# Patient Record
Sex: FEMALE | Race: WHITE | NOT HISPANIC OR LATINO | Employment: OTHER | URBAN - METROPOLITAN AREA
[De-identification: names, ages, dates, MRNs, and addresses within clinical notes are randomized per-mention and may not be internally consistent; named-entity substitution may affect disease eponyms.]

---

## 2017-01-10 ENCOUNTER — TELEPHONE (OUTPATIENT)
Dept: FAMILY MEDICINE CLINIC | Facility: CLINIC | Age: 77
End: 2017-01-10

## 2017-01-10 NOTE — TELEPHONE ENCOUNTER
Colds are caused by viruses of which self resolve and abx don't help.  I would suggest codeine-guaifenesis 5-10 ml every 6 hours as needed for cough/congestion disp 240ml and nasal saline rinses 2-3 times a day.  Let me know if no improvement in another week or so or if soa, fevers continuing or no imrpovement.

## 2017-01-12 ENCOUNTER — TELEPHONE (OUTPATIENT)
Dept: FAMILY MEDICINE CLINIC | Facility: CLINIC | Age: 77
End: 2017-01-12

## 2017-01-12 NOTE — TELEPHONE ENCOUNTER
2 days isn't very long time.  Send in zithromax 250mg 2 x1 and then 1po daily x 4 days disp #6 and hold onto, would try give couple more days.    RRJ

## 2017-04-04 ENCOUNTER — OFFICE VISIT (OUTPATIENT)
Dept: OBSTETRICS AND GYNECOLOGY | Age: 77
End: 2017-04-04

## 2017-04-04 VITALS
WEIGHT: 154 LBS | SYSTOLIC BLOOD PRESSURE: 148 MMHG | BODY MASS INDEX: 26.29 KG/M2 | HEIGHT: 64 IN | DIASTOLIC BLOOD PRESSURE: 72 MMHG

## 2017-04-04 DIAGNOSIS — Z79.890 POST-MENOPAUSE ON HRT (HORMONE REPLACEMENT THERAPY): ICD-10-CM

## 2017-04-04 DIAGNOSIS — Z78.0 MENOPAUSE: ICD-10-CM

## 2017-04-04 DIAGNOSIS — Z01.419 WELL WOMAN EXAM WITH ROUTINE GYNECOLOGICAL EXAM: Primary | ICD-10-CM

## 2017-04-04 LAB
BILIRUB BLD-MCNC: NEGATIVE MG/DL
GLUCOSE UR STRIP-MCNC: NEGATIVE MG/DL
KETONES UR QL: NEGATIVE
LEUKOCYTE EST, POC: NEGATIVE
NITRITE UR-MCNC: NEGATIVE MG/ML
PH UR: 7 [PH] (ref 5–8)
PROT UR STRIP-MCNC: NEGATIVE MG/DL
RBC # UR STRIP: NEGATIVE /UL
SP GR UR: 1.02 (ref 1–1.03)
UROBILINOGEN UR QL: NORMAL

## 2017-04-04 PROCEDURE — 81003 URINALYSIS AUTO W/O SCOPE: CPT | Performed by: OBSTETRICS & GYNECOLOGY

## 2017-04-04 PROCEDURE — G0101 CA SCREEN;PELVIC/BREAST EXAM: HCPCS | Performed by: OBSTETRICS & GYNECOLOGY

## 2017-04-04 RX ORDER — AZITHROMYCIN 250 MG/1
TABLET, FILM COATED ORAL
COMMUNITY
Start: 2017-01-13 | End: 2017-04-04

## 2017-04-04 NOTE — PROGRESS NOTES
"ANNUAL GYN EXAM        2017    Subjective   Martina Blake is a 76 y.o., G2, ,  White Female  OB History    Para Term  AB SAB TAB Ectopic Multiple Living   2 2 2 0 0 0 0 0 0 2      # Outcome Date GA Lbr Paul/2nd Weight Sex Delivery Anes PTL Lv   2 Term            1 Term                    Chief complaint:   Chief Complaint   Patient presents with   • Gynecologic Exam     Annual Exam, Mammogram 2016 @ [place]   • Nocturia     History of Present Illness:  Nocturia, 2-3 times per night. Voids 4-5 times per daylight hours. Rare urgency incontinence.    1.Menstrual Hx:  Hysterectomy , KATHERIN, both ovaries still in. Taking 1/2 of a 0.5 mg estradiol tablet daily. Doing well on this.    2.Pap Smear Hx:  Prior to first exam in , never had an abnormal Pap smear.                                 [id number], normal yearly Pap Smears.                                 No longer needs yearly Pap smears.     3.Breast / Ovarian Hx:  Mammogram 2016 [place].  Does not do Regular SBE.    No family history of breast cancer.  No family history of ovarian cancer.    4.Family Hx of Colon Ca:  None.  Last Colonoscopy in .        Family Hx of Uterine Ca:   None.     5.New Past Medical Hx: No new PHx.  Past Medical History:   Diagnosis Date   • Cystitis    • Cystocele     moderate   • Dyslipidemia    • Esophageal reflux    • Fatigue    • Hypertension    • Major depression, chronic    • Menopause    • Osteoarthritis    • Osteoporosis    • Palpitations    • Post menopausal problems    • RLS (restless legs syndrome)    • Seizure disorder    • Subjective tinnitus    • Vaginal delivery     x2  \"SONYA\"      \"JASON\"   • Vitamin D deficiency          6.New Fam Medical Hx: No new Fhx.  Family History   Problem Relation Age of Onset   • No Known Problems Mother    • Stroke Father 54      @ 60    • No Known Problems Maternal Grandmother    • No Known Problems Maternal Grandfather    • Heart disease Paternal " "Grandmother    • Heart disease Paternal Grandfather    • No Known Problems Daughter    • Stroke Brother 76   • Diabetes type II Brother          7.   Past Surgical History:   Procedure Laterality Date   • CHOLECYSTECTOMY     • COLONOSCOPY     • CRANIOTOMY     • TOTAL ABDOMINAL HYSTERECTOMY  1980    w/ bladder suspension.  Probably vaginal hysterectomy with anterior colporrhaphy.             The following portions of the patient's history were reviewed / updated as appropriate: allergies, current medications, past medical history, past surgical history, past family history, past social history, and problem list.      Review of Systems   Constitutional: Negative.    HENT: Negative.    Eyes: Negative.    Respiratory: Negative.    Cardiovascular: Negative.    Gastrointestinal: Negative.    Endocrine: Negative.    Genitourinary: Positive for frequency and urgency.   Musculoskeletal: Negative.    Skin: Negative.    Neurological: Negative.    Hematological: Negative.    Psychiatric/Behavioral: Positive for sleep disturbance.       Objective     /72  Ht 64\" (162.6 cm)  Wt 154 lb (69.9 kg)  BMI 26.43 kg/m2    PHYSICAL EXAM    Constitutional: Well-developed, well-nourished, mildly overweight white female, in no distress, alert and well oriented ×3.    Skin is warm, dry, without rash.       Neck appears normal, trachea in the midline.  Thyroid exam normal.  No carotid bruits bilaterally.         No cervical lymphadenopathy.    Lungs clear to auscultation.  Chest wall appears normal.    Heart with regular rhythm without murmur or gallop.    Breasts:         Right breast nontender, without dominant mass.  No nipple discharge.            No superficial skin changes.  No axillary adenopathy.        Left breast nontender, without dominant mass.  No nipple discharge.            No superficial skin changes.  No axillary adenopathy.      Abdomen is flat, soft and nontender.No palpable mass.           No hepatosplenomegaly.  " Flanks are negative.          Bowel sounds normal.  No abdominal bruit.          No inguinal lymphadenopathy bilaterally.     Pelvic exam :  Vulva normal.           External genitalia normal.  BUS negative.             Introitus normal.  Vaginal mucosa normal.            Vaginal cuff looks normal, no Pap smear.          Bimanual exam negative, tolerates 2 finger exam.  No palpable mass.               Adnexa are negative bilaterally.  Cannot feel either ovary.  No tenderness.  No palpable mass.          Rectovaginal exam negative .      Musc /Skel: Lower extremities without edema.     Neuro: Coordination is grossly normal.  Gait is normal.    Psych: Mood and affect is normal.  Behavior normal.  Thought content normal.            Judgment normal.          Martina was seen today for gynecologic exam and nocturia.    Diagnoses and all orders for this visit:    Well woman exam with routine gynecological exam  -     POC Urinalysis Dipstick, Automated    Menopause    Post-menopause on HRT (hormone replacement therapy)  Comments:  Doing well on half a tablet of 0.5 mg estradiol daily.      COMMENTS:      Dustin Velazquez MD

## 2017-04-13 DIAGNOSIS — M81.0 POSTMENOPAUSAL OSTEOPOROSIS: Primary | ICD-10-CM

## 2017-04-13 DIAGNOSIS — Z79.899 DRUG THERAPY: ICD-10-CM

## 2017-04-14 DIAGNOSIS — I10 BENIGN ESSENTIAL HYPERTENSION: Primary | ICD-10-CM

## 2017-04-14 DIAGNOSIS — R73.09 BLOOD GLUCOSE ABNORMAL: ICD-10-CM

## 2017-04-14 DIAGNOSIS — E55.9 VITAMIN D DEFICIENCY: ICD-10-CM

## 2017-04-14 DIAGNOSIS — R53.83 FATIGUE, UNSPECIFIED TYPE: ICD-10-CM

## 2017-04-14 DIAGNOSIS — E78.5 DYSLIPIDEMIA: ICD-10-CM

## 2017-04-14 LAB
25(OH)D3+25(OH)D2 SERPL-MCNC: 48.1 NG/ML (ref 30–100)
ALBUMIN SERPL-MCNC: 4.4 G/DL (ref 3.5–5.2)
ALBUMIN/GLOB SERPL: 1.6 G/DL
ALP SERPL-CCNC: 73 U/L (ref 39–117)
ALT SERPL-CCNC: 13 U/L (ref 1–33)
AST SERPL-CCNC: 19 U/L (ref 1–32)
BILIRUB SERPL-MCNC: 0.3 MG/DL (ref 0.1–1.2)
BUN SERPL-MCNC: 20 MG/DL (ref 8–23)
BUN/CREAT SERPL: 27.4 (ref 7–25)
CALCIUM SERPL-MCNC: 9.8 MG/DL (ref 8.6–10.5)
CHLORIDE SERPL-SCNC: 97 MMOL/L (ref 98–107)
CHOLEST SERPL-MCNC: 209 MG/DL (ref 0–200)
CO2 SERPL-SCNC: 31 MMOL/L (ref 22–29)
CREAT SERPL-MCNC: 0.73 MG/DL (ref 0.57–1)
GLOBULIN SER CALC-MCNC: 2.8 GM/DL
GLUCOSE SERPL-MCNC: 105 MG/DL (ref 65–99)
HBA1C MFR BLD: 5.2 % (ref 4.8–5.6)
HDLC SERPL-MCNC: 69 MG/DL (ref 40–60)
LDLC SERPL CALC-MCNC: 126 MG/DL (ref 0–100)
POTASSIUM SERPL-SCNC: 4.4 MMOL/L (ref 3.5–5.2)
PROT SERPL-MCNC: 7.2 G/DL (ref 6–8.5)
SODIUM SERPL-SCNC: 138 MMOL/L (ref 136–145)
TRIGL SERPL-MCNC: 72 MG/DL (ref 0–150)
VLDLC SERPL CALC-MCNC: 14.4 MG/DL (ref 5–40)

## 2017-04-18 ENCOUNTER — RESULTS ENCOUNTER (OUTPATIENT)
Dept: FAMILY MEDICINE CLINIC | Facility: CLINIC | Age: 77
End: 2017-04-18

## 2017-04-18 DIAGNOSIS — Z79.899 DRUG THERAPY: ICD-10-CM

## 2017-04-18 DIAGNOSIS — M81.0 POSTMENOPAUSAL OSTEOPOROSIS: ICD-10-CM

## 2017-04-19 ENCOUNTER — RESULTS ENCOUNTER (OUTPATIENT)
Dept: FAMILY MEDICINE CLINIC | Facility: CLINIC | Age: 77
End: 2017-04-19

## 2017-04-19 DIAGNOSIS — R73.09 BLOOD GLUCOSE ABNORMAL: ICD-10-CM

## 2017-04-19 DIAGNOSIS — I10 BENIGN ESSENTIAL HYPERTENSION: ICD-10-CM

## 2017-04-19 DIAGNOSIS — E55.9 VITAMIN D DEFICIENCY: ICD-10-CM

## 2017-04-19 DIAGNOSIS — E78.5 DYSLIPIDEMIA: ICD-10-CM

## 2017-04-21 ENCOUNTER — OFFICE VISIT (OUTPATIENT)
Dept: FAMILY MEDICINE CLINIC | Facility: CLINIC | Age: 77
End: 2017-04-21

## 2017-04-21 VITALS
HEIGHT: 64 IN | DIASTOLIC BLOOD PRESSURE: 80 MMHG | BODY MASS INDEX: 26.29 KG/M2 | OXYGEN SATURATION: 99 % | WEIGHT: 154 LBS | HEART RATE: 54 BPM | SYSTOLIC BLOOD PRESSURE: 128 MMHG | TEMPERATURE: 97.9 F

## 2017-04-21 DIAGNOSIS — I10 BENIGN ESSENTIAL HYPERTENSION: Primary | ICD-10-CM

## 2017-04-21 DIAGNOSIS — E78.5 DYSLIPIDEMIA: ICD-10-CM

## 2017-04-21 DIAGNOSIS — G25.81 RESTLESS LEGS SYNDROME: ICD-10-CM

## 2017-04-21 DIAGNOSIS — E55.9 VITAMIN D DEFICIENCY: ICD-10-CM

## 2017-04-21 PROCEDURE — 99214 OFFICE O/P EST MOD 30 MIN: CPT | Performed by: FAMILY MEDICINE

## 2017-04-21 RX ORDER — GABAPENTIN 300 MG/1
300 CAPSULE ORAL NIGHTLY
Qty: 30 CAPSULE | Refills: 5 | Status: SHIPPED | OUTPATIENT
Start: 2017-04-21 | End: 2017-10-30 | Stop reason: SDUPTHER

## 2017-04-21 NOTE — PROGRESS NOTES
Subjective   Martina Blake is a 76 y.o. female. Presents today for   Chief Complaint   Patient presents with   • Hypertension     pt here for 6 month med review and labs.    • Rash     pt has been getting itchy rash all over   • Restless Legs Syndrome     pt has been getting restless legs at night     Had preappt labs as below  Rash   This is a new problem. The current episode started 1 to 4 weeks ago. The rash is diffuse. Rash characteristics: a few area of excoriations and dry skin, otherwise no rash;  Is wide spread. She was exposed to a new detergent/soap. Pertinent negatives include no shortness of breath or vomiting. Past treatments include moisturizer. The treatment provided no relief.   Hypertension   This is a chronic problem. The current episode started more than 1 year ago. The problem is unchanged. The problem is controlled. Pertinent negatives include no chest pain, headaches, orthopnea, palpitations, peripheral edema, PND or shortness of breath. (Heart racing occly, none recently;  Saw Cardiology last month, d/w beta blocker vs ablation for SVT, but asx and so no changes.  Has not had any issues.) There are no associated agents to hypertension. Past treatments include calcium channel blockers and ACE inhibitors. The current treatment provides moderate improvement.   hx of elevated lipids, not on statin.  Hx of IFG, a1c normal and .  Hx of low vitamin D, but normal.  Cannot sleep due to RLS, would like try medication as not getting better on own.    Review of Systems   Constitutional: Unexpected weight change: Weight gain or loss.   Respiratory: Negative for shortness of breath and wheezing.    Cardiovascular: Negative for chest pain, palpitations, orthopnea, leg swelling and PND.   Gastrointestinal: Negative for abdominal pain, nausea and vomiting.   Musculoskeletal: Negative for myalgias.   Skin: Positive for rash.   Neurological: Negative for dizziness, tremors, syncope, facial asymmetry,  speech difficulty, weakness, light-headedness, numbness and headaches.       The following portions of the patient's history were reviewed and updated as appropriate: allergies, current medications, past medical history and problem list.    Patient Active Problem List   Diagnosis   • Blood glucose abnormal   • Benign essential hypertension   • Dyslipidemia   • Gastroesophageal reflux disease   • Fatigue   • Generalized osteoarthritis   • Chronic recurrent major depressive disorder   • Menopause present   • Osteoporosis   • Palpitations   • Restless legs syndrome   • Seizure disorder   • Subjective tinnitus   • Vitamin D deficiency   • Bradycardia   • Chest pain   • Menopause   • Post-menopause on HRT (hormone replacement therapy)       Allergies   Allergen Reactions   • Crab (Diagnostic) Itching and Rash   • Pseudoephedrine        Current Outpatient Prescriptions on File Prior to Visit   Medication Sig Dispense Refill   • amLODIPine (NORVASC) 10 MG tablet Take 1 tablet by mouth Daily. 90 tablet 3   • aspirin 81 MG EC tablet Take 81 mg by mouth daily.     • benazepril (LOTENSIN) 40 MG tablet Take 1 tablet by mouth Daily. 90 tablet 3   • Calcium Carb-Cholecalciferol (CALCIUM 600 + D PO) Take  by mouth.     • Denosumab (PROLIA SC) Inject  under the skin.     • escitalopram (LEXAPRO) 5 MG tablet Take 1 tablet by mouth Daily. 90 tablet 3   • estradiol (ESTRACE) 0.5 MG tablet Take 1 tablet by mouth Daily. 90 tablet 3   • folic acid (FOLVITE) 400 MCG tablet Take 400 mcg by mouth daily.     • hydrALAZINE (APRESOLINE) 100 MG tablet Take 1 tablet by mouth 3 (Three) Times a Day. 270 tablet 3   • hydrochlorothiazide (HYDRODIURIL) 25 MG tablet Take 1 tablet by mouth Daily. 90 tablet 3   • Omega-3 Fatty Acids (FISH OIL) 1000 MG capsule capsule Take  by mouth daily with breakfast.     • pantoprazole (PROTONIX) 40 MG EC tablet Take 1 tablet by mouth Daily. 90 tablet 3   • phenytoin (DILANTIN) 100 MG ER capsule 3 at hs 270 capsule 3  "  • vitamin B-12 (CYANOCOBALAMIN) 100 MCG tablet Take 50 mcg by mouth daily.     • vitamin B-6 (PYRIDOXINE) 100 MG tablet Take 100 mg by mouth daily.     • vitamin C (ASCORBIC ACID) 500 MG tablet Take 500 mg by mouth daily.     • vitamin D (CHOLECALCIFEROL) 400 UNITS tablet Take 400 Units by mouth daily.       No current facility-administered medications on file prior to visit.        Objective   Vitals:    04/21/17 1343   BP: 128/80   BP Location: Left arm   Patient Position: Sitting   Cuff Size: Adult   Pulse: 54   Temp: 97.9 °F (36.6 °C)   TempSrc: Oral   SpO2: 99%   Weight: 154 lb (69.9 kg)   Height: 64\" (162.6 cm)       Physical Exam   Constitutional: She appears well-developed and well-nourished.   HENT:   Head: Normocephalic and atraumatic.   Neck: Neck supple. No JVD present. No thyromegaly present.   Cardiovascular: Normal rate, regular rhythm and normal heart sounds.  Exam reveals no gallop and no friction rub.    No murmur heard.  Pulmonary/Chest: Effort normal and breath sounds normal. No respiratory distress. She has no wheezes. She has no rales.   Abdominal: Soft. Bowel sounds are normal. She exhibits no distension. There is no tenderness. There is no rebound and no guarding.   Musculoskeletal: She exhibits no edema.   Neurological: She is alert.   Skin: Skin is warm and dry.   Psychiatric: She has a normal mood and affect. Her behavior is normal.   Nursing note and vitals reviewed.      Hemoglobin A1c   Order: [id number]   Collected:  4/14/2017 10:00 AM   Notes Recorded by Jamie Walton DO on 4/15/2017 at 8:18 AM  Will discuss at follow-up office visit 4/21/17      Ref Range & Units 7d ago     Hemoglobin A1C 4.80 - 5.60 % 5.20   Comments: Hemoglobin A1C Ranges:   Increased Risk for Diabetes  5.7% to 6.4%   Diabetes                     >= 6.5%   Diabetic Goal                < 7.0%      View Full Report  Narrative   Performed at:  [address] "  [id number]  : Jesus Sheldon MD, Phone:  [phone]               Vitamin D 25 hydroxy   Order: [id number]   Collected:  4/14/2017 10:00 AM   Notes Recorded by Jamie Walton DO on 4/15/2017 at 8:18 AM  Will discuss at follow-up office visit 4/21/17      Ref Range & Units 7d ago     25 Hydroxy, Vitamin D 30.0 - 100.0 ng/mL 48.1   Comments: Reference Range for Total Vitamin D 25(OH)   Deficiency    <20.0 ng/mL   Insufficiency 21-29 ng/mL   Sufficiency    ng/mL   Toxicity      >100 ng/ml          View Full Report  Narrative   Performed at:  [address] [place] [address]  [address] [place], [place]  [id number]  : Jesus Sheldon MD, Phone:  [phone]                  Lipid panel   Order: [id number]   Collected:  4/14/2017 10:00 AM   Notes Recorded by Jamie Walton DO on 4/15/2017 at 8:18 AM  Will discuss at follow-up office visit 4/21/17      Ref Range & Units 7d ago     Total Cholesterol 0 - 200 mg/dL 209 (H)   Triglycerides 0 - 150 mg/dL 72   HDL Cholesterol 40 - 60 mg/dL 69 (H)   VLDL Cholesterol 5 - 40 mg/dL 14.4   LDL Cholesterol  0 - 100 mg/dL 126 (H)   View Full Report  Narrative   Performed at:  [address] [place] [address]  [address] [place], [place]  [id number]  : Jesus Sheldon MD, Phone:  [phone]                  Comprehensive metabolic panel   Order: [id number]   Collected:  4/14/2017 10:00 AM   Notes Recorded by Jamie Walton DO on 4/15/2017 at 8:18 AM  Will discuss at follow-up office visit 4/21/17      Ref Range & Units 7d ago     Glucose 65 - 99 mg/dL 105 (H)   BUN 8 - 23 mg/dL 20   Creatinine 0.57 - 1.00 mg/dL 0.73   eGFR Non African Am >60 mL/min/1.73 78   Comments: The MDRD GFR formula is only valid for adults with stable   renal function between ages 18 and 70.      eGFR African Am >60 mL/min/1.73 94   BUN/Creatinine Ratio 7.0 - 25.0 27.4 (H)   Sodium 136 - 145 mmol/L 138   Potassium 3.5 - 5.2 mmol/L 4.4   Chloride 98 - 107 mmol/L 97 (L)    Total CO2 22.0 - 29.0 mmol/L 31.0 (H)   Calcium 8.6 - 10.5 mg/dL 9.8   Total Protein 6.0 - 8.5 g/dL 7.2   Albumin 3.50 - 5.20 g/dL 4.40   Globulin gm/dL 2.8   A/G Ratio g/dL 1.6   Total Bilirubin 0.1 - 1.2 mg/dL 0.3   Alkaline Phosphatase 39 - 117 U/L 73   AST (SGOT) 1 - 32 U/L 19   ALT (SGPT) 1 - 33 U/L 13   View Full Report  Narrative   Performed at:  [facility] [address] [facility]  [address] [place], [place]  [id number]  : Jesus Sheldon MD, Phone:  [phone]                   Assessment/Plan   Martina was seen today for hypertension, rash and restless legs syndrome.    Diagnoses and all orders for this visit:    Benign essential hypertension    Dyslipidemia    Restless legs syndrome  -     gabapentin (NEURONTIN) 300 MG capsule; Take 1 capsule by mouth Every Night.    Vitamin D deficiency    Other orders  -     Cancel: Comprehensive Metabolic Panel  -     Cancel: Lipid Panel    -labs look good  -cholesterol ok, HDL high;  Not on statin  -hypertension - controlled, continue medications  -vitamin d controlled  -try dove sensitive skin bar soap only and see if itching improves       -Follow up: 1 year       Current Outpatient Prescriptions:   •  amLODIPine (NORVASC) 10 MG tablet, Take 1 tablet by mouth Daily., Disp: 90 tablet, Rfl: 3  •  aspirin 81 MG EC tablet, Take 81 mg by mouth daily., Disp: , Rfl:   •  benazepril (LOTENSIN) 40 MG tablet, Take 1 tablet by mouth Daily., Disp: 90 tablet, Rfl: 3  •  Calcium Carb-Cholecalciferol (CALCIUM 600 + D PO), Take  by mouth., Disp: , Rfl:   •  Denosumab (PROLIA SC), Inject  under the skin., Disp: , Rfl:   •  escitalopram (LEXAPRO) 5 MG tablet, Take 1 tablet by mouth Daily., Disp: 90 tablet, Rfl: 3  •  estradiol (ESTRACE) 0.5 MG tablet, Take 1 tablet by mouth Daily., Disp: 90 tablet, Rfl: 3  •  folic acid (FOLVITE) 400 MCG tablet, Take 400 mcg by mouth daily., Disp: , Rfl:   •  hydrALAZINE (APRESOLINE) 100 MG tablet, Take 1 tablet by mouth 3 (Three) Times a  Day., Disp: 270 tablet, Rfl: 3  •  hydrochlorothiazide (HYDRODIURIL) 25 MG tablet, Take 1 tablet by mouth Daily., Disp: 90 tablet, Rfl: 3  •  Omega-3 Fatty Acids (FISH OIL) 1000 MG capsule capsule, Take  by mouth daily with breakfast., Disp: , Rfl:   •  pantoprazole (PROTONIX) 40 MG EC tablet, Take 1 tablet by mouth Daily., Disp: 90 tablet, Rfl: 3  •  phenytoin (DILANTIN) 100 MG ER capsule, 3 at hs, Disp: 270 capsule, Rfl: 3  •  vitamin B-12 (CYANOCOBALAMIN) 100 MCG tablet, Take 50 mcg by mouth daily., Disp: , Rfl:   •  vitamin B-6 (PYRIDOXINE) 100 MG tablet, Take 100 mg by mouth daily., Disp: , Rfl:   •  vitamin C (ASCORBIC ACID) 500 MG tablet, Take 500 mg by mouth daily., Disp: , Rfl:   •  vitamin D (CHOLECALCIFEROL) 400 UNITS tablet, Take 400 Units by mouth daily., Disp: , Rfl:   •  gabapentin (NEURONTIN) 300 MG capsule, Take 1 capsule by mouth Every Night., Disp: 30 capsule, Rfl: 5

## 2017-05-09 ENCOUNTER — OFFICE VISIT (OUTPATIENT)
Dept: FAMILY MEDICINE CLINIC | Facility: CLINIC | Age: 77
End: 2017-05-09

## 2017-05-09 VITALS
BODY MASS INDEX: 25.95 KG/M2 | OXYGEN SATURATION: 98 % | DIASTOLIC BLOOD PRESSURE: 70 MMHG | HEIGHT: 64 IN | SYSTOLIC BLOOD PRESSURE: 124 MMHG | HEART RATE: 60 BPM | WEIGHT: 152 LBS | TEMPERATURE: 97.9 F

## 2017-05-09 DIAGNOSIS — L29.9 ITCHING: Primary | ICD-10-CM

## 2017-05-09 PROCEDURE — 99213 OFFICE O/P EST LOW 20 MIN: CPT | Performed by: NURSE PRACTITIONER

## 2017-05-19 ENCOUNTER — TELEPHONE (OUTPATIENT)
Dept: FAMILY MEDICINE CLINIC | Facility: CLINIC | Age: 77
End: 2017-05-19

## 2017-05-19 DIAGNOSIS — B86 SCABIES: Primary | ICD-10-CM

## 2017-05-19 RX ORDER — PERMETHRIN 50 MG/G
CREAM TOPICAL ONCE
Qty: 60 G | Refills: 1 | Status: SHIPPED | OUTPATIENT
Start: 2017-05-19 | End: 2017-05-19

## 2017-10-17 RX ORDER — AMLODIPINE BESYLATE 10 MG/1
TABLET ORAL
Qty: 90 TABLET | Refills: 0 | Status: SHIPPED | OUTPATIENT
Start: 2017-10-17 | End: 2017-10-30 | Stop reason: SDUPTHER

## 2017-10-18 ENCOUNTER — TELEPHONE (OUTPATIENT)
Dept: FAMILY MEDICINE CLINIC | Facility: CLINIC | Age: 77
End: 2017-10-18

## 2017-10-19 DIAGNOSIS — Z79.899 DRUG THERAPY: ICD-10-CM

## 2017-10-19 DIAGNOSIS — E55.9 VITAMIN D DEFICIENCY: ICD-10-CM

## 2017-10-19 DIAGNOSIS — I10 BENIGN ESSENTIAL HYPERTENSION: Primary | ICD-10-CM

## 2017-10-19 DIAGNOSIS — E78.5 DYSLIPIDEMIA: ICD-10-CM

## 2017-10-19 DIAGNOSIS — M81.0 AGE-RELATED OSTEOPOROSIS WITHOUT CURRENT PATHOLOGICAL FRACTURE: ICD-10-CM

## 2017-10-19 DIAGNOSIS — G40.909 SEIZURE DISORDER (HCC): ICD-10-CM

## 2017-10-27 LAB
25(OH)D3+25(OH)D2 SERPL-MCNC: 48.3 NG/ML (ref 30–100)
ALBUMIN SERPL-MCNC: 4.1 G/DL (ref 3.5–5.2)
ALBUMIN/GLOB SERPL: 1.4 G/DL
ALP SERPL-CCNC: 106 U/L (ref 39–117)
ALT SERPL-CCNC: 20 U/L (ref 1–33)
AST SERPL-CCNC: 25 U/L (ref 1–32)
BASOPHILS # BLD AUTO: 0.03 10*3/MM3 (ref 0–0.2)
BASOPHILS NFR BLD AUTO: 0.6 % (ref 0–1.5)
BILIRUB SERPL-MCNC: 0.4 MG/DL (ref 0.1–1.2)
BUN SERPL-MCNC: 16 MG/DL (ref 8–23)
BUN/CREAT SERPL: 26.7 (ref 7–25)
CALCIUM SERPL-MCNC: 9.5 MG/DL (ref 8.6–10.5)
CHLORIDE SERPL-SCNC: 94 MMOL/L (ref 98–107)
CHOLEST SERPL-MCNC: 204 MG/DL (ref 0–200)
CO2 SERPL-SCNC: 27.4 MMOL/L (ref 22–29)
CREAT SERPL-MCNC: 0.6 MG/DL (ref 0.57–1)
EOSINOPHIL # BLD AUTO: 0.39 10*3/MM3 (ref 0–0.7)
EOSINOPHIL NFR BLD AUTO: 7.7 % (ref 0.3–6.2)
ERYTHROCYTE [DISTWIDTH] IN BLOOD BY AUTOMATED COUNT: 13.1 % (ref 11.7–13)
GFR SERPLBLD CREATININE-BSD FMLA CKD-EPI: 117 ML/MIN/1.73
GFR SERPLBLD CREATININE-BSD FMLA CKD-EPI: 97 ML/MIN/1.73
GLOBULIN SER CALC-MCNC: 2.9 GM/DL
GLUCOSE SERPL-MCNC: 97 MG/DL (ref 65–99)
HCT VFR BLD AUTO: 36.6 % (ref 35.6–45.5)
HDLC SERPL-MCNC: 71 MG/DL (ref 40–60)
HGB BLD-MCNC: 12.2 G/DL (ref 11.9–15.5)
IMM GRANULOCYTES # BLD: 0 10*3/MM3 (ref 0–0.03)
IMM GRANULOCYTES NFR BLD: 0 % (ref 0–0.5)
LDLC SERPL CALC-MCNC: 123 MG/DL (ref 0–100)
LYMPHOCYTES # BLD AUTO: 0.52 10*3/MM3 (ref 0.9–4.8)
LYMPHOCYTES NFR BLD AUTO: 10.2 % (ref 19.6–45.3)
MCH RBC QN AUTO: 31.2 PG (ref 26.9–32)
MCHC RBC AUTO-ENTMCNC: 33.3 G/DL (ref 32.4–36.3)
MCV RBC AUTO: 93.6 FL (ref 80.5–98.2)
MONOCYTES # BLD AUTO: 0.83 10*3/MM3 (ref 0.2–1.2)
MONOCYTES NFR BLD AUTO: 16.3 % (ref 5–12)
NEUTROPHILS # BLD AUTO: 3.32 10*3/MM3 (ref 1.9–8.1)
NEUTROPHILS NFR BLD AUTO: 65.2 % (ref 42.7–76)
PHENYTOIN SERPL-MCNC: 4.6 MCG/ML (ref 10–20)
PLATELET # BLD AUTO: 204 10*3/MM3 (ref 140–500)
POTASSIUM SERPL-SCNC: 4.4 MMOL/L (ref 3.5–5.2)
PROT SERPL-MCNC: 7 G/DL (ref 6–8.5)
RBC # BLD AUTO: 3.91 10*6/MM3 (ref 3.9–5.2)
SODIUM SERPL-SCNC: 134 MMOL/L (ref 136–145)
TRIGL SERPL-MCNC: 50 MG/DL (ref 0–150)
VLDLC SERPL CALC-MCNC: 10 MG/DL (ref 5–40)
WBC # BLD AUTO: 5.09 10*3/MM3 (ref 4.5–10.7)

## 2017-10-29 NOTE — PROGRESS NOTES
Call and mail copy of results to patient.  Cholesterol adequate control  Phenytoin (dilantin) level is subtherapeutic, any missed doses?  Vitamin D normal  Blood counts normal

## 2017-10-30 ENCOUNTER — OFFICE VISIT (OUTPATIENT)
Dept: FAMILY MEDICINE CLINIC | Facility: CLINIC | Age: 77
End: 2017-10-30

## 2017-10-30 VITALS
SYSTOLIC BLOOD PRESSURE: 122 MMHG | BODY MASS INDEX: 27.46 KG/M2 | OXYGEN SATURATION: 99 % | WEIGHT: 160 LBS | DIASTOLIC BLOOD PRESSURE: 70 MMHG | HEART RATE: 68 BPM | TEMPERATURE: 97.4 F

## 2017-10-30 DIAGNOSIS — G25.81 RESTLESS LEGS SYNDROME: ICD-10-CM

## 2017-10-30 DIAGNOSIS — R21 RASH: Primary | ICD-10-CM

## 2017-10-30 DIAGNOSIS — L29.9 ITCHING: ICD-10-CM

## 2017-10-30 PROCEDURE — 99213 OFFICE O/P EST LOW 20 MIN: CPT | Performed by: FAMILY MEDICINE

## 2017-10-30 RX ORDER — PHENYTOIN SODIUM 100 MG/1
CAPSULE, EXTENDED RELEASE ORAL
Qty: 270 CAPSULE | Refills: 3 | Status: SHIPPED | OUTPATIENT
Start: 2017-10-30 | End: 2018-04-10

## 2017-10-30 RX ORDER — HYDROCHLOROTHIAZIDE 25 MG/1
25 TABLET ORAL DAILY
Qty: 90 TABLET | Refills: 3 | Status: SHIPPED | OUTPATIENT
Start: 2017-10-30 | End: 2018-04-14 | Stop reason: HOSPADM

## 2017-10-30 RX ORDER — HYDRALAZINE HYDROCHLORIDE 100 MG/1
100 TABLET, FILM COATED ORAL 3 TIMES DAILY
Qty: 270 TABLET | Refills: 3 | Status: ON HOLD | OUTPATIENT
Start: 2017-10-30 | End: 2018-04-14

## 2017-10-30 RX ORDER — GABAPENTIN 300 MG/1
CAPSULE ORAL
Qty: 90 CAPSULE | Refills: 5 | Status: SHIPPED | OUTPATIENT
Start: 2017-10-30 | End: 2017-12-01

## 2017-10-30 RX ORDER — PANTOPRAZOLE SODIUM 40 MG/1
40 TABLET, DELAYED RELEASE ORAL DAILY
Qty: 90 TABLET | Refills: 3 | Status: SHIPPED | OUTPATIENT
Start: 2017-10-30 | End: 2018-11-23 | Stop reason: SDUPTHER

## 2017-10-30 RX ORDER — DOXEPIN HYDROCHLORIDE 25 MG/1
25 CAPSULE ORAL NIGHTLY
COMMUNITY
End: 2018-04-02

## 2017-10-30 RX ORDER — CETIRIZINE HYDROCHLORIDE 10 MG/1
10 TABLET ORAL DAILY
COMMUNITY
End: 2018-05-16 | Stop reason: HOSPADM

## 2017-10-30 RX ORDER — AMLODIPINE BESYLATE 10 MG/1
10 TABLET ORAL DAILY
Qty: 90 TABLET | Refills: 3 | Status: SHIPPED | OUTPATIENT
Start: 2017-10-30 | End: 2018-11-05 | Stop reason: SDUPTHER

## 2017-10-30 RX ORDER — BENAZEPRIL HYDROCHLORIDE 40 MG/1
40 TABLET, FILM COATED ORAL DAILY
Qty: 90 TABLET | Refills: 3 | Status: SHIPPED | OUTPATIENT
Start: 2017-10-30 | End: 2018-11-05 | Stop reason: SDUPTHER

## 2017-10-30 RX ORDER — ESCITALOPRAM OXALATE 5 MG/1
5 TABLET ORAL DAILY
Qty: 90 TABLET | Refills: 3 | Status: SHIPPED | OUTPATIENT
Start: 2017-10-30 | End: 2018-05-16 | Stop reason: HOSPADM

## 2017-10-30 NOTE — PROGRESS NOTES
Subjective   Martina Blake is a 77 y.o. female. Presents today for   Chief Complaint   Patient presents with   • Follow-up     had scabes all summer and now her elbow and arms are sore she has been seeing derm.         Rash   This is a recurrent problem. The current episode started more than 1 month ago. Progression since onset: Dx with scabies, saw dermatology;  tx for scabies;  Given doxepin, zyrtec;  Tried stopping supplements;   Relates rash still present. The rash is diffuse. The rash is characterized by itchiness. Associated with: scabies. (Saw Dermatology 2 weeks ago, suggested come back here as well.  Reports has had injection of steroid x2;  Had tapered dose of prednisone as well;  Reports tested for celiac but negative;  Did skin bx, told scabies.) Past treatments include antihistamine and anti-itch cream. The treatment provided mild relief.     Relates wonders if gabapentin as started in spring, d/w often give for itching and would actually increase dose to see if helps;  Helps RLS    Review of Systems   Skin: Positive for rash.       The following portions of the patient's history were reviewed and updated as appropriate: allergies, current medications, past medical history and problem list.    Patient Active Problem List   Diagnosis   • Blood glucose abnormal   • Benign essential hypertension   • Dyslipidemia   • Gastroesophageal reflux disease   • Fatigue   • Generalized osteoarthritis   • Chronic recurrent major depressive disorder   • Menopause present   • Osteoporosis   • Palpitations   • Restless legs syndrome   • Seizure disorder   • Subjective tinnitus   • Vitamin D deficiency   • Bradycardia   • Chest pain   • Menopause   • Post-menopause on HRT (hormone replacement therapy)       Allergies   Allergen Reactions   • Crab (Diagnostic) Itching and Rash   • Pseudoephedrine        Current Outpatient Prescriptions on File Prior to Visit   Medication Sig Dispense Refill   • amLODIPine (NORVASC) 10 MG  tablet take 1 tablet by mouth once daily 90 tablet 0   • aspirin 81 MG EC tablet Take 81 mg by mouth daily.     • benazepril (LOTENSIN) 40 MG tablet Take 1 tablet by mouth Daily. 90 tablet 3   • Calcium Carb-Cholecalciferol (CALCIUM 600 + D PO) Take  by mouth.     • Denosumab (PROLIA SC) Inject  under the skin.     • escitalopram (LEXAPRO) 5 MG tablet Take 1 tablet by mouth Daily. 90 tablet 3   • estradiol (ESTRACE) 0.5 MG tablet Take 1 tablet by mouth Daily. 90 tablet 3   • folic acid (FOLVITE) 400 MCG tablet Take 400 mcg by mouth daily.     • gabapentin (NEURONTIN) 300 MG capsule Take 1 capsule by mouth Every Night. 30 capsule 5   • hydrALAZINE (APRESOLINE) 100 MG tablet Take 1 tablet by mouth 3 (Three) Times a Day. 270 tablet 3   • hydrochlorothiazide (HYDRODIURIL) 25 MG tablet Take 1 tablet by mouth Daily. 90 tablet 3   • Omega-3 Fatty Acids (FISH OIL) 1000 MG capsule capsule Take  by mouth daily with breakfast.     • pantoprazole (PROTONIX) 40 MG EC tablet Take 1 tablet by mouth Daily. 90 tablet 3   • phenytoin (DILANTIN) 100 MG ER capsule 3 at hs 270 capsule 3   • vitamin B-12 (CYANOCOBALAMIN) 100 MCG tablet Take 50 mcg by mouth daily.     • vitamin B-6 (PYRIDOXINE) 100 MG tablet Take 100 mg by mouth daily.     • vitamin C (ASCORBIC ACID) 500 MG tablet Take 500 mg by mouth daily.     • vitamin D (CHOLECALCIFEROL) 400 UNITS tablet Take 400 Units by mouth daily.       No current facility-administered medications on file prior to visit.        Objective   Vitals:    10/30/17 1419   BP: 122/70   Pulse: 68   Temp: 97.4 °F (36.3 °C)   SpO2: 99%   Weight: 160 lb (72.6 kg)       Physical Exam   Constitutional: She is oriented to person, place, and time. She appears well-developed and well-nourished.   Neurological: She is alert and oriented to person, place, and time.   Skin: Skin is warm and dry.   Over trunk punctate lesions.   Psychiatric: She has a normal mood and affect. Her behavior is normal.   Nursing note and  vitals reviewed.      Assessment/Plan   Martina was seen today for follow-up.    Diagnoses and all orders for this visit:    Rash    Restless legs syndrome  -     gabapentin (NEURONTIN) 300 MG capsule; 1 every am and 2 nightly    Itching    Other orders  -     amLODIPine (NORVASC) 10 MG tablet; Take 1 tablet by mouth Daily.  -     benazepril (LOTENSIN) 40 MG tablet; Take 1 tablet by mouth Daily.  -     escitalopram (LEXAPRO) 5 MG tablet; Take 1 tablet by mouth Daily.  -     hydrALAZINE (APRESOLINE) 100 MG tablet; Take 1 tablet by mouth 3 (Three) Times a Day.  -     hydrochlorothiazide (HYDRODIURIL) 25 MG tablet; Take 1 tablet by mouth Daily.  -     pantoprazole (PROTONIX) 40 MG EC tablet; Take 1 tablet by mouth Daily.  -     phenytoin (DILANTIN) 100 MG ER capsule; 3 at hs    -try vics vapor rub with OTC HC 1% and see if relieves  -increase gabapentin and see if helps  -reports dilantin had gone down to just 2 a day, reported to patient was subtx on recent check, go back to 3 daily  -requests medicaiton refills as due.         -Follow up: 3 months       Current Outpatient Prescriptions:   •  amLODIPine (NORVASC) 10 MG tablet, take 1 tablet by mouth once daily, Disp: 90 tablet, Rfl: 0  •  aspirin 81 MG EC tablet, Take 81 mg by mouth daily., Disp: , Rfl:   •  benazepril (LOTENSIN) 40 MG tablet, Take 1 tablet by mouth Daily., Disp: 90 tablet, Rfl: 3  •  Calcium Carb-Cholecalciferol (CALCIUM 600 + D PO), Take  by mouth., Disp: , Rfl:   •  cetirizine (zyrTEC) 10 MG tablet, Take 10 mg by mouth Daily., Disp: , Rfl:   •  Denosumab (PROLIA SC), Inject  under the skin., Disp: , Rfl:   •  doxepin (SINEquan) 25 MG capsule, Take 25 mg by mouth Every Night., Disp: , Rfl:   •  escitalopram (LEXAPRO) 5 MG tablet, Take 1 tablet by mouth Daily., Disp: 90 tablet, Rfl: 3  •  estradiol (ESTRACE) 0.5 MG tablet, Take 1 tablet by mouth Daily., Disp: 90 tablet, Rfl: 3  •  folic acid (FOLVITE) 400 MCG tablet, Take 400 mcg by mouth daily., Disp:  , Rfl:   •  gabapentin (NEURONTIN) 300 MG capsule, Take 1 capsule by mouth Every Night., Disp: 30 capsule, Rfl: 5  •  hydrALAZINE (APRESOLINE) 100 MG tablet, Take 1 tablet by mouth 3 (Three) Times a Day., Disp: 270 tablet, Rfl: 3  •  hydrochlorothiazide (HYDRODIURIL) 25 MG tablet, Take 1 tablet by mouth Daily., Disp: 90 tablet, Rfl: 3  •  Omega-3 Fatty Acids (FISH OIL) 1000 MG capsule capsule, Take  by mouth daily with breakfast., Disp: , Rfl:   •  pantoprazole (PROTONIX) 40 MG EC tablet, Take 1 tablet by mouth Daily., Disp: 90 tablet, Rfl: 3  •  phenytoin (DILANTIN) 100 MG ER capsule, 3 at hs, Disp: 270 capsule, Rfl: 3  •  vitamin B-12 (CYANOCOBALAMIN) 100 MCG tablet, Take 50 mcg by mouth daily., Disp: , Rfl:   •  vitamin B-6 (PYRIDOXINE) 100 MG tablet, Take 100 mg by mouth daily., Disp: , Rfl:   •  vitamin C (ASCORBIC ACID) 500 MG tablet, Take 500 mg by mouth daily., Disp: , Rfl:   •  vitamin D (CHOLECALCIFEROL) 400 UNITS tablet, Take 400 Units by mouth daily., Disp: , Rfl:

## 2017-11-06 ENCOUNTER — TELEPHONE (OUTPATIENT)
Dept: FAMILY MEDICINE CLINIC | Facility: CLINIC | Age: 77
End: 2017-11-06

## 2017-11-06 DIAGNOSIS — M81.0 POSTMENOPAUSAL OSTEOPOROSIS: Primary | ICD-10-CM

## 2017-12-01 ENCOUNTER — OFFICE VISIT (OUTPATIENT)
Dept: FAMILY MEDICINE CLINIC | Facility: CLINIC | Age: 77
End: 2017-12-01

## 2017-12-01 VITALS
BODY MASS INDEX: 27.12 KG/M2 | WEIGHT: 158 LBS | HEART RATE: 57 BPM | SYSTOLIC BLOOD PRESSURE: 138 MMHG | OXYGEN SATURATION: 96 % | TEMPERATURE: 97.5 F | DIASTOLIC BLOOD PRESSURE: 62 MMHG

## 2017-12-01 DIAGNOSIS — L29.9 ITCHING: Primary | ICD-10-CM

## 2017-12-01 PROCEDURE — 99213 OFFICE O/P EST LOW 20 MIN: CPT | Performed by: FAMILY MEDICINE

## 2017-12-01 NOTE — PROGRESS NOTES
Subjective   Martina Blake is a 77 y.o. female. Presents today for   Chief Complaint   Patient presents with   • Follow-up     still itching and broke out all over in a rash.       Rash   This is a chronic problem. The current episode started more than 1 month ago (several months). The problem is unchanged. The rash is diffuse. The rash is characterized by itchiness. She was exposed to nothing (was treated for scabies a whiel ago). Pertinent negatives include no facial edema, fever, rhinorrhea, shortness of breath or sore throat. Past treatments include topical steroids, oral steroids, anti-itch cream, antihistamine and moisturizer (Has been to dermatology several times, they suggested coming back here and possibly seeing allergist.). The treatment provided no relief.       Review of Systems   Constitutional: Negative for fever.   HENT: Negative for rhinorrhea and sore throat.    Respiratory: Negative for shortness of breath.    Skin: Positive for rash.       The following portions of the patient's history were reviewed and updated as appropriate: allergies, current medications, past medical history and problem list.    Patient Active Problem List   Diagnosis   • Blood glucose abnormal   • Benign essential hypertension   • Dyslipidemia   • Gastroesophageal reflux disease   • Fatigue   • Generalized osteoarthritis   • Chronic recurrent major depressive disorder   • Menopause present   • Osteoporosis   • Palpitations   • Restless legs syndrome   • Seizure disorder   • Subjective tinnitus   • Vitamin D deficiency   • Bradycardia   • Chest pain   • Menopause   • Post-menopause on HRT (hormone replacement therapy)       Allergies   Allergen Reactions   • Crab (Diagnostic) Itching and Rash   • Pseudoephedrine        Current Outpatient Prescriptions on File Prior to Visit   Medication Sig Dispense Refill   • amLODIPine (NORVASC) 10 MG tablet Take 1 tablet by mouth Daily. 90 tablet 3   • aspirin 81 MG EC tablet Take 81 mg  by mouth daily.     • benazepril (LOTENSIN) 40 MG tablet Take 1 tablet by mouth Daily. 90 tablet 3   • Calcium Carb-Cholecalciferol (CALCIUM 600 + D PO) Take  by mouth.     • cetirizine (zyrTEC) 10 MG tablet Take 10 mg by mouth Daily.     • Denosumab (PROLIA SC) Inject  under the skin.     • doxepin (SINEquan) 25 MG capsule Take 25 mg by mouth Every Night.     • escitalopram (LEXAPRO) 5 MG tablet Take 1 tablet by mouth Daily. 90 tablet 3   • estradiol (ESTRACE) 0.5 MG tablet Take 1 tablet by mouth Daily. 90 tablet 3   • folic acid (FOLVITE) 400 MCG tablet Take 400 mcg by mouth daily.     • gabapentin (NEURONTIN) 300 MG capsule 1 every am and 2 nightly 90 capsule 5   • hydrALAZINE (APRESOLINE) 100 MG tablet Take 1 tablet by mouth 3 (Three) Times a Day. 270 tablet 3   • hydrochlorothiazide (HYDRODIURIL) 25 MG tablet Take 1 tablet by mouth Daily. 90 tablet 3   • Omega-3 Fatty Acids (FISH OIL) 1000 MG capsule capsule Take  by mouth daily with breakfast.     • pantoprazole (PROTONIX) 40 MG EC tablet Take 1 tablet by mouth Daily. 90 tablet 3   • phenytoin (DILANTIN) 100 MG ER capsule 3 at hs 270 capsule 3   • vitamin B-12 (CYANOCOBALAMIN) 100 MCG tablet Take 50 mcg by mouth daily.     • vitamin B-6 (PYRIDOXINE) 100 MG tablet Take 100 mg by mouth daily.     • vitamin C (ASCORBIC ACID) 500 MG tablet Take 500 mg by mouth daily.     • vitamin D (CHOLECALCIFEROL) 400 UNITS tablet Take 400 Units by mouth daily.       No current facility-administered medications on file prior to visit.        Objective   Vitals:    12/01/17 0957   BP: 138/62   Pulse: 57   Temp: 97.5 °F (36.4 °C)   SpO2: 96%   Weight: 158 lb (71.7 kg)       Physical Exam   Constitutional: She is oriented to person, place, and time. She appears well-developed and well-nourished.   Neurological: She is alert and oriented to person, place, and time.   Skin: Skin is warm and dry. Rash noted. Rash is papular.   A few scattered excoriations as well.  Dry skin    Psychiatric: She has a normal mood and affect. Her behavior is normal.   Nursing note and vitals reviewed.      Assessment/Plan   Martina was seen today for follow-up.    Diagnoses and all orders for this visit:    Itching  -     Ambulatory Referral to Allergy        1)  Gabapentin 1 nightly x 3 days and stop  2)  Dove bar soap only  3) no tub baths;  Showers luke warm only  4) After showers apply lotion head to toe, with aveeno fragrance free or Eucerin dry skin therapy  5) Change laundry detergent to dye and fragrance free, stop all dryer sheets and use liquid fabric softener that is dye and fragrance free only.         -Follow up: Prn - RTC if worse or no improvement.          Current Outpatient Prescriptions:   •  amLODIPine (NORVASC) 10 MG tablet, Take 1 tablet by mouth Daily., Disp: 90 tablet, Rfl: 3  •  aspirin 81 MG EC tablet, Take 81 mg by mouth daily., Disp: , Rfl:   •  benazepril (LOTENSIN) 40 MG tablet, Take 1 tablet by mouth Daily., Disp: 90 tablet, Rfl: 3  •  Calcium Carb-Cholecalciferol (CALCIUM 600 + D PO), Take  by mouth., Disp: , Rfl:   •  cetirizine (zyrTEC) 10 MG tablet, Take 10 mg by mouth Daily., Disp: , Rfl:   •  Denosumab (PROLIA SC), Inject  under the skin., Disp: , Rfl:   •  doxepin (SINEquan) 25 MG capsule, Take 25 mg by mouth Every Night., Disp: , Rfl:   •  escitalopram (LEXAPRO) 5 MG tablet, Take 1 tablet by mouth Daily., Disp: 90 tablet, Rfl: 3  •  estradiol (ESTRACE) 0.5 MG tablet, Take 1 tablet by mouth Daily., Disp: 90 tablet, Rfl: 3  •  folic acid (FOLVITE) 400 MCG tablet, Take 400 mcg by mouth daily., Disp: , Rfl:   •  gabapentin (NEURONTIN) 300 MG capsule, 1 every am and 2 nightly, Disp: 90 capsule, Rfl: 5  •  hydrALAZINE (APRESOLINE) 100 MG tablet, Take 1 tablet by mouth 3 (Three) Times a Day., Disp: 270 tablet, Rfl: 3  •  hydrochlorothiazide (HYDRODIURIL) 25 MG tablet, Take 1 tablet by mouth Daily., Disp: 90 tablet, Rfl: 3  •  Omega-3 Fatty Acids (FISH OIL) 1000 MG capsule  capsule, Take  by mouth daily with breakfast., Disp: , Rfl:   •  pantoprazole (PROTONIX) 40 MG EC tablet, Take 1 tablet by mouth Daily., Disp: 90 tablet, Rfl: 3  •  phenytoin (DILANTIN) 100 MG ER capsule, 3 at hs, Disp: 270 capsule, Rfl: 3  •  vitamin B-12 (CYANOCOBALAMIN) 100 MCG tablet, Take 50 mcg by mouth daily., Disp: , Rfl:   •  vitamin B-6 (PYRIDOXINE) 100 MG tablet, Take 100 mg by mouth daily., Disp: , Rfl:   •  vitamin C (ASCORBIC ACID) 500 MG tablet, Take 500 mg by mouth daily., Disp: , Rfl:   •  vitamin D (CHOLECALCIFEROL) 400 UNITS tablet, Take 400 Units by mouth daily., Disp: , Rfl:

## 2017-12-01 NOTE — PATIENT INSTRUCTIONS
1)  Gabapentin 1 nightly x 3 days and stop  2)  Dove bar soap only  3) no tub baths;  Showers luke warm only  4) After showers apply lotion head to toe, with aveeno fragrance free or Eucerin dry skin therapy  5) Change laundry detergent to dye and fragrance free, stop all dryer sheets and use liquid fabric softener that is dye and fragrance free only.

## 2017-12-05 RX ORDER — ESTRADIOL 0.5 MG/1
TABLET ORAL
Qty: 90 TABLET | Refills: 1 | Status: SHIPPED | OUTPATIENT
Start: 2017-12-05 | End: 2018-04-10

## 2018-02-05 ENCOUNTER — TELEPHONE (OUTPATIENT)
Dept: FAMILY MEDICINE CLINIC | Facility: CLINIC | Age: 78
End: 2018-02-05

## 2018-02-07 DIAGNOSIS — M81.0 AGE-RELATED OSTEOPOROSIS WITHOUT CURRENT PATHOLOGICAL FRACTURE: Primary | ICD-10-CM

## 2018-04-02 ENCOUNTER — OFFICE VISIT (OUTPATIENT)
Dept: FAMILY MEDICINE CLINIC | Facility: CLINIC | Age: 78
End: 2018-04-02

## 2018-04-02 VITALS
HEIGHT: 64 IN | SYSTOLIC BLOOD PRESSURE: 142 MMHG | HEART RATE: 78 BPM | OXYGEN SATURATION: 99 % | BODY MASS INDEX: 25.61 KG/M2 | WEIGHT: 150 LBS | TEMPERATURE: 97.6 F | DIASTOLIC BLOOD PRESSURE: 60 MMHG

## 2018-04-02 DIAGNOSIS — J30.2 CHRONIC SEASONAL ALLERGIC RHINITIS, UNSPECIFIED TRIGGER: ICD-10-CM

## 2018-04-02 DIAGNOSIS — E55.9 VITAMIN D DEFICIENCY: ICD-10-CM

## 2018-04-02 DIAGNOSIS — R53.83 FATIGUE, UNSPECIFIED TYPE: Primary | ICD-10-CM

## 2018-04-02 DIAGNOSIS — H61.21 IMPACTED CERUMEN OF RIGHT EAR: ICD-10-CM

## 2018-04-02 PROCEDURE — 99214 OFFICE O/P EST MOD 30 MIN: CPT | Performed by: NURSE PRACTITIONER

## 2018-04-02 PROCEDURE — 69209 REMOVE IMPACTED EAR WAX UNI: CPT | Performed by: NURSE PRACTITIONER

## 2018-04-02 RX ORDER — GABAPENTIN 300 MG/1
600 CAPSULE ORAL NIGHTLY
Refills: 0 | COMMUNITY
Start: 2018-03-06 | End: 2018-08-23 | Stop reason: SDUPTHER

## 2018-04-02 RX ORDER — IPRATROPIUM BROMIDE 21 UG/1
SPRAY, METERED NASAL
COMMUNITY
Start: 2018-03-28 | End: 2018-04-10

## 2018-04-02 RX ORDER — MONTELUKAST SODIUM 10 MG/1
10 TABLET ORAL DAILY
COMMUNITY
Start: 2018-03-28 | End: 2018-09-21 | Stop reason: SDUPTHER

## 2018-04-02 RX ORDER — RANITIDINE 150 MG/1
150 TABLET ORAL 2 TIMES DAILY
Refills: 1 | COMMUNITY
Start: 2018-03-06 | End: 2018-05-16 | Stop reason: HOSPADM

## 2018-04-02 NOTE — PROGRESS NOTES
"Subjective   Martina Blake is a 77 y.o. female who presents c/o fatigue x 6 weeks. Was dx with allergy flare up last week at [facility]. Also with a pain across top of back x several months.     History of Present Illness   Started ipratropium and singulair last week for allergy symptoms, ears popping, nose burning.     Lasts 6 weeks, no energy, 30 minutes of work, then has to nap. Daughter thought maybe had mono, as fatigue. Was generally lazy over the winter.     Having epigastric pain with eating and nothing tastes right. X 1 week wonders if associated with medications.     Neck stiff for 2-3 days, applying heat, some help. Sleeping on 2 pillows as post nasal drainage.     Next prolia injection June 7  The following portions of the patient's history were reviewed and updated as appropriate: allergies, current medications, past family history, past medical history, past social history, past surgical history and problem list.    Review of Systems   Constitutional: Positive for fatigue. Negative for chills and fever.   HENT: Positive for congestion and postnasal drip. Negative for rhinorrhea, sinus pressure and sneezing.    Eyes: Negative.    Respiratory: Negative.    Cardiovascular: Negative.    Endocrine: Negative.    Genitourinary: Negative.    Allergic/Immunologic: Positive for environmental allergies.   Neurological: Positive for headaches (some frontal headaches, mainly at night. ).   Hematological: Negative.    Psychiatric/Behavioral: Negative.      /60   Pulse 78   Temp 97.6 °F (36.4 °C) (Oral)   Ht 162.6 cm (64\")   Wt 68 kg (150 lb)   SpO2 99%   BMI 25.75 kg/m²     Objective     Physical Exam   Constitutional: She appears well-developed and well-nourished.   HENT:   Right Ear: Tympanic membrane is not retracted. A middle ear effusion is present.   Left Ear: Tympanic membrane is not retracted. A middle ear effusion is present.   Nose: Mucosal edema present. No rhinorrhea. Right sinus " exhibits no maxillary sinus tenderness and no frontal sinus tenderness. Left sinus exhibits no maxillary sinus tenderness and no frontal sinus tenderness.   Mouth/Throat: Uvula is midline, oropharynx is clear and moist and mucous membranes are normal.   R cerumen impaction   Neck: Normal range of motion. Neck supple. No tracheal deviation present. No thyromegaly present.   Cardiovascular: Normal rate, regular rhythm and normal heart sounds.    Pulmonary/Chest: Effort normal and breath sounds normal.   Musculoskeletal:        Cervical back: She exhibits spasm. She exhibits normal range of motion and no tenderness.   Lymphadenopathy:     She has no cervical adenopathy.   Skin: Skin is warm and dry.   Nursing note and vitals reviewed.    Assessment/Plan   Problems Addressed this Visit        Respiratory    Chronic seasonal allergic rhinitis       Digestive    Vitamin D deficiency    Relevant Orders    Vitamin D 25 Hydroxy       Other    Fatigue - Primary    Relevant Orders    TSH    Vitamin B12    CBC & Differential      Other Visit Diagnoses     Impacted cerumen of right ear        Relevant Orders    Ear Cerumen Removal        Stop ipratropium secondary to taste perversion. Continue singulair at HS. Continue zyrtec.   Neck pain--no red flags, try to sleep on 1 pillow, consider massage to address.   Fatigue--check B12 and TSH     Ear Cerumen Removal Instrumentation  Date/Time: 4/2/2018 3:35 PM  Performed by: NARINDER TRAYLOR  Authorized by: NARINDER TRAYLOR   Consent: Verbal consent obtained.  Risks and benefits: risks, benefits and alternatives were discussed  Consent given by: patient  Patient understanding: patient states understanding of the procedure being performed  Patient identity confirmed: verbally with patient  Ceruminolytics applied: Ceruminolytics applied prior to the procedure.  Location details: right ear  Patient tolerance: Patient tolerated the procedure well with no immediate  complications  Procedure type: irrigation   Sedation:  Patient sedated: no

## 2018-04-03 LAB
25(OH)D3+25(OH)D2 SERPL-MCNC: 43 NG/ML (ref 30–100)
BASOPHILS # BLD AUTO: 0 X10E3/UL (ref 0–0.2)
BASOPHILS NFR BLD AUTO: 0 %
EOSINOPHIL # BLD AUTO: 0.1 X10E3/UL (ref 0–0.4)
EOSINOPHIL NFR BLD AUTO: 1 %
ERYTHROCYTE [DISTWIDTH] IN BLOOD BY AUTOMATED COUNT: 12.9 % (ref 12.3–15.4)
HCT VFR BLD AUTO: 31.8 % (ref 34–46.6)
HGB BLD-MCNC: 10.8 G/DL (ref 11.1–15.9)
IMM GRANULOCYTES # BLD: 0 X10E3/UL (ref 0–0.1)
IMM GRANULOCYTES NFR BLD: 0 %
LYMPHOCYTES # BLD AUTO: 1 X10E3/UL (ref 0.7–3.1)
LYMPHOCYTES NFR BLD AUTO: 12 %
MCH RBC QN AUTO: 28.7 PG (ref 26.6–33)
MCHC RBC AUTO-ENTMCNC: 34 G/DL (ref 31.5–35.7)
MCV RBC AUTO: 85 FL (ref 79–97)
MONOCYTES # BLD AUTO: 1.1 X10E3/UL (ref 0.1–0.9)
MONOCYTES NFR BLD AUTO: 14 %
NEUTROPHILS # BLD AUTO: 5.9 X10E3/UL (ref 1.4–7)
NEUTROPHILS NFR BLD AUTO: 73 %
PLATELET # BLD AUTO: 266 X10E3/UL (ref 150–379)
RBC # BLD AUTO: 3.76 X10E6/UL (ref 3.77–5.28)
TSH SERPL DL<=0.005 MIU/L-ACNC: 1.54 UIU/ML (ref 0.45–4.5)
VIT B12 SERPL-MCNC: 898 PG/ML (ref 232–1245)
WBC # BLD AUTO: 8.1 X10E3/UL (ref 3.4–10.8)

## 2018-04-03 NOTE — PROGRESS NOTES
Please call the patient regarding her abnormal result. Anemic, likely cause of fatigue, recommend FOBT x 3, add iron stores to further clarify. Vitamin levels normal.

## 2018-04-06 ENCOUNTER — CLINICAL SUPPORT (OUTPATIENT)
Dept: FAMILY MEDICINE CLINIC | Facility: CLINIC | Age: 78
End: 2018-04-06

## 2018-04-06 DIAGNOSIS — Z12.11 COLON CANCER SCREENING: Primary | ICD-10-CM

## 2018-04-06 LAB
EXPIRATION DATE: NORMAL
GASTROCULT GAST QL: NEGATIVE
Lab: NORMAL

## 2018-04-06 PROCEDURE — G0328 FECAL BLOOD SCRN IMMUNOASSAY: HCPCS | Performed by: FAMILY MEDICINE

## 2018-04-10 ENCOUNTER — APPOINTMENT (OUTPATIENT)
Dept: GENERAL RADIOLOGY | Facility: HOSPITAL | Age: 78
End: 2018-04-10

## 2018-04-10 ENCOUNTER — HOSPITAL ENCOUNTER (INPATIENT)
Facility: HOSPITAL | Age: 78
LOS: 4 days | Discharge: HOME | End: 2018-04-14
Attending: EMERGENCY MEDICINE | Admitting: INTERNAL MEDICINE

## 2018-04-10 ENCOUNTER — TELEPHONE (OUTPATIENT)
Dept: FAMILY MEDICINE CLINIC | Facility: CLINIC | Age: 78
End: 2018-04-10

## 2018-04-10 ENCOUNTER — OFFICE VISIT (OUTPATIENT)
Dept: FAMILY MEDICINE CLINIC | Facility: CLINIC | Age: 78
End: 2018-04-10

## 2018-04-10 VITALS
DIASTOLIC BLOOD PRESSURE: 50 MMHG | HEART RATE: 70 BPM | SYSTOLIC BLOOD PRESSURE: 140 MMHG | WEIGHT: 147 LBS | BODY MASS INDEX: 25.23 KG/M2 | OXYGEN SATURATION: 98 % | TEMPERATURE: 97.6 F

## 2018-04-10 DIAGNOSIS — B96.81 HELICOBACTER PYLORI GASTRITIS: ICD-10-CM

## 2018-04-10 DIAGNOSIS — R63.4 ABNORMAL WEIGHT LOSS: ICD-10-CM

## 2018-04-10 DIAGNOSIS — K92.1 MELENA: ICD-10-CM

## 2018-04-10 DIAGNOSIS — E83.52 HYPERCALCEMIA: ICD-10-CM

## 2018-04-10 DIAGNOSIS — R53.83 OTHER FATIGUE: ICD-10-CM

## 2018-04-10 DIAGNOSIS — D50.0 IRON DEFICIENCY ANEMIA DUE TO CHRONIC BLOOD LOSS: ICD-10-CM

## 2018-04-10 DIAGNOSIS — R07.89 OTHER CHEST PAIN: ICD-10-CM

## 2018-04-10 DIAGNOSIS — E87.6 HYPOKALEMIA: ICD-10-CM

## 2018-04-10 DIAGNOSIS — K29.70 HELICOBACTER PYLORI GASTRITIS: ICD-10-CM

## 2018-04-10 DIAGNOSIS — I48.91 NEW ONSET ATRIAL FIBRILLATION (HCC): Primary | ICD-10-CM

## 2018-04-10 DIAGNOSIS — D64.9 ANEMIA, UNSPECIFIED TYPE: Primary | ICD-10-CM

## 2018-04-10 PROBLEM — N39.0 UTI (URINARY TRACT INFECTION): Status: ACTIVE | Noted: 2018-04-10

## 2018-04-10 PROBLEM — I48.0 PAROXYSMAL A-FIB: Status: ACTIVE | Noted: 2018-04-10

## 2018-04-10 LAB
ALBUMIN SERPL-MCNC: 3.4 G/DL (ref 3.5–5.2)
ALBUMIN/GLOB SERPL: 1 G/DL
ALP SERPL-CCNC: 86 U/L (ref 39–117)
ALT SERPL W P-5'-P-CCNC: 17 U/L (ref 1–33)
ANION GAP SERPL CALCULATED.3IONS-SCNC: 15.1 MMOL/L
AST SERPL-CCNC: 47 U/L (ref 1–32)
BACTERIA UR QL AUTO: ABNORMAL /HPF
BASOPHILS # BLD AUTO: 0.02 10*3/MM3 (ref 0–0.2)
BASOPHILS NFR BLD AUTO: 0.2 % (ref 0–1.5)
BILIRUB SERPL-MCNC: 0.4 MG/DL (ref 0.1–1.2)
BILIRUB UR QL STRIP: NEGATIVE
BUN BLD-MCNC: 11 MG/DL (ref 8–23)
BUN/CREAT SERPL: 14.9 (ref 7–25)
CALCIUM SPEC-SCNC: 11.2 MG/DL (ref 8.6–10.5)
CHLORIDE SERPL-SCNC: 95 MMOL/L (ref 98–107)
CLARITY UR: ABNORMAL
CO2 SERPL-SCNC: 27.9 MMOL/L (ref 22–29)
COLOR UR: YELLOW
CREAT BLD-MCNC: 0.74 MG/DL (ref 0.57–1)
DEPRECATED RDW RBC AUTO: 42.5 FL (ref 37–54)
DEVELOPER EXPIRATION DATE: NORMAL
DEVELOPER LOT NUMBER: NORMAL
EOSINOPHIL # BLD AUTO: 0.02 10*3/MM3 (ref 0–0.7)
EOSINOPHIL NFR BLD AUTO: 0.2 % (ref 0.3–6.2)
ERYTHROCYTE [DISTWIDTH] IN BLOOD BY AUTOMATED COUNT: 13.1 % (ref 11.7–13)
EXPIRATION DATE: NORMAL
FECAL OCCULT BLOOD SCREEN, POC: NEGATIVE
GFR SERPL CREATININE-BSD FRML MDRD: 76 ML/MIN/1.73
GLOBULIN UR ELPH-MCNC: 3.4 GM/DL
GLUCOSE BLD-MCNC: 103 MG/DL (ref 65–99)
GLUCOSE UR STRIP-MCNC: NEGATIVE MG/DL
HCT VFR BLD AUTO: 31.8 % (ref 35.6–45.5)
HGB BLD-MCNC: 10 G/DL (ref 11.9–15.5)
HGB UR QL STRIP.AUTO: NEGATIVE
HOLD SPECIMEN: NORMAL
HOLD SPECIMEN: NORMAL
HYALINE CASTS UR QL AUTO: ABNORMAL /LPF
IMM GRANULOCYTES # BLD: 0.02 10*3/MM3 (ref 0–0.03)
IMM GRANULOCYTES NFR BLD: 0.2 % (ref 0–0.5)
KETONES UR QL STRIP: ABNORMAL
LEUKOCYTE ESTERASE UR QL STRIP.AUTO: ABNORMAL
LYMPHOCYTES # BLD AUTO: 0.75 10*3/MM3 (ref 0.9–4.8)
LYMPHOCYTES NFR BLD AUTO: 7.3 % (ref 19.6–45.3)
Lab: NORMAL
MAGNESIUM SERPL-MCNC: 1.3 MG/DL (ref 1.6–2.4)
MCH RBC QN AUTO: 28.1 PG (ref 26.9–32)
MCHC RBC AUTO-ENTMCNC: 31.4 G/DL (ref 32.4–36.3)
MCV RBC AUTO: 89.3 FL (ref 80.5–98.2)
MONOCYTES # BLD AUTO: 1.47 10*3/MM3 (ref 0.2–1.2)
MONOCYTES NFR BLD AUTO: 14.2 % (ref 5–12)
NEGATIVE CONTROL: NEGATIVE
NEUTROPHILS # BLD AUTO: 8.04 10*3/MM3 (ref 1.9–8.1)
NEUTROPHILS NFR BLD AUTO: 77.9 % (ref 42.7–76)
NITRITE UR QL STRIP: NEGATIVE
PH UR STRIP.AUTO: 7 [PH] (ref 5–8)
PHENYTOIN SERPL-MCNC: 5.4 MCG/ML (ref 10–20)
PLATELET # BLD AUTO: 264 10*3/MM3 (ref 140–500)
PMV BLD AUTO: 10.1 FL (ref 6–12)
POSITIVE CONTROL: POSITIVE
POTASSIUM BLD-SCNC: 2.8 MMOL/L (ref 3.5–5.2)
POTASSIUM BLD-SCNC: 3.7 MMOL/L (ref 3.5–5.2)
PROT SERPL-MCNC: 6.8 G/DL (ref 6–8.5)
PROT UR QL STRIP: ABNORMAL
RBC # BLD AUTO: 3.56 10*6/MM3 (ref 3.9–5.2)
RBC # UR: ABNORMAL /HPF
REF LAB TEST METHOD: ABNORMAL
SODIUM BLD-SCNC: 138 MMOL/L (ref 136–145)
SP GR UR STRIP: 1.01 (ref 1–1.03)
SQUAMOUS #/AREA URNS HPF: ABNORMAL /HPF
TROPONIN T SERPL-MCNC: <0.01 NG/ML (ref 0–0.03)
UROBILINOGEN UR QL STRIP: ABNORMAL
WBC NRBC COR # BLD: 10.32 10*3/MM3 (ref 4.5–10.7)
WBC UR QL AUTO: ABNORMAL /HPF
WHOLE BLOOD HOLD SPECIMEN: NORMAL
WHOLE BLOOD HOLD SPECIMEN: NORMAL

## 2018-04-10 PROCEDURE — 25010000002 CEFTRIAXONE PER 250 MG: Performed by: INTERNAL MEDICINE

## 2018-04-10 PROCEDURE — 25010000002 ENOXAPARIN PER 10 MG: Performed by: INTERNAL MEDICINE

## 2018-04-10 PROCEDURE — 80185 ASSAY OF PHENYTOIN TOTAL: CPT | Performed by: EMERGENCY MEDICINE

## 2018-04-10 PROCEDURE — 82270 OCCULT BLOOD FECES: CPT | Performed by: FAMILY MEDICINE

## 2018-04-10 PROCEDURE — 93005 ELECTROCARDIOGRAM TRACING: CPT | Performed by: EMERGENCY MEDICINE

## 2018-04-10 PROCEDURE — 85025 COMPLETE CBC W/AUTO DIFF WBC: CPT

## 2018-04-10 PROCEDURE — 99214 OFFICE O/P EST MOD 30 MIN: CPT | Performed by: FAMILY MEDICINE

## 2018-04-10 PROCEDURE — 71046 X-RAY EXAM CHEST 2 VIEWS: CPT

## 2018-04-10 PROCEDURE — 93005 ELECTROCARDIOGRAM TRACING: CPT

## 2018-04-10 PROCEDURE — 81001 URINALYSIS AUTO W/SCOPE: CPT | Performed by: EMERGENCY MEDICINE

## 2018-04-10 PROCEDURE — 25010000002 MAGNESIUM SULFATE 2 GM/50ML SOLUTION: Performed by: INTERNAL MEDICINE

## 2018-04-10 PROCEDURE — 83735 ASSAY OF MAGNESIUM: CPT | Performed by: INTERNAL MEDICINE

## 2018-04-10 PROCEDURE — 99285 EMERGENCY DEPT VISIT HI MDM: CPT

## 2018-04-10 PROCEDURE — 80053 COMPREHEN METABOLIC PANEL: CPT | Performed by: EMERGENCY MEDICINE

## 2018-04-10 PROCEDURE — 93010 ELECTROCARDIOGRAM REPORT: CPT | Performed by: INTERNAL MEDICINE

## 2018-04-10 PROCEDURE — 84484 ASSAY OF TROPONIN QUANT: CPT | Performed by: EMERGENCY MEDICINE

## 2018-04-10 PROCEDURE — 84132 ASSAY OF SERUM POTASSIUM: CPT | Performed by: INTERNAL MEDICINE

## 2018-04-10 RX ORDER — SODIUM CHLORIDE 0.9 % (FLUSH) 0.9 %
10 SYRINGE (ML) INJECTION AS NEEDED
Status: DISCONTINUED | OUTPATIENT
Start: 2018-04-10 | End: 2018-04-14 | Stop reason: HOSPADM

## 2018-04-10 RX ORDER — SODIUM CHLORIDE 0.9 % (FLUSH) 0.9 %
1-10 SYRINGE (ML) INJECTION AS NEEDED
Status: DISCONTINUED | OUTPATIENT
Start: 2018-04-10 | End: 2018-04-14 | Stop reason: HOSPADM

## 2018-04-10 RX ORDER — POTASSIUM CHLORIDE 750 MG/1
40 CAPSULE, EXTENDED RELEASE ORAL ONCE
Status: DISCONTINUED | OUTPATIENT
Start: 2018-04-10 | End: 2018-04-10

## 2018-04-10 RX ORDER — POTASSIUM CHLORIDE 1.5 G/1.77G
40 POWDER, FOR SOLUTION ORAL ONCE
Status: COMPLETED | OUTPATIENT
Start: 2018-04-10 | End: 2018-04-10

## 2018-04-10 RX ORDER — CHOLECALCIFEROL (VITAMIN D3) 125 MCG
5 CAPSULE ORAL NIGHTLY
COMMUNITY

## 2018-04-10 RX ORDER — MAGNESIUM SULFATE HEPTAHYDRATE 40 MG/ML
2 INJECTION, SOLUTION INTRAVENOUS AS NEEDED
Status: DISCONTINUED | OUTPATIENT
Start: 2018-04-10 | End: 2018-04-14 | Stop reason: HOSPADM

## 2018-04-10 RX ORDER — MAGNESIUM SULFATE HEPTAHYDRATE 40 MG/ML
4 INJECTION, SOLUTION INTRAVENOUS AS NEEDED
Status: DISCONTINUED | OUTPATIENT
Start: 2018-04-10 | End: 2018-04-14 | Stop reason: HOSPADM

## 2018-04-10 RX ORDER — PHENYTOIN SODIUM 100 MG/1
300 CAPSULE, EXTENDED RELEASE ORAL EVERY 12 HOURS SCHEDULED
Status: DISCONTINUED | OUTPATIENT
Start: 2018-04-10 | End: 2018-04-14

## 2018-04-10 RX ORDER — PHENYTOIN SODIUM 100 MG/1
300 CAPSULE, EXTENDED RELEASE ORAL
COMMUNITY
End: 2018-12-16 | Stop reason: SDUPTHER

## 2018-04-10 RX ORDER — ASPIRIN 81 MG/1
81 TABLET ORAL DAILY
Status: DISCONTINUED | OUTPATIENT
Start: 2018-04-10 | End: 2018-04-14

## 2018-04-10 RX ORDER — POTASSIUM CHLORIDE 750 MG/1
40 CAPSULE, EXTENDED RELEASE ORAL AS NEEDED
Status: DISCONTINUED | OUTPATIENT
Start: 2018-04-10 | End: 2018-04-11

## 2018-04-10 RX ORDER — LANOLIN ALCOHOL/MO/W.PET/CERES
100 CREAM (GRAM) TOPICAL DAILY
Status: DISCONTINUED | OUTPATIENT
Start: 2018-04-10 | End: 2018-04-14 | Stop reason: HOSPADM

## 2018-04-10 RX ORDER — POTASSIUM CHLORIDE 1.5 G/1.77G
40 POWDER, FOR SOLUTION ORAL AS NEEDED
Status: DISCONTINUED | OUTPATIENT
Start: 2018-04-10 | End: 2018-04-11

## 2018-04-10 RX ORDER — HYDRALAZINE HYDROCHLORIDE 50 MG/1
50 TABLET, FILM COATED ORAL EVERY 8 HOURS SCHEDULED
Status: DISCONTINUED | OUTPATIENT
Start: 2018-04-10 | End: 2018-04-14 | Stop reason: HOSPADM

## 2018-04-10 RX ORDER — AMLODIPINE BESYLATE 10 MG/1
10 TABLET ORAL DAILY
Status: DISCONTINUED | OUTPATIENT
Start: 2018-04-11 | End: 2018-04-14 | Stop reason: HOSPADM

## 2018-04-10 RX ORDER — ESTRADIOL 1 MG/1
0.5 TABLET ORAL DAILY
Status: DISCONTINUED | OUTPATIENT
Start: 2018-04-11 | End: 2018-04-14 | Stop reason: HOSPADM

## 2018-04-10 RX ORDER — ONDANSETRON 2 MG/ML
4 INJECTION INTRAMUSCULAR; INTRAVENOUS EVERY 6 HOURS PRN
Status: DISCONTINUED | OUTPATIENT
Start: 2018-04-10 | End: 2018-04-14 | Stop reason: HOSPADM

## 2018-04-10 RX ORDER — ACETAMINOPHEN 325 MG/1
650 TABLET ORAL EVERY 4 HOURS PRN
Status: DISCONTINUED | OUTPATIENT
Start: 2018-04-10 | End: 2018-04-14 | Stop reason: HOSPADM

## 2018-04-10 RX ORDER — CEFTRIAXONE SODIUM 1 G/50ML
1 INJECTION, SOLUTION INTRAVENOUS EVERY 24 HOURS
Status: COMPLETED | OUTPATIENT
Start: 2018-04-10 | End: 2018-04-12

## 2018-04-10 RX ORDER — CETIRIZINE HYDROCHLORIDE 10 MG/1
5 TABLET ORAL DAILY
Status: DISCONTINUED | OUTPATIENT
Start: 2018-04-11 | End: 2018-04-14 | Stop reason: HOSPADM

## 2018-04-10 RX ORDER — ASCORBIC ACID 500 MG
500 TABLET ORAL DAILY
Status: DISCONTINUED | OUTPATIENT
Start: 2018-04-10 | End: 2018-04-14 | Stop reason: HOSPADM

## 2018-04-10 RX ORDER — MONTELUKAST SODIUM 10 MG/1
10 TABLET ORAL DAILY
Status: DISCONTINUED | OUTPATIENT
Start: 2018-04-11 | End: 2018-04-14 | Stop reason: HOSPADM

## 2018-04-10 RX ORDER — LANOLIN ALCOHOL/MO/W.PET/CERES
400 CREAM (GRAM) TOPICAL DAILY
Status: DISCONTINUED | OUTPATIENT
Start: 2018-04-10 | End: 2018-04-14 | Stop reason: HOSPADM

## 2018-04-10 RX ORDER — POTASSIUM CHLORIDE 7.45 MG/ML
10 INJECTION INTRAVENOUS
Status: DISCONTINUED | OUTPATIENT
Start: 2018-04-10 | End: 2018-04-11

## 2018-04-10 RX ORDER — CHOLECALCIFEROL (VITAMIN D3) 125 MCG
5 CAPSULE ORAL NIGHTLY
Status: DISCONTINUED | OUTPATIENT
Start: 2018-04-10 | End: 2018-04-14 | Stop reason: HOSPADM

## 2018-04-10 RX ORDER — ESCITALOPRAM OXALATE 5 MG/1
5 TABLET ORAL DAILY
Status: DISCONTINUED | OUTPATIENT
Start: 2018-04-11 | End: 2018-04-14 | Stop reason: HOSPADM

## 2018-04-10 RX ORDER — SODIUM CHLORIDE 9 MG/ML
75 INJECTION, SOLUTION INTRAVENOUS CONTINUOUS
Status: DISCONTINUED | OUTPATIENT
Start: 2018-04-10 | End: 2018-04-11

## 2018-04-10 RX ORDER — GABAPENTIN 300 MG/1
300 CAPSULE ORAL NIGHTLY
Status: DISCONTINUED | OUTPATIENT
Start: 2018-04-10 | End: 2018-04-14 | Stop reason: HOSPADM

## 2018-04-10 RX ORDER — PANTOPRAZOLE SODIUM 40 MG/1
40 TABLET, DELAYED RELEASE ORAL EVERY MORNING
Status: DISCONTINUED | OUTPATIENT
Start: 2018-04-11 | End: 2018-04-14 | Stop reason: HOSPADM

## 2018-04-10 RX ORDER — VITAMIN E 268 MG
400 CAPSULE ORAL DAILY
COMMUNITY
End: 2018-05-16 | Stop reason: HOSPADM

## 2018-04-10 RX ORDER — LISINOPRIL 40 MG/1
40 TABLET ORAL
Status: DISCONTINUED | OUTPATIENT
Start: 2018-04-11 | End: 2018-04-14 | Stop reason: HOSPADM

## 2018-04-10 RX ORDER — UBIDECARENONE 75 MG
100 CAPSULE ORAL DAILY
Status: DISCONTINUED | OUTPATIENT
Start: 2018-04-10 | End: 2018-04-10 | Stop reason: CLARIF

## 2018-04-10 RX ORDER — ESTRADIOL 0.5 MG/1
0.5 TABLET ORAL DAILY
COMMUNITY
End: 2018-05-16 | Stop reason: HOSPADM

## 2018-04-10 RX ADMIN — HYDRALAZINE HYDROCHLORIDE 50 MG: 50 TABLET, FILM COATED ORAL at 21:40

## 2018-04-10 RX ADMIN — ASPIRIN 81 MG: 81 TABLET ORAL at 21:40

## 2018-04-10 RX ADMIN — ACETAMINOPHEN 400 MCG: 400 TABLET ORAL at 21:40

## 2018-04-10 RX ADMIN — POTASSIUM CHLORIDE 40 MEQ: 1.5 POWDER, FOR SOLUTION ORAL at 17:03

## 2018-04-10 RX ADMIN — SODIUM CHLORIDE 75 ML/HR: 9 INJECTION, SOLUTION INTRAVENOUS at 18:45

## 2018-04-10 RX ADMIN — PHENYTOIN SODIUM 300 MG: 100 CAPSULE, EXTENDED RELEASE ORAL at 21:40

## 2018-04-10 RX ADMIN — Medication 5 MG: at 21:40

## 2018-04-10 RX ADMIN — OXYCODONE HYDROCHLORIDE AND ACETAMINOPHEN 500 MG: 500 TABLET ORAL at 21:40

## 2018-04-10 RX ADMIN — MAGNESIUM SULFATE HEPTAHYDRATE 2 G: 40 INJECTION, SOLUTION INTRAVENOUS at 21:39

## 2018-04-10 RX ADMIN — SODIUM CHLORIDE 500 ML: 9 INJECTION, SOLUTION INTRAVENOUS at 16:53

## 2018-04-10 RX ADMIN — GABAPENTIN 300 MG: 300 CAPSULE ORAL at 21:40

## 2018-04-10 NOTE — ED PROVIDER NOTES
" EMERGENCY DEPARTMENT ENCOUNTER    CHIEF COMPLAINT  Chief Complaint: CP  History given by: patient and daughter  History limited by: nothing  Room Number: 35/35  PMD: Jamie Walton DO      HPI:  Pt is a 77 y.o. female who presents via EMS with c/o chest tightness that began around 1230 while laying down. She reports shoulder pain, palpitations, and a numbness in her L hand. Pt states she got up, felt lightheaded, and collapsed. She denies hitting her head or LOC. She also c/o N/V. Pt was told by cardiology that if this arrhythmia becomes bothersome, he would \"put a defibrillator in\". Per EMS, pt was noted to have afib RVR. Pt was given 6 NTG, ASA, and zofran en route. She is currently asymptomatic.    In addition, pt and daughter reports generalized weakness for the last 3 weeks and has lost 13lbs in that time. Pt does not have decreased appetite but finds that she in unable to eat much. PMD is concerned that pt is bleeding internally.      Duration:  1230 today  Onset: gradual  Timing: constant  Location: chest  Radiation: none  Quality: \"tightness\"  Intensity/Severity: moderate  Progression: resolved  Associated Symptoms: N/V, shoulder pain, lightheaded, palpitations, numbness in L hand  Aggravating Factors: none  Alleviating Factors: none  Previous Episodes: Pt has hx of arrhthymia.  Treatment before arrival: Pt was given 6 NTG, ASA, and zofran en route.    PAST MEDICAL HISTORY  Active Ambulatory Problems     Diagnosis Date Noted   • Blood glucose abnormal 04/15/2016   • Benign essential hypertension 04/15/2016   • Dyslipidemia 04/15/2016   • Gastroesophageal reflux disease 04/15/2016   • Fatigue 04/15/2016   • Generalized osteoarthritis 04/15/2016   • Chronic recurrent major depressive disorder 04/15/2016   • Menopause present 04/15/2016   • Osteoporosis 04/15/2016   • Palpitations 04/15/2016   • Restless legs syndrome 04/15/2016   • Seizure disorder 04/15/2016   • Subjective tinnitus 04/15/2016   • Vitamin " D deficiency 04/15/2016   • Bradycardia 2015   • Chest pain 2015   • Menopause 2017   • Post-menopause on HRT (hormone replacement therapy) 2017   • Chronic seasonal allergic rhinitis 2018     Resolved Ambulatory Problems     Diagnosis Date Noted   • Hypertension 2015     Past Medical History:   Diagnosis Date   • Cystitis    • Cystocele    • Dyslipidemia    • Esophageal reflux    • Fatigue    • Hypertension    • Major depression, chronic    • Menopause    • Osteoarthritis    • Osteoporosis    • Palpitations    • Post menopausal problems    • RLS (restless legs syndrome)    • Seizure disorder    • Subjective tinnitus    • Vaginal delivery    • Vitamin D deficiency        PAST SURGICAL HISTORY  Past Surgical History:   Procedure Laterality Date   • CHOLECYSTECTOMY     • COLONOSCOPY     • CRANIOTOMY     • TOTAL ABDOMINAL HYSTERECTOMY      w/ bladder suspension.  Probably vaginal hysterectomy with anterior colporrhaphy.        FAMILY HISTORY  Family History   Problem Relation Age of Onset   • No Known Problems Mother    • Stroke Father 54      @ 60    • No Known Problems Maternal Grandmother    • No Known Problems Maternal Grandfather    • Heart disease Paternal Grandmother    • Heart disease Paternal Grandfather    • No Known Problems Daughter    • Stroke Brother 76   • Diabetes type II Brother        SOCIAL HISTORY  Social History     Social History   • Marital status:      Spouse name: JL   • Number of children: 2   • Years of education: N/A     Occupational History   • Not on file.     Social History Main Topics   • Smoking status: Never Smoker   • Smokeless tobacco: Never Used   • Alcohol use No   • Drug use: No   • Sexual activity: Not on file      Comment:  (Jl)     Other Topics Concern   • Not on file     Social History Narrative   • No narrative on file       ALLERGIES  Crab (diagnostic) and Pseudoephedrine    REVIEW OF SYSTEMS  Review of  Systems   Constitutional: Positive for unexpected weight change (lost 13lbs in 3 weeks). Negative for fever.   HENT: Negative for sore throat.    Respiratory: Positive for chest tightness. Negative for cough and shortness of breath.    Cardiovascular: Positive for chest pain and palpitations.   Gastrointestinal: Positive for nausea and vomiting. Negative for abdominal pain and diarrhea.   Genitourinary: Negative for dysuria.   Musculoskeletal: Positive for myalgias (shoulder pain). Negative for neck pain.   Skin: Negative for rash.   Neurological: Positive for weakness (generalized, 3 weeks), light-headedness and numbness (left hand). Negative for headaches.   All other systems reviewed and are negative.      PHYSICAL EXAM  ED Triage Vitals [04/10/18 1459]   Temp Heart Rate Resp BP SpO2   98.8 °F (37.1 °C) (!) 125 16 144/80 100 %      Temp src Heart Rate Source Patient Position BP Location FiO2 (%)   -- Monitor -- -- --       Physical Exam   Constitutional: She is oriented to person, place, and time and well-developed, well-nourished, and in no distress. No distress.   HENT:   Head: Normocephalic and atraumatic.   Eyes: EOM are normal. Pupils are equal, round, and reactive to light.   Neck: Normal range of motion. Neck supple.   Cardiovascular: Normal rate and regular rhythm.    Murmur heard.   Systolic murmur is present with a grade of 2/6   Pulses:       Radial pulses are 2+ on the right side, and 2+ on the left side.        Dorsalis pedis pulses are 2+ on the right side, and 2+ on the left side.        Posterior tibial pulses are 2+ on the right side, and 2+ on the left side.   Pt is currently in sinus rhythm with a rate of 70.   Pulmonary/Chest: Effort normal and breath sounds normal. No respiratory distress.   Abdominal: Soft. There is no tenderness. There is no rebound and no guarding.   Musculoskeletal: Normal range of motion. She exhibits no edema.   No pedal edema.   Neurological: She is alert and oriented  to person, place, and time. She has normal sensation and normal strength.   Skin: Skin is warm and dry. No rash noted.   Psychiatric: Mood and affect normal.   Nursing note and vitals reviewed.      LAB RESULTS  Lab Results (last 24 hours)     Procedure Component Value Units Date/Time    POC Occult Blood Stool [[id number]]  (Normal) Collected:  04/10/18 0930    Specimen:  Stool Updated:  04/10/18 0932     Fecal Occult Blood Negative     Lot Number [id number]     Expiration Date 73,118     DEVELOPER LOT NUMBER [id number]     DEVELOPER EXPIRATION DATE 43,019     Positive Control Positive     Negative Control Negative    CBC & Differential [[id number]] Collected:  04/10/18 1505    Specimen:  Blood Updated:  04/10/18 1519    Narrative:       The following orders were created for panel order CBC & Differential.  Procedure                               Abnormality         Status                     ---------                               -----------         ------                     CBC Auto Differential[[id number]]        Abnormal            Final result                 Please view results for these tests on the individual orders.    Comprehensive Metabolic Panel [[id number]]  (Abnormal) Collected:  04/10/18 1505    Specimen:  Blood Updated:  04/10/18 1546     Glucose 103 (H) mg/dL      BUN 11 mg/dL      Creatinine 0.74 mg/dL      Sodium 138 mmol/L      Potassium 2.8 (L) mmol/L      Chloride 95 (L) mmol/L      CO2 27.9 mmol/L      Calcium 11.2 (H) mg/dL      Total Protein 6.8 g/dL      Albumin 3.40 (L) g/dL      ALT (SGPT) 17 U/L      AST (SGOT) 47 (H) U/L      Alkaline Phosphatase 86 U/L      Total Bilirubin 0.4 mg/dL      eGFR Non African Amer 76 mL/min/1.73      Globulin 3.4 gm/dL      A/G Ratio 1.0 g/dL      BUN/Creatinine Ratio 14.9     Anion Gap 15.1 mmol/L     Narrative:       The MDRD GFR formula is only valid for adults with stable renal function between ages 18 and 70.    Troponin [[id number]]  (Normal) Collected:   04/10/18 1505    Specimen:  Blood Updated:  04/10/18 1553     Troponin T <0.010 ng/mL     Narrative:       Troponin T Reference Ranges:  Less than 0.03 ng/mL:    Negative for AMI  0.03 to 0.09 ng/mL:      Indeterminant for AMI  Greater than 0.09 ng/mL: Positive for AMI    Phenytoin Level, Total [[id number]]  (Abnormal) Collected:  04/10/18 1505    Specimen:  Blood Updated:  04/10/18 1541     Phenytoin Level 5.4 (L) mcg/mL     CBC Auto Differential [[id number]]  (Abnormal) Collected:  04/10/18 1505    Specimen:  Blood Updated:  04/10/18 1519     WBC 10.32 10*3/mm3      RBC 3.56 (L) 10*6/mm3      Hemoglobin 10.0 (L) g/dL      Hematocrit 31.8 (L) %      MCV 89.3 fL      MCH 28.1 pg      MCHC 31.4 (L) g/dL      RDW 13.1 (H) %      RDW-SD 42.5 fl      MPV 10.1 fL      Platelets 264 10*3/mm3      Neutrophil % 77.9 (H) %      Lymphocyte % 7.3 (L) %      Monocyte % 14.2 (H) %      Eosinophil % 0.2 (L) %      Basophil % 0.2 %      Immature Grans % 0.2 %      Neutrophils, Absolute 8.04 10*3/mm3      Lymphocytes, Absolute 0.75 (L) 10*3/mm3      Monocytes, Absolute 1.47 (H) 10*3/mm3      Eosinophils, Absolute 0.02 10*3/mm3      Basophils, Absolute 0.02 10*3/mm3      Immature Grans, Absolute 0.02 10*3/mm3           I ordered the above labs and reviewed the results    RADIOLOGY  XR Chest 2 View   Final Result   No evidence for acute pulmonary process. Borderline heart   size. Follow-up as clinical indications persist.       This report was finalized on 4/10/2018 4:43 PM by Dr. Levi Olea MD.               I ordered the above noted radiological studies. Interpreted by radiologist. Reviewed by me in PACS.       PROCEDURES  Procedures  EKG           EKG time: 1511  Rhythm/Rate: afib, 147  P waves and AL: afib  QRS, axis: wide complex, LAD   ST and T waves: diffuse non-specific ST and T wave changes, ST depression in V4, V5, V6    Interpreted Contemporaneously by me, independently viewed  No prior for comparison.    EKG            EKG time: 1628  Rhythm/Rate: NSR, 69  P waves and MN: IVCD  QRS, axis: LVH  ST and T waves: diffuse non-specific ST and T wave changes     Interpreted Contemporaneously by me, independently viewed  improved compared to prior today at 1511, pt is now in NSR      PROGRESS AND CONSULTS  ED Course   Value Comment By Time   Hemoglobin: (!) 10.0 Previous hemoglobin was 10.88 days ago Yevgeniy Magana MD 04/10 1633   Calcium: (!) 11.2 (Reviewed) Yevgeniy Magana MD 04/10 1634   1620  Placed call to cardiology for consult. Ordered repeat EKG and potassium for hypokalemia.    1635  Placed call to [facility] for consult.    1646  Discussed pt's case, labs, imaging, and EKG results with Dr. Remy (cardiology) who agrees to consult.    1700  Discussed pt's case, labs, imaging, and EKG results with Dr. Melgoza ([facility]) who will admit pt.    1710  Rechecked pt who is resting comfortably. Notified pt and family of all labs, imaging, EKG results, and consults. Discussed pt and family of plan to admit. Pt and family understands and agrees with the plan, all questions answered.      MEDICAL DECISION MAKING  Results were reviewed/discussed with the patient and they were also made aware of online access. Pt also made aware that some labs, such as cultures, will not be resulted during ER visit and follow up with PMD is necessary.     MDM  Number of Diagnoses or Management Options  Hypercalcemia:   Hypokalemia:   New onset atrial fibrillation with RVR:   Other chest pain:      Amount and/or Complexity of Data Reviewed  Clinical lab tests: reviewed and ordered (phenytoin level=5.4)  Tests in the radiology section of CPT®: ordered and reviewed (CXR is negative.)  Tests in the medicine section of CPT®: ordered and reviewed (See procedure note.)  Decide to obtain previous medical records or to obtain history from someone other than the patient: yes  Obtain history from someone other than the patient: yes (Daughter)  Review and summarize past medical  records: yes (Pt had stress test in Jan 2015 that was unremarkable. Pt also had an unremarkable lexiscan stress test. EF=65%)  Discuss the patient with other providers: yes (Dr. Remy (cardiology), Dr. Melgoza ([facility]))  Independent visualization of images, tracings, or specimens: yes    Patient Progress  Patient progress: stable         DIAGNOSIS  Final diagnoses:   Other chest pain   Hypokalemia   Hypercalcemia   New onset atrial fibrillation with RVR       DISPOSITION  ADMISSION    Discussed treatment plan and reason for admission with pt/family and admitting physician.  Pt/family voiced understanding of the plan for admission for further testing/treatment as needed.         Latest Documented Vital Signs:  As of 5:11 PM  BP- 162/78 HR- 72 Temp- 98.8 °F (37.1 °C) O2 sat- 92%    --  Documentation assistance provided by blossom Schreiber for Dr. Magana.  Information recorded by the scribe was done at my direction and has been verified and validated by me.     Kellie Schreiber  04/10/18 7522       Yevgeniy Magana MD  04/10/18 6524

## 2018-04-10 NOTE — H&P
Internal medicine history and physical    INTERNAL MEDICINE   [facility]       Patient Identification:  Name: Martina Blake  Age: 77 y.o.  Sex: female  :  1940  MRN: [id number]                   Primary Care Physician: Jamie Walton DO                                   Chief Complaint:  Sudden onset of palpitation numbness and discomfort in the left arm this afternoon.    History of Present Illness:   Patient is a 77-year-old female with a complicated past medical history has not been feeling very well since the beginning of the year.  She had a follow-up with Dr. Smith cardiologist couple of months ago and at that time she was told that there is a discrepancy between her upper part of the heart in the lower part of the heart in terms of how fast beating.  According to her at that time it was recommended that she may need either a device shock or thinners.  Patient was not symptomatic at that time symptoms palpitation or dizziness so it was elected to be observed.  According the patient since then she has decreased appetite and not eating and drinking very well and was very fatigued.  2 weeks into her symptoms she was found to have severe anemia which she described as hemoglobin dropped from 11-10.  Patient was seen today by primary care physician for progressive fatigue and drop in hemoglobin and points along with 13 pounds since 2017.  Check outpatient Hemoccult test in the stool is negative.  She admits to having occasional dark stools.  Denies any bright red blood in the stool.  After visited by her primary care physician she went to her friend's place and while she was there she developed sudden onset of above-mentioned symptoms resulting in EMS being called by her friends.  She was found to have atrial fibrillation with rapid ventricular response as per EMS monitor and was brought to the emergency room in the state of tachycardia.  By the time you physician could see  "the patient she spontaneous converted back to sinus rhythm.  According to her symptoms started when she was laying laying down and was involving the shoulder along with numbness in the left hand.  When she started to get up she felt lightheaded and collapsed but never lost consciousness.  Prior to arrival.  EMS she was given 6 nitroglycerin aspirin and Zofran with improvement in her symptoms.  She is currently chest pain-free.    Past Medical History:  Past Medical History:   Diagnosis Date   • Cystitis    • Cystocele     moderate   • Dyslipidemia    • Esophageal reflux    • Fatigue    • History of transfusion     no reaction   • Hypertension    • Major depression, chronic    • Menopause    • Osteoarthritis    • Osteoporosis    • Palpitations    • Post menopausal problems    • RLS (restless legs syndrome)    • Seizure disorder    • Subjective tinnitus    • Vaginal delivery     x2  \"SONYA\"      \"JASON\"   • Vitamin D deficiency      Past Surgical History:  Past Surgical History:   Procedure Laterality Date   • CHOLECYSTECTOMY     • COLONOSCOPY     • CRANIOTOMY     • TOTAL ABDOMINAL HYSTERECTOMY  1980    w/ bladder suspension.  Probably vaginal hysterectomy with anterior colporrhaphy.       Home Meds:  Prescriptions Prior to Admission   Medication Sig Dispense Refill Last Dose   • amLODIPine (NORVASC) 10 MG tablet Take 1 tablet by mouth Daily. 90 tablet 3 4/10/2018 at Unknown time   • aspirin 81 MG EC tablet Take 81 mg by mouth daily.   4/9/2018 at Unknown time   • benazepril (LOTENSIN) 40 MG tablet Take 1 tablet by mouth Daily. 90 tablet 3 4/10/2018 at Unknown time   • calcium carbonate-vitamin d (CALCIUM 600+D) 600-400 MG-UNIT per tablet Take 1 tablet by mouth Daily.   4/9/2018 at Unknown time   • cetirizine (zyrTEC) 10 MG tablet Take 10 mg by mouth Daily.   4/10/2018 at Unknown time   • escitalopram (LEXAPRO) 5 MG tablet Take 1 tablet by mouth Daily. 90 tablet 3 4/10/2018 at Unknown time   • estradiol (ESTRACE) 0.5 MG " tablet Take 0.5 mg by mouth Daily.   4/10/2018 at Unknown time   • folic acid (FOLVITE) 400 MCG tablet Take 400 mcg by mouth daily.   4/9/2018 at Unknown time   • gabapentin (NEURONTIN) 300 MG capsule Take 300 mg by mouth Every Night.  0 4/9/2018 at Unknown time   • hydrALAZINE (APRESOLINE) 100 MG tablet Take 1 tablet by mouth 3 (Three) Times a Day. 270 tablet 3 4/10/2018 at 0800   • hydrochlorothiazide (HYDRODIURIL) 25 MG tablet Take 1 tablet by mouth Daily. 90 tablet 3 4/10/2018 at Unknown time   • melatonin 5 MG tablet tablet Take 5 mg by mouth Every Night.   4/9/2018 at Unknown time   • montelukast (SINGULAIR) 10 MG tablet Take 10 mg by mouth Daily.   4/10/2018 at Unknown time   • Omega-3 Fatty Acids (FISH OIL) 1000 MG capsule capsule Take 1,000 mg by mouth Daily With Breakfast.   4/9/2018 at Unknown time   • pantoprazole (PROTONIX) 40 MG EC tablet Take 1 tablet by mouth Daily. 90 tablet 3 4/10/2018 at Unknown time   • phenytoin (DILANTIN) 100 MG ER capsule Take 300 mg by mouth every night at bedtime.   4/9/2018 at Unknown time   • raNITIdine (ZANTAC) 150 MG tablet Take 150 mg by mouth 2 (Two) Times a Day.  1 4/10/2018 at 0800   • vitamin B-12 (CYANOCOBALAMIN) 100 MCG tablet Take 100 mcg by mouth Daily.   4/9/2018 at Unknown time   • vitamin B-6 (PYRIDOXINE) 100 MG tablet Take 100 mg by mouth daily.   4/9/2018 at Unknown time   • vitamin C (ASCORBIC ACID) 500 MG tablet Take 500 mg by mouth daily.   4/9/2018 at Unknown time   • vitamin D (CHOLECALCIFEROL) 400 UNITS tablet Take 400 Units by mouth daily.   4/9/2018 at Unknown time   • vitamin E 400 UNIT capsule Take 400 Units by mouth Daily.   4/9/2018 at Unknown time   • Denosumab (PROLIA SC) Inject 1 Units under the skin Every 6 (Six) Months. Due June 7, 2018   More than a month at Unknown time     Current Meds:     Current Facility-Administered Medications:   •  Magnesium Sulfate 2 gram Bolus, followed by 8 gram infusion (total Mg dose 10 grams)- Mg less than or  equal to 1mg/dL, 2 g, Intravenous, PRN **OR** Magnesium Sulfate 6 gram Infusion (2 gm x 3) -Mg 1.1 -1.5 mg/dL, 2 g, Intravenous, PRN **OR** magnesium sulfate 4 gram infusion- Mg 1.6-1.9 mg/dL, 4 g, Intravenous, PRN, Gregory Melgoza MD  •  potassium chloride (MICRO-K) CR capsule 40 mEq, 40 mEq, Oral, PRN **OR** potassium chloride (KLOR-CON) packet 40 mEq, 40 mEq, Oral, PRN **OR** potassium chloride 10 mEq in 100 mL IVPB, 10 mEq, Intravenous, Q1H PRN, Gregory Melgoza MD  •  sodium chloride 0.9 % flush 10 mL, 10 mL, Intravenous, PRN, Yevgeniy Magana MD  •  Insert peripheral IV, , , Once **AND** sodium chloride 0.9 % flush 10 mL, 10 mL, Intravenous, PRN, Yevgeniy Magana MD  •  sodium chloride 0.9 % infusion, 75 mL/hr, Intravenous, Continuous, Gregory Melgoza MD, Last Rate: 75 mL/hr at 04/10/18 1845, 75 mL/hr at 04/10/18 1845  Allergies:  Allergies   Allergen Reactions   • Crab (Diagnostic) Itching and Rash   • Pseudoephedrine Dizziness     Social History:   Social History   Substance Use Topics   • Smoking status: Never Smoker   • Smokeless tobacco: Never Used   • Alcohol use No      Family History:  Family History   Problem Relation Age of Onset   • No Known Problems Mother    • Stroke Father 54      @ 60    • No Known Problems Maternal Grandmother    • No Known Problems Maternal Grandfather    • Heart disease Paternal Grandmother    • Heart disease Paternal Grandfather    • No Known Problems Daughter    • Stroke Brother 76   • Diabetes type II Brother           Review of Systems  See history of present illness and past medical history.    Constitutional: Positive for unexpected weight change (lost 13lbs in 3 weeks). Negative for fever.   HENT: Negative for sore throat.    Respiratory: Positive for chest tightness. Negative for cough and shortness of breath.    Cardiovascular: Positive for chest pain and palpitations.   Gastrointestinal: Positive for nausea and vomiting. Negative for abdominal pain and diarrhea.  "  Genitourinary: Negative for dysuria.   Musculoskeletal: Positive for myalgias (shoulder pain). Negative for neck pain.   Skin: Negative for rash.   Neurological: Positive for weakness (generalized, 3 weeks), light-headedness and numbness (left hand). Negative for headaches.   All other systems reviewed     Vitals:   /85 (BP Location: Right arm, Patient Position: Standing)   Pulse 63   Temp 98 °F (36.7 °C) (Oral)   Resp 18   Ht 162.6 cm (64\")   Wt 61.5 kg (135 lb 8 oz)   SpO2 100%   BMI 23.26 kg/m²   I/O:   Intake/Output Summary (Last 24 hours) at 04/10/18 1924  Last data filed at 04/10/18 1756   Gross per 24 hour   Intake                0 ml   Output              300 ml   Net             -300 ml     Exam:  General Appearance:    Alert, cooperative, no distress, appears stated age, Does not appear to be toxic or septic.  Does  appear fatigued and tired.     Head:    Normocephalic, without obvious abnormality, atraumatic   Eyes:    PERRL, conjunctiva/corneas clear, EOM's intact, both eyes   Ears:    Normal external ear canals, both ears   Nose:   Nares normal, septum midline, mucosa normal, no drainage    or sinus tenderness   Throat:   Lips, tongue, gums normal; oral mucosa pink and moist   Neck:   Supple, symmetrical, trachea midline, no adenopathy;     thyroid:  no enlargement/tenderness/nodules; no carotid    bruit or JVD   Back:     Symmetric, no curvature, ROM normal, no CVA tenderness   Lungs:     Clear to auscultation bilaterally, respirations unlabored   Chest Wall:    No tenderness or deformity    Heart:    Regular rate and Sinus rhythm, S1 and S2 normal,2/6 systolic murmur.   Abdomen:     Soft, non-tender, bowel sounds active all four quadrants,     no masses, no hepatomegaly, no splenomegaly   Extremities:   Extremities normal, atraumatic, no cyanosis or edema   Pulses:   Pulses palpable in all extremities; symmetric all extremities   Skin:   Skin color normal, Skin is warm and dry,  no " rashes or palpable lesions   Neurologic:   CNII-XII intact, motor strength grossly intact, sensation grossly intact to light touch, no focal deficits noted       Data Review:      I reviewed the patient's new clinical results.    Results from last 7 days  Lab Units 04/10/18  1505   WBC 10*3/mm3 10.32   HEMOGLOBIN g/dL 10.0*   PLATELETS 10*3/mm3 264       Results from last 7 days  Lab Units 04/10/18  1505   SODIUM mmol/L 138   POTASSIUM mmol/L 2.8*   CHLORIDE mmol/L 95*   CO2 mmol/L 27.9   BUN mg/dL 11   CREATININE mg/dL 0.74   CALCIUM mg/dL 11.2*   GLUCOSE mg/dL 103*       Assessment:  Active Hospital Problems (** Indicates Principal Problem)    Diagnosis Date Noted   • **Chest pain [R07.9] 01/05/2015   • Paroxysmal a-fib [I48.0] 04/10/2018   • Hypercalcemia [E83.52] 04/10/2018   • UTI (urinary tract infection) [N39.0] 04/10/2018   • Benign essential hypertension [I10] 04/15/2016   • Fatigue [R53.83] 04/15/2016   • Palpitations [R00.2] 04/15/2016   • Seizure disorder [G40.909] 04/15/2016      Resolved Hospital Problems    Diagnosis Date Noted Date Resolved   No resolved problems to display.       Plan:  Paroxysmal atrial fibrillation with rapid ventricular response-continue with beta blocker and correct electrolyte abnormalities.  Hypokalemia likely due to the use of hydrochlorothiazide thiazide and decrease intake potassium protocol and replaced potassium to keep it between the range of 4 and 4-1/2 meq/L.  Generalized weakness and fatigue likely due to urinary tract infection based on the urinary formula in the patient doesn't have any specific symptoms of UTI such as flank pain or consistency and frequency. Follow-up on urine cultures and empirically start her on Rocephin and monitor her overall disposition and clinical course.  Hypercalcemia likely secondary to dehydration-repeat serum calcium level after hydration and if it persists check parathyroid level and look at other causes of hypercalcemia including  possibility of hypercalcemia malignancy.  Hold all her vitamin D and calcium replacements for the time being.  Seizure disorder with sub therapeutic Dilantin level  - improve dilantin level and provide her with seizures precautions  Anemia monitor her hemoglobin and hematocrit patch.  Gregory Melgoza MD   4/10/2018  7:24 PM  Much of this encounter note is an electronic transcription/translation of spoken language to printed text. The electronic translation of spoken language may permit erroneous, or at times, nonsensical words or phrases to be inadvertently transcribed; Although I have reviewed the note for such errors, some may still exist

## 2018-04-10 NOTE — PLAN OF CARE
Problem: Patient Care Overview  Goal: Plan of Care Review  Outcome: Ongoing (interventions implemented as appropriate)   04/10/18 1802   Coping/Psychosocial   Plan of Care Reviewed With patient   Plan of Care Review   Progress improving   OTHER   Outcome Summary Admitted to [facility]. VSS. No cp and currently in SR. Continue to monitor.       04/10/18 1802   Coping/Psychosocial   Plan of Care Reviewed With patient   Plan of Care Review   Progress improving   OTHER   Outcome Summary Admitted to [facility]. VSS. No cp and currently in SR. Continue to monitor.      Goal: Individualization and Mutuality  Outcome: Ongoing (interventions implemented as appropriate)    Goal: Discharge Needs Assessment  Outcome: Ongoing (interventions implemented as appropriate)    Goal: Interprofessional Rounds/Family Conf  Outcome: Ongoing (interventions implemented as appropriate)      Problem: Pain, Acute (Adult)  Goal: Identify Related Risk Factors and Signs and Symptoms  Outcome: Outcome(s) achieved Date Met: 04/10/18    Goal: Acceptable Pain Control/Comfort Level  Outcome: Ongoing (interventions implemented as appropriate)      Problem: Arrhythmia/Dysrhythmia (Symptomatic) (Adult)  Goal: Signs and Symptoms of Listed Potential Problems Will be Absent, Minimized or Managed (Arrhythmia/Dysrhythmia)  Outcome: Ongoing (interventions implemented as appropriate)

## 2018-04-10 NOTE — TELEPHONE ENCOUNTER
----- Message from Jamie Walton DO sent at 4/7/2018  7:32 AM EDT -----  Call results to patient.  Stool negative for blood.

## 2018-04-10 NOTE — PROGRESS NOTES
Subjective   Martina Blake is a 77 y.o. female. Presents today for   Chief Complaint   Patient presents with   • Follow-up     med check review labs, she is anemic and did hemocults.         Anemia   Presents for initial visit. Onset time: Noted fatigue, checked cbc and Hgb down 2 gm, new 3 months ago. Symptoms include abdominal pain (Had some abd pain left side couple days ago, but resolved.), anorexia, malaise/fatigue and weight loss (lost 13 lbs since 10/2017). There has been no bruising/bleeding easily, fever, leg swelling, light-headedness, pallor or palpitations. (Had FOBT negative x1;  Just dropped off 2nd.  Has been having reflux.  Reports hx of h. Pylori, on ppi and ranitidine currently.) Signs of blood loss that are present include melena (off/on melena). Signs of blood loss that are not present include hematochezia. Past treatments include nothing (Intolerant iron po;  B12 was normal.  ). There is no history of cancer, chronic liver disease, chronic renal disease, heart failure or hypothyroidism (TSH normal). Procedure history includes colonoscopy (last 2016 believes, was not able to complete entire colon, had barium enema that was okay.) and FOBT.       Review of Systems   Constitutional: Positive for malaise/fatigue, unexpected weight change and weight loss (lost 13 lbs since 10/2017). Negative for fever.   Respiratory: Negative for shortness of breath.    Cardiovascular: Negative for chest pain, palpitations and leg swelling.   Gastrointestinal: Positive for abdominal pain (Had some abd pain left side couple days ago, but resolved.), anorexia and melena (off/on melena). Negative for anal bleeding, blood in stool, hematochezia, nausea and vomiting.   Genitourinary: Negative for difficulty urinating and dysuria.   Skin: Negative for pallor.   Neurological: Negative for light-headedness.   Hematological: Does not bruise/bleed easily.       The following portions of the patient's history were reviewed  and updated as appropriate: allergies, current medications, past medical history, past social history, past surgical history and problem list.    Patient Active Problem List   Diagnosis   • Blood glucose abnormal   • Benign essential hypertension   • Dyslipidemia   • Gastroesophageal reflux disease   • Fatigue   • Generalized osteoarthritis   • Chronic recurrent major depressive disorder   • Menopause present   • Osteoporosis   • Palpitations   • Restless legs syndrome   • Seizure disorder   • Subjective tinnitus   • Vitamin D deficiency   • Bradycardia   • Chest pain   • Menopause   • Post-menopause on HRT (hormone replacement therapy)   • Chronic seasonal allergic rhinitis       Allergies   Allergen Reactions   • Crab (Diagnostic) Itching and Rash   • Pseudoephedrine        Current Outpatient Prescriptions on File Prior to Visit   Medication Sig Dispense Refill   • amLODIPine (NORVASC) 10 MG tablet Take 1 tablet by mouth Daily. 90 tablet 3   • aspirin 81 MG EC tablet Take 81 mg by mouth daily.     • benazepril (LOTENSIN) 40 MG tablet Take 1 tablet by mouth Daily. 90 tablet 3   • Calcium Carb-Cholecalciferol (CALCIUM 600 + D PO) Take  by mouth.     • cetirizine (zyrTEC) 10 MG tablet Take 10 mg by mouth Daily.     • Denosumab (PROLIA SC) Inject  under the skin.     • escitalopram (LEXAPRO) 5 MG tablet Take 1 tablet by mouth Daily. 90 tablet 3   • estradiol (ESTRACE) 0.5 MG tablet take 1 tablet by mouth once daily 90 tablet 1   • folic acid (FOLVITE) 400 MCG tablet Take 400 mcg by mouth daily.     • gabapentin (NEURONTIN) 300 MG capsule   0   • hydrALAZINE (APRESOLINE) 100 MG tablet Take 1 tablet by mouth 3 (Three) Times a Day. 270 tablet 3   • hydrochlorothiazide (HYDRODIURIL) 25 MG tablet Take 1 tablet by mouth Daily. 90 tablet 3   • montelukast (SINGULAIR) 10 MG tablet      • Omega-3 Fatty Acids (FISH OIL) 1000 MG capsule capsule Take  by mouth daily with breakfast.     • pantoprazole (PROTONIX) 40 MG EC tablet  Take 1 tablet by mouth Daily. 90 tablet 3   • phenytoin (DILANTIN) 100 MG ER capsule 3 at hs 270 capsule 3   • raNITIdine (ZANTAC) 150 MG tablet   1   • vitamin B-12 (CYANOCOBALAMIN) 100 MCG tablet Take 50 mcg by mouth daily.     • vitamin B-6 (PYRIDOXINE) 100 MG tablet Take 100 mg by mouth daily.     • vitamin C (ASCORBIC ACID) 500 MG tablet Take 500 mg by mouth daily.     • vitamin D (CHOLECALCIFEROL) 400 UNITS tablet Take 400 Units by mouth daily.     • [DISCONTINUED] ipratropium (ATROVENT) 0.03 % nasal spray        No current facility-administered medications on file prior to visit.        Objective   Vitals:    04/10/18 0853   BP: 140/50   Pulse: 70   Temp: 97.6 °F (36.4 °C)   SpO2: 98%   Weight: 66.7 kg (147 lb)       Physical Exam   Constitutional: She appears well-developed and well-nourished.   HENT:   Head: Normocephalic and atraumatic.   Neck: Neck supple. No JVD present. No thyromegaly present.   Cardiovascular: Normal rate, regular rhythm and normal heart sounds.  Exam reveals no gallop and no friction rub.    No murmur heard.  Pulmonary/Chest: Effort normal and breath sounds normal. No respiratory distress. She has no wheezes. She has no rales.   Abdominal: Soft. Bowel sounds are normal. She exhibits no distension. There is no tenderness. There is no rebound and no guarding.   Musculoskeletal: She exhibits no edema.   Neurological: She is alert.   Skin: Skin is warm and dry.   Psychiatric: She has a normal mood and affect. Her behavior is normal.   Nursing note and vitals reviewed.      Assessment/Plan   Martina was seen today for follow-up.    Diagnoses and all orders for this visit:    Anemia, unspecified type  -     POC Occult Blood Stool  -     Ambulatory Referral to Gastroenterology  -     CBC & Differential  -     Iron Profile  -     Ferritin  -     Basic Metabolic Panel    Helicobacter pylori gastritis  Comments:  hx of in past    Melena  -     Ambulatory Referral to Gastroenterology  -     CBC &  Differential  -     Iron Profile  -     Ferritin  -     Basic Metabolic Panel    Other fatigue  -     CBC & Differential  -     Iron Profile  -     Ferritin  -     Basic Metabolic Panel    -intolerant oral iron, hold for now;  Check CBC, iron studies and order iron infusion if low iron studies;  D/w concern new onset anemia, fatigue and weight loss that is unintentional.  Will refer back to GI;  Has hx of h. Pylori, d/w rechecking but has been on ppi.  If profuse bleeding, recurrence of abd pain, go ER immediately.         -Follow up: 2 months        Current Outpatient Prescriptions:   •  amLODIPine (NORVASC) 10 MG tablet, Take 1 tablet by mouth Daily., Disp: 90 tablet, Rfl: 3  •  aspirin 81 MG EC tablet, Take 81 mg by mouth daily., Disp: , Rfl:   •  benazepril (LOTENSIN) 40 MG tablet, Take 1 tablet by mouth Daily., Disp: 90 tablet, Rfl: 3  •  Calcium Carb-Cholecalciferol (CALCIUM 600 + D PO), Take  by mouth., Disp: , Rfl:   •  cetirizine (zyrTEC) 10 MG tablet, Take 10 mg by mouth Daily., Disp: , Rfl:   •  Denosumab (PROLIA SC), Inject  under the skin., Disp: , Rfl:   •  escitalopram (LEXAPRO) 5 MG tablet, Take 1 tablet by mouth Daily., Disp: 90 tablet, Rfl: 3  •  estradiol (ESTRACE) 0.5 MG tablet, take 1 tablet by mouth once daily, Disp: 90 tablet, Rfl: 1  •  folic acid (FOLVITE) 400 MCG tablet, Take 400 mcg by mouth daily., Disp: , Rfl:   •  gabapentin (NEURONTIN) 300 MG capsule, , Disp: , Rfl: 0  •  hydrALAZINE (APRESOLINE) 100 MG tablet, Take 1 tablet by mouth 3 (Three) Times a Day., Disp: 270 tablet, Rfl: 3  •  hydrochlorothiazide (HYDRODIURIL) 25 MG tablet, Take 1 tablet by mouth Daily., Disp: 90 tablet, Rfl: 3  •  montelukast (SINGULAIR) 10 MG tablet, , Disp: , Rfl:   •  Omega-3 Fatty Acids (FISH OIL) 1000 MG capsule capsule, Take  by mouth daily with breakfast., Disp: , Rfl:   •  pantoprazole (PROTONIX) 40 MG EC tablet, Take 1 tablet by mouth Daily., Disp: 90 tablet, Rfl: 3  •  phenytoin (DILANTIN) 100 MG ER  capsule, 3 at hs, Disp: 270 capsule, Rfl: 3  •  raNITIdine (ZANTAC) 150 MG tablet, , Disp: , Rfl: 1  •  vitamin B-12 (CYANOCOBALAMIN) 100 MCG tablet, Take 50 mcg by mouth daily., Disp: , Rfl:   •  vitamin B-6 (PYRIDOXINE) 100 MG tablet, Take 100 mg by mouth daily., Disp: , Rfl:   •  vitamin C (ASCORBIC ACID) 500 MG tablet, Take 500 mg by mouth daily., Disp: , Rfl:   •  vitamin D (CHOLECALCIFEROL) 400 UNITS tablet, Take 400 Units by mouth daily., Disp: , Rfl:

## 2018-04-10 NOTE — PROGRESS NOTES
Clinical Pharmacy Services: Medication History    Martina Blake is a 77 y.o. female presenting to [facility] for   Chief Complaint   Patient presents with   • Syncope     after md visit for rectal bleeding, noted to have afib rvr per ems   • Chest Pain     ntg sl x6, asa 325mg, zofran 4mg PTA       She  has a past medical history of Cystitis; Cystocele; Dyslipidemia; Esophageal reflux; Fatigue; Hypertension; Major depression, chronic; Menopause; Osteoarthritis; Osteoporosis; Palpitations; Post menopausal problems; RLS (restless legs syndrome); Seizure disorder; Subjective tinnitus; Vaginal delivery; and Vitamin D deficiency.    Allergies as of 04/10/2018 - Reviewed 04/10/2018   Allergen Reaction Noted   • Crab (diagnostic) Itching and Rash 10/21/2016   • Pseudoephedrine  04/15/2016       Medication information was obtained from: Patient, medication list  Pharmacy and Phone Number: [facility] [phone]    Prior to Admission Medications     Prescriptions Last Dose Informant Patient Reported? Taking?    amLODIPine (NORVASC) 10 MG tablet  Self No Yes    Take 1 tablet by mouth Daily.    aspirin 81 MG EC tablet  Self Yes Yes    Take 81 mg by mouth daily.    benazepril (LOTENSIN) 40 MG tablet  Self No Yes    Take 1 tablet by mouth Daily.    calcium carbonate-vitamin d (CALCIUM 600+D) 600-400 MG-UNIT per tablet  Self Yes Yes    Take 1 tablet by mouth 2 (Two) Times a Day.    cetirizine (zyrTEC) 10 MG tablet  Self Yes Yes    Take 10 mg by mouth Daily.    Denosumab (PROLIA SC)  Self Yes Yes    Inject 1 Units under the skin Every 6 (Six) Months.    escitalopram (LEXAPRO) 5 MG tablet  Self No Yes    Take 1 tablet by mouth Daily.    estradiol (ESTRACE) 0.5 MG tablet  Self Yes Yes    Take 0.5 mg by mouth Daily.    folic acid (FOLVITE) 400 MCG tablet  Self Yes Yes    Take 400 mcg by mouth daily.    gabapentin (NEURONTIN) 300 MG capsule  Self Yes Yes    Take 300 mg by mouth Every Night.    hydrALAZINE (APRESOLINE)  100 MG tablet  Self No Yes    Take 1 tablet by mouth 3 (Three) Times a Day.    hydrochlorothiazide (HYDRODIURIL) 25 MG tablet  Self No Yes    Take 1 tablet by mouth Daily.    melatonin 5 MG tablet tablet  Self Yes Yes    Take 5 mg by mouth Every Night.    montelukast (SINGULAIR) 10 MG tablet  Self Yes Yes    Take 10 mg by mouth Daily.    Omega-3 Fatty Acids (FISH OIL) 1000 MG capsule capsule  Self Yes Yes    Take 1,000 mg by mouth Daily With Breakfast.    pantoprazole (PROTONIX) 40 MG EC tablet  Self No Yes    Take 1 tablet by mouth Daily.    phenytoin (DILANTIN) 100 MG ER capsule  Self Yes Yes    Take 300 mg by mouth every night at bedtime.    raNITIdine (ZANTAC) 150 MG tablet  Self Yes Yes    Take 150 mg by mouth 2 (Two) Times a Day.    vitamin B-12 (CYANOCOBALAMIN) 100 MCG tablet  Self Yes Yes    Take 100 mcg by mouth Daily.    vitamin B-6 (PYRIDOXINE) 100 MG tablet  Self Yes Yes    Take 100 mg by mouth daily.    vitamin C (ASCORBIC ACID) 500 MG tablet  Self Yes Yes    Take 500 mg by mouth daily.    vitamin D (CHOLECALCIFEROL) 400 UNITS tablet  Self Yes Yes    Take 400 Units by mouth daily.    vitamin E 400 UNIT capsule  Self Yes Yes    Take 400 Units by mouth Daily.            Medication notes: Added Vitamin E and Melatonin per patient    This medication list is complete to the best of my knowledge as of 4/10/2018    Please call if questions.    Justine Hartman, Medication History Technician  4/10/2018 5:39 PM

## 2018-04-11 ENCOUNTER — APPOINTMENT (OUTPATIENT)
Dept: CARDIOLOGY | Facility: HOSPITAL | Age: 78
End: 2018-04-11
Attending: INTERNAL MEDICINE

## 2018-04-11 PROBLEM — I48.0 PAROXYSMAL ATRIAL FIBRILLATION: Status: ACTIVE | Noted: 2018-04-11

## 2018-04-11 PROBLEM — E87.6 HYPOKALEMIA: Status: ACTIVE | Noted: 2018-04-11

## 2018-04-11 PROBLEM — E83.42 HYPOMAGNESEMIA: Status: ACTIVE | Noted: 2018-04-11

## 2018-04-11 LAB
ALBUMIN SERPL-MCNC: 2.9 G/DL (ref 3.5–5.2)
ALBUMIN/GLOB SERPL: 0.9 G/DL
ALP SERPL-CCNC: 73 U/L (ref 39–117)
ALT SERPL W P-5'-P-CCNC: 12 U/L (ref 1–33)
ANION GAP SERPL CALCULATED.3IONS-SCNC: 11.9 MMOL/L
ASCENDING AORTA: 2.7 CM
AST SERPL-CCNC: 43 U/L (ref 1–32)
BASOPHILS # BLD AUTO: 0.02 10*3/MM3 (ref 0–0.2)
BASOPHILS # BLD AUTO: 0.02 10*3/MM3 (ref 0–0.2)
BASOPHILS NFR BLD AUTO: 0.2 % (ref 0–1.5)
BASOPHILS NFR BLD AUTO: 0.3 % (ref 0–1.5)
BH CV ECHO MEAS - ACS: 1.6 CM
BH CV ECHO MEAS - AO MAX PG: 15 MMHG
BH CV ECHO MEAS - AO MEAN PG (FULL): 4 MMHG
BH CV ECHO MEAS - AO MEAN PG: 8 MMHG
BH CV ECHO MEAS - AO ROOT AREA (BSA CORRECTED): 1.8
BH CV ECHO MEAS - AO ROOT AREA: 6.6 CM^2
BH CV ECHO MEAS - AO ROOT DIAM: 2.9 CM
BH CV ECHO MEAS - AO V2 MAX: 192 CM/SEC
BH CV ECHO MEAS - AO V2 MEAN: 127 CM/SEC
BH CV ECHO MEAS - AO V2 VTI: 41.6 CM
BH CV ECHO MEAS - ASC AORTA: 2.7 CM
BH CV ECHO MEAS - AVA(I,A): 2.5 CM^2
BH CV ECHO MEAS - AVA(I,D): 2.5 CM^2
BH CV ECHO MEAS - BSA(HAYCOCK): 1.7 M^2
BH CV ECHO MEAS - BSA: 1.7 M^2
BH CV ECHO MEAS - BZI_BMI: 23.2 KILOGRAMS/M^2
BH CV ECHO MEAS - BZI_METRIC_HEIGHT: 162.6 CM
BH CV ECHO MEAS - BZI_METRIC_WEIGHT: 61.2 KG
BH CV ECHO MEAS - CONTRAST EF (2CH): 62 ML/M^2
BH CV ECHO MEAS - CONTRAST EF 4CH: 66.7 ML/M^2
BH CV ECHO MEAS - EDV(CUBED): 140.6 ML
BH CV ECHO MEAS - EDV(MOD-SP2): 100 ML
BH CV ECHO MEAS - EDV(MOD-SP4): 117 ML
BH CV ECHO MEAS - EDV(TEICH): 129.5 ML
BH CV ECHO MEAS - EF(CUBED): 87.5 %
BH CV ECHO MEAS - EF(MOD-SP2): 62 %
BH CV ECHO MEAS - EF(MOD-SP4): 66.7 %
BH CV ECHO MEAS - EF(TEICH): 81 %
BH CV ECHO MEAS - ESV(CUBED): 17.6 ML
BH CV ECHO MEAS - ESV(MOD-SP2): 38 ML
BH CV ECHO MEAS - ESV(MOD-SP4): 39 ML
BH CV ECHO MEAS - ESV(TEICH): 24.6 ML
BH CV ECHO MEAS - FS: 50 %
BH CV ECHO MEAS - IVS/LVPW: 0.89
BH CV ECHO MEAS - IVSD: 0.8 CM
BH CV ECHO MEAS - LAT PEAK E' VEL: 12 CM/SEC
BH CV ECHO MEAS - LV DIASTOLIC VOL/BSA (35-75): 70.7 ML/M^2
BH CV ECHO MEAS - LV MASS(C)D: 156.9 GRAMS
BH CV ECHO MEAS - LV MASS(C)DI: 94.8 GRAMS/M^2
BH CV ECHO MEAS - LV MEAN PG: 4 MMHG
BH CV ECHO MEAS - LV SYSTOLIC VOL/BSA (12-30): 23.6 ML/M^2
BH CV ECHO MEAS - LV V1 MAX: 159 CM/SEC
BH CV ECHO MEAS - LV V1 MEAN: 96.3 CM/SEC
BH CV ECHO MEAS - LV V1 VTI: 32.5 CM
BH CV ECHO MEAS - LVIDD: 5.2 CM
BH CV ECHO MEAS - LVIDS: 2.6 CM
BH CV ECHO MEAS - LVLD AP2: 7.2 CM
BH CV ECHO MEAS - LVLD AP4: 7.9 CM
BH CV ECHO MEAS - LVLS AP2: 6.2 CM
BH CV ECHO MEAS - LVLS AP4: 6.7 CM
BH CV ECHO MEAS - LVOT AREA (M): 3.1 CM^2
BH CV ECHO MEAS - LVOT AREA: 3.1 CM^2
BH CV ECHO MEAS - LVOT DIAM: 2 CM
BH CV ECHO MEAS - LVPWD: 0.9 CM
BH CV ECHO MEAS - MED PEAK E' VEL: 6 CM/SEC
BH CV ECHO MEAS - MR MAX PG: 45.2 MMHG
BH CV ECHO MEAS - MR MAX VEL: 336 CM/SEC
BH CV ECHO MEAS - MV A DUR: 0.19 SEC
BH CV ECHO MEAS - MV A MAX VEL: 67.9 CM/SEC
BH CV ECHO MEAS - MV DEC SLOPE: 330 CM/SEC^2
BH CV ECHO MEAS - MV DEC TIME: 0.16 SEC
BH CV ECHO MEAS - MV E MAX VEL: 117 CM/SEC
BH CV ECHO MEAS - MV E/A: 1.7
BH CV ECHO MEAS - MV MEAN PG: 2 MMHG
BH CV ECHO MEAS - MV P1/2T MAX VEL: 128 CM/SEC
BH CV ECHO MEAS - MV P1/2T: 113.6 MSEC
BH CV ECHO MEAS - MV V2 MEAN: 67.2 CM/SEC
BH CV ECHO MEAS - MV V2 VTI: 39.1 CM
BH CV ECHO MEAS - MVA P1/2T LCG: 1.7 CM^2
BH CV ECHO MEAS - MVA(P1/2T): 1.9 CM^2
BH CV ECHO MEAS - MVA(VTI): 2.6 CM^2
BH CV ECHO MEAS - PA ACC SLOPE: 1484 CM/SEC^2
BH CV ECHO MEAS - PA ACC TIME: 0.07 SEC
BH CV ECHO MEAS - PA MAX PG: 4.5 MMHG
BH CV ECHO MEAS - PA PR(ACCEL): 47.5 MMHG
BH CV ECHO MEAS - PA V2 MAX: 106 CM/SEC
BH CV ECHO MEAS - PULM A REVS DUR: 0.12 SEC
BH CV ECHO MEAS - PULM A REVS VEL: 30.1 CM/SEC
BH CV ECHO MEAS - PULM DIAS VEL: 61.7 CM/SEC
BH CV ECHO MEAS - PULM S/D: 1.3
BH CV ECHO MEAS - PULM SYS VEL: 80.5 CM/SEC
BH CV ECHO MEAS - QP/QS: 0.45
BH CV ECHO MEAS - RAP SYSTOLE: 8 MMHG
BH CV ECHO MEAS - RV MEAN PG: 1 MMHG
BH CV ECHO MEAS - RV V1 MEAN: 37.4 CM/SEC
BH CV ECHO MEAS - RV V1 VTI: 13.4 CM
BH CV ECHO MEAS - RVOT AREA: 3.5 CM^2
BH CV ECHO MEAS - RVOT DIAM: 2.1 CM
BH CV ECHO MEAS - RVSP: 44 MMHG
BH CV ECHO MEAS - SI(AO): 166 ML/M^2
BH CV ECHO MEAS - SI(CUBED): 74.3 ML/M^2
BH CV ECHO MEAS - SI(LVOT): 61.7 ML/M^2
BH CV ECHO MEAS - SI(MOD-SP2): 37.5 ML/M^2
BH CV ECHO MEAS - SI(MOD-SP4): 47.1 ML/M^2
BH CV ECHO MEAS - SI(TEICH): 63.4 ML/M^2
BH CV ECHO MEAS - SUP REN AO DIAM: 1.9 CM
BH CV ECHO MEAS - SV(AO): 274.8 ML
BH CV ECHO MEAS - SV(CUBED): 123 ML
BH CV ECHO MEAS - SV(LVOT): 102.1 ML
BH CV ECHO MEAS - SV(MOD-SP2): 62 ML
BH CV ECHO MEAS - SV(MOD-SP4): 78 ML
BH CV ECHO MEAS - SV(RVOT): 46.4 ML
BH CV ECHO MEAS - SV(TEICH): 104.9 ML
BH CV ECHO MEAS - TAPSE (>1.6): 2.4 CM2
BH CV ECHO MEAS - TR MAX VEL: 300 CM/SEC
BH CV VAS BP RIGHT ARM: NORMAL MMHG
BH CV XLRA - RV BASE: 3.6 CM
BH CV XLRA - TDI S': 14 CM/SEC
BILIRUB SERPL-MCNC: 0.3 MG/DL (ref 0.1–1.2)
BUN BLD-MCNC: 11 MG/DL (ref 8–23)
BUN SERPL-MCNC: 10 MG/DL (ref 8–23)
BUN/CREAT SERPL: 13.5 (ref 7–25)
BUN/CREAT SERPL: 15.9 (ref 7–25)
CALCIUM SERPL-MCNC: 12.7 MG/DL (ref 8.6–10.5)
CALCIUM SPEC-SCNC: 11.1 MG/DL (ref 8.6–10.5)
CHLORIDE SERPL-SCNC: 100 MMOL/L (ref 98–107)
CHLORIDE SERPL-SCNC: 92 MMOL/L (ref 98–107)
CO2 SERPL-SCNC: 29.1 MMOL/L (ref 22–29)
CO2 SERPL-SCNC: 31.6 MMOL/L (ref 22–29)
CREAT BLD-MCNC: 0.69 MG/DL (ref 0.57–1)
CREAT SERPL-MCNC: 0.74 MG/DL (ref 0.57–1)
DEPRECATED RDW RBC AUTO: 43.2 FL (ref 37–54)
E/E' RATIO: 15
EOSINOPHIL # BLD AUTO: 0.03 10*3/MM3 (ref 0–0.7)
EOSINOPHIL # BLD AUTO: 0.04 10*3/MM3 (ref 0–0.7)
EOSINOPHIL NFR BLD AUTO: 0.3 % (ref 0.3–6.2)
EOSINOPHIL NFR BLD AUTO: 0.6 % (ref 0.3–6.2)
ERYTHROCYTE [DISTWIDTH] IN BLOOD BY AUTOMATED COUNT: 13.3 % (ref 11.7–13)
ERYTHROCYTE [DISTWIDTH] IN BLOOD BY AUTOMATED COUNT: 13.4 % (ref 11.7–13)
FERRITIN SERPL-MCNC: 313.1 NG/ML (ref 13–150)
GFR SERPL CREATININE-BSD FRML MDRD: 82 ML/MIN/1.73
GFR SERPLBLD CREATININE-BSD FMLA CKD-EPI: 76 ML/MIN/1.73
GFR SERPLBLD CREATININE-BSD FMLA CKD-EPI: 92 ML/MIN/1.73
GLOBULIN UR ELPH-MCNC: 3.3 GM/DL
GLUCOSE BLD-MCNC: 106 MG/DL (ref 65–99)
GLUCOSE SERPL-MCNC: 115 MG/DL (ref 65–99)
HCT VFR BLD AUTO: 28.4 % (ref 35.6–45.5)
HCT VFR BLD AUTO: 33.9 % (ref 35.6–45.5)
HGB BLD-MCNC: 10.4 G/DL (ref 11.9–15.5)
HGB BLD-MCNC: 8.9 G/DL (ref 11.9–15.5)
IMM GRANULOCYTES # BLD: 0 10*3/MM3 (ref 0–0.03)
IMM GRANULOCYTES # BLD: 0.02 10*3/MM3 (ref 0–0.03)
IMM GRANULOCYTES NFR BLD: 0 % (ref 0–0.5)
IMM GRANULOCYTES NFR BLD: 0.3 % (ref 0–0.5)
IRON SATN MFR SERPL: 14 % (ref 20–50)
IRON SERPL-MCNC: 32 MCG/DL (ref 37–145)
LEFT ATRIUM VOLUME INDEX: 49 ML/M2
LV EF 2D ECHO EST: 67 %
LYMPHOCYTES # BLD AUTO: 0.6 10*3/MM3 (ref 0.9–4.8)
LYMPHOCYTES # BLD AUTO: 0.89 10*3/MM3 (ref 0.9–4.8)
LYMPHOCYTES NFR BLD AUTO: 13.7 % (ref 19.6–45.3)
LYMPHOCYTES NFR BLD AUTO: 6.9 % (ref 19.6–45.3)
MAGNESIUM SERPL-MCNC: 3.1 MG/DL (ref 1.6–2.4)
MAXIMAL PREDICTED HEART RATE: 143 BPM
MCH RBC QN AUTO: 27.8 PG (ref 26.9–32)
MCH RBC QN AUTO: 28.3 PG (ref 26.9–32)
MCHC RBC AUTO-ENTMCNC: 30.7 G/DL (ref 32.4–36.3)
MCHC RBC AUTO-ENTMCNC: 31.3 G/DL (ref 32.4–36.3)
MCV RBC AUTO: 90.2 FL (ref 80.5–98.2)
MCV RBC AUTO: 90.6 FL (ref 80.5–98.2)
MONOCYTES # BLD AUTO: 1.27 10*3/MM3 (ref 0.2–1.2)
MONOCYTES # BLD AUTO: 1.51 10*3/MM3 (ref 0.2–1.2)
MONOCYTES NFR BLD AUTO: 17.3 % (ref 5–12)
MONOCYTES NFR BLD AUTO: 19.5 % (ref 5–12)
NEUTROPHILS # BLD AUTO: 4.27 10*3/MM3 (ref 1.9–8.1)
NEUTROPHILS # BLD AUTO: 6.56 10*3/MM3 (ref 1.9–8.1)
NEUTROPHILS NFR BLD AUTO: 65.6 % (ref 42.7–76)
NEUTROPHILS NFR BLD AUTO: 75.3 % (ref 42.7–76)
PLATELET # BLD AUTO: 212 10*3/MM3 (ref 140–500)
PLATELET # BLD AUTO: 281 10*3/MM3 (ref 140–500)
PMV BLD AUTO: 10.3 FL (ref 6–12)
POTASSIUM BLD-SCNC: 2.9 MMOL/L (ref 3.5–5.2)
POTASSIUM SERPL-SCNC: 3 MMOL/L (ref 3.5–5.2)
PROT SERPL-MCNC: 6.2 G/DL (ref 6–8.5)
RBC # BLD AUTO: 3.15 10*6/MM3 (ref 3.9–5.2)
RBC # BLD AUTO: 3.74 10*6/MM3 (ref 3.9–5.2)
SODIUM BLD-SCNC: 141 MMOL/L (ref 136–145)
SODIUM SERPL-SCNC: 138 MMOL/L (ref 136–145)
STRESS TARGET HR: 122 BPM
TIBC SERPL-MCNC: 222 MCG/DL
TROPONIN T SERPL-MCNC: 0.05 NG/ML (ref 0–0.03)
UIBC SERPL-MCNC: 190 MCG/DL
WBC # BLD AUTO: 8.72 10*3/MM3 (ref 4.5–10.7)
WBC NRBC COR # BLD: 6.51 10*3/MM3 (ref 4.5–10.7)

## 2018-04-11 PROCEDURE — 99222 1ST HOSP IP/OBS MODERATE 55: CPT | Performed by: INTERNAL MEDICINE

## 2018-04-11 PROCEDURE — 93005 ELECTROCARDIOGRAM TRACING: CPT | Performed by: INTERNAL MEDICINE

## 2018-04-11 PROCEDURE — 25010000002 MAGNESIUM SULFATE 2 GM/50ML SOLUTION: Performed by: INTERNAL MEDICINE

## 2018-04-11 PROCEDURE — 84484 ASSAY OF TROPONIN QUANT: CPT | Performed by: INTERNAL MEDICINE

## 2018-04-11 PROCEDURE — 93306 TTE W/DOPPLER COMPLETE: CPT | Performed by: INTERNAL MEDICINE

## 2018-04-11 PROCEDURE — 80053 COMPREHEN METABOLIC PANEL: CPT | Performed by: INTERNAL MEDICINE

## 2018-04-11 PROCEDURE — 25010000002 CEFTRIAXONE PER 250 MG: Performed by: INTERNAL MEDICINE

## 2018-04-11 PROCEDURE — 93010 ELECTROCARDIOGRAM REPORT: CPT | Performed by: INTERNAL MEDICINE

## 2018-04-11 PROCEDURE — 25010000002 CEFTRIAXONE PER 250 MG: Performed by: HOSPITALIST

## 2018-04-11 PROCEDURE — 85025 COMPLETE CBC W/AUTO DIFF WBC: CPT | Performed by: INTERNAL MEDICINE

## 2018-04-11 PROCEDURE — 93306 TTE W/DOPPLER COMPLETE: CPT

## 2018-04-11 PROCEDURE — 83735 ASSAY OF MAGNESIUM: CPT | Performed by: INTERNAL MEDICINE

## 2018-04-11 RX ORDER — POTASSIUM CHLORIDE 750 MG/1
20 CAPSULE, EXTENDED RELEASE ORAL
Status: DISCONTINUED | OUTPATIENT
Start: 2018-04-11 | End: 2018-04-14 | Stop reason: HOSPADM

## 2018-04-11 RX ORDER — METOPROLOL TARTRATE 50 MG/1
50 TABLET, FILM COATED ORAL EVERY 12 HOURS SCHEDULED
Status: DISCONTINUED | OUTPATIENT
Start: 2018-04-11 | End: 2018-04-14 | Stop reason: HOSPADM

## 2018-04-11 RX ADMIN — POTASSIUM CHLORIDE 40 MEQ: 750 CAPSULE, EXTENDED RELEASE ORAL at 11:51

## 2018-04-11 RX ADMIN — PHENYTOIN SODIUM 300 MG: 100 CAPSULE, EXTENDED RELEASE ORAL at 22:27

## 2018-04-11 RX ADMIN — METOPROLOL TARTRATE 50 MG: 50 TABLET, FILM COATED ORAL at 20:53

## 2018-04-11 RX ADMIN — CEFTRIAXONE SODIUM 1 G: 1 INJECTION, SOLUTION INTRAVENOUS at 00:37

## 2018-04-11 RX ADMIN — HYDRALAZINE HYDROCHLORIDE 50 MG: 50 TABLET, FILM COATED ORAL at 14:25

## 2018-04-11 RX ADMIN — ACETAMINOPHEN 650 MG: 325 TABLET ORAL at 22:34

## 2018-04-11 RX ADMIN — POTASSIUM CHLORIDE 20 MEQ: 750 CAPSULE, EXTENDED RELEASE ORAL at 17:35

## 2018-04-11 RX ADMIN — LISINOPRIL 40 MG: 40 TABLET ORAL at 08:09

## 2018-04-11 RX ADMIN — Medication 5 MG: at 20:54

## 2018-04-11 RX ADMIN — SODIUM CHLORIDE 75 ML/HR: 9 INJECTION, SOLUTION INTRAVENOUS at 11:51

## 2018-04-11 RX ADMIN — MONTELUKAST 10 MG: 10 TABLET, FILM COATED ORAL at 08:09

## 2018-04-11 RX ADMIN — ESCITALOPRAM 5 MG: 5 TABLET, FILM COATED ORAL at 08:09

## 2018-04-11 RX ADMIN — CETIRIZINE HYDROCHLORIDE 5 MG: 10 TABLET, FILM COATED ORAL at 08:09

## 2018-04-11 RX ADMIN — ESTRADIOL 0.5 MG: 1 TABLET ORAL at 08:10

## 2018-04-11 RX ADMIN — PYRIDOXINE HCL TAB 50 MG 100 MG: 50 TAB at 05:21

## 2018-04-11 RX ADMIN — AMLODIPINE BESYLATE 10 MG: 10 TABLET ORAL at 08:09

## 2018-04-11 RX ADMIN — POTASSIUM CHLORIDE 40 MEQ: 750 CAPSULE, EXTENDED RELEASE ORAL at 08:09

## 2018-04-11 RX ADMIN — METOPROLOL TARTRATE 50 MG: 50 TABLET, FILM COATED ORAL at 14:24

## 2018-04-11 RX ADMIN — MAGNESIUM SULFATE HEPTAHYDRATE 2 G: 40 INJECTION, SOLUTION INTRAVENOUS at 00:22

## 2018-04-11 RX ADMIN — GABAPENTIN 300 MG: 300 CAPSULE ORAL at 20:54

## 2018-04-11 RX ADMIN — HYDRALAZINE HYDROCHLORIDE 50 MG: 50 TABLET, FILM COATED ORAL at 22:27

## 2018-04-11 RX ADMIN — HYDRALAZINE HYDROCHLORIDE 50 MG: 50 TABLET, FILM COATED ORAL at 05:19

## 2018-04-11 RX ADMIN — CEFTRIAXONE SODIUM 1 G: 1 INJECTION, SOLUTION INTRAVENOUS at 20:55

## 2018-04-11 RX ADMIN — PANTOPRAZOLE SODIUM 40 MG: 40 TABLET, DELAYED RELEASE ORAL at 05:22

## 2018-04-11 RX ADMIN — MAGNESIUM SULFATE HEPTAHYDRATE 2 G: 40 INJECTION, SOLUTION INTRAVENOUS at 05:20

## 2018-04-11 RX ADMIN — ACETAMINOPHEN 650 MG: 325 TABLET ORAL at 00:28

## 2018-04-11 NOTE — CONSULTS
Adult Nutrition  Assessment/PES    Patient Name:  Martina Blake  YOB: 1940  MRN: [id number]  Admit Date:  4/10/2018    Assessment Date:  4/11/2018    Comments:  Completed nutrition assessment triggered by nurse admission screen. Unsure of accuracy of weights-in doctor's office yesterday patient weighed 147# but her measured weight here was 135#.          Adult Nutrition Assessment     Row Name 04/11/18 1015       Calculation Measurements    Weight Used For Calculations 61.5 kg (135 lb 9.3 oz)       KCAL/KG    14 Kcal/Kg (kcal) 861    15 Kcal/Kg (kcal) 922.5    18 Kcal/Kg (kcal) 1107    20 Kcal/Kg (kcal) 1230    25 Kcal/Kg (kcal) 1537.5    30 Kcal/Kg (kcal) 1845    35 Kcal/Kg (kcal) 2152.5    40 Kcal/Kg (kcal) 2460    45 Kcal/Kg (kcal) 2767.5    50 Kcal/Kg (kcal) 3075       [place]-St. Jeor Equation    RMR ([place]-St. Jeor Equation) 1085       Fluid Requirements    [place]ibel Method (over 20 kg) 2730       Nutrition Prescription PO    Current PO Diet Regular       Nutrient/Fluid Evaluation    Number of Days Evaluated 1 day    Additional Documentation Fluid Intake Evaluation (Group)       Intake Assessment    Fluid Requirement Assessment meeting needs       Fluid Intake Evaluation    IV Fluid (mL) 1800       PO Evaluation    Number of Days PO Intake Evaluated 1 day    Number of Meals 1    % PO Intake 75%    Row Name 04/11/18 1014       Physical Findings    Skin other (see comments)   intact; Luis score 20       Calculation Measurements    Weight Used For Calculations 61.5 kg (135 lb 9.3 oz)       Estimated/Assessed Needs    Additional Documentation Calorie Requirements (Group);Fluid Requirements (Group);Protein Requirements (Group)       Calorie Requirements    Estimated Calorie Requirement (kcal/day) 1538    Estimated Calorie Need Method kcal/kg    Estimated Calorie Requirement Comment 25 treasure/kg       KCAL/KG    14 Kcal/Kg (kcal) 861    15 Kcal/Kg (kcal) 922.5    18 Kcal/Kg (kcal) 1107    20  Kcal/Kg (kcal) 1230    25 Kcal/Kg (kcal) 1537.5    30 Kcal/Kg (kcal) 1845    35 Kcal/Kg (kcal) 2152.5    40 Kcal/Kg (kcal) 2460    45 Kcal/Kg (kcal) 2767.5    50 Kcal/Kg (kcal) 3075       [place]-St. Jeor Equation    RMR ([place]-St. Jeor Equation) 1085       Protein Requirements    Est Protein Requirement Amount (gms/kg) 1.0 gm protein    Estimated Protein Requirements (gms/day) 61.5       Fluid Requirements    Estimated Fluid Requirements (mL/day) 1538    Estimated Fluid Requirement Method RDA Method    RDA Method (mL) 1538    [place] Method (over 20 kg) 2730    Row Name 04/11/18 1013       Labs/Procedures/Meds    Lab Results Reviewed reviewed    Lab Results Comments alb, H/H, K+ low; glu, Mg++ high; imaging and vitals reviewed; meds: antacid, antibiotic, anticonvulsant    Row Name 04/11/18 1011       Admit Weight    Admit Weight Method measured    Admit Weight 61.5 kg (135 lb 9.3 oz)       Usual Body Weight (UBW)    Weight Loss unintentional    Weight Loss Time Frame October 2017 weight 160#. Actual admitting weight 135#. 25# weight loss (18.51%) x 6 months       Body Mass Index (BMI)    BMI Assessment BMI 18.5-24.9: normal    Row Name 04/11/18 1009       Reason for Assessment    Reason For Assessment nurse/nurse practitioner consult    Diagnosis cardiac disease    Identified At Risk by Screening Criteria MST SCORE 2+;unintentional loss of 10 lbs or more in the past 2 mos          Problem/Interventions:        Problem 1     Row Name 04/11/18 1016       Nutrition Diagnoses Problem 1    Problem 1 Unintended Weight Loss    Etiology (related to) Factors Affecting Nutrition    Appetite Poor    Signs/Symptoms (evidenced by) Unintended Weight Change    Unintended Weight Change Loss    Number of Pounds Lost 25#    Weight loss time period 6 months                    Intervention Goal     Row Name 04/11/18 1017       Intervention Goal    General Maintain nutrition    PO Tolerate PO;Maintain intake;PO intake (%)     PO Intake % 75 %    Weight No significant weight loss            Nutrition Intervention     Row Name 04/11/18 1017       Nutrition Intervention    RD/Tech Action Follow Tx progress;Care plan reviewd              Education/Evaluation     Row Name 04/11/18 1017       Education    Education Will Instruct as appropriate       Monitor/Evaluation    Monitor Per protocol        Electronically signed by:  Debora Hernandez RD  04/11/18 10:19 AM

## 2018-04-11 NOTE — CONSULTS
Date of Hospital Visit: 18  Encounter Provider: Te Chan MD  Place of Service: [facility] CARDIOLOGY  Patient Name: Martina Blake  :1940  Referral Provider: Gregory Melgoza MD    Chief complaint:  Chest pain    Reason for Consult:  Atrial fibrillation    History of Present Illness    Ms Pinedo is a 77 year old woman who normally follows with Dr Champ Veloz.  She has a long history of intermittent palpitations.  A Holter in 2017 showed occasional PVCs and semi frequent PACs with occasional short bursts of SVT.  An echocardiogram in  was relatively normaly for age. A stress test was normal in 2015 as well.     Yesterday, she developed the abrupt onset of rapid irregular palpitations associated with chest pressure that radiated into the neck, jaw, and arm.  It was quite intense and made her quite short of breath.  EMS gave her nitroglycerin (x6) and each dose relieved her pain some until it was finally gone.  She was also in rapid atrial fibrillation.  She converted to NSR on her own last night, and has not had any cardiac complaints since.    She was noted to have numerous lab abnormalities including hypokalemia, hypomagnesemia, hypercalcemia, and anemia.  Her TSH was normal.      She has not had chest pain prior to yesterday.  Her palpitations in the past usually lasted only seconds; this was the first sustained episode.  She denies exertional dyspnea, edema, orthopnea, or syncope.     Previous Cardiac Testing  ECHO 2015  FINDINGS:        Technical Comments:  The study quality is fair.      Left Ventricle:  The left ventricular chamber size is normal. Mild concentric left  ventricular hypertrophy is observed. Global left ventricular wall motion  and contractility are within normal limits. There is normal left  ventricular systolic function. The estimated ejection fraction is  60-65%.  Abnormal left ventricular diastolic filling is  "observed,  consistent with impaired relaxation.      Left Atrium:  The left atrium is mildly enlarged.     Right Ventricle:  The right ventricular cavity size is normal. The right ventricular  global systolic function is normal.     Right Atrium:  The right atrial cavity size is normal.     Aortic Valve:  Mild aortic leaflet calcification is visualized. There is no evidence of  aortic regurgitation. There is no evidence of aortic stenosis.     Mitral Valve:  There is mild mitral annular calcification. There is mild mitral  regurgitation.      Tricuspid Valve:  There is mild tricuspid regurgitation. The right ventricular systolic  pressure is estimated to be 20-25 mmHg.  No pulmonary hypertension is  noted.     Pulmonic Valve:  The pulmonic valve is not adequately visualized but Doppler appears  normal. There is mild pulmonic regurgitation.      Pericardium:  There is no pericardial effusion.     Aorta:  There is no dilatation of the ascending aorta.     Stress Test 01/06/2015  IMPRESSION:   1. Probable normal myocardial perfusion study. No diagnostic pattern for   stress-induced ischemia or prior infarction. Fixed anteroapical defect   with preserved contractility, likely breast tissue attenuation artifact.   2. Normal wall motion with resting left ventricular ejection fraction of   79%. Clinical correlation recommended.   Past Medical History:   Diagnosis Date   • Cystitis    • Cystocele     moderate   • Dyslipidemia    • Esophageal reflux    • Fatigue    • History of transfusion     no reaction   • Hypertension    • Major depression, chronic    • Menopause    • Osteoarthritis    • Osteoporosis    • Palpitations    • Post menopausal problems    • RLS (restless legs syndrome)    • Seizure disorder    • Subjective tinnitus    • Vaginal delivery     x2  \"SONYA\"      \"JASON\"   • Vitamin D deficiency        Past Surgical History:   Procedure Laterality Date   • CHOLECYSTECTOMY     • COLONOSCOPY     • CRANIOTOMY     • " TOTAL ABDOMINAL HYSTERECTOMY  1980    w/ bladder suspension.  Probably vaginal hysterectomy with anterior colporrhaphy.        Prior to Admission medications    Medication Sig Start Date End Date Taking? Authorizing Provider   amLODIPine (NORVASC) 10 MG tablet Take 1 tablet by mouth Daily. 10/30/17  Yes Jamie Walton DO   aspirin 81 MG EC tablet Take 81 mg by mouth daily.   Yes Historical Provider, MD   benazepril (LOTENSIN) 40 MG tablet Take 1 tablet by mouth Daily. 10/30/17  Yes Jamie Walton DO   calcium carbonate-vitamin d (CALCIUM 600+D) 600-400 MG-UNIT per tablet Take 1 tablet by mouth Daily.   Yes Historical Provider, MD   cetirizine (zyrTEC) 10 MG tablet Take 10 mg by mouth Daily.   Yes Historical Provider, MD   escitalopram (LEXAPRO) 5 MG tablet Take 1 tablet by mouth Daily. 10/30/17  Yes Jamie Walton DO   estradiol (ESTRACE) 0.5 MG tablet Take 0.5 mg by mouth Daily.   Yes Historical Provider, MD   folic acid (FOLVITE) 400 MCG tablet Take 400 mcg by mouth daily.   Yes Historical Provider, MD   gabapentin (NEURONTIN) 300 MG capsule Take 300 mg by mouth Every Night. 3/6/18  Yes Historical Provider, MD   hydrALAZINE (APRESOLINE) 100 MG tablet Take 1 tablet by mouth 3 (Three) Times a Day. 10/30/17  Yes Jamie Walton DO   hydrochlorothiazide (HYDRODIURIL) 25 MG tablet Take 1 tablet by mouth Daily. 10/30/17  Yes Jamie Walton DO   melatonin 5 MG tablet tablet Take 5 mg by mouth Every Night.   Yes Historical Provider, MD   montelukast (SINGULAIR) 10 MG tablet Take 10 mg by mouth Daily. 3/28/18  Yes Historical Provider, MD   Omega-3 Fatty Acids (FISH OIL) 1000 MG capsule capsule Take 1,000 mg by mouth Daily With Breakfast.   Yes Historical Provider, MD   pantoprazole (PROTONIX) 40 MG EC tablet Take 1 tablet by mouth Daily. 10/30/17  Yes Jamie Walton DO   phenytoin (DILANTIN) 100 MG ER capsule Take 300 mg by mouth every night at bedtime.   Yes Historical Provider, MD   raNITIdine (ZANTAC)  150 MG tablet Take 150 mg by mouth 2 (Two) Times a Day. 3/6/18  Yes Historical Provider, MD   vitamin B-12 (CYANOCOBALAMIN) 100 MCG tablet Take 100 mcg by mouth Daily.   Yes Historical Provider, MD   vitamin B-6 (PYRIDOXINE) 100 MG tablet Take 100 mg by mouth daily.   Yes Historical Provider, MD   vitamin C (ASCORBIC ACID) 500 MG tablet Take 500 mg by mouth daily.   Yes Historical Provider, MD   vitamin D (CHOLECALCIFEROL) 400 UNITS tablet Take 400 Units by mouth daily.   Yes Historical Provider, MD   vitamin E 400 UNIT capsule Take 400 Units by mouth Daily.   Yes Historical Provider, MD   Denosumab (PROLIA SC) Inject 1 Units under the skin Every 6 (Six) Months. Due 2018    Historical Provider, MD       Social History     Social History   • Marital status:      Spouse name: JL   • Number of children: 2   • Years of education: N/A     Occupational History   • Not on file.     Social History Main Topics   • Smoking status: Never Smoker   • Smokeless tobacco: Never Used   • Alcohol use No   • Drug use: No   • Sexual activity: Not on file      Comment:  (Jl)     Other Topics Concern   • Not on file     Social History Narrative   • No narrative on file       Family History   Problem Relation Age of Onset   • No Known Problems Mother    • Stroke Father 54      @ 60    • No Known Problems Maternal Grandmother    • No Known Problems Maternal Grandfather    • Heart disease Paternal Grandmother    • Heart disease Paternal Grandfather    • No Known Problems Daughter    • Stroke Brother 76   • Diabetes type II Brother        Review of Systems   Constitutional: Positive for fatigue.   Respiratory: Positive for chest tightness and shortness of breath.    Cardiovascular: Positive for chest pain and palpitations.   All other systems reviewed and are negative.       Objective:     Vitals:    04/10/18 1937 18 0018 18 0700 18 0809   BP: 159/76 138/61 147/68    BP Location: Right arm  "Right arm Right arm    Patient Position: Lying Lying Lying    Pulse: 57 63 56 60   Resp: 16 16 16    Temp: 98.3 °F (36.8 °C) 98.1 °F (36.7 °C) 98.3 °F (36.8 °C)    TempSrc: Oral Oral Oral    SpO2: 100% 98% 92%    Weight:       Height:         Body mass index is 23.26 kg/m².  Flowsheet Rows    Flowsheet Row First Filed Value   Admission Height 162.6 cm (64\") Documented at 04/10/2018 1459   Admission Weight 66.7 kg (147 lb) Documented at 04/10/2018 1459          Physical Exam   Constitutional: She is oriented to person, place, and time.   obese   HENT:   Head: Normocephalic.   Nose: Nose normal.   Mouth/Throat: Oropharynx is clear and moist.   Eyes: Conjunctivae and EOM are normal. Pupils are equal, round, and reactive to light.   Neck: Normal range of motion.   Cannot assess JVD due to body habitus   Cardiovascular: Normal rate, regular rhythm, normal heart sounds and intact distal pulses.    Pulmonary/Chest: Effort normal and breath sounds normal.   Abdominal: Soft.   Cannot feel organs or aorta   Musculoskeletal: Normal range of motion. She exhibits no edema.   Neurological: She is alert and oriented to person, place, and time. No cranial nerve deficit.   Skin: Skin is warm and dry. No erythema.   Psychiatric: She has a normal mood and affect. Her behavior is normal. Judgment and thought content normal.   Vitals reviewed.              Lab Review:                  Results from last 7 days  Lab Units 04/11/18  0610   SODIUM mmol/L 141   POTASSIUM mmol/L 2.9*   CHLORIDE mmol/L 100   CO2 mmol/L 29.1*   BUN mg/dL 11   CREATININE mg/dL 0.69   GLUCOSE mg/dL 106*   CALCIUM mg/dL 11.1*       Results from last 7 days  Lab Units 04/11/18  0610 04/10/18  1505   TROPONIN T ng/mL 0.051* <0.010       Results from last 7 days  Lab Units 04/11/18  0610   WBC 10*3/mm3 6.51   HEMOGLOBIN g/dL 8.9*   HEMATOCRIT % 28.4*   PLATELETS 10*3/mm3 212               Results from last 7 days  Lab Units 04/11/18  0610   MAGNESIUM mg/dL 3.1*       " "  CXR  IMPRESSION:  No evidence for acute pulmonary process. Borderline heart  size. Follow-up as clinical indications persist.    Telemetry      EKG 04/11/2018    EKG 04/10/2018 1511    I personally viewed and interpreted the patient's EKG/Telemetry data    Assessment/Plan:     1.  PAF -- she certainly has the substrate for AF (age, female sex, atrial dilation, hypertension) and I suspect her marked electrolyte abnormalities \"pushed her over the edge.\"  She is in NSR at this point.  Her LVEF is normal.  For now, I recommend a beta blocker only. I would like her anemia to be worked up before starting an anticoagulant (which she will need long term, as her CHADSVASc score is 4.      2.  Chest pain -- her symptoms are clearly anginal, but were likely driven by her rapid arrhythmia.  She has not had any exertional symptoms prior to this.  Once her electrolyte abnormalities are improved, I will get a stress SPECT.      3.  Hypokalemia/hypomagnesemia/hypercalcemia -- stop HCTZ.  Consider spironolactone.      4.  HTN -- I'm adding metoprolol and stopping the thiazide.  Again, I may need to start spironolactone.               "

## 2018-04-11 NOTE — PLAN OF CARE
Problem: Patient Care Overview  Goal: Plan of Care Review  Outcome: Ongoing (interventions implemented as appropriate)   04/11/18 0615   Coping/Psychosocial   Plan of Care Reviewed With patient   Plan of Care Review   Progress no change   OTHER   Outcome Summary No complaints this shift. VSS, will continue to monitor.     Goal: Individualization and Mutuality  Outcome: Ongoing (interventions implemented as appropriate)    Goal: Discharge Needs Assessment  Outcome: Ongoing (interventions implemented as appropriate)    Goal: Interprofessional Rounds/Family Conf  Outcome: Ongoing (interventions implemented as appropriate)      Problem: Pain, Acute (Adult)  Goal: Acceptable Pain Control/Comfort Level  Outcome: Ongoing (interventions implemented as appropriate)      Problem: Arrhythmia/Dysrhythmia (Symptomatic) (Adult)  Goal: Signs and Symptoms of Listed Potential Problems Will be Absent, Minimized or Managed (Arrhythmia/Dysrhythmia)  Outcome: Ongoing (interventions implemented as appropriate)      Problem: Fall Risk (Adult)  Goal: Identify Related Risk Factors and Signs and Symptoms  Outcome: Outcome(s) achieved Date Met: 04/11/18    Goal: Absence of Fall  Outcome: Ongoing (interventions implemented as appropriate)

## 2018-04-11 NOTE — PROGRESS NOTES
Call results to patient.  Patient admitted to hospital day of after seen.  Let know iron level low;  Is she doing okay?

## 2018-04-11 NOTE — PROGRESS NOTES
"    DAILY PROGRESS NOTE  [facility]    Patient Identification:  Name: Martina Blake  Age: 77 y.o.  Sex: female  :  1940  MRN: [id number]         Primary Care Physician: Jamie Walton DO    Subjective:  Interval History: chest pain resolved - still c/o fatigue and now states melena for last 3 weeks though previously states stool was negative in PCP office - no abd pains/n/v    Objective:daughter at bs     Scheduled Meds:  amLODIPine 10 mg Oral Daily   aspirin 81 mg Oral Daily   ceftriaxone 1 g Intravenous Q24H   cetirizine 5 mg Oral Daily   enoxaparin 1 mg/kg Subcutaneous Q12H   escitalopram 5 mg Oral Daily   estradiol 0.5 mg Oral Daily   folic acid 400 mcg Oral Daily   gabapentin 300 mg Oral Nightly   hydrALAZINE 50 mg Oral Q8H   lisinopril 40 mg Oral Q24H   melatonin 5 mg Oral Nightly   metoprolol tartrate 50 mg Oral Q12H   montelukast 10 mg Oral Daily   pantoprazole 40 mg Oral QAM   phenytoin 300 mg Oral Q12H   vitamin B-6 100 mg Oral Daily   vitamin C 500 mg Oral Daily     Continuous Infusions:  sodium chloride 75 mL/hr Last Rate: 75 mL/hr (18 1151)       Vital signs in last 24 hours:  Temp:  [98 °F (36.7 °C)-98.3 °F (36.8 °C)] 98.3 °F (36.8 °C)  Heart Rate:  [56-68] 68  Resp:  [16-18] 16  BP: (138-188)/(61-85) 150/66    Intake/Output:    Intake/Output Summary (Last 24 hours) at 18 1654  Last data filed at 18 1500   Gross per 24 hour   Intake             1151 ml   Output             1900 ml   Net             -749 ml       Exam:  /66 (BP Location: Right arm, Patient Position: Lying)   Pulse 68   Temp 98.3 °F (36.8 °C) (Oral)   Resp 16   Ht 162.6 cm (64\")   Wt 61.5 kg (135 lb 8 oz)   SpO2 94%   BMI 23.26 kg/m²     General Appearance:    Alert, cooperative, no distress, AAOx3                           Eyes:    PERRL, conjunctiva/corneas clear, EOM's intact, both eyes                         Throat:   Lips, tongue, gums normal; oral mucosa pink and moist    "                        Neck:   Supple, symmetrical, trachea midline, no JVD                         Lungs:    Clear to auscultation bilaterally, respirations unlabored                          Heart:    Regular rate and rhythm, S1 and S2 normal                  Abdomen:     Soft, non-tender, bowel sounds active, no masses                 Extremities:   Extremities normal, atraumatic, no cyanosis or edema                  Neurologic:   CNII-XII intact, moving all extremities w/out focal deficits noted     Data Review:  Labs in chart were reviewed.    Assessment:  Active Hospital Problems (** Indicates Principal Problem)    Diagnosis Date Noted   • **Chest pain [R07.9] 01/05/2015   • Paroxysmal atrial fibrillation [I48.0] 04/11/2018   • Hypokalemia [E87.6] 04/11/2018   • Hypomagnesemia [E83.42] 04/11/2018   • Hypercalcemia [E83.52] 04/10/2018   • UTI (urinary tract infection) [N39.0] 04/10/2018   • Fatigue [R53.83] 04/15/2016   • Seizure disorder [G40.909] 04/15/2016   • Essential hypertension [I10] 01/05/2015      Resolved Hospital Problems    Diagnosis Date Noted Date Resolved   No resolved problems to display.       Plan:  PAFib w/ resolved RVR and elevated troponins - appreciate Cards input   -CP secondary to above and is resolved    -normal TSH    UTI - Rocephin D2/3 - NO Cx sent from ER at admission     Hypokalemia/Hypomagnesia - replace K and DC protocol - s/p IV /Mg     Anemia secondary to Melena/GIB - check stool for Heme - consult GI for endoscopy as Cards is wanting to initiate AC (lovenox)    Hypercalcemia w/out AMS - secondary to supplementation which has been dc'd - agree w/ DC of HCTZ. Can SLIVF    SCDs for DVT ppx     Cain Marin MD  4/11/2018  4:54 PM

## 2018-04-11 NOTE — PROGRESS NOTES
Discharge Planning Assessment  [facility]     Patient Name: Martina Blake  MRN: [id number]  Today's Date: 4/11/2018    Admit Date: 4/10/2018          Discharge Needs Assessment     Row Name 04/11/18 1503       Living Environment    Lives With alone    Current Living Arrangements home/apartment/condo    Primary Care Provided by self    Provides Primary Care For no one    Family Caregiver if Needed child(oswaldo), adult    Able to Return to Prior Arrangements yes       Resource/Environmental Concerns    Resource/Environmental Concerns none       Discharge Needs Assessment    Anticipated Changes Related to Illness none    Equipment Needed After Discharge none            Discharge Plan     Row Name 04/11/18 1504       Plan    Patient/Family in Agreement with Plan unable to assess    Plan Comments IMM verified, met at bedside with pt who lives at home alone. She has never used home health or been to rehab. Pt uses no equipment. Her PCP is Jamie Walton, Rx is [facility] on [address]. Denies problems affording or obtaining medications. Plan at DC is home with no anticipated DC needs.....[facility]    Row Name 04/11/18 1503       Plan    Plan Home no needs        Destination     No service coordination in this encounter.      Durable Medical Equipment     No service coordination in this encounter.      Dialysis/Infusion     No service coordination in this encounter.      Home Medical Care     No service coordination in this encounter.      Social Care     No service coordination in this encounter.                Demographic Summary     Row Name 04/11/18 1509       General Information    Admission Type inpatient    Arrived From home;physician office    Required Notices Provided Important Message from Medicare    Referral Source admission list    Reason for Consult discharge planning    Preferred Language English     Used During This Interaction no            Functional Status     Row Name 04/11/18 1506        Functional Status    Usual Activity Tolerance excellent    Current Activity Tolerance excellent       Functional Status, IADL    Medications independent    Meal Preparation independent    Housekeeping independent    Laundry independent    Shopping independent       Mental Status    General Appearance WDL WDL       Mental Status Summary    Recent Changes in Mental Status/Cognitive Functioning no changes            Psychosocial    No documentation.           Abuse/Neglect    No documentation.           Legal    No documentation.           Substance Abuse    No documentation.           Patient Forms    No documentation.         Archana Crow RN

## 2018-04-11 NOTE — PLAN OF CARE
Problem: Patient Care Overview  Goal: Plan of Care Review  Outcome: Ongoing (interventions implemented as appropriate)   04/11/18 3910   Coping/Psychosocial   Plan of Care Reviewed With patient   Plan of Care Review   Progress improving   OTHER   Outcome Summary Vitals stable. no chest pain today. K-2.9 - currently replacing. Mag low - replaced early this am. lopressor increased to 50. NPO at midnight. Will continue to monitor.        Problem: Pain, Acute (Adult)  Goal: Acceptable Pain Control/Comfort Level  Outcome: Ongoing (interventions implemented as appropriate)      Problem: Arrhythmia/Dysrhythmia (Symptomatic) (Adult)  Goal: Signs and Symptoms of Listed Potential Problems Will be Absent, Minimized or Managed (Arrhythmia/Dysrhythmia)  Outcome: Ongoing (interventions implemented as appropriate)      Problem: Fall Risk (Adult)  Goal: Absence of Fall  Outcome: Ongoing (interventions implemented as appropriate)

## 2018-04-12 ENCOUNTER — APPOINTMENT (OUTPATIENT)
Dept: NUCLEAR MEDICINE | Facility: HOSPITAL | Age: 78
End: 2018-04-12

## 2018-04-12 ENCOUNTER — INPATIENT HOSPITAL (OUTPATIENT)
Dept: URBAN - METROPOLITAN AREA HOSPITAL 113 | Facility: HOSPITAL | Age: 78
End: 2018-04-12
Payer: COMMERCIAL

## 2018-04-12 DIAGNOSIS — K30 FUNCTIONAL DYSPEPSIA: ICD-10-CM

## 2018-04-12 DIAGNOSIS — D50.9 IRON DEFICIENCY ANEMIA, UNSPECIFIED: ICD-10-CM

## 2018-04-12 PROBLEM — E83.42 HYPOMAGNESEMIA: Status: RESOLVED | Noted: 2018-04-11 | Resolved: 2018-04-12

## 2018-04-12 PROBLEM — E83.52 HYPERCALCEMIA: Status: RESOLVED | Noted: 2018-04-10 | Resolved: 2018-04-12

## 2018-04-12 PROBLEM — D50.0 IRON DEFICIENCY ANEMIA DUE TO CHRONIC BLOOD LOSS: Status: ACTIVE | Noted: 2018-04-10

## 2018-04-12 PROBLEM — E87.6 HYPOKALEMIA: Status: RESOLVED | Noted: 2018-04-11 | Resolved: 2018-04-12

## 2018-04-12 LAB
ALBUMIN SERPL-MCNC: 2.9 G/DL (ref 3.5–5.2)
ANION GAP SERPL CALCULATED.3IONS-SCNC: 10.6 MMOL/L
BH CV STRESS COMMENTS STAGE 1: NORMAL
BH CV STRESS DOSE REGADENOSON STAGE 1: 0.4
BH CV STRESS DURATION MIN STAGE 1: 0
BH CV STRESS DURATION SEC STAGE 1: 10
BH CV STRESS PROTOCOL 1: NORMAL
BH CV STRESS RECOVERY BP: NORMAL MMHG
BH CV STRESS RECOVERY HR: 58 BPM
BH CV STRESS STAGE 1: 1
BUN BLD-MCNC: 12 MG/DL (ref 8–23)
BUN/CREAT SERPL: 23.1 (ref 7–25)
CALCIUM SPEC-SCNC: 10.4 MG/DL (ref 8.6–10.5)
CHLORIDE SERPL-SCNC: 103 MMOL/L (ref 98–107)
CO2 SERPL-SCNC: 26.4 MMOL/L (ref 22–29)
CREAT BLD-MCNC: 0.52 MG/DL (ref 0.57–1)
DEPRECATED RDW RBC AUTO: 45 FL (ref 37–54)
ERYTHROCYTE [DISTWIDTH] IN BLOOD BY AUTOMATED COUNT: 13.6 % (ref 11.7–13)
GFR SERPL CREATININE-BSD FRML MDRD: 114 ML/MIN/1.73
GLUCOSE BLD-MCNC: 98 MG/DL (ref 65–99)
HCT VFR BLD AUTO: 28.4 % (ref 35.6–45.5)
HEMOCCULT STL QL: NEGATIVE
HGB BLD-MCNC: 8.8 G/DL (ref 11.9–15.5)
LV EF NUC BP: 74 %
MAXIMAL PREDICTED HEART RATE: 143 BPM
MCH RBC QN AUTO: 28.3 PG (ref 26.9–32)
MCHC RBC AUTO-ENTMCNC: 31 G/DL (ref 32.4–36.3)
MCV RBC AUTO: 91.3 FL (ref 80.5–98.2)
PERCENT MAX PREDICTED HR: 43.36 %
PHOSPHATE SERPL-MCNC: 2.6 MG/DL (ref 2.5–4.5)
PLATELET # BLD AUTO: 221 10*3/MM3 (ref 140–500)
PMV BLD AUTO: 10.6 FL (ref 6–12)
POTASSIUM BLD-SCNC: 4 MMOL/L (ref 3.5–5.2)
RBC # BLD AUTO: 3.11 10*6/MM3 (ref 3.9–5.2)
SODIUM BLD-SCNC: 140 MMOL/L (ref 136–145)
STRESS BASELINE BP: NORMAL MMHG
STRESS BASELINE HR: 48 BPM
STRESS PERCENT HR: 51 %
STRESS POST PEAK BP: NORMAL MMHG
STRESS POST PEAK HR: 62 BPM
STRESS TARGET HR: 122 BPM
WBC NRBC COR # BLD: 7.5 10*3/MM3 (ref 4.5–10.7)

## 2018-04-12 PROCEDURE — 78452 HT MUSCLE IMAGE SPECT MULT: CPT | Performed by: INTERNAL MEDICINE

## 2018-04-12 PROCEDURE — A9500 TC99M SESTAMIBI: HCPCS | Performed by: INTERNAL MEDICINE

## 2018-04-12 PROCEDURE — 25010000002 REGADENOSON 0.4 MG/5ML SOLUTION: Performed by: INTERNAL MEDICINE

## 2018-04-12 PROCEDURE — 80069 RENAL FUNCTION PANEL: CPT | Performed by: HOSPITALIST

## 2018-04-12 PROCEDURE — 78452 HT MUSCLE IMAGE SPECT MULT: CPT

## 2018-04-12 PROCEDURE — 82272 OCCULT BLD FECES 1-3 TESTS: CPT | Performed by: HOSPITALIST

## 2018-04-12 PROCEDURE — 93016 CV STRESS TEST SUPVJ ONLY: CPT | Performed by: INTERNAL MEDICINE

## 2018-04-12 PROCEDURE — 93005 ELECTROCARDIOGRAM TRACING: CPT | Performed by: HOSPITALIST

## 2018-04-12 PROCEDURE — 0 TECHNETIUM SESTAMIBI: Performed by: INTERNAL MEDICINE

## 2018-04-12 PROCEDURE — 93018 CV STRESS TEST I&R ONLY: CPT | Performed by: INTERNAL MEDICINE

## 2018-04-12 PROCEDURE — 99233 SBSQ HOSP IP/OBS HIGH 50: CPT | Performed by: INTERNAL MEDICINE

## 2018-04-12 PROCEDURE — 99222 1ST HOSP IP/OBS MODERATE 55: CPT | Performed by: INTERNAL MEDICINE

## 2018-04-12 PROCEDURE — 25010000002 CEFTRIAXONE PER 250 MG: Performed by: HOSPITALIST

## 2018-04-12 PROCEDURE — 85027 COMPLETE CBC AUTOMATED: CPT | Performed by: HOSPITALIST

## 2018-04-12 PROCEDURE — 93017 CV STRESS TEST TRACING ONLY: CPT

## 2018-04-12 PROCEDURE — 25010000002 ONDANSETRON PER 1 MG: Performed by: INTERNAL MEDICINE

## 2018-04-12 PROCEDURE — 93010 ELECTROCARDIOGRAM REPORT: CPT | Performed by: INTERNAL MEDICINE

## 2018-04-12 RX ORDER — NITROGLYCERIN 0.4 MG/1
0.4 TABLET SUBLINGUAL
Status: DISCONTINUED | OUTPATIENT
Start: 2018-04-12 | End: 2018-04-14 | Stop reason: HOSPADM

## 2018-04-12 RX ORDER — POLYETHYLENE GLYCOL 3350, SODIUM CHLORIDE, POTASSIUM CHLORIDE, SODIUM BICARBONATE, AND SODIUM SULFATE 240; 5.84; 2.98; 6.72; 22.72 G/4L; G/4L; G/4L; G/4L; G/4L
2000 POWDER, FOR SOLUTION ORAL DAILY
Status: DISCONTINUED | OUTPATIENT
Start: 2018-04-12 | End: 2018-04-12

## 2018-04-12 RX ORDER — POLYETHYLENE GLYCOL 3350, SODIUM CHLORIDE, POTASSIUM CHLORIDE, SODIUM BICARBONATE, AND SODIUM SULFATE 240; 5.84; 2.98; 6.72; 22.72 G/4L; G/4L; G/4L; G/4L; G/4L
2000 POWDER, FOR SOLUTION ORAL 2 TIMES DAILY
Status: COMPLETED | OUTPATIENT
Start: 2018-04-12 | End: 2018-04-13

## 2018-04-12 RX ORDER — SPIRONOLACTONE 50 MG/1
50 TABLET, FILM COATED ORAL DAILY
Status: DISCONTINUED | OUTPATIENT
Start: 2018-04-12 | End: 2018-04-14 | Stop reason: HOSPADM

## 2018-04-12 RX ADMIN — PYRIDOXINE HCL TAB 50 MG 100 MG: 50 TAB at 09:00

## 2018-04-12 RX ADMIN — TECHNETIUM TC 99M SESTAMIBI 1 DOSE: 1 INJECTION INTRAVENOUS at 09:50

## 2018-04-12 RX ADMIN — ESTRADIOL 0.5 MG: 1 TABLET ORAL at 08:58

## 2018-04-12 RX ADMIN — PHENYTOIN SODIUM 300 MG: 100 CAPSULE, EXTENDED RELEASE ORAL at 22:50

## 2018-04-12 RX ADMIN — GABAPENTIN 300 MG: 300 CAPSULE ORAL at 22:49

## 2018-04-12 RX ADMIN — PANTOPRAZOLE SODIUM 40 MG: 40 TABLET, DELAYED RELEASE ORAL at 06:22

## 2018-04-12 RX ADMIN — METOPROLOL TARTRATE 50 MG: 50 TABLET, FILM COATED ORAL at 08:59

## 2018-04-12 RX ADMIN — POTASSIUM CHLORIDE 20 MEQ: 750 CAPSULE, EXTENDED RELEASE ORAL at 13:17

## 2018-04-12 RX ADMIN — OXYCODONE HYDROCHLORIDE AND ACETAMINOPHEN 500 MG: 500 TABLET ORAL at 08:59

## 2018-04-12 RX ADMIN — AMLODIPINE BESYLATE 10 MG: 10 TABLET ORAL at 08:59

## 2018-04-12 RX ADMIN — CEFTRIAXONE SODIUM 1 G: 1 INJECTION, SOLUTION INTRAVENOUS at 20:16

## 2018-04-12 RX ADMIN — POLYETHYLENE GLYCOL 3350, SODIUM CHLORIDE, POTASSIUM CHLORIDE, SODIUM BICARBONATE, AND SODIUM SULFATE 2000 ML: 240; 5.84; 2.98; 6.72; 22.72 POWDER, FOR SOLUTION ORAL at 20:25

## 2018-04-12 RX ADMIN — LISINOPRIL 40 MG: 40 TABLET ORAL at 08:59

## 2018-04-12 RX ADMIN — ONDANSETRON 4 MG: 2 INJECTION INTRAMUSCULAR; INTRAVENOUS at 20:16

## 2018-04-12 RX ADMIN — CETIRIZINE HYDROCHLORIDE 5 MG: 10 TABLET, FILM COATED ORAL at 09:01

## 2018-04-12 RX ADMIN — HYDRALAZINE HYDROCHLORIDE 50 MG: 50 TABLET, FILM COATED ORAL at 06:22

## 2018-04-12 RX ADMIN — ASPIRIN 81 MG: 81 TABLET ORAL at 08:59

## 2018-04-12 RX ADMIN — ACETAMINOPHEN 400 MCG: 400 TABLET ORAL at 09:00

## 2018-04-12 RX ADMIN — POTASSIUM CHLORIDE 20 MEQ: 750 CAPSULE, EXTENDED RELEASE ORAL at 08:59

## 2018-04-12 RX ADMIN — SPIRONOLACTONE 50 MG: 50 TABLET ORAL at 13:17

## 2018-04-12 RX ADMIN — HYDRALAZINE HYDROCHLORIDE 50 MG: 50 TABLET, FILM COATED ORAL at 13:18

## 2018-04-12 RX ADMIN — REGADENOSON 0.4 MG: 0.08 INJECTION, SOLUTION INTRAVENOUS at 11:20

## 2018-04-12 RX ADMIN — TECHNETIUM TC 99M SESTAMIBI 1 DOSE: 1 INJECTION INTRAVENOUS at 11:20

## 2018-04-12 RX ADMIN — METOPROLOL TARTRATE 50 MG: 50 TABLET, FILM COATED ORAL at 20:13

## 2018-04-12 RX ADMIN — POTASSIUM CHLORIDE 20 MEQ: 750 CAPSULE, EXTENDED RELEASE ORAL at 18:14

## 2018-04-12 RX ADMIN — ESCITALOPRAM 5 MG: 5 TABLET, FILM COATED ORAL at 08:59

## 2018-04-12 RX ADMIN — HYDRALAZINE HYDROCHLORIDE 50 MG: 50 TABLET, FILM COATED ORAL at 22:49

## 2018-04-12 NOTE — PROGRESS NOTES
"    DAILY PROGRESS NOTE  [facility]    Patient Identification:  Name: Martina Blake  Age: 77 y.o.  Sex: female  :  1940  MRN: [id number]         Primary Care Physician: Jamie Walton DO    Subjective:  Interval History: no cp/melena or BM's - no loc/lof/n/v    Objective:no fm     Scheduled Meds:    amLODIPine 10 mg Oral Daily   aspirin 81 mg Oral Daily   ceftriaxone 1 g Intravenous Q24H   cetirizine 5 mg Oral Daily   enoxaparin 1 mg/kg Subcutaneous Q12H   escitalopram 5 mg Oral Daily   estradiol 0.5 mg Oral Daily   folic acid 400 mcg Oral Daily   gabapentin 300 mg Oral Nightly   hydrALAZINE 50 mg Oral Q8H   lisinopril 40 mg Oral Q24H   melatonin 5 mg Oral Nightly   metoprolol tartrate 50 mg Oral Q12H   montelukast 10 mg Oral Daily   pantoprazole 40 mg Oral QAM   phenytoin 300 mg Oral Q12H   polyethylene glycol with electrolytes 2,000 mL Oral Daily   potassium chloride 20 mEq Oral TID With Meals   spironolactone 50 mg Oral Daily   vitamin B-6 100 mg Oral Daily   vitamin C 500 mg Oral Daily     Continuous Infusions:     Vital signs in last 24 hours:  Temp:  [97.3 °F (36.3 °C)-98.4 °F (36.9 °C)] 97.3 °F (36.3 °C)  Heart Rate:  [52-60] 52  Resp:  [16-20] 20  BP: (162-186)/(60-85) 182/74    Intake/Output:    Intake/Output Summary (Last 24 hours) at 18 1430  Last data filed at 18 1334   Gross per 24 hour   Intake              650 ml   Output             1050 ml   Net             -400 ml       Exam:  BP (!) 182/74 Comment: scheduled meds given  Pulse 52   Temp 97.3 °F (36.3 °C) (Oral)   Resp 20   Ht 162.6 cm (64\")   Wt 61.5 kg (135 lb 8 oz)   SpO2 93%   BMI 23.26 kg/m²     General Appearance:    Alert, cooperative, no distress, AAOx3                         Throat:   Lips, tongue, gums normal; oral mucosa pink and moist                           Neck:   No JVD                         Lungs:    Clear to auscultation bilaterally, respirations unlabored                          " Heart:    Regular rate and rhythm, S1 and S2 normal                  Abdomen:     Soft, non-tender, bowel sounds active                 Extremities:   Extremities normal, atraumatic, no cyanosis or edema                             Data Review:  Labs in chart were reviewed.    Assessment:  Active Hospital Problems (** Indicates Principal Problem)    Diagnosis Date Noted   • **Paroxysmal atrial fibrillation [I48.0] 04/11/2018   • UTI (urinary tract infection) [N39.0] 04/10/2018   • Iron deficiency anemia due to chronic blood loss [D50.0] 04/10/2018   • Fatigue [R53.83] 04/15/2016   • Seizure disorder [G40.909] 04/15/2016   • Essential hypertension [I10] 01/05/2015      Resolved Hospital Problems    Diagnosis Date Noted Date Resolved   • Hypokalemia [E87.6] 04/11/2018 04/12/2018   • Hypomagnesemia [E83.42] 04/11/2018 04/12/2018   • Hypercalcemia [E83.52] 04/10/2018 04/12/2018   • Chest pain [R07.9] 01/05/2015 04/12/2018       Plan:  PAFib w/ resolved RVR and elevated troponins - appreciate Cards input              -CP secondary to above and is resolved - stress negative              -normal TSH     UTI - Rocephin D3/3 - NO Cx sent from ER at admission      Hypokalemia/Hypomagnesia - replaced      Anemia secondary to Melena/GIB - negative stool for Heme - consulted GI for endoscopy as Cards is wanting to initiate AC (lovenox)     Hypercalcemia w/out AMS - secondary to supplementation which has been dc'd - agree w/ DC of HCTZ.     HTN - spironolactone added      SCDs for DVT ppx        Cain Marin MD  4/12/2018  2:30 PM

## 2018-04-12 NOTE — PROGRESS NOTES
LOS: 2 days   Patient Care Team:  Jamie Walton DO as PCP - General  Mary Jane Shearer MD as PCP - Claims Attributed    Chief Complaint: CP, palpitations, melena     Interval History: No cardiac complaints at all.  Remains in SR/SB.      Objective   Vital Signs  Temp:  [98.1 °F (36.7 °C)-98.4 °F (36.9 °C)] 98.2 °F (36.8 °C)  Heart Rate:  [52-68] 53  Resp:  [16-20] 20  BP: (150-186)/(60-85) 169/82    Intake/Output Summary (Last 24 hours) at 04/12/18 0927  Last data filed at 04/12/18 0858   Gross per 24 hour   Intake              680 ml   Output             1050 ml   Net             -370 ml           Physical Exam   Constitutional: She is oriented to person, place, and time.   obese   HENT:   Head: Normocephalic.   Nose: Nose normal.   Mouth/Throat: Oropharynx is clear and moist.   Eyes: Conjunctivae and EOM are normal. Pupils are equal, round, and reactive to light.   Neck: Normal range of motion.   Cannot assess JVD due to body habitus   Cardiovascular: Regular rhythm and intact distal pulses.  Bradycardia present.    Murmur heard.   Systolic murmur is present with a grade of 1/6   Pulmonary/Chest: Effort normal and breath sounds normal.   Abdominal: Soft.   Cannot feel organs or aorta   Musculoskeletal: Normal range of motion. She exhibits edema (no edema but the left leg is larger than the right leg).   Neurological: She is alert and oriented to person, place, and time. No cranial nerve deficit.   Skin: Skin is warm and dry. No erythema.   Psychiatric: She has a normal mood and affect. Her behavior is normal. Judgment and thought content normal.   Vitals reviewed.      Results Review:        Results from last 7 days  Lab Units 04/12/18  0448 04/11/18  0610 04/10/18  1902 04/10/18  1505   SODIUM mmol/L 140 141  --  138   POTASSIUM mmol/L 4.0 2.9* 3.7 2.8*   CHLORIDE mmol/L 103 100  --  95*   CO2 mmol/L 26.4 29.1*  --  27.9   BUN mg/dL 12 11  --  11   CREATININE mg/dL 0.52* 0.69  --  0.74   GLUCOSE mg/dL  "98 106*  --  103*   CALCIUM mg/dL 10.4 11.1*  --  11.2*       Results from last 7 days  Lab Units 04/11/18  0610 04/10/18  1505   TROPONIN T ng/mL 0.051* <0.010       Results from last 7 days  Lab Units 04/12/18  0448 04/11/18  0610 04/10/18  1505   WBC 10*3/mm3 7.50 6.51 10.32   HEMOGLOBIN g/dL 8.8* 8.9* 10.0*   HEMATOCRIT % 28.4* 28.4* 31.8*   PLATELETS 10*3/mm3 221 212 264               Results from last 7 days  Lab Units 04/11/18  0610   MAGNESIUM mg/dL 3.1*           I reviewed the patient's new clinical results.  I personally viewed and interpreted the patient's EKG/Telemetry data        Medication Review:     amLODIPine 10 mg Oral Daily   aspirin 81 mg Oral Daily   ceftriaxone 1 g Intravenous Q24H   cetirizine 5 mg Oral Daily   enoxaparin 1 mg/kg Subcutaneous Q12H   escitalopram 5 mg Oral Daily   estradiol 0.5 mg Oral Daily   folic acid 400 mcg Oral Daily   gabapentin 300 mg Oral Nightly   hydrALAZINE 50 mg Oral Q8H   lisinopril 40 mg Oral Q24H   melatonin 5 mg Oral Nightly   metoprolol tartrate 50 mg Oral Q12H   montelukast 10 mg Oral Daily   pantoprazole 40 mg Oral QAM   phenytoin 300 mg Oral Q12H   potassium chloride 20 mEq Oral TID With Meals   vitamin B-6 100 mg Oral Daily   vitamin C 500 mg Oral Daily            Assessment/Plan     Principal Problem:    Chest pain  Active Problems:    Fatigue    Seizure disorder    Essential hypertension    Hypercalcemia    UTI (urinary tract infection)    Paroxysmal atrial fibrillation    Hypokalemia    Hypomagnesemia    1.  PAF -- she certainly has the substrate for AF (age, female sex, atrial dilation, hypertension) and I suspect her marked electrolyte abnormalities \"pushed her over the edge.\"  She is in NSR/SB at this point.  Her LVEF is normal.  For now, I recommend a beta blocker only. I would like her anemia to be worked up before starting an anticoagulant (which she will need long term, as her CHADSVASc score is 4).  She was having melena at home.     2.  Chest " pain -- her symptoms are clearly anginal, but were likely driven by her rapid arrhythmia.  She has not had any exertional symptoms prior to this.  I will get a Cardiolite today.      3.  Hypokalemia/hypomagnesemia/hypercalcemia -- likely due to HCTZ which was stopped.     4.  HTN -- I added metoprolol but I can't increase the dose due to HR. She's on amlodipine and lisinopril at max doses, as well as a good dose of hydralazine.  I'm starting spironolactone.    Te Chan MD  04/12/18  9:27 AM

## 2018-04-12 NOTE — PLAN OF CARE
Problem: Patient Care Overview  Goal: Plan of Care Review  Outcome: Ongoing (interventions implemented as appropriate)   04/12/18 6846   Coping/Psychosocial   Plan of Care Reviewed With patient   Plan of Care Review   Progress improving   OTHER   Outcome Summary tylenol given 1x, IV abx continued, NPO since midnight for possible stress test in AM, VSS, will continue to monitor       Problem: Pain, Acute (Adult)  Goal: Acceptable Pain Control/Comfort Level  Outcome: Ongoing (interventions implemented as appropriate)      Problem: Arrhythmia/Dysrhythmia (Symptomatic) (Adult)  Goal: Signs and Symptoms of Listed Potential Problems Will be Absent, Minimized or Managed (Arrhythmia/Dysrhythmia)  Outcome: Ongoing (interventions implemented as appropriate)      Problem: Fall Risk (Adult)  Goal: Absence of Fall  Outcome: Ongoing (interventions implemented as appropriate)

## 2018-04-12 NOTE — PLAN OF CARE
Problem: Patient Care Overview  Goal: Plan of Care Review  Outcome: Ongoing (interventions implemented as appropriate)   04/12/18 2765   Coping/Psychosocial   Plan of Care Reviewed With patient;family   Plan of Care Review   Progress improving   OTHER   Outcome Summary Vitals stable. no pain. stress test done today. upper and lower GI tomorrow. family at bedside.Will continue to monitor.        Problem: Pain, Acute (Adult)  Goal: Acceptable Pain Control/Comfort Level  Outcome: Ongoing (interventions implemented as appropriate)      Problem: Arrhythmia/Dysrhythmia (Symptomatic) (Adult)  Goal: Signs and Symptoms of Listed Potential Problems Will be Absent, Minimized or Managed (Arrhythmia/Dysrhythmia)  Outcome: Ongoing (interventions implemented as appropriate)      Problem: Fall Risk (Adult)  Goal: Absence of Fall  Outcome: Ongoing (interventions implemented as appropriate)

## 2018-04-13 ENCOUNTER — ANESTHESIA (OUTPATIENT)
Dept: GASTROENTEROLOGY | Facility: HOSPITAL | Age: 78
End: 2018-04-13

## 2018-04-13 ENCOUNTER — ANESTHESIA EVENT (OUTPATIENT)
Dept: GASTROENTEROLOGY | Facility: HOSPITAL | Age: 78
End: 2018-04-13

## 2018-04-13 LAB
ANION GAP SERPL CALCULATED.3IONS-SCNC: 14.5 MMOL/L
BUN BLD-MCNC: 9 MG/DL (ref 8–23)
BUN/CREAT SERPL: 18.4 (ref 7–25)
CALCIUM SPEC-SCNC: 10.5 MG/DL (ref 8.6–10.5)
CHLORIDE SERPL-SCNC: 97 MMOL/L (ref 98–107)
CO2 SERPL-SCNC: 28.5 MMOL/L (ref 22–29)
CREAT BLD-MCNC: 0.49 MG/DL (ref 0.57–1)
FERRITIN SERPL-MCNC: 307 NG/ML (ref 15–150)
GFR SERPL CREATININE-BSD FRML MDRD: 122 ML/MIN/1.73
GLUCOSE BLD-MCNC: 101 MG/DL (ref 65–99)
HCT VFR BLD AUTO: 30.6 % (ref 35.6–45.5)
HGB BLD-MCNC: 9.5 G/DL (ref 11.9–15.5)
IRON SATN MFR SERPL: 12 % (ref 15–55)
IRON SERPL-MCNC: 23 UG/DL (ref 27–139)
POTASSIUM BLD-SCNC: 4.4 MMOL/L (ref 3.5–5.2)
SODIUM BLD-SCNC: 140 MMOL/L (ref 136–145)
TIBC SERPL-MCNC: 194 UG/DL (ref 250–450)
UIBC SERPL-MCNC: 171 UG/DL (ref 118–369)
WRITTEN AUTHORIZATION: NORMAL

## 2018-04-13 PROCEDURE — 88312 SPECIAL STAINS GROUP 1: CPT | Performed by: INTERNAL MEDICINE

## 2018-04-13 PROCEDURE — 0DB98ZX EXCISION OF DUODENUM, VIA NATURAL OR ARTIFICIAL OPENING ENDOSCOPIC, DIAGNOSTIC: ICD-10-PCS | Performed by: INTERNAL MEDICINE

## 2018-04-13 PROCEDURE — 88305 TISSUE EXAM BY PATHOLOGIST: CPT | Performed by: INTERNAL MEDICINE

## 2018-04-13 PROCEDURE — 45378 DIAGNOSTIC COLONOSCOPY: CPT | Performed by: INTERNAL MEDICINE

## 2018-04-13 PROCEDURE — 99232 SBSQ HOSP IP/OBS MODERATE 35: CPT | Performed by: INTERNAL MEDICINE

## 2018-04-13 PROCEDURE — 43239 EGD BIOPSY SINGLE/MULTIPLE: CPT | Performed by: INTERNAL MEDICINE

## 2018-04-13 PROCEDURE — 25010000002 PROPOFOL 10 MG/ML EMULSION: Performed by: NURSE ANESTHETIST, CERTIFIED REGISTERED

## 2018-04-13 PROCEDURE — 85018 HEMOGLOBIN: CPT | Performed by: HOSPITALIST

## 2018-04-13 PROCEDURE — 85014 HEMATOCRIT: CPT | Performed by: HOSPITALIST

## 2018-04-13 PROCEDURE — 80048 BASIC METABOLIC PNL TOTAL CA: CPT | Performed by: INTERNAL MEDICINE

## 2018-04-13 PROCEDURE — 0DB68ZX EXCISION OF STOMACH, VIA NATURAL OR ARTIFICIAL OPENING ENDOSCOPIC, DIAGNOSTIC: ICD-10-PCS | Performed by: INTERNAL MEDICINE

## 2018-04-13 PROCEDURE — 0DJD8ZZ INSPECTION OF LOWER INTESTINAL TRACT, VIA NATURAL OR ARTIFICIAL OPENING ENDOSCOPIC: ICD-10-PCS | Performed by: INTERNAL MEDICINE

## 2018-04-13 RX ORDER — PROPOFOL 10 MG/ML
VIAL (ML) INTRAVENOUS AS NEEDED
Status: DISCONTINUED | OUTPATIENT
Start: 2018-04-13 | End: 2018-04-13 | Stop reason: SURG

## 2018-04-13 RX ORDER — SODIUM CHLORIDE, SODIUM LACTATE, POTASSIUM CHLORIDE, CALCIUM CHLORIDE 600; 310; 30; 20 MG/100ML; MG/100ML; MG/100ML; MG/100ML
30 INJECTION, SOLUTION INTRAVENOUS CONTINUOUS
Status: DISCONTINUED | OUTPATIENT
Start: 2018-04-13 | End: 2018-04-13

## 2018-04-13 RX ORDER — PROPOFOL 10 MG/ML
VIAL (ML) INTRAVENOUS CONTINUOUS PRN
Status: DISCONTINUED | OUTPATIENT
Start: 2018-04-13 | End: 2018-04-13 | Stop reason: SURG

## 2018-04-13 RX ORDER — LIDOCAINE HYDROCHLORIDE 20 MG/ML
INJECTION, SOLUTION INFILTRATION; PERINEURAL AS NEEDED
Status: DISCONTINUED | OUTPATIENT
Start: 2018-04-13 | End: 2018-04-13 | Stop reason: SURG

## 2018-04-13 RX ADMIN — METOPROLOL TARTRATE 50 MG: 50 TABLET, FILM COATED ORAL at 08:17

## 2018-04-13 RX ADMIN — PHENYTOIN SODIUM 300 MG: 100 CAPSULE, EXTENDED RELEASE ORAL at 20:30

## 2018-04-13 RX ADMIN — HYDRALAZINE HYDROCHLORIDE 50 MG: 50 TABLET, FILM COATED ORAL at 21:58

## 2018-04-13 RX ADMIN — APIXABAN 5 MG: 5 TABLET, FILM COATED ORAL at 20:30

## 2018-04-13 RX ADMIN — METOPROLOL TARTRATE 50 MG: 50 TABLET, FILM COATED ORAL at 20:30

## 2018-04-13 RX ADMIN — HYDRALAZINE HYDROCHLORIDE 50 MG: 50 TABLET, FILM COATED ORAL at 17:34

## 2018-04-13 RX ADMIN — POLYETHYLENE GLYCOL 3350, SODIUM CHLORIDE, POTASSIUM CHLORIDE, SODIUM BICARBONATE, AND SODIUM SULFATE 2000 ML: 240; 5.84; 2.98; 6.72; 22.72 POWDER, FOR SOLUTION ORAL at 03:34

## 2018-04-13 RX ADMIN — PROPOFOL 250 MCG/KG/MIN: 10 INJECTION, EMULSION INTRAVENOUS at 11:26

## 2018-04-13 RX ADMIN — ASPIRIN 81 MG: 81 TABLET ORAL at 17:35

## 2018-04-13 RX ADMIN — ACETAMINOPHEN 400 MCG: 400 TABLET ORAL at 17:35

## 2018-04-13 RX ADMIN — LIDOCAINE HYDROCHLORIDE 40 MG: 20 INJECTION, SOLUTION INFILTRATION; PERINEURAL at 11:26

## 2018-04-13 RX ADMIN — AMLODIPINE BESYLATE 10 MG: 10 TABLET ORAL at 08:17

## 2018-04-13 RX ADMIN — CETIRIZINE HYDROCHLORIDE 5 MG: 10 TABLET, FILM COATED ORAL at 17:35

## 2018-04-13 RX ADMIN — ACETAMINOPHEN 650 MG: 325 TABLET ORAL at 21:54

## 2018-04-13 RX ADMIN — SODIUM CHLORIDE, POTASSIUM CHLORIDE, SODIUM LACTATE AND CALCIUM CHLORIDE 30 ML/HR: 600; 310; 30; 20 INJECTION, SOLUTION INTRAVENOUS at 09:27

## 2018-04-13 RX ADMIN — PROPOFOL 50 MG: 10 INJECTION, EMULSION INTRAVENOUS at 11:26

## 2018-04-13 RX ADMIN — ESTRADIOL 0.5 MG: 1 TABLET ORAL at 17:34

## 2018-04-13 RX ADMIN — MONTELUKAST 10 MG: 10 TABLET, FILM COATED ORAL at 17:34

## 2018-04-13 RX ADMIN — PYRIDOXINE HCL TAB 50 MG 100 MG: 50 TAB at 17:34

## 2018-04-13 RX ADMIN — OXYCODONE HYDROCHLORIDE AND ACETAMINOPHEN 500 MG: 500 TABLET ORAL at 17:34

## 2018-04-13 RX ADMIN — LISINOPRIL 40 MG: 40 TABLET ORAL at 08:17

## 2018-04-13 RX ADMIN — GABAPENTIN 300 MG: 300 CAPSULE ORAL at 20:30

## 2018-04-13 RX ADMIN — ESCITALOPRAM 5 MG: 5 TABLET, FILM COATED ORAL at 17:35

## 2018-04-13 RX ADMIN — Medication 5 MG: at 21:54

## 2018-04-13 NOTE — PROGRESS NOTES
"    DAILY PROGRESS NOTE  [facility]    Patient Identification:  Name: Martina Blake  Age: 77 y.o.  Sex: female  :  1940  MRN: [id number]         Primary Care Physician: Jamie Walton DO    Subjective:  Interval History: No new complaints.  She is denying chest pain shortness of breath nausea vomiting or abdominal pain.  There's been no further issues with melena or bloody stool.  She denies any abdominal pain.    Objective: No family present at bedside - discussed case with CCP    Scheduled Meds:  amLODIPine 10 mg Oral Daily   apixaban 5 mg Oral Q12H   aspirin 81 mg Oral Daily   cetirizine 5 mg Oral Daily   enoxaparin 1 mg/kg Subcutaneous Q12H   escitalopram 5 mg Oral Daily   estradiol 0.5 mg Oral Daily   folic acid 400 mcg Oral Daily   gabapentin 300 mg Oral Nightly   hydrALAZINE 50 mg Oral Q8H   lisinopril 40 mg Oral Q24H   melatonin 5 mg Oral Nightly   metoprolol tartrate 50 mg Oral Q12H   montelukast 10 mg Oral Daily   pantoprazole 40 mg Oral QAM   phenytoin 300 mg Oral Q12H   potassium chloride 20 mEq Oral TID With Meals   spironolactone 50 mg Oral Daily   vitamin B-6 100 mg Oral Daily   vitamin C 500 mg Oral Daily     Continuous Infusions:  lactated ringers 30 mL/hr Last Rate: 30 mL/hr (18 0927)       Vital signs in last 24 hours:  Temp:  [97.4 °F (36.3 °C)-98.2 °F (36.8 °C)] 97.4 °F (36.3 °C)  Heart Rate:  [48-75] 51  Resp:  [16-20] 16  BP: (116-180)/(52-78) 180/76    Intake/Output:    Intake/Output Summary (Last 24 hours) at 18 1425  Last data filed at 18 1237   Gross per 24 hour   Intake              640 ml   Output              800 ml   Net             -160 ml       Exam:  /76 (BP Location: Left arm, Patient Position: Sitting)   Pulse 51   Temp 97.4 °F (36.3 °C) (Oral)   Resp 16   Ht 162.6 cm (64\")   Wt 61.5 kg (135 lb 8 oz)   SpO2 92%   BMI 23.26 kg/m²     General Appearance:    Alert, cooperative, no distress, AAOx3                         " Throat:   Lips, tongue, gums normal; oral mucosa pink and moist                           Neck:   no JVD                         Lungs:    Clear to auscultation bilaterally, respirations unlabored                          Heart:    Keith rate and rhythm, S1 and S2 normal                  Abdomen:     Soft, non-tender, bowel sounds active                 Extremities:   Extremities normal, atraumatic, no cyanosis or edema                            Data Review:  Labs in chart were reviewed.    Assessment:  Active Hospital Problems (** Indicates Principal Problem)    Diagnosis Date Noted   • **Paroxysmal atrial fibrillation [I48.0] 04/11/2018   • UTI (urinary tract infection) [N39.0] 04/10/2018   • Iron deficiency anemia due to chronic blood loss [D50.0] 04/10/2018   • Fatigue [R53.83] 04/15/2016   • Seizure disorder [G40.909] 04/15/2016   • Essential hypertension [I10] 01/05/2015      Resolved Hospital Problems    Diagnosis Date Noted Date Resolved   • Hypokalemia [E87.6] 04/11/2018 04/12/2018   • Hypomagnesemia [E83.42] 04/11/2018 04/12/2018   • Hypercalcemia [E83.52] 04/10/2018 04/12/2018   • Chest pain [R07.9] 01/05/2015 04/12/2018       Plan:  PAFib w/ resolved RVR and elevated troponins - appreciate Cards input              -CP secondary to above and is resolved - stress negative              -normal TSH   -Patient makes statements that had previously been on Toprol and Coreg in the past but these were discontinued and she is not sure why.  She is starting to illustrate low normal heart rates into the 50s during my exam so need to watch this over the next 24 hours as I feel it goes any lower she is likely to become symptomatic     UTI - Rocephin D3/3 - NO Cx sent from ER at admission      Hypokalemia/Hypomagnesia - replaced      Anemia secondary to Melena/GIB - negative stool for Heme endoscopy has not revealed any additional source.     -Cards has on therapeutic lovenox    -Transition to by mouth Eliquis per  cardiology recommendations.  I think it would be wise to watch her blood counts with a repeat H/H in the a.m. to ensure no further drop though over the last 24 hours she has trended up from 8.8-9.5     Hypercalcemia w/out AMS - secondary to supplementation which has been dc'd - agree w/ DC of HCTZ.      HTN - spironolactone added - BP still quite labile     SCDs for DVT ppx     Cain Marin MD  4/13/2018  2:25 PM

## 2018-04-13 NOTE — PLAN OF CARE
Problem: Patient Care Overview  Goal: Plan of Care Review  Outcome: Ongoing (interventions implemented as appropriate)   04/13/18 0448   Coping/Psychosocial   Plan of Care Reviewed With patient   Plan of Care Review   Progress improving   OTHER   Outcome Summary no complaints of pain, upper and lower GI in AM, bowel prep in progress, VSS, will continue to monitor       Problem: Pain, Acute (Adult)  Goal: Acceptable Pain Control/Comfort Level  Outcome: Ongoing (interventions implemented as appropriate)      Problem: Arrhythmia/Dysrhythmia (Symptomatic) (Adult)  Goal: Signs and Symptoms of Listed Potential Problems Will be Absent, Minimized or Managed (Arrhythmia/Dysrhythmia)  Outcome: Ongoing (interventions implemented as appropriate)      Problem: Fall Risk (Adult)  Goal: Absence of Fall  Outcome: Ongoing (interventions implemented as appropriate)

## 2018-04-13 NOTE — CONSULTS
"Inpatient Gastroenterology Consult  Consult performed by: FUENTES NIX  Consult ordered by: TY BAEZ          Patient Care Team:  Jamie Walton DO as PCP - General  Mary Jane Shearer MD as PCP - Claims Attributed    Chief complaint:melena anemia    Subjective     History of Present Illness  Pleasant lady with chest pain, who also describes melena.  Although stools have been heme negative.  She has iron deficiency anemia as well.  Last colonoscopy in  found to have a tortuous colon for which was incomplete she does describe an upset stomach as well and weight loss.  She is concerned about a serious condition given her anemia  Review of Systems   HENT: Negative for trouble swallowing.    Respiratory: Positive for chest tightness.    Gastrointestinal: Positive for abdominal pain and nausea. Negative for anal bleeding.        Past Medical History:   Diagnosis Date   • Cystitis    • Cystocele     moderate   • Dyslipidemia    • Esophageal reflux    • Fatigue    • History of transfusion     no reaction   • Hypertension    • Major depression, chronic    • Menopause    • Osteoarthritis    • Osteoporosis    • Palpitations    • Post menopausal problems    • RLS (restless legs syndrome)    • Seizure disorder    • Subjective tinnitus    • Vaginal delivery     x2  \"SONYA\"      \"JASON\"   • Vitamin D deficiency    ,   Past Surgical History:   Procedure Laterality Date   • CHOLECYSTECTOMY     • COLONOSCOPY     • CRANIOTOMY     • TOTAL ABDOMINAL HYSTERECTOMY      w/ bladder suspension.  Probably vaginal hysterectomy with anterior colporrhaphy.    ,   Family History   Problem Relation Age of Onset   • No Known Problems Mother    • Stroke Father 54      @ 60    • No Known Problems Maternal Grandmother    • No Known Problems Maternal Grandfather    • Heart disease Paternal Grandmother    • Heart disease Paternal Grandfather    • No Known Problems Daughter    • Stroke Brother 76   • Diabetes type II Brother "    ,   Social History   Substance Use Topics   • Smoking status: Never Smoker   • Smokeless tobacco: Never Used   • Alcohol use No   ,   Prescriptions Prior to Admission   Medication Sig Dispense Refill Last Dose   • amLODIPine (NORVASC) 10 MG tablet Take 1 tablet by mouth Daily. 90 tablet 3 4/10/2018 at Unknown time   • aspirin 81 MG EC tablet Take 81 mg by mouth daily.   4/9/2018 at Unknown time   • benazepril (LOTENSIN) 40 MG tablet Take 1 tablet by mouth Daily. 90 tablet 3 4/10/2018 at Unknown time   • calcium carbonate-vitamin d (CALCIUM 600+D) 600-400 MG-UNIT per tablet Take 1 tablet by mouth Daily.   4/9/2018 at Unknown time   • cetirizine (zyrTEC) 10 MG tablet Take 10 mg by mouth Daily.   4/10/2018 at Unknown time   • escitalopram (LEXAPRO) 5 MG tablet Take 1 tablet by mouth Daily. 90 tablet 3 4/10/2018 at Unknown time   • estradiol (ESTRACE) 0.5 MG tablet Take 0.5 mg by mouth Daily.   4/10/2018 at Unknown time   • folic acid (FOLVITE) 400 MCG tablet Take 400 mcg by mouth daily.   4/9/2018 at Unknown time   • gabapentin (NEURONTIN) 300 MG capsule Take 300 mg by mouth Every Night.  0 4/9/2018 at Unknown time   • hydrALAZINE (APRESOLINE) 100 MG tablet Take 1 tablet by mouth 3 (Three) Times a Day. 270 tablet 3 4/10/2018 at 0800   • hydrochlorothiazide (HYDRODIURIL) 25 MG tablet Take 1 tablet by mouth Daily. 90 tablet 3 4/10/2018 at Unknown time   • melatonin 5 MG tablet tablet Take 5 mg by mouth Every Night.   4/9/2018 at Unknown time   • montelukast (SINGULAIR) 10 MG tablet Take 10 mg by mouth Daily.   4/10/2018 at Unknown time   • Omega-3 Fatty Acids (FISH OIL) 1000 MG capsule capsule Take 1,000 mg by mouth Daily With Breakfast.   4/9/2018 at Unknown time   • pantoprazole (PROTONIX) 40 MG EC tablet Take 1 tablet by mouth Daily. 90 tablet 3 4/10/2018 at Unknown time   • phenytoin (DILANTIN) 100 MG ER capsule Take 300 mg by mouth every night at bedtime.   4/9/2018 at Unknown time   • raNITIdine (ZANTAC) 150 MG  tablet Take 150 mg by mouth 2 (Two) Times a Day.  1 4/10/2018 at 0800   • vitamin B-12 (CYANOCOBALAMIN) 100 MCG tablet Take 100 mcg by mouth Daily.   4/9/2018 at Unknown time   • vitamin B-6 (PYRIDOXINE) 100 MG tablet Take 100 mg by mouth daily.   4/9/2018 at Unknown time   • vitamin C (ASCORBIC ACID) 500 MG tablet Take 500 mg by mouth daily.   4/9/2018 at Unknown time   • vitamin D (CHOLECALCIFEROL) 400 UNITS tablet Take 400 Units by mouth daily.   4/9/2018 at Unknown time   • vitamin E 400 UNIT capsule Take 400 Units by mouth Daily.   4/9/2018 at Unknown time   • Denosumab (PROLIA SC) Inject 1 Units under the skin Every 6 (Six) Months. Due June 7, 2018   More than a month at Unknown time   , Scheduled Meds:    amLODIPine 10 mg Oral Daily   aspirin 81 mg Oral Daily   cetirizine 5 mg Oral Daily   enoxaparin 1 mg/kg Subcutaneous Q12H   escitalopram 5 mg Oral Daily   estradiol 0.5 mg Oral Daily   folic acid 400 mcg Oral Daily   gabapentin 300 mg Oral Nightly   hydrALAZINE 50 mg Oral Q8H   lisinopril 40 mg Oral Q24H   melatonin 5 mg Oral Nightly   metoprolol tartrate 50 mg Oral Q12H   montelukast 10 mg Oral Daily   pantoprazole 40 mg Oral QAM   phenytoin 300 mg Oral Q12H   polyethylene glycol with electrolytes 2,000 mL Oral BID   potassium chloride 20 mEq Oral TID With Meals   spironolactone 50 mg Oral Daily   vitamin B-6 100 mg Oral Daily   vitamin C 500 mg Oral Daily   , Continuous Infusions:   , PRN Meds:  •  acetaminophen  •  magnesium sulfate **OR** magnesium sulfate **OR** magnesium sulfate  •  nitroglycerin  •  ondansetron  •  sodium chloride  •  sodium chloride  •  Insert peripheral IV **AND** sodium chloride and Allergies:  Crab (diagnostic) and Pseudoephedrine    Objective      Vital Signs  Temp:  [97.3 °F (36.3 °C)-98.4 °F (36.9 °C)] 98.1 °F (36.7 °C)  Heart Rate:  [52-60] 57  Resp:  [16-20] 20  BP: (136-186)/(60-85) 140/75    Physical Exam   Constitutional: She appears well-developed and well-nourished.    HENT:   Mouth/Throat: Oropharynx is clear and moist.   Eyes: Conjunctivae are normal.   Neck: Neck supple.   Cardiovascular: Normal rate and regular rhythm.  Exam reveals gallop.    Pulmonary/Chest: Effort normal and breath sounds normal.   Abdominal: Soft. Bowel sounds are normal.       Results Review:    I reviewed the patient's new clinical results.        Assessment/Plan     Principal Problem:    Paroxysmal atrial fibrillation  Active Problems:    Fatigue    Seizure disorder    Essential hypertension    UTI (urinary tract infection)    Iron deficiency anemia due to chronic blood loss      Assessment:  (Dyspepsia  Iron deficiency anemia  ).     Plan:   (Upper and lower tract endoscopy, risks, alternatives and benefits dicussed  with patient and patient is agreeable to proceed.).       I discussed the patients findings and my recommendations with patient and nursing staff    Dominik Burt MD  04/12/18  9:39 PM    Time: Critical care 20 min

## 2018-04-13 NOTE — PROGRESS NOTES
She remains in NSR.  I s/w Dr. Burt re: her endoscopy results.    I recommend apixaban 5mg BID.     Her BP is still up but we just changed her medications yesterday and they will need a few days to reach steady state.    She can be discharged at any time and f/u with Dr. Veloz.

## 2018-04-13 NOTE — PLAN OF CARE
Problem: Patient Care Overview  Goal: Plan of Care Review  Outcome: Ongoing (interventions implemented as appropriate)   04/13/18 9528   Coping/Psychosocial   Plan of Care Reviewed With patient   Plan of Care Review   Progress improving   OTHER   Outcome Summary No c/o pain or soa. EGD showed hiatal hernia, polyps removed and chronic gastritis, Colonoscopy showed diverticulosis. Pt to start on Eliquis and Lovenox discontinued. Cardiology notified to keep rounding on pt d/t bradycardia and on Lopressor.     Goal: Individualization and Mutuality  Outcome: Ongoing (interventions implemented as appropriate)    Goal: Discharge Needs Assessment  Outcome: Ongoing (interventions implemented as appropriate)    Goal: Interprofessional Rounds/Family Conf  Outcome: Ongoing (interventions implemented as appropriate)      Problem: Pain, Acute (Adult)  Goal: Acceptable Pain Control/Comfort Level  Outcome: Ongoing (interventions implemented as appropriate)      Problem: Arrhythmia/Dysrhythmia (Symptomatic) (Adult)  Goal: Signs and Symptoms of Listed Potential Problems Will be Absent, Minimized or Managed (Arrhythmia/Dysrhythmia)  Outcome: Ongoing (interventions implemented as appropriate)      Problem: Fall Risk (Adult)  Goal: Absence of Fall  Outcome: Ongoing (interventions implemented as appropriate)

## 2018-04-13 NOTE — PROGRESS NOTES
UPPER AND LOWER ENDOSCOPY SUMMARY    HIATAL HERNIA  GASTRITIS   ESOPHAGITIS  DIVERTICULOSIS     case D/W Dr Chan and patients daughter  , no contraindication to AC, consider iron supplement  bx pending for celiac disease.   Certainly patient could have angioectasia of the small bowel , if anemia persists would consider outpatient capsule study.

## 2018-04-13 NOTE — PROGRESS NOTES
Continued Stay Note  [facility]     Patient Name: Martina Blake  MRN: [id number]  Today's Date: 4/13/2018    Admit Date: 4/10/2018          Discharge Plan     Row Name 04/13/18 1528       Plan    Plan Home     Plan Comments Asked to price ELIQUIS for patient. Called [facility] pharmacy . Per Violetta,  Co-pay  is $45 . Patient agreeable  to paying co pay and plans to use  her [facility] pharmacy..... CLARA Solomon              Discharge Codes    No documentation.       Expected Discharge Date and Time     Expected Discharge Date Expected Discharge Time    Apr 13, 2018             CLARA Solomon

## 2018-04-13 NOTE — ANESTHESIA POSTPROCEDURE EVALUATION
Patient: Martina Blake    Procedure Summary     Date:  04/13/18 Room / Location:   [facility] /  [facility]    Anesthesia Start:  1122 Anesthesia Stop:  1238    Procedures:       ESOPHAGOGASTRODUODENOSCOPY with biopsy (N/A Esophagus)      COLONOSCOPY to terminal ileum (N/A ) Diagnosis:       Iron deficiency anemia due to chronic blood loss      (Iron deficiency anemia due to chronic blood loss [D50.0])    Surgeon:  Dominik Burt MD Provider:  Maame Youssef MD    Anesthesia Type:  MAC ASA Status:  3          Anesthesia Type: MAC  Last vitals  BP   180/76 (04/13/18 1345)   Temp   36.3 °C (97.4 °F) (04/13/18 1345)   Pulse   51 (04/13/18 1345)   Resp   16 (04/13/18 1345)     SpO2   92 % (04/13/18 1300)     Post Anesthesia Care and Evaluation    Patient location during evaluation: bedside  Patient participation: complete - patient participated  Level of consciousness: awake and alert  Pain score: 0  Pain management: adequate  Airway patency: patent  Anesthetic complications: No anesthetic complications  PONV Status: none  Cardiovascular status: acceptable  Respiratory status: acceptable  Hydration status: acceptable  Post Neuraxial Block status: Motor and sensory function returned to baseline

## 2018-04-13 NOTE — ANESTHESIA PREPROCEDURE EVALUATION
Anesthesia Evaluation     Patient summary reviewed and Nursing notes reviewed   NPO Solid Status: > 8 hours  NPO Liquid Status: > 2 hours           Airway   Dental      Pulmonary    Cardiovascular     (+) hypertension, dysrhythmias Atrial Fib,       Neuro/Psych  (+) seizures, psychiatric history,     GI/Hepatic/Renal/Endo    (+)  GERD,      Musculoskeletal     Abdominal    Substance History      OB/GYN          Other   (+) arthritis                     Anesthesia Plan    ASA 3     MAC

## 2018-04-14 VITALS
HEART RATE: 56 BPM | DIASTOLIC BLOOD PRESSURE: 72 MMHG | RESPIRATION RATE: 18 BRPM | OXYGEN SATURATION: 92 % | WEIGHT: 135.5 LBS | BODY MASS INDEX: 23.13 KG/M2 | TEMPERATURE: 98.3 F | SYSTOLIC BLOOD PRESSURE: 156 MMHG | HEIGHT: 64 IN

## 2018-04-14 PROBLEM — K44.9 HIATAL HERNIA: Status: ACTIVE | Noted: 2018-04-14

## 2018-04-14 PROBLEM — K29.00 ACUTE SUPERFICIAL GASTRITIS WITHOUT HEMORRHAGE: Status: ACTIVE | Noted: 2018-04-14

## 2018-04-14 PROBLEM — K20.90 ESOPHAGITIS: Status: ACTIVE | Noted: 2018-04-14

## 2018-04-14 PROBLEM — K57.30 DIVERTICULOSIS OF LARGE INTESTINE WITHOUT HEMORRHAGE: Status: ACTIVE | Noted: 2018-04-14

## 2018-04-14 LAB
CYTO UR: NORMAL
HCT VFR BLD AUTO: 28 % (ref 35.6–45.5)
HCT VFR BLD AUTO: 29.5 % (ref 35.6–45.5)
HGB BLD-MCNC: 8.7 G/DL (ref 11.9–15.5)
HGB BLD-MCNC: 9.2 G/DL (ref 11.9–15.5)
LAB AP CASE REPORT: NORMAL
Lab: NORMAL
PATH REPORT.FINAL DX SPEC: NORMAL
PATH REPORT.GROSS SPEC: NORMAL

## 2018-04-14 PROCEDURE — 99232 SBSQ HOSP IP/OBS MODERATE 35: CPT | Performed by: INTERNAL MEDICINE

## 2018-04-14 PROCEDURE — 85018 HEMOGLOBIN: CPT | Performed by: INTERNAL MEDICINE

## 2018-04-14 PROCEDURE — 85018 HEMOGLOBIN: CPT | Performed by: HOSPITALIST

## 2018-04-14 PROCEDURE — 85014 HEMATOCRIT: CPT | Performed by: INTERNAL MEDICINE

## 2018-04-14 PROCEDURE — 85014 HEMATOCRIT: CPT | Performed by: HOSPITALIST

## 2018-04-14 RX ORDER — HYDRALAZINE HYDROCHLORIDE 100 MG/1
50 TABLET, FILM COATED ORAL 3 TIMES DAILY
Qty: 270 TABLET | Refills: 3
Start: 2018-04-14 | End: 2018-04-19

## 2018-04-14 RX ORDER — SPIRONOLACTONE 50 MG/1
50 TABLET, FILM COATED ORAL DAILY
Qty: 30 TABLET | Refills: 0 | Status: SHIPPED | OUTPATIENT
Start: 2018-04-15 | End: 2018-05-16 | Stop reason: HOSPADM

## 2018-04-14 RX ORDER — PHENYTOIN SODIUM 100 MG/1
300 CAPSULE, EXTENDED RELEASE ORAL NIGHTLY
Status: DISCONTINUED | OUTPATIENT
Start: 2018-04-14 | End: 2018-04-14 | Stop reason: HOSPADM

## 2018-04-14 RX ORDER — METOPROLOL TARTRATE 50 MG/1
50 TABLET, FILM COATED ORAL EVERY 12 HOURS SCHEDULED
Qty: 60 TABLET | Refills: 0 | Status: SHIPPED | OUTPATIENT
Start: 2018-04-14 | End: 2018-04-19

## 2018-04-14 RX ORDER — FERROUS SULFATE 325(65) MG
325 TABLET ORAL
Qty: 90 TABLET | Refills: 0 | Status: SHIPPED | OUTPATIENT
Start: 2018-04-14 | End: 2018-05-16 | Stop reason: HOSPADM

## 2018-04-14 RX ADMIN — PANTOPRAZOLE SODIUM 40 MG: 40 TABLET, DELAYED RELEASE ORAL at 06:44

## 2018-04-14 RX ADMIN — PYRIDOXINE HCL TAB 50 MG 100 MG: 50 TAB at 08:42

## 2018-04-14 RX ADMIN — ASPIRIN 81 MG: 81 TABLET ORAL at 08:42

## 2018-04-14 RX ADMIN — ACETAMINOPHEN 400 MCG: 400 TABLET ORAL at 08:42

## 2018-04-14 RX ADMIN — METOPROLOL TARTRATE 50 MG: 50 TABLET, FILM COATED ORAL at 08:42

## 2018-04-14 RX ADMIN — MONTELUKAST 10 MG: 10 TABLET, FILM COATED ORAL at 08:42

## 2018-04-14 RX ADMIN — ESTRADIOL 0.5 MG: 1 TABLET ORAL at 08:43

## 2018-04-14 RX ADMIN — CETIRIZINE HYDROCHLORIDE 5 MG: 10 TABLET, FILM COATED ORAL at 08:42

## 2018-04-14 RX ADMIN — HYDRALAZINE HYDROCHLORIDE 50 MG: 50 TABLET, FILM COATED ORAL at 06:44

## 2018-04-14 RX ADMIN — LISINOPRIL 40 MG: 40 TABLET ORAL at 08:42

## 2018-04-14 RX ADMIN — APIXABAN 5 MG: 5 TABLET, FILM COATED ORAL at 08:42

## 2018-04-14 RX ADMIN — ESCITALOPRAM 5 MG: 5 TABLET, FILM COATED ORAL at 08:42

## 2018-04-14 RX ADMIN — AMLODIPINE BESYLATE 10 MG: 10 TABLET ORAL at 08:42

## 2018-04-14 RX ADMIN — SPIRONOLACTONE 50 MG: 50 TABLET ORAL at 08:42

## 2018-04-14 RX ADMIN — POTASSIUM CHLORIDE 20 MEQ: 750 CAPSULE, EXTENDED RELEASE ORAL at 08:42

## 2018-04-14 RX ADMIN — HYDRALAZINE HYDROCHLORIDE 50 MG: 50 TABLET, FILM COATED ORAL at 14:18

## 2018-04-14 RX ADMIN — OXYCODONE HYDROCHLORIDE AND ACETAMINOPHEN 500 MG: 500 TABLET ORAL at 08:42

## 2018-04-14 RX ADMIN — POTASSIUM CHLORIDE 20 MEQ: 750 CAPSULE, EXTENDED RELEASE ORAL at 11:41

## 2018-04-14 NOTE — PLAN OF CARE
Problem: Patient Care Overview  Goal: Plan of Care Review  Outcome: Ongoing (interventions implemented as appropriate)   04/14/18 0404   Coping/Psychosocial   Plan of Care Reviewed With patient   Plan of Care Review   Progress no change   OTHER   Outcome Summary No complaints this shift. HR as low as 49. VSS, will continue to monitor.      Goal: Individualization and Mutuality  Outcome: Ongoing (interventions implemented as appropriate)    Goal: Discharge Needs Assessment  Outcome: Ongoing (interventions implemented as appropriate)    Goal: Interprofessional Rounds/Family Conf  Outcome: Ongoing (interventions implemented as appropriate)      Problem: Pain, Acute (Adult)  Goal: Acceptable Pain Control/Comfort Level  Outcome: Ongoing (interventions implemented as appropriate)      Problem: Arrhythmia/Dysrhythmia (Symptomatic) (Adult)  Goal: Signs and Symptoms of Listed Potential Problems Will be Absent, Minimized or Managed (Arrhythmia/Dysrhythmia)  Outcome: Ongoing (interventions implemented as appropriate)

## 2018-04-14 NOTE — PROGRESS NOTES
[facility]  Inpatient Progress Note    Reason for Follow Up: Melena, esophagitis, gastritis, diverticulosis    Subjective     Interval History:   No further bleeding, following hemoglobin    Current Facility-Administered Medications:   •  acetaminophen (TYLENOL) tablet 650 mg, 650 mg, Oral, Q4H PRN, Gregory Melgoza MD, 650 mg at 04/13/18 2154  •  amLODIPine (NORVASC) tablet 10 mg, 10 mg, Oral, Daily, Gregory Melgoza MD, 10 mg at 04/14/18 0842  •  apixaban (ELIQUIS) tablet 5 mg, 5 mg, Oral, Q12H, Cain Marin MD, 5 mg at 04/14/18 0842  •  cetirizine (zyrTEC) tablet 5 mg, 5 mg, Oral, Daily, Jawmelvi Melgoza MD, 5 mg at 04/14/18 0842  •  escitalopram (LEXAPRO) tablet 5 mg, 5 mg, Oral, Daily, Jawmelvi Melgoza MD, 5 mg at 04/14/18 0842  •  estradiol (ESTRACE) tablet 0.5 mg, 0.5 mg, Oral, Daily, Jawmelvi Melgoza MD, 0.5 mg at 04/14/18 0843  •  folic acid (FOLVITE) tablet 400 mcg, 400 mcg, Oral, Daily, Gregory Melgoza MD, 400 mcg at 04/14/18 0842  •  gabapentin (NEURONTIN) capsule 300 mg, 300 mg, Oral, Nightly, Jawmelvi Melgoza MD, 300 mg at 04/13/18 2030  •  hydrALAZINE (APRESOLINE) tablet 50 mg, 50 mg, Oral, Q8H, Gregory Melgoza MD, 50 mg at 04/14/18 0644  •  lisinopril (PRINIVIL,ZESTRIL) tablet 40 mg, 40 mg, Oral, Q24H, Gregory Melgoza MD, 40 mg at 04/14/18 0842  •  Magnesium Sulfate 2 gram Bolus, followed by 8 gram infusion (total Mg dose 10 grams)- Mg less than or equal to 1mg/dL, 2 g, Intravenous, PRN **OR** Magnesium Sulfate 6 gram Infusion (2 gm x 3) -Mg 1.1 -1.5 mg/dL, 2 g, Intravenous, PRN, Last Rate: 25 mL/hr at 04/11/18 0520, 2 g at 04/11/18 0520 **OR** magnesium sulfate 4 gram infusion- Mg 1.6-1.9 mg/dL, 4 g, Intravenous, PRN, Jawmelvi Melgoza MD  •  melatonin tablet 5 mg, 5 mg, Oral, Nightly, Jawmelvi Melgoza MD, 5 mg at 04/13/18 2154  •  metoprolol tartrate (LOPRESSOR) tablet 50 mg, 50 mg, Oral, Q12H, Te Chan MD, 50 mg at 04/14/18 0842  •  montelukast (SINGULAIR) tablet 10 mg, 10 mg, Oral, Daily, Jawed Abad,  MD, 10 mg at 04/14/18 0842  •  nitroglycerin (NITROSTAT) SL tablet 0.4 mg, 0.4 mg, Sublingual, Q5 Min PRN, Cain Marin MD  •  ondansetron (ZOFRAN) injection 4 mg, 4 mg, Intravenous, Q6H PRN, Gregory Melgoza MD, 4 mg at 04/12/18 2016  •  pantoprazole (PROTONIX) EC tablet 40 mg, 40 mg, Oral, QAM, Gregory Melgoza MD, 40 mg at 04/14/18 0644  •  phenytoin (DILANTIN) ER capsule 300 mg, 300 mg, Oral, Nightly, Quinton Frazier MD  •  potassium chloride (MICRO-K) CR capsule 20 mEq, 20 mEq, Oral, TID With Meals, Cain Marin MD, 20 mEq at 04/14/18 1141  •  sodium chloride 0.9 % flush 1-10 mL, 1-10 mL, Intravenous, PRN, Gregory Melgoza MD  •  sodium chloride 0.9 % flush 10 mL, 10 mL, Intravenous, PRN, Yevgeniy Magana MD  •  Insert peripheral IV, , , Once **AND** sodium chloride 0.9 % flush 10 mL, 10 mL, Intravenous, PRN, Yevgeniy Magana MD  •  spironolactone (ALDACTONE) tablet 50 mg, 50 mg, Oral, Daily, Te Chan MD, 50 mg at 04/14/18 0842  •  vitamin B-6 (PYRIDOXINE) tablet 100 mg, 100 mg, Oral, Daily, Gregory Melgoza MD, 100 mg at 04/14/18 0842  •  vitamin C (ASCORBIC ACID) tablet 500 mg, 500 mg, Oral, Daily, Gregory Melgoza MD, 500 mg at 04/14/18 0842  Review of Systems:    She endorses weakness and fatigue all other systems reviewed and negative    Objective     Vital Signs  Temp:  [97.4 °F (36.3 °C)-98.3 °F (36.8 °C)] 97.9 °F (36.6 °C)  Heart Rate:  [49-84] 58  Resp:  [16-18] 18  BP: (152-200)/(62-84) 180/62  Body mass index is 23.26 kg/m².  No intake or output data in the 24 hours ending 04/14/18 1255  No intake/output data recorded.     Physical Exam:   General: patient awake, alert and cooperative   Eyes: Normal lids and lashes, no scleral icterus   Neck: supple, normal ROM   Skin: warm and dry, not jaundiced   Cardiovascular: regular rhythm and rate, no murmurs auscultated   Pulm: clear to auscultation bilaterally, regular and unlabored   Abdomen: soft, nontender, nondistended; normal bowel sounds   Rectal:  deferred   Extremities: no rash or edema   Psychiatric: Normal mood and behavior; memory intact     Results Review:     I reviewed the patient's new clinical results.      Results from last 7 days  Lab Units 04/14/18  0423 04/13/18  0555 04/12/18  0448 04/11/18  0610 04/10/18  1505   WBC 10*3/mm3  --   --  7.50 6.51 10.32   HEMOGLOBIN g/dL 8.7* 9.5* 8.8* 8.9* 10.0*   HEMATOCRIT % 28.0* 30.6* 28.4* 28.4* 31.8*   PLATELETS 10*3/mm3  --   --  221 212 264       Results from last 7 days  Lab Units 04/13/18  0555 04/12/18  0448 04/11/18  0610  04/10/18  1505   SODIUM mmol/L 140 140 141  --  138   POTASSIUM mmol/L 4.4 4.0 2.9*  < > 2.8*   CHLORIDE mmol/L 97* 103 100  --  95*   CO2 mmol/L 28.5 26.4 29.1*  --  27.9   BUN mg/dL 9 12 11  --  11   CREATININE mg/dL 0.49* 0.52* 0.69  --  0.74   CALCIUM mg/dL 10.5 10.4 11.1*  --  11.2*   BILIRUBIN mg/dL  --   --  0.3  --  0.4   ALK PHOS U/L  --   --  73  --  86   ALT (SGPT) U/L  --   --  12  --  17   AST (SGOT) U/L  --   --  43*  --  47*   GLUCOSE mg/dL 101* 98 106*  --  103*   < > = values in this interval not displayed.      No results found for: LIPASE    Radiology:  XR Chest 2 View   Final Result   No evidence for acute pulmonary process. Borderline heart   size. Follow-up as clinical indications persist.       This report was finalized on 4/10/2018 4:43 PM by Dr. Levi Olea MD.              Assessment/Plan     Patient Active Problem List   Diagnosis   • Blood glucose abnormal   • Dyslipidemia   • Gastroesophageal reflux disease   • Fatigue   • Generalized osteoarthritis   • Chronic recurrent major depressive disorder   • Osteoporosis   • Restless legs syndrome   • Seizure disorder   • Vitamin D deficiency   • Bradycardia   • Essential hypertension   • Post-menopause on HRT (hormone replacement therapy)   • Chronic seasonal allergic rhinitis   • UTI (urinary tract infection)   • Paroxysmal atrial fibrillation   • Iron deficiency anemia due to chronic blood loss        GI bleed, anemia, melena, gastritis, esophagitis    Plan:    No active bleeding observed  Hemoglobin dropped a bit  Recheck hemoglobin 5 PM  If hemoglobin is improved DC home  qam PPI  Capsule endoscopy as an outpatient per Dr. Burt    I discussed the patients findings and my recommendations with patient, family and nursing staff.    Aime Nichole MD

## 2018-04-14 NOTE — DISCHARGE INSTRUCTIONS
ECHO and stress test as listed below. She had anemia but no definitive evidence of any GI bleeding (though she states sometimes her stool is dark). She underwent UPPER AND LOWER ENDOSCOPY-HIATAL HERNIA, GASTRITIS, ESOPHAGITIS, DIVERTICULOSIS but no evidence of any acute bleed.  If anemia persists would consider outpatient capsule study with Dr. Burt. Gi recommended that she continue on daily PPI and to add po iron.    Stress test 4/12/18  · Myocardial perfusion imaging indicates a normal myocardial perfusion study with no evidence of ischemia.  · Left ventricular ejection fraction is hyperdynamic (Calculated EF > 70%).  · Impressions are consistent with a low risk study.     ECHO 4/11/18  · Mild to moderate tricuspid valve regurgitation is present.  · Left ventricular systolic function is normal. Calculated EF = 66.7%. Estimated EF was in agreement with the calculated EF. Estimated EF = 67%. Normal left ventricular cavity size and wall thickness noted. All left ventricular wall segments contract normally. Left ventricular diastolic dysfunction is noted (grade II w/high LAP) consistent with pseudonormalization.  · Left atrial volume is severely increased.  · Right atrial cavity size is moderately dilated.  · Trace-to-mild mitral valve regurgitation is present.  · Mild to moderate tricuspid valve regurgitation is present. Estimated right ventricular systolic pressure from tricuspid regurgitation is mildly elevated (35-45 mmHg). Calculated right ventricular systolic pressure from tricuspid regurgitation is 44 mmHg.

## 2018-04-14 NOTE — DISCHARGE SUMMARY
"       NAME: Martina Blake ADMIT: 4/10/2018   : 1940  PCP: Jamie Walton DO    MRN: [id number] LOS: 4 days   AGE/SEX: 77 y.o. female  ROOM: [facility]     Date of Admission:  4/10/2018  Date of Discharge:  2018    PCP: Jamie Walton DO    CHIEF COMPLAINT  Syncope (after md visit for rectal bleeding, noted to have afib rvr per ems) and Chest Pain (ntg sl x6, asa 325mg, zofran 4mg PTA)      DISCHARGE DIAGNOSIS  Active Hospital Problems (** Indicates Principal Problem)    Diagnosis Date Noted   • **Paroxysmal atrial fibrillation [I48.0] 2018   • Acute superficial gastritis without hemorrhage [K29.00] 2018   • Hiatal hernia [K44.9] 2018   • Esophagitis [K20.9] 2018   • Diverticulosis of large intestine without hemorrhage [K57.30] 2018   • Iron deficiency anemia due to chronic blood loss [D50.0] 04/10/2018   • Fatigue [R53.83] 04/15/2016   • Seizure disorder [G40.909] 04/15/2016   • Essential hypertension [I10] 2015      Resolved Hospital Problems    Diagnosis Date Noted Date Resolved   • Hypokalemia [E87.6] 2018   • Hypomagnesemia [E83.42] 2018   • Hypercalcemia [E83.52] 04/10/2018 2018   • Chest pain [R07.9] 2015       SECONDARY DIAGNOSES  Past Medical History:   Diagnosis Date   • Cystitis    • Cystocele     moderate   • Dyslipidemia    • Esophageal reflux    • Fatigue    • History of transfusion     no reaction   • Hypertension    • Major depression, chronic    • Menopause    • Osteoarthritis    • Osteoporosis    • Palpitations    • Post menopausal problems    • RLS (restless legs syndrome)    • Seizure disorder    • Subjective tinnitus    • Vaginal delivery     x2  \"SONYA\"      \"JASON\"   • Vitamin D deficiency        CONSULTS   Dr. Chan- Cardiology  Dr. Burt- [facility] COURSE  Patient is a 77 y.o. female who has history of intermittent palpitations followed by Dr. Champ Veloz Cardiology but no known " Afib. She presented to the ER with chest pain and palpations and was found to be in Afib with RVR. She also had hypokalemia, hypomagnesemia, and anemia.     She was admitted. Placed on rate control agents with metoprolol. ECHO and stress test as listed below. She had anemia but no definitive evidence of any GI bleeding (though she states sometimes her stool is dark). She underwent UPPER AND LOWER ENDOSCOPY-HIATAL HERNIA, GASTRITIS, ESOPHAGITIS, DIVERTICULOSIS but no evidence of any acute bleed.  If anemia persists would consider outpatient capsule study with Dr. Burt. Gi recommended that she continue on daily PPI and to add po iron.     GI and Cardiology started Ms. Blake on eliquis with her CHADSVASc score of 4 (will stop her ASA). She had significant hypokalemia/hypomagnesemia/hypercalcemia --HCTZ stopped and started on aldactone. Should have follow up BMP with Jamie Walton DO or Cardiology as well as follow up CBC but Hgb has remained stable.          DIAGNOSTICS    Stress test 4/12/18  · Myocardial perfusion imaging indicates a normal myocardial perfusion study with no evidence of ischemia.  · Left ventricular ejection fraction is hyperdynamic (Calculated EF > 70%).  · Impressions are consistent with a low risk study.    ECHO 4/11/18  · Mild to moderate tricuspid valve regurgitation is present.  · Left ventricular systolic function is normal. Calculated EF = 66.7%. Estimated EF was in agreement with the calculated EF. Estimated EF = 67%. Normal left ventricular cavity size and wall thickness noted. All left ventricular wall segments contract normally. Left ventricular diastolic dysfunction is noted (grade II w/high LAP) consistent with pseudonormalization.  · Left atrial volume is severely increased.  · Right atrial cavity size is moderately dilated.  · Trace-to-mild mitral valve regurgitation is present.  · Mild to moderate tricuspid valve regurgitation is present. Estimated right ventricular systolic  pressure from tricuspid regurgitation is mildly elevated (35-45 mmHg). Calculated right ventricular systolic pressure from tricuspid regurgitation is 44 mmHg.    Hemoglobin & Hematocrit, Blood [[id number]] (Abnormal) Collected: 04/14/18 1347   Lab Status: Final result Specimen: Blood Updated: 04/14/18 1409    Hemoglobin 9.2 (L) g/dL     Hematocrit 29.5 (L) %    Hemoglobin & Hematocrit, Blood [[id number]] (Abnormal) Collected: 04/14/18 0423   Lab Status: Final result Specimen: Blood Updated: 04/14/18 0510    Hemoglobin 8.7 (L) g/dL     Hematocrit 28.0 (L) %    Basic Metabolic Panel [[id number]] (Abnormal) Collected: 04/13/18 0555   Lab Status: Final result Specimen: Blood Updated: 04/13/18 0634    Glucose 101 (H) mg/dL     BUN 9 mg/dL     Creatinine 0.49 (L) mg/dL     Sodium 140 mmol/L     Potassium 4.4 mmol/L     Chloride 97 (L) mmol/L     CO2 28.5 mmol/L     Calcium 10.5 mg/dL     eGFR Non African Amer 122 mL/min/1.73     BUN/Creatinine Ratio 18.4    Anion Gap 14.5 mmol/L      Iron Profile [[id number]] (Abnormal) Collected: 04/10/18 0959   Lab Status: Final result Specimen: Blood Updated: 04/12/18 1014    TIBC 222 mcg/dL     UIBC 190 mcg/dL     Iron 32 (L) mcg/dL     Iron Saturation 14 (L) %    Narrative:     Performed at:  [address] [place] [address]  [id number]  : Jesus Sheldon MD, Phone:  [phone]  Patient Fasting:  N    Ferritin [[id number]] (Abnormal) Collected: 04/10/18 0959   Lab Status: Final result Specimen: Blood Updated: 04/12/18 1014    Ferritin 313.10 (H) ng/mL    Narrative:     Performed at:  [address] [place] [address]  [address] [place], [place]  [id number]  : Jesus Sheldon MD, Phone:  [phone]     TSH [[id number]] Collected: 04/02/18 1551   Lab Status: Final result Specimen: Blood Updated: 04/03/18 0917    TSH 1.540 uIU/mL    Narrative:     Performed at:  [address]  [id number]  :  Jose Manuel Lopez PhD, Phone:  [phone]  Patient Fasting:  N    Vitamin B12 [[id number]] Collected: 04/02/18 1551   Lab Status: Final result Specimen: Blood Updated: 04/03/18 0917    Vitamin B-12 898 pg/mL    Narrative:     Performed at:  [address]  [id number]  : Jose Manuel Lopez PhD, Phone:  [phone]  Patient Fasting:  N    Vitamin D 25 Hydroxy [[id number]] Collected: 04/02/18 1551   Lab Status: Final result Specimen: Blood Updated: 04/03/18 0917    25 Hydroxy, Vitamin D 43.0 ng/mL    Narrative:     Performed at:  [address]  [id number]  : Jose Manuel Lopez PhD, Phone:  [phone]       PHYSICAL EXAM  Objective     Alert,  nad  Soft, nt  Lungs clear no resp distress  Soft, nt    CONDITION ON DISCHARGE  Stable.      DISCHARGE DISPOSITION   Home or Self Care      DISCHARGE MEDICATIONS   Martina Blake   Home Medication Instructions JABARI:[id number]    Printed on:04/14/18 0521   Medication Information                      amLODIPine (NORVASC) 10 MG tablet  Take 1 tablet by mouth Daily.             apixaban (ELIQUIS) 5 MG tablet tablet  Take 1 tablet by mouth 2 (Two) Times a Day.             benazepril (LOTENSIN) 40 MG tablet  Take 1 tablet by mouth Daily.             calcium carbonate-vitamin d (CALCIUM 600+D) 600-400 MG-UNIT per tablet  Take 1 tablet by mouth Daily.             cetirizine (zyrTEC) 10 MG tablet  Take 10 mg by mouth Daily.             Denosumab (PROLIA SC)  Inject 1 Units under the skin Every 6 (Six) Months. Due June 7, 2018             escitalopram (LEXAPRO) 5 MG tablet  Take 1 tablet by mouth Daily.             estradiol (ESTRACE) 0.5 MG tablet  Take 0.5 mg by mouth Daily.             ferrous sulfate 325 (65 FE) MG tablet  Take 1 tablet by mouth Daily With Breakfast.             folic acid (FOLVITE) 400 MCG tablet  Take 400 mcg by mouth daily.             gabapentin (NEURONTIN) 300 MG  capsule  Take 300 mg by mouth Every Night.             hydrALAZINE (APRESOLINE) 100 MG tablet  Take 0.5 tablets by mouth 3 (Three) Times a Day.             melatonin 5 MG tablet tablet  Take 5 mg by mouth Every Night.             metoprolol tartrate (LOPRESSOR) 50 MG tablet  Take 1 tablet by mouth Every 12 (Twelve) Hours.             montelukast (SINGULAIR) 10 MG tablet  Take 10 mg by mouth Daily.             pantoprazole (PROTONIX) 40 MG EC tablet  Take 1 tablet by mouth Daily.             phenytoin (DILANTIN) 100 MG ER capsule  Take 300 mg by mouth every night at bedtime.             raNITIdine (ZANTAC) 150 MG tablet  Take 150 mg by mouth 2 (Two) Times a Day.             spironolactone (ALDACTONE) 50 MG tablet  Take 1 tablet by mouth Daily.             vitamin B-12 (CYANOCOBALAMIN) 100 MCG tablet  Take 100 mcg by mouth Daily.             vitamin B-6 (PYRIDOXINE) 100 MG tablet  Take 100 mg by mouth daily.             vitamin C (ASCORBIC ACID) 500 MG tablet  Take 500 mg by mouth daily.             vitamin D (CHOLECALCIFEROL) 400 UNITS tablet  Take 400 Units by mouth daily.             vitamin E 400 UNIT capsule  Take 400 Units by mouth Daily.                  Future Appointments  Date Time Provider Department Center   6/11/2018 11:00 AM DO MERLINE Ace None     Additional Instructions for the Follow-ups that You Need to Schedule     Discharge Follow-up with PCP    As directed      Follow Up Details:  1-2 weeks         Discharge Follow-up with Specified Provider: Cardiologist Dr. Veloz; 3 Weeks    As directed      To:  Cardiologist Dr. Veloz    Follow Up:  3 Weeks         Discharge Follow-up with Specified Provider: GUERO Burt    As directed      To:  GUERO Burt    Follow Up Details:  2-3 weeks           Follow-up Information     Champ Veloz MD Follow up in 4 week(s).    Specialty:  Cardiology  Contact information:  [address]  [address]  [facility]  [address]  [phone]             Jamie Walton DO .    Specialties:  Family Medicine, Emergency Medicine  Why:  1-2 weeks  Contact information:  [address] [facility] [address]  [facility] [id number] [phone]             Mary Jane Shearer MD .    Specialty:  Dermatology  Why:  1-2 weeks  Contact information:  [address] [facility] [address]  [facility] [id number] [phone]                   TEST  RESULTS PENDING AT DISCHARGE         Quinton Frazier MD  [facility]  04/14/18  3:09 PM      Time: greater than 32 minutes on discharge  It was a pleasure taking care of this patient while in the hospital.

## 2018-04-14 NOTE — PLAN OF CARE
Problem: Patient Care Overview  Goal: Plan of Care Review  Outcome: Outcome(s) achieved Date Met: 04/14/18 04/14/18 1552   Coping/Psychosocial   Plan of Care Reviewed With patient   Plan of Care Review   Progress improving   OTHER   Outcome Summary No c/o pain or soa. Afternoon Hgb 9.2. Pt discharging home.     Goal: Individualization and Mutuality  Outcome: Outcome(s) achieved Date Met: 04/14/18    Goal: Discharge Needs Assessment  Outcome: Outcome(s) achieved Date Met: 04/14/18    Goal: Interprofessional Rounds/Family Conf  Outcome: Outcome(s) achieved Date Met: 04/14/18      Problem: Pain, Acute (Adult)  Goal: Acceptable Pain Control/Comfort Level  Outcome: Outcome(s) achieved Date Met: 04/14/18      Problem: Arrhythmia/Dysrhythmia (Symptomatic) (Adult)  Goal: Signs and Symptoms of Listed Potential Problems Will be Absent, Minimized or Managed (Arrhythmia/Dysrhythmia)  Outcome: Outcome(s) achieved Date Met: 04/14/18      Problem: Fall Risk (Adult)  Goal: Absence of Fall  Outcome: Outcome(s) achieved Date Met: 04/14/18

## 2018-04-16 NOTE — PROGRESS NOTES
Case Management Discharge Note    Final Note: Home no needs, Review of AVS and DC summary    Destination     No service coordination in this encounter.      Durable Medical Equipment     No service coordination in this encounter.      Dialysis/Infusion     No service coordination in this encounter.      Home Medical Care     No service coordination in this encounter.      Social Care     No service coordination in this encounter.        Other:  (car)    Final Discharge Disposition Code: 01 - home or self-care

## 2018-04-19 ENCOUNTER — TELEPHONE (OUTPATIENT)
Dept: CARDIOLOGY | Facility: CLINIC | Age: 78
End: 2018-04-19

## 2018-04-19 ENCOUNTER — HOSPITAL ENCOUNTER (OUTPATIENT)
Dept: CARDIOLOGY | Facility: HOSPITAL | Age: 78
Discharge: HOME | End: 2018-04-19
Admitting: INTERNAL MEDICINE

## 2018-04-19 ENCOUNTER — LAB (OUTPATIENT)
Dept: LAB | Facility: HOSPITAL | Age: 78
End: 2018-04-19

## 2018-04-19 VITALS
HEIGHT: 64 IN | WEIGHT: 150 LBS | DIASTOLIC BLOOD PRESSURE: 72 MMHG | HEART RATE: 51 BPM | BODY MASS INDEX: 25.61 KG/M2 | OXYGEN SATURATION: 96 % | SYSTOLIC BLOOD PRESSURE: 196 MMHG

## 2018-04-19 DIAGNOSIS — E83.52 HYPERCALCEMIA: ICD-10-CM

## 2018-04-19 DIAGNOSIS — R53.83 FATIGUE, UNSPECIFIED TYPE: Primary | ICD-10-CM

## 2018-04-19 DIAGNOSIS — I10 ESSENTIAL HYPERTENSION: ICD-10-CM

## 2018-04-19 DIAGNOSIS — R42 DIZZINESS: ICD-10-CM

## 2018-04-19 LAB
ALBUMIN SERPL-MCNC: 3.6 G/DL (ref 3.5–5.2)
ALBUMIN/GLOB SERPL: 1 G/DL
ALP SERPL-CCNC: 90 U/L (ref 39–117)
ALT SERPL W P-5'-P-CCNC: 18 U/L (ref 1–33)
ANION GAP SERPL CALCULATED.3IONS-SCNC: 14.1 MMOL/L
AST SERPL-CCNC: 60 U/L (ref 1–32)
BASOPHILS # BLD AUTO: 0.02 10*3/MM3 (ref 0–0.2)
BASOPHILS NFR BLD AUTO: 0.2 % (ref 0–1.5)
BILIRUB SERPL-MCNC: 0.3 MG/DL (ref 0.1–1.2)
BUN BLD-MCNC: 14 MG/DL (ref 8–23)
BUN/CREAT SERPL: 16.7 (ref 7–25)
CALCIUM SPEC-SCNC: 12.6 MG/DL (ref 8.6–10.5)
CHLORIDE SERPL-SCNC: 94 MMOL/L (ref 98–107)
CO2 SERPL-SCNC: 30.9 MMOL/L (ref 22–29)
CREAT BLD-MCNC: 0.84 MG/DL (ref 0.57–1)
D DIMER PPP FEU-MCNC: 2.12 MCGFEU/ML (ref 0–0.49)
DEPRECATED RDW RBC AUTO: 48.1 FL (ref 37–54)
EOSINOPHIL # BLD AUTO: 0.05 10*3/MM3 (ref 0–0.7)
EOSINOPHIL NFR BLD AUTO: 0.5 % (ref 0.3–6.2)
ERYTHROCYTE [DISTWIDTH] IN BLOOD BY AUTOMATED COUNT: 15 % (ref 11.7–13)
GFR SERPL CREATININE-BSD FRML MDRD: 66 ML/MIN/1.73
GLOBULIN UR ELPH-MCNC: 3.7 GM/DL
GLUCOSE BLD-MCNC: 99 MG/DL (ref 65–99)
HCT VFR BLD AUTO: 32.4 % (ref 35.6–45.5)
HGB BLD-MCNC: 10 G/DL (ref 11.9–15.5)
IMM GRANULOCYTES # BLD: 0.03 10*3/MM3 (ref 0–0.03)
IMM GRANULOCYTES NFR BLD: 0.3 % (ref 0–0.5)
LYMPHOCYTES # BLD AUTO: 0.51 10*3/MM3 (ref 0.9–4.8)
LYMPHOCYTES NFR BLD AUTO: 5 % (ref 19.6–45.3)
MAGNESIUM SERPL-MCNC: 1.7 MG/DL (ref 1.6–2.4)
MCH RBC QN AUTO: 27.8 PG (ref 26.9–32)
MCHC RBC AUTO-ENTMCNC: 30.9 G/DL (ref 32.4–36.3)
MCV RBC AUTO: 90 FL (ref 80.5–98.2)
MONOCYTES # BLD AUTO: 1.63 10*3/MM3 (ref 0.2–1.2)
MONOCYTES NFR BLD AUTO: 16 % (ref 5–12)
NEUTROPHILS # BLD AUTO: 7.95 10*3/MM3 (ref 1.9–8.1)
NEUTROPHILS NFR BLD AUTO: 78 % (ref 42.7–76)
NT-PROBNP SERPL-MCNC: 5893 PG/ML (ref 0–1800)
PLATELET # BLD AUTO: 266 10*3/MM3 (ref 140–500)
PMV BLD AUTO: 11.2 FL (ref 6–12)
POTASSIUM BLD-SCNC: 3 MMOL/L (ref 3.5–5.2)
PROT SERPL-MCNC: 7.3 G/DL (ref 6–8.5)
PTH-INTACT SERPL-MCNC: 7.1 PG/ML (ref 15–65)
RBC # BLD AUTO: 3.6 10*6/MM3 (ref 3.9–5.2)
SODIUM BLD-SCNC: 139 MMOL/L (ref 136–145)
TROPONIN T SERPL-MCNC: <0.01 NG/ML (ref 0–0.03)
WBC NRBC COR # BLD: 10.19 10*3/MM3 (ref 4.5–10.7)

## 2018-04-19 PROCEDURE — 85379 FIBRIN DEGRADATION QUANT: CPT | Performed by: INTERNAL MEDICINE

## 2018-04-19 PROCEDURE — 83970 ASSAY OF PARATHORMONE: CPT

## 2018-04-19 PROCEDURE — 84484 ASSAY OF TROPONIN QUANT: CPT | Performed by: INTERNAL MEDICINE

## 2018-04-19 PROCEDURE — 83735 ASSAY OF MAGNESIUM: CPT | Performed by: INTERNAL MEDICINE

## 2018-04-19 PROCEDURE — 93010 ELECTROCARDIOGRAM REPORT: CPT | Performed by: INTERNAL MEDICINE

## 2018-04-19 PROCEDURE — 80053 COMPREHEN METABOLIC PANEL: CPT | Performed by: INTERNAL MEDICINE

## 2018-04-19 PROCEDURE — 99204 OFFICE O/P NEW MOD 45 MIN: CPT | Performed by: INTERNAL MEDICINE

## 2018-04-19 PROCEDURE — 94760 N-INVAS EAR/PLS OXIMETRY 1: CPT

## 2018-04-19 PROCEDURE — 93005 ELECTROCARDIOGRAM TRACING: CPT | Performed by: INTERNAL MEDICINE

## 2018-04-19 PROCEDURE — 85025 COMPLETE CBC W/AUTO DIFF WBC: CPT | Performed by: INTERNAL MEDICINE

## 2018-04-19 PROCEDURE — 36415 COLL VENOUS BLD VENIPUNCTURE: CPT

## 2018-04-19 PROCEDURE — 83880 ASSAY OF NATRIURETIC PEPTIDE: CPT | Performed by: INTERNAL MEDICINE

## 2018-04-19 RX ORDER — SODIUM CHLORIDE 0.9 % (FLUSH) 0.9 %
10 SYRINGE (ML) INJECTION AS NEEDED
Status: DISCONTINUED | OUTPATIENT
Start: 2018-04-19 | End: 2018-05-16 | Stop reason: HOSPADM

## 2018-04-19 RX ORDER — NITROGLYCERIN 0.4 MG/1
0.4 TABLET SUBLINGUAL
Status: DISCONTINUED | OUTPATIENT
Start: 2018-04-19 | End: 2018-05-16 | Stop reason: HOSPADM

## 2018-04-19 RX ORDER — HYDRALAZINE HYDROCHLORIDE 100 MG/1
100 TABLET, FILM COATED ORAL 3 TIMES DAILY
Qty: 270 TABLET | Refills: 3
Start: 2018-04-19 | End: 2018-05-16 | Stop reason: HOSPADM

## 2018-04-19 RX ORDER — POTASSIUM CHLORIDE 1500 MG/1
20 TABLET, FILM COATED, EXTENDED RELEASE ORAL 2 TIMES DAILY
Qty: 60 TABLET | Refills: 2 | Status: SHIPPED | OUTPATIENT
Start: 2018-04-19 | End: 2018-07-09 | Stop reason: SDUPTHER

## 2018-04-19 NOTE — PROGRESS NOTES
Date of Office Visit: 2018  Encounter Provider: Justine Barrios MD  Place of Service: [facility] CARDIOLOGY  Patient Name: Martina Blake  :1940      Patient ID:  Martina Blake is a 77 y.o. female is here for hypertension.           History of Present Illness    Patient is a 77 y.o. female who has history of intermittent palpitations followed by Dr. Champ Veloz Cardiology but no known Afib. She presented to the ER with chest pain and palpations and was found to be in Afib with RVR. She also had hypokalemia, hypomagnesemia, and anemia.      She was admitted. Placed on rate control agents with metoprolol. ECHO and stress test as listed below. She had anemia but no definitive evidence of any GI bleeding (though she states sometimes her stool is dark). She underwent UPPER AND LOWER ENDOSCOPY-HIATAL HERNIA, GASTRITIS, ESOPHAGITIS, DIVERTICULOSIS but no evidence of any acute bleed.  If anemia persists would consider outpatient capsule study with Dr. Burt. Gi recommended that she continue on daily PPI and to add po iron.      GI and Cardiology started Ms. Blake on eliquis with her CHADSVASc score of 4 (will stop her ASA). She had significant hypokalemia/hypomagnesemia/hypercalcemia --HCTZ stopped and started on aldactone.     She had a stress nuclear perfusion study which was normal 18. She had an echocardiogram done 18.  This showed ejection fraction 67% with grade 2 diastolic dysfunction, severe left atrial enlargement, moderate right atrial enlargement, mild to moderate tricuspid insufficiency, RVSP 44 mmHg, mild to moderate tricuspid insufficiency.    She has been having progressive fatigue and short windedness.  She has been dizzy.  She's had no syncope.  Her blood pressures been high running 170-190.  She doesn't feel her heart racing or skipping.  She's had no vomiting blood, blood in stool or black tarry stools.  She has no exertional chest  "tightness or pressure.  She has no real lower extremity edema.  She says she just doesn't feel well.  She is mildly confused.  Her appetite and poor.  She's lost 13 pounds.  She's had no fevers or chills.      Today her hemoglobin is 10 with a d-dimer of 2.12, proBNP is 5893, potassium 3.0 with normal renal function and Ca+ 12.5.  She had normal TSH 4/2/18.     Past Medical History:   Diagnosis Date   • Atrial fibrillation    • Cystitis    • Cystocele     moderate   • Dyslipidemia    • Esophageal reflux    • Fatigue    • History of transfusion     no reaction   • Hypertension    • Major depression, chronic    • Menopause    • Osteoarthritis    • Osteoporosis    • Palpitations    • Post menopausal problems    • RLS (restless legs syndrome)    • Seizure disorder    • Subjective tinnitus    • Vaginal delivery     x2  \"SONYA\"      \"JASON\"   • Vitamin D deficiency          Past Surgical History:   Procedure Laterality Date   • CHOLECYSTECTOMY     • COLONOSCOPY     • COLONOSCOPY N/A 4/13/2018    Procedure: COLONOSCOPY to terminal ileum;  Surgeon: Dominik Burt MD;  Location: [facility] ENDOSCOPY;  Service: Gastroenterology   • CRANIOTOMY     • ENDOSCOPY N/A 4/13/2018    Procedure: ESOPHAGOGASTRODUODENOSCOPY with biopsy;  Surgeon: Dominik Burt MD;  Location: [facility] ENDOSCOPY;  Service: Gastroenterology   • TOTAL ABDOMINAL HYSTERECTOMY  1980    w/ bladder suspension.  Probably vaginal hysterectomy with anterior colporrhaphy.        Current Outpatient Prescriptions on File Prior to Encounter   Medication Sig Dispense Refill   • amLODIPine (NORVASC) 10 MG tablet Take 1 tablet by mouth Daily. 90 tablet 3   • apixaban (ELIQUIS) 5 MG tablet tablet Take 1 tablet by mouth 2 (Two) Times a Day. 60 tablet 0   • benazepril (LOTENSIN) 40 MG tablet Take 1 tablet by mouth Daily. 90 tablet 3   • calcium carbonate-vitamin d (CALCIUM 600+D) 600-400 MG-UNIT per tablet Take 1 tablet by mouth Daily.     • cetirizine (zyrTEC) 10 MG tablet Take " 10 mg by mouth Daily.     • Denosumab (PROLIA SC) Inject 1 Units under the skin Every 6 (Six) Months. Due June 7, 2018     • escitalopram (LEXAPRO) 5 MG tablet Take 1 tablet by mouth Daily. 90 tablet 3   • estradiol (ESTRACE) 0.5 MG tablet Take 0.5 mg by mouth Daily.     • folic acid (FOLVITE) 400 MCG tablet Take 400 mcg by mouth daily.     • gabapentin (NEURONTIN) 300 MG capsule Take 300 mg by mouth Every Night.  0   • hydrALAZINE (APRESOLINE) 100 MG tablet Take 0.5 tablets by mouth 3 (Three) Times a Day. 270 tablet 3   • melatonin 5 MG tablet tablet Take 5 mg by mouth Every Night.     • metoprolol tartrate (LOPRESSOR) 50 MG tablet Take 1 tablet by mouth Every 12 (Twelve) Hours. 60 tablet 0   • montelukast (SINGULAIR) 10 MG tablet Take 10 mg by mouth Daily.     • pantoprazole (PROTONIX) 40 MG EC tablet Take 1 tablet by mouth Daily. 90 tablet 3   • phenytoin (DILANTIN) 100 MG ER capsule Take 300 mg by mouth every night at bedtime.     • raNITIdine (ZANTAC) 150 MG tablet Take 150 mg by mouth 2 (Two) Times a Day.  1   • spironolactone (ALDACTONE) 50 MG tablet Take 1 tablet by mouth Daily. 30 tablet 0   • vitamin B-12 (CYANOCOBALAMIN) 100 MCG tablet Take 100 mcg by mouth Daily.     • vitamin B-6 (PYRIDOXINE) 100 MG tablet Take 100 mg by mouth daily.     • vitamin C (ASCORBIC ACID) 500 MG tablet Take 500 mg by mouth daily.     • vitamin D (CHOLECALCIFEROL) 400 UNITS tablet Take 400 Units by mouth daily.     • vitamin E 400 UNIT capsule Take 400 Units by mouth Daily.     • ferrous sulfate 325 (65 FE) MG tablet Take 1 tablet by mouth Daily With Breakfast. 90 tablet 0     No current facility-administered medications on file prior to encounter.        Social History     Social History   • Marital status:      Spouse name: POOJA   • Number of children: 2   • Years of education: N/A     Occupational History   • Not on file.     Social History Main Topics   • Smoking status: Never Smoker   • Smokeless tobacco: Never  "Used   • Alcohol use No   • Drug use: No   • Sexual activity: Not on file      Comment:  (Jl)     Other Topics Concern   • Not on file     Social History Narrative   • No narrative on file           Review of Systems   Constitution: Positive for malaise/fatigue.   HENT: Negative for congestion.    Eyes: Negative for vision loss in left eye and vision loss in right eye.   Cardiovascular: Positive for dyspnea on exertion.   Respiratory: Negative.  Negative for cough, hemoptysis, shortness of breath, sleep disturbances due to breathing, snoring, sputum production and wheezing.    Endocrine: Negative.    Hematologic/Lymphatic: Negative.    Skin: Negative for poor wound healing and rash.   Musculoskeletal: Negative for falls, gout, muscle cramps and myalgias.   Gastrointestinal: Negative for abdominal pain, diarrhea, dysphagia, hematemesis, melena, nausea and vomiting.   Neurological: Negative for excessive daytime sleepiness, dizziness, headaches, light-headedness, loss of balance, seizures and vertigo.   Psychiatric/Behavioral: Negative for depression and substance abuse. The patient is not nervous/anxious.        Procedures    ECG 12 Lead  Date/Time: 4/19/2018 4:15 PM  Performed by: DAMIAN BLAKE  Authorized by: DAMIAN BLAKE   Comparison: compared with previous ECG   Comparison to previous ECG: Mild changes.   Rhythm: sinus rhythm  Conduction: LAFB  T depression: V2  T flattening: V3, V4 and V5  Clinical impression: abnormal ECG                Objective:      Vitals:    04/19/18 1510 04/19/18 1511   BP: (!) 190/71 (!) 196/72   BP Location: Left arm Right arm   Patient Position: Sitting Sitting   Pulse: 51    SpO2: 96%    Weight: 68 kg (150 lb)    Height: 162.6 cm (64\")      Body mass index is 25.75 kg/m².    Physical Exam   Constitutional: She is oriented to person, place, and time. She appears well-developed and well-nourished. No distress.   pale   HENT:   Head: Normocephalic and atraumatic. "   Eyes: Conjunctivae are normal. No scleral icterus.   Neck: Neck supple. No JVD present. Carotid bruit is not present. No thyromegaly present.   Cardiovascular: Normal rate, regular rhythm, S1 normal, S2 normal, normal heart sounds and intact distal pulses.   No extrasystoles are present. PMI is not displaced.  Exam reveals no gallop.    No murmur heard.  Pulses:       Carotid pulses are 2+ on the right side, and 2+ on the left side.       Radial pulses are 2+ on the right side, and 2+ on the left side.        Dorsalis pedis pulses are 2+ on the right side, and 2+ on the left side.        Posterior tibial pulses are 2+ on the right side, and 2+ on the left side.   Pulmonary/Chest: Effort normal and breath sounds normal. No respiratory distress. She has no wheezes. She has no rhonchi. She has no rales. She exhibits no tenderness.   Abdominal: Soft. Bowel sounds are normal. She exhibits no distension, no abdominal bruit and no mass. There is no tenderness.   Musculoskeletal: She exhibits no edema or deformity.   Lymphadenopathy:     She has no cervical adenopathy.   Neurological: She is alert and oriented to person, place, and time. No cranial nerve deficit.   Skin: Skin is warm and dry. No rash noted. She is not diaphoretic. No cyanosis. No pallor. Nails show no clubbing.   Psychiatric: She has a normal mood and affect. Judgment normal.   Vitals reviewed.      Lab Review:       Assessment:      Diagnosis Plan   1. Fatigue, unspecified type     2. Dizziness  Cardiac Monitoring    Vital Signs - Once    Vital Signs - As Needed    Pulse Oximetry    Oxygen Therapy- Nasal Cannula; Titrate for SPO2: 92%, equal to or greater than    Insert Peripheral IV    sodium chloride 0.9 % flush 10 mL    nitroglycerin (NITROSTAT) SL tablet 0.4 mg    NPO Diet    Bathroom Privileges With Assistance    CBC & Differential    Comprehensive Metabolic Panel    Troponin T    D-dimer    proBNP    Cardiac Monitoring    Vital Signs - Once     Oxygen Therapy- Nasal Cannula; Titrate for SPO2: 92%, equal to or greater than    Insert Peripheral IV    NPO Diet    Bathroom Privileges With Assistance    Troponin T    Troponin T    D-dimer    D-dimer    proBNP    proBNP    CBC Auto Differential   3. Essential hypertension  Cardiac Monitoring    Vital Signs - Once    Vital Signs - As Needed    Pulse Oximetry    Oxygen Therapy- Nasal Cannula; Titrate for SPO2: 92%, equal to or greater than    Insert Peripheral IV    sodium chloride 0.9 % flush 10 mL    nitroglycerin (NITROSTAT) SL tablet 0.4 mg    NPO Diet    Bathroom Privileges With Assistance    CBC & Differential    Comprehensive Metabolic Panel    Troponin T    D-dimer    proBNP    Cardiac Monitoring    Vital Signs - Once    Oxygen Therapy- Nasal Cannula; Titrate for SPO2: 92%, equal to or greater than    Insert Peripheral IV    NPO Diet    Bathroom Privileges With Assistance    Troponin T    Troponin T    D-dimer    D-dimer    proBNP    proBNP    CBC Auto Differential     1. Hypertensive urgency.  She also has bradycardia with some mild dizziness.  Stop metoprolol.  Increase hydralazine to 100 mg 3 times a day.  2. Hypercalcemia.  Review of her chest x-ray from the hospitalization shows no lytic lesions.  Set up intact PTH.  3. Hypokalemia.  Start potassium 20 mg twice daily.  I would not try to diurese her as it will make her potassium worse  4. Anemia, mild gastritis on upper GI without bleeding.  Normal lower GI.  5. History of hypomagnesemia.  Await her magnesium level currently.     Plan:       Send intact PTH today.  See justice in 1 week for BP check.  I'm concerned that she has a malignancy With weight loss and elevated calcium level.

## 2018-04-19 NOTE — PROGRESS NOTES
"77 y.o.    162.6 cm (64\") 68 kg (150 lb)    Crab (diagnostic) and Pseudoephedrine    Jamie Walton DO    Chief Complaint   Patient presents with   • Dizziness     PT STATES THAT HER BP AT HOME /97.  FAMILY CALLED DR. BILL AND WAS SENT FOR EVALUATION.  RECENT ADMISSION FOR AF AND IS ON ELIQUIS       Past Medical History:   Diagnosis Date   • Cystitis    • Cystocele     moderate   • Dyslipidemia    • Esophageal reflux    • Fatigue    • History of transfusion     no reaction   • Hypertension    • Major depression, chronic    • Menopause    • Osteoarthritis    • Osteoporosis    • Palpitations    • Post menopausal problems    • RLS (restless legs syndrome)    • Seizure disorder    • Subjective tinnitus    • Vaginal delivery     x2  \"SONYA\"      \"JASON\"   • Vitamin D deficiency        Past Surgical History:   Procedure Laterality Date   • CHOLECYSTECTOMY     • COLONOSCOPY     • COLONOSCOPY N/A 2018    Procedure: COLONOSCOPY to terminal ileum;  Surgeon: Dominik Burt MD;  Location: [facility] ENDOSCOPY;  Service: Gastroenterology   • CRANIOTOMY     • ENDOSCOPY N/A 2018    Procedure: ESOPHAGOGASTRODUODENOSCOPY with biopsy;  Surgeon: Dominik uBrt MD;  Location: [facility] ENDOSCOPY;  Service: Gastroenterology   • TOTAL ABDOMINAL HYSTERECTOMY      w/ bladder suspension.  Probably vaginal hysterectomy with anterior colporrhaphy.        Social History     Social History   • Marital status:      Spouse name: POOJA   • Number of children: 2     Social History Main Topics   • Smoking status: Never Smoker   • Smokeless tobacco: Never Used   • Alcohol use No   • Drug use: No     Other Topics Concern   • Not on file       Family History   Problem Relation Age of Onset   • No Known Problems Mother    • Stroke Father 54      @ 60    • No Known Problems Maternal Grandmother    • No Known Problems Maternal Grandfather    • Heart disease Paternal Grandmother    • Heart disease Paternal Grandfather    • No " "Known Problems Daughter    • Stroke Brother 76   • Diabetes type II Brother        Vitals:    04/19/18 1510 04/19/18 1511   BP: (!) 190/71 (!) 196/72   BP Location: Left arm Right arm   Patient Position: Sitting Sitting   Pulse: 51    SpO2: 96%    Weight: 68 kg (150 lb)    Height: 162.6 cm (64\")          Current Outpatient Prescriptions:   •  amLODIPine (NORVASC) 10 MG tablet, Take 1 tablet by mouth Daily., Disp: 90 tablet, Rfl: 3  •  apixaban (ELIQUIS) 5 MG tablet tablet, Take 1 tablet by mouth 2 (Two) Times a Day., Disp: 60 tablet, Rfl: 0  •  benazepril (LOTENSIN) 40 MG tablet, Take 1 tablet by mouth Daily., Disp: 90 tablet, Rfl: 3  •  calcium carbonate-vitamin d (CALCIUM 600+D) 600-400 MG-UNIT per tablet, Take 1 tablet by mouth Daily., Disp: , Rfl:   •  cetirizine (zyrTEC) 10 MG tablet, Take 10 mg by mouth Daily., Disp: , Rfl:   •  Denosumab (PROLIA SC), Inject 1 Units under the skin Every 6 (Six) Months. Due June 7, 2018, Disp: , Rfl:   •  escitalopram (LEXAPRO) 5 MG tablet, Take 1 tablet by mouth Daily., Disp: 90 tablet, Rfl: 3  •  estradiol (ESTRACE) 0.5 MG tablet, Take 0.5 mg by mouth Daily., Disp: , Rfl:   •  ferrous sulfate 325 (65 FE) MG tablet, Take 1 tablet by mouth Daily With Breakfast., Disp: 90 tablet, Rfl: 0  •  folic acid (FOLVITE) 400 MCG tablet, Take 400 mcg by mouth daily., Disp: , Rfl:   •  gabapentin (NEURONTIN) 300 MG capsule, Take 300 mg by mouth Every Night., Disp: , Rfl: 0  •  hydrALAZINE (APRESOLINE) 100 MG tablet, Take 0.5 tablets by mouth 3 (Three) Times a Day., Disp: 270 tablet, Rfl: 3  •  melatonin 5 MG tablet tablet, Take 5 mg by mouth Every Night., Disp: , Rfl:   •  metoprolol tartrate (LOPRESSOR) 50 MG tablet, Take 1 tablet by mouth Every 12 (Twelve) Hours., Disp: 60 tablet, Rfl: 0  •  montelukast (SINGULAIR) 10 MG tablet, Take 10 mg by mouth Daily., Disp: , Rfl:   •  pantoprazole (PROTONIX) 40 MG EC tablet, Take 1 tablet by mouth Daily., Disp: 90 tablet, Rfl: 3  •  phenytoin " (DILANTIN) 100 MG ER capsule, Take 300 mg by mouth every night at bedtime., Disp: , Rfl:   •  raNITIdine (ZANTAC) 150 MG tablet, Take 150 mg by mouth 2 (Two) Times a Day., Disp: , Rfl: 1  •  spironolactone (ALDACTONE) 50 MG tablet, Take 1 tablet by mouth Daily., Disp: 30 tablet, Rfl: 0  •  vitamin B-12 (CYANOCOBALAMIN) 100 MCG tablet, Take 100 mcg by mouth Daily., Disp: , Rfl:   •  vitamin B-6 (PYRIDOXINE) 100 MG tablet, Take 100 mg by mouth daily., Disp: , Rfl:   •  vitamin C (ASCORBIC ACID) 500 MG tablet, Take 500 mg by mouth daily., Disp: , Rfl:   •  vitamin D (CHOLECALCIFEROL) 400 UNITS tablet, Take 400 Units by mouth daily., Disp: , Rfl:   •  vitamin E 400 UNIT capsule, Take 400 Units by mouth Daily., Disp: , Rfl:     Current Facility-Administered Medications:   •  nitroglycerin (NITROSTAT) SL tablet 0.4 mg, 0.4 mg, Sublingual, Q5 Min PRN, Justine Barrios MD  •  sodium chloride 0.9 % flush 10 mL, 10 mL, Intravenous, PRN, Justine Barrios MD

## 2018-04-19 NOTE — TELEPHONE ENCOUNTER
Pts friend called stating the pt is c/o SOA, very fatigued and wobbly.  Pts BP was taken manually and electrically 172/91 and HR 45.      Pt was recently in [facility] for new onset AFIB.  Seen by AGATA while there.      Per VO, AGATA [place] to [place] [facility]      Pts daughter bringing her now.      [facility]

## 2018-04-20 ENCOUNTER — TELEPHONE (OUTPATIENT)
Dept: FAMILY MEDICINE CLINIC | Facility: CLINIC | Age: 78
End: 2018-04-20

## 2018-04-20 ENCOUNTER — TELEPHONE (OUTPATIENT)
Dept: CARDIOLOGY | Facility: CLINIC | Age: 78
End: 2018-04-20

## 2018-04-20 NOTE — TELEPHONE ENCOUNTER
----- Message from Justine Barrios MD sent at 4/20/2018  8:14 AM EDT -----  pls let her know that her magnesium level is normal but her parathyroid hormone is low - she needs to get in touch with her PCP.

## 2018-04-21 ENCOUNTER — APPOINTMENT (OUTPATIENT)
Dept: MRI IMAGING | Facility: HOSPITAL | Age: 78
End: 2018-04-21

## 2018-04-21 ENCOUNTER — DOCUMENTATION (OUTPATIENT)
Dept: FAMILY MEDICINE CLINIC | Facility: CLINIC | Age: 78
End: 2018-04-21

## 2018-04-21 ENCOUNTER — HOSPITAL ENCOUNTER (INPATIENT)
Facility: HOSPITAL | Age: 78
LOS: 25 days | Discharge: HOME HEALTH | End: 2018-05-16
Attending: EMERGENCY MEDICINE | Admitting: INTERNAL MEDICINE

## 2018-04-21 ENCOUNTER — APPOINTMENT (OUTPATIENT)
Dept: GENERAL RADIOLOGY | Facility: HOSPITAL | Age: 78
End: 2018-04-21

## 2018-04-21 ENCOUNTER — TELEPHONE (OUTPATIENT)
Dept: FAMILY MEDICINE CLINIC | Facility: CLINIC | Age: 78
End: 2018-04-21

## 2018-04-21 ENCOUNTER — APPOINTMENT (OUTPATIENT)
Dept: CT IMAGING | Facility: HOSPITAL | Age: 78
End: 2018-04-21

## 2018-04-21 DIAGNOSIS — I16.0 HYPERTENSIVE URGENCY: ICD-10-CM

## 2018-04-21 DIAGNOSIS — C85.91 LYMPHOMA OF LYMPH NODES OF NECK, UNSPECIFIED LYMPHOMA TYPE (HCC): Primary | ICD-10-CM

## 2018-04-21 DIAGNOSIS — C83.39 DIFFUSE LARGE B-CELL LYMPHOMA OF EXTRANODAL SITE EXCLUDING SPLEEN AND OTHER SOLID ORGANS (HCC): ICD-10-CM

## 2018-04-21 DIAGNOSIS — E83.52 HYPERCALCEMIA: ICD-10-CM

## 2018-04-21 DIAGNOSIS — R53.1 WEAKNESS: ICD-10-CM

## 2018-04-21 PROBLEM — E87.6 HYPOKALEMIA: Status: ACTIVE | Noted: 2018-04-21

## 2018-04-21 PROBLEM — R93.0 ABNORMAL CT OF THE HEAD: Status: ACTIVE | Noted: 2018-04-21

## 2018-04-21 PROBLEM — I10 HYPERTENSION: Chronic | Status: ACTIVE | Noted: 2018-04-21

## 2018-04-21 LAB
25(OH)D3 SERPL-MCNC: 38.9 NG/ML (ref 30–100)
ALBUMIN SERPL-MCNC: 3.3 G/DL (ref 3.5–5.2)
ALBUMIN/GLOB SERPL: 0.9 G/DL
ALP SERPL-CCNC: 86 U/L (ref 39–117)
ALT SERPL W P-5'-P-CCNC: 14 U/L (ref 1–33)
ANION GAP SERPL CALCULATED.3IONS-SCNC: 14.8 MMOL/L
AST SERPL-CCNC: 57 U/L (ref 1–32)
BASOPHILS # BLD AUTO: 0.02 10*3/MM3 (ref 0–0.2)
BASOPHILS NFR BLD AUTO: 0.2 % (ref 0–1.5)
BILIRUB SERPL-MCNC: 0.4 MG/DL (ref 0.1–1.2)
BILIRUB UR QL STRIP: NEGATIVE
BUN BLD-MCNC: 18 MG/DL (ref 8–23)
BUN/CREAT SERPL: 21.2 (ref 7–25)
CALCIUM SPEC-SCNC: 13.8 MG/DL (ref 8.6–10.5)
CHLORIDE SERPL-SCNC: 95 MMOL/L (ref 98–107)
CLARITY UR: CLEAR
CO2 SERPL-SCNC: 29.2 MMOL/L (ref 22–29)
COLOR UR: YELLOW
CREAT BLD-MCNC: 0.85 MG/DL (ref 0.57–1)
DEPRECATED RDW RBC AUTO: 49.3 FL (ref 37–54)
EOSINOPHIL # BLD AUTO: 0.02 10*3/MM3 (ref 0–0.7)
EOSINOPHIL NFR BLD AUTO: 0.2 % (ref 0.3–6.2)
ERYTHROCYTE [DISTWIDTH] IN BLOOD BY AUTOMATED COUNT: 15.4 % (ref 11.7–13)
GFR SERPL CREATININE-BSD FRML MDRD: 65 ML/MIN/1.73
GLOBULIN UR ELPH-MCNC: 3.8 GM/DL
GLUCOSE BLD-MCNC: 109 MG/DL (ref 65–99)
GLUCOSE UR STRIP-MCNC: NEGATIVE MG/DL
HCT VFR BLD AUTO: 32.9 % (ref 35.6–45.5)
HGB BLD-MCNC: 10.3 G/DL (ref 11.9–15.5)
HGB UR QL STRIP.AUTO: NEGATIVE
HOLD SPECIMEN: NORMAL
IMM GRANULOCYTES # BLD: 0.04 10*3/MM3 (ref 0–0.03)
IMM GRANULOCYTES NFR BLD: 0.4 % (ref 0–0.5)
INR PPP: 1.29 (ref 0.9–1.1)
KETONES UR QL STRIP: NEGATIVE
LEUKOCYTE ESTERASE UR QL STRIP.AUTO: NEGATIVE
LYMPHOCYTES # BLD AUTO: 0.64 10*3/MM3 (ref 0.9–4.8)
LYMPHOCYTES NFR BLD AUTO: 5.8 % (ref 19.6–45.3)
MAGNESIUM SERPL-MCNC: 1.8 MG/DL (ref 1.6–2.4)
MCH RBC QN AUTO: 28.2 PG (ref 26.9–32)
MCHC RBC AUTO-ENTMCNC: 31.3 G/DL (ref 32.4–36.3)
MCV RBC AUTO: 90.1 FL (ref 80.5–98.2)
MONOCYTES # BLD AUTO: 1.59 10*3/MM3 (ref 0.2–1.2)
MONOCYTES NFR BLD AUTO: 14.3 % (ref 5–12)
NEUTROPHILS # BLD AUTO: 8.78 10*3/MM3 (ref 1.9–8.1)
NEUTROPHILS NFR BLD AUTO: 79.1 % (ref 42.7–76)
NITRITE UR QL STRIP: NEGATIVE
PH UR STRIP.AUTO: 6 [PH] (ref 5–8)
PHENYTOIN SERPL-MCNC: 3.5 MCG/ML (ref 10–20)
PHOSPHATE SERPL-MCNC: 3.1 MG/DL (ref 2.5–4.5)
PLATELET # BLD AUTO: 267 10*3/MM3 (ref 140–500)
PMV BLD AUTO: 11 FL (ref 6–12)
POTASSIUM BLD-SCNC: 3.4 MMOL/L (ref 3.5–5.2)
PROT SERPL-MCNC: 7.1 G/DL (ref 6–8.5)
PROT UR QL STRIP: ABNORMAL
PROTHROMBIN TIME: 15.8 SECONDS (ref 11.7–14.2)
PTH-INTACT SERPL-MCNC: 8.1 PG/ML (ref 15–65)
RBC # BLD AUTO: 3.65 10*6/MM3 (ref 3.9–5.2)
SODIUM BLD-SCNC: 139 MMOL/L (ref 136–145)
SP GR UR STRIP: 1.01 (ref 1–1.03)
T4 FREE SERPL-MCNC: 1.41 NG/DL (ref 0.93–1.7)
TROPONIN T SERPL-MCNC: <0.01 NG/ML (ref 0–0.03)
TSH SERPL DL<=0.05 MIU/L-ACNC: 2.18 MIU/ML (ref 0.27–4.2)
UROBILINOGEN UR QL STRIP: ABNORMAL
WBC NRBC COR # BLD: 11.09 10*3/MM3 (ref 4.5–10.7)
WHOLE BLOOD HOLD SPECIMEN: NORMAL

## 2018-04-21 PROCEDURE — 70553 MRI BRAIN STEM W/O & W/DYE: CPT

## 2018-04-21 PROCEDURE — 36415 COLL VENOUS BLD VENIPUNCTURE: CPT | Performed by: HOSPITALIST

## 2018-04-21 PROCEDURE — A9577 INJ MULTIHANCE: HCPCS | Performed by: HOSPITALIST

## 2018-04-21 PROCEDURE — 84100 ASSAY OF PHOSPHORUS: CPT | Performed by: EMERGENCY MEDICINE

## 2018-04-21 PROCEDURE — 84439 ASSAY OF FREE THYROXINE: CPT | Performed by: EMERGENCY MEDICINE

## 2018-04-21 PROCEDURE — 99285 EMERGENCY DEPT VISIT HI MDM: CPT

## 2018-04-21 PROCEDURE — 84443 ASSAY THYROID STIM HORMONE: CPT | Performed by: EMERGENCY MEDICINE

## 2018-04-21 PROCEDURE — 93005 ELECTROCARDIOGRAM TRACING: CPT | Performed by: EMERGENCY MEDICINE

## 2018-04-21 PROCEDURE — 99222 1ST HOSP IP/OBS MODERATE 55: CPT | Performed by: INTERNAL MEDICINE

## 2018-04-21 PROCEDURE — 83735 ASSAY OF MAGNESIUM: CPT | Performed by: EMERGENCY MEDICINE

## 2018-04-21 PROCEDURE — 93010 ELECTROCARDIOGRAM REPORT: CPT | Performed by: INTERNAL MEDICINE

## 2018-04-21 PROCEDURE — 25010000002 FUROSEMIDE PER 20 MG: Performed by: HOSPITALIST

## 2018-04-21 PROCEDURE — 85610 PROTHROMBIN TIME: CPT | Performed by: EMERGENCY MEDICINE

## 2018-04-21 PROCEDURE — 85025 COMPLETE CBC W/AUTO DIFF WBC: CPT | Performed by: EMERGENCY MEDICINE

## 2018-04-21 PROCEDURE — 0 GADOBENATE DIMEGLUMINE 529 MG/ML SOLUTION: Performed by: HOSPITALIST

## 2018-04-21 PROCEDURE — 82306 VITAMIN D 25 HYDROXY: CPT | Performed by: HOSPITALIST

## 2018-04-21 PROCEDURE — 93005 ELECTROCARDIOGRAM TRACING: CPT | Performed by: INTERNAL MEDICINE

## 2018-04-21 PROCEDURE — 71046 X-RAY EXAM CHEST 2 VIEWS: CPT

## 2018-04-21 PROCEDURE — 84484 ASSAY OF TROPONIN QUANT: CPT | Performed by: EMERGENCY MEDICINE

## 2018-04-21 PROCEDURE — 80053 COMPREHEN METABOLIC PANEL: CPT | Performed by: EMERGENCY MEDICINE

## 2018-04-21 PROCEDURE — 70450 CT HEAD/BRAIN W/O DYE: CPT

## 2018-04-21 PROCEDURE — 80185 ASSAY OF PHENYTOIN TOTAL: CPT | Performed by: EMERGENCY MEDICINE

## 2018-04-21 PROCEDURE — 25010000002 HYDRALAZINE PER 20 MG: Performed by: EMERGENCY MEDICINE

## 2018-04-21 PROCEDURE — 83970 ASSAY OF PARATHORMONE: CPT | Performed by: EMERGENCY MEDICINE

## 2018-04-21 PROCEDURE — 81003 URINALYSIS AUTO W/O SCOPE: CPT | Performed by: EMERGENCY MEDICINE

## 2018-04-21 RX ORDER — POTASSIUM CHLORIDE 1.5 G/1.77G
20 POWDER, FOR SOLUTION ORAL 2 TIMES DAILY
Status: DISCONTINUED | OUTPATIENT
Start: 2018-04-21 | End: 2018-04-24

## 2018-04-21 RX ORDER — FUROSEMIDE 10 MG/ML
20 INJECTION INTRAMUSCULAR; INTRAVENOUS EVERY 12 HOURS
Status: DISCONTINUED | OUTPATIENT
Start: 2018-04-21 | End: 2018-04-21

## 2018-04-21 RX ORDER — LANOLIN ALCOHOL/MO/W.PET/CERES
400 CREAM (GRAM) TOPICAL DAILY
Status: DISCONTINUED | OUTPATIENT
Start: 2018-04-21 | End: 2018-05-01 | Stop reason: HOSPADM

## 2018-04-21 RX ORDER — ACETAMINOPHEN 325 MG/1
650 TABLET ORAL EVERY 4 HOURS PRN
Status: DISCONTINUED | OUTPATIENT
Start: 2018-04-21 | End: 2018-05-01 | Stop reason: HOSPADM

## 2018-04-21 RX ORDER — SODIUM CHLORIDE 9 MG/ML
50 INJECTION, SOLUTION INTRAVENOUS CONTINUOUS
Status: DISCONTINUED | OUTPATIENT
Start: 2018-04-21 | End: 2018-04-25

## 2018-04-21 RX ORDER — DILTIAZEM HYDROCHLORIDE 5 MG/ML
20 INJECTION INTRAVENOUS ONCE
Status: COMPLETED | OUTPATIENT
Start: 2018-04-21 | End: 2018-04-21

## 2018-04-21 RX ORDER — ESTRADIOL 1 MG/1
0.5 TABLET ORAL DAILY
Status: DISCONTINUED | OUTPATIENT
Start: 2018-04-21 | End: 2018-04-24

## 2018-04-21 RX ORDER — MONTELUKAST SODIUM 10 MG/1
10 TABLET ORAL DAILY
Status: DISCONTINUED | OUTPATIENT
Start: 2018-04-21 | End: 2018-05-01 | Stop reason: HOSPADM

## 2018-04-21 RX ORDER — LISINOPRIL 20 MG/1
20 TABLET ORAL EVERY 12 HOURS SCHEDULED
Status: DISCONTINUED | OUTPATIENT
Start: 2018-04-21 | End: 2018-05-01 | Stop reason: HOSPADM

## 2018-04-21 RX ORDER — HYDRALAZINE HYDROCHLORIDE 20 MG/ML
10 INJECTION INTRAMUSCULAR; INTRAVENOUS ONCE
Status: COMPLETED | OUTPATIENT
Start: 2018-04-21 | End: 2018-04-21

## 2018-04-21 RX ORDER — SODIUM CHLORIDE 0.9 % (FLUSH) 0.9 %
1-10 SYRINGE (ML) INJECTION AS NEEDED
Status: DISCONTINUED | OUTPATIENT
Start: 2018-04-21 | End: 2018-04-24

## 2018-04-21 RX ORDER — SPIRONOLACTONE 50 MG/1
50 TABLET, FILM COATED ORAL DAILY
Status: DISCONTINUED | OUTPATIENT
Start: 2018-04-21 | End: 2018-04-22

## 2018-04-21 RX ORDER — CETIRIZINE HYDROCHLORIDE 10 MG/1
10 TABLET ORAL DAILY
Status: DISCONTINUED | OUTPATIENT
Start: 2018-04-21 | End: 2018-04-22

## 2018-04-21 RX ORDER — FERROUS SULFATE 325(65) MG
325 TABLET ORAL
Status: DISCONTINUED | OUTPATIENT
Start: 2018-04-22 | End: 2018-04-22

## 2018-04-21 RX ORDER — AMLODIPINE BESYLATE 10 MG/1
10 TABLET ORAL DAILY
Status: DISCONTINUED | OUTPATIENT
Start: 2018-04-21 | End: 2018-05-01 | Stop reason: HOSPADM

## 2018-04-21 RX ORDER — FAMOTIDINE 20 MG/1
20 TABLET, FILM COATED ORAL 2 TIMES DAILY
Status: DISCONTINUED | OUTPATIENT
Start: 2018-04-21 | End: 2018-04-22

## 2018-04-21 RX ORDER — HYDRALAZINE HYDROCHLORIDE 20 MG/ML
20 INJECTION INTRAMUSCULAR; INTRAVENOUS ONCE
Status: COMPLETED | OUTPATIENT
Start: 2018-04-21 | End: 2018-04-21

## 2018-04-21 RX ORDER — SODIUM CHLORIDE 0.9 % (FLUSH) 0.9 %
10 SYRINGE (ML) INJECTION AS NEEDED
Status: DISCONTINUED | OUTPATIENT
Start: 2018-04-21 | End: 2018-05-01 | Stop reason: HOSPADM

## 2018-04-21 RX ORDER — HYDRALAZINE HYDROCHLORIDE 20 MG/ML
10 INJECTION INTRAMUSCULAR; INTRAVENOUS EVERY 6 HOURS PRN
Status: DISCONTINUED | OUTPATIENT
Start: 2018-04-21 | End: 2018-05-01 | Stop reason: HOSPADM

## 2018-04-21 RX ORDER — PHENYTOIN SODIUM 100 MG/1
300 CAPSULE, EXTENDED RELEASE ORAL NIGHTLY
Status: DISCONTINUED | OUTPATIENT
Start: 2018-04-21 | End: 2018-04-24

## 2018-04-21 RX ORDER — GABAPENTIN 300 MG/1
300 CAPSULE ORAL NIGHTLY
Status: DISCONTINUED | OUTPATIENT
Start: 2018-04-21 | End: 2018-05-01 | Stop reason: HOSPADM

## 2018-04-21 RX ORDER — PANTOPRAZOLE SODIUM 40 MG/1
40 TABLET, DELAYED RELEASE ORAL DAILY
Status: DISCONTINUED | OUTPATIENT
Start: 2018-04-21 | End: 2018-05-01 | Stop reason: HOSPADM

## 2018-04-21 RX ORDER — CHOLECALCIFEROL (VITAMIN D3) 125 MCG
5 CAPSULE ORAL NIGHTLY
Status: DISCONTINUED | OUTPATIENT
Start: 2018-04-21 | End: 2018-05-01 | Stop reason: HOSPADM

## 2018-04-21 RX ORDER — POTASSIUM CHLORIDE 750 MG/1
40 CAPSULE, EXTENDED RELEASE ORAL ONCE
Status: COMPLETED | OUTPATIENT
Start: 2018-04-21 | End: 2018-04-21

## 2018-04-21 RX ORDER — SODIUM CHLORIDE 9 MG/ML
125 INJECTION, SOLUTION INTRAVENOUS CONTINUOUS
Status: DISCONTINUED | OUTPATIENT
Start: 2018-04-21 | End: 2018-04-21

## 2018-04-21 RX ORDER — ONDANSETRON 2 MG/ML
4 INJECTION INTRAMUSCULAR; INTRAVENOUS EVERY 6 HOURS PRN
Status: DISCONTINUED | OUTPATIENT
Start: 2018-04-21 | End: 2018-05-01 | Stop reason: HOSPADM

## 2018-04-21 RX ORDER — ESCITALOPRAM OXALATE 5 MG/1
5 TABLET ORAL DAILY
Status: DISCONTINUED | OUTPATIENT
Start: 2018-04-21 | End: 2018-05-01 | Stop reason: HOSPADM

## 2018-04-21 RX ORDER — HYDRALAZINE HYDROCHLORIDE 50 MG/1
100 TABLET, FILM COATED ORAL 3 TIMES DAILY
Status: DISCONTINUED | OUTPATIENT
Start: 2018-04-21 | End: 2018-04-23

## 2018-04-21 RX ADMIN — Medication 20 MG: at 15:54

## 2018-04-21 RX ADMIN — FUROSEMIDE 20 MG: 10 INJECTION, SOLUTION INTRAMUSCULAR; INTRAVENOUS at 20:45

## 2018-04-21 RX ADMIN — GADOBENATE DIMEGLUMINE 14 ML: 529 INJECTION, SOLUTION INTRAVENOUS at 19:28

## 2018-04-21 RX ADMIN — POTASSIUM CHLORIDE 10 MEQ: 750 CAPSULE, EXTENDED RELEASE ORAL at 16:12

## 2018-04-21 RX ADMIN — PANTOPRAZOLE SODIUM 40 MG: 40 TABLET, DELAYED RELEASE ORAL at 18:06

## 2018-04-21 RX ADMIN — HYDRALAZINE HYDROCHLORIDE 100 MG: 50 TABLET, FILM COATED ORAL at 18:21

## 2018-04-21 RX ADMIN — DILTIAZEM HYDROCHLORIDE 20 MG: 5 INJECTION, SOLUTION INTRAVENOUS at 23:11

## 2018-04-21 RX ADMIN — APIXABAN 5 MG: 5 TABLET, FILM COATED ORAL at 20:06

## 2018-04-21 RX ADMIN — SODIUM CHLORIDE 150 ML/HR: 9 INJECTION, SOLUTION INTRAVENOUS at 17:51

## 2018-04-21 RX ADMIN — Medication 10 MG: at 14:34

## 2018-04-21 RX ADMIN — SPIRONOLACTONE 50 MG: 50 TABLET ORAL at 20:21

## 2018-04-21 RX ADMIN — SODIUM CHLORIDE 125 ML/HR: 9 INJECTION, SOLUTION INTRAVENOUS at 15:53

## 2018-04-21 RX ADMIN — SODIUM CHLORIDE 5 MG/HR: 900 INJECTION, SOLUTION INTRAVENOUS at 23:12

## 2018-04-21 NOTE — TELEPHONE ENCOUNTER
Rec'd call from answering service to speak with max'ts dgt Aga Luis about patient. Spoke with dgt. Patient was seen in ED on 4/10 with new onset A Fib, It was noted she had hypomagnesemia/hypokalemia/hypercalcemia. Tx for A fib and d/c home on 4/14 with f/u with Dr. Walton scheduled for 4/24. Mom had been weak but was able to walk unassisted last night with just stand by supervision. This morning she was unable to get out of bed and in fact didn't have the strength to make a call on her cell phone. They were notified last night that her parathyroid level was very low at 7.1.  Since patient is so much weaker than yesterday, she wonders if they need to do something else prior to her scheduled appt on Tues.  Medication and labs reviewed. Ca level was 12.7, PTH 7.1. Mag level wnl but K was 3.0.  Advised dgt, in view of recent hs to take the patient back to the ER to be re-evaluated for the sudden change in her status. Hold Calcium for now.  She understands and will take her to [facility] ED.

## 2018-04-22 LAB
ALBUMIN SERPL-MCNC: 3.2 G/DL (ref 3.5–5.2)
ALBUMIN/GLOB SERPL: 0.8 G/DL
ALP SERPL-CCNC: 83 U/L (ref 39–117)
ALT SERPL W P-5'-P-CCNC: 13 U/L (ref 1–33)
ANION GAP SERPL CALCULATED.3IONS-SCNC: 16.4 MMOL/L
AST SERPL-CCNC: 48 U/L (ref 1–32)
BILIRUB SERPL-MCNC: 0.6 MG/DL (ref 0.1–1.2)
BUN BLD-MCNC: 16 MG/DL (ref 8–23)
BUN/CREAT SERPL: 21.1 (ref 7–25)
CALCIUM SPEC-SCNC: 12.7 MG/DL (ref 8.6–10.5)
CHLORIDE SERPL-SCNC: 96 MMOL/L (ref 98–107)
CO2 SERPL-SCNC: 28.6 MMOL/L (ref 22–29)
CREAT BLD-MCNC: 0.76 MG/DL (ref 0.57–1)
DEPRECATED RDW RBC AUTO: 48.6 FL (ref 37–54)
ERYTHROCYTE [DISTWIDTH] IN BLOOD BY AUTOMATED COUNT: 15.3 % (ref 11.7–13)
GFR SERPL CREATININE-BSD FRML MDRD: 74 ML/MIN/1.73
GLOBULIN UR ELPH-MCNC: 3.9 GM/DL
GLUCOSE BLD-MCNC: 109 MG/DL (ref 65–99)
HCT VFR BLD AUTO: 30.6 % (ref 35.6–45.5)
HGB BLD-MCNC: 9.8 G/DL (ref 11.9–15.5)
MAGNESIUM SERPL-MCNC: 1.4 MG/DL (ref 1.6–2.4)
MCH RBC QN AUTO: 28.2 PG (ref 26.9–32)
MCHC RBC AUTO-ENTMCNC: 32 G/DL (ref 32.4–36.3)
MCV RBC AUTO: 87.9 FL (ref 80.5–98.2)
PHOSPHATE SERPL-MCNC: 2.9 MG/DL (ref 2.5–4.5)
PLATELET # BLD AUTO: 284 10*3/MM3 (ref 140–500)
PMV BLD AUTO: 10.5 FL (ref 6–12)
POTASSIUM BLD-SCNC: 3 MMOL/L (ref 3.5–5.2)
PROT SERPL-MCNC: 7.1 G/DL (ref 6–8.5)
PROT UR-MCNC: 18 MG/DL
RBC # BLD AUTO: 3.48 10*6/MM3 (ref 3.9–5.2)
SODIUM BLD-SCNC: 141 MMOL/L (ref 136–145)
SODIUM UR-SCNC: 127 MMOL/L
URATE SERPL-MCNC: 8.5 MG/DL (ref 2.4–5.7)
WBC NRBC COR # BLD: 18.95 10*3/MM3 (ref 4.5–10.7)

## 2018-04-22 PROCEDURE — G8979 MOBILITY GOAL STATUS: HCPCS | Performed by: PHYSICAL THERAPIST

## 2018-04-22 PROCEDURE — 82397 CHEMILUMINESCENT ASSAY: CPT | Performed by: INTERNAL MEDICINE

## 2018-04-22 PROCEDURE — 25010000003 POTASSIUM CHLORIDE 10 MEQ/100ML SOLUTION: Performed by: HOSPITALIST

## 2018-04-22 PROCEDURE — 83735 ASSAY OF MAGNESIUM: CPT | Performed by: INTERNAL MEDICINE

## 2018-04-22 PROCEDURE — 84550 ASSAY OF BLOOD/URIC ACID: CPT | Performed by: INTERNAL MEDICINE

## 2018-04-22 PROCEDURE — 86334 IMMUNOFIX E-PHORESIS SERUM: CPT | Performed by: INTERNAL MEDICINE

## 2018-04-22 PROCEDURE — 97161 PT EVAL LOW COMPLEX 20 MIN: CPT | Performed by: PHYSICAL THERAPIST

## 2018-04-22 PROCEDURE — 82784 ASSAY IGA/IGD/IGG/IGM EACH: CPT | Performed by: INTERNAL MEDICINE

## 2018-04-22 PROCEDURE — 84165 PROTEIN E-PHORESIS SERUM: CPT | Performed by: INTERNAL MEDICINE

## 2018-04-22 PROCEDURE — 84156 ASSAY OF PROTEIN URINE: CPT | Performed by: INTERNAL MEDICINE

## 2018-04-22 PROCEDURE — 83883 ASSAY NEPHELOMETRY NOT SPEC: CPT | Performed by: INTERNAL MEDICINE

## 2018-04-22 PROCEDURE — 99232 SBSQ HOSP IP/OBS MODERATE 35: CPT | Performed by: INTERNAL MEDICINE

## 2018-04-22 PROCEDURE — 97116 GAIT TRAINING THERAPY: CPT | Performed by: PHYSICAL THERAPIST

## 2018-04-22 PROCEDURE — 85027 COMPLETE CBC AUTOMATED: CPT | Performed by: HOSPITALIST

## 2018-04-22 PROCEDURE — 84300 ASSAY OF URINE SODIUM: CPT | Performed by: INTERNAL MEDICINE

## 2018-04-22 PROCEDURE — 84100 ASSAY OF PHOSPHORUS: CPT | Performed by: INTERNAL MEDICINE

## 2018-04-22 PROCEDURE — G8978 MOBILITY CURRENT STATUS: HCPCS | Performed by: PHYSICAL THERAPIST

## 2018-04-22 PROCEDURE — 25010000002 MAGNESIUM SULFATE IN D5W 1G/100ML (PREMIX) 1-5 GM/100ML-% SOLUTION: Performed by: INTERNAL MEDICINE

## 2018-04-22 PROCEDURE — 80053 COMPREHEN METABOLIC PANEL: CPT | Performed by: HOSPITALIST

## 2018-04-22 RX ORDER — POTASSIUM CHLORIDE 7.45 MG/ML
10 INJECTION INTRAVENOUS
Status: DISCONTINUED | OUTPATIENT
Start: 2018-04-22 | End: 2018-05-01 | Stop reason: HOSPADM

## 2018-04-22 RX ORDER — SPIRONOLACTONE 50 MG/1
50 TABLET, FILM COATED ORAL
Status: DISCONTINUED | OUTPATIENT
Start: 2018-04-22 | End: 2018-05-01 | Stop reason: HOSPADM

## 2018-04-22 RX ORDER — MAGNESIUM SULFATE 1 G/100ML
1 INJECTION INTRAVENOUS
Status: COMPLETED | OUTPATIENT
Start: 2018-04-22 | End: 2018-04-22

## 2018-04-22 RX ORDER — POTASSIUM CHLORIDE 750 MG/1
40 CAPSULE, EXTENDED RELEASE ORAL AS NEEDED
Status: DISCONTINUED | OUTPATIENT
Start: 2018-04-22 | End: 2018-05-01 | Stop reason: HOSPADM

## 2018-04-22 RX ORDER — POTASSIUM CHLORIDE 1.5 G/1.77G
40 POWDER, FOR SOLUTION ORAL AS NEEDED
Status: DISCONTINUED | OUTPATIENT
Start: 2018-04-22 | End: 2018-05-01 | Stop reason: HOSPADM

## 2018-04-22 RX ORDER — MAGNESIUM SULFATE 1 G/100ML
2 INJECTION INTRAVENOUS ONCE
Status: DISCONTINUED | OUTPATIENT
Start: 2018-04-22 | End: 2018-04-22 | Stop reason: SDUPTHER

## 2018-04-22 RX ADMIN — POTASSIUM CHLORIDE 10 MEQ: 10 INJECTION, SOLUTION INTRAVENOUS at 16:39

## 2018-04-22 RX ADMIN — PHENYTOIN SODIUM 300 MG: 100 CAPSULE, EXTENDED RELEASE ORAL at 20:39

## 2018-04-22 RX ADMIN — APIXABAN 5 MG: 5 TABLET, FILM COATED ORAL at 20:39

## 2018-04-22 RX ADMIN — SODIUM CHLORIDE 150 ML/HR: 9 INJECTION, SOLUTION INTRAVENOUS at 16:39

## 2018-04-22 RX ADMIN — POTASSIUM CHLORIDE 10 MEQ: 10 INJECTION, SOLUTION INTRAVENOUS at 20:31

## 2018-04-22 RX ADMIN — DILTIAZEM HYDROCHLORIDE 30 MG: 30 TABLET, FILM COATED ORAL at 20:39

## 2018-04-22 RX ADMIN — MAGNESIUM SULFATE HEPTAHYDRATE 1 G: 1 INJECTION, SOLUTION INTRAVENOUS at 10:05

## 2018-04-22 RX ADMIN — POTASSIUM CHLORIDE 10 MEQ: 10 INJECTION, SOLUTION INTRAVENOUS at 14:54

## 2018-04-22 RX ADMIN — DILTIAZEM HYDROCHLORIDE 30 MG: 30 TABLET, FILM COATED ORAL at 14:53

## 2018-04-22 RX ADMIN — HYDRALAZINE HYDROCHLORIDE 50 MG: 50 TABLET, FILM COATED ORAL at 20:39

## 2018-04-22 RX ADMIN — LISINOPRIL 20 MG: 20 TABLET ORAL at 20:39

## 2018-04-22 RX ADMIN — POTASSIUM CHLORIDE 10 MEQ: 10 INJECTION, SOLUTION INTRAVENOUS at 22:43

## 2018-04-22 RX ADMIN — POTASSIUM CHLORIDE 10 MEQ: 10 INJECTION, SOLUTION INTRAVENOUS at 17:42

## 2018-04-22 RX ADMIN — SODIUM CHLORIDE 150 ML/HR: 9 INJECTION, SOLUTION INTRAVENOUS at 10:12

## 2018-04-22 RX ADMIN — MAGNESIUM SULFATE HEPTAHYDRATE 1 G: 1 INJECTION, SOLUTION INTRAVENOUS at 11:12

## 2018-04-22 RX ADMIN — SODIUM CHLORIDE 150 ML/HR: 9 INJECTION, SOLUTION INTRAVENOUS at 04:04

## 2018-04-23 ENCOUNTER — APPOINTMENT (OUTPATIENT)
Dept: CT IMAGING | Facility: HOSPITAL | Age: 78
End: 2018-04-23

## 2018-04-23 ENCOUNTER — APPOINTMENT (OUTPATIENT)
Dept: CARDIOLOGY | Facility: HOSPITAL | Age: 78
End: 2018-04-23
Attending: INTERNAL MEDICINE

## 2018-04-23 ENCOUNTER — APPOINTMENT (OUTPATIENT)
Dept: GENERAL RADIOLOGY | Facility: HOSPITAL | Age: 78
End: 2018-04-23

## 2018-04-23 LAB
ALBUMIN SERPL-MCNC: 3.2 G/DL (ref 3.5–5.2)
ANION GAP SERPL CALCULATED.3IONS-SCNC: 16.3 MMOL/L
BUN BLD-MCNC: 13 MG/DL (ref 8–23)
BUN/CREAT SERPL: 20.3 (ref 7–25)
CALCIUM SPEC-SCNC: 10.5 MG/DL (ref 8.6–10.5)
CHLORIDE SERPL-SCNC: 97 MMOL/L (ref 98–107)
CO2 SERPL-SCNC: 27.7 MMOL/L (ref 22–29)
CREAT BLD-MCNC: 0.64 MG/DL (ref 0.57–1)
CREAT UR-MCNC: 20.4 MG/DL
DEPRECATED RDW RBC AUTO: 48.4 FL (ref 37–54)
ERYTHROCYTE [DISTWIDTH] IN BLOOD BY AUTOMATED COUNT: 15.2 % (ref 11.7–13)
GFR SERPL CREATININE-BSD FRML MDRD: 90 ML/MIN/1.73
GLUCOSE BLD-MCNC: 93 MG/DL (ref 65–99)
HCT VFR BLD AUTO: 31.1 % (ref 35.6–45.5)
HGB BLD-MCNC: 9.9 G/DL (ref 11.9–15.5)
MAGNESIUM SERPL-MCNC: 1.6 MG/DL (ref 1.6–2.4)
MCH RBC QN AUTO: 28.3 PG (ref 26.9–32)
MCHC RBC AUTO-ENTMCNC: 31.8 G/DL (ref 32.4–36.3)
MCV RBC AUTO: 88.9 FL (ref 80.5–98.2)
PHOSPHATE SERPL-MCNC: 1.7 MG/DL (ref 2.5–4.5)
PLATELET # BLD AUTO: 295 10*3/MM3 (ref 140–500)
PMV BLD AUTO: 10.7 FL (ref 6–12)
POTASSIUM BLD-SCNC: 2.8 MMOL/L (ref 3.5–5.2)
POTASSIUM BLD-SCNC: 3.8 MMOL/L (ref 3.5–5.2)
PROCALCITONIN SERPL-MCNC: 0.14 NG/ML (ref 0.1–0.25)
RBC # BLD AUTO: 3.5 10*6/MM3 (ref 3.9–5.2)
SODIUM BLD-SCNC: 141 MMOL/L (ref 136–145)
WBC NRBC COR # BLD: 18.44 10*3/MM3 (ref 4.5–10.7)

## 2018-04-23 PROCEDURE — 82272 OCCULT BLD FECES 1-3 TESTS: CPT | Performed by: INTERNAL MEDICINE

## 2018-04-23 PROCEDURE — 83735 ASSAY OF MAGNESIUM: CPT | Performed by: HOSPITALIST

## 2018-04-23 PROCEDURE — 93975 VASCULAR STUDY: CPT

## 2018-04-23 PROCEDURE — 84244 ASSAY OF RENIN: CPT | Performed by: INTERNAL MEDICINE

## 2018-04-23 PROCEDURE — 73502 X-RAY EXAM HIP UNI 2-3 VIEWS: CPT

## 2018-04-23 PROCEDURE — 25010000003 POTASSIUM CHLORIDE 10 MEQ/100ML SOLUTION: Performed by: HOSPITALIST

## 2018-04-23 PROCEDURE — 84132 ASSAY OF SERUM POTASSIUM: CPT | Performed by: HOSPITALIST

## 2018-04-23 PROCEDURE — 70450 CT HEAD/BRAIN W/O DYE: CPT

## 2018-04-23 PROCEDURE — 82088 ASSAY OF ALDOSTERONE: CPT | Performed by: INTERNAL MEDICINE

## 2018-04-23 PROCEDURE — 82570 ASSAY OF URINE CREATININE: CPT | Performed by: INTERNAL MEDICINE

## 2018-04-23 PROCEDURE — 84145 PROCALCITONIN (PCT): CPT | Performed by: HOSPITALIST

## 2018-04-23 PROCEDURE — 85027 COMPLETE CBC AUTOMATED: CPT | Performed by: HOSPITALIST

## 2018-04-23 PROCEDURE — 25010000002 MAGNESIUM SULFATE IN D5W 1G/100ML (PREMIX) 1-5 GM/100ML-% SOLUTION: Performed by: INTERNAL MEDICINE

## 2018-04-23 PROCEDURE — 25010000002 HYDRALAZINE PER 20 MG: Performed by: HOSPITALIST

## 2018-04-23 PROCEDURE — 80069 RENAL FUNCTION PANEL: CPT | Performed by: HOSPITALIST

## 2018-04-23 PROCEDURE — 99232 SBSQ HOSP IP/OBS MODERATE 35: CPT | Performed by: INTERNAL MEDICINE

## 2018-04-23 PROCEDURE — 92610 EVALUATE SWALLOWING FUNCTION: CPT

## 2018-04-23 RX ORDER — MAGNESIUM SULFATE 1 G/100ML
2 INJECTION INTRAVENOUS ONCE
Status: COMPLETED | OUTPATIENT
Start: 2018-04-23 | End: 2018-04-23

## 2018-04-23 RX ORDER — MINOXIDIL 2.5 MG/1
2.5 TABLET ORAL EVERY 12 HOURS SCHEDULED
Status: DISCONTINUED | OUTPATIENT
Start: 2018-04-23 | End: 2018-04-25

## 2018-04-23 RX ADMIN — METOPROLOL TARTRATE 25 MG: 25 TABLET ORAL at 18:48

## 2018-04-23 RX ADMIN — ACETAMINOPHEN 400 MCG: 400 TABLET ORAL at 10:20

## 2018-04-23 RX ADMIN — HYDRALAZINE HYDROCHLORIDE 100 MG: 50 TABLET, FILM COATED ORAL at 10:26

## 2018-04-23 RX ADMIN — Medication 10 MG: at 00:25

## 2018-04-23 RX ADMIN — SODIUM CHLORIDE 150 ML/HR: 9 INJECTION, SOLUTION INTRAVENOUS at 00:29

## 2018-04-23 RX ADMIN — MINOXIDIL 2.5 MG: 2.5 TABLET ORAL at 13:27

## 2018-04-23 RX ADMIN — POTASSIUM CHLORIDE 10 MEQ: 10 INJECTION, SOLUTION INTRAVENOUS at 00:25

## 2018-04-23 RX ADMIN — POTASSIUM CHLORIDE 40 MEQ: 750 CAPSULE, EXTENDED RELEASE ORAL at 13:28

## 2018-04-23 RX ADMIN — POTASSIUM CHLORIDE 40 MEQ: 750 CAPSULE, EXTENDED RELEASE ORAL at 18:39

## 2018-04-23 RX ADMIN — GABAPENTIN 300 MG: 300 CAPSULE ORAL at 22:02

## 2018-04-23 RX ADMIN — SPIRONOLACTONE 50 MG: 50 TABLET ORAL at 18:39

## 2018-04-23 RX ADMIN — POTASSIUM PHOSPHATE, MONOBASIC AND POTASSIUM PHOSPHATE, DIBASIC 15 MMOL: 224; 236 INJECTION, SOLUTION INTRAVENOUS at 16:30

## 2018-04-23 RX ADMIN — LISINOPRIL 20 MG: 20 TABLET ORAL at 19:42

## 2018-04-23 RX ADMIN — PHENYTOIN SODIUM 300 MG: 100 CAPSULE, EXTENDED RELEASE ORAL at 22:02

## 2018-04-23 RX ADMIN — MAGNESIUM SULFATE HEPTAHYDRATE 2 G: 1 INJECTION, SOLUTION INTRAVENOUS at 11:36

## 2018-04-23 RX ADMIN — POTASSIUM CHLORIDE 10 MEQ: 10 INJECTION, SOLUTION INTRAVENOUS at 06:50

## 2018-04-23 RX ADMIN — ESCITALOPRAM 5 MG: 5 TABLET, FILM COATED ORAL at 10:19

## 2018-04-23 RX ADMIN — Medication 5 MG: at 22:01

## 2018-04-23 RX ADMIN — APIXABAN 5 MG: 5 TABLET, FILM COATED ORAL at 10:20

## 2018-04-23 RX ADMIN — MINOXIDIL 2.5 MG: 2.5 TABLET ORAL at 19:42

## 2018-04-23 RX ADMIN — PANTOPRAZOLE SODIUM 40 MG: 40 TABLET, DELAYED RELEASE ORAL at 10:19

## 2018-04-23 RX ADMIN — Medication 10 MG: at 07:35

## 2018-04-23 RX ADMIN — APIXABAN 5 MG: 5 TABLET, FILM COATED ORAL at 22:01

## 2018-04-23 RX ADMIN — SODIUM CHLORIDE 150 ML/HR: 9 INJECTION, SOLUTION INTRAVENOUS at 08:26

## 2018-04-23 RX ADMIN — DILTIAZEM HYDROCHLORIDE 30 MG: 30 TABLET, FILM COATED ORAL at 07:38

## 2018-04-23 RX ADMIN — ESTRADIOL 0.5 MG: 1 TABLET ORAL at 10:19

## 2018-04-23 RX ADMIN — POTASSIUM CHLORIDE 20 MEQ: 1.5 POWDER, FOR SOLUTION ORAL at 10:20

## 2018-04-23 RX ADMIN — MONTELUKAST 10 MG: 10 TABLET, FILM COATED ORAL at 10:20

## 2018-04-23 RX ADMIN — LISINOPRIL 20 MG: 20 TABLET ORAL at 08:08

## 2018-04-23 RX ADMIN — POTASSIUM CHLORIDE 40 MEQ: 750 CAPSULE, EXTENDED RELEASE ORAL at 08:08

## 2018-04-23 RX ADMIN — SPIRONOLACTONE 50 MG: 50 TABLET ORAL at 11:36

## 2018-04-23 RX ADMIN — AMLODIPINE BESYLATE 10 MG: 10 TABLET ORAL at 08:09

## 2018-04-24 ENCOUNTER — APPOINTMENT (OUTPATIENT)
Dept: CT IMAGING | Facility: HOSPITAL | Age: 78
End: 2018-04-24
Attending: HOSPITALIST

## 2018-04-24 PROBLEM — R16.0 LIVER MASS: Status: ACTIVE | Noted: 2018-04-24

## 2018-04-24 PROBLEM — E87.6 HYPOKALEMIA: Status: RESOLVED | Noted: 2018-04-21 | Resolved: 2018-04-24

## 2018-04-24 LAB
ALBUMIN SERPL-MCNC: 2.8 G/DL (ref 2.9–4.4)
ALBUMIN SERPL-MCNC: 3 G/DL (ref 3.5–5.2)
ALBUMIN/GLOB SERPL: 0.9 {RATIO} (ref 0.7–1.7)
ALPHA1 GLOB FLD ELPH-MCNC: 0.5 G/DL (ref 0–0.4)
ALPHA2 GLOB SERPL ELPH-MCNC: 1 G/DL (ref 0.4–1)
ANION GAP SERPL CALCULATED.3IONS-SCNC: 14.6 MMOL/L
B-GLOBULIN SERPL ELPH-MCNC: 1 G/DL (ref 0.7–1.3)
BH CV ECHO MEAS - DIST REN A EDV LEFT: 16.4 CM/SEC
BH CV ECHO MEAS - DIST REN A PSV LEFT: 98.4 CM/SEC
BH CV ECHO MEAS - DIST REN A RI LEFT: 0.83
BH CV ECHO MEAS - HILAR A EDV LEFT: 5 CM/SEC
BH CV ECHO MEAS - HILAR A PSV LEFT: 29 CM/SEC
BH CV ECHO MEAS - HILAR A RI LEFT: 0.83
BH CV ECHO MEAS - MID REN A EDV LEFT: 19.2 CM/SEC
BH CV ECHO MEAS - MID REN A PSV LEFT: 94.2 CM/SEC
BH CV ECHO MEAS - MID REN A RI LEFT: 0.84
BH CV ECHO MEAS - PROX REN A EDV LEFT: 20 CM/SEC
BH CV ECHO MEAS - PROX REN A PSV LEFT: 132 CM/SEC
BH CV ECHO MEAS - PROX REN A RI LEFT: 0.81
BH CV VAS BP RIGHT ARM: NORMAL MMHG
BH CV VAS RENAL AORTIC MID EDV: 0 CM/S
BH CV VAS RENAL AORTIC MID PSV: 184 CM/S
BH CV VAS RENAL HILUM LEFT EDV: 5 CM/S
BH CV VAS RENAL HILUM LEFT PSV: 29 CM/S
BH CV VAS RENAL HILUM RIGHT EDV: 5.81 CM/S
BH CV VAS RENAL HILUM RIGHT PSV: 29 CM/S
BH CV XLRA MEAS - KID L LEFT: 11 CM
BH CV XLRA MEAS - RENAL A ORG RI LEFT: 0.85
BH CV XLRA MEAS DIST REN A EDV RIGHT: 12.1 CM/SEC
BH CV XLRA MEAS DIST REN A PSV RIGHT: 94.6 CM/SEC
BH CV XLRA MEAS DIST REN A RI RIGHT: 0.83
BH CV XLRA MEAS HILAR A EDV RIGHT: 5.8 CM/SEC
BH CV XLRA MEAS HILAR A PSV RIGHT: 29 CM/SEC
BH CV XLRA MEAS HILAR A RI RIGHT: 0.8
BH CV XLRA MEAS KID L RIGHT: 10.3 CM
BH CV XLRA MEAS KID W RIGHT: 5.4 CM
BH CV XLRA MEAS MID REN A EDV RIGHT: 15.1 CM/SEC
BH CV XLRA MEAS MID REN A PSV RIGHT: 101 CM/SEC
BH CV XLRA MEAS MID REN A RI RIGHT: 0.84
BH CV XLRA MEAS PROX REN A EDV RIGHT: 19.4 CM/SEC
BH CV XLRA MEAS PROX REN A PSV RIGHT: 121 CM/SEC
BH CV XLRA MEAS PROX REN A RI RIGHT: 0.81
BH CV XLRA MEAS RAR RIGHT: 0.65
BH CV XLRA MEAS RENAL A ORG EDV LEFT: 19.3 CM/SEC
BH CV XLRA MEAS RENAL A ORG EDV RIGHT: 23.9 CM/SEC
BH CV XLRA MEAS RENAL A ORG PSV LEFT: 104 CM/SEC
BH CV XLRA MEAS RENAL A ORG PSV RIGHT: 118 CM/SEC
BH CV XLRA MEAS RENAL A ORG RI RIGHT: 0.82
BUN BLD-MCNC: 17 MG/DL (ref 8–23)
BUN/CREAT SERPL: 24.3 (ref 7–25)
CALCIUM SPEC-SCNC: 11 MG/DL (ref 8.6–10.5)
CEA SERPL-MCNC: 1.34 NG/ML
CHLORIDE SERPL-SCNC: 98 MMOL/L (ref 98–107)
CO2 SERPL-SCNC: 25.4 MMOL/L (ref 22–29)
CREAT BLD-MCNC: 0.7 MG/DL (ref 0.57–1)
DEPRECATED RDW RBC AUTO: 48.9 FL (ref 37–54)
ERYTHROCYTE [DISTWIDTH] IN BLOOD BY AUTOMATED COUNT: 15 % (ref 11.7–13)
FERRITIN SERPL-MCNC: 411.4 NG/ML (ref 13–150)
GAMMA GLOB SERPL ELPH-MCNC: 1 G/DL (ref 0.4–1.8)
GFR SERPL CREATININE-BSD FRML MDRD: 81 ML/MIN/1.73
GLOBULIN SER CALC-MCNC: 3.5 G/DL (ref 2.2–3.9)
GLUCOSE BLD-MCNC: 96 MG/DL (ref 65–99)
HCT VFR BLD AUTO: 28.3 % (ref 35.6–45.5)
HEMOCCULT STL QL: NEGATIVE
HGB BLD-MCNC: 8.9 G/DL (ref 11.9–15.5)
IGA SERPL-MCNC: 207 MG/DL (ref 64–422)
IGG SERPL-MCNC: 948 MG/DL (ref 700–1600)
IGM SERPL-MCNC: 86 MG/DL (ref 26–217)
INTERPRETATION SERPL IEP-IMP: ABNORMAL
IRON 24H UR-MRATE: 16 MCG/DL (ref 37–145)
IRON SATN MFR SERPL: 9 % (ref 20–50)
KAPPA LC SERPL-MCNC: 27.9 MG/L (ref 3.3–19.4)
KAPPA LC/LAMBDA SER: 0.94 {RATIO} (ref 0.26–1.65)
LAMBDA LC FREE SERPL-MCNC: 29.8 MG/L (ref 5.7–26.3)
LEFT KIDNEY WIDTH: 4.8 CM
LEFT RENAL UPPER PARENCHYMA MAX: 22.6 CM/S
LEFT RENAL UPPER PARENCHYMA MIN: 6.19 CM/S
LEFT RENAL UPPER PARENCHYMA RI: 0.73
Lab: ABNORMAL
M-SPIKE: ABNORMAL G/DL
MAGNESIUM SERPL-MCNC: 2 MG/DL (ref 1.6–2.4)
MCH RBC QN AUTO: 28 PG (ref 26.9–32)
MCHC RBC AUTO-ENTMCNC: 31.4 G/DL (ref 32.4–36.3)
MCV RBC AUTO: 89 FL (ref 80.5–98.2)
PHOSPHATE SERPL-MCNC: 3.2 MG/DL (ref 2.5–4.5)
PLATELET # BLD AUTO: 271 10*3/MM3 (ref 140–500)
PMV BLD AUTO: 10.5 FL (ref 6–12)
POTASSIUM BLD-SCNC: 3.6 MMOL/L (ref 3.5–5.2)
PROT SERPL-MCNC: 6.3 G/DL (ref 6–8.5)
RBC # BLD AUTO: 3.18 10*6/MM3 (ref 3.9–5.2)
RIGHT RENAL UPPER PARENCHYMA MAX: 17 CM/S
RIGHT RENAL UPPER PARENCHYMA MIN: 3.82 CM/S
RIGHT RENAL UPPER PARENCHYMA RI: 0.78
SODIUM BLD-SCNC: 138 MMOL/L (ref 136–145)
TIBC SERPL-MCNC: 180 MCG/DL
TRANSFERRIN SERPL-MCNC: 121 MG/DL (ref 200–360)
WBC NRBC COR # BLD: 15.86 10*3/MM3 (ref 4.5–10.7)

## 2018-04-24 PROCEDURE — 0 DIATRIZOATE MEGLUMINE & SODIUM PER 1 ML: Performed by: HOSPITALIST

## 2018-04-24 PROCEDURE — 82728 ASSAY OF FERRITIN: CPT | Performed by: INTERNAL MEDICINE

## 2018-04-24 PROCEDURE — 74177 CT ABD & PELVIS W/CONTRAST: CPT

## 2018-04-24 PROCEDURE — 84466 ASSAY OF TRANSFERRIN: CPT | Performed by: INTERNAL MEDICINE

## 2018-04-24 PROCEDURE — 25010000002 HYDRALAZINE PER 20 MG: Performed by: HOSPITALIST

## 2018-04-24 PROCEDURE — 83735 ASSAY OF MAGNESIUM: CPT | Performed by: HOSPITALIST

## 2018-04-24 PROCEDURE — 82378 CARCINOEMBRYONIC ANTIGEN: CPT | Performed by: HOSPITALIST

## 2018-04-24 PROCEDURE — 85027 COMPLETE CBC AUTOMATED: CPT | Performed by: HOSPITALIST

## 2018-04-24 PROCEDURE — 25010000002 IOPAMIDOL 61 % SOLUTION: Performed by: HOSPITALIST

## 2018-04-24 PROCEDURE — 92526 ORAL FUNCTION THERAPY: CPT

## 2018-04-24 PROCEDURE — 83540 ASSAY OF IRON: CPT | Performed by: INTERNAL MEDICINE

## 2018-04-24 PROCEDURE — 80069 RENAL FUNCTION PANEL: CPT | Performed by: HOSPITALIST

## 2018-04-24 PROCEDURE — 71260 CT THORAX DX C+: CPT

## 2018-04-24 RX ORDER — POTASSIUM CHLORIDE 750 MG/1
20 CAPSULE, EXTENDED RELEASE ORAL DAILY
Status: DISCONTINUED | OUTPATIENT
Start: 2018-04-25 | End: 2018-04-25

## 2018-04-24 RX ORDER — PHENYTOIN 125 MG/5ML
300 SUSPENSION ORAL NIGHTLY
Status: DISCONTINUED | OUTPATIENT
Start: 2018-04-24 | End: 2018-05-01 | Stop reason: HOSPADM

## 2018-04-24 RX ADMIN — METOPROLOL TARTRATE 25 MG: 25 TABLET ORAL at 22:17

## 2018-04-24 RX ADMIN — PANTOPRAZOLE SODIUM 40 MG: 40 TABLET, DELAYED RELEASE ORAL at 09:55

## 2018-04-24 RX ADMIN — AMLODIPINE BESYLATE 10 MG: 10 TABLET ORAL at 08:08

## 2018-04-24 RX ADMIN — DIATRIZOATE MEGLUMINE AND DIATRIZOATE SODIUM 30 ML: 600; 100 SOLUTION ORAL; RECTAL at 12:03

## 2018-04-24 RX ADMIN — ESTRADIOL 0.5 MG: 1 TABLET ORAL at 09:55

## 2018-04-24 RX ADMIN — METOPROLOL TARTRATE 25 MG: 25 TABLET ORAL at 08:08

## 2018-04-24 RX ADMIN — POTASSIUM CHLORIDE 40 MEQ: 750 CAPSULE, EXTENDED RELEASE ORAL at 09:55

## 2018-04-24 RX ADMIN — LISINOPRIL 20 MG: 20 TABLET ORAL at 08:08

## 2018-04-24 RX ADMIN — MINOXIDIL 2.5 MG: 2.5 TABLET ORAL at 08:08

## 2018-04-24 RX ADMIN — Medication 5 MG: at 22:15

## 2018-04-24 RX ADMIN — APIXABAN 5 MG: 5 TABLET, FILM COATED ORAL at 08:08

## 2018-04-24 RX ADMIN — SPIRONOLACTONE 50 MG: 50 TABLET ORAL at 17:02

## 2018-04-24 RX ADMIN — SPIRONOLACTONE 50 MG: 50 TABLET ORAL at 08:08

## 2018-04-24 RX ADMIN — Medication 10 MG: at 14:38

## 2018-04-24 RX ADMIN — SODIUM CHLORIDE 50 ML/HR: 9 INJECTION, SOLUTION INTRAVENOUS at 07:25

## 2018-04-24 RX ADMIN — POTASSIUM CHLORIDE 40 MEQ: 750 CAPSULE, EXTENDED RELEASE ORAL at 15:02

## 2018-04-24 RX ADMIN — MONTELUKAST 10 MG: 10 TABLET, FILM COATED ORAL at 09:55

## 2018-04-24 RX ADMIN — ACETAMINOPHEN 400 MCG: 400 TABLET ORAL at 09:55

## 2018-04-24 RX ADMIN — MINOXIDIL 2.5 MG: 2.5 TABLET ORAL at 22:15

## 2018-04-24 RX ADMIN — ESCITALOPRAM 5 MG: 5 TABLET, FILM COATED ORAL at 09:55

## 2018-04-24 RX ADMIN — LISINOPRIL 20 MG: 20 TABLET ORAL at 22:16

## 2018-04-24 RX ADMIN — ACETAMINOPHEN 325 MG: 325 TABLET ORAL at 00:29

## 2018-04-24 RX ADMIN — IOPAMIDOL 85 ML: 612 INJECTION, SOLUTION INTRAVENOUS at 14:14

## 2018-04-25 LAB
ALBUMIN SERPL-MCNC: 2.9 G/DL (ref 3.5–5.2)
ALBUMIN/GLOB SERPL: 0.9 G/DL
ALP SERPL-CCNC: 65 U/L (ref 39–117)
ALT SERPL W P-5'-P-CCNC: 11 U/L (ref 1–33)
ANION GAP SERPL CALCULATED.3IONS-SCNC: 13.8 MMOL/L
AST SERPL-CCNC: 45 U/L (ref 1–32)
BASOPHILS # BLD AUTO: 0.02 10*3/MM3 (ref 0–0.2)
BASOPHILS NFR BLD AUTO: 0.1 % (ref 0–1.5)
BILIRUB SERPL-MCNC: 0.4 MG/DL (ref 0.1–1.2)
BUN BLD-MCNC: 16 MG/DL (ref 8–23)
BUN/CREAT SERPL: 25.4 (ref 7–25)
CALCIUM SPEC-SCNC: 11.3 MG/DL (ref 8.6–10.5)
CANCER AG19-9 SERPL-ACNC: 7.8 U/ML
CHLORIDE SERPL-SCNC: 99 MMOL/L (ref 98–107)
CO2 SERPL-SCNC: 25.2 MMOL/L (ref 22–29)
CREAT BLD-MCNC: 0.63 MG/DL (ref 0.57–1)
DEPRECATED RDW RBC AUTO: 48 FL (ref 37–54)
EOSINOPHIL # BLD AUTO: 0.05 10*3/MM3 (ref 0–0.7)
EOSINOPHIL NFR BLD AUTO: 0.3 % (ref 0.3–6.2)
ERYTHROCYTE [DISTWIDTH] IN BLOOD BY AUTOMATED COUNT: 14.8 % (ref 11.7–13)
GFR SERPL CREATININE-BSD FRML MDRD: 92 ML/MIN/1.73
GLOBULIN UR ELPH-MCNC: 3.2 GM/DL
GLUCOSE BLD-MCNC: 106 MG/DL (ref 65–99)
HCT VFR BLD AUTO: 28.4 % (ref 35.6–45.5)
HGB BLD-MCNC: 8.9 G/DL (ref 11.9–15.5)
IMM GRANULOCYTES # BLD: 0.04 10*3/MM3 (ref 0–0.03)
IMM GRANULOCYTES NFR BLD: 0.3 % (ref 0–0.5)
LYMPHOCYTES # BLD AUTO: 0.77 10*3/MM3 (ref 0.9–4.8)
LYMPHOCYTES NFR BLD AUTO: 5.3 % (ref 19.6–45.3)
MAGNESIUM SERPL-MCNC: 1.5 MG/DL (ref 1.6–2.4)
MCH RBC QN AUTO: 27.7 PG (ref 26.9–32)
MCHC RBC AUTO-ENTMCNC: 31.3 G/DL (ref 32.4–36.3)
MCV RBC AUTO: 88.5 FL (ref 80.5–98.2)
MONOCYTES # BLD AUTO: 1.93 10*3/MM3 (ref 0.2–1.2)
MONOCYTES NFR BLD AUTO: 13.4 % (ref 5–12)
NEUTROPHILS # BLD AUTO: 11.62 10*3/MM3 (ref 1.9–8.1)
NEUTROPHILS NFR BLD AUTO: 80.6 % (ref 42.7–76)
PHOSPHATE SERPL-MCNC: 3.9 MG/DL (ref 2.5–4.5)
PLATELET # BLD AUTO: 283 10*3/MM3 (ref 140–500)
PMV BLD AUTO: 10.2 FL (ref 6–12)
POTASSIUM BLD-SCNC: 3.5 MMOL/L (ref 3.5–5.2)
POTASSIUM BLD-SCNC: 3.5 MMOL/L (ref 3.5–5.2)
POTASSIUM BLD-SCNC: 4.4 MMOL/L (ref 3.5–5.2)
PROT SERPL-MCNC: 6.1 G/DL (ref 6–8.5)
RBC # BLD AUTO: 3.21 10*6/MM3 (ref 3.9–5.2)
SODIUM BLD-SCNC: 138 MMOL/L (ref 136–145)
WBC NRBC COR # BLD: 14.43 10*3/MM3 (ref 4.5–10.7)

## 2018-04-25 PROCEDURE — 25010000002 MAGNESIUM SULFATE 2 GM/50ML SOLUTION: Performed by: INTERNAL MEDICINE

## 2018-04-25 PROCEDURE — 86304 IMMUNOASSAY TUMOR CA 125: CPT | Performed by: HOSPITALIST

## 2018-04-25 PROCEDURE — 82105 ALPHA-FETOPROTEIN SERUM: CPT | Performed by: HOSPITALIST

## 2018-04-25 PROCEDURE — 84100 ASSAY OF PHOSPHORUS: CPT | Performed by: HOSPITALIST

## 2018-04-25 PROCEDURE — 93005 ELECTROCARDIOGRAM TRACING: CPT | Performed by: HOSPITALIST

## 2018-04-25 PROCEDURE — 85025 COMPLETE CBC W/AUTO DIFF WBC: CPT | Performed by: HOSPITALIST

## 2018-04-25 PROCEDURE — 80053 COMPREHEN METABOLIC PANEL: CPT | Performed by: HOSPITALIST

## 2018-04-25 PROCEDURE — 86301 IMMUNOASSAY TUMOR CA 19-9: CPT | Performed by: HOSPITALIST

## 2018-04-25 PROCEDURE — 83735 ASSAY OF MAGNESIUM: CPT | Performed by: INTERNAL MEDICINE

## 2018-04-25 PROCEDURE — 25010000002 ZOLEDRONIC ACID PER 1 MG: Performed by: INTERNAL MEDICINE

## 2018-04-25 PROCEDURE — 93010 ELECTROCARDIOGRAM REPORT: CPT | Performed by: INTERNAL MEDICINE

## 2018-04-25 PROCEDURE — 84132 ASSAY OF SERUM POTASSIUM: CPT | Performed by: HOSPITALIST

## 2018-04-25 PROCEDURE — 99232 SBSQ HOSP IP/OBS MODERATE 35: CPT | Performed by: INTERNAL MEDICINE

## 2018-04-25 RX ORDER — SODIUM CHLORIDE 9 MG/ML
50 INJECTION, SOLUTION INTRAVENOUS CONTINUOUS
Status: DISCONTINUED | OUTPATIENT
Start: 2018-04-25 | End: 2018-04-29

## 2018-04-25 RX ORDER — METOPROLOL TARTRATE 50 MG/1
50 TABLET, FILM COATED ORAL ONCE
Status: DISCONTINUED | OUTPATIENT
Start: 2018-04-25 | End: 2018-04-25

## 2018-04-25 RX ORDER — MAGNESIUM SULFATE 1 G/100ML
1 INJECTION INTRAVENOUS
Status: ACTIVE | OUTPATIENT
Start: 2018-04-25 | End: 2018-04-25

## 2018-04-25 RX ORDER — MAGNESIUM SULFATE HEPTAHYDRATE 40 MG/ML
2 INJECTION, SOLUTION INTRAVENOUS AS NEEDED
Status: DISCONTINUED | OUTPATIENT
Start: 2018-04-25 | End: 2018-04-25

## 2018-04-25 RX ORDER — MAGNESIUM SULFATE HEPTAHYDRATE 40 MG/ML
4 INJECTION, SOLUTION INTRAVENOUS AS NEEDED
Status: DISCONTINUED | OUTPATIENT
Start: 2018-04-25 | End: 2018-04-25

## 2018-04-25 RX ORDER — POTASSIUM CHLORIDE 750 MG/1
40 CAPSULE, EXTENDED RELEASE ORAL DAILY
Status: DISCONTINUED | OUTPATIENT
Start: 2018-04-25 | End: 2018-05-01 | Stop reason: HOSPADM

## 2018-04-25 RX ORDER — NITROGLYCERIN 0.4 MG/1
0.4 TABLET SUBLINGUAL
Status: DISCONTINUED | OUTPATIENT
Start: 2018-04-25 | End: 2018-05-01 | Stop reason: HOSPADM

## 2018-04-25 RX ORDER — MINOXIDIL 2.5 MG/1
5 TABLET ORAL EVERY 12 HOURS SCHEDULED
Status: DISCONTINUED | OUTPATIENT
Start: 2018-04-25 | End: 2018-05-01 | Stop reason: HOSPADM

## 2018-04-25 RX ADMIN — PHENYTOIN 300 MG: 125 SUSPENSION ORAL at 20:13

## 2018-04-25 RX ADMIN — MAGNESIUM SULFATE HEPTAHYDRATE 2 G: 40 INJECTION, SOLUTION INTRAVENOUS at 05:41

## 2018-04-25 RX ADMIN — POTASSIUM CHLORIDE 40 MEQ: 750 CAPSULE, EXTENDED RELEASE ORAL at 09:17

## 2018-04-25 RX ADMIN — MINOXIDIL 5 MG: 2.5 TABLET ORAL at 20:12

## 2018-04-25 RX ADMIN — SPIRONOLACTONE 50 MG: 50 TABLET ORAL at 09:14

## 2018-04-25 RX ADMIN — ACETAMINOPHEN 650 MG: 325 TABLET ORAL at 20:12

## 2018-04-25 RX ADMIN — MONTELUKAST 10 MG: 10 TABLET, FILM COATED ORAL at 09:12

## 2018-04-25 RX ADMIN — METOPROLOL TARTRATE 50 MG: 25 TABLET ORAL at 05:16

## 2018-04-25 RX ADMIN — LISINOPRIL 20 MG: 20 TABLET ORAL at 11:53

## 2018-04-25 RX ADMIN — PANTOPRAZOLE SODIUM 40 MG: 40 TABLET, DELAYED RELEASE ORAL at 09:14

## 2018-04-25 RX ADMIN — METOPROLOL TARTRATE 25 MG: 25 TABLET ORAL at 20:12

## 2018-04-25 RX ADMIN — POTASSIUM CHLORIDE 40 MEQ: 750 CAPSULE, EXTENDED RELEASE ORAL at 05:41

## 2018-04-25 RX ADMIN — ACETAMINOPHEN 650 MG: 325 TABLET ORAL at 00:38

## 2018-04-25 RX ADMIN — ZOLEDRONIC ACID 4 MG: 4 INJECTION, SOLUTION, CONCENTRATE INTRAVENOUS at 11:54

## 2018-04-25 RX ADMIN — SPIRONOLACTONE 50 MG: 50 TABLET ORAL at 18:44

## 2018-04-25 RX ADMIN — LISINOPRIL 20 MG: 20 TABLET ORAL at 20:12

## 2018-04-25 RX ADMIN — SODIUM CHLORIDE 100 ML/HR: 9 INJECTION, SOLUTION INTRAVENOUS at 20:30

## 2018-04-25 RX ADMIN — MINOXIDIL 5 MG: 2.5 TABLET ORAL at 11:53

## 2018-04-25 RX ADMIN — ACETAMINOPHEN 400 MCG: 400 TABLET ORAL at 09:12

## 2018-04-25 RX ADMIN — ESCITALOPRAM 5 MG: 5 TABLET, FILM COATED ORAL at 09:12

## 2018-04-25 RX ADMIN — SODIUM CHLORIDE 100 ML/HR: 9 INJECTION, SOLUTION INTRAVENOUS at 10:12

## 2018-04-25 RX ADMIN — AMLODIPINE BESYLATE 10 MG: 10 TABLET ORAL at 09:12

## 2018-04-25 RX ADMIN — Medication 5 MG: at 20:12

## 2018-04-26 ENCOUNTER — APPOINTMENT (OUTPATIENT)
Dept: CT IMAGING | Facility: HOSPITAL | Age: 78
End: 2018-04-26
Attending: HOSPITALIST

## 2018-04-26 ENCOUNTER — APPOINTMENT (OUTPATIENT)
Dept: NUCLEAR MEDICINE | Facility: HOSPITAL | Age: 78
End: 2018-04-26

## 2018-04-26 LAB
AFP-TM SERPL-MCNC: 1.7 NG/ML (ref 0–8.3)
ALBUMIN SERPL-MCNC: 3 G/DL (ref 3.5–5.2)
ALBUMIN/GLOB SERPL: 0.9 G/DL
ALDOST SERPL-MCNC: <1 NG/DL (ref 0–30)
ALDOST/RENIN PLAS-RTO: <5 {RATIO} (ref 0–30)
ALP SERPL-CCNC: 71 U/L (ref 39–117)
ALT SERPL W P-5'-P-CCNC: 13 U/L (ref 1–33)
ANION GAP SERPL CALCULATED.3IONS-SCNC: 11.2 MMOL/L
AST SERPL-CCNC: 44 U/L (ref 1–32)
BILIRUB SERPL-MCNC: 0.4 MG/DL (ref 0.1–1.2)
BUN BLD-MCNC: 13 MG/DL (ref 8–23)
BUN/CREAT SERPL: 20.6 (ref 7–25)
CALCIUM SPEC-SCNC: 11.2 MG/DL (ref 8.6–10.5)
CANCER AG125 SERPL-ACNC: 77.2 U/ML (ref 0–38.1)
CHLORIDE SERPL-SCNC: 101 MMOL/L (ref 98–107)
CO2 SERPL-SCNC: 25.8 MMOL/L (ref 22–29)
CREAT BLD-MCNC: 0.63 MG/DL (ref 0.57–1)
GFR SERPL CREATININE-BSD FRML MDRD: 92 ML/MIN/1.73
GLOBULIN UR ELPH-MCNC: 3.4 GM/DL
GLUCOSE BLD-MCNC: 99 MG/DL (ref 65–99)
INR PPP: 1.3 (ref 0.9–1.1)
MAGNESIUM SERPL-MCNC: 2 MG/DL (ref 1.6–2.4)
PHOSPHATE SERPL-MCNC: 3.2 MG/DL (ref 2.5–4.5)
POTASSIUM BLD-SCNC: 3.2 MMOL/L (ref 3.5–5.2)
POTASSIUM BLD-SCNC: 3.9 MMOL/L (ref 3.5–5.2)
PROT SERPL-MCNC: 6.4 G/DL (ref 6–8.5)
PROTHROMBIN TIME: 15.9 SECONDS (ref 11.7–14.2)
RENIN PLAS-CCNC: 0.2 NG/ML/HR (ref 0.17–5.38)
SODIUM BLD-SCNC: 138 MMOL/L (ref 136–145)

## 2018-04-26 PROCEDURE — 83735 ASSAY OF MAGNESIUM: CPT | Performed by: INTERNAL MEDICINE

## 2018-04-26 PROCEDURE — 84100 ASSAY OF PHOSPHORUS: CPT | Performed by: INTERNAL MEDICINE

## 2018-04-26 PROCEDURE — 88184 FLOWCYTOMETRY/ TC 1 MARKER: CPT | Performed by: RADIOLOGY

## 2018-04-26 PROCEDURE — 85610 PROTHROMBIN TIME: CPT | Performed by: INTERNAL MEDICINE

## 2018-04-26 PROCEDURE — A9503 TC99M MEDRONATE: HCPCS | Performed by: HOSPITALIST

## 2018-04-26 PROCEDURE — 80053 COMPREHEN METABOLIC PANEL: CPT | Performed by: INTERNAL MEDICINE

## 2018-04-26 PROCEDURE — 88189 FLOWCYTOMETRY/READ 16 & >: CPT | Performed by: RADIOLOGY

## 2018-04-26 PROCEDURE — 88307 TISSUE EXAM BY PATHOLOGIST: CPT | Performed by: HOSPITALIST

## 2018-04-26 PROCEDURE — 88341 IMHCHEM/IMCYTCHM EA ADD ANTB: CPT | Performed by: HOSPITALIST

## 2018-04-26 PROCEDURE — 88300 SURGICAL PATH GROSS: CPT | Performed by: HOSPITALIST

## 2018-04-26 PROCEDURE — 77012 CT SCAN FOR NEEDLE BIOPSY: CPT

## 2018-04-26 PROCEDURE — 84132 ASSAY OF SERUM POTASSIUM: CPT | Performed by: HOSPITALIST

## 2018-04-26 PROCEDURE — 78306 BONE IMAGING WHOLE BODY: CPT

## 2018-04-26 PROCEDURE — 88185 FLOWCYTOMETRY/TC ADD-ON: CPT | Performed by: RADIOLOGY

## 2018-04-26 PROCEDURE — 0 TECHNETIUM MEDRONATE KIT: Performed by: HOSPITALIST

## 2018-04-26 PROCEDURE — 0FB03ZX EXCISION OF LIVER, PERCUTANEOUS APPROACH, DIAGNOSTIC: ICD-10-PCS | Performed by: RADIOLOGY

## 2018-04-26 PROCEDURE — 88342 IMHCHEM/IMCYTCHM 1ST ANTB: CPT | Performed by: HOSPITALIST

## 2018-04-26 RX ORDER — LIDOCAINE HYDROCHLORIDE 10 MG/ML
20 INJECTION, SOLUTION INFILTRATION; PERINEURAL ONCE
Status: COMPLETED | OUTPATIENT
Start: 2018-04-26 | End: 2018-04-26

## 2018-04-26 RX ORDER — TC 99M MEDRONATE 20 MG/10ML
23 INJECTION, POWDER, LYOPHILIZED, FOR SOLUTION INTRAVENOUS
Status: COMPLETED | OUTPATIENT
Start: 2018-04-26 | End: 2018-04-26

## 2018-04-26 RX ADMIN — AMLODIPINE BESYLATE 10 MG: 10 TABLET ORAL at 10:51

## 2018-04-26 RX ADMIN — PHENYTOIN 300 MG: 125 SUSPENSION ORAL at 20:39

## 2018-04-26 RX ADMIN — ACETAMINOPHEN 650 MG: 325 TABLET ORAL at 22:32

## 2018-04-26 RX ADMIN — Medication 5 MG: at 22:32

## 2018-04-26 RX ADMIN — POTASSIUM CHLORIDE 40 MEQ: 750 CAPSULE, EXTENDED RELEASE ORAL at 19:27

## 2018-04-26 RX ADMIN — SODIUM CHLORIDE 100 ML/HR: 9 INJECTION, SOLUTION INTRAVENOUS at 20:29

## 2018-04-26 RX ADMIN — MONTELUKAST 10 MG: 10 TABLET, FILM COATED ORAL at 10:50

## 2018-04-26 RX ADMIN — LISINOPRIL 20 MG: 20 TABLET ORAL at 10:50

## 2018-04-26 RX ADMIN — POTASSIUM CHLORIDE 40 MEQ: 750 CAPSULE, EXTENDED RELEASE ORAL at 10:51

## 2018-04-26 RX ADMIN — ESCITALOPRAM 5 MG: 5 TABLET, FILM COATED ORAL at 10:51

## 2018-04-26 RX ADMIN — METOPROLOL TARTRATE 25 MG: 25 TABLET ORAL at 10:50

## 2018-04-26 RX ADMIN — SPIRONOLACTONE 50 MG: 50 TABLET ORAL at 18:00

## 2018-04-26 RX ADMIN — SPIRONOLACTONE 50 MG: 50 TABLET ORAL at 10:51

## 2018-04-26 RX ADMIN — LIDOCAINE HYDROCHLORIDE 20 ML: 10 INJECTION, SOLUTION INFILTRATION; PERINEURAL at 09:56

## 2018-04-26 RX ADMIN — MINOXIDIL 5 MG: 2.5 TABLET ORAL at 10:50

## 2018-04-26 RX ADMIN — GABAPENTIN 300 MG: 300 CAPSULE ORAL at 22:32

## 2018-04-26 RX ADMIN — METOPROLOL TARTRATE 25 MG: 25 TABLET ORAL at 20:36

## 2018-04-26 RX ADMIN — MINOXIDIL 5 MG: 2.5 TABLET ORAL at 20:34

## 2018-04-26 RX ADMIN — Medication 23 MILLICURIE: at 06:45

## 2018-04-26 RX ADMIN — ACETAMINOPHEN 400 MCG: 400 TABLET ORAL at 10:50

## 2018-04-26 RX ADMIN — LISINOPRIL 20 MG: 20 TABLET ORAL at 20:38

## 2018-04-26 RX ADMIN — POTASSIUM CHLORIDE 40 MEQ: 750 CAPSULE, EXTENDED RELEASE ORAL at 12:32

## 2018-04-26 RX ADMIN — PANTOPRAZOLE SODIUM 40 MG: 40 TABLET, DELAYED RELEASE ORAL at 10:50

## 2018-04-27 LAB
ALBUMIN SERPL-MCNC: 3 G/DL (ref 3.5–5.2)
ANION GAP SERPL CALCULATED.3IONS-SCNC: 10.1 MMOL/L
BUN BLD-MCNC: 10 MG/DL (ref 8–23)
BUN/CREAT SERPL: 16.9 (ref 7–25)
CALCIUM SPEC-SCNC: 10.8 MG/DL (ref 8.6–10.5)
CHLORIDE SERPL-SCNC: 104 MMOL/L (ref 98–107)
CO2 SERPL-SCNC: 24.9 MMOL/L (ref 22–29)
CREAT BLD-MCNC: 0.59 MG/DL (ref 0.57–1)
DEPRECATED RDW RBC AUTO: 46.6 FL (ref 37–54)
ERYTHROCYTE [DISTWIDTH] IN BLOOD BY AUTOMATED COUNT: 14.5 % (ref 11.7–13)
GFR SERPL CREATININE-BSD FRML MDRD: 99 ML/MIN/1.73
GLUCOSE BLD-MCNC: 100 MG/DL (ref 65–99)
HCT VFR BLD AUTO: 27.8 % (ref 35.6–45.5)
HGB BLD-MCNC: 8.8 G/DL (ref 11.9–15.5)
MAGNESIUM SERPL-MCNC: 1.4 MG/DL (ref 1.6–2.4)
MCH RBC QN AUTO: 27.8 PG (ref 26.9–32)
MCHC RBC AUTO-ENTMCNC: 31.7 G/DL (ref 32.4–36.3)
MCV RBC AUTO: 87.7 FL (ref 80.5–98.2)
PHOSPHATE SERPL-MCNC: 2.4 MG/DL (ref 2.5–4.5)
PLATELET # BLD AUTO: 301 10*3/MM3 (ref 140–500)
PMV BLD AUTO: 10.6 FL (ref 6–12)
POTASSIUM BLD-SCNC: 3.5 MMOL/L (ref 3.5–5.2)
PTH RELATED PROT SERPL-SCNC: <2 PMOL/L
RBC # BLD AUTO: 3.17 10*6/MM3 (ref 3.9–5.2)
REF LAB TEST METHOD: NORMAL
SODIUM BLD-SCNC: 139 MMOL/L (ref 136–145)
WBC NRBC COR # BLD: 11.48 10*3/MM3 (ref 4.5–10.7)

## 2018-04-27 PROCEDURE — 83735 ASSAY OF MAGNESIUM: CPT | Performed by: INTERNAL MEDICINE

## 2018-04-27 PROCEDURE — 85027 COMPLETE CBC AUTOMATED: CPT | Performed by: HOSPITALIST

## 2018-04-27 PROCEDURE — 80069 RENAL FUNCTION PANEL: CPT | Performed by: HOSPITALIST

## 2018-04-27 PROCEDURE — 97110 THERAPEUTIC EXERCISES: CPT

## 2018-04-27 PROCEDURE — 25010000002 MAGNESIUM SULFATE IN D5W 1G/100ML (PREMIX) 1-5 GM/100ML-% SOLUTION: Performed by: INTERNAL MEDICINE

## 2018-04-27 RX ORDER — MAGNESIUM SULFATE 1 G/100ML
1 INJECTION INTRAVENOUS
Status: ACTIVE | OUTPATIENT
Start: 2018-04-27 | End: 2018-04-27

## 2018-04-27 RX ORDER — MAGNESIUM SULFATE 1 G/100ML
1 INJECTION INTRAVENOUS ONCE
Status: COMPLETED | OUTPATIENT
Start: 2018-04-27 | End: 2018-04-27

## 2018-04-27 RX ADMIN — LISINOPRIL 20 MG: 20 TABLET ORAL at 21:31

## 2018-04-27 RX ADMIN — POTASSIUM CHLORIDE 40 MEQ: 750 CAPSULE, EXTENDED RELEASE ORAL at 10:19

## 2018-04-27 RX ADMIN — POTASSIUM PHOSPHATE, MONOBASIC AND POTASSIUM PHOSPHATE, DIBASIC 15 MMOL: 224; 236 INJECTION, SOLUTION INTRAVENOUS at 22:43

## 2018-04-27 RX ADMIN — MAGNESIUM SULFATE IN DEXTROSE 1 G: 10 INJECTION, SOLUTION INTRAVENOUS at 10:45

## 2018-04-27 RX ADMIN — ACETAMINOPHEN 400 MCG: 400 TABLET ORAL at 10:19

## 2018-04-27 RX ADMIN — ESCITALOPRAM 5 MG: 5 TABLET, FILM COATED ORAL at 10:20

## 2018-04-27 RX ADMIN — PANTOPRAZOLE SODIUM 40 MG: 40 TABLET, DELAYED RELEASE ORAL at 10:20

## 2018-04-27 RX ADMIN — METOPROLOL TARTRATE 25 MG: 25 TABLET ORAL at 10:19

## 2018-04-27 RX ADMIN — MINOXIDIL 5 MG: 2.5 TABLET ORAL at 21:30

## 2018-04-27 RX ADMIN — MINOXIDIL 5 MG: 2.5 TABLET ORAL at 10:19

## 2018-04-27 RX ADMIN — Medication 5 MG: at 22:43

## 2018-04-27 RX ADMIN — METOPROLOL TARTRATE 25 MG: 25 TABLET ORAL at 21:31

## 2018-04-27 RX ADMIN — MONTELUKAST 10 MG: 10 TABLET, FILM COATED ORAL at 10:19

## 2018-04-27 RX ADMIN — AMLODIPINE BESYLATE 10 MG: 10 TABLET ORAL at 10:20

## 2018-04-27 RX ADMIN — PHENYTOIN 300 MG: 125 SUSPENSION ORAL at 21:30

## 2018-04-27 RX ADMIN — MAGNESIUM SULFATE HEPTAHYDRATE 1 G: 1 INJECTION, SOLUTION INTRAVENOUS at 09:12

## 2018-04-27 RX ADMIN — GABAPENTIN 300 MG: 300 CAPSULE ORAL at 22:43

## 2018-04-27 RX ADMIN — SPIRONOLACTONE 50 MG: 50 TABLET ORAL at 18:36

## 2018-04-27 RX ADMIN — MAGNESIUM SULFATE HEPTAHYDRATE 1 G: 1 INJECTION, SOLUTION INTRAVENOUS at 08:07

## 2018-04-27 RX ADMIN — LISINOPRIL 20 MG: 20 TABLET ORAL at 10:19

## 2018-04-27 RX ADMIN — SPIRONOLACTONE 50 MG: 50 TABLET ORAL at 10:20

## 2018-04-28 PROBLEM — C85.90 LYMPHOMA (HCC): Status: ACTIVE | Noted: 2018-04-28

## 2018-04-28 LAB
ALBUMIN SERPL-MCNC: 2.8 G/DL (ref 3.5–5.2)
ANION GAP SERPL CALCULATED.3IONS-SCNC: 10.6 MMOL/L
BUN BLD-MCNC: 10 MG/DL (ref 8–23)
BUN/CREAT SERPL: 17.2 (ref 7–25)
CALCIUM SPEC-SCNC: 10.2 MG/DL (ref 8.6–10.5)
CHLORIDE SERPL-SCNC: 103 MMOL/L (ref 98–107)
CO2 SERPL-SCNC: 24.4 MMOL/L (ref 22–29)
CREAT BLD-MCNC: 0.58 MG/DL (ref 0.57–1)
GFR SERPL CREATININE-BSD FRML MDRD: 101 ML/MIN/1.73
GLUCOSE BLD-MCNC: 104 MG/DL (ref 65–99)
MAGNESIUM SERPL-MCNC: 1.9 MG/DL (ref 1.6–2.4)
PHOSPHATE SERPL-MCNC: 3.8 MG/DL (ref 2.5–4.5)
POTASSIUM BLD-SCNC: 4 MMOL/L (ref 3.5–5.2)
SODIUM BLD-SCNC: 138 MMOL/L (ref 136–145)

## 2018-04-28 PROCEDURE — 97110 THERAPEUTIC EXERCISES: CPT

## 2018-04-28 PROCEDURE — 83735 ASSAY OF MAGNESIUM: CPT | Performed by: INTERNAL MEDICINE

## 2018-04-28 PROCEDURE — 80069 RENAL FUNCTION PANEL: CPT | Performed by: HOSPITALIST

## 2018-04-28 RX ADMIN — POTASSIUM CHLORIDE 40 MEQ: 750 CAPSULE, EXTENDED RELEASE ORAL at 08:16

## 2018-04-28 RX ADMIN — PANTOPRAZOLE SODIUM 40 MG: 40 TABLET, DELAYED RELEASE ORAL at 08:17

## 2018-04-28 RX ADMIN — AMLODIPINE BESYLATE 10 MG: 10 TABLET ORAL at 08:17

## 2018-04-28 RX ADMIN — ACETAMINOPHEN 650 MG: 325 TABLET ORAL at 22:40

## 2018-04-28 RX ADMIN — SPIRONOLACTONE 50 MG: 50 TABLET ORAL at 17:17

## 2018-04-28 RX ADMIN — MINOXIDIL 5 MG: 2.5 TABLET ORAL at 21:30

## 2018-04-28 RX ADMIN — SPIRONOLACTONE 50 MG: 50 TABLET ORAL at 08:17

## 2018-04-28 RX ADMIN — MINOXIDIL 5 MG: 2.5 TABLET ORAL at 08:17

## 2018-04-28 RX ADMIN — GABAPENTIN 300 MG: 300 CAPSULE ORAL at 22:40

## 2018-04-28 RX ADMIN — LISINOPRIL 20 MG: 20 TABLET ORAL at 08:17

## 2018-04-28 RX ADMIN — METOPROLOL TARTRATE 25 MG: 25 TABLET ORAL at 08:17

## 2018-04-28 RX ADMIN — MONTELUKAST 10 MG: 10 TABLET, FILM COATED ORAL at 08:17

## 2018-04-28 RX ADMIN — METOPROLOL TARTRATE 25 MG: 25 TABLET ORAL at 21:35

## 2018-04-28 RX ADMIN — LISINOPRIL 20 MG: 20 TABLET ORAL at 21:35

## 2018-04-28 RX ADMIN — Medication 5 MG: at 22:41

## 2018-04-28 RX ADMIN — ESCITALOPRAM 5 MG: 5 TABLET, FILM COATED ORAL at 08:17

## 2018-04-28 RX ADMIN — PHENYTOIN 300 MG: 125 SUSPENSION ORAL at 21:35

## 2018-04-28 RX ADMIN — ACETAMINOPHEN 400 MCG: 400 TABLET ORAL at 08:17

## 2018-04-29 ENCOUNTER — APPOINTMENT (OUTPATIENT)
Dept: CT IMAGING | Facility: HOSPITAL | Age: 78
End: 2018-04-29

## 2018-04-29 LAB
ALBUMIN SERPL-MCNC: 2.6 G/DL (ref 3.5–5.2)
ANION GAP SERPL CALCULATED.3IONS-SCNC: 12.5 MMOL/L
BUN BLD-MCNC: 10 MG/DL (ref 8–23)
BUN/CREAT SERPL: 18.2 (ref 7–25)
CA-I BLD-MCNC: 6.1 MG/DL (ref 4.6–5.4)
CA-I SERPL ISE-MCNC: 1.53 MMOL/L (ref 1.15–1.35)
CALCIUM SPEC-SCNC: 9.7 MG/DL (ref 8.6–10.5)
CHLORIDE SERPL-SCNC: 102 MMOL/L (ref 98–107)
CO2 SERPL-SCNC: 23.5 MMOL/L (ref 22–29)
CREAT BLD-MCNC: 0.55 MG/DL (ref 0.57–1)
CRP SERPL-MCNC: 11.46 MG/DL (ref 0–0.5)
ERYTHROCYTE [SEDIMENTATION RATE] IN BLOOD: 47 MM/HR (ref 0–30)
GFR SERPL CREATININE-BSD FRML MDRD: 107 ML/MIN/1.73
GLUCOSE BLD-MCNC: 97 MG/DL (ref 65–99)
LDH SERPL-CCNC: 734 U/L (ref 135–214)
MAGNESIUM SERPL-MCNC: 1.5 MG/DL (ref 1.6–2.4)
PHOSPHATE SERPL-MCNC: 3.3 MG/DL (ref 2.5–4.5)
POTASSIUM BLD-SCNC: 3.3 MMOL/L (ref 3.5–5.2)
POTASSIUM BLD-SCNC: 4.3 MMOL/L (ref 3.5–5.2)
SODIUM BLD-SCNC: 138 MMOL/L (ref 136–145)
URATE SERPL-MCNC: 5.9 MG/DL (ref 2.4–5.7)

## 2018-04-29 PROCEDURE — 25010000002 IOPAMIDOL 61 % SOLUTION: Performed by: HOSPITALIST

## 2018-04-29 PROCEDURE — 84132 ASSAY OF SERUM POTASSIUM: CPT | Performed by: HOSPITALIST

## 2018-04-29 PROCEDURE — 94799 UNLISTED PULMONARY SVC/PX: CPT

## 2018-04-29 PROCEDURE — 83615 LACTATE (LD) (LDH) ENZYME: CPT | Performed by: INTERNAL MEDICINE

## 2018-04-29 PROCEDURE — 84550 ASSAY OF BLOOD/URIC ACID: CPT | Performed by: INTERNAL MEDICINE

## 2018-04-29 PROCEDURE — 83735 ASSAY OF MAGNESIUM: CPT | Performed by: INTERNAL MEDICINE

## 2018-04-29 PROCEDURE — 99223 1ST HOSP IP/OBS HIGH 75: CPT | Performed by: INTERNAL MEDICINE

## 2018-04-29 PROCEDURE — 25010000002 MAGNESIUM SULFATE IN D5W 1G/100ML (PREMIX) 1-5 GM/100ML-% SOLUTION: Performed by: INTERNAL MEDICINE

## 2018-04-29 PROCEDURE — 82330 ASSAY OF CALCIUM: CPT | Performed by: HOSPITALIST

## 2018-04-29 PROCEDURE — 86140 C-REACTIVE PROTEIN: CPT | Performed by: INTERNAL MEDICINE

## 2018-04-29 PROCEDURE — 97110 THERAPEUTIC EXERCISES: CPT

## 2018-04-29 PROCEDURE — 70491 CT SOFT TISSUE NECK W/DYE: CPT

## 2018-04-29 PROCEDURE — 85652 RBC SED RATE AUTOMATED: CPT | Performed by: INTERNAL MEDICINE

## 2018-04-29 PROCEDURE — 80069 RENAL FUNCTION PANEL: CPT | Performed by: HOSPITALIST

## 2018-04-29 RX ORDER — MAGNESIUM SULFATE 1 G/100ML
2 INJECTION INTRAVENOUS ONCE
Status: COMPLETED | OUTPATIENT
Start: 2018-04-29 | End: 2018-04-29

## 2018-04-29 RX ORDER — MAGNESIUM OXIDE 400 MG/1
400 TABLET ORAL 2 TIMES DAILY
Status: DISCONTINUED | OUTPATIENT
Start: 2018-04-29 | End: 2018-05-01 | Stop reason: HOSPADM

## 2018-04-29 RX ADMIN — LISINOPRIL 20 MG: 20 TABLET ORAL at 08:27

## 2018-04-29 RX ADMIN — POTASSIUM CHLORIDE 40 MEQ: 750 CAPSULE, EXTENDED RELEASE ORAL at 17:41

## 2018-04-29 RX ADMIN — LISINOPRIL 20 MG: 20 TABLET ORAL at 21:31

## 2018-04-29 RX ADMIN — ACETAMINOPHEN 650 MG: 325 TABLET ORAL at 23:35

## 2018-04-29 RX ADMIN — SPIRONOLACTONE 50 MG: 50 TABLET ORAL at 08:28

## 2018-04-29 RX ADMIN — PANTOPRAZOLE SODIUM 40 MG: 40 TABLET, DELAYED RELEASE ORAL at 08:27

## 2018-04-29 RX ADMIN — MAGNESIUM SULFATE HEPTAHYDRATE 2 G: 1 INJECTION, SOLUTION INTRAVENOUS at 10:51

## 2018-04-29 RX ADMIN — PHENYTOIN 300 MG: 125 SUSPENSION ORAL at 21:31

## 2018-04-29 RX ADMIN — METOPROLOL TARTRATE 25 MG: 25 TABLET ORAL at 08:27

## 2018-04-29 RX ADMIN — IOPAMIDOL 75 ML: 612 INJECTION, SOLUTION INTRAVENOUS at 12:41

## 2018-04-29 RX ADMIN — METOPROLOL TARTRATE 25 MG: 25 TABLET ORAL at 21:31

## 2018-04-29 RX ADMIN — Medication 400 MG: at 13:51

## 2018-04-29 RX ADMIN — AMLODIPINE BESYLATE 10 MG: 10 TABLET ORAL at 08:27

## 2018-04-29 RX ADMIN — MINOXIDIL 5 MG: 2.5 TABLET ORAL at 08:27

## 2018-04-29 RX ADMIN — Medication 400 MG: at 21:31

## 2018-04-29 RX ADMIN — POTASSIUM CHLORIDE 40 MEQ: 750 CAPSULE, EXTENDED RELEASE ORAL at 13:51

## 2018-04-29 RX ADMIN — Medication 5 MG: at 21:31

## 2018-04-29 RX ADMIN — POTASSIUM CHLORIDE 40 MEQ: 750 CAPSULE, EXTENDED RELEASE ORAL at 08:27

## 2018-04-29 RX ADMIN — MONTELUKAST 10 MG: 10 TABLET, FILM COATED ORAL at 08:27

## 2018-04-29 RX ADMIN — MINOXIDIL 5 MG: 2.5 TABLET ORAL at 21:31

## 2018-04-29 RX ADMIN — ACETAMINOPHEN 400 MCG: 400 TABLET ORAL at 08:27

## 2018-04-29 RX ADMIN — GABAPENTIN 300 MG: 300 CAPSULE ORAL at 21:30

## 2018-04-29 RX ADMIN — ESCITALOPRAM 5 MG: 5 TABLET, FILM COATED ORAL at 08:27

## 2018-04-29 RX ADMIN — SPIRONOLACTONE 50 MG: 50 TABLET ORAL at 17:41

## 2018-04-30 PROBLEM — C85.91: Status: ACTIVE | Noted: 2018-04-21

## 2018-04-30 LAB
ALBUMIN SERPL-MCNC: 2.8 G/DL (ref 3.5–5.2)
ANION GAP SERPL CALCULATED.3IONS-SCNC: 12.2 MMOL/L
BUN BLD-MCNC: 8 MG/DL (ref 8–23)
BUN/CREAT SERPL: 13.3 (ref 7–25)
CALCIUM SPEC-SCNC: 9.8 MG/DL (ref 8.6–10.5)
CHLORIDE SERPL-SCNC: 102 MMOL/L (ref 98–107)
CO2 SERPL-SCNC: 24.8 MMOL/L (ref 22–29)
CREAT BLD-MCNC: 0.6 MG/DL (ref 0.57–1)
GFR SERPL CREATININE-BSD FRML MDRD: 97 ML/MIN/1.73
GLUCOSE BLD-MCNC: 96 MG/DL (ref 65–99)
LDH SERPL-CCNC: 706 U/L (ref 135–214)
MAGNESIUM SERPL-MCNC: 1.9 MG/DL (ref 1.6–2.4)
PHOSPHATE SERPL-MCNC: 3.2 MG/DL (ref 2.5–4.5)
POTASSIUM BLD-SCNC: 4.1 MMOL/L (ref 3.5–5.2)
SODIUM BLD-SCNC: 139 MMOL/L (ref 136–145)
URATE SERPL-MCNC: 5.7 MG/DL (ref 2.4–5.7)

## 2018-04-30 PROCEDURE — 83735 ASSAY OF MAGNESIUM: CPT | Performed by: INTERNAL MEDICINE

## 2018-04-30 PROCEDURE — 97110 THERAPEUTIC EXERCISES: CPT

## 2018-04-30 PROCEDURE — 99233 SBSQ HOSP IP/OBS HIGH 50: CPT | Performed by: INTERNAL MEDICINE

## 2018-04-30 PROCEDURE — 92526 ORAL FUNCTION THERAPY: CPT | Performed by: SPEECH-LANGUAGE PATHOLOGIST

## 2018-04-30 PROCEDURE — 80069 RENAL FUNCTION PANEL: CPT | Performed by: HOSPITALIST

## 2018-04-30 PROCEDURE — 84550 ASSAY OF BLOOD/URIC ACID: CPT | Performed by: INTERNAL MEDICINE

## 2018-04-30 PROCEDURE — 83615 LACTATE (LD) (LDH) ENZYME: CPT | Performed by: INTERNAL MEDICINE

## 2018-04-30 RX ADMIN — ACETAMINOPHEN 400 MCG: 400 TABLET ORAL at 08:21

## 2018-04-30 RX ADMIN — METOPROLOL TARTRATE 25 MG: 25 TABLET ORAL at 20:27

## 2018-04-30 RX ADMIN — SPIRONOLACTONE 50 MG: 50 TABLET ORAL at 17:35

## 2018-04-30 RX ADMIN — MINOXIDIL 5 MG: 2.5 TABLET ORAL at 08:18

## 2018-04-30 RX ADMIN — PANTOPRAZOLE SODIUM 40 MG: 40 TABLET, DELAYED RELEASE ORAL at 08:18

## 2018-04-30 RX ADMIN — Medication 400 MG: at 08:20

## 2018-04-30 RX ADMIN — ESCITALOPRAM 5 MG: 5 TABLET, FILM COATED ORAL at 08:18

## 2018-04-30 RX ADMIN — POTASSIUM CHLORIDE 40 MEQ: 750 CAPSULE, EXTENDED RELEASE ORAL at 08:19

## 2018-04-30 RX ADMIN — LISINOPRIL 20 MG: 20 TABLET ORAL at 20:27

## 2018-04-30 RX ADMIN — ACETAMINOPHEN 650 MG: 325 TABLET ORAL at 23:34

## 2018-04-30 RX ADMIN — AMLODIPINE BESYLATE 10 MG: 10 TABLET ORAL at 08:19

## 2018-04-30 RX ADMIN — MONTELUKAST 10 MG: 10 TABLET, FILM COATED ORAL at 08:20

## 2018-04-30 RX ADMIN — LISINOPRIL 20 MG: 20 TABLET ORAL at 08:18

## 2018-04-30 RX ADMIN — METOPROLOL TARTRATE 25 MG: 25 TABLET ORAL at 08:17

## 2018-04-30 RX ADMIN — Medication 400 MG: at 20:28

## 2018-04-30 RX ADMIN — PHENYTOIN 300 MG: 125 SUSPENSION ORAL at 20:27

## 2018-04-30 RX ADMIN — Medication 5 MG: at 23:34

## 2018-04-30 RX ADMIN — SPIRONOLACTONE 50 MG: 50 TABLET ORAL at 08:20

## 2018-04-30 RX ADMIN — MINOXIDIL 5 MG: 2.5 TABLET ORAL at 20:28

## 2018-05-01 ENCOUNTER — ANESTHESIA EVENT (OUTPATIENT)
Dept: PERIOP | Facility: HOSPITAL | Age: 78
End: 2018-05-01

## 2018-05-01 ENCOUNTER — ANESTHESIA (OUTPATIENT)
Dept: PERIOP | Facility: HOSPITAL | Age: 78
End: 2018-05-01

## 2018-05-01 LAB
ALBUMIN SERPL-MCNC: 3 G/DL (ref 3.5–5.2)
ANION GAP SERPL CALCULATED.3IONS-SCNC: 12.9 MMOL/L
BASOPHILS # BLD AUTO: 0.02 10*3/MM3 (ref 0–0.2)
BASOPHILS NFR BLD AUTO: 0.2 % (ref 0–1.5)
BUN BLD-MCNC: 8 MG/DL (ref 8–23)
BUN/CREAT SERPL: 12.9 (ref 7–25)
CALCIUM SPEC-SCNC: 10.8 MG/DL (ref 8.6–10.5)
CHLORIDE SERPL-SCNC: 100 MMOL/L (ref 98–107)
CO2 SERPL-SCNC: 26.1 MMOL/L (ref 22–29)
CREAT BLD-MCNC: 0.62 MG/DL (ref 0.57–1)
DEPRECATED RDW RBC AUTO: 45 FL (ref 37–54)
EOSINOPHIL # BLD AUTO: 0.11 10*3/MM3 (ref 0–0.7)
EOSINOPHIL NFR BLD AUTO: 1 % (ref 0.3–6.2)
ERYTHROCYTE [DISTWIDTH] IN BLOOD BY AUTOMATED COUNT: 14.4 % (ref 11.7–13)
GFR SERPL CREATININE-BSD FRML MDRD: 93 ML/MIN/1.73
GLUCOSE BLD-MCNC: 103 MG/DL (ref 65–99)
HBV SURFACE AG SERPL QL IA: NORMAL
HCT VFR BLD AUTO: 25.7 % (ref 35.6–45.5)
HGB BLD-MCNC: 8.2 G/DL (ref 11.9–15.5)
IMM GRANULOCYTES # BLD: 0.22 10*3/MM3 (ref 0–0.03)
IMM GRANULOCYTES NFR BLD: 1.9 % (ref 0–0.5)
LDH SERPL-CCNC: 672 U/L (ref 135–214)
LYMPHOCYTES # BLD AUTO: 0.87 10*3/MM3 (ref 0.9–4.8)
LYMPHOCYTES NFR BLD AUTO: 7.5 % (ref 19.6–45.3)
MAGNESIUM SERPL-MCNC: 1.8 MG/DL (ref 1.6–2.4)
MCH RBC QN AUTO: 27.4 PG (ref 26.9–32)
MCHC RBC AUTO-ENTMCNC: 31.9 G/DL (ref 32.4–36.3)
MCV RBC AUTO: 86 FL (ref 80.5–98.2)
MONOCYTES # BLD AUTO: 2.12 10*3/MM3 (ref 0.2–1.2)
MONOCYTES NFR BLD AUTO: 18.3 % (ref 5–12)
NEUTROPHILS # BLD AUTO: 8.22 10*3/MM3 (ref 1.9–8.1)
NEUTROPHILS NFR BLD AUTO: 71.1 % (ref 42.7–76)
PHOSPHATE SERPL-MCNC: 3.6 MG/DL (ref 2.5–4.5)
PLATELET # BLD AUTO: 271 10*3/MM3 (ref 140–500)
PMV BLD AUTO: 10.3 FL (ref 6–12)
POTASSIUM BLD-SCNC: 3.5 MMOL/L (ref 3.5–5.2)
RBC # BLD AUTO: 2.99 10*6/MM3 (ref 3.9–5.2)
SODIUM BLD-SCNC: 139 MMOL/L (ref 136–145)
URATE SERPL-MCNC: 5.5 MG/DL (ref 2.4–5.7)
WBC NRBC COR # BLD: 11.56 10*3/MM3 (ref 4.5–10.7)

## 2018-05-01 PROCEDURE — 87340 HEPATITIS B SURFACE AG IA: CPT | Performed by: INTERNAL MEDICINE

## 2018-05-01 PROCEDURE — 85025 COMPLETE CBC W/AUTO DIFF WBC: CPT | Performed by: INTERNAL MEDICINE

## 2018-05-01 PROCEDURE — 83735 ASSAY OF MAGNESIUM: CPT | Performed by: INTERNAL MEDICINE

## 2018-05-01 PROCEDURE — 25010000002 ONDANSETRON PER 1 MG: Performed by: HOSPITALIST

## 2018-05-01 PROCEDURE — 25010000002 DEXAMETHASONE PER 1 MG: Performed by: NURSE ANESTHETIST, CERTIFIED REGISTERED

## 2018-05-01 PROCEDURE — 25010000003 CEFAZOLIN IN DEXTROSE 2-4 GM/100ML-% SOLUTION: Performed by: OTOLARYNGOLOGY

## 2018-05-01 PROCEDURE — 80069 RENAL FUNCTION PANEL: CPT | Performed by: HOSPITALIST

## 2018-05-01 PROCEDURE — 88305 TISSUE EXAM BY PATHOLOGIST: CPT | Performed by: OTOLARYNGOLOGY

## 2018-05-01 PROCEDURE — 25010000002 PROPOFOL 10 MG/ML EMULSION: Performed by: NURSE ANESTHETIST, CERTIFIED REGISTERED

## 2018-05-01 PROCEDURE — 25010000002 MIDAZOLAM PER 1 MG: Performed by: ANESTHESIOLOGY

## 2018-05-01 PROCEDURE — 07B20ZX EXCISION OF LEFT NECK LYMPHATIC, OPEN APPROACH, DIAGNOSTIC: ICD-10-PCS | Performed by: OTOLARYNGOLOGY

## 2018-05-01 PROCEDURE — 84550 ASSAY OF BLOOD/URIC ACID: CPT | Performed by: INTERNAL MEDICINE

## 2018-05-01 PROCEDURE — 86704 HEP B CORE ANTIBODY TOTAL: CPT | Performed by: INTERNAL MEDICINE

## 2018-05-01 PROCEDURE — 25010000002 ONDANSETRON PER 1 MG: Performed by: NURSE ANESTHETIST, CERTIFIED REGISTERED

## 2018-05-01 PROCEDURE — 88185 FLOWCYTOMETRY/TC ADD-ON: CPT | Performed by: HOSPITALIST

## 2018-05-01 PROCEDURE — 88189 FLOWCYTOMETRY/READ 16 & >: CPT | Performed by: HOSPITALIST

## 2018-05-01 PROCEDURE — 83615 LACTATE (LD) (LDH) ENZYME: CPT | Performed by: INTERNAL MEDICINE

## 2018-05-01 PROCEDURE — 88184 FLOWCYTOMETRY/ TC 1 MARKER: CPT | Performed by: HOSPITALIST

## 2018-05-01 PROCEDURE — 25010000002 FENTANYL CITRATE (PF) 100 MCG/2ML SOLUTION: Performed by: NURSE ANESTHETIST, CERTIFIED REGISTERED

## 2018-05-01 PROCEDURE — 99233 SBSQ HOSP IP/OBS HIGH 50: CPT | Performed by: INTERNAL MEDICINE

## 2018-05-01 PROCEDURE — 25010000002 PHENYLEPHRINE PER 1 ML: Performed by: NURSE ANESTHETIST, CERTIFIED REGISTERED

## 2018-05-01 RX ORDER — POTASSIUM CHLORIDE 750 MG/1
40 CAPSULE, EXTENDED RELEASE ORAL DAILY
Status: DISCONTINUED | OUTPATIENT
Start: 2018-05-01 | End: 2018-05-10

## 2018-05-01 RX ORDER — SODIUM CHLORIDE, SODIUM LACTATE, POTASSIUM CHLORIDE, CALCIUM CHLORIDE 600; 310; 30; 20 MG/100ML; MG/100ML; MG/100ML; MG/100ML
9 INJECTION, SOLUTION INTRAVENOUS CONTINUOUS
Status: DISCONTINUED | OUTPATIENT
Start: 2018-05-01 | End: 2018-05-01

## 2018-05-01 RX ORDER — LIDOCAINE HYDROCHLORIDE 10 MG/ML
0.5 INJECTION, SOLUTION EPIDURAL; INFILTRATION; INTRACAUDAL; PERINEURAL ONCE AS NEEDED
Status: DISCONTINUED | OUTPATIENT
Start: 2018-05-01 | End: 2018-05-01 | Stop reason: HOSPADM

## 2018-05-01 RX ORDER — DEXTROSE AND SODIUM CHLORIDE 5; .45 G/100ML; G/100ML
100 INJECTION, SOLUTION INTRAVENOUS CONTINUOUS
Status: DISCONTINUED | OUTPATIENT
Start: 2018-05-01 | End: 2018-05-02

## 2018-05-01 RX ORDER — EPHEDRINE SULFATE 50 MG/ML
INJECTION, SOLUTION INTRAVENOUS AS NEEDED
Status: DISCONTINUED | OUTPATIENT
Start: 2018-05-01 | End: 2018-05-01 | Stop reason: SURG

## 2018-05-01 RX ORDER — MONTELUKAST SODIUM 10 MG/1
10 TABLET ORAL DAILY
Status: DISCONTINUED | OUTPATIENT
Start: 2018-05-01 | End: 2018-05-06

## 2018-05-01 RX ORDER — GABAPENTIN 300 MG/1
300 CAPSULE ORAL NIGHTLY
Status: DISCONTINUED | OUTPATIENT
Start: 2018-05-01 | End: 2018-05-05

## 2018-05-01 RX ORDER — SODIUM CHLORIDE 0.9 % (FLUSH) 0.9 %
1-10 SYRINGE (ML) INJECTION AS NEEDED
Status: DISCONTINUED | OUTPATIENT
Start: 2018-05-01 | End: 2018-05-01

## 2018-05-01 RX ORDER — ACETAMINOPHEN 325 MG/1
650 TABLET ORAL EVERY 4 HOURS PRN
Status: DISCONTINUED | OUTPATIENT
Start: 2018-05-01 | End: 2018-05-16 | Stop reason: HOSPADM

## 2018-05-01 RX ORDER — POTASSIUM CHLORIDE 1.5 G/1.77G
40 POWDER, FOR SOLUTION ORAL AS NEEDED
Status: DISCONTINUED | OUTPATIENT
Start: 2018-05-01 | End: 2018-05-16 | Stop reason: HOSPADM

## 2018-05-01 RX ORDER — SPIRONOLACTONE 50 MG/1
50 TABLET, FILM COATED ORAL
Status: DISCONTINUED | OUTPATIENT
Start: 2018-05-01 | End: 2018-05-04

## 2018-05-01 RX ORDER — MIDAZOLAM HYDROCHLORIDE 1 MG/ML
2 INJECTION INTRAMUSCULAR; INTRAVENOUS
Status: DISCONTINUED | OUTPATIENT
Start: 2018-05-01 | End: 2018-05-01 | Stop reason: HOSPADM

## 2018-05-01 RX ORDER — FENTANYL CITRATE 50 UG/ML
50 INJECTION, SOLUTION INTRAMUSCULAR; INTRAVENOUS
Status: DISCONTINUED | OUTPATIENT
Start: 2018-05-01 | End: 2018-05-01 | Stop reason: HOSPADM

## 2018-05-01 RX ORDER — FENTANYL CITRATE 50 UG/ML
INJECTION, SOLUTION INTRAMUSCULAR; INTRAVENOUS AS NEEDED
Status: DISCONTINUED | OUTPATIENT
Start: 2018-05-01 | End: 2018-05-01 | Stop reason: SURG

## 2018-05-01 RX ORDER — DIPHENHYDRAMINE HYDROCHLORIDE 50 MG/ML
12.5 INJECTION INTRAMUSCULAR; INTRAVENOUS
Status: DISCONTINUED | OUTPATIENT
Start: 2018-05-01 | End: 2018-05-01 | Stop reason: HOSPADM

## 2018-05-01 RX ORDER — MINOXIDIL 2.5 MG/1
5 TABLET ORAL EVERY 12 HOURS SCHEDULED
Status: DISCONTINUED | OUTPATIENT
Start: 2018-05-01 | End: 2018-05-04

## 2018-05-01 RX ORDER — EPHEDRINE SULFATE 50 MG/ML
5 INJECTION, SOLUTION INTRAVENOUS ONCE AS NEEDED
Status: DISCONTINUED | OUTPATIENT
Start: 2018-05-01 | End: 2018-05-01 | Stop reason: HOSPADM

## 2018-05-01 RX ORDER — SODIUM CHLORIDE 0.9 % (FLUSH) 0.9 %
1-10 SYRINGE (ML) INJECTION AS NEEDED
Status: DISCONTINUED | OUTPATIENT
Start: 2018-05-01 | End: 2018-05-01 | Stop reason: HOSPADM

## 2018-05-01 RX ORDER — ALLOPURINOL 100 MG/1
200 TABLET ORAL 2 TIMES DAILY
Status: DISCONTINUED | OUTPATIENT
Start: 2018-05-01 | End: 2018-05-06

## 2018-05-01 RX ORDER — FAMOTIDINE 10 MG/ML
20 INJECTION, SOLUTION INTRAVENOUS ONCE
Status: COMPLETED | OUTPATIENT
Start: 2018-05-01 | End: 2018-05-01

## 2018-05-01 RX ORDER — HYDROCODONE BITARTRATE AND ACETAMINOPHEN 5; 325 MG/1; MG/1
1 TABLET ORAL EVERY 4 HOURS PRN
Status: DISCONTINUED | OUTPATIENT
Start: 2018-05-01 | End: 2018-05-16 | Stop reason: HOSPADM

## 2018-05-01 RX ORDER — LABETALOL HYDROCHLORIDE 5 MG/ML
5 INJECTION, SOLUTION INTRAVENOUS
Status: DISCONTINUED | OUTPATIENT
Start: 2018-05-01 | End: 2018-05-01 | Stop reason: HOSPADM

## 2018-05-01 RX ORDER — CHOLECALCIFEROL (VITAMIN D3) 125 MCG
5 CAPSULE ORAL NIGHTLY
Status: DISCONTINUED | OUTPATIENT
Start: 2018-05-01 | End: 2018-05-06

## 2018-05-01 RX ORDER — POTASSIUM CHLORIDE 750 MG/1
40 CAPSULE, EXTENDED RELEASE ORAL AS NEEDED
Status: DISCONTINUED | OUTPATIENT
Start: 2018-05-01 | End: 2018-05-10

## 2018-05-01 RX ORDER — ONDANSETRON 2 MG/ML
4 INJECTION INTRAMUSCULAR; INTRAVENOUS EVERY 6 HOURS PRN
Status: DISCONTINUED | OUTPATIENT
Start: 2018-05-01 | End: 2018-05-16 | Stop reason: HOSPADM

## 2018-05-01 RX ORDER — CEFAZOLIN SODIUM 2 G/100ML
2 INJECTION, SOLUTION INTRAVENOUS ONCE
Status: COMPLETED | OUTPATIENT
Start: 2018-05-01 | End: 2018-05-01

## 2018-05-01 RX ORDER — PHENYTOIN 125 MG/5ML
300 SUSPENSION ORAL NIGHTLY
Status: DISCONTINUED | OUTPATIENT
Start: 2018-05-01 | End: 2018-05-16 | Stop reason: HOSPADM

## 2018-05-01 RX ORDER — PANTOPRAZOLE SODIUM 40 MG/1
40 TABLET, DELAYED RELEASE ORAL
Status: DISCONTINUED | OUTPATIENT
Start: 2018-05-02 | End: 2018-05-16 | Stop reason: HOSPADM

## 2018-05-01 RX ORDER — AMLODIPINE BESYLATE 10 MG/1
10 TABLET ORAL
Status: DISCONTINUED | OUTPATIENT
Start: 2018-05-01 | End: 2018-05-16 | Stop reason: HOSPADM

## 2018-05-01 RX ORDER — HYDRALAZINE HYDROCHLORIDE 20 MG/ML
10 INJECTION INTRAMUSCULAR; INTRAVENOUS EVERY 6 HOURS PRN
Status: DISCONTINUED | OUTPATIENT
Start: 2018-05-01 | End: 2018-05-16 | Stop reason: HOSPADM

## 2018-05-01 RX ORDER — NALOXONE HCL 0.4 MG/ML
0.4 VIAL (ML) INJECTION
Status: DISCONTINUED | OUTPATIENT
Start: 2018-05-01 | End: 2018-05-16 | Stop reason: HOSPADM

## 2018-05-01 RX ORDER — PROPOFOL 10 MG/ML
VIAL (ML) INTRAVENOUS AS NEEDED
Status: DISCONTINUED | OUTPATIENT
Start: 2018-05-01 | End: 2018-05-01 | Stop reason: SURG

## 2018-05-01 RX ORDER — DEXAMETHASONE SODIUM PHOSPHATE 10 MG/ML
INJECTION INTRAMUSCULAR; INTRAVENOUS AS NEEDED
Status: DISCONTINUED | OUTPATIENT
Start: 2018-05-01 | End: 2018-05-01 | Stop reason: SURG

## 2018-05-01 RX ORDER — ESCITALOPRAM OXALATE 5 MG/1
5 TABLET ORAL DAILY
Status: DISCONTINUED | OUTPATIENT
Start: 2018-05-01 | End: 2018-05-05

## 2018-05-01 RX ORDER — MAGNESIUM OXIDE 400 MG/1
400 TABLET ORAL 2 TIMES DAILY
Status: DISCONTINUED | OUTPATIENT
Start: 2018-05-01 | End: 2018-05-05

## 2018-05-01 RX ORDER — LIDOCAINE HYDROCHLORIDE 20 MG/ML
INJECTION, SOLUTION INFILTRATION; PERINEURAL AS NEEDED
Status: DISCONTINUED | OUTPATIENT
Start: 2018-05-01 | End: 2018-05-01 | Stop reason: SURG

## 2018-05-01 RX ORDER — LANOLIN ALCOHOL/MO/W.PET/CERES
400 CREAM (GRAM) TOPICAL DAILY
Status: DISCONTINUED | OUTPATIENT
Start: 2018-05-01 | End: 2018-05-06

## 2018-05-01 RX ORDER — ONDANSETRON 2 MG/ML
INJECTION INTRAMUSCULAR; INTRAVENOUS AS NEEDED
Status: DISCONTINUED | OUTPATIENT
Start: 2018-05-01 | End: 2018-05-01 | Stop reason: SURG

## 2018-05-01 RX ORDER — LISINOPRIL 20 MG/1
20 TABLET ORAL EVERY 12 HOURS SCHEDULED
Status: DISCONTINUED | OUTPATIENT
Start: 2018-05-01 | End: 2018-05-09

## 2018-05-01 RX ORDER — ROCURONIUM BROMIDE 10 MG/ML
INJECTION, SOLUTION INTRAVENOUS AS NEEDED
Status: DISCONTINUED | OUTPATIENT
Start: 2018-05-01 | End: 2018-05-01 | Stop reason: SURG

## 2018-05-01 RX ORDER — MIDAZOLAM HYDROCHLORIDE 1 MG/ML
1 INJECTION INTRAMUSCULAR; INTRAVENOUS
Status: DISCONTINUED | OUTPATIENT
Start: 2018-05-01 | End: 2018-05-01 | Stop reason: HOSPADM

## 2018-05-01 RX ADMIN — FENTANYL CITRATE 50 MCG: 50 INJECTION INTRAMUSCULAR; INTRAVENOUS at 10:23

## 2018-05-01 RX ADMIN — EPHEDRINE SULFATE 10 MG: 50 INJECTION INTRAMUSCULAR; INTRAVENOUS; SUBCUTANEOUS at 10:41

## 2018-05-01 RX ADMIN — EPHEDRINE SULFATE 10 MG: 50 INJECTION INTRAMUSCULAR; INTRAVENOUS; SUBCUTANEOUS at 10:47

## 2018-05-01 RX ADMIN — CEFAZOLIN SODIUM 2 G: 2 INJECTION, SOLUTION INTRAVENOUS at 10:35

## 2018-05-01 RX ADMIN — MIDAZOLAM HYDROCHLORIDE 0.5 MG: 2 INJECTION, SOLUTION INTRAMUSCULAR; INTRAVENOUS at 09:56

## 2018-05-01 RX ADMIN — FENTANYL CITRATE 50 MCG: 50 INJECTION INTRAMUSCULAR; INTRAVENOUS at 10:45

## 2018-05-01 RX ADMIN — GABAPENTIN 300 MG: 300 CAPSULE ORAL at 21:25

## 2018-05-01 RX ADMIN — HYDROCODONE BITARTRATE AND ACETAMINOPHEN 1 TABLET: 5; 325 TABLET ORAL at 23:21

## 2018-05-01 RX ADMIN — EPHEDRINE SULFATE 10 MG: 50 INJECTION INTRAMUSCULAR; INTRAVENOUS; SUBCUTANEOUS at 10:44

## 2018-05-01 RX ADMIN — EPHEDRINE SULFATE 10 MG: 50 INJECTION INTRAMUSCULAR; INTRAVENOUS; SUBCUTANEOUS at 10:49

## 2018-05-01 RX ADMIN — PHENYLEPHRINE HYDROCHLORIDE 50 MCG: 10 INJECTION INTRAVENOUS at 11:12

## 2018-05-01 RX ADMIN — ACETAMINOPHEN 650 MG: 325 TABLET ORAL at 21:25

## 2018-05-01 RX ADMIN — FAMOTIDINE 20 MG: 10 INJECTION INTRAVENOUS at 09:55

## 2018-05-01 RX ADMIN — SPIRONOLACTONE 50 MG: 50 TABLET ORAL at 16:25

## 2018-05-01 RX ADMIN — LIDOCAINE HYDROCHLORIDE 60 MG: 20 INJECTION, SOLUTION INFILTRATION; PERINEURAL at 10:23

## 2018-05-01 RX ADMIN — METOPROLOL TARTRATE 25 MG: 25 TABLET ORAL at 08:06

## 2018-05-01 RX ADMIN — MINOXIDIL 5 MG: 2.5 TABLET ORAL at 21:26

## 2018-05-01 RX ADMIN — PROPOFOL 110 MG: 10 INJECTION, EMULSION INTRAVENOUS at 10:23

## 2018-05-01 RX ADMIN — ESCITALOPRAM 5 MG: 5 TABLET, FILM COATED ORAL at 16:25

## 2018-05-01 RX ADMIN — Medication 5 MG: at 21:25

## 2018-05-01 RX ADMIN — ONDANSETRON 4 MG: 2 INJECTION INTRAMUSCULAR; INTRAVENOUS at 08:09

## 2018-05-01 RX ADMIN — MONTELUKAST 10 MG: 10 TABLET, FILM COATED ORAL at 16:25

## 2018-05-01 RX ADMIN — Medication 400 MG: at 21:25

## 2018-05-01 RX ADMIN — SODIUM CHLORIDE, POTASSIUM CHLORIDE, SODIUM LACTATE AND CALCIUM CHLORIDE 9 ML/HR: 600; 310; 30; 20 INJECTION, SOLUTION INTRAVENOUS at 09:55

## 2018-05-01 RX ADMIN — ROCURONIUM BROMIDE 30 MG: 10 INJECTION INTRAVENOUS at 10:23

## 2018-05-01 RX ADMIN — ACETAMINOPHEN 400 MCG: 400 TABLET ORAL at 16:25

## 2018-05-01 RX ADMIN — METOPROLOL TARTRATE 25 MG: 25 TABLET ORAL at 21:26

## 2018-05-01 RX ADMIN — ONDANSETRON 4 MG: 2 INJECTION INTRAMUSCULAR; INTRAVENOUS at 11:03

## 2018-05-01 RX ADMIN — LISINOPRIL 20 MG: 20 TABLET ORAL at 21:25

## 2018-05-01 RX ADMIN — AMLODIPINE BESYLATE 10 MG: 10 TABLET ORAL at 16:25

## 2018-05-01 RX ADMIN — DEXTROSE AND SODIUM CHLORIDE 100 ML/HR: 5; 450 INJECTION, SOLUTION INTRAVENOUS at 14:16

## 2018-05-01 RX ADMIN — POTASSIUM CHLORIDE 40 MEQ: 750 CAPSULE, EXTENDED RELEASE ORAL at 16:25

## 2018-05-01 RX ADMIN — PHENYTOIN 300 MG: 125 SUSPENSION ORAL at 21:26

## 2018-05-01 RX ADMIN — ALLOPURINOL 200 MG: 100 TABLET ORAL at 21:26

## 2018-05-01 RX ADMIN — DEXAMETHASONE SODIUM PHOSPHATE 8 MG: 10 INJECTION INTRAMUSCULAR; INTRAVENOUS at 11:02

## 2018-05-01 NOTE — ANESTHESIA POSTPROCEDURE EVALUATION
"Patient: Martina Blake    Procedure Summary     Date:  05/01/18 Room / Location:  [facility] OR [address] [place] [address] MAIN OR    Anesthesia Start:  1013 Anesthesia Stop:  1135    Procedure:  CERVICAL LYMPH NODE BIOPSY/EXCISION (Left Neck) Diagnosis:       Lymphoma of lymph nodes of neck, unspecified lymphoma type      (Lymphoma of lymph nodes of neck, unspecified lymphoma type [C85.91])    Surgeon:  Danny Oconnor MD Provider:  Agusto Roe MD    Anesthesia Type:  general ASA Status:  3          Anesthesia Type: general  Last vitals  BP   118/52 (05/01/18 1140)   Temp   36.8 °C (98.3 °F) (05/01/18 1132)   Pulse   72 (05/01/18 1140)   Resp   12 (05/01/18 1140)     SpO2   96 % (05/01/18 1140)     Post Anesthesia Care and Evaluation    Patient location during evaluation: PACU  Patient participation: complete - patient participated  Level of consciousness: awake and alert  Pain management: adequate  Airway patency: patent  Anesthetic complications: No anesthetic complications    Cardiovascular status: acceptable  Respiratory status: acceptable  Hydration status: acceptable    Comments: /52   Pulse 72   Temp 36.8 °C (98.3 °F) (Oral)   Resp 12   Ht 162.6 cm (64\")   Wt 66.8 kg (147 lb 4.8 oz)   SpO2 96%   BMI 25.28 kg/m²               "

## 2018-05-01 NOTE — OP NOTE
[facility] OPERATIVE NOTE  5/1/2018    NAME: Martina Blake    YOB: 1940  MRN: [id number]    PRE-OPERATIVE DIAGNOSIS:   Lymphoma with cervical lymphadenopathy    POST-OPERATIVE DIAGNOSIS: same    PROCEDURE PERFORMED: Excisional Biopsy of Neck Mass left    SURGEON: Danny Oconnor MD    ASSISTANT(S): None    ANESTHESIA: General Anesthesia via Endotracheal Tube    INDICATIONS: The patient is a 77 y.o. female with tentative diagnosis of lymphoma.  She appears to have metastatic disease.  She is brought to the operating room for cervical lymph node biopsy to help establish a definitive diagnosis.    FINDINGS: There was a large lymph node located in the left posterior neck.  There was significant inflammation around the lymph node and I did not excise it completely due to the 11th cranial nerve intimately wrapping around it.    PROCEDURE:    Patient was brought to the operating room and given general anesthesia and orally intubated.  A shoulder roll was placed the neck was rotated to the right.  The left neck was then prepped and draped in sterile fashion.  The left neck lymph node was palpated and identified in the posterior triangle.  Horizontal incision was made through the skin and platysma overlying the mass.  Using sharp and blunt dissection flaps were elevated superiorly and inferiorly.  The 11th cranial nerve was identified and confirmed with a portable nerve stimulator.  This appeared to intimately wrapping around the nerve and I did not feel complete excision was possible without injury to the nerve.  The lymph node was grasped with DeBakeys and a 15 blade was used to remove a block of tissue from the lymph node.  An additional piece was taken.  Hemostasis was controlled with bipolar cautery and application of Surgicel.  The wound was closed with interrupted 4-0 Vicryl.  Mastisol and Steri-Strips were placed over the skin followed by Telfa and Tegaderm.  She was extubated in the  operating room and transferred to the recovery room in stable condition all sponge and enhancement counts were correct.    SPECIMENS: Left neck lymph node sent for lymphoma workup and flow cytometry    COMPLICATIONS: NONE    ESTIMATED BLOOD LOSS: 10 cc    Danny Oconnor MD  5/1/2018

## 2018-05-01 NOTE — ANESTHESIA PREPROCEDURE EVALUATION
Anesthesia Evaluation     Patient summary reviewed and Nursing notes reviewed                Airway   Mallampati: II  Dental      Pulmonary - negative pulmonary ROS   Cardiovascular     ECG reviewed  Rate: normal    (+) hypertension, CABG, dysrhythmias Paroxysmal Atrial Fib,       Neuro/Psych  (+) dizziness/light headedness, weakness, psychiatric history,     GI/Hepatic/Renal/Endo    (+)  hiatal hernia, GERD,      Musculoskeletal     Abdominal    Substance History - negative use     OB/GYN    (+) Pregnant,         Other   (+) arthritis   history of cancer                    Anesthesia Plan    ASA 3     general   (PAF now in NSR with PAC's  Multiple Medical problems)  intravenous induction   Anesthetic plan and risks discussed with patient.

## 2018-05-01 NOTE — PLAN OF CARE
Problem: Fall Risk (Adult)  Goal: Absence of Fall  Outcome: Ongoing (interventions implemented as appropriate)      Problem: Patient Care Overview  Goal: Plan of Care Review  Outcome: Ongoing (interventions implemented as appropriate)   05/01/18 1262   Coping/Psychosocial   Plan of Care Reviewed With patient   Plan of Care Review   Progress improving   OTHER   Outcome Summary VSS; No c/o pain or nausea; pt likes taking her pills with food - not applesauce or pudding; pt seems somewhat anxious for bx today; no s/s of distress       Problem: Activity Intolerance (Adult)  Goal: Activity Tolerance  Outcome: Ongoing (interventions implemented as appropriate)    Goal: Effective Energy Conservation Techniques  Outcome: Ongoing (interventions implemented as appropriate)      Problem: Hypertensive Disease/Crisis (Arterial) (Adult)  Goal: Signs and Symptoms of Listed Potential Problems Will be Absent, Minimized or Managed (Hypertensive Disease/Crisis)  Outcome: Ongoing (interventions implemented as appropriate)

## 2018-05-01 NOTE — SIGNIFICANT NOTE
05/01/18 1429   Rehab Treatment   Discipline physical therapist   Reason Treatment Not Performed patient/family declined treatment  (Pt declined PT tx this pm due to just having surgery this am.  PT will follow up 5/2.  KEIRY Kaur notified.)   Recommendation   PT - Next Appointment 05/02/18

## 2018-05-01 NOTE — ANESTHESIA PROCEDURE NOTES
Airway  Urgency: elective    Airway not difficult    General Information and Staff    Patient location during procedure: OR  CRNA: OLIVIA DOTY    Indications and Patient Condition  Indications for airway management: airway protection    Preoxygenated: yes  Mask difficulty assessment: 1 - vent by mask    Final Airway Details  Final airway type: endotracheal airway      Successful airway: ETT  Cuffed: yes   Successful intubation technique: direct laryngoscopy  Endotracheal tube insertion site: oral  Blade: Dee  Blade size: #3  ETT size: 7.0 mm  Cormack-Lehane Classification: grade I - full view of glottis  Placement verified by: chest auscultation and capnometry   Measured from: lips  ETT to lips (cm): 21  Number of attempts at approach: 1    Additional Comments   ett cuff up at MOP

## 2018-05-01 NOTE — PROGRESS NOTES
"DAILY PROGRESS NOTE  [facility]    Patient Identification:  Name: Martina Blake  Age: 77 y.o.  Sex: female  :  1940  MRN: [id number]         Primary Care Physician: Jamie Walton DO      Subjective      Objective:  General Appearance:  Comfortable, well-appearing, in no acute distress and not in pain.    Vital signs: (most recent): Blood pressure 112/76, pulse 73, temperature 97.7 °F (36.5 °C), temperature source Oral, resp. rate 16, height 162.6 cm (64\"), weight 66.8 kg (147 lb 4.8 oz), SpO2 96 %.    Lungs:  Normal effort and normal respiratory rate.  Breath sounds clear to auscultation.    Heart: Normal rate.  Regular rhythm.    Abdomen: Abdomen is soft.  Bowel sounds are normal.     Extremities: There is no dependent edema.    Neurological: Patient is alert and oriented to person, place and time.    Skin:  Warm and dry.                Vital signs in last 24 hours:  Temp:  [97.7 °F (36.5 °C)-98.8 °F (37.1 °C)] 97.7 °F (36.5 °C)  Heart Rate:  [55-74] 73  Resp:  [12-20] 16  BP: (112-150)/(50-76) 112/76    Intake/Output:    Intake/Output Summary (Last 24 hours) at 18 1722  Last data filed at 18 1219   Gross per 24 hour   Intake              700 ml   Output              250 ml   Net              450 ml           Results from last 7 days  Lab Units 18  0521 18  0454 18  0347   WBC 10*3/mm3 11.56* 11.48* 14.43*   HEMOGLOBIN g/dL 8.2* 8.8* 8.9*   PLATELETS 10*3/mm3 271 301 283     Results from last 7 days  Lab Units 18  0521 18  0443 18  0424 18  0428 18  0454 18  2327 18  0311  18  0347   SODIUM mmol/L 139 139  --  138 138 139  --  138  --  138   POTASSIUM mmol/L 3.5 4.1 4.3 3.3* 4.0 3.5 3.9 3.2*  < > 3.5  3.5   CHLORIDE mmol/L 100 102  --  102 103 104  --  101  --  99   CO2 mmol/L 26.1 24.8  --  23.5 24.4 24.9  --  25.8  --  25.2   BUN mg/dL 8 8  --  10 10 10  --  13  --  16   CREATININE mg/dL " 0.62 0.60  --  0.55* 0.58 0.59  --  0.63  --  0.63   GLUCOSE mg/dL 103* 96  --  97 104* 100*  --  99  --  106*   < > = values in this interval not displayed.Estimated Creatinine Clearance: 55.3 mL/min (by C-G formula based on SCr of 0.62 mg/dL).  Results from last 7 days  Lab Units 05/01/18 0521 04/30/18 0443 04/29/18 0424 04/28/18 0428 04/27/18 0454 04/26/18 0311 04/25/18  0347   CALCIUM mg/dL 10.8* 9.8 9.7 10.2 10.8* 11.2* 11.3*   ALBUMIN g/dL 3.00* 2.80* 2.60* 2.80* 3.00* 3.00* 2.90*   MAGNESIUM mg/dL 1.8 1.9 1.5* 1.9 1.4* 2.0 1.5*   PHOSPHORUS mg/dL 3.6 3.2 3.3 3.8 2.4* 3.2 3.9     Results from last 7 days  Lab Units 05/01/18 0521 04/30/18 0443 04/29/18 0424 04/28/18 0428 04/27/18 0454 04/26/18 0311 04/25/18  0347   ALBUMIN g/dL 3.00* 2.80* 2.60* 2.80* 3.00* 3.00* 2.90*   BILIRUBIN mg/dL  --   --   --   --   --  0.4 0.4   ALK PHOS U/L  --   --   --   --   --  71 65   AST (SGOT) U/L  --   --   --   --   --  44* 45*   ALT (SGPT) U/L  --   --   --   --   --  13 11       Assessment:  Principal Problem:    Hypercalcemia 2nd to malignancy - Nephrology input appreciated.   Active Problems:    Lymphoma - working dx - Onc input appreciated.     Gastroesophageal reflux disease    Chronic recurrent major depressive disorder    Restless legs syndrome    Seizure disorder    Essential hypertension    Paroxysmal atrial fibrillation    Iron deficiency anemia due to chronic blood loss    Weakness    Hypertension    Liver mass    Hypokalemia:  Mild.  Protocol.    Hypomagnesemia:  Cont MgOx and monitior.        Plan:  Trans to 3P.     Isreal Tan MD  5/1/2018  5:22 PM

## 2018-05-01 NOTE — PLAN OF CARE
Problem: Fall Risk (Adult)  Goal: Absence of Fall  Outcome: Ongoing (interventions implemented as appropriate)      Problem: Patient Care Overview  Goal: Plan of Care Review  Outcome: Ongoing (interventions implemented as appropriate)   05/01/18 4901   Coping/Psychosocial   Plan of Care Reviewed With patient   Plan of Care Review   Progress improving   OTHER   Outcome Summary No c/o pain or soa. Biopsy of left neck lymph node done this morning. Pt transfered to , will need port placed and will be started on chemo. PET scan ordered for tomorrow morning.      Goal: Individualization and Mutuality  Outcome: Ongoing (interventions implemented as appropriate)    Goal: Discharge Needs Assessment  Outcome: Ongoing (interventions implemented as appropriate)    Goal: Interprofessional Rounds/Family Conf  Outcome: Ongoing (interventions implemented as appropriate)      Problem: Activity Intolerance (Adult)  Goal: Activity Tolerance  Outcome: Ongoing (interventions implemented as appropriate)    Goal: Effective Energy Conservation Techniques  Outcome: Ongoing (interventions implemented as appropriate)      Problem: Hypertensive Disease/Crisis (Arterial) (Adult)  Goal: Signs and Symptoms of Listed Potential Problems Will be Absent, Minimized or Managed (Hypertensive Disease/Crisis)  Outcome: Ongoing (interventions implemented as appropriate)

## 2018-05-01 NOTE — PROGRESS NOTES
"CHIEF COMPLAINT:  Anemia, hypercalcemia, radiographic findings of malignancy with pathology/ diagnosis on the liver consistent with diffuse large B-cell lymphoma FISH for c-myc, bcl-2 and bcl-6 pending    Interval History:  The patient underwent an excisional lymph node biopsy from the left neck this morning for confirmation of pathology.  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  Additional FISH studies are pending to evaluate for \"double hit\" lymphoma as Ki 67 staining is 4+    Oncology History:  Martina Blake is a 77 y.o. female who were asked to see in consultation  4/29/18 for hypercalcemia, anemia and radiologic findings consistent with likely malignancy-?  Lymphoma.        She has a significant past medical history of palpitations followed by cardiology.  She presented to the emergency room April 10-14 with chest pain and palpitations and was found to be in A. fib with RVR and also demonstrated electrolyte abnormalities and anemia.  Records demonstrates that her anemia became particularly evident April 11 with an H&H of 8.9/ 28.4, platelet count 212,000 with a normal automated differential.  She underwent GI assessment including upper and lower endoscopy demonstrated hernia, gastritis, esophagitis, diverticulosis but no evidence of acute bleed.  She was placed on Eliquis as result of a relatively high CHADSVASc score.  She had also been found to be hypercalcemic at approximately 11 with HCTZ altered and the patient started on Aldactone.  Additional testing had included a ferritin of 313.1, iron of 32 with TIBC of 222 and iron saturation of 14, occult blood negative per stool testing    She was brought back to the emergency room April 21 with further evidence of hypercalcemia, associated weakness, anorexia, nausea, ataxia and weight loss.  MRI of the brain April 21 was found to be unremarkable. " Outpatient intact PTH levels were found to be 7.1(reduced), and an H&H of 10.3 and 32.9, white count 11,090, platelet count 267,000 with a normal differential.  Patient seen by renal medicine with the possibility of Fanconi syndrome raised.  Thereafter PTH hormone was found be less than 2.0, and protein electropheresis included IgG of 948, IgA of 207, IgM 86, total protein 6.3, albumin 2.8, no M spike noted, slightly elevated free kappa and lambda light chains with normal ratio.  Repeat testing per iron profile again revealed low serum iron but high serum ferritin and normal CEA, normal AFP, CA-125 of 77.2, CA 19-9 7.8   At this point the reason for her hypercalcemia was thought to be possible medication effect and/or possible malignancy with calcium was running between 12 and 13 mg/dL.    This led to CT scan of chest abdomen pelvis performed April 24 demonstrate extensive metastatic disease throughout the abdomen and pelvis particularly involving the spleen and liver, extensive lymphadenopathy at the abdomen and pelvis with a 4 cm lesion splenic hilum partially encasing the pancreatic tail, pancreatic tail malignancy?,  Gastric primary malignancy?  There was a mass along the right wall of the urinary bladder with adjacent adenopathy.  CT of the chest revealed several tiny dense pleural-based nodular opacities-partially calcified granulomas, no mediastinal or hilar adenopathy, enlarged pulmonary arteries consistent with pulmonary arterial hypertension.  A subsequent bone scan performed April 26 revealed prominent uptake in the right frontal bone and right posterior ninth rib and a CT needle biopsy of the liver was obtained April 26 as well.The sample obtained revealed, on flow cytometry examination, a subpopulation of normal B cells that might be consistent with B-cell lymphoma.  Additional testing is currently pending.  On May 25 further hypercalcemia was again noted and treated with IV fluids and dose of Zometa,  "vitamin D level normal.  Again evidence of hypokalemia, hypophosphatemia and hypomagnesemia.  The patient is seen April 29 her calcium level has gradually improved dropping to 9.7, albumin of 2.6, ionized calcium of 6.1, BUN and creatinine of 10/.55.  We are asked to see her with results available thus far as to possible lymphoma.    Past Medical History:     Atrial fibrillation      • Cystitis     • Cystocele       moderate   • Dyslipidemia     • Esophageal reflux     • Fatigue     • History of transfusion       no reaction   • Hypertension     • Major depression, chronic     • Menopause     • Osteoarthritis     • Osteoporosis     • Palpitations     • Post menopausal problems     • RLS (restless legs syndrome)     • Seizure disorder     • Subjective tinnitus     • Vaginal delivery       x2  \"SONYA\"      \"JASON\"   • Vitamin D deficiency      Social History:  The patient is a  and has 2 children.  She has never smoked.  She denies alcohol use.    Review of Systems   Constitutional: Positive for activity change, appetite change and unexpected weight change. Negative for fatigue.   HENT: Negative.    Respiratory: Negative for shortness of breath and wheezing.    Cardiovascular: Negative for chest pain and palpitations.   Gastrointestinal: Negative for abdominal pain, constipation, nausea and vomiting.   Musculoskeletal: Positive for gait problem. Negative for neck stiffness.   Skin: Negative for color change and pallor.   Neurological: Positive for weakness. Negative for seizures, numbness and headaches.   Hematological: Positive for adenopathy.   Psychiatric/Behavioral: Negative.           Medications:  The current medication list was reviewed in the EMR    ALLERGIES:    Allergies   Allergen Reactions   • Crab (Diagnostic) Itching and Rash   • Pseudoephedrine Dizziness       Objective      Vitals:    05/01/18 1320   BP: 123/56   Pulse: 70   Resp:    Temp: 97.7 °F (36.5 °C)   SpO2:           Physical Exam  " "  Constitutional: She is oriented to person, place, and time.  LN: there is a left neck bandage  Cardiovascular: Normal rate and regular rhythm.   Extrasystoles are present.   No murmur heard.  Pulmonary/Chest: Effort normal and breath sounds normal. No respiratory distress.   Abdominal: Soft. She exhibits no distension. There is no tenderness.   Neurological:   Strength is mildly clonally reduced  Skin: Skin is warm and dry. No rash noted. There is pallor.   Psychiatric: She has a normal mood and affect. Her behavior is normal.   Nursing note and vitals reviewed    RECENT LABS:  Hematology   Results from last 7 days  Lab Units 05/01/18  0521 04/27/18  0454 04/25/18  0347   WBC 10*3/mm3 11.56* 11.48* 14.43*   HEMOGLOBIN g/dL 8.2* 8.8* 8.9*   HEMATOCRIT % 25.7* 27.8* 28.4*   PLATELETS 10*3/mm3 271 301 283     2  Lab Results   Component Value Date    GLUCOSE 103 (H) 05/01/2018    BUN 8 05/01/2018    CREATININE 0.62 05/01/2018    EGFRIFNONA 93 05/01/2018    EGFRIFAFRI 92 04/10/2018    BCR 12.9 05/01/2018    CO2 26.1 05/01/2018    CALCIUM 10.8 (H) 05/01/2018    PROTENTOTREF 6.3 04/22/2018    ALBUMIN 3.00 (L) 05/01/2018    LABIL2 0.9 04/26/2018    AST 44 (H) 04/26/2018    ALT 13 04/26/2018       Lab Results   Component Value Date    IRON 16 (L) 04/24/2018    TIBC 180 04/24/2018    FERRITIN 411.40 (H) 04/24/2018       Lab Results   Component Value Date    SIDJSGEA62 898 04/02/2018     CRP 11.46  ESR 47           Assessment/Plan   1.  Hypercalcemia of malignancy:  The patient received Zometa 4 mg on 4/25/18.  Her calcium temporarily improved but is beginning to elevate again today to 10.8 with albumin 3.0.  Hypercalcemia is likely to be an ongoing issue until we can treat the underlying lymphoma causing the problem    2.  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  Additional FISH studies are pending to evaluate for \"double hit\" lymphoma as Ki 67 staining is 4+     She has extensive metastatic disease " "throughout the abdomen and pelvis, splenomegaly replaced with low attenuation lesions, 4.4 cm splenic hilum mass, disease encasing the pancreatic tail, 3 cm lesion abutting the stomach, extensive lymphadenopathy throughout the celiac axis, retroperitoneum, mesentery, periaortic lymphadenopathy, liver lesion in the posterior segment 4.8 cm, nodular thickening of the right wall of urinary bladder 5.0 cm, peritoneal lymphadenopathy and thickening, coarsely calcified mesenteric mass 9.6 cm (?old mesenteric adenitis).  Bone scan shows foci of uptake in the frontal bone and right posterior ninth rib.      The patient has had B symptoms with decreased performance status, and weight loss, anorexia.    I discussed today with the patient and her son her liver biopsy results which showed diffuse large B-cell lymphoma.  She underwent a cervical lymph node excisional biopsy today to confirm disease.  Given the patient's refractory hypercalcemia and B symptoms she will need treatment while in the hospital.  I recommended we proceed with a PET scan for complete staging.  I did not pursue a bone marrow exam as with likely not change her treatment.  We could consider a lumbar puncture given her advanced stage, particularly consider lumbar puncture to rule out CNS involvement if disease returns positive for C-myc, BCL-2 or BCL- 6    I recommended a power port placement to facilitate chemotherapy.    I reviewed with the patient and her son systemic chemotherapy with CHOP R including risk and side effects of this regimen including but not limited to alopecia, worsening fatigue, neuropathy, cardiac toxicity, myelosuppression, risk of infection, possible need of transfusion support etc.  I explained to the patient if her FISH studies returned consistent with a \"double hit\" lymphoma alternative therapy may be required.  The patient is anxious to proceed with treatment.  She has had a 2-D echocardiogram for 1118 with ejection fraction 67%. "  Hepatitis B testing is pending.    3.  Leukocytosis/anemia:  Probably inflammatory/malignancy associated (elevated ferritin, low transferrin, elevated ESR) versus bone marrow involvement?.  I do not think a bone marrow exam would change her treatment plan.  We will monitor the CBC.    4.  Paroxysmal atrial fibrillation: The patient is anticoagulated with Eliquis.  Her platelet count will need to be monitored carefully after chemotherapy    Recommendations:  1.  Transfer the patient to [address] [place] [address] in anticipation of chemotherapy during the admission  2.  PET scan for staging of lymphoma  3.  PowerPort placement  4.  We will follow-up the FISH studies for c-myc, bcl-2 and bcl-6 but results could take up to 10 days and we will likely need to start some form of chemotherapy, probably CHOP-R, before results are available  5.  Follow-up pending cervical lymph node biopsy to confirm the diagnosis  6.  I will begin allopurinol as the patient is at risk of tumor lysis syndrome given the bulk of disease  7.  Consider lumbar puncture to rule out CNS involvement  8.  Monitor cbc, renal function, calcium                        5/1/2018      CC:

## 2018-05-02 LAB
ALBUMIN SERPL-MCNC: 2.7 G/DL (ref 3.5–5.2)
ANION GAP SERPL CALCULATED.3IONS-SCNC: 11.3 MMOL/L
BUN BLD-MCNC: 9 MG/DL (ref 8–23)
BUN/CREAT SERPL: 12.9 (ref 7–25)
CALCIUM SPEC-SCNC: 10.7 MG/DL (ref 8.6–10.5)
CHLORIDE SERPL-SCNC: 100 MMOL/L (ref 98–107)
CO2 SERPL-SCNC: 25.7 MMOL/L (ref 22–29)
CREAT BLD-MCNC: 0.7 MG/DL (ref 0.57–1)
GFR SERPL CREATININE-BSD FRML MDRD: 81 ML/MIN/1.73
GLUCOSE BLD-MCNC: 107 MG/DL (ref 65–99)
HBV CORE AB SER DONR QL IA: NEGATIVE
MAGNESIUM SERPL-MCNC: 1.9 MG/DL (ref 1.6–2.4)
PHOSPHATE SERPL-MCNC: 3.5 MG/DL (ref 2.5–4.5)
POTASSIUM BLD-SCNC: 3.7 MMOL/L (ref 3.5–5.2)
REF LAB TEST METHOD: NORMAL
SODIUM BLD-SCNC: 137 MMOL/L (ref 136–145)
URATE SERPL-MCNC: 5.4 MG/DL (ref 2.4–5.7)

## 2018-05-02 PROCEDURE — 99221 1ST HOSP IP/OBS SF/LOW 40: CPT | Performed by: SURGERY

## 2018-05-02 PROCEDURE — 99233 SBSQ HOSP IP/OBS HIGH 50: CPT | Performed by: INTERNAL MEDICINE

## 2018-05-02 PROCEDURE — 84550 ASSAY OF BLOOD/URIC ACID: CPT | Performed by: INTERNAL MEDICINE

## 2018-05-02 PROCEDURE — 83735 ASSAY OF MAGNESIUM: CPT | Performed by: INTERNAL MEDICINE

## 2018-05-02 PROCEDURE — 80069 RENAL FUNCTION PANEL: CPT | Performed by: INTERNAL MEDICINE

## 2018-05-02 PROCEDURE — 25810000003 DEXTROSE-NACL PER 500 ML: Performed by: INTERNAL MEDICINE

## 2018-05-02 RX ORDER — DEXTROSE AND SODIUM CHLORIDE 5; .9 G/100ML; G/100ML
100 INJECTION, SOLUTION INTRAVENOUS CONTINUOUS
Status: DISCONTINUED | OUTPATIENT
Start: 2018-05-02 | End: 2018-05-06

## 2018-05-02 RX ADMIN — PANTOPRAZOLE SODIUM 40 MG: 40 TABLET, DELAYED RELEASE ORAL at 07:15

## 2018-05-02 RX ADMIN — MONTELUKAST 10 MG: 10 TABLET, FILM COATED ORAL at 08:28

## 2018-05-02 RX ADMIN — LISINOPRIL 20 MG: 20 TABLET ORAL at 21:30

## 2018-05-02 RX ADMIN — SPIRONOLACTONE 50 MG: 50 TABLET ORAL at 17:44

## 2018-05-02 RX ADMIN — LISINOPRIL 20 MG: 20 TABLET ORAL at 08:29

## 2018-05-02 RX ADMIN — Medication 400 MG: at 08:29

## 2018-05-02 RX ADMIN — Medication 400 MG: at 21:31

## 2018-05-02 RX ADMIN — ACETAMINOPHEN 400 MCG: 400 TABLET ORAL at 08:28

## 2018-05-02 RX ADMIN — POTASSIUM CHLORIDE 40 MEQ: 750 CAPSULE, EXTENDED RELEASE ORAL at 08:28

## 2018-05-02 RX ADMIN — MINOXIDIL 5 MG: 2.5 TABLET ORAL at 21:30

## 2018-05-02 RX ADMIN — PHENYTOIN 300 MG: 125 SUSPENSION ORAL at 21:31

## 2018-05-02 RX ADMIN — METOPROLOL TARTRATE 25 MG: 25 TABLET ORAL at 21:31

## 2018-05-02 RX ADMIN — MINOXIDIL 5 MG: 2.5 TABLET ORAL at 08:28

## 2018-05-02 RX ADMIN — Medication 5 MG: at 21:30

## 2018-05-02 RX ADMIN — AMLODIPINE BESYLATE 10 MG: 10 TABLET ORAL at 08:29

## 2018-05-02 RX ADMIN — ALLOPURINOL 200 MG: 100 TABLET ORAL at 21:31

## 2018-05-02 RX ADMIN — GABAPENTIN 300 MG: 300 CAPSULE ORAL at 21:31

## 2018-05-02 RX ADMIN — METOPROLOL TARTRATE 25 MG: 25 TABLET ORAL at 08:29

## 2018-05-02 RX ADMIN — SPIRONOLACTONE 50 MG: 50 TABLET ORAL at 08:29

## 2018-05-02 RX ADMIN — DEXTROSE AND SODIUM CHLORIDE 100 ML/HR: 5; 900 INJECTION, SOLUTION INTRAVENOUS at 16:43

## 2018-05-02 RX ADMIN — ALLOPURINOL 200 MG: 100 TABLET ORAL at 08:29

## 2018-05-02 RX ADMIN — APIXABAN 5 MG: 5 TABLET, FILM COATED ORAL at 08:29

## 2018-05-02 RX ADMIN — DEXTROSE AND SODIUM CHLORIDE 100 ML/HR: 5; 450 INJECTION, SOLUTION INTRAVENOUS at 01:46

## 2018-05-02 RX ADMIN — ESCITALOPRAM 5 MG: 5 TABLET, FILM COATED ORAL at 08:28

## 2018-05-02 NOTE — PROGRESS NOTES
Adult Nutrition  Assessment/PES    Patient Name:  Martina Blake  YOB: 1940  MRN: [id number]  Admit Date:  4/21/2018    Assessment Date:  5/2/2018    Assessment completed. Patient reported appetite improving-likes Mighty shakes supplements, ordered daily.  Patient requested nutrition education once treatment is started. Provided healing guide handout-cancer nutrition guide.  Will follow   Reason for Assessment   Reason For Assessment per organizational policy-length of stay   Diagnosis   Primary Problem:  Hypercalcemia            Adult Nutrition Assessment     Row Name 05/02/18 1041       Nutrition Prescription PO    Current PO Diet Regular       PO Evaluation    Number of Meals 3    % PO Intake 75    Row Name 05/02/18 1040       Body Mass Index (BMI)    BMI Assessment BMI 18.5-24.9: normal       Labs/Procedures/Meds    Lab Results Reviewed reviewed, pertinent    Lab Results Comments Meds-folic acid, PPI, D5% and NaCl       Physical Findings    Overall Physical Appearance --   B=19       Calculation Measurements    Weight Used For Calculations 64.9 kg (143 lb)       Estimated/Assessed Needs    Additional Documentation KCAL/KG (Group);Protein Requirements (Group);Fluid Requirements (Group)       KCAL/KG                                                      kcal/kg (Specify) --   0959-5153       [place]-St. Jeor Equation    RMR ([place]-St. Jeor Equation) 1118.64       Protein Requirements    Est Protein Requirement Amount (gms/kg) 1.0 gm protein    Estimated Protein Requirements (gms/day) 64.86       Fluid Requirements    Estimated Fluid Requirements (mL/day) 1700    RDA Method (mL) 1700    [place]-Segar Method (over 20 kg) 2797.28    Row Name 05/02/18 1039                           Problem/Interventions:        Problem 1     Row Name 05/02/18 1043       Nutrition Diagnoses Problem 1    Problem 1 Nutrition Appropriate for Condition at this Time                    Intervention Goal     Row Name  05/02/18 1043       Intervention Goal    General Maintain nutrition;Meet nutritional needs for age/condition    PO Tolerate PO    Weight Maintain weight            Nutrition Intervention     Row Name 05/02/18 1043       Nutrition Intervention    RD/Tech Action Interview for preference;Follow Tx progress;Care plan reviewd;Encourage intake;Recommend/ordered;Supplement provided    Recommended/Ordered Supplement            Nutrition Prescription     Row Name 05/02/18 1044       Nutrition Prescription PO    PO Prescription Begin/change supplement    Supplement Mighty Shake    Supplement Frequency Daily    New PO Prescription Ordered? Yes            Education/Evaluation     Row Name 05/02/18 1044       Education    Education Provided education regarding   healing guide once chemotherapy is started       Monitor/Evaluation    Monitor Per protocol    Education Follow-up Reinforce PRN        Electronically signed by:  Mariel Lara RD  05/02/18 10:45 AM

## 2018-05-02 NOTE — PROGRESS NOTES
Continued Stay Note   [facility]     Patient Name: Martina Blake  MRN: [id number]  Today's Date: 5/2/2018    Admit Date: 4/21/2018          Discharge Plan     Row Name 05/02/18 1237       Plan    Plan Home with HH- [facility] following.    Plan Comments Met with the patient at bedside who stated that she did not want to go to rehab upon d/c as referral was previously made to [facility].  The patient stated that she would d/c home with HH services- [facility] is following and patient states that she will confirm [facility] with her daughter. SHREE Dinero is following to assist the paitent with possible transportation needs to treatments and MD appointments. CCP will follow to assist with the patient's d/c home and will confirm [facility].  CLARA Mills              Discharge Codes    No documentation.           CLARA Muhammad

## 2018-05-02 NOTE — PROGRESS NOTES
Oncology Social Work    Referral received from [facility] to see for new diagnosis and psychosocial support.  Chart reviewed.  Patient admitted on 4/21/2018 with anemia, hypercalcemia.  Patient had an excisional lymph node bx for the left neck. Patient with B-Cell lymphoma. Patient to have PET scan this afternoon and family conference with Dr. Matthews tomorrow morning to discuss pathology, scan results and treatment.     Psychosocial assessment:  Patient is 77 yr old  female, active and independent with ADL's,  Alert and oriented x 3.  Patient lives at home alone in a patio home, no steps to enter.  She has two adult children.  She has never used home health or any durable medical equipment.  She states that she is very active and loves to travel with her girlfriends.  Patient reports no financial distress.  She has health insurance, RX drug coverage and long term care insurance.  She is worried about how the treatment may affect her performance / activity level.  She states that she has been so weak the past couple months and worries her weakness might worsen once she starts treatment.    Discussed support system and patient reports a strong support system and both of her children live locally.  She has an advanced directive on file.  She has designated her children as her POA's.  Patient attends [facility] and states that her  has already been up to the hospital to see her.    No psych hx, substance use.     OSW explained role and services.  Patient states that she may need home health at d/c and transportation assistance.  OSW explained that I can assist with both.  Patient's mood was good and she was very engaged in our conversation.  Provided my contact information and will follow up with patient next week.    Thank you,  Zoila Navarrete, MSSW, CSW, OSW-C

## 2018-05-02 NOTE — PLAN OF CARE
Problem: Fall Risk (Adult)  Goal: Absence of Fall  Outcome: Ongoing (interventions implemented as appropriate)      Problem: Patient Care Overview  Goal: Plan of Care Review  Outcome: Ongoing (interventions implemented as appropriate)   05/02/18 6892   Coping/Psychosocial   Plan of Care Reviewed With patient   Plan of Care Review   Progress improving   OTHER   Outcome Summary Pt was scheduled for a PET scan today. Reviewed with patient she was not to have anything to eat or drink after 830am. Meal tray was delivered to patient at lunch and she forgot and ate. Plan is for PET to be done at 730am. Iv's should be stopper 6 hours prior due to dextrose.. Pt also scheduled for port placement tomorrow but is refusing to sign permit until she and family meet with MD in the morning. She has been up to the BR with assist of 1. Will continue to monitor     Goal: Individualization and Mutuality  Outcome: Ongoing (interventions implemented as appropriate)    Goal: Discharge Needs Assessment  Outcome: Ongoing (interventions implemented as appropriate)      Problem: Activity Intolerance (Adult)  Goal: Activity Tolerance  Outcome: Ongoing (interventions implemented as appropriate)    Goal: Effective Energy Conservation Techniques  Outcome: Ongoing (interventions implemented as appropriate)      Problem: Hypertensive Disease/Crisis (Arterial) (Adult)  Goal: Signs and Symptoms of Listed Potential Problems Will be Absent, Minimized or Managed (Hypertensive Disease/Crisis)  Outcome: Ongoing (interventions implemented as appropriate)

## 2018-05-02 NOTE — PROGRESS NOTES
NEPHROLOGY PROGRESS NOTE    PATIENT IDENTIFICATION:   Name:  Martina Blake      MRN:  [id number]     77 y.o.  female             Reason for visit: hypercalcemia    SUBJECTIVE:   Awaiting PET scan (scheduled for tomorrow AM); no SOB; states she is eating ok    OBJECTIVE:  Vitals:    05/02/18 0500 05/02/18 0828 05/02/18 1020 05/02/18 1504   BP: 142/56 136/59 133/67 142/66   BP Location: Right arm  Left arm Left arm   Patient Position: Lying  Lying Lying   Pulse: 66 66 59 61   Resp: 16  16 16   Temp: 97 °F (36.1 °C)  97 °F (36.1 °C) 98.4 °F (36.9 °C)   TempSrc: Oral  Oral Oral   SpO2: 95%  94% 98%   Weight:       Height:               Body mass index is 24.65 kg/m².    Intake/Output Summary (Last 24 hours) at 05/02/18 1614  Last data filed at 05/02/18 1320   Gross per 24 hour   Intake             1902 ml   Output                0 ml   Net             1902 ml     Wt Readings from Last 1 Encounters:   05/01/18 1850 65.1 kg (143 lb 9.6 oz)   04/30/18 0643 66.8 kg (147 lb 4.8 oz)   04/29/18 0652 67.9 kg (149 lb 12.8 oz)   04/28/18 0641 68 kg (150 lb)   04/27/18 0603 64.7 kg (142 lb 9.6 oz)   04/25/18 0516 62.6 kg (138 lb)   04/21/18 1731 68.4 kg (150 lb 12.8 oz)   04/21/18 1227 68 kg (150 lb)     Wt Readings from Last 3 Encounters:   05/01/18 65.1 kg (143 lb 9.6 oz)   04/19/18 68 kg (150 lb)   04/10/18 61.5 kg (135 lb 8 oz)         Exam:  General: NAD, pleasant  Neck with dressing at site of left LN resection  Heart RRR no s3 or rub  Lungs Clear to auscultation; not labored on RA  abd +bs, slightly distended, soft, mildly tender. No guarding or rebound.   Ext no lower ext edema.  Skin, no rash  Psych mood and affect fine    Scheduled meds:      allopurinol 200 mg Oral BID   amLODIPine 10 mg Oral Q24H   escitalopram 5 mg Oral Daily   folic acid 400 mcg Oral Daily   gabapentin 300 mg Oral Nightly   lisinopril 20 mg Oral Q12H   magnesium oxide 400 mg Oral BID   melatonin 5 mg Oral Nightly   metoprolol tartrate 25 mg  Oral Q12H   minoxidil 5 mg Oral Q12H   montelukast 10 mg Oral Daily   pantoprazole 40 mg Oral Q AM   phenytoin 300 mg Oral Nightly   potassium chloride 40 mEq Oral Daily   spironolactone 50 mg Oral BID     IV meds:                          dextrose 5 % and sodium chloride 0.45 % 100 mL/hr Last Rate: 100 mL/hr (05/02/18 0146)       Data Review:      Results from last 7 days  Lab Units 05/02/18  0554 05/01/18 0521 04/30/18 0443 04/26/18 0311   SODIUM mmol/L 137 139 139  < > 138   POTASSIUM mmol/L 3.7 3.5 4.1  < > 3.2*   CHLORIDE mmol/L 100 100 102  < > 101   CO2 mmol/L 25.7 26.1 24.8  < > 25.8   BUN mg/dL 9 8 8  < > 13   CREATININE mg/dL 0.70 0.62 0.60  < > 0.63   CALCIUM mg/dL 10.7* 10.8* 9.8  < > 11.2*   BILIRUBIN mg/dL  --   --   --   --  0.4   ALK PHOS U/L  --   --   --   --  71   ALT (SGPT) U/L  --   --   --   --  13   AST (SGOT) U/L  --   --   --   --  44*   GLUCOSE mg/dL 107* 103* 96  < > 99   < > = values in this interval not displayed.  Estimated Creatinine Clearance: 60.5 mL/min (by C-G formula based on SCr of 0.7 mg/dL).    Results from last 7 days  Lab Units 05/02/18  0554   URIC ACID mg/dL 5.4       Results from last 7 days  Lab Units 05/02/18  0554 05/01/18  0521 04/30/18 0443   MAGNESIUM mg/dL 1.9 1.8 1.9   PHOSPHORUS mg/dL 3.5 3.6 3.2         Results from last 7 days  Lab Units 05/01/18 0521 04/27/18 0454   WBC 10*3/mm3 11.56* 11.48*   HEMOGLOBIN g/dL 8.2* 8.8*   PLATELETS 10*3/mm3 271 301         Results from last 7 days  Lab Units 04/26/18 0311   INR  1.30*             ASSESSMENT:   Principal Problem:    Hypercalcemia  Active Problems:    Gastroesophageal reflux disease    Chronic recurrent major depressive disorder    Restless legs syndrome    Seizure disorder    Essential hypertension    Paroxysmal atrial fibrillation    Iron deficiency anemia due to chronic blood loss    Weakness    Hypertension    Liver mass    Lymphoma    Lymphoma of lymph nodes of neck    1.  Hypercalcemia. Improving,  "all in all, and likely driven by malignancy  2.  Liver mass on renal artery duplex. Widely metastatic disease on CT abdomen. Liver biopsy results pending; concern for lymphoma  3.  Hypokalemia, hypophosphatemia, and hypomagnesemia: variably low.  Multifactorial, with likely contribution from malignancy d/t \"take up\" by malignant cells  4.  Labile HTN: better, all in all  4.  Afib with RVR, with rate now controlled  5.  Recent GIB  6.  Leukocytosis.     PLAN:  1.  K, phos, and Mg being repleted as needed  2.  Chemo anticipated for what is likely lymphoma, so concern for TLS.  Allopurinol started, and pt may need rasburicase    3.  F/u pathology reports  4.  IVF for now, tho will switch to normal saline in face of dropping sNa       Kendall Belle MD  5/2/2018    4:14 PM   "

## 2018-05-02 NOTE — PLAN OF CARE
Problem: Fall Risk (Adult)  Goal: Absence of Fall  Outcome: Ongoing (interventions implemented as appropriate)      Problem: Patient Care Overview  Goal: Plan of Care Review  Outcome: Ongoing (interventions implemented as appropriate)   05/02/18 0248   Coping/Psychosocial   Plan of Care Reviewed With patient   Plan of Care Review   Progress improving   OTHER   Outcome Summary Up to [facility] with assist of one. Incont. briefs in place. Left node biopsy on neck with dressing in place. Awaiting Path. Plan is to get Mediiport placed with chemo soon. PET scan scheduled for today. To be NPO after 0830 today . suspect lymphoma       Problem: Activity Intolerance (Adult)  Goal: Activity Tolerance  Outcome: Ongoing (interventions implemented as appropriate)      Problem: Hypertensive Disease/Crisis (Arterial) (Adult)  Goal: Signs and Symptoms of Listed Potential Problems Will be Absent, Minimized or Managed (Hypertensive Disease/Crisis)  Outcome: Ongoing (interventions implemented as appropriate)

## 2018-05-02 NOTE — CONSULTS
"REASON FOR CONSULT:    [facility]    REQUESTING PHYSICIAN:    Chivo Iraheta M.D.    HISTORY OF PRESENT ILLNESS:    Martina Blake is a 77 y.o. female who omitted to the hospital on 4/21/2018 hypercalcemia, weakness, ataxia, and weight loss.  CT imaging of the chest abdomen and pelvis shows extensive lymphadenopathy and what appears to be metastatic disease involving the spleen liver and pelvic structures.  Yesterday she underwent incisional biopsy of a left posterior cervical lymph node by Dr. Oconnor.  Histopathology and flow cytometry are pending today.  She is scheduled for a PET/CT scan today.    Since admission to the hospital she has been seen by cardiology, gastroenterology, and nephrology.    She still has generalized weakness.  She does not have shortness of breath.  Her appetite is still poor.    Past Medical History:   Diagnosis Date   • Atrial fibrillation    • Cystitis    • Cystocele     moderate   • Dyslipidemia    • Esophageal reflux    • Fatigue    • History of transfusion     no reaction   • Hypertension    • Major depression, chronic    • Menopause    • Osteoarthritis    • Osteoporosis    • Palpitations    • Post menopausal problems    • RLS (restless legs syndrome)    • Seizure disorder    • Subjective tinnitus    • Vaginal delivery     x2  \"SONYA\"      \"JASON\"   • Vitamin D deficiency        Past Surgical History:   Procedure Laterality Date   • CHOLECYSTECTOMY     • COLONOSCOPY     • COLONOSCOPY N/A 4/13/2018    Procedure: COLONOSCOPY to terminal ileum;  Surgeon: Dominik Burt MD;  Location: [facility] ENDOSCOPY;  Service: Gastroenterology   • CRANIOTOMY     • ENDOSCOPY N/A 4/13/2018    Procedure: ESOPHAGOGASTRODUODENOSCOPY with biopsy;  Surgeon: Dominik Burt MD;  Location: [facility] ENDOSCOPY;  Service: Gastroenterology   • TOTAL ABDOMINAL HYSTERECTOMY  1980    w/ bladder suspension.  Probably vaginal hysterectomy with anterior colporrhaphy.          Current Facility-Administered " Medications:   •  acetaminophen (TYLENOL) tablet 650 mg, 650 mg, Oral, Q4H PRN, Danny Oconnor MD, 650 mg at 05/01/18 2125  •  allopurinol (ZYLOPRIM) tablet 200 mg, 200 mg, Oral, BID, Dustin Zee MD, 200 mg at 05/02/18 0829  •  amLODIPine (NORVASC) tablet 10 mg, 10 mg, Oral, Q24H, Danny Oconnor MD, 10 mg at 05/02/18 0829  •  apixaban (ELIQUIS) tablet 5 mg, 5 mg, Oral, Q12H, Danny Oconnor MD, 5 mg at 05/02/18 0829  •  dextrose 5 % and sodium chloride 0.45 % infusion, 100 mL/hr, Intravenous, Continuous, Danny Oconnor MD, Last Rate: 100 mL/hr at 05/02/18 0146, 100 mL/hr at 05/02/18 0146  •  escitalopram (LEXAPRO) tablet 5 mg, 5 mg, Oral, Daily, Danny Oconnor MD, 5 mg at 05/02/18 0828  •  folic acid (FOLVITE) tablet 400 mcg, 400 mcg, Oral, Daily, Danny Oconnor MD, 400 mcg at 05/02/18 0828  •  gabapentin (NEURONTIN) capsule 300 mg, 300 mg, Oral, Nightly, Danny Oconnor MD, 300 mg at 05/01/18 2125  •  hydrALAZINE (APRESOLINE) injection 10 mg, 10 mg, Intravenous, Q6H PRN, Danny Oconnor MD  •  HYDROcodone-acetaminophen (NORCO) 5-325 MG per tablet 1 tablet, 1 tablet, Oral, Q4H PRN, Danny Oconnor MD, 1 tablet at 05/01/18 2321  •  lisinopril (PRINIVIL,ZESTRIL) tablet 20 mg, 20 mg, Oral, Q12H, Danny Oconnor MD, 20 mg at 05/02/18 0829  •  magnesium oxide (MAG-OX) tablet 400 mg, 400 mg, Oral, BID, Danny Oconnor MD, 400 mg at 05/02/18 0829  •  melatonin tablet 5 mg, 5 mg, Oral, Nightly, Danny Oconnor MD, 5 mg at 05/01/18 2125  •  metoprolol tartrate (LOPRESSOR) tablet 25 mg, 25 mg, Oral, Q12H, Danny Oconnor MD, 25 mg at 05/02/18 0829  •  minoxidil (LONITEN) tablet 5 mg, 5 mg, Oral, Q12H, Danny Oconnor MD, 5 mg at 05/02/18 0828  •  montelukast (SINGULAIR) tablet 10 mg, 10 mg, Oral, Daily, Danny Oconnor MD, 10 mg at 05/02/18 0828  •  morphine injection 4 mg, 4 mg, Intravenous, Q2H PRN **AND** naloxone (NARCAN) injection 0.4 mg, 0.4 mg, Intravenous, Q5 Min PRN, Danny Oconnor MD  •  ondansetron  (ZOFRAN) 6.68 mg in sodium chloride 0.9 % 50 mL IVPB, 0.1 mg/kg, Intravenous, Once PRN, Danny Oconnor MD  •  ondansetron (ZOFRAN) injection 4 mg, 4 mg, Intravenous, Q6H PRN, Danny Oconnor MD  •  pantoprazole (PROTONIX) EC tablet 40 mg, 40 mg, Oral, Q AM, Danny Oconnor MD, 40 mg at 18 0715  •  phenytoin (DILANTIN) 125 MG/5ML suspension 300 mg, 300 mg, Oral, Nightly, Danny Oconnor MD, 300 mg at 18 2126  •  potassium chloride (KLOR-CON) packet 40 mEq, 40 mEq, Oral, PRN, Danny Oconnor MD  •  potassium chloride (MICRO-K) CR capsule 40 mEq, 40 mEq, Oral, Daily, Danny Oconnor MD, 40 mEq at 18 0828  •  potassium chloride (MICRO-K) CR capsule 40 mEq, 40 mEq, Oral, PRN, Danny Oconnor MD  •  spironolactone (ALDACTONE) tablet 50 mg, 50 mg, Oral, BID, Danny Oconnor MD, 50 mg at 18 0829    Allergies   Allergen Reactions   • Crab (Diagnostic) Itching and Rash   • Pseudoephedrine Dizziness       Family History   Problem Relation Age of Onset   • No Known Problems Mother    • Stroke Father 54      @ 60    • No Known Problems Maternal Grandmother    • No Known Problems Maternal Grandfather    • Heart disease Paternal Grandmother    • Heart disease Paternal Grandfather    • No Known Problems Daughter    • Stroke Brother 76   • Diabetes type II Brother        Social History     Social History   • Marital status:      Spouse name: JL   • Number of children: 2   • Years of education: N/A     Occupational History   • Not on file.     Social History Main Topics   • Smoking status: Never Smoker   • Smokeless tobacco: Never Used   • Alcohol use No   • Drug use: No   • Sexual activity: Not on file      Comment:  (Jl)     Other Topics Concern   • Not on file     Social History Narrative   • No narrative on file       Review of Systems   Constitutional: Positive for unexpected weight change.   HENT: Negative for trouble swallowing.    Respiratory: Negative for shortness of  "breath.    Cardiovascular: Negative for chest pain.   Gastrointestinal: Positive for abdominal pain and blood in stool.   Genitourinary: Negative for dysuria.       Objective     /59   Pulse 66   Temp 97 °F (36.1 °C) (Oral)   Resp 16   Ht 162.6 cm (64\")   Wt 65.1 kg (143 lb 9.6 oz)   SpO2 95%   BMI 24.65 kg/m²     Physical Exam   Constitutional: She is oriented to person, place, and time. She appears well-developed and well-nourished. No distress.   HENT:   Head: Normocephalic and atraumatic.   Eyes: Conjunctivae are normal. No scleral icterus.   Neck: No JVD present.   There is a 4 x 4 gauze bandage and Tegaderm dressing on the posterior left neck from her cervical node biopsy.  Dressing is dry.   Cardiovascular: Normal rate, normal heart sounds and intact distal pulses.    No murmur heard.  Pulmonary/Chest: Effort normal and breath sounds normal. No respiratory distress. She has no wheezes. She has no rales.   Abdominal: Soft. Bowel sounds are normal. She exhibits no distension. There is no tenderness. There is no guarding.   Musculoskeletal: She exhibits no edema or deformity.   Lymphadenopathy:     She has cervical adenopathy.   Neurological: She is alert and oriented to person, place, and time.   Skin: Skin is warm and dry.   Psychiatric: She has a normal mood and affect. Her behavior is normal.   Vitals reviewed.      DIAGNOSTIC DATA:    I reviewed the reports, images, and pathology from the EGD and colonoscopy performed on 4/13/2018.  I reviewed the pathology report of a liver biopsy performed on 4/26/2018 that shows B-cell lymphoma.  Pathology report from cervical lymph node biopsy on 5/1/2018 is pending.    Creatinine 0.70.    WBC 11.56 yesterday.  Hgb 8.2 yesterday.    ASSESSMENT:    Lymphoma.    PLAN:    She is on Eliquis for atrial fibrillation.  She already took her dose today.  She not willing to consent for PowerPort insertion until her cervical lymph node biopsy results are back and she " and her family can discuss a treatment plan with the oncologists.  Will hold Eliquis tomorrow and Friday morning.  Plan for PowerPort insertion Friday morning.

## 2018-05-02 NOTE — PROGRESS NOTES
"CHIEF COMPLAINT:  Anemia, hypercalcemia, radiographic findings of malignancy with pathology/ diagnosis on the liver consistent with diffuse large B-cell lymphoma FISH for c-myc, bcl-2 and bcl-6 pending    Interval History:  The patient underwent an excisional lymph node biopsy from the left neck this morning for confirmation of pathology.  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  Additional FISH studies are pending to evaluate for \"double hit\" lymphoma as Ki 67 staining is 4+  On 05/02/2018, actually the patient was feeling better.  She had no issues in regard to the site of the lymph node biopsy in the left side of the neck with minimal discomfort.  I talked with Dr. Gill in regard to port placement tomorrow.      Hopefully, we will have further information in regard to “double-hit” lymphoma before we start any chemotherapy.  If that is the case, the patient will require infusional EPOCH/Rituxan.     Other than that, I went through formal education and teaching in regard to chemotherapy medicines, the frequency of the treatment, side effects and so forth; please review below.    The patient will have a PET scan this afternoon as well and the port will be placed tomorrow.      Oncology History:  Martina Blake is a 77 y.o. female who were asked to see in consultation  4/29/18 for hypercalcemia, anemia and radiologic findings consistent with likely malignancy-?  Lymphoma.        She has a significant past medical history of palpitations followed by cardiology.  She presented to the emergency room April 10-14 with chest pain and palpitations and was found to be in A. fib with RVR and also demonstrated electrolyte abnormalities and anemia.  Records demonstrates that her anemia became particularly evident April 11 with an H&H of 8.9/ 28.4, platelet count 212,000 with a normal automated differential.  She " underwent GI assessment including upper and lower endoscopy demonstrated hernia, gastritis, esophagitis, diverticulosis but no evidence of acute bleed.  She was placed on Eliquis as result of a relatively high CHADSVASc score.  She had also been found to be hypercalcemic at approximately 11 with HCTZ altered and the patient started on Aldactone.  Additional testing had included a ferritin of 313.1, iron of 32 with TIBC of 222 and iron saturation of 14, occult blood negative per stool testing    She was brought back to the emergency room April 21 with further evidence of hypercalcemia, associated weakness, anorexia, nausea, ataxia and weight loss.  MRI of the brain April 21 was found to be unremarkable. Outpatient intact PTH levels were found to be 7.1(reduced), and an H&H of 10.3 and 32.9, white count 11,090, platelet count 267,000 with a normal differential.  Patient seen by renal medicine with the possibility of Fanconi syndrome raised.  Thereafter PTH hormone was found be less than 2.0, and protein electropheresis included IgG of 948, IgA of 207, IgM 86, total protein 6.3, albumin 2.8, no M spike noted, slightly elevated free kappa and lambda light chains with normal ratio.  Repeat testing per iron profile again revealed low serum iron but high serum ferritin and normal CEA, normal AFP, CA-125 of 77.2, CA 19-9 7.8   At this point the reason for her hypercalcemia was thought to be possible medication effect and/or possible malignancy with calcium was running between 12 and 13 mg/dL.    This led to CT scan of chest abdomen pelvis performed April 24 demonstrate extensive metastatic disease throughout the abdomen and pelvis particularly involving the spleen and liver, extensive lymphadenopathy at the abdomen and pelvis with a 4 cm lesion splenic hilum partially encasing the pancreatic tail, pancreatic tail malignancy?,  Gastric primary malignancy?  There was a mass along the right wall of the urinary bladder with  "adjacent adenopathy.  CT of the chest revealed several tiny dense pleural-based nodular opacities-partially calcified granulomas, no mediastinal or hilar adenopathy, enlarged pulmonary arteries consistent with pulmonary arterial hypertension.  A subsequent bone scan performed April 26 revealed prominent uptake in the right frontal bone and right posterior ninth rib and a CT needle biopsy of the liver was obtained April 26 as well.The sample obtained revealed, on flow cytometry examination, a subpopulation of normal B cells that might be consistent with B-cell lymphoma.  Additional testing is currently pending.  On May 25 further hypercalcemia was again noted and treated with IV fluids and dose of Zometa, vitamin D level normal.  Again evidence of hypokalemia, hypophosphatemia and hypomagnesemia.  The patient is seen April 29 her calcium level has gradually improved dropping to 9.7, albumin of 2.6, ionized calcium of 6.1, BUN and creatinine of 10/.55.  We are asked to see her with results available thus far as to possible lymphoma.    Past Medical History:     Atrial fibrillation      • Cystitis     • Cystocele       moderate   • Dyslipidemia     • Esophageal reflux     • Fatigue     • History of transfusion       no reaction   • Hypertension     • Major depression, chronic     • Menopause     • Osteoarthritis     • Osteoporosis     • Palpitations     • Post menopausal problems     • RLS (restless legs syndrome)     • Seizure disorder     • Subjective tinnitus     • Vaginal delivery       x2  \"SONYA\"      \"JASON\"   • Vitamin D deficiency      Social History:  The patient is a  and has 2 children.  She has never smoked.  She denies alcohol use.    Review of Systems   Constitutional: Positive for activity change, appetite change, fatigue and unexpected weight change.   HENT: Negative.    Eyes: Negative.    Respiratory: Negative.  Negative for shortness of breath and wheezing.    Cardiovascular: Negative.  Negative " for chest pain and palpitations.   Gastrointestinal: Negative.  Negative for abdominal pain, constipation, nausea and vomiting.   Endocrine: Negative.    Genitourinary: Negative.    Musculoskeletal: Positive for gait problem. Negative for neck stiffness.   Skin: Negative.  Negative for color change and pallor.   Neurological: Positive for weakness. Negative for seizures, numbness and headaches.   Hematological: Positive for adenopathy.   Psychiatric/Behavioral: Negative.           Medications:  The current medication list was reviewed in the EMR    ALLERGIES:    Allergies   Allergen Reactions   • Crab (Diagnostic) Itching and Rash   • Pseudoephedrine Dizziness       Objective      Vitals:    05/02/18 0828   BP: 136/59   Pulse: 66   Resp:    Temp:    SpO2:           Physical Exam    Constitutional: She is oriented to person, place, and time.  LN: there is a left neck bandage  Cardiovascular: Normal rate and regular rhythm.   Extrasystoles are present.   No murmur heard.  Pulmonary/Chest: Effort normal and breath sounds normal. No respiratory distress.   Abdominal: Soft. She exhibits no distension. There is no tenderness.   Neurological:   Strength is mildly clonally reduced  Skin: Skin is warm and dry. No rash noted. There is pallor.   Psychiatric: She has a normal mood and affect. Her behavior is normal.   Nursing note and vitals reviewed    RECENT LABS:  Hematology   Results from last 7 days  Lab Units 05/01/18  0521 04/27/18  0454   WBC 10*3/mm3 11.56* 11.48*   HEMOGLOBIN g/dL 8.2* 8.8*   HEMATOCRIT % 25.7* 27.8*   PLATELETS 10*3/mm3 271 301     2  Lab Results   Component Value Date    GLUCOSE 107 (H) 05/02/2018    BUN 9 05/02/2018    CREATININE 0.70 05/02/2018    EGFRIFNONA 81 05/02/2018    EGFRIFAFRI 92 04/10/2018    BCR 12.9 05/02/2018    CO2 25.7 05/02/2018    CALCIUM 10.7 (H) 05/02/2018    PROTENTOTREF 6.3 04/22/2018    ALBUMIN 2.70 (L) 05/02/2018    LABIL2 0.9 04/26/2018    AST 44 (H) 04/26/2018    ALT 13  "04/26/2018       Lab Results   Component Value Date    IRON 16 (L) 04/24/2018    TIBC 180 04/24/2018    FERRITIN 411.40 (H) 04/24/2018       Lab Results   Component Value Date    SKLHQRZF74 898 04/02/2018     CRP 11.46  ESR 47           Assessment/Plan   1.  Hypercalcemia of malignancy:  The patient received Zometa 4 mg on 4/25/18.  Her calcium temporarily improved but is beginning to elevate again today to 10.8 with albumin 3.0.  Hypercalcemia is likely to be an ongoing issue until we can treat the underlying lymphoma causing the problem    2.  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  Additional FISH studies are pending to evaluate for \"double hit\" lymphoma as Ki 67 staining is 4+     She has extensive metastatic disease throughout the abdomen and pelvis, splenomegaly replaced with low attenuation lesions, 4.4 cm splenic hilum mass, disease encasing the pancreatic tail, 3 cm lesion abutting the stomach, extensive lymphadenopathy throughout the celiac axis, retroperitoneum, mesentery, periaortic lymphadenopathy, liver lesion in the posterior segment 4.8 cm, nodular thickening of the right wall of urinary bladder 5.0 cm, peritoneal lymphadenopathy and thickening, coarsely calcified mesenteric mass 9.6 cm (?old mesenteric adenitis).  Bone scan shows foci of uptake in the frontal bone and right posterior ninth rib.      The patient has had B symptoms with decreased performance status, and weight loss, anorexia.    I discussed today with the patient and her son her liver biopsy results which showed diffuse large B-cell lymphoma.  She underwent a cervical lymph node excisional biopsy today to confirm disease.  Given the patient's refractory hypercalcemia and B symptoms she will need treatment while in the hospital.  I recommended we proceed with a PET scan for complete staging.  I did not pursue a bone marrow exam as with likely not change her treatment.  We could consider a lumbar puncture given " "her advanced stage, particularly consider lumbar puncture to rule out CNS involvement if disease returns positive for C-myc, BCL-2 or BCL- 6    I recommended a power port placement to facilitate chemotherapy.    I reviewed with the patient and her son systemic chemotherapy with CHOP R including risk and side effects of this regimen including but not limited to alopecia, worsening fatigue, neuropathy, cardiac toxicity, myelosuppression, risk of infection, possible need of transfusion support etc.  I explained to the patient if her FISH studies returned consistent with a \"double hit\" lymphoma alternative therapy may be required.  The patient is anxious to proceed with treatment.  She has had a 2-D echocardiogram for 1118 with ejection fraction 67%.  Hepatitis B testing is negative/normal.    3.  Leukocytosis/anemia:  Probably inflammatory/malignancy associated (elevated ferritin, low transferrin, elevated ESR) versus bone marrow involvement?.  I do not think a bone marrow exam would change her treatment plan.  We will monitor the CBC.    4.  Paroxysmal atrial fibrillation: The patient is anticoagulated with Eliquis.  Her platelet count will need to be monitored carefully after chemotherapy    Recommendations:  1.  Patient has again been irritated by me in regard to the frequency of chemotherapy medicine administration every 3 weeks with the goal to put the disease away in remission as far as we can and maybe even consider the possibility of curing.  Obviously, the cure depends on the final pathological analysis.  If this is a double-hit lymphoma I doubt that will be the case.  Nevertheless, I think the patient deserves to have the opportunity of being treated and see how things evolve.    2.  I discussed with the patient that independent of the chemotherapy regimen that is picked, the likelihood of her having tumor lysis syndrome is very high.  For this reason, I have already talked with the Pharmacist in regard to " the RUBIRICASE that she will be require to be given before the initiation of chemotherapy.  3.  Patient will have formal chemotherapy education, teaching and consent today by the Pharmacist and nurses.  Obviously, the final regimen will be dependent of the final pathological analysis.  The entity of the regimen will be CHOP/Rituxan.  The patient is aware that on the 1st day of infusion she will require medication for almost 8 hours and she will require treatment every 3 weeks.  If indeed she has a double-hit lymphoma, future treatments will need to be administered in the hospital.  If she does not have a double-hit lymphoma future treatments could be administered in the office.  4.  I discussed with her the side effects of the treatment including alopecia, nausea, vomiting, anemia, leukopenia, thrombocytopenia, immunodeficiency as well as tumor lysis syndrome, peripheral neuropathy, constipation, diarrhea and alopecia among others.  She was ready to proceed.  5.  I discussed with Dr. Gill the fact that it will be okay to hold off on the anticoagulant, Eliquis, until the patient undergoes placement of a port tomorrow.    6.  Patient will have a PET scan this afternoon.  This will be useful in regard to the determination of level of SUV activity besides location of tumors.  We know that this is in the bone and liver, spleen and intraabdominal organs and also in the cervical area.  Very likely this represents stage IV disease anyway given the fact that there is presence of this in the bone and she has associated hypercalcemia malignancy.    7.  Patient in preparation for the PET scan will hold off on IV fluids and after she returns will be started on hydration prechemotherapy with dextrose and sodium bicarbonate solution.  Maybe she will require placement of a Roberson catheter to allow proper diuresis to ensure and minimize the potentially for tumor lysis syndrome.  8.  I discussed with the Pharmacist that she will  require special prophylactic medication for tumor lysis syndrome.  9.  Patient will require during the first 3 days of treatment, blood work every 6 hours that will monitor labs in regard to tumor lysis syndrome that will include a chemistry profile, LDH, uric acid and phosphorus.    10. Patient had formal education in regard to all these things, but we will review this with the family tomorrow. The patient will ask the kids to be here tomorrow morning for me to discuss these issues with them.                      5/2/2018      CC:

## 2018-05-02 NOTE — NURSING NOTE
Explained to patient she was not to have anything to eat or drink after 830am.  Verbalized understanding. Noted at lunch a tray was delivered and pt had eaten.  Dr. Cruz notified and pt to be scheduled in AM.  She is to be NPO after midnight and test will be dont at 730am.  IVF's will need to be stopped at least 6 hours prior to test due to dextrose.

## 2018-05-03 ENCOUNTER — APPOINTMENT (OUTPATIENT)
Dept: PET IMAGING | Facility: HOSPITAL | Age: 78
End: 2018-05-03

## 2018-05-03 LAB
ALBUMIN SERPL-MCNC: 2.4 G/DL (ref 3.5–5.2)
ANION GAP SERPL CALCULATED.3IONS-SCNC: 9.4 MMOL/L
BUN BLD-MCNC: 9 MG/DL (ref 8–23)
BUN/CREAT SERPL: 12.7 (ref 7–25)
CALCIUM SPEC-SCNC: 13 MG/DL (ref 8.6–10.5)
CHLORIDE SERPL-SCNC: 100 MMOL/L (ref 98–107)
CO2 SERPL-SCNC: 27.6 MMOL/L (ref 22–29)
CREAT BLD-MCNC: 0.71 MG/DL (ref 0.57–1)
CYTO UR: NORMAL
DX PRELIMINARY: NORMAL
GFR SERPL CREATININE-BSD FRML MDRD: 80 ML/MIN/1.73
GLUCOSE BLD-MCNC: 111 MG/DL (ref 65–99)
GLUCOSE BLDC GLUCOMTR-MCNC: 110 MG/DL (ref 70–130)
LAB AP CASE REPORT: NORMAL
LAB AP CLINICAL INFORMATION: NORMAL
LAB AP SPECIAL STAINS: NORMAL
Lab: NORMAL
Lab: NORMAL
MAGNESIUM SERPL-MCNC: 1.9 MG/DL (ref 1.6–2.4)
PATH REPORT.ADDENDUM SPEC: NORMAL
PATH REPORT.FINAL DX SPEC: NORMAL
PATH REPORT.GROSS SPEC: NORMAL
PHOSPHATE SERPL-MCNC: 4 MG/DL (ref 2.5–4.5)
POTASSIUM BLD-SCNC: 3.9 MMOL/L (ref 3.5–5.2)
SODIUM BLD-SCNC: 137 MMOL/L (ref 136–145)
URATE SERPL-MCNC: 4.3 MG/DL (ref 2.4–5.7)

## 2018-05-03 PROCEDURE — 83735 ASSAY OF MAGNESIUM: CPT | Performed by: INTERNAL MEDICINE

## 2018-05-03 PROCEDURE — A9552 F18 FDG: HCPCS | Performed by: HOSPITALIST

## 2018-05-03 PROCEDURE — 99233 SBSQ HOSP IP/OBS HIGH 50: CPT | Performed by: INTERNAL MEDICINE

## 2018-05-03 PROCEDURE — 25810000003 DEXTROSE-NACL PER 500 ML: Performed by: INTERNAL MEDICINE

## 2018-05-03 PROCEDURE — 84550 ASSAY OF BLOOD/URIC ACID: CPT | Performed by: INTERNAL MEDICINE

## 2018-05-03 PROCEDURE — 80069 RENAL FUNCTION PANEL: CPT | Performed by: INTERNAL MEDICINE

## 2018-05-03 PROCEDURE — 78815 PET IMAGE W/CT SKULL-THIGH: CPT

## 2018-05-03 PROCEDURE — 82962 GLUCOSE BLOOD TEST: CPT

## 2018-05-03 PROCEDURE — 0 FLUDEOXYGLUCOSE F18 SOLUTION: Performed by: HOSPITALIST

## 2018-05-03 PROCEDURE — 99231 SBSQ HOSP IP/OBS SF/LOW 25: CPT | Performed by: SURGERY

## 2018-05-03 RX ADMIN — LISINOPRIL 20 MG: 20 TABLET ORAL at 21:51

## 2018-05-03 RX ADMIN — Medication 400 MG: at 11:15

## 2018-05-03 RX ADMIN — AMLODIPINE BESYLATE 10 MG: 10 TABLET ORAL at 11:16

## 2018-05-03 RX ADMIN — POTASSIUM CHLORIDE 40 MEQ: 750 CAPSULE, EXTENDED RELEASE ORAL at 11:14

## 2018-05-03 RX ADMIN — ALLOPURINOL 200 MG: 100 TABLET ORAL at 21:51

## 2018-05-03 RX ADMIN — Medication 5 MG: at 21:50

## 2018-05-03 RX ADMIN — PHENYTOIN 300 MG: 125 SUSPENSION ORAL at 21:51

## 2018-05-03 RX ADMIN — LISINOPRIL 20 MG: 20 TABLET ORAL at 11:15

## 2018-05-03 RX ADMIN — SPIRONOLACTONE 50 MG: 50 TABLET ORAL at 17:17

## 2018-05-03 RX ADMIN — FLUDEOXYGLUCOSE F18 1 DOSE: 300 INJECTION INTRAVENOUS at 08:30

## 2018-05-03 RX ADMIN — ESCITALOPRAM 5 MG: 5 TABLET, FILM COATED ORAL at 11:15

## 2018-05-03 RX ADMIN — MONTELUKAST 10 MG: 10 TABLET, FILM COATED ORAL at 11:14

## 2018-05-03 RX ADMIN — METOPROLOL TARTRATE 25 MG: 25 TABLET ORAL at 21:50

## 2018-05-03 RX ADMIN — MINOXIDIL 5 MG: 2.5 TABLET ORAL at 11:15

## 2018-05-03 RX ADMIN — Medication 400 MG: at 21:54

## 2018-05-03 RX ADMIN — SPIRONOLACTONE 50 MG: 50 TABLET ORAL at 11:16

## 2018-05-03 RX ADMIN — ALLOPURINOL 200 MG: 100 TABLET ORAL at 11:16

## 2018-05-03 RX ADMIN — ACETAMINOPHEN 650 MG: 325 TABLET ORAL at 22:21

## 2018-05-03 RX ADMIN — GABAPENTIN 300 MG: 300 CAPSULE ORAL at 22:21

## 2018-05-03 RX ADMIN — MINOXIDIL 5 MG: 2.5 TABLET ORAL at 21:50

## 2018-05-03 RX ADMIN — ACETAMINOPHEN 400 MCG: 400 TABLET ORAL at 11:14

## 2018-05-03 RX ADMIN — METOPROLOL TARTRATE 25 MG: 25 TABLET ORAL at 11:15

## 2018-05-03 RX ADMIN — DEXTROSE AND SODIUM CHLORIDE 100 ML/HR: 5; 900 INJECTION, SOLUTION INTRAVENOUS at 17:17

## 2018-05-03 NOTE — PROGRESS NOTES
"CHIEF COMPLAINT:  Anemia, hypercalcemia, radiographic findings of malignancy with pathology/ diagnosis on the liver consistent with diffuse large B-cell lymphoma FISH for c-myc, bcl-2 and bcl-6 pending    Interval History:  The patient underwent an excisional lymph node biopsy from the left neck this morning for confirmation of pathology.  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  Additional FISH studies are pending to evaluate for \"double hit\" lymphoma as Ki 67 staining is 4+  On 05/02/2018, actually the patient was feeling better.  She had no issues in regard to the site of the lymph node biopsy in the left side of the neck with minimal discomfort.  I talked with Dr. Gill in regard to port placement tomorrow.      Hopefully, we will have further information in regard to “double-hit” lymphoma before we start any chemotherapy.  If that is the case, the patient will require infusional EPOCH/Rituxan.     Other than that, I went through formal education and teaching in regard to chemotherapy medicines, the frequency of the treatment, side effects and so forth; please review below.    The patient will have a PET scan this afternoon as well and the port will be placed tomorrow.  On 05/03/2018 the patient was feeling fatigued and tired from so many testings back and forth. She had her PET scan this morning and there is planning for port placement tomorrow. I asked the patient to bring her family this afternoon to talk with them about the diagnosis and decide how they want to proceed. I pointed out to the patient as well that the absence of treatment life expectancy is measured in a question of a few weeks to a few months.     I also discussed with her the finding on the PET scan today that will be described below and also the finding of the cervical lymph node that will be described below.         Oncology " History:  Martina Blake is a 77 y.o. female who were asked to see in consultation  4/29/18 for hypercalcemia, anemia and radiologic findings consistent with likely malignancy-?  Lymphoma.        She has a significant past medical history of palpitations followed by cardiology.  She presented to the emergency room April 10-14 with chest pain and palpitations and was found to be in A. fib with RVR and also demonstrated electrolyte abnormalities and anemia.  Records demonstrates that her anemia became particularly evident April 11 with an H&H of 8.9/ 28.4, platelet count 212,000 with a normal automated differential.  She underwent GI assessment including upper and lower endoscopy demonstrated hernia, gastritis, esophagitis, diverticulosis but no evidence of acute bleed.  She was placed on Eliquis as result of a relatively high CHADSVASc score.  She had also been found to be hypercalcemic at approximately 11 with HCTZ altered and the patient started on Aldactone.  Additional testing had included a ferritin of 313.1, iron of 32 with TIBC of 222 and iron saturation of 14, occult blood negative per stool testing    She was brought back to the emergency room April 21 with further evidence of hypercalcemia, associated weakness, anorexia, nausea, ataxia and weight loss.  MRI of the brain April 21 was found to be unremarkable. Outpatient intact PTH levels were found to be 7.1(reduced), and an H&H of 10.3 and 32.9, white count 11,090, platelet count 267,000 with a normal differential.  Patient seen by renal medicine with the possibility of Fanconi syndrome raised.  Thereafter PTH hormone was found be less than 2.0, and protein electropheresis included IgG of 948, IgA of 207, IgM 86, total protein 6.3, albumin 2.8, no M spike noted, slightly elevated free kappa and lambda light chains with normal ratio.  Repeat testing per iron profile again revealed low serum iron but high serum ferritin and normal CEA, normal AFP, CA-125 of  77.2, CA 19-9 7.8   At this point the reason for her hypercalcemia was thought to be possible medication effect and/or possible malignancy with calcium was running between 12 and 13 mg/dL.    This led to CT scan of chest abdomen pelvis performed April 24 demonstrate extensive metastatic disease throughout the abdomen and pelvis particularly involving the spleen and liver, extensive lymphadenopathy at the abdomen and pelvis with a 4 cm lesion splenic hilum partially encasing the pancreatic tail, pancreatic tail malignancy?,  Gastric primary malignancy?  There was a mass along the right wall of the urinary bladder with adjacent adenopathy.  CT of the chest revealed several tiny dense pleural-based nodular opacities-partially calcified granulomas, no mediastinal or hilar adenopathy, enlarged pulmonary arteries consistent with pulmonary arterial hypertension.  A subsequent bone scan performed April 26 revealed prominent uptake in the right frontal bone and right posterior ninth rib and a CT needle biopsy of the liver was obtained April 26 as well.The sample obtained revealed, on flow cytometry examination, a subpopulation of normal B cells that might be consistent with B-cell lymphoma.  Additional testing is currently pending.  On May 25 further hypercalcemia was again noted and treated with IV fluids and dose of Zometa, vitamin D level normal.  Again evidence of hypokalemia, hypophosphatemia and hypomagnesemia.  The patient is seen April 29 her calcium level has gradually improved dropping to 9.7, albumin of 2.6, ionized calcium of 6.1, BUN and creatinine of 10/.55.  We are asked to see her with results available thus far as to possible lymphoma.    Past Medical History:     Atrial fibrillation      • Cystitis     • Cystocele       moderate   • Dyslipidemia     • Esophageal reflux     • Fatigue     • History of transfusion       no reaction   • Hypertension     • Major depression, chronic     • Menopause     •  "Osteoarthritis     • Osteoporosis     • Palpitations     • Post menopausal problems     • RLS (restless legs syndrome)     • Seizure disorder     • Subjective tinnitus     • Vaginal delivery       x2  \"SONYA\"      \"JASON\"   • Vitamin D deficiency      Social History:  The patient is a  and has 2 children.  She has never smoked.  She denies alcohol use.    Review of Systems   Constitutional: Positive for activity change, appetite change, fatigue and unexpected weight change.   HENT: Negative.    Eyes: Negative.    Respiratory: Negative.  Negative for shortness of breath and wheezing.    Cardiovascular: Negative.  Negative for chest pain and palpitations.   Gastrointestinal: Negative.  Negative for abdominal pain, constipation, nausea and vomiting.   Endocrine: Negative.    Genitourinary: Negative.    Musculoskeletal: Positive for gait problem. Negative for neck stiffness.   Skin: Negative.  Negative for color change and pallor.   Neurological: Positive for weakness. Negative for seizures, numbness and headaches.   Hematological: Positive for adenopathy.   Psychiatric/Behavioral: Negative.           Medications:  The current medication list was reviewed in the EMR    ALLERGIES:    Allergies   Allergen Reactions   • Crab (Diagnostic) Itching and Rash   • Pseudoephedrine Dizziness       Objective      Vitals:    05/03/18 1112   BP: 159/69   Pulse: 71   Resp: 18   Temp: 98.4 °F (36.9 °C)   SpO2: 93%          Physical Exam    GENERAL:  Well-developed, well-nourished  Patient  in no acute distress.   SKIN:  Warm, dry without rashes, purpura or petechiae.  HEENT:  Pupils were equal and reactive to light and accomodation, conjunctivas non injected, no pterigion, normal extraocular movements, normal visual acuity.   Mouth mucosa was moist, no exudates in oropharynx, normal gum line, normal roof of the mouth and pillars, normal papillations of the tongue.  NECK:  Supple with good range of motion; no thyromegaly no change in " left cervical wound and node 2 cm in size;, no JVD or bruits, no cervical adenopathies.No carotid arteries pain, no carotid abnormal pulsation or arterial dance.  LYMPHATICS:  No cervical, supraclavicular, axillary, epitrochlear or inguinal adenopathy.  CHEST:  Normal excursion of both jaylen thoraces, normal voice fremitus, no subcutaneous emphysema, normal axillas, no rashes or acanthosis nigricans. Lungs clear to percussion and auscultation, normal breath sounds bilaterally, no wheezing, crackles or ronchi, no stridor, no rubs.  CARDIAC AND VASCULAR: normal rate and regular rhythm, without murmurs, rubs or S3 or S4 right or left sided gallops. Normal femoral, popliteal, pedis, brachial and carotid pulses.  ABDOMEN:  Soft, nontender with spleen 4 cm blcm organomegaly , palpable liver 4 cm brcm., no ascites, no collateral circulation,no distention,no Carmel sign, no abdominal pain, no inguinal hernias,no umbilical hernias, no abdominal bruits. Normal bowel sounds.  GENITAL: Not  Performed.  EXTREMITIES  AND SPINE:  No clubbing, cyanosis or edema, no deformities or pain .No kyphosis, scoliosis, deformities or pain in spine, ribs or pelvic bone.  NEUROLOGICAL:  Patient was awake, alert, oriented to time, person and place      RECENT LABS:  Hematology   Results from last 7 days  Lab Units 05/01/18  0521 04/27/18  0454   WBC 10*3/mm3 11.56* 11.48*   HEMOGLOBIN g/dL 8.2* 8.8*   HEMATOCRIT % 25.7* 27.8*   PLATELETS 10*3/mm3 271 301     2  Lab Results   Component Value Date    GLUCOSE 111 (H) 05/03/2018    BUN 9 05/03/2018    CREATININE 0.71 05/03/2018    EGFRIFNONA 80 05/03/2018    EGFRIFAFRI 92 04/10/2018    BCR 12.7 05/03/2018    CO2 27.6 05/03/2018    CALCIUM 13.0 (H) 05/03/2018    PROTENTOTREF 6.3 04/22/2018    ALBUMIN 2.40 (L) 05/03/2018    LABIL2 0.9 04/26/2018    AST 44 (H) 04/26/2018    ALT 13 04/26/2018       Lab Results   Component Value Date    IRON 16 (L) 04/24/2018    TIBC 180 04/24/2018    FERRITIN 411.40 (H)  04/24/2018       Lab Results   Component Value Date    KBHTXUEV34 898 04/02/2018          F-18 FDG PET FROM SKULL BASE TO MID THIGH WITH PET/CT FUSION     HISTORY: 77-year-old female with lymphoma. Initial staging.     TECHNIQUE: Radiation dose reduction techniques were utilized, including  automated exposure control and exposure modulation based on body size.   Blood glucose level at time of injection was 110 mg/dL.  5.2 mCi of F-18  FDG were injected and PET was performed from skull base to mid thigh. CT  was obtained for localization and attenuation correction. Time at  injection 8:30 AM. PET start time 10:17 AM. Compared with previous CTs  from 04/24/2018 as well as neck CT from 04/29/2018.     FINDINGS: There is intense hypermetabolic activity throughout the spleen  with a maximal SUV of 16.9. There is hypermetabolic confluent  lymphadenopathy at the splenic hilum and pancreatic tail as well as  hypermetabolic lymphadenopathy throughout the upper abdomen and  retroperitoneum. The left para-aortic lymphadenopathy has a maximal SUV  of 13.1. There is a hypermetabolic approximately 5 cm lesion within the  inferior aspect of the right hepatic lobe with a maximal SUV of 12.8.  There are hypermetabolic nodes along the right pelvic sidewall and right  external iliac chain. The largest node along the right external iliac  chain measuring 4.0 x 2.7 cm is nearly photopenic except for a much  smaller nodular focus of hypermetabolic activity at the superior margin  of the node. There are also hypermetabolic mesenteric nodes within the  right aspect of the pelvis. Assessment of bladder tumors with PET is  limited as there is excretion of the radiopharmaceutical within the  bladder, but the plaque-like right bladder wall thickening does appear  to be more hypermetabolic than the contents of the urinary bladder. The  coarsely calcified mesenteric lesion is photopenic. Within the chest,  there is a hypermetabolic node along  the azygos vein and shotty nodes at  the rhett have low level activity. There is no hypermetabolic axillary  lymphadenopathy. Within the neck, there is hypermetabolic  lymphadenopathy in the left posterior cervical triangle with a maximal  SUV of 11.2. There are also multiple hypermetabolic bone lesions. The  largest involves the left intertrochanteric region of the femur with a  maximal SUV of 18.7. There is faint sclerosis on the corresponding CT  sequence. There are also hypermetabolic bone lesions scattered  throughout the axial skeleton, ribs, right scapula and there is patchy  metabolic activity throughout the pelvis. There is a somewhat  ill-defined lesion within the left gluteal musculature measuring  approximately 4 cm, previously nearly 5 cm and there is hypermetabolic  activity predominantly in the periphery with a maximal SUV of 5.9. IV  infiltration is noted at the right upper extremity injection site.     IMPRESSION:  1. There is extensive metastatic disease involving the spleen and there  is hypermetabolic lymphadenopathy within the left aspect of the neck,  mediastinum, abdomen, and pelvis. The approximately 5 cm right hepatic  lobe liver mass is intensely hypermetabolic as well. The right bladder  wall thickening is more hypermetabolic than the urinary contents and  likely represents malignancy. There are multiple bone metastases as  described. Along the axial skeleton, T8 is involved to the greatest  degree and there is also a sizable metastasis at the intertrochanteric  region of the left femur.  2. The approximately 4 cm lesion within the left buttock musculature is  smaller than previously and may represent a resolving hematoma. Pattern  of the metabolic activity along the periphery of the lesion can be seen  with hematomas.  3. The coarsely calcified mesenteric mass is photopenic and is likely  benign.   Tissue Pathology Exam   Order: [id number]   Status:  Preliminary result   Visible to patient:  " No (Not Released)   Dx:  Lymphoma of lymph nodes of neck, unsp...    2d ago   Preliminary Diagnosis   LEFT POSTERIOR CERVICAL LYMPH NODE:                ATYPICAL LYMPHOID INFILTRATE WORRISOME FOR LYMPHOPROLIFERATIVE DISORDER.      COMMENT:  Paraffin block will be forwarded to [facility] for additional studies to further classify the lymphoid infiltrate and to correlate with flow cytometry.       CMK/brb  [facility]     CPT CODES:  1.  15362   Preliminary result electronically signed by Carlos Bridges MD on 5/3/2018 at 0748   Preliminary result electronically signed by Carlos Bridges MD on 5/2/2018 at 1453   Gross Description See result belowP    Received fresh without formalin labeled \"left hip posterior cervical lymph node\" are two pink tan fragments of tissue consistent with lymph node measuring 1.2 x 0.9 x 0.9 cm in aggregate. A representative section of each of the tissue fragments is submitted in transport media for flow cytometry studies. The remaining tissue is serially sectioned and entirely submitted in a single block labeled 1A for routine histologic examination.  CC/USO/CMK/th    Microscopic Description See result belowP    Microscopic performed, incorporated in diagnosis.    Scan on 5/2/2018  2:01 PM by Krista Barraza : FLOW CYTOMETRY ANALYSISScan on 5/2/2018  2:01 PM by Krista Barraza : FLOW CYTOMETRY ANALYSIS      Resulting Agency [facility]                 Assessment/Plan   1.  Hypercalcemia of malignancy:  The patient received Zometa 4 mg on 4/25/18.  Her calcium temporarily improved but is beginning to elevate again today to 10.8 with albumin 3.0.  Hypercalcemia is likely to be an ongoing issue until we can treat the underlying lymphoma causing the problem    2.  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  Additional FISH studies are pending to evaluate for \"double hit\" lymphoma as Ki 67 staining is 4+     She has extensive metastatic disease throughout " "the abdomen and pelvis, splenomegaly replaced with low attenuation lesions, 4.4 cm splenic hilum mass, disease encasing the pancreatic tail, 3 cm lesion abutting the stomach, extensive lymphadenopathy throughout the celiac axis, retroperitoneum, mesentery, periaortic lymphadenopathy, liver lesion in the posterior segment 4.8 cm, nodular thickening of the right wall of urinary bladder 5.0 cm, peritoneal lymphadenopathy and thickening, coarsely calcified mesenteric mass 9.6 cm (?old mesenteric adenitis).  Bone scan shows foci of uptake in the frontal bone and right posterior ninth rib.      The patient has had B symptoms with decreased performance status, and weight loss, anorexia.    I discussed today with the patient and her son her liver biopsy results which showed diffuse large B-cell lymphoma.  She underwent a cervical lymph node excisional biopsy today to confirm disease.  Given the patient's refractory hypercalcemia and B symptoms she will need treatment while in the hospital.  I recommended we proceed with a PET scan for complete staging.  I did not pursue a bone marrow exam as with likely not change her treatment.  We could consider a lumbar puncture given her advanced stage, particularly consider lumbar puncture to rule out CNS involvement if disease returns positive for C-myc, BCL-2 or BCL- 6    I recommended a power port placement to facilitate chemotherapy.    I reviewed with the patient and her son systemic chemotherapy with CHOP R including risk and side effects of this regimen including but not limited to alopecia, worsening fatigue, neuropathy, cardiac toxicity, myelosuppression, risk of infection, possible need of transfusion support etc.  I explained to the patient if her FISH studies returned consistent with a \"double hit\" lymphoma alternative therapy may be required.  The patient is anxious to proceed with treatment.  She has had a 2-D echocardiogram for 1118 with ejection fraction 67%.  Hepatitis " B testing is negative/normal.    3.  Leukocytosis/anemia:  Probably inflammatory/malignancy associated (elevated ferritin, low transferrin, elevated ESR) versus bone marrow involvement?.  I do not think a bone marrow exam would change her treatment plan.  We will monitor the CBC.    4.  Paroxysmal atrial fibrillation: The patient is anticoagulated with Eliquis.  Her platelet count will need to be monitored carefully after chemotherapy    Recommendations:  1.  Patient has again been irritated by me in regard to the frequency of chemotherapy medicine administration every 3 weeks with the goal to put the disease away in remission as far as we can and maybe even consider the possibility of curing.  Obviously, the cure depends on the final pathological analysis.  If this is a double-hit lymphoma I doubt that will be the case.  Nevertheless, I think the patient deserves to have the opportunity of being treated and see how things evolve.    2.  I discussed with the patient that independent of the chemotherapy regimen that is picked, the likelihood of her having tumor lysis syndrome is very high.  For this reason, I have already talked with the Pharmacist in regard to the RUBIRICASE that she will be require to be given before the initiation of chemotherapy.  3.  Patient will have formal chemotherapy education, teaching and consent today by the Pharmacist and nurses.  Obviously, the final regimen will be dependent of the final pathological analysis.  The entity of the regimen will be CHOP/Rituxan.  The patient is aware that on the 1st day of infusion she will require medication for almost 8 hours and she will require treatment every 3 weeks.  If indeed she has a double-hit lymphoma, future treatments will need to be administered in the hospital.  If she does not have a double-hit lymphoma future treatments could be administered in the office.  4.  I discussed with her the side effects of the treatment including alopecia,  nausea, vomiting, anemia, leukopenia, thrombocytopenia, immunodeficiency as well as tumor lysis syndrome, peripheral neuropathy, constipation, diarrhea and alopecia among others.  She was ready to proceed.  5.  I discussed with Dr. Gill the fact that it will be okay to hold off on the anticoagulant, Eliquis, until the patient undergoes placement of a port tomorrow.    6.  Patient will have a PET scan this afternoon.  This will be useful in regard to the determination of level of SUV activity besides location of tumors.  We know that this is in the bone and liver, spleen and intraabdominal organs and also in the cervical area.  Very likely this represents stage IV disease anyway given the fact that there is presence of this in the bone and she has associated hypercalcemia malignancy.    7.  Patient in preparation for the PET scan will hold off on IV fluids and after she returns will be started on hydration prechemotherapy with dextrose and sodium bicarbonate solution.  Maybe she will require placement of a Roberson catheter to allow proper diuresis to ensure and minimize the potentially for tumor lysis syndrome.  8.  I discussed with the Pharmacist that she will require special prophylactic medication for tumor lysis syndrome.  9.  Patient will require during the first 3 days of treatment, blood work every 6 hours that will monitor labs in regard to tumor lysis syndrome that will include a chemistry profile, LDH, uric acid and phosphorus.    10. Patient had formal education in regard to all these things, but we will review this with the family tomorrow. The patient will ask the kids to be here tomorrow morning for me to discuss these issues with them.    I discussed with the patient today the findings on the PET scan that are very dramatic. She has multiple sites of disease through her skeleton, her spleen, her liver, lymph nodes, in many places including the pelvic area and also disease in her spine and left  femur. Again the SUV activity in all of these areas is very high consistent with her diagnosis.     I reviewed with the patient the findings of the report of the cervical lymph node that contains similar material to the one in the liver biopsy and definitive morphological diagnosis and immunohistochemistry diagnosis will be completed probably tomorrow. This will not change whatever we get to do in regard to treatment.     Theoretically the patient is scheduled to have a port placed tomorrow and then proceed with definitive chemotherapy. The patient has requested an appointment to be seen today with all of her family members coming around 2-o'clock to discuss the status of disease, diagnosis, options of therapy, other options including doing nothing and overall survival. This will be discussed with them in detail and an addendum will be done to this note once that this happens.     The patient understands that in absence of any therapy life expectancy is measured in a few weeks. The patient also understands that treatment can provide her quantity of life and quality of life that she no longer has.       I underwent through the logistics of the therapy, the explanation about the staging of the lymphoma, the side effects of each one of the chemotherapy medicines in detail, the need for placement of a port if she wants to proceed with definitive chemotherapy and I utilized almost 1 hour of my time discussing this with the patient’s daughter, the patient and the patient’s son. After each one of the questions were answered to the best of possibilities, they made the decision and the patient also made the decision to go ahead and proceed with port placement tomorrow. In expectation for this, she will go to the ER in the morning, and after that will initiate hydration with sodium bicarbonate in prevention of tumor lysis syndrome.     I discussed also the case with the nurse who is looking after the patient and as well as  the pharmacist.                    5/3/2018      CC:

## 2018-05-03 NOTE — PROGRESS NOTES
"DAILY PROGRESS NOTE  [facility]    Patient Identification:  Name: Martina Blake  Age: 77 y.o.  Sex: female  :  1940  MRN: [id number]         Primary Care Physician: Jamie Walton, DO      Subjective  No c/o.     Objective:  General Appearance:  Comfortable, well-appearing, in no acute distress and not in pain.    Vital signs: (most recent): Blood pressure 143/56, pulse 67, temperature 97.9 °F (36.6 °C), temperature source Oral, resp. rate 18, height 162.6 cm (64\"), weight 65.1 kg (143 lb 9.6 oz), SpO2 96 %.    Lungs:  Normal effort and normal respiratory rate.  Breath sounds clear to auscultation.    Heart: Normal rate.  Regular rhythm.    Extremities: There is no dependent edema.    Neurological: Patient is alert and oriented to person, place and time.    Skin:  Warm and dry.                Vital signs in last 24 hours:  Temp:  [97 °F (36.1 °C)-98.4 °F (36.9 °C)] 97.9 °F (36.6 °C)  Heart Rate:  [59-77] 67  Resp:  [16-18] 18  BP: (123-143)/(56-67) 143/56    Intake/Output:    Intake/Output Summary (Last 24 hours) at 18  Last data filed at 18 1826   Gross per 24 hour   Intake             2580 ml   Output                0 ml   Net             2580 ml           Results from last 7 days  Lab Units 18  0521 18  0454   WBC 10*3/mm3 11.56* 11.48*   HEMOGLOBIN g/dL 8.2* 8.8*   PLATELETS 10*3/mm3 271 301     Results from last 7 days  Lab Units 18  0554 18  0521 18  0443 18  2121 18  0424 18  0428 18  0454  18  0311   SODIUM mmol/L 137 139 139  --  138 138 139  --  138   POTASSIUM mmol/L 3.7 3.5 4.1 4.3 3.3* 4.0 3.5  < > 3.2*   CHLORIDE mmol/L 100 100 102  --  102 103 104  --  101   CO2 mmol/L 25.7 26.1 24.8  --  23.5 24.4 24.9  --  25.8   BUN mg/dL 9 8 8  --  10 10 10  --  13   CREATININE mg/dL 0.70 0.62 0.60  --  0.55* 0.58 0.59  --  0.63   GLUCOSE mg/dL 107* 103* 96  --  97 104* 100*  --  99   < > = values in this " interval not displayed.Estimated Creatinine Clearance: 60.5 mL/min (by C-G formula based on SCr of 0.7 mg/dL).  Results from last 7 days  Lab Units 05/02/18  0554 05/01/18 0521 04/30/18 0443 04/29/18 0424 04/28/18 0428 04/27/18 0454 04/26/18  0311   CALCIUM mg/dL 10.7* 10.8* 9.8 9.7 10.2 10.8* 11.2*   ALBUMIN g/dL 2.70* 3.00* 2.80* 2.60* 2.80* 3.00* 3.00*   MAGNESIUM mg/dL 1.9 1.8 1.9 1.5* 1.9 1.4* 2.0   PHOSPHORUS mg/dL 3.5 3.6 3.2 3.3 3.8 2.4* 3.2     Results from last 7 days  Lab Units 05/02/18 0554 05/01/18 0521 04/30/18 0443 04/29/18 0424 04/28/18 0428 04/27/18 0454 04/26/18  0311   ALBUMIN g/dL 2.70* 3.00* 2.80* 2.60* 2.80* 3.00* 3.00*   BILIRUBIN mg/dL  --   --   --   --   --   --  0.4   ALK PHOS U/L  --   --   --   --   --   --  71   AST (SGOT) U/L  --   --   --   --   --   --  44*   ALT (SGPT) U/L  --   --   --   --   --   --  13       Assessment:  Principal Problem:    Hypercalcemia 2nd to malignancy  Active Problems:    Lymphoma -     Gastroesophageal reflux disease    Chronic recurrent major depressive disorder    Restless legs syndrome    Seizure disorder    Essential hypertension    Paroxysmal atrial fibrillation    Iron deficiency anemia due to chronic blood loss    Weakness    Hypertension    Liver mass    Hypokalemia:     Hypomagnesemia:         Plan:  Chemo tx per Oncology.     Isreal Tan MD  5/2/2018  8:01 PM

## 2018-05-03 NOTE — PROGRESS NOTES
Chief Complaint:    Lymphoma    Subjective:    Feels okay. Returned from PET scan.    Objective:    Vitals:    05/02/18 1832 05/02/18 2039 05/03/18 0509 05/03/18 1112   BP: 143/56 166/59 158/64 159/69   BP Location: Left arm Left arm Left arm Left arm   Patient Position: Lying Lying Lying Lying   Pulse: 67 72 74 71   Resp: 18 18 18 18   Temp: 97.9 °F (36.6 °C) 99.6 °F (37.6 °C) 98 °F (36.7 °C) 98.4 °F (36.9 °C)   TempSrc: Oral Oral Oral Oral   SpO2: 96% 96% 93% 93%   Weight:       Height:           Lungs: Clear  Heart: Regular  Abdomen: Soft.   Extremities: Warm    Labs reviewed.    Path reviewed.    Assessment:    Lymphoma    Plan:    Placement of PowerPort tomorrow AM.  Apixaban on hold until after port insertion.

## 2018-05-03 NOTE — PLAN OF CARE
Problem: Fall Risk (Adult)  Goal: Absence of Fall  Outcome: Ongoing (interventions implemented as appropriate)      Problem: Patient Care Overview  Goal: Plan of Care Review  Outcome: Ongoing (interventions implemented as appropriate)   05/03/18 2110   Coping/Psychosocial   Plan of Care Reviewed With patient   Plan of Care Review   Progress improving   OTHER   Outcome Summary Pt down for PET scan today, ney well. Scheduled for port placement and initiation of chemo tomorrow. She has been up to the bathroom today but also experienced episodes of incontinence. Denies c/o pain today Will continue to monitor     Goal: Individualization and Mutuality  Outcome: Ongoing (interventions implemented as appropriate)    Goal: Discharge Needs Assessment  Outcome: Ongoing (interventions implemented as appropriate)      Problem: Activity Intolerance (Adult)  Goal: Activity Tolerance  Outcome: Ongoing (interventions implemented as appropriate)    Goal: Effective Energy Conservation Techniques  Outcome: Ongoing (interventions implemented as appropriate)      Problem: Hypertensive Disease/Crisis (Arterial) (Adult)  Goal: Signs and Symptoms of Listed Potential Problems Will be Absent, Minimized or Managed (Hypertensive Disease/Crisis)  Outcome: Ongoing (interventions implemented as appropriate)

## 2018-05-03 NOTE — PLAN OF CARE
Problem: Fall Risk (Adult)  Goal: Absence of Fall  Outcome: Ongoing (interventions implemented as appropriate)      Problem: Patient Care Overview  Goal: Plan of Care Review  Outcome: Ongoing (interventions implemented as appropriate)   05/03/18 0207   Coping/Psychosocial   Plan of Care Reviewed With patient   Plan of Care Review   Progress improving   OTHER   Outcome Summary To have PET scan this AM> IV fluids stopped at 0030. Has been NPO since 2400. Plan is to have Mediport placed today as well. Chemo CHOP-R to start Friday. Left neck node biopsy dressing intact. Up to bsc with assist. Bed alarm on. Incont. briefs in place. Takes pills with pudding. NO compl. of pain or nausea tonight.       Problem: Activity Intolerance (Adult)  Goal: Activity Tolerance  Outcome: Ongoing (interventions implemented as appropriate)      Problem: Hypertensive Disease/Crisis (Arterial) (Adult)  Goal: Signs and Symptoms of Listed Potential Problems Will be Absent, Minimized or Managed (Hypertensive Disease/Crisis)  Outcome: Ongoing (interventions implemented as appropriate)

## 2018-05-04 ENCOUNTER — ANESTHESIA (OUTPATIENT)
Dept: PERIOP | Facility: HOSPITAL | Age: 78
End: 2018-05-04

## 2018-05-04 ENCOUNTER — APPOINTMENT (OUTPATIENT)
Dept: GENERAL RADIOLOGY | Facility: HOSPITAL | Age: 78
End: 2018-05-04

## 2018-05-04 ENCOUNTER — ANESTHESIA EVENT (OUTPATIENT)
Dept: PERIOP | Facility: HOSPITAL | Age: 78
End: 2018-05-04

## 2018-05-04 LAB
ALBUMIN SERPL-MCNC: 2.7 G/DL (ref 3.5–5.2)
ALBUMIN SERPL-MCNC: 3 G/DL (ref 3.5–5.2)
ALBUMIN/GLOB SERPL: 0.9 G/DL
ALP SERPL-CCNC: 80 U/L (ref 39–117)
ALT SERPL W P-5'-P-CCNC: 11 U/L (ref 1–33)
ANION GAP SERPL CALCULATED.3IONS-SCNC: 12 MMOL/L
ANION GAP SERPL CALCULATED.3IONS-SCNC: 7.4 MMOL/L
AST SERPL-CCNC: 39 U/L (ref 1–32)
BILIRUB SERPL-MCNC: 0.2 MG/DL (ref 0.1–1.2)
BUN BLD-MCNC: 8 MG/DL (ref 8–23)
BUN BLD-MCNC: 8 MG/DL (ref 8–23)
BUN/CREAT SERPL: 11.4 (ref 7–25)
BUN/CREAT SERPL: 13.6 (ref 7–25)
CALCIUM SPEC-SCNC: 11.6 MG/DL (ref 8.6–10.5)
CALCIUM SPEC-SCNC: 12.4 MG/DL (ref 8.6–10.5)
CHLORIDE SERPL-SCNC: 98 MMOL/L (ref 98–107)
CHLORIDE SERPL-SCNC: 98 MMOL/L (ref 98–107)
CO2 SERPL-SCNC: 30 MMOL/L (ref 22–29)
CO2 SERPL-SCNC: 32.6 MMOL/L (ref 22–29)
CREAT BLD-MCNC: 0.59 MG/DL (ref 0.57–1)
CREAT BLD-MCNC: 0.7 MG/DL (ref 0.57–1)
GFR SERPL CREATININE-BSD FRML MDRD: 81 ML/MIN/1.73
GFR SERPL CREATININE-BSD FRML MDRD: 99 ML/MIN/1.73
GLOBULIN UR ELPH-MCNC: 3.1 GM/DL
GLUCOSE BLD-MCNC: 108 MG/DL (ref 65–99)
GLUCOSE BLD-MCNC: 133 MG/DL (ref 65–99)
LDH SERPL-CCNC: 537 U/L (ref 135–214)
MAGNESIUM SERPL-MCNC: 2 MG/DL (ref 1.6–2.4)
PHOSPHATE SERPL-MCNC: 2.8 MG/DL (ref 2.5–4.5)
PHOSPHATE SERPL-MCNC: 3 MG/DL (ref 2.5–4.5)
POTASSIUM BLD-SCNC: 3.3 MMOL/L (ref 3.5–5.2)
POTASSIUM BLD-SCNC: 3.5 MMOL/L (ref 3.5–5.2)
PROT SERPL-MCNC: 5.8 G/DL (ref 6–8.5)
SODIUM BLD-SCNC: 138 MMOL/L (ref 136–145)
SODIUM BLD-SCNC: 140 MMOL/L (ref 136–145)
URATE SERPL-MCNC: 3.4 MG/DL (ref 2.4–5.7)

## 2018-05-04 PROCEDURE — 83735 ASSAY OF MAGNESIUM: CPT | Performed by: INTERNAL MEDICINE

## 2018-05-04 PROCEDURE — 25010000002 ONDANSETRON PER 1 MG: Performed by: INTERNAL MEDICINE

## 2018-05-04 PROCEDURE — 80053 COMPREHEN METABOLIC PANEL: CPT | Performed by: INTERNAL MEDICINE

## 2018-05-04 PROCEDURE — 02HV33Z INSERTION OF INFUSION DEVICE INTO SUPERIOR VENA CAVA, PERCUTANEOUS APPROACH: ICD-10-PCS | Performed by: SURGERY

## 2018-05-04 PROCEDURE — C1788 PORT, INDWELLING, IMP: HCPCS | Performed by: SURGERY

## 2018-05-04 PROCEDURE — 25010000002 RITUXIMAB 10 MG/ML SOLUTION 10 ML VIAL: Performed by: INTERNAL MEDICINE

## 2018-05-04 PROCEDURE — 25010000002 RITUXIMAB 10 MG/ML SOLUTION 50 ML VIAL: Performed by: INTERNAL MEDICINE

## 2018-05-04 PROCEDURE — 25010000002 MIDAZOLAM PER 1 MG: Performed by: NURSE ANESTHETIST, CERTIFIED REGISTERED

## 2018-05-04 PROCEDURE — 25010000002 PROPOFOL 10 MG/ML EMULSION: Performed by: NURSE ANESTHETIST, CERTIFIED REGISTERED

## 2018-05-04 PROCEDURE — 3E03305 INTRODUCTION OF OTHER ANTINEOPLASTIC INTO PERIPHERAL VEIN, PERCUTANEOUS APPROACH: ICD-10-PCS | Performed by: RADIOLOGY

## 2018-05-04 PROCEDURE — 25010000002 CYCLOPHOSPHAMIDE PER 100 MG: Performed by: INTERNAL MEDICINE

## 2018-05-04 PROCEDURE — 25010000002 FENTANYL CITRATE (PF) 100 MCG/2ML SOLUTION: Performed by: NURSE ANESTHETIST, CERTIFIED REGISTERED

## 2018-05-04 PROCEDURE — 25010000002 VINCRISTINE PER 1 MG: Performed by: INTERNAL MEDICINE

## 2018-05-04 PROCEDURE — 77001 FLUOROGUIDE FOR VEIN DEVICE: CPT | Performed by: SURGERY

## 2018-05-04 PROCEDURE — B548ZZA ULTRASONOGRAPHY OF SUPERIOR VENA CAVA, GUIDANCE: ICD-10-PCS | Performed by: SURGERY

## 2018-05-04 PROCEDURE — 25010000002 ONDANSETRON PER 1 MG: Performed by: OTOLARYNGOLOGY

## 2018-05-04 PROCEDURE — 25810000003 DEXTROSE-NACL PER 500 ML: Performed by: INTERNAL MEDICINE

## 2018-05-04 PROCEDURE — 25010000002 HYDRALAZINE PER 20 MG: Performed by: OTOLARYNGOLOGY

## 2018-05-04 PROCEDURE — 63710000001 PREDNISONE PER 5 MG: Performed by: INTERNAL MEDICINE

## 2018-05-04 PROCEDURE — 25010000003 CEFAZOLIN IN DEXTROSE 2-4 GM/100ML-% SOLUTION: Performed by: SURGERY

## 2018-05-04 PROCEDURE — 25010000002 DIPHENHYDRAMINE PER 50 MG: Performed by: INTERNAL MEDICINE

## 2018-05-04 PROCEDURE — 84550 ASSAY OF BLOOD/URIC ACID: CPT | Performed by: INTERNAL MEDICINE

## 2018-05-04 PROCEDURE — 0JH60WZ INSERTION OF TOTALLY IMPLANTABLE VASCULAR ACCESS DEVICE INTO CHEST SUBCUTANEOUS TISSUE AND FASCIA, OPEN APPROACH: ICD-10-PCS | Performed by: SURGERY

## 2018-05-04 PROCEDURE — 25010000002 FOSAPREPITANT PER 1 MG: Performed by: INTERNAL MEDICINE

## 2018-05-04 PROCEDURE — 84100 ASSAY OF PHOSPHORUS: CPT | Performed by: INTERNAL MEDICINE

## 2018-05-04 PROCEDURE — 25010000002 DOXORUBICIN PER 10 MG: Performed by: INTERNAL MEDICINE

## 2018-05-04 PROCEDURE — 36561 INSERT TUNNELED CV CATH: CPT | Performed by: SURGERY

## 2018-05-04 PROCEDURE — 83615 LACTATE (LD) (LDH) ENZYME: CPT | Performed by: INTERNAL MEDICINE

## 2018-05-04 PROCEDURE — 80069 RENAL FUNCTION PANEL: CPT | Performed by: INTERNAL MEDICINE

## 2018-05-04 PROCEDURE — 25010000002 HEPARIN (PORCINE) PER 1000 UNITS: Performed by: SURGERY

## 2018-05-04 PROCEDURE — 99233 SBSQ HOSP IP/OBS HIGH 50: CPT | Performed by: INTERNAL MEDICINE

## 2018-05-04 PROCEDURE — 76937 US GUIDE VASCULAR ACCESS: CPT | Performed by: SURGERY

## 2018-05-04 PROCEDURE — 77001 FLUOROGUIDE FOR VEIN DEVICE: CPT

## 2018-05-04 PROCEDURE — 25010000002 RASBURICASE PER 0.5 MG: Performed by: INTERNAL MEDICINE

## 2018-05-04 PROCEDURE — 25010000002 DEXAMETHASONE PER 1 MG: Performed by: INTERNAL MEDICINE

## 2018-05-04 DEVICE — POWERPORT M.R.I. IMPLANTABLE PORT WITH ATTACHABLE 9.6F OPEN-ENDED SINGLE-LUMEN VENOUS CATHETER INTERMEDIATE KIT (WITH SUTURE PLUGS)
Type: IMPLANTABLE DEVICE | Site: CHEST | Status: FUNCTIONAL
Brand: POWERPORT M.R.I.

## 2018-05-04 RX ORDER — MAGNESIUM HYDROXIDE 1200 MG/15ML
LIQUID ORAL AS NEEDED
Status: DISCONTINUED | OUTPATIENT
Start: 2018-05-04 | End: 2018-05-04 | Stop reason: HOSPADM

## 2018-05-04 RX ORDER — FENTANYL CITRATE 50 UG/ML
50 INJECTION, SOLUTION INTRAMUSCULAR; INTRAVENOUS
Status: DISCONTINUED | OUTPATIENT
Start: 2018-05-04 | End: 2018-05-04 | Stop reason: HOSPADM

## 2018-05-04 RX ORDER — FLUMAZENIL 0.1 MG/ML
0.2 INJECTION INTRAVENOUS AS NEEDED
Status: DISCONTINUED | OUTPATIENT
Start: 2018-05-04 | End: 2018-05-04 | Stop reason: HOSPADM

## 2018-05-04 RX ORDER — BUPIVACAINE HYDROCHLORIDE AND EPINEPHRINE 5; 5 MG/ML; UG/ML
INJECTION, SOLUTION PERINEURAL AS NEEDED
Status: DISCONTINUED | OUTPATIENT
Start: 2018-05-04 | End: 2018-05-04 | Stop reason: HOSPADM

## 2018-05-04 RX ORDER — SODIUM CHLORIDE 9 MG/ML
250 INJECTION, SOLUTION INTRAVENOUS ONCE
Status: COMPLETED | OUTPATIENT
Start: 2018-05-04 | End: 2018-05-04

## 2018-05-04 RX ORDER — PREDNISONE 50 MG/1
100 TABLET ORAL
Status: COMPLETED | OUTPATIENT
Start: 2018-05-04 | End: 2018-05-08

## 2018-05-04 RX ORDER — DOXORUBICIN HYDROCHLORIDE 2 MG/ML
50 INJECTION, SOLUTION INTRAVENOUS ONCE
Status: COMPLETED | OUTPATIENT
Start: 2018-05-04 | End: 2018-05-04

## 2018-05-04 RX ORDER — DIPHENHYDRAMINE HYDROCHLORIDE 50 MG/ML
50 INJECTION INTRAMUSCULAR; INTRAVENOUS AS NEEDED
Status: DISCONTINUED | OUTPATIENT
Start: 2018-05-04 | End: 2018-05-16 | Stop reason: HOSPADM

## 2018-05-04 RX ORDER — SODIUM CHLORIDE 0.9 % (FLUSH) 0.9 %
1-10 SYRINGE (ML) INJECTION AS NEEDED
Status: DISCONTINUED | OUTPATIENT
Start: 2018-05-04 | End: 2018-05-04 | Stop reason: HOSPADM

## 2018-05-04 RX ORDER — FENTANYL CITRATE 50 UG/ML
INJECTION, SOLUTION INTRAMUSCULAR; INTRAVENOUS AS NEEDED
Status: DISCONTINUED | OUTPATIENT
Start: 2018-05-04 | End: 2018-05-04 | Stop reason: SURG

## 2018-05-04 RX ORDER — PREDNISONE 50 MG/1
100 TABLET ORAL DAILY
Qty: 10 TABLET | Refills: 5 | Status: SHIPPED | OUTPATIENT
Start: 2018-05-04 | End: 2018-05-09

## 2018-05-04 RX ORDER — PALONOSETRON 0.05 MG/ML
0.25 INJECTION, SOLUTION INTRAVENOUS ONCE
Status: CANCELLED | OUTPATIENT
Start: 2018-05-05

## 2018-05-04 RX ORDER — HYDRALAZINE HYDROCHLORIDE 20 MG/ML
5 INJECTION INTRAMUSCULAR; INTRAVENOUS
Status: DISCONTINUED | OUTPATIENT
Start: 2018-05-04 | End: 2018-05-04 | Stop reason: HOSPADM

## 2018-05-04 RX ORDER — ONDANSETRON 2 MG/ML
4 INJECTION INTRAMUSCULAR; INTRAVENOUS ONCE AS NEEDED
Status: DISCONTINUED | OUTPATIENT
Start: 2018-05-04 | End: 2018-05-04 | Stop reason: HOSPADM

## 2018-05-04 RX ORDER — MIDAZOLAM HYDROCHLORIDE 1 MG/ML
INJECTION INTRAMUSCULAR; INTRAVENOUS AS NEEDED
Status: DISCONTINUED | OUTPATIENT
Start: 2018-05-04 | End: 2018-05-04 | Stop reason: SURG

## 2018-05-04 RX ORDER — LIDOCAINE HYDROCHLORIDE 10 MG/ML
0.5 INJECTION, SOLUTION EPIDURAL; INFILTRATION; INTRACAUDAL; PERINEURAL ONCE AS NEEDED
Status: DISCONTINUED | OUTPATIENT
Start: 2018-05-04 | End: 2018-05-04 | Stop reason: HOSPADM

## 2018-05-04 RX ORDER — FAMOTIDINE 10 MG/ML
20 INJECTION, SOLUTION INTRAVENOUS AS NEEDED
Status: DISCONTINUED | OUTPATIENT
Start: 2018-05-04 | End: 2018-05-06

## 2018-05-04 RX ORDER — DIPHENHYDRAMINE HYDROCHLORIDE 50 MG/ML
12.5 INJECTION INTRAMUSCULAR; INTRAVENOUS
Status: DISCONTINUED | OUTPATIENT
Start: 2018-05-04 | End: 2018-05-04 | Stop reason: HOSPADM

## 2018-05-04 RX ORDER — EPHEDRINE SULFATE 50 MG/ML
5 INJECTION, SOLUTION INTRAVENOUS ONCE AS NEEDED
Status: DISCONTINUED | OUTPATIENT
Start: 2018-05-04 | End: 2018-05-04 | Stop reason: HOSPADM

## 2018-05-04 RX ORDER — NALOXONE HCL 0.4 MG/ML
0.2 VIAL (ML) INJECTION AS NEEDED
Status: DISCONTINUED | OUTPATIENT
Start: 2018-05-04 | End: 2018-05-04 | Stop reason: HOSPADM

## 2018-05-04 RX ORDER — MEPERIDINE HYDROCHLORIDE 50 MG/ML
25 INJECTION INTRAMUSCULAR; INTRAVENOUS; SUBCUTANEOUS
Status: DISCONTINUED | OUTPATIENT
Start: 2018-05-04 | End: 2018-05-06

## 2018-05-04 RX ORDER — SODIUM CHLORIDE, SODIUM LACTATE, POTASSIUM CHLORIDE, CALCIUM CHLORIDE 600; 310; 30; 20 MG/100ML; MG/100ML; MG/100ML; MG/100ML
9 INJECTION, SOLUTION INTRAVENOUS CONTINUOUS
Status: DISCONTINUED | OUTPATIENT
Start: 2018-05-04 | End: 2018-05-04

## 2018-05-04 RX ORDER — SENNA AND DOCUSATE SODIUM 50; 8.6 MG/1; MG/1
2 TABLET, FILM COATED ORAL NIGHTLY
Status: DISCONTINUED | OUTPATIENT
Start: 2018-05-04 | End: 2018-05-16 | Stop reason: HOSPADM

## 2018-05-04 RX ORDER — ACETAMINOPHEN 325 MG/1
650 TABLET ORAL ONCE
Status: COMPLETED | OUTPATIENT
Start: 2018-05-04 | End: 2018-05-04

## 2018-05-04 RX ORDER — PROPOFOL 10 MG/ML
VIAL (ML) INTRAVENOUS CONTINUOUS PRN
Status: DISCONTINUED | OUTPATIENT
Start: 2018-05-04 | End: 2018-05-04 | Stop reason: SURG

## 2018-05-04 RX ORDER — CEFAZOLIN SODIUM 2 G/100ML
2 INJECTION, SOLUTION INTRAVENOUS ONCE
Status: COMPLETED | OUTPATIENT
Start: 2018-05-04 | End: 2018-05-04

## 2018-05-04 RX ADMIN — DIPHENHYDRAMINE HYDROCHLORIDE 50 MG: 50 INJECTION, SOLUTION INTRAMUSCULAR; INTRAVENOUS at 13:46

## 2018-05-04 RX ADMIN — ACETAMINOPHEN 650 MG: 325 TABLET ORAL at 18:40

## 2018-05-04 RX ADMIN — PANTOPRAZOLE SODIUM 40 MG: 40 TABLET, DELAYED RELEASE ORAL at 05:36

## 2018-05-04 RX ADMIN — SODIUM CHLORIDE 250 ML: 0.9 INJECTION, SOLUTION INTRAVENOUS at 13:46

## 2018-05-04 RX ADMIN — PREDNISONE 100 MG: 50 TABLET ORAL at 20:23

## 2018-05-04 RX ADMIN — METOPROLOL TARTRATE 25 MG: 25 TABLET ORAL at 20:24

## 2018-05-04 RX ADMIN — AMLODIPINE BESYLATE 10 MG: 10 TABLET ORAL at 11:19

## 2018-05-04 RX ADMIN — DEXTROSE AND SODIUM CHLORIDE 100 ML/HR: 5; 900 INJECTION, SOLUTION INTRAVENOUS at 05:36

## 2018-05-04 RX ADMIN — SODIUM CHLORIDE 7.5 MG: 9 INJECTION, SOLUTION INTRAVENOUS at 14:09

## 2018-05-04 RX ADMIN — MEPERIDINE HYDROCHLORIDE 25 MG: 50 INJECTION INTRAMUSCULAR; INTRAVENOUS; SUBCUTANEOUS at 17:29

## 2018-05-04 RX ADMIN — FENTANYL CITRATE 50 MCG: 50 INJECTION INTRAMUSCULAR; INTRAVENOUS at 08:17

## 2018-05-04 RX ADMIN — RITUXIMAB 640 MG: 10 INJECTION, SOLUTION INTRAVENOUS at 14:45

## 2018-05-04 RX ADMIN — LISINOPRIL 20 MG: 20 TABLET ORAL at 11:19

## 2018-05-04 RX ADMIN — ALLOPURINOL 200 MG: 100 TABLET ORAL at 20:24

## 2018-05-04 RX ADMIN — METOPROLOL TARTRATE 25 MG: 25 TABLET ORAL at 11:19

## 2018-05-04 RX ADMIN — Medication 5 MG: at 20:31

## 2018-05-04 RX ADMIN — LISINOPRIL 20 MG: 20 TABLET ORAL at 20:23

## 2018-05-04 RX ADMIN — CEFAZOLIN SODIUM 2 G: 2 INJECTION, SOLUTION INTRAVENOUS at 08:22

## 2018-05-04 RX ADMIN — DEXAMETHASONE SODIUM PHOSPHATE 200 ML/HR: 4 INJECTION, SOLUTION INTRAMUSCULAR; INTRAVENOUS at 20:36

## 2018-05-04 RX ADMIN — PHENYTOIN 300 MG: 125 SUSPENSION ORAL at 20:23

## 2018-05-04 RX ADMIN — DOCUSATE SODIUM -SENNOSIDES 2 TABLET: 50; 8.6 TABLET, COATED ORAL at 20:31

## 2018-05-04 RX ADMIN — DOXORUBICIN HYDROCHLORIDE 86 MG: 2 INJECTION, SOLUTION INTRAVENOUS at 22:03

## 2018-05-04 RX ADMIN — CYCLOPHOSPHAMIDE 1280 MG: 1 INJECTION, POWDER, FOR SOLUTION INTRAVENOUS; ORAL at 22:40

## 2018-05-04 RX ADMIN — Medication 10 MG: at 17:58

## 2018-05-04 RX ADMIN — ACETAMINOPHEN 650 MG: 325 TABLET, FILM COATED ORAL at 13:46

## 2018-05-04 RX ADMIN — VINCRISTINE SULFATE 2 MG: 1 INJECTION, SOLUTION INTRAVENOUS at 22:04

## 2018-05-04 RX ADMIN — POTASSIUM CHLORIDE 40 MEQ: 750 CAPSULE, EXTENDED RELEASE ORAL at 11:18

## 2018-05-04 RX ADMIN — GABAPENTIN 300 MG: 300 CAPSULE ORAL at 20:31

## 2018-05-04 RX ADMIN — HYDROCODONE BITARTRATE AND ACETAMINOPHEN 1 TABLET: 5; 325 TABLET ORAL at 11:26

## 2018-05-04 RX ADMIN — MINOXIDIL 5 MG: 2.5 TABLET ORAL at 11:18

## 2018-05-04 RX ADMIN — Medication 2 MG: at 08:17

## 2018-05-04 RX ADMIN — ONDANSETRON 4 MG: 2 INJECTION INTRAMUSCULAR; INTRAVENOUS at 18:27

## 2018-05-04 RX ADMIN — SODIUM CHLORIDE, POTASSIUM CHLORIDE, SODIUM LACTATE AND CALCIUM CHLORIDE 9 ML/HR: 600; 310; 30; 20 INJECTION, SOLUTION INTRAVENOUS at 07:39

## 2018-05-04 RX ADMIN — FENTANYL CITRATE 50 MCG: 50 INJECTION, SOLUTION INTRAMUSCULAR; INTRAVENOUS at 09:26

## 2018-05-04 RX ADMIN — SODIUM CHLORIDE 150 MG: 9 INJECTION, SOLUTION INTRAVENOUS at 20:36

## 2018-05-04 RX ADMIN — Medication 400 MG: at 20:23

## 2018-05-04 RX ADMIN — PROPOFOL 100 MCG/KG/MIN: 10 INJECTION, EMULSION INTRAVENOUS at 08:19

## 2018-05-04 NOTE — PROGRESS NOTES
NEPHROLOGY PROGRESS NOTE    PATIENT IDENTIFICATION:   Name:  Martina Blake      MRN:  [id number]     77 y.o.  female             Reason for visit: hypercalcemia    SUBJECTIVE:   Had chemo port placed earlier today; chest is sore; no SOB; appetite low; chemo to start today or tomorrow    OBJECTIVE:  Vitals:    05/04/18 1059 05/04/18 1443 05/04/18 1514 05/04/18 1544   BP: 128/60 115/55 119/63 127/61   BP Location: Left arm Left arm Left arm Left arm   Patient Position: Lying  Lying Lying   Pulse: 64 55 55 55   Resp: 16 16 16 16   Temp: 98.4 °F (36.9 °C) 98 °F (36.7 °C) 97.2 °F (36.2 °C) 97.1 °F (36.2 °C)   TempSrc: Oral Oral Oral Oral   SpO2: 97% 97%  97%   Weight:       Height:               Body mass index is 24.65 kg/m².    Intake/Output Summary (Last 24 hours) at 05/04/18 1613  Last data filed at 05/04/18 1000   Gross per 24 hour   Intake             2000 ml   Output                0 ml   Net             2000 ml     Wt Readings from Last 1 Encounters:   05/01/18 1850 65.1 kg (143 lb 9.6 oz)   04/30/18 0643 66.8 kg (147 lb 4.8 oz)   04/29/18 0652 67.9 kg (149 lb 12.8 oz)   04/28/18 0641 68 kg (150 lb)   04/27/18 0603 64.7 kg (142 lb 9.6 oz)   04/25/18 0516 62.6 kg (138 lb)   04/21/18 1731 68.4 kg (150 lb 12.8 oz)   04/21/18 1227 68 kg (150 lb)     Wt Readings from Last 3 Encounters:   05/01/18 65.1 kg (143 lb 9.6 oz)   04/19/18 68 kg (150 lb)   04/10/18 61.5 kg (135 lb 8 oz)         Exam:  General: NAD but groggy post port-placement, pleasant  Neck with dressing at site of left LN resection and over port right chest  Heart RRR no s3 or rub  Lungs Clear to auscultation; not labored   abd +bs, slightly distended, soft, mildly tender. No guarding or rebound.   Ext no lower ext edema.  Skin, no rash  Psych mood and affect fine    Scheduled meds:      allopurinol 200 mg Oral BID   amLODIPine 10 mg Oral Q24H   cyclophosphamide (CYTOXAN) chemo IVPB 750 mg/m2 (Treatment Plan Recorded) Intravenous Once   DOXOrubicin  50 mg/m2 (Treatment Plan Recorded) Intravenous Once   escitalopram 5 mg Oral Daily   folic acid 400 mcg Oral Daily   fosaprepitant (EMEND) IVPB 150 mg Intravenous Once   gabapentin 300 mg Oral Nightly   lisinopril 20 mg Oral Q12H   magnesium oxide 400 mg Oral BID   melatonin 5 mg Oral Nightly   metoprolol tartrate 25 mg Oral Q12H   minoxidil 5 mg Oral Q12H   montelukast 10 mg Oral Daily   ondansetron with dexamethasone IVPB 200 mL/hr Intravenous Once   pantoprazole 40 mg Oral Q AM   phenytoin 300 mg Oral Nightly   potassium chloride 40 mEq Oral Daily   predniSONE 100 mg Oral Daily With Breakfast   sennosides-docusate sodium 2 tablet Oral Nightly   vinCRIStine (ONCOVIN) chemo IVPB 2 mg Intravenous Once     IV meds:                          dextrose 5 % and sodium chloride 0.9 % 100 mL/hr Last Rate: 100 mL/hr (05/04/18 0536)       Data Review:      Results from last 7 days  Lab Units 05/04/18  1220 05/04/18  0602 05/03/18  0437   SODIUM mmol/L 138 140 137   POTASSIUM mmol/L 3.5 3.3* 3.9   CHLORIDE mmol/L 98 98 100   CO2 mmol/L 32.6* 30.0* 27.6   BUN mg/dL 8 8 9   CREATININE mg/dL 0.70 0.59 0.71   CALCIUM mg/dL 11.6* 12.4* 13.0*   BILIRUBIN mg/dL 0.2  --   --    ALK PHOS U/L 80  --   --    ALT (SGPT) U/L 11  --   --    AST (SGOT) U/L 39*  --   --    GLUCOSE mg/dL 133* 108* 111*     Estimated Creatinine Clearance: 60.5 mL/min (by C-G formula based on SCr of 0.7 mg/dL).    Results from last 7 days  Lab Units 05/04/18  1220   URIC ACID mg/dL 3.4       Results from last 7 days  Lab Units 05/04/18  1220 05/04/18  0602 05/03/18  0437 05/02/18  0554   MAGNESIUM mg/dL  --  2.0 1.9 1.9   PHOSPHORUS mg/dL 3.0 2.8 4.0 3.5         Results from last 7 days  Lab Units 05/01/18  0521   WBC 10*3/mm3 11.56*   HEMOGLOBIN g/dL 8.2*   PLATELETS 10*3/mm3 271                   ASSESSMENT:   Principal Problem:    Hypercalcemia  Active Problems:    Gastroesophageal reflux disease    Chronic recurrent major depressive disorder    Restless legs  "syndrome    Seizure disorder    Essential hypertension    Paroxysmal atrial fibrillation    Iron deficiency anemia due to chronic blood loss    Weakness    Hypertension    Liver mass    Lymphoma    Lymphoma of lymph nodes of neck    1.  Hypercalcemia. stable, all in all, and likely driven by malignancy  2.  Liver mass on renal artery duplex. Widely metastatic disease on CT abdomen. Lymphoma is culprit  3.  Hypokalemia, hypophosphatemia, and hypomagnesemia: variably low.  Multifactorial, with poor nutrition and a likely contribution from malignancy d/t \"take up\" by malignant cells  4.  Labile HTN: better, all in all  4.  Afib with RVR, with rate now controlled  5.  Recent GIB  6.  Leukocytosis.     PLAN:  1.  K, phos, and Mg being repleted as needed  2.  Chemo anticipated soon, with concern for TLS.  Allopurinol started, and pt may need rasburicase    3.  IVF  4.  Stop minox with gradually falling BP's  5.  Surveillance labs       Kendall Belle MD  5/4/2018    4:13 PM   "

## 2018-05-04 NOTE — PROGRESS NOTES
Discussed with Dr Matthews.  I will transfer care to his service and signoff.  Many thanks for assistance in this case.     Isreal Tan MD

## 2018-05-04 NOTE — PROGRESS NOTES
"Martina Blake is a 77 y.o. y.o. 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 Reviewed regimen with patient and daughter (via phone). Pt to receive cycle 1 during this admission. Pt without previous experience of chemotherapy. The patient is concerned about nausea risk and what she \"can and can't eat\" while on chemo, while her daughter is concerned regarding functional status while on chemo. Daughter was concerned that if the patient changed her mind, that chemo could not be \"stopped\"; reassured daughter that if her mother wanted to stop chemotherapy at any time this was an option and she would be supported.    In addition to the specific concerns mentioned above by the patient and family, the following effects were discussed with the patient/daughter: pancytopenia, infection risk, nausea/vomiting, hair loss, cardiotoxicity, hemorraghic cystitis, neuropathy, skin changes, insomnia/agitation, fluid retention, sun exposure, taste abnormalities and constipation. Infection prevention including hand washing, food preparation, avoidance of crowds/sick contacts and use of mask were discussed as well as expected cosmet. Bleeding risk was also covered including when to notify MD regarding bleeding and bruising as well as ways to minimize risk of falls. Pt to get Rituximab today - reviewed infusion reaction and when to notify an RN.     Patient very receptive to counseling although her participation was signficantly limited by drowsiness - she often had a difficult time staying awake. Daughter however very participatory in counseling and asked appropriate questions to confirm understanding. Educational handouts regarding medications provided; patient also given calendar outlining chemotherapy scheduled. Patient was able to verbalize understanding of the risks of chemo and denied further questions at this time; daughter was able to verbalize and " teach back understanding - noted that she would be a caregiver for patient and would help reinforce discussed effects.    Thank you for this opportunity to  this patient and her family,    Debora Manning, Pharm.D., [facility]  Oncology Pharmacy Specialist  [phone]

## 2018-05-04 NOTE — ANESTHESIA PREPROCEDURE EVALUATION
Anesthesia Evaluation     no history of anesthetic complications:  NPO Solid Status: > 8 hours             Airway   Mallampati: II  TM distance: >3 FB  Neck ROM: full  No difficulty expected  Dental - normal exam     Pulmonary - negative pulmonary ROS and normal exam   (-) rhonchi  Cardiovascular   Exercise tolerance: poor (<4 METS)    ECG reviewed  Rhythm: regular    (+) hypertension, dysrhythmias Atrial Fib, murmur,   (-) carotid bruits    ROS comment: ECG- afib.  Currently in NSR on monitor.    Echo- TR,no AS, good EF    Neuro/Psych  (+) psychiatric history Depression,     GI/Hepatic/Renal/Endo    (+)  hiatal hernia, GERD,      Musculoskeletal     Abdominal  - normal exam   Substance History      OB/GYN          Other      history of cancer active      Other Comment: lymphoma                  Anesthesia Plan    ASA 3     MAC   (D/W pt. MAC and possible awareness intra op.  Pt understands MAC and GA are not the same and the possibility of GA being required for failed MAC)  intravenous induction   Anesthetic plan and risks discussed with patient.

## 2018-05-04 NOTE — PROGRESS NOTES
"CHIEF COMPLAINT:  Anemia, hypercalcemia, radiographic findings of malignancy with pathology/ diagnosis on the liver consistent with diffuse large B-cell lymphoma FISH for c-myc, bcl-2 and bcl-6 pending    Interval History:  The patient underwent an excisional lymph node biopsy from the left neck this morning for confirmation of pathology.  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  Additional FISH studies are pending to evaluate for \"double hit\" lymphoma as Ki 67 staining is 4+  On 05/02/2018, actually the patient was feeling better.  She had no issues in regard to the site of the lymph node biopsy in the left side of the neck with minimal discomfort.  I talked with Dr. Gill in regard to port placement tomorrow.      Hopefully, we will have further information in regard to “double-hit” lymphoma before we start any chemotherapy.  If that is the case, the patient will require infusional EPOCH/Rituxan.     Other than that, I went through formal education and teaching in regard to chemotherapy medicines, the frequency of the treatment, side effects and so forth; please review below.    The patient will have a PET scan this afternoon as well and the port will be placed tomorrow.  On 05/03/2018 the patient was feeling fatigued and tired from so many testings back and forth. She had her PET scan this morning and there is planning for port placement tomorrow. I asked the patient to bring her family this afternoon to talk with them about the diagnosis and decide how they want to proceed. I pointed out to the patient as well that the absence of treatment life expectancy is measured in a question of a few weeks to a few months.     I also discussed with her the finding on the PET scan today that will be described below and also the finding of the cervical lymph node that will be described below.         Oncology " History:  Martina Blake is a 77 y.o. female who were asked to see in consultation  4/29/18 for hypercalcemia, anemia and radiologic findings consistent with likely malignancy-?  Lymphoma.        She has a significant past medical history of palpitations followed by cardiology.  She presented to the emergency room April 10-14 with chest pain and palpitations and was found to be in A. fib with RVR and also demonstrated electrolyte abnormalities and anemia.  Records demonstrates that her anemia became particularly evident April 11 with an H&H of 8.9/ 28.4, platelet count 212,000 with a normal automated differential.  She underwent GI assessment including upper and lower endoscopy demonstrated hernia, gastritis, esophagitis, diverticulosis but no evidence of acute bleed.  She was placed on Eliquis as result of a relatively high CHADSVASc score.  She had also been found to be hypercalcemic at approximately 11 with HCTZ altered and the patient started on Aldactone.  Additional testing had included a ferritin of 313.1, iron of 32 with TIBC of 222 and iron saturation of 14, occult blood negative per stool testing    She was brought back to the emergency room April 21 with further evidence of hypercalcemia, associated weakness, anorexia, nausea, ataxia and weight loss.  MRI of the brain April 21 was found to be unremarkable. Outpatient intact PTH levels were found to be 7.1(reduced), and an H&H of 10.3 and 32.9, white count 11,090, platelet count 267,000 with a normal differential.  Patient seen by renal medicine with the possibility of Fanconi syndrome raised.  Thereafter PTH hormone was found be less than 2.0, and protein electropheresis included IgG of 948, IgA of 207, IgM 86, total protein 6.3, albumin 2.8, no M spike noted, slightly elevated free kappa and lambda light chains with normal ratio.  Repeat testing per iron profile again revealed low serum iron but high serum ferritin and normal CEA, normal AFP, CA-125 of  77.2, CA 19-9 7.8   At this point the reason for her hypercalcemia was thought to be possible medication effect and/or possible malignancy with calcium was running between 12 and 13 mg/dL.    This led to CT scan of chest abdomen pelvis performed April 24 demonstrate extensive metastatic disease throughout the abdomen and pelvis particularly involving the spleen and liver, extensive lymphadenopathy at the abdomen and pelvis with a 4 cm lesion splenic hilum partially encasing the pancreatic tail, pancreatic tail malignancy?,  Gastric primary malignancy?  There was a mass along the right wall of the urinary bladder with adjacent adenopathy.  CT of the chest revealed several tiny dense pleural-based nodular opacities-partially calcified granulomas, no mediastinal or hilar adenopathy, enlarged pulmonary arteries consistent with pulmonary arterial hypertension.  A subsequent bone scan performed April 26 revealed prominent uptake in the right frontal bone and right posterior ninth rib and a CT needle biopsy of the liver was obtained April 26 as well.The sample obtained revealed, on flow cytometry examination, a subpopulation of normal B cells that might be consistent with B-cell lymphoma.  Additional testing is currently pending.  On May 25 further hypercalcemia was again noted and treated with IV fluids and dose of Zometa, vitamin D level normal.  Again evidence of hypokalemia, hypophosphatemia and hypomagnesemia.  The patient is seen April 29 her calcium level has gradually improved dropping to 9.7, albumin of 2.6, ionized calcium of 6.1, BUN and creatinine of 10/.55.  We are asked to see her with results available thus far as to possible lymphoma.    Past Medical History:     Atrial fibrillation      • Cystitis     • Cystocele       moderate   • Dyslipidemia     • Esophageal reflux     • Fatigue     • History of transfusion       no reaction   • Hypertension     • Major depression, chronic     • Menopause     •  "Osteoarthritis     • Osteoporosis     • Palpitations     • Post menopausal problems     • RLS (restless legs syndrome)     • Seizure disorder     • Subjective tinnitus     • Vaginal delivery       x2  \"SONYA\"      \"JASON\"   • Vitamin D deficiency      Social History:  The patient is a  and has 2 children.  She has never smoked.  She denies alcohol use.    Review of Systems   Constitutional: Positive for fatigue and unexpected weight change. Negative for activity change and appetite change.   HENT: Negative.    Eyes: Negative.    Respiratory: Negative.  Negative for shortness of breath and wheezing.    Cardiovascular: Negative.  Negative for chest pain and palpitations.   Gastrointestinal: Negative.  Negative for abdominal pain, constipation, nausea and vomiting.   Endocrine: Negative.    Genitourinary: Negative.    Musculoskeletal: Positive for gait problem. Negative for neck stiffness.   Skin: Negative.  Negative for color change and pallor.   Neurological: Positive for weakness. Negative for seizures, numbness and headaches.   Hematological: Positive for adenopathy.   Psychiatric/Behavioral: Negative.           Medications:  The current medication list was reviewed in the EMR    ALLERGIES:    Allergies   Allergen Reactions   • Crab (Diagnostic) Itching and Rash   • Pseudoephedrine Dizziness       Objective      Vitals:    05/04/18 1020   BP: 134/57   Pulse: 64   Resp: 16   Temp: 98.4 °F (36.9 °C)   SpO2: 97%          Physical Exam    GENERAL:  Well-developed, well-nourished  Patient  in no acute distress.   SKIN:  Warm, dry without rashes, purpura or petechiae.  HEENT:  Pupils were equal and reactive to light and accomodation, conjunctivas non injected, no pterigion, normal extraocular movements, normal visual acuity.   Mouth mucosa was moist, no exudates in oropharynx, normal gum line, normal roof of the mouth and pillars, normal papillations of the tongue.  NECK:  Supple with good range of motion; no " thyromegaly no change in left cervical wound and node 2 cm in size;, no JVD or bruits, no cervical adenopathies.No carotid arteries pain, no carotid abnormal pulsation or arterial dance.  LYMPHATICS:  No cervical, supraclavicular, axillary, epitrochlear or inguinal adenopathy.  CHEST:  Normal excursion of both jaylen thoraces, normal voice fremitus, no subcutaneous emphysema, normal axillas, no rashes or acanthosis nigricans. Lungs clear to percussion and auscultation, normal breath sounds bilaterally, no wheezing, crackles or ronchi, no stridor, no rubs.  CARDIAC AND VASCULAR: normal rate and regular rhythm, without murmurs, rubs or S3 or S4 right or left sided gallops. Normal femoral, popliteal, pedis, brachial and carotid pulses.  ABDOMEN:  Soft, nontender with spleen 4 cm blcm organomegaly , palpable liver 4 cm brcm., no ascites, no collateral circulation,no distention,no [place] sign, no abdominal pain, no inguinal hernias,no umbilical hernias, no abdominal bruits. Normal bowel sounds.  GENITAL: Not  Performed.  EXTREMITIES  AND SPINE:  No clubbing, cyanosis or edema, no deformities or pain .No kyphosis, scoliosis, deformities or pain in spine, ribs or pelvic bone.  NEUROLOGICAL:  Patient was awake, alert, oriented to time, person and place      RECENT LABS:  Hematology   Results from last 7 days  Lab Units 05/01/18  0521   WBC 10*3/mm3 11.56*   HEMOGLOBIN g/dL 8.2*   HEMATOCRIT % 25.7*   PLATELETS 10*3/mm3 271     2  Lab Results   Component Value Date    GLUCOSE 108 (H) 05/04/2018    BUN 8 05/04/2018    CREATININE 0.59 05/04/2018    EGFRIFNONA 99 05/04/2018    EGFRIFAFRI 92 04/10/2018    BCR 13.6 05/04/2018    CO2 30.0 (H) 05/04/2018    CALCIUM 12.4 (H) 05/04/2018    PROTENTOTREF 6.3 04/22/2018    ALBUMIN 3.00 (L) 05/04/2018    LABIL2 0.9 04/26/2018    AST 44 (H) 04/26/2018    ALT 13 04/26/2018       Lab Results   Component Value Date    IRON 16 (L) 04/24/2018    TIBC 180 04/24/2018    FERRITIN 411.40 (H)  04/24/2018       Lab Results   Component Value Date    [id number] 898 04/02/2018          F-18 FDG PET FROM SKULL BASE TO MID THIGH WITH PET/CT FUSION     HISTORY: 77-year-old female with lymphoma. Initial staging.     TECHNIQUE: Radiation dose reduction techniques were utilized, including  automated exposure control and exposure modulation based on body size.   Blood glucose level at time of injection was 110 mg/dL.  5.2 mCi of F-18  FDG were injected and PET was performed from skull base to mid thigh. CT  was obtained for localization and attenuation correction. Time at  injection 8:30 AM. PET start time 10:17 AM. Compared with previous CTs  from 04/24/2018 as well as neck CT from 04/29/2018.     FINDINGS: There is intense hypermetabolic activity throughout the spleen  with a maximal SUV of 16.9. There is hypermetabolic confluent  lymphadenopathy at the splenic hilum and pancreatic tail as well as  hypermetabolic lymphadenopathy throughout the upper abdomen and  retroperitoneum. The left para-aortic lymphadenopathy has a maximal SUV  of 13.1. There is a hypermetabolic approximately 5 cm lesion within the  inferior aspect of the right hepatic lobe with a maximal SUV of 12.8.  There are hypermetabolic nodes along the right pelvic sidewall and right  external iliac chain. The largest node along the right external iliac  chain measuring 4.0 x 2.7 cm is nearly photopenic except for a much  smaller nodular focus of hypermetabolic activity at the superior margin  of the node. There are also hypermetabolic mesenteric nodes within the  right aspect of the pelvis. Assessment of bladder tumors with PET is  limited as there is excretion of the radiopharmaceutical within the  bladder, but the plaque-like right bladder wall thickening does appear  to be more hypermetabolic than the contents of the urinary bladder. The  coarsely calcified mesenteric lesion is photopenic. Within the chest,  there is a hypermetabolic node along  the azygos vein and shotty nodes at  the rhett have low level activity. There is no hypermetabolic axillary  lymphadenopathy. Within the neck, there is hypermetabolic  lymphadenopathy in the left posterior cervical triangle with a maximal  SUV of 11.2. There are also multiple hypermetabolic bone lesions. The  largest involves the left intertrochanteric region of the femur with a  maximal SUV of 18.7. There is faint sclerosis on the corresponding CT  sequence. There are also hypermetabolic bone lesions scattered  throughout the axial skeleton, ribs, right scapula and there is patchy  metabolic activity throughout the pelvis. There is a somewhat  ill-defined lesion within the left gluteal musculature measuring  approximately 4 cm, previously nearly 5 cm and there is hypermetabolic  activity predominantly in the periphery with a maximal SUV of 5.9. IV  infiltration is noted at the right upper extremity injection site.     IMPRESSION:  1. There is extensive metastatic disease involving the spleen and there  is hypermetabolic lymphadenopathy within the left aspect of the neck,  mediastinum, abdomen, and pelvis. The approximately 5 cm right hepatic  lobe liver mass is intensely hypermetabolic as well. The right bladder  wall thickening is more hypermetabolic than the urinary contents and  likely represents malignancy. There are multiple bone metastases as  described. Along the axial skeleton, T8 is involved to the greatest  degree and there is also a sizable metastasis at the intertrochanteric  region of the left femur.  2. The approximately 4 cm lesion within the left buttock musculature is  smaller than previously and may represent a resolving hematoma. Pattern  of the metabolic activity along the periphery of the lesion can be seen  with hematomas.  3. The coarsely calcified mesenteric mass is photopenic and is likely  benign.   Tissue Pathology Exam   Order: [id number]   Status:  Preliminary result   Visible to patient:  " No (Not Released)   Dx:  Lymphoma of lymph nodes of neck, unsp...    2d ago   Preliminary Diagnosis   LEFT POSTERIOR CERVICAL LYMPH NODE:                ATYPICAL LYMPHOID INFILTRATE WORRISOME FOR LYMPHOPROLIFERATIVE DISORDER.      COMMENT:  Paraffin block will be forwarded to [facility] for additional studies to further classify the lymphoid infiltrate and to correlate with flow cytometry.       CMK/brb  I[facility]/THM     CPT CODES:  1.  18772   Preliminary result electronically signed by Carlos Bridges MD on 5/3/2018 at 0748   Preliminary result electronically signed by Carlos Bridges MD on 5/2/2018 at 1453   Gross Description See result belowP    Received fresh without formalin labeled \"left hip posterior cervical lymph node\" are two pink tan fragments of tissue consistent with lymph node measuring 1.2 x 0.9 x 0.9 cm in aggregate. A representative section of each of the tissue fragments is submitted in transport media for flow cytometry studies. The remaining tissue is serially sectioned and entirely submitted in a single block labeled 1A for routine histologic examination.  CC/USO/CMK/th    Microscopic Description See result belowP    Microscopic performed, incorporated in diagnosis.    Scan on 5/2/2018  2:01 PM by Krista Barraza : FLOW CYTOMETRY ANALYSISScan on 5/2/2018  2:01 PM by Krista Barraza : FLOW CYTOMETRY ANALYSIS      Resulting Agency [facility]                 Assessment/Plan   1.  Hypercalcemia of malignancy:  The patient received Zometa 4 mg on 4/25/18.  Her calcium temporarily improved but is beginning to elevate again today to 10.8 with albumin 3.0.  Hypercalcemia is likely to be an ongoing issue until we can treat the underlying lymphoma causing the problem    2.  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  Additional FISH studies are pending to evaluate for \"double hit\" lymphoma as Ki 67 staining is 4+     She has extensive metastatic disease throughout " "the abdomen and pelvis, splenomegaly replaced with low attenuation lesions, 4.4 cm splenic hilum mass, disease encasing the pancreatic tail, 3 cm lesion abutting the stomach, extensive lymphadenopathy throughout the celiac axis, retroperitoneum, mesentery, periaortic lymphadenopathy, liver lesion in the posterior segment 4.8 cm, nodular thickening of the right wall of urinary bladder 5.0 cm, peritoneal lymphadenopathy and thickening, coarsely calcified mesenteric mass 9.6 cm (?old mesenteric adenitis).  Bone scan shows foci of uptake in the frontal bone and right posterior ninth rib.      The patient has had B symptoms with decreased performance status, and weight loss, anorexia.    I discussed today with the patient and her son her liver biopsy results which showed diffuse large B-cell lymphoma.  She underwent a cervical lymph node excisional biopsy today to confirm disease.  Given the patient's refractory hypercalcemia and B symptoms she will need treatment while in the hospital.  I recommended we proceed with a PET scan for complete staging.  I did not pursue a bone marrow exam as with likely not change her treatment.  We could consider a lumbar puncture given her advanced stage, particularly consider lumbar puncture to rule out CNS involvement if disease returns positive for C-myc, BCL-2 or BCL- 6    I recommended a power port placement to facilitate chemotherapy.    I reviewed with the patient and her son systemic chemotherapy with CHOP R including risk and side effects of this regimen including but not limited to alopecia, worsening fatigue, neuropathy, cardiac toxicity, myelosuppression, risk of infection, possible need of transfusion support etc.  I explained to the patient if her FISH studies returned consistent with a \"double hit\" lymphoma alternative therapy may be required.  The patient is anxious to proceed with treatment.  She has had a 2-D echocardiogram for 1118 with ejection fraction 67%.  Hepatitis " B testing is negative/normal.    3.  Leukocytosis/anemia:  Probably inflammatory/malignancy associated (elevated ferritin, low transferrin, elevated ESR) versus bone marrow involvement?.  I do not think a bone marrow exam would change her treatment plan.  We will monitor the CBC.    4.  Paroxysmal atrial fibrillation: The patient is anticoagulated with Eliquis.  Her platelet count will need to be monitored carefully after chemotherapy    Recommendations:  1.  Patient has again been irritated by me in regard to the frequency of chemotherapy medicine administration every 3 weeks with the goal to put the disease away in remission as far as we can and maybe even consider the possibility of curing.  Obviously, the cure depends on the final pathological analysis.  If this is a double-hit lymphoma I doubt that will be the case.  Nevertheless, I think the patient deserves to have the opportunity of being treated and see how things evolve.    2.  I discussed with the patient that independent of the chemotherapy regimen that is picked, the likelihood of her having tumor lysis syndrome is very high.  For this reason, I have already talked with the Pharmacist in regard to the RUBIRICASE that she will be require to be given before the initiation of chemotherapy.  3.  Patient will have formal chemotherapy education, teaching and consent today by the Pharmacist and nurses.  Obviously, the final regimen will be dependent of the final pathological analysis.  The entity of the regimen will be CHOP/Rituxan.  The patient is aware that on the 1st day of infusion she will require medication for almost 8 hours and she will require treatment every 3 weeks.  If indeed she has a double-hit lymphoma, future treatments will need to be administered in the hospital.  If she does not have a double-hit lymphoma future treatments could be administered in the office.  4.  I discussed with her the side effects of the treatment including alopecia,  nausea, vomiting, anemia, leukopenia, thrombocytopenia, immunodeficiency as well as tumor lysis syndrome, peripheral neuropathy, constipation, diarrhea and alopecia among others.  She was ready to proceed.  5.  I discussed with Dr. Gill the fact that it will be okay to hold off on the anticoagulant, Eliquis, until the patient undergoes placement of a port tomorrow.    6.  Patient will have a PET scan this afternoon.  This will be useful in regard to the determination of level of SUV activity besides location of tumors.  We know that this is in the bone and liver, spleen and intraabdominal organs and also in the cervical area.  Very likely this represents stage IV disease anyway given the fact that there is presence of this in the bone and she has associated hypercalcemia malignancy.    7.  Patient in preparation for the PET scan will hold off on IV fluids and after she returns will be started on hydration prechemotherapy with dextrose and sodium bicarbonate solution.  Maybe she will require placement of a Roberson catheter to allow proper diuresis to ensure and minimize the potentially for tumor lysis syndrome.  8.  I discussed with the Pharmacist that she will require special prophylactic medication for tumor lysis syndrome.  9.  Patient will require during the first 3 days of treatment, blood work every 6 hours that will monitor labs in regard to tumor lysis syndrome that will include a chemistry profile, LDH, uric acid and phosphorus.    10. Patient had formal education in regard to all these things, but we will review this with the family tomorrow. The patient will ask the kids to be here tomorrow morning for me to discuss these issues with them.    I discussed with the patient today the findings on the PET scan that are very dramatic. She has multiple sites of disease through her skeleton, her spleen, her liver, lymph nodes, in many places including the pelvic area and also disease in her spine and left  femur. Again the SUV activity in all of these areas is very high consistent with her diagnosis.     I reviewed with the patient the findings of the report of the cervical lymph node that contains similar material to the one in the liver biopsy and definitive morphological diagnosis and immunohistochemistry diagnosis will be completed probably tomorrow. This will not change whatever we get to do in regard to treatment.     The patient has had her port placed this morning. I have actually discussed the case with Dr. Gill, General Surgery. I also have discussed the case with the pharmacist. I also have discussed the case with the nurse taking care of the patient. Overall the patient is doing fine postoperatively and I am ready to go ahead and proceed with her therapy. Therefore I am going to go ahead and proceed with the care plan for CHOP/Rituxan. I have discussed the case with Dr. Zee also. 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 We are going to proceed with CHOP/Rituxan and see how she evolves from this, see how the toxicity is and observe the metabolic complications from this to see what happens. In anticipation for her chemotherapy treatment the patient also will receive her IV medication to prevent her from developing tumor lysis syndrome. This was discussed with the pharmacy days ago.    Given the late timing back into the room likely the patient will require to split the treatment in 2 days, either the Rituxan today or tomorrow and the chemotherapy again either today or tomorrow. We will see how things evolve.     In preparation for her chemotherapy treatment I have ordered for the patient to have a CMP, LDH, uric acid and phosphorus level done every 6 hours and ordered the nurses who are taking care of the patient to call  if the phosphorus level is above 6, if the potassium level is above 5, if the creatinine level is above 1.3 or if the LDH goes above 10.     If any of the above changes in a radical way we will consult nephrology to be sure that we have all of the measures appropriate to minimize any potential for tumor lysis and eventually kidney dysfunction.    I reviewed the port by myself, it is functional doing fine and it is fine for the patient to go ahead and proceed with the treatment as we described yesterday extensively to the patient and her daughter in detail as well with the patient's son.                     5/4/2018      CC:

## 2018-05-04 NOTE — NURSING NOTE
Pt started on Rituxan, upon the rate becoming 250mg/hr, patient started shaking. Demoral IV given and Rituxan stopped. NS increased to 100cc/hr. Patient's BP also became elevated. IV hydralazine given. Upon assessment an hour later before restarting Rituxan the patient started vomiting. IV Zofran was given. Pt was assessed 30 mins later and had spiked a temperature of 101.1 F. PO Tylenol 650mg given. Reassessment 15 mins later the patient had improved. Rituxan restarted at 150mg/hr. Will continue to monitor. MD aware of the above.

## 2018-05-04 NOTE — OP NOTE
PREOPERATIVE DIAGNOSIS: Lymphoma.    POSTOPERATIVE DIAGNOSIS: Lymphoma.    PROCEDURE: Right internal jugular vein PowerPort placement with ultrasound guidance and fluoroscopic guidance.    SURGEON: Jamie Campa M.D.  ANESTHESIA: MAC plus 30 mL 0.5% bupivacaine.  BLOOD LOSS: 20 mL.    DESCRIPTION: The patient is positioned supine on the operating table. The arms are tucked at her sides. A shoulder roll was placed beneath the scapula to hyperextend the neck and clavicle. Perioperative antibiotics were administered. Lower extremity compression garments are in place. A surgical pause was performed. The skin over the right chest and neck area was prepped with ChloraPrep. Sterile drapes were arranged.   The site was anesthetized with 0.5% bupivacaine. A total of 30 mL was ultimately used for the case. A spinal needle was used to percutaneously enter the right internal jugular vein under ultrasound guidance. It was entered easily. A guidewire was easily passed. The needle was removed. A photo was obtained of the ultrasound image. Fluoroscopy was used to position the guidewire properly.   A subcutaneous pocket was made in the right chest wall  with electrocautery. It was large enough to accommodate implantation of the reservoir. The catheter was tunneled between the guidewire incision and the reservoir incision.  A dilator and peel-away sheath were passed over the guidewire. The guidewire and dilator were removed. Fluoroscopy was used to accurately trim the catheter to the appropriate length. The catheter was passed through the peel-away sheath and the sheath was removed. Fluoroscopy was used to confirm that the catheter tip was correctly positioned at the junction between superior vena cava and right atrium. The contour of the catheter is smooth. The catheter flushes easily and has good blood return.   The reservoir was secured to the anterior chest wall with 2-0 Ethibond interrupted stitches. The subcutaneous  tissues were closed over the reservoir with 2-0 Vicryl. The skin was closed with 4-0 Monocryl and Steri-Strips. I accessed the port with the supplied noncoring infusion set. A Biopatch and sterile Tegaderm dressing were placed over the access site. A postoperative chest x-ray was ordered for the recovery room.

## 2018-05-04 NOTE — NURSING NOTE
"Nursing Chemotherapy Verification    Chemotherapy Regimen:   Treatment Plans     Name Type Plan dates Plan Provider         Active    OP LYMPHOMA R-CHOP RiTUXimab / Cyclophosphamide / DOXOrubicin / VinCRIStine / PredniSONE / Pegfilgrastim ONCOLOGY TREATMENT  5/4/2018 - Present Ramesh Matthews MD                     Current height and weight: 162.6 cm (64\") 65.1 kg (143 lb 9.6 oz)  Calculated BSA from current height and weight (uLcinda): 1.7    Relevant Labs    Results from last 7 days  Lab Units 05/01/18  0521   WBC 10*3/mm3 11.56*   HEMOGLOBIN g/dL 8.2*   HEMATOCRIT % 25.7*   PLATELETS 10*3/mm3 271     Lab Results   Component Value Date    NEUTROABS 8.22 (H) 05/01/2018         Results from last 7 days  Lab Units 05/04/18  1220 05/04/18  0602 05/03/18  0437   CREATININE mg/dL 0.70 0.59 0.71       Serum creatinine: 0.7 mg/dL 05/04/18 1220  Estimated creatinine clearance: 60.5 mL/min    Lab Results   Component Value Date    HEPBCAB Negative 05/01/2018       Verification attestation:  I have personally reviewed the planned regimen and its administration and dosing. I understand the potential side effects.The patient has been instructed on the regimen, potential side effects, and self care measures; the consent form has been completed. I have confirmed that the appropriate premedication, prehydration, post medication and/or emergency medications are ordered in addition to the chemotherapy.    I have independently verified that the height and weight is current and calculated the BSA. I have verified the doses with the planned regimen and have clarified any deviations with the physician (Dr. Matthews). I have confirmed the route of administration and patient IV access.    Nurse: Robyn Lomax RN  2nd verification Nurse: Anushka Shields RN    "

## 2018-05-04 NOTE — PLAN OF CARE
Problem: Fall Risk (Adult)  Goal: Absence of Fall  Outcome: Ongoing (interventions implemented as appropriate)      Problem: Patient Care Overview  Goal: Plan of Care Review  Outcome: Ongoing (interventions implemented as appropriate)   05/04/18 4136   Coping/Psychosocial   Plan of Care Reviewed With patient   Plan of Care Review   Progress improving   OTHER   Outcome Summary Pt went down for port placement today. Port accessed in PACU, flushing with blood return. RCHOP started, rituxan going now. VSS. Will continue to monitor. Incontience care provided. Family at bedside. IVF continued.      Goal: Individualization and Mutuality  Outcome: Ongoing (interventions implemented as appropriate)    Goal: Discharge Needs Assessment  Outcome: Ongoing (interventions implemented as appropriate)    Goal: Interprofessional Rounds/Family Conf  Outcome: Ongoing (interventions implemented as appropriate)      Problem: Activity Intolerance (Adult)  Goal: Activity Tolerance  Outcome: Ongoing (interventions implemented as appropriate)    Goal: Effective Energy Conservation Techniques  Outcome: Ongoing (interventions implemented as appropriate)      Problem: Hypertensive Disease/Crisis (Arterial) (Adult)  Goal: Signs and Symptoms of Listed Potential Problems Will be Absent, Minimized or Managed (Hypertensive Disease/Crisis)  Outcome: Ongoing (interventions implemented as appropriate)

## 2018-05-04 NOTE — ANESTHESIA POSTPROCEDURE EVALUATION
"Patient: Martina Blake    Procedure Summary     Date:  05/04/18 Room / Location:  [facility] OR 06 / [facility] MAIN OR    Anesthesia Start:  0813 Anesthesia Stop:  0910    Procedure:  INSERTION VENOUS ACCESS DEVICE (N/A ) Diagnosis:       Lymphoma of lymph nodes of neck, unspecified lymphoma type      (Lymphoma of lymph nodes of neck, unspecified lymphoma type [C85.91])    Surgeon:  Jamie Gill MD Provider:  Maame Youssef MD    Anesthesia Type:  MAC ASA Status:  3          Anesthesia Type: MAC  Last vitals  BP   134/58 (05/04/18 1005)   Temp   36.6 °C (97.9 °F) (05/04/18 0905)   Pulse   64 (05/04/18 1005)   Resp   16 (05/04/18 1005)     SpO2   98 % (05/04/18 1005)     Post Anesthesia Care and Evaluation    Patient location during evaluation: bedside  Patient participation: complete - patient participated  Level of consciousness: awake  Pain score: 2  Pain management: adequate  Airway patency: patent  Anesthetic complications: No anesthetic complications    Cardiovascular status: acceptable  Respiratory status: acceptable  Hydration status: acceptable    Comments: /58 (BP Location: Right arm, Patient Position: Lying)   Pulse 64   Temp 36.6 °C (97.9 °F) (Oral)   Resp 16   Ht 162.6 cm (64\")   Wt 65.1 kg (143 lb 9.6 oz)   SpO2 98%   BMI 24.65 kg/m²         "

## 2018-05-05 LAB
ALBUMIN SERPL-MCNC: 2.7 G/DL (ref 3.5–5.2)
ALBUMIN SERPL-MCNC: 2.8 G/DL (ref 3.5–5.2)
ALBUMIN SERPL-MCNC: 2.9 G/DL (ref 3.5–5.2)
ALBUMIN/GLOB SERPL: 0.9 G/DL
ALBUMIN/GLOB SERPL: 0.9 G/DL
ALBUMIN/GLOB SERPL: 1 G/DL
ALP SERPL-CCNC: 81 U/L (ref 39–117)
ALP SERPL-CCNC: 81 U/L (ref 39–117)
ALP SERPL-CCNC: 85 U/L (ref 39–117)
ALT SERPL W P-5'-P-CCNC: 8 U/L (ref 1–33)
ALT SERPL W P-5'-P-CCNC: 8 U/L (ref 1–33)
ALT SERPL W P-5'-P-CCNC: 9 U/L (ref 1–33)
ANION GAP SERPL CALCULATED.3IONS-SCNC: 10.2 MMOL/L
ANION GAP SERPL CALCULATED.3IONS-SCNC: 12.1 MMOL/L
ANION GAP SERPL CALCULATED.3IONS-SCNC: 8.5 MMOL/L
AST SERPL-CCNC: 38 U/L (ref 1–32)
AST SERPL-CCNC: 43 U/L (ref 1–32)
AST SERPL-CCNC: 43 U/L (ref 1–32)
BILIRUB SERPL-MCNC: 0.2 MG/DL (ref 0.1–1.2)
BILIRUB SERPL-MCNC: 0.2 MG/DL (ref 0.1–1.2)
BILIRUB SERPL-MCNC: <0.2 MG/DL (ref 0.1–1.2)
BUN BLD-MCNC: 15 MG/DL (ref 8–23)
BUN BLD-MCNC: 16 MG/DL (ref 8–23)
BUN BLD-MCNC: 19 MG/DL (ref 8–23)
BUN/CREAT SERPL: 16.3 (ref 7–25)
BUN/CREAT SERPL: 16.5 (ref 7–25)
BUN/CREAT SERPL: 19 (ref 7–25)
CALCIUM SPEC-SCNC: 11 MG/DL (ref 8.6–10.5)
CALCIUM SPEC-SCNC: 11.3 MG/DL (ref 8.6–10.5)
CALCIUM SPEC-SCNC: 11.5 MG/DL (ref 8.6–10.5)
CHLORIDE SERPL-SCNC: 100 MMOL/L (ref 98–107)
CHLORIDE SERPL-SCNC: 101 MMOL/L (ref 98–107)
CHLORIDE SERPL-SCNC: 102 MMOL/L (ref 98–107)
CO2 SERPL-SCNC: 24.9 MMOL/L (ref 22–29)
CO2 SERPL-SCNC: 27.8 MMOL/L (ref 22–29)
CO2 SERPL-SCNC: 28.5 MMOL/L (ref 22–29)
CREAT BLD-MCNC: 0.92 MG/DL (ref 0.57–1)
CREAT BLD-MCNC: 0.97 MG/DL (ref 0.57–1)
CREAT BLD-MCNC: 1 MG/DL (ref 0.57–1)
GFR SERPL CREATININE-BSD FRML MDRD: 54 ML/MIN/1.73
GFR SERPL CREATININE-BSD FRML MDRD: 56 ML/MIN/1.73
GFR SERPL CREATININE-BSD FRML MDRD: 59 ML/MIN/1.73
GLOBULIN UR ELPH-MCNC: 2.8 GM/DL
GLOBULIN UR ELPH-MCNC: 3.1 GM/DL
GLOBULIN UR ELPH-MCNC: 3.3 GM/DL
GLUCOSE BLD-MCNC: 144 MG/DL (ref 65–99)
GLUCOSE BLD-MCNC: 157 MG/DL (ref 65–99)
GLUCOSE BLD-MCNC: 173 MG/DL (ref 65–99)
LDH SERPL-CCNC: 549 U/L (ref 135–214)
LDH SERPL-CCNC: 660 U/L (ref 135–214)
LDH SERPL-CCNC: 678 U/L (ref 135–214)
MAGNESIUM SERPL-MCNC: 2 MG/DL (ref 1.6–2.4)
PHOSPHATE SERPL-MCNC: 2.8 MG/DL (ref 2.5–4.5)
PHOSPHATE SERPL-MCNC: 3.9 MG/DL (ref 2.5–4.5)
PHOSPHATE SERPL-MCNC: 3.9 MG/DL (ref 2.5–4.5)
PHOSPHATE SERPL-MCNC: 4 MG/DL (ref 2.5–4.5)
POTASSIUM BLD-SCNC: 3.8 MMOL/L (ref 3.5–5.2)
POTASSIUM BLD-SCNC: 4 MMOL/L (ref 3.5–5.2)
POTASSIUM BLD-SCNC: 4.2 MMOL/L (ref 3.5–5.2)
PROT SERPL-MCNC: 5.6 G/DL (ref 6–8.5)
PROT SERPL-MCNC: 5.8 G/DL (ref 6–8.5)
PROT SERPL-MCNC: 6.2 G/DL (ref 6–8.5)
SODIUM BLD-SCNC: 138 MMOL/L (ref 136–145)
SODIUM BLD-SCNC: 138 MMOL/L (ref 136–145)
SODIUM BLD-SCNC: 139 MMOL/L (ref 136–145)
URATE SERPL-MCNC: 0.2 MG/DL (ref 2.4–5.7)
URATE SERPL-MCNC: 0.3 MG/DL (ref 2.4–5.7)
URATE SERPL-MCNC: <0.2 MG/DL (ref 2.4–5.7)
URATE SERPL-MCNC: <0.2 MG/DL (ref 2.4–5.7)

## 2018-05-05 PROCEDURE — 99233 SBSQ HOSP IP/OBS HIGH 50: CPT | Performed by: INTERNAL MEDICINE

## 2018-05-05 PROCEDURE — 84550 ASSAY OF BLOOD/URIC ACID: CPT | Performed by: INTERNAL MEDICINE

## 2018-05-05 PROCEDURE — 83735 ASSAY OF MAGNESIUM: CPT | Performed by: INTERNAL MEDICINE

## 2018-05-05 PROCEDURE — 84100 ASSAY OF PHOSPHORUS: CPT | Performed by: INTERNAL MEDICINE

## 2018-05-05 PROCEDURE — 63710000001 PREDNISONE PER 5 MG: Performed by: INTERNAL MEDICINE

## 2018-05-05 PROCEDURE — 80053 COMPREHEN METABOLIC PANEL: CPT | Performed by: INTERNAL MEDICINE

## 2018-05-05 PROCEDURE — 83615 LACTATE (LD) (LDH) ENZYME: CPT | Performed by: INTERNAL MEDICINE

## 2018-05-05 PROCEDURE — 63710000001 DIPHENHYDRAMINE PER 50 MG: Performed by: INTERNAL MEDICINE

## 2018-05-05 PROCEDURE — 99024 POSTOP FOLLOW-UP VISIT: CPT | Performed by: SURGERY

## 2018-05-05 PROCEDURE — 25810000003 DEXTROSE-NACL PER 500 ML: Performed by: INTERNAL MEDICINE

## 2018-05-05 RX ORDER — DIPHENHYDRAMINE HCL 25 MG
25 CAPSULE ORAL EVERY 6 HOURS PRN
Status: DISCONTINUED | OUTPATIENT
Start: 2018-05-05 | End: 2018-05-16 | Stop reason: HOSPADM

## 2018-05-05 RX ADMIN — DOCUSATE SODIUM -SENNOSIDES 2 TABLET: 50; 8.6 TABLET, COATED ORAL at 21:59

## 2018-05-05 RX ADMIN — Medication 5 MG: at 21:59

## 2018-05-05 RX ADMIN — METOPROLOL TARTRATE 25 MG: 25 TABLET ORAL at 08:15

## 2018-05-05 RX ADMIN — DEXTROSE AND SODIUM CHLORIDE 100 ML/HR: 5; 900 INJECTION, SOLUTION INTRAVENOUS at 15:52

## 2018-05-05 RX ADMIN — ESCITALOPRAM 5 MG: 5 TABLET, FILM COATED ORAL at 08:15

## 2018-05-05 RX ADMIN — DEXTROSE AND SODIUM CHLORIDE 100 ML/HR: 5; 900 INJECTION, SOLUTION INTRAVENOUS at 05:22

## 2018-05-05 RX ADMIN — MONTELUKAST 10 MG: 10 TABLET, FILM COATED ORAL at 08:15

## 2018-05-05 RX ADMIN — ALLOPURINOL 200 MG: 100 TABLET ORAL at 08:17

## 2018-05-05 RX ADMIN — ALLOPURINOL 200 MG: 100 TABLET ORAL at 21:59

## 2018-05-05 RX ADMIN — Medication 400 MG: at 08:16

## 2018-05-05 RX ADMIN — DIPHENHYDRAMINE HYDROCHLORIDE 25 MG: 25 CAPSULE ORAL at 21:58

## 2018-05-05 RX ADMIN — PANTOPRAZOLE SODIUM 40 MG: 40 TABLET, DELAYED RELEASE ORAL at 06:02

## 2018-05-05 RX ADMIN — PREDNISONE 100 MG: 50 TABLET ORAL at 08:15

## 2018-05-05 RX ADMIN — LISINOPRIL 20 MG: 20 TABLET ORAL at 21:58

## 2018-05-05 RX ADMIN — PHENYTOIN 300 MG: 125 SUSPENSION ORAL at 21:58

## 2018-05-05 RX ADMIN — ACETAMINOPHEN 400 MCG: 400 TABLET ORAL at 08:15

## 2018-05-05 RX ADMIN — POTASSIUM CHLORIDE 40 MEQ: 750 CAPSULE, EXTENDED RELEASE ORAL at 08:16

## 2018-05-05 NOTE — PROGRESS NOTES
NEPHROLOGY PROGRESS NOTE    PATIENT IDENTIFICATION:   Name:  Martina Blake      MRN:  [id number]     77 y.o.  female             Reason for visit: hypercalcemia    SUBJECTIVE:   S/p 1st round of chemo with RCHOP yesterday.  Difficulty tolerating with associated fever and hypertension.  Now resolved.  Patient does not recall this.  Denies n/v/soa, still w/ good appetite, encouraged po intake.    OBJECTIVE:  Vitals:    05/04/18 2000 05/04/18 2030 05/04/18 2349 05/05/18 0533   BP: 109/48 111/56 105/47 103/54   BP Location: Left arm Left arm Left arm Left arm   Patient Position: Lying Lying Lying Lying   Pulse: 69 71 56 55   Resp: 16 16 16 16   Temp: 98.2 °F (36.8 °C) 97.7 °F (36.5 °C) 97.5 °F (36.4 °C) 97 °F (36.1 °C)   TempSrc: Oral Oral Oral Oral   SpO2: 96% 92% 96% 96%   Weight:       Height:               Body mass index is 24.65 kg/m².    Intake/Output Summary (Last 24 hours) at 05/05/18 1208  Last data filed at 05/05/18 1058   Gross per 24 hour   Intake             2291 ml   Output                0 ml   Net             2291 ml     Wt Readings from Last 1 Encounters:   05/01/18 1850 65.1 kg (143 lb 9.6 oz)   04/30/18 0643 66.8 kg (147 lb 4.8 oz)   04/29/18 0652 67.9 kg (149 lb 12.8 oz)   04/28/18 0641 68 kg (150 lb)   04/27/18 0603 64.7 kg (142 lb 9.6 oz)   04/25/18 0516 62.6 kg (138 lb)   04/21/18 1731 68.4 kg (150 lb 12.8 oz)   04/21/18 1227 68 kg (150 lb)     Wt Readings from Last 3 Encounters:   05/01/18 65.1 kg (143 lb 9.6 oz)   04/19/18 68 kg (150 lb)   04/10/18 61.5 kg (135 lb 8 oz)         Exam:  General: NAD  pleasant  Neck with dressing at site of left LN resection  Port right chest  Heart RRR no s3 or rub  Lungs Clear to auscultation; not labored   abd +bs, slightly distended, soft, mildly tender. No guarding or rebound.   Ext no lower ext edema.  Skin, no rash  Psych mood and affect fine    Scheduled meds:      allopurinol 200 mg Oral BID   amLODIPine 10 mg Oral Q24H   folic acid 400 mcg Oral  Daily   lisinopril 20 mg Oral Q12H   melatonin 5 mg Oral Nightly   metoprolol tartrate 25 mg Oral Daily   montelukast 10 mg Oral Daily   pantoprazole 40 mg Oral Q AM   phenytoin 300 mg Oral Nightly   potassium chloride 40 mEq Oral Daily   predniSONE 100 mg Oral Daily With Breakfast   sennosides-docusate sodium 2 tablet Oral Nightly     IV meds:                          dextrose 5 % and sodium chloride 0.9 % 100 mL/hr Last Rate: 100 mL/hr (05/05/18 0522)       Data Review:      Results from last 7 days  Lab Units 05/05/18  0656 05/04/18  1220 05/04/18  0602   SODIUM mmol/L 139 138 140   POTASSIUM mmol/L 4.2 3.5 3.3*   CHLORIDE mmol/L 102 98 98   CO2 mmol/L 28.5 32.6* 30.0*   BUN mg/dL 15 8 8   CREATININE mg/dL 0.92 0.70 0.59   CALCIUM mg/dL 11.3* 11.6* 12.4*   BILIRUBIN mg/dL 0.2 0.2  --    ALK PHOS U/L 81 80  --    ALT (SGPT) U/L 9 11  --    AST (SGOT) U/L 38* 39*  --    GLUCOSE mg/dL 157* 133* 108*     Estimated Creatinine Clearance: 52.6 mL/min (by C-G formula based on SCr of 0.92 mg/dL).    Results from last 7 days  Lab Units 05/05/18  0656   URIC ACID mg/dL <0.2*       Results from last 7 days  Lab Units 05/05/18  0658 05/05/18  0656 05/05/18  0251 05/04/18  1220 05/04/18  0602 05/03/18  0437   MAGNESIUM mg/dL 2.0  --   --   --  2.0 1.9   PHOSPHORUS mg/dL  --  4.0 3.9 3.0 2.8 4.0         Results from last 7 days  Lab Units 05/01/18  0521   WBC 10*3/mm3 11.56*   HEMOGLOBIN g/dL 8.2*   PLATELETS 10*3/mm3 271                   ASSESSMENT:   Principal Problem:    Hypercalcemia  Active Problems:    Gastroesophageal reflux disease    Chronic recurrent major depressive disorder    Restless legs syndrome    Seizure disorder    Essential hypertension    Paroxysmal atrial fibrillation    Iron deficiency anemia due to chronic blood loss    Weakness    Hypertension    Liver mass    Lymphoma    Lymphoma of lymph nodes of neck    1.  Hypercalcemia. stable, all in all, and likely driven by malignancy  2.  Liver mass on renal  "artery duplex. Widely metastatic disease on CT abdomen. Lymphoma is culprit  3.  Hypokalemia, hypophosphatemia, and hypomagnesemia: variably low.  Multifactorial, with poor nutrition and a likely contribution from malignancy d/t \"take up\" by malignant cells  4.  Labile HTN: better, all in all  4.  Afib with RVR, with rate now controlled  5.  Recent GIB  6.  Leukocytosis.     PLAN:  1. Calcium slowly improving.  2.  Encouraged po intake  3.  Lytes stable  4.  Surveillance labs       Jose L Young MD  5/5/2018    12:08 PM   "

## 2018-05-05 NOTE — PROGRESS NOTES
Postoperative day #1 from port placement    Subjective:  No complaints.  Underwent first chemotherapy via port yesterday.  No shortness of breath.    Objective:  Incision clean dry and intact, no signs of infection.  No respiratory distress.  Abdomen soft and benign.    Postop chest x-ray looks good with regard to the Evamuf-w-Rium positioning.    Assessment and plan:  Lymphoma  Postoperative day one port placement  Port is functioning well.  No evidence of postoperative complications.  Please call us as needed.    Kobe Tyson MD  General and Endoscopic Surgery  [facility]    [address] [place] [address], [address]  [place], [place], [address]  P: [phone]  F: [phone]

## 2018-05-05 NOTE — PROGRESS NOTES
"CHIEF COMPLAINT:  Anemia, hypercalcemia, radiographic findings of malignancy with pathology/ diagnosis on the liver consistent with diffuse large B-cell lymphoma FISH for c-myc, bcl-2 and bcl-6 pending    Interval History:  The patient underwent an excisional lymph node biopsy from the left neck this morning for confirmation of pathology.  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  Additional FISH studies are pending to evaluate for \"double hit\" lymphoma as Ki 67 staining is 4+  On 05/02/2018, actually the patient was feeling better.  She had no issues in regard to the site of the lymph node biopsy in the left side of the neck with minimal discomfort.  I talked with Dr. Gill in regard to port placement tomorrow.      Hopefully, we will have further information in regard to “double-hit” lymphoma before we start any chemotherapy.  If that is the case, the patient will require infusional EPOCH/Rituxan.     Other than that, I went through formal education and teaching in regard to chemotherapy medicines, the frequency of the treatment, side effects and so forth; please review below.    The patient will have a PET scan this afternoon as well and the port will be placed tomorrow.  On 05/03/2018 the patient was feeling fatigued and tired from so many testings back and forth. She had her PET scan this morning and there is planning for port placement tomorrow. I asked the patient to bring her family this afternoon to talk with them about the diagnosis and decide how they want to proceed. I pointed out to the patient as well that the absence of treatment life expectancy is measured in a question of a few weeks to a few months.     I also discussed with her the finding on the PET scan today that will be described below and also the finding of the cervical lymph node that will be described below.         Oncology " History:  Martina Blake is a 77 y.o. female who were asked to see in consultation  4/29/18 for hypercalcemia, anemia and radiologic findings consistent with likely malignancy-?  Lymphoma.        She has a significant past medical history of palpitations followed by cardiology.  She presented to the emergency room April 10-14 with chest pain and palpitations and was found to be in A. fib with RVR and also demonstrated electrolyte abnormalities and anemia.  Records demonstrates that her anemia became particularly evident April 11 with an H&H of 8.9/ 28.4, platelet count 212,000 with a normal automated differential.  She underwent GI assessment including upper and lower endoscopy demonstrated hernia, gastritis, esophagitis, diverticulosis but no evidence of acute bleed.  She was placed on Eliquis as result of a relatively high CHADSVASc score.  She had also been found to be hypercalcemic at approximately 11 with HCTZ altered and the patient started on Aldactone.  Additional testing had included a ferritin of 313.1, iron of 32 with TIBC of 222 and iron saturation of 14, occult blood negative per stool testing    She was brought back to the emergency room April 21 with further evidence of hypercalcemia, associated weakness, anorexia, nausea, ataxia and weight loss.  MRI of the brain April 21 was found to be unremarkable. Outpatient intact PTH levels were found to be 7.1(reduced), and an H&H of 10.3 and 32.9, white count 11,090, platelet count 267,000 with a normal differential.  Patient seen by renal medicine with the possibility of Fanconi syndrome raised.  Thereafter PTH hormone was found be less than 2.0, and protein electropheresis included IgG of 948, IgA of 207, IgM 86, total protein 6.3, albumin 2.8, no M spike noted, slightly elevated free kappa and lambda light chains with normal ratio.  Repeat testing per iron profile again revealed low serum iron but high serum ferritin and normal CEA, normal AFP, CA-125 of  77.2, CA 19-9 7.8   At this point the reason for her hypercalcemia was thought to be possible medication effect and/or possible malignancy with calcium was running between 12 and 13 mg/dL.    This led to CT scan of chest abdomen pelvis performed April 24 demonstrate extensive metastatic disease throughout the abdomen and pelvis particularly involving the spleen and liver, extensive lymphadenopathy at the abdomen and pelvis with a 4 cm lesion splenic hilum partially encasing the pancreatic tail, pancreatic tail malignancy?,  Gastric primary malignancy?  There was a mass along the right wall of the urinary bladder with adjacent adenopathy.  CT of the chest revealed several tiny dense pleural-based nodular opacities-partially calcified granulomas, no mediastinal or hilar adenopathy, enlarged pulmonary arteries consistent with pulmonary arterial hypertension.  A subsequent bone scan performed April 26 revealed prominent uptake in the right frontal bone and right posterior ninth rib and a CT needle biopsy of the liver was obtained April 26 as well.The sample obtained revealed, on flow cytometry examination, a subpopulation of normal B cells that might be consistent with B-cell lymphoma.  Additional testing is currently pending.  On May 25 further hypercalcemia was again noted and treated with IV fluids and dose of Zometa, vitamin D level normal.  Again evidence of hypokalemia, hypophosphatemia and hypomagnesemia.  The patient is seen April 29 her calcium level has gradually improved dropping to 9.7, albumin of 2.6, ionized calcium of 6.1, BUN and creatinine of 10/.55.  We are asked to see her with results available thus far as to possible lymphoma.    Past Medical History:     Atrial fibrillation      • Cystitis     • Cystocele       moderate   • Dyslipidemia     • Esophageal reflux     • Fatigue     • History of transfusion       no reaction   • Hypertension     • Major depression, chronic     • Menopause     •  "Osteoarthritis     • Osteoporosis     • Palpitations     • Post menopausal problems     • RLS (restless legs syndrome)     • Seizure disorder     • Subjective tinnitus     • Vaginal delivery       x2  \"SONYA\"      \"JASON\"   • Vitamin D deficiency      Social History:  The patient is a  and has 2 children.  She has never smoked.  She denies alcohol use.    Review of Systems   Constitutional: Positive for fatigue and unexpected weight change. Negative for activity change and appetite change.   HENT: Negative.    Eyes: Negative.    Respiratory: Negative.  Negative for shortness of breath and wheezing.    Cardiovascular: Negative.  Negative for chest pain and palpitations.   Gastrointestinal: Negative.  Negative for abdominal pain, constipation, nausea and vomiting.   Endocrine: Negative.    Genitourinary: Negative.    Musculoskeletal: Positive for gait problem. Negative for neck stiffness.   Skin: Negative.  Negative for color change and pallor.   Neurological: Positive for weakness. Negative for seizures, numbness and headaches.   Hematological: Positive for adenopathy.   Psychiatric/Behavioral: Positive for confusion.          Medications:  The current medication list was reviewed in the EMR    ALLERGIES:    Allergies   Allergen Reactions   • Crab (Diagnostic) Itching and Rash   • Pseudoephedrine Dizziness       Objective      Vitals:    05/05/18 0533   BP: 103/54   Pulse: 55   Resp: 16   Temp: 97 °F (36.1 °C)   SpO2: 96%          Physical Exam    GENERAL:  Well-developed, well-nourished  Patient  in no acute distress.   SKIN:  Warm, dry without rashes, purpura or petechiae.  HEENT:  Pupils were equal and reactive to light and accomodation, conjunctivas non injected, no pterigion, normal extraocular movements, normal visual acuity.   Mouth mucosa was moist, no exudates in oropharynx, normal gum line, normal roof of the mouth and pillars, normal papillations of the tongue.  NECK:  Supple with good range of motion; " no thyromegaly no change in left cervical wound and node 2 cm in size;, no JVD or bruits, no cervical adenopathies.No carotid arteries pain, no carotid abnormal pulsation or arterial dance.  LYMPHATICS:  No cervical, supraclavicular, axillary, epitrochlear or inguinal adenopathy.  CHEST:  Normal excursion of both jaylen thoraces, normal voice fremitus, no subcutaneous emphysema, normal axillas, no rashes or acanthosis nigricans. Lungs clear to percussion and auscultation, normal breath sounds bilaterally, no wheezing, crackles or ronchi, no stridor, no rubs.  CARDIAC AND VASCULAR: normal rate and regular rhythm, without murmurs, rubs or S3 or S4 right or left sided gallops. Normal femoral, popliteal, pedis, brachial and carotid pulses.  ABDOMEN:  Soft, nontender with spleen 4 cm blcm organomegaly , palpable liver 4 cm brcm., no ascites, no collateral circulation,no distention,no Henderson sign, no abdominal pain, no inguinal hernias,no umbilical hernias, no abdominal bruits. Normal bowel sounds.  GENITAL: Not  Performed.  EXTREMITIES  AND SPINE:  No clubbing, cyanosis or edema, no deformities or pain .No kyphosis, scoliosis, deformities or pain in spine, ribs or pelvic bone.  NEUROLOGICAL:  Patient was SLEEPY, LOGIC PROPPER SPEECH UPON QUESTIONS    RECENT LABS:  Hematology   Results from last 7 days  Lab Units 05/01/18  0521   WBC 10*3/mm3 11.56*   HEMOGLOBIN g/dL 8.2*   HEMATOCRIT % 25.7*   PLATELETS 10*3/mm3 271     2  Lab Results   Component Value Date    GLUCOSE 157 (H) 05/05/2018    BUN 15 05/05/2018    CREATININE 0.92 05/05/2018    EGFRIFNONA 59 (L) 05/05/2018    EGFRIFAFRI 92 04/10/2018    BCR 16.3 05/05/2018    CO2 28.5 05/05/2018    CALCIUM 11.3 (H) 05/05/2018    PROTENTOTREF 6.3 04/22/2018    ALBUMIN 2.70 (L) 05/05/2018    LABIL2 0.9 05/05/2018    AST 38 (H) 05/05/2018    ALT 9 05/05/2018       Lab Results   Component Value Date    IRON 16 (L) 04/24/2018    TIBC 180 04/24/2018    FERRITIN 411.40 (H) 04/24/2018  "      Lab Results   Component Value Date    [id number] 898 04/02/2018          Comprehensive Metabolic Panel   Order: [id number]   Status:  Final result   Visible to patient:  No (Not Released)    Ref Range & Units 06:56   Glucose 65 - 99 mg/dL 157     BUN 8 - 23 mg/dL 15    Creatinine 0.57 - 1.00 mg/dL 0.92    Sodium 136 - 145 mmol/L 139    Potassium 3.5 - 5.2 mmol/L 4.2    Chloride 98 - 107 mmol/L 102    CO2 22.0 - 29.0 mmol/L 28.5    Calcium 8.6 - 10.5 mg/dL 11.3     Total Protein 6.0 - 8.5 g/dL 5.8     Albumin 3.50 - 5.20 g/dL 2.70     ALT (SGPT) 1 - 33 U/L 9    AST (SGOT) 1 - 32 U/L 38     Alkaline Phosphatase 39 - 117 U/L 81    Total Bilirubin 0.1 - 1.2 mg/dL 0.2    eGFR Non African Amer >60 mL/min/1.73 59     Globulin gm/dL 3.1    A/G Ratio g/dL 0.9    BUN/Creatinine Ratio 7.0 - 25.0 16.3                Assessment/Plan   1.  Hypercalcemia of malignancy:  The patient received Zometa 4 mg on 4/25/18.  Her calcium temporarily improved but is beginning to elevate again today to 10.8 with albumin 3.0.  Hypercalcemia is likely to be an ongoing issue until we can treat the underlying lymphoma causing the problem    2.  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  Additional FISH studies arePOSITIVE for \"double hit\" lymphoma as Ki 67 staining is 4+     She has extensive metastatic disease throughout the abdomen and pelvis, splenomegaly replaced with low attenuation lesions, 4.4 cm splenic hilum mass, disease encasing the pancreatic tail, 3 cm lesion abutting the stomach, extensive lymphadenopathy throughout the celiac axis, retroperitoneum, mesentery, periaortic lymphadenopathy, liver lesion in the posterior segment 4.8 cm, nodular thickening of the right wall of urinary bladder 5.0 cm, peritoneal lymphadenopathy and thickening, coarsely calcified mesenteric mass 9.6 cm (?old mesenteric adenitis).  Bone scan shows foci of uptake in the frontal bone and right posterior ninth rib.      The patient has " "had B symptoms with decreased performance status, and weight loss, anorexia.    I discussed today with the patient and her son her liver biopsy results which showed diffuse large B-cell lymphoma.  She underwent a cervical lymph node excisional biopsy today to confirm disease.  Given the patient's refractory hypercalcemia and B symptoms she will need treatment while in the hospital.  I recommended we proceed with a PET scan for complete staging.  I did not pursue a bone marrow exam as with likely not change her treatment.  We could consider a lumbar puncture given her advanced stage, particularly consider lumbar puncture to rule out CNS involvement if disease returns positive for C-myc, BCL-2 or BCL- 6    I recommended a power port placement to facilitate chemotherapy.    I reviewed with the patient and her son systemic chemotherapy with CHOP R including risk and side effects of this regimen including but not limited to alopecia, worsening fatigue, neuropathy, cardiac toxicity, myelosuppression, risk of infection, possible need of transfusion support etc.  I explained to the patient if her FISH studies returned consistent with a \"double hit\" lymphoma alternative therapy may be required.  The patient is anxious to proceed with treatment.  She has had a 2-D echocardiogram for 1118 with ejection fraction 67%.  Hepatitis B testing is negative/normal.    3.  Leukocytosis/anemia:  Probably inflammatory/malignancy associated (elevated ferritin, low transferrin, elevated ESR) versus bone marrow involvement?.  I do not think a bone marrow exam would change her treatment plan.  We will monitor the CBC.    4.  Paroxysmal atrial fibrillation: The patient is anticoagulated with Eliquis.  Her platelet count will need to be monitored carefully after chemotherapy    Recommendations:  1.  Patient has again been irritated by me in regard to the frequency of chemotherapy medicine administration every 3 weeks with the goal to put the " disease away in remission as far as we can and maybe even consider the possibility of curing.  Obviously, the cure depends on the final pathological analysis.  If this is a double-hit lymphoma I doubt that will be the case.  Nevertheless, I think the patient deserves to have the opportunity of being treated and see how things evolve.    2.  I discussed with the patient that independent of the chemotherapy regimen that is picked, the likelihood of her having tumor lysis syndrome is very high.  For this reason, I have already talked with the Pharmacist in regard to the RUBIRICASE that she will be require to be given before the initiation of chemotherapy.  3.  Patient will have formal chemotherapy education, teaching and consent today by the Pharmacist and nurses.  Obviously, the final regimen will be dependent of the final pathological analysis.  The entity of the regimen will be CHOP/Rituxan.  The patient is aware that on the 1st day of infusion she will require medication for almost 8 hours and she will require treatment every 3 weeks.  If indeed she has a double-hit lymphoma, future treatments will need to be administered in the hospital.  If she does not have a double-hit lymphoma future treatments could be administered in the office.  4.  I discussed with her the side effects of the treatment including alopecia, nausea, vomiting, anemia, leukopenia, thrombocytopenia, immunodeficiency as well as tumor lysis syndrome, peripheral neuropathy, constipation, diarrhea and alopecia among others.  She was ready to proceed.  5.  I discussed with Dr. Gill the fact that it will be okay to hold off on the anticoagulant, Eliquis, until the patient undergoes placement of a port tomorrow.    6.  Patient will have a PET scan this afternoon.  This will be useful in regard to the determination of level of SUV activity besides location of tumors.  We know that this is in the bone and liver, spleen and intraabdominal organs and  also in the cervical area.  Very likely this represents stage IV disease anyway given the fact that there is presence of this in the bone and she has associated hypercalcemia malignancy.    7.  Patient in preparation for the PET scan will hold off on IV fluids and after she returns will be started on hydration prechemotherapy with dextrose and sodium bicarbonate solution.  Maybe she will require placement of a Roberson catheter to allow proper diuresis to ensure and minimize the potentially for tumor lysis syndrome.  8.  I discussed with the Pharmacist that she will require special prophylactic medication for tumor lysis syndrome.  9.  Patient will require during the first 3 days of treatment, blood work every 6 hours that will monitor labs in regard to tumor lysis syndrome that will include a chemistry profile, LDH, uric acid and phosphorus.    10. Patient had formal education in regard to all these things, but we will review this with the family tomorrow. The patient will ask the kids to be here tomorrow morning for me to discuss these issues with them.    I discussed with the patient today the findings on the PET scan that are very dramatic. She has multiple sites of disease through her skeleton, her spleen, her liver, lymph nodes, in many places including the pelvic area and also disease in her spine and left femur. Again the SUV activity in all of these areas is very high consistent with her diagnosis.     I reviewed with the patient the findings of the report of the cervical lymph node that contains similar material to the one in the liver biopsy and definitive morphological diagnosis and immunohistochemistry diagnosis will be completed probably tomorrow. This will not change whatever we get to do in regard to treatment.     The patient has had her port placed this morning. I have actually discussed the case with Dr. Gill, General Surgery. I also have discussed the case with the pharmacist. I also have  discussed the case with the nurse taking care of the patient. Overall the patient is doing fine postoperatively and I am ready to go ahead and proceed with her therapy. Therefore I am going to go ahead and proceed with the care plan for CHOP/Rituxan. I have discussed the case with Dr. Zee also. 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 We are going to proceed with CHOP/Rituxan and see how she evolves from this, see how the toxicity is and observe the metabolic complications from this to see what happens. In anticipation for her chemotherapy treatment the patient also will receive her IV medication to prevent her from developing tumor lysis syndrome. This was discussed with the pharmacy days ago.    The patient was able to undergo yesterday through almost 8 hours of her plan of treatment with Rituxan and CHOP. She had fevers and chills during the infusion and nausea and vomiting after Demerol was given to control the chills. She finally was able to finish the treatment. So far the patient feels very fatigued and tired. This morning she is sleepy and she is taking also multiple medicines acting on the sensory nervous system including gabapentin, Dilantin and Lexapro. I am going to hold off on these medicines today, especially the Lexapro and the gabapentin, leave her on the Dilantin for her seizure activity only and we will monitor. Her electrolytes still show hypercalcemia of malignancy. Hopefully with the hydration that she is receiving now this will be getting better along with the treatment for her lymphoma.     The patient will remain on aggressive IV fluid resuscitation to control tumor lysis syndrome. Her uric acid remains into the normal limits and the LDH has not mobilized up. Her phosphorus level and potassium level remain  normal. Therefore, under these premises, the patient will be monitored, continued with her IV fluids, and again we will monitor her calcium level. Actually, her corrected calcium level is around 13 given her low albumin level. I would not be surprised if in the next day or so will need to give her Zometa as well.     The patient will be initiating at some point tonight her Granix to counteract the effect of the chemotherapy and keep her immune system in the best way possible in order to minimize any potentiality for immunosuppression and infection.     Her port is functioning fine.     I discussed all these facts with the patient and the nurse who is taking care of her today.                    5/5/2018      CC:

## 2018-05-05 NOTE — PLAN OF CARE
Problem: Fall Risk (Adult)  Goal: Absence of Fall  Outcome: Ongoing (interventions implemented as appropriate)      Problem: Patient Care Overview  Goal: Plan of Care Review   05/05/18 1600   Coping/Psychosocial   Plan of Care Reviewed With patient   Plan of Care Review   Progress improving   OTHER   Outcome Summary no c/o pain or nausea this shift, labs drawn every 6 hrs as ordered, Md stopped several meds that he thought made pt sleepy     Goal: Individualization and Mutuality  Outcome: Ongoing (interventions implemented as appropriate)    Goal: Discharge Needs Assessment  Outcome: Ongoing (interventions implemented as appropriate)    Goal: Interprofessional Rounds/Family Conf  Outcome: Ongoing (interventions implemented as appropriate)      Problem: Activity Intolerance (Adult)  Goal: Activity Tolerance  Outcome: Ongoing (interventions implemented as appropriate)    Goal: Effective Energy Conservation Techniques  Outcome: Ongoing (interventions implemented as appropriate)      Problem: Hypertensive Disease/Crisis (Arterial) (Adult)  Goal: Signs and Symptoms of Listed Potential Problems Will be Absent, Minimized or Managed (Hypertensive Disease/Crisis)  Outcome: Ongoing (interventions implemented as appropriate)

## 2018-05-06 LAB
ALBUMIN SERPL-MCNC: 2.8 G/DL (ref 3.5–5.2)
ALBUMIN SERPL-MCNC: 3 G/DL (ref 3.5–5.2)
ALBUMIN/GLOB SERPL: 1 G/DL
ALBUMIN/GLOB SERPL: 1.1 G/DL
ALP SERPL-CCNC: 70 U/L (ref 39–117)
ALP SERPL-CCNC: 72 U/L (ref 39–117)
ALT SERPL W P-5'-P-CCNC: 6 U/L (ref 1–33)
ALT SERPL W P-5'-P-CCNC: 8 U/L (ref 1–33)
ANION GAP SERPL CALCULATED.3IONS-SCNC: 10.6 MMOL/L
ANION GAP SERPL CALCULATED.3IONS-SCNC: 9.4 MMOL/L
AST SERPL-CCNC: 35 U/L (ref 1–32)
AST SERPL-CCNC: 37 U/L (ref 1–32)
BASOPHILS # BLD AUTO: 0.01 10*3/MM3 (ref 0–0.2)
BASOPHILS NFR BLD AUTO: 0.1 % (ref 0–1.5)
BILIRUB SERPL-MCNC: 0.2 MG/DL (ref 0.1–1.2)
BILIRUB SERPL-MCNC: 0.2 MG/DL (ref 0.1–1.2)
BUN BLD-MCNC: 22 MG/DL (ref 8–23)
BUN BLD-MCNC: 23 MG/DL (ref 8–23)
BUN/CREAT SERPL: 21.6 (ref 7–25)
BUN/CREAT SERPL: 23.2 (ref 7–25)
CALCIUM SPEC-SCNC: 10.9 MG/DL (ref 8.6–10.5)
CALCIUM SPEC-SCNC: 11 MG/DL (ref 8.6–10.5)
CHLORIDE SERPL-SCNC: 102 MMOL/L (ref 98–107)
CHLORIDE SERPL-SCNC: 103 MMOL/L (ref 98–107)
CO2 SERPL-SCNC: 25.4 MMOL/L (ref 22–29)
CO2 SERPL-SCNC: 25.6 MMOL/L (ref 22–29)
CREAT BLD-MCNC: 0.99 MG/DL (ref 0.57–1)
CREAT BLD-MCNC: 1.02 MG/DL (ref 0.57–1)
DEPRECATED RDW RBC AUTO: 46.4 FL (ref 37–54)
EOSINOPHIL # BLD AUTO: 0 10*3/MM3 (ref 0–0.7)
EOSINOPHIL NFR BLD AUTO: 0 % (ref 0.3–6.2)
ERYTHROCYTE [DISTWIDTH] IN BLOOD BY AUTOMATED COUNT: 14.9 % (ref 11.7–13)
GFR SERPL CREATININE-BSD FRML MDRD: 53 ML/MIN/1.73
GFR SERPL CREATININE-BSD FRML MDRD: 54 ML/MIN/1.73
GLOBULIN UR ELPH-MCNC: 2.7 GM/DL
GLOBULIN UR ELPH-MCNC: 2.7 GM/DL
GLUCOSE BLD-MCNC: 111 MG/DL (ref 65–99)
GLUCOSE BLD-MCNC: 132 MG/DL (ref 65–99)
HCT VFR BLD AUTO: 23.6 % (ref 35.6–45.5)
HGB BLD-MCNC: 7.4 G/DL (ref 11.9–15.5)
IMM GRANULOCYTES # BLD: 0.03 10*3/MM3 (ref 0–0.03)
IMM GRANULOCYTES NFR BLD: 0.2 % (ref 0–0.5)
LDH SERPL-CCNC: 531 U/L (ref 135–214)
LDH SERPL-CCNC: 561 U/L (ref 135–214)
LYMPHOCYTES # BLD AUTO: 0.76 10*3/MM3 (ref 0.9–4.8)
LYMPHOCYTES NFR BLD AUTO: 5.5 % (ref 19.6–45.3)
MCH RBC QN AUTO: 27.2 PG (ref 26.9–32)
MCHC RBC AUTO-ENTMCNC: 31.4 G/DL (ref 32.4–36.3)
MCV RBC AUTO: 86.8 FL (ref 80.5–98.2)
MONOCYTES # BLD AUTO: 0.49 10*3/MM3 (ref 0.2–1.2)
MONOCYTES NFR BLD AUTO: 3.6 % (ref 5–12)
NEUTROPHILS # BLD AUTO: 12.53 10*3/MM3 (ref 1.9–8.1)
NEUTROPHILS NFR BLD AUTO: 90.8 % (ref 42.7–76)
PHOSPHATE SERPL-MCNC: 2.8 MG/DL (ref 2.5–4.5)
PHOSPHATE SERPL-MCNC: 2.9 MG/DL (ref 2.5–4.5)
PLATELET # BLD AUTO: 236 10*3/MM3 (ref 140–500)
PMV BLD AUTO: 9.6 FL (ref 6–12)
POTASSIUM BLD-SCNC: 3.8 MMOL/L (ref 3.5–5.2)
POTASSIUM BLD-SCNC: 4 MMOL/L (ref 3.5–5.2)
PROT SERPL-MCNC: 5.5 G/DL (ref 6–8.5)
PROT SERPL-MCNC: 5.7 G/DL (ref 6–8.5)
RBC # BLD AUTO: 2.72 10*6/MM3 (ref 3.9–5.2)
SODIUM BLD-SCNC: 138 MMOL/L (ref 136–145)
SODIUM BLD-SCNC: 138 MMOL/L (ref 136–145)
URATE SERPL-MCNC: 0.4 MG/DL (ref 2.4–5.7)
URATE SERPL-MCNC: 0.5 MG/DL (ref 2.4–5.7)
WBC NRBC COR # BLD: 13.79 10*3/MM3 (ref 4.5–10.7)

## 2018-05-06 PROCEDURE — 99233 SBSQ HOSP IP/OBS HIGH 50: CPT | Performed by: INTERNAL MEDICINE

## 2018-05-06 PROCEDURE — 80053 COMPREHEN METABOLIC PANEL: CPT | Performed by: INTERNAL MEDICINE

## 2018-05-06 PROCEDURE — 84550 ASSAY OF BLOOD/URIC ACID: CPT | Performed by: INTERNAL MEDICINE

## 2018-05-06 PROCEDURE — 84100 ASSAY OF PHOSPHORUS: CPT | Performed by: INTERNAL MEDICINE

## 2018-05-06 PROCEDURE — 97164 PT RE-EVAL EST PLAN CARE: CPT

## 2018-05-06 PROCEDURE — 85025 COMPLETE CBC W/AUTO DIFF WBC: CPT | Performed by: INTERNAL MEDICINE

## 2018-05-06 PROCEDURE — 25810000003 DEXTROSE-NACL PER 500 ML: Performed by: INTERNAL MEDICINE

## 2018-05-06 PROCEDURE — 97110 THERAPEUTIC EXERCISES: CPT

## 2018-05-06 PROCEDURE — 83615 LACTATE (LD) (LDH) ENZYME: CPT | Performed by: INTERNAL MEDICINE

## 2018-05-06 PROCEDURE — 25010000002 TBO-FILGRASTIM 300 MCG/0.5ML SOLUTION PREFILLED SYRINGE: Performed by: INTERNAL MEDICINE

## 2018-05-06 PROCEDURE — 63710000001 DIPHENHYDRAMINE PER 50 MG: Performed by: INTERNAL MEDICINE

## 2018-05-06 PROCEDURE — 63710000001 PREDNISONE PER 5 MG: Performed by: INTERNAL MEDICINE

## 2018-05-06 RX ORDER — ALLOPURINOL 300 MG/1
300 TABLET ORAL DAILY
Status: DISCONTINUED | OUTPATIENT
Start: 2018-05-07 | End: 2018-05-16 | Stop reason: HOSPADM

## 2018-05-06 RX ORDER — ALPRAZOLAM 0.25 MG/1
0.25 TABLET ORAL EVERY 8 HOURS PRN
Status: DISCONTINUED | OUTPATIENT
Start: 2018-05-06 | End: 2018-05-16 | Stop reason: HOSPADM

## 2018-05-06 RX ADMIN — DEXTROSE AND SODIUM CHLORIDE 100 ML/HR: 5; 900 INJECTION, SOLUTION INTRAVENOUS at 02:10

## 2018-05-06 RX ADMIN — PHENYTOIN 300 MG: 125 SUSPENSION ORAL at 22:15

## 2018-05-06 RX ADMIN — PREDNISONE 100 MG: 50 TABLET ORAL at 08:45

## 2018-05-06 RX ADMIN — AMLODIPINE BESYLATE 10 MG: 10 TABLET ORAL at 08:45

## 2018-05-06 RX ADMIN — ALLOPURINOL 200 MG: 100 TABLET ORAL at 08:45

## 2018-05-06 RX ADMIN — TBO-FILGRASTIM 300 MCG: 300 INJECTION, SOLUTION SUBCUTANEOUS at 10:39

## 2018-05-06 RX ADMIN — DOCUSATE SODIUM -SENNOSIDES 2 TABLET: 50; 8.6 TABLET, COATED ORAL at 22:17

## 2018-05-06 RX ADMIN — PANTOPRAZOLE SODIUM 40 MG: 40 TABLET, DELAYED RELEASE ORAL at 08:11

## 2018-05-06 RX ADMIN — POTASSIUM CHLORIDE 40 MEQ: 750 CAPSULE, EXTENDED RELEASE ORAL at 08:45

## 2018-05-06 RX ADMIN — LISINOPRIL 20 MG: 20 TABLET ORAL at 08:45

## 2018-05-06 RX ADMIN — ALPRAZOLAM 0.25 MG: 0.25 TABLET ORAL at 22:20

## 2018-05-06 RX ADMIN — METOPROLOL TARTRATE 25 MG: 25 TABLET ORAL at 08:45

## 2018-05-06 RX ADMIN — ACETAMINOPHEN 400 MCG: 400 TABLET ORAL at 08:45

## 2018-05-06 RX ADMIN — MONTELUKAST 10 MG: 10 TABLET, FILM COATED ORAL at 08:45

## 2018-05-06 RX ADMIN — DIPHENHYDRAMINE HYDROCHLORIDE 25 MG: 25 CAPSULE ORAL at 23:50

## 2018-05-06 RX ADMIN — LISINOPRIL 20 MG: 20 TABLET ORAL at 22:16

## 2018-05-06 NOTE — THERAPY EVALUATION
"Acute Care - Physical Therapy Initial Evaluation   [facility]     Patient Name: Martina Blake  : 1940  MRN: [id number]  Today's Date: 2018      Date of Referral to PT: 18  Referring Physician: Byron      Admit Date: 2018    Visit Dx:     ICD-10-CM ICD-9-CM   1. Lymphoma of lymph nodes of neck, unspecified lymphoma type C85.91 202.81   2. Weakness R53.1 780.79   3. Hypercalcemia E83.52 275.42   4. Hypertensive urgency I16.0 401.9   5. 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 C83.39 202.80     Patient Active Problem List   Diagnosis   • Blood glucose abnormal   • Dyslipidemia   • Gastroesophageal reflux disease   • Fatigue   • Generalized osteoarthritis   • Chronic recurrent major depressive disorder   • Osteoporosis   • Restless legs syndrome   • Seizure disorder   • Vitamin D deficiency   • Bradycardia   • Essential hypertension   • Post-menopause on HRT (hormone replacement therapy)   • Chronic seasonal allergic rhinitis   • Paroxysmal atrial fibrillation   • Iron deficiency anemia due to chronic blood loss   • Acute superficial gastritis without hemorrhage   • Hiatal hernia   • Esophagitis   • Diverticulosis of large intestine without hemorrhage   • Dizziness   • Weakness   • Hypertension   • Hypercalcemia   • Liver mass   • Lymphoma   • Lymphoma of lymph nodes of neck     Past Medical History:   Diagnosis Date   • Atrial fibrillation    • Cystitis    • Cystocele     moderate   • Dyslipidemia    • Esophageal reflux    • Fatigue    • History of transfusion     no reaction   • Hypertension    • Major depression, chronic    • Menopause    • Osteoarthritis    • Osteoporosis    • Palpitations    • Post menopausal problems    • RLS (restless legs syndrome)    • Seizure disorder    • Subjective tinnitus    • Vaginal delivery     x2  \"SONYA\"      \"JASON\"   • Vitamin D deficiency      Past Surgical History:   Procedure Laterality Date   • CERVICAL " LYMPH NODE BIOPSY/EXCISION Left 5/1/2018    Procedure: CERVICAL LYMPH NODE BIOPSY/EXCISION;  Surgeon: Danny Oconnor MD;  Location: [facility] MAIN OR;  Service: ENT   • CHOLECYSTECTOMY     • COLONOSCOPY     • COLONOSCOPY N/A 4/13/2018    Procedure: COLONOSCOPY to terminal ileum;  Surgeon: Dominik Burt MD;  Location: [facility] ENDOSCOPY;  Service: Gastroenterology   • CRANIOTOMY     • ENDOSCOPY N/A 4/13/2018    Procedure: ESOPHAGOGASTRODUODENOSCOPY with biopsy;  Surgeon: Dominik Burt MD;  Location: [facility] ENDOSCOPY;  Service: Gastroenterology   • TOTAL ABDOMINAL HYSTERECTOMY  1980    w/ bladder suspension.  Probably vaginal hysterectomy with anterior colporrhaphy.         PT ASSESSMENT (last 12 hours)      Physical Therapy Evaluation     Row Name 05/06/18 1407          PT Evaluation Time/Intention    Subjective Information complains of;fatigue  -     Document Type re-evaluation  -     Mode of Treatment physical therapy  -     Patient Effort good  -     Row Name 05/06/18 140          General Information    Patient Profile Reviewed? yes  -PC     Referring Physician Byron  -     Patient Observations alert;cooperative;agree to therapy  -PC     Patient/Family Observations pt sitting edge of bed, no acute distress  -PC     Pertinent History of Current Functional Problem pt found to have lymphoma with extensive metastatic disease in the abdomen and pelvis, she had an excisional biopsy of a L neck mass on 5/1 and chemo was initiated on 5/4.   -PC     Barriers to Rehab medically complex  -     Row Name 05/06/18 1404          Bed Mobility Assessment/Treatment    Supine-Sit [place] (Bed Mobility) --   sitting edge of bed upon entering room  -     Sit-Supine [place] (Bed Mobility) supervision  -     Row Name 05/06/18 1409          Transfer Assessment/Treatment    Sit-Stand [place] (Transfers) contact guard  -     Stand-Sit [place] (Transfers) contact guard  -     Row Name 05/06/18 1400           Sit-Stand Transfer    Assistive Device (Sit-Stand Transfers) walker, front-wheeled  -PC     Row Name 05/06/18 1408          Stand-Sit Transfer    Assistive Device (Stand-Sit Transfers) walker, front-wheeled  -PC     Row Name 05/06/18 1408          Gait/Stairs Assessment/Training    [place] Level (Gait) contact guard  -PC     Assistive Device (Gait) walker, front-wheeled  -PC     Distance in Feet (Gait) 160 ft  -PC     Pattern (Gait) step-through  -PC     Deviations/Abnormal Patterns (Gait) gait speed decreased  -PC     Bilateral Gait Deviations forward flexed posture  -PC     Row Name 05/06/18 1408          General ROM    GENERAL ROM COMMENTS WFL  -PC     Row Name 05/06/18 1408          General Assessment (Manual Muscle Testing)    Comment, General Manual Muscle Testing (MMT) Assessment general weakness, no focal deficits but fatigues quickly  -PC     Row Name 05/06/18 1408          Pain Assessment    Additional Documentation Pain Scale: Word Pre/Post-Treatment (Group)  -PC     Row Name 05/06/18 1408          Pain Scale: Numbers Pre/Post-Treatment    Pain Scale: Numbers, Pretreatment 0/10 - no pain  -PC     Row Name             Wound 05/01/18 1057 Left neck incision    Wound - Properties Group Date first assessed: 05/01/18  -PEARL Time first assessed: 1057  -PEARL Side: Left  -PEARL Location: neck  -PEARL Type: incision  -PEARL    Row Name             Wound 05/04/18 0838 Right chest incision    Wound - Properties Group Date first assessed: 05/04/18  -SM Time first assessed: 0838  -SM Side: Right  -SM Location: chest  -SM Type: incision  -SM    Row Name 05/06/18 1408          Plan of Care Review    Plan of Care Reviewed With patient  -PC     Row Name 05/06/18 1408          Physical Therapy Clinical Impression    Date of Referral to PT 05/06/18  -PC     Criteria for Skilled Interventions Met (PT Clinical Impression) yes;treatment indicated  -PC     Impairments Found (describe specific impairments) aerobic  capacity/endurance;gait, locomotion, and balance  -PC     Rehab Potential (PT Clinical Summary) good, to achieve stated therapy goals  -PC     Row Name 05/06/18 1408          Physical Therapy Goals    Bed Mobility Goal Selection (PT) bed mobility, PT goal 1  -PC     Transfer Goal Selection (PT) transfer, PT goal 1  -PC     Gait Training Goal Selection (PT) gait training, PT goal 1  -PC     Row Name 05/06/18 1408          Bed Mobility Goal 1 (PT)    Activity/Assistive Device (Bed Mobility Goal 1, PT) sit to supine/supine to sit  -PC     [place] Level/Cues Needed (Bed Mobility Goal 1, PT) independent  -PC     Time Frame (Bed Mobility Goal 1, PT) 1 week  -PC     Row Name 05/06/18 1408          Transfer Goal 1 (PT)    Activity/Assistive Device (Transfer Goal 1, PT) sit-to-stand/stand-to-sit  -PC     [place] Level/Cues Needed (Transfer Goal 1, PT) independent  -PC     Time Frame (Transfer Goal 1, PT) 1 week  -PC     Row Name 05/06/18 1408          Gait Training Goal 1 (PT)    Activity/Assistive Device (Gait Training Goal 1, PT) walker, rolling  -PC     [place] Level (Gait Training Goal 1, PT) supervision required  -PC     Distance (Gait Goal 1, PT) 200 ft  -PC     Time Frame (Gait Training Goal 1, PT) 1 week  -PC     Row Name 05/06/18 1402          Positioning and Restraints    Pre-Treatment Position in bed  -PC     Post Treatment Position bed  -PC     In Bed supine;call light within reach;encouraged to call for assist  -PC       User Key  (r) = Recorded By, (t) = Taken By, (c) = Cosigned By    Initials Name Provider Type    PEARL Almonte, RN Registered Nurse    PC Rosette Navarrete PT Physical Therapist    JEREMIAH Cole RN Registered Nurse          Physical Therapy Education     Title: PT OT SLP Therapies (Done)     Topic: Physical Therapy (Done)     Point: Mobility training (Done)    Learning Progress Summary     Learner Status Readiness Method Response Comment Documented by    Patient Done  Acceptance E,D VU,DU  PC 05/06/18 1416     Done Acceptance E,TB VU   05/03/18 0206     Done Acceptance E,TB VU   05/02/18 0248     Done Acceptance E VU   04/29/18 1554     Done Acceptance E,TB,D VU,NR   04/28/18 1733     Done Acceptance E,TB VU,DU  CW 04/27/18 1544     Done Acceptance E,TB VU,NR role of PT, benefits of activity, use of AD, safety GR 04/22/18 1311    Family Done Acceptance E,TB,D VU,NR   04/28/18 1733     Done Acceptance E,TB VU,NR role of PT, benefits of activity, use of AD, safety GR 04/22/18 1311          Point: Home exercise program (Done)    Learning Progress Summary     Learner Status Readiness Method Response Comment Documented by    Patient Done Acceptance E,D VU,DU  PC 05/06/18 1416     Done Acceptance E,TB VU   05/03/18 0206     Done Acceptance E,TB VU   05/02/18 0248     Done Acceptance E,TB,D VU,NR   04/28/18 1733     Done Acceptance E,TB VU,DU  CW 04/27/18 1544     Done Acceptance E,TB VU,NR role of PT, benefits of activity, use of AD, safety GR 04/22/18 1311    Family Done Acceptance E,TB,D VU,NR   04/28/18 1733     Done Acceptance E,TB VU,NR role of PT, benefits of activity, use of AD, safety GR 04/22/18 1311          Point: Body mechanics (Done)    Learning Progress Summary     Learner Status Readiness Method Response Comment Documented by    Patient Done Acceptance E,D VU,DU  PC 05/06/18 1416     Done Acceptance E,TB VU   05/03/18 0206     Done Acceptance E,TB VU   05/02/18 0248     Done Acceptance E,TB,D VU,NR   04/28/18 1733     Done Acceptance E,TB VU,DU  CW 04/27/18 1544     Done Acceptance E,TB VU,NR role of PT, benefits of activity, use of AD, safety GR 04/22/18 1311    Family Done Acceptance E,TB,D VU,NR   04/28/18 1733     Done Acceptance E,TB VU,NR role of PT, benefits of activity, use of AD, safety GR 04/22/18 1311          Point: Precautions (Done)    Learning Progress Summary     Learner Status Readiness Method Response Comment Documented by     Patient Done Acceptance E,D VU,DU  PC 05/06/18 1416     Done Acceptance E,TB VU  BS 05/03/18 0206     Done Acceptance E,TB VU  BS 05/02/18 0248     Done Acceptance E GENE CHAMBERLAIN 04/30/18 1615     Done Acceptance E,TB,D VU,NR   04/28/18 1733     Done Acceptance E,TB VU,DU  CW 04/27/18 1544     Done Acceptance E,TB VU,NR role of PT, benefits of activity, use of AD, safety  04/22/18 1311    Family Done Acceptance E,TB,D VU,NR   04/28/18 1733     Done Acceptance E,TB VU,NR role of PT, benefits of activity, use of AD, safety  04/22/18 1311                      User Key     Initials Effective Dates Name Provider Type Discipline     10/03/16 -  Aubrey Del Rosario, PT Physical Therapist PT     04/03/18 -  Rosette Navarrete, PT Physical Therapist PT    MD 04/03/18 -  Malissa Oliver, PT Physical Therapist PT     03/07/18 -  Linda Cole, PTA Physical Therapy Assistant PT     06/16/16 -  Ara Gibson, RN Registered Nurse Nurse     04/03/18 -  Alma Delia Cazares, PT Physical Therapist PT     03/07/18 -  Myke Burrell, THIAGO Physical Therapy Assistant PT                PT Recommendation and Plan  Planned Therapy Interventions (PT Eval): strengthening, transfer training, balance training, bed mobility training, gait training  Therapy Frequency (PT Clinical Impression): daily  Plan of Care Reviewed With: patient  Outcome Summary: pt presnets with ewakness and impaired functional mobility, pt will benefit from PT to address, pt hopes to return to her home at discharge          Outcome Measures     Row Name 05/06/18 1400             How much help from another person do you currently need...    Turning from your back to your side while in flat bed without using bedrails? 3  -PC      Moving from lying on back to sitting on the side of a flat bed without bedrails? 3  -PC      Moving to and from a bed to a chair (including a wheelchair)? 3  -PC      Standing up from a chair using your arms (e.g., wheelchair, bedside  chair)? 3  -PC      Climbing 3-5 steps with a railing? 3  -PC      To walk in hospital room? 3  -PC      AM-PAC 6 Clicks Score 18  -PC        User Key  (r) = Recorded By, (t) = Taken By, (c) = Cosigned By    Initials Name Provider Type    PC Rosette Navarrete, PT Physical Therapist           Time Calculation:         PT Charges     Row Name 05/06/18 1417             Time Calculation    Start Time 1340  -PC      Stop Time 1400  -PC      Time Calculation (min) 20 min  -PC      PT Received On 05/06/18  -PC      PT - Next Appointment 05/07/18  -PC      PT Goal Re-Cert Due Date 05/13/18  -PC        User Key  (r) = Recorded By, (t) = Taken By, (c) = Cosigned By    Initials Name Provider Type    PC Rosette Navarrete, PT Physical Therapist              PT G-Codes  PT Professional Judgement Used?: Yes  Outcome Measure Options: AM-PAC 6 Clicks Basic Mobility (PT)  Score: 13  Functional Limitation: Mobility: Walking and moving around  Mobility: Walking and Moving Around Current Status (): At least 60 percent but less than 80 percent impaired, limited or restricted  Mobility: Walking and Moving Around Goal Status (): At least 40 percent but less than 60 percent impaired, limited or restricted      Rosette Navarrete, PT  5/6/2018

## 2018-05-06 NOTE — PLAN OF CARE
Problem: Fall Risk (Adult)  Goal: Absence of Fall  Outcome: Ongoing (interventions implemented as appropriate)      Problem: Patient Care Overview  Goal: Plan of Care Review  Outcome: Ongoing (interventions implemented as appropriate)   05/06/18 3205   Coping/Psychosocial   Plan of Care Reviewed With patient;daughter   Plan of Care Review   Progress improving   OTHER   Outcome Summary started Granix today, walked with PT, no pain, fluids dc/d, daily labs ordered     Goal: Individualization and Mutuality  Outcome: Ongoing (interventions implemented as appropriate)    Goal: Discharge Needs Assessment  Outcome: Ongoing (interventions implemented as appropriate)    Goal: Interprofessional Rounds/Family Conf  Outcome: Ongoing (interventions implemented as appropriate)      Problem: Activity Intolerance (Adult)  Goal: Activity Tolerance  Outcome: Ongoing (interventions implemented as appropriate)    Goal: Effective Energy Conservation Techniques  Outcome: Ongoing (interventions implemented as appropriate)      Problem: Hypertensive Disease/Crisis (Arterial) (Adult)  Goal: Signs and Symptoms of Listed Potential Problems Will be Absent, Minimized or Managed (Hypertensive Disease/Crisis)  Outcome: Outcome(s) achieved Date Met: 05/06/18      Problem: Oncology Care (Adult)  Goal: Signs and Symptoms of Listed Potential Problems Will be Absent, Minimized or Managed (Oncology Care)  Outcome: Ongoing (interventions implemented as appropriate)

## 2018-05-06 NOTE — PROGRESS NOTES
NEPHROLOGY PROGRESS NOTE    PATIENT IDENTIFICATION:   Name:  Martina Blake      MRN:  [id number]     77 y.o.  female             Reason for visit: hypercalcemia    SUBJECTIVE:   Doing well.  No new complaints. Denies n/v/soa, still w/ good appetite, encouraged po intake.    OBJECTIVE:  Vitals:    05/05/18 1445 05/05/18 1750 05/05/18 2045 05/06/18 0608   BP: 111/60 122/64 126/65 125/58   BP Location: Left arm Left arm Left arm Left arm   Patient Position: Lying Lying Lying Lying   Pulse: 63 60 68 71   Resp: 18 18 18 17   Temp: 97.1 °F (36.2 °C) 97.1 °F (36.2 °C) 97.1 °F (36.2 °C) 97.1 °F (36.2 °C)   TempSrc: Oral Oral Oral Oral   SpO2: 95% 95% 94% 90%   Weight:       Height:               Body mass index is 24.65 kg/m².    Intake/Output Summary (Last 24 hours) at 05/06/18 1021  Last data filed at 05/06/18 0210   Gross per 24 hour   Intake             2516 ml   Output                0 ml   Net             2516 ml     Wt Readings from Last 1 Encounters:   05/01/18 1850 65.1 kg (143 lb 9.6 oz)   04/30/18 0643 66.8 kg (147 lb 4.8 oz)   04/29/18 0652 67.9 kg (149 lb 12.8 oz)   04/28/18 0641 68 kg (150 lb)   04/27/18 0603 64.7 kg (142 lb 9.6 oz)   04/25/18 0516 62.6 kg (138 lb)   04/21/18 1731 68.4 kg (150 lb 12.8 oz)   04/21/18 1227 68 kg (150 lb)     Wt Readings from Last 3 Encounters:   05/01/18 65.1 kg (143 lb 9.6 oz)   04/19/18 68 kg (150 lb)   04/10/18 61.5 kg (135 lb 8 oz)         Exam:  General: NAD  pleasant  Neck with dressing at site of left LN resection  Port right chest  Heart RRR no s3 or rub  Lungs Clear to auscultation; not labored   abd +bs, slightly distended, soft, mildly tender. No guarding or rebound.   Ext no lower ext edema.  Skin, no rash  Psych mood and affect fine    Scheduled meds:      [START ON 5/7/2018] allopurinol 300 mg Oral Daily   amLODIPine 10 mg Oral Q24H   lisinopril 20 mg Oral Q12H   metoprolol tartrate 25 mg Oral Daily   pantoprazole 40 mg Oral Q AM   phenytoin 300 mg Oral  Nightly   potassium chloride 40 mEq Oral Daily   predniSONE 100 mg Oral Daily With Breakfast   sennosides-docusate sodium 2 tablet Oral Nightly   tbo-filgrastim 300 mcg Subcutaneous Q24H     IV meds:                             Data Review:      Results from last 7 days  Lab Units 05/06/18  0708 05/06/18  0205 05/05/18  1817   SODIUM mmol/L 138 138 138   POTASSIUM mmol/L 3.8 4.0 4.0   CHLORIDE mmol/L 103 102 101   CO2 mmol/L 25.6 25.4 24.9   BUN mg/dL 23 22 19   CREATININE mg/dL 0.99 1.02* 1.00   CALCIUM mg/dL 11.0* 10.9* 11.0*   BILIRUBIN mg/dL 0.2 0.2 <0.2   ALK PHOS U/L 70 72 81   ALT (SGPT) U/L 6 8 8   AST (SGOT) U/L 35* 37* 43*   GLUCOSE mg/dL 111* 132* 173*     Estimated Creatinine Clearance: 48.9 mL/min (by C-G formula based on SCr of 0.99 mg/dL).    Results from last 7 days  Lab Units 05/06/18  0708   URIC ACID mg/dL 0.5*       Results from last 7 days  Lab Units 05/06/18  0708 05/06/18  0205 05/05/18  1817  05/05/18  0658  05/04/18  0602 05/03/18  0437   MAGNESIUM mg/dL  --   --   --   --  2.0  --  2.0 1.9   PHOSPHORUS mg/dL 2.8 2.9 2.8  < >  --   < > 2.8 4.0   < > = values in this interval not displayed.      Results from last 7 days  Lab Units 05/06/18  0814 05/01/18  0521   WBC 10*3/mm3 13.79* 11.56*   HEMOGLOBIN g/dL 7.4* 8.2*   PLATELETS 10*3/mm3 236 271                   ASSESSMENT:   Principal Problem:    Hypercalcemia  Active Problems:    Gastroesophageal reflux disease    Chronic recurrent major depressive disorder    Restless legs syndrome    Seizure disorder    Essential hypertension    Paroxysmal atrial fibrillation    Iron deficiency anemia due to chronic blood loss    Weakness    Hypertension    Liver mass    Lymphoma    Lymphoma of lymph nodes of neck    1.  Hypercalcemia. stable, all in all, and likely driven by malignancy s/p zometa x1.  May need to repeat if calcium does not trend down s/p chemo.  2.  Metastatic NHL, s/p RCHOP 5/4/18.  3.  Hypokalemia, hypophosphatemia, and hypomagnesemia:  "variably low.  Multifactorial, with poor nutrition and a likely contribution from malignancy d/t \"take up\" by malignant cells  4.  Labile HTN: better, all in all  4.  Afib with RVR, with rate now controlled  5.  Recent GIB  6.  Leukocytosis.   7. Anemia; transfuse prn    PLAN:  1.  Encouraged po intake  2   Lytes stable  3.  Surveillance labs       Jose L Young MD  5/6/2018    10:21 AM   "

## 2018-05-06 NOTE — PROGRESS NOTES
"CHIEF COMPLAINT:  Anemia, hypercalcemia, radiographic findings of malignancy with pathology/ diagnosis on the liver consistent with diffuse large B-cell lymphoma FISH for c-myc, bcl-2 and bcl-6 pending    Interval History:  The patient underwent an excisional lymph node biopsy from the left neck this morning for confirmation of pathology.  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  Additional FISH studies are pending to evaluate for \"double hit\" lymphoma as Ki 67 staining is 4+  On 05/02/2018, actually the patient was feeling better.  She had no issues in regard to the site of the lymph node biopsy in the left side of the neck with minimal discomfort.  I talked with Dr. Gill in regard to port placement tomorrow.      Hopefully, we will have further information in regard to “double-hit” lymphoma before we start any chemotherapy.  If that is the case, the patient will require infusional EPOCH/Rituxan.     Other than that, I went through formal education and teaching in regard to chemotherapy medicines, the frequency of the treatment, side effects and so forth; please review below.    The patient will have a PET scan this afternoon as well and the port will be placed tomorrow.  On 05/03/2018 the patient was feeling fatigued and tired from so many testings back and forth. She had her PET scan this morning and there is planning for port placement tomorrow. I asked the patient to bring her family this afternoon to talk with them about the diagnosis and decide how they want to proceed. I pointed out to the patient as well that the absence of treatment life expectancy is measured in a question of a few weeks to a few months.     I also discussed with her the finding on the PET scan today that will be described below and also the finding of the cervical lymph node that will be described below.         Oncology " History:  Martina Blake is a 77 y.o. female who were asked to see in consultation  4/29/18 for hypercalcemia, anemia and radiologic findings consistent with likely malignancy-?  Lymphoma.        She has a significant past medical history of palpitations followed by cardiology.  She presented to the emergency room April 10-14 with chest pain and palpitations and was found to be in A. fib with RVR and also demonstrated electrolyte abnormalities and anemia.  Records demonstrates that her anemia became particularly evident April 11 with an H&H of 8.9/ 28.4, platelet count 212,000 with a normal automated differential.  She underwent GI assessment including upper and lower endoscopy demonstrated hernia, gastritis, esophagitis, diverticulosis but no evidence of acute bleed.  She was placed on Eliquis as result of a relatively high CHADSVASc score.  She had also been found to be hypercalcemic at approximately 11 with HCTZ altered and the patient started on Aldactone.  Additional testing had included a ferritin of 313.1, iron of 32 with TIBC of 222 and iron saturation of 14, occult blood negative per stool testing    She was brought back to the emergency room April 21 with further evidence of hypercalcemia, associated weakness, anorexia, nausea, ataxia and weight loss.  MRI of the brain April 21 was found to be unremarkable. Outpatient intact PTH levels were found to be 7.1(reduced), and an H&H of 10.3 and 32.9, white count 11,090, platelet count 267,000 with a normal differential.  Patient seen by renal medicine with the possibility of Fanconi syndrome raised.  Thereafter PTH hormone was found be less than 2.0, and protein electropheresis included IgG of 948, IgA of 207, IgM 86, total protein 6.3, albumin 2.8, no M spike noted, slightly elevated free kappa and lambda light chains with normal ratio.  Repeat testing per iron profile again revealed low serum iron but high serum ferritin and normal CEA, normal AFP, CA-125 of  77.2, CA 19-9 7.8   At this point the reason for her hypercalcemia was thought to be possible medication effect and/or possible malignancy with calcium was running between 12 and 13 mg/dL.    This led to CT scan of chest abdomen pelvis performed April 24 demonstrate extensive metastatic disease throughout the abdomen and pelvis particularly involving the spleen and liver, extensive lymphadenopathy at the abdomen and pelvis with a 4 cm lesion splenic hilum partially encasing the pancreatic tail, pancreatic tail malignancy?,  Gastric primary malignancy?  There was a mass along the right wall of the urinary bladder with adjacent adenopathy.  CT of the chest revealed several tiny dense pleural-based nodular opacities-partially calcified granulomas, no mediastinal or hilar adenopathy, enlarged pulmonary arteries consistent with pulmonary arterial hypertension.  A subsequent bone scan performed April 26 revealed prominent uptake in the right frontal bone and right posterior ninth rib and a CT needle biopsy of the liver was obtained April 26 as well.The sample obtained revealed, on flow cytometry examination, a subpopulation of normal B cells that might be consistent with B-cell lymphoma.  Additional testing is currently pending.  On May 25 further hypercalcemia was again noted and treated with IV fluids and dose of Zometa, vitamin D level normal.  Again evidence of hypokalemia, hypophosphatemia and hypomagnesemia.  The patient is seen April 29 her calcium level has gradually improved dropping to 9.7, albumin of 2.6, ionized calcium of 6.1, BUN and creatinine of 10/.55.  We are asked to see her with results available thus far as to possible lymphoma.    Past Medical History:     Atrial fibrillation      • Cystitis     • Cystocele       moderate   • Dyslipidemia     • Esophageal reflux     • Fatigue     • History of transfusion       no reaction   • Hypertension     • Major depression, chronic     • Menopause     •  "Osteoarthritis     • Osteoporosis     • Palpitations     • Post menopausal problems     • RLS (restless legs syndrome)     • Seizure disorder     • Subjective tinnitus     • Vaginal delivery       x2  \"SONYA\"      \"JASON\"   • Vitamin D deficiency      Social History:  The patient is a  and has 2 children.  She has never smoked.  She denies alcohol use.    Review of Systems   Constitutional: Positive for fatigue. Negative for activity change, appetite change and unexpected weight change.   HENT: Negative.    Eyes: Negative.    Respiratory: Negative.  Negative for shortness of breath and wheezing.    Cardiovascular: Negative.  Negative for chest pain and palpitations.   Gastrointestinal: Negative.  Negative for abdominal pain, constipation, nausea and vomiting.   Endocrine: Negative.    Genitourinary: Negative.    Musculoskeletal: Positive for gait problem. Negative for neck stiffness.   Skin: Negative.  Negative for color change and pallor.   Neurological: Positive for weakness. Negative for seizures, numbness and headaches.   Hematological: Positive for adenopathy.   Psychiatric/Behavioral: Negative for confusion.          Medications:  The current medication list was reviewed in the EMR    ALLERGIES:    Allergies   Allergen Reactions   • Crab (Diagnostic) Itching and Rash   • Pseudoephedrine Dizziness       Objective      Vitals:    05/06/18 0608   BP: 125/58   Pulse: 71   Resp: 17   Temp: 97.1 °F (36.2 °C)   SpO2: 90%          Physical Exam    GENERAL:  Well-developed, well-nourished  Patient  in no acute distress.   SKIN:  Warm, dry without rashes, purpura or petechiae.  HEENT:  Pupils were equal and reactive to light and accomodation, conjunctivas non injected, no pterigion, normal extraocular movements, normal visual acuity.   Mouth mucosa was moist, no exudates in oropharynx, normal gum line, normal roof of the mouth and pillars, normal papillations of the tongue.  NECK:  Supple with good range of motion; " no thyromegaly no change in left cervical wound and node 2 cm in size;, no JVD or bruits, no cervical adenopathies.No carotid arteries pain, no carotid abnormal pulsation or arterial dance.  LYMPHATICS:  No cervical, supraclavicular, axillary, epitrochlear or inguinal adenopathy.  CHEST:  Normal excursion of both jaylen thoraces, normal voice fremitus, no subcutaneous emphysema, normal axillas, no rashes or acanthosis nigricans. Lungs clear to percussion and auscultation, normal breath sounds bilaterally, no wheezing, crackles or ronchi, no stridor, no rubs.  CARDIAC AND VASCULAR: normal rate and regular rhythm, without murmurs, rubs or S3 or S4 right or left sided gallops. Normal femoral, popliteal, pedis, brachial and carotid pulses.  ABDOMEN:  Soft, nontender with spleen 4 cm blcm organomegaly , palpable liver 4 cm brcm., no ascites, no collateral circulation,no distention,no Braddock sign, no abdominal pain, no inguinal hernias,no umbilical hernias, no abdominal bruits. Normal bowel sounds.  GENITAL: Not  Performed.  EXTREMITIES  AND SPINE:  No clubbing, cyanosis or edema, no deformities or pain .No kyphosis, scoliosis, deformities or pain in spine, ribs or pelvic bone.  NEUROLOGICAL:  Patient was alert, LOGIC PROPPER SPEECH UPON QUESTIONS    RECENT LABS:  Hematology   Results from last 7 days  Lab Units 05/06/18  0814 05/01/18  0521   WBC 10*3/mm3 13.79* 11.56*   HEMOGLOBIN g/dL 7.4* 8.2*   HEMATOCRIT % 23.6* 25.7*   PLATELETS 10*3/mm3 236 271     2  Lab Results   Component Value Date    GLUCOSE 111 (H) 05/06/2018    BUN 23 05/06/2018    CREATININE 0.99 05/06/2018    EGFRIFNONA 54 (L) 05/06/2018    EGFRIFAFRI 92 04/10/2018    BCR 23.2 05/06/2018    CO2 25.6 05/06/2018    CALCIUM 11.0 (H) 05/06/2018    PROTENTOTREF 6.3 04/22/2018    ALBUMIN 2.80 (L) 05/06/2018    LABIL2 1.0 05/06/2018    AST 35 (H) 05/06/2018    ALT 6 05/06/2018       Lab Results   Component Value Date    IRON 16 (L) 04/24/2018      "04/24/2018    FERRITIN 411.40 (H) 04/24/2018       Lab Results   Component Value Date    [id number] 898 04/02/2018                  Assessment/Plan   1.  Hypercalcemia of malignancy:  The patient received Zometa 4 mg on 4/25/18.  Her calcium temporarily improved but is beginning to elevate again today to 10.8 with albumin 3.0.  Hypercalcemia is likely to be an ongoing issue until we can treat the underlying lymphoma causing the problem    2.  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  Additional FISH studies arePOSITIVE for \"double hit\" lymphoma as Ki 67 staining is 4+     She has extensive metastatic disease throughout the abdomen and pelvis, splenomegaly replaced with low attenuation lesions, 4.4 cm splenic hilum mass, disease encasing the pancreatic tail, 3 cm lesion abutting the stomach, extensive lymphadenopathy throughout the celiac axis, retroperitoneum, mesentery, periaortic lymphadenopathy, liver lesion in the posterior segment 4.8 cm, nodular thickening of the right wall of urinary bladder 5.0 cm, peritoneal lymphadenopathy and thickening, coarsely calcified mesenteric mass 9.6 cm (?old mesenteric adenitis).  Bone scan shows foci of uptake in the frontal bone and right posterior ninth rib.      The patient has had B symptoms with decreased performance status, and weight loss, anorexia.    I discussed today with the patient and her son her liver biopsy results which showed diffuse large B-cell lymphoma.  She underwent a cervical lymph node excisional biopsy today to confirm disease.  Given the patient's refractory hypercalcemia and B symptoms she will need treatment while in the hospital.  I recommended we proceed with a PET scan for complete staging.  I did not pursue a bone marrow exam as with likely not change her treatment.  We could consider a lumbar puncture given her advanced stage, particularly consider lumbar puncture to rule out CNS involvement if disease returns positive for " "C-myc, BCL-2 or BCL- 6    I recommended a power port placement to facilitate chemotherapy.    I reviewed with the patient and her son systemic chemotherapy with CHOP R including risk and side effects of this regimen including but not limited to alopecia, worsening fatigue, neuropathy, cardiac toxicity, myelosuppression, risk of infection, possible need of transfusion support etc.  I explained to the patient if her FISH studies returned consistent with a \"double hit\" lymphoma alternative therapy may be required.  The patient is anxious to proceed with treatment.  She has had a 2-D echocardiogram for 1118 with ejection fraction 67%.  Hepatitis B testing is negative/normal.    3.  Leukocytosis/anemia:  Probably inflammatory/malignancy associated (elevated ferritin, low transferrin, elevated ESR) versus bone marrow involvement?.  I do not think a bone marrow exam would change her treatment plan.  We will monitor the CBC.    4.  Paroxysmal atrial fibrillation: The patient is anticoagulated with Eliquis.  Her platelet count will need to be monitored carefully after chemotherapy    Recommendations:  1.  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  The patient has completed her plan of chemotherapy CHOP/Rituxan and so far she has not had anything that suggests tumor lysis syndrome. Her LDH remains elevated, her uric acid is very low and her phosphorus, potassium level and creatinine have remained stable.  The cervical lymph node palpable on the left side is unchanged and the spleen and liver remain unchanged on clinical examination.  On the other hand, actually the patient looks very good today.  She is eating a copious amount of food.  She is walking and is making a lot of sense in regard to her conversation today.  She is not as sleepy as she was yesterday. I think the discontinuation of the gabapentin and Lexapro were good indicators that some of these medicines were acting on her central nerves system and she did not need these " medicines anymore anyway.    2.  Hypercalcemia malignancy.  The patient’s calcium remains elevated at 11. We expect that with the chemotherapy these numbers eventually will come down in the next 3-4 days. If the calcium remains elevated in the next 2-3 days maybe she will be a candidate to receive another dose of Zometa.    3.  Anemia associated with neoplastic disease.  No indicator for transfusion of red cells at this time.  4.  Need for continuation of Granix in the background of chemotherapy treatment.  This medicine will remain ongoing at the present dose on a daily basis and in order to minimize any potential for neutropenia and neutropenic fever.   5.  Minimal discomfort at the port site.  This is expected from a port that was placed 3 days ago. This pain eventually will go away.  6.  Polypharmacy.  The patient’s medications have been trimmed down to the minimal amount possible to minimize so many pills and so many other confounding factors.   7.  Social issues.  The patient is starting to ask about what we are going to do in regard to discharge.  I am going to go ahead and consult the  in preparation for this and going home eventually at some point next week with home health.  8.  I think the patient so far is doing fine.  We have to see how she is going to do and she is aware that she needs to be here probably until the end of the week to see how she is going to do with her ANC.  9.  Laboratory testing for tumor lysis.  Now that we know that this patient does not have this condition we will discontinue her IV fluids and we will continue blood testing every 6-hours for potassium, creatinine, phosphorus, LDH and uric acid.  The testing of this will be done on a daily basis from now on.  10. Allopurinol dose will be adjusted to just 300 mg a day given the fact that she has a creatinine that is normal.   11. Surgical site in the left neck will be left alone until she further heals and hopefully  this will be an index or resolution in regard to therapy for lymphoma in the next days or so.                5/6/2018      CC:

## 2018-05-06 NOTE — PLAN OF CARE
Problem: Patient Care Overview  Goal: Plan of Care Review  Outcome: Ongoing (interventions implemented as appropriate)   05/06/18 1795   Coping/Psychosocial   Plan of Care Reviewed With patient   OTHER   Outcome Summary pt presnets with ewakness and impaired functional mobility, pt will benefit from PT to address, pt hopes to return to her home at discharge

## 2018-05-07 PROBLEM — D72.823 LEUKEMOID REACTION: Status: ACTIVE | Noted: 2018-05-07

## 2018-05-07 PROBLEM — G47.00 INSOMNIA: Status: ACTIVE | Noted: 2018-05-07

## 2018-05-07 LAB
ALBUMIN SERPL-MCNC: 2.9 G/DL (ref 3.5–5.2)
ALBUMIN/GLOB SERPL: 0.9 G/DL
ALP SERPL-CCNC: 81 U/L (ref 39–117)
ALT SERPL W P-5'-P-CCNC: 9 U/L (ref 1–33)
ANION GAP SERPL CALCULATED.3IONS-SCNC: 9.2 MMOL/L
AST SERPL-CCNC: 29 U/L (ref 1–32)
BASOPHILS # BLD AUTO: 0.03 10*3/MM3 (ref 0–0.2)
BASOPHILS NFR BLD AUTO: 0 % (ref 0–1.5)
BILIRUB SERPL-MCNC: 0.4 MG/DL (ref 0.1–1.2)
BUN BLD-MCNC: 30 MG/DL (ref 8–23)
BUN/CREAT SERPL: 32.6 (ref 7–25)
C3 FRG RBC-MCNC: NORMAL
CALCIUM SPEC-SCNC: 11.3 MG/DL (ref 8.6–10.5)
CHLORIDE SERPL-SCNC: 103 MMOL/L (ref 98–107)
CO2 SERPL-SCNC: 24.8 MMOL/L (ref 22–29)
CREAT BLD-MCNC: 0.92 MG/DL (ref 0.57–1)
DEPRECATED RDW RBC AUTO: 47.9 FL (ref 37–54)
EOSINOPHIL # BLD AUTO: 0 10*3/MM3 (ref 0–0.7)
EOSINOPHIL NFR BLD AUTO: 0 % (ref 0.3–6.2)
ERYTHROCYTE [DISTWIDTH] IN BLOOD BY AUTOMATED COUNT: 15.4 % (ref 11.7–13)
GFR SERPL CREATININE-BSD FRML MDRD: 59 ML/MIN/1.73
GLOBULIN UR ELPH-MCNC: 3.1 GM/DL
GLUCOSE BLD-MCNC: 116 MG/DL (ref 65–99)
HCT VFR BLD AUTO: 23.4 % (ref 35.6–45.5)
HGB BLD-MCNC: 7.4 G/DL (ref 11.9–15.5)
IMM GRANULOCYTES # BLD: 1.82 10*3/MM3 (ref 0–0.03)
IMM GRANULOCYTES NFR BLD: 2.7 % (ref 0–0.5)
LDH SERPL-CCNC: 448 U/L (ref 135–214)
LYMPHOCYTES # BLD AUTO: 0.45 10*3/MM3 (ref 0.9–4.8)
LYMPHOCYTES NFR BLD AUTO: 0.7 % (ref 19.6–45.3)
MCH RBC QN AUTO: 27.4 PG (ref 26.9–32)
MCHC RBC AUTO-ENTMCNC: 31.6 G/DL (ref 32.4–36.3)
MCV RBC AUTO: 86.7 FL (ref 80.5–98.2)
MONOCYTES # BLD AUTO: 0.44 10*3/MM3 (ref 0.2–1.2)
MONOCYTES NFR BLD AUTO: 0.7 % (ref 5–12)
NEUTROPHILS # BLD AUTO: 65.67 10*3/MM3 (ref 1.9–8.1)
NEUTROPHILS NFR BLD AUTO: 98.6 % (ref 42.7–76)
NRBC BLD MANUAL-RTO: 0 /100 WBC (ref 0–0)
OVALOCYTES BLD QL SMEAR: NORMAL
PLAT MORPH BLD: NORMAL
PLATELET # BLD AUTO: 211 10*3/MM3 (ref 140–500)
PMV BLD AUTO: 10.1 FL (ref 6–12)
POTASSIUM BLD-SCNC: 4.2 MMOL/L (ref 3.5–5.2)
PROT SERPL-MCNC: 6 G/DL (ref 6–8.5)
RBC # BLD AUTO: 2.7 10*6/MM3 (ref 3.9–5.2)
SODIUM BLD-SCNC: 137 MMOL/L (ref 136–145)
SPHEROCYTES BLD QL SMEAR: NORMAL
URATE SERPL-MCNC: 0.9 MG/DL (ref 2.4–5.7)
WBC MORPH BLD: NORMAL
WBC NRBC COR # BLD: 66.59 10*3/MM3 (ref 4.5–10.7)

## 2018-05-07 PROCEDURE — 83615 LACTATE (LD) (LDH) ENZYME: CPT | Performed by: INTERNAL MEDICINE

## 2018-05-07 PROCEDURE — 85007 BL SMEAR W/DIFF WBC COUNT: CPT | Performed by: INTERNAL MEDICINE

## 2018-05-07 PROCEDURE — 25010000002 DIPHENHYDRAMINE PER 50 MG: Performed by: INTERNAL MEDICINE

## 2018-05-07 PROCEDURE — 99233 SBSQ HOSP IP/OBS HIGH 50: CPT | Performed by: INTERNAL MEDICINE

## 2018-05-07 PROCEDURE — 63710000001 PREDNISONE PER 5 MG: Performed by: INTERNAL MEDICINE

## 2018-05-07 PROCEDURE — 84550 ASSAY OF BLOOD/URIC ACID: CPT | Performed by: INTERNAL MEDICINE

## 2018-05-07 PROCEDURE — 25010000002 ZOLEDRONIC ACID PER 1 MG: Performed by: INTERNAL MEDICINE

## 2018-05-07 PROCEDURE — 25010000002 IRON SUCROSE PER 1 MG: Performed by: INTERNAL MEDICINE

## 2018-05-07 PROCEDURE — 85025 COMPLETE CBC W/AUTO DIFF WBC: CPT | Performed by: INTERNAL MEDICINE

## 2018-05-07 PROCEDURE — 63710000001 DIPHENHYDRAMINE PER 50 MG: Performed by: INTERNAL MEDICINE

## 2018-05-07 PROCEDURE — 80053 COMPREHEN METABOLIC PANEL: CPT | Performed by: INTERNAL MEDICINE

## 2018-05-07 PROCEDURE — 97110 THERAPEUTIC EXERCISES: CPT

## 2018-05-07 PROCEDURE — 25010000002 TBO-FILGRASTIM 300 MCG/0.5ML SOLUTION PREFILLED SYRINGE: Performed by: INTERNAL MEDICINE

## 2018-05-07 RX ORDER — GABAPENTIN 300 MG/1
600 CAPSULE ORAL NIGHTLY
Status: DISCONTINUED | OUTPATIENT
Start: 2018-05-07 | End: 2018-05-16 | Stop reason: HOSPADM

## 2018-05-07 RX ORDER — DIPHENHYDRAMINE HYDROCHLORIDE 50 MG/ML
25 INJECTION INTRAMUSCULAR; INTRAVENOUS DAILY
Status: COMPLETED | OUTPATIENT
Start: 2018-05-07 | End: 2018-05-08

## 2018-05-07 RX ORDER — CHOLECALCIFEROL (VITAMIN D3) 125 MCG
5 CAPSULE ORAL NIGHTLY
Status: DISCONTINUED | OUTPATIENT
Start: 2018-05-07 | End: 2018-05-16 | Stop reason: HOSPADM

## 2018-05-07 RX ORDER — FAMOTIDINE 10 MG/ML
20 INJECTION, SOLUTION INTRAVENOUS DAILY
Status: COMPLETED | OUTPATIENT
Start: 2018-05-07 | End: 2018-05-08

## 2018-05-07 RX ADMIN — ALLOPURINOL 300 MG: 300 TABLET ORAL at 08:29

## 2018-05-07 RX ADMIN — GABAPENTIN 600 MG: 300 CAPSULE ORAL at 22:12

## 2018-05-07 RX ADMIN — POTASSIUM CHLORIDE 40 MEQ: 750 CAPSULE, EXTENDED RELEASE ORAL at 08:29

## 2018-05-07 RX ADMIN — Medication 5 MG: at 22:12

## 2018-05-07 RX ADMIN — AMLODIPINE BESYLATE 10 MG: 10 TABLET ORAL at 08:29

## 2018-05-07 RX ADMIN — IRON SUCROSE 300 MG: 20 INJECTION, SOLUTION INTRAVENOUS at 17:06

## 2018-05-07 RX ADMIN — PREDNISONE 100 MG: 50 TABLET ORAL at 08:29

## 2018-05-07 RX ADMIN — LISINOPRIL 20 MG: 20 TABLET ORAL at 22:08

## 2018-05-07 RX ADMIN — ZOLEDRONIC ACID 4 MG: 4 INJECTION, SOLUTION, CONCENTRATE INTRAVENOUS at 14:09

## 2018-05-07 RX ADMIN — TBO-FILGRASTIM 300 MCG: 300 INJECTION, SOLUTION SUBCUTANEOUS at 09:52

## 2018-05-07 RX ADMIN — PANTOPRAZOLE SODIUM 40 MG: 40 TABLET, DELAYED RELEASE ORAL at 05:38

## 2018-05-07 RX ADMIN — METOPROLOL TARTRATE 25 MG: 25 TABLET ORAL at 08:29

## 2018-05-07 RX ADMIN — FAMOTIDINE 20 MG: 10 INJECTION INTRAVENOUS at 16:42

## 2018-05-07 RX ADMIN — DIPHENHYDRAMINE HYDROCHLORIDE 25 MG: 50 INJECTION, SOLUTION INTRAMUSCULAR; INTRAVENOUS at 16:42

## 2018-05-07 RX ADMIN — PHENYTOIN 300 MG: 125 SUSPENSION ORAL at 22:08

## 2018-05-07 RX ADMIN — LISINOPRIL 20 MG: 20 TABLET ORAL at 08:29

## 2018-05-07 NOTE — PLAN OF CARE
Problem: Patient Care Overview  Goal: Plan of Care Review   05/07/18 1323   Coping/Psychosocial   Plan of Care Reviewed With patient   Plan of Care Review   Progress improving   OTHER   Outcome Summary Pt progressing w ambulation as shown by less assistance for safety and improved dynamic standing balance. Pt requires SBA with RWx when ambulating.

## 2018-05-07 NOTE — PROGRESS NOTES
"CHIEF COMPLAINT:  Anemia, hypercalcemia, radiographic findings of malignancy with pathology/ diagnosis on the liver consistent with diffuse large B-cell lymphoma FISH for c-myc, bcl-2 and bcl-6 pending    Interval History:  The patient underwent an excisional lymph node biopsy from the left neck this morning for confirmation of pathology.  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  Additional FISH studies are pending to evaluate for \"double hit\" lymphoma as Ki 67 staining is 4+     On 05/02/2018, actually the patient was feeling better.  She had no issues in regard to the site of the lymph node biopsy in the left side of the neck with minimal discomfort.  I talked with Dr. Gill in regard to port placement tomorrow.      Hopefully, we will have further information in regard to “double-hit” lymphoma before we start any chemotherapy.  If that is the case, the patient will require infusional EPOCH/Rituxan.     Other than that, I went through formal education and teaching in regard to chemotherapy medicines, the frequency of the treatment, side effects and so forth; please review below.    The patient will have a PET scan this afternoon as well and the port will be placed tomorrow.  On 05/03/2018 the patient was feeling fatigued and tired from so many testings back and forth. She had her PET scan this morning and there is planning for port placement tomorrow. I asked the patient to bring her family this afternoon to talk with them about the diagnosis and decide how they want to proceed. I pointed out to the patient as well that the absence of treatment life expectancy is measured in a question of a few weeks to a few months.     I also discussed with her the finding on the PET scan today that will be described below and also the finding of the cervical lymph node that will be described below.         Oncology " History:  Martina Blake is a 77 y.o. female who were asked to see in consultation  4/29/18 for hypercalcemia, anemia and radiologic findings consistent with likely malignancy-?  Lymphoma.        She has a significant past medical history of palpitations followed by cardiology.  She presented to the emergency room April 10-14 with chest pain and palpitations and was found to be in A. fib with RVR and also demonstrated electrolyte abnormalities and anemia.  Records demonstrates that her anemia became particularly evident April 11 with an H&H of 8.9/ 28.4, platelet count 212,000 with a normal automated differential.  She underwent GI assessment including upper and lower endoscopy demonstrated hernia, gastritis, esophagitis, diverticulosis but no evidence of acute bleed.  She was placed on Eliquis as result of a relatively high CHADSVASc score.  She had also been found to be hypercalcemic at approximately 11 with HCTZ altered and the patient started on Aldactone.  Additional testing had included a ferritin of 313.1, iron of 32 with TIBC of 222 and iron saturation of 14, occult blood negative per stool testing    She was brought back to the emergency room April 21 with further evidence of hypercalcemia, associated weakness, anorexia, nausea, ataxia and weight loss.  MRI of the brain April 21 was found to be unremarkable. Outpatient intact PTH levels were found to be 7.1(reduced), and an H&H of 10.3 and 32.9, white count 11,090, platelet count 267,000 with a normal differential.  Patient seen by renal medicine with the possibility of Fanconi syndrome raised.  Thereafter PTH hormone was found be less than 2.0, and protein electropheresis included IgG of 948, IgA of 207, IgM 86, total protein 6.3, albumin 2.8, no M spike noted, slightly elevated free kappa and lambda light chains with normal ratio.  Repeat testing per iron profile again revealed low serum iron but high serum ferritin and normal CEA, normal AFP, CA-125 of  77.2, CA 19-9 7.8   At this point the reason for her hypercalcemia was thought to be possible medication effect and/or possible malignancy with calcium was running between 12 and 13 mg/dL.    This led to CT scan of chest abdomen pelvis performed April 24 demonstrate extensive metastatic disease throughout the abdomen and pelvis particularly involving the spleen and liver, extensive lymphadenopathy at the abdomen and pelvis with a 4 cm lesion splenic hilum partially encasing the pancreatic tail, pancreatic tail malignancy?,  Gastric primary malignancy?  There was a mass along the right wall of the urinary bladder with adjacent adenopathy.  CT of the chest revealed several tiny dense pleural-based nodular opacities-partially calcified granulomas, no mediastinal or hilar adenopathy, enlarged pulmonary arteries consistent with pulmonary arterial hypertension.  A subsequent bone scan performed April 26 revealed prominent uptake in the right frontal bone and right posterior ninth rib and a CT needle biopsy of the liver was obtained April 26 as well.The sample obtained revealed, on flow cytometry examination, a subpopulation of normal B cells that might be consistent with B-cell lymphoma.  Additional testing is currently pending.  On May 25 further hypercalcemia was again noted and treated with IV fluids and dose of Zometa, vitamin D level normal.  Again evidence of hypokalemia, hypophosphatemia and hypomagnesemia.  The patient is seen April 29 her calcium level has gradually improved dropping to 9.7, albumin of 2.6, ionized calcium of 6.1, BUN and creatinine of 10/.55.  We are asked to see her with results available thus far as to possible lymphoma.    Past Medical History:     Atrial fibrillation      • Cystitis     • Cystocele       moderate   • Dyslipidemia     • Esophageal reflux     • Fatigue     • History of transfusion       no reaction   • Hypertension     • Major depression, chronic     • Menopause     •  "Osteoarthritis     • Osteoporosis     • Palpitations     • Post menopausal problems     • RLS (restless legs syndrome)     • Seizure disorder     • Subjective tinnitus     • Vaginal delivery       x2  \"SONYA\"      \"JASON\"   • Vitamin D deficiency      Social History:  The patient is a  and has 2 children.  She has never smoked.  She denies alcohol use.    Review of Systems   Constitutional: Positive for fatigue. Negative for activity change, appetite change and unexpected weight change.   HENT: Negative.    Eyes: Negative.    Respiratory: Negative.  Negative for shortness of breath and wheezing.    Cardiovascular: Negative.  Negative for chest pain and palpitations.   Gastrointestinal: Negative.  Negative for abdominal pain, constipation, nausea and vomiting.   Endocrine: Negative.    Genitourinary: Negative.    Musculoskeletal: Positive for gait problem. Negative for neck stiffness.   Skin: Negative.  Negative for color change and pallor.   Neurological: Positive for weakness. Negative for seizures, numbness and headaches.   Hematological: Positive for adenopathy.   Psychiatric/Behavioral: Negative for confusion.          Medications:  The current medication list was reviewed in the EMR    ALLERGIES:    Allergies   Allergen Reactions   • Crab (Diagnostic) Itching and Rash   • Pseudoephedrine Dizziness       Objective      Vitals:    05/07/18 1043   BP:    Pulse:    Resp: 18   Temp: 97.1 °F (36.2 °C)   SpO2: 93%          Physical Exam    GENERAL:  Well-developed, well-nourished  Patient  in no acute distress.   SKIN:  Warm, dry without rashes, purpura or petechiae.  HEENT:  Pupils were equal and reactive to light and accomodation, conjunctivas non injected, no pterigion, normal extraocular movements, normal visual acuity.   Mouth mucosa was moist, no exudates in oropharynx, normal gum line, normal roof of the mouth and pillars, normal papillations of the tongue.  NECK:  Supple with good range of motion; no " thyromegaly no change in left cervical wound and node 2 cm in size;, no JVD or bruits, no cervical adenopathies.No carotid arteries pain, no carotid abnormal pulsation or arterial dance.  LYMPHATICS:  No cervical, supraclavicular, axillary, epitrochlear or inguinal adenopathy.  CHEST:  Normal excursion of both jaylen thoraces, normal voice fremitus, no subcutaneous emphysema, normal axillas, no rashes or acanthosis nigricans. Lungs clear to percussion and auscultation, normal breath sounds bilaterally, no wheezing, crackles or ronchi, no stridor, no rubs.  CARDIAC AND VASCULAR: normal rate and regular rhythm, without murmurs, rubs or S3 or S4 right or left sided gallops. Normal femoral, popliteal, pedis, brachial and carotid pulses.  ABDOMEN:  Soft, nontender with spleen 4 cm blcm organomegaly , palpable liver 4 cm brcm., no ascites, no collateral circulation,no distention,no Rip sign, no abdominal pain, no inguinal hernias,no umbilical hernias, no abdominal bruits. Normal bowel sounds.  GENITAL: Not  Performed.  EXTREMITIES  AND SPINE:  No clubbing, cyanosis or edema, no deformities or pain .No kyphosis, scoliosis, deformities or pain in spine, ribs or pelvic bone.  NEUROLOGICAL:  Patient was alert, LOGIC PROPPER SPEECH UPON QUESTIONS    RECENT LABS:      Results from last 7 days  Lab Units 05/07/18  0536 05/06/18  0814 05/01/18  0521   WBC 10*3/mm3 66.59* 13.79* 11.56*   HEMOGLOBIN g/dL 7.4* 7.4* 8.2*   HEMATOCRIT % 23.4* 23.6* 25.7*   PLATELETS 10*3/mm3 211 236 271     Lab Results   Component Value Date    NEUTROABS 65.67 (H) 05/07/2018       Results from last 7 days  Lab Units 05/07/18  0536 05/06/18  0708 05/06/18  0205   SODIUM mmol/L 137 138 138   POTASSIUM mmol/L 4.2 3.8 4.0   CHLORIDE mmol/L 103 103 102   CO2 mmol/L 24.8 25.6 25.4   BUN mg/dL 30* 23 22   CREATININE mg/dL 0.92 0.99 1.02*   CALCIUM mg/dL 11.3* 11.0* 10.9*   BILIRUBIN mg/dL 0.4 0.2 0.2   ALK PHOS U/L 81 70 72   ALT (SGPT) U/L 9 6 8   AST  "(SGOT) U/L 29 35* 37*   GLUCOSE mg/dL 116* 111* 132*       Assessment/Plan     1.  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  Additional FISH studies are POSITIVE for \"double hit\" lymphoma as Ki 67 staining is 4+     She has extensive metastatic disease throughout the abdomen and pelvis, splenomegaly replaced with low attenuation lesions, 4.4 cm splenic hilum mass, disease encasing the pancreatic tail, 3 cm lesion abutting the stomach, extensive lymphadenopathy throughout the celiac axis, retroperitoneum, mesentery, periaortic lymphadenopathy, liver lesion in the posterior segment 4.8 cm, nodular thickening of the right wall of urinary bladder 5.0 cm, peritoneal lymphadenopathy and thickening, coarsely calcified mesenteric mass 9.6 cm (?old mesenteric adenitis).  Bone scan shows foci of uptake in the frontal bone and right posterior ninth rib.      The patient has had B symptoms with decreased performance status, and weight loss, anorexia.    She had a PET scan examination.  She was started on chemotherapy with R-CHOP on 5/4/2018.  She tolerated chemotherapy relatively well.     2.  Hypercalcemia of malignancy:  The patient received Zometa 4 mg on 4/25/18.  Her calcium temporarily improved but is beginning to elevate again today to 11.3 with albumin 2.9.  Hypercalcemia is likely to be an ongoing issue until she responded to chemotherapy.      I discussed with pharmacist Dr. Gagnon today and will give her another dose of Zometa today.      3.  Leukocytosis.  This is likely secondary to chronic see injection.  I explained to patient that her WBC eventually will decrease the next week also due to to chemotherapy.  The tomasz of marrow suppression usually happens around day #10.      4.  Anemia:  Probably inflammatory/malignancy associated (elevated ferritin, low transferrin, elevated ESR) versus bone marrow involvement.   We do not think a bone marrow exam would change her treatment plan.      Her " hemoglobin has deteriorated past 2 weeks.  Patient didn't reports no lightheadedness or fainting.  We'll continue to observe.  I do believe this patient would need transfusion in the near future.      Reviewing her iron study from 4/24/2018, I do believe this patient also had elements of iron deficiency.  I will give this patient intravenous iron Venofer 300 mg for 2 doses.  We'll check her vitamin B12 and folic acid level also.     5.  Paroxysmal atrial fibrillation: The patient is anticoagulated with Eliquis.  Her platelet count will need to be monitored carefully after chemotherapy.     6.  Prophylaxis of tumor lysis syndrome.  Patient will be continued on allopurinol.    7.  GI prophylaxis on Protonix.     8.  Insomnia.  Patient reports he was not able to sleep the last 4 out of 5 days.  At home she used to take 2 tablets of gabapentin and 5 mg melatonin before sleep.  I asked patient to double check with her daughter the dose of gabapentin.  In the computer and listed take 1 tablets of 300 mg which is in discrepancy with the patient told me.  I was started on melatonin 5 mg daily at bedtime.      9.  Discharge plan.  Patient will need to continue physical/occupational therapy.  Patient prefers to be discharged to home.   has been following patient.     Discussed with the patient and today.  I also discussed with her nurse today.     I discussed her case with Dr. Matthews, my colleague.    JABARI RAMIREZ M.D., Ph.D.      5/7/2018      CC:

## 2018-05-07 NOTE — PROGRESS NOTES
Discharge Planning Assessment   [facility]     Patient Name: Martina Blake  MRN: [id number]  Today's Date: 5/7/2018    Admit Date: 4/21/2018          Discharge Needs Assessment    No documentation.             Discharge Plan     Row Name 05/07/18 1419       Plan    Plan Home with [facility] vs Skilled Nursing Rehab    Plan Comments Call placed to daughter Aga Smith work phone [phone] to discuss discharge plans, she stated she has a meeting tomorrow 5/8 at 1545 with Zoila Navarrete Oncology Social Worker to discuss discharge planning. Sammi Mcleod RN        Destination     Service Request Status Selected Specialties Address Phone Number Fax Number    [facility] Pending - Request Sent N/A [address] [facility] [address] [phone]      Durable Medical Equipment     No service coordination in this encounter.      Dialysis/Infusion     No service coordination in this encounter.      Home Medical Care     Service Request Status Selected Specialties Address Phone Number Fax Number    [facility] Accepted N/A [address] [address] [phone]      Social Care     No service coordination in this encounter.                Demographic Summary    No documentation.           Functional Status    No documentation.           Psychosocial    No documentation.           Abuse/Neglect    No documentation.           Legal    No documentation.           Substance Abuse    No documentation.           Patient Forms    No documentation.         Sammi Mcleod RN

## 2018-05-07 NOTE — PROGRESS NOTES
NEPHROLOGY PROGRESS NOTE    PATIENT IDENTIFICATION:   Name:  Martina Blake      MRN:  [id number]     77 y.o.  female             Reason for visit: hypercalcemia    SUBJECTIVE:   Hasn't eaten as much today as yesterday; no SOB on RA; no pain; getting iv iron    OBJECTIVE:  Vitals:    05/07/18 1043 05/07/18 1445 05/07/18 1706 05/07/18 1737   BP:  174/68 175/65 175/65   BP Location:  Left arm Left arm Left arm   Patient Position:  Lying Sitting Sitting   Pulse:  65 66 72   Resp: 18 18 18 18   Temp: 97.1 °F (36.2 °C) 97.3 °F (36.3 °C) 97.6 °F (36.4 °C) 97.3 °F (36.3 °C)   TempSrc: Oral Oral Oral Oral   SpO2: 93% 95% 96% 96%   Weight:       Height:               Body mass index is 24.65 kg/m².    Intake/Output Summary (Last 24 hours) at 05/07/18 1810  Last data filed at 05/07/18 0829   Gross per 24 hour   Intake              210 ml   Output                0 ml   Net              210 ml     Wt Readings from Last 1 Encounters:   05/01/18 1850 65.1 kg (143 lb 9.6 oz)   04/30/18 0643 66.8 kg (147 lb 4.8 oz)   04/29/18 0652 67.9 kg (149 lb 12.8 oz)   04/28/18 0641 68 kg (150 lb)   04/27/18 0603 64.7 kg (142 lb 9.6 oz)   04/25/18 0516 62.6 kg (138 lb)   04/21/18 1731 68.4 kg (150 lb 12.8 oz)   04/21/18 1227 68 kg (150 lb)     Wt Readings from Last 3 Encounters:   05/01/18 65.1 kg (143 lb 9.6 oz)   04/19/18 68 kg (150 lb)   04/10/18 61.5 kg (135 lb 8 oz)         Exam:  General: NAD, pleasant  Neck without JVD; healing incision on left at site of LN resection  Port right chest  Heart RRR no s3 or rub  Lungs fwe rales bilat; not labored   abd +bs, slightly distended, soft, mildly tender. No guarding or rebound.   Ext trace lower ext edema.  Skin, no rash  Psych mood and affect fine    Scheduled meds:      allopurinol 300 mg Oral Daily   amLODIPine 10 mg Oral Q24H   diphenhydrAMINE 25 mg Intravenous Daily   famotidine 20 mg Intravenous Daily   gabapentin 600 mg Oral Nightly   iron sucrose 300 mg Intravenous Daily  This is prescribed by Dr. Norman. Discussed with Alisson Arriaga RN ([phone]), and she recommended forwarding to Dr. Lopez.     Abhishek Donnelly MD      lisinopril 20 mg Oral Q12H   melatonin 5 mg Oral Nightly   metoprolol tartrate 25 mg Oral Daily   pantoprazole 40 mg Oral Q AM   phenytoin 300 mg Oral Nightly   potassium chloride 40 mEq Oral Daily   predniSONE 100 mg Oral Daily With Breakfast   sennosides-docusate sodium 2 tablet Oral Nightly   tbo-filgrastim 300 mcg Subcutaneous Q24H     IV meds:                             Data Review:      Results from last 7 days  Lab Units 05/07/18  0536 05/06/18  0708 05/06/18  0205   SODIUM mmol/L 137 138 138   POTASSIUM mmol/L 4.2 3.8 4.0   CHLORIDE mmol/L 103 103 102   CO2 mmol/L 24.8 25.6 25.4   BUN mg/dL 30* 23 22   CREATININE mg/dL 0.92 0.99 1.02*   CALCIUM mg/dL 11.3* 11.0* 10.9*   BILIRUBIN mg/dL 0.4 0.2 0.2   ALK PHOS U/L 81 70 72   ALT (SGPT) U/L 9 6 8   AST (SGOT) U/L 29 35* 37*   GLUCOSE mg/dL 116* 111* 132*     Estimated Creatinine Clearance: 52.6 mL/min (by C-G formula based on SCr of 0.92 mg/dL).    Results from last 7 days  Lab Units 05/07/18  0536   URIC ACID mg/dL 0.9*       Results from last 7 days  Lab Units 05/06/18  0708 05/06/18  0205 05/05/18  1817  05/05/18  0658  05/04/18  0602 05/03/18  0437   MAGNESIUM mg/dL  --   --   --   --  2.0  --  2.0 1.9   PHOSPHORUS mg/dL 2.8 2.9 2.8  < >  --   < > 2.8 4.0   < > = values in this interval not displayed.      Results from last 7 days  Lab Units 05/07/18  0536 05/06/18  0814 05/01/18  0521   WBC 10*3/mm3 66.59* 13.79* 11.56*   HEMOGLOBIN g/dL 7.4* 7.4* 8.2*   PLATELETS 10*3/mm3 211 236 271                   ASSESSMENT:   Principal Problem:    Hypercalcemia  Active Problems:    Gastroesophageal reflux disease    Chronic recurrent major depressive disorder    Restless legs syndrome    Seizure disorder    Essential hypertension    Paroxysmal atrial fibrillation    Iron deficiency anemia due to chronic blood loss    Weakness    Hypertension    Liver mass    Lymphoma    Lymphoma of lymph nodes of neck    Leukemoid reaction    Insomnia    1.  Hypercalcemia.  "stable, all in all, and likely driven by malignancy s/p zometa 4.25.18 and again 5.7.18.   2.  Metastatic NHL, s/p RCHOP 5/4/18.  3.  Hypokalemia, hypophosphatemia, and hypomagnesemia: variably low.  Multifactorial, with poor nutrition and a likely contribution from malignancy d/t \"take up\" by malignant cells  4.  Labile HTN  4.  Afib with RVR, with rate now controlled  5.  Recent GIB  6.  Leukocytosis.   7.  Anemia; transfuse prn    PLAN:  1.  PRN replacement of lytes  2   Agree with more Zometa today  3.  Chemo per Heme/Onc  4.  Surveillance labs       Kendall Belle MD  5/7/2018    6:10 PM   "

## 2018-05-07 NOTE — PROGRESS NOTES
Oncology Social Work    OSW received a phone call from patient's dgt and Aga BROOKE.  Spoke to Aga about patient's status and concerns at discharge.  We have scheduled a family conference for 3:45 tomorrow in patient's room to discuss HH Vs SNF and setting up in home caregivers in the home, treatment plans and other practical , home and emotional needs.      Thank you,  Zoila Navarrete, CLAUDIAW, CSW, OSW-C

## 2018-05-07 NOTE — THERAPY TREATMENT NOTE
Acute Care - Physical Therapy Treatment Note   [facility]     Patient Name: Martina Blake  : 1940  MRN: [id number]  Today's Date: 2018     Date of Referral to PT: 18  Referring Physician: Byron    Admit Date: 2018    Visit Dx:    ICD-10-CM ICD-9-CM   1. Lymphoma of lymph nodes of neck, unspecified lymphoma type C85.91 202.81   2. Weakness R53.1 780.79   3. Hypercalcemia E83.52 275.42   4. Hypertensive urgency I16.0 401.9   5. 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 C83.39 202.80     Patient Active Problem List   Diagnosis   • Blood glucose abnormal   • Dyslipidemia   • Gastroesophageal reflux disease   • Fatigue   • Generalized osteoarthritis   • Chronic recurrent major depressive disorder   • Osteoporosis   • Restless legs syndrome   • Seizure disorder   • Vitamin D deficiency   • Bradycardia   • Essential hypertension   • Post-menopause on HRT (hormone replacement therapy)   • Chronic seasonal allergic rhinitis   • Paroxysmal atrial fibrillation   • Iron deficiency anemia due to chronic blood loss   • Acute superficial gastritis without hemorrhage   • Hiatal hernia   • Esophagitis   • Diverticulosis of large intestine without hemorrhage   • Dizziness   • Weakness   • Hypertension   • Hypercalcemia   • Liver mass   • Lymphoma   • Lymphoma of lymph nodes of neck   • Leukemoid reaction   • Insomnia       Therapy Treatment          Rehabilitation Treatment Summary     Row Name 18 1322             Treatment Time/Intention    Discipline physical therapist  -MD      Document Type therapy note (daily note)  -MD      Subjective Information no complaints  -MD      Mode of Treatment physical therapy  -MD      Therapy Frequency (PT Clinical Impression) daily  -MD      Patient Effort good  -MD      Recorded by [MD] Malissa Oliver, PT 18 1323 18 1323      Row Name 18 1322             Cognitive Assessment/Intervention- PT/OT     Orientation Status (Cognition) oriented x 4  -MD      Personal Safety Interventions fall prevention program maintained  -MD      Recorded by [MD] Malissa Oliver, PT 05/07/18 1323 05/07/18 1323      Row Name 05/07/18 1322             Bed Mobility Assessment/Treatment    Supine-Sit Harris (Bed Mobility) supervision  -MD      Sit-Supine Harris (Bed Mobility) supervision  -MD      Recorded by [MD] Malissa Oliver, PT 05/07/18 1323 05/07/18 1323      Row Name 05/07/18 1322             Transfer Assessment/Treatment    Sit-Stand Harris (Transfers) contact guard  -MD      Stand-Sit Harris (Transfers) contact guard  -MD      Recorded by [MD] Malissa Oliver, PT 05/07/18 1323 05/07/18 1323      Row Name 05/07/18 1322             Sit-Stand Transfer    Assistive Device (Sit-Stand Transfers) walker front-wheeled  -MD      Recorded by [MD] Malissa Oliver, PT 05/07/18 1323 05/07/18 1323      Row Name 05/07/18 1322             Stand-Sit Transfer    Assistive Device (Stand-Sit Transfers) walker front-wheeled  -MD      Recorded by [MD] Malissa Oliver, PT 05/07/18 1323 05/07/18 1323      Row Name 05/07/18 1322             Gait/Stairs Assessment/Training    Harris Level (Gait) stand by assist;contact guard  -MD      Assistive Device (Gait) walker, front-wheeled  -MD      Distance in Feet (Gait) 160  -MD      Deviations/Abnormal Patterns (Gait) gait speed decreased  -MD      Bilateral Gait Deviations forward flexed posture  -MD      Recorded by [MD] Malissa Oliver, PT 05/07/18 1323 05/07/18 1323      Row Name 05/07/18 1322             Positioning and Restraints    Pre-Treatment Position in bed  -MD      Post Treatment Position bed  -MD      In Bed supine;call light within reach;with family/caregiver  -MD      Recorded by [MD] Malissa Oliver, PT 05/07/18 1323 05/07/18 1323      Row Name 05/07/18 1322             Pain Scale: Numbers Pre/Post-Treatment    Pain Scale: Numbers, Pretreatment 0/10 - no pain  -MD      Recorded by [MD] Malissa Oliver, PT  05/07/18 1323 05/07/18 1323      Row Name                Wound 05/01/18 1057 Left neck incision    Wound - Properties Group Date first assessed: 05/01/18 [PEARL] Time first assessed: 1057 [PEARL] Side: Left [PEARL] Location: neck [PEARL] Type: incision [PEARL] Recorded by:  [PEARL] Magaly Almonte RN 05/01/18 1057 05/01/18 1057    Row Name                Wound 05/04/18 0838 Right chest incision    Wound - Properties Group Date first assessed: 05/04/18 [SM] Time first assessed: 0838 [SM] Side: Right [SM] Location: chest [SM] Type: incision [SM] Recorded by:  [SM] Scott Cole RN 05/04/18 0838 05/04/18 0838      User Key  (r) = Recorded By, (t) = Taken By, (c) = Cosigned By    Initials Name Effective Dates Discipline    PEARL Magaly Almonte RN 06/16/16 -  Nurse    MD Malissa Oliver, PT 04/03/18 -  PT    JEREMIAH Cole RN 06/16/16 -  Nurse          Wound 05/01/18 1057 Left neck incision (Active)   Dressing Appearance dry;intact 5/7/2018  8:31 AM   Closure Adhesive closure strips 5/7/2018  8:31 AM   Base dry;clean 5/7/2018  8:31 AM   Periwound Temperature warm 5/7/2018  8:31 AM   Periwound Skin Turgor soft 5/7/2018  8:31 AM   Drainage Amount none 5/7/2018  8:31 AM   Dressing Care, Wound gauze;transparent film 5/7/2018  8:31 AM       Wound 05/04/18 0838 Right chest incision (Active)   Dressing Appearance dry;intact 5/7/2018  8:31 AM   Closure Adhesive closure strips 5/7/2018  8:31 AM   Base dry;clean 5/7/2018  8:31 AM   Drainage Amount none 5/7/2018  8:31 AM   Dressing Care, Wound transparent film 5/7/2018  8:31 AM             Physical Therapy Education     Title: PT OT SLP Therapies (Done)     Topic: Physical Therapy (Done)     Point: Mobility training (Done)    Learning Progress Summary     Learner Status Readiness Method Response Comment Documented by    Patient Done Acceptance EJOAO DU PC 05/06/18 0440     Done Acceptance E,TB GENE  BS 05/03/18 0206     Done Acceptance E,TB GENE  BS 05/02/18 0248     Done Acceptance E VU  EJ  04/29/18 1554     Done Acceptance E,TB,D VU,NR   04/28/18 1733     Done Acceptance E,TB VU,DU  CW 04/27/18 1544     Done Acceptance E,TB VU,NR role of PT, benefits of activity, use of AD, safety GR 04/22/18 1311    Family Done Acceptance E,TB,D VU,NR   04/28/18 1733     Done Acceptance E,TB VU,NR role of PT, benefits of activity, use of AD, safety GR 04/22/18 1311          Point: Home exercise program (Done)    Learning Progress Summary     Learner Status Readiness Method Response Comment Documented by    Patient Done Acceptance E,D VU,DU  PC 05/06/18 1416     Done Acceptance E,TB VU   05/03/18 0206     Done Acceptance E,TB VU   05/02/18 0248     Done Acceptance E,TB,D VU,NR   04/28/18 1733     Done Acceptance E,TB VU,DU  CW 04/27/18 1544     Done Acceptance E,TB VU,NR role of PT, benefits of activity, use of AD, safety GR 04/22/18 1311    Family Done Acceptance E,TB,D VU,NR   04/28/18 1733     Done Acceptance E,TB VU,NR role of PT, benefits of activity, use of AD, safety GR 04/22/18 1311          Point: Body mechanics (Done)    Learning Progress Summary     Learner Status Readiness Method Response Comment Documented by    Patient Done Acceptance E,D VU,DU  PC 05/06/18 1416     Done Acceptance E,TB VU   05/03/18 0206     Done Acceptance E,TB VU   05/02/18 0248     Done Acceptance E,TB,D VU,NR   04/28/18 1733     Done Acceptance E,TB VU,DU  CW 04/27/18 1544     Done Acceptance E,TB VU,NR role of PT, benefits of activity, use of AD, safety GR 04/22/18 1311    Family Done Acceptance E,TB,D VU,NR   04/28/18 1733     Done Acceptance E,TB VU,NR role of PT, benefits of activity, use of AD, safety GR 04/22/18 1311          Point: Precautions (Done)    Learning Progress Summary     Learner Status Readiness Method Response Comment Documented by    Patient Done Acceptance MAKAYLA MILLS MD 05/07/18 1325     Done Acceptance E,D VU,DU  PC 05/06/18 1416     Done Acceptance PAVEL BENAVIDES  BS 05/03/18 0206     Done Acceptance  E,TB VU   05/02/18 0248     Done Acceptance E VU  MD 04/30/18 1615     Done Acceptance E,TB,D VU,NR   04/28/18 1733     Done Acceptance E,TB VU,DU   04/27/18 1544     Done Acceptance E,TB VU,NR role of PT, benefits of activity, use of AD, safety  04/22/18 1311    Family Done Acceptance E,TB,D VU,NR   04/28/18 1733     Done Acceptance E,TB VU,NR role of PT, benefits of activity, use of AD, safety  04/22/18 1311                      User Key     Initials Effective Dates Name Provider Type Discipline     10/03/16 -  Aubrey Del Rosario, PT Physical Therapist PT     04/03/18 -  Rosette Navarrete, PT Physical Therapist PT    MD 04/03/18 -  Malissa Oliver, PT Physical Therapist PT     03/07/18 -  Linda Cole, PTA Physical Therapy Assistant PT     06/16/16 -  Ara Gibson, RN Registered Nurse Nurse     04/03/18 -  Alma Delia Cazares, PT Physical Therapist PT     03/07/18 -  Myke Burrell, PTA Physical Therapy Assistant PT                    PT Recommendation and Plan  Therapy Frequency (PT Clinical Impression): daily  Plan of Care Reviewed With: patient  Progress: improving  Outcome Summary: Pt progressing w ambulation as shown by less assistance for safety and improved dynamic standing balance.  Pt requires SBA with RWx when ambulating.          Outcome Measures     Row Name 05/07/18 1300 05/06/18 1400          How much help from another person do you currently need...    Turning from your back to your side while in flat bed without using bedrails? 4  -MD 3  -PC     Moving from lying on back to sitting on the side of a flat bed without bedrails? 4  -MD 3  -PC     Moving to and from a bed to a chair (including a wheelchair)? 3  -MD 3  -PC     Standing up from a chair using your arms (e.g., wheelchair, bedside chair)? 3  -MD 3  -PC     Climbing 3-5 steps with a railing? 3  -MD 3  -PC     To walk in hospital room? 3  -MD 3  -PC     AM-PAC 6 Clicks Score 20  -MD 18  -PC        Functional Assessment     Outcome Measure Options AM-PAC 6 Clicks Basic Mobility (PT)  -MD  --       User Key  (r) = Recorded By, (t) = Taken By, (c) = Cosigned By    Initials Name Provider Type    JENNIFER Navarrete, PT Physical Therapist    MD Malissa Oliver, PT Physical Therapist           Time Calculation:         PT Charges     Row Name 05/07/18 1321             Time Calculation    Start Time 1308  -MD      Stop Time 1321  -MD      Time Calculation (min) 13 min  -MD      PT Received On 05/07/18  -MD      PT - Next Appointment 05/08/18  -MD        User Key  (r) = Recorded By, (t) = Taken By, (c) = Cosigned By    Initials Name Provider Type    MD Malissa Oliver, PT Physical Therapist          Therapy Charges for Today     Code Description Service Date Service Provider Modifiers Qty    32940977166 HC PT THER PROC EA 15 MIN 5/7/2018 Malissa Oliver PT GP 1          PT G-Codes  PT Professional Judgement Used?: Yes  Outcome Measure Options: AM-PAC 6 Clicks Basic Mobility (PT)  Score: 13  Functional Limitation: Mobility: Walking and moving around  Mobility: Walking and Moving Around Current Status (): At least 60 percent but less than 80 percent impaired, limited or restricted  Mobility: Walking and Moving Around Goal Status (): At least 40 percent but less than 60 percent impaired, limited or restricted    Malissa Oliver PT  5/7/2018

## 2018-05-08 PROBLEM — D64.81 ANEMIA ASSOCIATED WITH CHEMOTHERAPY: Status: ACTIVE | Noted: 2018-05-08

## 2018-05-08 PROBLEM — T45.1X5A ANEMIA ASSOCIATED WITH CHEMOTHERAPY: Status: ACTIVE | Noted: 2018-05-08

## 2018-05-08 LAB
ABO GROUP BLD: NORMAL
ALBUMIN SERPL-MCNC: 2.7 G/DL (ref 3.5–5.2)
ALBUMIN/GLOB SERPL: 1.1 G/DL
ALP SERPL-CCNC: 76 U/L (ref 39–117)
ALT SERPL W P-5'-P-CCNC: 8 U/L (ref 1–33)
ANION GAP SERPL CALCULATED.3IONS-SCNC: 9.3 MMOL/L
AST SERPL-CCNC: 24 U/L (ref 1–32)
BASOPHILS # BLD AUTO: 0.02 10*3/MM3 (ref 0–0.2)
BASOPHILS NFR BLD AUTO: 0 % (ref 0–1.5)
BILIRUB SERPL-MCNC: 0.6 MG/DL (ref 0.1–1.2)
BLD GP AB SCN SERPL QL: NEGATIVE
BUN BLD-MCNC: 31 MG/DL (ref 8–23)
BUN/CREAT SERPL: 44.3 (ref 7–25)
CALCIUM SPEC-SCNC: 10.4 MG/DL (ref 8.6–10.5)
CHLORIDE SERPL-SCNC: 105 MMOL/L (ref 98–107)
CO2 SERPL-SCNC: 25.7 MMOL/L (ref 22–29)
CREAT BLD-MCNC: 0.7 MG/DL (ref 0.57–1)
DEPRECATED RDW RBC AUTO: 48.1 FL (ref 37–54)
EOSINOPHIL # BLD AUTO: 0 10*3/MM3 (ref 0–0.7)
EOSINOPHIL NFR BLD AUTO: 0 % (ref 0.3–6.2)
ERYTHROCYTE [DISTWIDTH] IN BLOOD BY AUTOMATED COUNT: 15.4 % (ref 11.7–13)
FOLATE SERPL-MCNC: 11.51 NG/ML (ref 4.78–24.2)
GFR SERPL CREATININE-BSD FRML MDRD: 81 ML/MIN/1.73
GLOBULIN UR ELPH-MCNC: 2.5 GM/DL
GLUCOSE BLD-MCNC: 82 MG/DL (ref 65–99)
HCT VFR BLD AUTO: 19.9 % (ref 35.6–45.5)
HGB BLD-MCNC: 6.3 G/DL (ref 11.9–15.5)
IMM GRANULOCYTES # BLD: 1.84 10*3/MM3 (ref 0–0.03)
IMM GRANULOCYTES NFR BLD: 3.9 % (ref 0–0.5)
LDH SERPL-CCNC: 339 U/L (ref 135–214)
LYMPHOCYTES # BLD AUTO: 0.16 10*3/MM3 (ref 0.9–4.8)
LYMPHOCYTES NFR BLD AUTO: 0.3 % (ref 19.6–45.3)
MAGNESIUM SERPL-MCNC: 1.8 MG/DL (ref 1.6–2.4)
MCH RBC QN AUTO: 27.5 PG (ref 26.9–32)
MCHC RBC AUTO-ENTMCNC: 31.7 G/DL (ref 32.4–36.3)
MCV RBC AUTO: 86.9 FL (ref 80.5–98.2)
MONOCYTES # BLD AUTO: 0.11 10*3/MM3 (ref 0.2–1.2)
MONOCYTES NFR BLD AUTO: 0.2 % (ref 5–12)
NEUTROPHILS # BLD AUTO: 46.92 10*3/MM3 (ref 1.9–8.1)
NEUTROPHILS NFR BLD AUTO: 99.5 % (ref 42.7–76)
NRBC BLD MANUAL-RTO: 0 /100 WBC (ref 0–0)
PHOSPHATE SERPL-MCNC: 3.1 MG/DL (ref 2.5–4.5)
PLATELET # BLD AUTO: 140 10*3/MM3 (ref 140–500)
PMV BLD AUTO: 10.1 FL (ref 6–12)
POTASSIUM BLD-SCNC: 3.8 MMOL/L (ref 3.5–5.2)
PROT SERPL-MCNC: 5.2 G/DL (ref 6–8.5)
RBC # BLD AUTO: 2.29 10*6/MM3 (ref 3.9–5.2)
RH BLD: NEGATIVE
SODIUM BLD-SCNC: 140 MMOL/L (ref 136–145)
T&S EXPIRATION DATE: NORMAL
URATE SERPL-MCNC: 1.2 MG/DL (ref 2.4–5.7)
VIT B12 BLD-MCNC: 485 PG/ML (ref 211–946)
WBC NRBC COR # BLD: 47.21 10*3/MM3 (ref 4.5–10.7)

## 2018-05-08 PROCEDURE — P9016 RBC LEUKOCYTES REDUCED: HCPCS

## 2018-05-08 PROCEDURE — 93005 ELECTROCARDIOGRAM TRACING: CPT | Performed by: INTERNAL MEDICINE

## 2018-05-08 PROCEDURE — 86901 BLOOD TYPING SEROLOGIC RH(D): CPT

## 2018-05-08 PROCEDURE — 84100 ASSAY OF PHOSPHORUS: CPT | Performed by: INTERNAL MEDICINE

## 2018-05-08 PROCEDURE — 36430 TRANSFUSION BLD/BLD COMPNT: CPT

## 2018-05-08 PROCEDURE — 93010 ELECTROCARDIOGRAM REPORT: CPT | Performed by: INTERNAL MEDICINE

## 2018-05-08 PROCEDURE — 82746 ASSAY OF FOLIC ACID SERUM: CPT | Performed by: INTERNAL MEDICINE

## 2018-05-08 PROCEDURE — 86901 BLOOD TYPING SEROLOGIC RH(D): CPT | Performed by: INTERNAL MEDICINE

## 2018-05-08 PROCEDURE — 97110 THERAPEUTIC EXERCISES: CPT

## 2018-05-08 PROCEDURE — 25010000002 TBO-FILGRASTIM 300 MCG/0.5ML SOLUTION PREFILLED SYRINGE: Performed by: INTERNAL MEDICINE

## 2018-05-08 PROCEDURE — 25010000002 DIPHENHYDRAMINE PER 50 MG: Performed by: INTERNAL MEDICINE

## 2018-05-08 PROCEDURE — 83615 LACTATE (LD) (LDH) ENZYME: CPT | Performed by: INTERNAL MEDICINE

## 2018-05-08 PROCEDURE — 84550 ASSAY OF BLOOD/URIC ACID: CPT | Performed by: INTERNAL MEDICINE

## 2018-05-08 PROCEDURE — 86900 BLOOD TYPING SEROLOGIC ABO: CPT

## 2018-05-08 PROCEDURE — 85025 COMPLETE CBC W/AUTO DIFF WBC: CPT | Performed by: INTERNAL MEDICINE

## 2018-05-08 PROCEDURE — 86923 COMPATIBILITY TEST ELECTRIC: CPT

## 2018-05-08 PROCEDURE — 86850 RBC ANTIBODY SCREEN: CPT | Performed by: INTERNAL MEDICINE

## 2018-05-08 PROCEDURE — 82607 VITAMIN B-12: CPT | Performed by: INTERNAL MEDICINE

## 2018-05-08 PROCEDURE — 63710000001 PREDNISONE PER 5 MG: Performed by: INTERNAL MEDICINE

## 2018-05-08 PROCEDURE — 99233 SBSQ HOSP IP/OBS HIGH 50: CPT | Performed by: INTERNAL MEDICINE

## 2018-05-08 PROCEDURE — 25010000002 IRON SUCROSE PER 1 MG: Performed by: INTERNAL MEDICINE

## 2018-05-08 PROCEDURE — 83735 ASSAY OF MAGNESIUM: CPT | Performed by: INTERNAL MEDICINE

## 2018-05-08 PROCEDURE — 99222 1ST HOSP IP/OBS MODERATE 55: CPT | Performed by: INTERNAL MEDICINE

## 2018-05-08 PROCEDURE — 80053 COMPREHEN METABOLIC PANEL: CPT | Performed by: INTERNAL MEDICINE

## 2018-05-08 PROCEDURE — 86900 BLOOD TYPING SEROLOGIC ABO: CPT | Performed by: INTERNAL MEDICINE

## 2018-05-08 RX ORDER — CHOLECALCIFEROL (VITAMIN D3) 125 MCG
1000 CAPSULE ORAL DAILY
Status: DISCONTINUED | OUTPATIENT
Start: 2018-05-08 | End: 2018-05-16 | Stop reason: HOSPADM

## 2018-05-08 RX ORDER — METOPROLOL TARTRATE 50 MG/1
50 TABLET, FILM COATED ORAL DAILY
Status: DISCONTINUED | OUTPATIENT
Start: 2018-05-09 | End: 2018-05-08

## 2018-05-08 RX ORDER — METOPROLOL TARTRATE 50 MG/1
50 TABLET, FILM COATED ORAL EVERY 12 HOURS SCHEDULED
Status: DISCONTINUED | OUTPATIENT
Start: 2018-05-08 | End: 2018-05-16 | Stop reason: HOSPADM

## 2018-05-08 RX ORDER — ACETAMINOPHEN 325 MG/1
650 TABLET ORAL ONCE
Status: COMPLETED | OUTPATIENT
Start: 2018-05-08 | End: 2018-05-08

## 2018-05-08 RX ADMIN — Medication 5 MG: at 22:08

## 2018-05-08 RX ADMIN — TBO-FILGRASTIM 300 MCG: 300 INJECTION, SOLUTION SUBCUTANEOUS at 10:40

## 2018-05-08 RX ADMIN — DIPHENHYDRAMINE HYDROCHLORIDE 25 MG: 50 INJECTION, SOLUTION INTRAMUSCULAR; INTRAVENOUS at 08:04

## 2018-05-08 RX ADMIN — ACETAMINOPHEN 650 MG: 325 TABLET, FILM COATED ORAL at 11:32

## 2018-05-08 RX ADMIN — DOCUSATE SODIUM -SENNOSIDES 2 TABLET: 50; 8.6 TABLET, COATED ORAL at 22:08

## 2018-05-08 RX ADMIN — FAMOTIDINE 20 MG: 10 INJECTION INTRAVENOUS at 08:04

## 2018-05-08 RX ADMIN — POTASSIUM CHLORIDE 40 MEQ: 750 CAPSULE, EXTENDED RELEASE ORAL at 08:03

## 2018-05-08 RX ADMIN — METOPROLOL TARTRATE 50 MG: 50 TABLET, FILM COATED ORAL at 22:10

## 2018-05-08 RX ADMIN — LISINOPRIL 20 MG: 20 TABLET ORAL at 08:04

## 2018-05-08 RX ADMIN — GABAPENTIN 600 MG: 300 CAPSULE ORAL at 22:10

## 2018-05-08 RX ADMIN — IRON SUCROSE 300 MG: 20 INJECTION, SOLUTION INTRAVENOUS at 09:27

## 2018-05-08 RX ADMIN — ALLOPURINOL 300 MG: 300 TABLET ORAL at 08:04

## 2018-05-08 RX ADMIN — PHENYTOIN 300 MG: 125 SUSPENSION ORAL at 22:08

## 2018-05-08 RX ADMIN — PREDNISONE 100 MG: 50 TABLET ORAL at 08:04

## 2018-05-08 RX ADMIN — METOPROLOL TARTRATE 25 MG: 25 TABLET ORAL at 08:04

## 2018-05-08 RX ADMIN — PANTOPRAZOLE SODIUM 40 MG: 40 TABLET, DELAYED RELEASE ORAL at 05:53

## 2018-05-08 RX ADMIN — AMLODIPINE BESYLATE 10 MG: 10 TABLET ORAL at 08:04

## 2018-05-08 RX ADMIN — Medication 1000 MCG: at 14:51

## 2018-05-08 RX ADMIN — LISINOPRIL 20 MG: 20 TABLET ORAL at 22:08

## 2018-05-08 NOTE — PROGRESS NOTES
NEPHROLOGY PROGRESS NOTE    PATIENT IDENTIFICATION:   Name:  Martina Blake      MRN:  [id number]     77 y.o.  female             Reason for visit: hypercalcemia    SUBJECTIVE:   Eating better.  Urinating a lot, not measured. Bowels moving. Not soa. Tired.     OBJECTIVE:  Vitals:    05/07/18 1737 05/07/18 2237 05/08/18 0607 05/08/18 0803   BP: 175/65 (!) 184/71 180/74 (!) 188/73   BP Location: Left arm Left arm Left arm    Patient Position: Sitting Lying Lying    Pulse: 72 68 63 61   Resp: 18 18 18    Temp: 97.3 °F (36.3 °C) 97.6 °F (36.4 °C) 97.7 °F (36.5 °C)    TempSrc: Oral Oral Oral    SpO2: 96% 93% 91%    Weight:       Height:               Body mass index is 24.65 kg/m².    Intake/Output Summary (Last 24 hours) at 05/08/18 0926  Last data filed at 05/07/18 1850   Gross per 24 hour   Intake              580 ml   Output                0 ml   Net              580 ml     Wt Readings from Last 1 Encounters:   05/01/18 1850 65.1 kg (143 lb 9.6 oz)   04/30/18 0643 66.8 kg (147 lb 4.8 oz)   04/29/18 0652 67.9 kg (149 lb 12.8 oz)   04/28/18 0641 68 kg (150 lb)   04/27/18 0603 64.7 kg (142 lb 9.6 oz)   04/25/18 0516 62.6 kg (138 lb)   04/21/18 1731 68.4 kg (150 lb 12.8 oz)   04/21/18 1227 68 kg (150 lb)     Wt Readings from Last 3 Encounters:   05/01/18 65.1 kg (143 lb 9.6 oz)   04/19/18 68 kg (150 lb)   04/10/18 61.5 kg (135 lb 8 oz)         Exam:  General: NAD, pleasant  Neck without JVD; port rij  Heart RRR no s3 or rub. 2/6 early syst m LUSB  Lungs clear to auscult   Abd +bs, slightly distended, soft, not tender.    Ext trace lower ext edema.  Skin: no rash  Psych mood and affect fine    Scheduled meds:      acetaminophen 650 mg Oral Once   allopurinol 300 mg Oral Daily   amLODIPine 10 mg Oral Q24H   gabapentin 600 mg Oral Nightly   iron sucrose 300 mg Intravenous Daily   lisinopril 20 mg Oral Q12H   melatonin 5 mg Oral Nightly   metoprolol tartrate 25 mg Oral Daily   pantoprazole 40 mg Oral Q AM   phenytoin  300 mg Oral Nightly   potassium chloride 40 mEq Oral Daily   sennosides-docusate sodium 2 tablet Oral Nightly   tbo-filgrastim 300 mcg Subcutaneous Q24H     IV meds:                             Data Review:      Results from last 7 days  Lab Units 05/08/18  0554 05/07/18  0536 05/06/18  0708   SODIUM mmol/L 140 137 138   POTASSIUM mmol/L 3.8 4.2 3.8   CHLORIDE mmol/L 105 103 103   CO2 mmol/L 25.7 24.8 25.6   BUN mg/dL 31* 30* 23   CREATININE mg/dL 0.70 0.92 0.99   CALCIUM mg/dL 10.4 11.3* 11.0*   BILIRUBIN mg/dL 0.6 0.4 0.2   ALK PHOS U/L 76 81 70   ALT (SGPT) U/L 8 9 6   AST (SGOT) U/L 24 29 35*   GLUCOSE mg/dL 82 116* 111*     Estimated Creatinine Clearance: 60.5 mL/min (by C-G formula based on SCr of 0.7 mg/dL).    Results from last 7 days  Lab Units 05/08/18  0554   URIC ACID mg/dL 1.2*       Results from last 7 days  Lab Units 05/08/18  0554 05/06/18  0708 05/06/18  0205  05/05/18  0658  05/04/18  0602   MAGNESIUM mg/dL 1.8  --   --   --  2.0  --  2.0   PHOSPHORUS mg/dL 3.1 2.8 2.9  < >  --   < > 2.8   < > = values in this interval not displayed.      Results from last 7 days  Lab Units 05/08/18  0554 05/07/18  0536 05/06/18  0814   WBC 10*3/mm3 47.21* 66.59* 13.79*   HEMOGLOBIN g/dL 6.3* 7.4* 7.4*   PLATELETS 10*3/mm3 140 211 236                   ASSESSMENT:   Principal Problem:    Hypercalcemia  Active Problems:    Gastroesophageal reflux disease    Chronic recurrent major depressive disorder    Restless legs syndrome    Seizure disorder    Essential hypertension    Paroxysmal atrial fibrillation    Iron deficiency anemia due to chronic blood loss    Weakness    Hypertension    Liver mass    Lymphoma    Lymphoma of lymph nodes of neck    Leukemoid reaction    Insomnia    1.  Hypercalcemia. Due to malignancy s/p zometa 4.25.18 5.7.18. Calcium better today  2.  Metastatic NHL, s/p [facility] 5/4/18.  3.  Hypokalemia, hypophosphatemia, and hypomagnesemia: all replete today.  Multifactorial, with poor nutrition and a  likely contribution from malignancy .  4.  Labile HTN. Increase metoprolol.   5.  Afib with RVR, with rate now controlled  6.  Recent GIB  7.  Leukocytosis.   8.  Anemia; IV iron. Prn prbc    PLAN:  1. Increase metoprolol       Delilah Denney MD  5/8/2018    9:26 AM

## 2018-05-08 NOTE — CONSULTS
Date of Hospital Visit: [unfilled]ENC@  Encounter Provider: Justine Barrios MD  Place of Service: [facility] CARDIOLOGY  Patient Name: Martina Blake  :1940  Referral Provider: Fahad Gonzalez MD    Chief complaint    History of Present Illness      Mrs Blake is 77-year-old patient who has a history of atrial fibrillation with rapid ventricular response in the setting of hypokalemia, hypomagnesemia, hypercalcemia and anemia.  She was placed on metoprolol.  She had upper and lower endoscopy showing hiatal hernia, gastritis, esophagitis and diverticulosis but no evidence of acute bleeding.  Dr. Burt at the time recommended a capsule study and daily PPI.  She started on eliquis due to chads score of 4.  Her HCTZ was stopped and she started on Aldactone.  She had a normal stress nuclear perfusion state and 18.  Her echocardiogram done 18 showed ejection fraction 67% with grade 2 diastolic dysfunction, severe left atrial enlargement, mild to moderate tricuspid insufficiency and RVSP of 44 mm record.    She presented to our [facility] aggressive fatigue and short windedness as well as dizziness.  Her blood pressure was high running 170s to 190s.  She didn't feel her heart racing or skipping.  Her appetite was poor.  She is having mild confusion and she lost 13 pounds.  Her hemoglobin on 18 was 10 with a d-dimer of 2.12, proBNP 5893, potassium 3 with normal renal function and calcium 12.5 with normal TSH in 18.    There was concerned for malignancy.  Shortly after this, she was diagnosed with large B-cell lymphoma, diffuse. She is now getting chemotherapy for this.  Her counts are starting to decrease.  She has atrial fibrillation with RVR.     she does not feel the atrial fibrillation.  She has no dizziness, nausea, chest pain or dyspnea.  She tolerates atrial fibrillation quite well.        Past Medical History:   Diagnosis Date   • Atrial fibrillation    • Cystitis   "  • Cystocele     moderate   • Dyslipidemia    • Esophageal reflux    • Fatigue    • History of transfusion     no reaction   • Hypertension    • Major depression, chronic    • Menopause    • Osteoarthritis    • Osteoporosis    • Palpitations    • Post menopausal problems    • RLS (restless legs syndrome)    • Seizure disorder    • Subjective tinnitus    • Vaginal delivery     x2  \"SONYA\"      \"JASON\"   • Vitamin D deficiency        Past Surgical History:   Procedure Laterality Date   • CERVICAL LYMPH NODE BIOPSY/EXCISION Left 5/1/2018    Procedure: CERVICAL LYMPH NODE BIOPSY/EXCISION;  Surgeon: Danny Oconnor MD;  Location: [facility] MAIN OR;  Service: ENT   • CHOLECYSTECTOMY     • COLONOSCOPY     • COLONOSCOPY N/A 4/13/2018    Procedure: COLONOSCOPY to terminal ileum;  Surgeon: Dominik Burt MD;  [facility];  Service: Gastroenterology   • CRANIOTOMY     • ENDOSCOPY N/A 4/13/2018    Procedure: ESOPHAGOGASTRODUODENOSCOPY with biopsy;  Surgeon: Dominik Burt MD;  Location: [facility] ENDOSCOPY;  Service: Gastroenterology   • TOTAL ABDOMINAL HYSTERECTOMY  1980    w/ bladder suspension.  Probably vaginal hysterectomy with anterior colporrhaphy.    • VENOUS ACCESS DEVICE (PORT) INSERTION N/A 5/4/2018    Procedure: INSERTION VENOUS ACCESS DEVICE;  Surgeon: Jamie Gill MD;  Location: [facility] OR;  Service: General       Facility-Administered Medications Prior to Admission   Medication Dose Route Frequency Provider Last Rate Last Dose   • nitroglycerin (NITROSTAT) SL tablet 0.4 mg  0.4 mg Sublingual Q5 Min PRN Justine Barrios MD       • sodium chloride 0.9 % flush 10 mL  10 mL Intravenous PRN Justine Barrios MD         Prescriptions Prior to Admission   Medication Sig Dispense Refill Last Dose   • amLODIPine (NORVASC) 10 MG tablet Take 1 tablet by mouth Daily. 90 tablet 3 Taking   • apixaban (ELIQUIS) 5 MG tablet tablet Take 1 tablet by mouth 2 (Two) Times a Day. 60 tablet 0 Taking   • " benazepril (LOTENSIN) 40 MG tablet Take 1 tablet by mouth Daily. 90 tablet 3 Taking   • calcium carbonate-vitamin d (CALCIUM 600+D) 600-400 MG-UNIT per tablet Take 1 tablet by mouth Daily.   Taking   • cetirizine (zyrTEC) 10 MG tablet Take 10 mg by mouth Daily.   Taking   • Denosumab (PROLIA SC) Inject 1 Units under the skin Every 6 (Six) Months. Due June 7, 2018   Taking   • escitalopram (LEXAPRO) 5 MG tablet Take 1 tablet by mouth Daily. 90 tablet 3 Taking   • estradiol (ESTRACE) 0.5 MG tablet Take 0.5 mg by mouth Daily.   Taking   • ferrous sulfate 325 (65 FE) MG tablet Take 1 tablet by mouth Daily With Breakfast. 90 tablet 0 Not Taking   • folic acid (FOLVITE) 400 MCG tablet Take 400 mcg by mouth daily.   Taking   • gabapentin (NEURONTIN) 300 MG capsule Take 300 mg by mouth Every Night.  0 Taking   • hydrALAZINE (APRESOLINE) 100 MG tablet Take 1 tablet by mouth 3 (Three) Times a Day. 270 tablet 3    • melatonin 5 MG tablet tablet Take 5 mg by mouth Every Night.   Taking   • montelukast (SINGULAIR) 10 MG tablet Take 10 mg by mouth Daily.   Taking   • pantoprazole (PROTONIX) 40 MG EC tablet Take 1 tablet by mouth Daily. 90 tablet 3 Taking   • phenytoin (DILANTIN) 100 MG ER capsule Take 300 mg by mouth every night at bedtime.   Taking   • potassium chloride (K-TAB) 20 MEQ tablet controlled-release ER tablet Take 1 tablet by mouth 2 (Two) Times a Day. 60 tablet 2    • raNITIdine (ZANTAC) 150 MG tablet Take 150 mg by mouth 2 (Two) Times a Day.  1 Taking   • spironolactone (ALDACTONE) 50 MG tablet Take 1 tablet by mouth Daily. 30 tablet 0 Taking   • vitamin B-12 (CYANOCOBALAMIN) 100 MCG tablet Take 100 mcg by mouth Daily.   Taking   • vitamin B-6 (PYRIDOXINE) 100 MG tablet Take 100 mg by mouth daily.   Taking   • vitamin C (ASCORBIC ACID) 500 MG tablet Take 500 mg by mouth daily.   Taking   • vitamin D (CHOLECALCIFEROL) 400 UNITS tablet Take 400 Units by mouth daily.   Taking   • vitamin E 400 UNIT capsule Take 400  Units by mouth Daily.   Taking       Current Meds  Scheduled Meds:  allopurinol 300 mg Oral Daily   amLODIPine 10 mg Oral Q24H   gabapentin 600 mg Oral Nightly   lisinopril 20 mg Oral Q12H   melatonin 5 mg Oral Nightly   [START ON 2018] metoprolol tartrate 50 mg Oral Daily   pantoprazole 40 mg Oral Q AM   phenytoin 300 mg Oral Nightly   potassium chloride 40 mEq Oral Daily   sennosides-docusate sodium 2 tablet Oral Nightly   tbo-filgrastim 300 mcg Subcutaneous Q24H   vitamin B-12 1,000 mcg Oral Daily     Continuous Infusions:   PRN Meds:.•  acetaminophen  •  ALPRAZolam  •  diphenhydrAMINE  •  diphenhydrAMINE  •  hydrALAZINE  •  HYDROcodone-acetaminophen  •  Morphine **AND** naloxone  •  ondansetron  •  ondansetron  •  potassium chloride  •  potassium chloride    Allergies as of 2018 - Reviewed 2018   Allergen Reaction Noted   • Crab (diagnostic) Itching and Rash 10/21/2016   • Pseudoephedrine Dizziness 04/15/2016       Social History     Social History   • Marital status:      Spouse name: JL   • Number of children: 2   • Years of education: N/A     Occupational History   • Not on file.     Social History Main Topics   • Smoking status: Never Smoker   • Smokeless tobacco: Never Used   • Alcohol use No   • Drug use: No   • Sexual activity: Defer      Comment:  (Jl)     Other Topics Concern   • Not on file     Social History Narrative   • No narrative on file       Family History   Problem Relation Age of Onset   • No Known Problems Mother    • Stroke Father 54      @ 60    • No Known Problems Maternal Grandmother    • No Known Problems Maternal Grandfather    • Heart disease Paternal Grandmother    • Heart disease Paternal Grandfather    • No Known Problems Daughter    • Stroke Brother 76   • Diabetes type II Brother        REVIEW OF SYSTEMS:   12 point ROS was performed and is negative except as outlined in HPI            Objective:   Temp:  [97 °F (36.1 °C)-97.7 °F (36.5  "°C)] 97 °F (36.1 °C)  Heart Rate:  [] 115  Resp:  [18] 18  BP: (161-188)/(65-82) 161/82  Body mass index is 24.65 kg/m².  Flowsheet Rows    Flowsheet Row First Filed Value   Admission Height 167.6 cm (66\") Documented at 04/21/2018 1227   Admission Weight 68 kg (150 lb) Documented at 04/21/2018 1227        Vitals:    05/08/18 1520   BP: 161/82   Pulse: 115   Resp: 18   Temp: 97 °F (36.1 °C)   SpO2: 93%       General Appearance:    Alert, cooperative, in no acute distress   Head:    Normocephalic, without obvious abnormality, atraumatic   Eyes:            Lids and lashes normal, conjunctivae and sclerae normal, no   icterus, no pallor, corneas clear   Ears:    Ears appear intact with no abnormalities noted   Throat:   No oral lesions, no thrush, oral mucosa moist   Neck:   No adenopathy, supple, trachea midline, no thyromegaly, no   carotid bruit, no JVD   Back:     No kyphosis present, no scoliosis present, no skin lesions, erythema or scars, no tenderness, range of motion normal   Lungs:     Clear to auscultation,respirations regular, even and unlabored    Heart:    irregular rhythm and normal rate, normal S1 and S2, no murmur, no gallop, no rub, no click   Chest Wall:    No abnormalities observed   Abdomen:     Normal bowel sounds, no masses, no organomegaly, soft        non-tender, non-distended, no guarding, no rebound  tenderness   Extremities:   Moves all extremities well, no edema, no cyanosis, no redness   Pulses:   Pulses palpable and equal bilaterally. Normal radial, carotid, dorsalis pedis and posterior tibial pulses bilaterally.    Skin:  Psychiatric:   No bleeding, bruising or rash    Alert and oriented x 3, normal mood and affect                 Lab Review:      Results from last 7 days  Lab Units 05/08/18  0554   SODIUM mmol/L 140   POTASSIUM mmol/L 3.8   CHLORIDE mmol/L 105   CO2 mmol/L 25.7   BUN mg/dL 31*   CREATININE mg/dL 0.70   CALCIUM mg/dL 10.4   BILIRUBIN mg/dL 0.6   ALK PHOS U/L 76 "   ALT (SGPT) U/L 8   AST (SGOT) U/L 24   GLUCOSE mg/dL 82         @LABRCNTbnp@    Results from last 7 days  Lab Units 05/08/18  0554 05/07/18  0536 05/06/18  0814   WBC 10*3/mm3 47.21* 66.59* 13.79*   HEMOGLOBIN g/dL 6.3* 7.4* 7.4*   HEMATOCRIT % 19.9* 23.4* 23.6*   PLATELETS 10*3/mm3 140 211 236           Results from last 7 days  Lab Units 05/08/18  0554   MAGNESIUM mg/dL 1.8     @LABNT(chol,trig,hdl,ldl)    I personally viewed and interpreted the patient's EKG/Telemetry data  )  Patient Active Problem List   Diagnosis   • Blood glucose abnormal   • Dyslipidemia   • Gastroesophageal reflux disease   • Fatigue   • Generalized osteoarthritis   • Chronic recurrent major depressive disorder   • Osteoporosis   • Restless legs syndrome   • Seizure disorder   • Vitamin D deficiency   • Bradycardia   • Essential hypertension   • Post-menopause on HRT (hormone replacement therapy)   • Chronic seasonal allergic rhinitis   • Paroxysmal atrial fibrillation   • Iron deficiency anemia due to chronic blood loss   • Acute superficial gastritis without hemorrhage   • Hiatal hernia   • Esophagitis   • Diverticulosis of large intestine without hemorrhage   • Dizziness   • Weakness   • Hypertension   • Hypercalcemia   • Liver mass   • Lymphoma   • Lymphoma of lymph nodes of neck   • Leukemoid reaction   • Insomnia   • Anemia associated with chemotherapy     Assessment and Plan:    1. Diffuse B-cell lymphoma. Has started chemotherapy, RCHOP with first therapy 5/4/18.   2. Anemia, to get venofer.   3. Atrial fibrillation with rVR. Increase metoprolol to 50mg bid  4. Hypertension.       Will follow.     Justine Barrios MD  05/08/18  4:23 PM.  Time spent in reviewing chart, discussion and examination:

## 2018-05-08 NOTE — PLAN OF CARE
Problem: Patient Care Overview  Goal: Plan of Care Review   05/08/18 1423   Coping/Psychosocial   Plan of Care Reviewed With patient   OTHER   Outcome Summary Pt requires CGA with ambulation due to gait impairments and compromised dynamic standing balance.

## 2018-05-08 NOTE — PLAN OF CARE
Problem: Fall Risk (Adult)  Goal: Absence of Fall  Outcome: Ongoing (interventions implemented as appropriate)      Problem: Patient Care Overview  Goal: Plan of Care Review  Outcome: Ongoing (interventions implemented as appropriate)   05/08/18 1640   Coping/Psychosocial   Plan of Care Reviewed With patient   Plan of Care Review   Progress improving   OTHER   Outcome Summary Pt A&Ox4. Up with assistance, using walker. 2 units of PRBCs ordered. Tylenol and Bendaryl given before. Port flushing with blood return. Pt went into A Fib after first unit of blood. EKG stat ordered, cardiology consulted and metoprolol increased. Granix continued. Will continue to monitor. Family at bedside. HTN, afebrile. Chemo precautions.      Goal: Individualization and Mutuality  Outcome: Ongoing (interventions implemented as appropriate)    Goal: Discharge Needs Assessment  Outcome: Ongoing (interventions implemented as appropriate)    Goal: Interprofessional Rounds/Family Conf  Outcome: Ongoing (interventions implemented as appropriate)      Problem: Activity Intolerance (Adult)  Goal: Activity Tolerance  Outcome: Ongoing (interventions implemented as appropriate)    Goal: Effective Energy Conservation Techniques  Outcome: Ongoing (interventions implemented as appropriate)      Problem: Oncology Care (Adult)  Goal: Signs and Symptoms of Listed Potential Problems Will be Absent, Minimized or Managed (Oncology Care)  Outcome: Ongoing (interventions implemented as appropriate)

## 2018-05-08 NOTE — THERAPY TREATMENT NOTE
Acute Care - Physical Therapy Treatment Note   [facility]     Patient Name: Martina Blake  : 1940  MRN: [id number]  Today's Date: 2018     Date of Referral to PT: 18  Referring Physician: Byron    Admit Date: 2018    Visit Dx:    ICD-10-CM ICD-9-CM   1. Lymphoma of lymph nodes of neck, unspecified lymphoma type C85.91 202.81   2. Weakness R53.1 780.79   3. Hypercalcemia E83.52 275.42   4. Hypertensive urgency I16.0 401.9   5. 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 C83.39 202.80     Patient Active Problem List   Diagnosis   • Blood glucose abnormal   • Dyslipidemia   • Gastroesophageal reflux disease   • Fatigue   • Generalized osteoarthritis   • Chronic recurrent major depressive disorder   • Osteoporosis   • Restless legs syndrome   • Seizure disorder   • Vitamin D deficiency   • Bradycardia   • Essential hypertension   • Post-menopause on HRT (hormone replacement therapy)   • Chronic seasonal allergic rhinitis   • Paroxysmal atrial fibrillation   • Iron deficiency anemia due to chronic blood loss   • Acute superficial gastritis without hemorrhage   • Hiatal hernia   • Esophagitis   • Diverticulosis of large intestine without hemorrhage   • Dizziness   • Weakness   • Hypertension   • Hypercalcemia   • Liver mass   • Lymphoma   • Lymphoma of lymph nodes of neck   • Leukemoid reaction   • Insomnia   • Anemia associated with chemotherapy       Therapy Treatment          Rehabilitation Treatment Summary     Row Name 18 1420             Treatment Time/Intention    Discipline physical therapist  -MD      Document Type therapy note (daily note)  -MD      Subjective Information complains of;fatigue;weakness  -MD      Mode of Treatment physical therapy  -MD      Patient Effort good  -MD      Existing Precautions/Restrictions fall  -MD      Recorded by [MD] Malissa Oliver, PT 18 1421 18 1421      Row Name 18 1420              Cognitive Assessment/Intervention- PT/OT    Orientation Status (Cognition) oriented x 4  -MD      Personal Safety Interventions fall prevention program maintained  -MD      Recorded by [MD] Malissa Oliver, PT 05/08/18 1421 05/08/18 1421      Row Name 05/08/18 1420             Bed Mobility Assessment/Treatment    Supine-Sit [place] (Bed Mobility) supervision  -MD      Sit-Supine [place] (Bed Mobility) supervision  -MD      Recorded by [MD] Malissa Oliver, PT 05/08/18 1421 05/08/18 1421      Row Name 05/08/18 1420             Transfer Assessment/Treatment    Sit-Stand [place] (Transfers) contact guard  -MD      Stand-Sit [place] (Transfers) contact guard  -MD      Recorded by [MD] Malissa Oliver, PT 05/08/18 1421 05/08/18 1421      Row Name 05/08/18 1420             Sit-Stand Transfer    Assistive Device (Sit-Stand Transfers) walker, front-wheeled  -MD      Recorded by [MD] Malissa Oliver, PT 05/08/18 1421 05/08/18 1421      Row Name 05/08/18 1420             Stand-Sit Transfer    Assistive Device (Stand-Sit Transfers) walker, front-wheeled  -MD      Recorded by [MD] Malissa Oliver, PT 05/08/18 1421 05/08/18 1421      Row Name 05/08/18 1420             Gait/Stairs Assessment/Training    [place] Level (Gait) contact guard  -MD      Assistive Device (Gait) walker, front-wheeled  -MD      Distance in Feet (Gait) 160  -MD      Pattern (Gait) step-through  -MD      Deviations/Abnormal Patterns (Gait) gait speed decreased  -MD      Bilateral Gait Deviations forward flexed posture  -MD      Recorded by [MD] Malissa Oliver, PT 05/08/18 1421 05/08/18 1421      Row Name 05/08/18 1420             Positioning and Restraints    Pre-Treatment Position in bed  -MD      Post Treatment Position bed  -MD      In Bed supine;call light within reach;exit alarm on  -MD      Recorded by [MD] Malissa Oliver, PT 05/08/18 1421 05/08/18 1421      Row Name 05/08/18 1420             Pain Scale: Numbers Pre/Post-Treatment    Pain Scale: Numbers,  Pretreatment 0/10 - no pain  -MD      Recorded by [MD] Malissa Oliver, PT 05/08/18 1421 05/08/18 1421      Row Name                Wound 05/01/18 1057 Left neck incision    Wound - Properties Group Date first assessed: 05/01/18 [PEARL] Time first assessed: 1057 [PEARL] Side: Left [PEARL] Location: neck [PEARL] Type: incision [PEARL] Recorded by:  [PEARL] Magaly Almonte RN 05/01/18 1057 05/01/18 1057    Row Name                Wound 05/04/18 0838 Right chest incision    Wound - Properties Group Date first assessed: 05/04/18 [SM] Time first assessed: 0838 [SM] Side: Right [SM] Location: chest [SM] Type: incision [SM] Recorded by:  [SM] Scott Cole RN 05/04/18 0838 05/04/18 0838      User Key  (r) = Recorded By, (t) = Taken By, (c) = Cosigned By    Initials Name Effective Dates Discipline    PEARL Magaly Almonte RN 06/16/16 -  Nurse    MD Malissa Oliver, PT 04/03/18 -  PT    JEREMIAH Cole RN 06/16/16 -  Nurse          Wound 05/01/18 1057 Left neck incision (Active)   Dressing Appearance intact;dry 5/8/2018  8:06 AM   Closure Adhesive closure strips 5/8/2018  8:06 AM   Base dry;clean 5/8/2018  8:06 AM   Periwound Temperature warm 5/8/2018  8:06 AM   Periwound Skin Turgor soft 5/8/2018  8:06 AM   Drainage Amount none 5/8/2018  8:06 AM   Dressing Care, Wound transparent film 5/8/2018  8:06 AM       Wound 05/04/18 0838 Right chest incision (Active)   Dressing Appearance dry;intact 5/8/2018  8:06 AM   Closure Adhesive closure strips 5/8/2018  8:06 AM   Base clean;dry 5/8/2018  8:06 AM   Drainage Amount none 5/8/2018  8:06 AM   Dressing Care, Wound transparent film 5/8/2018  8:06 AM             Physical Therapy Education     Title: PT OT SLP Therapies (Done)     Topic: Physical Therapy (Done)     Point: Mobility training (Done)    Learning Progress Summary     Learner Status Readiness Method Response Comment Documented by    Patient Done Acceptance E,MADIE HOBBS  PC 05/06/18 1416     Done Acceptance PAVEL BENAVIDES  BS 05/03/18 0206     Done Acceptance  E,TB VU   05/02/18 0248     Done Acceptance E VU  EJ 04/29/18 1554     Done Acceptance E,TB,D VU,NR   04/28/18 1733     Done Acceptance E,TB VU,DU  CW 04/27/18 1544     Done Acceptance E,TB VU,NR role of PT, benefits of activity, use of AD, safety GR 04/22/18 1311    Family Done Acceptance E,TB,D VU,NR   04/28/18 1733     Done Acceptance E,TB VU,NR role of PT, benefits of activity, use of AD, safety GR 04/22/18 1311          Point: Home exercise program (Done)    Learning Progress Summary     Learner Status Readiness Method Response Comment Documented by    Patient Done Acceptance E,D VU,DU  PC 05/06/18 1416     Done Acceptance E,TB VU   05/03/18 0206     Done Acceptance E,TB VU   05/02/18 0248     Done Acceptance E,TB,D VU,NR   04/28/18 1733     Done Acceptance E,TB VU,DU  CW 04/27/18 1544     Done Acceptance E,TB VU,NR role of PT, benefits of activity, use of AD, safety GR 04/22/18 1311    Family Done Acceptance E,TB,D VU,NR   04/28/18 1733     Done Acceptance E,TB VU,NR role of PT, benefits of activity, use of AD, safety GR 04/22/18 1311          Point: Body mechanics (Done)    Learning Progress Summary     Learner Status Readiness Method Response Comment Documented by    Patient Done Acceptance E,D VU,DU  PC 05/06/18 1416     Done Acceptance E,TB VU   05/03/18 0206     Done Acceptance E,TB VU   05/02/18 0248     Done Acceptance E,TB,D VU,NR   04/28/18 1733     Done Acceptance E,TB VU,DU  CW 04/27/18 1544     Done Acceptance E,TB VU,NR role of PT, benefits of activity, use of AD, safety GR 04/22/18 1311    Family Done Acceptance E,TB,D VU,NR   04/28/18 1733     Done Acceptance E,TB VU,NR role of PT, benefits of activity, use of AD, safety GR 04/22/18 1311          Point: Precautions (Done)    Learning Progress Summary     Learner Status Readiness Method Response Comment Documented by    Patient Done Acceptance E VU  MD 05/08/18 1422     Done Acceptance E GENE CHAMBERLAIN 05/07/18 1325     Done  Acceptance E,D VU,DU  PC 05/06/18 1416     Done Acceptance E,TB VU   05/03/18 0206     Done Acceptance E,TB VU   05/02/18 0248     Done Acceptance E VU  MD 04/30/18 1615     Done Acceptance E,TB,D VU,NR   04/28/18 1733     Done Acceptance E,TB VU,DU  CW 04/27/18 1544     Done Acceptance E,TB VU,NR role of PT, benefits of activity, use of AD, safety  04/22/18 1311    Family Done Acceptance E,TB,D VU,NR   04/28/18 1733     Done Acceptance E,TB VU,NR role of PT, benefits of activity, use of AD, safety  04/22/18 1311                      User Key     Initials Effective Dates Name Provider Type Discipline     10/03/16 -  Aubrey Del Rosario, PT Physical Therapist PT     04/03/18 -  Rosette Navarrete, PT Physical Therapist PT    MD 04/03/18 -  Malissa Oliver, PT Physical Therapist PT     03/07/18 -  Linda Cole, PTA Physical Therapy Assistant PT     06/16/16 -  Ara Gibson, RN Registered Nurse Nurse     04/03/18 -  Alma Delia Cazares, PT Physical Therapist PT     03/07/18 -  Myke Burrell, THIAGO Physical Therapy Assistant PT                    PT Recommendation and Plan  Therapy Frequency (PT Clinical Impression): daily  Plan of Care Reviewed With: patient  Progress: improving  Outcome Summary: Pt requires CGA with ambulation due to gait impairments and compromised dynamic standing balance.            Outcome Measures     Row Name 05/08/18 1400 05/07/18 1300 05/06/18 1400       How much help from another person do you currently need...    Turning from your back to your side while in flat bed without using bedrails? 4  -MD 4  -MD 3  -PC    Moving from lying on back to sitting on the side of a flat bed without bedrails? 4  -MD Javier CARROLL 3  -PC    Moving to and from a bed to a chair (including a wheelchair)? 3  -MD 3  -MD 3  -PC    Standing up from a chair using your arms (e.g., wheelchair, bedside chair)? 3  -MD 3  -MD 3  -PC    Climbing 3-5 steps with a railing? 3  -MD 3  -MD 3  -PC    To walk in  hospital room? 3  -MD 3  -MD 3  -PC    AM-PAC 6 Clicks Score 20  -MD 20  -MD 18  -PC       Functional Assessment    Outcome Measure Options AM-PAC 6 Clicks Basic Mobility (PT)  -MD AM-PAC 6 Clicks Basic Mobility (PT)  -MD  --      User Key  (r) = Recorded By, (t) = Taken By, (c) = Cosigned By    Initials Name Provider Type    PC Rosette Navarrete, PT Physical Therapist    MD Malissa Oliver, PT Physical Therapist           Time Calculation:         PT Charges     Row Name 05/08/18 1402             Time Calculation    Start Time 1402  -MD      Stop Time 1417  -MD      Time Calculation (min) 15 min  -MD      PT Received On 05/08/18  -MD      PT - Next Appointment 05/09/18  -MD        User Key  (r) = Recorded By, (t) = Taken By, (c) = Cosigned By    Initials Name Provider Type    MD Malissa Oliver, PT Physical Therapist          Therapy Charges for Today     Code Description Service Date Service Provider Modifiers Qty    58834166138 HC PT THER PROC EA 15 MIN 5/7/2018 Malissa Oliver, PT GP 1    48415666743 HC PT THER PROC EA 15 MIN 5/8/2018 Malissa Oliver, PT GP 1          PT G-Codes  PT Professional Judgement Used?: Yes  Outcome Measure Options: AM-PAC 6 Clicks Basic Mobility (PT)  Score: 13  Functional Limitation: Mobility: Walking and moving around  Mobility: Walking and Moving Around Current Status (): At least 60 percent but less than 80 percent impaired, limited or restricted  Mobility: Walking and Moving Around Goal Status (): At least 40 percent but less than 60 percent impaired, limited or restricted    Malissa Oliver PT  5/8/2018

## 2018-05-08 NOTE — PROGRESS NOTES
"CHIEF COMPLAINT:  Anemia, hypercalcemia, radiographic findings of malignancy with pathology/ diagnosis on the liver consistent with diffuse large B-cell lymphoma FISH for c-myc, bcl-2 and bcl-6 pending    Interval History:  The patient underwent an excisional lymph node biopsy from the left neck this morning for confirmation of pathology.  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  Additional FISH studies are pending to evaluate for \"double hit\" lymphoma as Ki 67 staining is 4+     On 05/02/2018, actually the patient was feeling better.  She had no issues in regard to the site of the lymph node biopsy in the left side of the neck with minimal discomfort.  I talked with Dr. Gill in regard to port placement tomorrow.      Hopefully, we will have further information in regard to “double-hit” lymphoma before we start any chemotherapy.  If that is the case, the patient will require infusional EPOCH/Rituxan.     Other than that, I went through formal education and teaching in regard to chemotherapy medicines, the frequency of the treatment, side effects and so forth; please review below.    The patient will have a PET scan this afternoon as well and the port will be placed tomorrow.  On 05/03/2018 the patient was feeling fatigued and tired from so many testings back and forth. She had her PET scan this morning and there is planning for port placement tomorrow. I asked the patient to bring her family this afternoon to talk with them about the diagnosis and decide how they want to proceed. I pointed out to the patient as well that the absence of treatment life expectancy is measured in a question of a few weeks to a few months.     I also discussed with her the finding on the PET scan today that will be described below and also the finding of the cervical lymph node that will be described below.     On 5/7/2018, " we started the patient on intravenous Venofer.  Patient has worsening hemoglobin 7.4.  She tolerated Venofer without any incident.     On 5/8/2018 laboratory study showed worsening hemoglobin at 6.3, she reports no bleeding.  Her WBC is trending down at a 47,200 as well as neutrophils 47,000. She has good appetite no nausea vomiting.  No cough no dyspnea.     Oncology History:  Martina Blake is a 77 y.o. female who were asked to see in consultation  4/29/18 for hypercalcemia, anemia and radiologic findings consistent with likely malignancy-?  Lymphoma.        She has a significant past medical history of palpitations followed by cardiology.  She presented to the emergency room April 10-14 with chest pain and palpitations and was found to be in A. fib with RVR and also demonstrated electrolyte abnormalities and anemia.  Records demonstrates that her anemia became particularly evident April 11 with an H&H of 8.9/ 28.4, platelet count 212,000 with a normal automated differential.  She underwent GI assessment including upper and lower endoscopy demonstrated hernia, gastritis, esophagitis, diverticulosis but no evidence of acute bleed.  She was placed on Eliquis as result of a relatively high CHADSVASc score.  She had also been found to be hypercalcemic at approximately 11 with HCTZ altered and the patient started on Aldactone.  Additional testing had included a ferritin of 313.1, iron of 32 with TIBC of 222 and iron saturation of 14, occult blood negative per stool testing    She was brought back to the emergency room April 21 with further evidence of hypercalcemia, associated weakness, anorexia, nausea, ataxia and weight loss.  MRI of the brain April 21 was found to be unremarkable. Outpatient intact PTH levels were found to be 7.1(reduced), and an H&H of 10.3 and 32.9, white count 11,090, platelet count 267,000 with a normal differential.  Patient seen by renal medicine with the possibility of Fanconi syndrome  raised.  Thereafter PTH hormone was found be less than 2.0, and protein electropheresis included IgG of 948, IgA of 207, IgM 86, total protein 6.3, albumin 2.8, no M spike noted, slightly elevated free kappa and lambda light chains with normal ratio.  Repeat testing per iron profile again revealed low serum iron but high serum ferritin and normal CEA, normal AFP, CA-125 of 77.2, CA 19-9 7.8   At this point the reason for her hypercalcemia was thought to be possible medication effect and/or possible malignancy with calcium was running between 12 and 13 mg/dL.    This led to CT scan of chest abdomen pelvis performed April 24 demonstrate extensive metastatic disease throughout the abdomen and pelvis particularly involving the spleen and liver, extensive lymphadenopathy at the abdomen and pelvis with a 4 cm lesion splenic hilum partially encasing the pancreatic tail, pancreatic tail malignancy?,  Gastric primary malignancy?  There was a mass along the right wall of the urinary bladder with adjacent adenopathy.  CT of the chest revealed several tiny dense pleural-based nodular opacities-partially calcified granulomas, no mediastinal or hilar adenopathy, enlarged pulmonary arteries consistent with pulmonary arterial hypertension.  A subsequent bone scan performed April 26 revealed prominent uptake in the right frontal bone and right posterior ninth rib and a CT needle biopsy of the liver was obtained April 26 as well.The sample obtained revealed, on flow cytometry examination, a subpopulation of normal B cells that might be consistent with B-cell lymphoma.  Additional testing is currently pending.  On May 25 further hypercalcemia was again noted and treated with IV fluids and dose of Zometa, vitamin D level normal.  Again evidence of hypokalemia, hypophosphatemia and hypomagnesemia.  The patient is seen April 29 her calcium level has gradually improved dropping to 9.7, albumin of 2.6, ionized calcium of 6.1, BUN and  "creatinine of 10/.55.  We are asked to see her with results available thus far as to possible lymphoma.    Past Medical History:     Atrial fibrillation      • Cystitis     • Cystocele       moderate   • Dyslipidemia     • Esophageal reflux     • Fatigue     • History of transfusion       no reaction   • Hypertension     • Major depression, chronic     • Menopause     • Osteoarthritis     • Osteoporosis     • Palpitations     • Post menopausal problems     • RLS (restless legs syndrome)     • Seizure disorder     • Subjective tinnitus     • Vaginal delivery       x2  \"SONYA\"      \"JASON\"   • Vitamin D deficiency      Social History:  The patient is a  and has 2 children.  She has never smoked.  She denies alcohol use.    Review of Systems   Constitutional: Positive for fatigue. Negative for activity change, appetite change and unexpected weight change.   HENT: Negative.    Eyes: Negative.    Respiratory: Negative.  Negative for shortness of breath and wheezing.    Cardiovascular: Negative.  Negative for chest pain and palpitations.   Gastrointestinal: Negative.  Negative for abdominal pain, constipation, nausea and vomiting.   Endocrine: Negative.    Genitourinary: Negative.    Musculoskeletal: Positive for gait problem. Negative for neck stiffness.   Skin: Negative.  Negative for color change and pallor.   Neurological: Positive for weakness. Negative for seizures, numbness and headaches.   Hematological: Positive for adenopathy.   Psychiatric/Behavioral: Negative for confusion.          Medications:  The current medication list was reviewed in the EMR    ALLERGIES:    Allergies   Allergen Reactions   • Crab (Diagnostic) Itching and Rash   • Pseudoephedrine Dizziness       Objective    Vitals:    05/07/18 2237 05/08/18 0607 05/08/18 0803 05/08/18 0932   BP: (!) 184/71 180/74 (!) 188/73    BP Location: Left arm Left arm     Patient Position: Lying Lying     Pulse: 68 63 61 74   Resp: 18 18     Temp: 97.6 °F (36.4 " °C) 97.7 °F (36.5 °C)  97.1 °F (36.2 °C)   TempSrc: Oral Oral  Oral   SpO2: 93% 91%  94%   Weight:       Height:       ECOG 3       Physical Exam    GENERAL:  Well-developed, well-nourished  Patient lay in bed in no acute distress.   SKIN:  Warm, dry without rashes, purpura or petechiae.  HEENT:  conjunctivas non injected, normal extraocular movements.   Mouth mucosa was moist, no exudates in oropharynx,  normal roof of the mouth and pillars, normal papillations of the tongue.  NECK:  no thyromegaly no change in left cervical wound and node 2 cm in size, no cervical adenopathies.   LYMPHATICS:  No cervical, supraclavicular, axillary adenopathy.  CHEST:  Lungs clear to percussion and auscultation, normal breath sounds bilaterally, no wheezing, crackles or ronchi, no stridor, no rubs.  CARDIAC AND VASCULAR: normal rate and regular rhythm, without murmurs, rubs or S3 or S4 right or left sided gallops.    ABDOMEN:  Soft, nontender with spleen 4 cm blcm organomegaly , palpable liver 4 cm brcm., no ascites, no collateral circulation,no distention, no abdominal pain, Normal bowel sounds.   EXTREMITIES  AND SPINE:  No clubbing, cyanosis or edema, no deformities or pain.      NEUROLOGICAL:  Patient is AAOx3.     RECENT LABS:      Results from last 7 days  Lab Units 05/08/18  0554 05/07/18  0536 05/06/18  0814   WBC 10*3/mm3 47.21* 66.59* 13.79*   HEMOGLOBIN g/dL 6.3* 7.4* 7.4*   HEMATOCRIT % 19.9* 23.4* 23.6*   PLATELETS 10*3/mm3 140 211 236     Lab Results   Component Value Date    NEUTROABS 46.92 (H) 05/08/2018       Results from last 7 days  Lab Units 05/08/18  0554 05/07/18  0536 05/06/18  0708   SODIUM mmol/L 140 137 138   POTASSIUM mmol/L 3.8 4.2 3.8   CHLORIDE mmol/L 105 103 103   CO2 mmol/L 25.7 24.8 25.6   BUN mg/dL 31* 30* 23   CREATININE mg/dL 0.70 0.92 0.99   CALCIUM mg/dL 10.4 11.3* 11.0*   BILIRUBIN mg/dL 0.6 0.4 0.2   ALK PHOS U/L 76 81 70   ALT (SGPT) U/L 8 9 6   AST (SGOT) U/L 24 29 35*   GLUCOSE mg/dL 82  "116* 111*     Lab Results   Component Value Date    WCIOIBQS11 485 05/08/2018     Lab Results   Component Value Date    FOLATE 11.51 05/08/2018       Assessment/Plan     1.  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  Additional FISH studies are POSITIVE for \"double hit\" lymphoma as Ki 67 staining is 4+     She has extensive metastatic disease throughout the abdomen and pelvis, splenomegaly replaced with low attenuation lesions, 4.4 cm splenic hilum mass, disease encasing the pancreatic tail, 3 cm lesion abutting the stomach, extensive lymphadenopathy throughout the celiac axis, retroperitoneum, mesentery, periaortic lymphadenopathy, liver lesion in the posterior segment 4.8 cm, nodular thickening of the right wall of urinary bladder 5.0 cm, peritoneal lymphadenopathy and thickening, coarsely calcified mesenteric mass 9.6 cm (?old mesenteric adenitis).  Bone scan shows foci of uptake in the frontal bone and right posterior ninth rib.      The patient has had B symptoms with decreased performance status, and weight loss, anorexia.    She had a PET scan examination.  She was started on chemotherapy with R-CHOP on 5/4/2018.  She tolerated chemotherapy relatively well.     2.  Hypercalcemia of malignancy:  The patient received Zometa 4 mg on 4/25/18.  Her calcium temporarily improved but is beginning to elevate again today to 11.3 with albumin 2.9.  Hypercalcemia is likely to be an ongoing issue until she responded to chemotherapy.      Patient received a second dose of Zometa on 5/7/2018.  Laboratory study today reported a normalized calcium and a 10.4.  We'll continue to monitor.     3.  Leukocytosis.  This is likely secondary to Granix injection.  Her WBC actually is trending down which is expected, I think her WBC will continue to decrease despite Granix injection.    The tomasz of marrow suppression usually happens around day #10.      4.  Anemia:  Probably inflammatory/malignancy associated " (elevated ferritin, low transferrin, elevated ESR) versus bone marrow involvement.   We do not think a bone marrow exam would change her treatment plan.      Patient has further worsening anemia secondary to chemotherapy, with hemoglobin 6.3 this morning.  I ordered 2 units PRBC transfusion today on 5/8/2018.     Reviewing her iron study from 4/24/2018, I do believe this patient also had elements of iron deficiency.  She received  intravenous iron Venofer 300 mg for 2 doses on 5/7/2018 and 5/8/2018.      She has normal folic acid level and vitamin B12 level this morning on 5/8/2018, however her B12 level can be further improved.  We'll was started on oral B12 supplementation once a day.    5.  Paroxysmal atrial fibrillation: The patient is anticoagulated with Eliquis.  Her platelet count will need to be monitored carefully after chemotherapy.  Her platelets is coming down at 140,000 on 5/8/2018    6.  Prophylaxis of tumor lysis syndrome.  Patient will be continued on allopurinol.    7.  GI prophylaxis on Protonix.     8.  Insomnia.  Patient reports he was not able to sleep the last 4 out of 5 days.  At home she used to take 2 tablets of gabapentin and 5 mg melatonin before sleep.      Patient was started on gabapentin and the melatonin on 5/7/2018.  She reported had a significant improvement of sleep in the night.    9.  Discharge plan.  Patient will need to continue physical/occupational therapy.  Patient prefers to be discharged to home.   has been following patient.       PLAN:   1.  Transfuse 2 units PRBC today.    2.  She finished a second dose of Venofer today.   3.  Continue Granix 300 mg daily.    4.  Continue allopurinol for tumor lysis syndrome prophylaxis.   5.  Continue Eliquis for anticoagulation.   6.  Continue gabapentin and melatonin.   7.  Start oral vitamin B12 at 1000 µg daily.   8.  Continue supportive care.     Discussed with the patient today.  I also discussed with her nurse  today.       JABARI RAMIREZ M.D., Ph.D.      5/8/2018      CC:

## 2018-05-09 LAB
ABO + RH BLD: NORMAL
ABO + RH BLD: NORMAL
ALBUMIN SERPL-MCNC: 2.7 G/DL (ref 3.5–5.2)
ALBUMIN/GLOB SERPL: 1.1 G/DL
ALP SERPL-CCNC: 81 U/L (ref 39–117)
ALT SERPL W P-5'-P-CCNC: 12 U/L (ref 1–33)
ANION GAP SERPL CALCULATED.3IONS-SCNC: 8.8 MMOL/L
ANISOCYTOSIS BLD QL: ABNORMAL
AST SERPL-CCNC: 23 U/L (ref 1–32)
BH BB BLOOD EXPIRATION DATE: NORMAL
BH BB BLOOD EXPIRATION DATE: NORMAL
BH BB BLOOD TYPE BARCODE: 600
BH BB BLOOD TYPE BARCODE: 600
BH BB DISPENSE STATUS: NORMAL
BH BB DISPENSE STATUS: NORMAL
BH BB PRODUCT CODE: NORMAL
BH BB PRODUCT CODE: NORMAL
BH BB UNIT NUMBER: NORMAL
BH BB UNIT NUMBER: NORMAL
BILIRUB SERPL-MCNC: 0.7 MG/DL (ref 0.1–1.2)
BUN BLD-MCNC: 27 MG/DL (ref 8–23)
BUN/CREAT SERPL: 42.2 (ref 7–25)
C3 FRG RBC-MCNC: ABNORMAL
CALCIUM SPEC-SCNC: 10.7 MG/DL (ref 8.6–10.5)
CHLORIDE SERPL-SCNC: 103 MMOL/L (ref 98–107)
CO2 SERPL-SCNC: 27.2 MMOL/L (ref 22–29)
CREAT BLD-MCNC: 0.64 MG/DL (ref 0.57–1)
DACRYOCYTES BLD QL SMEAR: ABNORMAL
DEPRECATED RDW RBC AUTO: 47.1 FL (ref 37–54)
ERYTHROCYTE [DISTWIDTH] IN BLOOD BY AUTOMATED COUNT: 15 % (ref 11.7–13)
GFR SERPL CREATININE-BSD FRML MDRD: 90 ML/MIN/1.73
GLOBULIN UR ELPH-MCNC: 2.5 GM/DL
GLUCOSE BLD-MCNC: 83 MG/DL (ref 65–99)
HCT VFR BLD AUTO: 24.3 % (ref 35.6–45.5)
HGB BLD-MCNC: 7.9 G/DL (ref 11.9–15.5)
HYPOCHROMIA BLD QL: ABNORMAL
LDH SERPL-CCNC: 310 U/L (ref 135–214)
LYMPHOCYTES # BLD MANUAL: 0.58 10*3/MM3 (ref 0.9–4.8)
LYMPHOCYTES NFR BLD MANUAL: 2 % (ref 19.6–45.3)
MAGNESIUM SERPL-MCNC: 1.7 MG/DL (ref 1.6–2.4)
MCH RBC QN AUTO: 27.7 PG (ref 26.9–32)
MCHC RBC AUTO-ENTMCNC: 32.5 G/DL (ref 32.4–36.3)
MCV RBC AUTO: 85.3 FL (ref 80.5–98.2)
NEUTROPHILS # BLD AUTO: 28.57 10*3/MM3 (ref 1.9–8.1)
NEUTROPHILS NFR BLD MANUAL: 98 % (ref 42.7–76)
NEUTS HYPERSEG # BLD: ABNORMAL 10*3/UL
PHOSPHATE SERPL-MCNC: 3.5 MG/DL (ref 2.5–4.5)
PLAT MORPH BLD: NORMAL
PLATELET # BLD AUTO: 98 10*3/MM3 (ref 140–500)
PMV BLD AUTO: 10.7 FL (ref 6–12)
POTASSIUM BLD-SCNC: 3.4 MMOL/L (ref 3.5–5.2)
PROT SERPL-MCNC: 5.2 G/DL (ref 6–8.5)
RBC # BLD AUTO: 2.85 10*6/MM3 (ref 3.9–5.2)
SCAN SLIDE: NORMAL
SODIUM BLD-SCNC: 139 MMOL/L (ref 136–145)
UNIT  ABO: NORMAL
UNIT  ABO: NORMAL
UNIT  RH: NORMAL
UNIT  RH: NORMAL
URATE SERPL-MCNC: 1.5 MG/DL (ref 2.4–5.7)
WBC NRBC COR # BLD: 29.15 10*3/MM3 (ref 4.5–10.7)

## 2018-05-09 PROCEDURE — P9016 RBC LEUKOCYTES REDUCED: HCPCS

## 2018-05-09 PROCEDURE — 99233 SBSQ HOSP IP/OBS HIGH 50: CPT | Performed by: INTERNAL MEDICINE

## 2018-05-09 PROCEDURE — 86900 BLOOD TYPING SEROLOGIC ABO: CPT

## 2018-05-09 PROCEDURE — 63710000001 DIPHENHYDRAMINE PER 50 MG: Performed by: INTERNAL MEDICINE

## 2018-05-09 PROCEDURE — 83615 LACTATE (LD) (LDH) ENZYME: CPT | Performed by: INTERNAL MEDICINE

## 2018-05-09 PROCEDURE — 25010000002 HYDRALAZINE PER 20 MG: Performed by: OTOLARYNGOLOGY

## 2018-05-09 PROCEDURE — 25010000002 FUROSEMIDE PER 20 MG: Performed by: INTERNAL MEDICINE

## 2018-05-09 PROCEDURE — 85025 COMPLETE CBC W/AUTO DIFF WBC: CPT | Performed by: INTERNAL MEDICINE

## 2018-05-09 PROCEDURE — 25010000002 TBO-FILGRASTIM 300 MCG/0.5ML SOLUTION PREFILLED SYRINGE: Performed by: INTERNAL MEDICINE

## 2018-05-09 PROCEDURE — 36430 TRANSFUSION BLD/BLD COMPNT: CPT

## 2018-05-09 PROCEDURE — 80053 COMPREHEN METABOLIC PANEL: CPT | Performed by: INTERNAL MEDICINE

## 2018-05-09 PROCEDURE — 83735 ASSAY OF MAGNESIUM: CPT | Performed by: INTERNAL MEDICINE

## 2018-05-09 PROCEDURE — 85007 BL SMEAR W/DIFF WBC COUNT: CPT | Performed by: INTERNAL MEDICINE

## 2018-05-09 PROCEDURE — 99232 SBSQ HOSP IP/OBS MODERATE 35: CPT | Performed by: INTERNAL MEDICINE

## 2018-05-09 PROCEDURE — 97110 THERAPEUTIC EXERCISES: CPT

## 2018-05-09 PROCEDURE — 84100 ASSAY OF PHOSPHORUS: CPT | Performed by: INTERNAL MEDICINE

## 2018-05-09 PROCEDURE — 84550 ASSAY OF BLOOD/URIC ACID: CPT | Performed by: INTERNAL MEDICINE

## 2018-05-09 RX ORDER — ACETAMINOPHEN 325 MG/1
650 TABLET ORAL ONCE
Status: COMPLETED | OUTPATIENT
Start: 2018-05-09 | End: 2018-05-09

## 2018-05-09 RX ORDER — FUROSEMIDE 10 MG/ML
20 INJECTION INTRAMUSCULAR; INTRAVENOUS ONCE
Status: COMPLETED | OUTPATIENT
Start: 2018-05-09 | End: 2018-05-09

## 2018-05-09 RX ORDER — DIPHENHYDRAMINE HCL 25 MG
25 CAPSULE ORAL ONCE
Status: DISCONTINUED | OUTPATIENT
Start: 2018-05-09 | End: 2018-05-16 | Stop reason: HOSPADM

## 2018-05-09 RX ORDER — POTASSIUM CHLORIDE 750 MG/1
40 CAPSULE, EXTENDED RELEASE ORAL ONCE
Status: COMPLETED | OUTPATIENT
Start: 2018-05-09 | End: 2018-05-09

## 2018-05-09 RX ADMIN — FUROSEMIDE 20 MG: 10 INJECTION, SOLUTION INTRAMUSCULAR; INTRAVENOUS at 18:19

## 2018-05-09 RX ADMIN — Medication 5 MG: at 21:00

## 2018-05-09 RX ADMIN — Medication 1000 MCG: at 08:49

## 2018-05-09 RX ADMIN — GABAPENTIN 600 MG: 300 CAPSULE ORAL at 21:00

## 2018-05-09 RX ADMIN — POTASSIUM CHLORIDE 40 MEQ: 750 CAPSULE, EXTENDED RELEASE ORAL at 08:50

## 2018-05-09 RX ADMIN — DIPHENHYDRAMINE HYDROCHLORIDE 25 MG: 25 CAPSULE ORAL at 15:29

## 2018-05-09 RX ADMIN — METOPROLOL TARTRATE 50 MG: 50 TABLET, FILM COATED ORAL at 08:49

## 2018-05-09 RX ADMIN — PANTOPRAZOLE SODIUM 40 MG: 40 TABLET, DELAYED RELEASE ORAL at 06:03

## 2018-05-09 RX ADMIN — ALLOPURINOL 300 MG: 300 TABLET ORAL at 08:52

## 2018-05-09 RX ADMIN — DOCUSATE SODIUM -SENNOSIDES 2 TABLET: 50; 8.6 TABLET, COATED ORAL at 21:04

## 2018-05-09 RX ADMIN — LISINOPRIL 30 MG: 20 TABLET ORAL at 21:01

## 2018-05-09 RX ADMIN — Medication 10 MG: at 23:07

## 2018-05-09 RX ADMIN — LISINOPRIL 20 MG: 20 TABLET ORAL at 08:49

## 2018-05-09 RX ADMIN — Medication 10 MG: at 17:09

## 2018-05-09 RX ADMIN — ACETAMINOPHEN 650 MG: 325 TABLET, FILM COATED ORAL at 15:29

## 2018-05-09 RX ADMIN — PHENYTOIN 300 MG: 125 SUSPENSION ORAL at 21:00

## 2018-05-09 RX ADMIN — AMLODIPINE BESYLATE 10 MG: 10 TABLET ORAL at 08:49

## 2018-05-09 RX ADMIN — POTASSIUM CHLORIDE 40 MEQ: 750 CAPSULE, EXTENDED RELEASE ORAL at 15:56

## 2018-05-09 RX ADMIN — TBO-FILGRASTIM 300 MCG: 300 INJECTION, SOLUTION SUBCUTANEOUS at 12:07

## 2018-05-09 RX ADMIN — POTASSIUM CHLORIDE 40 MEQ: 750 CAPSULE, EXTENDED RELEASE ORAL at 15:35

## 2018-05-09 RX ADMIN — Medication 10 MG: at 09:49

## 2018-05-09 RX ADMIN — METOPROLOL TARTRATE 50 MG: 50 TABLET, FILM COATED ORAL at 21:00

## 2018-05-09 NOTE — PROGRESS NOTES
"CHIEF COMPLAINT:  Anemia, hypercalcemia, radiographic findings of malignancy with pathology/ diagnosis on the liver consistent with diffuse large B-cell lymphoma FISH for c-myc, bcl-2 and bcl-6 pending    Interval History:  The patient underwent an excisional lymph node biopsy from the left neck this morning for confirmation of pathology.  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  Additional FISH studies are pending to evaluate for \"double hit\" lymphoma as Ki 67 staining is 4+     On 05/02/2018, actually the patient was feeling better.  She had no issues in regard to the site of the lymph node biopsy in the left side of the neck with minimal discomfort.  I talked with Dr. Gill in regard to port placement tomorrow.      Hopefully, we will have further information in regard to “double-hit” lymphoma before we start any chemotherapy.  If that is the case, the patient will require infusional EPOCH/Rituxan.     Other than that, I went through formal education and teaching in regard to chemotherapy medicines, the frequency of the treatment, side effects and so forth; please review below.    The patient will have a PET scan this afternoon as well and the port will be placed tomorrow.  On 05/03/2018 the patient was feeling fatigued and tired from so many testings back and forth. She had her PET scan this morning and there is planning for port placement tomorrow. I asked the patient to bring her family this afternoon to talk with them about the diagnosis and decide how they want to proceed. I pointed out to the patient as well that the absence of treatment life expectancy is measured in a question of a few weeks to a few months.     I also discussed with her the finding on the PET scan today that will be described below and also the finding of the cervical lymph node that will be described below.     On 5/7/2018, " we started the patient on intravenous Venofer.  Patient has worsening hemoglobin 7.4.  She tolerated Venofer without any incident.     On 5/8/2018 laboratory study showed worsening hemoglobin at 6.3, she reports no bleeding.  Her WBC is trending down at 47,200 as well as neutrophils 47,000. She has good appetite no nausea vomiting.  No cough no dyspnea.  Patient was given 2 units PRBC transfusion.  She got a second dose of Venofer (total 600 mg).      On 5/9/2018, laboratory study showed improved the hemoglobin is 7.9.  However patient reported she was dyspneic and that having lightheadedness when she walked in the hallway.  She reports no nausea vomiting.  No bleeding. Her nurse reported patient also had elevated blood pressure.  She got intravenous hydralazine.  She already is on amlodipine, lisinopril and metoprolol.      Oncology History:  Martina Blake is a 77 y.o. female who were asked to see in consultation  4/29/18 for hypercalcemia, anemia and radiologic findings consistent with likely malignancy-?  Lymphoma.        She has a significant past medical history of palpitations followed by cardiology.  She presented to the emergency room April 10-14 with chest pain and palpitations and was found to be in A. fib with RVR and also demonstrated electrolyte abnormalities and anemia.  Records demonstrates that her anemia became particularly evident April 11 with an H&H of 8.9/ 28.4, platelet count 212,000 with a normal automated differential.  She underwent GI assessment including upper and lower endoscopy demonstrated hernia, gastritis, esophagitis, diverticulosis but no evidence of acute bleed.  She was placed on Eliquis as result of a relatively high CHADSVASc score.  She had also been found to be hypercalcemic at approximately 11 with HCTZ altered and the patient started on Aldactone.  Additional testing had included a ferritin of 313.1, iron of 32 with TIBC of 222 and iron saturation of 14, occult blood  negative per stool testing    She was brought back to the emergency room April 21 with further evidence of hypercalcemia, associated weakness, anorexia, nausea, ataxia and weight loss.  MRI of the brain April 21 was found to be unremarkable. Outpatient intact PTH levels were found to be 7.1(reduced), and an H&H of 10.3 and 32.9, white count 11,090, platelet count 267,000 with a normal differential.  Patient seen by renal medicine with the possibility of Fanconi syndrome raised.  Thereafter PTH hormone was found be less than 2.0, and protein electropheresis included IgG of 948, IgA of 207, IgM 86, total protein 6.3, albumin 2.8, no M spike noted, slightly elevated free kappa and lambda light chains with normal ratio.  Repeat testing per iron profile again revealed low serum iron but high serum ferritin and normal CEA, normal AFP, CA-125 of 77.2, CA 19-9 7.8   At this point the reason for her hypercalcemia was thought to be possible medication effect and/or possible malignancy with calcium was running between 12 and 13 mg/dL.    This led to CT scan of chest abdomen pelvis performed April 24 demonstrate extensive metastatic disease throughout the abdomen and pelvis particularly involving the spleen and liver, extensive lymphadenopathy at the abdomen and pelvis with a 4 cm lesion splenic hilum partially encasing the pancreatic tail, pancreatic tail malignancy?,  Gastric primary malignancy?  There was a mass along the right wall of the urinary bladder with adjacent adenopathy.  CT of the chest revealed several tiny dense pleural-based nodular opacities-partially calcified granulomas, no mediastinal or hilar adenopathy, enlarged pulmonary arteries consistent with pulmonary arterial hypertension.  A subsequent bone scan performed April 26 revealed prominent uptake in the right frontal bone and right posterior ninth rib and a CT needle biopsy of the liver was obtained April 26 as well.The sample obtained revealed, on flow  "cytometry examination, a subpopulation of normal B cells that might be consistent with B-cell lymphoma.  Additional testing is currently pending.  On May 25 further hypercalcemia was again noted and treated with IV fluids and dose of Zometa, vitamin D level normal.  Again evidence of hypokalemia, hypophosphatemia and hypomagnesemia.  The patient is seen April 29 her calcium level has gradually improved dropping to 9.7, albumin of 2.6, ionized calcium of 6.1, BUN and creatinine of 10/.55.  We are asked to see her with results available thus far as to possible lymphoma.    Past Medical History:     Atrial fibrillation      • Cystitis     • Cystocele       moderate   • Dyslipidemia     • Esophageal reflux     • Fatigue     • History of transfusion       no reaction   • Hypertension     • Major depression, chronic     • Menopause     • Osteoarthritis     • Osteoporosis     • Palpitations     • Post menopausal problems     • RLS (restless legs syndrome)     • Seizure disorder     • Subjective tinnitus     • Vaginal delivery       x2  \"SONYA\"      \"JASON\"   • Vitamin D deficiency      Social History:  The patient is a  and has 2 children.  She has never smoked.  She denies alcohol use.    Review of Systems   Constitutional: Positive for fatigue. Negative for activity change, appetite change and unexpected weight change.   HENT: Negative.    Eyes: Negative.    Respiratory: Positive for shortness of breath (Has exertional dyspnea). Negative for wheezing.    Cardiovascular: Negative.  Negative for chest pain and palpitations.   Gastrointestinal: Negative.  Negative for abdominal pain, constipation, nausea and vomiting.   Endocrine: Negative.    Genitourinary: Negative.    Musculoskeletal: Negative for neck stiffness.   Skin: Negative.  Negative for color change and pallor.   Neurological: Positive for weakness. Negative for seizures, numbness and headaches.   Hematological: Positive for adenopathy. "   Psychiatric/Behavioral: Negative for confusion.          Medications:  The current medication list was reviewed in the EMR    ALLERGIES:    Allergies   Allergen Reactions   • Crab (Diagnostic) Itching and Rash   • Pseudoephedrine Dizziness       Objective    Vitals:    05/08/18 2048 05/09/18 0538 05/09/18 0934 05/09/18 1139   BP: (!) 184/80 (!) 182/78 (!) 205/74 173/69   BP Location: Left arm Left arm Left arm Left leg   Patient Position: Lying Lying Sitting Sitting   Pulse: 68 61 54 52   Resp: 18 16 16    Temp: 98.1 °F (36.7 °C) 96.9 °F (36.1 °C) 98.7 °F (37.1 °C)    TempSrc: Oral Oral Oral    SpO2: 95% 95% 97%    Weight:  70.9 kg (156 lb 4.8 oz)     Height:       ECOG 3       Physical Exam    GENERAL:  Well-developed, well-nourished female patient lay in bed in no acute distress.   SKIN:  Warm, dry without rashes, purpura or petechiae.  HEENT:  conjunctivas non injected, normal extraocular movements.   Mouth mucosa was moist, no exudates in oropharynx.  NECK:  no thyromegaly no change in left cervical wound and node 2 cm in size, no other cervical adenopathies.   LYMPHATICS:  No cervical, supraclavicular, axillary adenopathy.  CHEST:  Lungs clear to auscultation, normal breath sounds bilaterally, no wheezing, crackles or ronchi.  CARDIAC AND VASCULAR: normal rate and regular rhythm, without murmurs, rubs or S3 or S4 right or left sided gallops.    ABDOMEN:  Soft, nontender with spleen 4 cm blcm organomegaly, palpable liver 4 cm brcm., no ascites, no collateral circulation, no distention, no abdominal pain, Normal bowel sounds.   EXTREMITIES  AND SPINE:  No clubbing, cyanosis or edema, no deformities or pain.      NEUROLOGICAL:  Patient is AAOx3.     RECENT LABS:      Results from last 7 days  Lab Units 05/09/18  0603 05/08/18  0554 05/07/18  0536   WBC 10*3/mm3 29.15* 47.21* 66.59*   HEMOGLOBIN g/dL 7.9* 6.3* 7.4*   HEMATOCRIT % 24.3* 19.9* 23.4*   PLATELETS 10*3/mm3 98* 140 211     Lab Results   Component Value  "Date    NEUTROABS 28.57 (H) 05/09/2018       Results from last 7 days  Lab Units 05/09/18  0603 05/08/18  0554 05/07/18  0536   SODIUM mmol/L 139 140 137   POTASSIUM mmol/L 3.4* 3.8 4.2   CHLORIDE mmol/L 103 105 103   CO2 mmol/L 27.2 25.7 24.8   BUN mg/dL 27* 31* 30*   CREATININE mg/dL 0.64 0.70 0.92   CALCIUM mg/dL 10.7* 10.4 11.3*   BILIRUBIN mg/dL 0.7 0.6 0.4   ALK PHOS U/L 81 76 81   ALT (SGPT) U/L 12 8 9   AST (SGOT) U/L 23 24 29   GLUCOSE mg/dL 83 82 116*     Lab Results   Component Value Date    XIXOBIFS80 485 05/08/2018     Lab Results   Component Value Date    FOLATE 11.51 05/08/2018       Assessment/Plan     1.  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  Additional FISH studies are POSITIVE for \"double hit\" lymphoma as Ki 67 staining is 4+     She has extensive metastatic disease throughout the abdomen and pelvis, splenomegaly replaced with low attenuation lesions, 4.4 cm splenic hilum mass, disease encasing the pancreatic tail, 3 cm lesion abutting the stomach, extensive lymphadenopathy throughout the celiac axis, retroperitoneum, mesentery, periaortic lymphadenopathy, liver lesion in the posterior segment 4.8 cm, nodular thickening of the right wall of urinary bladder 5.0 cm, peritoneal lymphadenopathy and thickening, coarsely calcified mesenteric mass 9.6 cm (?old mesenteric adenitis).  Bone scan shows foci of uptake in the frontal bone and right posterior ninth rib.      The patient has had B symptoms with decreased performance status, and weight loss, anorexia.    She had a PET scan examination.  She was started on chemotherapy with R-CHOP on 5/4/2018.  She tolerated chemotherapy relatively well.     2.  Hypercalcemia of malignancy:  The patient received Zometa 4 mg on 4/25/18.  Her calcium temporarily improved but is beginning to elevate again to 11.3 with albumin 2.9.  Hypercalcemia is likely to be an ongoing issue until she responded to chemotherapy.      Patient received a " second dose of Zometa on 5/7/2018.  Laboratory study today reporte calcium 10.7, slightly trending up again.  We'll continue to monitor.     3.  Leukocytosis.  This is secondary to Granix injection.  Her WBC actually is trending down which is expected, I think her WBC will continue to decrease despite Granix injection.    The tomasz of marrow suppression usually happens around day #10.  We will continue Granix injection daily for planed 10 days.  CBC will be monitored daily during hospital stay.     4.  Anemia:  Probably inflammatory/malignancy associated (elevated ferritin, low transferrin, elevated ESR) versus bone marrow involvement.   We do not think a bone marrow exam would change her treatment plan.      Patient has further worsening anemia secondary to chemotherapy, with hemoglobin 6.3 on 5/8/2018.  She received 2 units PRBC transfusion on 5/8/2018.  Her hemoglobin improved to 7.9 this morning, however she continues to be symptomatic with exertional dyspnea and lightheadedness.  Gross given 2 more units PRBC transfusion.  We'll give 20 mg Lasix after first unit to avoid volume overload.     Reviewing her iron study from 4/24/2018, I do believe this patient also had elements of iron deficiency.  She received intravenous iron Venofer 300 mg for 2 doses on 5/7/2018 and 5/8/2018, total 600 mg.      She has normal folic acid level and vitamin B12 level this morning on 5/8/2018, however her B12 level can be further improved.  We'll was started on oral B12 supplementation once a day.    5.  Paroxysmal atrial fibrillation: The patient was anticoagulated with Eliquis, which has been on hold since admission.  Her platelet count is coming down quickly after chemotherapy.  Her platelets is coming down at 98,000 on 5/9/2018.  We prefer not to start this patient back on Eliquis at this point.     6.  Thrombocytopenia secondary to chemotherapy.  Her platelets drop quickly.  This needs to be monitored daily.     6.   Prophylaxis of tumor lysis syndrome.  Patient will be continued on allopurinol.    7.  GI prophylaxis on Protonix.     8.  Insomnia.  Patient reports he was not able to sleep the last 4 out of 5 days.  At home she used to take 2 tablets of gabapentin and 5 mg melatonin before sleep.      Patient was started on gabapentin and the melatonin on 5/7/2018.  She reported had a significant improvement of sleep in the night.     9.  Significantly elevated above pressure.  Dr. Denney adjusted her medication for BP control.     10.  Discharge plan.  Patient will need to continue physical/occupational therapy.  Patient prefers to be discharged to home.   has been following patient.       PLAN:   1.  Transfuse another 2 units PRBC today.    2.  Give Lasix 20 mg IV after first unit transfusion.    3.  Continue Granix 300 mg daily.    4.  Continue allopurinol for tumor lysis syndrome prophylaxis.   5.  Continue to hold Eliquis.   6.   lisinopril dose has been increased today.    7.  Continue gabapentin and melatonin.   8.  Start oral vitamin B12 at 1000 µg daily.   9.  Continue supportive care.     Discussed with the patient today.  I also discussed with her nurse today.     Dr. Denney discussed with me today for management of her case.       JABARI RAMIREZ M.D., Ph.D.      5/9/2018      CC:

## 2018-05-09 NOTE — THERAPY TREATMENT NOTE
Acute Care - Physical Therapy Treatment Note   [facility]     Patient Name: Martina Blake  : 1940  MRN: [id number]  Today's Date: 2018     Date of Referral to PT: 18  Referring Physician: Byron    Admit Date: 2018    Visit Dx:    ICD-10-CM ICD-9-CM   1. Lymphoma of lymph nodes of neck, unspecified lymphoma type C85.91 202.81   2. Weakness R53.1 780.79   3. Hypercalcemia E83.52 275.42   4. Hypertensive urgency I16.0 401.9   5. 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 C83.39 202.80     Patient Active Problem List   Diagnosis   • Blood glucose abnormal   • Dyslipidemia   • Gastroesophageal reflux disease   • Fatigue   • Generalized osteoarthritis   • Chronic recurrent major depressive disorder   • Osteoporosis   • Restless legs syndrome   • Seizure disorder   • Vitamin D deficiency   • Bradycardia   • Essential hypertension   • Post-menopause on HRT (hormone replacement therapy)   • Chronic seasonal allergic rhinitis   • Paroxysmal atrial fibrillation   • Iron deficiency anemia due to chronic blood loss   • Acute superficial gastritis without hemorrhage   • Hiatal hernia   • Esophagitis   • Diverticulosis of large intestine without hemorrhage   • Dizziness   • Weakness   • Hypertension   • Hypercalcemia   • Liver mass   • Lymphoma   • Lymphoma of lymph nodes of neck   • Leukemoid reaction   • Insomnia   • Anemia associated with chemotherapy       Therapy Treatment          Rehabilitation Treatment Summary     Row Name 18 1400             Treatment Time/Intention    Discipline physical therapist  -MD      Document Type therapy note (daily note)  -MD      Subjective Information no complaints  -MD      Mode of Treatment physical therapy  -MD      Patient/Family Observations Pt supine in bed showing no signs of acute distress.  -MD      Patient Effort good  -MD      Existing Precautions/Restrictions fall  -MD      Recorded by [MD] Malissa Oliver,  PT 05/09/18 1402 05/09/18 1402      Row Name 05/09/18 1400             Cognitive Assessment/Intervention- PT/OT    Orientation Status (Cognition) oriented x 4  -MD      Personal Safety Interventions fall prevention program maintained  -MD      Recorded by [MD] Malissa Oliver, PT 05/09/18 1402 05/09/18 1402      Row Name 05/09/18 1400             Bed Mobility Assessment/Treatment    Supine-Sit [place] (Bed Mobility) supervision  -MD      Sit-Supine [place] (Bed Mobility) supervision  -MD      Recorded by [MD] Malissa Oliver, PT 05/09/18 1402 05/09/18 1402      Row Name 05/09/18 1400             Transfer Assessment/Treatment    Sit-Stand [place] (Transfers) stand by assist  -MD      Stand-Sit [place] (Transfers) stand by assist  -MD      Recorded by [MD] Malissa Oliver, PT 05/09/18 1402 05/09/18 1402      Row Name 05/09/18 1400             Sit-Stand Transfer    Assistive Device (Sit-Stand Transfers) walker, front-wheeled  -MD      Recorded by [MD] Malissa Oliver, PT 05/09/18 1402 05/09/18 1402      Row Name 05/09/18 1400             Stand-Sit Transfer    Assistive Device (Stand-Sit Transfers) walker, front-wheeled  -MD      Recorded by [MD] Malissa Oliver, PT 05/09/18 1402 05/09/18 1402      Row Name 05/09/18 1400             Gait/Stairs Assessment/Training    [place] Level (Gait) contact guard  -MD      Assistive Device (Gait) walker, front-wheeled  -MD      Distance in Feet (Gait) 240  -MD      Pattern (Gait) step-through  -MD      Deviations/Abnormal Patterns (Gait) gait speed decreased  -MD      Recorded by [MD] Malissa Oliver, PT 05/09/18 1402 05/09/18 1402      Row Name 05/09/18 1400             Positioning and Restraints    Pre-Treatment Position in bed  -MD      Post Treatment Position bed  -MD      In Bed supine;call light within reach  -MD      Recorded by [MD] Malissa Oliver, PT 05/09/18 1402 05/09/18 1402      Row Name 05/09/18 1400             Pain Scale: Numbers Pre/Post-Treatment    Pain Scale: Numbers,  Pretreatment 0/10 - no pain  -MD      Recorded by [MD] Malissa Oliver, PT 05/09/18 1402 05/09/18 1402      Row Name                Wound 05/01/18 1057 Left neck incision    Wound - Properties Group Date first assessed: 05/01/18 [PEARL] Time first assessed: 1057 [PEARL] Side: Left [PEARL] Location: neck [PEARL] Type: incision [PEARL] Recorded by:  [PEARL] Magaly Almonte RN 05/01/18 1057 05/01/18 1057    Row Name                Wound 05/04/18 0838 Right chest incision    Wound - Properties Group Date first assessed: 05/04/18 [SM] Time first assessed: 0838 [SM] Side: Right [SM] Location: chest [SM] Type: incision [SM] Recorded by:  [SM] Scott Cole RN 05/04/18 0838 05/04/18 0838      User Key  (r) = Recorded By, (t) = Taken By, (c) = Cosigned By    Initials Name Effective Dates Discipline    PEARL Magaly Almonte RN 06/16/16 -  Nurse    MD Malissa Oliver, PT 04/03/18 -  PT    JEREMIAH Cole RN 06/16/16 -  Nurse          Wound 05/01/18 1057 Left neck incision (Active)   Dressing Appearance dry;intact 5/9/2018  8:55 AM   Closure Adhesive closure strips 5/9/2018  8:55 AM   Base dry;clean 5/9/2018  8:55 AM   Drainage Amount none 5/8/2018 10:04 PM   Dressing Care, Wound transparent film 5/8/2018 10:04 PM       Wound 05/04/18 0838 Right chest incision (Active)   Dressing Appearance dry;intact 5/9/2018  8:55 AM   Closure Adhesive closure strips 5/9/2018  8:55 AM   Base dry;clean 5/9/2018  8:55 AM   Drainage Amount none 5/8/2018 10:04 PM   Dressing Care, Wound transparent film 5/8/2018 10:04 PM             Physical Therapy Education     Title: PT OT SLP Therapies (Done)     Topic: Physical Therapy (Done)     Point: Mobility training (Done)    Learning Progress Summary     Learner Status Readiness Method Response Comment Documented by    Patient Done Acceptance E,MADIE HOBBS 05/06/18 1416     Done Acceptance PAEVL BENAVIDES 05/03/18 0206     Done Acceptance EPAVEL 05/02/18 0248     Done Acceptance E VU  EJ 04/29/18 1554     Done Acceptance  E,TB,D VU,NR   04/28/18 1733     Done Acceptance E,TB VU,DU   04/27/18 1544     Done Acceptance E,TB VU,NR role of PT, benefits of activity, use of AD, safety GR 04/22/18 1311    Family Done Acceptance E,TB,D VU,NR   04/28/18 1733     Done Acceptance E,TB VU,NR role of PT, benefits of activity, use of AD, safety GR 04/22/18 1311          Point: Home exercise program (Done)    Learning Progress Summary     Learner Status Readiness Method Response Comment Documented by    Patient Done Acceptance E,D VU,DU  PC 05/06/18 1416     Done Acceptance E,TB VU   05/03/18 0206     Done Acceptance E,TB VU   05/02/18 0248     Done Acceptance E,TB,D VU,NR   04/28/18 1733     Done Acceptance E,TB VU,MADIE   04/27/18 1544     Done Acceptance E,TB VU,NR role of PT, benefits of activity, use of AD, safety GR 04/22/18 1311    Family Done Acceptance E,TB,D VU,NR   04/28/18 1733     Done Acceptance E,TB VU,NR role of PT, benefits of activity, use of AD, safety GR 04/22/18 1311          Point: Body mechanics (Done)    Learning Progress Summary     Learner Status Readiness Method Response Comment Documented by    Patient Done Acceptance E,D VU,DU  PC 05/06/18 1416     Done Acceptance E,TB VU   05/03/18 0206     Done Acceptance E,TB VU   05/02/18 0248     Done Acceptance E,TB,D VU,NR   04/28/18 1733     Done Acceptance E,TB VU,DU   04/27/18 1544     Done Acceptance E,TB VU,NR role of PT, benefits of activity, use of AD, safety GR 04/22/18 1311    Family Done Acceptance E,TB,D VU,NR   04/28/18 1733     Done Acceptance E,TB VU,NR role of PT, benefits of activity, use of AD, safety GR 04/22/18 1311          Point: Precautions (Done)    Learning Progress Summary     Learner Status Readiness Method Response Comment Documented by    Patient Done Acceptance E VU PT educated pt to ambulate in hallway with CNA or RN w RWx while at [facility] multiple times daily.  Pt states understanding. MD 05/09/18 2878     Done Acceptance E VU   MD 05/08/18 1422     Done Acceptance E VU  MD 05/07/18 1325     Done Acceptance E,D VU,DU  PC 05/06/18 1416     Done Acceptance E,TB VU   05/03/18 0206     Done Acceptance E,TB VU   05/02/18 0248     Done Acceptance E VU  MD 04/30/18 1615     Done Acceptance E,TB,D VU,NR   04/28/18 1733     Done Acceptance E,TB VU,DU   04/27/18 1544     Done Acceptance E,TB VU,NR role of PT, benefits of activity, use of AD, safety  04/22/18 1311    Family Done Acceptance E,TB,D VU,NR   04/28/18 1733     Done Acceptance E,TB VU,NR role of PT, benefits of activity, use of AD, safety  04/22/18 1311                      User Key     Initials Effective Dates Name Provider Type Discipline     10/03/16 -  Aubrey Del Rosario, PT Physical Therapist PT     04/03/18 -  Rosette Navarrete, PT Physical Therapist PT    MD 04/03/18 -  Malissa Oliver, PT Physical Therapist PT     03/07/18 -  Linda Cole, PTA Physical Therapy Assistant PT     06/16/16 -  Ara Gibson, RN Registered Nurse Nurse     04/03/18 -  Alma Delia Cazares, PT Physical Therapist PT     03/07/18 -  Myke Burrell, PTA Physical Therapy Assistant PT                    PT Recommendation and Plan  Therapy Frequency (PT Clinical Impression): daily  Plan of Care Reviewed With: patient  Progress: improving  Outcome Summary: Pt progressing w ambulation and transfers this pm.  Pt tolerated increase in ambulation distance well and required less assistance to transfer from sitting-standing.  PT encouraged pt to ambulate w CNA and notified CNA that pt was safe to ambulate with her.          Outcome Measures     Row Name 05/09/18 1400 05/08/18 1400 05/07/18 1300       How much help from another person do you currently need...    Turning from your back to your side while in flat bed without using bedrails? 4  -MD 4  -MD 4  -MD    Moving from lying on back to sitting on the side of a flat bed without bedrails? 4  -MD 4  -MD 4  -MD    Moving to and from a bed to a  chair (including a wheelchair)? 3  -MD 3  -MD 3  -MD    Standing up from a chair using your arms (e.g., wheelchair, bedside chair)? 3  -MD 3  -MD 3  -MD    Climbing 3-5 steps with a railing? 3  -MD 3  -MD 3  -MD    To walk in hospital room? 3  -MD 3  -MD 3  -MD    AM-PAC 6 Clicks Score 20  -MD 20  -MD 20  -MD       Functional Assessment    Outcome Measure Options AM-PAC 6 Clicks Basic Mobility (PT)  -MD AM-PAC 6 Clicks Basic Mobility (PT)  -MD AM-PAC 6 Clicks Basic Mobility (PT)  -MD      User Key  (r) = Recorded By, (t) = Taken By, (c) = Cosigned By    Initials Name Provider Type    MD Malissa Oliver PT Physical Therapist           Time Calculation:         PT Charges     Row Name 05/09/18 1349             Time Calculation    Start Time 1349  -MD      Stop Time 1405  -MD      Time Calculation (min) 16 min  -MD      PT Received On 05/09/18  -MD      PT - Next Appointment 05/10/18  -MD        User Key  (r) = Recorded By, (t) = Taken By, (c) = Cosigned By    Initials Name Provider Type    MD Malissa Oliver, PT Physical Therapist          Therapy Charges for Today     Code Description Service Date Service Provider Modifiers Qty    82236486546 HC PT THER PROC EA 15 MIN 5/8/2018 Malissa Oilver PT GP 1    59036577068 HC PT THER PROC EA 15 MIN 5/9/2018 Malissa Oliver PT GP 1          PT G-Codes  PT Professional Judgement Used?: Yes  Outcome Measure Options: AM-PAC 6 Clicks Basic Mobility (PT)  Score: 13  Functional Limitation: Mobility: Walking and moving around  Mobility: Walking and Moving Around Current Status (): At least 60 percent but less than 80 percent impaired, limited or restricted  Mobility: Walking and Moving Around Goal Status (): At least 40 percent but less than 60 percent impaired, limited or restricted    Malissa Oliver PT  5/9/2018

## 2018-05-09 NOTE — PLAN OF CARE
Problem: Fall Risk (Adult)  Goal: Absence of Fall  Outcome: Ongoing (interventions implemented as appropriate)      Problem: Patient Care Overview  Goal: Plan of Care Review  Outcome: Ongoing (interventions implemented as appropriate)   05/09/18 6851   Coping/Psychosocial   Plan of Care Reviewed With patient   Plan of Care Review   Progress improving   OTHER   Outcome Summary no c/o, hbg 7.9 transfusing 2 u pcs, BP elevated 180s-200, lisinopril increased, pt has prn order for hydralazine and have given twice, Renal wants daily wt by standing scale and strict I/O, pt is incontinent at times     Goal: Individualization and Mutuality  Outcome: Ongoing (interventions implemented as appropriate)    Goal: Discharge Needs Assessment  Outcome: Ongoing (interventions implemented as appropriate)    Goal: Interprofessional Rounds/Family Conf  Outcome: Ongoing (interventions implemented as appropriate)      Problem: Activity Intolerance (Adult)  Goal: Activity Tolerance  Outcome: Ongoing (interventions implemented as appropriate)    Goal: Effective Energy Conservation Techniques  Outcome: Ongoing (interventions implemented as appropriate)      Problem: Oncology Care (Adult)  Goal: Signs and Symptoms of Listed Potential Problems Will be Absent, Minimized or Managed (Oncology Care)  Outcome: Ongoing (interventions implemented as appropriate)

## 2018-05-09 NOTE — PLAN OF CARE
Problem: Oncology Care (Adult)  Intervention: Prevent Deficiencies/Improve Nutritional Intake   05/09/18 0368   Nutrition Interventions   Oral Nutrition Promotion calorie dense foods provided;calorie dense liquids provided;nutritional therapy counseling provided   Follow up-good appetite, consuming on average 75% of meals. Previously educated patient on healing guide handout for nutrition during treatment.  Discussed ways for adequate nutrition once out of the hospital-patient stated her family and neighbors will help in regards to meals and grocery shopping.  Will follow up per protocol

## 2018-05-09 NOTE — PLAN OF CARE
Problem: Patient Care Overview  Goal: Plan of Care Review   05/09/18 5808   Coping/Psychosocial   Plan of Care Reviewed With patient   Plan of Care Review   Progress improving   OTHER   Outcome Summary Pt progressing w ambulation and transfers this pm. Pt tolerated increase in ambulation distance well and required less assistance to transfer from sitting-standing. PT encouraged pt to ambulate w CNA and notified CNA that pt was safe to ambulate with her.

## 2018-05-09 NOTE — PROGRESS NOTES
Oncology Social Work    Family conference held with patient, dgt and son at bedside.  Discussed patients functional status -  Walking with a rolling walker at [facility] 160ft. And is at [facility] with transfers in and out of bed.   Patient really desires to go home at d/c.  Dgt and Son are supportive in making that a reality for patient.  Patient interested in having [facility] for PT, OT and Aide.  Patient also has long term care insurance and daughter is working to get the policy up and running. OSW helped fill out some of the forms for patient.  OSW provided list of inhome caregiving agencies.  Daughter is trying to find private/indepednent caregivers, but appreciated the list.  Explained that OSW can assist with transportation to and from treatment in the future if needed.      Plan:  Referral to [facility] for PT, OT, Aide  Referral to [employer] for rolling walker    OSW will remain available and assist if needs arise  Thank you,  Zoila Navarrete, CLAUDIAW, CSW, OSW-C

## 2018-05-09 NOTE — PROGRESS NOTES
LOS: 18 days   Patient Care Team:  Jamie Walton DO as PCP - General  Mary Jane Shearer MD as PCP - Claims Attributed    Chief Complaint: following for AF       Interval History: She had a brief episode of AF yesterday which was asymptomatic and not too terribly rapid.  She is doing well from a cardiac standpoint today.      Objective   Vital Signs  Temp:  [96.9 °F (36.1 °C)-98.7 °F (37.1 °C)] 98.7 °F (37.1 °C)  Heart Rate:  [] 54  Resp:  [16-18] 16  BP: (161-205)/(74-83) 205/74    Intake/Output Summary (Last 24 hours) at 05/09/18 0947  Last data filed at 05/09/18 0934   Gross per 24 hour   Intake             1405 ml   Output                0 ml   Net             1405 ml           Physical Exam   Constitutional: She is oriented to person, place, and time. She appears well-developed and well-nourished.   HENT:   Head: Normocephalic.   Nose: Nose normal.   Mouth/Throat: Oropharynx is clear and moist.   Eyes: Conjunctivae and EOM are normal. Pupils are equal, round, and reactive to light.   Neck: Normal range of motion. No JVD present.   Cardiovascular: Normal rate, regular rhythm, normal heart sounds and intact distal pulses.    Pulmonary/Chest: Effort normal and breath sounds normal.   Abdominal: Soft. She exhibits no mass. There is no tenderness.   Musculoskeletal: Normal range of motion. She exhibits no edema.   Neurological: She is alert and oriented to person, place, and time. No cranial nerve deficit.   Skin: Skin is warm and dry. No erythema.   Psychiatric: She has a normal mood and affect. Her behavior is normal. Judgment and thought content normal.   Vitals reviewed.      Results Review:        Results from last 7 days  Lab Units 05/09/18  0603 05/08/18  0554 05/07/18  0536   SODIUM mmol/L 139 140 137   POTASSIUM mmol/L 3.4* 3.8 4.2   CHLORIDE mmol/L 103 105 103   CO2 mmol/L 27.2 25.7 24.8   BUN mg/dL 27* 31* 30*   CREATININE mg/dL 0.64 0.70 0.92   GLUCOSE mg/dL 83 82 116*   CALCIUM mg/dL  10.7* 10.4 11.3*           Results from last 7 days  Lab Units 05/09/18  0603 05/08/18  0554 05/07/18  0536   WBC 10*3/mm3 29.15* 47.21* 66.59*   HEMOGLOBIN g/dL 7.9* 6.3* 7.4*   HEMATOCRIT % 24.3* 19.9* 23.4*   PLATELETS 10*3/mm3 98* 140 211               Results from last 7 days  Lab Units 05/09/18  0603   MAGNESIUM mg/dL 1.7           I reviewed the patient's new clinical results.  I personally viewed and interpreted the patient's EKG/Telemetry data        Medication Review:     allopurinol 300 mg Oral Daily   amLODIPine 10 mg Oral Q24H   gabapentin 600 mg Oral Nightly   lisinopril 20 mg Oral Q12H   melatonin 5 mg Oral Nightly   metoprolol tartrate 50 mg Oral Q12H   pantoprazole 40 mg Oral Q AM   phenytoin 300 mg Oral Nightly   potassium chloride 40 mEq Oral Daily   sennosides-docusate sodium 2 tablet Oral Nightly   tbo-filgrastim 300 mcg Subcutaneous Q24H   vitamin B-12 1,000 mcg Oral Daily            Assessment/Plan     1.  PAF -- known diagnosis.  She had a brief episode yesterday which is not unexpected given the clinical scenario.  She's in SR/SB right now.  Continue metoprolol.      Can her apixaban be restarted? Or is this prohibitive given the expected decline in blood counts?      Te Chan MD  05/09/18  9:47 AM

## 2018-05-09 NOTE — PROGRESS NOTES
NEPHROLOGY PROGRESS NOTE    PATIENT IDENTIFICATION:   Name:  Martina Blake      MRN:  [id number]     77 y.o.  female             Reason for visit: hypercalcemia    SUBJECTIVE:     Feels ok.  Eating better.  Urinating, not recorded. Not weighed standing.  Spoke to RN. Bowels moving. BP not controlled.   OBJECTIVE:  Vitals:    05/09/18 0934 05/09/18 1139 05/09/18 1455 05/09/18 1457   BP: (!) 205/74 173/69 173/73 173/73   BP Location: Left arm Left arm  Left arm   Patient Position: Sitting Sitting  Sitting   Pulse: 54 52 54 54   Resp: 16      Temp: 98.7 °F (37.1 °C)  97.2 °F (36.2 °C) 97.2 °F (36.2 °C)   TempSrc: Oral      SpO2: 97%      Weight:       Height:               Body mass index is 26.83 kg/m².    Intake/Output Summary (Last 24 hours) at 05/09/18 1522  Last data filed at 05/09/18 0934   Gross per 24 hour   Intake              805 ml   Output                0 ml   Net              805 ml     Wt Readings from Last 1 Encounters:   05/09/18 0538 70.9 kg (156 lb 4.8 oz)   05/01/18 1850 65.1 kg (143 lb 9.6 oz)   04/30/18 0643 66.8 kg (147 lb 4.8 oz)   04/29/18 0652 67.9 kg (149 lb 12.8 oz)   04/28/18 0641 68 kg (150 lb)   04/27/18 0603 64.7 kg (142 lb 9.6 oz)   04/25/18 0516 62.6 kg (138 lb)   04/21/18 1731 68.4 kg (150 lb 12.8 oz)   04/21/18 1227 68 kg (150 lb)     Wt Readings from Last 3 Encounters:   05/09/18 70.9 kg (156 lb 4.8 oz)   04/19/18 68 kg (150 lb)   04/10/18 61.5 kg (135 lb 8 oz)         Exam:  General: NAD, pleasant  Neck without JVD; port rij  Heart RRR no s3 or rub. 2/6 early syst m LUSB  Lungs clear to auscultation   Abd +bs, slightly distended, soft, not tender.    Ext trace lower ext edema.  Skin: no rash  Psych mood and affect fine    Scheduled meds:      acetaminophen 650 mg Oral Once   allopurinol 300 mg Oral Daily   amLODIPine 10 mg Oral Q24H   CloNIDine 1 patch Transdermal Weekly   diphenhydrAMINE 25 mg Oral Once   furosemide 20 mg Intravenous Once   gabapentin 600 mg Oral Nightly    lisinopril 20 mg Oral Q12H   melatonin 5 mg Oral Nightly   metoprolol tartrate 50 mg Oral Q12H   pantoprazole 40 mg Oral Q AM   phenytoin 300 mg Oral Nightly   potassium chloride 40 mEq Oral Daily   sennosides-docusate sodium 2 tablet Oral Nightly   tbo-filgrastim 300 mcg Subcutaneous Q24H   vitamin B-12 1,000 mcg Oral Daily     IV meds:                             Data Review:      Results from last 7 days  Lab Units 05/09/18  0603 05/08/18  0554 05/07/18  0536   SODIUM mmol/L 139 140 137   POTASSIUM mmol/L 3.4* 3.8 4.2   CHLORIDE mmol/L 103 105 103   CO2 mmol/L 27.2 25.7 24.8   BUN mg/dL 27* 31* 30*   CREATININE mg/dL 0.64 0.70 0.92   CALCIUM mg/dL 10.7* 10.4 11.3*   BILIRUBIN mg/dL 0.7 0.6 0.4   ALK PHOS U/L 81 76 81   ALT (SGPT) U/L 12 8 9   AST (SGOT) U/L 23 24 29   GLUCOSE mg/dL 83 82 116*     Estimated Creatinine Clearance: 56.9 mL/min (by C-G formula based on SCr of 0.64 mg/dL).    Results from last 7 days  Lab Units 05/09/18  0603   URIC ACID mg/dL 1.5*       Results from last 7 days  Lab Units 05/09/18  0603 05/08/18  0554 05/06/18  0708  05/05/18  0658   MAGNESIUM mg/dL 1.7 1.8  --   --  2.0   PHOSPHORUS mg/dL 3.5 3.1 2.8  < >  --    < > = values in this interval not displayed.      Results from last 7 days  Lab Units 05/09/18  0603 05/08/18  0554 05/07/18  0536 05/06/18  0814   WBC 10*3/mm3 29.15* 47.21* 66.59* 13.79*   HEMOGLOBIN g/dL 7.9* 6.3* 7.4* 7.4*   PLATELETS 10*3/mm3 98* 140 211 236                   ASSESSMENT:   Principal Problem:    Hypercalcemia  Active Problems:    Gastroesophageal reflux disease    Chronic recurrent major depressive disorder    Restless legs syndrome    Seizure disorder    Essential hypertension    Paroxysmal atrial fibrillation    Iron deficiency anemia due to chronic blood loss    Weakness    Hypertension    Liver mass    Lymphoma    Lymphoma of lymph nodes of neck    Leukemoid reaction    Insomnia    Anemia associated with chemotherapy    1.  Hypercalcemia. Due to  malignancy s/p zometa 4.25.18 and 5.7.18. Calcium stable today  2.  Metastatic NHL, s/p [facility] 5/4/18.  3.  Hypokalemia, will replace. Hypophosphatemia, and hypomagnesemia: normal today.    Multifactorial, with poor nutrition and a likely contribution from malignancy .  4.  Labile HTN. Metoprolol increased yesterday. Clonidine patch ordered today, but already bradycardic so will need to change this. Increase lisinopril instead.     5.  Afib with RVR, with rate now controlled.  6.  Recent GIB  7.  Leukocytosis. WBC falling.   8.  Anemia; IV iron. Prn prbc. PRBC today. Platelets falling.     PLAN:  1. Monitor chemistries  2. Increase lisinopril to 30 mg bid.  If not better, can add hydralazine.   3. Spoke to RN about weight standing and urine measurement.    4. PAULINE Denney MD  5/9/2018    3:22 PM

## 2018-05-09 NOTE — PROGRESS NOTES
Continued Stay Note   [facility]     Patient Name: Martina Blake  MRN: [id number]  Today's Date: 5/9/2018    Admit Date: 4/21/2018          Discharge Plan     Row Name 05/09/18 1015       Plan    Plan  Home with [facility]?     Patient/Family in Agreement with Plan yes    Plan Comments Spoke with patient at bedside regarding dc plans. Instructed CCP to speak with her daughter Lucille. Spoke with Lucille on phone. Agreeable with [facility]. Zoila/OSW following. She contacted [facility] for rolling walker. Also provided list of in-home personal caregivers. Continue to follow              Discharge Codes    No documentation.           Jaquelin Atkins RN

## 2018-05-10 LAB
ABO + RH BLD: NORMAL
ABO + RH BLD: NORMAL
ANION GAP SERPL CALCULATED.3IONS-SCNC: 8.3 MMOL/L
ANISOCYTOSIS BLD QL: ABNORMAL
BH BB BLOOD EXPIRATION DATE: NORMAL
BH BB BLOOD EXPIRATION DATE: NORMAL
BH BB BLOOD TYPE BARCODE: 600
BH BB BLOOD TYPE BARCODE: 600
BH BB DISPENSE STATUS: NORMAL
BH BB DISPENSE STATUS: NORMAL
BH BB PRODUCT CODE: NORMAL
BH BB PRODUCT CODE: NORMAL
BH BB UNIT NUMBER: NORMAL
BH BB UNIT NUMBER: NORMAL
BUN BLD-MCNC: 21 MG/DL (ref 8–23)
BUN/CREAT SERPL: 38.9 (ref 7–25)
C3 FRG RBC-MCNC: ABNORMAL
CALCIUM SPEC-SCNC: 10.5 MG/DL (ref 8.6–10.5)
CHLORIDE SERPL-SCNC: 102 MMOL/L (ref 98–107)
CO2 SERPL-SCNC: 28.7 MMOL/L (ref 22–29)
CREAT BLD-MCNC: 0.54 MG/DL (ref 0.57–1)
DACRYOCYTES BLD QL SMEAR: ABNORMAL
DEPRECATED RDW RBC AUTO: 46.6 FL (ref 37–54)
ERYTHROCYTE [DISTWIDTH] IN BLOOD BY AUTOMATED COUNT: 15.2 % (ref 11.7–13)
GFR SERPL CREATININE-BSD FRML MDRD: 109 ML/MIN/1.73
GLUCOSE BLD-MCNC: 95 MG/DL (ref 65–99)
HCT VFR BLD AUTO: 32.8 % (ref 35.6–45.5)
HGB BLD-MCNC: 10.8 G/DL (ref 11.9–15.5)
LDH SERPL-CCNC: 319 U/L (ref 135–214)
LYMPHOCYTES # BLD MANUAL: 0.29 10*3/MM3 (ref 0.9–4.8)
LYMPHOCYTES NFR BLD MANUAL: 2 % (ref 5–12)
LYMPHOCYTES NFR BLD MANUAL: 3 % (ref 19.6–45.3)
MAGNESIUM SERPL-MCNC: 2 MG/DL (ref 1.6–2.4)
MCH RBC QN AUTO: 27.8 PG (ref 26.9–32)
MCHC RBC AUTO-ENTMCNC: 32.9 G/DL (ref 32.4–36.3)
MCV RBC AUTO: 84.3 FL (ref 80.5–98.2)
METAMYELOCYTES NFR BLD MANUAL: 1 % (ref 0–0)
MONOCYTES # BLD AUTO: 0.2 10*3/MM3 (ref 0.2–1.2)
NEUTROPHILS # BLD AUTO: 9.21 10*3/MM3 (ref 1.9–8.1)
NEUTROPHILS NFR BLD MANUAL: 94 % (ref 42.7–76)
NEUTS HYPERSEG # BLD: ABNORMAL 10*3/UL
PHOSPHATE SERPL-MCNC: 3.4 MG/DL (ref 2.5–4.5)
PLAT MORPH BLD: NORMAL
PLATELET # BLD AUTO: 55 10*3/MM3 (ref 140–500)
PMV BLD AUTO: 10.2 FL (ref 6–12)
POTASSIUM BLD-SCNC: 3.2 MMOL/L (ref 3.5–5.2)
RBC # BLD AUTO: 3.89 10*6/MM3 (ref 3.9–5.2)
SCAN SLIDE: NORMAL
SODIUM BLD-SCNC: 139 MMOL/L (ref 136–145)
UNIT  ABO: NORMAL
UNIT  ABO: NORMAL
UNIT  RH: NORMAL
UNIT  RH: NORMAL
URATE SERPL-MCNC: 1.9 MG/DL (ref 2.4–5.7)
WBC NRBC COR # BLD: 9.8 10*3/MM3 (ref 4.5–10.7)

## 2018-05-10 PROCEDURE — 25010000002 TBO-FILGRASTIM 300 MCG/0.5ML SOLUTION PREFILLED SYRINGE: Performed by: INTERNAL MEDICINE

## 2018-05-10 PROCEDURE — 97110 THERAPEUTIC EXERCISES: CPT

## 2018-05-10 PROCEDURE — 83615 LACTATE (LD) (LDH) ENZYME: CPT | Performed by: INTERNAL MEDICINE

## 2018-05-10 PROCEDURE — 80048 BASIC METABOLIC PNL TOTAL CA: CPT | Performed by: INTERNAL MEDICINE

## 2018-05-10 PROCEDURE — 85025 COMPLETE CBC W/AUTO DIFF WBC: CPT | Performed by: INTERNAL MEDICINE

## 2018-05-10 PROCEDURE — 85007 BL SMEAR W/DIFF WBC COUNT: CPT | Performed by: INTERNAL MEDICINE

## 2018-05-10 PROCEDURE — 99233 SBSQ HOSP IP/OBS HIGH 50: CPT | Performed by: INTERNAL MEDICINE

## 2018-05-10 PROCEDURE — 84550 ASSAY OF BLOOD/URIC ACID: CPT | Performed by: INTERNAL MEDICINE

## 2018-05-10 PROCEDURE — 83735 ASSAY OF MAGNESIUM: CPT | Performed by: INTERNAL MEDICINE

## 2018-05-10 PROCEDURE — 25010000002 HYDRALAZINE PER 20 MG: Performed by: OTOLARYNGOLOGY

## 2018-05-10 PROCEDURE — 84100 ASSAY OF PHOSPHORUS: CPT | Performed by: INTERNAL MEDICINE

## 2018-05-10 RX ORDER — POTASSIUM CHLORIDE 1.5 G/1.77G
40 POWDER, FOR SOLUTION ORAL 2 TIMES DAILY
Status: DISCONTINUED | OUTPATIENT
Start: 2018-05-10 | End: 2018-05-16 | Stop reason: HOSPADM

## 2018-05-10 RX ORDER — HYDRALAZINE HYDROCHLORIDE 25 MG/1
25 TABLET, FILM COATED ORAL EVERY 8 HOURS SCHEDULED
Status: DISCONTINUED | OUTPATIENT
Start: 2018-05-10 | End: 2018-05-16 | Stop reason: HOSPADM

## 2018-05-10 RX ADMIN — POTASSIUM CHLORIDE 40 MEQ: 1.5 POWDER, FOR SOLUTION ORAL at 20:50

## 2018-05-10 RX ADMIN — DOCUSATE SODIUM -SENNOSIDES 2 TABLET: 50; 8.6 TABLET, COATED ORAL at 20:51

## 2018-05-10 RX ADMIN — PANTOPRAZOLE SODIUM 40 MG: 40 TABLET, DELAYED RELEASE ORAL at 05:47

## 2018-05-10 RX ADMIN — Medication 5 MG: at 20:51

## 2018-05-10 RX ADMIN — LISINOPRIL 30 MG: 20 TABLET ORAL at 20:51

## 2018-05-10 RX ADMIN — Medication 10 MG: at 23:57

## 2018-05-10 RX ADMIN — HYDRALAZINE HYDROCHLORIDE 25 MG: 25 TABLET, FILM COATED ORAL at 13:42

## 2018-05-10 RX ADMIN — POTASSIUM CHLORIDE 40 MEQ: 1.5 POWDER, FOR SOLUTION ORAL at 13:42

## 2018-05-10 RX ADMIN — ACETAMINOPHEN 650 MG: 325 TABLET ORAL at 06:00

## 2018-05-10 RX ADMIN — LISINOPRIL 30 MG: 20 TABLET ORAL at 08:35

## 2018-05-10 RX ADMIN — METOPROLOL TARTRATE 50 MG: 50 TABLET, FILM COATED ORAL at 20:50

## 2018-05-10 RX ADMIN — Medication 10 MG: at 18:20

## 2018-05-10 RX ADMIN — Medication 1000 MCG: at 08:35

## 2018-05-10 RX ADMIN — HYDRALAZINE HYDROCHLORIDE 25 MG: 25 TABLET, FILM COATED ORAL at 20:51

## 2018-05-10 RX ADMIN — ACETAMINOPHEN 650 MG: 325 TABLET ORAL at 23:51

## 2018-05-10 RX ADMIN — AMLODIPINE BESYLATE 10 MG: 10 TABLET ORAL at 08:35

## 2018-05-10 RX ADMIN — GABAPENTIN 600 MG: 300 CAPSULE ORAL at 21:40

## 2018-05-10 RX ADMIN — METOPROLOL TARTRATE 50 MG: 50 TABLET, FILM COATED ORAL at 08:35

## 2018-05-10 RX ADMIN — Medication 10 MG: at 05:47

## 2018-05-10 RX ADMIN — ALLOPURINOL 300 MG: 300 TABLET ORAL at 08:35

## 2018-05-10 RX ADMIN — TBO-FILGRASTIM 300 MCG: 300 INJECTION, SOLUTION SUBCUTANEOUS at 10:45

## 2018-05-10 NOTE — THERAPY TREATMENT NOTE
Acute Care - Physical Therapy Treatment Note   [facility]     Patient Name: Martina Blake  : 1940  MRN: [id number]  Today's Date: 5/10/2018     Date of Referral to PT: 18  Referring Physician: Byron    Admit Date: 2018    Visit Dx:    ICD-10-CM ICD-9-CM   1. Lymphoma of lymph nodes of neck, unspecified lymphoma type C85.91 202.81   2. Weakness R53.1 780.79   3. Hypercalcemia E83.52 275.42   4. Hypertensive urgency I16.0 401.9   5. 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 C83.39 202.80     Patient Active Problem List   Diagnosis   • Blood glucose abnormal   • Dyslipidemia   • Gastroesophageal reflux disease   • Fatigue   • Generalized osteoarthritis   • Chronic recurrent major depressive disorder   • Osteoporosis   • Restless legs syndrome   • Seizure disorder   • Vitamin D deficiency   • Bradycardia   • Essential hypertension   • Post-menopause on HRT (hormone replacement therapy)   • Chronic seasonal allergic rhinitis   • Paroxysmal atrial fibrillation   • Iron deficiency anemia due to chronic blood loss   • Acute superficial gastritis without hemorrhage   • Hiatal hernia   • Esophagitis   • Diverticulosis of large intestine without hemorrhage   • Dizziness   • Weakness   • Hypertension   • Hypercalcemia   • Liver mass   • Lymphoma   • Lymphoma of lymph nodes of neck   • Leukemoid reaction   • Insomnia   • Anemia associated with chemotherapy       Therapy Treatment          Rehabilitation Treatment Summary     Row Name 05/10/18 1202             Treatment Time/Intention    Discipline physical therapist  -MD      Document Type therapy note (daily note)  -MD      Subjective Information no complaints  -MD      Mode of Treatment physical therapy  -MD      Patient/Family Observations Pt sitting in chair showing no signs of acute distress  -MD      Therapy Frequency (PT Clinical Impression) daily  -MD      Patient Effort good  -MD      Existing  Precautions/Restrictions fall  -MD      Recorded by [MD] Malissa Oliver, PT 05/10/18 1204 05/10/18 1204      Row Name 05/10/18 1202             Cognitive Assessment/Intervention- PT/OT    Orientation Status (Cognition) oriented x 4  -MD      Personal Safety Interventions gait belt  -MD      Recorded by [MD] Malissa Oliver, PT 05/10/18 1204 05/10/18 1204      Row Name 05/10/18 1202             Bed Mobility Assessment/Treatment    Sit-Supine [place] (Bed Mobility) supervision  -MD      Recorded by [MD] Malissa Oliver, PT 05/10/18 1206 05/10/18 1206      Row Name 05/10/18 1202             Transfer Assessment/Treatment    Sit-Stand [place] (Transfers) stand by assist  -MD      Stand-Sit [place] (Transfers) stand by assist  -MD      Recorded by [MD] Malissa Oliver, PT 05/10/18 1204 05/10/18 1204      Row Name 05/10/18 1202             Sit-Stand Transfer    Assistive Device (Sit-Stand Transfers) walker, front-wheeled  -MD      Recorded by [MD] Malissa Oliver, PT 05/10/18 1204 05/10/18 1204      Row Name 05/10/18 1202             Stand-Sit Transfer    Assistive Device (Stand-Sit Transfers) walker, front-wheeled  -MD      Recorded by [MD] Malissa Oliver, PT 05/10/18 1204 05/10/18 1204      Row Name 05/10/18 1202             Gait/Stairs Assessment/Training    [place] Level (Gait) contact guard  -MD      Assistive Device (Gait) walker, front-wheeled  -MD      Distance in Feet (Gait) 100  -MD      Pattern (Gait) step-through  -MD      Deviations/Abnormal Patterns (Gait) gait speed decreased  -MD      Recorded by [MD] Malissa Oliver, PT 05/10/18 1204 05/10/18 1204      Row Name 05/10/18 1202             Positioning and Restraints    Pre-Treatment Position sitting in chair/recliner  -MD      Post Treatment Position bed  -MD      In Bed supine;call light within reach;with family/caregiver  -MD      Recorded by [MD] Malissa Oliver, PT 05/10/18 1204 05/10/18 1204      Row Name 05/10/18 1202             Pain Scale: Numbers Pre/Post-Treatment    Pain  Scale: Numbers, Pretreatment 0/10 - no pain  -MD      Recorded by [MD] Malissa Oliver, PT 05/10/18 1204 05/10/18 1204      Row Name                Wound 05/01/18 1057 Left neck incision    Wound - Properties Group Date first assessed: 05/01/18 [PEARL] Time first assessed: 1057 [PEARL] Side: Left [PEARL] Location: neck [PEARL] Type: incision [PEARL] Recorded by:  [PEARL] Magaly Almonte RN 05/01/18 1057 05/01/18 1057    Row Name                Wound 05/04/18 0838 Right chest incision    Wound - Properties Group Date first assessed: 05/04/18 [SM] Time first assessed: 0838 [SM] Side: Right [SM] Location: chest [SM] Type: incision [SM] Recorded by:  [SM] Scott Cole RN 05/04/18 0838 05/04/18 0838      User Key  (r) = Recorded By, (t) = Taken By, (c) = Cosigned By    Initials Name Effective Dates Discipline    PEARL Magaly Almonte RN 06/16/16 -  Nurse    MD Malissa Oliver, PT 04/03/18 -  PT    JEREMIAH Cole RN 06/16/16 -  Nurse          Wound 05/01/18 1057 Left neck incision (Active)   Dressing Appearance dry;intact;no drainage 5/10/2018  8:26 AM   Closure RYAN 5/10/2018  8:26 AM   Base dry;clean 5/10/2018  8:26 AM   Periwound Temperature warm 5/10/2018  8:26 AM   Periwound Skin Turgor soft 5/10/2018  8:26 AM   Drainage Amount none 5/10/2018  8:26 AM   Dressing Care, Wound transparent film 5/10/2018  8:26 AM       Wound 05/04/18 0838 Right chest incision (Active)   Dressing Appearance dry;intact;no drainage 5/10/2018  8:26 AM   Closure Adhesive closure strips 5/10/2018  8:26 AM   Base clean;dry 5/10/2018  8:26 AM   Drainage Amount none 5/10/2018  8:26 AM   Dressing Care, Wound transparent film 5/10/2018  8:26 AM             Physical Therapy Education     Title: PT OT SLP Therapies (Done)     Topic: Physical Therapy (Done)     Point: Mobility training (Done)    Learning Progress Summary     Learner Status Readiness Method Response Comment Documented by    Patient Done Acceptance JOAO BENAVIDES DU PC 05/06/18 1416     Done Acceptance PAVEL BENAVIDES  05/03/18 0206     Done Acceptance E,TB VU   05/02/18 0248     Done Acceptance E VU   04/29/18 1554     Done Acceptance E,TB,D VU,NR   04/28/18 1733     Done Acceptance E,TB VU,DU  CW 04/27/18 1544     Done Acceptance E,TB VU,NR role of PT, benefits of activity, use of AD, safety GR 04/22/18 1311    Family Done Acceptance E,TB,D VU,NR   04/28/18 1733     Done Acceptance E,TB VU,NR role of PT, benefits of activity, use of AD, safety GR 04/22/18 1311          Point: Home exercise program (Done)    Learning Progress Summary     Learner Status Readiness Method Response Comment Documented by    Patient Done Acceptance E,D VU,DU  PC 05/06/18 1416     Done Acceptance E,TB VU   05/03/18 0206     Done Acceptance E,TB VU   05/02/18 0248     Done Acceptance E,TB,D VU,NR   04/28/18 1733     Done Acceptance E,TB VU,DU  CW 04/27/18 1544     Done Acceptance E,TB VU,NR role of PT, benefits of activity, use of AD, safety GR 04/22/18 1311    Family Done Acceptance E,TB,D VU,NR   04/28/18 1733     Done Acceptance E,TB VU,NR role of PT, benefits of activity, use of AD, safety  04/22/18 1311          Point: Body mechanics (Done)    Learning Progress Summary     Learner Status Readiness Method Response Comment Documented by    Patient Done Acceptance E,D VU,DU  PC 05/06/18 1416     Done Acceptance E,TB VU   05/03/18 0206     Done Acceptance E,TB VU   05/02/18 0248     Done Acceptance E,TB,D VU,NR   04/28/18 1733     Done Acceptance E,TB VU,DU  CW 04/27/18 1544     Done Acceptance E,TB VU,NR role of PT, benefits of activity, use of AD, safety GR 04/22/18 1311    Family Done Acceptance E,TB,D VU,NR   04/28/18 1733     Done Acceptance E,TB VU,NR role of PT, benefits of activity, use of AD, safety GR 04/22/18 1311          Point: Precautions (Done)    Learning Progress Summary     Learner Status Readiness Method Response Comment Documented by    Patient Done Acceptance MAKAYLA MILLS MD 05/10/18 3764     Done Acceptance MAKAYLA MILLS  PT educated pt to ambulate in hallway with CNA or RN elvis Rios while at [facility] multiple times daily.  Pt states understanding. MD 05/09/18 1402     Done Acceptance E VU  MD 05/08/18 1422     Done Acceptance E VU  MD 05/07/18 1325     Done Acceptance E,D VU,DU  PC 05/06/18 1416     Done Acceptance E,TB VU  BS 05/03/18 0206     Done Acceptance E,TB VU  BS 05/02/18 0248     Done Acceptance E VU  MD 04/30/18 1615     Done Acceptance E,TB,D VU,NR   04/28/18 1733     Done Acceptance E,TB VU,DU   04/27/18 1544     Done Acceptance E,TB VU,NR role of PT, benefits of activity, use of AD, safety  04/22/18 1311    Family Done Acceptance E,TB,D VU,NR   04/28/18 1733     Done Acceptance E,TB VU,NR role of PT, benefits of activity, use of AD, safety  04/22/18 1311                      User Key     Initials Effective Dates Name Provider Type Discipline     10/03/16 -  Aubrey Del Rosario, PT Physical Therapist PT     04/03/18 -  Rosette Navarrete, PT Physical Therapist PT    MD 04/03/18 -  Malissa Oliver, PT Physical Therapist PT     03/07/18 -  Linda Cole, PTA Physical Therapy Assistant PT     06/16/16 -  Ara Gibson, RN Registered Nurse Nurse     04/03/18 -  Alma Delia Cazares, PT Physical Therapist PT     03/07/18 -  Myke Burrell, PTA Physical Therapy Assistant PT                    PT Recommendation and Plan  Therapy Frequency (PT Clinical Impression): daily  Plan of Care Reviewed With: patient  Progress: improving  Outcome Summary: Pt ambulation distance decreased today due to fatigue and pt feeling weak.            Outcome Measures     Row Name 05/10/18 1200 05/09/18 1400 05/08/18 1400       How much help from another person do you currently need...    Turning from your back to your side while in flat bed without using bedrails? 4  -MD 4  -MD 4  -MD    Moving from lying on back to sitting on the side of a flat bed without bedrails? 4  -MD 4  -MD 4  -MD    Moving to and from a bed to a chair (including  a wheelchair)? 3  -MD 3  -MD 3  -MD    Standing up from a chair using your arms (e.g., wheelchair, bedside chair)? 3  -MD 3  -MD 3  -MD    Climbing 3-5 steps with a railing? 3  -MD 3  -MD 3  -MD    To walk in hospital room? 3  -MD 3  -MD 3  -MD    AM-PAC 6 Clicks Score 20  -MD 20  -MD 20  -MD       Functional Assessment    Outcome Measure Options AM-PAC 6 Clicks Basic Mobility (PT)  -MD AM-PAC 6 Clicks Basic Mobility (PT)  -MD AM-PAC 6 Clicks Basic Mobility (PT)  -MD    Row Name 05/07/18 1300             How much help from another person do you currently need...    Turning from your back to your side while in flat bed without using bedrails? 4  -MD      Moving from lying on back to sitting on the side of a flat bed without bedrails? 4  -MD      Moving to and from a bed to a chair (including a wheelchair)? 3  -MD      Standing up from a chair using your arms (e.g., wheelchair, bedside chair)? 3  -MD      Climbing 3-5 steps with a railing? 3  -MD      To walk in hospital room? 3  -MD      AM-PAC 6 Clicks Score 20  -MD         Functional Assessment    Outcome Measure Options AM-PAC 6 Clicks Basic Mobility (PT)  -MD        User Key  (r) = Recorded By, (t) = Taken By, (c) = Cosigned By    Initials Name Provider Type    MD Malissa Oliver PT Physical Therapist           Time Calculation:         PT Charges     Row Name 05/10/18 1202             Time Calculation    Start Time 1153  -MD      Stop Time 1206  -MD      Time Calculation (min) 13 min  -MD      PT Received On 05/10/18  -MD      PT - Next Appointment 05/11/18  -MD        User Key  (r) = Recorded By, (t) = Taken By, (c) = Cosigned By    Initials Name Provider Type    MD Malissa Oliver PT Physical Therapist          Therapy Charges for Today     Code Description Service Date Service Provider Modifiers Qty    39298545413 HC PT THER PROC EA 15 MIN 5/9/2018 Malissa Oliver PT GP 1    97465338709 HC PT THER PROC EA 15 MIN 5/10/2018 Malissa Oliver PT GP 1          PT G-Codes  PT  Professional Judgement Used?: Yes  Outcome Measure Options: AM-PAC 6 Clicks Basic Mobility (PT)  Score: 13  Functional Limitation: Mobility: Walking and moving around  Mobility: Walking and Moving Around Current Status (): At least 60 percent but less than 80 percent impaired, limited or restricted  Mobility: Walking and Moving Around Goal Status (): At least 40 percent but less than 60 percent impaired, limited or restricted    Malissa Oliver, PT  5/10/2018

## 2018-05-10 NOTE — PLAN OF CARE
Problem: Fall Risk (Adult)  Goal: Absence of Fall  Outcome: Ongoing (interventions implemented as appropriate)      Problem: Patient Care Overview  Goal: Plan of Care Review  Outcome: Ongoing (interventions implemented as appropriate)   05/10/18 0459   Coping/Psychosocial   Plan of Care Reviewed With patient   Plan of Care Review   Progress improving   OTHER   Outcome Summary Tolerated 2 units of PRBC tonight. BP cont. to be high. IV Hydralazine given x 1. Up with assist only. Incont briefs in place. up to [facility]. Home soon. S/P Chemo for lymphoma. Family at bedside.       Problem: Activity Intolerance (Adult)  Goal: Activity Tolerance  Outcome: Ongoing (interventions implemented as appropriate)      Problem: Oncology Care (Adult)  Goal: Signs and Symptoms of Listed Potential Problems Will be Absent, Minimized or Managed (Oncology Care)  Outcome: Ongoing (interventions implemented as appropriate)

## 2018-05-10 NOTE — PLAN OF CARE
Problem: Patient Care Overview  Goal: Plan of Care Review  Outcome: Ongoing (interventions implemented as appropriate)   05/10/18 9857   Coping/Psychosocial   Plan of Care Reviewed With patient   Plan of Care Review   Progress improving   OTHER   Outcome Summary Pt. has had an overall good day. Given potassium - need to give PRN potassium. Sat up in chair today. Granix given to boost WBC's.      Goal: Individualization and Mutuality  Outcome: Ongoing (interventions implemented as appropriate)    Goal: Discharge Needs Assessment  Outcome: Ongoing (interventions implemented as appropriate)    Goal: Interprofessional Rounds/Family Conf  Outcome: Ongoing (interventions implemented as appropriate)      Problem: Activity Intolerance (Adult)  Goal: Activity Tolerance  Outcome: Ongoing (interventions implemented as appropriate)    Goal: Effective Energy Conservation Techniques  Outcome: Ongoing (interventions implemented as appropriate)      Problem: Oncology Care (Adult)  Goal: Signs and Symptoms of Listed Potential Problems Will be Absent, Minimized or Managed (Oncology Care)  Outcome: Ongoing (interventions implemented as appropriate)

## 2018-05-10 NOTE — PLAN OF CARE
Problem: Patient Care Overview  Goal: Plan of Care Review   05/10/18 1204   Coping/Psychosocial   Plan of Care Reviewed With patient   OTHER   Outcome Summary Pt ambulation distance decreased today due to fatigue and pt feeling weak.

## 2018-05-10 NOTE — PROGRESS NOTES
Oncology Social Work    OSW followed up with patient and her dgt Aga.  Patient making more progress with PT and walking longer distances at stand by assist level.  Patient nervous about going home and transportation to and from doctor's office for Granix shots.  Explained that I could set up transportation assistance through [employer] when family and friends are unable to bring her.    Dgt still working on setting up in home caregivers and their schedule.  [facility] following for home health needs and [facility] following for rolling wx.     Will remain available for pracitical, home and emotional needs.  Thank you,  Zoila Navarrete, MSSW, CSW, OSW-C

## 2018-05-10 NOTE — PROGRESS NOTES
NEPHROLOGY PROGRESS NOTE    PATIENT IDENTIFICATION:   Name:  Martina Blake      MRN:  [id number]     77 y.o.  female             Reason for visit: hypercalcemia    SUBJECTIVE:     Feels ok.  Eating better.  Urinating a lot.  Bowels moving. BP not controlled.   OBJECTIVE:  Vitals:    05/09/18 2150 05/10/18 0308 05/10/18 0538 05/10/18 0952   BP: (!) 194/83 178/73 (!) 192/76 170/70   BP Location:  Left arm Left arm Left arm   Patient Position:  Lying Lying Sitting   Pulse: 60 59 60    Resp: 16 18 16    Temp: 97 °F (36.1 °C) 97 °F (36.1 °C) 99.3 °F (37.4 °C)    TempSrc: Oral Oral Oral    SpO2: 97% 95% 95%    Weight:   71.4 kg (157 lb 8 oz)    Height:               Body mass index is 27.03 kg/m².    Intake/Output Summary (Last 24 hours) at 05/10/18 1156  Last data filed at 05/09/18 2125   Gross per 24 hour   Intake              970 ml   Output              250 ml   Net              720 ml     Wt Readings from Last 1 Encounters:   05/10/18 0538 71.4 kg (157 lb 8 oz)   05/09/18 1555 71.3 kg (157 lb 3.2 oz)   05/09/18 0538 70.9 kg (156 lb 4.8 oz)   05/01/18 1850 65.1 kg (143 lb 9.6 oz)   04/30/18 0643 66.8 kg (147 lb 4.8 oz)   04/29/18 0652 67.9 kg (149 lb 12.8 oz)   04/28/18 0641 68 kg (150 lb)   04/27/18 0603 64.7 kg (142 lb 9.6 oz)   04/25/18 0516 62.6 kg (138 lb)   04/21/18 1731 68.4 kg (150 lb 12.8 oz)   04/21/18 1227 68 kg (150 lb)     Wt Readings from Last 3 Encounters:   05/10/18 71.4 kg (157 lb 8 oz)   04/19/18 68 kg (150 lb)   04/10/18 61.5 kg (135 lb 8 oz)         Exam:  General: NAD, pleasant  Neck without JVD; port rij  Heart RRR no s3 or rub. 2/6 early syst m LUSB  Lungs clear to auscultation   Abd +bs, slightly distended, soft, not tender.    Ext trace lower ext edema.  Skin: no rash  Psych mood and affect fine    Scheduled meds:      allopurinol 300 mg Oral Daily   amLODIPine 10 mg Oral Q24H   diphenhydrAMINE 25 mg Oral Once   gabapentin 600 mg Oral Nightly   hydrALAZINE 25 mg Oral Q8H   lisinopril 30  mg Oral Q12H   melatonin 5 mg Oral Nightly   metoprolol tartrate 50 mg Oral Q12H   pantoprazole 40 mg Oral Q AM   phenytoin 300 mg Oral Nightly   potassium chloride 40 mEq Oral BID   sennosides-docusate sodium 2 tablet Oral Nightly   tbo-filgrastim 300 mcg Subcutaneous Q24H   vitamin B-12 1,000 mcg Oral Daily     IV meds:                             Data Review:      Results from last 7 days  Lab Units 05/10/18  0556 05/09/18  0603 05/08/18  0554 05/07/18  0536   SODIUM mmol/L 139 139 140 137   POTASSIUM mmol/L 3.2* 3.4* 3.8 4.2   CHLORIDE mmol/L 102 103 105 103   CO2 mmol/L 28.7 27.2 25.7 24.8   BUN mg/dL 21 27* 31* 30*   CREATININE mg/dL 0.54* 0.64 0.70 0.92   CALCIUM mg/dL 10.5 10.7* 10.4 11.3*   BILIRUBIN mg/dL  --  0.7 0.6 0.4   ALK PHOS U/L  --  81 76 81   ALT (SGPT) U/L  --  12 8 9   AST (SGOT) U/L  --  23 24 29   GLUCOSE mg/dL 95 83 82 116*     Estimated Creatinine Clearance: 57.1 mL/min (by C-G formula based on SCr of 0.54 mg/dL (L)).    Results from last 7 days  Lab Units 05/10/18  0556   URIC ACID mg/dL 1.9*       Results from last 7 days  Lab Units 05/10/18  0556 05/09/18  0603 05/08/18  0554   MAGNESIUM mg/dL 2.0 1.7 1.8   PHOSPHORUS mg/dL 3.4 3.5 3.1         Results from last 7 days  Lab Units 05/10/18  0556 05/09/18  0603 05/08/18  0554 05/07/18  0536 05/06/18  0814   WBC 10*3/mm3 9.80 29.15* 47.21* 66.59* 13.79*   HEMOGLOBIN g/dL 10.8* 7.9* 6.3* 7.4* 7.4*   PLATELETS 10*3/mm3 55* 98* 140 211 236                   ASSESSMENT:   Principal Problem:    Hypercalcemia  Active Problems:    Gastroesophageal reflux disease    Chronic recurrent major depressive disorder    Restless legs syndrome    Seizure disorder    Essential hypertension    Paroxysmal atrial fibrillation    Iron deficiency anemia due to chronic blood loss    Weakness    Hypertension    Liver mass    Lymphoma    Lymphoma of lymph nodes of neck    Leukemoid reaction    Insomnia    Anemia associated with chemotherapy    1.  Hypercalcemia. Due  to malignancy s/p zometa 4.25.18 and 5.7.18. Calcium stable today  2.  Metastatic NHL, s/p RCHOP 5/4/18.  3.  Hypokalemia, will replace with packet.  Not able to swallow the large Micro K pills. Hypophosphatemia, and hypomagnesemia: normal today.  4.  Labile HTN. Metoprolol at mx effect with pulse in 50-60 range. Lisinopril increased yesterday.  Add hydralazine ( home med) back today.  Hesitant to add diuretic with her hypercalcemia and hypokalemia.   5.  Afib with RVR, with rate now controlled.  6.  Recent GIB  7.  Leukocytosis. WBC falling.   8.  Anemia; IV iron. Prn prbc. PRBC today. Platelets falling.     PLAN:  1. Monitor chemistries  2. Hydralazine 25 mg tid  3. Change potassium to packet form.  Watch K closely as she is also on lisinopril.   Delilah Denney MD  5/10/2018    11:56 AM

## 2018-05-10 NOTE — PROGRESS NOTES
"CHIEF COMPLAINT:  Anemia, hypercalcemia, radiographic findings of malignancy with pathology/ diagnosis on the liver consistent with diffuse large B-cell lymphoma FISH for c-myc, bcl-2 and bcl-6 pending    Interval History:  The patient underwent an excisional lymph node biopsy from the left neck this morning for confirmation of pathology.  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  Additional FISH studies are pending to evaluate for \"double hit\" lymphoma as Ki 67 staining is 4+     On 05/02/2018, actually the patient was feeling better.  She had no issues in regard to the site of the lymph node biopsy in the left side of the neck with minimal discomfort.  I talked with Dr. Gill in regard to port placement tomorrow.      Hopefully, we will have further information in regard to “double-hit” lymphoma before we start any chemotherapy.  If that is the case, the patient will require infusional EPOCH/Rituxan.     Other than that, I went through formal education and teaching in regard to chemotherapy medicines, the frequency of the treatment, side effects and so forth; please review below.    The patient will have a PET scan this afternoon as well and the port will be placed tomorrow.  On 05/03/2018 the patient was feeling fatigued and tired from so many testings back and forth. She had her PET scan this morning and there is planning for port placement tomorrow. I asked the patient to bring her family this afternoon to talk with them about the diagnosis and decide how they want to proceed. I pointed out to the patient as well that the absence of treatment life expectancy is measured in a question of a few weeks to a few months.     I also discussed with her the finding on the PET scan today that will be described below and also the finding of the cervical lymph node that will be described below.     On 5/7/2018, " we started the patient on intravenous Venofer.  Patient has worsening hemoglobin 7.4.  She tolerated Venofer without any incident.     On 5/8/2018 laboratory study showed worsening hemoglobin at 6.3, she reports no bleeding.  Her WBC is trending down at 47,200 as well as neutrophils 47,000. She has good appetite no nausea vomiting.  No cough no dyspnea.  Patient was given 2 units PRBC transfusion.  She got a second dose of Venofer (total 600 mg).      On 5/9/2018, laboratory study showed improved the hemoglobin is 7.9.  However patient reported she was dyspneic and that having lightheadedness when she walked in the hallway.  She reports no nausea vomiting.  No bleeding. Her nurse reported patient also had elevated blood pressure.  She got intravenous hydralazine.  She already is on amlodipine, lisinopril and metoprolol.  Due to symptomatic anemia, patient was given 2 units more units PRBC transfusion.    On 5/10/2018 patient reports more fatigue.  She denies bleeding.  She reports good appetite.  She has bowel movement daily.  Laboratory study today reported worsening platelets at a 55,000.  Her hemoglobin improved at 10.8, and a normal WBC 9800 including neutrophils 9200.  Her WBC counts actually is deteriorating every day despite on Gnanix injection.    Oncology History:  Martina Blake is a 77 y.o. female who were asked to see in consultation  4/29/18 for hypercalcemia, anemia and radiologic findings consistent with likely malignancy-?  Lymphoma.        She has a significant past medical history of palpitations followed by cardiology.  She presented to the emergency room April 10-14 with chest pain and palpitations and was found to be in A. fib with RVR and also demonstrated electrolyte abnormalities and anemia.  Records demonstrates that her anemia became particularly evident April 11 with an H&H of 8.9/ 28.4, platelet count 212,000 with a normal automated differential.  She underwent GI assessment including  upper and lower endoscopy demonstrated hernia, gastritis, esophagitis, diverticulosis but no evidence of acute bleed.  She was placed on Eliquis as result of a relatively high CHADSVASc score.  She had also been found to be hypercalcemic at approximately 11 with HCTZ altered and the patient started on Aldactone.  Additional testing had included a ferritin of 313.1, iron of 32 with TIBC of 222 and iron saturation of 14, occult blood negative per stool testing    She was brought back to the emergency room April 21 with further evidence of hypercalcemia, associated weakness, anorexia, nausea, ataxia and weight loss.  MRI of the brain April 21 was found to be unremarkable. Outpatient intact PTH levels were found to be 7.1(reduced), and an H&H of 10.3 and 32.9, white count 11,090, platelet count 267,000 with a normal differential.  Patient seen by renal medicine with the possibility of Fanconi syndrome raised.  Thereafter PTH hormone was found be less than 2.0, and protein electropheresis included IgG of 948, IgA of 207, IgM 86, total protein 6.3, albumin 2.8, no M spike noted, slightly elevated free kappa and lambda light chains with normal ratio.  Repeat testing per iron profile again revealed low serum iron but high serum ferritin and normal CEA, normal AFP, CA-125 of 77.2, CA 19-9 7.8   At this point the reason for her hypercalcemia was thought to be possible medication effect and/or possible malignancy with calcium was running between 12 and 13 mg/dL.    This led to CT scan of chest abdomen pelvis performed April 24 demonstrate extensive metastatic disease throughout the abdomen and pelvis particularly involving the spleen and liver, extensive lymphadenopathy at the abdomen and pelvis with a 4 cm lesion splenic hilum partially encasing the pancreatic tail, pancreatic tail malignancy?,  Gastric primary malignancy?  There was a mass along the right wall of the urinary bladder with adjacent adenopathy.  CT of the chest  "revealed several tiny dense pleural-based nodular opacities-partially calcified granulomas, no mediastinal or hilar adenopathy, enlarged pulmonary arteries consistent with pulmonary arterial hypertension.  A subsequent bone scan performed April 26 revealed prominent uptake in the right frontal bone and right posterior ninth rib and a CT needle biopsy of the liver was obtained April 26 as well.The sample obtained revealed, on flow cytometry examination, a subpopulation of normal B cells that might be consistent with B-cell lymphoma.  Additional testing is currently pending.  On May 25 further hypercalcemia was again noted and treated with IV fluids and dose of Zometa, vitamin D level normal.  Again evidence of hypokalemia, hypophosphatemia and hypomagnesemia.  The patient is seen April 29 her calcium level has gradually improved dropping to 9.7, albumin of 2.6, ionized calcium of 6.1, BUN and creatinine of 10/.55.  We are asked to see her with results available thus far as to possible lymphoma.    Past Medical History:     Atrial fibrillation      • Cystitis     • Cystocele       moderate   • Dyslipidemia     • Esophageal reflux     • Fatigue     • History of transfusion       no reaction   • Hypertension     • Major depression, chronic     • Menopause     • Osteoarthritis     • Osteoporosis     • Palpitations     • Post menopausal problems     • RLS (restless legs syndrome)     • Seizure disorder     • Subjective tinnitus     • Vaginal delivery       x2  \"SONYA\"      \"JASON\"   • Vitamin D deficiency      Social History:  The patient is a  and has 2 children.  She has never smoked.  She denies alcohol use.    Review of Systems   Constitutional: Positive for fatigue. Negative for activity change, appetite change and unexpected weight change.   HENT: Negative.    Eyes: Negative.    Respiratory: Positive for shortness of breath (Has exertional dyspnea). Negative for wheezing.    Cardiovascular: Negative.  Negative " for chest pain and palpitations.   Gastrointestinal: Negative.  Negative for abdominal pain, constipation, nausea and vomiting.   Endocrine: Negative.    Genitourinary: Negative.    Musculoskeletal: Negative for neck stiffness.   Skin: Negative.  Negative for color change and pallor.   Neurological: Positive for weakness. Negative for seizures, numbness and headaches.   Hematological: Positive for adenopathy.   Psychiatric/Behavioral: Negative for confusion.          Medications:  The current medication list was reviewed in the EMR    ALLERGIES:    Allergies   Allergen Reactions   • Crab (Diagnostic) Itching and Rash   • Pseudoephedrine Dizziness       Objective    Vitals:    05/09/18 2150 05/10/18 0308 05/10/18 0538 05/10/18 0952   BP: (!) 194/83 178/73 (!) 192/76 170/70   BP Location:  Left arm Left arm Left arm   Patient Position:  Lying Lying Sitting   Pulse: 60 59 60    Resp: 16 18 16    Temp: 97 °F (36.1 °C) 97 °F (36.1 °C) 99.3 °F (37.4 °C)    TempSrc: Oral Oral Oral    SpO2: 97% 95% 95%    Weight:   71.4 kg (157 lb 8 oz)    Height:       ECOG 3       Physical Exam    GENERAL:  Well-developed, well-nourished female patient lay in bed in no acute distress.   SKIN:  Warm, dry without rashes, purpura or petechiae.  HEENT:  conjunctivas non injected.  Mouth mucosa moist, no exudates in oropharynx.  NECK:  no thyromegaly. no cervical adenopathies.   LYMPHATICS:  No cervical, supraclavicular, axillary adenopathy.  CHEST:  Lungs clear to auscultation, normal breath sounds bilaterally, no wheezing, crackles or ronchi.  CARDIAC AND VASCULAR: normal rate and regular rhythm, without murmurs.    ABDOMEN:  Soft, nontender with spleen 4 cm blcm organomegaly, palpable liver 4 cm brcm., no ascites, no collateral circulation, no distention, no abdominal pain, Normal bowel sounds.   EXTREMITIES  AND SPINE:  No clubbing, cyanosis or edema, no deformities or pain.      NEUROLOGICAL:  Patient is AAOx3.     RECENT  "LABS:      Results from last 7 days  Lab Units 05/10/18  0556 05/09/18  0603 05/08/18  0554   WBC 10*3/mm3 9.80 29.15* 47.21*   HEMOGLOBIN g/dL 10.8* 7.9* 6.3*   HEMATOCRIT % 32.8* 24.3* 19.9*   PLATELETS 10*3/mm3 55* 98* 140   MONOCYTES % % 2.0*  --   --      Lab Results   Component Value Date    NEUTROABS 9.21 (H) 05/10/2018       Results from last 7 days  Lab Units 05/10/18  0556 05/09/18  0603 05/08/18  0554 05/07/18  0536   SODIUM mmol/L 139 139 140 137   POTASSIUM mmol/L 3.2* 3.4* 3.8 4.2   CHLORIDE mmol/L 102 103 105 103   CO2 mmol/L 28.7 27.2 25.7 24.8   BUN mg/dL 21 27* 31* 30*   CREATININE mg/dL 0.54* 0.64 0.70 0.92   CALCIUM mg/dL 10.5 10.7* 10.4 11.3*   BILIRUBIN mg/dL  --  0.7 0.6 0.4   ALK PHOS U/L  --  81 76 81   ALT (SGPT) U/L  --  12 8 9   AST (SGOT) U/L  --  23 24 29   GLUCOSE mg/dL 95 83 82 116*     Lab Results   Component Value Date    [id number] 485 05/08/2018     Lab Results   Component Value Date    FOLATE 11.51 05/08/2018       Assessment/Plan     1.  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  Additional FISH studies are POSITIVE for \"double hit\" lymphoma as Ki 67 staining is 4+     She has extensive metastatic disease throughout the abdomen and pelvis, splenomegaly replaced with low attenuation lesions, 4.4 cm splenic hilum mass, disease encasing the pancreatic tail, 3 cm lesion abutting the stomach, extensive lymphadenopathy throughout the celiac axis, retroperitoneum, mesentery, periaortic lymphadenopathy, liver lesion in the posterior segment 4.8 cm, nodular thickening of the right wall of urinary bladder 5.0 cm, peritoneal lymphadenopathy and thickening, coarsely calcified mesenteric mass 9.6 cm (?old mesenteric adenitis).  Bone scan shows foci of uptake in the frontal bone and right posterior ninth rib.      The patient has had B symptoms with decreased performance status, and weight loss, anorexia.    She had a PET scan examination.  She was started on chemotherapy " with R-CHOP on 5/4/2018.  She tolerated chemotherapy relatively well.     2.  Hypercalcemia of malignancy:  The patient received Zometa 4 mg on 4/25/18.  Her calcium temporarily improved but is beginning to elevate again to 11.3 with albumin 2.9.  Hypercalcemia is likely to be an ongoing issue until she responded to chemotherapy.      Patient received a second dose of Zometa on 5/7/2018.  Laboratory study today reporte calcium 10.5.  We'll continue to monitor.     3.  Leukocytosis.  This is secondary to Granix injection.  Her WBC actually is trending down which is expected, I think her WBC will continue to decrease despite Granix injection.    The tomasz of marrow suppression usually happens around day #10.     Her WBC and neutrophil count has been trending down every day, despite granix injection.  We will continue Granix injection daily for planed 10 days.  CBC will be monitored daily during hospital stay.     4.  Anemia:  Probably inflammatory/malignancy associated (elevated ferritin, low transferrin, elevated ESR) versus bone marrow involvement.   We do not think a bone marrow exam would change her treatment plan.      Patient was given TIBC transfusion 4 units in the past 3 days due to the tomasz hemoglobin 6.3 and she was symptomatic.  Today hemoglobin improved to 10.8 on 5/10/2018.     Reviewing her iron study from 4/24/2018, I do believe this patient also had elements of iron deficiency.  She received intravenous iron Venofer 300 mg for 2 doses on 5/7/2018 and 5/8/2018, total 600 mg.      She has normal folic acid level and vitamin B12 level this morning on 5/8/2018, however her B12 level can be further improved.  She was started on oral B12 supplementation once a day.    5.  Thrombocytopenia secondary to chemotherapy.  Her platelets drop quickly.  It is 55,000 today.  Expecting it will further decreased tomorrow.  Patient reports no bleeding.  This needs to be monitored daily.     6.  Paroxysmal atrial  fibrillation: The patient was anticoagulated with Eliquis, which has been on hold since admission.  Her platelet count is coming down quickly after chemotherapy.  Her platelets is coming down at 98,000 on 5/9/2018 and 55,000 on 5/10/2018.  We prefer not to start this patient back on Eliquis at this point.     7. Prophylaxis of tumor lysis syndrome.  Patient will be continued on allopurinol.    8.  GI prophylaxis on Protonix.     9.  Insomnia.  Patient reports he was not able to sleep the last 4 out of 5 days.  At home she used to take 2 tablets of gabapentin and 5 mg melatonin before sleep.      Patient was started on gabapentin and the melatonin on 5/7/2018.  She reported had a significant improvement of sleep in the night.     10.  Significantly elevated blood pressure.  Dr. Denney manages her medication for BP control.     10.  Discharge plan.  Patient will need to continue physical/occupational therapy.  Patient prefers to be discharged to home.   has been following patient.       PLAN:   1.  Continue Granix 300 mg daily.    2.  Continue allopurinol for tumor lysis syndrome prophylaxis.   3.  Continue to hold Eliquis.   4.   Hydralazine has being added today.    5.  Continue gabapentin and melatonin.   6.  Continue oral vitamin B12 at 1000 µg daily.   7.  Continue supportive care.   8.  CBC with differentiation in morning.     Discussed with the patient today.      I also discussed with cardiologist Dr. Chan today.       JABARI RAMIREZ M.D., Ph.D.      5/10/2018      CC:

## 2018-05-11 PROBLEM — T45.1X5A CHEMOTHERAPY-INDUCED NEUTROPENIA (HCC): Status: ACTIVE | Noted: 2018-05-11

## 2018-05-11 PROBLEM — D70.1 CHEMOTHERAPY-INDUCED NEUTROPENIA: Status: ACTIVE | Noted: 2018-05-11

## 2018-05-11 PROBLEM — D61.810 PANCYTOPENIA DUE TO ANTINEOPLASTIC CHEMOTHERAPY: Status: ACTIVE | Noted: 2018-05-11

## 2018-05-11 PROBLEM — T45.1X5A PANCYTOPENIA DUE TO ANTINEOPLASTIC CHEMOTHERAPY: Status: ACTIVE | Noted: 2018-05-11

## 2018-05-11 LAB
ALBUMIN SERPL-MCNC: 3.2 G/DL (ref 3.5–5.2)
ANION GAP SERPL CALCULATED.3IONS-SCNC: 9.7 MMOL/L
BUN BLD-MCNC: 20 MG/DL (ref 8–23)
BUN/CREAT SERPL: 44.4 (ref 7–25)
CALCIUM SPEC-SCNC: 10.5 MG/DL (ref 8.6–10.5)
CHLORIDE SERPL-SCNC: 101 MMOL/L (ref 98–107)
CO2 SERPL-SCNC: 28.3 MMOL/L (ref 22–29)
CREAT BLD-MCNC: 0.45 MG/DL (ref 0.57–1)
DEPRECATED RDW RBC AUTO: 47 FL (ref 37–54)
ERYTHROCYTE [DISTWIDTH] IN BLOOD BY AUTOMATED COUNT: 15.2 % (ref 11.7–13)
GFR SERPL CREATININE-BSD FRML MDRD: 135 ML/MIN/1.73
GLUCOSE BLD-MCNC: 96 MG/DL (ref 65–99)
HCT VFR BLD AUTO: 32 % (ref 35.6–45.5)
HGB BLD-MCNC: 10.5 G/DL (ref 11.9–15.5)
LDH SERPL-CCNC: 266 U/L (ref 135–214)
MCH RBC QN AUTO: 27.7 PG (ref 26.9–32)
MCHC RBC AUTO-ENTMCNC: 32.8 G/DL (ref 32.4–36.3)
MCV RBC AUTO: 84.4 FL (ref 80.5–98.2)
NRBC BLD MANUAL-RTO: 0 /100 WBC (ref 0–0)
PHOSPHATE SERPL-MCNC: 3.5 MG/DL (ref 2.5–4.5)
PLATELET # BLD AUTO: 34 10*3/MM3 (ref 140–500)
PMV BLD AUTO: 10.1 FL (ref 6–12)
POTASSIUM BLD-SCNC: 3.4 MMOL/L (ref 3.5–5.2)
RBC # BLD AUTO: 3.79 10*6/MM3 (ref 3.9–5.2)
SODIUM BLD-SCNC: 139 MMOL/L (ref 136–145)
URATE SERPL-MCNC: 1.8 MG/DL (ref 2.4–5.7)
WBC NRBC COR # BLD: 1.4 10*3/MM3 (ref 4.5–10.7)

## 2018-05-11 PROCEDURE — 85025 COMPLETE CBC W/AUTO DIFF WBC: CPT | Performed by: INTERNAL MEDICINE

## 2018-05-11 PROCEDURE — 83615 LACTATE (LD) (LDH) ENZYME: CPT | Performed by: INTERNAL MEDICINE

## 2018-05-11 PROCEDURE — 84550 ASSAY OF BLOOD/URIC ACID: CPT | Performed by: INTERNAL MEDICINE

## 2018-05-11 PROCEDURE — 80069 RENAL FUNCTION PANEL: CPT | Performed by: INTERNAL MEDICINE

## 2018-05-11 PROCEDURE — 25010000002 TBO-FILGRASTIM 300 MCG/0.5ML SOLUTION PREFILLED SYRINGE: Performed by: INTERNAL MEDICINE

## 2018-05-11 PROCEDURE — 99233 SBSQ HOSP IP/OBS HIGH 50: CPT | Performed by: INTERNAL MEDICINE

## 2018-05-11 PROCEDURE — 97110 THERAPEUTIC EXERCISES: CPT

## 2018-05-11 RX ORDER — LEVOFLOXACIN 500 MG/1
500 TABLET, FILM COATED ORAL DAILY
Status: DISCONTINUED | OUTPATIENT
Start: 2018-05-11 | End: 2018-05-14

## 2018-05-11 RX ORDER — NYSTATIN 100000 [USP'U]/G
POWDER TOPICAL EVERY 8 HOURS SCHEDULED
Status: DISCONTINUED | OUTPATIENT
Start: 2018-05-11 | End: 2018-05-16 | Stop reason: HOSPADM

## 2018-05-11 RX ADMIN — TBO-FILGRASTIM 300 MCG: 300 INJECTION, SOLUTION SUBCUTANEOUS at 11:12

## 2018-05-11 RX ADMIN — GABAPENTIN 600 MG: 300 CAPSULE ORAL at 21:38

## 2018-05-11 RX ADMIN — PANTOPRAZOLE SODIUM 40 MG: 40 TABLET, DELAYED RELEASE ORAL at 05:16

## 2018-05-11 RX ADMIN — LISINOPRIL 30 MG: 20 TABLET ORAL at 21:39

## 2018-05-11 RX ADMIN — NYSTATIN: 100000 POWDER TOPICAL at 20:05

## 2018-05-11 RX ADMIN — HYDRALAZINE HYDROCHLORIDE 25 MG: 25 TABLET, FILM COATED ORAL at 21:39

## 2018-05-11 RX ADMIN — LISINOPRIL 30 MG: 20 TABLET ORAL at 09:17

## 2018-05-11 RX ADMIN — DOCUSATE SODIUM -SENNOSIDES 2 TABLET: 50; 8.6 TABLET, COATED ORAL at 21:39

## 2018-05-11 RX ADMIN — AMLODIPINE BESYLATE 10 MG: 10 TABLET ORAL at 09:17

## 2018-05-11 RX ADMIN — HYDRALAZINE HYDROCHLORIDE 25 MG: 25 TABLET, FILM COATED ORAL at 05:16

## 2018-05-11 RX ADMIN — Medication 5 MG: at 21:39

## 2018-05-11 RX ADMIN — METOPROLOL TARTRATE 50 MG: 50 TABLET, FILM COATED ORAL at 21:39

## 2018-05-11 RX ADMIN — NYSTATIN: 100000 POWDER TOPICAL at 21:40

## 2018-05-11 RX ADMIN — PHENYTOIN 300 MG: 125 SUSPENSION ORAL at 21:38

## 2018-05-11 RX ADMIN — LEVOFLOXACIN 500 MG: 500 TABLET, FILM COATED ORAL at 14:16

## 2018-05-11 RX ADMIN — METOPROLOL TARTRATE 50 MG: 50 TABLET, FILM COATED ORAL at 09:17

## 2018-05-11 RX ADMIN — Medication 1000 MCG: at 09:17

## 2018-05-11 RX ADMIN — NYSTATIN: 100000 POWDER TOPICAL at 14:16

## 2018-05-11 RX ADMIN — HYDRALAZINE HYDROCHLORIDE 25 MG: 25 TABLET, FILM COATED ORAL at 14:17

## 2018-05-11 RX ADMIN — POTASSIUM CHLORIDE 40 MEQ: 1.5 POWDER, FOR SOLUTION ORAL at 21:38

## 2018-05-11 RX ADMIN — ALLOPURINOL 300 MG: 300 TABLET ORAL at 09:17

## 2018-05-11 RX ADMIN — POTASSIUM CHLORIDE 40 MEQ: 1.5 POWDER, FOR SOLUTION ORAL at 09:17

## 2018-05-11 RX ADMIN — ACETAMINOPHEN 650 MG: 325 TABLET ORAL at 21:47

## 2018-05-11 NOTE — PLAN OF CARE
Problem: Patient Care Overview  Goal: Plan of Care Review   05/11/18 1145   Coping/Psychosocial   Plan of Care Reviewed With patient   Plan of Care Review   Progress improving   OTHER   Outcome Summary Pt progressing w ambulation distance today showing increased endurance. PT educated pt to ambulate w nsg 2 more times today.

## 2018-05-11 NOTE — THERAPY TREATMENT NOTE
Acute Care - Physical Therapy Treatment Note   [facility]     Patient Name: Martina Blake  : 1940  MRN: [id number]  Today's Date: 2018     Date of Referral to PT: 18  Referring Physician: Byron    Admit Date: 2018    Visit Dx:    ICD-10-CM ICD-9-CM   1. Lymphoma of lymph nodes of neck, unspecified lymphoma type C85.91 202.81   2. Weakness R53.1 780.79   3. Hypercalcemia E83.52 275.42   4. Hypertensive urgency I16.0 401.9   5. 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 C83.39 202.80     Patient Active Problem List   Diagnosis   • Blood glucose abnormal   • Dyslipidemia   • Gastroesophageal reflux disease   • Fatigue   • Generalized osteoarthritis   • Chronic recurrent major depressive disorder   • Osteoporosis   • Restless legs syndrome   • Seizure disorder   • Vitamin D deficiency   • Bradycardia   • Essential hypertension   • Post-menopause on HRT (hormone replacement therapy)   • Chronic seasonal allergic rhinitis   • Paroxysmal atrial fibrillation   • Iron deficiency anemia due to chronic blood loss   • Acute superficial gastritis without hemorrhage   • Hiatal hernia   • Esophagitis   • Diverticulosis of large intestine without hemorrhage   • Dizziness   • Weakness   • Hypertension   • Hypercalcemia   • Liver mass   • Lymphoma   • Lymphoma of lymph nodes of neck   • Leukemoid reaction   • Insomnia   • Anemia associated with chemotherapy   • Pancytopenia due to antineoplastic chemotherapy   • Chemotherapy-induced neutropenia       Therapy Treatment          Rehabilitation Treatment Summary     Row Name 18 1144             Treatment Time/Intention    Discipline physical therapist  -MD      Document Type therapy note (daily note)  -MD      Subjective Information no complaints  -MD      Mode of Treatment physical therapy  -MD      Therapy Frequency (PT Clinical Impression) daily  -MD      Existing Precautions/Restrictions fall  -MD       Recorded by [MD] Malissa Oliver, PT 05/11/18 1145 05/11/18 1145      Row Name 05/11/18 1144             Cognitive Assessment/Intervention- PT/OT    Orientation Status (Cognition) oriented x 4  -MD      Personal Safety Interventions gait belt  -MD      Recorded by [MD] Malissa Oliver, PT 05/11/18 1145 05/11/18 1145      Row Name 05/11/18 1144             Bed Mobility Assessment/Treatment    Supine-Sit [place] (Bed Mobility) supervision  -MD      Sit-Supine [place] (Bed Mobility) supervision  -MD      Recorded by [MD] Malissa Oliver, PT 05/11/18 1145 05/11/18 1145      Row Name 05/11/18 1144             Transfer Assessment/Treatment    Sit-Stand [place] (Transfers) stand by assist  -MD      Stand-Sit [place] (Transfers) stand by assist  -MD      Recorded by [MD] Malissa Oliver, PT 05/11/18 1145 05/11/18 1145      Row Name 05/11/18 1144             Sit-Stand Transfer    Assistive Device (Sit-Stand Transfers) walker front-wheeled  -MD      Recorded by [MD] Malissa Oliver, PT 05/11/18 1145 05/11/18 1145      Row Name 05/11/18 1144             Stand-Sit Transfer    Assistive Device (Stand-Sit Transfers) walker front-wheeled  -MD      Recorded by [MD] Malissa Oliver, PT 05/11/18 1145 05/11/18 1145      Row Name 05/11/18 1144             Gait/Stairs Assessment/Training    [place] Level (Gait) contact guard  -MD      Assistive Device (Gait) walker, front-wheeled  -MD      Distance in Feet (Gait) 150  -MD      Pattern (Gait) step-through  -MD      Deviations/Abnormal Patterns (Gait) gait speed decreased  -MD      Recorded by [MD] Malissa Oliver, PT 05/11/18 1145 05/11/18 1145      Row Name 05/11/18 1144             Positioning and Restraints    Pre-Treatment Position in bed  -MD      Post Treatment Position chair  -MD      In Chair sitting;call light within reach;reclined  -MD      Recorded by [MD] Malissa Oliver, PT 05/11/18 1145 05/11/18 1145      Row Name 05/11/18 1144             Pain Scale: Numbers Pre/Post-Treatment    Pain Scale:  Numbers, Pretreatment 0/10 - no pain  -MD      Recorded by [MD] Malissa Oliver, PT 05/11/18 1145 05/11/18 1145      Row Name                Wound 05/01/18 1057 Left neck incision    Wound - Properties Group Date first assessed: 05/01/18 [PEARL] Time first assessed: 1057 [PEARL] Side: Left [PEARL] Location: neck [PEARL] Type: incision [PEARL] Recorded by:  [PEARL] Magaly Almonte RN 05/01/18 1057 05/01/18 1057    Row Name                Wound 05/04/18 0838 Right chest incision    Wound - Properties Group Date first assessed: 05/04/18 [SM] Time first assessed: 0838 [SM] Side: Right [SM] Location: chest [SM] Type: incision [SM] Recorded by:  [SM] Scott Cole RN 05/04/18 0838 05/04/18 0838      User Key  (r) = Recorded By, (t) = Taken By, (c) = Cosigned By    Initials Name Effective Dates Discipline    PEARL Magaly Almonte RN 06/16/16 -  Nurse    MD Malissa Oliver, PT 04/03/18 -  PT    JEREMIAH Cole RN 06/16/16 -  Nurse          Wound 05/01/18 1057 Left neck incision (Active)   Dressing Appearance dry;intact;no drainage 5/11/2018  9:24 AM   Closure RYAN 5/11/2018  9:24 AM   Base dry;clean 5/11/2018  9:24 AM   Periwound Temperature warm 5/11/2018  9:24 AM   Periwound Skin Turgor soft 5/11/2018  9:24 AM   Drainage Amount none 5/11/2018  9:24 AM   Dressing Care, Wound transparent film 5/11/2018  9:24 AM       Wound 05/04/18 0838 Right chest incision (Active)   Dressing Appearance dry;intact;no drainage 5/11/2018  9:24 AM   Closure RYAN 5/11/2018  9:24 AM   Base clean;dry 5/11/2018  9:24 AM   Drainage Amount none 5/11/2018  9:24 AM   Dressing Care, Wound transparent film 5/11/2018  9:24 AM             Physical Therapy Education     Title: PT OT SLP Therapies (Done)     Topic: Physical Therapy (Done)     Point: Mobility training (Done)    Learning Progress Summary     Learner Status Readiness Method Response Comment Documented by    Patient Done Acceptance E,D VU,DU  PC 05/06/18 1416     Done Acceptance PAVEL BENAVIDES 05/03/18 0206     Done  Acceptance E,TB VU   05/02/18 0248     Done Acceptance E VU  EJ 04/29/18 1554     Done Acceptance E,TB,D VU,NR   04/28/18 1733     Done Acceptance E,TB VU,DU  CW 04/27/18 1544     Done Acceptance E,TB VU,NR role of PT, benefits of activity, use of AD, safety GR 04/22/18 1311    Family Done Acceptance E,TB,D VU,NR   04/28/18 1733     Done Acceptance E,TB VU,NR role of PT, benefits of activity, use of AD, safety GR 04/22/18 1311          Point: Home exercise program (Done)    Learning Progress Summary     Learner Status Readiness Method Response Comment Documented by    Patient Done Acceptance E,D VU,DU  PC 05/06/18 1416     Done Acceptance E,TB VU   05/03/18 0206     Done Acceptance E,TB VU   05/02/18 0248     Done Acceptance E,TB,D VU,NR   04/28/18 1733     Done Acceptance E,TB VU,DU  CW 04/27/18 1544     Done Acceptance E,TB VU,NR role of PT, benefits of activity, use of AD, safety GR 04/22/18 1311    Family Done Acceptance E,TB,D VU,NR   04/28/18 1733     Done Acceptance E,TB VU,NR role of PT, benefits of activity, use of AD, safety GR 04/22/18 1311          Point: Body mechanics (Done)    Learning Progress Summary     Learner Status Readiness Method Response Comment Documented by    Patient Done Acceptance E,D VU,DU  PC 05/06/18 1416     Done Acceptance E,TB VU   05/03/18 0206     Done Acceptance E,TB VU   05/02/18 0248     Done Acceptance E,TB,D VU,NR   04/28/18 1733     Done Acceptance E,TB VU,DU  CW 04/27/18 1544     Done Acceptance E,TB VU,NR role of PT, benefits of activity, use of AD, safety GR 04/22/18 1311    Family Done Acceptance E,TB,D VU,NR   04/28/18 1733     Done Acceptance E,TB VU,NR role of PT, benefits of activity, use of AD, safety GR 04/22/18 1311          Point: Precautions (Done)    Learning Progress Summary     Learner Status Readiness Method Response Comment Documented by    Patient Done Acceptance E VU  MD 05/11/18 1141     Done Acceptance MAKAYLA MILLS MD 05/10/18 9283      Done Acceptance E VU PT educated pt to ambulate in hallway with CNA or RN w RWx while at [facility] multiple times daily.  Pt states understanding. MD 05/09/18 1402     Done Acceptance E VU  MD 05/08/18 1422     Done Acceptance E VU  MD 05/07/18 1325     Done Acceptance E,D VU,DU  PC 05/06/18 1416     Done Acceptance E,TB VU  BS 05/03/18 0206     Done Acceptance E,TB VU  BS 05/02/18 0248     Done Acceptance E VU  MD 04/30/18 1615     Done Acceptance E,TB,D VU,NR   04/28/18 1733     Done Acceptance E,TB VU,DU   04/27/18 1544     Done Acceptance E,TB VU,NR role of PT, benefits of activity, use of AD, safety  04/22/18 1311    Family Done Acceptance E,TB,D VU,NR   04/28/18 1733     Done Acceptance E,TB VU,NR role of PT, benefits of activity, use of AD, safety  04/22/18 1311                      User Key     Initials Effective Dates Name Provider Type Discipline     10/03/16 -  Aubrey Del Rosario, PT Physical Therapist PT    PC 04/03/18 -  Rosette Navarrete, PT Physical Therapist PT    MD 04/03/18 -  Malissa Oliver, PT Physical Therapist PT     03/07/18 -  Linda Cole, PTA Physical Therapy Assistant PT     06/16/16 -  Ara Gibson, RN Registered Nurse Nurse     04/03/18 -  Alma Delia Cazares, PT Physical Therapist PT     03/07/18 -  Myke Burrell PTA Physical Therapy Assistant PT                    PT Recommendation and Plan  Therapy Frequency (PT Clinical Impression): daily  Plan of Care Reviewed With: patient  Progress: improving  Outcome Summary: Pt progressing w ambulation distance today showing increased endurance.  PT educated pt to ambulate w nsg 2 more times today.          Outcome Measures     Row Name 05/11/18 1100 05/10/18 1200 05/09/18 1400       How much help from another person do you currently need...    Turning from your back to your side while in flat bed without using bedrails? 4  -MD 4  -MD 4  -MD    Moving from lying on back to sitting on the side of a flat bed without bedrails? 4   -MD 4  -MD 4  -MD    Moving to and from a bed to a chair (including a wheelchair)? 3  -MD 3  -MD 3  -MD    Standing up from a chair using your arms (e.g., wheelchair, bedside chair)? 3  -MD 3  -MD 3  -MD    Climbing 3-5 steps with a railing? 3  -MD 3  -MD 3  -MD    To walk in hospital room? 3  -MD 3  -MD 3  -MD    AM-PAC 6 Clicks Score 20  -MD 20  -MD 20  -MD       Functional Assessment    Outcome Measure Options AM-PAC 6 Clicks Basic Mobility (PT)  -MD AM-PAC 6 Clicks Basic Mobility (PT)  -MD AM-PAC 6 Clicks Basic Mobility (PT)  -MD    Row Name 05/08/18 1400             How much help from another person do you currently need...    Turning from your back to your side while in flat bed without using bedrails? 4  -MD      Moving from lying on back to sitting on the side of a flat bed without bedrails? 4  -MD      Moving to and from a bed to a chair (including a wheelchair)? 3  -MD      Standing up from a chair using your arms (e.g., wheelchair, bedside chair)? 3  -MD      Climbing 3-5 steps with a railing? 3  -MD      To walk in hospital room? 3  -MD      AM-PAC 6 Clicks Score 20  -MD         Functional Assessment    Outcome Measure Options AM-PAC 6 Clicks Basic Mobility (PT)  -MD        User Key  (r) = Recorded By, (t) = Taken By, (c) = Cosigned By    Initials Name Provider Type    MD Malissa Oliver PT Physical Therapist           Time Calculation:         PT Charges     Row Name 05/11/18 1143             Time Calculation    Start Time 1137  -MD      Stop Time 1147  -MD      Time Calculation (min) 10 min  -MD      PT Received On 05/11/18  -MD      PT - Next Appointment 05/12/18  -MD        User Key  (r) = Recorded By, (t) = Taken By, (c) = Cosigned By    Initials Name Provider Type    MD Malissa Oliver PT Physical Therapist          Therapy Charges for Today     Code Description Service Date Service Provider Modifiers Qty    05252053923 HC PT THER PROC EA 15 MIN 5/10/2018 Malissa Oliver, PT GP 1    76622085435 HC PT THER PROC EA  15 MIN 5/11/2018 Malissa Oliver, PT  1          PT G-Codes  PT Professional Judgement Used?: Yes  Outcome Measure Options: AM-PAC 6 Clicks Basic Mobility (PT)  Score: 13  Functional Limitation: Mobility: Walking and moving around  Mobility: Walking and Moving Around Current Status (): At least 60 percent but less than 80 percent impaired, limited or restricted  Mobility: Walking and Moving Around Goal Status (): At least 40 percent but less than 60 percent impaired, limited or restricted    Malissa Oliver, PT  5/11/2018

## 2018-05-11 NOTE — PROGRESS NOTES
"CHIEF COMPLAINT:  Anemia, hypercalcemia, radiographic findings of malignancy with pathology/ diagnosis on the liver consistent with diffuse large B-cell lymphoma FISH for c-myc, bcl-2 and bcl-6 pending    Interval History:  The patient underwent an excisional lymph node biopsy from the left neck this morning for confirmation of pathology.  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  Additional FISH studies are pending to evaluate for \"double hit\" lymphoma as Ki 67 staining is 4+     On 05/02/2018, actually the patient was feeling better.  She had no issues in regard to the site of the lymph node biopsy in the left side of the neck with minimal discomfort.  I talked with Dr. Gill in regard to port placement tomorrow.      Hopefully, we will have further information in regard to “double-hit” lymphoma before we start any chemotherapy.  If that is the case, the patient will require infusional EPOCH/Rituxan.     Other than that, I went through formal education and teaching in regard to chemotherapy medicines, the frequency of the treatment, side effects and so forth; please review below.    The patient will have a PET scan this afternoon as well and the port will be placed tomorrow.  On 05/03/2018 the patient was feeling fatigued and tired from so many testings back and forth. She had her PET scan this morning and there is planning for port placement tomorrow. I asked the patient to bring her family this afternoon to talk with them about the diagnosis and decide how they want to proceed. I pointed out to the patient as well that the absence of treatment life expectancy is measured in a question of a few weeks to a few months.     I also discussed with her the finding on the PET scan today that will be described below and also the finding of the cervical lymph node that will be described below.     On 5/7/2018, " we started the patient on intravenous Venofer.  Patient has worsening hemoglobin 7.4.  She tolerated Venofer without any incident.     On 5/8/2018 laboratory study showed worsening hemoglobin at 6.3, she reports no bleeding.  Her WBC is trending down at 47,200 as well as neutrophils 47,000. She has good appetite no nausea vomiting.  No cough no dyspnea.  Patient was given 2 units PRBC transfusion.  She got a second dose of Venofer (total 600 mg).      On 5/9/2018, laboratory study showed improved the hemoglobin is 7.9.  However patient reported she was dyspneic and that having lightheadedness when she walked in the hallway.  She reports no nausea vomiting.  No bleeding. Her nurse reported patient also had elevated blood pressure.  She got intravenous hydralazine.  She already is on amlodipine, lisinopril and metoprolol.  Due to symptomatic anemia, patient was given 2 units more units PRBC transfusion.    On 5/10/2018 patient reports more fatigue.  She denies bleeding.  She reports good appetite.  She has bowel movement daily.  Laboratory study today reported worsening platelets at a 55,000.  Her hemoglobin improved at 10.8, and a normal WBC 9800 including neutrophils 9200.  Her WBC counts actually is deteriorating every day despite on Gnanix injection.     On 5/11/2018, patient complains of swelling in the legs.  Denies oral mucositis/soreness, no nausea vomiting.  She reports no fever no sweating no chills.  Had a regular bowel movement.  No evidence of bleeding no bruising.  Her laboratory study showed a significant drop of WBC at 1400 and platelets is further therapy now it is 34,000.  Hemoglobin is 10.5,  stable after transfusion on 5/9/2018.     Oncology History:  Martina Blake is a 77 y.o. female who were asked to see in consultation  4/29/18 for hypercalcemia, anemia and radiologic findings consistent with likely malignancy-?  Lymphoma.        She has a significant past medical history of palpitations  followed by cardiology.  She presented to the emergency room April 10-14 with chest pain and palpitations and was found to be in A. fib with RVR and also demonstrated electrolyte abnormalities and anemia.  Records demonstrates that her anemia became particularly evident April 11 with an H&H of 8.9/ 28.4, platelet count 212,000 with a normal automated differential.  She underwent GI assessment including upper and lower endoscopy demonstrated hernia, gastritis, esophagitis, diverticulosis but no evidence of acute bleed.  She was placed on Eliquis as result of a relatively high CHADSVASc score.  She had also been found to be hypercalcemic at approximately 11 with HCTZ altered and the patient started on Aldactone.  Additional testing had included a ferritin of 313.1, iron of 32 with TIBC of 222 and iron saturation of 14, occult blood negative per stool testing    She was brought back to the emergency room April 21 with further evidence of hypercalcemia, associated weakness, anorexia, nausea, ataxia and weight loss.  MRI of the brain April 21 was found to be unremarkable. Outpatient intact PTH levels were found to be 7.1(reduced), and an H&H of 10.3 and 32.9, white count 11,090, platelet count 267,000 with a normal differential.  Patient seen by renal medicine with the possibility of Fanconi syndrome raised.  Thereafter PTH hormone was found be less than 2.0, and protein electropheresis included IgG of 948, IgA of 207, IgM 86, total protein 6.3, albumin 2.8, no M spike noted, slightly elevated free kappa and lambda light chains with normal ratio.  Repeat testing per iron profile again revealed low serum iron but high serum ferritin and normal CEA, normal AFP, CA-125 of 77.2, CA 19-9 7.8   At this point the reason for her hypercalcemia was thought to be possible medication effect and/or possible malignancy with calcium was running between 12 and 13 mg/dL.    This led to CT scan of chest abdomen pelvis performed April 24  "demonstrate extensive metastatic disease throughout the abdomen and pelvis particularly involving the spleen and liver, extensive lymphadenopathy at the abdomen and pelvis with a 4 cm lesion splenic hilum partially encasing the pancreatic tail, pancreatic tail malignancy?,  Gastric primary malignancy?  There was a mass along the right wall of the urinary bladder with adjacent adenopathy.  CT of the chest revealed several tiny dense pleural-based nodular opacities-partially calcified granulomas, no mediastinal or hilar adenopathy, enlarged pulmonary arteries consistent with pulmonary arterial hypertension.  A subsequent bone scan performed April 26 revealed prominent uptake in the right frontal bone and right posterior ninth rib and a CT needle biopsy of the liver was obtained April 26 as well.The sample obtained revealed, on flow cytometry examination, a subpopulation of normal B cells that might be consistent with B-cell lymphoma.  Additional testing is currently pending.  On May 25 further hypercalcemia was again noted and treated with IV fluids and dose of Zometa, vitamin D level normal.  Again evidence of hypokalemia, hypophosphatemia and hypomagnesemia.  The patient is seen April 29 her calcium level has gradually improved dropping to 9.7, albumin of 2.6, ionized calcium of 6.1, BUN and creatinine of 10/.55.  We are asked to see her with results available thus far as to possible lymphoma.    Past Medical History:     Atrial fibrillation      • Cystitis     • Cystocele       moderate   • Dyslipidemia     • Esophageal reflux     • Fatigue     • History of transfusion       no reaction   • Hypertension     • Major depression, chronic     • Menopause     • Osteoarthritis     • Osteoporosis     • Palpitations     • Post menopausal problems     • RLS (restless legs syndrome)     • Seizure disorder     • Subjective tinnitus     • Vaginal delivery       x2  \"SONYA\"      \"JASON\"   • Vitamin D deficiency      Social " History:  The patient is a  and has 2 children.  She has never smoked.  She denies alcohol use.    Review of Systems   Constitutional: Positive for fatigue. Negative for activity change, appetite change and unexpected weight change.   HENT: Negative.    Eyes: Negative.    Respiratory: Positive for shortness of breath (Has exertional dyspnea). Negative for wheezing.    Cardiovascular: Negative.  Negative for chest pain and palpitations.   Gastrointestinal: Negative.  Negative for abdominal pain, constipation, nausea and vomiting.   Endocrine: Negative.    Genitourinary: Negative.    Musculoskeletal: Negative for neck stiffness.   Skin: Negative.  Negative for color change and pallor.   Neurological: Positive for weakness. Negative for seizures, numbness and headaches.   Hematological: Positive for adenopathy.   Psychiatric/Behavioral: Negative for confusion.          Medications:  The current medication list was reviewed in the EMR    ALLERGIES:    Allergies   Allergen Reactions   • Crab (Diagnostic) Itching and Rash   • Pseudoephedrine Dizziness       Objective    Vitals:    05/10/18 2353 05/10/18 2357 05/11/18 0446 05/11/18 0922   BP: 180/82  (!) 187/79 170/70   BP Location: Left arm  Left arm Left arm   Patient Position: Lying  Lying Sitting   Pulse:  58 58    Resp:   16    Temp:   98.3 °F (36.8 °C)    TempSrc:   Oral    SpO2:   96%    Weight:   72.1 kg (158 lb 15.2 oz)    Height:       ECOG 3       Physical Exam    GENERAL:  Well-developed, well-nourished female patient lay in bed in no acute distress.   SKIN:  Warm, dry without rashes, purpura or petechiae.  HEENT:  conjunctivas non injected.  Mouth mucosa moist, no exudates in oropharynx.  NECK:  no thyromegaly. Left neck has a lymph node about 2 cm, no tenderness.  No other cervical adenopathies.   LYMPHATICS:  No other cervical, supraclavicular, axillary adenopathy.  CHEST:  Lungs clear to auscultation, normal breath sounds bilaterally, no wheezing,  "crackles or ronchi.  CARDIAC AND VASCULAR: normal rate and regular rhythm, without murmurs.    ABDOMEN:  Soft, nontender with spleen 4 cm blcm organomegaly, palpable liver 4 cm brcm., no ascites, no collateral circulation, no distention, no abdominal pain, Normal bowel sounds.   EXTREMITIES  AND SPINE:  No clubbing, cyanosis or edema, no deformities or pain.      NEUROLOGICAL:  Patient is AAOx3.     RECENT LABS:      Results from last 7 days  Lab Units 05/11/18  0405 05/10/18  0556 05/09/18  0603   WBC 10*3/mm3 1.40* 9.80 29.15*   HEMOGLOBIN g/dL 10.5* 10.8* 7.9*   HEMATOCRIT % 32.0* 32.8* 24.3*   PLATELETS 10*3/mm3 34* 55* 98*   MONOCYTES % %  --  2.0*  --      Lab Results   Component Value Date    NEUTROABS 9.21 (H) 05/10/2018       Results from last 7 days  Lab Units 05/11/18  0405 05/10/18  0556 05/09/18  0603 05/08/18  0554 05/07/18  0536   SODIUM mmol/L 139 139 139 140 137   POTASSIUM mmol/L 3.4* 3.2* 3.4* 3.8 4.2   CHLORIDE mmol/L 101 102 103 105 103   CO2 mmol/L 28.3 28.7 27.2 25.7 24.8   BUN mg/dL 20 21 27* 31* 30*   CREATININE mg/dL 0.45* 0.54* 0.64 0.70 0.92   CALCIUM mg/dL 10.5 10.5 10.7* 10.4 11.3*   BILIRUBIN mg/dL  --   --  0.7 0.6 0.4   ALK PHOS U/L  --   --  81 76 81   ALT (SGPT) U/L  --   --  12 8 9   AST (SGOT) U/L  --   --  23 24 29   GLUCOSE mg/dL 96 95 83 82 116*      on 5/11/2018, 319 on 5/10/2018, 531 on 5/6/2018 and 678 on 5/5/2018    Uric acid 1.8 on 5/11/2018, 1.2 on 5/8/2018    Assessment/Plan :     1.  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  Additional FISH studies are POSITIVE for \"double hit\" lymphoma as Ki 67 staining is 4+     She has extensive metastatic disease throughout the abdomen and pelvis, splenomegaly replaced with low attenuation lesions, 4.4 cm splenic hilum mass, disease encasing the pancreatic tail, 3 cm lesion abutting the stomach, extensive lymphadenopathy throughout the celiac axis, retroperitoneum, mesentery, periaortic lymphadenopathy, " liver lesion in the posterior segment 4.8 cm, nodular thickening of the right wall of urinary bladder 5.0 cm, peritoneal lymphadenopathy and thickening, coarsely calcified mesenteric mass 9.6 cm (?old mesenteric adenitis).  Bone scan shows foci of uptake in the frontal bone and right posterior ninth rib.      The patient has had B symptoms with decreased performance status, and weight loss, anorexia.    She was started on chemotherapy with R-CHOP on 5/4/2018.  She tolerated chemotherapy relatively well.     2.  Hypercalcemia of malignancy:  The patient received Zometa 4 mg on 4/25/18.  Her calcium temporarily improved but is beginning to elevate again to 11.3 with albumin 2.9.  Hypercalcemia is likely to be an ongoing issue until she responded to chemotherapy.      Patient received a second dose of Zometa on 5/7/2018.  Laboratory study today reporte calcium 10.5.  We'll continue to monitor.     3.  Pancytopenia secondary to chemotherapy.     This patient now has severe leukocytopenia/neutropenia, despite daily Granix  Injection, her WBC dropped from peak level 67,000 to current 1400 within 4 days.  Her WBC may further decrease in the next several days.  We'll continue Granix injection daily.       We'll start the patient on Levaquin 500 mg daily for prophylaxis of bacterial infection due to severe neutropenia.    4.   Severe thrombocytopenia secondary to chemotherapy.  Her platelets drop quickly.  It dropped from baseline level to 236,000 on 5/7/2018 to today's 34,000 within 4 days.  Expecting it will further decreased tomorrow.  Patient reports no bleeding.  This needs to be monitored daily.     5.  Anemia:  Had a pre-existing anemia but it worsened with chemotherapy.  Her baseline anemia was probably inflammatory/malignancy associated (elevated ferritin, low transferrin, elevated ESR) versus bone marrow involvement.   We do not think a bone marrow exam would change her treatment plan.      Patient was given pRBC  transfusion 4 units in the past 4 days due to the tomasz hemoglobin 6.3 on 5/8/2018 and she was symptomatic.  Her hemoglobin improved to 10.8 on 5/10/2018.  Stable at 10.5 today.     Reviewing her iron study from 4/24/2018, I do believe this patient also had elements of iron deficiency.  She received intravenous iron Venofer 300 mg for 2 doses on 5/7/2018 and 5/8/2018, total 600 mg.      She has normal folic acid level and vitamin B12 level this morning on 5/8/2018, however her B12 level can be further improved.  She was started on oral B12 supplementation once a day.      6.  Paroxysmal atrial fibrillation: The patient was anticoagulated with Eliquis, which has been on hold since admission.  Her platelet count is coming down quickly after chemotherapy.  Her platelets is coming down at 55,000 on 5/10/2018 and a further down to 34,000 on 5/11/2018.  We prefer not to start this patient back on Eliquis at this point.     7. Prophylaxis of tumor lysis syndrome.  Her uric acid has being had a low level.  Her LDH has coming down nicely also.  Patient will be continued on allopurinol.    8.  GI prophylaxis on Protonix.     9.  Insomnia.  Patient reports he was not able to sleep the last 4 out of 5 days.  At home she used to take 2 tablets of gabapentin and 5 mg melatonin before sleep.      Patient was started on gabapentin and the melatonin on 5/7/2018.  She reported had a significant improvement of sleep in the night.     10.  Significantly elevated blood pressure.  Dr. Denney manages her medication for BP control.     11.  No extremity edema developed within the past day.  Examination shows no calf tenderness.  We'll add a RUDOLPH hose.    12.  Discharge plan.  Patient will need to continue physical/occupational therapy.  Patient prefers to be discharged to home.   has been following patient.       PLAN:   1.  Continue Granix 300 mg daily.    2.  And Levaquin 500 mg daily for prophylaxis.   3.  Continue  allopurinol for tumor lysis syndrome prophylaxis.   4.   Continue current medicine for management of hypertension.   5.  Continue gabapentin and melatonin.   6.  Continue oral vitamin B12 at 1000 µg daily.   7. Add RUDOLPH HOSE to both legs.   8.  Continue supportive care.   9.  CBC with differentiation in morning.     Discussed with the patient today.  I discussed with his nurse today.      JABARI RAMIREZ M.D., Ph.D.      5/11/2018      CC:

## 2018-05-11 NOTE — PROGRESS NOTES
Continued Stay Note   [facility]     Patient Name: Martina Blake  MRN: [id number]  Today's Date: 5/11/2018    Admit Date: 4/21/2018          Discharge Plan     Row Name 05/11/18 1522       Plan    Plan Home with [facility]    Patient/Family in Agreement with Plan yes    Plan Comments Discussed case with Dr. Burt. States no plan for discharge this weekend. Discharge plans unchanged. Plans dc home with family and [facility]. [facility] following for walker. CCP will continue to follow.              Discharge Codes    No documentation.       Expected Discharge Date and Time     Expected Discharge Date Expected Discharge Time    May 14, 2018             Jaquelin Atkins RN

## 2018-05-11 NOTE — PLAN OF CARE
Problem: Patient Care Overview  Goal: Plan of Care Review  Outcome: Ongoing (interventions implemented as appropriate)   05/11/18 0624   Coping/Psychosocial   Plan of Care Reviewed With patient   Plan of Care Review   Progress no change   OTHER   Outcome Summary Patient rested well throughout night, WBC back down to 1.4, unable to give potassium last night, vomited after received oral dose, will address again in the morning, will continue to monitor.       Problem: Oncology Care (Adult)  Goal: Signs and Symptoms of Listed Potential Problems Will be Absent, Minimized or Managed (Oncology Care)  Outcome: Ongoing (interventions implemented as appropriate)

## 2018-05-12 LAB
ACANTHOCYTES BLD QL SMEAR: ABNORMAL
ALBUMIN SERPL-MCNC: 3 G/DL (ref 3.5–5.2)
ANION GAP SERPL CALCULATED.3IONS-SCNC: 11.6 MMOL/L
ANION GAP SERPL CALCULATED.3IONS-SCNC: 12.9 MMOL/L
BASOPHILS NFR BLD AUTO: 2 % (ref 0–1.5)
BUN BLD-MCNC: 14 MG/DL (ref 8–23)
BUN BLD-MCNC: 16 MG/DL (ref 8–23)
BUN/CREAT SERPL: 25 (ref 7–25)
BUN/CREAT SERPL: 28.1 (ref 7–25)
CALCIUM SPEC-SCNC: 10.7 MG/DL (ref 8.6–10.5)
CALCIUM SPEC-SCNC: 10.8 MG/DL (ref 8.6–10.5)
CHLORIDE SERPL-SCNC: 97 MMOL/L (ref 98–107)
CHLORIDE SERPL-SCNC: 99 MMOL/L (ref 98–107)
CO2 SERPL-SCNC: 27.1 MMOL/L (ref 22–29)
CO2 SERPL-SCNC: 28.4 MMOL/L (ref 22–29)
CREAT BLD-MCNC: 0.56 MG/DL (ref 0.57–1)
CREAT BLD-MCNC: 0.57 MG/DL (ref 0.57–1)
DEPRECATED RDW RBC AUTO: 46.8 FL (ref 37–54)
ELLIPTOCYTES BLD QL SMEAR: ABNORMAL
EOSINOPHIL # BLD MANUAL: 0.08 10*3/MM3 (ref 0–0.7)
EOSINOPHIL NFR BLD MANUAL: 36 % (ref 0.3–6.2)
ERYTHROCYTE [DISTWIDTH] IN BLOOD BY AUTOMATED COUNT: 15 % (ref 11.7–13)
GFR SERPL CREATININE-BSD FRML MDRD: 103 ML/MIN/1.73
GFR SERPL CREATININE-BSD FRML MDRD: 105 ML/MIN/1.73
GLUCOSE BLD-MCNC: 96 MG/DL (ref 65–99)
GLUCOSE BLD-MCNC: 96 MG/DL (ref 65–99)
HCT VFR BLD AUTO: 31.9 % (ref 35.6–45.5)
HELMET CELLS: ABNORMAL
HGB BLD-MCNC: 10.2 G/DL (ref 11.9–15.5)
LDH SERPL-CCNC: 243 U/L (ref 135–214)
LYMPHOCYTES # BLD MANUAL: 0.11 10*3/MM3 (ref 0.9–4.8)
LYMPHOCYTES NFR BLD MANUAL: 4 % (ref 5–12)
LYMPHOCYTES NFR BLD MANUAL: 48 % (ref 19.6–45.3)
MAGNESIUM SERPL-MCNC: 1.5 MG/DL (ref 1.6–2.4)
MAGNESIUM SERPL-MCNC: 2 MG/DL (ref 1.6–2.4)
MCH RBC QN AUTO: 27.3 PG (ref 26.9–32)
MCHC RBC AUTO-ENTMCNC: 32 G/DL (ref 32.4–36.3)
MCV RBC AUTO: 85.5 FL (ref 80.5–98.2)
MONOCYTES # BLD AUTO: 0.01 10*3/MM3 (ref 0.2–1.2)
NEUTROPHILS # BLD AUTO: 0.02 10*3/MM3 (ref 1.9–8.1)
NEUTROPHILS NFR BLD MANUAL: 10 % (ref 42.7–76)
PHOSPHATE SERPL-MCNC: 3.4 MG/DL (ref 2.5–4.5)
PLAT MORPH BLD: NORMAL
PLATELET # BLD AUTO: 21 10*3/MM3 (ref 140–500)
PMV BLD AUTO: ABNORMAL FL (ref 6–12)
POTASSIUM BLD-SCNC: 3.4 MMOL/L (ref 3.5–5.2)
POTASSIUM BLD-SCNC: 3.6 MMOL/L (ref 3.5–5.2)
RBC # BLD AUTO: 3.73 10*6/MM3 (ref 3.9–5.2)
SCAN SLIDE: NORMAL
SODIUM BLD-SCNC: 137 MMOL/L (ref 136–145)
SODIUM BLD-SCNC: 139 MMOL/L (ref 136–145)
TARGETS BLD QL SMEAR: ABNORMAL
URATE SERPL-MCNC: 1.7 MG/DL (ref 2.4–5.7)
WBC MORPH BLD: NORMAL
WBC NRBC COR # BLD: 0.23 10*3/MM3 (ref 4.5–10.7)

## 2018-05-12 PROCEDURE — 25010000002 TBO-FILGRASTIM 300 MCG/0.5ML SOLUTION PREFILLED SYRINGE: Performed by: INTERNAL MEDICINE

## 2018-05-12 PROCEDURE — 85025 COMPLETE CBC W/AUTO DIFF WBC: CPT | Performed by: INTERNAL MEDICINE

## 2018-05-12 PROCEDURE — 63710000001 DIPHENHYDRAMINE PER 50 MG: Performed by: INTERNAL MEDICINE

## 2018-05-12 PROCEDURE — 80069 RENAL FUNCTION PANEL: CPT | Performed by: INTERNAL MEDICINE

## 2018-05-12 PROCEDURE — 83615 LACTATE (LD) (LDH) ENZYME: CPT | Performed by: INTERNAL MEDICINE

## 2018-05-12 PROCEDURE — 85007 BL SMEAR W/DIFF WBC COUNT: CPT | Performed by: INTERNAL MEDICINE

## 2018-05-12 PROCEDURE — 25010000002 MAGNESIUM SULFATE IN D5W 1G/100ML (PREMIX) 1-5 GM/100ML-% SOLUTION: Performed by: INTERNAL MEDICINE

## 2018-05-12 PROCEDURE — 83735 ASSAY OF MAGNESIUM: CPT | Performed by: INTERNAL MEDICINE

## 2018-05-12 PROCEDURE — 97110 THERAPEUTIC EXERCISES: CPT

## 2018-05-12 PROCEDURE — 84550 ASSAY OF BLOOD/URIC ACID: CPT | Performed by: INTERNAL MEDICINE

## 2018-05-12 PROCEDURE — 80048 BASIC METABOLIC PNL TOTAL CA: CPT | Performed by: INTERNAL MEDICINE

## 2018-05-12 PROCEDURE — 99233 SBSQ HOSP IP/OBS HIGH 50: CPT | Performed by: INTERNAL MEDICINE

## 2018-05-12 RX ORDER — FLUCONAZOLE 200 MG/1
200 TABLET ORAL DAILY
Status: DISCONTINUED | OUTPATIENT
Start: 2018-05-12 | End: 2018-05-12

## 2018-05-12 RX ORDER — FLUCONAZOLE 150 MG/1
150 TABLET ORAL DAILY
Status: COMPLETED | OUTPATIENT
Start: 2018-05-13 | End: 2018-05-14

## 2018-05-12 RX ORDER — MAGNESIUM SULFATE 1 G/100ML
1 INJECTION INTRAVENOUS ONCE
Status: COMPLETED | OUTPATIENT
Start: 2018-05-12 | End: 2018-05-12

## 2018-05-12 RX ADMIN — NYSTATIN: 100000 POWDER TOPICAL at 06:33

## 2018-05-12 RX ADMIN — METOPROLOL TARTRATE 50 MG: 50 TABLET, FILM COATED ORAL at 09:35

## 2018-05-12 RX ADMIN — LISINOPRIL 30 MG: 20 TABLET ORAL at 09:35

## 2018-05-12 RX ADMIN — Medication 5 MG: at 22:20

## 2018-05-12 RX ADMIN — LISINOPRIL 30 MG: 20 TABLET ORAL at 22:21

## 2018-05-12 RX ADMIN — DOCUSATE SODIUM -SENNOSIDES 2 TABLET: 50; 8.6 TABLET, COATED ORAL at 22:20

## 2018-05-12 RX ADMIN — GABAPENTIN 600 MG: 300 CAPSULE ORAL at 22:21

## 2018-05-12 RX ADMIN — NYSTATIN: 100000 POWDER TOPICAL at 14:23

## 2018-05-12 RX ADMIN — NYSTATIN: 100000 POWDER TOPICAL at 22:21

## 2018-05-12 RX ADMIN — POTASSIUM CHLORIDE 40 MEQ: 1.5 POWDER, FOR SOLUTION ORAL at 09:34

## 2018-05-12 RX ADMIN — PANTOPRAZOLE SODIUM 40 MG: 40 TABLET, DELAYED RELEASE ORAL at 06:33

## 2018-05-12 RX ADMIN — DIPHENHYDRAMINE HYDROCHLORIDE 25 MG: 25 CAPSULE ORAL at 01:26

## 2018-05-12 RX ADMIN — HYDRALAZINE HYDROCHLORIDE 25 MG: 25 TABLET, FILM COATED ORAL at 14:23

## 2018-05-12 RX ADMIN — PHENYTOIN 300 MG: 125 SUSPENSION ORAL at 22:19

## 2018-05-12 RX ADMIN — LEVOFLOXACIN 500 MG: 500 TABLET, FILM COATED ORAL at 09:35

## 2018-05-12 RX ADMIN — ACETAMINOPHEN 650 MG: 325 TABLET ORAL at 22:20

## 2018-05-12 RX ADMIN — METOPROLOL TARTRATE 50 MG: 50 TABLET, FILM COATED ORAL at 22:20

## 2018-05-12 RX ADMIN — MAGNESIUM SULFATE HEPTAHYDRATE 1 G: 1 INJECTION, SOLUTION INTRAVENOUS at 11:09

## 2018-05-12 RX ADMIN — TBO-FILGRASTIM 300 MCG: 300 INJECTION, SOLUTION SUBCUTANEOUS at 09:34

## 2018-05-12 RX ADMIN — ACETAMINOPHEN 650 MG: 325 TABLET ORAL at 15:36

## 2018-05-12 RX ADMIN — FLUCONAZOLE 200 MG: 200 TABLET ORAL at 12:42

## 2018-05-12 RX ADMIN — DIPHENHYDRAMINE HYDROCHLORIDE 25 MG: 25 CAPSULE ORAL at 22:20

## 2018-05-12 RX ADMIN — Medication 1000 MCG: at 09:35

## 2018-05-12 RX ADMIN — HYDRALAZINE HYDROCHLORIDE 25 MG: 25 TABLET, FILM COATED ORAL at 06:32

## 2018-05-12 RX ADMIN — AMLODIPINE BESYLATE 10 MG: 10 TABLET ORAL at 09:35

## 2018-05-12 RX ADMIN — POTASSIUM CHLORIDE 40 MEQ: 1.5 POWDER, FOR SOLUTION ORAL at 22:20

## 2018-05-12 RX ADMIN — ALLOPURINOL 300 MG: 300 TABLET ORAL at 09:35

## 2018-05-12 RX ADMIN — HYDRALAZINE HYDROCHLORIDE 25 MG: 25 TABLET, FILM COATED ORAL at 22:20

## 2018-05-12 NOTE — PROGRESS NOTES
"CHIEF COMPLAINT:  Anemia, hypercalcemia, radiographic findings of malignancy with pathology/ diagnosis on the liver consistent with diffuse large B-cell lymphoma FISH for c-myc, bcl-2 and bcl-6 pending    Interval History:  The patient underwent an excisional lymph node biopsy from the left neck this morning for confirmation of pathology.  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  Additional FISH studies are pending to evaluate for \"double hit\" lymphoma as Ki 67 staining is 4+     On 05/02/2018, actually the patient was feeling better.  She had no issues in regard to the site of the lymph node biopsy in the left side of the neck with minimal discomfort.  I talked with Dr. Gill in regard to port placement tomorrow.      Hopefully, we will have further information in regard to “double-hit” lymphoma before we start any chemotherapy.  If that is the case, the patient will require infusional EPOCH/Rituxan.     Other than that, I went through formal education and teaching in regard to chemotherapy medicines, the frequency of the treatment, side effects and so forth; please review below.    The patient will have a PET scan this afternoon as well and the port will be placed tomorrow.  On 05/03/2018 the patient was feeling fatigued and tired from so many testings back and forth. She had her PET scan this morning and there is planning for port placement tomorrow. I asked the patient to bring her family this afternoon to talk with them about the diagnosis and decide how they want to proceed. I pointed out to the patient as well that the absence of treatment life expectancy is measured in a question of a few weeks to a few months.     I also discussed with her the finding on the PET scan today that will be described below and also the finding of the cervical lymph node that will be described below.     On 5/7/2018, " we started the patient on intravenous Venofer.  Patient has worsening hemoglobin 7.4.  She tolerated Venofer without any incident.     On 5/8/2018 laboratory study showed worsening hemoglobin at 6.3, she reports no bleeding.  Her WBC is trending down at 47,200 as well as neutrophils 47,000. She has good appetite no nausea vomiting.  No cough no dyspnea.  Patient was given 2 units PRBC transfusion.  She got a second dose of Venofer (total 600 mg).      On 5/9/2018, laboratory study showed improved the hemoglobin is 7.9.  However patient reported she was dyspneic and that having lightheadedness when she walked in the hallway.  She reports no nausea vomiting.  No bleeding. Her nurse reported patient also had elevated blood pressure.  She got intravenous hydralazine.  She already is on amlodipine, lisinopril and metoprolol.  Due to symptomatic anemia, patient was given 2 units more units PRBC transfusion.    On 5/10/2018 patient reports more fatigue.  She denies bleeding.  She reports good appetite.  She has bowel movement daily.  Laboratory study today reported worsening platelets at a 55,000.  Her hemoglobin improved at 10.8, and a normal WBC 9800 including neutrophils 9200.  Her WBC counts actually is deteriorating every day despite on Gnanix injection.     On 5/11/2018, patient complains of swelling in the legs.  Denies oral mucositis/soreness, no nausea vomiting.  She reports no fever no sweating no chills.  Had a regular bowel movement.  No evidence of bleeding no bruising.  Her laboratory study showed a significant drop of WBC at 1400 and platelets is further therapy now it is 34,000.  Hemoglobin is 10.5,  stable after transfusion on 5/9/2018.     On 5/12/2018, patient has worsening severe neutropenia with ANC 20, and worsening severe thrombocytopenia with platelets 21,000 and a slightly trending down hemoglobin 10.2.  She denies no bleeding no bruising.     Oncology History:  Martina Blake is a 77 y.o. female  who were asked to see in consultation  4/29/18 for hypercalcemia, anemia and radiologic findings consistent with likely malignancy-?  Lymphoma.        She has a significant past medical history of palpitations followed by cardiology.  She presented to the emergency room April 10-14 with chest pain and palpitations and was found to be in A. fib with RVR and also demonstrated electrolyte abnormalities and anemia.  Records demonstrates that her anemia became particularly evident April 11 with an H&H of 8.9/ 28.4, platelet count 212,000 with a normal automated differential.  She underwent GI assessment including upper and lower endoscopy demonstrated hernia, gastritis, esophagitis, diverticulosis but no evidence of acute bleed.  She was placed on Eliquis as result of a relatively high CHADSVASc score.  She had also been found to be hypercalcemic at approximately 11 with HCTZ altered and the patient started on Aldactone.  Additional testing had included a ferritin of 313.1, iron of 32 with TIBC of 222 and iron saturation of 14, occult blood negative per stool testing    She was brought back to the emergency room April 21 with further evidence of hypercalcemia, associated weakness, anorexia, nausea, ataxia and weight loss.  MRI of the brain April 21 was found to be unremarkable. Outpatient intact PTH levels were found to be 7.1(reduced), and an H&H of 10.3 and 32.9, white count 11,090, platelet count 267,000 with a normal differential.  Patient seen by renal medicine with the possibility of Fanconi syndrome raised.  Thereafter PTH hormone was found be less than 2.0, and protein electropheresis included IgG of 948, IgA of 207, IgM 86, total protein 6.3, albumin 2.8, no M spike noted, slightly elevated free kappa and lambda light chains with normal ratio.  Repeat testing per iron profile again revealed low serum iron but high serum ferritin and normal CEA, normal AFP, CA-125 of 77.2, CA 19-9 7.8   At this point the reason  for her hypercalcemia was thought to be possible medication effect and/or possible malignancy with calcium was running between 12 and 13 mg/dL.    This led to CT scan of chest abdomen pelvis performed April 24 demonstrate extensive metastatic disease throughout the abdomen and pelvis particularly involving the spleen and liver, extensive lymphadenopathy at the abdomen and pelvis with a 4 cm lesion splenic hilum partially encasing the pancreatic tail, pancreatic tail malignancy?,  Gastric primary malignancy?  There was a mass along the right wall of the urinary bladder with adjacent adenopathy.  CT of the chest revealed several tiny dense pleural-based nodular opacities-partially calcified granulomas, no mediastinal or hilar adenopathy, enlarged pulmonary arteries consistent with pulmonary arterial hypertension.  A subsequent bone scan performed April 26 revealed prominent uptake in the right frontal bone and right posterior ninth rib and a CT needle biopsy of the liver was obtained April 26 as well.The sample obtained revealed, on flow cytometry examination, a subpopulation of normal B cells that might be consistent with B-cell lymphoma.  Additional testing is currently pending.  On May 25 further hypercalcemia was again noted and treated with IV fluids and dose of Zometa, vitamin D level normal.  Again evidence of hypokalemia, hypophosphatemia and hypomagnesemia.  The patient is seen April 29 her calcium level has gradually improved dropping to 9.7, albumin of 2.6, ionized calcium of 6.1, BUN and creatinine of 10/.55.  We are asked to see her with results available thus far as to possible lymphoma.    Past Medical History:     Atrial fibrillation      • Cystitis     • Cystocele       moderate   • Dyslipidemia     • Esophageal reflux     • Fatigue     • History of transfusion       no reaction   • Hypertension     • Major depression, chronic     • Menopause     • Osteoarthritis     • Osteoporosis     • Palpitations  "    • Post menopausal problems     • RLS (restless legs syndrome)     • Seizure disorder     • Subjective tinnitus     • Vaginal delivery       x2  \"SONYA\"      \"JASON\"   • Vitamin D deficiency      Social History:  The patient is a  and has 2 children.  She has never smoked.  She denies alcohol use.    Review of Systems   Constitutional: Positive for fatigue. Negative for activity change, appetite change and unexpected weight change.   HENT: Negative.    Eyes: Negative.    Respiratory: Positive for shortness of breath (Has exertional dyspnea). Negative for wheezing.    Cardiovascular: Negative.  Negative for chest pain and palpitations.   Gastrointestinal: Negative.  Negative for abdominal pain, constipation, nausea and vomiting.   Endocrine: Negative.    Genitourinary: Negative.    Musculoskeletal: Negative for neck stiffness.   Skin: Negative.  Negative for color change and pallor.   Neurological: Positive for weakness. Negative for seizures, numbness and headaches.   Hematological: Positive for adenopathy.   Psychiatric/Behavioral: Negative for confusion.          Medications:  The current medication list was reviewed in the EMR    ALLERGIES:    Allergies   Allergen Reactions   • Crab (Diagnostic) Itching and Rash   • Pseudoephedrine Dizziness       Objective    Vitals:    05/11/18 2305 05/12/18 0535 05/12/18 0600 05/12/18 0934   BP: 150/90 130/80  171/73   BP Location: Left arm Left arm     Patient Position: Lying Lying     Pulse: 61 59  68   Resp: 20 20     Temp: 97.4 °F (36.3 °C) 98.7 °F (37.1 °C)     TempSrc: Oral Oral     SpO2: 96% 95%     Weight:   65.5 kg (144 lb 8 oz)    Height:       ECOG 3       Physical Exam    GENERAL:  Well-developed, well-nourished female patient sitting in chair. no acute distress.   SKIN:  Warm, dry without rashes, purpura or petechiae.  HEENT:  conjunctivas non injected.  Mouth mucosa moist, no exudates in oropharynx.  NECK:  no thyromegaly. Left neck has a lymph node about 2 " "cm, no tenderness.  No other cervical adenopathies.   LYMPHATICS:  No other cervical, supraclavicular, axillary adenopathy.  CHEST:  Lungs clear to auscultation, normal breath sounds bilaterally, no wheezing, crackles or ronchi.  CARDIAC AND VASCULAR: normal rate and regular rhythm, without murmurs.    ABDOMEN:  Soft, nontender with spleen 4 cm blcm organomegaly, palpable liver 4 cm brcm, no distention, no abdominal pain, Normal bowel sounds.   EXTREMITIES  AND SPINE:  No clubbing, cyanosis or edema, no deformities or pain.      NEUROLOGICAL:  Patient is AAOx3.     RECENT LABS:      Results from last 7 days  Lab Units 05/12/18  0326 05/11/18  0405 05/10/18  0556   WBC 10*3/mm3 0.23* 1.40* 9.80   HEMOGLOBIN g/dL 10.2* 10.5* 10.8*   HEMATOCRIT % 31.9* 32.0* 32.8*   PLATELETS 10*3/mm3 21* 34* 55*   MONOCYTES % % 4.0*  --  2.0*     Lab Results   Component Value Date    NEUTROABS 0.02 (C) 05/12/2018       Results from last 7 days  Lab Units 05/12/18  0326 05/11/18  0405 05/10/18  0556 05/09/18  0603 05/08/18  0554 05/07/18  0536   SODIUM mmol/L 139 139 139 139 140 137   POTASSIUM mmol/L 3.6 3.4* 3.2* 3.4* 3.8 4.2   CHLORIDE mmol/L 99 101 102 103 105 103   CO2 mmol/L 28.4 28.3 28.7 27.2 25.7 24.8   BUN mg/dL 16 20 21 27* 31* 30*   CREATININE mg/dL 0.57 0.45* 0.54* 0.64 0.70 0.92   CALCIUM mg/dL 10.7* 10.5 10.5 10.7* 10.4 11.3*   BILIRUBIN mg/dL  --   --   --  0.7 0.6 0.4   ALK PHOS U/L  --   --   --  81 76 81   ALT (SGPT) U/L  --   --   --  12 8 9   AST (SGOT) U/L  --   --   --  23 24 29   GLUCOSE mg/dL 96 96 95 83 82 116*      on 5/12/2018,  on 5/11/2018, 319 on 5/10/2018, 531 on 5/6/2018 and 678 on 5/5/2018    Uric acid 1.8 on 5/11/2018, 1.2 on 5/8/2018.    Magnesium level 1.5 on 5/12/2018      Assessment/Plan :     1.  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  Additional FISH studies are POSITIVE for \"double hit\" lymphoma as Ki 67 staining is 4+     She has extensive metastatic " disease throughout the abdomen and pelvis, splenomegaly replaced with low attenuation lesions, 4.4 cm splenic hilum mass, disease encasing the pancreatic tail, 3 cm lesion abutting the stomach, extensive lymphadenopathy throughout the celiac axis, retroperitoneum, mesentery, periaortic lymphadenopathy, liver lesion in the posterior segment 4.8 cm, nodular thickening of the right wall of urinary bladder 5.0 cm, peritoneal lymphadenopathy and thickening, coarsely calcified mesenteric mass 9.6 cm (?old mesenteric adenitis).  Bone scan shows foci of uptake in the frontal bone and right posterior ninth rib.      The patient has had B symptoms with decreased performance status, and weight loss, anorexia.    She was started on chemotherapy with R-CHOP on 5/4/2018.  She tolerated chemotherapy relatively well.     2.  Hypercalcemia of malignancy:  The patient received Zometa 4 mg on 4/25/18.  Her calcium temporarily improved but is beginning to elevate again to 11.3 with albumin 2.9.  Hypercalcemia is likely to be an ongoing issue until she responded to chemotherapy.      Patient received a second dose of Zometa on 5/7/2018.  Laboratory study today reporte calcium 10.5.  We'll continue to monitor.     3.  Pancytopenia secondary to chemotherapy.     This patient now has severe leukocytopenia/neutropenia, despite daily Granix  Injection, her WBC dropped from peak level 67,000 to 1400 within 4 days on 5/11/2018.  There are further decreased to a total  and neutrophils 20 on 5/12/2018.  Her WBC may stays at this level in the next several days.  We'll continue Granix injection daily.       She was started on Levaquin 500 mg daily for prophylaxis of bacterial infection on 5/11/2018.    4.   Severe thrombocytopenia secondary to chemotherapy.  Her platelets drop quickly.  It dropped from baseline level to 236,000 on 5/7/2018 to today's 21,000 within 5 days.  Expecting it will further decrease or stay in the level for some  days.  Patient reports no bleeding.  There is no need of for transfusion of platelets.  Her platelet level needs to be monitored daily.     5.  Anemia:  Had pre-existing anemia but it worsened with chemotherapy.  Her baseline anemia was probably inflammatory/malignancy associated (elevated ferritin, low transferrin, elevated ESR) versus bone marrow involvement.   We do not think a bone marrow exam would change her treatment plan.      Patient was given pRBC transfusion 4 units in the past 4 days due to the tomasz hemoglobin 6.3 on 5/8/2018 and she was symptomatic.  Her hemoglobin improved to 10.8 on 5/10/2018.  Stable at 10.3 today.     Reviewing her iron study from 4/24/2018, I do believe this patient also had elements of iron deficiency.  She received intravenous iron Venofer 300 mg for 2 doses on 5/7/2018 and 5/8/2018, total 600 mg.      She has normal folic acid level and vitamin B12 level this morning on 5/8/2018, however her B12 level can be further improved.  She was started on oral B12 supplementation once a day.      6.  Paroxysmal atrial fibrillation: The patient was anticoagulated with Eliquis, which has been on hold since admission.  Her platelet count is coming down quickly after chemotherapy.  Her platelets is coming down at 55,000 on 5/10/2018 and further down to 21,000 on 5/12/2018.  We prefer not to start this patient back on Eliquis until her platelets recover above 60,000.  This can be ongoing issue though since patient will have more chemotherapy down the line, repeat every 3 weeks.     7. Prophylaxis of tumor lysis syndrome.  Her uric acid has being had a low level.  Her LDH has coming down nicely also.  Patient will be continued on allopurinol.     8.  Hypokalemia and hypomagnesemia.  Patient will be given intravenous magnesium sulfate infusion.  Patient is on oral potassium which will be continued.    9.  GI prophylaxis on Protonix.     10.  Insomnia.  Patient reports he was not able to sleep  the last 4 out of 5 days.  At home she used to take 2 tablets of gabapentin and 5 mg melatonin before sleep.      Patient was started on gabapentin and the melatonin on 5/7/2018.  She reported had a significant improvement of sleep in the night.     11.  Significantly elevated blood pressure.  Nephrologist manages her medication for BP control.  Her BP is better controlled.      12.  No extremity edema developed within the past day.  Examination shows no calf tenderness.  We added RUDOLPH hose.     12.  Vaginal yeast infection.  Patient was started on topical the statin yesterday.  Patient is still asymptomatic.  We'll start on Diflucan.     13.  Discharge plan.  Patient will need to continue physical/occupational therapy.  Patient prefers to be discharged to home.   has been following patient.       PLAN:   1.  Continue Granix 300 mg daily.    2.  Continue Levaquin 500 mg daily for prophylaxis.   3.  Add Diflucan 200 mg daily for 5 days. .  4.  Continue allopurinol for tumor lysis syndrome prophylaxis.   5.  Continue current medicine for management of hypertension.   6.  Intravenous magnesium sulfate infusion.. Continue oral potassium.  7.  Continue gabapentin and melatonin.   8.  Continue oral vitamin B12 at 1000 µg daily.   9.  Add RUDOLPH HOSE to both legs.   10.  Continue supportive care.   11.  CBC with differentiation, BMP and mag level in morning.         Discussed with the patient today, as she asked about her diagnosis, no region of her lymphoma, and the prognosis and the monitoring of her response.  I answered the question to her satisfaction.    I discussed with his nurse today.      JABARI RAMIREZ M.D., Ph.D.      5/12/2018      CC:

## 2018-05-12 NOTE — PROGRESS NOTES
NEPHROLOGY PROGRESS NOTE    PATIENT IDENTIFICATION:   Name:  Martina Blake      MRN:  [id number]     77 y.o.  female             Reason for visit: hypercalcemia    SUBJECTIVE:    States she is eating better, but food looks untouched on dinner tray; no SOB on RA    OBJECTIVE:  Vitals:    05/11/18 0922 05/11/18 1101 05/11/18 1425 05/11/18 1826   BP: 170/70 134/80 128/72 138/78   BP Location: Left arm Left arm Left arm Left arm   Patient Position: Sitting Lying Lying Lying   Pulse:  60 58 62   Resp:  18 16 20   Temp:  96.8 °F (36 °C) 97 °F (36.1 °C) 97.2 °F (36.2 °C)   TempSrc:  Oral Oral Oral   SpO2:  97% 95% 96%   Weight:       Height:               Body mass index is 27.28 kg/m².    Intake/Output Summary (Last 24 hours) at 05/11/18 2112  Last data filed at 05/11/18 1826   Gross per 24 hour   Intake             1030 ml   Output                0 ml   Net             1030 ml     Wt Readings from Last 1 Encounters:   05/11/18 0446 72.1 kg (158 lb 15.2 oz)   05/10/18 0538 71.4 kg (157 lb 8 oz)   05/09/18 1555 71.3 kg (157 lb 3.2 oz)   05/09/18 0538 70.9 kg (156 lb 4.8 oz)   05/01/18 1850 65.1 kg (143 lb 9.6 oz)   04/30/18 0643 66.8 kg (147 lb 4.8 oz)   04/29/18 0652 67.9 kg (149 lb 12.8 oz)   04/28/18 0641 68 kg (150 lb)   04/27/18 0603 64.7 kg (142 lb 9.6 oz)   04/25/18 0516 62.6 kg (138 lb)   04/21/18 1731 68.4 kg (150 lb 12.8 oz)   04/21/18 1227 68 kg (150 lb)     Wt Readings from Last 3 Encounters:   05/11/18 72.1 kg (158 lb 15.2 oz)   04/19/18 68 kg (150 lb)   04/10/18 61.5 kg (135 lb 8 oz)         Exam:  General: NAD, pleasant  Neck without JVD; port rij  Heart RRR no s3 or rub. 2/6 early syst m LUSB  Lungs clear to auscultation; not labored  Abd +bs, slightly distended, soft, not tender.    Ext trace lower ext edema.  Skin: no rash  Psych mood and affect fine    Scheduled meds:      allopurinol 300 mg Oral Daily   amLODIPine 10 mg Oral Q24H   diphenhydrAMINE 25 mg Oral Once   gabapentin 600 mg Oral Nightly    hydrALAZINE 25 mg Oral Q8H   levoFLOXacin 500 mg Oral Daily   lisinopril 30 mg Oral Q12H   melatonin 5 mg Oral Nightly   metoprolol tartrate 50 mg Oral Q12H   nystatin  Topical Q8H   pantoprazole 40 mg Oral Q AM   phenytoin 300 mg Oral Nightly   potassium chloride 40 mEq Oral BID   sennosides-docusate sodium 2 tablet Oral Nightly   tbo-filgrastim 300 mcg Subcutaneous Q24H   vitamin B-12 1,000 mcg Oral Daily     IV meds:                             Data Review:      Results from last 7 days  Lab Units 05/11/18  0405 05/10/18  0556 05/09/18  0603 05/08/18  0554 05/07/18  0536   SODIUM mmol/L 139 139 139 140 137   POTASSIUM mmol/L 3.4* 3.2* 3.4* 3.8 4.2   CHLORIDE mmol/L 101 102 103 105 103   CO2 mmol/L 28.3 28.7 27.2 25.7 24.8   BUN mg/dL 20 21 27* 31* 30*   CREATININE mg/dL 0.45* 0.54* 0.64 0.70 0.92   CALCIUM mg/dL 10.5 10.5 10.7* 10.4 11.3*   BILIRUBIN mg/dL  --   --  0.7 0.6 0.4   ALK PHOS U/L  --   --  81 76 81   ALT (SGPT) U/L  --   --  12 8 9   AST (SGOT) U/L  --   --  23 24 29   GLUCOSE mg/dL 96 95 83 82 116*     Estimated Creatinine Clearance: 57.4 mL/min (by C-G formula based on SCr of 0.45 mg/dL (L)).    Results from last 7 days  Lab Units 05/11/18  0405   URIC ACID mg/dL 1.8*       Results from last 7 days  Lab Units 05/11/18  0405 05/10/18  0556 05/09/18  0603 05/08/18  0554   MAGNESIUM mg/dL  --  2.0 1.7 1.8   PHOSPHORUS mg/dL 3.5 3.4 3.5 3.1         Results from last 7 days  Lab Units 05/11/18  0405 05/10/18  0556 05/09/18  0603 05/08/18  0554 05/07/18  0536   WBC 10*3/mm3 1.40* 9.80 29.15* 47.21* 66.59*   HEMOGLOBIN g/dL 10.5* 10.8* 7.9* 6.3* 7.4*   PLATELETS 10*3/mm3 34* 55* 98* 140 211                   ASSESSMENT:   Principal Problem:    Hypercalcemia  Active Problems:    Gastroesophageal reflux disease    Chronic recurrent major depressive disorder    Restless legs syndrome    Seizure disorder    Essential hypertension    Paroxysmal atrial fibrillation    Iron deficiency anemia due to chronic  blood loss    Weakness    Hypertension    Liver mass    Lymphoma    Lymphoma of lymph nodes of neck    Leukemoid reaction    Insomnia    Anemia associated with chemotherapy    Pancytopenia due to antineoplastic chemotherapy    Chemotherapy-induced neutropenia    1.  Hypercalcemia. Due to malignancy s/p zometa 4.25.18 and 5.7.18. Calcium stable today  2.  Metastatic NHL, s/p RCHOP 5.4.18.  3.  Hypokalemia, will replace with packet.  Not able to swallow the large Micro K pills. Hypophosphatemia and hypomagnesemia: with prn replacement  4.  Labile HTN. better.   5.  Afib with RVR, with rate now controlled.  6.  Recent GIB  7.  Leukocytosis. WBC falling.   8.  Anemia; IV iron. Prn prbc. Platelets falling.     PLAN:  1. Monitor chemistries  2. Replace K, phos, and Mg PRN    Kendall Belle MD  5/11/2018    9:12 PM

## 2018-05-12 NOTE — PLAN OF CARE
Problem: Patient Care Overview  Goal: Plan of Care Review  Outcome: Ongoing (interventions implemented as appropriate)   05/12/18 8669   Coping/Psychosocial   Plan of Care Reviewed With patient   Plan of Care Review   Progress improving   OTHER   Outcome Summary Pt tolerated treatment well this date. Pt reported feeling weak d/t blood count being low today, although gave good effort throughout. Ambulated 200ft w/ Rw and SBA. Performed seated ther ex for LE strengthening. PT will continue to address functional mobility deficits as tolerated.

## 2018-05-12 NOTE — THERAPY TREATMENT NOTE
Acute Care - Physical Therapy Treatment Note   [facility]     Patient Name: Martina Blake  : 1940  MRN: [id number]  Today's Date: 2018     Date of Referral to PT: 18  Referring Physician: Byron    Admit Date: 2018    Visit Dx:    ICD-10-CM ICD-9-CM   1. Lymphoma of lymph nodes of neck, unspecified lymphoma type C85.91 202.81   2. Weakness R53.1 780.79   3. Hypercalcemia E83.52 275.42   4. Hypertensive urgency I16.0 401.9   5. 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 C83.39 202.80     Patient Active Problem List   Diagnosis   • Blood glucose abnormal   • Dyslipidemia   • Gastroesophageal reflux disease   • Fatigue   • Generalized osteoarthritis   • Chronic recurrent major depressive disorder   • Osteoporosis   • Restless legs syndrome   • Seizure disorder   • Vitamin D deficiency   • Bradycardia   • Essential hypertension   • Post-menopause on HRT (hormone replacement therapy)   • Chronic seasonal allergic rhinitis   • Paroxysmal atrial fibrillation   • Iron deficiency anemia due to chronic blood loss   • Acute superficial gastritis without hemorrhage   • Hiatal hernia   • Esophagitis   • Diverticulosis of large intestine without hemorrhage   • Dizziness   • Weakness   • Hypertension   • Hypercalcemia   • Liver mass   • Lymphoma   • Lymphoma of lymph nodes of neck   • Leukemoid reaction   • Insomnia   • Anemia associated with chemotherapy   • Pancytopenia due to antineoplastic chemotherapy   • Chemotherapy-induced neutropenia       Therapy Treatment          Rehabilitation Treatment Summary     Row Name 18 1332             Treatment Time/Intention    Discipline physical therapy assistant  -SM      Document Type therapy note (daily note)  -SM      Subjective Information complains of;weakness  -SM      Mode of Treatment physical therapy  -SM      Patient Effort good  -SM      Existing Precautions/Restrictions fall  -SM      Recorded by  [SM] Zoila Cole, PTA 05/12/18 1346 05/12/18 1346      Row Name 05/12/18 1332             Cognitive Assessment/Intervention- PT/OT    Orientation Status (Cognition) oriented x 4  -SM      Personal Safety Interventions fall prevention program maintained;gait belt;nonskid shoes/slippers when out of bed  -SM      Recorded by [SM] Zoila Cole, PTA 05/12/18 1346 05/12/18 1346      Row Name 05/12/18 1332             Bed Mobility Assessment/Treatment    Comment (Bed Mobility) up in chair  -SM      Recorded by [SM] Zoila Cole, PTA 05/12/18 1346 05/12/18 1346      Row Name 05/12/18 1332             Transfer Assessment/Treatment    Transfer Assessment/Treatment sit-stand transfer;stand-sit transfer  -      Sit-Stand Evans (Transfers) stand by assist  -      Stand-Sit Evans (Transfers) stand by assist  -      Recorded by [SM] Zoila Cole, PTA 05/12/18 1346 05/12/18 1346      Row Name 05/12/18 1332             Sit-Stand Transfer    Assistive Device (Sit-Stand Transfers) walker, front-wheeled  -SM      Recorded by [SM] Zoila Cole, PTA 05/12/18 1346 05/12/18 1346      Row Name 05/12/18 1332             Stand-Sit Transfer    Assistive Device (Stand-Sit Transfers) walker, front-wheeled  -      Recorded by [SM] Zoila Cole, PTA 05/12/18 1346 05/12/18 1346      Row Name 05/12/18 1332             Gait/Stairs Assessment/Training    Gait/Stairs Assessment/Training gait/ambulation independence;gait/ambulation assistive device;distance ambulated;gait deviations  -      Evans Level (Gait) stand by assist  -      Assistive Device (Gait) walker, front-wheeled  -      Distance in Feet (Gait) 200  -SM      Deviations/Abnormal Patterns (Gait) grazyna decreased;stride length decreased  -      Bilateral Gait Deviations forward flexed posture  -SM      Recorded by [SM] Zoila Cole, PTA 05/12/18 1346 05/12/18 1346      Row Name 05/12/18 1332             Motor  Skills Assessment/Interventions    Additional Documentation Therapeutic Exercise (Group)  -SM      Recorded by [] Zoila Cole, [facility] 05/12/18 1346 05/12/18 1346      Row Name 05/12/18 1332             Therapeutic Exercise    Additional Documentation Therapeutic Exercise (Row)  -      Recorded by [] Zoila Cole, [facility] 05/12/18 1346 05/12/18 1346      Row Name 05/12/18 1332             Therapeutic Exercise    Lower Extremity (Therapeutic Exercise) gluteal sets;quad sets, bilateral  -      Lower Extremity Range of Motion (Therapeutic Exercise) ankle dorsiflexion/plantar flexion, bilateral  -      Exercise Type (Therapeutic Exercise) AROM (active range of motion)  -      Position (Therapeutic Exercise) seated  -      Sets/Reps (Therapeutic Exercise) 10  -SM      Recorded by [] Zoila Cole, [facility] 05/12/18 1346 05/12/18 1346      Row Name 05/12/18 1332             Positioning and Restraints    Pre-Treatment Position sitting in chair/recliner  -SM      Post Treatment Position chair  -SM      In Chair reclined;call light within reach;encouraged to call for assist  -SM      Recorded by [] Zoila Cole, [facility] 05/12/18 1346 05/12/18 1346      Row Name 05/12/18 1332             Pain Assessment    Additional Documentation Pain Scale: Numbers Pre/Post-Treatment (Group)  -SM      Recorded by [] Zoila Cole, [facility] 05/12/18 1346 05/12/18 1346      Row Name 05/12/18 1332             Pain Scale: Numbers Pre/Post-Treatment    Pain Scale: Numbers, Pretreatment 0/10 - no pain  -SM      Recorded by [] Zoila Cole, [facility] 05/12/18 1346 05/12/18 1346      Row Name                Wound 05/01/18 1057 Left neck incision    Wound - Properties Group Date first assessed: 05/01/18 [PEARL] Time first assessed: 1057 [PEARL] Side: Left [PEARL] Location: neck [PEARL] Type: incision [PEARL] Recorded by:  [PEARL] Magaly Almonte RN 05/01/18 1057 05/01/18 1057    Row Name                Wound 05/04/18 0838 Right chest  incision    Wound - Properties Group Date first assessed: 05/04/18 [SMA] Time first assessed: 0838 [SMA] Side: Right [SMA] Location: chest [SMA] Type: incision [SMA] Recorded by:  [[facility]] Scott Cole RN 05/04/18 0838 05/04/18 0838      User Key  (r) = Recorded By, (t) = Taken By, (c) = Cosigned By    Initials Name Effective Dates Discipline    PEARL Magaly Almonte RN 06/16/16 -  Nurse    SMA Scott Cole RN 06/16/16 -  Nurse    JEREMIAH Cole, THIAGO 03/07/18 -  PT          Rash 05/11/18 2147 other (see comments) groin patch (Active)   Distribution regional 5/12/2018  9:43 AM   Configuration/Shape pinpoint 5/12/2018  9:43 AM   Borders indistinct 5/12/2018  9:43 AM   Characteristics other (see comments) 5/12/2018  9:43 AM   Color red 5/12/2018  9:43 AM   Lesion Size pinpoint 5/12/2018  9:43 AM   Care, Rash antimicrobial agent applied 5/12/2018  9:43 AM       Wound 05/01/18 1057 Left neck incision (Active)   Dressing Appearance dry;intact;no drainage 5/12/2018  9:43 AM   Closure RYAN 5/12/2018  9:43 AM   Base dry;clean 5/12/2018  9:43 AM   Periwound Temperature warm 5/12/2018  9:43 AM   Periwound Skin Turgor soft 5/12/2018  9:43 AM   Drainage Amount none 5/12/2018  9:43 AM   Dressing Care, Wound transparent film 5/12/2018  9:43 AM       Wound 05/04/18 0838 Right chest incision (Active)   Dressing Appearance dry;intact;no drainage 5/12/2018  9:43 AM   Closure RYAN 5/12/2018  9:43 AM   Base clean;dry 5/12/2018  9:43 AM   Drainage Amount none 5/12/2018  9:43 AM   Dressing Care, Wound transparent film 5/12/2018  9:43 AM             Physical Therapy Education     Title: PT OT SLP Therapies (Done)     Topic: Physical Therapy (Done)     Point: Mobility training (Done)    Learning Progress Summary     Learner Status Readiness Method Response Comment Documented by    Patient Done Acceptance JOAO BENAVIDES,PANCHO DANIELS 05/12/18 1346     Done Acceptance E,MADIE HOBBS  PC 05/06/18 1416     Done Acceptance PAVEL BENAVIDES  BS 05/03/18 0206     Done  Acceptance E,TB VU   05/02/18 0248     Done Acceptance E VU  EJ 04/29/18 1554     Done Acceptance E,TB,D VU,NR   04/28/18 1733     Done Acceptance E,TB VU,DU  CW 04/27/18 1544     Done Acceptance E,TB VU,NR role of PT, benefits of activity, use of AD, safety GR 04/22/18 1311    Family Done Acceptance E,TB,D VU,NR   04/28/18 1733     Done Acceptance E,TB VU,NR role of PT, benefits of activity, use of AD, safety GR 04/22/18 1311          Point: Home exercise program (Done)    Learning Progress Summary     Learner Status Readiness Method Response Comment Documented by    Patient Done Acceptance E,D VU,NR   05/12/18 1346     Done Acceptance E,D VU,DU  PC 05/06/18 1416     Done Acceptance E,TB VU   05/03/18 0206     Done Acceptance E,TB VU   05/02/18 0248     Done Acceptance E,TB,D VU,NR   04/28/18 1733     Done Acceptance E,TB VU,DU  CW 04/27/18 1544     Done Acceptance E,TB VU,NR role of PT, benefits of activity, use of AD, safety GR 04/22/18 1311    Family Done Acceptance E,TB,D VU,NR   04/28/18 1733     Done Acceptance E,TB VU,NR role of PT, benefits of activity, use of AD, safety GR 04/22/18 1311          Point: Body mechanics (Done)    Learning Progress Summary     Learner Status Readiness Method Response Comment Documented by    Patient Done Acceptance E,D VU,NR   05/12/18 1346     Done Acceptance E,D VU,DU  PC 05/06/18 1416     Done Acceptance E,TB VU   05/03/18 0206     Done Acceptance E,TB VU   05/02/18 0248     Done Acceptance E,TB,D VU,NR   04/28/18 1733     Done Acceptance E,TB VU,DU  CW 04/27/18 1544     Done Acceptance E,TB VU,NR role of PT, benefits of activity, use of AD, safety GR 04/22/18 1311    Family Done Acceptance E,TB,D VU,NR   04/28/18 1733     Done Acceptance E,TB VU,NR role of PT, benefits of activity, use of AD, safety GR 04/22/18 4951          Point: Precautions (Done)    Learning Progress Summary     Learner Status Readiness Method Response Comment Documented by     Patient Done Acceptance E,D VU,NR   05/12/18 1346     Done Acceptance E VU  MD 05/11/18 1146     Done Acceptance E VU  MD 05/10/18 1205     Done Acceptance E VU PT educated pt to ambulate in hallway with CNA or RN w RWx while at [facility] multiple times daily.  Pt states understanding. MD 05/09/18 1402     Done Acceptance E VU  MD 05/08/18 1422     Done Acceptance E VU  MD 05/07/18 1325     Done Acceptance E,D VU,DU  PC 05/06/18 1416     Done Acceptance E,TB VU   05/03/18 0206     Done Acceptance E,TB VU  BS 05/02/18 0248     Done Acceptance E VU  MD 04/30/18 1615     Done Acceptance E,TB,D VU,NR   04/28/18 1733     Done Acceptance E,TB VU,DU   04/27/18 1544     Done Acceptance E,TB VU,NR role of PT, benefits of activity, use of AD, safety  04/22/18 1311    Family Done Acceptance E,TB,D VU,NR   04/28/18 1733     Done Acceptance E,TB VU,NR role of PT, benefits of activity, use of AD, safety  04/22/18 1311                      User Key     Initials Effective Dates Name Provider Type Discipline    GR 10/03/16 -  Aubrey Del Rosario, PT Physical Therapist PT    PC 04/03/18 -  Rosette Navarrete, PT Physical Therapist PT    MD 04/03/18 -  Malissa Oliver, PT Physical Therapist PT     03/07/18 -  Linda Cole, PTA Physical Therapy Assistant PT    BS 06/16/16 -  Ara Gibson, RN Registered Nurse Nurse     04/03/18 -  Alma Delia Cazares, PT Physical Therapist PT     03/07/18 -  Zoila Cole, PTA Physical Therapy Assistant PT    CW 03/07/18 -  Myke Burrell, PTA Physical Therapy Assistant PT                    PT Recommendation and Plan     Plan of Care Reviewed With: patient  Progress: improving  Outcome Summary: Pt tolerated treatment well this date. Pt reported feeling weak d/t blood count being low today, although gave good effort throughout. Ambulated 200ft w/  Rw and SBA. Performed seated ther ex for LE strengthening. PT will continue to address functional mobility deficits as tolerated.           Outcome Measures     Row Name 05/12/18 1300 05/11/18 1100 05/10/18 1200       How much help from another person do you currently need...    Turning from your back to your side while in flat bed without using bedrails? 4  -SM 4  -MD 4  -MD    Moving from lying on back to sitting on the side of a flat bed without bedrails? 4  -SM 4  -MD 4  -MD    Moving to and from a bed to a chair (including a wheelchair)? 3  -SM 3  -MD 3  -MD    Standing up from a chair using your arms (e.g., wheelchair, bedside chair)? 4  -SM 3  -MD 3  -MD    Climbing 3-5 steps with a railing? 3  -SM 3  -MD 3  -MD    To walk in hospital room? 3  -SM 3  -MD 3  -MD    AM-PAC 6 Clicks Score 21  -SM 20  -MD 20  -MD       Functional Assessment    Outcome Measure Options AM-PAC 6 Clicks Basic Mobility (PT)  -SM AM-PAC 6 Clicks Basic Mobility (PT)  -MD AM-PAC 6 Clicks Basic Mobility (PT)  -MD    Row Name 05/09/18 1400             How much help from another person do you currently need...    Turning from your back to your side while in flat bed without using bedrails? 4  -MD      Moving from lying on back to sitting on the side of a flat bed without bedrails? 4  -MD      Moving to and from a bed to a chair (including a wheelchair)? 3  -MD      Standing up from a chair using your arms (e.g., wheelchair, bedside chair)? 3  -MD      Climbing 3-5 steps with a railing? 3  -MD      To walk in hospital room? 3  -MD      AM-PAC 6 Clicks Score 20  -MD         Functional Assessment    Outcome Measure Options AM-PAC 6 Clicks Basic Mobility (PT)  -MD        User Key  (r) = Recorded By, (t) = Taken By, (c) = Cosigned By    Initials Name Provider Type    MD Malissa Oliver, PT Physical Therapist     Zoila Cole, PTA Physical Therapy Assistant           Time Calculation:         PT Charges     Row Name 05/12/18 1349             Time Calculation    Start Time 1332  -      Stop Time 1350  -      Time Calculation (min) 18 min  -      PT Received On 05/12/18  -       PT - Next Appointment 05/14/18  -        User Key  (r) = Recorded By, (t) = Taken By, (c) = Cosigned By    Initials Name Provider Type     Zoila Cole PTA Physical Therapy Assistant          Therapy Charges for Today     Code Description Service Date Service Provider Modifiers Qty    84257823327 HC PT THER PROC EA 15 MIN 5/12/2018 Zoila Cole PTA GP 1          PT G-Codes  PT Professional Judgement Used?: Yes  Outcome Measure Options: AM-PAC 6 Clicks Basic Mobility (PT)  Score: 13  Functional Limitation: Mobility: Walking and moving around  Mobility: Walking and Moving Around Current Status (): At least 60 percent but less than 80 percent impaired, limited or restricted  Mobility: Walking and Moving Around Goal Status (): At least 40 percent but less than 60 percent impaired, limited or restricted    Zoila Cole PTA  5/12/2018

## 2018-05-12 NOTE — PROGRESS NOTES
NEPHROLOGY PROGRESS NOTE    PATIENT IDENTIFICATION:   Name:  Martina Blake      MRN:  [id number]     77 y.o.  female             Reason for visit: hypercalcemia    SUBJECTIVE:    Feeling better. No SOB. Sitting in chair.   OBJECTIVE:  Vitals:    05/12/18 0535 05/12/18 0600 05/12/18 0934 05/12/18 1058   BP: 130/80  171/73    BP Location: Left arm      Patient Position: Lying      Pulse: 59  68    Resp: 20      Temp: 98.7 °F (37.1 °C)   98.1 °F (36.7 °C)   TempSrc: Oral   Oral   SpO2: 95%   98%   Weight:  65.5 kg (144 lb 8 oz)     Height:               Body mass index is 24.8 kg/m².    Intake/Output Summary (Last 24 hours) at 05/12/18 1102  Last data filed at 05/11/18 1826   Gross per 24 hour   Intake              480 ml   Output                0 ml   Net              480 ml     Wt Readings from Last 1 Encounters:   05/12/18 0600 65.5 kg (144 lb 8 oz)   05/11/18 0446 72.1 kg (158 lb 15.2 oz)   05/10/18 0538 71.4 kg (157 lb 8 oz)   05/09/18 1555 71.3 kg (157 lb 3.2 oz)   05/09/18 0538 70.9 kg (156 lb 4.8 oz)   05/01/18 1850 65.1 kg (143 lb 9.6 oz)   04/30/18 0643 66.8 kg (147 lb 4.8 oz)   04/29/18 0652 67.9 kg (149 lb 12.8 oz)   04/28/18 0641 68 kg (150 lb)   04/27/18 0603 64.7 kg (142 lb 9.6 oz)   04/25/18 0516 62.6 kg (138 lb)   04/21/18 1731 68.4 kg (150 lb 12.8 oz)   04/21/18 1227 68 kg (150 lb)     Wt Readings from Last 3 Encounters:   05/12/18 65.5 kg (144 lb 8 oz)   04/19/18 68 kg (150 lb)   04/10/18 61.5 kg (135 lb 8 oz)       Exam:  General: NAD, pleasant  Neck without JVD; port rij  Heart RRR no s3 or rub. 2/6 early syst m LUSB  Lungs clear to auscultation; not labored  Abd +bs, slightly distended, soft, not tender.    Ext trace lower ext edema.  Skin: no rash  Psych mood and affect fine    Scheduled meds:      allopurinol 300 mg Oral Daily   amLODIPine 10 mg Oral Q24H   diphenhydrAMINE 25 mg Oral Once   fluconazole 200 mg Oral Daily   gabapentin 600 mg Oral Nightly   hydrALAZINE 25 mg Oral Q8H    levoFLOXacin 500 mg Oral Daily   lisinopril 30 mg Oral Q12H   magnesium sulfate 1 g Intravenous Once   melatonin 5 mg Oral Nightly   metoprolol tartrate 50 mg Oral Q12H   nystatin  Topical Q8H   pantoprazole 40 mg Oral Q AM   phenytoin 300 mg Oral Nightly   potassium chloride 40 mEq Oral BID   sennosides-docusate sodium 2 tablet Oral Nightly   tbo-filgrastim 300 mcg Subcutaneous Q24H   vitamin B-12 1,000 mcg Oral Daily     IV meds:                             Data Review:      Results from last 7 days  Lab Units 05/12/18  0326 05/11/18  0405 05/10/18  0556 05/09/18  0603 05/08/18  0554 05/07/18  0536   SODIUM mmol/L 139 139 139 139 140 137   POTASSIUM mmol/L 3.6 3.4* 3.2* 3.4* 3.8 4.2   CHLORIDE mmol/L 99 101 102 103 105 103   CO2 mmol/L 28.4 28.3 28.7 27.2 25.7 24.8   BUN mg/dL 16 20 21 27* 31* 30*   CREATININE mg/dL 0.57 0.45* 0.54* 0.64 0.70 0.92   CALCIUM mg/dL 10.7* 10.5 10.5 10.7* 10.4 11.3*   BILIRUBIN mg/dL  --   --   --  0.7 0.6 0.4   ALK PHOS U/L  --   --   --  81 76 81   ALT (SGPT) U/L  --   --   --  12 8 9   AST (SGOT) U/L  --   --   --  23 24 29   GLUCOSE mg/dL 96 96 95 83 82 116*     Estimated Creatinine Clearance: 60.9 mL/min (by C-G formula based on SCr of 0.57 mg/dL).    Results from last 7 days  Lab Units 05/12/18 0326   URIC ACID mg/dL 1.7*       Results from last 7 days  Lab Units 05/12/18 0326 05/11/18  0405 05/10/18  0556 05/09/18  0603   MAGNESIUM mg/dL 1.5*  --  2.0 1.7   PHOSPHORUS mg/dL 3.4 3.5 3.4 3.5         Results from last 7 days  Lab Units 05/12/18 0326 05/11/18  0405 05/10/18  0556 05/09/18  0603 05/08/18  0554   WBC 10*3/mm3 0.23* 1.40* 9.80 29.15* 47.21*   HEMOGLOBIN g/dL 10.2* 10.5* 10.8* 7.9* 6.3*   PLATELETS 10*3/mm3 21* 34* 55* 98* 140           ASSESSMENT:   Principal Problem:    Hypercalcemia  Active Problems:    Gastroesophageal reflux disease    Chronic recurrent major depressive disorder    Restless legs syndrome    Seizure disorder    Essential hypertension     Paroxysmal atrial fibrillation    Iron deficiency anemia due to chronic blood loss    Weakness    Hypertension    Liver mass    Lymphoma    Lymphoma of lymph nodes of neck    Leukemoid reaction    Insomnia    Anemia associated with chemotherapy    Pancytopenia due to antineoplastic chemotherapy    Chemotherapy-induced neutropenia    1.  Hypercalcemia de to malignancy, s/p zometa 4.25.18 and 5.7.18. Calcium is stable. Monitor. If ca spikes again, could consider steroids.   2.  Metastatic NHL, s/p RCHOP 5.4.18.  3.  Hypokalemia, replaced. Hypophosphatemia and hypomagnesemia: with prn replacement. Will give 1 g MgSO4.   4.  Labile HTN. better.   5.  Afib with RVR, with rate now controlled.  6.  Recent GIB.  7.  Leukocytosis. WBC falling.   8.  Anemia; IV iron. Prn prbc.   9.  Pan cytopenia.   D/w nursing staff.   Will follow.     Emilio Mary MD  5/12/2018    11:02 AM

## 2018-05-12 NOTE — PLAN OF CARE
Problem: Patient Care Overview  Goal: Plan of Care Review  Outcome: Ongoing (interventions implemented as appropriate)   05/11/18 2008   Coping/Psychosocial   Plan of Care Reviewed With patient;family   Plan of Care Review   Progress improving   OTHER   Outcome Summary Pt. remains with low WBC and fairly low potassium - continues to receive Granix and Micro K. Pt. ambulated with family today and worked with PT briefly. Vaginal area is having some redness - Nystatin powder applied.      Goal: Individualization and Mutuality  Outcome: Ongoing (interventions implemented as appropriate)    Goal: Discharge Needs Assessment  Outcome: Ongoing (interventions implemented as appropriate)    Goal: Interprofessional Rounds/Family Conf  Outcome: Ongoing (interventions implemented as appropriate)      Problem: Activity Intolerance (Adult)  Goal: Activity Tolerance  Outcome: Ongoing (interventions implemented as appropriate)    Goal: Effective Energy Conservation Techniques  Outcome: Ongoing (interventions implemented as appropriate)      Problem: Oncology Care (Adult)  Goal: Signs and Symptoms of Listed Potential Problems Will be Absent, Minimized or Managed (Oncology Care)  Outcome: Ongoing (interventions implemented as appropriate)

## 2018-05-12 NOTE — PLAN OF CARE
Problem: Fall Risk (Adult)  Goal: Absence of Fall  Outcome: Ongoing (interventions implemented as appropriate)      Problem: Patient Care Overview  Goal: Plan of Care Review  Outcome: Ongoing (interventions implemented as appropriate)   05/12/18 0656   Coping/Psychosocial   Plan of Care Reviewed With patient   Plan of Care Review   Progress no change   OTHER   Outcome Summary Pt had ok night. C/o slight headache last night, resolved with tylenol. More neutropenic today. Incontinent. Rash in groin with no change; nystatin powder applied. Continu to monitor.       Problem: Activity Intolerance (Adult)  Goal: Effective Energy Conservation Techniques  Outcome: Ongoing (interventions implemented as appropriate)      Problem: Oncology Care (Adult)  Goal: Signs and Symptoms of Listed Potential Problems Will be Absent, Minimized or Managed (Oncology Care)  Outcome: Ongoing (interventions implemented as appropriate)      Problem: Skin Injury Risk (Adult)  Goal: Skin Health and Integrity  Outcome: Ongoing (interventions implemented as appropriate)

## 2018-05-12 NOTE — PLAN OF CARE
Problem: Fall Risk (Adult)  Goal: Absence of Fall  Outcome: Ongoing (interventions implemented as appropriate)      Problem: Patient Care Overview  Goal: Plan of Care Review  Outcome: Ongoing (interventions implemented as appropriate)   05/12/18 8075   Coping/Psychosocial   Plan of Care Reviewed With patient   Plan of Care Review   Progress improving   OTHER   Outcome Summary Pt up in chair most of day. A&Ox4. IV Mg given x1 for low Mg. Port flushing with blood return. DIflucan started to help with rash on bilateral groin area. Neutropenic and bleeding precautions. No need for transfusion at this time. Granix continued. HTN, meds given as ordered. Afebrile. Will continue to monitor. No c/o pain, n/v/d. Pt up with assistance, ambulating with walker.      Goal: Individualization and Mutuality  Outcome: Ongoing (interventions implemented as appropriate)    Goal: Discharge Needs Assessment  Outcome: Ongoing (interventions implemented as appropriate)    Goal: Interprofessional Rounds/Family Conf  Outcome: Ongoing (interventions implemented as appropriate)      Problem: Activity Intolerance (Adult)  Goal: Activity Tolerance  Outcome: Ongoing (interventions implemented as appropriate)    Goal: Effective Energy Conservation Techniques  Outcome: Ongoing (interventions implemented as appropriate)      Problem: Oncology Care (Adult)  Goal: Signs and Symptoms of Listed Potential Problems Will be Absent, Minimized or Managed (Oncology Care)  Outcome: Ongoing (interventions implemented as appropriate)      Problem: Skin Injury Risk (Adult)  Goal: Identify Related Risk Factors and Signs and Symptoms  Outcome: Ongoing (interventions implemented as appropriate)    Goal: Skin Health and Integrity  Outcome: Ongoing (interventions implemented as appropriate)

## 2018-05-13 LAB
ANION GAP SERPL CALCULATED.3IONS-SCNC: 12.3 MMOL/L
BUN BLD-MCNC: 11 MG/DL (ref 8–23)
BUN/CREAT SERPL: 20.4 (ref 7–25)
CALCIUM SPEC-SCNC: 10.6 MG/DL (ref 8.6–10.5)
CHLORIDE SERPL-SCNC: 103 MMOL/L (ref 98–107)
CO2 SERPL-SCNC: 25.7 MMOL/L (ref 22–29)
CREAT BLD-MCNC: 0.54 MG/DL (ref 0.57–1)
DEPRECATED RDW RBC AUTO: 48 FL (ref 37–54)
ERYTHROCYTE [DISTWIDTH] IN BLOOD BY AUTOMATED COUNT: 14.9 % (ref 11.7–13)
GFR SERPL CREATININE-BSD FRML MDRD: 109 ML/MIN/1.73
GLUCOSE BLD-MCNC: 83 MG/DL (ref 65–99)
HCT VFR BLD AUTO: 30.8 % (ref 35.6–45.5)
HGB BLD-MCNC: 9.8 G/DL (ref 11.9–15.5)
LDH SERPL-CCNC: 457 U/L (ref 135–214)
MAGNESIUM SERPL-MCNC: 1.7 MG/DL (ref 1.6–2.4)
MCH RBC QN AUTO: 27.7 PG (ref 26.9–32)
MCHC RBC AUTO-ENTMCNC: 31.8 G/DL (ref 32.4–36.3)
MCV RBC AUTO: 87 FL (ref 80.5–98.2)
NRBC BLD MANUAL-RTO: 0 /100 WBC (ref 0–0)
PHOSPHATE SERPL-MCNC: 3.7 MG/DL (ref 2.5–4.5)
PLATELET # BLD AUTO: 26 10*3/MM3 (ref 140–500)
PMV BLD AUTO: ABNORMAL FL (ref 6–12)
POTASSIUM BLD-SCNC: 3.5 MMOL/L (ref 3.5–5.2)
RBC # BLD AUTO: 3.54 10*6/MM3 (ref 3.9–5.2)
SODIUM BLD-SCNC: 141 MMOL/L (ref 136–145)
URATE SERPL-MCNC: 1.8 MG/DL (ref 2.4–5.7)
WBC NRBC COR # BLD: 0.79 10*3/MM3 (ref 4.5–10.7)

## 2018-05-13 PROCEDURE — 63710000001 DIPHENHYDRAMINE PER 50 MG: Performed by: INTERNAL MEDICINE

## 2018-05-13 PROCEDURE — 93005 ELECTROCARDIOGRAM TRACING: CPT | Performed by: INTERNAL MEDICINE

## 2018-05-13 PROCEDURE — 99233 SBSQ HOSP IP/OBS HIGH 50: CPT | Performed by: INTERNAL MEDICINE

## 2018-05-13 PROCEDURE — 84550 ASSAY OF BLOOD/URIC ACID: CPT | Performed by: INTERNAL MEDICINE

## 2018-05-13 PROCEDURE — 25010000002 TBO-FILGRASTIM 300 MCG/0.5ML SOLUTION PREFILLED SYRINGE: Performed by: INTERNAL MEDICINE

## 2018-05-13 PROCEDURE — 84100 ASSAY OF PHOSPHORUS: CPT | Performed by: INTERNAL MEDICINE

## 2018-05-13 PROCEDURE — 25010000002 MAGNESIUM SULFATE IN D5W 1G/100ML (PREMIX) 1-5 GM/100ML-% SOLUTION: Performed by: INTERNAL MEDICINE

## 2018-05-13 PROCEDURE — 83615 LACTATE (LD) (LDH) ENZYME: CPT | Performed by: INTERNAL MEDICINE

## 2018-05-13 PROCEDURE — 80048 BASIC METABOLIC PNL TOTAL CA: CPT | Performed by: INTERNAL MEDICINE

## 2018-05-13 PROCEDURE — 93010 ELECTROCARDIOGRAM REPORT: CPT | Performed by: INTERNAL MEDICINE

## 2018-05-13 PROCEDURE — 85025 COMPLETE CBC W/AUTO DIFF WBC: CPT | Performed by: INTERNAL MEDICINE

## 2018-05-13 PROCEDURE — 83735 ASSAY OF MAGNESIUM: CPT | Performed by: INTERNAL MEDICINE

## 2018-05-13 RX ORDER — MAGNESIUM SULFATE 1 G/100ML
2 INJECTION INTRAVENOUS ONCE
Status: COMPLETED | OUTPATIENT
Start: 2018-05-13 | End: 2018-05-13

## 2018-05-13 RX ADMIN — PANTOPRAZOLE SODIUM 40 MG: 40 TABLET, DELAYED RELEASE ORAL at 06:48

## 2018-05-13 RX ADMIN — HYDRALAZINE HYDROCHLORIDE 25 MG: 25 TABLET, FILM COATED ORAL at 13:45

## 2018-05-13 RX ADMIN — NYSTATIN: 100000 POWDER TOPICAL at 13:46

## 2018-05-13 RX ADMIN — HYDRALAZINE HYDROCHLORIDE 25 MG: 25 TABLET, FILM COATED ORAL at 06:48

## 2018-05-13 RX ADMIN — NYSTATIN: 100000 POWDER TOPICAL at 21:33

## 2018-05-13 RX ADMIN — POTASSIUM CHLORIDE 40 MEQ: 1.5 POWDER, FOR SOLUTION ORAL at 10:04

## 2018-05-13 RX ADMIN — GABAPENTIN 600 MG: 300 CAPSULE ORAL at 20:05

## 2018-05-13 RX ADMIN — LISINOPRIL 30 MG: 20 TABLET ORAL at 10:03

## 2018-05-13 RX ADMIN — FLUCONAZOLE 150 MG: 150 TABLET ORAL at 10:04

## 2018-05-13 RX ADMIN — AMLODIPINE BESYLATE 10 MG: 10 TABLET ORAL at 10:03

## 2018-05-13 RX ADMIN — LISINOPRIL 30 MG: 20 TABLET ORAL at 20:05

## 2018-05-13 RX ADMIN — Medication 1000 MCG: at 10:04

## 2018-05-13 RX ADMIN — METOPROLOL TARTRATE 50 MG: 50 TABLET, FILM COATED ORAL at 21:33

## 2018-05-13 RX ADMIN — PHENYTOIN 300 MG: 125 SUSPENSION ORAL at 20:26

## 2018-05-13 RX ADMIN — DIPHENHYDRAMINE HYDROCHLORIDE 25 MG: 25 CAPSULE ORAL at 20:04

## 2018-05-13 RX ADMIN — METOPROLOL TARTRATE 50 MG: 50 TABLET, FILM COATED ORAL at 10:04

## 2018-05-13 RX ADMIN — DOCUSATE SODIUM -SENNOSIDES 2 TABLET: 50; 8.6 TABLET, COATED ORAL at 20:05

## 2018-05-13 RX ADMIN — MAGNESIUM SULFATE HEPTAHYDRATE 2 G: 1 INJECTION, SOLUTION INTRAVENOUS at 11:14

## 2018-05-13 RX ADMIN — LEVOFLOXACIN 500 MG: 500 TABLET, FILM COATED ORAL at 10:03

## 2018-05-13 RX ADMIN — NYSTATIN: 100000 POWDER TOPICAL at 06:48

## 2018-05-13 RX ADMIN — POTASSIUM CHLORIDE 40 MEQ: 1.5 POWDER, FOR SOLUTION ORAL at 20:04

## 2018-05-13 RX ADMIN — TBO-FILGRASTIM 300 MCG: 300 INJECTION, SOLUTION SUBCUTANEOUS at 10:06

## 2018-05-13 RX ADMIN — Medication 5 MG: at 20:04

## 2018-05-13 RX ADMIN — ALLOPURINOL 300 MG: 300 TABLET ORAL at 10:04

## 2018-05-13 RX ADMIN — HYDRALAZINE HYDROCHLORIDE 25 MG: 25 TABLET, FILM COATED ORAL at 21:32

## 2018-05-13 NOTE — PROGRESS NOTES
NEPHROLOGY PROGRESS NOTE    PATIENT IDENTIFICATION:   Name:  Martina Blake      MRN:  [id number]     77 y.o.  female             Reason for visit: hypercalcemia    SUBJECTIVE:    Feeling better. No SOB. No new complains.   OBJECTIVE:  Vitals:    05/12/18 1839 05/12/18 2303 05/13/18 0558 05/13/18 0600   BP: 150/82  165/80    BP Location: Left arm  Left arm    Patient Position: Lying  Lying    Pulse: 58 62 53    Resp: 18 18 18    Temp: 97.7 °F (36.5 °C) 97.3 °F (36.3 °C) 97.7 °F (36.5 °C)    TempSrc: Oral Oral Oral    SpO2: 97% 98% 97%    Weight:    63 kg (139 lb)   Height:               Body mass index is 23.86 kg/m².  No intake or output data in the 24 hours ending 05/13/18 1000  Wt Readings from Last 1 Encounters:   05/13/18 0600 63 kg (139 lb)   05/12/18 0600 65.5 kg (144 lb 8 oz)   05/11/18 0446 72.1 kg (158 lb 15.2 oz)   05/10/18 0538 71.4 kg (157 lb 8 oz)   05/09/18 1555 71.3 kg (157 lb 3.2 oz)   05/09/18 0538 70.9 kg (156 lb 4.8 oz)   05/01/18 1850 65.1 kg (143 lb 9.6 oz)   04/30/18 0643 66.8 kg (147 lb 4.8 oz)   04/29/18 0652 67.9 kg (149 lb 12.8 oz)   04/28/18 0641 68 kg (150 lb)   04/27/18 0603 64.7 kg (142 lb 9.6 oz)   04/25/18 0516 62.6 kg (138 lb)   04/21/18 1731 68.4 kg (150 lb 12.8 oz)   04/21/18 1227 68 kg (150 lb)     Wt Readings from Last 3 Encounters:   05/13/18 63 kg (139 lb)   04/19/18 68 kg (150 lb)   04/10/18 61.5 kg (135 lb 8 oz)       Exam:  General: NAD, pleasant  Neck without JVD; port rij  Heart RRR no s3 or rub. 2/6 early syst m LUSB  Lungs with minimal bassam upper ronchi. No rales. not labored  Abd +bs, slightly distended, soft, not tender.    Ext trace lower ext edema.  Skin: no rash  Psych mood and affect fine    Scheduled meds:      allopurinol 300 mg Oral Daily   amLODIPine 10 mg Oral Q24H   diphenhydrAMINE 25 mg Oral Once   fluconazole 150 mg Oral Daily   gabapentin 600 mg Oral Nightly   hydrALAZINE 25 mg Oral Q8H   levoFLOXacin 500 mg Oral Daily   lisinopril 30 mg Oral Q12H    magnesium sulfate 2 g Intravenous Once   melatonin 5 mg Oral Nightly   metoprolol tartrate 50 mg Oral Q12H   nystatin  Topical Q8H   pantoprazole 40 mg Oral Q AM   phenytoin 300 mg Oral Nightly   potassium chloride 40 mEq Oral BID   sennosides-docusate sodium 2 tablet Oral Nightly   tbo-filgrastim 300 mcg Subcutaneous Q24H   vitamin B-12 1,000 mcg Oral Daily     IV meds:                             Data Review:      Results from last 7 days  Lab Units 05/13/18 0343 05/12/18 1828 05/12/18 0326  05/09/18  0603 05/08/18  0554 05/07/18  0536   SODIUM mmol/L 141 137 139  < > 139 140 137   POTASSIUM mmol/L 3.5 3.4* 3.6  < > 3.4* 3.8 4.2   CHLORIDE mmol/L 103 97* 99  < > 103 105 103   CO2 mmol/L 25.7 27.1 28.4  < > 27.2 25.7 24.8   BUN mg/dL 11 14 16  < > 27* 31* 30*   CREATININE mg/dL 0.54* 0.56* 0.57  < > 0.64 0.70 0.92   CALCIUM mg/dL 10.6* 10.8* 10.7*  < > 10.7* 10.4 11.3*   BILIRUBIN mg/dL  --   --   --   --  0.7 0.6 0.4   ALK PHOS U/L  --   --   --   --  81 76 81   ALT (SGPT) U/L  --   --   --   --  12 8 9   AST (SGOT) U/L  --   --   --   --  23 24 29   GLUCOSE mg/dL 83 96 96  < > 83 82 116*   < > = values in this interval not displayed.  Estimated Creatinine Clearance: 58.7 mL/min (by C-G formula based on SCr of 0.54 mg/dL (L)).    Results from last 7 days  Lab Units 05/13/18 0343   URIC ACID mg/dL 1.8*       Results from last 7 days  Lab Units 05/13/18 0343 05/12/18 1828 05/12/18 0326 05/11/18  0405   MAGNESIUM mg/dL 1.7 2.0 1.5*  --    PHOSPHORUS mg/dL 3.7  --  3.4 3.5         Results from last 7 days  Lab Units 05/13/18  0343 05/12/18  0326 05/11/18  0405 05/10/18  0556 05/09/18  0603   WBC 10*3/mm3 0.79* 0.23* 1.40* 9.80 29.15*   HEMOGLOBIN g/dL 9.8* 10.2* 10.5* 10.8* 7.9*   PLATELETS 10*3/mm3 26* 21* 34* 55* 98*           ASSESSMENT:   Principal Problem:    Hypercalcemia  Active Problems:    Gastroesophageal reflux disease    Chronic recurrent major depressive disorder    Restless legs syndrome     Seizure disorder    Essential hypertension    Paroxysmal atrial fibrillation    Iron deficiency anemia due to chronic blood loss    Weakness    Hypertension    Liver mass    Lymphoma    Lymphoma of lymph nodes of neck    Leukemoid reaction    Insomnia    Anemia associated with chemotherapy    Pancytopenia due to antineoplastic chemotherapy    Chemotherapy-induced neutropenia    1.  Hypercalcemia de to malignancy, s/p zometa 4.25.18 and 5.7.18. Calcium is stable. Monitor. If ca spikes again, could consider steroids.   2.  Metastatic NHL, s/p RCHOP 5.4.18.  3.  Hypokalemia, replaced per protocol.   4.  Hypophosphatemia and hypomagnesemia: with prn replacement.   5.  Labile HTN. better.   6.  Recent GIB and anemia. IV iron and prn prbc.   7.  Leukocytosis. WBC falling.   8.  Afib with RVR, with rate now controlled.   9.  Pan-cytopenia. Per oncology.   Will follow.     Emilio Mary MD  5/13/2018    10:00 AM

## 2018-05-13 NOTE — PLAN OF CARE
Problem: Patient Care Overview  Goal: Plan of Care Review  Outcome: Ongoing (interventions implemented as appropriate)   05/13/18 1698   Coping/Psychosocial   Plan of Care Reviewed With patient   Plan of Care Review   Progress improving   OTHER   Outcome Summary Pt. received 2 grams of Magnesium Sulfate today due to low magnesium. Granix given to boost WBC's. BP has been well-controlled compared to the past few days - Hydralazine given. RUDOLPH's on. PORT remains flush-able with blood return. Pt. ambulated in lugo with family.      Goal: Individualization and Mutuality  Outcome: Ongoing (interventions implemented as appropriate)    Goal: Interprofessional Rounds/Family Conf  Outcome: Ongoing (interventions implemented as appropriate)      Problem: Activity Intolerance (Adult)  Goal: Activity Tolerance  Outcome: Ongoing (interventions implemented as appropriate)    Goal: Effective Energy Conservation Techniques  Outcome: Ongoing (interventions implemented as appropriate)      Problem: Oncology Care (Adult)  Goal: Signs and Symptoms of Listed Potential Problems Will be Absent, Minimized or Managed (Oncology Care)  Outcome: Ongoing (interventions implemented as appropriate)      Problem: Skin Injury Risk (Adult)  Goal: Identify Related Risk Factors and Signs and Symptoms  Outcome: Ongoing (interventions implemented as appropriate)    Goal: Skin Health and Integrity  Outcome: Ongoing (interventions implemented as appropriate)

## 2018-05-13 NOTE — PROGRESS NOTES
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  She was given first cycle R-CHOP on 5/4/2018.    Interval History:  The patient underwent an excisional lymph node biopsy from the left neck this morning for confirmation of pathology.  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  Additional FISH studies are pending to evaluate for \"double hit\" lymphoma as Ki 67 staining is 4+     On 05/02/2018, actually the patient was feeling better.  She had no issues in regard to the site of the lymph node biopsy in the left side of the neck with minimal discomfort.  I talked with Dr. Gill in regard to port placement tomorrow.      Hopefully, we will have further information in regard to “double-hit” lymphoma before we start any chemotherapy.     Other than that, I went through formal education and teaching in regard to chemotherapy medicines, the frequency of the treatment, side effects and so forth; please review below.    The patient will have a PET scan this afternoon as well and the port will be placed tomorrow.    On 05/03/2018 the patient was feeling fatigued and tired from so many testings back and forth. She had her PET scan this morning and there is planning for port placement tomorrow. I asked the patient to bring her family this afternoon to talk with them about the diagnosis and decide how they want to proceed. I pointed out to the patient as well that the absence of treatment life expectancy is measured in a question of a few weeks to a few months.     I also discussed with her the finding on the PET scan today that will be described below and also the finding of the " cervical lymph node that will be described below.     On 5/4/2018, patient was given for cycle immunochemotherapy with R-CHOP.  Patient tolerated physically.      On 5/7/2018, we started the patient on intravenous Venofer.  Patient has worsening hemoglobin 7.4.  She tolerated Venofer without any incident.     On 5/8/2018 laboratory study showed worsening hemoglobin at 6.3, she reports no bleeding.  Her WBC is trending down at 47,200 as well as neutrophils 47,000. She has good appetite no nausea vomiting.  No cough no dyspnea.  Patient was given 2 units PRBC transfusion.  She got a second dose of Venofer (total 600 mg).      On 5/9/2018, laboratory study showed improved the hemoglobin is 7.9.  However patient reported she was dyspneic and that having lightheadedness when she walked in the hallway.  She reports no nausea vomiting.  No bleeding. Her nurse reported patient also had elevated blood pressure.  She got intravenous hydralazine.  She already is on amlodipine, lisinopril and metoprolol.  Due to symptomatic anemia, patient was given 2 units more units PRBC transfusion.    On 5/10/2018 patient reports more fatigue.  She denies bleeding.  She reports good appetite.  She has bowel movement daily.  Laboratory study today reported worsening platelets at 55,000.  Her hemoglobin improved at 10.8, and a normal WBC 9800 including neutrophils 9200.  Her WBC counts actually is deteriorating every day despite on Gnanix injection.     On 5/11/2018, patient complains of swelling in the legs.  Denies oral mucositis/soreness, no nausea vomiting.  She reports no fever no sweating no chills.  Had a regular bowel movement.  No evidence of bleeding no bruising.  Her laboratory study showed a significant drop of WBC at 1400 and platelets is further therapy now it is 34,000.  Hemoglobin is 10.5,  stable after transfusion on 5/9/2018.     On 5/12/2018, patient has worsening severe neutropenia with ANC 20, and worsening severe  thrombocytopenia with platelets 21,000 and a slightly trending down hemoglobin 10.2.  She denies  bleeding no bruising.     Oncology History:  Martina Blake is a 77 y.o. female who were asked to see in consultation  4/29/18 for hypercalcemia, anemia and radiologic findings consistent with likely malignancy-?  Lymphoma.        She has a significant past medical history of palpitations followed by cardiology.  She presented to the emergency room April 10-14 with chest pain and palpitations and was found to be in A. fib with RVR and also demonstrated electrolyte abnormalities and anemia.  Records demonstrates that her anemia became particularly evident April 11 with an H&H of 8.9/ 28.4, platelet count 212,000 with a normal automated differential.  She underwent GI assessment including upper and lower endoscopy demonstrated hernia, gastritis, esophagitis, diverticulosis but no evidence of acute bleed.  She was placed on Eliquis as result of a relatively high CHADSVASc score.  She had also been found to be hypercalcemic at approximately 11 with HCTZ altered and the patient started on Aldactone.  Additional testing had included a ferritin of 313.1, iron of 32 with TIBC of 222 and iron saturation of 14, occult blood negative per stool testing    She was brought back to the emergency room April 21 with further evidence of hypercalcemia, associated weakness, anorexia, nausea, ataxia and weight loss.  MRI of the brain April 21 was found to be unremarkable. Outpatient intact PTH levels were found to be 7.1(reduced), and an H&H of 10.3 and 32.9, white count 11,090, platelet count 267,000 with a normal differential.  Patient seen by renal medicine with the possibility of Fanconi syndrome raised.  Thereafter PTH hormone was found be less than 2.0, and protein electropheresis included IgG of 948, IgA of 207, IgM 86, total protein 6.3, albumin 2.8, no M spike noted, slightly elevated free kappa and lambda light chains with normal  ratio.  Repeat testing per iron profile again revealed low serum iron but high serum ferritin and normal CEA, normal AFP, CA-125 of 77.2, CA 19-9 7.8   At this point the reason for her hypercalcemia was thought to be possible medication effect and/or possible malignancy with calcium was running between 12 and 13 mg/dL.    This led to CT scan of chest abdomen pelvis performed April 24 demonstrate extensive metastatic disease throughout the abdomen and pelvis particularly involving the spleen and liver, extensive lymphadenopathy at the abdomen and pelvis with a 4 cm lesion splenic hilum partially encasing the pancreatic tail, pancreatic tail malignancy?,  Gastric primary malignancy?  There was a mass along the right wall of the urinary bladder with adjacent adenopathy.  CT of the chest revealed several tiny dense pleural-based nodular opacities-partially calcified granulomas, no mediastinal or hilar adenopathy, enlarged pulmonary arteries consistent with pulmonary arterial hypertension.  A subsequent bone scan performed April 26 revealed prominent uptake in the right frontal bone and right posterior ninth rib and a CT needle biopsy of the liver was obtained April 26 as well.The sample obtained revealed, on flow cytometry examination, a subpopulation of normal B cells that might be consistent with B-cell lymphoma.  Additional testing is currently pending.     On May 25 further hypercalcemia was again noted and treated with IV fluids and dose of Zometa, vitamin D level normal.  Again evidence of hypokalemia, hypophosphatemia and hypomagnesemia.  The patient is seen April 29 her calcium level has gradually improved dropping to 9.7, albumin of 2.6, ionized calcium of 6.1, BUN and creatinine of 10/.55.  We are asked to see her with results available thus far as to possible lymphoma.    Past Medical History:     Atrial fibrillation      • Cystitis     • Cystocele       moderate   • Dyslipidemia     • Esophageal reflux     •  "Fatigue     • History of transfusion       no reaction   • Hypertension     • Major depression, chronic     • Menopause     • Osteoarthritis     • Osteoporosis     • Palpitations     • Post menopausal problems     • RLS (restless legs syndrome)     • Seizure disorder     • Subjective tinnitus     • Vaginal delivery       x2  \"SONYA\"      \"JASON\"   • Vitamin D deficiency      Social History:  The patient is a  and has 2 children.  She has never smoked.  She denies alcohol use.    Review of Systems   Constitutional: Positive for fatigue. Negative for activity change, appetite change and unexpected weight change.   HENT: Negative.    Eyes: Negative.    Respiratory: Positive for shortness of breath (Has exertional dyspnea). Negative for wheezing.    Cardiovascular: Negative.  Negative for chest pain and palpitations.   Gastrointestinal: Negative.  Negative for abdominal pain, constipation, nausea and vomiting.   Endocrine: Negative.    Genitourinary: Negative.    Musculoskeletal: Negative for neck stiffness.   Skin: Negative.  Negative for color change and pallor.   Neurological: Positive for weakness. Negative for seizures, numbness and headaches.   Hematological: Positive for adenopathy.   Psychiatric/Behavioral: Negative for confusion.          Medications:  The current medication list was reviewed in the EMR    ALLERGIES:    Allergies   Allergen Reactions   • Crab (Diagnostic) Itching and Rash   • Pseudoephedrine Dizziness       Objective    Vitals:    05/12/18 1839 05/12/18 2303 05/13/18 0558 05/13/18 0600   BP: 150/82  165/80    BP Location: Left arm  Left arm    Patient Position: Lying  Lying    Pulse: 58 62 53    Resp: 18 18 18    Temp: 97.7 °F (36.5 °C) 97.3 °F (36.3 °C) 97.7 °F (36.5 °C)    TempSrc: Oral Oral Oral    SpO2: 97% 98% 97%    Weight:    63 kg (139 lb)   Height:       ECOG 3       Physical Exam    GENERAL:  Well-developed, well-nourished female patient sitting in chair. no acute distress.   SKIN: "  Warm, dry without rashes, no purpura or petechiae.  HEENT:  conjunctivas non injected.  Mouth mucosa moist, no exudates in oropharynx.  NECK:  no thyromegaly. Left neck has a lymph node about 2 cm, no tenderness.  No other cervical adenopathies.   LYMPHATICS:  No other cervical, supraclavicular, axillary adenopathy.  CHEST:  Lungs clear to auscultation, no wheezing, crackles or ronchi.  CARDIAC AND VASCULAR: normal rate and regular rhythm, without murmurs.    ABDOMEN:  Soft, nontender with spleen 4 cm blcm organomegaly, palpable liver 4 cm brcm, no distention, no abdominal pain, Normal bowel sounds.   EXTREMITIES  AND SPINE:  No clubbing, cyanosis or edema, no deformities or pain.      NEUROLOGICAL:  Patient is AAOx3.     RECENT LABS:      Results from last 7 days  Lab Units 05/13/18  0343 05/12/18  0326 05/11/18  0405 05/10/18  0556   WBC 10*3/mm3 0.79* 0.23* 1.40* 9.80   HEMOGLOBIN g/dL 9.8* 10.2* 10.5* 10.8*   HEMATOCRIT % 30.8* 31.9* 32.0* 32.8*   PLATELETS 10*3/mm3 26* 21* 34* 55*   MONOCYTES % %  --  4.0*  --  2.0*     Lab Results   Component Value Date    NEUTROABS 0.02 (C) 05/12/2018       Results from last 7 days  Lab Units 05/13/18  0343 05/12/18  1828 05/12/18  0326  05/09/18  0603 05/08/18  0554 05/07/18  0536   SODIUM mmol/L 141 137 139  < > 139 140 137   POTASSIUM mmol/L 3.5 3.4* 3.6  < > 3.4* 3.8 4.2   CHLORIDE mmol/L 103 97* 99  < > 103 105 103   CO2 mmol/L 25.7 27.1 28.4  < > 27.2 25.7 24.8   BUN mg/dL 11 14 16  < > 27* 31* 30*   CREATININE mg/dL 0.54* 0.56* 0.57  < > 0.64 0.70 0.92   CALCIUM mg/dL 10.6* 10.8* 10.7*  < > 10.7* 10.4 11.3*   BILIRUBIN mg/dL  --   --   --   --  0.7 0.6 0.4   ALK PHOS U/L  --   --   --   --  81 76 81   ALT (SGPT) U/L  --   --   --   --  12 8 9   AST (SGOT) U/L  --   --   --   --  23 24 29   GLUCOSE mg/dL 83 96 96  < > 83 82 116*   < > = values in this interval not displayed.   on 5/12/2018,  on 5/11/2018, 319 on 5/10/2018, 531 on 5/6/2018 and 678 on  "5/5/2018    Uric acid 1.8 on 5/11/2018, 1.2 on 5/8/2018.    Magnesium level 1.5 on 5/12/2018      Assessment/Plan :     1.  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  Additional FISH studies are POSITIVE for \"double hit\" lymphoma as Ki 67 staining is 4+     She has extensive metastatic disease throughout the abdomen and pelvis, splenomegaly replaced with low attenuation lesions, 4.4 cm splenic hilum mass, disease encasing the pancreatic tail, 3 cm lesion abutting the stomach, extensive lymphadenopathy throughout the celiac axis, retroperitoneum, mesentery, periaortic lymphadenopathy, liver lesion in the posterior segment 4.8 cm, nodular thickening of the right wall of urinary bladder 5.0 cm, peritoneal lymphadenopathy and thickening, coarsely calcified mesenteric mass 9.6 cm (?old mesenteric adenitis).  Bone scan shows foci of uptake in the frontal bone and right posterior ninth rib.      The patient has had B symptoms with decreased performance status, and weight loss, anorexia.    She was started on chemotherapy with R-CHOP on 5/4/2018.  She tolerated chemotherapy relatively well.     2.  Hypercalcemia of malignancy:  The patient received Zometa 4 mg on 4/25/18.  Her calcium temporarily improved but is beginning to elevate again to 11.3 with albumin 2.9.  Hypercalcemia is likely to be an ongoing issue until she responded to chemotherapy.      Patient received a second dose of Zometa on 5/7/2018.  Laboratory study today reporte calcium 10.5.  We'll continue to monitor.     3.  Pancytopenia secondary to chemotherapy.     This patient now has severe leukocytopenia/neutropenia, despite daily Granix  Injection, her WBC dropped from peak level 67,000 to 1400 within 4 days on 5/11/2018.  There are further decreased to a total  and neutrophils 20 on 5/12/2018.  Her WBC may stays at this level in the next several days.  We'll continue Granix injection daily.       She was started on Levaquin 500 " mg daily for prophylaxis of bacterial infection on 5/11/2018.    4.   Severe thrombocytopenia secondary to chemotherapy.  Her platelets drop quickly.  It dropped from baseline level to 236,000 on 5/7/2018 to 21,000 on 5/12/2018 within 5 days.  Patient reports no bleeding.      On 5/13/2018, platelets is 26,000, marginally better.  Hopefully this is the finding point for marrow recovery.  Patient is asymptomatic with no bleeding or bruising. There is no need of for transfusion of platelets.  Her platelet level needs to be monitored daily.     5.  Anemia:  Had pre-existing anemia but it worsened with chemotherapy.  Her baseline anemia was probably inflammatory/malignancy associated (elevated ferritin, low transferrin, elevated ESR) versus bone marrow involvement.   We do not think a bone marrow exam would change her treatment plan.      Patient was given pRBC transfusion 4 units in the past 4 days due to the tomasz hemoglobin 6.3 on 5/8/2018 and she was symptomatic.  Her hemoglobin improved to 10.8 on 5/10/2018.  Slowly trending down in the past 4 days, at 9.8 on 5/13/2018.     Reviewing her iron study from 4/24/2018, I do believe this patient also had elements of iron deficiency.  She received intravenous iron Venofer 300 mg for 2 doses on 5/7/2018 and 5/8/2018, total 600 mg.      She has normal folic acid level and vitamin B12 level this morning on 5/8/2018, however her B12 level can be further improved.  She was started on oral B12 supplementation once a day.      6.  Paroxysmal atrial fibrillation: The patient was anticoagulated with Eliquis, which has been on hold since admission.  Her platelet count is coming down quickly after chemotherapy.  Her platelets is coming down at 55,000 on 5/10/2018 and further down to 21,000 on 5/12/2018.  We prefer not to start this patient back on Eliquis until her platelets recover above 60,000.  This can be ongoing issue though since patient will have more chemotherapy down the  line, repeat every 3 weeks.     7. Prophylaxis of tumor lysis syndrome.  Her uric acid has being had a low level.  Her LDH has coming down nicely also.  Patient will be continued on allopurinol.     8.  Hypokalemia and hypomagnesemia.  Both are marginal low today.  Patient will be given intravenous magnesium sulfate infusion 2 g on 5/13/2018.  Patient is on oral potassium 40 MG to twice a day, which will be continued.    9.  GI prophylaxis on Protonix.     10.  Insomnia.  Patient reports he was not able to sleep the last 4 out of 5 days.  At home she used to take 2 tablets of gabapentin and 5 mg melatonin before sleep.      Patient was started on gabapentin and the melatonin on 5/7/2018.  She reported had a significant improvement of sleep in the night.     11.  Significantly elevated blood pressure.  Nephrologist manages her medication for BP control.  Her BP is better controlled.      12.  No extremity edema developed within the past day.  Examination shows no calf tenderness.  We added RUDOLPH hose.     12.  Vaginal yeast infection.  Patient was started on topical the statin yesterday.  Patient is still asymptomatic.  We started her on Diflucan on 5/12/2018 for 3 days as consulted with pharmacist.  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    13.  Discharge plan.  Patient will need to continue physical/occupational therapy.  Patient prefers to be discharged to home.   has been following patient.  With her bone marrow shows signs of good recovery, she can be discharged home and follow-up with us in the clinic.      PLAN:   1.  Continue Granix 300 mg daily.    2.  Continue Levaquin 500 mg daily for prophylaxis.   3.  Continue Diflucan, (first dose 200 mg on 5/12/2018, and then 150 mg daily for 2 more days finish on  5/14/2018).    4.  Continue allopurinol for tumor lysis syndrome prophylaxis.   5.  Continue current medicine for management of hypertension.   6.  Intravenous magnesium sulfate infusion 2 g on 5/13/2018.   7.  Continue oral potassium.  8.  Continue gabapentin and melatonin.   9.  Continue oral vitamin B12 at 1000 µg daily.   10. UseTED HOSE to both legs for DVT prophylaxis.   10.  Continue supportive care.   11.  CBC with differentiation, BMP and mag level in morning.         Discussed with the patient today.  I discussed with her nurse today.      JABARI RAMIREZ M.D., Ph.D.      5/13/2018      CC:

## 2018-05-13 NOTE — SIGNIFICANT NOTE
05/13/18 1319   Rehab Treatment   Discipline physical therapy assistant   Reason Treatment Not Performed patient/family declined treatment  (pt declined d/t ambulating w/ family; educated on performing seated ther ex in evening for LE strengthening; PT to follow up tomorrow)   Recommendation   PT - Next Appointment 05/14/18

## 2018-05-13 NOTE — PLAN OF CARE
Problem: Fall Risk (Adult)  Goal: Absence of Fall  Outcome: Ongoing (interventions implemented as appropriate)      Problem: Patient Care Overview  Goal: Plan of Care Review  Outcome: Ongoing (interventions implemented as appropriate)   05/13/18 2451   Coping/Psychosocial   Plan of Care Reviewed With patient   Plan of Care Review   Progress improving   OTHER   Outcome Summary Pt had a good night, rested well. Was able to keep all meds down last night. C/o itching . Bilateral groin and rectal rash look the same, nystatin powder continues. VSS. Bleeding and neutropenic precautions maintained. Friend is at bedside. Incontinent. Able to turn herself. Continue to monitor.       Problem: Activity Intolerance (Adult)  Goal: Activity Tolerance  Outcome: Ongoing (interventions implemented as appropriate)    Goal: Effective Energy Conservation Techniques  Outcome: Ongoing (interventions implemented as appropriate)      Problem: Oncology Care (Adult)  Goal: Signs and Symptoms of Listed Potential Problems Will be Absent, Minimized or Managed (Oncology Care)  Outcome: Ongoing (interventions implemented as appropriate)      Problem: Skin Injury Risk (Adult)  Goal: Identify Related Risk Factors and Signs and Symptoms  Outcome: Ongoing (interventions implemented as appropriate)    Goal: Skin Health and Integrity  Outcome: Ongoing (interventions implemented as appropriate)

## 2018-05-14 LAB
ACANTHOCYTES BLD QL SMEAR: ABNORMAL
ANION GAP SERPL CALCULATED.3IONS-SCNC: 12.6 MMOL/L
BUN BLD-MCNC: 13 MG/DL (ref 8–23)
BUN/CREAT SERPL: 21 (ref 7–25)
CALCIUM SPEC-SCNC: 11.4 MG/DL (ref 8.6–10.5)
CHLORIDE SERPL-SCNC: 99 MMOL/L (ref 98–107)
CO2 SERPL-SCNC: 28.4 MMOL/L (ref 22–29)
CREAT BLD-MCNC: 0.62 MG/DL (ref 0.57–1)
DEPRECATED RDW RBC AUTO: 47.3 FL (ref 37–54)
EOSINOPHIL # BLD MANUAL: 0.03 10*3/MM3 (ref 0–0.7)
EOSINOPHIL NFR BLD MANUAL: 1 % (ref 0.3–6.2)
ERYTHROCYTE [DISTWIDTH] IN BLOOD BY AUTOMATED COUNT: 14.9 % (ref 11.7–13)
GFR SERPL CREATININE-BSD FRML MDRD: 93 ML/MIN/1.73
GLUCOSE BLD-MCNC: 89 MG/DL (ref 65–99)
HCT VFR BLD AUTO: 31.6 % (ref 35.6–45.5)
HGB BLD-MCNC: 10 G/DL (ref 11.9–15.5)
LDH SERPL-CCNC: 259 U/L (ref 135–214)
LYMPHOCYTES # BLD MANUAL: 0.3 10*3/MM3 (ref 0.9–4.8)
LYMPHOCYTES NFR BLD MANUAL: 11 % (ref 19.6–45.3)
LYMPHOCYTES NFR BLD MANUAL: 16 % (ref 5–12)
MAGNESIUM SERPL-MCNC: 1.9 MG/DL (ref 1.6–2.4)
MCH RBC QN AUTO: 27.4 PG (ref 26.9–32)
MCHC RBC AUTO-ENTMCNC: 31.6 G/DL (ref 32.4–36.3)
MCV RBC AUTO: 86.6 FL (ref 80.5–98.2)
METAMYELOCYTES NFR BLD MANUAL: 3 % (ref 0–0)
MONOCYTES # BLD AUTO: 0.44 10*3/MM3 (ref 0.2–1.2)
NEUTROPHILS # BLD AUTO: 1.9 10*3/MM3 (ref 1.9–8.1)
NEUTROPHILS NFR BLD MANUAL: 69 % (ref 42.7–76)
PLAT MORPH BLD: NORMAL
PLATELET # BLD AUTO: 27 10*3/MM3 (ref 140–500)
PMV BLD AUTO: ABNORMAL FL (ref 6–12)
POTASSIUM BLD-SCNC: 3.5 MMOL/L (ref 3.5–5.2)
RBC # BLD AUTO: 3.65 10*6/MM3 (ref 3.9–5.2)
SCAN SLIDE: NORMAL
SCHISTOCYTES BLD QL SMEAR: ABNORMAL
SODIUM BLD-SCNC: 140 MMOL/L (ref 136–145)
URATE SERPL-MCNC: 2 MG/DL (ref 2.4–5.7)
WBC MORPH BLD: NORMAL
WBC NRBC COR # BLD: 2.76 10*3/MM3 (ref 4.5–10.7)

## 2018-05-14 PROCEDURE — 83615 LACTATE (LD) (LDH) ENZYME: CPT | Performed by: INTERNAL MEDICINE

## 2018-05-14 PROCEDURE — 85025 COMPLETE CBC W/AUTO DIFF WBC: CPT | Performed by: INTERNAL MEDICINE

## 2018-05-14 PROCEDURE — 83735 ASSAY OF MAGNESIUM: CPT | Performed by: INTERNAL MEDICINE

## 2018-05-14 PROCEDURE — 80048 BASIC METABOLIC PNL TOTAL CA: CPT | Performed by: INTERNAL MEDICINE

## 2018-05-14 PROCEDURE — 25010000002 TBO-FILGRASTIM 300 MCG/0.5ML SOLUTION PREFILLED SYRINGE: Performed by: INTERNAL MEDICINE

## 2018-05-14 PROCEDURE — 85007 BL SMEAR W/DIFF WBC COUNT: CPT | Performed by: INTERNAL MEDICINE

## 2018-05-14 PROCEDURE — 99233 SBSQ HOSP IP/OBS HIGH 50: CPT | Performed by: INTERNAL MEDICINE

## 2018-05-14 PROCEDURE — 84550 ASSAY OF BLOOD/URIC ACID: CPT | Performed by: INTERNAL MEDICINE

## 2018-05-14 PROCEDURE — 63710000001 DIPHENHYDRAMINE PER 50 MG: Performed by: INTERNAL MEDICINE

## 2018-05-14 RX ORDER — LISINOPRIL 40 MG/1
40 TABLET ORAL EVERY 12 HOURS SCHEDULED
Status: DISCONTINUED | OUTPATIENT
Start: 2018-05-14 | End: 2018-05-16 | Stop reason: HOSPADM

## 2018-05-14 RX ADMIN — NYSTATIN: 100000 POWDER TOPICAL at 06:27

## 2018-05-14 RX ADMIN — ALLOPURINOL 300 MG: 300 TABLET ORAL at 09:07

## 2018-05-14 RX ADMIN — LEVOFLOXACIN 500 MG: 500 TABLET, FILM COATED ORAL at 09:07

## 2018-05-14 RX ADMIN — FLUCONAZOLE 150 MG: 150 TABLET ORAL at 09:06

## 2018-05-14 RX ADMIN — TBO-FILGRASTIM 300 MCG: 300 INJECTION, SOLUTION SUBCUTANEOUS at 09:07

## 2018-05-14 RX ADMIN — HYDRALAZINE HYDROCHLORIDE 25 MG: 25 TABLET, FILM COATED ORAL at 22:16

## 2018-05-14 RX ADMIN — POTASSIUM CHLORIDE 40 MEQ: 1.5 POWDER, FOR SOLUTION ORAL at 20:11

## 2018-05-14 RX ADMIN — LISINOPRIL 30 MG: 20 TABLET ORAL at 09:07

## 2018-05-14 RX ADMIN — POTASSIUM CHLORIDE 40 MEQ: 1.5 POWDER, FOR SOLUTION ORAL at 09:07

## 2018-05-14 RX ADMIN — LISINOPRIL 40 MG: 40 TABLET ORAL at 20:15

## 2018-05-14 RX ADMIN — DIPHENHYDRAMINE HYDROCHLORIDE 25 MG: 25 CAPSULE ORAL at 20:16

## 2018-05-14 RX ADMIN — NYSTATIN: 100000 POWDER TOPICAL at 14:09

## 2018-05-14 RX ADMIN — HYDRALAZINE HYDROCHLORIDE 25 MG: 25 TABLET, FILM COATED ORAL at 14:09

## 2018-05-14 RX ADMIN — DOCUSATE SODIUM -SENNOSIDES 2 TABLET: 50; 8.6 TABLET, COATED ORAL at 20:10

## 2018-05-14 RX ADMIN — METOPROLOL TARTRATE 50 MG: 50 TABLET, FILM COATED ORAL at 09:07

## 2018-05-14 RX ADMIN — Medication 1000 MCG: at 09:07

## 2018-05-14 RX ADMIN — GABAPENTIN 600 MG: 300 CAPSULE ORAL at 20:16

## 2018-05-14 RX ADMIN — NYSTATIN: 100000 POWDER TOPICAL at 22:16

## 2018-05-14 RX ADMIN — ACETAMINOPHEN 650 MG: 325 TABLET ORAL at 20:16

## 2018-05-14 RX ADMIN — Medication 5 MG: at 20:15

## 2018-05-14 RX ADMIN — METOPROLOL TARTRATE 50 MG: 50 TABLET, FILM COATED ORAL at 22:16

## 2018-05-14 RX ADMIN — PANTOPRAZOLE SODIUM 40 MG: 40 TABLET, DELAYED RELEASE ORAL at 06:26

## 2018-05-14 RX ADMIN — HYDRALAZINE HYDROCHLORIDE 25 MG: 25 TABLET, FILM COATED ORAL at 06:26

## 2018-05-14 RX ADMIN — PHENYTOIN 300 MG: 125 SUSPENSION ORAL at 20:10

## 2018-05-14 RX ADMIN — AMLODIPINE BESYLATE 10 MG: 10 TABLET ORAL at 09:06

## 2018-05-14 NOTE — PLAN OF CARE
Problem: Patient Care Overview  Goal: Plan of Care Review   05/14/18 1203   Coping/Psychosocial   Plan of Care Reviewed With patient   OTHER   Outcome Summary PT spoke with pt who states she is ambulating w family multiple times daily. Pt ambulates 200` SBA w RWx. Pt will require RWx when d/c home. PT will sign off at this time as pt is safe to ambulate w family/nsg while at [facility].

## 2018-05-14 NOTE — PROGRESS NOTES
NEPHROLOGY PROGRESS NOTE    PATIENT IDENTIFICATION:   Name:  Martina Blake      MRN:  [id number]     77 y.o.  female             Reason for visit: hypercalcemia    SUBJECTIVE:    Eager to go home; eating well; no SOB    OBJECTIVE:  Vitals:    05/13/18 2123 05/14/18 0400 05/14/18 0954 05/14/18 1534   BP: 176/70 167/63 172/70 160/80   BP Location: Left arm Left arm Left arm Right arm   Patient Position: Sitting Lying Sitting Lying   Pulse: 65 55 66 62   Resp: 18 16 18 16   Temp: 97.9 °F (36.6 °C) 97.3 °F (36.3 °C) 97.6 °F (36.4 °C) 98.7 °F (37.1 °C)   TempSrc: Oral Oral Oral Oral   SpO2: 98% 93% 97% 98%   Weight:  64.1 kg (141 lb 5 oz)     Height:               Body mass index is 24.26 kg/m².    Intake/Output Summary (Last 24 hours) at 05/14/18 1745  Last data filed at 05/14/18 1326   Gross per 24 hour   Intake              760 ml   Output                0 ml   Net              760 ml     Wt Readings from Last 1 Encounters:   05/14/18 0400 64.1 kg (141 lb 5 oz)   05/13/18 0600 63 kg (139 lb)   05/12/18 0600 65.5 kg (144 lb 8 oz)   05/11/18 0446 72.1 kg (158 lb 15.2 oz)   05/10/18 0538 71.4 kg (157 lb 8 oz)   05/09/18 1555 71.3 kg (157 lb 3.2 oz)   05/09/18 0538 70.9 kg (156 lb 4.8 oz)   05/01/18 1850 65.1 kg (143 lb 9.6 oz)   04/30/18 0643 66.8 kg (147 lb 4.8 oz)   04/29/18 0652 67.9 kg (149 lb 12.8 oz)   04/28/18 0641 68 kg (150 lb)   04/27/18 0603 64.7 kg (142 lb 9.6 oz)   04/25/18 0516 62.6 kg (138 lb)   04/21/18 1731 68.4 kg (150 lb 12.8 oz)   04/21/18 1227 68 kg (150 lb)     Wt Readings from Last 3 Encounters:   05/14/18 64.1 kg (141 lb 5 oz)   04/19/18 68 kg (150 lb)   04/10/18 61.5 kg (135 lb 8 oz)       Exam:  General: NAD, pleasant  Neck without JVD; port rij  Heart RRR no s3 or rub. 2/6 early syst m LUSB  Lungs clear; no rales; not labored  Abd +bs, slightly distended, soft, not tender.    Ext trace lower ext edema, right greater than left.  Skin: no rash  Psych mood and affect fine    Scheduled meds:       allopurinol 300 mg Oral Daily   amLODIPine 10 mg Oral Q24H   diphenhydrAMINE 25 mg Oral Once   gabapentin 600 mg Oral Nightly   hydrALAZINE 25 mg Oral Q8H   lisinopril 30 mg Oral Q12H   melatonin 5 mg Oral Nightly   metoprolol tartrate 50 mg Oral Q12H   nystatin  Topical Q8H   pantoprazole 40 mg Oral Q AM   phenytoin 300 mg Oral Nightly   potassium chloride 40 mEq Oral BID   sennosides-docusate sodium 2 tablet Oral Nightly   tbo-filgrastim 300 mcg Subcutaneous Q24H   vitamin B-12 1,000 mcg Oral Daily     IV meds:                             Data Review:      Results from last 7 days  Lab Units 05/14/18  0429 05/13/18  0343 05/12/18  1828  05/09/18  0603 05/08/18  0554   SODIUM mmol/L 140 141 137  < > 139 140   POTASSIUM mmol/L 3.5 3.5 3.4*  < > 3.4* 3.8   CHLORIDE mmol/L 99 103 97*  < > 103 105   CO2 mmol/L 28.4 25.7 27.1  < > 27.2 25.7   BUN mg/dL 13 11 14  < > 27* 31*   CREATININE mg/dL 0.62 0.54* 0.56*  < > 0.64 0.70   CALCIUM mg/dL 11.4* 10.6* 10.8*  < > 10.7* 10.4   BILIRUBIN mg/dL  --   --   --   --  0.7 0.6   ALK PHOS U/L  --   --   --   --  81 76   ALT (SGPT) U/L  --   --   --   --  12 8   AST (SGOT) U/L  --   --   --   --  23 24   GLUCOSE mg/dL 89 83 96  < > 83 82   < > = values in this interval not displayed.  Estimated Creatinine Clearance: 59.6 mL/min (by C-G formula based on SCr of 0.62 mg/dL).    Results from last 7 days  Lab Units 05/14/18  0429   URIC ACID mg/dL 2.0*       Results from last 7 days  Lab Units 05/14/18  0429 05/13/18  0343 05/12/18  1828 05/12/18  0326 05/11/18  0405   MAGNESIUM mg/dL 1.9 1.7 2.0 1.5*  --    PHOSPHORUS mg/dL  --  3.7  --  3.4 3.5         Results from last 7 days  Lab Units 05/14/18  0429 05/13/18  0343 05/12/18  0326 05/11/18  0405 05/10/18  0556   WBC 10*3/mm3 2.76* 0.79* 0.23* 1.40* 9.80   HEMOGLOBIN g/dL 10.0* 9.8* 10.2* 10.5* 10.8*   PLATELETS 10*3/mm3 27* 26* 21* 34* 55*           ASSESSMENT:   Principal Problem:    Hypercalcemia  Active Problems:     Gastroesophageal reflux disease    Chronic recurrent major depressive disorder    Restless legs syndrome    Seizure disorder    Essential hypertension    Paroxysmal atrial fibrillation    Iron deficiency anemia due to chronic blood loss    Weakness    Hypertension    Liver mass    Lymphoma    Lymphoma of lymph nodes of neck    Leukemoid reaction    Insomnia    Anemia associated with chemotherapy    Pancytopenia due to antineoplastic chemotherapy    Chemotherapy-induced neutropenia    1.  Hypercalcemia due to malignancy, stable all in all, s/p zometa 4.25.18 and 5.7.18.    2.  Metastatic NHL, s/p RCHOP 5.4.18.  3.  Hypokalemia, replacing per protocol.   4.  Hypophosphatemia and hypomagnesemia: with prn replacement.   5.  Labile HTN. uncontrolled.   6.  Recent GIB and anemia. IV iron and prn prbc.   7.  Leukocytosis. WBC falling.   8.  Afib with RVR, with rate now controlled.   9.  Pan-cytopenia. Per oncology.     Plan:  1.  Increase lisinopril to 40 mg BID  2.  Surveillance labs     Kendall Belle MD  5/14/2018    5:45 PM

## 2018-05-14 NOTE — PLAN OF CARE
Problem: Patient Care Overview  Goal: Plan of Care Review  Outcome: Ongoing (interventions implemented as appropriate)   05/14/18 6608   Coping/Psychosocial   Plan of Care Reviewed With patient   Plan of Care Review   Progress improving   OTHER   Outcome Summary Pt. up in chair most of the day. Counts looking better today - WBC count up to 2 - continues to receive granix Q 24. Magnesium also up from yesterday. Pt. had a good BM this morning. Rash on vaginal area seems to be resolving but is still present. Overall pt. is in good spirits and ready to go home.      Goal: Individualization and Mutuality  Outcome: Ongoing (interventions implemented as appropriate)    Goal: Discharge Needs Assessment  Outcome: Ongoing (interventions implemented as appropriate)    Goal: Interprofessional Rounds/Family Conf  Outcome: Ongoing (interventions implemented as appropriate)      Problem: Activity Intolerance (Adult)  Goal: Activity Tolerance  Outcome: Ongoing (interventions implemented as appropriate)    Goal: Effective Energy Conservation Techniques  Outcome: Ongoing (interventions implemented as appropriate)      Problem: Skin Injury Risk (Adult)  Goal: Identify Related Risk Factors and Signs and Symptoms  Outcome: Ongoing (interventions implemented as appropriate)    Goal: Skin Health and Integrity  Outcome: Ongoing (interventions implemented as appropriate)

## 2018-05-14 NOTE — PROGRESS NOTES
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      Interval History: Today, the patient reports that she is significantly improved.  She is ambulating well without assistance.  Her appetite has improved.  Fortunately she has not experienced any infectious difficulties.  She did have a minor vaginal yeast infection with some pruritus however that is improved on Diflucan.      Review of Systems:    Review of systems was obtained with pertinent positive findings as noted in the interval history.  All other systems negative.    Objective     Vital Signs  Temp:  [97.3 °F (36.3 °C)-98.7 °F (37.1 °C)] 98.7 °F (37.1 °C)  Heart Rate:  [55-66] 62  Resp:  [16-18] 16  BP: (160-176)/(63-80) 160/80        Physical Exam:     GENERAL:  Well-developed, well-nourished in no acute distress.   SKIN:  Warm, dry without rashes, purpura or petechiae.  EYES:  Pupils equal, round and reactive to light.  EOMs intact.  Conjunctivae normal.  MOUTH:  Tongue is well-papillated; no stomatitis or ulcers.  Lips normal.  THROAT:  Oropharynx without lesions or exudates.  NECK:  Supple with good range of motion; no thyromegaly or masses, no JVD.  Bilateral neck incisions with dressings in place (port right, biopsy left).  CHEST:  Lungs clear to percussion and auscultation. Good airflow.  CARDIAC:  Regular rate and rhythm without murmurs, rubs or gallops. Normal S1,S2.  ABDOMEN:  Soft, nontender with no organomegaly or masses.  EXTREMITIES:  No clubbing, cyanosis or edema.  NEUROLOGICAL:  Cranial Nerves II-XII grossly intact.  No focal neurological deficits.  PSYCHIATRIC:  Normal affect and mood.           Results Review:     I reviewed the patient's new clinical results.      Results from last 7 days  Lab Units 05/14/18  0429   WBC 10*3/mm3 2.76*   HEMOGLOBIN g/dL 10.0*    HEMATOCRIT % 31.6*   PLATELETS 10*3/mm3 27*     Lab Results   Component Value Date    NEUTROABS 1.90 05/14/2018       Results from last 7 days  Lab Units 05/14/18  0429   SODIUM mmol/L 140   POTASSIUM mmol/L 3.5   CHLORIDE mmol/L 99   CO2 mmol/L 28.4   BUN mg/dL 13   CREATININE mg/dL 0.62   GLUCOSE mg/dL 89   CALCIUM mg/dL 11.4*           Results from last 7 days  Lab Units 05/14/18  0429   MAGNESIUM mg/dL 1.9         Medication Review: Yes     Assessment/Plan     1. 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  · PET scan 5/3/18 with diffuse splenic involvement (SUV 16.9), lymphadenopathy throughout upper abdomen and retroperitoneum (SUV 13.1), right liver lesion 5 cm (SUV 12.8), pelvic lymphadenopathy up to 4 cm, bladder wall thickening with associated hypermetabolism, cervical lymphadenopathy left posterior (SUV 11.2), multiple bone lesions including left intertrochanteric femur, ribs, right scapula, pelvis as well as left gluteal hematoma versus lymphomatous lesion.  · CT-guided liver biopsy 4/26/18 with diffuse large B-cell non-Hodgkin's lymphoma, CD20 positive, double hit (BCL-6 rearrangement 85%, MYC rearrangement 79%), KI-67 4+/4  · Left cervical excisional biopsy by ENT Dr. Oconnor 5/1/18 confirming high-grade B cell non-Hodgkin's lymphoma, Ki-67 100%, double hit (BCL-6 rearrangement 76%, MYC rearrangement 85%)  · Echocardiogram 4/11/18 with ejection fraction 66.7%  · Right port placement Dr Gill 5/4/18  · Patient not felt to be a candidate for more aggressive chemotherapy due to performance status and age (DA EPOCH-R)  · Therefore treated with R CHOP chemotherapy 5/4/18.  · Followed by granix 300mcg daily beginning 5/6/18.  · Currently clinically doing well, counts beginning to improve, LDH declining to 259.  Some concern regarding increase in calcium today  to 11.4.  2. Myelosuppression with pancytopenia related to chemotherapy:  · Continuing on granix 300mcg daily initiated 5/6/18  · Counts improving today, no longer neutropenic with WBC 2.76, ANC 1.9.  Discontinue neutropenic precautions.  Check WBC in a.m. and administer at least 1 more day of granix.  · Platelet count stable to slightly improved at 27,000 today, no bleeding issues, no need for transfusion.  3. Multifactorial anemia:  · Related to anemia chronic disease, chemotherapy, prior iron deficiency.  · Received previous venofer 600mg (5/7 and 5/8) with iron studies from 4/24/18 showing ferritin 411, iron saturation 9%, TIBC 180.  · Hemoglobin today stable at 10.0, no need for additional transfusion support.  4. Hypercalcemia of malignancy:  · Calcium up to 13.8 on 4/21/18 (albumin 3.3).  Intact PTH 8.1, PTH RP less than 2.0  · Received Zometa 4 mg IV 4/25/18.  · Calcium up to 11.3 on 5/7/18 with second dose of Zometa administered 4 mg IV.  · Calcium today increased to 11.4.  Patient asymptomatic.  Reluctant to place back on IV fluids.  Plan to reassess calcium in a.m. as well as ionized calcium.  5. Hypomagnesemia, hypokalemia:  · Continuing on oral potassium chloride 40 mEq twice a day  · Potassium 3.5, magnesium 1.9 today, no additional supplementation needed.  6. Paroxysmal atrial fibrillation:  · Recently diagnosed, anticoagulated with Eliquis initially, discontinued due to expected thrombocytopenia from chemotherapy  · Currently in sinus rhythm, continues on Lopressor 50 mg every 12 hours  · Unable to resume anticoagulation at present due to persistent severe thrombocytopenia.  7. Vaginal yeast infection:  · Pruritus improved after course of oral Diflucan completed today.  8. Prior seizure disorder:  · Continues currently on Dilantin 300 mg daily at bedtime and Neurontin 600 mg daily at bedtime  9. GI prophylaxis:  · Protonix 40 mg daily  10. CODE STATUS:  · Patient is full  code  11. Disposition:  · Anticipate potential discharge home in the next few days pending counts and calcium level.    Plan:  1. Discontinue prophylactic Levaquin  2. Discontinue neutropenic precautions  3. Discontinue Diflucan (treatment completed)  4. Continue granix 300mcg today  5. CBC/differential, CMP, LDH, uric acid, magnesium in a.m.      I discussed at the bedside with the patient and her daughter.  The patient and all of the above issues were new to me today.      Fahad Bobby MD  05/14/18  4:49 PM

## 2018-05-14 NOTE — THERAPY DISCHARGE NOTE
Acute Care - IRF Physical Therapy Discharge Summary   [facility]       Patient Name: Martina Blake  : 1940  MRN: [id number]    Today's Date: 2018       Date of Referral to PT: 18  Referring Physician: Byron      Admit Date: 2018      PT Recommendation and Plan    Visit Dx:    ICD-10-CM ICD-9-CM   1. Lymphoma of lymph nodes of neck, unspecified lymphoma type C85.91 202.81   2. Weakness R53.1 780.79   3. Hypercalcemia E83.52 275.42   4. Hypertensive urgency I16.0 401.9   5. 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 C83.39 202.80             Outcome Measures     Row Name 18 1300             How much help from another person do you currently need...    Turning from your back to your side while in flat bed without using bedrails? 4  -SM      Moving from lying on back to sitting on the side of a flat bed without bedrails? 4  -SM      Moving to and from a bed to a chair (including a wheelchair)? 3  -SM      Standing up from a chair using your arms (e.g., wheelchair, bedside chair)? 4  -SM      Climbing 3-5 steps with a railing? 3  -SM      To walk in hospital room? 3  -SM      AM-PAC 6 Clicks Score 21  -SM         Functional Assessment    Outcome Measure Options AM-PAC 6 Clicks Basic Mobility (PT)  -SM        User Key  (r) = Recorded By, (t) = Taken By, (c) = Cosigned By    Initials Name Provider Type     Zoila Cole PTA Physical Therapy Assistant                        PT Discharge Summary  Reason for Discharge: other (comment) (pt safe to ambulate w family/nsg at this time)  Outcomes Achieved: Patient able to partially acheive established goals  Discharge Destination: Home with assist      Malissa Oliver, PT   2018

## 2018-05-15 LAB
ALBUMIN SERPL-MCNC: 3 G/DL (ref 3.5–5.2)
ALBUMIN/GLOB SERPL: 0.9 G/DL
ALP SERPL-CCNC: 101 U/L (ref 39–117)
ALT SERPL W P-5'-P-CCNC: 10 U/L (ref 1–33)
ANION GAP SERPL CALCULATED.3IONS-SCNC: 12.2 MMOL/L
ANISOCYTOSIS BLD QL: ABNORMAL
AST SERPL-CCNC: 21 U/L (ref 1–32)
BILIRUB SERPL-MCNC: 0.3 MG/DL (ref 0.1–1.2)
BUN BLD-MCNC: 13 MG/DL (ref 8–23)
BUN/CREAT SERPL: 19.7 (ref 7–25)
CA-I BLD-MCNC: 6.8 MG/DL (ref 4.6–5.4)
CA-I SERPL ISE-MCNC: 1.69 MMOL/L (ref 1.15–1.35)
CALCIUM SPEC-SCNC: 11.6 MG/DL (ref 8.6–10.5)
CHLORIDE SERPL-SCNC: 99 MMOL/L (ref 98–107)
CO2 SERPL-SCNC: 26.8 MMOL/L (ref 22–29)
CREAT BLD-MCNC: 0.66 MG/DL (ref 0.57–1)
CYTO UR: NORMAL
DEPRECATED RDW RBC AUTO: 48 FL (ref 37–54)
DX PRELIMINARY: NORMAL
EOSINOPHIL # BLD MANUAL: 0.14 10*3/MM3 (ref 0–0.7)
EOSINOPHIL NFR BLD MANUAL: 1 % (ref 0.3–6.2)
ERYTHROCYTE [DISTWIDTH] IN BLOOD BY AUTOMATED COUNT: 15.1 % (ref 11.7–13)
GFR SERPL CREATININE-BSD FRML MDRD: 87 ML/MIN/1.73
GLOBULIN UR ELPH-MCNC: 3.2 GM/DL
GLUCOSE BLD-MCNC: 85 MG/DL (ref 65–99)
HCT VFR BLD AUTO: 32 % (ref 35.6–45.5)
HGB BLD-MCNC: 10.2 G/DL (ref 11.9–15.5)
LAB AP CASE REPORT: NORMAL
LDH SERPL-CCNC: 354 U/L (ref 135–214)
LYMPHOCYTES # BLD MANUAL: 0.84 10*3/MM3 (ref 0.9–4.8)
LYMPHOCYTES NFR BLD MANUAL: 6 % (ref 19.6–45.3)
LYMPHOCYTES NFR BLD MANUAL: 6 % (ref 5–12)
Lab: NORMAL
Lab: NORMAL
MAGNESIUM SERPL-MCNC: 2 MG/DL (ref 1.6–2.4)
MCH RBC QN AUTO: 27.6 PG (ref 26.9–32)
MCHC RBC AUTO-ENTMCNC: 31.9 G/DL (ref 32.4–36.3)
MCV RBC AUTO: 86.5 FL (ref 80.5–98.2)
METAMYELOCYTES NFR BLD MANUAL: 2 % (ref 0–0)
MONOCYTES # BLD AUTO: 0.84 10*3/MM3 (ref 0.2–1.2)
MYELOCYTES NFR BLD MANUAL: 1 % (ref 0–0)
NEUTROPHILS # BLD AUTO: 11.74 10*3/MM3 (ref 1.9–8.1)
NEUTROPHILS NFR BLD MANUAL: 84 % (ref 42.7–76)
NRBC SPEC MANUAL: 1 /100 WBC (ref 0–0)
OVALOCYTES BLD QL SMEAR: ABNORMAL
PATH REPORT.ADDENDUM SPEC: NORMAL
PATH REPORT.FINAL DX SPEC: NORMAL
PATH REPORT.GROSS SPEC: NORMAL
PHOSPHATE SERPL-MCNC: 3.2 MG/DL (ref 2.5–4.5)
PLAT MORPH BLD: NORMAL
PLATELET # BLD AUTO: 38 10*3/MM3 (ref 140–500)
POTASSIUM BLD-SCNC: 4.1 MMOL/L (ref 3.5–5.2)
PROT SERPL-MCNC: 6.2 G/DL (ref 6–8.5)
RBC # BLD AUTO: 3.7 10*6/MM3 (ref 3.9–5.2)
SCAN SLIDE: NORMAL
SCHISTOCYTES BLD QL SMEAR: ABNORMAL
SODIUM BLD-SCNC: 138 MMOL/L (ref 136–145)
URATE SERPL-MCNC: 2.3 MG/DL (ref 2.4–5.7)
WBC MORPH BLD: NORMAL
WBC NRBC COR # BLD: 13.98 10*3/MM3 (ref 4.5–10.7)

## 2018-05-15 PROCEDURE — 84550 ASSAY OF BLOOD/URIC ACID: CPT | Performed by: INTERNAL MEDICINE

## 2018-05-15 PROCEDURE — 97110 THERAPEUTIC EXERCISES: CPT

## 2018-05-15 PROCEDURE — 80053 COMPREHEN METABOLIC PANEL: CPT | Performed by: INTERNAL MEDICINE

## 2018-05-15 PROCEDURE — 83615 LACTATE (LD) (LDH) ENZYME: CPT | Performed by: INTERNAL MEDICINE

## 2018-05-15 PROCEDURE — 63710000001 DIPHENHYDRAMINE PER 50 MG: Performed by: INTERNAL MEDICINE

## 2018-05-15 PROCEDURE — 83735 ASSAY OF MAGNESIUM: CPT | Performed by: INTERNAL MEDICINE

## 2018-05-15 PROCEDURE — 85007 BL SMEAR W/DIFF WBC COUNT: CPT | Performed by: INTERNAL MEDICINE

## 2018-05-15 PROCEDURE — 85025 COMPLETE CBC W/AUTO DIFF WBC: CPT | Performed by: INTERNAL MEDICINE

## 2018-05-15 PROCEDURE — 82330 ASSAY OF CALCIUM: CPT | Performed by: INTERNAL MEDICINE

## 2018-05-15 PROCEDURE — 99231 SBSQ HOSP IP/OBS SF/LOW 25: CPT | Performed by: INTERNAL MEDICINE

## 2018-05-15 PROCEDURE — 84100 ASSAY OF PHOSPHORUS: CPT | Performed by: INTERNAL MEDICINE

## 2018-05-15 RX ORDER — FENTANYL CITRATE 50 UG/ML
INJECTION, SOLUTION INTRAMUSCULAR; INTRAVENOUS
Status: DISPENSED
Start: 2018-05-15 | End: 2018-05-16

## 2018-05-15 RX ADMIN — PHENYTOIN 300 MG: 125 SUSPENSION ORAL at 21:24

## 2018-05-15 RX ADMIN — Medication 1000 MCG: at 09:02

## 2018-05-15 RX ADMIN — DIPHENHYDRAMINE HYDROCHLORIDE 25 MG: 25 CAPSULE ORAL at 21:29

## 2018-05-15 RX ADMIN — AMLODIPINE BESYLATE 10 MG: 10 TABLET ORAL at 09:02

## 2018-05-15 RX ADMIN — HYDRALAZINE HYDROCHLORIDE 25 MG: 25 TABLET, FILM COATED ORAL at 14:19

## 2018-05-15 RX ADMIN — NYSTATIN: 100000 POWDER TOPICAL at 21:24

## 2018-05-15 RX ADMIN — Medication 5 MG: at 21:24

## 2018-05-15 RX ADMIN — LISINOPRIL 40 MG: 40 TABLET ORAL at 09:02

## 2018-05-15 RX ADMIN — ALLOPURINOL 300 MG: 300 TABLET ORAL at 09:02

## 2018-05-15 RX ADMIN — DOCUSATE SODIUM -SENNOSIDES 2 TABLET: 50; 8.6 TABLET, COATED ORAL at 21:24

## 2018-05-15 RX ADMIN — HYDRALAZINE HYDROCHLORIDE 25 MG: 25 TABLET, FILM COATED ORAL at 05:01

## 2018-05-15 RX ADMIN — LISINOPRIL 40 MG: 40 TABLET ORAL at 21:24

## 2018-05-15 RX ADMIN — ACETAMINOPHEN 650 MG: 325 TABLET ORAL at 21:29

## 2018-05-15 RX ADMIN — NYSTATIN: 100000 POWDER TOPICAL at 15:01

## 2018-05-15 RX ADMIN — METOPROLOL TARTRATE 50 MG: 50 TABLET, FILM COATED ORAL at 09:02

## 2018-05-15 RX ADMIN — PANTOPRAZOLE SODIUM 40 MG: 40 TABLET, DELAYED RELEASE ORAL at 05:02

## 2018-05-15 RX ADMIN — HYDRALAZINE HYDROCHLORIDE 25 MG: 25 TABLET, FILM COATED ORAL at 21:24

## 2018-05-15 RX ADMIN — POTASSIUM CHLORIDE 40 MEQ: 1.5 POWDER, FOR SOLUTION ORAL at 09:02

## 2018-05-15 RX ADMIN — METOPROLOL TARTRATE 50 MG: 50 TABLET, FILM COATED ORAL at 21:24

## 2018-05-15 RX ADMIN — POTASSIUM CHLORIDE 40 MEQ: 1.5 POWDER, FOR SOLUTION ORAL at 21:22

## 2018-05-15 RX ADMIN — NYSTATIN: 100000 POWDER TOPICAL at 05:02

## 2018-05-15 RX ADMIN — GABAPENTIN 600 MG: 300 CAPSULE ORAL at 21:29

## 2018-05-15 NOTE — PROGRESS NOTES
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      Interval History: The patient has no complaints today, has been ambulating well with the use of her walker.  She denies any abdominal discomfort.  Oral intake has improved significantly.  She reports 2 loose bowel movements, no diarrhea.      Review of Systems:    Review of systems was obtained with pertinent positive findings as noted in the interval history.  All other systems negative.    Objective     Vital Signs  Temp:  [97 °F (36.1 °C)-98.7 °F (37.1 °C)] 97.1 °F (36.2 °C)  Heart Rate:  [60-62] 62  Resp:  [16] 16  BP: (132-160)/(60-80) 132/60        Physical Exam:     GENERAL:  Well-developed, well-nourished in no acute distress.   SKIN:  Warm, dry without rashes, purpura or petechiae.  EYES:  Pupils equal, round and reactive to light.  EOMs intact.  Conjunctivae normal.  MOUTH:  Tongue is well-papillated; no stomatitis or ulcers.  Lips normal.  THROAT:  Oropharynx without lesions or exudates.  NECK:  Supple with good range of motion; no thyromegaly or masses, no JVD.  Bilateral neck incisions with dressings in place (port right, biopsy left).  CHEST:  Lungs clear to percussion and auscultation. Good airflow.  CARDIAC:  Regular rate and rhythm with 2/6 murmur.  ABDOMEN:  Soft, nontender with no organomegaly or masses.  EXTREMITIES:  No clubbing, cyanosis or edema.  NEUROLOGICAL:  Cranial Nerves II-XII grossly intact.  No focal neurological deficits.  PSYCHIATRIC:  Normal affect and mood.           Results Review:     I reviewed the patient's new clinical results.      Results from last 7 days  Lab Units 05/15/18  0459   WBC 10*3/mm3 13.98*   HEMOGLOBIN g/dL 10.2*   HEMATOCRIT % 32.0*   PLATELETS 10*3/mm3 38*     Lab Results   Component Value Date    NEUTROABS 11.74 (H) 05/15/2018        Results from last 7 days  Lab Units 05/15/18  0459   SODIUM mmol/L 138   POTASSIUM mmol/L 4.1   CHLORIDE mmol/L 99   CO2 mmol/L 26.8   BUN mg/dL 13   CREATININE mg/dL 0.66   GLUCOSE mg/dL 85   CALCIUM mg/dL 11.6*           Results from last 7 days  Lab Units 05/15/18  0459   MAGNESIUM mg/dL 2.0         Medication Review: Yes     Assessment/Plan     1. 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  · PET scan 5/3/18 with diffuse splenic involvement (SUV 16.9), lymphadenopathy throughout upper abdomen and retroperitoneum (SUV 13.1), right liver lesion 5 cm (SUV 12.8), pelvic lymphadenopathy up to 4 cm, bladder wall thickening with associated hypermetabolism, cervical lymphadenopathy left posterior (SUV 11.2), multiple bone lesions including left intertrochanteric femur, ribs, right scapula, pelvis as well as left gluteal hematoma versus lymphomatous lesion.  · CT-guided liver biopsy 4/26/18 with diffuse large B-cell non-Hodgkin's lymphoma, CD20 positive, double hit (BCL-6 rearrangement 85%, MYC rearrangement 79%), KI-67 4+/4  · Left cervical excisional biopsy by ENT Dr. Oconnor 5/1/18 confirming high-grade B cell non-Hodgkin's lymphoma, Ki-67 100%, double hit (BCL-6 rearrangement 76%, MYC rearrangement 85%)  · Echocardiogram 4/11/18 with ejection fraction 66.7%  · Right port placement Dr Gill 5/4/18  · Patient not felt to be a candidate for more aggressive chemotherapy due to performance status and age (DA EPOCH-R)  · Therefore treated with R CHOP chemotherapy 5/4/18.  · Followed by granix 300mcg daily beginning 5/6/18 through 5/14/18.  · Today, significant increase in WBC to 13.98 and granix has been discontinued.  Hemoglobin continues gradual improvement at 10.2.  Platelet count is gradually improving at 38,000.  Concern regarding slight increase in LDH from  259-354 today as well as slight increase in calcium from 11.4-11.6.  Will monitor labs in a.m.  Patient is recovering well from treatment and likely could be discharged home tomorrow.  2. Myelosuppression with pancytopenia related to chemotherapy:  · Continuing on granix 300mcg daily initiated 5/6/18  · As above, WBC dramatically improved at 13.98 today and granix has been discontinued, hemoglobin and platelets are slightly further improved as well.  3. Multifactorial anemia:  · Related to anemia chronic disease, chemotherapy, prior iron deficiency.  · Received previous venofer 600mg (5/7 and 5/8) with iron studies from 4/24/18 showing ferritin 411, iron saturation 9%, TIBC 180.  · Hemoglobin today stable at 10.2.  4. Hypercalcemia of malignancy:  · Calcium up to 13.8 on 4/21/18 (albumin 3.3).  Intact PTH 8.1, PTH RP less than 2.0  · Received Zometa 4 mg IV 4/25/18.  · Calcium up to 11.3 on 5/7/18 with second dose of Zometa administered 4 mg IV.  · Calcium today increased to 11.6 with ionized calcium of 6.8.  The patient remains asymptomatic.  Hopefully her calcium will gradually improve as she continues to respond to chemotherapy.  We will recheck labs again in the morning to ensure that there is not a continued upward trend.  5. Hypomagnesemia, hypokalemia:  · Continuing on oral potassium chloride 40 mEq twice a day  · Potassium 4.1, magnesium 2.0 today.  6. Paroxysmal atrial fibrillation:  · Recently diagnosed, anticoagulated with Eliquis initially, discontinued due to expected thrombocytopenia from chemotherapy  · Currently in sinus rhythm, continues on Lopressor 50 mg every 12 hours  · Unable to resume anticoagulation at present due to persistent severe thrombocytopenia.  7. Vaginal yeast infection:  · Pruritus now resolved after course of oral Diflucan.  8. Prior seizure disorder:  · Continues currently on Dilantin 300 mg daily at bedtime and Neurontin 600 mg daily at bedtime  9. GI prophylaxis:  · Protonix 40  mg daily  10. CODE STATUS:  · Patient is full code  11. Disposition:  · Anticipate potential discharge home tomorrow  · Follow-up visit scheduled 5/25/18 at which time the patient will be due for cycle 2 R-CHOP chemotherapy with growth factor support.    Plan:  1. Discontinue granix  2. CBC/differential, renal panel, LDH, uric acid, magnesium, ionized calcium in a.m.      Fahad Bobby MD  05/15/18  2:06 PM

## 2018-05-15 NOTE — PROGRESS NOTES
Continued Stay Note   [facility]     Patient Name: Martina Blake  MRN: [id number]  Today's Date: 5/15/2018    Admit Date: 4/21/2018          Discharge Plan     Row Name 05/15/18 1423       Plan    Patient/Family in Agreement with Plan yes    Plan Comments Reviewed case with patient's RN. States possible dc home in am. [facility] informed. Continues to follow. Malissa/Ruby contacted. Will deliver rolling walker to room tomorrow. No additional needs noted. CCP will continue to follow.    Row Name 05/15/18 1413       Plan    Plan Home with [facility].              Discharge Codes    No documentation.       Expected Discharge Date and Time     Expected Discharge Date Expected Discharge Time    May 16, 2018             Jaquelin Atkins RN

## 2018-05-15 NOTE — PLAN OF CARE
Problem: Fall Risk (Adult)  Goal: Absence of Fall  Outcome: Ongoing (interventions implemented as appropriate)      Problem: Patient Care Overview  Goal: Plan of Care Review  Outcome: Ongoing (interventions implemented as appropriate)   05/15/18 0516   Coping/Psychosocial   Plan of Care Reviewed With patient   Plan of Care Review   Progress improving   OTHER   Outcome Summary Pt had a good night, rested well through the night. Rash is getting better. Pt is no pain and in good spirits. Continue to monitor.       Problem: Oncology Care (Adult)  Goal: Signs and Symptoms of Listed Potential Problems Will be Absent, Minimized or Managed (Oncology Care)  Outcome: Ongoing (interventions implemented as appropriate)      Problem: Skin Injury Risk (Adult)  Goal: Identify Related Risk Factors and Signs and Symptoms  Outcome: Ongoing (interventions implemented as appropriate)    Goal: Skin Health and Integrity  Outcome: Ongoing (interventions implemented as appropriate)

## 2018-05-15 NOTE — PROGRESS NOTES
NEPHROLOGY PROGRESS NOTE    PATIENT IDENTIFICATION:   Name:  Martina Blake      MRN:  [id number]     77 y.o.  female             Reason for visit: hypercalcemia    SUBJECTIVE:    No problems overnight; eating better; no SOB; BM's ok    OBJECTIVE:  Vitals:    05/14/18 1534 05/14/18 1751 05/14/18 2104 05/15/18 0444   BP: 160/80 140/72 149/72 154/70   BP Location: Right arm Right arm Left arm Left arm   Patient Position: Lying Sitting Lying Lying   Pulse: 62 60 62 61   Resp: 16 16 16 16   Temp: 98.7 °F (37.1 °C) 97.1 °F (36.2 °C) 97 °F (36.1 °C) 97 °F (36.1 °C)   TempSrc: Oral Oral Oral Oral   SpO2: 98% 98% 98% 93%   Weight:       Height:               Body mass index is 24.26 kg/m².    Intake/Output Summary (Last 24 hours) at 05/15/18 0829  Last data filed at 05/15/18 0444   Gross per 24 hour   Intake             1070 ml   Output                0 ml   Net             1070 ml     Wt Readings from Last 1 Encounters:   05/14/18 0400 64.1 kg (141 lb 5 oz)   05/13/18 0600 63 kg (139 lb)   05/12/18 0600 65.5 kg (144 lb 8 oz)   05/11/18 0446 72.1 kg (158 lb 15.2 oz)   05/10/18 0538 71.4 kg (157 lb 8 oz)   05/09/18 1555 71.3 kg (157 lb 3.2 oz)   05/09/18 0538 70.9 kg (156 lb 4.8 oz)   05/01/18 1850 65.1 kg (143 lb 9.6 oz)   04/30/18 0643 66.8 kg (147 lb 4.8 oz)   04/29/18 0652 67.9 kg (149 lb 12.8 oz)   04/28/18 0641 68 kg (150 lb)   04/27/18 0603 64.7 kg (142 lb 9.6 oz)   04/25/18 0516 62.6 kg (138 lb)   04/21/18 1731 68.4 kg (150 lb 12.8 oz)   04/21/18 1227 68 kg (150 lb)     Wt Readings from Last 3 Encounters:   05/14/18 64.1 kg (141 lb 5 oz)   04/19/18 68 kg (150 lb)   04/10/18 61.5 kg (135 lb 8 oz)       Exam:  General: NAD, pleasant  Neck without JVD; port rij  Heart RRR no s3 or rub. 2/6 early syst m LUSB  Lungs clear; no rales; not labored  Abd +bs, slightly distended, soft, not tender.    Ext trace lower ext edema, right greater than left.  Skin: no rash  Psych mood and affect fine    Scheduled meds:       allopurinol 300 mg Oral Daily   amLODIPine 10 mg Oral Q24H   diphenhydrAMINE 25 mg Oral Once   gabapentin 600 mg Oral Nightly   hydrALAZINE 25 mg Oral Q8H   lisinopril 40 mg Oral Q12H   melatonin 5 mg Oral Nightly   metoprolol tartrate 50 mg Oral Q12H   nystatin  Topical Q8H   pantoprazole 40 mg Oral Q AM   phenytoin 300 mg Oral Nightly   potassium chloride 40 mEq Oral BID   sennosides-docusate sodium 2 tablet Oral Nightly   tbo-filgrastim 300 mcg Subcutaneous Q24H   vitamin B-12 1,000 mcg Oral Daily     IV meds:                             Data Review:      Results from last 7 days  Lab Units 05/15/18  0459 05/14/18  0429 05/13/18  0343  05/09/18  0603   SODIUM mmol/L 138 140 141  < > 139   POTASSIUM mmol/L 4.1 3.5 3.5  < > 3.4*   CHLORIDE mmol/L 99 99 103  < > 103   CO2 mmol/L 26.8 28.4 25.7  < > 27.2   BUN mg/dL 13 13 11  < > 27*   CREATININE mg/dL 0.66 0.62 0.54*  < > 0.64   CALCIUM mg/dL 11.6* 11.4* 10.6*  < > 10.7*   BILIRUBIN mg/dL 0.3  --   --   --  0.7   ALK PHOS U/L 101  --   --   --  81   ALT (SGPT) U/L 10  --   --   --  12   AST (SGOT) U/L 21  --   --   --  23   GLUCOSE mg/dL 85 89 83  < > 83   < > = values in this interval not displayed.  Estimated Creatinine Clearance: 59.6 mL/min (by C-G formula based on SCr of 0.66 mg/dL).    Results from last 7 days  Lab Units 05/15/18  0459   URIC ACID mg/dL 2.3*       Results from last 7 days  Lab Units 05/15/18  0459 05/14/18  0429 05/13/18  0343  05/12/18  0326   MAGNESIUM mg/dL 2.0 1.9 1.7  < > 1.5*   PHOSPHORUS mg/dL 3.2  --  3.7  --  3.4   < > = values in this interval not displayed.      Results from last 7 days  Lab Units 05/15/18  0459 05/14/18  0429 05/13/18  0343 05/12/18  0326 05/11/18  0405   WBC 10*3/mm3 13.98* 2.76* 0.79* 0.23* 1.40*   HEMOGLOBIN g/dL 10.2* 10.0* 9.8* 10.2* 10.5*   PLATELETS 10*3/mm3 38* 27* 26* 21* 34*           ASSESSMENT:   Principal Problem:    Hypercalcemia  Active Problems:    Gastroesophageal reflux disease    Chronic  recurrent major depressive disorder    Restless legs syndrome    Seizure disorder    Essential hypertension    Paroxysmal atrial fibrillation    Iron deficiency anemia due to chronic blood loss    Weakness    Hypertension    Liver mass    Lymphoma    Lymphoma of lymph nodes of neck    Leukemoid reaction    Insomnia    Anemia associated with chemotherapy    Pancytopenia due to antineoplastic chemotherapy    Chemotherapy-induced neutropenia    1.  Hypercalcemia due to malignancy, stable all in all, s/p zometa 4.25.18 and 5.7.18.    2.  Metastatic NHL, s/p RCHOP 5.4.18.  3.  Hypokalemia, replacing per protocol.   4.  Hypophosphatemia and hypomagnesemia: with prn replacement.   5.  Labile HTN, with trend improving  6.  Recent GIB and anemia. IV iron and prn prbc.   7.  Leukocytosis.  8.  Afib with RVR, with rate now controlled.   9.  Pan-cytopenia. Per oncology.     Plan:  1.  Expect serum Ca to normalize as remission hopefully obtained  2.  Surveillance labs     Kendall Belle MD  5/15/2018    8:29 AM

## 2018-05-15 NOTE — PLAN OF CARE
Problem: Patient Care Overview  Goal: Plan of Care Review  Outcome: Ongoing (interventions implemented as appropriate)   05/15/18 3847   Coping/Psychosocial   Plan of Care Reviewed With patient   Plan of Care Review   Progress improving   OTHER   Outcome Summary Pt. did not receieve Granix today - WBC count was 13.98. Continues to receive Nystain powder for a vaginal rash that is clearing. Worked with PT today and tolerated well. Possibly home tomorrow (5/16).      Goal: Individualization and Mutuality  Outcome: Ongoing (interventions implemented as appropriate)    Goal: Discharge Needs Assessment  Outcome: Ongoing (interventions implemented as appropriate)    Goal: Interprofessional Rounds/Family Conf  Outcome: Ongoing (interventions implemented as appropriate)      Problem: Oncology Care (Adult)  Goal: Signs and Symptoms of Listed Potential Problems Will be Absent, Minimized or Managed (Oncology Care)  Outcome: Outcome(s) achieved Date Met: 05/15/18      Problem: Skin Injury Risk (Adult)  Goal: Identify Related Risk Factors and Signs and Symptoms  Outcome: Ongoing (interventions implemented as appropriate)    Goal: Skin Health and Integrity  Outcome: Ongoing (interventions implemented as appropriate)

## 2018-05-15 NOTE — THERAPY TREATMENT NOTE
Acute Care - Physical Therapy Treatment Note   [facility]     Patient Name: Martina Blake  : 1940  MRN: [id number]  Today's Date: 5/15/2018     Date of Referral to PT: 18  Referring Physician: Byron    Admit Date: 2018    Visit Dx:    ICD-10-CM ICD-9-CM   1. Lymphoma of lymph nodes of neck, unspecified lymphoma type C85.91 202.81   2. Weakness R53.1 780.79   3. Hypercalcemia E83.52 275.42   4. Hypertensive urgency I16.0 401.9   5. 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 C83.39 202.80     Patient Active Problem List   Diagnosis   • Blood glucose abnormal   • Dyslipidemia   • Gastroesophageal reflux disease   • Fatigue   • Generalized osteoarthritis   • Chronic recurrent major depressive disorder   • Osteoporosis   • Restless legs syndrome   • Seizure disorder   • Vitamin D deficiency   • Bradycardia   • Essential hypertension   • Post-menopause on HRT (hormone replacement therapy)   • Chronic seasonal allergic rhinitis   • Paroxysmal atrial fibrillation   • Iron deficiency anemia due to chronic blood loss   • Acute superficial gastritis without hemorrhage   • Hiatal hernia   • Esophagitis   • Diverticulosis of large intestine without hemorrhage   • Dizziness   • Weakness   • Hypertension   • Hypercalcemia   • Liver mass   • Lymphoma   • Lymphoma of lymph nodes of neck   • Leukemoid reaction   • Insomnia   • Anemia associated with chemotherapy   • Pancytopenia due to antineoplastic chemotherapy   • Chemotherapy-induced neutropenia       Therapy Treatment          Rehabilitation Treatment Summary     Row Name 05/15/18 1516             Treatment Time/Intention    Discipline physical therapist  -PC      Document Type therapy note (daily note)  -PC      Subjective Information no complaints  -PC      Mode of Treatment physical therapy  -PC      Patient/Family Observations pt walking in lugo with family  -PC      Therapy Frequency (PT Clinical  Impression) daily  -PC      Patient Effort good  -PC      Recorded by [PC] Rosette Navarrete, PT 05/15/18 1524 05/15/18 1524      Row Name 05/15/18 1516             Cognitive Assessment/Intervention- PT/OT    Orientation Status (Cognition) oriented x 4  -PC      Personal Safety Interventions fall prevention program maintained;gait belt  -PC      Recorded by [PC] Rosette Navarrete, PT 05/15/18 1524 05/15/18 1524      Row Name 05/15/18 1516             Bed Mobility Assessment/Treatment    Comment (Bed Mobility) not assessed. pt in chair  -PC      Recorded by [PC] Rosette Navarrete, PT 05/15/18 1524 05/15/18 1524      Row Name 05/15/18 1516             Transfer Assessment/Treatment    Sit-Stand Gregg (Transfers) conditional independence  -PC      Stand-Sit Gregg (Transfers) conditional independence  -PC      Recorded by [PC] Rosette Navarrete, PT 05/15/18 1524 05/15/18 1524      Row Name 05/15/18 1516             Sit-Stand Transfer    Assistive Device (Sit-Stand Transfers) --   needs to hold onto chair arms  -PC      Recorded by [PC] Rosette Navarrete, PT 05/15/18 1524 05/15/18 1524      Row Name 05/15/18 1516             Gait/Stairs Assessment/Training    Gregg Level (Gait) supervision  -PC      Assistive Device (Gait) walker, front-wheeled  -PC      Distance in Feet (Gait) 200 ft  -PC      Pattern (Gait) step-through  -PC      Bilateral Gait Deviations forward flexed posture  -PC      Comment (Gait/Stairs) verbal cues for posture correction and to stay closer to walker  -PC      Recorded by [PC] Rosette Navarrete, PT 05/15/18 1524 05/15/18 1524      Row Name 05/15/18 1516             Therapeutic Exercise    Comment (Therapeutic Exercise) --   5 reps sit to stand, marching in place, shallow knee bends,   -PC      Recorded by [PC] Rosette Navarrete, PT 05/15/18 1524 05/15/18 1524      Row Name 05/15/18 1516             Positioning and Restraints    Pre-Treatment Position sitting in chair/recliner  -PC      Post  Treatment Position chair  -PC      In Chair sitting;call light within reach;encouraged to call for assist;with family/caregiver  -PC      Recorded by [PC] Rosette Navarrete, PT 05/15/18 1524 05/15/18 1524      Row Name                Wound 05/01/18 1057 Left neck incision    Wound - Properties Group Date first assessed: 05/01/18 [PEARL] Time first assessed: 1057 [PEARL] Side: Left [PEARL] Location: neck [PEARL] Type: incision [PEARL] Recorded by:  [PEARL] Magaly Almonte RN 05/01/18 1057 05/01/18 1057    Row Name                Wound 05/04/18 0838 Right chest incision    Wound - Properties Group Date first assessed: 05/04/18 [SM] Time first assessed: 0838 [SM] Side: Right [SM] Location: chest [SM] Type: incision [SM] Recorded by:  [SM] Scott Cole RN 05/04/18 0838 05/04/18 0838      User Key  (r) = Recorded By, (t) = Taken By, (c) = Cosigned By    Initials Name Effective Dates Discipline    PEARL Magaly Almonte RN 06/16/16 -  Nurse    PC Rosette Navarrete, PT 04/03/18 -  PT    SM Scott Cole RN 06/16/16 -  Nurse          Rash 05/11/18 2147 other (see comments) groin patch (Active)   Distribution regional 5/15/2018  8:15 AM   Configuration/Shape pinpoint 5/15/2018  8:15 AM   Borders indistinct 5/15/2018  8:15 AM   Characteristics other (see comments) 5/15/2018  8:15 AM   Color red 5/15/2018  8:15 AM   Lesion Size pinpoint 5/15/2018  8:15 AM   Care, Rash open to air;other (see comments) 5/15/2018  8:15 AM       Wound 05/01/18 1057 Left neck incision (Active)   Dressing Appearance intact;dry 5/15/2018  8:15 AM   Closure Adhesive closure strips 5/15/2018  8:15 AM   Base clean;dry 5/15/2018  8:15 AM   Periwound Temperature warm 5/15/2018  8:15 AM   Periwound Skin Turgor soft 5/15/2018  8:15 AM   Drainage Amount none 5/15/2018  8:15 AM   Dressing Care, Wound transparent film 5/15/2018  8:15 AM       Wound 05/04/18 0838 Right chest incision (Active)   Dressing Appearance dry;intact;no drainage 5/15/2018  8:15 AM   Closure RYAN 5/15/2018   8:15 AM   Base clean;dry 5/15/2018  8:15 AM   Periwound intact;dry 5/15/2018  8:15 AM   Periwound Temperature warm 5/15/2018  8:15 AM   Periwound Skin Turgor soft 5/15/2018  8:15 AM   Drainage Amount none 5/15/2018  8:15 AM   Dressing Care, Wound transparent film 5/15/2018  8:15 AM             Physical Therapy Education     Title: PT OT SLP Therapies (Done)     Topic: Physical Therapy (Done)     Point: Mobility training (Done)    Learning Progress Summary     Learner Status Readiness Method Response Comment Documented by    Patient Done Acceptance E,D DU  PC 05/15/18 1524     Done Acceptance E,D VU,NR   05/12/18 1346     Done Acceptance E,D VU,DU  PC 05/06/18 1416     Done Acceptance E,TB VU   05/03/18 0206     Done Acceptance E,TB VU   05/02/18 0248     Done Acceptance E VU   04/29/18 1554     Done Acceptance E,TB,D VU,NR   04/28/18 1733     Done Acceptance E,TB VU,DU  CW 04/27/18 1544     Done Acceptance E,TB VU,NR role of PT, benefits of activity, use of AD, safety GR 04/22/18 1311    Family Done Acceptance E,TB,D VU,NR   04/28/18 1733     Done Acceptance E,TB VU,NR role of PT, benefits of activity, use of AD, safety GR 04/22/18 1311          Point: Home exercise program (Done)    Learning Progress Summary     Learner Status Readiness Method Response Comment Documented by    Patient Done Acceptance E,D DU  PC 05/15/18 1524     Done Acceptance E,D VU,NR   05/12/18 1346     Done Acceptance E,D VU,DU  PC 05/06/18 1416     Done Acceptance E,TB VU   05/03/18 0206     Done Acceptance E,TB VU   05/02/18 0248     Done Acceptance E,TB,D VU,NR   04/28/18 1733     Done Acceptance E,TB VU,DU  CW 04/27/18 1544     Done Acceptance E,TB VU,NR role of PT, benefits of activity, use of AD, safety GR 04/22/18 1311    Family Done Acceptance E,TB,D VU,NR   04/28/18 1733     Done Acceptance E,TB VU,NR role of PT, benefits of activity, use of AD, safety GR 04/22/18 1311          Point: Body mechanics (Done)     Learning Progress Summary     Learner Status Readiness Method Response Comment Documented by    Patient Done Acceptance E,D DU  PC 05/15/18 1524     Done Acceptance E,D VU,NR   05/12/18 1346     Done Acceptance E,D VU,DU  PC 05/06/18 1416     Done Acceptance E,TB VU   05/03/18 0206     Done Acceptance E,TB VU   05/02/18 0248     Done Acceptance E,TB,D VU,NR   04/28/18 1733     Done Acceptance E,TB VU,DU  CW 04/27/18 1544     Done Acceptance E,TB VU,NR role of PT, benefits of activity, use of AD, safety GR 04/22/18 1311    Family Done Acceptance E,TB,D VU,NR   04/28/18 1733     Done Acceptance E,TB VU,NR role of PT, benefits of activity, use of AD, safety GR 04/22/18 1311          Point: Precautions (Done)    Learning Progress Summary     Learner Status Readiness Method Response Comment Documented by    Patient Done Acceptance E,D DU  PC 05/15/18 1524     Done Acceptance E,D VU,NR   05/12/18 1346     Done Acceptance E VU  MD 05/11/18 1146     Done Acceptance E VU  MD 05/10/18 1205     Done Acceptance E VU PT educated pt to ambulate in hallway with CNA or RN elvis Rios while at [facility] multiple times daily.  Pt states understanding. MD 05/09/18 1402     Done Acceptance E VU  MD 05/08/18 1422     Done Acceptance E GENE CHAMBERLAIN 05/07/18 1325     Done Acceptance E,D VU,DU  PC 05/06/18 1416     Done Acceptance E,TB VU   05/03/18 0206     Done Acceptance E,TB VU   05/02/18 0248     Done Acceptance E VU  MD 04/30/18 1615     Done Acceptance E,TB,D VU,NR   04/28/18 1733     Done Acceptance E,TB VU,DU  CW 04/27/18 1544     Done Acceptance E,TB VU,NR role of PT, benefits of activity, use of AD, safety  04/22/18 1311    Family Done Acceptance E,TB,D VU,NR   04/28/18 1733     Done Acceptance E,TB VU,NR role of PT, benefits of activity, use of AD, safety GR 04/22/18 1311                      User Key     Initials Effective Dates Name Provider Type Discipline    GR 10/03/16 -  Aubrey Del Rosario, PT Physical Therapist PT     PC 04/03/18 -  Rosette Navarrete, PT Physical Therapist PT    MD 04/03/18 -  Malissa Oliver, PT Physical Therapist PT    JM 03/07/18 -  Linda Cole, PTA Physical Therapy Assistant PT    BS 06/16/16 -  Ara Gibson, RN Registered Nurse Nurse    EJ 04/03/18 -  Alma Delia Cazares, PT Physical Therapist PT    SM 03/07/18 -  Zoila Cole, PTA Physical Therapy Assistant PT    CW 03/07/18 -  Myke Burrell, PTA Physical Therapy Assistant PT                    PT Recommendation and Plan  Planned Therapy Interventions (PT Eval): strengthening, transfer training, balance training, bed mobility training, gait training  Therapy Frequency (PT Clinical Impression): daily  Plan of Care Reviewed With: patient  Progress: improving  Outcome Summary: pt cont to show improvement in activity tolerance and gait steadiness          Outcome Measures     Row Name 05/15/18 1500             How much help from another person do you currently need...    Turning from your back to your side while in flat bed without using bedrails? 4  -PC      Moving from lying on back to sitting on the side of a flat bed without bedrails? 4  -PC      Moving to and from a bed to a chair (including a wheelchair)? 3  -PC      Standing up from a chair using your arms (e.g., wheelchair, bedside chair)? 4  -PC      Climbing 3-5 steps with a railing? 3  -PC      To walk in hospital room? 3  -PC      AM-PAC 6 Clicks Score 21  -PC        User Key  (r) = Recorded By, (t) = Taken By, (c) = Cosigned By    Initials Name Provider Type    PC Rosette Navarrete, PT Physical Therapist           Time Calculation:         PT Charges     Row Name 05/15/18 1527             Time Calculation    Start Time 1455  -PC      Stop Time 1505  -PC      Time Calculation (min) 10 min  -PC      PT Received On 05/15/18  -PC      PT - Next Appointment 05/16/18  -PC      PT Goal Re-Cert Due Date 05/22/18  -PC        User Key  (r) = Recorded By, (t) = Taken By, (c) = Cosigned By     Initials Name Provider Type    PC Rosette Navarrete, PT Physical Therapist          Therapy Charges for Today     Code Description Service Date Service Provider Modifiers Qty    95162967238 HC PT THER PROC EA 15 MIN 5/15/2018 Rosette Navarrete, PT GP 1          PT G-Codes  PT Professional Judgement Used?: Yes  Outcome Measure Options: AM-PAC 6 Clicks Basic Mobility (PT)  Score: 13  Functional Limitation: Mobility: Walking and moving around  Mobility: Walking and Moving Around Current Status (): At least 60 percent but less than 80 percent impaired, limited or restricted  Mobility: Walking and Moving Around Goal Status (): At least 40 percent but less than 60 percent impaired, limited or restricted    Rosette Navarrete, SAGAR  5/15/2018

## 2018-05-15 NOTE — PLAN OF CARE
Problem: Patient Care Overview  Goal: Plan of Care Review   05/15/18 1524   Coping/Psychosocial   Plan of Care Reviewed With patient   Plan of Care Review   Progress improving   OTHER   Outcome Summary pt cont to show improvement in activity tolerance and gait steadiness

## 2018-05-16 VITALS
TEMPERATURE: 97.3 F | HEART RATE: 57 BPM | OXYGEN SATURATION: 98 % | DIASTOLIC BLOOD PRESSURE: 62 MMHG | SYSTOLIC BLOOD PRESSURE: 163 MMHG | HEIGHT: 64 IN | WEIGHT: 141.31 LBS | BODY MASS INDEX: 24.13 KG/M2 | RESPIRATION RATE: 16 BRPM

## 2018-05-16 LAB
ALBUMIN SERPL-MCNC: 3.3 G/DL (ref 3.5–5.2)
ANION GAP SERPL CALCULATED.3IONS-SCNC: 9 MMOL/L
ANISOCYTOSIS BLD QL: ABNORMAL
BUN BLD-MCNC: 14 MG/DL (ref 8–23)
BUN/CREAT SERPL: 20.3 (ref 7–25)
CA-I BLD-MCNC: 6.8 MG/DL (ref 4.6–5.4)
CA-I SERPL ISE-MCNC: 1.69 MMOL/L (ref 1.15–1.35)
CALCIUM SPEC-SCNC: 11.9 MG/DL (ref 8.6–10.5)
CHLORIDE SERPL-SCNC: 100 MMOL/L (ref 98–107)
CO2 SERPL-SCNC: 29 MMOL/L (ref 22–29)
CREAT BLD-MCNC: 0.69 MG/DL (ref 0.57–1)
DEPRECATED RDW RBC AUTO: 50.1 FL (ref 37–54)
ERYTHROCYTE [DISTWIDTH] IN BLOOD BY AUTOMATED COUNT: 15.6 % (ref 11.7–13)
GFR SERPL CREATININE-BSD FRML MDRD: 82 ML/MIN/1.73
GLUCOSE BLD-MCNC: 92 MG/DL (ref 65–99)
HCT VFR BLD AUTO: 30.8 % (ref 35.6–45.5)
HGB BLD-MCNC: 9.8 G/DL (ref 11.9–15.5)
LDH SERPL-CCNC: 347 U/L (ref 135–214)
LYMPHOCYTES # BLD MANUAL: 0.5 10*3/MM3 (ref 0.9–4.8)
LYMPHOCYTES NFR BLD MANUAL: 3 % (ref 19.6–45.3)
LYMPHOCYTES NFR BLD MANUAL: 6 % (ref 5–12)
MAGNESIUM SERPL-MCNC: 2 MG/DL (ref 1.6–2.4)
MCH RBC QN AUTO: 28 PG (ref 26.9–32)
MCHC RBC AUTO-ENTMCNC: 31.8 G/DL (ref 32.4–36.3)
MCV RBC AUTO: 88 FL (ref 80.5–98.2)
METAMYELOCYTES NFR BLD MANUAL: 2 % (ref 0–0)
MONOCYTES # BLD AUTO: 0.99 10*3/MM3 (ref 0.2–1.2)
MYELOCYTES NFR BLD MANUAL: 1 % (ref 0–0)
NEUTROPHILS # BLD AUTO: 14.55 10*3/MM3 (ref 1.9–8.1)
NEUTROPHILS NFR BLD MANUAL: 88 % (ref 42.7–76)
OVALOCYTES BLD QL SMEAR: ABNORMAL
PHOSPHATE SERPL-MCNC: 3.8 MG/DL (ref 2.5–4.5)
PLAT MORPH BLD: NORMAL
PLATELET # BLD AUTO: 59 10*3/MM3 (ref 140–500)
POTASSIUM BLD-SCNC: 4.1 MMOL/L (ref 3.5–5.2)
RBC # BLD AUTO: 3.5 10*6/MM3 (ref 3.9–5.2)
SCAN SLIDE: NORMAL
SODIUM BLD-SCNC: 138 MMOL/L (ref 136–145)
URATE SERPL-MCNC: 2.3 MG/DL (ref 2.4–5.7)
WBC MORPH BLD: NORMAL
WBC NRBC COR # BLD: 16.53 10*3/MM3 (ref 4.5–10.7)

## 2018-05-16 PROCEDURE — 82330 ASSAY OF CALCIUM: CPT | Performed by: INTERNAL MEDICINE

## 2018-05-16 PROCEDURE — 99239 HOSP IP/OBS DSCHRG MGMT >30: CPT | Performed by: INTERNAL MEDICINE

## 2018-05-16 PROCEDURE — 83615 LACTATE (LD) (LDH) ENZYME: CPT | Performed by: INTERNAL MEDICINE

## 2018-05-16 PROCEDURE — 84550 ASSAY OF BLOOD/URIC ACID: CPT | Performed by: INTERNAL MEDICINE

## 2018-05-16 PROCEDURE — 83735 ASSAY OF MAGNESIUM: CPT | Performed by: INTERNAL MEDICINE

## 2018-05-16 PROCEDURE — 85025 COMPLETE CBC W/AUTO DIFF WBC: CPT | Performed by: INTERNAL MEDICINE

## 2018-05-16 PROCEDURE — 25010000002 HEPARIN FLUSH (PORCINE) 100 UNIT/ML SOLUTION: Performed by: INTERNAL MEDICINE

## 2018-05-16 PROCEDURE — 85007 BL SMEAR W/DIFF WBC COUNT: CPT | Performed by: INTERNAL MEDICINE

## 2018-05-16 PROCEDURE — 80069 RENAL FUNCTION PANEL: CPT | Performed by: INTERNAL MEDICINE

## 2018-05-16 RX ORDER — HYDRALAZINE HYDROCHLORIDE 25 MG/1
25 TABLET, FILM COATED ORAL 3 TIMES DAILY
Qty: 90 TABLET | Refills: 1 | Status: SHIPPED | OUTPATIENT
Start: 2018-05-16 | End: 2018-07-14 | Stop reason: SDUPTHER

## 2018-05-16 RX ORDER — ALLOPURINOL 300 MG/1
300 TABLET ORAL DAILY
Qty: 30 TABLET | Refills: 1 | Status: SHIPPED | OUTPATIENT
Start: 2018-05-17 | End: 2018-07-09 | Stop reason: SDUPTHER

## 2018-05-16 RX ORDER — METOPROLOL TARTRATE 50 MG/1
50 TABLET, FILM COATED ORAL EVERY 12 HOURS SCHEDULED
Qty: 60 TABLET | Refills: 1 | Status: SHIPPED | OUTPATIENT
Start: 2018-05-16 | End: 2018-06-28

## 2018-05-16 RX ORDER — NYSTATIN 100000 [USP'U]/G
POWDER TOPICAL EVERY 8 HOURS SCHEDULED
Qty: 45 G | Refills: 1 | Status: SHIPPED | OUTPATIENT
Start: 2018-05-16 | End: 2019-10-28

## 2018-05-16 RX ORDER — SENNA AND DOCUSATE SODIUM 50; 8.6 MG/1; MG/1
2 TABLET, FILM COATED ORAL NIGHTLY
Qty: 60 TABLET | Refills: 1 | Status: SHIPPED | OUTPATIENT
Start: 2018-05-16 | End: 2018-07-14 | Stop reason: SDUPTHER

## 2018-05-16 RX ADMIN — HYDRALAZINE HYDROCHLORIDE 25 MG: 25 TABLET, FILM COATED ORAL at 15:31

## 2018-05-16 RX ADMIN — AMLODIPINE BESYLATE 10 MG: 10 TABLET ORAL at 09:28

## 2018-05-16 RX ADMIN — Medication 500 UNITS: at 15:33

## 2018-05-16 RX ADMIN — Medication 1000 MCG: at 09:27

## 2018-05-16 RX ADMIN — ALLOPURINOL 300 MG: 300 TABLET ORAL at 09:28

## 2018-05-16 RX ADMIN — NYSTATIN: 100000 POWDER TOPICAL at 07:18

## 2018-05-16 RX ADMIN — METOPROLOL TARTRATE 50 MG: 50 TABLET, FILM COATED ORAL at 09:27

## 2018-05-16 RX ADMIN — HYDROCODONE BITARTRATE AND ACETAMINOPHEN 1 TABLET: 5; 325 TABLET ORAL at 03:25

## 2018-05-16 RX ADMIN — HYDRALAZINE HYDROCHLORIDE 25 MG: 25 TABLET, FILM COATED ORAL at 07:18

## 2018-05-16 RX ADMIN — PANTOPRAZOLE SODIUM 40 MG: 40 TABLET, DELAYED RELEASE ORAL at 07:18

## 2018-05-16 RX ADMIN — POTASSIUM CHLORIDE 40 MEQ: 1.5 POWDER, FOR SOLUTION ORAL at 09:27

## 2018-05-16 RX ADMIN — LISINOPRIL 40 MG: 40 TABLET ORAL at 09:28

## 2018-05-16 NOTE — PROGRESS NOTES
Continued Stay Note   [facility]     Patient Name: Martina Blake  MRN: [id number]  Today's Date: 5/16/2018    Admit Date: 4/21/2018          Discharge Plan     Row Name 05/16/18 1636       Plan    Plan Home with [facility]    Plan Comments Walker in room. Advised Ca/MARCOA  of patient's discharge home today. [facility] to follow for HH needs......  CLARA Solomon              Discharge Codes    No documentation.       Expected Discharge Date and Time     Expected Discharge Date Expected Discharge Time    May 16, 2018             CLARA Solomon

## 2018-05-16 NOTE — PLAN OF CARE
Problem: Oncology Care (Adult)  Intervention: Prevent Deficiencies/Improve Nutritional Intake  Follow up-good appetite, consuming on average 50% of meals

## 2018-05-16 NOTE — DISCHARGE SUMMARY
Date of Discharge:  5/16/2018    Discharge Diagnosis:   1. 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 Myelosuppression with pancytopenia related to chemotherapy  3. Multifactorial anemia  4. Hypercalcemia of malignancy  5. Hypomagnesemia, hypokalemia  6. Paroxysmal atrial fibrillation  7. Longstanding seizure disorder    Presenting Problem/History of Present Illness  Hypercalcemia [E83.52]  Weakness [R53.1]  Hypertensive urgency [I16.0]     Hospital Course  1. 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  · PET scan 5/3/18 with diffuse splenic involvement (SUV 16.9), lymphadenopathy throughout upper abdomen and retroperitoneum (SUV 13.1), right liver lesion 5 cm (SUV 12.8), pelvic lymphadenopathy up to 4 cm, bladder wall thickening with associated hypermetabolism, cervical lymphadenopathy left posterior (SUV 11.2), multiple bone lesions including left intertrochanteric femur, ribs, right scapula, pelvis as well as left gluteal hematoma versus lymphomatous lesion.  · CT-guided liver biopsy 4/26/18 with diffuse large B-cell non-Hodgkin's lymphoma, CD20 positive, double hit (BCL-6 rearrangement 85%, MYC rearrangement 79%), KI-67 4+/4  · Left cervical excisional biopsy by ENT Dr. Oconnor 5/1/18 confirming high-grade B cell non-Hodgkin's lymphoma, Ki-67 100%, double hit (BCL-6 rearrangement 76%, MYC rearrangement 85%)  · Echocardiogram 4/11/18 with ejection fraction 66.7%  · Right port placement Dr Gill 5/4/18  · Patient not felt to be a candidate for more aggressive chemotherapy due to performance status and age (DA EPOCH-R)  · Therefore treated with R CHOP chemotherapy 5/4/18.  · Followed by granix 300mcg daily beginning 5/6/18 through 5/14/18.  · Today, the patient has increasing WBC related to growth factor use, stable hemoglobin,  spontaneous improvement in platelet count.  She is doing quite well following her chemotherapy and is ready to go home.  There is some concern regarding her persistently elevated LDH at 347 today as well as her escalating calcium at 11.9.  She is currently asymptomatic from the calcium and her ionized calcium is stable at 6.8.  Therefore we are going to proceed with discharge home.  She will return to the office later this week on 5/18/18 with repeat labs, nurse practitioner visit, and potential treatment with a third dose of Zometa if her calcium has continued to escalate significantly.  Otherwise she will be seen the following week again with a physician visit and plans to begin cycle 2 R CHOP chemotherapy with growth factor support (either daily granix 10 days or Neulasta on body injector).  Would anticipate follow-up PET scan after 2 cycles of therapy.  2. Myelosuppression with pancytopenia related to chemotherapy:  · Received granix 300mcg daily initiated 5/6/18, last dose 5/14/18.  · As above, WBC further improved at 16.53 today, hemoglobin stable, and platelets are slightly further improved as well.  3. Multifactorial anemia:  · Related to anemia chronic disease, chemotherapy, prior iron deficiency.  · Received previous venofer 600mg (5/7 and 5/8) with iron studies from 4/24/18 showing ferritin 411, iron saturation 9%, TIBC 180.  · Hemoglobin today stable at 9.8.  4. Hypercalcemia of malignancy:  · Calcium up to 13.8 on 4/21/18 (albumin 3.3).  Intact PTH 8.1, PTH RP less than 2.0  · Received Zometa 4 mg IV 4/25/18.  · Calcium up to 11.3 on 5/7/18 with second dose of Zometa administered 4 mg IV.  · Calcium today has continued to gradually increase up to 11.9 with albumin 3.3.  Ionized calcium however is stable at 6.8 compared to yesterday.  The patient is asymptomatic.  We will not plan to administer a further dose of Zometa just yet and will allow further time for the patient to respond to chemotherapy.  She  will be returning to the office later this week on 5/18/18 for nurse practitioner visit with repeat laboratory studies and potential Zometa pending her calcium level.  5. Hypomagnesemia, hypokalemia:  · Continuing on oral potassium chloride 40 mEq twice a day  · Potassium 4.1, magnesium 2.0 today.  6. Paroxysmal atrial fibrillation:  · Recently diagnosed, anticoagulated with Eliquis initially, discontinued due to expected thrombocytopenia from chemotherapy  · Currently in sinus rhythm, continues on Lopressor 50 mg every 12 hours  · Unable to resume anticoagulation at present due to persistent severe thrombocytopenia.  7. Vaginal yeast infection:  · Pruritus resolved after course of oral Diflucan.  8. Prior seizure disorder:  · Continues currently on Dilantin 300 mg daily at bedtime and Neurontin 600 mg daily at bedtime, will return to outpatient dose of Neurontin 300 mg daily at bedtime at discharge.  9. GI prophylaxis:  · Protonix 40 mg daily  10. Venous access:  · Port placement 5/4/18 by Dr. Gill  11. CODE STATUS:  · Patient is full code  12. Disposition:  · Discharge home today.  · Arrange nurse practitioner visit on 5/18/18 with CBC, CMP, LDH, ionized calcium and possible treatment with Zometa pending calcium level.  · Follow-up MD visit scheduled 5/25/18 with CBC, CMP, LDH, ionized calcium at which time the patient will be due for cycle 2 R-CHOP chemotherapy with growth factor support.       Procedures Performed  Procedure(s):  INSERTION VENOUS ACCESS DEVICE       Consults:   Consults     Date and Time Order Name Status Description    5/8/2018 1535 Inpatient Cardiology Consult Completed     5/2/2018 0310 Inpatient General Surgery Consult Completed     4/28/2018 1739 Hematology & Oncology Inpatient Consult Completed     4/21/2018 1732 Inpatient Cardiology Consult Completed     4/21/2018 1550 [facility] (on-call MD unless specified) Completed     4/11/2018 1702 Inpatient Gastroenterology Consult Completed      4/10/2018 1931 Inpatient Cardiology Consult Completed     4/10/2018 1635 [facility] (on-call MD unless specified) Completed     4/10/2018 1618 [facility] (on-call MD unless specified) Completed               Condition on Discharge:  Fair    Vital Signs  Temp:  [97.3 °F (36.3 °C)-97.9 °F (36.6 °C)] 97.3 °F (36.3 °C)  Heart Rate:  [56-60] 57  Resp:  [16-18] 16  BP: (136-163)/(60-62) 163/62    Physical Exam:  GENERAL:  Well-developed, well-nourished in no acute distress.   SKIN:  Warm, dry without rashes, purpura or petechiae.  EYES:  Pupils equal, round and reactive to light.  EOMs intact.  Conjunctivae normal.  MOUTH:  Tongue is well-papillated; no stomatitis or ulcers.  Lips normal.  THROAT:  Oropharynx without lesions or exudates.  NECK:  Supple with good range of motion; no thyromegaly or masses, no JVD.  Bilateral neck incisions with dressings in place (port right, biopsy left).  CHEST:  Lungs clear to percussion and auscultation. Good airflow.  CARDIAC:  Regular rate and rhythm with 2/6 murmur.  ABDOMEN:  Soft, nontender with no organomegaly or masses.  EXTREMITIES:  No clubbing, cyanosis or edema.  NEUROLOGICAL:  Cranial Nerves II-XII grossly intact.  No focal neurological deficits.  PSYCHIATRIC:  Normal affect and mood.  I did examine the patient today, unchanged as noted above    Discharge Disposition  Home or Self Care    Discharge Medications   Martina Blake   Home Medication Instructions JABARI:[id number]    Printed on:05/16/18 4003   Medication Information                      allopurinol (ZYLOPRIM) 300 MG tablet  Take 1 tablet by mouth Daily.             amLODIPine (NORVASC) 10 MG tablet  Take 1 tablet by mouth Daily.             benazepril (LOTENSIN) 40 MG tablet  Take 1 tablet by mouth Daily.             folic acid (FOLVITE) 400 MCG tablet  Take 400 mcg by mouth daily.             gabapentin (NEURONTIN) 300 MG capsule  Take 300 mg by mouth Every Night.             hydrALAZINE (APRESOLINE) 25 MG tablet  Take 1  tablet by mouth 3 (Three) Times a Day.             melatonin 5 MG tablet tablet  Take 5 mg by mouth Every Night.             metoprolol tartrate (LOPRESSOR) 50 MG tablet  Take 1 tablet by mouth Every 12 (Twelve) Hours.             montelukast (SINGULAIR) 10 MG tablet  Take 10 mg by mouth Daily.             nystatin (MYCOSTATIN) 816475 UNIT/GM powder  Apply  topically Every 8 (Eight) Hours.             pantoprazole (PROTONIX) 40 MG EC tablet  Take 1 tablet by mouth Daily.             phenytoin (DILANTIN) 100 MG ER capsule  Take 300 mg by mouth every night at bedtime.             potassium chloride (K-TAB) 20 MEQ tablet controlled-release ER tablet  Take 1 tablet by mouth 2 (Two) Times a Day.             sennosides-docusate sodium (SENOKOT-S) 8.6-50 MG tablet  Take 2 tablets by mouth Every Night.             vitamin B-12 (CYANOCOBALAMIN) 100 MCG tablet  Take 100 mcg by mouth Daily.                 Discharge Diet:  regular    Activity at Discharge:  as tolerated, currently ambulating with walker    Follow-up Appointments  Future Appointments  Date Time Provider Department Center   5/18/2018 7:50 AM LAB CHAIR 1 [facility]  [facility]   5/18/2018 8:20 AM MELINDA Rothman [facility]  [facility]   5/18/2018 9:00 AM CHAIR 12 [facility] - [facility]  [facility]   5/25/2018 7:45 AM IN[facility] PORT CHAIR  [facility]   5/25/2018 8:20 AM Chivo Iraheta MD [facility]  [facility]   5/25/2018 9:00 AM CHEMO INF 6 [facility]  [facility]   6/11/2018 11:00 AM Jamie Walton DO [facility] None     Additional Instructions for the Follow-ups that You Need to Schedule     CBC and Differential    May 05, 2018      Manual Differential:  No         Comprehensive metabolic panel    May 05, 2018                   Fahad Bobby MD  05/16/18  2:52 PM

## 2018-05-17 ENCOUNTER — EPISODE CHANGES (OUTPATIENT)
Dept: CASE MANAGEMENT | Facility: OTHER | Age: 78
End: 2018-05-17

## 2018-05-17 DIAGNOSIS — C85.91 LYMPHOMA OF LYMPH NODES OF NECK, UNSPECIFIED LYMPHOMA TYPE (HCC): Primary | ICD-10-CM

## 2018-05-17 DIAGNOSIS — E83.52 HYPERCALCEMIA: ICD-10-CM

## 2018-05-18 ENCOUNTER — OFFICE VISIT (OUTPATIENT)
Dept: ONCOLOGY | Facility: CLINIC | Age: 78
End: 2018-05-18

## 2018-05-18 ENCOUNTER — INFUSION (OUTPATIENT)
Dept: ONCOLOGY | Facility: HOSPITAL | Age: 78
End: 2018-05-18

## 2018-05-18 ENCOUNTER — HOSPITAL ENCOUNTER (OUTPATIENT)
Dept: CARDIOLOGY | Facility: HOSPITAL | Age: 78
Discharge: HOME | End: 2018-05-18
Admitting: NURSE PRACTITIONER

## 2018-05-18 VITALS
RESPIRATION RATE: 16 BRPM | HEART RATE: 64 BPM | OXYGEN SATURATION: 98 % | DIASTOLIC BLOOD PRESSURE: 70 MMHG | WEIGHT: 138.6 LBS | BODY MASS INDEX: 23.66 KG/M2 | HEIGHT: 64 IN | SYSTOLIC BLOOD PRESSURE: 130 MMHG | TEMPERATURE: 97.5 F

## 2018-05-18 DIAGNOSIS — E83.52 HYPERCALCEMIA: ICD-10-CM

## 2018-05-18 DIAGNOSIS — Z45.2 ENCOUNTER FOR ADJUSTMENT OR MANAGEMENT OF VASCULAR ACCESS DEVICE: ICD-10-CM

## 2018-05-18 DIAGNOSIS — M79.604 RIGHT LEG PAIN: Primary | ICD-10-CM

## 2018-05-18 DIAGNOSIS — I82.401 ACUTE DEEP VEIN THROMBOSIS (DVT) OF RIGHT LOWER EXTREMITY, UNSPECIFIED VEIN (HCC): ICD-10-CM

## 2018-05-18 DIAGNOSIS — M79.89 RIGHT LEG SWELLING: ICD-10-CM

## 2018-05-18 DIAGNOSIS — C85.91 LYMPHOMA OF LYMPH NODES OF NECK, UNSPECIFIED LYMPHOMA TYPE (HCC): ICD-10-CM

## 2018-05-18 DIAGNOSIS — C85.91 LYMPHOMA OF LYMPH NODES OF NECK, UNSPECIFIED LYMPHOMA TYPE (HCC): Primary | ICD-10-CM

## 2018-05-18 DIAGNOSIS — E83.52 HYPERCALCEMIA: Primary | ICD-10-CM

## 2018-05-18 LAB
ALBUMIN SERPL-MCNC: 3.8 G/DL (ref 3.5–5.2)
ALBUMIN/GLOB SERPL: 1.2 G/DL (ref 1.1–2.4)
ALP SERPL-CCNC: 121 U/L (ref 38–116)
ALT SERPL W P-5'-P-CCNC: 11 U/L (ref 0–33)
ANION GAP SERPL CALCULATED.3IONS-SCNC: 13.9 MMOL/L
AST SERPL-CCNC: 26 U/L (ref 0–32)
BASOPHILS # BLD AUTO: 0.14 10*3/MM3 (ref 0–0.1)
BASOPHILS NFR BLD AUTO: 0.7 % (ref 0–1.1)
BH CV LOW VAS RIGHT GREATER SAPH AK VESSEL: 1
BH CV LOW VAS RIGHT GREATER SAPH BK VESSEL: 1
BH CV LOW VAS RIGHT PERONEAL VESSEL: 1
BH CV LOW VAS RIGHT POPLITEAL SPONT: 1
BH CV LOW VAS RIGHT POSTERIOR TIBIAL VESSEL: 1
BH CV LOW VAS RIGHT SAPHENOFEMORAL JUNCTION SPONT: 1
BH CV LOWER VASCULAR LEFT COMMON FEMORAL AUGMENT: NORMAL
BH CV LOWER VASCULAR LEFT COMMON FEMORAL COMPETENT: NORMAL
BH CV LOWER VASCULAR LEFT COMMON FEMORAL COMPRESS: NORMAL
BH CV LOWER VASCULAR LEFT COMMON FEMORAL PHASIC: NORMAL
BH CV LOWER VASCULAR LEFT COMMON FEMORAL SPONT: NORMAL
BH CV LOWER VASCULAR RIGHT COMMON FEMORAL AUGMENT: NORMAL
BH CV LOWER VASCULAR RIGHT COMMON FEMORAL COMPETENT: NORMAL
BH CV LOWER VASCULAR RIGHT COMMON FEMORAL COMPRESS: NORMAL
BH CV LOWER VASCULAR RIGHT COMMON FEMORAL PHASIC: NORMAL
BH CV LOWER VASCULAR RIGHT COMMON FEMORAL SPONT: NORMAL
BH CV LOWER VASCULAR RIGHT DISTAL FEMORAL COMPRESS: NORMAL
BH CV LOWER VASCULAR RIGHT GASTRONEMIUS COMPRESS: NORMAL
BH CV LOWER VASCULAR RIGHT GREATER SAPH AK COMPRESS: NORMAL
BH CV LOWER VASCULAR RIGHT GREATER SAPH AK THROMBUS: NORMAL
BH CV LOWER VASCULAR RIGHT GREATER SAPH BK COMPRESS: NORMAL
BH CV LOWER VASCULAR RIGHT GREATER SAPH BK THROMBUS: NORMAL
BH CV LOWER VASCULAR RIGHT LESSER SAPH COMPRESS: NORMAL
BH CV LOWER VASCULAR RIGHT MID FEMORAL AUGMENT: NORMAL
BH CV LOWER VASCULAR RIGHT MID FEMORAL COMPETENT: NORMAL
BH CV LOWER VASCULAR RIGHT MID FEMORAL COMPRESS: NORMAL
BH CV LOWER VASCULAR RIGHT MID FEMORAL PHASIC: NORMAL
BH CV LOWER VASCULAR RIGHT MID FEMORAL SPONT: NORMAL
BH CV LOWER VASCULAR RIGHT PERONEAL COMPRESS: NORMAL
BH CV LOWER VASCULAR RIGHT PERONEAL THROMBUS: NORMAL
BH CV LOWER VASCULAR RIGHT POPLITEAL AUGMENT: NORMAL
BH CV LOWER VASCULAR RIGHT POPLITEAL COMPRESS: NORMAL
BH CV LOWER VASCULAR RIGHT POPLITEAL PHASIC: NORMAL
BH CV LOWER VASCULAR RIGHT POPLITEAL SPONT: NORMAL
BH CV LOWER VASCULAR RIGHT POPLITEAL THROMBUS: NORMAL
BH CV LOWER VASCULAR RIGHT POSTERIOR TIBIAL COMPRESS: NORMAL
BH CV LOWER VASCULAR RIGHT POSTERIOR TIBIAL THROMBUS: NORMAL
BH CV LOWER VASCULAR RIGHT PROXIMAL FEMORAL COMPRESS: NORMAL
BH CV LOWER VASCULAR RIGHT SAPHENOFEMORAL JUNCTION AUGMENT: NORMAL
BH CV LOWER VASCULAR RIGHT SAPHENOFEMORAL JUNCTION COMPETENT: NORMAL
BH CV LOWER VASCULAR RIGHT SAPHENOFEMORAL JUNCTION COMPRESS: NORMAL
BH CV LOWER VASCULAR RIGHT SAPHENOFEMORAL JUNCTION PHASIC: NORMAL
BH CV LOWER VASCULAR RIGHT SAPHENOFEMORAL JUNCTION SPONT: NORMAL
BH CV LOWER VASCULAR RIGHT SAPHENOFEMORAL JUNCTION THROMBUS: NORMAL
BILIRUB SERPL-MCNC: 0.3 MG/DL (ref 0.1–1.2)
BUN BLD-MCNC: 11 MG/DL (ref 6–20)
BUN/CREAT SERPL: 15.9 (ref 7.3–30)
CA-I BLD-MCNC: 6 MG/DL (ref 4.6–5.4)
CA-I SERPL ISE-MCNC: 1.49 MMOL/L (ref 1.15–1.35)
CALCIUM SPEC-SCNC: 10.8 MG/DL (ref 8.5–10.2)
CHLORIDE SERPL-SCNC: 97 MMOL/L (ref 98–107)
CO2 SERPL-SCNC: 27.1 MMOL/L (ref 22–29)
CREAT BLD-MCNC: 0.69 MG/DL (ref 0.6–1.1)
DEPRECATED RDW RBC AUTO: 47.1 FL (ref 37–49)
EOSINOPHIL # BLD AUTO: 0.05 10*3/MM3 (ref 0–0.36)
EOSINOPHIL NFR BLD AUTO: 0.3 % (ref 1–5)
ERYTHROCYTE [DISTWIDTH] IN BLOOD BY AUTOMATED COUNT: 15.2 % (ref 11.7–14.5)
GFR SERPL CREATININE-BSD FRML MDRD: 82 ML/MIN/1.73
GLOBULIN UR ELPH-MCNC: 3.1 GM/DL (ref 1.8–3.5)
GLUCOSE BLD-MCNC: 110 MG/DL (ref 74–124)
HCT VFR BLD AUTO: 33.2 % (ref 34–45)
HGB BLD-MCNC: 10.7 G/DL (ref 11.5–14.9)
IMM GRANULOCYTES # BLD: 4 10*3/MM3 (ref 0–0.03)
IMM GRANULOCYTES NFR BLD: 20.2 % (ref 0–0.5)
LDH SERPL-CCNC: 295 U/L (ref 99–259)
LYMPHOCYTES # BLD AUTO: 0.48 10*3/MM3 (ref 1–3.5)
LYMPHOCYTES NFR BLD AUTO: 2.4 % (ref 20–49)
MAGNESIUM SERPL-MCNC: 1.7 MG/DL (ref 1.8–2.5)
MCH RBC QN AUTO: 27.9 PG (ref 27–33)
MCHC RBC AUTO-ENTMCNC: 32.2 G/DL (ref 32–35)
MCV RBC AUTO: 86.5 FL (ref 83–97)
MONOCYTES # BLD AUTO: 2.85 10*3/MM3 (ref 0.25–0.8)
MONOCYTES NFR BLD AUTO: 14.4 % (ref 4–12)
NEUTROPHILS # BLD AUTO: 12.32 10*3/MM3 (ref 1.5–7)
NEUTROPHILS NFR BLD AUTO: 62 % (ref 39–75)
NRBC BLD MANUAL-RTO: 0 /100 WBC (ref 0–0)
PHOSPHATE SERPL-MCNC: 3.2 MG/DL (ref 2.5–4.5)
PLATELET # BLD AUTO: 106 10*3/MM3 (ref 150–375)
PMV BLD AUTO: 11.8 FL (ref 8.9–12.1)
POTASSIUM BLD-SCNC: 3.6 MMOL/L (ref 3.5–4.7)
PROT SERPL-MCNC: 6.9 G/DL (ref 6.3–8)
RBC # BLD AUTO: 3.84 10*6/MM3 (ref 3.9–5)
SODIUM BLD-SCNC: 138 MMOL/L (ref 134–145)
URATE SERPL-MCNC: 2.7 MG/DL (ref 2.8–7.4)
WBC NRBC COR # BLD: 19.84 10*3/MM3 (ref 4–10)

## 2018-05-18 PROCEDURE — 84550 ASSAY OF BLOOD/URIC ACID: CPT

## 2018-05-18 PROCEDURE — 25010000002 ZOLEDRONIC ACID 4 MG/100ML SOLUTION: Performed by: NURSE PRACTITIONER

## 2018-05-18 PROCEDURE — 82330 ASSAY OF CALCIUM: CPT | Performed by: INTERNAL MEDICINE

## 2018-05-18 PROCEDURE — 84100 ASSAY OF PHOSPHORUS: CPT

## 2018-05-18 PROCEDURE — 85025 COMPLETE CBC W/AUTO DIFF WBC: CPT

## 2018-05-18 PROCEDURE — 83735 ASSAY OF MAGNESIUM: CPT

## 2018-05-18 PROCEDURE — 25010000002 HEPARIN FLUSH (PORCINE) 100 UNIT/ML SOLUTION: Performed by: INTERNAL MEDICINE

## 2018-05-18 PROCEDURE — 96365 THER/PROPH/DIAG IV INF INIT: CPT | Performed by: NURSE PRACTITIONER

## 2018-05-18 PROCEDURE — 83615 LACTATE (LD) (LDH) ENZYME: CPT

## 2018-05-18 PROCEDURE — 99215 OFFICE O/P EST HI 40 MIN: CPT | Performed by: NURSE PRACTITIONER

## 2018-05-18 PROCEDURE — 80053 COMPREHEN METABOLIC PANEL: CPT

## 2018-05-18 PROCEDURE — 93971 EXTREMITY STUDY: CPT

## 2018-05-18 RX ORDER — SODIUM CHLORIDE 9 MG/ML
250 INJECTION, SOLUTION INTRAVENOUS ONCE
Status: COMPLETED | OUTPATIENT
Start: 2018-05-18 | End: 2018-05-18

## 2018-05-18 RX ORDER — SODIUM CHLORIDE 0.9 % (FLUSH) 0.9 %
10 SYRINGE (ML) INJECTION AS NEEDED
Status: DISCONTINUED | OUTPATIENT
Start: 2018-05-18 | End: 2018-05-18 | Stop reason: HOSPADM

## 2018-05-18 RX ORDER — SODIUM CHLORIDE 0.9 % (FLUSH) 0.9 %
10 SYRINGE (ML) INJECTION AS NEEDED
Status: CANCELLED | OUTPATIENT
Start: 2018-05-18

## 2018-05-18 RX ORDER — ZOLEDRONIC ACID 0.04 MG/ML
4 INJECTION, SOLUTION INTRAVENOUS ONCE
Status: COMPLETED | OUTPATIENT
Start: 2018-05-18 | End: 2018-05-18

## 2018-05-18 RX ADMIN — SODIUM CHLORIDE, PRESERVATIVE FREE 500 UNITS: 5 INJECTION INTRAVENOUS at 08:51

## 2018-05-18 RX ADMIN — ZOLEDRONIC ACID 4 MG: 0.04 INJECTION, SOLUTION INTRAVENOUS at 10:08

## 2018-05-18 RX ADMIN — SODIUM CHLORIDE 250 ML: 900 INJECTION, SOLUTION INTRAVENOUS at 10:05

## 2018-05-18 RX ADMIN — Medication 10 ML: at 08:50

## 2018-05-18 NOTE — PROGRESS NOTES
"  Subjective .     REASONS FOR FOLLOWUP:    1. 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 Myelosuppression with pancytopenia related to chemotherapy  3. Multifactorial anemia  4. Hypercalcemia of malignancy  5. Hypomagnesemia, hypokalemia  6. Paroxysmal atrial fibrillation  7. Longstanding seizure disorder    HISTORY OF PRESENT ILLNESS:  The patient is a 77 y.o. year old female who is here for follow-up with the above-mentioned history.    History of Present Illness   Mrs. Blake returns today for hospital return, lab review, and possible Zometa.  The patient was hospitalized 4/10/2018 through 5/16/2018.  While hospitalized she did receive her first cycle of CHOP-R chemotherapy on 5/4/2018.  The patient had hypercalcemia while hospitalized which required Zometa.    She is complaining of right leg pain for the past several days. She states is worse at night.  She states that her leg has been swollen, and has \"fever\" in it.  She has not been febrile.  She states that she did notice it was a little red while in the hospital, but she did not meniton it to anyone. It has gotten worse.    She is otherwise doing okay.  She has a good appetite, and feels like she is adequately drinking fluids.  She denies issues with shortness breath or cough.  She denies any issues with nausea, vomiting, diarrhea, or constipation.     Past Medical History:   Diagnosis Date   • Atrial fibrillation    • Cystitis    • Cystocele     moderate   • Dyslipidemia    • Esophageal reflux    • Fatigue    • History of transfusion     no reaction   • Hypertension    • Major depression, chronic    • Menopause    • Non Hodgkin's lymphoma    • Osteoarthritis    • Osteoporosis    • Palpitations    • Post menopausal problems    • RLS (restless legs syndrome)    • Seizure disorder    • Subjective tinnitus    • Vaginal delivery     x2  \"SONYA\"      \"JASON\"   • Vitamin D deficiency        ONCOLOGIC HISTORY:  (History from " previous dates can be found in the separate document.)    Current Outpatient Prescriptions on File Prior to Visit   Medication Sig Dispense Refill   • allopurinol (ZYLOPRIM) 300 MG tablet Take 1 tablet by mouth Daily. 30 tablet 1   • amLODIPine (NORVASC) 10 MG tablet Take 1 tablet by mouth Daily. 90 tablet 3   • benazepril (LOTENSIN) 40 MG tablet Take 1 tablet by mouth Daily. 90 tablet 3   • folic acid (FOLVITE) 400 MCG tablet Take 400 mcg by mouth daily.     • gabapentin (NEURONTIN) 300 MG capsule Take 300 mg by mouth Every Night.  0   • hydrALAZINE (APRESOLINE) 25 MG tablet Take 1 tablet by mouth 3 (Three) Times a Day. 90 tablet 1   • melatonin 5 MG tablet tablet Take 5 mg by mouth Every Night.     • metoprolol tartrate (LOPRESSOR) 50 MG tablet Take 1 tablet by mouth Every 12 (Twelve) Hours. 60 tablet 1   • montelukast (SINGULAIR) 10 MG tablet Take 10 mg by mouth Daily.     • nystatin (MYCOSTATIN) 939567 UNIT/GM powder Apply  topically Every 8 (Eight) Hours. 45 g 1   • pantoprazole (PROTONIX) 40 MG EC tablet Take 1 tablet by mouth Daily. 90 tablet 3   • phenytoin (DILANTIN) 100 MG ER capsule Take 300 mg by mouth every night at bedtime.     • potassium chloride (K-TAB) 20 MEQ tablet controlled-release ER tablet Take 1 tablet by mouth 2 (Two) Times a Day. 60 tablet 2   • sennosides-docusate sodium (SENOKOT-S) 8.6-50 MG tablet Take 2 tablets by mouth Every Night. 60 tablet 1   • vitamin B-12 (CYANOCOBALAMIN) 100 MCG tablet Take 100 mcg by mouth Daily.       No current facility-administered medications on file prior to visit.        ALLERGIES:     Allergies   Allergen Reactions   • Crab (Diagnostic) Itching and Rash   • Pseudoephedrine Dizziness       Social History     Social History   • Marital status:      Spouse name: POOJA   • Number of children: 2   • Years of education: N/A     Occupational History   • Not on file.     Social History Main Topics   • Smoking status: Never Smoker   • Smokeless tobacco:  "Never Used   • Alcohol use No   • Drug use: No   • Sexual activity: Defer      Comment:  (Jl)     Other Topics Concern   • Not on file     Social History Narrative   • No narrative on file         Cancer-related family history is not on file.     Review of Systems   Constitutional: Negative for activity change, appetite change, chills, fatigue and fever.   HENT: Negative for mouth sores, nosebleeds and trouble swallowing.    Respiratory: Negative for cough and shortness of breath.    Cardiovascular: Positive for leg swelling. Negative for chest pain.   Gastrointestinal: Negative for abdominal pain, constipation, diarrhea, nausea and vomiting.   Genitourinary: Negative.  Negative for difficulty urinating.   Musculoskeletal:        RLE pain and swelling   Skin: Negative for rash.   Neurological: Negative for dizziness, weakness and numbness.   Hematological: Negative for adenopathy. Does not bruise/bleed easily.   Psychiatric/Behavioral: Negative for sleep disturbance.     Objective      Vitals:    05/18/18 0859   BP: 130/70   Pulse: 64   Resp: 16   Temp: 97.5 °F (36.4 °C)   SpO2: 98%  Comment: at rest   Weight: 62.9 kg (138 lb 9.6 oz)   Height: 162 cm (63.78\")  Comment: new ht.   PainSc: 8  Comment: rt. leg at night     Current Status 5/18/2018   ECOG score 0       Physical Exam   Constitutional: She is oriented to person, place, and time. She appears well-developed and well-nourished. No distress.   Seated in wheel chair   HENT:   Head: Normocephalic and atraumatic.   Mouth/Throat: Oropharynx is clear and moist and mucous membranes are normal. No oropharyngeal exudate.   Eyes: Pupils are equal, round, and reactive to light.   Neck: Normal range of motion.   Cardiovascular: Normal rate, regular rhythm and normal heart sounds.    No murmur heard.  Pulmonary/Chest: Effort normal and breath sounds normal. No respiratory distress. She has no wheezes. She has no rhonchi. She has no rales.   Abdominal: Soft. " Normal appearance and bowel sounds are normal. She exhibits no distension. There is no hepatosplenomegaly.   Musculoskeletal: Normal range of motion. She exhibits edema (R>L).   Thrombophlebitis of right inner thigh at the groin and just above the right knee.    Neurological: She is alert and oriented to person, place, and time.   Skin: Skin is warm and dry. No rash noted.   Psychiatric: She has a normal mood and affect.   Vitals reviewed.        RECENT LABS:  Hematology WBC   Date Value Ref Range Status   05/18/2018 19.84 (H) 4.00 - 10.00 10*3/mm3 Final     RBC   Date Value Ref Range Status   05/18/2018 3.84 (L) 3.90 - 5.00 10*6/mm3 Final     Hemoglobin   Date Value Ref Range Status   05/18/2018 10.7 (L) 11.5 - 14.9 g/dL Final     Hematocrit   Date Value Ref Range Status   05/18/2018 33.2 (L) 34.0 - 45.0 % Final     Platelets   Date Value Ref Range Status   05/18/2018 106 (L) 150 - 375 10*3/mm3 Final        Assessment/Plan     1. 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  · PET scan 5/3/18 with diffuse splenic involvement (SUV 16.9), lymphadenopathy throughout upper abdomen and retroperitoneum (SUV 13.1), right liver lesion 5 cm (SUV 12.8), pelvic lymphadenopathy up to 4 cm, bladder wall thickening with associated hypermetabolism, cervical lymphadenopathy left posterior (SUV 11.2), multiple bone lesions including left intertrochanteric femur, ribs, right scapula, pelvis as well as left gluteal hematoma versus lymphomatous lesion.  · CT-guided liver biopsy 4/26/18 with diffuse large B-cell non-Hodgkin's lymphoma, CD20 positive, double hit (BCL-6 rearrangement 85%, MYC rearrangement 79%), KI-67 4+/4  · Left cervical excisional biopsy by ENT Dr. Oconnor 5/1/18 confirming high-grade B cell non-Hodgkin's lymphoma, Ki-67 100%, double hit (BCL-6 rearrangement 76%, MYC  rearrangement 85%)  · Echocardiogram 4/11/18 with ejection fraction 66.7%  · Right port placement Dr Gill 5/4/18  · Patient not felt to be a candidate for more aggressive chemotherapy due to performance status and age (DA EPOCH-R)  · Therefore treated with R CHOP chemotherapy 5/4/18.  · Followed by granix 300mcg daily beginning 5/6/18 through 5/14/18.  2. Myelosuppression with pancytopenia related to chemotherapy:  · Received granix 300mcg daily initiated 5/6/18, last dose 5/14/18.    3. Multifactorial anemia:  · Related to anemia chronic disease, chemotherapy, prior iron deficiency.  · Received previous venofer 600mg (5/7 and 5/8) with iron studies from 4/24/18 showing ferritin 411, iron saturation 9%, TIBC 180.  · Hemoglobin today stable at 9.8.  4. Hypercalcemia of malignancy:  · Calcium up to 13.8 on 4/21/18 (albumin 3.3).  Intact PTH 8.1, PTH RP less than 2.0  · Received Zometa 4 mg IV 4/25/18.  · Calcium up to 11.3 on 5/7/18 with second dose of Zometa administered 4 mg IV.  · Calcium today has continued to gradually increase up to 11.9 with albumin 3.3.  Ionized calcium however is stable at 6.8 compared to yesterday.    · Calcium level 10.8 today.  I have reviewed with Dr. Iraheta, and we will proceed with Zometa today.  5. Hypomagnesemia, hypokalemia:  · Continuing on oral potassium chloride 40 mEq twice a day  · Potassium 3.6, magnesium 1.7 today.  6. Paroxysmal atrial fibrillation:  · Recently diagnosed, anticoagulated with Eliquis initially, discontinued due to expected thrombocytopenia from chemotherapy  · Currently in sinus rhythm, continues on Lopressor 50 mg every 12 hours  8. Prior seizure disorder:  · Continues currently on Dilantin 300 mg daily at bedtime and Neurontin 300 mg daily at bedtime.  10. Venous access:  · Port placement 5/4/18 by Dr. Gill  11. CODE STATUS:  · Patient is a full code  12. Right lower extremity pain and swelling: We send the patient for right lower extremity venous  Doppler, which was positive for an acute popliteal and calf vein DVT as well as superficial venous thrombosis.  I have reviewed with Dr. Iraheta.  We'll start the patient on Xarelto dose of 15 mg twice daily.  I have reviewed potential side effects of the medication, including bleeding, and signs and symptoms to watch for with the patient.  She verbalized understanding.  She will continue at a dose of 50 mg twice daily for at least the next week, and Dr. Iraheta will decide further dosing when he reviews her back in one week for follow-up.    PLAN:  1.  Proceed with somatic today.  2.  Start on Xarelto 15 mg twice daily.  Patient was provided with samples, she will need a prescription for further dosing instructions in one week when she returns for follow-up.  3.  Return for follow-up visit with Dr. Iraehta in one week for reevaluation, and discuss further dosing of Xarelto.  4.  Patient will be due for cycle 2 CHOP Rituxan in one week.  5.  Continue potassium and magnesium.  6.  Patient will likely require very close monitoring of her labs especially with the initiation of anticoagulation.            Cc:  Isreal Tan MD

## 2018-05-21 ENCOUNTER — TELEPHONE (OUTPATIENT)
Dept: GENERAL RADIOLOGY | Facility: HOSPITAL | Age: 78
End: 2018-05-21

## 2018-05-21 ENCOUNTER — LAB (OUTPATIENT)
Dept: OTHER | Facility: HOSPITAL | Age: 78
End: 2018-05-21

## 2018-05-21 ENCOUNTER — OFFICE VISIT (OUTPATIENT)
Dept: ONCOLOGY | Facility: CLINIC | Age: 78
End: 2018-05-21

## 2018-05-21 ENCOUNTER — TELEPHONE (OUTPATIENT)
Dept: ONCOLOGY | Facility: CLINIC | Age: 78
End: 2018-05-21

## 2018-05-21 VITALS
WEIGHT: 138 LBS | TEMPERATURE: 97.5 F | SYSTOLIC BLOOD PRESSURE: 152 MMHG | HEIGHT: 64 IN | DIASTOLIC BLOOD PRESSURE: 74 MMHG | RESPIRATION RATE: 16 BRPM | BODY MASS INDEX: 23.56 KG/M2 | OXYGEN SATURATION: 99 % | HEART RATE: 56 BPM

## 2018-05-21 DIAGNOSIS — C85.91 LYMPHOMA OF LYMPH NODES OF NECK, UNSPECIFIED LYMPHOMA TYPE (HCC): ICD-10-CM

## 2018-05-21 DIAGNOSIS — I82.401 ACUTE DEEP VEIN THROMBOSIS (DVT) OF RIGHT LOWER EXTREMITY, UNSPECIFIED VEIN (HCC): ICD-10-CM

## 2018-05-21 DIAGNOSIS — C85.91 LYMPHOMA OF LYMPH NODES OF NECK, UNSPECIFIED LYMPHOMA TYPE (HCC): Primary | ICD-10-CM

## 2018-05-21 DIAGNOSIS — L03.113 CELLULITIS OF RIGHT ELBOW: Primary | ICD-10-CM

## 2018-05-21 DIAGNOSIS — I80.9 THROMBOPHLEBITIS: ICD-10-CM

## 2018-05-21 LAB
DEPRECATED RDW RBC AUTO: 46.4 FL (ref 37–54)
ERYTHROCYTE [DISTWIDTH] IN BLOOD BY AUTOMATED COUNT: 15.9 % (ref 11.7–13)
HCT VFR BLD AUTO: 31.6 % (ref 35.6–45.5)
HGB BLD-MCNC: 10.5 G/DL (ref 11.9–15.5)
LYMPHOCYTES # BLD MANUAL: 0.41 10*3/MM3 (ref 0.8–7)
LYMPHOCYTES NFR BLD MANUAL: 11 % (ref 0–12)
LYMPHOCYTES NFR BLD MANUAL: 3 % (ref 24–44)
MCH RBC QN AUTO: 27.6 PG (ref 26.9–32)
MCHC RBC AUTO-ENTMCNC: 33.2 G/DL (ref 32.4–36.3)
MCV RBC AUTO: 83.2 FL (ref 80.5–98.2)
METAMYELOCYTES NFR BLD MANUAL: 1 % (ref 0–0)
MICROCYTES BLD QL: ABNORMAL
MONOCYTES # BLD AUTO: 1.5 10*3/MM3 (ref 0–1)
NEUTROPHILS # BLD AUTO: 11.62 10*3/MM3 (ref 1.9–8.1)
NEUTROPHILS NFR BLD MANUAL: 85 % (ref 42.7–76)
PLAT MORPH BLD: NORMAL
PLATELET # BLD AUTO: 242 10*3/MM3 (ref 140–500)
PMV BLD AUTO: 11.3 FL (ref 6–12)
RBC # BLD AUTO: 3.8 10*6/MM3 (ref 3.9–5.2)
SCAN SLIDE: NORMAL
WBC MORPH BLD: NORMAL
WBC NRBC COR # BLD: 13.67 10*3/MM3 (ref 4.5–10.7)

## 2018-05-21 PROCEDURE — 85007 BL SMEAR W/DIFF WBC COUNT: CPT | Performed by: INTERNAL MEDICINE

## 2018-05-21 PROCEDURE — 85025 COMPLETE CBC W/AUTO DIFF WBC: CPT | Performed by: INTERNAL MEDICINE

## 2018-05-21 PROCEDURE — 99213 OFFICE O/P EST LOW 20 MIN: CPT | Performed by: NURSE PRACTITIONER

## 2018-05-21 PROCEDURE — 36415 COLL VENOUS BLD VENIPUNCTURE: CPT

## 2018-05-21 RX ORDER — CEPHALEXIN 500 MG/1
500 CAPSULE ORAL 3 TIMES DAILY
Qty: 30 CAPSULE | Refills: 0 | Status: SHIPPED | OUTPATIENT
Start: 2018-05-21 | End: 2018-06-11

## 2018-05-21 NOTE — TELEPHONE ENCOUNTER
----- Message from Mariel Cunningham RN sent at 5/21/2018 11:37 AM EDT -----  Please call pt and schedule CBC and NP visit today at [place]. Pt may have blood clot. Call pt at [phone]. Thank you

## 2018-05-21 NOTE — TELEPHONE ENCOUNTER
Pt's home health nurse from [facility] called. Liseth states she is at the pt's home now and she has developed just since last night a larger than a golf ball but smaller than a baseball sized area on the inside of her right AC. It is red, and hot to touch, pt states it is not very painful. Liseth states pt has similar spots on her leg and she is taking eliquis. Reviewed with Dr. Bobby. He wants pt to come to [facility] to be evaluated by NP. Message sent to scheduling. Informed Liseth. Pt is aware.

## 2018-05-21 NOTE — PROGRESS NOTES
"  Subjective .  Redness and swelling in the right elbow    REASONS FOR FOLLOWUP:    1. 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 Myelosuppression with pancytopenia related to chemotherapy  3. Multifactorial anemia  4. Hypercalcemia of malignancy  5. Hypomagnesemia, hypokalemia  6. Paroxysmal atrial fibrillation  7. Longstanding seizure disorder    HISTORY OF PRESENT ILLNESS:  The patient is a 77 y.o. year old female who is here for follow-up with the above-mentioned history.    Nausea   Associated symptoms include joint swelling. Pertinent negatives include no fever.      Ms. Blake is a 77-year-old female with the above-mentioned history returns today with complaints of pain and swelling in her right elbow.  She first noticed it on around the Saturday.  She states that it is tender.  She denies fevers or chills, but has noticed some warmth in her right elbow.  She does continue on Xarelto 15 mg twice daily.  Swelling in her right lower extremity has slightly improved.  She is not having shortness of breath or cough.     Past Medical History:   Diagnosis Date   • Atrial fibrillation    • Cystitis    • Cystocele     moderate   • Dyslipidemia    • Esophageal reflux    • Fatigue    • History of transfusion     no reaction   • Hypertension    • Major depression, chronic    • Menopause    • Non Hodgkin's lymphoma    • Osteoarthritis    • Osteoporosis    • Palpitations    • Post menopausal problems    • RLS (restless legs syndrome)    • Seizure disorder    • Subjective tinnitus    • Vaginal delivery     x2  \"SONYA\"      \"JASON\"   • Vitamin D deficiency        ONCOLOGIC HISTORY:  (History from previous dates can be found in the separate document.)    Current Outpatient Prescriptions on File Prior to Visit   Medication Sig Dispense Refill   • allopurinol (ZYLOPRIM) 300 MG tablet Take 1 tablet by mouth Daily. 30 tablet 1   • amLODIPine (NORVASC) 10 MG tablet Take 1 tablet by mouth Daily. " 90 tablet 3   • benazepril (LOTENSIN) 40 MG tablet Take 1 tablet by mouth Daily. 90 tablet 3   • folic acid (FOLVITE) 400 MCG tablet Take 400 mcg by mouth daily.     • gabapentin (NEURONTIN) 300 MG capsule Take 300 mg by mouth Every Night.  0   • hydrALAZINE (APRESOLINE) 25 MG tablet Take 1 tablet by mouth 3 (Three) Times a Day. 90 tablet 1   • melatonin 5 MG tablet tablet Take 5 mg by mouth Every Night.     • metoprolol tartrate (LOPRESSOR) 50 MG tablet Take 1 tablet by mouth Every 12 (Twelve) Hours. 60 tablet 1   • montelukast (SINGULAIR) 10 MG tablet Take 10 mg by mouth Daily.     • nystatin (MYCOSTATIN) 931048 UNIT/GM powder Apply  topically Every 8 (Eight) Hours. 45 g 1   • pantoprazole (PROTONIX) 40 MG EC tablet Take 1 tablet by mouth Daily. 90 tablet 3   • phenytoin (DILANTIN) 100 MG ER capsule Take 300 mg by mouth every night at bedtime.     • potassium chloride (K-TAB) 20 MEQ tablet controlled-release ER tablet Take 1 tablet by mouth 2 (Two) Times a Day. 60 tablet 2   • sennosides-docusate sodium (SENOKOT-S) 8.6-50 MG tablet Take 2 tablets by mouth Every Night. 60 tablet 1   • vitamin B-12 (CYANOCOBALAMIN) 100 MCG tablet Take 100 mcg by mouth Daily.       No current facility-administered medications on file prior to visit.        ALLERGIES:     Allergies   Allergen Reactions   • Crab (Diagnostic) Itching and Rash   • Pseudoephedrine Dizziness       Social History     Social History   • Marital status:      Spouse name: JL   • Number of children: 2   • Years of education: N/A     Occupational History   • Not on file.     Social History Main Topics   • Smoking status: Never Smoker   • Smokeless tobacco: Never Used   • Alcohol use No   • Drug use: No   • Sexual activity: Defer      Comment:  (Jl)     Other Topics Concern   • Not on file     Social History Narrative   • No narrative on file         Cancer-related family history is not on file.     Review of Systems   Constitutional:  "Negative for fever.   Cardiovascular: Positive for leg swelling.   Musculoskeletal: Positive for joint swelling.     Objective      Vitals:    05/21/18 1606   BP: 152/74   Pulse: 56   Resp: 16   Temp: 97.5 °F (36.4 °C)   TempSrc: Oral   SpO2: 99%   Weight: 62.6 kg (138 lb)   Height: 162 cm (63.78\")   PainSc: 0-No pain     Current Status 5/21/2018   ECOG score 2       Physical Exam   Constitutional: She is oriented to person, place, and time. She appears well-developed and well-nourished. No distress.   Seated in wheel chair   HENT:   Head: Normocephalic and atraumatic.   Eyes: Pupils are equal, round, and reactive to light.   Pulmonary/Chest: Effort normal. No respiratory distress.   Musculoskeletal: Normal range of motion. She exhibits edema (R>L improved).   Thrombophlebitis of right inner thigh- improved at the groin and just above the right knee.    Neurological: She is alert and oriented to person, place, and time.   Skin: Skin is warm and dry. No rash noted.   Erythema and edema of the right elbow with slight warmth and tenderness.  There is no obvious trauma.    Psychiatric: She has a normal mood and affect.   Vitals reviewed.        RECENT LABS:  Hematology WBC   Date Value Ref Range Status   05/21/2018 13.67 (H) 4.50 - 10.70 10*3/mm3 Final     RBC   Date Value Ref Range Status   05/21/2018 3.80 (L) 3.90 - 5.20 10*6/mm3 Final     Hemoglobin   Date Value Ref Range Status   05/21/2018 10.5 (L) 11.9 - 15.5 g/dL Final     Hematocrit   Date Value Ref Range Status   05/21/2018 31.6 (L) 35.6 - 45.5 % Final     Platelets   Date Value Ref Range Status   05/21/2018 242 140 - 500 10*3/mm3 Final        Assessment/Plan     1. 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  · PET scan 5/3/18 with diffuse splenic involvement (SUV 16.9), lymphadenopathy throughout upper abdomen and " retroperitoneum (SUV 13.1), right liver lesion 5 cm (SUV 12.8), pelvic lymphadenopathy up to 4 cm, bladder wall thickening with associated hypermetabolism, cervical lymphadenopathy left posterior (SUV 11.2), multiple bone lesions including left intertrochanteric femur, ribs, right scapula, pelvis as well as left gluteal hematoma versus lymphomatous lesion.  · CT-guided liver biopsy 4/26/18 with diffuse large B-cell non-Hodgkin's lymphoma, CD20 positive, double hit (BCL-6 rearrangement 85%, MYC rearrangement 79%), KI-67 4+/4  · Left cervical excisional biopsy by ENT Dr. Oconnor 5/1/18 confirming high-grade B cell non-Hodgkin's lymphoma, Ki-67 100%, double hit (BCL-6 rearrangement 76%, MYC rearrangement 85%)  · Echocardiogram 4/11/18 with ejection fraction 66.7%  · Right port placement Dr Gill 5/4/18  · Patient not felt to be a candidate for more aggressive chemotherapy due to performance status and age (DA EPOCH-R)  · Therefore treated with R CHOP chemotherapy 5/4/18.  · Followed by granix 300mcg daily beginning 5/6/18 through 5/14/18.  2. Myelosuppression with pancytopenia related to chemotherapy:  · Received granix 300mcg daily initiated 5/6/18, last dose 5/14/18.    3. Multifactorial anemia:  · Related to anemia chronic disease, chemotherapy, prior iron deficiency.  · Received previous venofer 600mg (5/7 and 5/8) with iron studies from 4/24/18 showing ferritin 411, iron saturation 9%, TIBC 180.  · Hemoglobin today stable at 9.8.  4. Hypercalcemia of malignancy:  · Calcium up to 13.8 on 4/21/18 (albumin 3.3).  Intact PTH 8.1, PTH RP less than 2.0  · Received Zometa 4 mg IV 4/25/18.  · Calcium up to 11.3 on 5/7/18 with second dose of Zometa administered 4 mg IV.  · Calcium today has continued to gradually increase up to 11.9 with albumin 3.3.  Ionized calcium however is stable at 6.8 compared to yesterday.    · Calcium level 10.8 today.  I have reviewed with Dr. Iraheta, and we will proceed with Zometa  today.  5. Hypomagnesemia, hypokalemia:  · Continuing on oral potassium chloride 40 mEq twice a day  · Potassium 3.6, magnesium 1.7 today.  6. Paroxysmal atrial fibrillation:  · Recently diagnosed, anticoagulated with Eliquis initially, discontinued due to expected thrombocytopenia from chemotherapy  · Currently in sinus rhythm, continues on Lopressor 50 mg every 12 hours  8. Prior seizure disorder:  · Continues currently on Dilantin 300 mg daily at bedtime and Neurontin 300 mg daily at bedtime.  10. Venous access:  · Port placement 5/4/18 by Dr. Gill  11. CODE STATUS   Patient is a full code  12. DVT: Right lower extremity venous Doppler 5/18/2018, positive for an acute popliteal and calf vein DVT as well as superficial venous thrombosis. Patient started Xarelto 15 mg twice daily.  Dr. Iraheta to decide further dosing when he see her back on 5/25/2018.  13. Cellulitis right elbow: Keflex 500 mg three times dailt for the next 10 days.     PLAN:  1.  Keflex 500 mg three times daily for the next 10 days.  2. Continue Xarelto 15 mg twice daily until reevaluated by Dr. Iraheta on 5/25/2018 to decide further dosing.   3.  Patient due for cycle 2 CHOP-R on 5/25/2018.  4.  Continue potassium and magnesium.  6.  Patient will likely require very close monitoring of her labs especially with the initiation of anticoagulation.            Cc:  Isreal Tan MD

## 2018-05-23 DIAGNOSIS — C83.39 DIFFUSE LARGE B-CELL LYMPHOMA OF EXTRANODAL SITE EXCLUDING SPLEEN AND OTHER SOLID ORGANS (HCC): ICD-10-CM

## 2018-05-24 ENCOUNTER — TELEPHONE (OUTPATIENT)
Dept: ONCOLOGY | Facility: HOSPITAL | Age: 78
End: 2018-05-24

## 2018-05-24 NOTE — TELEPHONE ENCOUNTER
Calling with questions about treatment tomorrow. Received first treatment in hospital. Questions answered.

## 2018-05-24 NOTE — TELEPHONE ENCOUNTER
----- Message from Fahad Bobby Jr., MD sent at 5/24/2018  1:41 PM EDT -----  Regarding: RE:  I think Myrtle is managing things with her but no restrictions that I am aware of.  ----- Message -----  From: Gisell Adrian RN  Sent: 5/24/2018  12:44 PM  To: Fahad Bobby Jr., MD    A home health PT evaluated her and want to know if there are any restrictions due to blood clot and on blood thinners.

## 2018-05-25 ENCOUNTER — INFUSION (OUTPATIENT)
Dept: ONCOLOGY | Facility: HOSPITAL | Age: 78
End: 2018-05-25

## 2018-05-25 ENCOUNTER — OFFICE VISIT (OUTPATIENT)
Dept: ONCOLOGY | Facility: CLINIC | Age: 78
End: 2018-05-25

## 2018-05-25 VITALS — HEART RATE: 78 BPM | SYSTOLIC BLOOD PRESSURE: 151 MMHG | DIASTOLIC BLOOD PRESSURE: 77 MMHG

## 2018-05-25 VITALS
SYSTOLIC BLOOD PRESSURE: 150 MMHG | TEMPERATURE: 98.1 F | HEIGHT: 64 IN | DIASTOLIC BLOOD PRESSURE: 68 MMHG | BODY MASS INDEX: 23.05 KG/M2 | OXYGEN SATURATION: 99 % | HEART RATE: 57 BPM | WEIGHT: 135 LBS | RESPIRATION RATE: 12 BRPM

## 2018-05-25 DIAGNOSIS — C85.91 LYMPHOMA OF LYMPH NODES OF NECK, UNSPECIFIED LYMPHOMA TYPE (HCC): ICD-10-CM

## 2018-05-25 DIAGNOSIS — C83.39 DIFFUSE LARGE B-CELL LYMPHOMA OF EXTRANODAL SITE EXCLUDING SPLEEN AND OTHER SOLID ORGANS (HCC): ICD-10-CM

## 2018-05-25 DIAGNOSIS — C83.39 DIFFUSE LARGE B-CELL LYMPHOMA OF EXTRANODAL SITE EXCLUDING SPLEEN AND OTHER SOLID ORGANS (HCC): Primary | ICD-10-CM

## 2018-05-25 DIAGNOSIS — E83.52 HYPERCALCEMIA: ICD-10-CM

## 2018-05-25 LAB
ALBUMIN SERPL-MCNC: 3.4 G/DL (ref 3.5–5.2)
ALBUMIN/GLOB SERPL: 0.9 G/DL (ref 1.1–2.4)
ALP SERPL-CCNC: 100 U/L (ref 38–116)
ALT SERPL W P-5'-P-CCNC: 19 U/L (ref 0–33)
ANION GAP SERPL CALCULATED.3IONS-SCNC: 15.2 MMOL/L
AST SERPL-CCNC: 37 U/L (ref 0–32)
BASOPHILS # BLD AUTO: 0.15 10*3/MM3 (ref 0–0.1)
BASOPHILS NFR BLD AUTO: 1.7 % (ref 0–1.1)
BILIRUB SERPL-MCNC: 0.3 MG/DL (ref 0.1–1.2)
BUN BLD-MCNC: 9 MG/DL (ref 6–20)
BUN/CREAT SERPL: 15.8 (ref 7.3–30)
CA-I BLD-MCNC: 5 MG/DL (ref 4.6–5.4)
CA-I SERPL ISE-MCNC: 1.26 MMOL/L (ref 1.15–1.35)
CALCIUM SPEC-SCNC: 9 MG/DL (ref 8.5–10.2)
CHLORIDE SERPL-SCNC: 101 MMOL/L (ref 98–107)
CO2 SERPL-SCNC: 20.8 MMOL/L (ref 22–29)
CREAT BLD-MCNC: 0.57 MG/DL (ref 0.6–1.1)
DEPRECATED RDW RBC AUTO: 49.2 FL (ref 37–49)
EOSINOPHIL # BLD AUTO: 0.01 10*3/MM3 (ref 0–0.36)
EOSINOPHIL NFR BLD AUTO: 0.1 % (ref 1–5)
ERYTHROCYTE [DISTWIDTH] IN BLOOD BY AUTOMATED COUNT: 16.2 % (ref 11.7–14.5)
GFR SERPL CREATININE-BSD FRML MDRD: 103 ML/MIN/1.73
GLOBULIN UR ELPH-MCNC: 3.9 GM/DL (ref 1.8–3.5)
GLUCOSE BLD-MCNC: 115 MG/DL (ref 74–124)
HCT VFR BLD AUTO: 32 % (ref 34–45)
HGB BLD-MCNC: 10.1 G/DL (ref 11.5–14.9)
IMM GRANULOCYTES # BLD: 0.22 10*3/MM3 (ref 0–0.03)
IMM GRANULOCYTES NFR BLD: 2.5 % (ref 0–0.5)
LDH SERPL-CCNC: 258 U/L (ref 99–259)
LYMPHOCYTES # BLD AUTO: 0.37 10*3/MM3 (ref 1–3.5)
LYMPHOCYTES NFR BLD AUTO: 4.2 % (ref 20–49)
MCH RBC QN AUTO: 27.4 PG (ref 27–33)
MCHC RBC AUTO-ENTMCNC: 31.6 G/DL (ref 32–35)
MCV RBC AUTO: 87 FL (ref 83–97)
MONOCYTES # BLD AUTO: 1.43 10*3/MM3 (ref 0.25–0.8)
MONOCYTES NFR BLD AUTO: 16.3 % (ref 4–12)
NEUTROPHILS # BLD AUTO: 6.6 10*3/MM3 (ref 1.5–7)
NEUTROPHILS NFR BLD AUTO: 75.2 % (ref 39–75)
NRBC BLD MANUAL-RTO: 0 /100 WBC (ref 0–0)
PLATELET # BLD AUTO: 391 10*3/MM3 (ref 150–375)
PMV BLD AUTO: 9.3 FL (ref 8.9–12.1)
POTASSIUM BLD-SCNC: 4.5 MMOL/L (ref 3.5–4.7)
PROT SERPL-MCNC: 7.3 G/DL (ref 6.3–8)
RBC # BLD AUTO: 3.68 10*6/MM3 (ref 3.9–5)
SODIUM BLD-SCNC: 137 MMOL/L (ref 134–145)
URATE SERPL-MCNC: 2.4 MG/DL (ref 2.8–7.4)
WBC NRBC COR # BLD: 8.78 10*3/MM3 (ref 4–10)

## 2018-05-25 PROCEDURE — 82330 ASSAY OF CALCIUM: CPT | Performed by: INTERNAL MEDICINE

## 2018-05-25 PROCEDURE — 96377 APPLICATON ON-BODY INJECTOR: CPT | Performed by: INTERNAL MEDICINE

## 2018-05-25 PROCEDURE — 84550 ASSAY OF BLOOD/URIC ACID: CPT | Performed by: INTERNAL MEDICINE

## 2018-05-25 PROCEDURE — 99215 OFFICE O/P EST HI 40 MIN: CPT | Performed by: INTERNAL MEDICINE

## 2018-05-25 PROCEDURE — 25010000003 DEXAMETHASONE SODIUM PHOSPHATE 100 MG/10ML SOLUTION: Performed by: INTERNAL MEDICINE

## 2018-05-25 PROCEDURE — 85025 COMPLETE CBC W/AUTO DIFF WBC: CPT | Performed by: INTERNAL MEDICINE

## 2018-05-25 PROCEDURE — 25010000002 DOXORUBICIN PER 10 MG: Performed by: INTERNAL MEDICINE

## 2018-05-25 PROCEDURE — 25010000002 RITUXIMAB 10 MG/ML SOLUTION 10 ML VIAL: Performed by: INTERNAL MEDICINE

## 2018-05-25 PROCEDURE — 25010000002 CYCLOPHOSPHAMIDE PER 100 MG: Performed by: INTERNAL MEDICINE

## 2018-05-25 PROCEDURE — 25010000002 PEGFILGRASTIM 6 MG/0.6ML PREFILLED SYRINGE KIT: Performed by: INTERNAL MEDICINE

## 2018-05-25 PROCEDURE — 80053 COMPREHEN METABOLIC PANEL: CPT | Performed by: INTERNAL MEDICINE

## 2018-05-25 PROCEDURE — 96413 CHEMO IV INFUSION 1 HR: CPT | Performed by: INTERNAL MEDICINE

## 2018-05-25 PROCEDURE — 25010000002 PALONOSETRON PER 25 MCG: Performed by: INTERNAL MEDICINE

## 2018-05-25 PROCEDURE — 96375 TX/PRO/DX INJ NEW DRUG ADDON: CPT | Performed by: INTERNAL MEDICINE

## 2018-05-25 PROCEDURE — 36415 COLL VENOUS BLD VENIPUNCTURE: CPT | Performed by: INTERNAL MEDICINE

## 2018-05-25 PROCEDURE — 83615 LACTATE (LD) (LDH) ENZYME: CPT | Performed by: INTERNAL MEDICINE

## 2018-05-25 PROCEDURE — 25010000002 VINCRISTINE PER 1 MG: Performed by: INTERNAL MEDICINE

## 2018-05-25 PROCEDURE — 25010000002 RITUXIMAB 10 MG/ML SOLUTION 50 ML VIAL: Performed by: INTERNAL MEDICINE

## 2018-05-25 PROCEDURE — 25010000002 DIPHENHYDRAMINE PER 50 MG: Performed by: INTERNAL MEDICINE

## 2018-05-25 PROCEDURE — 96411 CHEMO IV PUSH ADDL DRUG: CPT | Performed by: INTERNAL MEDICINE

## 2018-05-25 PROCEDURE — 96417 CHEMO IV INFUS EACH ADDL SEQ: CPT | Performed by: INTERNAL MEDICINE

## 2018-05-25 PROCEDURE — 25010000002 FOSAPREPITANT PER 1 MG: Performed by: INTERNAL MEDICINE

## 2018-05-25 PROCEDURE — 96415 CHEMO IV INFUSION ADDL HR: CPT | Performed by: INTERNAL MEDICINE

## 2018-05-25 RX ORDER — DIPHENHYDRAMINE HYDROCHLORIDE 50 MG/ML
50 INJECTION INTRAMUSCULAR; INTRAVENOUS AS NEEDED
Status: CANCELLED | OUTPATIENT
Start: 2018-05-25

## 2018-05-25 RX ORDER — SODIUM CHLORIDE 9 MG/ML
250 INJECTION, SOLUTION INTRAVENOUS ONCE
Status: CANCELLED | OUTPATIENT
Start: 2018-05-25

## 2018-05-25 RX ORDER — SODIUM CHLORIDE 9 MG/ML
250 INJECTION, SOLUTION INTRAVENOUS ONCE
Status: COMPLETED | OUTPATIENT
Start: 2018-05-25 | End: 2018-05-25

## 2018-05-25 RX ORDER — ACETAMINOPHEN 325 MG/1
650 TABLET ORAL ONCE
Status: CANCELLED | OUTPATIENT
Start: 2018-05-25

## 2018-05-25 RX ORDER — PALONOSETRON 0.05 MG/ML
0.25 INJECTION, SOLUTION INTRAVENOUS ONCE
Status: COMPLETED | OUTPATIENT
Start: 2018-05-25 | End: 2018-05-25

## 2018-05-25 RX ORDER — ACETAMINOPHEN 325 MG/1
650 TABLET ORAL ONCE
Status: COMPLETED | OUTPATIENT
Start: 2018-05-25 | End: 2018-05-25

## 2018-05-25 RX ORDER — FAMOTIDINE 10 MG/ML
20 INJECTION, SOLUTION INTRAVENOUS AS NEEDED
Status: CANCELLED | OUTPATIENT
Start: 2018-05-25

## 2018-05-25 RX ORDER — PALONOSETRON 0.05 MG/ML
0.25 INJECTION, SOLUTION INTRAVENOUS ONCE
Status: CANCELLED | OUTPATIENT
Start: 2018-05-25

## 2018-05-25 RX ORDER — DOXORUBICIN HYDROCHLORIDE 2 MG/ML
50 INJECTION, SOLUTION INTRAVENOUS ONCE
Status: CANCELLED | OUTPATIENT
Start: 2018-05-25

## 2018-05-25 RX ORDER — DOXORUBICIN HYDROCHLORIDE 2 MG/ML
50 INJECTION, SOLUTION INTRAVENOUS ONCE
Status: COMPLETED | OUTPATIENT
Start: 2018-05-25 | End: 2018-05-25

## 2018-05-25 RX ORDER — FAMOTIDINE 20 MG/1
20 TABLET, FILM COATED ORAL ONCE
Status: COMPLETED | OUTPATIENT
Start: 2018-05-25 | End: 2018-05-25

## 2018-05-25 RX ORDER — FAMOTIDINE 20 MG/1
20 TABLET, FILM COATED ORAL ONCE
Status: CANCELLED
Start: 2018-05-25 | End: 2018-05-25

## 2018-05-25 RX ORDER — MEPERIDINE HYDROCHLORIDE 50 MG/ML
25 INJECTION INTRAMUSCULAR; INTRAVENOUS; SUBCUTANEOUS
Status: CANCELLED | OUTPATIENT
Start: 2018-05-25

## 2018-05-25 RX ADMIN — VINCRISTINE SULFATE 2 MG: 1 INJECTION, SOLUTION INTRAVENOUS at 11:08

## 2018-05-25 RX ADMIN — DIPHENHYDRAMINE HYDROCHLORIDE 50 MG: 50 INJECTION, SOLUTION INTRAMUSCULAR; INTRAVENOUS at 10:33

## 2018-05-25 RX ADMIN — FAMOTIDINE 20 MG: 20 TABLET, FILM COATED ORAL at 10:34

## 2018-05-25 RX ADMIN — SODIUM CHLORIDE 150 MG: 900 INJECTION, SOLUTION INTRAVENOUS at 09:42

## 2018-05-25 RX ADMIN — DEXAMETHASONE SODIUM PHOSPHATE 12 MG: 10 INJECTION, SOLUTION INTRAMUSCULAR; INTRAVENOUS at 10:16

## 2018-05-25 RX ADMIN — ACETAMINOPHEN 650 MG: 325 TABLET, FILM COATED ORAL at 10:34

## 2018-05-25 RX ADMIN — PEGFILGRASTIM 6 MG: KIT SUBCUTANEOUS at 15:22

## 2018-05-25 RX ADMIN — DOXORUBICIN HYDROCHLORIDE 86 MG: 2 INJECTION, SOLUTION INTRAVENOUS at 10:55

## 2018-05-25 RX ADMIN — SODIUM CHLORIDE 250 ML: 9 INJECTION, SOLUTION INTRAVENOUS at 09:42

## 2018-05-25 RX ADMIN — CYCLOPHOSPHAMIDE 1280 MG: 1 INJECTION, POWDER, FOR SOLUTION INTRAVENOUS; ORAL at 11:21

## 2018-05-25 RX ADMIN — PALONOSETRON HYDROCHLORIDE 0.25 MG: 0.25 INJECTION INTRAVENOUS at 09:42

## 2018-05-25 RX ADMIN — RITUXIMAB 600 MG: 10 INJECTION, SOLUTION INTRAVENOUS at 12:10

## 2018-05-25 NOTE — PROGRESS NOTES
Adriamycin given slow IV push per side arm of normal saline right mediport. Excellent blood return maintained.

## 2018-05-25 NOTE — PROGRESS NOTES
Subjective .  Patient feels considerably better, slow resolution of thromboses and cellulitis    REASONS FOR FOLLOWUP:    1. 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 female who we are asked to see April 29, 2018 in consultation for hypercalcemia, anemia and radiologic findings consistent with likely malignancy-?  Lymphoma.        She has a significant past medical history of palpitations followed by cardiology.  She presented to the emergency room April 10-14 with chest pain and palpitations and was found to be in A. fib with RVR and also demonstrated electrolyte abnormalities and anemia.  Records demonstrates that her anemia became particularly evident right April 11 ruptured you an H&H of 8.9 28.4 by can 6510, platelet count 212,000 with a normal automated differential.  She underwent GI assessment including upper and lower endoscopy demonstrated hernia, gastritis, esophagitis, diverticulosis but no evidence of acute bleed.  She been placed on Eliquis as result of a relatively high CHADSVASc score.  She had also been found to be hypercalcemic at approximately 11 with HCTZ altered and the patient started on Aldactone.  Additional testing had included a ferritin of 313.1, iron of 32 with TIBC of 222 and iron saturation of 14, occult blood negative per stool testing    She was brought back to the emergency room April 21 with further evidence of hypercalcemia, associated weakness, anorexia, nausea, ataxia and weight loss.  MRI of the brain April 21 was found to be unremarkable. Outpatient intact PTH levels were found to be 7.1(reduced), and an H&H of 10.3 and 32.9, white count 11,090, platelet count 267,000 with a normal differential.  Patient seen by renal medicine with the possibility of Fanconi syndrome raised.  Thereafter PTH hormone was found be less than 2.0, and prophylactic paresis included IgG of 948, IgA  of 207, IgM 86, total protein 6.3, albumin 2.8, no M spike noted, slightly elevated free kappa and lambda light chains with normal ratio.  Repeat testing per iron profile again revealed low serum iron but high serum ferritin and normal CEA, normal AFP, CA-125 of 77.2, CA 19-9 7.8   At this point the reason for her hypercalcemia was thought to be possible medication effect and/or possible malignancy with calcium was running between 12 and 13 mg/dL.    This led to CT scan of chest abdomen pelvis performed April 24 demonstrate extensive metastatic disease throughout the abdomen and pelvis particularly involving the spleen and liver, extensive lymphadenopathy at the abdomen and pelvis with a 4 cm lesion splenic hilum partially encasing the pancreatic tail, pancreatic tail malignancy?,  Gastric primary malignancy? There was an approximately a 5 cm mass along the right wall of the urinary bladder with adjacent adenopathy.  CT of the chest revealed several tiny dense pleural-based nodular opacities-partially calcified granulomas, no mediastinal or hilar adenopathy, enlarged pulmonary arteries consistent with pulmonary arterial hypertension.  His subsequent bone scan performed April 26 revealed prominent uptake in the right frontal bone and right posterior ninth rib and a CT needle biopsy of the liver was obtained April 26 as well.The sample obtained revealed, on flow cytometry examination, a subpopulation of normal B cells that might be  B-cell lymphoma.  This was eventually felt consistent with diffuse large B-cell non-Hodgkin's lymphoma, CD20 positive, double hit (BCL-6 rearrangement 85%, MYC rearrangement 79%), KI-67 4+/4. This led to the patient receiving R CHOP chemotherapy May 04, 2018 followed by Granix.  She is able to be discharged May 16, 2018.    We made plans for her to be seen in follow-up for continued Zometa and a second cycle of CHOP R chemotherapy by May 25 with follow-up PET/CT after 2 cycles of  "treatment.  She is seen back in office May 18 given Zometa with findings of potential right lower extremity DVT.  Doppler is positive for acute popliteal and calf vein DVT as well as superficial venous thrombosis and the patient was started on Xarelto.  She was seen again May 21 mouth additional redness and swelling per her right elbow and felt to have cellulitis started on Keflex as an outpatient.  She had been taking the Xarelto with some improvement in her right lower extremity.    The patient's next seen family history 25th 2018 and, fortunately, her cellulitis is resolving as well is her thrombosis per right lower extremity, albeit slowly.         Past Medical History:   Diagnosis Date   • Atrial fibrillation    • Cystitis    • Cystocele     moderate   • Dyslipidemia    • Esophageal reflux    • Fatigue    • History of transfusion     no reaction   • Hypertension    • Major depression, chronic    • Menopause    • Non Hodgkin's lymphoma    • Osteoarthritis    • Osteoporosis    • Palpitations    • Post menopausal problems    • RLS (restless legs syndrome)    • Seizure disorder    • Subjective tinnitus    • Vaginal delivery     x2  \"SONYA\"      \"JASON\"   • Vitamin D deficiency        ONCOLOGIC HISTORY:  (History from previous dates can be found in the separate document.)    Current Outpatient Prescriptions on File Prior to Visit   Medication Sig Dispense Refill   • allopurinol (ZYLOPRIM) 300 MG tablet Take 1 tablet by mouth Daily. 30 tablet 1   • amLODIPine (NORVASC) 10 MG tablet Take 1 tablet by mouth Daily. 90 tablet 3   • benazepril (LOTENSIN) 40 MG tablet Take 1 tablet by mouth Daily. 90 tablet 3   • cephalexin (KEFLEX) 500 MG capsule Take 1 capsule by mouth 3 (Three) Times a Day. 30 capsule 0   • folic acid (FOLVITE) 400 MCG tablet Take 400 mcg by mouth daily.     • gabapentin (NEURONTIN) 300 MG capsule Take 300 mg by mouth Every Night.  0   • hydrALAZINE (APRESOLINE) 25 MG tablet Take 1 tablet by mouth 3 " (Three) Times a Day. 90 tablet 1   • melatonin 5 MG tablet tablet Take 5 mg by mouth Every Night.     • metoprolol tartrate (LOPRESSOR) 50 MG tablet Take 1 tablet by mouth Every 12 (Twelve) Hours. 60 tablet 1   • montelukast (SINGULAIR) 10 MG tablet Take 10 mg by mouth Daily.     • nystatin (MYCOSTATIN) 336330 UNIT/GM powder Apply  topically Every 8 (Eight) Hours. 45 g 1   • pantoprazole (PROTONIX) 40 MG EC tablet Take 1 tablet by mouth Daily. 90 tablet 3   • phenytoin (DILANTIN) 100 MG ER capsule Take 300 mg by mouth every night at bedtime.     • potassium chloride (K-TAB) 20 MEQ tablet controlled-release ER tablet Take 1 tablet by mouth 2 (Two) Times a Day. 60 tablet 2   • PREDNISONE PO Take  by mouth. Pt needs directions     • sennosides-docusate sodium (SENOKOT-S) 8.6-50 MG tablet Take 2 tablets by mouth Every Night. 60 tablet 1   • vitamin B-12 (CYANOCOBALAMIN) 100 MCG tablet Take 100 mcg by mouth Daily.       No current facility-administered medications on file prior to visit.        ALLERGIES:     Allergies   Allergen Reactions   • Crab (Diagnostic) Itching and Rash   • Pseudoephedrine Dizziness       Social History     Social History   • Marital status:      Spouse name: JL   • Number of children: 2   • Years of education: N/A     Occupational History   • Not on file.     Social History Main Topics   • Smoking status: Never Smoker   • Smokeless tobacco: Never Used   • Alcohol use No   • Drug use: No   • Sexual activity: Defer      Comment:  (Jl)     Other Topics Concern   • Not on file     Social History Narrative   • No narrative on file         Cancer-related family history is not on file.     Review of Systems  A comprehensive 14 point review of systems was performed and was negative except as mentioned.    Objective      Vitals:    05/25/18 0847   BP: 150/68   Pulse: 57   Resp: 12   Temp: 98.1 °F (36.7 °C)   TempSrc: Oral   SpO2: 99%   Weight: 61.2 kg (135 lb)   Height: 162 cm  "(63.78\")   PainSc: 0-No pain     Current Status 5/25/2018   ECOG score 1       Physical Exam    Constitutional: She is oriented to person, place, and time. She appears well-developed and well-nourished. No distress.   Seated in wheel chair   HENT:   Head: Normocephalic and atraumatic.   Eyes: Pupils are equal, round, and reactive to light.   Pulmonary/Chest: Effort normal. No respiratory distress.   Musculoskeletal: Normal range of motion. She exhibits edema (R>L improved).   Thrombophlebitis of right inner thigh- improved at the groin and just above the right knee.    Neurological: She is alert and oriented to person, place, and time.   Skin: Skin is warm and dry. No rash noted.   Considerably reduced erythema and edema of the right elbow with slight warmth and tenderness.  There is no obvious trauma.    Psychiatric: She has a normal mood and affect.   Vitals reviewed.        RECENT LABS:  Hematology WBC   Date Value Ref Range Status   05/25/2018 8.78 4.00 - 10.00 10*3/mm3 Final     RBC   Date Value Ref Range Status   05/25/2018 3.68 (L) 3.90 - 5.00 10*6/mm3 Final     Hemoglobin   Date Value Ref Range Status   05/25/2018 10.1 (L) 11.5 - 14.9 g/dL Final     Hematocrit   Date Value Ref Range Status   05/25/2018 32.0 (L) 34.0 - 45.0 % Final     Platelets   Date Value Ref Range Status   05/25/2018 391 (H) 150 - 375 10*3/mm3 Final        Assessment/Plan     1. 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  · PET scan 5/3/18 with diffuse splenic involvement (SUV 16.9), lymphadenopathy throughout upper abdomen and retroperitoneum (SUV 13.1), right liver lesion 5 cm (SUV 12.8), pelvic lymphadenopathy up to 4 cm, bladder wall thickening with associated hypermetabolism, cervical lymphadenopathy left posterior (SUV 11.2), multiple bone lesions including left intertrochanteric femur, " ribs, right scapula, pelvis as well as left gluteal hematoma versus lymphomatous lesion.  · CT-guided liver biopsy 4/26/18 with diffuse large B-cell non-Hodgkin's lymphoma, CD20 positive, double hit (BCL-6 rearrangement 85%, MYC rearrangement 79%), KI-67 4+/4  · Left cervical excisional biopsy by ENT Dr. Oconnor 5/1/18 confirming high-grade B cell non-Hodgkin's lymphoma, Ki-67 100%, double hit (BCL-6 rearrangement 76%, MYC rearrangement 85%)  · Echocardiogram 4/11/18 with ejection fraction 66.7%  · Right port placement Dr Gill 5/4/18  · Patient not felt to be a candidate for more aggressive chemotherapy due to performance status and age (DA EPOCH-R)  · Therefore treated with R CHOP chemotherapy 5/4/18.  · Followed by granix 300mcg daily beginning 5/6/18 through 5/14/18.  · Today, the patient has increasing WBC related to growth factor use, stable hemoglobin, spontaneous improvement in platelet count.  She is doing quite well following her chemotherapy and is ready to go home.  There is some concern regarding her persistently elevated LDH at 347 today as well as her escalating calcium at 11.9.  She is currently asymptomatic from the calcium and her ionized calcium is stable at 6.8.  Therefore we are going to proceed with discharge home.  She will return to the office later this week on 5/18/18 with repeat labs, nurse practitioner visit, and potential treatment with a third dose of Zometa if her calcium has continued to escalate significantly. She is now seen with plans to begin cycle 2 R CHOP chemotherapy with growth factor support ( Neulasta on body injector).  Will schedule follow-up PET scan after 2 cycles of therapy and see her back in 3 weeks to review her status.  ·   2. Myelosuppression with pancytopenia related to chemotherapy:  · Received granix 300mcg daily previously and will need Neulasta-on body this treatment as well as her subsequent cycles.  ·   3. Multifactorial anemia:  · Related to anemia  chronic disease, chemotherapy, prior iron deficiency.  · Received previous venofer 600mg (5/7 and 5/8) with iron studies from 4/24/18 showing ferritin 411, iron saturation 9%, TIBC 180.  · Hemoglobin today stable at 10.1    4. Hypercalcemia of malignancy:  · Calcium up to 13.8 on 4/21/18 (albumin 3.3).  Intact PTH 8.1, PTH RP less than 2.0  · Received Zometa 4 mg IV 4/25/18.  · Calcium up to 11.3 on 5/7/18 with second dose of Zometa administered 4 mg IV.  · Calcium today has continued to gradually increase up to 11.9 with albumin 3.3.  Ionized calcium however is stable at 6.8 compared to yesterday.  The patient is asymptomatic.  We will not plan to administer a further dose of Zometa just yet and will allow further time for the patient to respond to chemotherapy.  She returned 518 for continued Zometa and when seen May 25 has a normal calcium level.    5. Hypomagnesemia, hypokalemia:  · Continuing on oral potassium chloride 40 mEq twice a day  · Potassium 4.4      6. Paroxysmal atrial fibrillation:  · Recently diagnosed, anticoagulated with Eliquis initially, discontinued due to expected thrombocytopenia from chemotherapy  · Currently in sinus rhythm, continues on Lopressor 50 mg every 12 hours  · Unable to resume anticoagulation at present due to persistent severe thrombocytopenia.      7. Vaginal yeast infection:  · Pruritus resolved after course of oral Diflucan.      8. Prior seizure disorder:  · Continues currently on Dilantin 300 mg daily at bedtime and Neurontin 600 mg daily at bedtime, will return to outpatient dose of Neurontin 300 mg daily at bedtime at discharge.      9. GI prophylaxis:  · Protonix 40 mg daily      10. Venous access:  · Port placement 5/4/18 by Dr. Gill      11. DVT- Continue Xarelto 20 mg P daily- samples, Escribed      12.  Cellulitis- complete Keflex

## 2018-05-27 ENCOUNTER — TELEPHONE (OUTPATIENT)
Dept: ONCOLOGY | Facility: CLINIC | Age: 78
End: 2018-05-27

## 2018-05-27 RX ORDER — ONDANSETRON 8 MG/1
8 TABLET, ORALLY DISINTEGRATING ORAL EVERY 8 HOURS PRN
Qty: 60 TABLET | Refills: 5 | Status: SHIPPED | OUTPATIENT
Start: 2018-05-27 | End: 2019-10-22

## 2018-05-27 NOTE — TELEPHONE ENCOUNTER
On treasure note:    Patient has nausea at home and no meds to use for it.    Rx for ondansetron ODT 8 mg q8h prn sent to her pharmacy.

## 2018-05-29 ENCOUNTER — TELEPHONE (OUTPATIENT)
Dept: ONCOLOGY | Facility: CLINIC | Age: 78
End: 2018-05-29

## 2018-05-29 NOTE — TELEPHONE ENCOUNTER
Pt called stating her PCP wants to start her on Prolia, she states for osteopenia. In Dr. Iraheta's note, he mentions possibly starting zometa but has not. Message sent to Dr. Iraheta to advise.     MD Mariel Anguiano RN             This is an odd request considering her diagnosis and current therapies.  Please tell the patient that we will address this for her and, in fact, she's been undergoing therapy for this as result of treatment for her hypercalcemia.      Called and spoke with Daughter. She states her mother truly believed she needed prolia at that moment, I informed daughter that Dr. Iraheta will discuss with pt when here in the office on 6/19

## 2018-05-31 ENCOUNTER — TELEPHONE (OUTPATIENT)
Dept: FAMILY MEDICINE CLINIC | Facility: CLINIC | Age: 78
End: 2018-05-31

## 2018-05-31 ENCOUNTER — TELEPHONE (OUTPATIENT)
Dept: ONCOLOGY | Facility: CLINIC | Age: 78
End: 2018-05-31

## 2018-05-31 NOTE — TELEPHONE ENCOUNTER
[facility] CALLING. PT'S HR HAS BEEN RUNNING IN THE 40'S. PT IS ON METOPROLOL 50MG BID. PT TOOK THIS MED IN THE PAST AND HAD TO BE TAKEN OFF OF IT D/T IT LOWERING HER HR. HOWEVER AT THIS LAST HOSP STAY SHE WAS STARTED BACK ON MED. RX HAS DR. SON'S NAME ON IT BUT DON'T KNOW IF HE REALLY PRESCRIBED IT OR NOT. CALLED DR. SON AND HE DOESN'T RECALL THAT BUT DID GIVE ORDERS FOR PT TO CUT BACK ON DOSE TO ONCE DAILY. TOLD PT WE NEED TO FIND OUT WHO PRESCRIBED THIS MED. PT HAS APPT HERE FRI AND PT WILL BRING RX BOTTLE HERE W/ HER. WE ALSO NEED TO CHECK PT'S BP AND HR AND THEN D/W DR. POSADA WHAT TO DO. PT IS HAVING EPISODES OF FEELING LIGHTHEADED AND FEELING DRAINED. PT STATES THAT SHE TOLD THE HOSP SHE COULDN'T TOLERATE THIS DRUG. ADVISED PT TO HOLD OFF ON TAKING METOPROLOL FOR NOW UNTIL SHE SEES US TOMORROW. PT V/U.     MO POSADA SAW PT MOST RECENTLY BUT NOT HERE THIS AFTERNOON TO ASK REASON WHY I WENT TO DR. SON (HE SAW PT IN HOSP).

## 2018-06-01 ENCOUNTER — LAB (OUTPATIENT)
Dept: LAB | Facility: HOSPITAL | Age: 78
End: 2018-06-01

## 2018-06-01 ENCOUNTER — INFUSION (OUTPATIENT)
Dept: ONCOLOGY | Facility: HOSPITAL | Age: 78
End: 2018-06-01

## 2018-06-01 VITALS — SYSTOLIC BLOOD PRESSURE: 154 MMHG | DIASTOLIC BLOOD PRESSURE: 65 MMHG | HEART RATE: 64 BPM

## 2018-06-01 DIAGNOSIS — C83.39 DIFFUSE LARGE B-CELL LYMPHOMA OF EXTRANODAL SITE EXCLUDING SPLEEN AND OTHER SOLID ORGANS (HCC): ICD-10-CM

## 2018-06-01 LAB
BASOPHILS # BLD AUTO: 0.1 10*3/MM3 (ref 0–0.1)
BASOPHILS NFR BLD AUTO: 4.7 % (ref 0–1.1)
DEPRECATED RDW RBC AUTO: 49.9 FL (ref 37–49)
EOSINOPHIL # BLD AUTO: 0.01 10*3/MM3 (ref 0–0.36)
EOSINOPHIL NFR BLD AUTO: 0.5 % (ref 1–5)
ERYTHROCYTE [DISTWIDTH] IN BLOOD BY AUTOMATED COUNT: 16.6 % (ref 11.7–14.5)
HCT VFR BLD AUTO: 29.4 % (ref 34–45)
HGB BLD-MCNC: 9.7 G/DL (ref 11.5–14.9)
IMM GRANULOCYTES # BLD: 0.05 10*3/MM3 (ref 0–0.03)
IMM GRANULOCYTES NFR BLD: 2.3 % (ref 0–0.5)
LYMPHOCYTES # BLD AUTO: 0.12 10*3/MM3 (ref 1–3.5)
LYMPHOCYTES NFR BLD AUTO: 5.6 % (ref 20–49)
MCH RBC QN AUTO: 27.9 PG (ref 27–33)
MCHC RBC AUTO-ENTMCNC: 33 G/DL (ref 32–35)
MCV RBC AUTO: 84.5 FL (ref 83–97)
MONOCYTES # BLD AUTO: 0.22 10*3/MM3 (ref 0.25–0.8)
MONOCYTES NFR BLD AUTO: 10.2 % (ref 4–12)
NEUTROPHILS # BLD AUTO: 1.65 10*3/MM3 (ref 1.5–7)
NEUTROPHILS NFR BLD AUTO: 76.7 % (ref 39–75)
NRBC BLD MANUAL-RTO: 0 /100 WBC (ref 0–0)
PLATELET # BLD AUTO: 126 10*3/MM3 (ref 150–375)
PMV BLD AUTO: 10.3 FL (ref 8.9–12.1)
RBC # BLD AUTO: 3.48 10*6/MM3 (ref 3.9–5)
WBC NRBC COR # BLD: 2.15 10*3/MM3 (ref 4–10)

## 2018-06-01 PROCEDURE — 36415 COLL VENOUS BLD VENIPUNCTURE: CPT | Performed by: INTERNAL MEDICINE

## 2018-06-01 PROCEDURE — 85025 COMPLETE CBC W/AUTO DIFF WBC: CPT | Performed by: INTERNAL MEDICINE

## 2018-06-01 NOTE — PROGRESS NOTES
Lab Results   Component Value Date    WBC 2.15 (L) 06/01/2018    HGB 9.7 (L) 06/01/2018    HCT 29.4 (L) 06/01/2018    MCV 84.5 06/01/2018     (L) 06/01/2018     CBC RESULTS R/W PT. PT IS 1 WK OUT CHEMO. COUNTS SATISFACTORY. BP AND HR CHECKED D/T METOPROLOL CAUSING BRADYCARDIA. D/W DR. POSADA AND PT TO CUT METOPROLOL TAB IN HALF AND TAKE 25MG BID. PT MADE AWARE AND V/U TO NEW ORDERS. PT GIVEN COPY OF LABS AND WILL RTN HERE NEXT FRI.

## 2018-06-08 ENCOUNTER — HOSPITAL ENCOUNTER (OUTPATIENT)
Dept: PET IMAGING | Facility: HOSPITAL | Age: 78
Discharge: HOME | End: 2018-06-08
Attending: INTERNAL MEDICINE

## 2018-06-08 ENCOUNTER — LAB (OUTPATIENT)
Dept: LAB | Facility: HOSPITAL | Age: 78
End: 2018-06-08

## 2018-06-08 ENCOUNTER — HOSPITAL ENCOUNTER (OUTPATIENT)
Dept: PET IMAGING | Facility: HOSPITAL | Age: 78
Discharge: HOME | End: 2018-06-08
Attending: INTERNAL MEDICINE | Admitting: INTERNAL MEDICINE

## 2018-06-08 ENCOUNTER — INFUSION (OUTPATIENT)
Dept: ONCOLOGY | Facility: HOSPITAL | Age: 78
End: 2018-06-08

## 2018-06-08 DIAGNOSIS — C83.39 DIFFUSE LARGE B-CELL LYMPHOMA OF EXTRANODAL SITE EXCLUDING SPLEEN AND OTHER SOLID ORGANS (HCC): ICD-10-CM

## 2018-06-08 DIAGNOSIS — E78.5 DYSLIPIDEMIA: Primary | ICD-10-CM

## 2018-06-08 LAB
ALBUMIN SERPL-MCNC: 3.6 G/DL (ref 3.5–5.2)
ALBUMIN/GLOB SERPL: 1.1 G/DL (ref 1.1–2.4)
ALP SERPL-CCNC: 156 U/L (ref 38–116)
ALT SERPL W P-5'-P-CCNC: 14 U/L (ref 0–33)
ANION GAP SERPL CALCULATED.3IONS-SCNC: 12.7 MMOL/L
AST SERPL-CCNC: 24 U/L (ref 0–32)
BASOPHILS # BLD AUTO: 0.08 10*3/MM3 (ref 0–0.1)
BASOPHILS NFR BLD AUTO: 0.3 % (ref 0–1.1)
BILIRUB SERPL-MCNC: 0.2 MG/DL (ref 0.1–1.2)
BUN BLD-MCNC: 5 MG/DL (ref 6–20)
BUN/CREAT SERPL: 8.5 (ref 7.3–30)
CALCIUM SPEC-SCNC: 8.7 MG/DL (ref 8.5–10.2)
CHLORIDE SERPL-SCNC: 99 MMOL/L (ref 98–107)
CO2 SERPL-SCNC: 24.3 MMOL/L (ref 22–29)
CREAT BLD-MCNC: 0.59 MG/DL (ref 0.6–1.1)
DEPRECATED RDW RBC AUTO: 54.7 FL (ref 37–49)
EOSINOPHIL # BLD AUTO: 0.06 10*3/MM3 (ref 0–0.36)
EOSINOPHIL NFR BLD AUTO: 0.2 % (ref 1–5)
ERYTHROCYTE [DISTWIDTH] IN BLOOD BY AUTOMATED COUNT: 18.6 % (ref 11.7–14.5)
GFR SERPL CREATININE-BSD FRML MDRD: 99 ML/MIN/1.73
GLOBULIN UR ELPH-MCNC: 3.3 GM/DL (ref 1.8–3.5)
GLUCOSE BLD-MCNC: 103 MG/DL (ref 74–124)
GLUCOSE BLDC GLUCOMTR-MCNC: 100 MG/DL (ref 70–130)
HCT VFR BLD AUTO: 31 % (ref 34–45)
HGB BLD-MCNC: 9.6 G/DL (ref 11.5–14.9)
IMM GRANULOCYTES # BLD: 5.97 10*3/MM3 (ref 0–0.03)
IMM GRANULOCYTES NFR BLD: 20.9 % (ref 0–0.5)
LYMPHOCYTES # BLD AUTO: 0.59 10*3/MM3 (ref 1–3.5)
LYMPHOCYTES NFR BLD AUTO: 2.1 % (ref 20–49)
MCH RBC QN AUTO: 27.8 PG (ref 27–33)
MCHC RBC AUTO-ENTMCNC: 31 G/DL (ref 32–35)
MCV RBC AUTO: 89.9 FL (ref 83–97)
MONOCYTES # BLD AUTO: 2.9 10*3/MM3 (ref 0.25–0.8)
MONOCYTES NFR BLD AUTO: 10.1 % (ref 4–12)
NEUTROPHILS # BLD AUTO: 19.03 10*3/MM3 (ref 1.5–7)
NEUTROPHILS NFR BLD AUTO: 66.4 % (ref 39–75)
NRBC BLD MANUAL-RTO: 0.3 /100 WBC (ref 0–0)
PLATELET # BLD AUTO: 61 10*3/MM3 (ref 150–375)
PMV BLD AUTO: 12.8 FL (ref 8.9–12.1)
POTASSIUM BLD-SCNC: 4.4 MMOL/L (ref 3.5–4.7)
PROT SERPL-MCNC: 6.9 G/DL (ref 6.3–8)
RBC # BLD AUTO: 3.45 10*6/MM3 (ref 3.9–5)
SODIUM BLD-SCNC: 136 MMOL/L (ref 134–145)
WBC NRBC COR # BLD: 28.63 10*3/MM3 (ref 4–10)

## 2018-06-08 PROCEDURE — 36415 COLL VENOUS BLD VENIPUNCTURE: CPT | Performed by: INTERNAL MEDICINE

## 2018-06-08 PROCEDURE — 82962 GLUCOSE BLOOD TEST: CPT

## 2018-06-08 PROCEDURE — 85025 COMPLETE CBC W/AUTO DIFF WBC: CPT | Performed by: INTERNAL MEDICINE

## 2018-06-08 PROCEDURE — 80053 COMPREHEN METABOLIC PANEL: CPT | Performed by: INTERNAL MEDICINE

## 2018-06-08 PROCEDURE — 0 FLUDEOXYGLUCOSE F18 SOLUTION: Performed by: INTERNAL MEDICINE

## 2018-06-08 PROCEDURE — 78815 PET IMAGE W/CT SKULL-THIGH: CPT

## 2018-06-08 PROCEDURE — A9552 F18 FDG: HCPCS | Performed by: INTERNAL MEDICINE

## 2018-06-08 RX ADMIN — FLUDEOXYGLUCOSE F18 1 DOSE: 300 INJECTION INTRAVENOUS at 08:25

## 2018-06-08 NOTE — PROGRESS NOTES
Lab Results   Component Value Date    WBC 28.63 (H) 06/08/2018    HGB 9.6 (L) 06/08/2018    HCT 31.0 (L) 06/08/2018    MCV 89.9 06/08/2018    PLT 61 (L) 06/08/2018   Afebrile. Had PET scan this am results not available. HR 60 and regular B/P 137/72. Several questions. All questions answered.

## 2018-06-11 ENCOUNTER — OFFICE VISIT (OUTPATIENT)
Dept: FAMILY MEDICINE CLINIC | Facility: CLINIC | Age: 78
End: 2018-06-11

## 2018-06-11 VITALS
HEIGHT: 64 IN | HEART RATE: 51 BPM | BODY MASS INDEX: 22.53 KG/M2 | OXYGEN SATURATION: 99 % | TEMPERATURE: 97.8 F | DIASTOLIC BLOOD PRESSURE: 62 MMHG | WEIGHT: 132 LBS | SYSTOLIC BLOOD PRESSURE: 150 MMHG

## 2018-06-11 DIAGNOSIS — C83.39 DIFFUSE LARGE B-CELL LYMPHOMA OF EXTRANODAL SITE EXCLUDING SPLEEN AND OTHER SOLID ORGANS (HCC): ICD-10-CM

## 2018-06-11 DIAGNOSIS — I10 ESSENTIAL HYPERTENSION: ICD-10-CM

## 2018-06-11 DIAGNOSIS — I82.409 ACUTE DEEP VEIN THROMBOSIS (DVT) OF OTHER VEIN OF LOWER EXTREMITY, UNSPECIFIED LATERALITY (HCC): ICD-10-CM

## 2018-06-11 DIAGNOSIS — I48.0 PAROXYSMAL ATRIAL FIBRILLATION (HCC): Primary | ICD-10-CM

## 2018-06-11 PROCEDURE — 99213 OFFICE O/P EST LOW 20 MIN: CPT | Performed by: FAMILY MEDICINE

## 2018-06-11 NOTE — PROGRESS NOTES
Subjective   Martina Blake is a 78 y.o. female. Presents today for   Chief Complaint   Patient presents with   • Follow-up     pt here for 2 month follow up visit. pt was put on xarelto, ran out of samples. she will not be taking her prolia shot anymore. she will be taking chemo until august.       History of Present Illness  Patient here for f/u hospitalization.  Patient has had progressive weight loss and fatigue, ultimately found to have lymphoma and has been undergoing tx.  IN the midst of work-up had re-admission of which in a. Fib with RVR and hypercalcemic.   She reports no current cp, dyspnea;  IN recent f/u with oncology noted to have acute DVT of a lower extremity.  She has completed 3 weeks of bid xarelto and is now out, not well covered.  Was told to continue for life.  Was on Eliquis for A. FIb, but was held 2ndary to severe thrombocytopenia, with most recent plt count 61k.  However, anticoagulation was reinitiated 2ndary to acute dvt.  Patient denies black or bloody stools, no epistaxis.      Review of Systems   Constitutional: Positive for fatigue and unexpected weight change.   Respiratory: Negative for shortness of breath.    Cardiovascular: Negative for chest pain and palpitations.   Neurological: Negative for syncope.       The following portions of the patient's history were reviewed and updated as appropriate: allergies, current medications, past medical history and problem list.    Patient Active Problem List   Diagnosis   • Blood glucose abnormal   • Dyslipidemia   • Gastroesophageal reflux disease   • Fatigue   • Generalized osteoarthritis   • Chronic recurrent major depressive disorder   • Osteoporosis   • Restless legs syndrome   • Seizure disorder   • Vitamin D deficiency   • Bradycardia   • Essential hypertension   • Post-menopause on HRT (hormone replacement therapy)   • Chronic seasonal allergic rhinitis   • Paroxysmal atrial fibrillation   • Iron deficiency anemia due to chronic  blood loss   • Acute superficial gastritis without hemorrhage   • Hiatal hernia   • Esophagitis   • Diverticulosis of large intestine without hemorrhage   • Dizziness   • Weakness   • Hypertension   • Hypercalcemia   • Liver mass   • Lymphoma   • Lymphoma of lymph nodes of neck   • Leukemoid reaction   • Insomnia   • Anemia associated with chemotherapy   • Pancytopenia due to antineoplastic chemotherapy   • Chemotherapy-induced neutropenia   • Encounter for adjustment or management of vascular access device       Allergies   Allergen Reactions   • Crab (Diagnostic) Itching and Rash   • Pseudoephedrine Dizziness       Current Outpatient Prescriptions on File Prior to Visit   Medication Sig Dispense Refill   • allopurinol (ZYLOPRIM) 300 MG tablet Take 1 tablet by mouth Daily. 30 tablet 1   • amLODIPine (NORVASC) 10 MG tablet Take 1 tablet by mouth Daily. 90 tablet 3   • benazepril (LOTENSIN) 40 MG tablet Take 1 tablet by mouth Daily. 90 tablet 3   • folic acid (FOLVITE) 400 MCG tablet Take 400 mcg by mouth daily.     • gabapentin (NEURONTIN) 300 MG capsule Take 300 mg by mouth Every Night.  0   • hydrALAZINE (APRESOLINE) 25 MG tablet Take 1 tablet by mouth 3 (Three) Times a Day. 90 tablet 1   • melatonin 5 MG tablet tablet Take 5 mg by mouth Every Night.     • metoprolol tartrate (LOPRESSOR) 50 MG tablet Take 1 tablet by mouth Every 12 (Twelve) Hours. 60 tablet 1   • montelukast (SINGULAIR) 10 MG tablet Take 10 mg by mouth Daily.     • nystatin (MYCOSTATIN) 654291 UNIT/GM powder Apply  topically Every 8 (Eight) Hours. 45 g 1   • ondansetron ODT (ZOFRAN-ODT) 8 MG disintegrating tablet Take 1 tablet by mouth Every 8 (Eight) Hours As Needed for Nausea or Vomiting for up to 60 doses. 60 tablet 5   • pantoprazole (PROTONIX) 40 MG EC tablet Take 1 tablet by mouth Daily. 90 tablet 3   • phenytoin (DILANTIN) 100 MG ER capsule Take 300 mg by mouth every night at bedtime.     • potassium chloride (K-TAB) 20 MEQ tablet  "controlled-release ER tablet Take 1 tablet by mouth 2 (Two) Times a Day. 60 tablet 2   • PREDNISONE PO Take  by mouth. Pt needs directions     • rivaroxaban (XARELTO) 20 MG tablet Take 1 tablet by mouth Daily. 30 tablet 2   • sennosides-docusate sodium (SENOKOT-S) 8.6-50 MG tablet Take 2 tablets by mouth Every Night. 60 tablet 1   • vitamin B-12 (CYANOCOBALAMIN) 100 MCG tablet Take 100 mcg by mouth Daily.     • [DISCONTINUED] cephalexin (KEFLEX) 500 MG capsule Take 1 capsule by mouth 3 (Three) Times a Day. 30 capsule 0     No current facility-administered medications on file prior to visit.        Objective   Vitals:    06/11/18 1057   BP: 150/62   BP Location: Left arm   Patient Position: Sitting   Cuff Size: Adult   Pulse: 51   Temp: 97.8 °F (36.6 °C)   TempSrc: Oral   SpO2: 99%   Weight: 59.9 kg (132 lb)   Height: 162 cm (63.78\")       Physical Exam   Constitutional: No distress.   HENT:   Head: Normocephalic and atraumatic.   Neck: Neck supple. No JVD present. No thyromegaly present.   Cardiovascular: Normal rate, regular rhythm and normal heart sounds.  Exam reveals no gallop and no friction rub.    No murmur heard.  Pulmonary/Chest: Effort normal and breath sounds normal. No respiratory distress. She has no wheezes. She has no rales.   Abdominal: Soft. Bowel sounds are normal. She exhibits no distension. There is no tenderness. There is no rebound and no guarding.   Musculoskeletal: She exhibits no edema.   Neurological: She is alert.   Skin: Skin is warm and dry. She is not diaphoretic.   Psychiatric: She has a normal mood and affect. Her behavior is normal.   Nursing note and vitals reviewed.      Assessment/Plan   Martina was seen today for follow-up.    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 Directed to ER immediately if any bleeding  A. Fib and htn stable       -Follow up: 3 months       Current Outpatient Prescriptions:   •  allopurinol (ZYLOPRIM) 300 MG tablet, Take 1 tablet by mouth Daily., Disp: 30 tablet, Rfl: 1  •  amLODIPine (NORVASC) 10 MG tablet, Take 1 tablet by mouth Daily., Disp: 90 tablet, Rfl: 3  •  benazepril (LOTENSIN) 40 MG tablet, Take 1 tablet by mouth Daily., Disp: 90 tablet, Rfl: 3  •  folic acid (FOLVITE) 400 MCG tablet, Take 400 mcg by mouth daily., Disp: , Rfl:   •  gabapentin (NEURONTIN) 300 MG capsule, Take 300 mg by mouth Every Night., Disp: , Rfl: 0  •  hydrALAZINE (APRESOLINE) 25 MG tablet, Take 1 tablet by mouth 3 (Three) Times a Day., Disp: 90 tablet, Rfl: 1  •  melatonin 5 MG tablet tablet, Take 5 mg by mouth Every Night., Disp: , Rfl:   •  metoprolol tartrate (LOPRESSOR) 50 MG tablet, Take 1 tablet by mouth Every 12 (Twelve) Hours., Disp: 60 tablet, Rfl: 1  •  montelukast (SINGULAIR) 10 MG tablet, Take 10 mg by mouth Daily., Disp: , Rfl:   •  nystatin (MYCOSTATIN) 709974 UNIT/GM powder, Apply  topically Every 8 (Eight) Hours., Disp: 45 g, Rfl: 1  •  ondansetron ODT (ZOFRAN-ODT) 8 MG disintegrating tablet, Take 1 tablet by mouth Every 8 (Eight) Hours As Needed for Nausea or Vomiting for up to 60 doses., Disp: 60 tablet, Rfl: 5  •  pantoprazole (PROTONIX) 40 MG EC tablet, Take 1 tablet by mouth Daily., Disp: 90 tablet, Rfl: 3  •  phenytoin (DILANTIN) 100 MG ER capsule, Take 300 mg by mouth every night at bedtime., Disp: , Rfl:   •  potassium chloride (K-TAB) 20 MEQ tablet controlled-release ER tablet, Take 1 tablet by mouth 2 (Two) Times a Day., Disp: 60 tablet, Rfl: 2  •  PREDNISONE PO, Take  by mouth. Pt needs directions, Disp: , Rfl:   •  rivaroxaban (XARELTO) 20 MG tablet, Take 1 tablet by mouth Daily., Disp: 30 tablet, Rfl: 2  •  sennosides-docusate sodium (SENOKOT-S) 8.6-50 MG tablet,  Take 2 tablets by mouth Every Night., Disp: 60 tablet, Rfl: 1  •  vitamin B-12 (CYANOCOBALAMIN) 100 MCG tablet, Take 100 mcg by mouth Daily., Disp: , Rfl:

## 2018-06-14 DIAGNOSIS — C83.39 DIFFUSE LARGE B-CELL LYMPHOMA OF EXTRANODAL SITE EXCLUDING SPLEEN AND OTHER SOLID ORGANS (HCC): ICD-10-CM

## 2018-06-14 DIAGNOSIS — E83.52 HYPERCALCEMIA: ICD-10-CM

## 2018-06-14 PROBLEM — C83.398 DIFFUSE LARGE B-CELL LYMPHOMA OF EXTRANODAL SITE EXCLUDING SPLEEN AND OTHER SOLID ORGANS: Status: ACTIVE | Noted: 2018-06-14

## 2018-06-15 ENCOUNTER — TELEPHONE (OUTPATIENT)
Dept: FAMILY MEDICINE CLINIC | Facility: CLINIC | Age: 78
End: 2018-06-15

## 2018-06-19 ENCOUNTER — OFFICE VISIT (OUTPATIENT)
Dept: ONCOLOGY | Facility: CLINIC | Age: 78
End: 2018-06-19

## 2018-06-19 ENCOUNTER — INFUSION (OUTPATIENT)
Dept: ONCOLOGY | Facility: HOSPITAL | Age: 78
End: 2018-06-19

## 2018-06-19 VITALS
SYSTOLIC BLOOD PRESSURE: 130 MMHG | BODY MASS INDEX: 22.91 KG/M2 | RESPIRATION RATE: 14 BRPM | DIASTOLIC BLOOD PRESSURE: 60 MMHG | WEIGHT: 134.2 LBS | HEIGHT: 64 IN | HEART RATE: 45 BPM | OXYGEN SATURATION: 100 % | TEMPERATURE: 98.1 F

## 2018-06-19 VITALS — HEART RATE: 55 BPM | DIASTOLIC BLOOD PRESSURE: 60 MMHG | SYSTOLIC BLOOD PRESSURE: 140 MMHG

## 2018-06-19 DIAGNOSIS — C83.39 DIFFUSE LARGE B-CELL LYMPHOMA OF EXTRANODAL SITE EXCLUDING SPLEEN AND OTHER SOLID ORGANS (HCC): ICD-10-CM

## 2018-06-19 DIAGNOSIS — C83.39 DIFFUSE LARGE B-CELL LYMPHOMA OF EXTRANODAL SITE EXCLUDING SPLEEN AND OTHER SOLID ORGANS (HCC): Primary | ICD-10-CM

## 2018-06-19 DIAGNOSIS — E83.52 HYPERCALCEMIA: ICD-10-CM

## 2018-06-19 PROBLEM — C85.91: Status: RESOLVED | Noted: 2018-04-21 | Resolved: 2018-06-19

## 2018-06-19 LAB
ALBUMIN SERPL-MCNC: 3.7 G/DL (ref 3.5–5.2)
ALBUMIN/GLOB SERPL: 1.3 G/DL (ref 1.1–2.4)
ALP SERPL-CCNC: 104 U/L (ref 38–116)
ALT SERPL W P-5'-P-CCNC: 13 U/L (ref 0–33)
ANION GAP SERPL CALCULATED.3IONS-SCNC: 12.5 MMOL/L
AST SERPL-CCNC: 23 U/L (ref 0–32)
BASOPHILS # BLD AUTO: 0.08 10*3/MM3 (ref 0–0.1)
BASOPHILS NFR BLD AUTO: 1.6 % (ref 0–1.1)
BILIRUB SERPL-MCNC: 0.2 MG/DL (ref 0.1–1.2)
BUN BLD-MCNC: 12 MG/DL (ref 6–20)
BUN/CREAT SERPL: 24 (ref 7.3–30)
CALCIUM SPEC-SCNC: 8.6 MG/DL (ref 8.5–10.2)
CHLORIDE SERPL-SCNC: 104 MMOL/L (ref 98–107)
CO2 SERPL-SCNC: 23.5 MMOL/L (ref 22–29)
CREAT BLD-MCNC: 0.5 MG/DL (ref 0.6–1.1)
DEPRECATED RDW RBC AUTO: 77.1 FL (ref 37–49)
EOSINOPHIL # BLD AUTO: 0.07 10*3/MM3 (ref 0–0.36)
EOSINOPHIL NFR BLD AUTO: 1.4 % (ref 1–5)
ERYTHROCYTE [DISTWIDTH] IN BLOOD BY AUTOMATED COUNT: 23.7 % (ref 11.7–14.5)
GFR SERPL CREATININE-BSD FRML MDRD: 119 ML/MIN/1.73
GLOBULIN UR ELPH-MCNC: 2.9 GM/DL (ref 1.8–3.5)
GLUCOSE BLD-MCNC: 97 MG/DL (ref 74–124)
HCT VFR BLD AUTO: 28.8 % (ref 34–45)
HGB BLD-MCNC: 9.2 G/DL (ref 11.5–14.9)
IMM GRANULOCYTES # BLD: 0.04 10*3/MM3 (ref 0–0.03)
IMM GRANULOCYTES NFR BLD: 0.8 % (ref 0–0.5)
LYMPHOCYTES # BLD AUTO: 0.41 10*3/MM3 (ref 1–3.5)
LYMPHOCYTES NFR BLD AUTO: 8 % (ref 20–49)
MAGNESIUM SERPL-MCNC: 1.8 MG/DL (ref 1.8–2.5)
MCH RBC QN AUTO: 29.7 PG (ref 27–33)
MCHC RBC AUTO-ENTMCNC: 31.9 G/DL (ref 32–35)
MCV RBC AUTO: 92.9 FL (ref 83–97)
MONOCYTES # BLD AUTO: 1.24 10*3/MM3 (ref 0.25–0.8)
MONOCYTES NFR BLD AUTO: 24.1 % (ref 4–12)
NEUTROPHILS # BLD AUTO: 3.3 10*3/MM3 (ref 1.5–7)
NEUTROPHILS NFR BLD AUTO: 64.1 % (ref 39–75)
NRBC BLD MANUAL-RTO: 0 /100 WBC (ref 0–0)
PHOSPHATE SERPL-MCNC: 2.5 MG/DL (ref 2.5–4.5)
PLATELET # BLD AUTO: 147 10*3/MM3 (ref 150–375)
PMV BLD AUTO: 9.6 FL (ref 8.9–12.1)
POTASSIUM BLD-SCNC: 4 MMOL/L (ref 3.5–4.7)
PROT SERPL-MCNC: 6.6 G/DL (ref 6.3–8)
RBC # BLD AUTO: 3.1 10*6/MM3 (ref 3.9–5)
SODIUM BLD-SCNC: 140 MMOL/L (ref 134–145)
WBC NRBC COR # BLD: 5.14 10*3/MM3 (ref 4–10)

## 2018-06-19 PROCEDURE — 96413 CHEMO IV INFUSION 1 HR: CPT | Performed by: INTERNAL MEDICINE

## 2018-06-19 PROCEDURE — 25010000002 VINCRISTINE PER 1 MG: Performed by: INTERNAL MEDICINE

## 2018-06-19 PROCEDURE — 96367 TX/PROPH/DG ADDL SEQ IV INF: CPT | Performed by: INTERNAL MEDICINE

## 2018-06-19 PROCEDURE — 25010000002 DIPHENHYDRAMINE PER 50 MG: Performed by: INTERNAL MEDICINE

## 2018-06-19 PROCEDURE — 25010000002 ZOLEDRONIC ACID 4 MG/100ML SOLUTION: Performed by: INTERNAL MEDICINE

## 2018-06-19 PROCEDURE — 25010000002 CYCLOPHOSPHAMIDE PER 100 MG: Performed by: INTERNAL MEDICINE

## 2018-06-19 PROCEDURE — 25010000002 RITUXIMAB 10 MG/ML SOLUTION 10 ML VIAL: Performed by: INTERNAL MEDICINE

## 2018-06-19 PROCEDURE — 96417 CHEMO IV INFUS EACH ADDL SEQ: CPT | Performed by: INTERNAL MEDICINE

## 2018-06-19 PROCEDURE — 96375 TX/PRO/DX INJ NEW DRUG ADDON: CPT | Performed by: INTERNAL MEDICINE

## 2018-06-19 PROCEDURE — 25010000002 PALONOSETRON PER 25 MCG: Performed by: INTERNAL MEDICINE

## 2018-06-19 PROCEDURE — 99215 OFFICE O/P EST HI 40 MIN: CPT | Performed by: INTERNAL MEDICINE

## 2018-06-19 PROCEDURE — 25010000002 DOXORUBICIN PER 10 MG: Performed by: INTERNAL MEDICINE

## 2018-06-19 PROCEDURE — 25010000002 FOSAPREPITANT PER 1 MG: Performed by: INTERNAL MEDICINE

## 2018-06-19 PROCEDURE — 96415 CHEMO IV INFUSION ADDL HR: CPT | Performed by: INTERNAL MEDICINE

## 2018-06-19 PROCEDURE — 96411 CHEMO IV PUSH ADDL DRUG: CPT | Performed by: INTERNAL MEDICINE

## 2018-06-19 PROCEDURE — 25010000002 RITUXIMAB 10 MG/ML SOLUTION 50 ML VIAL: Performed by: INTERNAL MEDICINE

## 2018-06-19 PROCEDURE — 25010000002 DEXAMETHASONE: Performed by: INTERNAL MEDICINE

## 2018-06-19 PROCEDURE — 96377 APPLICATON ON-BODY INJECTOR: CPT | Performed by: INTERNAL MEDICINE

## 2018-06-19 PROCEDURE — 25010000002 PEGFILGRASTIM 6 MG/0.6ML PREFILLED SYRINGE KIT: Performed by: INTERNAL MEDICINE

## 2018-06-19 PROCEDURE — 80053 COMPREHEN METABOLIC PANEL: CPT

## 2018-06-19 PROCEDURE — 85025 COMPLETE CBC W/AUTO DIFF WBC: CPT

## 2018-06-19 PROCEDURE — 84100 ASSAY OF PHOSPHORUS: CPT

## 2018-06-19 PROCEDURE — 83735 ASSAY OF MAGNESIUM: CPT

## 2018-06-19 RX ORDER — FAMOTIDINE 20 MG/1
20 TABLET, FILM COATED ORAL ONCE
Status: CANCELLED
Start: 2018-06-19 | End: 2018-06-19

## 2018-06-19 RX ORDER — PALONOSETRON 0.05 MG/ML
0.25 INJECTION, SOLUTION INTRAVENOUS ONCE
Status: CANCELLED | OUTPATIENT
Start: 2018-06-19

## 2018-06-19 RX ORDER — SODIUM CHLORIDE 9 MG/ML
250 INJECTION, SOLUTION INTRAVENOUS ONCE
Status: DISCONTINUED | OUTPATIENT
Start: 2018-06-19 | End: 2018-06-19 | Stop reason: HOSPADM

## 2018-06-19 RX ORDER — ZOLEDRONIC ACID 0.04 MG/ML
4 INJECTION, SOLUTION INTRAVENOUS ONCE
Status: CANCELLED | OUTPATIENT
Start: 2018-06-19

## 2018-06-19 RX ORDER — ZOLEDRONIC ACID 0.04 MG/ML
4 INJECTION, SOLUTION INTRAVENOUS ONCE
Status: COMPLETED | OUTPATIENT
Start: 2018-06-19 | End: 2018-06-19

## 2018-06-19 RX ORDER — ACETAMINOPHEN 325 MG/1
650 TABLET ORAL ONCE
Status: CANCELLED | OUTPATIENT
Start: 2018-06-19

## 2018-06-19 RX ORDER — MEPERIDINE HYDROCHLORIDE 50 MG/ML
25 INJECTION INTRAMUSCULAR; INTRAVENOUS; SUBCUTANEOUS
Status: CANCELLED | OUTPATIENT
Start: 2018-06-19

## 2018-06-19 RX ORDER — DOXORUBICIN HYDROCHLORIDE 2 MG/ML
50 INJECTION, SOLUTION INTRAVENOUS ONCE
Status: COMPLETED | OUTPATIENT
Start: 2018-06-19 | End: 2018-06-19

## 2018-06-19 RX ORDER — FAMOTIDINE 10 MG/ML
20 INJECTION, SOLUTION INTRAVENOUS AS NEEDED
Status: CANCELLED | OUTPATIENT
Start: 2018-06-19

## 2018-06-19 RX ORDER — DIPHENHYDRAMINE HYDROCHLORIDE 50 MG/ML
50 INJECTION INTRAMUSCULAR; INTRAVENOUS AS NEEDED
Status: CANCELLED | OUTPATIENT
Start: 2018-06-19

## 2018-06-19 RX ORDER — SODIUM CHLORIDE 9 MG/ML
250 INJECTION, SOLUTION INTRAVENOUS ONCE
Status: COMPLETED | OUTPATIENT
Start: 2018-06-19 | End: 2018-06-19

## 2018-06-19 RX ORDER — SODIUM CHLORIDE 9 MG/ML
250 INJECTION, SOLUTION INTRAVENOUS ONCE
Status: CANCELLED | OUTPATIENT
Start: 2018-06-19

## 2018-06-19 RX ORDER — PALONOSETRON 0.05 MG/ML
0.25 INJECTION, SOLUTION INTRAVENOUS ONCE
Status: COMPLETED | OUTPATIENT
Start: 2018-06-19 | End: 2018-06-19

## 2018-06-19 RX ORDER — DOXORUBICIN HYDROCHLORIDE 2 MG/ML
50 INJECTION, SOLUTION INTRAVENOUS ONCE
Status: CANCELLED | OUTPATIENT
Start: 2018-06-19

## 2018-06-19 RX ORDER — ACETAMINOPHEN 325 MG/1
650 TABLET ORAL ONCE
Status: COMPLETED | OUTPATIENT
Start: 2018-06-19 | End: 2018-06-19

## 2018-06-19 RX ORDER — FAMOTIDINE 20 MG/1
20 TABLET, FILM COATED ORAL ONCE
Status: COMPLETED | OUTPATIENT
Start: 2018-06-19 | End: 2018-06-19

## 2018-06-19 RX ADMIN — PEGFILGRASTIM 6 MG: KIT SUBCUTANEOUS at 15:04

## 2018-06-19 RX ADMIN — SODIUM CHLORIDE 150 MG: 9 INJECTION, SOLUTION INTRAVENOUS at 09:26

## 2018-06-19 RX ADMIN — DEXAMETHASONE SODIUM PHOSPHATE 12 MG: 4 INJECTION, SOLUTION INTRAMUSCULAR; INTRAVENOUS at 09:58

## 2018-06-19 RX ADMIN — ZOLEDRONIC ACID 4 MG: 0.04 INJECTION, SOLUTION INTRAVENOUS at 10:40

## 2018-06-19 RX ADMIN — VINCRISTINE SULFATE 2 MG: 1 INJECTION, SOLUTION INTRAVENOUS at 11:24

## 2018-06-19 RX ADMIN — PALONOSETRON HYDROCHLORIDE 0.25 MG: 0.25 INJECTION INTRAVENOUS at 09:26

## 2018-06-19 RX ADMIN — DIPHENHYDRAMINE HYDROCHLORIDE 50 MG: 50 INJECTION, SOLUTION INTRAMUSCULAR; INTRAVENOUS at 10:16

## 2018-06-19 RX ADMIN — SODIUM CHLORIDE 250 ML: 9 INJECTION, SOLUTION INTRAVENOUS at 09:26

## 2018-06-19 RX ADMIN — RITUXIMAB 600 MG: 10 INJECTION, SOLUTION INTRAVENOUS at 12:27

## 2018-06-19 RX ADMIN — DOXORUBICIN HYDROCHLORIDE 86 MG: 2 INJECTION, SOLUTION INTRAVENOUS at 11:04

## 2018-06-19 RX ADMIN — ACETAMINOPHEN 650 MG: 325 TABLET, FILM COATED ORAL at 10:16

## 2018-06-19 RX ADMIN — FAMOTIDINE 20 MG: 20 TABLET ORAL at 10:16

## 2018-06-19 RX ADMIN — CYCLOPHOSPHAMIDE 1280 MG: 1 INJECTION, POWDER, FOR SOLUTION INTRAVENOUS; ORAL at 11:37

## 2018-06-19 NOTE — PROGRESS NOTES
Subjective .  Patient continues to feel significantly better.    REASONS FOR FOLLOWUP:    1. 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 female who we are asked to see April 29, 2018 in consultation for hypercalcemia, anemia and radiologic findings consistent with likely malignancy-?  Lymphoma.        She has a significant past medical history of palpitations followed by cardiology.  She presented to the emergency room April 10-14 with chest pain and palpitations and was found to be in A. fib with RVR and also demonstrated electrolyte abnormalities and anemia.  Records demonstrates that her anemia became particularly evident right April 11 ruptured you an H&H of 8.9 28.4 by can 6510, platelet count 212,000 with a normal automated differential.  She underwent GI assessment including upper and lower endoscopy demonstrated hernia, gastritis, esophagitis, diverticulosis but no evidence of acute bleed.  She been placed on Eliquis as result of a relatively high CHADSVASc score.  She had also been found to be hypercalcemic at approximately 11 with HCTZ altered and the patient started on Aldactone.  Additional testing had included a ferritin of 313.1, iron of 32 with TIBC of 222 and iron saturation of 14, occult blood negative per stool testing    She was brought back to the emergency room April 21 with further evidence of hypercalcemia, associated weakness, anorexia, nausea, ataxia and weight loss.  MRI of the brain April 21 was found to be unremarkable. Outpatient intact PTH levels were found to be 7.1(reduced), and an H&H of 10.3 and 32.9, white count 11,090, platelet count 267,000 with a normal differential.  Patient seen by renal medicine with the possibility of Fanconi syndrome raised.  Thereafter PTH hormone was found be less than 2.0, and prophylactic paresis included IgG of 948, IgA of 207, IgM 86, total protein  6.3, albumin 2.8, no M spike noted, slightly elevated free kappa and lambda light chains with normal ratio.  Repeat testing per iron profile again revealed low serum iron but high serum ferritin and normal CEA, normal AFP, CA-125 of 77.2, CA 19-9 7.8   At this point the reason for her hypercalcemia was thought to be possible medication effect and/or possible malignancy with calcium was running between 12 and 13 mg/dL.    This led to CT scan of chest abdomen pelvis performed April 24 demonstrate extensive metastatic disease throughout the abdomen and pelvis particularly involving the spleen and liver, extensive lymphadenopathy at the abdomen and pelvis with a 4 cm lesion splenic hilum partially encasing the pancreatic tail, pancreatic tail malignancy?,  Gastric primary malignancy? There was an approximately a 5 cm mass along the right wall of the urinary bladder with adjacent adenopathy.  CT of the chest revealed several tiny dense pleural-based nodular opacities-partially calcified granulomas, no mediastinal or hilar adenopathy, enlarged pulmonary arteries consistent with pulmonary arterial hypertension.  His subsequent bone scan performed April 26 revealed prominent uptake in the right frontal bone and right posterior ninth rib and a CT needle biopsy of the liver was obtained April 26 as well.The sample obtained revealed, on flow cytometry examination, a subpopulation of normal B cells that might be  B-cell lymphoma.  This was eventually felt consistent with diffuse large B-cell non-Hodgkin's lymphoma, CD20 positive, double hit (BCL-6 rearrangement 85%, MYC rearrangement 79%), KI-67 4+/4. This led to the patient receiving R CHOP chemotherapy May 04, 2018 followed by Granix.  She is able to be discharged May 16, 2018.    We made plans for her to be seen in follow-up for continued Zometa and a second cycle of CHOP R chemotherapy by May 25 with follow-up PET/CT after 2 cycles of treatment.  She is seen back in office  "May 18 given Zometa with findings of potential right lower extremity DVT.  Doppler is positive for acute popliteal and calf vein DVT as well as superficial venous thrombosis and the patient was started on Xarelto.  She was seen again May 21 mouth additional redness and swelling per her right elbow and felt to have cellulitis started on Keflex as an outpatient.  She had been taking the Xarelto with some improvement in her right lower extremity.    The patient's next seen family history 25th 2018 and, fortunately, her cellulitis is resolving as well is her thrombosis per right lower extremity, albeit slowly.     As the patient's second cycle of CHOP R she underwent additional scans including repeat PET/CT.  This demonstrates a dramatic response with resolution of splenomegaly and associated hypermetabolic activity, resolution of hypermetabolic liver lesion, lymphadenopathy and bony metastasis as well as reduction left gluteal lesion.  She has near remission after these 2 cycles of chemotherapy and we discussed continuing chemotherapy with total of 6 cycles of CHOP R.  We will plan the additional Zometa today and plan for choose every other cycle at this point.         Past Medical History:   Diagnosis Date   • Atrial fibrillation    • Cystitis    • Cystocele     moderate   • Dyslipidemia    • Esophageal reflux    • Fatigue    • History of transfusion     no reaction   • Hypertension    • Major depression, chronic    • Menopause    • Non Hodgkin's lymphoma    • Osteoarthritis    • Osteoporosis    • Palpitations    • Post menopausal problems    • RLS (restless legs syndrome)    • Seizure disorder    • Subjective tinnitus    • Vaginal delivery     x2  \"SONYA\"      \"JASON\"   • Vitamin D deficiency        ONCOLOGIC HISTORY:  (History from previous dates can be found in the separate document.)    Current Outpatient Prescriptions on File Prior to Visit   Medication Sig Dispense Refill   • allopurinol (ZYLOPRIM) 300 MG tablet " Take 1 tablet by mouth Daily. 30 tablet 1   • amLODIPine (NORVASC) 10 MG tablet Take 1 tablet by mouth Daily. 90 tablet 3   • benazepril (LOTENSIN) 40 MG tablet Take 1 tablet by mouth Daily. 90 tablet 3   • folic acid (FOLVITE) 400 MCG tablet Take 400 mcg by mouth daily.     • gabapentin (NEURONTIN) 300 MG capsule Take 300 mg by mouth Every Night.  0   • hydrALAZINE (APRESOLINE) 25 MG tablet Take 1 tablet by mouth 3 (Three) Times a Day. 90 tablet 1   • melatonin 5 MG tablet tablet Take 5 mg by mouth Every Night.     • metoprolol tartrate (LOPRESSOR) 50 MG tablet Take 1 tablet by mouth Every 12 (Twelve) Hours. 60 tablet 1   • montelukast (SINGULAIR) 10 MG tablet Take 10 mg by mouth Daily.     • nystatin (MYCOSTATIN) 317175 UNIT/GM powder Apply  topically Every 8 (Eight) Hours. 45 g 1   • ondansetron ODT (ZOFRAN-ODT) 8 MG disintegrating tablet Take 1 tablet by mouth Every 8 (Eight) Hours As Needed for Nausea or Vomiting for up to 60 doses. 60 tablet 5   • pantoprazole (PROTONIX) 40 MG EC tablet Take 1 tablet by mouth Daily. 90 tablet 3   • phenytoin (DILANTIN) 100 MG ER capsule Take 300 mg by mouth every night at bedtime.     • potassium chloride (K-TAB) 20 MEQ tablet controlled-release ER tablet Take 1 tablet by mouth 2 (Two) Times a Day. 60 tablet 2   • PREDNISONE PO Take  by mouth. Pt needs directions     • rivaroxaban (XARELTO) 20 MG tablet Take 1 tablet by mouth Daily. 30 tablet 2   • sennosides-docusate sodium (SENOKOT-S) 8.6-50 MG tablet Take 2 tablets by mouth Every Night. 60 tablet 1   • vitamin B-12 (CYANOCOBALAMIN) 100 MCG tablet Take 100 mcg by mouth Daily.       No current facility-administered medications on file prior to visit.        ALLERGIES:     Allergies   Allergen Reactions   • Crab (Diagnostic) Itching and Rash   • Pseudoephedrine Dizziness       Social History     Social History   • Marital status:      Spouse name: POOJA   • Number of children: 2   • Years of education: N/A  "    Occupational History   •  Retired     Social History Main Topics   • Smoking status: Never Smoker   • Smokeless tobacco: Never Used   • Alcohol use No   • Drug use: No   • Sexual activity: Defer      Comment:  (Jl)     Other Topics Concern   • Not on file     Social History Narrative   • No narrative on file         Cancer-related family history is not on file.     Review of Systems  A comprehensive 14 point review of systems was performed and was negative except as mentioned.    Objective      Vitals:    06/19/18 0832   BP: 130/60   Pulse: (!) 45   Resp: 14   Temp: 98.1 °F (36.7 °C)   SpO2: 100%  Comment: at rest   Weight: 60.9 kg (134 lb 3.2 oz)   Height: 162 cm (63.78\")   PainSc: 0-No pain     Current Status 6/19/2018   ECOG score 0       Physical Exam    Constitutional: She is oriented to person, place, and time. She appears well-developed and well-nourished. No distress.   Seated in wheel chair   HENT:   Head: Normocephalic and atraumatic.   Eyes: Pupils are equal, round, and reactive to light.   Pulmonary/Chest: Effort normal. No respiratory distress.   Musculoskeletal: Normal range of motion. She exhibits edema (R>L improved).   Resolving thrombophlebitis of right inner thigh- improved at the groin and just above the right knee.    Neurological: She is alert and oriented to person, place, and time.   Skin: Skin is warm and dry. No rash noted.   Considerably reduced erythema and edema of the right elbow with slight warmth and tenderness.  There is no obvious trauma.    Psychiatric: She has a normal mood and affect.   Vitals reviewed.        RECENT LABS:  Hematology WBC   Date Value Ref Range Status   06/19/2018 5.14 4.00 - 10.00 10*3/mm3 Final     RBC   Date Value Ref Range Status   06/19/2018 3.10 (L) 3.90 - 5.00 10*6/mm3 Final     Hemoglobin   Date Value Ref Range Status   06/19/2018 9.2 (L) 11.5 - 14.9 g/dL Final     Hematocrit   Date Value Ref Range Status   06/19/2018 28.8 (L) 34.0 - 45.0 % " Final     Platelets   Date Value Ref Range Status   06/19/2018 147 (L) 150 - 375 10*3/mm3 Final      F-18 FDG PET FROM SKULL BASE TO MID THIGH WITH PET/CT FUSION  June 06, 2018    FINDINGS: There has been significant interval decrease in splenic size  and there has been resolution of the intensely hypermetabolic activity  throughout the spleen. Splenic activity is similar to that of the liver.  There are 2 regions of photopenia within the spleen measuring  approximately 2 cm and these are both within an approximately 4.6 cm  low-attenuation lesion on the corresponding CT sequence. There are also  much smaller low-attenuation splenic lesions which were previously much  larger and heterogeneous. There has been resolution of the intensely  hypermetabolic right hepatic lobe liver lesion. Given constraints of the  examination, there is likely a residual 1.9 cm lesion at the inferior  margin of the right hepatic lobe, previously measuring approximately 4.8  cm. There has been resolution of the hypermetabolic lymphadenopathy  throughout the abdomen and pelvis. The hypermetabolic cervical and  mediastinal lymphadenopathy has resolved as well. There has been  increase in the intensity of activity of the marrow diffusely and there  is a more patchy pattern of metabolic activity within the marrow in some  regions. However, the previously seen hypermetabolic bone lesions have  resolved. There is low-level activity at the bed of the 1.8 cm left  posterior cervical triangle node. There is ill-defined increased density  in this region and the maximal SUV is 3.4, previously 11.2. There has  been significant interval decrease in the size of the left gluteal  lesion measuring approximately 2.5 cm, previously approximately 4 cm.  The current maximal SUV is 3.9 and was previously 5.9.     IMPRESSION:  1. Dramatic interval response with resolution of splenomegaly and the  intense hypermetabolic splenic activity, resolution of  the  hypermetabolic liver lesion, lymphadenopathy, and bone metastases. The  left gluteal lesion is smaller and less intensely hypermetabolic.  2. Increase in the intensity of metabolic activity throughout the marrow  space and patchy marrow activity in some regions is likely chemotherapy  related.            Assessment/Plan     1. 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  · PET scan 5/3/18 with diffuse splenic involvement (SUV 16.9), lymphadenopathy throughout upper abdomen and retroperitoneum (SUV 13.1), right liver lesion 5 cm (SUV 12.8), pelvic lymphadenopathy up to 4 cm, bladder wall thickening with associated hypermetabolism, cervical lymphadenopathy left posterior (SUV 11.2), multiple bone lesions including left intertrochanteric femur, ribs, right scapula, pelvis as well as left gluteal hematoma versus lymphomatous lesion.  · CT-guided liver biopsy 4/26/18 with diffuse large B-cell non-Hodgkin's lymphoma, CD20 positive, double hit (BCL-6 rearrangement 85%, MYC rearrangement 79%), KI-67 4+/4  · Left cervical excisional biopsy by ENT Dr. Oconnor 5/1/18 confirming high-grade B cell non-Hodgkin's lymphoma, Ki-67 100%, double hit (BCL-6 rearrangement 76%, MYC rearrangement 85%)  · Echocardiogram 4/11/18 with ejection fraction 66.7%  · Right port placement Dr Gill 5/4/18  · Patient not felt to be a candidate for more aggressive chemotherapy due to performance status and age (DA EPOCH-R)  · Therefore treated with R CHOP chemotherapy 5/4/18.  · Followed by granix 300mcg daily beginning 5/6/18 through 5/14/18.  · Today, the patient has increasing WBC related to growth factor use, stable hemoglobin, spontaneous improvement in platelet count.  She is doing quite well following her chemotherapy and is ready to go home.  There is some concern regarding her persistently  elevated LDH at 347 today as well as her escalating calcium at 11.9.  She is currently asymptomatic from the calcium and her ionized calcium is stable at 6.8.  Therefore we are going to proceed with discharge home.  She will return to the office later this week on 5/18/18 with repeat labs, nurse practitioner visit, and potential treatment with a third dose of Zometa if her calcium has continued to escalate significantly. She is now seen with plans to begin cycle 2 R CHOP chemotherapy with growth factor support ( Neulasta on body injector).  Will schedule follow-up PET scan after 2 cycles of therapy and see her back in 3 weeks to review her status.  · Patient reviewed June 19 with near resolution of hypermetabolic sites on PET/CT.  Plans made for third cycle of CHOP R, additional Zometa and total of 6 cycles of chemotherapy anticipated.    2. Myelosuppression with pancytopenia related to chemotherapy:  · Received granix 300mcg daily previously and will need Neulasta-on body this treatment as well as her subsequent cycles.  ·   3. Multifactorial anemia:  · Related to anemia chronic disease, chemotherapy, prior iron deficiency.  · Received previous venofer 600mg (5/7 and 5/8) with iron studies from 4/24/18 showing ferritin 411, iron saturation 9%, TIBC 180.    4. Hypercalcemia of malignancy:  · Calcium up to 13.8 on 4/21/18 (albumin 3.3).  Intact PTH 8.1, PTH RP less than 2.0  · Received Zometa 4 mg IV 4/25/18.  · Calcium up to 11.3 on 5/7/18 with second dose of Zometa administered 4 mg IV.  · Calcium today has continued to gradually increase up to 11.9 with albumin 3.3.  Ionized calcium however is stable at 6.8 compared to yesterday.  The patient is asymptomatic.  We will not plan to administer a further dose of Zometa just yet and will allow further time for the patient to respond to chemotherapy.  She returned 518 for continued Zometa and when seen May 25 has a normal calcium level.  · Patient to receive Zometa June  19, subsequently every other cycle, restart low-dose calcium supplementation    5. Hypomagnesemia, hypokalemia:  · Continuing on oral potassium chloride 40 mEq twice a day  · Potassium 4.0      6. Paroxysmal atrial fibrillation:  · Recently diagnosed, anticoagulated with Eliquis initially, discontinued due to expected thrombocytopenia from chemotherapy  · Currently in sinus rhythm, continues on Lopressor 50 mg every 12 hours  · Unable to resume anticoagulation at present due to persistent severe thrombocytopenia.      7. Prior seizure disorder:  · Continues currently on Dilantin 300 mg daily at bedtime and Neurontin 600 mg daily at bedtime, will return to outpatient dose of Neurontin 300 mg daily at bedtime at discharge.      9. GI prophylaxis:  · Protonix 40 mg daily      10. Venous access:  · Port placement 5/4/18 by Dr. Gill      11. DVT- Continue Xarelto 20 mg P daily- samples, Escribed      12. Cellulitis-improving

## 2018-06-25 ENCOUNTER — TELEPHONE (OUTPATIENT)
Dept: ONCOLOGY | Facility: HOSPITAL | Age: 78
End: 2018-06-25

## 2018-06-25 NOTE — TELEPHONE ENCOUNTER
----- Message from Luisa Gann sent at 6/25/2018 11:40 AM EDT -----  Call roger with [facility] needs to know if patient needs  labs before her next appt        [phone]

## 2018-06-28 ENCOUNTER — OFFICE VISIT (OUTPATIENT)
Dept: CARDIOLOGY | Facility: CLINIC | Age: 78
End: 2018-06-28

## 2018-06-28 VITALS
HEART RATE: 54 BPM | DIASTOLIC BLOOD PRESSURE: 78 MMHG | HEIGHT: 64 IN | WEIGHT: 134.4 LBS | SYSTOLIC BLOOD PRESSURE: 124 MMHG | BODY MASS INDEX: 22.94 KG/M2

## 2018-06-28 DIAGNOSIS — I10 ESSENTIAL HYPERTENSION: ICD-10-CM

## 2018-06-28 DIAGNOSIS — C83.39 DIFFUSE LARGE B-CELL LYMPHOMA OF EXTRANODAL SITE EXCLUDING SPLEEN AND OTHER SOLID ORGANS (HCC): ICD-10-CM

## 2018-06-28 DIAGNOSIS — R00.1 SINUS BRADYCARDIA: ICD-10-CM

## 2018-06-28 DIAGNOSIS — I48.0 PAROXYSMAL ATRIAL FIBRILLATION (HCC): Primary | ICD-10-CM

## 2018-06-28 DIAGNOSIS — D69.6 THROMBOCYTOPENIA (HCC): ICD-10-CM

## 2018-06-28 PROCEDURE — 99214 OFFICE O/P EST MOD 30 MIN: CPT | Performed by: NURSE PRACTITIONER

## 2018-06-28 PROCEDURE — 93000 ELECTROCARDIOGRAM COMPLETE: CPT | Performed by: NURSE PRACTITIONER

## 2018-06-28 RX ORDER — ESCITALOPRAM OXALATE 5 MG/1
5 TABLET ORAL DAILY
COMMUNITY
End: 2018-10-22 | Stop reason: SDUPTHER

## 2018-06-28 NOTE — PROGRESS NOTES
Date of Office Visit: 2018  Encounter Provider: MELINDA Mabry  Place of Service: [facility] CARDIOLOGY  Patient Name: Martina Blake  :1940    Chief Complaint   Patient presents with   • Follow-up     lymphoma   :     HPI: Martina Blake is a 78 y.o. female who presents today for cardiac follow-up.  She is a new patient to me and her previous records have been reviewed.  She has a past medical history of hypertension, dyslipidemia, esophageal reflux, seizure disorder, and vitamin D deficiency.    She has a past medical history of atrial fibrillation with rapid ventricular response in the setting of hypokalemia, hypomagnesemia, hypercalcemia, and anemia.  She was placed on metoprolol.  She had an upper and lower endoscopy showing hiatal hernia, gastritis, esophagitis, and diverticulosis with no acute bleeding.  She was started on apixiban and and spironolactone.  Showed a normal Cardiolite stress test in 2018 an echocardiogram at that time revealed an EF of 67%, grade 2 diastolic dysfunction, severe left atrial enlargement, mild to moderate tricuspid insufficiency, and RVSP elevated at 44 mmHg.  There was concern for malignancy and she was diagnosed with large B-cell lymphoma diffuse and is now undergoing chemotherapy.  She is an established patient of Dr. Justine Barrios.    She was hospitalized in May of this year and chemotherapy was initiated.  Her apixiban was discontinued due to thrombocytopenia.  She was placed on metoprolol tartrate 50 mg twice daily for A. fib with rapid ventricular response.  She was discharged from the hospital on 18 and is followed up with her PCP and Dr. Iraheta.     She presents today for follow-up.  She feels that she's done well from a cardiac standpoint although she has noticed heart rates in the low 40s and had symptoms of dizziness at times.  Dr. Stubbs reduced her metoprolol tartrate to 25 mg twice daily on  "6/19/18.  Since then her heart rates have ranged 54-58 bpm at home with blood pressures averaging 120/60.  On occasion she'll feel her heartbeat racing once every 2 weeks anywhere from 30 minutes to 2 hours.  She denies chest pain, shortness of air, PND, orthopnea, edema, or syncope.  She remains fatigued.  Heart rate is 54 bpm today.    The following portion of the patient's history were reviewed and updated as appropriate: past medical history, past surgical history, past social history, past family history, allergies, current medications, and problem list.    Past Medical History:   Diagnosis Date   • Anemia     multifactorial   • Cystitis    • Cystocele     moderate   • Dyslipidemia    • Esophageal reflux    • Fatigue    • History of transfusion     no reaction   • Hypercalcemia    • Hypertension    • Major depression, chronic    • Menopause    • Non Hodgkin's lymphoma    • Osteoarthritis    • Osteoporosis    • Palpitations    • Paroxysmal atrial fibrillation    • Post menopausal problems    • RLS (restless legs syndrome)    • Seizure disorder    • Subjective tinnitus    • Vaginal delivery     x2  \"SONYA\"      \"JASON\"   • Vitamin D deficiency        Past Surgical History:   Procedure Laterality Date   • CERVICAL LYMPH NODE BIOPSY/EXCISION Left 5/1/2018    Procedure: CERVICAL LYMPH NODE BIOPSY/EXCISION;  Surgeon: Danny Oconnor MD;  Location: [facility] MAIN OR;  Service: ENT   • CHOLECYSTECTOMY     • COLONOSCOPY     • COLONOSCOPY N/A 4/13/2018    Procedure: COLONOSCOPY to terminal ileum;  Surgeon: Dominik Burt MD;  Location: [facility] ENDOSCOPY;  Service: Gastroenterology   • CRANIOTOMY     • ENDOSCOPY N/A 4/13/2018    Procedure: ESOPHAGOGASTRODUODENOSCOPY with biopsy;  Surgeon: Dominik Burt MD;  Location: [facility] ENDOSCOPY;  Service: Gastroenterology   • TOTAL ABDOMINAL HYSTERECTOMY  1980    w/ bladder suspension.  Probably vaginal hysterectomy with anterior colporrhaphy.    • VENOUS ACCESS DEVICE (PORT) INSERTION " N/A 2018    Procedure: INSERTION VENOUS ACCESS DEVICE;  Surgeon: Jamie Gill MD;  Location: [facility] MAIN OR;  Service: General   • WRIST SURGERY Left        Social History     Social History   • Marital status:      Spouse name: JL   • Number of children: 2   • Years of education: N/A     Occupational History   •  Retired     Social History Main Topics   • Smoking status: Never Smoker   • Smokeless tobacco: Never Used   • Alcohol use No   • Drug use: No   • Sexual activity: Defer      Comment:  (Jl)       Family History   Problem Relation Age of Onset   • No Known Problems Mother    • Stroke Father 54         @ 60    • No Known Problems Maternal Grandmother    • No Known Problems Maternal Grandfather    • Heart disease Paternal Grandmother    • Heart disease Paternal Grandfather    • No Known Problems Daughter    • Stroke Brother 76   • Diabetes type II Brother        Review of Systems   Constitution: Positive for malaise/fatigue. Negative for chills, diaphoresis, fever, night sweats, weight gain and weight loss.   HENT: Negative for hearing loss, nosebleeds, sore throat and tinnitus.    Eyes: Negative for blurred vision, double vision, pain and visual disturbance.   Cardiovascular: Positive for palpitations. Negative for chest pain, claudication, cyanosis, dyspnea on exertion, irregular heartbeat, leg swelling, near-syncope, orthopnea, paroxysmal nocturnal dyspnea and syncope.   Respiratory: Negative for cough, hemoptysis, shortness of breath, snoring and wheezing.    Endocrine: Negative for cold intolerance, heat intolerance and polyuria.   Hematologic/Lymphatic: Negative for bleeding problem. Does not bruise/bleed easily.   Skin: Negative for color change, dry skin, flushing and itching.   Musculoskeletal: Negative for falls, joint pain, joint swelling, muscle cramps, muscle weakness and myalgias.   Gastrointestinal: Negative for abdominal pain, constipation, heartburn,  melena, nausea and vomiting.   Genitourinary: Negative for dysuria and hematuria.   Neurological: Positive for dizziness and light-headedness. Negative for excessive daytime sleepiness, loss of balance, numbness, paresthesias, seizures and vertigo.   Psychiatric/Behavioral: Negative for altered mental status, depression, memory loss and substance abuse. The patient does not have insomnia and is not nervous/anxious.    Allergic/Immunologic: Negative for environmental allergies.       Allergies   Allergen Reactions   • Crab (Diagnostic) Itching and Rash   • Pseudoephedrine Dizziness         Current Outpatient Prescriptions:   •  allopurinol (ZYLOPRIM) 300 MG tablet, Take 1 tablet by mouth Daily., Disp: 30 tablet, Rfl: 1  •  amLODIPine (NORVASC) 10 MG tablet, Take 1 tablet by mouth Daily., Disp: 90 tablet, Rfl: 3  •  benazepril (LOTENSIN) 40 MG tablet, Take 1 tablet by mouth Daily., Disp: 90 tablet, Rfl: 3  •  escitalopram (LEXAPRO) 5 MG tablet, Take 5 mg by mouth Daily., Disp: , Rfl:   •  folic acid (FOLVITE) 400 MCG tablet, Take 400 mcg by mouth daily., Disp: , Rfl:   •  gabapentin (NEURONTIN) 300 MG capsule, Take 600 mg by mouth Every Night., Disp: , Rfl: 0  •  hydrALAZINE (APRESOLINE) 25 MG tablet, Take 1 tablet by mouth 3 (Three) Times a Day., Disp: 90 tablet, Rfl: 1  •  melatonin 5 MG tablet tablet, Take 5 mg by mouth Every Night., Disp: , Rfl:   •  metoprolol tartrate (LOPRESSOR) 50 MG tablet, Take 1 tablet by mouth Every 12 (Twelve) Hours. (Patient taking differently: Take 25 mg by mouth Every 12 (Twelve) Hours.), Disp: 60 tablet, Rfl: 1  •  montelukast (SINGULAIR) 10 MG tablet, Take 10 mg by mouth Daily., Disp: , Rfl:   •  nystatin (MYCOSTATIN) 417588 UNIT/GM powder, Apply  topically Every 8 (Eight) Hours., Disp: 45 g, Rfl: 1  •  ondansetron ODT (ZOFRAN-ODT) 8 MG disintegrating tablet, Take 1 tablet by mouth Every 8 (Eight) Hours As Needed for Nausea or Vomiting for up to 60 doses., Disp: 60 tablet, Rfl: 5  •   "pantoprazole (PROTONIX) 40 MG EC tablet, Take 1 tablet by mouth Daily., Disp: 90 tablet, Rfl: 3  •  phenytoin (DILANTIN) 100 MG ER capsule, Take 300 mg by mouth every night at bedtime., Disp: , Rfl:   •  potassium chloride (K-TAB) 20 MEQ tablet controlled-release ER tablet, Take 1 tablet by mouth 2 (Two) Times a Day., Disp: 60 tablet, Rfl: 2  •  PREDNISONE PO, Take  by mouth. Pt needs directions ----POST CHEMO, Disp: , Rfl:   •  rivaroxaban (XARELTO) 20 MG tablet, Take 1 tablet by mouth Daily., Disp: 30 tablet, Rfl: 2  •  sennosides-docusate sodium (SENOKOT-S) 8.6-50 MG tablet, Take 2 tablets by mouth Every Night., Disp: 60 tablet, Rfl: 1  •  vitamin B-12 (CYANOCOBALAMIN) 100 MCG tablet, Take 100 mcg by mouth Daily., Disp: , Rfl:       Objective:     Vitals:    06/28/18 1420   BP: 124/78   BP Location: Left arm   Pulse: 54   Weight: 61 kg (134 lb 6.4 oz)   Height: 162 cm (63.78\")     Body mass index is 23.23 kg/m².    PHYSICAL EXAM:    Vitals Reviewed.   General Appearance: No acute distress, well developed and well nourished. Thin.   Eyes: Conjunctiva and lids: No erythema, swelling, or discharge. Sclera non-icteric.    HENT: Atraumatic, normocephalic. External eyes, ears, and nose normal. No hearing loss noted. Mucous membranes normal. Lips not cyanotic. Neck supple with no tenderness. Hair loss with scarf.   Respiratory: No signs of respiratory distress. Respiration rhythm and depth normal.   Clear to auscultation. No rales, crackles, rhonchi, or wheezing auscultated.   Cardiovascular:  Jugular Venous Pressure: Normal  Heart Rate and Rhythm: Normal rhythm; bradycardic.  Heart Sounds: Normal S1 and S2. No S3 or S4 noted.  Murmurs: No murmurs noted. No rubs, thrills, or gallops.   Arterial Pulses: Carotid pulses normal. No carotid bruit noted. Posterior tibialis and dorsalis pedis pulses normal.   Lower Extremities: No edema noted.  Gastrointestinal:  Abdomen soft, non-distended, non-tender. Normal bowel sounds. No " hepatomegaly.   Musculoskeletal: Normal movement of extremities  Skin and Nails: General appearance normal. Pale. No cyanosis, diaphoresis. Skin temperature normal. No clubbing of fingernails.   Psychiatric: Patient alert and oriented to person, place, and time. Speech and behavior appropriate. Normal mood and affect.       ECG 12 Lead  Date/Time: 6/28/2018 2:18 PM  Performed by: NELIA TELLEZ  Authorized by: NELIA TELLEZ   Rhythm: sinus bradycardia  Rate: bradycardic  BPM: 54  Conduction: conduction normal  ST Segments: ST segments normal  T Waves: T waves normal  QRS axis: normal  Clinical impression: normal ECG              Assessment:       Diagnosis Plan   1. Paroxysmal atrial fibrillation     2. Sinus bradycardia     3. Essential hypertension     4. 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     5. Thrombocytopenia            Plan:       1.  Paroxysmal Atrial Fibrillation: She is currently in a normal sinus rhythm.  She's had a few episodes of palpitations in which she felt that she was in atrial fibrillation.  Her metoprolol tartrate was reduced to 25 mg twice daily and I will send a new prescription for this.  She currently is not a candidate for anticoagulation because of thrombocytopenia.  We did discuss the potential signs of stroke and of recommended presentation to the emergency department within 90 minutes of onset.    Atrial Fibrillation and Atrial Flutter  Assessment  • The patient has paroxysmal atrial fibrillation  • The patient's CHADS2-VASc score is 4  • A [id number]-LZBs score of 2 or more is considered a high risk for a thromboembolic event  • Rivaroxaban prescribed    Plan  • Attempt to maintain sinus rhythm  • Continue rivaroxaban for antithrombotic therapy, bleeding issues discussed  • Continue beta blocker for rate control    Subjective - Objective  • Currently in NSR      2.  Hypertension: Blood pressure is well-controlled.  3.  Lymphoma  and Thrombocytopenia: She just finished her last chemotherapy treatment and will continue to follow with Dr. Iraheta.   4.  I've recommended follow-up with Dr. Justine Barrios in 3 months, unless otherwise needed sooner.    As always, it has been a pleasure to participate in your patient's care.      Sincerely,         MELINDA Oliva

## 2018-07-09 RX ORDER — ALLOPURINOL 300 MG/1
300 TABLET ORAL DAILY
Qty: 90 TABLET | Refills: 0 | Status: SHIPPED | OUTPATIENT
Start: 2018-07-09 | End: 2018-09-24

## 2018-07-10 ENCOUNTER — TELEPHONE (OUTPATIENT)
Dept: FAMILY MEDICINE CLINIC | Facility: CLINIC | Age: 78
End: 2018-07-10

## 2018-07-10 RX ORDER — POTASSIUM CHLORIDE 1500 MG/1
TABLET, FILM COATED, EXTENDED RELEASE ORAL
Qty: 180 TABLET | Refills: 0 | Status: SHIPPED | OUTPATIENT
Start: 2018-07-10 | End: 2018-11-12 | Stop reason: SDUPTHER

## 2018-07-10 RX ORDER — CEPHALEXIN 500 MG/1
500 CAPSULE ORAL 2 TIMES DAILY
Qty: 14 CAPSULE | Refills: 0 | Status: SHIPPED | OUTPATIENT
Start: 2018-07-10 | End: 2018-10-22

## 2018-07-11 ENCOUNTER — TELEPHONE (OUTPATIENT)
Dept: ONCOLOGY | Facility: CLINIC | Age: 78
End: 2018-07-11

## 2018-07-11 DIAGNOSIS — C83.39 DIFFUSE LARGE B-CELL LYMPHOMA OF EXTRANODAL SITE EXCLUDING SPLEEN AND OTHER SOLID ORGANS (HCC): ICD-10-CM

## 2018-07-11 NOTE — TELEPHONE ENCOUNTER
----- Message from Robyn Alberto sent at 7/11/2018  3:10 PM EDT -----  Contact: [phone]  Pt calling wanted to see if she could have Jacqui for her nurse when she gets chemo again?

## 2018-07-13 ENCOUNTER — INFUSION (OUTPATIENT)
Dept: ONCOLOGY | Facility: HOSPITAL | Age: 78
End: 2018-07-13

## 2018-07-13 ENCOUNTER — OFFICE VISIT (OUTPATIENT)
Dept: ONCOLOGY | Facility: CLINIC | Age: 78
End: 2018-07-13

## 2018-07-13 VITALS — HEART RATE: 63 BPM | DIASTOLIC BLOOD PRESSURE: 60 MMHG | SYSTOLIC BLOOD PRESSURE: 110 MMHG

## 2018-07-13 VITALS
HEIGHT: 64 IN | DIASTOLIC BLOOD PRESSURE: 68 MMHG | BODY MASS INDEX: 22.64 KG/M2 | TEMPERATURE: 97.6 F | HEART RATE: 48 BPM | WEIGHT: 132.6 LBS | SYSTOLIC BLOOD PRESSURE: 130 MMHG

## 2018-07-13 DIAGNOSIS — D64.81 ANEMIA ASSOCIATED WITH CHEMOTHERAPY: ICD-10-CM

## 2018-07-13 DIAGNOSIS — T45.1X5A ANEMIA ASSOCIATED WITH CHEMOTHERAPY: ICD-10-CM

## 2018-07-13 DIAGNOSIS — D61.810 PANCYTOPENIA DUE TO ANTINEOPLASTIC CHEMOTHERAPY (HCC): ICD-10-CM

## 2018-07-13 DIAGNOSIS — C83.39 DIFFUSE LARGE B-CELL LYMPHOMA OF EXTRANODAL SITE EXCLUDING SPLEEN AND OTHER SOLID ORGANS (HCC): ICD-10-CM

## 2018-07-13 DIAGNOSIS — C83.39 DIFFUSE LARGE B-CELL LYMPHOMA OF EXTRANODAL SITE EXCLUDING SPLEEN AND OTHER SOLID ORGANS (HCC): Primary | ICD-10-CM

## 2018-07-13 DIAGNOSIS — T45.1X5A PANCYTOPENIA DUE TO ANTINEOPLASTIC CHEMOTHERAPY (HCC): ICD-10-CM

## 2018-07-13 DIAGNOSIS — E83.52 HYPERCALCEMIA: ICD-10-CM

## 2018-07-13 DIAGNOSIS — R53.1 WEAKNESS: ICD-10-CM

## 2018-07-13 LAB
ALBUMIN SERPL-MCNC: 3.8 G/DL (ref 3.5–5.2)
ALBUMIN/GLOB SERPL: 1.4 G/DL (ref 1.1–2.4)
ALP SERPL-CCNC: 115 U/L (ref 38–116)
ALT SERPL W P-5'-P-CCNC: 12 U/L (ref 0–33)
ANION GAP SERPL CALCULATED.3IONS-SCNC: 10.5 MMOL/L
AST SERPL-CCNC: 25 U/L (ref 0–32)
BASOPHILS # BLD AUTO: 0.1 10*3/MM3 (ref 0–0.1)
BASOPHILS NFR BLD AUTO: 2.3 % (ref 0–1.1)
BILIRUB SERPL-MCNC: 0.2 MG/DL (ref 0.1–1.2)
BUN BLD-MCNC: 12 MG/DL (ref 6–20)
BUN/CREAT SERPL: 21.4 (ref 7.3–30)
CALCIUM SPEC-SCNC: 9.7 MG/DL (ref 8.5–10.2)
CHLORIDE SERPL-SCNC: 106 MMOL/L (ref 98–107)
CO2 SERPL-SCNC: 25.5 MMOL/L (ref 22–29)
CREAT BLD-MCNC: 0.56 MG/DL (ref 0.6–1.1)
DEPRECATED RDW RBC AUTO: 84.4 FL (ref 37–49)
EOSINOPHIL # BLD AUTO: 0.08 10*3/MM3 (ref 0–0.36)
EOSINOPHIL NFR BLD AUTO: 1.8 % (ref 1–5)
ERYTHROCYTE [DISTWIDTH] IN BLOOD BY AUTOMATED COUNT: 23.4 % (ref 11.7–14.5)
GFR SERPL CREATININE-BSD FRML MDRD: 105 ML/MIN/1.73
GLOBULIN UR ELPH-MCNC: 2.8 GM/DL (ref 1.8–3.5)
GLUCOSE BLD-MCNC: 118 MG/DL (ref 74–124)
HCT VFR BLD AUTO: 29.7 % (ref 34–45)
HGB BLD-MCNC: 9.5 G/DL (ref 11.5–14.9)
IMM GRANULOCYTES # BLD: 0.04 10*3/MM3 (ref 0–0.03)
IMM GRANULOCYTES NFR BLD: 0.9 % (ref 0–0.5)
LYMPHOCYTES # BLD AUTO: 0.46 10*3/MM3 (ref 1–3.5)
LYMPHOCYTES NFR BLD AUTO: 10.6 % (ref 20–49)
MCH RBC QN AUTO: 31.8 PG (ref 27–33)
MCHC RBC AUTO-ENTMCNC: 32 G/DL (ref 32–35)
MCV RBC AUTO: 99.3 FL (ref 83–97)
MONOCYTES # BLD AUTO: 1.16 10*3/MM3 (ref 0.25–0.8)
MONOCYTES NFR BLD AUTO: 26.7 % (ref 4–12)
NEUTROPHILS # BLD AUTO: 2.51 10*3/MM3 (ref 1.5–7)
NEUTROPHILS NFR BLD AUTO: 57.7 % (ref 39–75)
NRBC BLD MANUAL-RTO: 0 /100 WBC (ref 0–0)
PLATELET # BLD AUTO: 119 10*3/MM3 (ref 150–375)
PMV BLD AUTO: 9.9 FL (ref 8.9–12.1)
POTASSIUM BLD-SCNC: 4 MMOL/L (ref 3.5–4.7)
PROT SERPL-MCNC: 6.6 G/DL (ref 6.3–8)
RBC # BLD AUTO: 2.99 10*6/MM3 (ref 3.9–5)
SODIUM BLD-SCNC: 142 MMOL/L (ref 134–145)
WBC NRBC COR # BLD: 4.35 10*3/MM3 (ref 4–10)

## 2018-07-13 PROCEDURE — 25010000002 DIPHENHYDRAMINE PER 50 MG: Performed by: NURSE PRACTITIONER

## 2018-07-13 PROCEDURE — 96413 CHEMO IV INFUSION 1 HR: CPT | Performed by: NURSE PRACTITIONER

## 2018-07-13 PROCEDURE — 85025 COMPLETE CBC W/AUTO DIFF WBC: CPT

## 2018-07-13 PROCEDURE — 25010000002 FOSAPREPITANT PER 1 MG: Performed by: NURSE PRACTITIONER

## 2018-07-13 PROCEDURE — 25010000002 PALONOSETRON PER 25 MCG: Performed by: NURSE PRACTITIONER

## 2018-07-13 PROCEDURE — 25010000002 VINCRISTINE PER 1 MG: Performed by: NURSE PRACTITIONER

## 2018-07-13 PROCEDURE — 25010000002 RITUXIMAB 10 MG/ML SOLUTION 50 ML VIAL: Performed by: NURSE PRACTITIONER

## 2018-07-13 PROCEDURE — 96411 CHEMO IV PUSH ADDL DRUG: CPT | Performed by: NURSE PRACTITIONER

## 2018-07-13 PROCEDURE — 80053 COMPREHEN METABOLIC PANEL: CPT

## 2018-07-13 PROCEDURE — 96375 TX/PRO/DX INJ NEW DRUG ADDON: CPT | Performed by: NURSE PRACTITIONER

## 2018-07-13 PROCEDURE — 96377 APPLICATON ON-BODY INJECTOR: CPT | Performed by: NURSE PRACTITIONER

## 2018-07-13 PROCEDURE — 25010000003 DEXAMETHASONE SODIUM PHOSPHATE 100 MG/10ML SOLUTION: Performed by: NURSE PRACTITIONER

## 2018-07-13 PROCEDURE — 96417 CHEMO IV INFUS EACH ADDL SEQ: CPT | Performed by: NURSE PRACTITIONER

## 2018-07-13 PROCEDURE — 25010000002 DOXORUBICIN PER 10 MG: Performed by: NURSE PRACTITIONER

## 2018-07-13 PROCEDURE — 25010000002 PEGFILGRASTIM 6 MG/0.6ML PREFILLED SYRINGE KIT: Performed by: NURSE PRACTITIONER

## 2018-07-13 PROCEDURE — 25010000002 RITUXIMAB 10 MG/ML SOLUTION 10 ML VIAL: Performed by: NURSE PRACTITIONER

## 2018-07-13 PROCEDURE — 25010000002 CYCLOPHOSPHAMIDE PER 100 MG: Performed by: NURSE PRACTITIONER

## 2018-07-13 PROCEDURE — 99214 OFFICE O/P EST MOD 30 MIN: CPT | Performed by: NURSE PRACTITIONER

## 2018-07-13 PROCEDURE — 96367 TX/PROPH/DG ADDL SEQ IV INF: CPT | Performed by: NURSE PRACTITIONER

## 2018-07-13 PROCEDURE — 96415 CHEMO IV INFUSION ADDL HR: CPT | Performed by: NURSE PRACTITIONER

## 2018-07-13 RX ORDER — FAMOTIDINE 20 MG/1
20 TABLET, FILM COATED ORAL ONCE
Status: CANCELLED
Start: 2018-07-13 | End: 2018-07-13

## 2018-07-13 RX ORDER — FAMOTIDINE 10 MG/ML
20 INJECTION, SOLUTION INTRAVENOUS AS NEEDED
Status: CANCELLED | OUTPATIENT
Start: 2018-07-13

## 2018-07-13 RX ORDER — PREDNISONE 50 MG/1
TABLET ORAL
Qty: 10 TABLET | Refills: 2 | Status: SHIPPED | OUTPATIENT
Start: 2018-07-13 | End: 2018-07-13 | Stop reason: SDUPTHER

## 2018-07-13 RX ORDER — DOXORUBICIN HYDROCHLORIDE 2 MG/ML
50 INJECTION, SOLUTION INTRAVENOUS ONCE
Status: COMPLETED | OUTPATIENT
Start: 2018-07-13 | End: 2018-07-13

## 2018-07-13 RX ORDER — PREDNISONE 50 MG/1
TABLET ORAL
Qty: 10 TABLET | Refills: 2 | Status: SHIPPED | OUTPATIENT
Start: 2018-07-13 | End: 2018-09-24

## 2018-07-13 RX ORDER — ACETAMINOPHEN 325 MG/1
650 TABLET ORAL ONCE
Status: COMPLETED | OUTPATIENT
Start: 2018-07-13 | End: 2018-07-13

## 2018-07-13 RX ORDER — DIPHENHYDRAMINE HYDROCHLORIDE 50 MG/ML
50 INJECTION INTRAMUSCULAR; INTRAVENOUS AS NEEDED
Status: CANCELLED | OUTPATIENT
Start: 2018-07-13

## 2018-07-13 RX ORDER — FAMOTIDINE 20 MG/1
20 TABLET, FILM COATED ORAL ONCE
Status: COMPLETED | OUTPATIENT
Start: 2018-07-13 | End: 2018-07-13

## 2018-07-13 RX ORDER — SODIUM CHLORIDE 9 MG/ML
250 INJECTION, SOLUTION INTRAVENOUS ONCE
Status: CANCELLED | OUTPATIENT
Start: 2018-07-13

## 2018-07-13 RX ORDER — PALONOSETRON 0.05 MG/ML
0.25 INJECTION, SOLUTION INTRAVENOUS ONCE
Status: COMPLETED | OUTPATIENT
Start: 2018-07-13 | End: 2018-07-13

## 2018-07-13 RX ORDER — SODIUM CHLORIDE 9 MG/ML
250 INJECTION, SOLUTION INTRAVENOUS ONCE
Status: COMPLETED | OUTPATIENT
Start: 2018-07-13 | End: 2018-07-13

## 2018-07-13 RX ORDER — LIDOCAINE AND PRILOCAINE 25; 25 MG/G; MG/G
CREAM TOPICAL
Qty: 5 G | Refills: 2 | Status: SHIPPED | OUTPATIENT
Start: 2018-07-13 | End: 2022-11-07

## 2018-07-13 RX ORDER — DOXORUBICIN HYDROCHLORIDE 2 MG/ML
50 INJECTION, SOLUTION INTRAVENOUS ONCE
Status: CANCELLED | OUTPATIENT
Start: 2018-07-13

## 2018-07-13 RX ORDER — PALONOSETRON 0.05 MG/ML
0.25 INJECTION, SOLUTION INTRAVENOUS ONCE
Status: CANCELLED | OUTPATIENT
Start: 2018-07-13

## 2018-07-13 RX ORDER — ACETAMINOPHEN 325 MG/1
650 TABLET ORAL ONCE
Status: CANCELLED | OUTPATIENT
Start: 2018-07-13

## 2018-07-13 RX ADMIN — PEGFILGRASTIM 6 MG: KIT SUBCUTANEOUS at 11:07

## 2018-07-13 RX ADMIN — PALONOSETRON 0.25 MG: 0.05 INJECTION, SOLUTION INTRAVENOUS at 08:37

## 2018-07-13 RX ADMIN — DEXAMETHASONE SODIUM PHOSPHATE 12 MG: 10 INJECTION, SOLUTION INTRAMUSCULAR; INTRAVENOUS at 09:26

## 2018-07-13 RX ADMIN — ACETAMINOPHEN 650 MG: 325 TABLET, FILM COATED ORAL at 08:34

## 2018-07-13 RX ADMIN — SODIUM CHLORIDE 150 MG: 9 INJECTION, SOLUTION INTRAVENOUS at 08:38

## 2018-07-13 RX ADMIN — VINCRISTINE SULFATE 2 MG: 1 INJECTION, SOLUTION INTRAVENOUS at 10:04

## 2018-07-13 RX ADMIN — DOXORUBICIN HYDROCHLORIDE 86 MG: 2 INJECTION, SOLUTION INTRAVENOUS at 09:43

## 2018-07-13 RX ADMIN — FAMOTIDINE 20 MG: 20 TABLET ORAL at 08:34

## 2018-07-13 RX ADMIN — DIPHENHYDRAMINE HYDROCHLORIDE 50 MG: 50 INJECTION, SOLUTION INTRAMUSCULAR; INTRAVENOUS at 09:09

## 2018-07-13 RX ADMIN — RITUXIMAB 600 MG: 10 INJECTION, SOLUTION INTRAVENOUS at 11:03

## 2018-07-13 RX ADMIN — CYCLOPHOSPHAMIDE 1280 MG: 1 INJECTION, POWDER, FOR SOLUTION INTRAVENOUS; ORAL at 10:17

## 2018-07-13 RX ADMIN — SODIUM CHLORIDE 250 ML: 9 INJECTION, SOLUTION INTRAVENOUS at 08:34

## 2018-07-13 NOTE — PROGRESS NOTES
"  Subjective .  Patient continues to feel significantly better.    REASONS FOR FOLLOWUP:    1. 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 female here today for cycle 4 of CHOP R. SHe continues to tolerate treatment quite well with minimal side effects. Her energy level is good. She is eating and drinking well. She denies fevers, chills, shortness of breath at rest, neuropathy, bleeding, bruising, or swelling.    Her CBC is stable.         Past Medical History:   Diagnosis Date   • Anemia     multifactorial   • Cystitis    • Cystocele     moderate   • Dyslipidemia    • Esophageal reflux    • Fatigue    • History of transfusion     no reaction   • Hypercalcemia    • Hypertension    • Major depression, chronic    • Menopause    • Non Hodgkin's lymphoma (CMS/HCC)    • Osteoarthritis    • Osteoporosis    • Palpitations    • Paroxysmal atrial fibrillation (CMS/HCC)    • Post menopausal problems    • RLS (restless legs syndrome)    • Seizure disorder (CMS/HCC)    • Subjective tinnitus    • Vaginal delivery     x2  \"SONYA\"      \"JASON\"   • Vitamin D deficiency        ONCOLOGIC HISTORY:  (History from previous dates can be found in the separate document.)    Current Outpatient Prescriptions on File Prior to Visit   Medication Sig Dispense Refill   • allopurinol (ZYLOPRIM) 300 MG tablet TAKE 1 TABLET BY MOUTH DAILY 90 tablet 0   • amLODIPine (NORVASC) 10 MG tablet Take 1 tablet by mouth Daily. 90 tablet 3   • benazepril (LOTENSIN) 40 MG tablet Take 1 tablet by mouth Daily. 90 tablet 3   • cephalexin (KEFLEX) 500 MG capsule Take 1 capsule by mouth 2 (Two) Times a Day. 14 capsule 0   • escitalopram (LEXAPRO) 5 MG tablet Take 5 mg by mouth Daily.     • folic acid (FOLVITE) 400 MCG tablet Take 400 mcg by mouth daily.     • gabapentin (NEURONTIN) 300 MG capsule Take 600 mg by mouth Every Night.  0   • hydrALAZINE (APRESOLINE) 25 MG " tablet Take 1 tablet by mouth 3 (Three) Times a Day. 90 tablet 1   • melatonin 5 MG tablet tablet Take 5 mg by mouth Every Night.     • metoprolol tartrate (LOPRESSOR) 25 MG tablet Take 1 tablet by mouth 2 (Two) Times a Day. 180 tablet 1   • montelukast (SINGULAIR) 10 MG tablet Take 10 mg by mouth Daily.     • ondansetron ODT (ZOFRAN-ODT) 8 MG disintegrating tablet Take 1 tablet by mouth Every 8 (Eight) Hours As Needed for Nausea or Vomiting for up to 60 doses. 60 tablet 5   • pantoprazole (PROTONIX) 40 MG EC tablet Take 1 tablet by mouth Daily. 90 tablet 3   • phenytoin (DILANTIN) 100 MG ER capsule Take 300 mg by mouth every night at bedtime.     • potassium chloride ER (K-TAB) 20 MEQ tablet controlled-release ER tablet TAKE 1 TABLET BY MOUTH TWICE DAILY 180 tablet 0   • sennosides-docusate sodium (SENOKOT-S) 8.6-50 MG tablet Take 2 tablets by mouth Every Night. 60 tablet 1   • vitamin B-12 (CYANOCOBALAMIN) 100 MCG tablet Take 100 mcg by mouth Daily.     • nystatin (MYCOSTATIN) 104711 UNIT/GM powder Apply  topically Every 8 (Eight) Hours. 45 g 1   • PREDNISONE PO Take  by mouth. Pt needs directions ----POST CHEMO       No current facility-administered medications on file prior to visit.        ALLERGIES:     Allergies   Allergen Reactions   • Crab (Diagnostic) Itching and Rash   • Pseudoephedrine Dizziness       Social History     Social History   • Marital status:      Spouse name: JL   • Number of children: 2   • Years of education: N/A     Occupational History   •  Retired     Social History Main Topics   • Smoking status: Never Smoker   • Smokeless tobacco: Never Used   • Alcohol use No   • Drug use: No   • Sexual activity: Defer      Comment:  (Jl)     Other Topics Concern   • Not on file     Social History Narrative   • No narrative on file         Cancer-related family history is not on file.     Review of Systems   Constitutional: Positive for fatigue. Negative for activity change and  "fever.   HENT: Negative.    Eyes: Negative.    Respiratory: Negative.    Cardiovascular: Negative.    Gastrointestinal: Negative.    Endocrine: Negative.    Genitourinary: Negative.    Musculoskeletal: Negative.    Skin: Negative.    Allergic/Immunologic: Negative.    Neurological: Negative.    Hematological: Negative.    Psychiatric/Behavioral: Negative.      A comprehensive 14 point review of systems was performed and was negative except as mentioned.    Objective      Vitals:    07/13/18 0743   BP: 130/68   Pulse: (!) 48   Temp: 97.6 °F (36.4 °C)   Weight: 60.1 kg (132 lb 9.6 oz)   Height: 162 cm (63.78\")   PainSc: 0-No pain     Current Status 7/13/2018   ECOG score 0       Physical Exam    Constitutional: She is oriented to person, place, and time. She appears well-developed and well-nourished. No distress.   Seated in wheel chair   HENT:   Head: Normocephalic and atraumatic.   Eyes: Pupils are equal, round, and reactive to light.   Pulmonary/Chest: Effort normal. No respiratory distress.   Musculoskeletal: Normal range of motion. .   Neurological: She is alert and oriented to person, place, and time.   Skin: Skin is warm and dry. No rash noted.   Considerably reduced erythema and edema of the right elbow with slight warmth and tenderness.  There is no obvious trauma.    Psychiatric: She has a normal mood and affect.   Vitals reviewed.        RECENT LABS:  Hematology WBC   Date Value Ref Range Status   07/13/2018 4.35 4.00 - 10.00 10*3/mm3 Final     RBC   Date Value Ref Range Status   07/13/2018 2.99 (L) 3.90 - 5.00 10*6/mm3 Final     Hemoglobin   Date Value Ref Range Status   07/13/2018 9.5 (L) 11.5 - 14.9 g/dL Final     Hematocrit   Date Value Ref Range Status   07/13/2018 29.7 (L) 34.0 - 45.0 % Final     Platelets   Date Value Ref Range Status   07/13/2018 119 (L) 150 - 375 10*3/mm3 Final      F-18 FDG PET FROM SKULL BASE TO MID THIGH WITH PET/CT FUSION  June 06, 2018    FINDINGS: There has been significant " interval decrease in splenic size  and there has been resolution of the intensely hypermetabolic activity  throughout the spleen. Splenic activity is similar to that of the liver.  There are 2 regions of photopenia within the spleen measuring  approximately 2 cm and these are both within an approximately 4.6 cm  low-attenuation lesion on the corresponding CT sequence. There are also  much smaller low-attenuation splenic lesions which were previously much  larger and heterogeneous. There has been resolution of the intensely  hypermetabolic right hepatic lobe liver lesion. Given constraints of the  examination, there is likely a residual 1.9 cm lesion at the inferior  margin of the right hepatic lobe, previously measuring approximately 4.8  cm. There has been resolution of the hypermetabolic lymphadenopathy  throughout the abdomen and pelvis. The hypermetabolic cervical and  mediastinal lymphadenopathy has resolved as well. There has been  increase in the intensity of activity of the marrow diffusely and there  is a more patchy pattern of metabolic activity within the marrow in some  regions. However, the previously seen hypermetabolic bone lesions have  resolved. There is low-level activity at the bed of the 1.8 cm left  posterior cervical triangle node. There is ill-defined increased density  in this region and the maximal SUV is 3.4, previously 11.2. There has  been significant interval decrease in the size of the left gluteal  lesion measuring approximately 2.5 cm, previously approximately 4 cm.  The current maximal SUV is 3.9 and was previously 5.9.     IMPRESSION:  1. Dramatic interval response with resolution of splenomegaly and the  intense hypermetabolic splenic activity, resolution of the  hypermetabolic liver lesion, lymphadenopathy, and bone metastases. The  left gluteal lesion is smaller and less intensely hypermetabolic.  2. Increase in the intensity of metabolic activity throughout the marrow  space and  patchy marrow activity in some regions is likely chemotherapy  related.            Assessment/Plan     1. 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  · PET scan 5/3/18 with diffuse splenic involvement (SUV 16.9), lymphadenopathy throughout upper abdomen and retroperitoneum (SUV 13.1), right liver lesion 5 cm (SUV 12.8), pelvic lymphadenopathy up to 4 cm, bladder wall thickening with associated hypermetabolism, cervical lymphadenopathy left posterior (SUV 11.2), multiple bone lesions including left intertrochanteric femur, ribs, right scapula, pelvis as well as left gluteal hematoma versus lymphomatous lesion.  · CT-guided liver biopsy 4/26/18 with diffuse large B-cell non-Hodgkin's lymphoma, CD20 positive, double hit (BCL-6 rearrangement 85%, MYC rearrangement 79%), KI-67 4+/4  · Left cervical excisional biopsy by ENT Dr. Oconnor 5/1/18 confirming high-grade B cell non-Hodgkin's lymphoma, Ki-67 100%, double hit (BCL-6 rearrangement 76%, MYC rearrangement 85%)  · Echocardiogram 4/11/18 with ejection fraction 66.7%  · Right port placement Dr Gill 5/4/18  · Patient not felt to be a candidate for more aggressive chemotherapy due to performance status and age (DA EPOCH-R)  · Therefore treated with R CHOP chemotherapy 5/4/18.  · Followed by granix 300mcg daily beginning 5/6/18 through 5/14/18.  · Today, the patient has increasing WBC related to growth factor use, stable hemoglobin, spontaneous improvement in platelet count.  She is doing quite well following her chemotherapy and is ready to go home.  There is some concern regarding her persistently elevated LDH at 347 today as well as her escalating calcium at 11.9.  She is currently asymptomatic from the calcium and her ionized calcium is stable at 6.8.  Therefore we are going to proceed with discharge home.  She will  return to the office later this week on 5/18/18 with repeat labs, nurse practitioner visit, and potential treatment with a third dose of Zometa if her calcium has continued to escalate significantly. She is now seen with plans to begin cycle 2 R CHOP chemotherapy with growth factor support ( Neulasta on body injector).  Will schedule follow-up PET scan after 2 cycles of therapy and see her back in 3 weeks to review her status.  · Patient reviewed June 19 with near resolution of hypermetabolic sites on PET/CT.  Plans made for third cycle of CHOP R, additional Zometa and total of 6 cycles of chemotherapy anticipated.  · She is here today for cycle 4 CHOP R. SHe is tolerating treatment well.    2. Myelosuppression with pancytopenia related to chemotherapy:  · Received granix 300mcg daily previously and will need Neulasta-on body this treatment as well as her subsequent cycles. Her WBC today is within normal limits.    3. Multifactorial anemia:  · Related to anemia chronic disease, chemotherapy, prior iron deficiency.  · Received previous venofer 600mg (5/7 and 5/8) with iron studies from 4/24/18 showing ferritin 411, iron saturation 9%, TIBC 180.  · Hgb stable today at 9.5.    4. Hypercalcemia of malignancy:  · Calcium up to 13.8 on 4/21/18 (albumin 3.3).  Intact PTH 8.1, PTH RP less than 2.0  · Received Zometa 4 mg IV 4/25/18.  · Calcium up to 11.3 on 5/7/18 with second dose of Zometa administered 4 mg IV.  · Calcium today has continued to gradually increase up to 11.9 with albumin 3.3.  Ionized calcium however is stable at 6.8 compared to yesterday.  The patient is asymptomatic.  We will not plan to administer a further dose of Zometa just yet and will allow further time for the patient to respond to chemotherapy.  She returned 518 for continued Zometa and when seen May 25 has a normal calcium level.  · Patient received Zometa on June 19, subsequently every other cycle, restart low-dose calcium  supplementation    5. Hypomagnesemia, hypokalemia:  · Continuing on oral potassium chloride 40 mEq twice a day  · Potassium 4.0      6. Paroxysmal atrial fibrillation:  · Recently diagnosed, anticoagulated with Eliquis initially, discontinued due to expected thrombocytopenia from chemotherapy  · Currently in sinus rhythm, continues on Lopressor 50 mg every 12 hours  · Unable to resume anticoagulation at present due to persistent severe thrombocytopenia.      7. Prior seizure disorder:  · Continues currently on Dilantin 300 mg daily at bedtime and Neurontin 600 mg daily at bedtime, will return to outpatient dose of Neurontin 300 mg daily at bedtime at discharge.      9. GI prophylaxis:  · Protonix 40 mg daily      10. Venous access:  · Port placement 5/4/18 by Dr. Gill      11. DVT- Continue Xarelto 20 mg P daily- samples, Escribed      12. Cellulitis-improving       PLAN:  Proceed with treatment as scheduled today. She will return in 3 weeks to see MD in anticipation of her next treatment. I have instructed her to call the office with any new or worsening symptoms.

## 2018-07-16 RX ORDER — HYDRALAZINE HYDROCHLORIDE 25 MG/1
TABLET, FILM COATED ORAL
Qty: 270 TABLET | Refills: 0 | Status: SHIPPED | OUTPATIENT
Start: 2018-07-16 | End: 2018-10-20 | Stop reason: SDUPTHER

## 2018-07-16 RX ORDER — DOCUSATE SODIUM -SENNOSIDES 50; 8.6 MG/1; MG/1
TABLET, COATED ORAL
Qty: 60 TABLET | Refills: 0 | Status: SHIPPED | OUTPATIENT
Start: 2018-07-16 | End: 2018-08-04 | Stop reason: SDUPTHER

## 2018-07-19 ENCOUNTER — TELEPHONE (OUTPATIENT)
Dept: ONCOLOGY | Facility: CLINIC | Age: 78
End: 2018-07-19

## 2018-07-19 NOTE — TELEPHONE ENCOUNTER
PT CALLING C/O CONSTIPATION AND BLDING HEMORRHOID. PT HASN'T HAD A BM SINCE Monday AND BEC SHE HAS BEEN STRAINING HER HEMORRHOID THAT SHE'S HAD SINCE HER 20'S IS NOW HAVING SOME BLDING. PT HAS TRIED SENOKOT BUT HAS ONLY TAKEN 2 TABS SO FAR. TOLD PT TO DRINK PLENTY OF FLUIDS, TAKE MORE SENOKOT AND MAYBE EVEN MIRALAX AND TO USE TUCKS PADS AND PREP-H. TOLD PT TO CALL BACK IF SHE ISN'T SUCCESSFUL. PT V/U.

## 2018-08-01 DIAGNOSIS — C83.39 DIFFUSE LARGE B-CELL LYMPHOMA OF EXTRANODAL SITE EXCLUDING SPLEEN AND OTHER SOLID ORGANS (HCC): ICD-10-CM

## 2018-08-02 NOTE — PROGRESS NOTES
Subjective .  Patient noted significant and severe constipation with last treatment    REASONS FOR FOLLOWUP:    1. 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 female who we are asked to see April 29, 2018 in consultation for hypercalcemia, anemia and radiologic findings consistent with likely malignancy-?  Lymphoma.        She has a significant past medical history of palpitations followed by cardiology.  She presented to the emergency room April 10-14 with chest pain and palpitations and was found to be in A. fib with RVR and also demonstrated electrolyte abnormalities and anemia.  Records demonstrates that her anemia became particularly evident right April 11 ruptured you an H&H of 8.9 28.4 by can 6510, platelet count 212,000 with a normal automated differential.  She underwent GI assessment including upper and lower endoscopy demonstrated hernia, gastritis, esophagitis, diverticulosis but no evidence of acute bleed.  She been placed on Eliquis as result of a relatively high CHADSVASc score.  She had also been found to be hypercalcemic at approximately 11 with HCTZ altered and the patient started on Aldactone.  Additional testing had included a ferritin of 313.1, iron of 32 with TIBC of 222 and iron saturation of 14, occult blood negative per stool testing    She was brought back to the emergency room April 21 with further evidence of hypercalcemia, associated weakness, anorexia, nausea, ataxia and weight loss.  MRI of the brain April 21 was found to be unremarkable. Outpatient intact PTH levels were found to be 7.1(reduced), and an H&H of 10.3 and 32.9, white count 11,090, platelet count 267,000 with a normal differential.  Patient seen by renal medicine with the possibility of Fanconi syndrome raised.  Thereafter PTH hormone was found be less than 2.0, and prophylactic paresis included IgG of 948, IgA of 207,  IgM 86, total protein 6.3, albumin 2.8, no M spike noted, slightly elevated free kappa and lambda light chains with normal ratio.  Repeat testing per iron profile again revealed low serum iron but high serum ferritin and normal CEA, normal AFP, CA-125 of 77.2, CA 19-9 7.8   At this point the reason for her hypercalcemia was thought to be possible medication effect and/or possible malignancy with calcium was running between 12 and 13 mg/dL.    This led to CT scan of chest abdomen pelvis performed April 24 demonstrate extensive metastatic disease throughout the abdomen and pelvis particularly involving the spleen and liver, extensive lymphadenopathy at the abdomen and pelvis with a 4 cm lesion splenic hilum partially encasing the pancreatic tail, pancreatic tail malignancy?,  Gastric primary malignancy? There was an approximately a 5 cm mass along the right wall of the urinary bladder with adjacent adenopathy.  CT of the chest revealed several tiny dense pleural-based nodular opacities-partially calcified granulomas, no mediastinal or hilar adenopathy, enlarged pulmonary arteries consistent with pulmonary arterial hypertension.  His subsequent bone scan performed April 26 revealed prominent uptake in the right frontal bone and right posterior ninth rib and a CT needle biopsy of the liver was obtained April 26 as well.The sample obtained revealed, on flow cytometry examination, a subpopulation of normal B cells that might be  B-cell lymphoma.  This was eventually felt consistent with diffuse large B-cell non-Hodgkin's lymphoma, CD20 positive, double hit (BCL-6 rearrangement 85%, MYC rearrangement 79%), KI-67 4+/4. This led to the patient receiving R CHOP chemotherapy May 04, 2018 followed by Granix.  She is able to be discharged May 16, 2018.    We made plans for her to be seen in follow-up for continued Zometa and a second cycle of CHOP R chemotherapy by May 25 with follow-up PET/CT after 2 cycles of treatment.  She  is seen back in office May 18 given Zometa with findings of potential right lower extremity DVT.  Doppler is positive for acute popliteal and calf vein DVT as well as superficial venous thrombosis and the patient was started on Xarelto.  She was seen again May 21 mouth additional redness and swelling per her right elbow and felt to have cellulitis started on Keflex as an outpatient.  She had been taking the Xarelto with some improvement in her right lower extremity.    The patient's next seen family history 25th 2018 and, fortunately, her cellulitis is resolving as well is her thrombosis per right lower extremity, albeit slowly.     As the patient's second cycle of CHOP R she underwent additional scans including repeat PET/CT.  This demonstrates a dramatic response with resolution of splenomegaly and associated hypermetabolic activity, resolution of hypermetabolic liver lesion, lymphadenopathy and bony metastasis as well as reduction left gluteal lesion.  She has near remission after these 2 cycles of chemotherapy and we discussed continuing chemotherapy with total of 6 cycles of CHOP R.  We will plan the additional Zometa today and plan for choose every other cycle at this point.  The patient is next seen August 03, 2018 having done well with treatment except for development of a constipation that was difficult to manage.  It is now resolved but we will be reducing her Oncovin for the remainder of treatments.         Past Medical History:   Diagnosis Date   • Anemia     multifactorial   • Cystitis    • Cystocele     moderate   • Dyslipidemia    • Esophageal reflux    • Fatigue    • History of transfusion     no reaction   • Hypercalcemia    • Hypertension    • Major depression, chronic    • Menopause    • Non Hodgkin's lymphoma (CMS/HCC)    • Osteoarthritis    • Osteoporosis    • Palpitations    • Paroxysmal atrial fibrillation (CMS/HCC)    • Post menopausal problems    • RLS (restless legs syndrome)    • Seizure  "disorder (CMS/HCC)    • Subjective tinnitus    • Vaginal delivery     x2  \"SONYA\"      \"JASON\"   • Vitamin D deficiency        ONCOLOGIC HISTORY:  (History from previous dates can be found in the separate document.)    Current Outpatient Prescriptions on File Prior to Visit   Medication Sig Dispense Refill   • allopurinol (ZYLOPRIM) 300 MG tablet TAKE 1 TABLET BY MOUTH DAILY 90 tablet 0   • amLODIPine (NORVASC) 10 MG tablet Take 1 tablet by mouth Daily. 90 tablet 3   • benazepril (LOTENSIN) 40 MG tablet Take 1 tablet by mouth Daily. 90 tablet 3   • cephalexin (KEFLEX) 500 MG capsule Take 1 capsule by mouth 2 (Two) Times a Day. 14 capsule 0   • escitalopram (LEXAPRO) 5 MG tablet Take 5 mg by mouth Daily.     • folic acid (FOLVITE) 400 MCG tablet Take 400 mcg by mouth daily.     • gabapentin (NEURONTIN) 300 MG capsule Take 600 mg by mouth Every Night.  0   • hydrALAZINE (APRESOLINE) 25 MG tablet TAKE 1 TABLET BY MOUTH THREE TIMES DAILY 270 tablet 0   • lidocaine-prilocaine (EMLA) 2.5-2.5 % cream Apply 30 min prior to port access 5 g 2   • melatonin 5 MG tablet tablet Take 5 mg by mouth Every Night.     • metoprolol tartrate (LOPRESSOR) 25 MG tablet Take 1 tablet by mouth 2 (Two) Times a Day. 180 tablet 1   • montelukast (SINGULAIR) 10 MG tablet Take 10 mg by mouth Daily.     • nystatin (MYCOSTATIN) 453574 UNIT/GM powder Apply  topically Every 8 (Eight) Hours. 45 g 1   • ondansetron ODT (ZOFRAN-ODT) 8 MG disintegrating tablet Take 1 tablet by mouth Every 8 (Eight) Hours As Needed for Nausea or Vomiting for up to 60 doses. 60 tablet 5   • pantoprazole (PROTONIX) 40 MG EC tablet Take 1 tablet by mouth Daily. 90 tablet 3   • phenytoin (DILANTIN) 100 MG ER capsule Take 300 mg by mouth every night at bedtime.     • potassium chloride ER (K-TAB) 20 MEQ tablet controlled-release ER tablet TAKE 1 TABLET BY MOUTH TWICE DAILY 180 tablet 0   • predniSONE (DELTASONE) 50 MG tablet Take 2 50mg tablets per day for 5 days after chemo 10 " "tablet 2   • SENEXON-S 8.6-50 MG per tablet TAKE 2 TABLETS BY MOUTH EVERY EVENING 60 tablet 0   • vitamin B-12 (CYANOCOBALAMIN) 100 MCG tablet Take 100 mcg by mouth Daily.       No current facility-administered medications on file prior to visit.        ALLERGIES:     Allergies   Allergen Reactions   • Crab (Diagnostic) Itching and Rash   • Pseudoephedrine Dizziness       Social History     Social History   • Marital status:      Spouse name: JL   • Number of children: 2   • Years of education: N/A     Occupational History   •  Retired     Social History Main Topics   • Smoking status: Never Smoker   • Smokeless tobacco: Never Used   • Alcohol use No   • Drug use: No   • Sexual activity: Defer      Comment:  (Jl)     Other Topics Concern   • Not on file     Social History Narrative   • No narrative on file         Cancer-related family history is not on file.     Review of Systems  A comprehensive 14 point review of systems was performed and was negative except as mentioned.    Objective      Vitals:    08/03/18 0753   BP: 120/66   Pulse: (!) 47   Resp: 14   Temp: 98 °F (36.7 °C)   TempSrc: Oral   SpO2: 100%   Weight: 59.6 kg (131 lb 6.4 oz)   Height: 162 cm (63.78\")   PainSc: 0-No pain     Current Status 8/3/2018   ECOG score 0       Physical Exam    Constitutional: She is oriented to person, place, and time. She appears well-developed and well-nourished. No distress.   Seated in wheel chair   HENT:   Head: Normocephalic and atraumatic.   Eyes: Pupils are equal, round, and reactive to light.   Pulmonary/Chest: Effort normal. No respiratory distress.   Musculoskeletal: Normal range of motion. She exhibits edema (R>L improved).   Resolving thrombophlebitis of right inner thigh- improved at the groin and just above the right knee.    Neurological: She is alert and oriented to person, place, and time.   Skin: Skin is warm and dry. No rash noted.   Considerably reduced erythema and edema of the " right elbow with slight warmth and tenderness.  There is no obvious trauma.    Psychiatric: She has a normal mood and affect.   Vitals reviewed.        RECENT LABS:  Hematology WBC   Date Value Ref Range Status   08/03/2018 5.52 4.00 - 10.00 10*3/mm3 Final     RBC   Date Value Ref Range Status   08/03/2018 2.79 (L) 3.90 - 5.00 10*6/mm3 Final     Hemoglobin   Date Value Ref Range Status   08/03/2018 9.4 (L) 11.5 - 14.9 g/dL Final     Hematocrit   Date Value Ref Range Status   08/03/2018 28.7 (L) 34.0 - 45.0 % Final     Platelets   Date Value Ref Range Status   08/03/2018 140 (L) 150 - 375 10*3/mm3 Final      F-18 FDG PET FROM SKULL BASE TO MID THIGH WITH PET/CT FUSION  June 06, 2018    FINDINGS: There has been significant interval decrease in splenic size  and there has been resolution of the intensely hypermetabolic activity  throughout the spleen. Splenic activity is similar to that of the liver.  There are 2 regions of photopenia within the spleen measuring  approximately 2 cm and these are both within an approximately 4.6 cm  low-attenuation lesion on the corresponding CT sequence. There are also  much smaller low-attenuation splenic lesions which were previously much  larger and heterogeneous. There has been resolution of the intensely  hypermetabolic right hepatic lobe liver lesion. Given constraints of the  examination, there is likely a residual 1.9 cm lesion at the inferior  margin of the right hepatic lobe, previously measuring approximately 4.8  cm. There has been resolution of the hypermetabolic lymphadenopathy  throughout the abdomen and pelvis. The hypermetabolic cervical and  mediastinal lymphadenopathy has resolved as well. There has been  increase in the intensity of activity of the marrow diffusely and there  is a more patchy pattern of metabolic activity within the marrow in some  regions. However, the previously seen hypermetabolic bone lesions have  resolved. There is low-level activity at the  bed of the 1.8 cm left  posterior cervical triangle node. There is ill-defined increased density  in this region and the maximal SUV is 3.4, previously 11.2. There has  been significant interval decrease in the size of the left gluteal  lesion measuring approximately 2.5 cm, previously approximately 4 cm.  The current maximal SUV is 3.9 and was previously 5.9.     IMPRESSION:  1. Dramatic interval response with resolution of splenomegaly and the  intense hypermetabolic splenic activity, resolution of the  hypermetabolic liver lesion, lymphadenopathy, and bone metastases. The  left gluteal lesion is smaller and less intensely hypermetabolic.  2. Increase in the intensity of metabolic activity throughout the marrow  space and patchy marrow activity in some regions is likely chemotherapy  related.            Assessment/Plan     1. 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  · PET scan 5/3/18 with diffuse splenic involvement (SUV 16.9), lymphadenopathy throughout upper abdomen and retroperitoneum (SUV 13.1), right liver lesion 5 cm (SUV 12.8), pelvic lymphadenopathy up to 4 cm, bladder wall thickening with associated hypermetabolism, cervical lymphadenopathy left posterior (SUV 11.2), multiple bone lesions including left intertrochanteric femur, ribs, right scapula, pelvis as well as left gluteal hematoma versus lymphomatous lesion.  · CT-guided liver biopsy 4/26/18 with diffuse large B-cell non-Hodgkin's lymphoma, CD20 positive, double hit (BCL-6 rearrangement 85%, MYC rearrangement 79%), KI-67 4+/4  · Left cervical excisional biopsy by ENT Dr. Oconnor 5/1/18 confirming high-grade B cell non-Hodgkin's lymphoma, Ki-67 100%, double hit (BCL-6 rearrangement 76%, MYC rearrangement 85%)  · Echocardiogram 4/11/18 with ejection fraction 66.7%  · Right port placement Dr Gill  5/4/18  · Patient not felt to be a candidate for more aggressive chemotherapy due to performance status and age (DA EPOCH-R)  · Therefore treated with R CHOP chemotherapy 5/4/18.  · Followed by granix 300mcg daily beginning 5/6/18 through 5/14/18.  · Today, the patient has increasing WBC related to growth factor use, stable hemoglobin, spontaneous improvement in platelet count.  She is doing quite well following her chemotherapy and is ready to go home.  There is some concern regarding her persistently elevated LDH at 347 today as well as her escalating calcium at 11.9.  She is currently asymptomatic from the calcium and her ionized calcium is stable at 6.8.  Therefore we are going to proceed with discharge home.  She will return to the office later this week on 5/18/18 with repeat labs, nurse practitioner visit, and potential treatment with a third dose of Zometa if her calcium has continued to escalate significantly. She is now seen with plans to begin cycle 2 R CHOP chemotherapy with growth factor support ( Neulasta on body injector).  Will schedule follow-up PET scan after 2 cycles of therapy and see her back in 3 weeks to review her status.  · Patient reviewed June 19 with near resolution of hypermetabolic sites on PET/CT.  Plans made for third cycle of CHOP R, additional Zometa and total of 6 cycles of chemotherapy anticipated.  · Patient seen August 03, 2018 for fifth cycle of chemotherapy-CHOP R, reduction of Oncovin 50%, 6 cycles planned.  Discussed subsequent CT scan follow-up.    2. Myelosuppression with pancytopenia related to chemotherapy:  · Received granix 300mcg daily previously and will need Neulasta-on body this treatment as well as her subsequent cycles.  ·   3. Multifactorial anemia:  · Related to anemia chronic disease, chemotherapy, prior iron deficiency.  · Received previous venofer 600mg (5/7 and 5/8) with iron studies from 4/24/18 showing ferritin 411, iron saturation 9%, TIBC  180.    4. Hypercalcemia of malignancy:  · Calcium up to 13.8 on 4/21/18 (albumin 3.3).  Intact PTH 8.1, PTH RP less than 2.0  · Received Zometa 4 mg IV 4/25/18.  · Calcium up to 11.3 on 5/7/18 with second dose of Zometa administered 4 mg IV.  · Calcium today has continued to gradually increase up to 11.9 with albumin 3.3.  Ionized calcium however is stable at 6.8 compared to yesterday.  The patient is asymptomatic.  We will not plan to administer a further dose of Zometa just yet and will allow further time for the patient to respond to chemotherapy.  She returned 518 for continued Zometa and when seen May 25 has a normal calcium level.  · Patient to receive Zometa June 19, subsequently every other cycle, restart low-dose calcium supplementation  · Patient seen August 03, 2018, plans made for Zometa with her final course of chemotherapy    5. Hypomagnesemia, hypokalemia:  · Continuing on oral potassium chloride 40 mEq twice a day  · Potassium 4.0      6. Paroxysmal atrial fibrillation:  · Recently diagnosed, anticoagulated with Eliquis initially, discontinued due to expected thrombocytopenia from chemotherapy  · Currently in sinus rhythm, continues on Lopressor 50 mg every 12 hours  · Unable to resume anticoagulation at present due to persistent severe thrombocytopenia.      7. Prior seizure disorder:  · Continues currently on Dilantin 300 mg daily at bedtime and Neurontin 600 mg daily at bedtime, will return to outpatient dose of Neurontin 300 mg daily at bedtime at discharge.      9. GI prophylaxis:  · Protonix 40 mg daily      10. Venous access:  · Port placement 5/4/18 by Dr. Gill      11. DVT- Continue Xarelto 20 mg P daily- samples, Escribed      12. Cellulitis-improving

## 2018-08-03 ENCOUNTER — INFUSION (OUTPATIENT)
Dept: ONCOLOGY | Facility: HOSPITAL | Age: 78
End: 2018-08-03

## 2018-08-03 ENCOUNTER — OFFICE VISIT (OUTPATIENT)
Dept: ONCOLOGY | Facility: CLINIC | Age: 78
End: 2018-08-03

## 2018-08-03 VITALS
TEMPERATURE: 98 F | DIASTOLIC BLOOD PRESSURE: 66 MMHG | HEIGHT: 64 IN | SYSTOLIC BLOOD PRESSURE: 120 MMHG | WEIGHT: 131.4 LBS | OXYGEN SATURATION: 100 % | HEART RATE: 47 BPM | RESPIRATION RATE: 14 BRPM | BODY MASS INDEX: 22.43 KG/M2

## 2018-08-03 VITALS — DIASTOLIC BLOOD PRESSURE: 66 MMHG | SYSTOLIC BLOOD PRESSURE: 125 MMHG | HEART RATE: 56 BPM

## 2018-08-03 DIAGNOSIS — C83.39 DIFFUSE LARGE B-CELL LYMPHOMA OF EXTRANODAL SITE EXCLUDING SPLEEN AND OTHER SOLID ORGANS (HCC): Primary | ICD-10-CM

## 2018-08-03 DIAGNOSIS — R53.1 WEAKNESS: ICD-10-CM

## 2018-08-03 DIAGNOSIS — C83.39 DIFFUSE LARGE B-CELL LYMPHOMA OF EXTRANODAL SITE EXCLUDING SPLEEN AND OTHER SOLID ORGANS (HCC): ICD-10-CM

## 2018-08-03 LAB
ALBUMIN SERPL-MCNC: 3.7 G/DL (ref 3.5–5.2)
ALBUMIN/GLOB SERPL: 1.3 G/DL (ref 1.1–2.4)
ALP SERPL-CCNC: 121 U/L (ref 38–116)
ALT SERPL W P-5'-P-CCNC: 13 U/L (ref 0–33)
ANION GAP SERPL CALCULATED.3IONS-SCNC: 11.1 MMOL/L
AST SERPL-CCNC: 21 U/L (ref 0–32)
BACTERIA UR QL AUTO: ABNORMAL /HPF
BASOPHILS # BLD AUTO: 0.11 10*3/MM3 (ref 0–0.1)
BASOPHILS NFR BLD AUTO: 2 % (ref 0–1.1)
BILIRUB SERPL-MCNC: 0.2 MG/DL (ref 0.1–1.2)
BILIRUB UR QL STRIP: NEGATIVE
BUN BLD-MCNC: 16 MG/DL (ref 6–20)
BUN/CREAT SERPL: 30.8 (ref 7.3–30)
CALCIUM SPEC-SCNC: 9.2 MG/DL (ref 8.5–10.2)
CHLORIDE SERPL-SCNC: 107 MMOL/L (ref 98–107)
CLARITY UR: ABNORMAL
CO2 SERPL-SCNC: 24.9 MMOL/L (ref 22–29)
COLOR UR: YELLOW
CREAT BLD-MCNC: 0.52 MG/DL (ref 0.6–1.1)
DEPRECATED RDW RBC AUTO: 71.7 FL (ref 37–49)
EOSINOPHIL # BLD AUTO: 0.04 10*3/MM3 (ref 0–0.36)
EOSINOPHIL NFR BLD AUTO: 0.7 % (ref 1–5)
ERYTHROCYTE [DISTWIDTH] IN BLOOD BY AUTOMATED COUNT: 18.6 % (ref 11.7–14.5)
GFR SERPL CREATININE-BSD FRML MDRD: 114 ML/MIN/1.73
GLOBULIN UR ELPH-MCNC: 2.8 GM/DL (ref 1.8–3.5)
GLUCOSE BLD-MCNC: 87 MG/DL (ref 74–124)
GLUCOSE UR STRIP-MCNC: NEGATIVE MG/DL
HCT VFR BLD AUTO: 28.7 % (ref 34–45)
HGB BLD-MCNC: 9.4 G/DL (ref 11.5–14.9)
HGB UR QL STRIP.AUTO: NEGATIVE
HYALINE CASTS UR QL AUTO: ABNORMAL /LPF
IMM GRANULOCYTES # BLD: 0.03 10*3/MM3 (ref 0–0.03)
IMM GRANULOCYTES NFR BLD: 0.5 % (ref 0–0.5)
KETONES UR QL STRIP: NEGATIVE
LEUKOCYTE ESTERASE UR QL STRIP.AUTO: ABNORMAL
LYMPHOCYTES # BLD AUTO: 0.46 10*3/MM3 (ref 1–3.5)
LYMPHOCYTES NFR BLD AUTO: 8.3 % (ref 20–49)
MCH RBC QN AUTO: 33.7 PG (ref 27–33)
MCHC RBC AUTO-ENTMCNC: 32.8 G/DL (ref 32–35)
MCV RBC AUTO: 102.9 FL (ref 83–97)
MONOCYTES # BLD AUTO: 1.15 10*3/MM3 (ref 0.25–0.8)
MONOCYTES NFR BLD AUTO: 20.8 % (ref 4–12)
NEUTROPHILS # BLD AUTO: 3.73 10*3/MM3 (ref 1.5–7)
NEUTROPHILS NFR BLD AUTO: 67.7 % (ref 39–75)
NITRITE UR QL STRIP: NEGATIVE
NRBC BLD MANUAL-RTO: 0 /100 WBC (ref 0–0)
PH UR STRIP.AUTO: 5.5 [PH] (ref 4.5–8)
PLATELET # BLD AUTO: 140 10*3/MM3 (ref 150–375)
PMV BLD AUTO: 9.3 FL (ref 8.9–12.1)
POTASSIUM BLD-SCNC: 3.8 MMOL/L (ref 3.5–4.7)
PROT SERPL-MCNC: 6.5 G/DL (ref 6.3–8)
PROT UR QL STRIP: NEGATIVE
RBC # BLD AUTO: 2.79 10*6/MM3 (ref 3.9–5)
RBC # UR: ABNORMAL /HPF
REF LAB TEST METHOD: ABNORMAL
SODIUM BLD-SCNC: 143 MMOL/L (ref 134–145)
SP GR UR STRIP: 1.02 (ref 1–1.03)
SQUAMOUS #/AREA URNS HPF: ABNORMAL /HPF
UROBILINOGEN UR QL STRIP: ABNORMAL
WBC NRBC COR # BLD: 5.52 10*3/MM3 (ref 4–10)
WBC UR QL AUTO: ABNORMAL /HPF
YEAST URNS QL MICRO: ABNORMAL /HPF

## 2018-08-03 PROCEDURE — 81001 URINALYSIS AUTO W/SCOPE: CPT | Performed by: INTERNAL MEDICINE

## 2018-08-03 PROCEDURE — 25010000002 CYCLOPHOSPHAMIDE PER 100 MG: Performed by: INTERNAL MEDICINE

## 2018-08-03 PROCEDURE — 25010000003 DEXAMETHASONE SODIUM PHOSPHATE 100 MG/10ML SOLUTION: Performed by: INTERNAL MEDICINE

## 2018-08-03 PROCEDURE — 25010000002 RITUXIMAB 10 MG/ML SOLUTION 10 ML VIAL: Performed by: INTERNAL MEDICINE

## 2018-08-03 PROCEDURE — 25010000002 DOXORUBICIN PER 10 MG: Performed by: INTERNAL MEDICINE

## 2018-08-03 PROCEDURE — 25010000002 FOSAPREPITANT PER 1 MG: Performed by: INTERNAL MEDICINE

## 2018-08-03 PROCEDURE — 96411 CHEMO IV PUSH ADDL DRUG: CPT | Performed by: INTERNAL MEDICINE

## 2018-08-03 PROCEDURE — 96377 APPLICATON ON-BODY INJECTOR: CPT | Performed by: INTERNAL MEDICINE

## 2018-08-03 PROCEDURE — 96413 CHEMO IV INFUSION 1 HR: CPT | Performed by: INTERNAL MEDICINE

## 2018-08-03 PROCEDURE — 96375 TX/PRO/DX INJ NEW DRUG ADDON: CPT | Performed by: INTERNAL MEDICINE

## 2018-08-03 PROCEDURE — 25010000002 PALONOSETRON PER 25 MCG: Performed by: INTERNAL MEDICINE

## 2018-08-03 PROCEDURE — 99214 OFFICE O/P EST MOD 30 MIN: CPT | Performed by: INTERNAL MEDICINE

## 2018-08-03 PROCEDURE — 96415 CHEMO IV INFUSION ADDL HR: CPT | Performed by: INTERNAL MEDICINE

## 2018-08-03 PROCEDURE — 80053 COMPREHEN METABOLIC PANEL: CPT

## 2018-08-03 PROCEDURE — 25010000002 VINCRISTINE PER 1 MG: Performed by: INTERNAL MEDICINE

## 2018-08-03 PROCEDURE — 25010000002 PEGFILGRASTIM 6 MG/0.6ML PREFILLED SYRINGE KIT: Performed by: INTERNAL MEDICINE

## 2018-08-03 PROCEDURE — 87086 URINE CULTURE/COLONY COUNT: CPT | Performed by: INTERNAL MEDICINE

## 2018-08-03 PROCEDURE — 85025 COMPLETE CBC W/AUTO DIFF WBC: CPT

## 2018-08-03 PROCEDURE — 96367 TX/PROPH/DG ADDL SEQ IV INF: CPT | Performed by: INTERNAL MEDICINE

## 2018-08-03 PROCEDURE — 25010000002 DIPHENHYDRAMINE PER 50 MG: Performed by: INTERNAL MEDICINE

## 2018-08-03 PROCEDURE — 25010000002 RITUXIMAB 10 MG/ML SOLUTION 50 ML VIAL: Performed by: INTERNAL MEDICINE

## 2018-08-03 RX ORDER — DIPHENHYDRAMINE HYDROCHLORIDE 50 MG/ML
50 INJECTION INTRAMUSCULAR; INTRAVENOUS AS NEEDED
Status: CANCELLED | OUTPATIENT
Start: 2018-08-03

## 2018-08-03 RX ORDER — PALONOSETRON 0.05 MG/ML
0.25 INJECTION, SOLUTION INTRAVENOUS ONCE
Status: COMPLETED | OUTPATIENT
Start: 2018-08-03 | End: 2018-08-03

## 2018-08-03 RX ORDER — FAMOTIDINE 20 MG/1
20 TABLET, FILM COATED ORAL ONCE
Status: COMPLETED | OUTPATIENT
Start: 2018-08-03 | End: 2018-08-03

## 2018-08-03 RX ORDER — SODIUM CHLORIDE 9 MG/ML
250 INJECTION, SOLUTION INTRAVENOUS ONCE
Status: CANCELLED | OUTPATIENT
Start: 2018-08-03

## 2018-08-03 RX ORDER — PALONOSETRON 0.05 MG/ML
0.25 INJECTION, SOLUTION INTRAVENOUS ONCE
Status: CANCELLED | OUTPATIENT
Start: 2018-08-03

## 2018-08-03 RX ORDER — DOXORUBICIN HYDROCHLORIDE 2 MG/ML
50 INJECTION, SOLUTION INTRAVENOUS ONCE
Status: CANCELLED | OUTPATIENT
Start: 2018-08-03

## 2018-08-03 RX ORDER — FAMOTIDINE 20 MG/1
20 TABLET, FILM COATED ORAL ONCE
Status: CANCELLED
Start: 2018-08-03 | End: 2018-08-03

## 2018-08-03 RX ORDER — DOXORUBICIN HYDROCHLORIDE 2 MG/ML
50 INJECTION, SOLUTION INTRAVENOUS ONCE
Status: COMPLETED | OUTPATIENT
Start: 2018-08-03 | End: 2018-08-03

## 2018-08-03 RX ORDER — ACETAMINOPHEN 325 MG/1
650 TABLET ORAL ONCE
Status: COMPLETED | OUTPATIENT
Start: 2018-08-03 | End: 2018-08-03

## 2018-08-03 RX ORDER — ACETAMINOPHEN 325 MG/1
650 TABLET ORAL ONCE
Status: CANCELLED | OUTPATIENT
Start: 2018-08-03

## 2018-08-03 RX ORDER — FAMOTIDINE 10 MG/ML
20 INJECTION, SOLUTION INTRAVENOUS AS NEEDED
Status: CANCELLED | OUTPATIENT
Start: 2018-08-03

## 2018-08-03 RX ORDER — SODIUM CHLORIDE 9 MG/ML
250 INJECTION, SOLUTION INTRAVENOUS ONCE
Status: COMPLETED | OUTPATIENT
Start: 2018-08-03 | End: 2018-08-03

## 2018-08-03 RX ORDER — MEPERIDINE HYDROCHLORIDE 50 MG/ML
25 INJECTION INTRAMUSCULAR; INTRAVENOUS; SUBCUTANEOUS
Status: CANCELLED | OUTPATIENT
Start: 2018-08-03

## 2018-08-03 RX ADMIN — PALONOSETRON HYDROCHLORIDE 0.25 MG: 0.25 INJECTION INTRAVENOUS at 09:01

## 2018-08-03 RX ADMIN — FAMOTIDINE 20 MG: 20 TABLET ORAL at 08:58

## 2018-08-03 RX ADMIN — PEGFILGRASTIM 6 MG: KIT SUBCUTANEOUS at 13:51

## 2018-08-03 RX ADMIN — DIPHENHYDRAMINE HYDROCHLORIDE 50 MG: 50 INJECTION, SOLUTION INTRAMUSCULAR; INTRAVENOUS at 09:53

## 2018-08-03 RX ADMIN — RITUXIMAB 600 MG: 10 INJECTION, SOLUTION INTRAVENOUS at 11:27

## 2018-08-03 RX ADMIN — VINCRISTINE SULFATE 1 MG: 1 INJECTION, SOLUTION INTRAVENOUS at 10:26

## 2018-08-03 RX ADMIN — DOXORUBICIN HYDROCHLORIDE 86 MG: 2 INJECTION, SOLUTION INTRAVENOUS at 10:15

## 2018-08-03 RX ADMIN — DEXAMETHASONE SODIUM PHOSPHATE 12 MG: 10 INJECTION, SOLUTION INTRAMUSCULAR; INTRAVENOUS at 09:35

## 2018-08-03 RX ADMIN — SODIUM CHLORIDE 250 ML: 900 INJECTION, SOLUTION INTRAVENOUS at 08:57

## 2018-08-03 RX ADMIN — SODIUM CHLORIDE 150 MG: 9 INJECTION, SOLUTION INTRAVENOUS at 09:03

## 2018-08-03 RX ADMIN — ACETAMINOPHEN 650 MG: 325 TABLET, FILM COATED ORAL at 08:58

## 2018-08-03 RX ADMIN — CYCLOPHOSPHAMIDE 1280 MG: 1 INJECTION, POWDER, FOR SOLUTION INTRAVENOUS; ORAL at 10:37

## 2018-08-05 LAB — BACTERIA SPEC AEROBE CULT: NORMAL

## 2018-08-06 RX ORDER — DOCUSATE SODIUM -SENNOSIDES 50; 8.6 MG/1; MG/1
TABLET, COATED ORAL
Qty: 60 TABLET | Refills: 0 | Status: SHIPPED | OUTPATIENT
Start: 2018-08-06 | End: 2019-10-22

## 2018-08-21 DIAGNOSIS — C83.39 DIFFUSE LARGE B-CELL LYMPHOMA OF EXTRANODAL SITE EXCLUDING SPLEEN AND OTHER SOLID ORGANS (HCC): ICD-10-CM

## 2018-08-21 DIAGNOSIS — E83.52 HYPERCALCEMIA: ICD-10-CM

## 2018-08-22 NOTE — PROGRESS NOTES
Subjective .  Patient noted significant and severe constipation with last treatment    REASONS FOR FOLLOWUP:    1. 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 female who we are asked to see April 29, 2018 in consultation for hypercalcemia, anemia and radiologic findings consistent with likely malignancy-?  Lymphoma.        She has a significant past medical history of palpitations followed by cardiology.  She presented to the emergency room April 10-14 with chest pain and palpitations and was found to be in A. fib with RVR and also demonstrated electrolyte abnormalities and anemia.  Records demonstrates that her anemia became particularly evident right April 11 ruptured you an H&H of 8.9 28.4 by can 6510, platelet count 212,000 with a normal automated differential.  She underwent GI assessment including upper and lower endoscopy demonstrated hernia, gastritis, esophagitis, diverticulosis but no evidence of acute bleed.  She been placed on Eliquis as result of a relatively high CHADSVASc score.  She had also been found to be hypercalcemic at approximately 11 with HCTZ altered and the patient started on Aldactone.  Additional testing had included a ferritin of 313.1, iron of 32 with TIBC of 222 and iron saturation of 14, occult blood negative per stool testing    She was brought back to the emergency room April 21 with further evidence of hypercalcemia, associated weakness, anorexia, nausea, ataxia and weight loss.  MRI of the brain April 21 was found to be unremarkable. Outpatient intact PTH levels were found to be 7.1(reduced), and an H&H of 10.3 and 32.9, white count 11,090, platelet count 267,000 with a normal differential.  Patient seen by renal medicine with the possibility of Fanconi syndrome raised.  Thereafter PTH hormone was found be less than 2.0, and prophylactic paresis included IgG of 948, IgA of 207,  IgM 86, total protein 6.3, albumin 2.8, no M spike noted, slightly elevated free kappa and lambda light chains with normal ratio.  Repeat testing per iron profile again revealed low serum iron but high serum ferritin and normal CEA, normal AFP, CA-125 of 77.2, CA 19-9 7.8   At this point the reason for her hypercalcemia was thought to be possible medication effect and/or possible malignancy with calcium was running between 12 and 13 mg/dL.    This led to CT scan of chest abdomen pelvis performed April 24 demonstrate extensive metastatic disease throughout the abdomen and pelvis particularly involving the spleen and liver, extensive lymphadenopathy at the abdomen and pelvis with a 4 cm lesion splenic hilum partially encasing the pancreatic tail, pancreatic tail malignancy?,  Gastric primary malignancy? There was an approximately a 5 cm mass along the right wall of the urinary bladder with adjacent adenopathy.  CT of the chest revealed several tiny dense pleural-based nodular opacities-partially calcified granulomas, no mediastinal or hilar adenopathy, enlarged pulmonary arteries consistent with pulmonary arterial hypertension.  His subsequent bone scan performed April 26 revealed prominent uptake in the right frontal bone and right posterior ninth rib and a CT needle biopsy of the liver was obtained April 26 as well.The sample obtained revealed, on flow cytometry examination, a subpopulation of normal B cells that might be  B-cell lymphoma.  This was eventually felt consistent with diffuse large B-cell non-Hodgkin's lymphoma, CD20 positive, double hit (BCL-6 rearrangement 85%, MYC rearrangement 79%), KI-67 4+/4. This led to the patient receiving R CHOP chemotherapy May 04, 2018 followed by Granix.  She is able to be discharged May 16, 2018.    We made plans for her to be seen in follow-up for continued Zometa and a second cycle of CHOP R chemotherapy by May 25 with follow-up PET/CT after 2 cycles of treatment.  She  is seen back in office May 18 given Zometa with findings of potential right lower extremity DVT.  Doppler is positive for acute popliteal and calf vein DVT as well as superficial venous thrombosis and the patient was started on Xarelto.  She was seen again May 21 mouth additional redness and swelling per her right elbow and felt to have cellulitis started on Keflex as an outpatient.  She had been taking the Xarelto with some improvement in her right lower extremity.    The patient's next seen family history 25th 2018 and, fortunately, her cellulitis is resolving as well is her thrombosis per right lower extremity, albeit slowly.     As the patient's second cycle of CHOP R she underwent additional scans including repeat PET/CT.  This demonstrates a dramatic response with resolution of splenomegaly and associated hypermetabolic activity, resolution of hypermetabolic liver lesion, lymphadenopathy and bony metastasis as well as reduction left gluteal lesion.  She has near remission after these 2 cycles of chemotherapy and we discussed continuing chemotherapy with total of 6 cycles of CHOP R.  We will plan the additional Zometa today and plan for choose every other cycle at this point.  The patient is next seen August 03, 2018 having done well with treatment except for development of a constipation that was difficult to manage.  It is now resolved but we will be reducing her Oncovin for the remainder of treatments.         Past Medical History:   Diagnosis Date   • Anemia     multifactorial   • Cystitis    • Cystocele     moderate   • Dyslipidemia    • Esophageal reflux    • Fatigue    • History of transfusion     no reaction   • Hypercalcemia    • Hypertension    • Major depression, chronic    • Menopause    • Non Hodgkin's lymphoma (CMS/HCC)    • Osteoarthritis    • Osteoporosis    • Palpitations    • Paroxysmal atrial fibrillation (CMS/HCC)    • Post menopausal problems    • RLS (restless legs syndrome)    • Seizure  "disorder (CMS/HCC)    • Subjective tinnitus    • Vaginal delivery     x2  \"SONYA\"      \"JASON\"   • Vitamin D deficiency        ONCOLOGIC HISTORY:  (History from previous dates can be found in the separate document.)    Current Outpatient Prescriptions on File Prior to Visit   Medication Sig Dispense Refill   • allopurinol (ZYLOPRIM) 300 MG tablet TAKE 1 TABLET BY MOUTH DAILY 90 tablet 0   • amLODIPine (NORVASC) 10 MG tablet Take 1 tablet by mouth Daily. 90 tablet 3   • benazepril (LOTENSIN) 40 MG tablet Take 1 tablet by mouth Daily. 90 tablet 3   • escitalopram (LEXAPRO) 5 MG tablet Take 5 mg by mouth Daily.     • folic acid (FOLVITE) 400 MCG tablet Take 400 mcg by mouth daily.     • hydrALAZINE (APRESOLINE) 25 MG tablet TAKE 1 TABLET BY MOUTH THREE TIMES DAILY 270 tablet 0   • lidocaine-prilocaine (EMLA) 2.5-2.5 % cream Apply 30 min prior to port access 5 g 2   • melatonin 5 MG tablet tablet Take 5 mg by mouth Every Night.     • metoprolol tartrate (LOPRESSOR) 25 MG tablet Take 1 tablet by mouth 2 (Two) Times a Day. 180 tablet 1   • montelukast (SINGULAIR) 10 MG tablet Take 10 mg by mouth Daily.     • nystatin (MYCOSTATIN) 164221 UNIT/GM powder Apply  topically Every 8 (Eight) Hours. 45 g 1   • ondansetron ODT (ZOFRAN-ODT) 8 MG disintegrating tablet Take 1 tablet by mouth Every 8 (Eight) Hours As Needed for Nausea or Vomiting for up to 60 doses. 60 tablet 5   • pantoprazole (PROTONIX) 40 MG EC tablet Take 1 tablet by mouth Daily. 90 tablet 3   • phenytoin (DILANTIN) 100 MG ER capsule Take 300 mg by mouth every night at bedtime.     • potassium chloride ER (K-TAB) 20 MEQ tablet controlled-release ER tablet TAKE 1 TABLET BY MOUTH TWICE DAILY 180 tablet 0   • predniSONE (DELTASONE) 50 MG tablet Take 2 50mg tablets per day for 5 days after chemo 10 tablet 2   • SENEXON-S 8.6-50 MG per tablet TAKE 2 TABLETS BY MOUTH EVERY EVENING 60 tablet 0   • vitamin B-12 (CYANOCOBALAMIN) 100 MCG tablet Take 100 mcg by mouth Daily.   " "  • [DISCONTINUED] gabapentin (NEURONTIN) 300 MG capsule Take 600 mg by mouth Every Night.  0   • cephalexin (KEFLEX) 500 MG capsule Take 1 capsule by mouth 2 (Two) Times a Day. 14 capsule 0     No current facility-administered medications on file prior to visit.        ALLERGIES:     Allergies   Allergen Reactions   • Crab (Diagnostic) Itching and Rash   • Pseudoephedrine Dizziness       Social History     Social History   • Marital status:      Spouse name: JL   • Number of children: 2   • Years of education: N/A     Occupational History   •  Retired     Social History Main Topics   • Smoking status: Never Smoker   • Smokeless tobacco: Never Used   • Alcohol use No   • Drug use: No   • Sexual activity: Defer      Comment:  (Jl)     Other Topics Concern   • Not on file     Social History Narrative   • No narrative on file         Cancer-related family history is not on file.     Review of Systems  A comprehensive 14 point review of systems was performed and was negative except as mentioned.    Objective      Vitals:    08/23/18 0820   BP: 104/62   Pulse: (!) 45   Resp: 16   Temp: 97.8 °F (36.6 °C)   TempSrc: Oral   SpO2: 99%   Weight: 59.8 kg (131 lb 12.8 oz)   Height: 162 cm (63.78\")   PainSc: 0-No pain     Current Status 8/23/2018   ECOG score 0       Physical Exam    Constitutional: She is oriented to person, place, and time. She appears well-developed and well-nourished. No distress.   Seated in wheel chair   HENT:   Head: Normocephalic and atraumatic.   Eyes: Pupils are equal, round, and reactive to light.   Pulmonary/Chest: Effort normal. No respiratory distress.   Musculoskeletal: Normal range of motion. She exhibits edema (R>L improved).   Resolving thrombophlebitis of right inner thigh- improved at the groin and just above the right knee.    Neurological: She is alert and oriented to person, place, and time.   Skin: Skin is warm and dry. No rash noted.   Considerably reduced erythema " and edema of the right elbow with slight warmth and tenderness.  There is no obvious trauma.    Psychiatric: She has a normal mood and affect.   Vitals reviewed.        RECENT LABS:  Hematology WBC   Date Value Ref Range Status   08/03/2018 5.52 4.00 - 10.00 10*3/mm3 Final     RBC   Date Value Ref Range Status   08/03/2018 2.79 (L) 3.90 - 5.00 10*6/mm3 Final     Hemoglobin   Date Value Ref Range Status   08/03/2018 9.4 (L) 11.5 - 14.9 g/dL Final     Hematocrit   Date Value Ref Range Status   08/03/2018 28.7 (L) 34.0 - 45.0 % Final     Platelets   Date Value Ref Range Status   08/03/2018 140 (L) 150 - 375 10*3/mm3 Final      F-18 FDG PET FROM SKULL BASE TO MID THIGH WITH PET/CT FUSION  June 06, 2018    FINDINGS: There has been significant interval decrease in splenic size  and there has been resolution of the intensely hypermetabolic activity  throughout the spleen. Splenic activity is similar to that of the liver.  There are 2 regions of photopenia within the spleen measuring  approximately 2 cm and these are both within an approximately 4.6 cm  low-attenuation lesion on the corresponding CT sequence. There are also  much smaller low-attenuation splenic lesions which were previously much  larger and heterogeneous. There has been resolution of the intensely  hypermetabolic right hepatic lobe liver lesion. Given constraints of the  examination, there is likely a residual 1.9 cm lesion at the inferior  margin of the right hepatic lobe, previously measuring approximately 4.8  cm. There has been resolution of the hypermetabolic lymphadenopathy  throughout the abdomen and pelvis. The hypermetabolic cervical and  mediastinal lymphadenopathy has resolved as well. There has been  increase in the intensity of activity of the marrow diffusely and there  is a more patchy pattern of metabolic activity within the marrow in some  regions. However, the previously seen hypermetabolic bone lesions have  resolved. There is low-level  activity at the bed of the 1.8 cm left  posterior cervical triangle node. There is ill-defined increased density  in this region and the maximal SUV is 3.4, previously 11.2. There has  been significant interval decrease in the size of the left gluteal  lesion measuring approximately 2.5 cm, previously approximately 4 cm.  The current maximal SUV is 3.9 and was previously 5.9.     IMPRESSION:  1. Dramatic interval response with resolution of splenomegaly and the  intense hypermetabolic splenic activity, resolution of the  hypermetabolic liver lesion, lymphadenopathy, and bone metastases. The  left gluteal lesion is smaller and less intensely hypermetabolic.  2. Increase in the intensity of metabolic activity throughout the marrow  space and patchy marrow activity in some regions is likely chemotherapy  related.            Assessment/Plan     1. 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  · PET scan 5/3/18 with diffuse splenic involvement (SUV 16.9), lymphadenopathy throughout upper abdomen and retroperitoneum (SUV 13.1), right liver lesion 5 cm (SUV 12.8), pelvic lymphadenopathy up to 4 cm, bladder wall thickening with associated hypermetabolism, cervical lymphadenopathy left posterior (SUV 11.2), multiple bone lesions including left intertrochanteric femur, ribs, right scapula, pelvis as well as left gluteal hematoma versus lymphomatous lesion.  · CT-guided liver biopsy 4/26/18 with diffuse large B-cell non-Hodgkin's lymphoma, CD20 positive, double hit (BCL-6 rearrangement 85%, MYC rearrangement 79%), KI-67 4+/4  · Left cervical excisional biopsy by ENT Dr. Oconnor 5/1/18 confirming high-grade B cell non-Hodgkin's lymphoma, Ki-67 100%, double hit (BCL-6 rearrangement 76%, MYC rearrangement 85%)  · Echocardiogram 4/11/18 with ejection fraction 66.7%  · Right port placement  Dr Gill 5/4/18  · Patient not felt to be a candidate for more aggressive chemotherapy due to performance status and age (DA EPOCH-R)  · Therefore treated with R CHOP chemotherapy 5/4/18.  · Followed by granix 300mcg daily beginning 5/6/18 through 5/14/18.  · Today, the patient has increasing WBC related to growth factor use, stable hemoglobin, spontaneous improvement in platelet count.  She is doing quite well following her chemotherapy and is ready to go home.  There is some concern regarding her persistently elevated LDH at 347 today as well as her escalating calcium at 11.9.  She is currently asymptomatic from the calcium and her ionized calcium is stable at 6.8.  Therefore we are going to proceed with discharge home.  She will return to the office later this week on 5/18/18 with repeat labs, nurse practitioner visit, and potential treatment with a third dose of Zometa if her calcium has continued to escalate significantly. She is now seen with plans to begin cycle 2 R CHOP chemotherapy with growth factor support ( Neulasta on body injector).  Will schedule follow-up PET scan after 2 cycles of therapy and see her back in 3 weeks to review her status.  · Patient reviewed June 19 with near resolution of hypermetabolic sites on PET/CT.  Plans made for third cycle of CHOP R, additional Zometa and total of 6 cycles of chemotherapy anticipated.  · Patient seen August 03, 2018 for fifth cycle of chemotherapy-CHOP R, reduction of Oncovin 50%, 6 cycles planned.  Discussed subsequent CT scan follow-up.    2. Myelosuppression with pancytopenia related to chemotherapy:  · Received granix 300mcg daily previously and will need Neulasta-on body this treatment as well as her subsequent cycles.  ·   3. Multifactorial anemia:  · Related to anemia chronic disease, chemotherapy, prior iron deficiency.  · Received previous venofer 600mg (5/7 and 5/8) with iron studies from 4/24/18 showing ferritin 411, iron saturation 9%, TIBC  180.    4. Hypercalcemia of malignancy:  · Calcium up to 13.8 on 4/21/18 (albumin 3.3).  Intact PTH 8.1, PTH RP less than 2.0  · Received Zometa 4 mg IV 4/25/18.  · Calcium up to 11.3 on 5/7/18 with second dose of Zometa administered 4 mg IV.  · Calcium today has continued to gradually increase up to 11.9 with albumin 3.3.  Ionized calcium however is stable at 6.8 compared to yesterday.  The patient is asymptomatic.  We will not plan to administer a further dose of Zometa just yet and will allow further time for the patient to respond to chemotherapy.  She returned 518 for continued Zometa and when seen May 25 has a normal calcium level.  · Patient to receive Zometa June 19, subsequently every other cycle, restart low-dose calcium supplementation  · Patient seen August 03, 2018, plans made for Zometa with her final course of chemotherapy    5. Hypomagnesemia, hypokalemia:  · Continuing on oral potassium chloride 40 mEq twice a day  · Potassium 4.0      6. Paroxysmal atrial fibrillation:  · Recently diagnosed, anticoagulated with Eliquis initially, discontinued due to expected thrombocytopenia from chemotherapy  · Currently in sinus rhythm, continues on Lopressor 50 mg every 12 hours  · Unable to resume anticoagulation at present due to persistent severe thrombocytopenia.      7. Prior seizure disorder:  · Continues currently on Dilantin 300 mg daily at bedtime and Neurontin 600 mg daily at bedtime, will return to outpatient dose of Neurontin 300 mg daily at bedtime at discharge.      9. GI prophylaxis:  · Protonix 40 mg daily      10. Venous access:  · Port placement 5/4/18 by Dr. Gill      11. DVT- Continue Xarelto 20 mg P daily- samples, Escribed      12. Cellulitis-improving

## 2018-08-23 ENCOUNTER — INFUSION (OUTPATIENT)
Dept: ONCOLOGY | Facility: HOSPITAL | Age: 78
End: 2018-08-23

## 2018-08-23 ENCOUNTER — OFFICE VISIT (OUTPATIENT)
Dept: ONCOLOGY | Facility: CLINIC | Age: 78
End: 2018-08-23

## 2018-08-23 VITALS — SYSTOLIC BLOOD PRESSURE: 125 MMHG | DIASTOLIC BLOOD PRESSURE: 49 MMHG | HEART RATE: 55 BPM

## 2018-08-23 VITALS
OXYGEN SATURATION: 99 % | HEIGHT: 64 IN | TEMPERATURE: 97.8 F | DIASTOLIC BLOOD PRESSURE: 62 MMHG | WEIGHT: 131.8 LBS | BODY MASS INDEX: 22.5 KG/M2 | SYSTOLIC BLOOD PRESSURE: 104 MMHG | HEART RATE: 45 BPM | RESPIRATION RATE: 16 BRPM

## 2018-08-23 DIAGNOSIS — C83.39 DIFFUSE LARGE B-CELL LYMPHOMA OF EXTRANODAL SITE EXCLUDING SPLEEN AND OTHER SOLID ORGANS (HCC): ICD-10-CM

## 2018-08-23 DIAGNOSIS — E83.52 HYPERCALCEMIA: ICD-10-CM

## 2018-08-23 DIAGNOSIS — C83.39 DIFFUSE LARGE B-CELL LYMPHOMA OF EXTRANODAL SITE EXCLUDING SPLEEN AND OTHER SOLID ORGANS (HCC): Primary | ICD-10-CM

## 2018-08-23 DIAGNOSIS — R30.0 DYSURIA: Primary | ICD-10-CM

## 2018-08-23 LAB
ALBUMIN SERPL-MCNC: 3.7 G/DL (ref 3.5–5.2)
ALBUMIN/GLOB SERPL: 1.5 G/DL (ref 1.1–2.4)
ALP SERPL-CCNC: 118 U/L (ref 38–116)
ALT SERPL W P-5'-P-CCNC: 11 U/L (ref 0–33)
ANION GAP SERPL CALCULATED.3IONS-SCNC: 9.6 MMOL/L
AST SERPL-CCNC: 22 U/L (ref 0–32)
BACTERIA UR QL AUTO: ABNORMAL /HPF
BASOPHILS # BLD AUTO: 0.09 10*3/MM3 (ref 0–0.2)
BASOPHILS NFR BLD AUTO: 1.7 % (ref 0–1.5)
BILIRUB SERPL-MCNC: 0.2 MG/DL (ref 0.1–1.2)
BILIRUB UR QL STRIP: NEGATIVE
BUN BLD-MCNC: 11 MG/DL (ref 6–20)
BUN/CREAT SERPL: 20.8 (ref 7.3–30)
CALCIUM SPEC-SCNC: 8.8 MG/DL (ref 8.5–10.2)
CHLORIDE SERPL-SCNC: 106 MMOL/L (ref 98–107)
CLARITY UR: CLEAR
CO2 SERPL-SCNC: 25.4 MMOL/L (ref 22–29)
COLOR UR: YELLOW
CREAT BLD-MCNC: 0.53 MG/DL (ref 0.6–1.1)
DEPRECATED RDW RBC AUTO: 62.7 FL (ref 37–54)
EOSINOPHIL # BLD AUTO: 0.03 10*3/MM3 (ref 0–0.7)
EOSINOPHIL NFR BLD AUTO: 0.6 % (ref 0.3–6.2)
ERYTHROCYTE [DISTWIDTH] IN BLOOD BY AUTOMATED COUNT: 16.4 % (ref 11.7–13)
GFR SERPL CREATININE-BSD FRML MDRD: 112 ML/MIN/1.73
GLOBULIN UR ELPH-MCNC: 2.5 GM/DL (ref 1.8–3.5)
GLUCOSE BLD-MCNC: 83 MG/DL (ref 74–124)
GLUCOSE UR STRIP-MCNC: NEGATIVE MG/DL
HCT VFR BLD AUTO: 27.9 % (ref 35.6–45.5)
HGB BLD-MCNC: 9 G/DL (ref 11.9–15.5)
HGB UR QL STRIP.AUTO: NEGATIVE
HYALINE CASTS UR QL AUTO: ABNORMAL /LPF
IMM GRANULOCYTES # BLD: 0.01 10*3/MM3 (ref 0–0.03)
IMM GRANULOCYTES NFR BLD: 0.2 % (ref 0–0.5)
KETONES UR QL STRIP: NEGATIVE
LEUKOCYTE ESTERASE UR QL STRIP.AUTO: ABNORMAL
LYMPHOCYTES # BLD AUTO: 0.98 10*3/MM3 (ref 0.9–4.8)
LYMPHOCYTES NFR BLD AUTO: 19 % (ref 19.6–45.3)
MAGNESIUM SERPL-MCNC: 1.9 MG/DL (ref 1.8–2.5)
MCH RBC QN AUTO: 33.8 PG (ref 26.9–32)
MCHC RBC AUTO-ENTMCNC: 32.3 G/DL (ref 32.4–36.3)
MCV RBC AUTO: 104.9 FL (ref 80.5–98.2)
MONOCYTES # BLD AUTO: 0.68 10*3/MM3 (ref 0.2–1.2)
MONOCYTES NFR BLD AUTO: 13.2 % (ref 5–12)
NEUTROPHILS # BLD AUTO: 3.37 10*3/MM3 (ref 1.9–8.1)
NEUTROPHILS NFR BLD AUTO: 65.5 % (ref 42.7–76)
NITRITE UR QL STRIP: NEGATIVE
PH UR STRIP.AUTO: 6.5 [PH] (ref 4.5–8)
PHOSPHATE SERPL-MCNC: 2.9 MG/DL (ref 2.5–4.5)
PLATELET # BLD AUTO: 124 10*3/MM3 (ref 140–500)
PMV BLD AUTO: 10.3 FL (ref 6–12)
POTASSIUM BLD-SCNC: 4.3 MMOL/L (ref 3.5–4.7)
PROT SERPL-MCNC: 6.2 G/DL (ref 6.3–8)
PROT UR QL STRIP: NEGATIVE
RBC # BLD AUTO: 2.66 10*6/MM3 (ref 3.9–5.2)
RBC # UR: ABNORMAL /HPF
REF LAB TEST METHOD: ABNORMAL
SODIUM BLD-SCNC: 141 MMOL/L (ref 134–145)
SP GR UR STRIP: 1.01 (ref 1–1.03)
SQUAMOUS #/AREA URNS HPF: ABNORMAL /HPF
UROBILINOGEN UR QL STRIP: ABNORMAL
WBC NRBC COR # BLD: 5.15 10*3/MM3 (ref 4.5–10.7)
WBC UR QL AUTO: ABNORMAL /HPF
YEAST URNS QL MICRO: ABNORMAL /HPF

## 2018-08-23 PROCEDURE — 25010000002 RITUXIMAB 10 MG/ML SOLUTION 10 ML VIAL: Performed by: INTERNAL MEDICINE

## 2018-08-23 PROCEDURE — 80053 COMPREHEN METABOLIC PANEL: CPT

## 2018-08-23 PROCEDURE — 25010000002 CYCLOPHOSPHAMIDE PER 100 MG: Performed by: INTERNAL MEDICINE

## 2018-08-23 PROCEDURE — 25010000003 DEXAMETHASONE SODIUM PHOSPHATE 100 MG/10ML SOLUTION: Performed by: INTERNAL MEDICINE

## 2018-08-23 PROCEDURE — 96413 CHEMO IV INFUSION 1 HR: CPT | Performed by: INTERNAL MEDICINE

## 2018-08-23 PROCEDURE — 25010000002 VINCRISTINE PER 1 MG: Performed by: INTERNAL MEDICINE

## 2018-08-23 PROCEDURE — 85025 COMPLETE CBC W/AUTO DIFF WBC: CPT | Performed by: INTERNAL MEDICINE

## 2018-08-23 PROCEDURE — 96375 TX/PRO/DX INJ NEW DRUG ADDON: CPT | Performed by: INTERNAL MEDICINE

## 2018-08-23 PROCEDURE — 83735 ASSAY OF MAGNESIUM: CPT

## 2018-08-23 PROCEDURE — 25010000002 PALONOSETRON PER 25 MCG: Performed by: INTERNAL MEDICINE

## 2018-08-23 PROCEDURE — 96411 CHEMO IV PUSH ADDL DRUG: CPT | Performed by: INTERNAL MEDICINE

## 2018-08-23 PROCEDURE — 99214 OFFICE O/P EST MOD 30 MIN: CPT | Performed by: INTERNAL MEDICINE

## 2018-08-23 PROCEDURE — 25010000002 DOXORUBICIN PER 10 MG: Performed by: INTERNAL MEDICINE

## 2018-08-23 PROCEDURE — 87086 URINE CULTURE/COLONY COUNT: CPT | Performed by: INTERNAL MEDICINE

## 2018-08-23 PROCEDURE — 96417 CHEMO IV INFUS EACH ADDL SEQ: CPT | Performed by: INTERNAL MEDICINE

## 2018-08-23 PROCEDURE — 25010000002 RITUXIMAB 10 MG/ML SOLUTION 50 ML VIAL: Performed by: INTERNAL MEDICINE

## 2018-08-23 PROCEDURE — 81001 URINALYSIS AUTO W/SCOPE: CPT | Performed by: INTERNAL MEDICINE

## 2018-08-23 PROCEDURE — 25010000002 FOSAPREPITANT PER 1 MG: Performed by: INTERNAL MEDICINE

## 2018-08-23 PROCEDURE — 96415 CHEMO IV INFUSION ADDL HR: CPT | Performed by: INTERNAL MEDICINE

## 2018-08-23 PROCEDURE — 96367 TX/PROPH/DG ADDL SEQ IV INF: CPT | Performed by: INTERNAL MEDICINE

## 2018-08-23 PROCEDURE — 25010000002 PEGFILGRASTIM 6 MG/0.6ML PREFILLED SYRINGE KIT: Performed by: INTERNAL MEDICINE

## 2018-08-23 PROCEDURE — 25010000002 ZOLEDRONIC ACID 4 MG/100ML SOLUTION: Performed by: INTERNAL MEDICINE

## 2018-08-23 PROCEDURE — 84100 ASSAY OF PHOSPHORUS: CPT

## 2018-08-23 RX ORDER — PALONOSETRON 0.05 MG/ML
0.25 INJECTION, SOLUTION INTRAVENOUS ONCE
Status: CANCELLED | OUTPATIENT
Start: 2018-08-23

## 2018-08-23 RX ORDER — DOXORUBICIN HYDROCHLORIDE 2 MG/ML
50 INJECTION, SOLUTION INTRAVENOUS ONCE
Status: CANCELLED | OUTPATIENT
Start: 2018-08-23

## 2018-08-23 RX ORDER — PHENAZOPYRIDINE HYDROCHLORIDE 100 MG/1
100 TABLET, FILM COATED ORAL 3 TIMES DAILY PRN
Qty: 6 TABLET | Refills: 2 | Status: SHIPPED | OUTPATIENT
Start: 2018-08-23 | End: 2019-10-22

## 2018-08-23 RX ORDER — GABAPENTIN 300 MG/1
600 CAPSULE ORAL NIGHTLY
Qty: 60 CAPSULE | Refills: 0 | Status: SHIPPED | OUTPATIENT
Start: 2018-08-23 | End: 2018-09-16 | Stop reason: SDUPTHER

## 2018-08-23 RX ORDER — SODIUM CHLORIDE 9 MG/ML
250 INJECTION, SOLUTION INTRAVENOUS ONCE
Status: COMPLETED | OUTPATIENT
Start: 2018-08-23 | End: 2018-08-23

## 2018-08-23 RX ORDER — FAMOTIDINE 20 MG/1
20 TABLET, FILM COATED ORAL ONCE
Status: COMPLETED | OUTPATIENT
Start: 2018-08-23 | End: 2018-08-23

## 2018-08-23 RX ORDER — DOXORUBICIN HYDROCHLORIDE 2 MG/ML
50 INJECTION, SOLUTION INTRAVENOUS ONCE
Status: COMPLETED | OUTPATIENT
Start: 2018-08-23 | End: 2018-08-23

## 2018-08-23 RX ORDER — FAMOTIDINE 10 MG/ML
20 INJECTION, SOLUTION INTRAVENOUS AS NEEDED
Status: CANCELLED | OUTPATIENT
Start: 2018-08-23

## 2018-08-23 RX ORDER — PALONOSETRON 0.05 MG/ML
0.25 INJECTION, SOLUTION INTRAVENOUS ONCE
Status: COMPLETED | OUTPATIENT
Start: 2018-08-23 | End: 2018-08-23

## 2018-08-23 RX ORDER — ACETAMINOPHEN 325 MG/1
650 TABLET ORAL ONCE
Status: CANCELLED | OUTPATIENT
Start: 2018-08-23

## 2018-08-23 RX ORDER — GABAPENTIN 300 MG/1
600 CAPSULE ORAL NIGHTLY
Qty: 60 CAPSULE | Refills: 0 | Status: SHIPPED | OUTPATIENT
Start: 2018-08-23 | End: 2018-08-23 | Stop reason: SDUPTHER

## 2018-08-23 RX ORDER — ACETAMINOPHEN 325 MG/1
650 TABLET ORAL ONCE
Status: COMPLETED | OUTPATIENT
Start: 2018-08-23 | End: 2018-08-23

## 2018-08-23 RX ORDER — ZOLEDRONIC ACID 0.04 MG/ML
4 INJECTION, SOLUTION INTRAVENOUS ONCE
Status: CANCELLED | OUTPATIENT
Start: 2018-08-23

## 2018-08-23 RX ORDER — MEPERIDINE HYDROCHLORIDE 50 MG/ML
25 INJECTION INTRAMUSCULAR; INTRAVENOUS; SUBCUTANEOUS
Status: CANCELLED | OUTPATIENT
Start: 2018-08-23

## 2018-08-23 RX ORDER — FAMOTIDINE 20 MG/1
20 TABLET, FILM COATED ORAL ONCE
Status: CANCELLED
Start: 2018-08-23 | End: 2018-08-23

## 2018-08-23 RX ORDER — ZOLEDRONIC ACID 0.04 MG/ML
4 INJECTION, SOLUTION INTRAVENOUS ONCE
Status: COMPLETED | OUTPATIENT
Start: 2018-08-23 | End: 2018-08-23

## 2018-08-23 RX ORDER — DIPHENHYDRAMINE HYDROCHLORIDE 50 MG/ML
50 INJECTION INTRAMUSCULAR; INTRAVENOUS AS NEEDED
Status: CANCELLED | OUTPATIENT
Start: 2018-08-23

## 2018-08-23 RX ORDER — SODIUM CHLORIDE 9 MG/ML
250 INJECTION, SOLUTION INTRAVENOUS ONCE
Status: CANCELLED | OUTPATIENT
Start: 2018-08-23

## 2018-08-23 RX ADMIN — ACETAMINOPHEN 650 MG: 325 TABLET, FILM COATED ORAL at 10:41

## 2018-08-23 RX ADMIN — DEXAMETHASONE SODIUM PHOSPHATE 12 MG: 10 INJECTION, SOLUTION INTRAMUSCULAR; INTRAVENOUS at 10:26

## 2018-08-23 RX ADMIN — SODIUM CHLORIDE 150 MG: 9 INJECTION, SOLUTION INTRAVENOUS at 09:54

## 2018-08-23 RX ADMIN — PALONOSETRON 0.25 MG: 0.05 INJECTION, SOLUTION INTRAVENOUS at 10:42

## 2018-08-23 RX ADMIN — DOXORUBICIN HYDROCHLORIDE 86 MG: 2 INJECTION, SOLUTION INTRAVENOUS at 11:02

## 2018-08-23 RX ADMIN — RITUXIMAB 600 MG: 10 INJECTION, SOLUTION INTRAVENOUS at 12:20

## 2018-08-23 RX ADMIN — VINCRISTINE SULFATE 1 MG: 1 INJECTION, SOLUTION INTRAVENOUS at 11:16

## 2018-08-23 RX ADMIN — PEGFILGRASTIM 6 MG: KIT SUBCUTANEOUS at 14:01

## 2018-08-23 RX ADMIN — SODIUM CHLORIDE 250 ML: 900 INJECTION, SOLUTION INTRAVENOUS at 09:45

## 2018-08-23 RX ADMIN — ZOLEDRONIC ACID 4 MG: 0.04 INJECTION, SOLUTION INTRAVENOUS at 09:27

## 2018-08-23 RX ADMIN — CYCLOPHOSPHAMIDE 1280 MG: 1 INJECTION, POWDER, FOR SOLUTION INTRAVENOUS; ORAL at 11:33

## 2018-08-23 RX ADMIN — FAMOTIDINE 20 MG: 20 TABLET ORAL at 10:41

## 2018-08-23 RX ADMIN — SODIUM CHLORIDE 250 ML: 9 INJECTION, SOLUTION INTRAVENOUS at 09:27

## 2018-08-23 NOTE — PROGRESS NOTES
Adriamycin administered slow IV push through side arm w/ free flowing saline. Positive blood return maintained.

## 2018-08-25 LAB — BACTERIA SPEC AEROBE CULT: NORMAL

## 2018-09-04 ENCOUNTER — TELEPHONE (OUTPATIENT)
Dept: ONCOLOGY | Facility: HOSPITAL | Age: 78
End: 2018-09-04

## 2018-09-04 NOTE — TELEPHONE ENCOUNTER
----- Message from Meredith Quinn sent at 9/4/2018 11:09 AM EDT -----  Contact: [phone]  Pt is still having burning and pressure.  What are the results of her urine test

## 2018-09-04 NOTE — TELEPHONE ENCOUNTER
Pt called stating she has been having urinary burning and pressure for the past month. Denies fever or other complaints. Questions result of urine culture from 8/23/18. Reviewed with Dr. Iraheta. Per Dr. Iraheta, pt informed culture results were negative. Instructed her to try using OTC pyridium and call if that does not improve symptoms. Pt v/u.   Pt also c/o floaters in her vision that started over the weekend. Denies blurred vision, headaches, or balance issues. Per Dr. Iraheta, instructed pt to see eye doctor to be evaluated. Pt v/u.

## 2018-09-06 ENCOUNTER — TELEPHONE (OUTPATIENT)
Dept: ONCOLOGY | Facility: CLINIC | Age: 78
End: 2018-09-06

## 2018-09-06 ENCOUNTER — TELEPHONE (OUTPATIENT)
Dept: ONCOLOGY | Facility: HOSPITAL | Age: 78
End: 2018-09-06

## 2018-09-06 DIAGNOSIS — R39.15 URINARY URGENCY: Primary | ICD-10-CM

## 2018-09-06 NOTE — TELEPHONE ENCOUNTER
----- Message from Sophy Hall sent at 9/6/2018 12:00 PM EDT -----  [phone]  Re: burning and urgency with her urine and was told to call back if worse.

## 2018-09-06 NOTE — TELEPHONE ENCOUNTER
Patient calling, still reporting burning, urgency and some incontinence.  States has been taken pyridium.  Denies fever.  Discussed with Dr. Iraheta and order received to have pt scheduled to see urology.  Order placed for urgent consult and message sent to scheduling department to make apt.  Called patient and explained same.  Pt v/u.

## 2018-09-06 NOTE — TELEPHONE ENCOUNTER
----- Message from Nai Allen RN sent at 9/6/2018  1:25 PM EDT -----  Regarding: pt needs urgent apt with [facility]  [facility] needs urgent apt with [facility].  Burning, urgency, and incontinence.  Has taken two rounds of pyridium.  Last UA was unremarkable.  Thanks

## 2018-09-17 RX ORDER — GABAPENTIN 300 MG/1
600 CAPSULE ORAL NIGHTLY
Qty: 60 CAPSULE | Refills: 0 | Status: SHIPPED | OUTPATIENT
Start: 2018-09-17 | End: 2018-10-20 | Stop reason: SDUPTHER

## 2018-09-19 ENCOUNTER — INFUSION (OUTPATIENT)
Dept: ONCOLOGY | Facility: HOSPITAL | Age: 78
End: 2018-09-19

## 2018-09-19 ENCOUNTER — HOSPITAL ENCOUNTER (OUTPATIENT)
Dept: PET IMAGING | Facility: HOSPITAL | Age: 78
Discharge: HOME | End: 2018-09-19
Attending: INTERNAL MEDICINE | Admitting: INTERNAL MEDICINE

## 2018-09-19 DIAGNOSIS — C83.39 DIFFUSE LARGE B-CELL LYMPHOMA OF EXTRANODAL SITE EXCLUDING SPLEEN AND OTHER SOLID ORGANS (HCC): Primary | ICD-10-CM

## 2018-09-19 DIAGNOSIS — C83.39 DIFFUSE LARGE B-CELL LYMPHOMA OF EXTRANODAL SITE EXCLUDING SPLEEN AND OTHER SOLID ORGANS (HCC): ICD-10-CM

## 2018-09-19 LAB
ALBUMIN SERPL-MCNC: 3.6 G/DL (ref 3.5–5.2)
ALBUMIN/GLOB SERPL: 1.3 G/DL (ref 1.1–2.4)
ALP SERPL-CCNC: 123 U/L (ref 38–116)
ALT SERPL W P-5'-P-CCNC: 12 U/L (ref 0–33)
ANION GAP SERPL CALCULATED.3IONS-SCNC: 8.9 MMOL/L
AST SERPL-CCNC: 23 U/L (ref 0–32)
BASOPHILS # BLD AUTO: 0.09 10*3/MM3 (ref 0–0.1)
BASOPHILS NFR BLD AUTO: 1.8 % (ref 0–1.1)
BILIRUB SERPL-MCNC: 0.2 MG/DL (ref 0.1–1.2)
BUN BLD-MCNC: 10 MG/DL (ref 6–20)
BUN/CREAT SERPL: 21.3 (ref 7.3–30)
CALCIUM SPEC-SCNC: 8.9 MG/DL (ref 8.5–10.2)
CHLORIDE SERPL-SCNC: 106 MMOL/L (ref 98–107)
CO2 SERPL-SCNC: 26.1 MMOL/L (ref 22–29)
CREAT BLD-MCNC: 0.47 MG/DL (ref 0.6–1.1)
DEPRECATED RDW RBC AUTO: 61.4 FL (ref 37–49)
EOSINOPHIL # BLD AUTO: 0.08 10*3/MM3 (ref 0–0.36)
EOSINOPHIL NFR BLD AUTO: 1.6 % (ref 1–5)
ERYTHROCYTE [DISTWIDTH] IN BLOOD BY AUTOMATED COUNT: 15.5 % (ref 11.7–14.5)
GFR SERPL CREATININE-BSD FRML MDRD: 128 ML/MIN/1.73
GLOBULIN UR ELPH-MCNC: 2.7 GM/DL (ref 1.8–3.5)
GLUCOSE BLD-MCNC: 102 MG/DL (ref 74–124)
HCT VFR BLD AUTO: 29 % (ref 34–45)
HGB BLD-MCNC: 9.4 G/DL (ref 11.5–14.9)
IMM GRANULOCYTES # BLD: 0.01 10*3/MM3 (ref 0–0.03)
IMM GRANULOCYTES NFR BLD: 0.2 % (ref 0–0.5)
LYMPHOCYTES # BLD AUTO: 0.77 10*3/MM3 (ref 1–3.5)
LYMPHOCYTES NFR BLD AUTO: 15.5 % (ref 20–49)
MCH RBC QN AUTO: 34.9 PG (ref 27–33)
MCHC RBC AUTO-ENTMCNC: 32.4 G/DL (ref 32–35)
MCV RBC AUTO: 107.8 FL (ref 83–97)
MONOCYTES # BLD AUTO: 1.26 10*3/MM3 (ref 0.25–0.8)
MONOCYTES NFR BLD AUTO: 25.4 % (ref 4–12)
NEUTROPHILS # BLD AUTO: 2.75 10*3/MM3 (ref 1.5–7)
NEUTROPHILS NFR BLD AUTO: 55.5 % (ref 39–75)
NRBC BLD MANUAL-RTO: 0 /100 WBC (ref 0–0)
PLATELET # BLD AUTO: 98 10*3/MM3 (ref 150–375)
PMV BLD AUTO: 9.7 FL (ref 8.9–12.1)
POTASSIUM BLD-SCNC: 4.1 MMOL/L (ref 3.5–4.7)
PROT SERPL-MCNC: 6.3 G/DL (ref 6.3–8)
RBC # BLD AUTO: 2.69 10*6/MM3 (ref 3.9–5)
SODIUM BLD-SCNC: 141 MMOL/L (ref 134–145)
WBC NRBC COR # BLD: 4.96 10*3/MM3 (ref 4–10)

## 2018-09-19 PROCEDURE — 85025 COMPLETE CBC W/AUTO DIFF WBC: CPT | Performed by: INTERNAL MEDICINE

## 2018-09-19 PROCEDURE — 0 DIATRIZOATE MEGLUMINE & SODIUM PER 1 ML: Performed by: INTERNAL MEDICINE

## 2018-09-19 PROCEDURE — 71260 CT THORAX DX C+: CPT

## 2018-09-19 PROCEDURE — 25010000002 IOPAMIDOL 61 % SOLUTION: Performed by: INTERNAL MEDICINE

## 2018-09-19 PROCEDURE — 74177 CT ABD & PELVIS W/CONTRAST: CPT

## 2018-09-19 PROCEDURE — 96523 IRRIG DRUG DELIVERY DEVICE: CPT | Performed by: INTERNAL MEDICINE

## 2018-09-19 PROCEDURE — 80053 COMPREHEN METABOLIC PANEL: CPT | Performed by: INTERNAL MEDICINE

## 2018-09-19 RX ADMIN — IOPAMIDOL 85 ML: 612 INJECTION, SOLUTION INTRAVENOUS at 11:40

## 2018-09-19 RX ADMIN — DIATRIZOATE MEGLUMINE AND DIATRIZOATE SODIUM 30 ML: 660; 100 LIQUID ORAL; RECTAL at 11:00

## 2018-09-21 RX ORDER — MONTELUKAST SODIUM 10 MG/1
TABLET ORAL
Qty: 90 TABLET | Refills: 0 | Status: SHIPPED | OUTPATIENT
Start: 2018-09-21 | End: 2019-02-10 | Stop reason: SDUPTHER

## 2018-09-21 RX ORDER — MONTELUKAST SODIUM 10 MG/1
TABLET ORAL
Qty: 60 TABLET | Refills: 0 | Status: SHIPPED | OUTPATIENT
Start: 2018-09-21 | End: 2018-09-21 | Stop reason: SDUPTHER

## 2018-09-24 ENCOUNTER — APPOINTMENT (OUTPATIENT)
Dept: LAB | Facility: HOSPITAL | Age: 78
End: 2018-09-24

## 2018-09-24 ENCOUNTER — OFFICE VISIT (OUTPATIENT)
Dept: ONCOLOGY | Facility: CLINIC | Age: 78
End: 2018-09-24

## 2018-09-24 VITALS
TEMPERATURE: 97.8 F | OXYGEN SATURATION: 100 % | HEART RATE: 47 BPM | SYSTOLIC BLOOD PRESSURE: 112 MMHG | RESPIRATION RATE: 12 BRPM | BODY MASS INDEX: 22.36 KG/M2 | DIASTOLIC BLOOD PRESSURE: 68 MMHG | HEIGHT: 64 IN | WEIGHT: 131 LBS

## 2018-09-24 DIAGNOSIS — E78.5 DYSLIPIDEMIA: ICD-10-CM

## 2018-09-24 DIAGNOSIS — G40.909 SEIZURE DISORDER (HCC): ICD-10-CM

## 2018-09-24 DIAGNOSIS — R73.09 BLOOD GLUCOSE ABNORMAL: ICD-10-CM

## 2018-09-24 DIAGNOSIS — C83.39 DIFFUSE LARGE B-CELL LYMPHOMA OF EXTRANODAL SITE EXCLUDING SPLEEN AND OTHER SOLID ORGANS (HCC): Primary | ICD-10-CM

## 2018-09-24 DIAGNOSIS — E43 SEVERE PROTEIN-CALORIE MALNUTRITION (HCC): ICD-10-CM

## 2018-09-24 DIAGNOSIS — E74.8 OTHER SPECIFIED DISORDERS OF CARBOHYDRATE METABOLISM (CODE): ICD-10-CM

## 2018-09-24 DIAGNOSIS — D50.0 IRON DEFICIENCY ANEMIA DUE TO CHRONIC BLOOD LOSS: ICD-10-CM

## 2018-09-24 PROCEDURE — 99215 OFFICE O/P EST HI 40 MIN: CPT | Performed by: INTERNAL MEDICINE

## 2018-09-24 PROCEDURE — G0463 HOSPITAL OUTPT CLINIC VISIT: HCPCS | Performed by: INTERNAL MEDICINE

## 2018-09-24 RX ORDER — RIVAROXABAN 20 MG/1
20 TABLET, FILM COATED ORAL DAILY
COMMUNITY
Start: 2018-08-25 | End: 2020-08-13 | Stop reason: SDUPTHER

## 2018-09-24 NOTE — PROGRESS NOTES
Subjective .  Patient with improved urologic symptoms, status post urology assessment    REASONS FOR FOLLOWUP:    1. 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 female who we are asked to see April 29, 2018 in consultation for hypercalcemia, anemia and radiologic findings consistent with likely malignancy-?  Lymphoma.        She has a significant past medical history of palpitations followed by cardiology.  She presented to the emergency room April 10-14 with chest pain and palpitations and was found to be in A. fib with RVR and also demonstrated electrolyte abnormalities and anemia.  Records demonstrates that her anemia became particularly evident right April 11 ruptured you an H&H of 8.9 28.4 by can 6510, platelet count 212,000 with a normal automated differential.  She underwent GI assessment including upper and lower endoscopy demonstrated hernia, gastritis, esophagitis, diverticulosis but no evidence of acute bleed.  She been placed on Eliquis as result of a relatively high CHADSVASc score.  She had also been found to be hypercalcemic at approximately 11 with HCTZ altered and the patient started on Aldactone.  Additional testing had included a ferritin of 313.1, iron of 32 with TIBC of 222 and iron saturation of 14, occult blood negative per stool testing    She was brought back to the emergency room April 21 with further evidence of hypercalcemia, associated weakness, anorexia, nausea, ataxia and weight loss.  MRI of the brain April 21 was found to be unremarkable. Outpatient intact PTH levels were found to be 7.1(reduced), and an H&H of 10.3 and 32.9, white count 11,090, platelet count 267,000 with a normal differential.  Patient seen by renal medicine with the possibility of Fanconi syndrome raised.  Thereafter PTH hormone was found be less than 2.0, and prophylactic paresis included IgG of 948, IgA of 207,  IgM 86, total protein 6.3, albumin 2.8, no M spike noted, slightly elevated free kappa and lambda light chains with normal ratio.  Repeat testing per iron profile again revealed low serum iron but high serum ferritin and normal CEA, normal AFP, CA-125 of 77.2, CA 19-9 7.8   At this point the reason for her hypercalcemia was thought to be possible medication effect and/or possible malignancy with calcium was running between 12 and 13 mg/dL.    This led to CT scan of chest abdomen pelvis performed April 24 demonstrate extensive metastatic disease throughout the abdomen and pelvis particularly involving the spleen and liver, extensive lymphadenopathy at the abdomen and pelvis with a 4 cm lesion splenic hilum partially encasing the pancreatic tail, pancreatic tail malignancy?,  Gastric primary malignancy? There was an approximately a 5 cm mass along the right wall of the urinary bladder with adjacent adenopathy.  CT of the chest revealed several tiny dense pleural-based nodular opacities-partially calcified granulomas, no mediastinal or hilar adenopathy, enlarged pulmonary arteries consistent with pulmonary arterial hypertension.  His subsequent bone scan performed April 26 revealed prominent uptake in the right frontal bone and right posterior ninth rib and a CT needle biopsy of the liver was obtained April 26 as well.The sample obtained revealed, on flow cytometry examination, a subpopulation of normal B cells that might be  B-cell lymphoma.  This was eventually felt consistent with diffuse large B-cell non-Hodgkin's lymphoma, CD20 positive, double hit (BCL-6 rearrangement 85%, MYC rearrangement 79%), KI-67 4+/4. This led to the patient receiving R CHOP chemotherapy May 04, 2018 followed by Granix.  She is able to be discharged May 16, 2018.    We made plans for her to be seen in follow-up for continued Zometa and a second cycle of CHOP R chemotherapy by May 25 with follow-up PET/CT after 2 cycles of treatment.  She  is seen back in office May 18 given Zometa with findings of potential right lower extremity DVT.  Doppler is positive for acute popliteal and calf vein DVT as well as superficial venous thrombosis and the patient was started on Xarelto.  She was seen again May 21 mouth additional redness and swelling per her right elbow and felt to have cellulitis started on Keflex as an outpatient.  She had been taking the Xarelto with some improvement in her right lower extremity.    The patient's next seen family history 25th 2018 and, fortunately, her cellulitis is resolving as well is her thrombosis per right lower extremity, albeit slowly.     As the patient's second cycle of CHOP R she underwent additional scans including repeat PET/CT.  This demonstrates a dramatic response with resolution of splenomegaly and associated hypermetabolic activity, resolution of hypermetabolic liver lesion, lymphadenopathy and bony metastasis as well as reduction left gluteal lesion.  She has near remission after these 2 cycles of chemotherapy and we discussed continuing chemotherapy with total of 6 cycles of CHOP R.  We will plan the additional Zometa today and plan for choose every other cycle at this point.  The patient is next seen August 03, 2018 having done well with treatment except for development of a constipation that was difficult to manage.  It is now resolved but we will be reducing her Oncovin for the remainder of treatments.   The patient went on to take her sixth lack of treatment August 23, 2015.As result of recurrent issues with urinary tract symptoms, urgency incontinence and dysuria the patient was seen by urology September 7.  She underwent additional scans,started Myrbetriq and prophylactic Macrobid.  As she is seen September 24 she actually feels improved neurologically.  We discussed her scans as well and they are improved though not completely normal and close follow-up will be necessary.           Past Medical History:  "  Diagnosis Date   • Anemia     multifactorial   • Cystitis    • Cystocele     moderate   • Dyslipidemia    • Esophageal reflux    • Fatigue    • History of transfusion     no reaction   • Hypercalcemia    • Hypertension    • Major depression, chronic    • Menopause    • Non Hodgkin's lymphoma (CMS/HCC)    • Osteoarthritis    • Osteoporosis    • Palpitations    • Paroxysmal atrial fibrillation (CMS/HCC)    • Post menopausal problems    • RLS (restless legs syndrome)    • Seizure disorder (CMS/HCC)    • Subjective tinnitus    • Vaginal delivery     x2  \"SONYA\"      \"JASON\"   • Vitamin D deficiency        ONCOLOGIC HISTORY:  (History from previous dates can be found in the separate document.)    Current Outpatient Prescriptions on File Prior to Visit   Medication Sig Dispense Refill   • amLODIPine (NORVASC) 10 MG tablet Take 1 tablet by mouth Daily. 90 tablet 3   • benazepril (LOTENSIN) 40 MG tablet Take 1 tablet by mouth Daily. 90 tablet 3   • escitalopram (LEXAPRO) 5 MG tablet Take 5 mg by mouth Daily.     • folic acid (FOLVITE) 400 MCG tablet Take 400 mcg by mouth daily.     • gabapentin (NEURONTIN) 300 MG capsule TAKE 2 CAPSULES BY MOUTH EVERY NIGHT 60 capsule 0   • hydrALAZINE (APRESOLINE) 25 MG tablet TAKE 1 TABLET BY MOUTH THREE TIMES DAILY 270 tablet 0   • lidocaine-prilocaine (EMLA) 2.5-2.5 % cream Apply 30 min prior to port access 5 g 2   • melatonin 5 MG tablet tablet Take 5 mg by mouth Every Night.     • metoprolol tartrate (LOPRESSOR) 25 MG tablet Take 1 tablet by mouth 2 (Two) Times a Day. 180 tablet 1   • montelukast (SINGULAIR) 10 MG tablet TAKE 1 TABLET BY MOUTH EVERY DAY 90 tablet 0   • nystatin (MYCOSTATIN) 790184 UNIT/GM powder Apply  topically Every 8 (Eight) Hours. 45 g 1   • ondansetron ODT (ZOFRAN-ODT) 8 MG disintegrating tablet Take 1 tablet by mouth Every 8 (Eight) Hours As Needed for Nausea or Vomiting for up to 60 doses. 60 tablet 5   • pantoprazole (PROTONIX) 40 MG EC tablet Take 1 tablet by " "mouth Daily. 90 tablet 3   • phenazopyridine (PYRIDIUM) 100 MG tablet Take 1 tablet by mouth 3 (Three) Times a Day As Needed for bladder spasms. 6 tablet 2   • phenytoin (DILANTIN) 100 MG ER capsule Take 300 mg by mouth every night at bedtime.     • potassium chloride ER (K-TAB) 20 MEQ tablet controlled-release ER tablet TAKE 1 TABLET BY MOUTH TWICE DAILY 180 tablet 0   • vitamin B-12 (CYANOCOBALAMIN) 100 MCG tablet Take 100 mcg by mouth Daily.     • [DISCONTINUED] allopurinol (ZYLOPRIM) 300 MG tablet TAKE 1 TABLET BY MOUTH DAILY 90 tablet 0   • [DISCONTINUED] predniSONE (DELTASONE) 50 MG tablet Take 2 50mg tablets per day for 5 days after chemo 10 tablet 2   • cephalexin (KEFLEX) 500 MG capsule Take 1 capsule by mouth 2 (Two) Times a Day. 14 capsule 0   • SENEXON-S 8.6-50 MG per tablet TAKE 2 TABLETS BY MOUTH EVERY EVENING 60 tablet 0     No current facility-administered medications on file prior to visit.        ALLERGIES:     Allergies   Allergen Reactions   • Crab (Diagnostic) Itching and Rash   • Pseudoephedrine Dizziness       Social History     Social History   • Marital status:      Spouse name: JL   • Number of children: 2   • Years of education: N/A     Occupational History   •  Retired     Social History Main Topics   • Smoking status: Never Smoker   • Smokeless tobacco: Never Used   • Alcohol use No   • Drug use: No   • Sexual activity: Defer      Comment:  (Jl)     Other Topics Concern   • Not on file     Social History Narrative   • No narrative on file         Cancer-related family history is not on file.     Review of Systems  A comprehensive 14 point review of systems was performed and was negative except as mentioned.    Objective      Vitals:    09/24/18 1029   BP: 112/68   Pulse: (!) 47   Resp: 12   Temp: 97.8 °F (36.6 °C)   TempSrc: Oral   SpO2: 100%   Weight: 59.4 kg (131 lb)   Height: 162 cm (63.78\")   PainSc: 0-No pain     Current Status 9/24/2018   ECOG score 0 "       Physical Exam    Constitutional: She is oriented to person, place, and time. She appears well-developed and well-nourished. No distress.   Seated in wheel chair   HENT:   Head: Normocephalic and atraumatic.   Eyes: Pupils are equal, round, and reactive to light.   Pulmonary/Chest: Effort normal. No respiratory distress.   Musculoskeletal: Normal range of motion. She exhibits edema (R>L improved).   Resolving thrombophlebitis of right inner thigh- improved at the groin and just above the right knee.    Neurological: She is alert and oriented to person, place, and time.   Skin: Skin is warm and dry. No rash noted.   Considerably reduced erythema and edema of the right elbow with slight warmth and tenderness.  There is no obvious trauma.    Psychiatric: She has a normal mood and affect.   Vitals reviewed.        RECENT LABS:  Hematology WBC   Date Value Ref Range Status   09/19/2018 4.96 4.00 - 10.00 10*3/mm3 Final     RBC   Date Value Ref Range Status   09/19/2018 2.69 (L) 3.90 - 5.00 10*6/mm3 Final     Hemoglobin   Date Value Ref Range Status   09/19/2018 9.4 (L) 11.5 - 14.9 g/dL Final     Hematocrit   Date Value Ref Range Status   09/19/2018 29.0 (L) 34.0 - 45.0 % Final     Platelets   Date Value Ref Range Status   09/19/2018 98 (L) 150 - 375 10*3/mm3 Final      F-18 FDG PET FROM SKULL BASE TO MID THIGH WITH PET/CT FUSION  June 06, 2018    FINDINGS: There has been significant interval decrease in splenic size  and there has been resolution of the intensely hypermetabolic activity  throughout the spleen. Splenic activity is similar to that of the liver.  There are 2 regions of photopenia within the spleen measuring  approximately 2 cm and these are both within an approximately 4.6 cm  low-attenuation lesion on the corresponding CT sequence. There are also  much smaller low-attenuation splenic lesions which were previously much  larger and heterogeneous. There has been resolution of the intensely  hypermetabolic  right hepatic lobe liver lesion. Given constraints of the  examination, there is likely a residual 1.9 cm lesion at the inferior  margin of the right hepatic lobe, previously measuring approximately 4.8  cm. There has been resolution of the hypermetabolic lymphadenopathy  throughout the abdomen and pelvis. The hypermetabolic cervical and  mediastinal lymphadenopathy has resolved as well. There has been  increase in the intensity of activity of the marrow diffusely and there  is a more patchy pattern of metabolic activity within the marrow in some  regions. However, the previously seen hypermetabolic bone lesions have  resolved. There is low-level activity at the bed of the 1.8 cm left  posterior cervical triangle node. There is ill-defined increased density  in this region and the maximal SUV is 3.4, previously 11.2. There has  been significant interval decrease in the size of the left gluteal  lesion measuring approximately 2.5 cm, previously approximately 4 cm.  The current maximal SUV is 3.9 and was previously 5.9.     IMPRESSION:  1. Dramatic interval response with resolution of splenomegaly and the  intense hypermetabolic splenic activity, resolution of the  hypermetabolic liver lesion, lymphadenopathy, and bone metastases. The left gluteal lesion is smaller and less intensely hypermetabolic.  2. Increase in the intensity of metabolic activity throughout the marrow  space and patchy marrow activity in some regions is likely chemotherapy  Related.      CT OF THE CHEST, ABDOMEN AND PELVIS WITH CONTRAST 09/19/2018   The PET CT scan dated 06/08/2018 is compared.     There is some minimal biapical pleural thickening. This appears stable.  No lung masses or significant pulmonary infiltrates are seen.     No pathologically enlarged axillary, hilar or mediastinal lymph nodes  are seen.     Gallbladder has been removed. There are 1 or 2 tiny low-density lesions  in the liver. The largest is seen in the inferior aspect  of the right  hepatic lobe measuring approximately 12 mm. This has decreased in size  from the previous PET/CT scan of 06/08/2018 when it measured  approximately 1.9 cm. There are multiple low-density lesions in the  spleen, the largest measuring 3.2 cm. This largest lesion as well as the  other lesions have also decreased in size from the PET/CT scan.      Spleen appears minimally enlarged but slightly smaller than on the  06/08/2018 study.     The pancreas, adrenals and kidneys appear unremarkable except for 1 or 2  tiny renal cysts. Multiple small retroperitoneal and mesenteric nodes  are again seen and these appear stable or slightly improved from the  previous study. There is some mild injection of the mesenteric fat.     There is colonic diverticulosis. Uterus has been removed.     No bowel wall thickening or bowel dilatation is seen.     IMPRESSION:  1. Small low-density liver and spleen lesions most numerous in the  spleen. These have decreased in size from the previous study of  06/08/2018.  2. Numerous small mesenteric and retroperitoneal lymph nodes appear  stable or slightly decreased in size from the previous study. No new  lymphadenopathy is seen.  3. Status post cholecystectomy and hysterectomy.  4. Colonic diverticulosis.  5. Additional followup CT of the chest, abdomen and pelvis suggested.       Assessment/Plan     1. 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  · PET scan 5/3/18 with diffuse splenic involvement (SUV 16.9), lymphadenopathy throughout upper abdomen and retroperitoneum (SUV 13.1), right liver lesion 5 cm (SUV 12.8), pelvic lymphadenopathy up to 4 cm, bladder wall thickening with associated hypermetabolism, cervical lymphadenopathy left posterior (SUV 11.2), multiple bone lesions including left intertrochanteric femur, ribs, right scapula,  pelvis as well as left gluteal hematoma versus lymphomatous lesion.  · CT-guided liver biopsy 4/26/18 with diffuse large B-cell non-Hodgkin's lymphoma, CD20 positive, double hit (BCL-6 rearrangement 85%, MYC rearrangement 79%), KI-67 4+/4  · Left cervical excisional biopsy by ENT Dr. Oconnor 5/1/18 confirming high-grade B cell non-Hodgkin's lymphoma, Ki-67 100%, double hit (BCL-6 rearrangement 76%, MYC rearrangement 85%)  · Echocardiogram 4/11/18 with ejection fraction 66.7%  · Right port placement Dr Gill 5/4/18  · Patient not felt to be a candidate for more aggressive chemotherapy due to performance status and age (DA EPOCH-R)  · Therefore treated with R CHOP chemotherapy 5/4/18.  · Followed by granix 300mcg daily beginning 5/6/18 through 5/14/18.  · Today, the patient has increasing WBC related to growth factor use, stable hemoglobin, spontaneous improvement in platelet count.  She is doing quite well following her chemotherapy and is ready to go home.  There is some concern regarding her persistently elevated LDH at 347 today as well as her escalating calcium at 11.9.  She is currently asymptomatic from the calcium and her ionized calcium is stable at 6.8.  Therefore we are going to proceed with discharge home.  She will return to the office later this week on 5/18/18 with repeat labs, nurse practitioner visit, and potential treatment with a third dose of Zometa if her calcium has continued to escalate significantly. She is now seen with plans to begin cycle 2 R CHOP chemotherapy with growth factor support ( Neulasta on body injector).  Will schedule follow-up PET scan after 2 cycles of therapy and see her back in 3 weeks to review her status.  · Patient reviewed June 19 with near resolution of hypermetabolic sites on PET/CT.  Plans made for third cycle of CHOP R, additional Zometa and total of 6 cycles of chemotherapy anticipated.  · Patient seen August 03, 2018 for fifth cycle of chemotherapy-CHOP R,  reduction of Oncovin 50%, 6 cycles planned.  Discussed subsequent CT scan follow-up.  · Follow-up scan September 19 with small low-density liver lesions and splenic lesions are decreased from previous, numerous small mesenteric and RP nodes again stable slightly smaller.  These are discussed September 24 and follow-up scan in 3 months is anticipated    2. Myelosuppression with pancytopenia related to chemotherapy:  · Received granix 300mcg daily previously and will need Neulasta-on body this treatment as well as her subsequent cycles.  · Patient seen September 24 and follow-up is planned      3. Multifactorial anemia:  · Related to anemia chronic disease, chemotherapy, prior iron deficiency.  · Received previous venofer 600mg (5/7 and 5/8) with iron studies from 4/24/18 showing ferritin 411, iron saturation 9%, TIBC 180.  Additional follow-up is planned as she is seen September 24    4. Hypercalcemia of malignancy:  · Calcium up to 13.8 on 4/21/18 (albumin 3.3).  Intact PTH 8.1, PTH RP less than 2.0  · Received Zometa 4 mg IV 4/25/18.  · Calcium up to 11.3 on 5/7/18 with second dose of Zometa administered 4 mg IV.  · Calcium today has continued to gradually increase up to 11.9 with albumin 3.3.  Ionized calcium however is stable at 6.8 compared to yesterday.  The patient is asymptomatic.  We will not plan to administer a further dose of Zometa just yet and will allow further time for the patient to respond to chemotherapy.  She returned 518 for continued Zometa and when seen May 25 has a normal calcium level.  · Patient to receive Zometa June 19, subsequently every other cycle, restart low-dose calcium supplementation  · Patient seen August 03, 2018, plans made for Zometa with her final course of chemotherapy August 23, 2018    5. Hypomagnesemia, hypokalemia:  · Continuing on oral potassium chloride 40 mEq twice a day  · Potassium 4.0      6. Paroxysmal atrial fibrillation:  · Recently diagnosed, anticoagulated with  Eliquis initially, discontinued due to expected thrombocytopenia from chemotherapy  · Currently in sinus rhythm, continues on Lopressor 50 mg every 12 hours  · Unable to resume anticoagulation at present due to persistent severe thrombocytopenia.      7. Prior seizure disorder:  · Continues currently on Dilantin 300 mg daily at bedtime and Neurontin 600 mg daily at bedtime, will return to outpatient dose of Neurontin 300 mg daily at bedtime at discharge.      9. GI prophylaxis:  · Protonix 40 mg daily      10. Venous access:  · Port placement 5/4/18 by Dr. Gill      11. DVT- Continue Xarelto 20 mg P daily- samples, Escribed      12. Cellulitis-improving      Plan:  *Patient will be observed off chemotherapy  *Return in October 12 prior to planned trip for laboratory studies for primary care to be drawn including Dilantin level, CMP,  A1c, lipid profile, 25-hydroxy vitamin D level, CBC  *M.D. assessment in 2 months anticipated scan 3 months

## 2018-10-03 ENCOUNTER — OFFICE VISIT (OUTPATIENT)
Dept: CARDIOLOGY | Facility: CLINIC | Age: 78
End: 2018-10-03

## 2018-10-03 VITALS
WEIGHT: 129 LBS | DIASTOLIC BLOOD PRESSURE: 58 MMHG | HEIGHT: 64 IN | BODY MASS INDEX: 22.02 KG/M2 | HEART RATE: 49 BPM | SYSTOLIC BLOOD PRESSURE: 132 MMHG

## 2018-10-03 DIAGNOSIS — I10 ESSENTIAL HYPERTENSION: Chronic | ICD-10-CM

## 2018-10-03 DIAGNOSIS — T45.1X5D ADVERSE EFFECT OF CHEMOTHERAPY, SUBSEQUENT ENCOUNTER: ICD-10-CM

## 2018-10-03 DIAGNOSIS — I48.0 PAROXYSMAL ATRIAL FIBRILLATION (HCC): Primary | ICD-10-CM

## 2018-10-03 PROCEDURE — 93000 ELECTROCARDIOGRAM COMPLETE: CPT | Performed by: INTERNAL MEDICINE

## 2018-10-03 PROCEDURE — 99214 OFFICE O/P EST MOD 30 MIN: CPT | Performed by: INTERNAL MEDICINE

## 2018-10-03 NOTE — PROGRESS NOTES
Date of Office Visit: 10/03/2018  Encounter Provider: Justine Barrios MD  Place of Service: [facility] CARDIOLOGY  Patient Name: Martina Blake  :1940      Patient ID:  Martina Blake is a 78 y.o. female is here for  followup for PaF.         History of Present Illness    She has a past medical history of hypertension, dyslipidemia, esophageal reflux, seizure disorder, and vitamin D deficiency.     She has a past medical history of atrial fibrillation with rapid ventricular response in the setting of hypokalemia, hypomagnesemia, hypercalcemia, and anemia.  She was placed on metoprolol.  She had an upper and lower endoscopy showing hiatal hernia, gastritis, esophagitis, and diverticulosis with no acute bleeding.  She was started on apixiban and and spironolactone.  She had a normal Cardiolite stress test in 2018 an echocardiogram at that time revealed an EF of 67%, grade 2 diastolic dysfunction, severe left atrial enlargement, mild to moderate tricuspid insufficiency, and RVSP elevated at 44 mmHg.      She has large B-cell lymphoma diffuse and is now in remission, was on R-CHOP (rituximab, doxorubicin, vincristine and cyclophosphamide) finished 18 with Dr. Iraheta.      She was hospitalized in May 2018 and chemotherapy was initiated.  Her apixiban was discontinued due to thrombocytopenia.  She was placed on metoprolol tartrate 50 mg twice daily for A. fib with rapid ventricular response.  She was discharged from the hospital on 18 and is followed up with her PCP and Dr. Iraheta.      She has had heart rates in the low 40s and had symptoms of dizziness at times.  Dr. Stubbs reduced her metoprolol tartrate to 25 mg twice daily on 18.     Past Medical History:   Diagnosis Date   • Anemia     multifactorial   • Cystitis    • Cystocele     moderate   • Dyslipidemia    • Esophageal reflux    • Fatigue    • History of transfusion     no reaction   •  "Hypercalcemia    • Hypertension    • Major depression, chronic    • Menopause    • Non Hodgkin's lymphoma (CMS/HCC)    • Osteoarthritis    • Osteoporosis    • Palpitations    • Paroxysmal atrial fibrillation (CMS/HCC)    • Post menopausal problems    • RLS (restless legs syndrome)    • Seizure disorder (CMS/HCC)    • Subjective tinnitus    • Vaginal delivery     x2  \"SONYA\"      \"JASON\"   • Vitamin D deficiency          Past Surgical History:   Procedure Laterality Date   • CERVICAL LYMPH NODE BIOPSY/EXCISION Left 5/1/2018    Procedure: CERVICAL LYMPH NODE BIOPSY/EXCISION;  Surgeon: Danny Oconnor MD;  Location: [facility] MAIN OR;  Service: ENT   • CHOLECYSTECTOMY     • COLONOSCOPY     • COLONOSCOPY N/A 4/13/2018    Procedure: COLONOSCOPY to terminal ileum;  Surgeon: Dominik Burt MD;  Location: [facility] ENDOSCOPY;  Service: Gastroenterology   • CRANIOTOMY     • ENDOSCOPY N/A 4/13/2018    Procedure: ESOPHAGOGASTRODUODENOSCOPY with biopsy;  Surgeon: Dominik Burt MD;  Location: [facility] ENDOSCOPY;  Service: Gastroenterology   • TOTAL ABDOMINAL HYSTERECTOMY  1980    w/ bladder suspension.  Probably vaginal hysterectomy with anterior colporrhaphy.    • VENOUS ACCESS DEVICE (PORT) INSERTION N/A 5/4/2018    Procedure: INSERTION VENOUS ACCESS DEVICE;  Surgeon: Jamie Gill MD;  Location: [facility] OR;  Service: General   • WRIST SURGERY Left        Current Outpatient Prescriptions on File Prior to Visit   Medication Sig Dispense Refill   • amLODIPine (NORVASC) 10 MG tablet Take 1 tablet by mouth Daily. 90 tablet 3   • benazepril (LOTENSIN) 40 MG tablet Take 1 tablet by mouth Daily. 90 tablet 3   • escitalopram (LEXAPRO) 5 MG tablet Take 5 mg by mouth Daily.     • folic acid (FOLVITE) 400 MCG tablet Take 400 mcg by mouth daily.     • gabapentin (NEURONTIN) 300 MG capsule TAKE 2 CAPSULES BY MOUTH EVERY NIGHT 60 capsule 0   • hydrALAZINE (APRESOLINE) 25 MG tablet TAKE 1 TABLET BY MOUTH THREE TIMES DAILY 270 tablet 0   • " lidocaine-prilocaine (EMLA) 2.5-2.5 % cream Apply 30 min prior to port access 5 g 2   • melatonin 5 MG tablet tablet Take 5 mg by mouth Every Night.     • metoprolol tartrate (LOPRESSOR) 25 MG tablet Take 1 tablet by mouth 2 (Two) Times a Day. 180 tablet 1   • montelukast (SINGULAIR) 10 MG tablet TAKE 1 TABLET BY MOUTH EVERY DAY 90 tablet 0   • nystatin (MYCOSTATIN) 056201 UNIT/GM powder Apply  topically Every 8 (Eight) Hours. 45 g 1   • pantoprazole (PROTONIX) 40 MG EC tablet Take 1 tablet by mouth Daily. 90 tablet 3   • phenytoin (DILANTIN) 100 MG ER capsule Take 300 mg by mouth every night at bedtime.     • potassium chloride ER (K-TAB) 20 MEQ tablet controlled-release ER tablet TAKE 1 TABLET BY MOUTH TWICE DAILY 180 tablet 0   • vitamin B-12 (CYANOCOBALAMIN) 100 MCG tablet Take 100 mcg by mouth Daily.     • XARELTO 20 MG tablet      • cephalexin (KEFLEX) 500 MG capsule Take 1 capsule by mouth 2 (Two) Times a Day. 14 capsule 0   • ondansetron ODT (ZOFRAN-ODT) 8 MG disintegrating tablet Take 1 tablet by mouth Every 8 (Eight) Hours As Needed for Nausea or Vomiting for up to 60 doses. 60 tablet 5   • phenazopyridine (PYRIDIUM) 100 MG tablet Take 1 tablet by mouth 3 (Three) Times a Day As Needed for bladder spasms. 6 tablet 2   • SENEXON-S 8.6-50 MG per tablet TAKE 2 TABLETS BY MOUTH EVERY EVENING 60 tablet 0     No current facility-administered medications on file prior to visit.        Social History     Social History   • Marital status:      Spouse name: JL   • Number of children: 2   • Years of education: N/A     Occupational History   •  Retired     Social History Main Topics   • Smoking status: Never Smoker   • Smokeless tobacco: Never Used   • Alcohol use No   • Drug use: No   • Sexual activity: Defer      Comment:  (Jl)     Other Topics Concern   • Not on file     Social History Narrative   • No narrative on file           Review of Systems   Constitution: Negative.   HENT: Negative for  "congestion.    Eyes: Negative for vision loss in left eye and vision loss in right eye.   Respiratory: Negative.  Negative for cough, hemoptysis, shortness of breath, sleep disturbances due to breathing, snoring, sputum production and wheezing.    Endocrine: Negative.    Hematologic/Lymphatic: Negative.    Skin: Negative for poor wound healing and rash.   Musculoskeletal: Negative for falls, gout, muscle cramps and myalgias.   Gastrointestinal: Negative for abdominal pain, diarrhea, dysphagia, hematemesis, melena, nausea and vomiting.   Neurological: Negative for excessive daytime sleepiness, dizziness, headaches, light-headedness, loss of balance, seizures and vertigo.   Psychiatric/Behavioral: Negative for depression and substance abuse. The patient is not nervous/anxious.        Procedures    ECG 12 Lead  Date/Time: 10/3/2018 11:01 AM  Performed by: DAMIAN BLAKE  Authorized by: DAMIAN BLAKE   Comparison: compared with previous ECG   Similar to previous ECG  Rhythm: sinus rhythm  QRS axis: left  Other findings: PRWP  Clinical impression: abnormal ECG                Objective:      Vitals:    10/03/18 1047   BP: 132/58   BP Location: Left arm   Patient Position: Sitting   Pulse: (!) 49   Weight: 58.5 kg (129 lb)   Height: 162 cm (63.78\")     Body mass index is 22.3 kg/m².    Physical Exam   Constitutional: She is oriented to person, place, and time. She appears well-developed and well-nourished. No distress.   HENT:   Head: Normocephalic and atraumatic.   Eyes: Conjunctivae are normal. No scleral icterus.   Neck: Neck supple. No JVD present. Carotid bruit is not present. No thyromegaly present.   Cardiovascular: Normal rate, regular rhythm, S1 normal, S2 normal, normal heart sounds and intact distal pulses.   No extrasystoles are present. PMI is not displaced.  Exam reveals no gallop.    No murmur heard.  Pulses:       Carotid pulses are 2+ on the right side, and 2+ on the left side.       Radial " pulses are 2+ on the right side, and 2+ on the left side.        Dorsalis pedis pulses are 2+ on the right side, and 2+ on the left side.        Posterior tibial pulses are 2+ on the right side, and 2+ on the left side.   Pulmonary/Chest: Effort normal and breath sounds normal. No respiratory distress. She has no wheezes. She has no rhonchi. She has no rales. She exhibits no tenderness.   Abdominal: Soft. Bowel sounds are normal. She exhibits no distension, no abdominal bruit and no mass. There is no tenderness.   Musculoskeletal: She exhibits no edema or deformity.   Lymphadenopathy:     She has no cervical adenopathy.   Neurological: She is alert and oriented to person, place, and time. No cranial nerve deficit.   Skin: Skin is warm and dry. No rash noted. She is not diaphoretic. No cyanosis. No pallor. Nails show no clubbing.   Psychiatric: She has a normal mood and affect. Judgment normal.   Vitals reviewed.      Lab Review:       Assessment:      Diagnosis Plan   1. Paroxysmal atrial fibrillation (CMS/HCC)     2. Essential hypertension       1. PAF - on xarelto.   2. Hypertension - well controlled.   3. Large B cell lymphoma - s/p R-CHOP -sees Dr. Iraheta.      Plan:         See justice in 6 months.  Repeat echo.  No med changes.     Atrial Fibrillation and Atrial Flutter  Assessment  • The patient has paroxysmal atrial fibrillation  • This is non-valvular in etiology  • The patient's CHADS2-VASc score is 3  • A [id number] score of 2 or more is considered a high risk for a thromboembolic event  • Rivaroxaban prescribed    Plan  • Attempt to maintain sinus rhythm  • Continue rivaroxaban for antithrombotic therapy, bleeding issues discussed  • Continue beta blocker for rate control

## 2018-10-11 RX ORDER — METOPROLOL TARTRATE 50 MG/1
TABLET, FILM COATED ORAL
Qty: 60 TABLET | Refills: 1 | Status: SHIPPED | OUTPATIENT
Start: 2018-10-11 | End: 2018-10-11 | Stop reason: SDUPTHER

## 2018-10-12 ENCOUNTER — TELEPHONE (OUTPATIENT)
Dept: GENERAL RADIOLOGY | Facility: HOSPITAL | Age: 78
End: 2018-10-12

## 2018-10-12 ENCOUNTER — INFUSION (OUTPATIENT)
Dept: ONCOLOGY | Facility: HOSPITAL | Age: 78
End: 2018-10-12

## 2018-10-12 ENCOUNTER — TELEPHONE (OUTPATIENT)
Dept: ONCOLOGY | Facility: HOSPITAL | Age: 78
End: 2018-10-12

## 2018-10-12 VITALS — HEART RATE: 49 BPM | DIASTOLIC BLOOD PRESSURE: 71 MMHG | SYSTOLIC BLOOD PRESSURE: 175 MMHG | TEMPERATURE: 97.6 F

## 2018-10-12 DIAGNOSIS — G40.909 SEIZURE DISORDER (HCC): ICD-10-CM

## 2018-10-12 DIAGNOSIS — E78.5 DYSLIPIDEMIA: ICD-10-CM

## 2018-10-12 DIAGNOSIS — C83.39 DIFFUSE LARGE B-CELL LYMPHOMA OF EXTRANODAL SITE EXCLUDING SPLEEN AND OTHER SOLID ORGANS (HCC): ICD-10-CM

## 2018-10-12 DIAGNOSIS — E43 SEVERE PROTEIN-CALORIE MALNUTRITION (HCC): ICD-10-CM

## 2018-10-12 DIAGNOSIS — Z45.2 ENCOUNTER FOR ADJUSTMENT OR MANAGEMENT OF VASCULAR ACCESS DEVICE: ICD-10-CM

## 2018-10-12 DIAGNOSIS — E74.8 OTHER SPECIFIED DISORDERS OF CARBOHYDRATE METABOLISM (CODE): ICD-10-CM

## 2018-10-12 DIAGNOSIS — R73.09 BLOOD GLUCOSE ABNORMAL: ICD-10-CM

## 2018-10-12 LAB
25(OH)D3 SERPL-MCNC: 49.5 NG/ML (ref 30–100)
ALBUMIN SERPL-MCNC: 3.9 G/DL (ref 3.5–5.2)
ALBUMIN/GLOB SERPL: 1.8 G/DL (ref 1.1–2.4)
ALP SERPL-CCNC: 128 U/L (ref 38–116)
ALT SERPL W P-5'-P-CCNC: 13 U/L (ref 0–33)
ANION GAP SERPL CALCULATED.3IONS-SCNC: 9.8 MMOL/L
AST SERPL-CCNC: 22 U/L (ref 0–32)
BASOPHILS # BLD AUTO: 0.04 10*3/MM3 (ref 0–0.1)
BASOPHILS NFR BLD AUTO: 0.7 % (ref 0–1.1)
BILIRUB SERPL-MCNC: 0.3 MG/DL (ref 0.1–1.2)
BUN BLD-MCNC: 17 MG/DL (ref 6–20)
BUN/CREAT SERPL: 32.1 (ref 7.3–30)
CALCIUM SPEC-SCNC: 8.6 MG/DL (ref 8.5–10.2)
CHLORIDE SERPL-SCNC: 106 MMOL/L (ref 98–107)
CHOLEST SERPL-MCNC: 156 MG/DL (ref 0–200)
CO2 SERPL-SCNC: 27.2 MMOL/L (ref 22–29)
CREAT BLD-MCNC: 0.53 MG/DL (ref 0.6–1.1)
DEPRECATED RDW RBC AUTO: 47.8 FL (ref 37–49)
EOSINOPHIL # BLD AUTO: 0.72 10*3/MM3 (ref 0–0.36)
EOSINOPHIL NFR BLD AUTO: 13.2 % (ref 1–5)
ERYTHROCYTE [DISTWIDTH] IN BLOOD BY AUTOMATED COUNT: 12.5 % (ref 11.7–14.5)
GFR SERPL CREATININE-BSD FRML MDRD: 112 ML/MIN/1.73
GLOBULIN UR ELPH-MCNC: 2.2 GM/DL (ref 1.8–3.5)
GLUCOSE BLD-MCNC: 101 MG/DL (ref 74–124)
HBA1C MFR BLD: 4.4 % (ref 4.8–5.6)
HCT VFR BLD AUTO: 33.8 % (ref 34–45)
HDLC SERPL-MCNC: 52 MG/DL (ref 40–60)
HGB BLD-MCNC: 10.8 G/DL (ref 11.5–14.9)
IMM GRANULOCYTES # BLD: 0.01 10*3/MM3 (ref 0–0.03)
IMM GRANULOCYTES NFR BLD: 0.2 % (ref 0–0.5)
LDLC SERPL CALC-MCNC: 90 MG/DL (ref 0–100)
LDLC/HDLC SERPL: 1.73 {RATIO}
LYMPHOCYTES # BLD AUTO: 0.91 10*3/MM3 (ref 1–3.5)
LYMPHOCYTES NFR BLD AUTO: 16.6 % (ref 20–49)
MCH RBC QN AUTO: 33.1 PG (ref 27–33)
MCHC RBC AUTO-ENTMCNC: 32 G/DL (ref 32–35)
MCV RBC AUTO: 103.7 FL (ref 83–97)
MONOCYTES # BLD AUTO: 1.1 10*3/MM3 (ref 0.25–0.8)
MONOCYTES NFR BLD AUTO: 20.1 % (ref 4–12)
NEUTROPHILS # BLD AUTO: 2.69 10*3/MM3 (ref 1.5–7)
NEUTROPHILS NFR BLD AUTO: 49.2 % (ref 39–75)
NRBC BLD MANUAL-RTO: 0 /100 WBC (ref 0–0)
PHENYTOIN SERPL-MCNC: 6.7 MCG/ML (ref 10–20)
PLATELET # BLD AUTO: 68 10*3/MM3 (ref 150–375)
PMV BLD AUTO: 10.1 FL (ref 8.9–12.1)
POTASSIUM BLD-SCNC: 4.3 MMOL/L (ref 3.5–4.7)
PROT SERPL-MCNC: 6.1 G/DL (ref 6.3–8)
RBC # BLD AUTO: 3.26 10*6/MM3 (ref 3.9–5)
SODIUM BLD-SCNC: 143 MMOL/L (ref 134–145)
TRIGL SERPL-MCNC: 69 MG/DL (ref 0–150)
VLDLC SERPL-MCNC: 13.8 MG/DL (ref 5–40)
WBC NRBC COR # BLD: 5.47 10*3/MM3 (ref 4–10)

## 2018-10-12 PROCEDURE — 80061 LIPID PANEL: CPT | Performed by: INTERNAL MEDICINE

## 2018-10-12 PROCEDURE — 96523 IRRIG DRUG DELIVERY DEVICE: CPT | Performed by: INTERNAL MEDICINE

## 2018-10-12 PROCEDURE — 85025 COMPLETE CBC W/AUTO DIFF WBC: CPT | Performed by: INTERNAL MEDICINE

## 2018-10-12 PROCEDURE — 83036 HEMOGLOBIN GLYCOSYLATED A1C: CPT | Performed by: INTERNAL MEDICINE

## 2018-10-12 PROCEDURE — 82306 VITAMIN D 25 HYDROXY: CPT | Performed by: INTERNAL MEDICINE

## 2018-10-12 PROCEDURE — 80053 COMPREHEN METABOLIC PANEL: CPT | Performed by: INTERNAL MEDICINE

## 2018-10-12 PROCEDURE — 80185 ASSAY OF PHENYTOIN TOTAL: CPT | Performed by: INTERNAL MEDICINE

## 2018-10-12 RX ORDER — METOPROLOL TARTRATE 50 MG/1
TABLET, FILM COATED ORAL
Qty: 180 TABLET | Refills: 0 | Status: SHIPPED | OUTPATIENT
Start: 2018-10-12 | End: 2018-10-22 | Stop reason: SDUPTHER

## 2018-10-12 RX ORDER — SODIUM CHLORIDE 0.9 % (FLUSH) 0.9 %
10 SYRINGE (ML) INJECTION AS NEEDED
Status: CANCELLED | OUTPATIENT
Start: 2018-10-12

## 2018-10-12 NOTE — TELEPHONE ENCOUNTER
----- Message from Cece Phillips RN sent at 10/12/2018  2:12 PM EDT -----  Regarding: NEEDS APPT  PT NEEDS AN APPT IN 3 WKS FROM NOW CBC AND RN REV. PT LIKES MID AM'S AND WOULD LIKE A CALL ON HER CELL! THANKS!!!

## 2018-10-12 NOTE — TELEPHONE ENCOUNTER
----- Message from Chivo Iraheta MD sent at 10/12/2018  9:09 AM EDT -----  Regarding: RE: PLTS HAVE DROPPED  3 weeks for oumou, RN RENATA Urbina  ----- Message -----  From: Cece Phillips RN  Sent: 10/12/2018   8:27 AM  To: Chivo Iraheta MD  Subject: PLTS HAVE DROPPED                                PLTS HAVE DROPPED AND PT DOESN'T RTN FOR 6 WKS. WOULD YOU LIKE FOR HER TO RTN SOONER?

## 2018-10-12 NOTE — PROGRESS NOTES
Lab Results   Component Value Date    WBC 5.47 10/12/2018    HGB 10.8 (L) 10/12/2018    HCT 33.8 (L) 10/12/2018    .7 (H) 10/12/2018    PLT 68 (L) 10/12/2018     CBC RESULTS R/W PT AND HER FRIEND. PT IS CURRENTLY OFF OF CHEMO. PLTS HAVE DROPPED TODAY. REST OF COUNTS SATISFACTORY. PT DENIES ANY BLDING. PT REPORTS FEELING FINE. PT D/T RTN IN 6 WKS TO SEE MD. WILL ASK DR. POSADA IF PT SHOULD RTN SOONER TO RECHECK PLTS. TOLD PT THAT WE WOULD LET HER KNOW IF THAT'S THE CASE. PT V/U. PT ADVISED TO MONITOR FOR BLDING A/O BRUISING. PT GIVEN COPY OF LABS.

## 2018-10-18 ENCOUNTER — EPISODE CHANGES (OUTPATIENT)
Dept: CASE MANAGEMENT | Facility: OTHER | Age: 78
End: 2018-10-18

## 2018-10-22 ENCOUNTER — OFFICE VISIT (OUTPATIENT)
Dept: FAMILY MEDICINE CLINIC | Facility: CLINIC | Age: 78
End: 2018-10-22

## 2018-10-22 VITALS
BODY MASS INDEX: 22.81 KG/M2 | TEMPERATURE: 97.6 F | OXYGEN SATURATION: 98 % | SYSTOLIC BLOOD PRESSURE: 130 MMHG | HEART RATE: 47 BPM | DIASTOLIC BLOOD PRESSURE: 62 MMHG | WEIGHT: 132 LBS

## 2018-10-22 DIAGNOSIS — F33.9 CHRONIC RECURRENT MAJOR DEPRESSIVE DISORDER (HCC): ICD-10-CM

## 2018-10-22 DIAGNOSIS — D69.6 THROMBOCYTOPENIA (HCC): ICD-10-CM

## 2018-10-22 DIAGNOSIS — I48.0 PAROXYSMAL ATRIAL FIBRILLATION (HCC): ICD-10-CM

## 2018-10-22 DIAGNOSIS — C83.39 DIFFUSE LARGE B-CELL LYMPHOMA OF EXTRANODAL SITE EXCLUDING SPLEEN AND OTHER SOLID ORGANS (HCC): ICD-10-CM

## 2018-10-22 DIAGNOSIS — Z00.00 MEDICARE ANNUAL WELLNESS VISIT, SUBSEQUENT: Primary | ICD-10-CM

## 2018-10-22 DIAGNOSIS — Z12.39 BREAST CANCER SCREENING: ICD-10-CM

## 2018-10-22 DIAGNOSIS — I10 ESSENTIAL HYPERTENSION: Chronic | ICD-10-CM

## 2018-10-22 DIAGNOSIS — G40.909 SEIZURE DISORDER (HCC): ICD-10-CM

## 2018-10-22 DIAGNOSIS — Z23 IMMUNIZATION DUE: ICD-10-CM

## 2018-10-22 DIAGNOSIS — Z78.0 MENOPAUSE: ICD-10-CM

## 2018-10-22 DIAGNOSIS — E78.5 DYSLIPIDEMIA: ICD-10-CM

## 2018-10-22 PROCEDURE — G0009 ADMIN PNEUMOCOCCAL VACCINE: HCPCS | Performed by: FAMILY MEDICINE

## 2018-10-22 PROCEDURE — 90670 PCV13 VACCINE IM: CPT | Performed by: FAMILY MEDICINE

## 2018-10-22 PROCEDURE — G0439 PPPS, SUBSEQ VISIT: HCPCS | Performed by: FAMILY MEDICINE

## 2018-10-22 RX ORDER — ESCITALOPRAM OXALATE 10 MG/1
5 TABLET ORAL DAILY
Qty: 30 TABLET | Refills: 5 | Status: SHIPPED | OUTPATIENT
Start: 2018-10-22 | End: 2018-12-27 | Stop reason: SDUPTHER

## 2018-10-22 RX ORDER — HYDRALAZINE HYDROCHLORIDE 25 MG/1
TABLET, FILM COATED ORAL
Qty: 270 TABLET | Refills: 0 | Status: SHIPPED | OUTPATIENT
Start: 2018-10-22 | End: 2019-01-19 | Stop reason: SDUPTHER

## 2018-10-22 RX ORDER — GABAPENTIN 300 MG/1
600 CAPSULE ORAL NIGHTLY
Qty: 60 CAPSULE | Refills: 0 | Status: SHIPPED | OUTPATIENT
Start: 2018-10-22 | End: 2018-11-18 | Stop reason: SDUPTHER

## 2018-10-22 NOTE — PROGRESS NOTES
QUICK REFERENCE INFORMATION:  The ABCs of the Annual Wellness Visit    Subsequent Medicare Wellness Visit    HEALTH RISK ASSESSMENT    1940    Recent Hospitalizations:  Recently treated at the following:  [facility].        Current Medical Providers:  Patient Care Team:  Jamie Walton DO as PCP - General  Mary Jane Shearer MD as PCP - Claims Attributed  Isreal Tan MD as Referring Physician (Internal Medicine)  Chivo Iraheta MD as Consulting Physician (Hematology and Oncology)  Fahad Bobby Jr., MD as Consulting Physician (Hematology and Oncology)        Smoking Status:  History   Smoking Status   • Never Smoker   Smokeless Tobacco   • Never Used       Alcohol Consumption:  History   Alcohol Use No       Depression Screen:   PHQ-2/PHQ-9 Depression Screening 10/22/2018   Little interest or pleasure in doing things 0   Feeling down, depressed, or hopeless 1   Trouble falling or staying asleep, or sleeping too much 1   Feeling tired or having little energy 1   Poor appetite or overeating 1   Feeling bad about yourself - or that you are a failure or have let yourself or your family down 0   Trouble concentrating on things, such as reading the newspaper or watching television 0   Moving or speaking so slowly that other people could have noticed. Or the opposite - being so fidgety or restless that you have been moving around a lot more than usual 0   Thoughts that you would be better off dead, or of hurting yourself in some way 0   Total Score 4       Health Habits and Functional and Cognitive Screening:  Functional & Cognitive Status 10/22/2018   Do you have difficulty preparing food and eating? No   Do you have difficulty bathing yourself, getting dressed or grooming yourself? No   Do you have difficulty using the toilet? Yes   Do you have difficulty moving around from place to place? No   Do you have trouble with steps or getting out of a bed or a chair? No   In the past  year have you fallen or experienced a near fall? Yes   Current Diet Limited Junk Food   Dental Exam Not up to date   Eye Exam Not up to date   Exercise (times per week) 0 times per week   Do you need help using the phone?  No   Are you deaf or do you have serious difficulty hearing?  No   Do you need help with transportation? No   Do you need help shopping? No   Do you need help preparing meals?  No   Do you need help with housework?  No   Do you need help with laundry? No   Do you need help taking your medications? No   Do you need help managing money? No   Do you ever drive or ride in a car without wearing a seat belt? No   Have you felt unusual stress, anger or loneliness in the last month? Yes   Who do you live with? Alone   If you need help, do you have trouble finding someone available to you? No   Have you been bothered in the last four weeks by sexual problems? No   Do you have difficulty concentrating, remembering or making decisions? No           Does the patient have evidence of cognitive impairment? No    Aspirin use counseling: Does not need ASA (and currently is not on it)      Recent Lab Results:  CMP:  Lab Results   Component Value Date     (H) 04/10/2018    BUN 17 10/12/2018    CREATININE 0.53 (L) 10/12/2018    EGFRIFNONA 112 10/12/2018    EGFRIFAFRI 92 04/10/2018    BCR 32.1 (H) 10/12/2018     10/12/2018    K 4.3 10/12/2018    CO2 27.2 10/12/2018    CALCIUM 8.6 10/12/2018    PROTENTOTREF 6.3 04/22/2018    ALBUMIN 3.90 10/12/2018    LABGLOBREF 3.5 04/22/2018    LABIL2 0.9 04/22/2018    BILITOT 0.3 10/12/2018    ALKPHOS 128 (H) 10/12/2018    AST 22 10/12/2018    ALT 13 10/12/2018     Lipid Panel:  Lab Results   Component Value Date    CHOL 156 10/12/2018    TRIG 69 10/12/2018    HDL 52 10/12/2018    VLDL 13.8 10/12/2018    LDLHDL 1.73 10/12/2018     HbA1c:  Lab Results   Component Value Date    HGBA1C 4.40 (L) 10/12/2018       Visual Acuity:  No exam data present    Age-appropriate  Screening Schedule:  Refer to the list below for future screening recommendations based on patient's age, sex and/or medical conditions. Orders for these recommended tests are listed in the plan section. The patient has been provided with a written plan.    Health Maintenance   Topic Date Due   • ZOSTER VACCINE (2 of 2) 12/16/2013   • PNEUMOCOCCAL VACCINE (65+ HIGH RISK) (2 of 2 - PCV13) 01/06/2016   • DXA SCAN  12/01/2017   • MAMMOGRAM  08/18/2019   • LIPID PANEL  10/12/2019   • TDAP/TD VACCINES (2 - Td) 10/21/2026   • COLONOSCOPY  04/13/2028   • INFLUENZA VACCINE  Completed        Subjective   History of Present Illness    Martina Blake is a 78 y.o. female who presents for an Subsequent Wellness Visit.  Patient with b cell lymphoma;  Has thrombocytopenia, being monitored by hem-onc, completed chemo.  HTN has been stable.  HLD well controlled on diet.  Reports mood down, doing ok on lexapro but feels needs bigger dose.  Overall better.    The following portions of the patient's history were reviewed and updated as appropriate: allergies, current medications, past family history, past medical history, past social history, past surgical history and problem list.    Outpatient Medications Prior to Visit   Medication Sig Dispense Refill   • folic acid (FOLVITE) 400 MCG tablet Take 400 mcg by mouth daily.     • hydrALAZINE (APRESOLINE) 25 MG tablet TAKE 1 TABLET BY MOUTH THREE TIMES DAILY 270 tablet 0   • lidocaine-prilocaine (EMLA) 2.5-2.5 % cream Apply 30 min prior to port access 5 g 2   • melatonin 5 MG tablet tablet Take 5 mg by mouth Every Night.     • metoprolol tartrate (LOPRESSOR) 25 MG tablet Take 1 tablet by mouth 2 (Two) Times a Day. 180 tablet 1   • montelukast (SINGULAIR) 10 MG tablet TAKE 1 TABLET BY MOUTH EVERY DAY 90 tablet 0   • nystatin (MYCOSTATIN) 753335 UNIT/GM powder Apply  topically Every 8 (Eight) Hours. 45 g 1   • ondansetron ODT (ZOFRAN-ODT) 8 MG disintegrating tablet Take 1 tablet by mouth  Every 8 (Eight) Hours As Needed for Nausea or Vomiting for up to 60 doses. 60 tablet 5   • pantoprazole (PROTONIX) 40 MG EC tablet Take 1 tablet by mouth Daily. 90 tablet 3   • phenazopyridine (PYRIDIUM) 100 MG tablet Take 1 tablet by mouth 3 (Three) Times a Day As Needed for bladder spasms. 6 tablet 2   • phenytoin (DILANTIN) 100 MG ER capsule Take 300 mg by mouth every night at bedtime.     • potassium chloride ER (K-TAB) 20 MEQ tablet controlled-release ER tablet TAKE 1 TABLET BY MOUTH TWICE DAILY 180 tablet 0   • SENEXON-S 8.6-50 MG per tablet TAKE 2 TABLETS BY MOUTH EVERY EVENING 60 tablet 0   • vitamin B-12 (CYANOCOBALAMIN) 100 MCG tablet Take 100 mcg by mouth Daily.     • XARELTO 20 MG tablet      • escitalopram (LEXAPRO) 5 MG tablet Take 5 mg by mouth Daily.     • gabapentin (NEURONTIN) 300 MG capsule TAKE 2 CAPSULES BY MOUTH EVERY NIGHT 60 capsule 0   • metoprolol tartrate (LOPRESSOR) 50 MG tablet TAKE 1 TABLET BY MOUTH EVERY 12 HOURS 180 tablet 0   • amLODIPine (NORVASC) 10 MG tablet Take 1 tablet by mouth Daily. 90 tablet 3   • benazepril (LOTENSIN) 40 MG tablet Take 1 tablet by mouth Daily. 90 tablet 3   • cephalexin (KEFLEX) 500 MG capsule Take 1 capsule by mouth 2 (Two) Times a Day. 14 capsule 0     No facility-administered medications prior to visit.        Patient Active Problem List   Diagnosis   • Blood glucose abnormal   • Dyslipidemia   • Gastroesophageal reflux disease   • Fatigue   • Generalized osteoarthritis   • Chronic recurrent major depressive disorder (CMS/HCC)   • Osteoporosis   • Restless legs syndrome   • Seizure disorder (CMS/HCC)   • Bradycardia   • Post-menopause on HRT (hormone replacement therapy)   • Chronic seasonal allergic rhinitis   • Paroxysmal atrial fibrillation (CMS/HCC)   • Iron deficiency anemia due to chronic blood loss   • Acute superficial gastritis without hemorrhage   • Hiatal hernia   • Esophagitis   • Diverticulosis of large intestine without hemorrhage   •  Dizziness   • Weakness   • Hypertension   • Hypercalcemia   • Liver mass   • Lymphoma (CMS/HCC)   • Leukemoid reaction   • Insomnia   • Anemia associated with chemotherapy   • Pancytopenia due to antineoplastic chemotherapy (CMS/HCC)   • Chemotherapy-induced neutropenia (CMS/HCC)   • Encounter for adjustment or management of vascular access device   • 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    Review of Systems   Respiratory: Negative for shortness of breath.    Cardiovascular: Negative for chest pain.   Gastrointestinal: Negative for abdominal pain, blood in stool, nausea and vomiting.   Neurological: Negative for syncope.       Compared to one year ago, the patient feels her physical health is worse.  Compared to one year ago, the patient feels her mental health is the same.    Objective     Physical Exam   Constitutional: She appears well-developed and well-nourished.   HENT:   Head: Normocephalic and atraumatic.   Neck: Neck supple. No JVD present. No thyromegaly present.   Cardiovascular: Normal rate, regular rhythm and normal heart sounds.  Exam reveals no gallop and no friction rub.    No murmur heard.  Pulmonary/Chest: Effort normal and breath sounds normal. No respiratory distress. She has no wheezes. She has no rales.   Abdominal: Soft. Bowel sounds are normal. She exhibits no distension. There is no tenderness. There is no rebound and no guarding.   Musculoskeletal: She exhibits no edema.   Neurological: She is alert.   Skin: Skin is warm and dry.   Psychiatric: She has a normal mood and affect. Her behavior is normal.   Nursing note and vitals reviewed.      Vitals:    10/22/18 1340   BP: 130/62   Pulse: (!) 47    Temp: 97.6 °F (36.4 °C)   SpO2: 98%   Weight: 59.9 kg (132 lb)       Patient's Body mass index is 22.81 kg/m². BMI is within normal parameters. No follow-up required.  Patient did lose weight while undergoing chemo and discussed eat what hungry for to maintain weight.      Assessment/Plan   Patient Self-Management and Personalized Health Advice  The patient has been provided with information about: diet and preventive services including:   · Pneumococcal vaccine .    Visit Diagnoses:    ICD-10-CM ICD-9-CM   1. Medicare annual wellness visit, subsequent Z00.00 V70.0   2. Essential hypertension I10 401.9   3. Paroxysmal atrial fibrillation (CMS/[facility]) I48.0 427.31   4. Seizure disorder (CMS/[facility]) G40.909 345.90   5. 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 Thrombocytopenia (CMS/[facility]) D69.6 287.5   7. Dyslipidemia E78.5 272.4   8. Chronic recurrent major depressive disorder (CMS/[facility]) F33.9 296.30   9. Menopause Z78.0 627.2   10. Breast cancer screening Z12.31 V76.10   11. Immunization due Z23 V05.9       Orders Placed This Encounter   Procedures   • DEXA Bone Density Axial     Order Specific Question:   Reason for Exam:     Answer:   post-menopause   • Mammo Screening Bilateral With CAD     Standing Status:   Future     Standing Expiration Date:   10/22/2019     Order Specific Question:   Reason for Exam:     Answer:   breast cancer screening   • Pneumococcal Conjugate Vaccine 13-Valent All (PCV13)     Component      Latest Ref Rng & Units 10/12/2018   WBC      4.00 - 10.00 10*3/mm3 5.47   RBC      3.90 - 5.00 10*6/mm3 3.26 (L)   Hemoglobin      11.5 - 14.9 g/dL 10.8 (L)   Hematocrit      34.0 - 45.0 % 33.8 (L)   MCV      83.0 - 97.0 fL 103.7 (H)   MCH      27.0 - 33.0 pg 33.1 (H)   MCHC      32.0 - 35.0 g/dL 32.0   RDW      11.7 - 14.5 % 12.5   RDW-SD      37.0 - 49.0 fl 47.8   MPV      8.9 - 12.1 fL 10.1   Platelets      150 - 375 10*3/mm3 68  (L)   Neutrophil %      39.0 - 75.0 % 49.2   Lymphocyte %      20.0 - 49.0 % 16.6 (L)   Monocyte %      4.0 - 12.0 % 20.1 (H)   Eosinophil %      1.0 - 5.0 % 13.2 (H)   Basophil %      0.0 - 1.1 % 0.7   Immature Grans %      0.0 - 0.5 % 0.2   Neutrophils, Absolute      1.50 - 7.00 10*3/mm3 2.69   Lymphocytes, Absolute      1.00 - 3.50 10*3/mm3 0.91 (L)   Monocytes, Absolute      0.25 - 0.80 10*3/mm3 1.10 (H)   Eosinophils, Absolute      0.00 - 0.36 10*3/mm3 0.72 (H)   Basophils, Absolute      0.00 - 0.10 10*3/mm3 0.04   Immature Grans, Absolute      0.00 - 0.03 10*3/mm3 0.01   nRBC      0.0 - 0.0 /100 WBC 0.0   Glucose      74 - 124 mg/dL 101   BUN      6 - 20 mg/dL 17   Creatinine      0.60 - 1.10 mg/dL 0.53 (L)   Sodium      134 - 145 mmol/L 143   Potassium      3.5 - 4.7 mmol/L 4.3   Chloride      98 - 107 mmol/L 106   CO2      22.0 - 29.0 mmol/L 27.2   Calcium      8.5 - 10.2 mg/dL 8.6   Total Protein      6.3 - 8.0 g/dL 6.1 (L)   Albumin      3.50 - 5.20 g/dL 3.90   ALT (SGPT)      0 - 33 U/L 13   AST (SGOT)      0 - 32 U/L 22   Alkaline Phosphatase      38 - 116 U/L 128 (H)   Total Bilirubin      0.1 - 1.2 mg/dL 0.3   eGFR Non African Amer      >60 mL/min/1.73 112   Globulin      1.8 - 3.5 gm/dL 2.2   A/G Ratio      1.1 - 2.4 g/dL 1.8   BUN/Creatinine Ratio      7.3 - 30.0 32.1 (H)   Anion Gap      mmol/L 9.8   Total Cholesterol      0 - 200 mg/dL 156   Triglycerides      0 - 150 mg/dL 69   HDL Cholesterol      40 - 60 mg/dL 52   LDL Cholesterol       0 - 100 mg/dL 90   VLDL Cholesterol      5 - 40 mg/dL 13.8   LDL/HDL Ratio       1.73   Phenytoin Level      10.0 - 20.0 mcg/mL 6.7 (L)   Hemoglobin A1C      4.80 - 5.60 % 4.40 (L)   25 Hydroxy, Vitamin D      30.0 - 100.0 ng/ml 49.5     Outpatient Encounter Prescriptions as of 10/22/2018   Medication Sig Dispense Refill   • escitalopram (LEXAPRO) 10 MG tablet Take 0.5 tablets by mouth Daily. 30 tablet 5   • folic acid (FOLVITE) 400 MCG tablet Take 400 mcg by  mouth daily.     • hydrALAZINE (APRESOLINE) 25 MG tablet TAKE 1 TABLET BY MOUTH THREE TIMES DAILY 270 tablet 0   • lidocaine-prilocaine (EMLA) 2.5-2.5 % cream Apply 30 min prior to port access 5 g 2   • melatonin 5 MG tablet tablet Take 5 mg by mouth Every Night.     • metoprolol tartrate (LOPRESSOR) 25 MG tablet Take 1 tablet by mouth 2 (Two) Times a Day. 180 tablet 1   • montelukast (SINGULAIR) 10 MG tablet TAKE 1 TABLET BY MOUTH EVERY DAY 90 tablet 0   • nystatin (MYCOSTATIN) 073981 UNIT/GM powder Apply  topically Every 8 (Eight) Hours. 45 g 1   • ondansetron ODT (ZOFRAN-ODT) 8 MG disintegrating tablet Take 1 tablet by mouth Every 8 (Eight) Hours As Needed for Nausea or Vomiting for up to 60 doses. 60 tablet 5   • pantoprazole (PROTONIX) 40 MG EC tablet Take 1 tablet by mouth Daily. 90 tablet 3   • phenazopyridine (PYRIDIUM) 100 MG tablet Take 1 tablet by mouth 3 (Three) Times a Day As Needed for bladder spasms. 6 tablet 2   • phenytoin (DILANTIN) 100 MG ER capsule Take 300 mg by mouth every night at bedtime.     • potassium chloride ER (K-TAB) 20 MEQ tablet controlled-release ER tablet TAKE 1 TABLET BY MOUTH TWICE DAILY 180 tablet 0   • SENEXON-S 8.6-50 MG per tablet TAKE 2 TABLETS BY MOUTH EVERY EVENING 60 tablet 0   • vitamin B-12 (CYANOCOBALAMIN) 100 MCG tablet Take 100 mcg by mouth Daily.     • XARELTO 20 MG tablet      • [DISCONTINUED] escitalopram (LEXAPRO) 5 MG tablet Take 5 mg by mouth Daily.     • [DISCONTINUED] gabapentin (NEURONTIN) 300 MG capsule TAKE 2 CAPSULES BY MOUTH EVERY NIGHT 60 capsule 0   • [DISCONTINUED] metoprolol tartrate (LOPRESSOR) 50 MG tablet TAKE 1 TABLET BY MOUTH EVERY 12 HOURS 180 tablet 0   • amLODIPine (NORVASC) 10 MG tablet Take 1 tablet by mouth Daily. 90 tablet 3   • benazepril (LOTENSIN) 40 MG tablet Take 1 tablet by mouth Daily. 90 tablet 3   • [DISCONTINUED] cephalexin (KEFLEX) 500 MG capsule Take 1 capsule by mouth 2 (Two) Times a Day. 14 capsule 0     No  facility-administered encounter medications on file as of 10/22/2018.        Reviewed use of high risk medication in the elderly: yes  Reviewed for potential of harmful drug interactions in the elderly: yes    Follow Up:  Return in about 6 months (around 4/22/2019), or if symptoms worsen or fail to improve.     An After Visit Summary and PPPS with all of these plans were given to the patient.      Due for mammo and dexa  prevnar 13 today  Had flu

## 2018-10-22 NOTE — PATIENT INSTRUCTIONS
Medicare Wellness  Personal Prevention Plan of Service     Date of Office Visit:  10/22/2018  Encounter Provider:  Jamie Walton DO  Place of Service:  [facility]  Patient Name: Martina Blake  :  1940    As part of the Medicare Wellness portion of your visit today, we are providing you with this personalized preventive plan of services (PPPS). This plan is based upon recommendations of the [place] Preventive Services Task Force (USPSTF) and the Advisory Committee on Immunization Practices (ACIP).    This lists the preventive care services that should be considered, and provides dates of when you are due. Items listed as completed are up-to-date and do not require any further intervention.    Health Maintenance   Topic Date Due   • ZOSTER VACCINE (2 of 2) 2013   • PNEUMOCOCCAL VACCINE (65+ HIGH RISK) (2 of 2 - PCV13) 2016   • DXA SCAN  2017   • MAMMOGRAM  2019   • LIPID PANEL  10/12/2019   • MEDICARE ANNUAL WELLNESS  10/22/2019   • TDAP/TD VACCINES (2 - Td) 10/21/2026   • COLONOSCOPY  2028   • INFLUENZA VACCINE  Completed       Orders Placed This Encounter   Procedures   • DEXA Bone Density Axial     Order Specific Question:   Reason for Exam:     Answer:   post-menopause   • Mammo Screening Bilateral With CAD     Standing Status:   Future     Standing Expiration Date:   10/22/2019     Order Specific Question:   Reason for Exam:     Answer:   breast cancer screening   • Pneumococcal Conjugate Vaccine 13-Valent All (PCV13)       Return in about 6 months (around 2019), or if symptoms worsen or fail to improve.        Advance Directive  Advance directives are legal documents that let you make choices ahead of time about your health care and medical treatment in case you become unable to communicate for yourself. Advance directives are a way for you to communicate your wishes to family, friends, and health care providers. This can  help convey your decisions about end-of-life care if you become unable to communicate.  Discussing and writing advance directives should happen over time rather than all at once. Advance directives can be changed depending on your situation and what you want, even after you have signed the advance directives.  If you do not have an advance directive, some states assign family decision makers to act on your behalf based on how closely you are related to them. Each state has its own laws regarding advance directives. You may want to check with your health care provider, , or state representative about the laws in your state. There are different types of advance directives, such as:  · Medical power of .  · Living will.  · Do not resuscitate (DNR) or do not attempt resuscitation (DNAR) order.    Health care proxy and medical power of   A health care proxy, also called a health care agent, is a person who is appointed to make medical decisions for you in cases in which you are unable to make the decisions yourself. Generally, people choose someone they know well and trust to represent their preferences. Make sure to ask this person for an agreement to act as your proxy. A proxy may have to exercise judgment in the event of a medical decision for which your wishes are not known.  A medical power of  is a legal document that names your health care proxy. Depending on the laws in your state, after the document is written, it may also need to be:  · Signed.  · Notarized.  · Dated.  · Copied.  · Witnessed.  · Incorporated into your medical record.    You may also want to appoint someone to manage your financial affairs in a situation in which you are unable to do so. This is called a durable power of  for finances. It is a separate legal document from the durable power of  for health care. You may choose the same person or someone different from your health care proxy to act as  your agent in financial matters.  If you do not appoint a proxy, or if there is a concern that the proxy is not acting in your best interests, a court-appointed guardian may be designated to act on your behalf.  Living will  A living will is a set of instructions documenting your wishes about medical care when you cannot express them yourself. Health care providers should keep a copy of your living will in your medical record. You may want to give a copy to family members or friends. To alert caregivers in case of an emergency, you can place a card in your wallet to let them know that you have a living will and where they can find it. A living will is used if you become:  · Terminally ill.  · Incapacitated.  · Unable to communicate or make decisions.    Items to consider in your living will include:  · The use or non-use of life-sustaining equipment, such as dialysis machines and breathing machines (ventilators).  · A DNR or DNAR order, which is the instruction not to use cardiopulmonary resuscitation (CPR) if breathing or heartbeat stops.  · The use or non-use of tube feeding.  · Withholding of food and fluids.  · Comfort (palliative) care when the goal becomes comfort rather than a cure.  · Organ and tissue donation.    A living will does not give instructions for distributing your money and property if you should pass away. It is recommended that you seek the advice of a  when writing a will. Decisions about taxes, beneficiaries, and asset distribution will be legally binding. This process can relieve your family and friends of any concerns surrounding disputes or questions that may come up about the distribution of your assets.  DNR or DNAR  A DNR or DNAR order is a request not to have CPR in the event that your heart stops beating or you stop breathing. If a DNR or DNAR order has not been made and shared, a health care provider will try to help any patient whose heart has stopped or who has stopped  breathing. If you plan to have surgery, talk with your health care provider about how your DNR or DNAR order will be followed if problems occur.  Summary  · Advance directives are the legal documents that allow you to make choices ahead of time about your health care and medical treatment in case you become unable to communicate for yourself.  · The process of discussing and writing advance directives should happen over time. You can change the advance directives, even after you have signed them.  · Advance directives include DNR or DNAR orders, living [place], and designating an agent as your medical power of .  This information is not intended to replace advice given to you by your health care provider. Make sure you discuss any questions you have with your health care provider.  Document Released: 03/26/2009 Document Revised: 11/06/2017 Document Reviewed: 11/06/2017  [employer] Interactive Patient Education © 2017 [employer]

## 2018-11-02 ENCOUNTER — CLINICAL SUPPORT (OUTPATIENT)
Dept: ONCOLOGY | Facility: HOSPITAL | Age: 78
End: 2018-11-02

## 2018-11-02 ENCOUNTER — HOSPITAL ENCOUNTER (OUTPATIENT)
Dept: CARDIOLOGY | Facility: HOSPITAL | Age: 78
Discharge: HOME | End: 2018-11-02
Attending: INTERNAL MEDICINE | Admitting: INTERNAL MEDICINE

## 2018-11-02 ENCOUNTER — LAB (OUTPATIENT)
Dept: LAB | Facility: HOSPITAL | Age: 78
End: 2018-11-02

## 2018-11-02 VITALS
WEIGHT: 132.06 LBS | HEIGHT: 63 IN | SYSTOLIC BLOOD PRESSURE: 170 MMHG | DIASTOLIC BLOOD PRESSURE: 70 MMHG | OXYGEN SATURATION: 98 % | HEART RATE: 65 BPM | BODY MASS INDEX: 23.4 KG/M2

## 2018-11-02 DIAGNOSIS — I48.0 PAROXYSMAL ATRIAL FIBRILLATION (HCC): ICD-10-CM

## 2018-11-02 DIAGNOSIS — D50.0 IRON DEFICIENCY ANEMIA DUE TO CHRONIC BLOOD LOSS: Primary | ICD-10-CM

## 2018-11-02 DIAGNOSIS — T45.1X5D ADVERSE EFFECT OF CHEMOTHERAPY, SUBSEQUENT ENCOUNTER: ICD-10-CM

## 2018-11-02 LAB
BASOPHILS # BLD AUTO: 0.05 10*3/MM3 (ref 0–0.1)
BASOPHILS NFR BLD AUTO: 0.9 % (ref 0–1.1)
DEPRECATED RDW RBC AUTO: 45.5 FL (ref 37–49)
EOSINOPHIL # BLD AUTO: 0.31 10*3/MM3 (ref 0–0.36)
EOSINOPHIL NFR BLD AUTO: 5.3 % (ref 1–5)
ERYTHROCYTE [DISTWIDTH] IN BLOOD BY AUTOMATED COUNT: 12.4 % (ref 11.7–14.5)
HCT VFR BLD AUTO: 35.5 % (ref 34–45)
HGB BLD-MCNC: 11.8 G/DL (ref 11.5–14.9)
IMM GRANULOCYTES # BLD: 0.23 10*3/MM3 (ref 0–0.03)
IMM GRANULOCYTES NFR BLD: 3.9 % (ref 0–0.5)
LYMPHOCYTES # BLD AUTO: 0.86 10*3/MM3 (ref 1–3.5)
LYMPHOCYTES NFR BLD AUTO: 14.7 % (ref 20–49)
MCH RBC QN AUTO: 32.9 PG (ref 27–33)
MCHC RBC AUTO-ENTMCNC: 33.2 G/DL (ref 32–35)
MCV RBC AUTO: 98.9 FL (ref 83–97)
MONOCYTES # BLD AUTO: 0.96 10*3/MM3 (ref 0.25–0.8)
MONOCYTES NFR BLD AUTO: 16.4 % (ref 4–12)
NEUTROPHILS # BLD AUTO: 3.45 10*3/MM3 (ref 1.5–7)
NEUTROPHILS NFR BLD AUTO: 58.8 % (ref 39–75)
NRBC BLD MANUAL-RTO: 0 /100 WBC (ref 0–0)
PLATELET # BLD AUTO: 95 10*3/MM3 (ref 150–375)
PMV BLD AUTO: 10.4 FL (ref 8.9–12.1)
RBC # BLD AUTO: 3.59 10*6/MM3 (ref 3.9–5)
WBC NRBC COR # BLD: 5.86 10*3/MM3 (ref 4–10)

## 2018-11-02 PROCEDURE — 93306 TTE W/DOPPLER COMPLETE: CPT

## 2018-11-02 PROCEDURE — 0399T HC MYOCARDL STRAIN IMAG QUAN ASSMT PER SESS: CPT

## 2018-11-02 PROCEDURE — 0399T ADULT TRANSTHORACIC ECHO COMPLETE W/ CONT IF NECESSARY PER PROTOCOL: CPT | Performed by: INTERNAL MEDICINE

## 2018-11-02 PROCEDURE — 85025 COMPLETE CBC W/AUTO DIFF WBC: CPT | Performed by: INTERNAL MEDICINE

## 2018-11-02 PROCEDURE — 93306 TTE W/DOPPLER COMPLETE: CPT | Performed by: INTERNAL MEDICINE

## 2018-11-02 PROCEDURE — 36415 COLL VENOUS BLD VENIPUNCTURE: CPT | Performed by: INTERNAL MEDICINE

## 2018-11-02 NOTE — PROGRESS NOTES
Pt presents for RN review. She voiced no complaints and her platelets have improved. She continues taking Neurontin, Xarelto, and  Potassium daily. Copy of labs given and pt V/U.   Lab Results   Component Value Date    WBC 5.86 11/02/2018    HGB 11.8 11/02/2018    HCT 35.5 11/02/2018    MCV 98.9 (H) 11/02/2018    PLT 95 (L) 11/02/2018

## 2018-11-05 LAB
ASCENDING AORTA: 2.8 CM
BH CV ECHO MEAS - ACS: 1.8 CM
BH CV ECHO MEAS - AO MAX PG (FULL): 3.2 MMHG
BH CV ECHO MEAS - AO MAX PG: 8.2 MMHG
BH CV ECHO MEAS - AO MEAN PG (FULL): 1.7 MMHG
BH CV ECHO MEAS - AO MEAN PG: 4.5 MMHG
BH CV ECHO MEAS - AO ROOT AREA (BSA CORRECTED): 1.8
BH CV ECHO MEAS - AO ROOT AREA: 6.9 CM^2
BH CV ECHO MEAS - AO ROOT DIAM: 3 CM
BH CV ECHO MEAS - AO V2 MAX: 143.6 CM/SEC
BH CV ECHO MEAS - AO V2 MEAN: 99.3 CM/SEC
BH CV ECHO MEAS - AO V2 VTI: 38.1 CM
BH CV ECHO MEAS - ASC AORTA: 2.8 CM
BH CV ECHO MEAS - AVA(I,A): 1.8 CM^2
BH CV ECHO MEAS - AVA(I,D): 1.8 CM^2
BH CV ECHO MEAS - AVA(V,A): 2 CM^2
BH CV ECHO MEAS - AVA(V,D): 2 CM^2
BH CV ECHO MEAS - BSA(HAYCOCK): 1.6 M^2
BH CV ECHO MEAS - BSA: 1.6 M^2
BH CV ECHO MEAS - BZI_BMI: 23.4 KILOGRAMS/M^2
BH CV ECHO MEAS - BZI_METRIC_HEIGHT: 160 CM
BH CV ECHO MEAS - BZI_METRIC_WEIGHT: 59.9 KG
BH CV ECHO MEAS - EDV(MOD-SP2): 70 ML
BH CV ECHO MEAS - EDV(MOD-SP4): 82 ML
BH CV ECHO MEAS - EDV(TEICH): 146.9 ML
BH CV ECHO MEAS - EF(CUBED): 69.4 %
BH CV ECHO MEAS - EF(MOD-BP): 59 %
BH CV ECHO MEAS - EF(MOD-SP2): 54.3 %
BH CV ECHO MEAS - EF(MOD-SP4): 62.2 %
BH CV ECHO MEAS - EF(TEICH): 60.4 %
BH CV ECHO MEAS - ESV(MOD-SP2): 32 ML
BH CV ECHO MEAS - ESV(MOD-SP4): 31 ML
BH CV ECHO MEAS - ESV(TEICH): 58.2 ML
BH CV ECHO MEAS - IVS/LVPW: 1
BH CV ECHO MEAS - IVSD: 1.1 CM
BH CV ECHO MEAS - LAT PEAK E' VEL: 6 CM/SEC
BH CV ECHO MEAS - LV DIASTOLIC VOL/BSA (35-75): 50.6 ML/M^2
BH CV ECHO MEAS - LV MASS(C)D: 223.3 GRAMS
BH CV ECHO MEAS - LV MASS(C)DI: 137.8 GRAMS/M^2
BH CV ECHO MEAS - LV MAX PG: 5.1 MMHG
BH CV ECHO MEAS - LV MEAN PG: 2.8 MMHG
BH CV ECHO MEAS - LV SYSTOLIC VOL/BSA (12-30): 19.1 ML/M^2
BH CV ECHO MEAS - LV V1 MAX: 112.6 CM/SEC
BH CV ECHO MEAS - LV V1 MEAN: 78 CM/SEC
BH CV ECHO MEAS - LV V1 VTI: 26.9 CM
BH CV ECHO MEAS - LVIDD: 5.5 CM
BH CV ECHO MEAS - LVIDS: 3.7 CM
BH CV ECHO MEAS - LVLD AP2: 7.4 CM
BH CV ECHO MEAS - LVLD AP4: 6.8 CM
BH CV ECHO MEAS - LVLS AP2: 6.2 CM
BH CV ECHO MEAS - LVLS AP4: 5.8 CM
BH CV ECHO MEAS - LVOT AREA (M): 2.5 CM^2
BH CV ECHO MEAS - LVOT AREA: 2.5 CM^2
BH CV ECHO MEAS - LVOT DIAM: 1.8 CM
BH CV ECHO MEAS - LVPWD: 1 CM
BH CV ECHO MEAS - MED PEAK E' VEL: 4 CM/SEC
BH CV ECHO MEAS - MV A DUR: 0.11 SEC
BH CV ECHO MEAS - MV A MAX VEL: 86.3 CM/SEC
BH CV ECHO MEAS - MV DEC SLOPE: 178.5 CM/SEC^2
BH CV ECHO MEAS - MV DEC TIME: 0.27 SEC
BH CV ECHO MEAS - MV E MAX VEL: 72.8 CM/SEC
BH CV ECHO MEAS - MV E/A: 0.84
BH CV ECHO MEAS - MV MAX PG: 3.3 MMHG
BH CV ECHO MEAS - MV MEAN PG: 1.6 MMHG
BH CV ECHO MEAS - MV P1/2T MAX VEL: 79.9 CM/SEC
BH CV ECHO MEAS - MV P1/2T: 131.1 MSEC
BH CV ECHO MEAS - MV V2 MAX: 90.3 CM/SEC
BH CV ECHO MEAS - MV V2 MEAN: 58.6 CM/SEC
BH CV ECHO MEAS - MV V2 VTI: 41.5 CM
BH CV ECHO MEAS - MVA P1/2T LCG: 2.8 CM^2
BH CV ECHO MEAS - MVA(P1/2T): 1.7 CM^2
BH CV ECHO MEAS - MVA(VTI): 1.6 CM^2
BH CV ECHO MEAS - PA ACC TIME: 0.06 SEC
BH CV ECHO MEAS - PA MAX PG (FULL): 2.1 MMHG
BH CV ECHO MEAS - PA MAX PG: 4.7 MMHG
BH CV ECHO MEAS - PA PR(ACCEL): 50.5 MMHG
BH CV ECHO MEAS - PA V2 MAX: 108.6 CM/SEC
BH CV ECHO MEAS - PI END-D VEL: 104.5 CM/SEC
BH CV ECHO MEAS - PULM A REVS DUR: 0.11 SEC
BH CV ECHO MEAS - PULM A REVS VEL: 21 CM/SEC
BH CV ECHO MEAS - PULM DIAS VEL: 29.4 CM/SEC
BH CV ECHO MEAS - PULM S/D: 1.9
BH CV ECHO MEAS - PULM SYS VEL: 54.6 CM/SEC
BH CV ECHO MEAS - PVA(V,A): 1.8 CM^2
BH CV ECHO MEAS - PVA(V,D): 1.8 CM^2
BH CV ECHO MEAS - QP/QS: 0.71
BH CV ECHO MEAS - RAP SYSTOLE: 3 MMHG
BH CV ECHO MEAS - RV MAX PG: 2.7 MMHG
BH CV ECHO MEAS - RV MEAN PG: 1.5 MMHG
BH CV ECHO MEAS - RV V1 MAX: 81.4 CM/SEC
BH CV ECHO MEAS - RV V1 MEAN: 57.6 CM/SEC
BH CV ECHO MEAS - RV V1 VTI: 19.5 CM
BH CV ECHO MEAS - RVOT AREA: 2.5 CM^2
BH CV ECHO MEAS - RVOT DIAM: 1.8 CM
BH CV ECHO MEAS - RVSP: 24 MMHG
BH CV ECHO MEAS - SI(AO): 162.9 ML/M^2
BH CV ECHO MEAS - SI(CUBED): 70.9 ML/M^2
BH CV ECHO MEAS - SI(LVOT): 41.6 ML/M^2
BH CV ECHO MEAS - SI(MOD-SP2): 23.4 ML/M^2
BH CV ECHO MEAS - SI(MOD-SP4): 31.5 ML/M^2
BH CV ECHO MEAS - SI(TEICH): 54.7 ML/M^2
BH CV ECHO MEAS - SUP REN AO DIAM: 2.7 CM
BH CV ECHO MEAS - SV(AO): 264 ML
BH CV ECHO MEAS - SV(CUBED): 114.9 ML
BH CV ECHO MEAS - SV(LVOT): 67.5 ML
BH CV ECHO MEAS - SV(MOD-SP2): 38 ML
BH CV ECHO MEAS - SV(MOD-SP4): 51 ML
BH CV ECHO MEAS - SV(RVOT): 47.7 ML
BH CV ECHO MEAS - SV(TEICH): 88.7 ML
BH CV ECHO MEAS - TAPSE (>1.6): 2.7 CM2
BH CV ECHO MEAS - TR MAX VEL: 228 CM/SEC
BH CV ECHO MEASUREMENTS AVERAGE E/E' RATIO: 14.56
BH CV XLRA - RV BASE: 3.4 CM
BH CV XLRA - TDI S': 13 CM/SEC
LEFT ATRIUM VOLUME INDEX: 36 ML/M2
LV EF 2D ECHO EST: 59 %
MAXIMAL PREDICTED HEART RATE: 142 BPM
SINUS: 3.1 CM
STJ: 2.8 CM
STRESS TARGET HR: 121 BPM

## 2018-11-05 RX ORDER — BENAZEPRIL HYDROCHLORIDE 40 MG/1
TABLET, FILM COATED ORAL
Qty: 90 TABLET | Refills: 0 | Status: SHIPPED | OUTPATIENT
Start: 2018-11-05 | End: 2019-02-03 | Stop reason: SDUPTHER

## 2018-11-05 RX ORDER — AMLODIPINE BESYLATE 10 MG/1
TABLET ORAL
Qty: 90 TABLET | Refills: 0 | Status: SHIPPED | OUTPATIENT
Start: 2018-11-05 | End: 2019-02-03 | Stop reason: SDUPTHER

## 2018-11-13 RX ORDER — POTASSIUM CHLORIDE 1500 MG/1
TABLET, FILM COATED, EXTENDED RELEASE ORAL
Qty: 180 TABLET | Refills: 0 | Status: SHIPPED | OUTPATIENT
Start: 2018-11-13 | End: 2018-12-23 | Stop reason: SDUPTHER

## 2018-11-20 RX ORDER — GABAPENTIN 300 MG/1
600 CAPSULE ORAL NIGHTLY
Qty: 60 CAPSULE | Refills: 0 | OUTPATIENT
Start: 2018-11-20 | End: 2018-12-16 | Stop reason: SDUPTHER

## 2018-11-26 ENCOUNTER — OFFICE VISIT (OUTPATIENT)
Dept: ONCOLOGY | Facility: CLINIC | Age: 78
End: 2018-11-26

## 2018-11-26 ENCOUNTER — INFUSION (OUTPATIENT)
Dept: ONCOLOGY | Facility: HOSPITAL | Age: 78
End: 2018-11-26

## 2018-11-26 VITALS
WEIGHT: 133.9 LBS | BODY MASS INDEX: 22.86 KG/M2 | TEMPERATURE: 97.8 F | RESPIRATION RATE: 16 BRPM | SYSTOLIC BLOOD PRESSURE: 142 MMHG | HEIGHT: 64 IN | OXYGEN SATURATION: 98 % | HEART RATE: 44 BPM | DIASTOLIC BLOOD PRESSURE: 80 MMHG

## 2018-11-26 DIAGNOSIS — D64.81 ANEMIA ASSOCIATED WITH CHEMOTHERAPY: ICD-10-CM

## 2018-11-26 DIAGNOSIS — T45.1X5A PANCYTOPENIA DUE TO ANTINEOPLASTIC CHEMOTHERAPY (HCC): ICD-10-CM

## 2018-11-26 DIAGNOSIS — T45.1X5A ANEMIA ASSOCIATED WITH CHEMOTHERAPY: ICD-10-CM

## 2018-11-26 DIAGNOSIS — Z45.2 ENCOUNTER FOR ADJUSTMENT OR MANAGEMENT OF VASCULAR ACCESS DEVICE: ICD-10-CM

## 2018-11-26 DIAGNOSIS — D50.0 IRON DEFICIENCY ANEMIA DUE TO CHRONIC BLOOD LOSS: Primary | ICD-10-CM

## 2018-11-26 DIAGNOSIS — D50.0 IRON DEFICIENCY ANEMIA DUE TO CHRONIC BLOOD LOSS: ICD-10-CM

## 2018-11-26 DIAGNOSIS — E83.52 HYPERCALCEMIA: Primary | ICD-10-CM

## 2018-11-26 DIAGNOSIS — D61.810 PANCYTOPENIA DUE TO ANTINEOPLASTIC CHEMOTHERAPY (HCC): ICD-10-CM

## 2018-11-26 DIAGNOSIS — I48.0 PAROXYSMAL ATRIAL FIBRILLATION (HCC): ICD-10-CM

## 2018-11-26 DIAGNOSIS — C83.39 DIFFUSE LARGE B-CELL LYMPHOMA OF EXTRANODAL SITE EXCLUDING SPLEEN AND OTHER SOLID ORGANS (HCC): ICD-10-CM

## 2018-11-26 LAB
ALBUMIN SERPL-MCNC: 4 G/DL (ref 3.5–5.2)
ALBUMIN/GLOB SERPL: 1.5 G/DL (ref 1.1–2.4)
ALP SERPL-CCNC: 121 U/L (ref 38–116)
ALT SERPL W P-5'-P-CCNC: 20 U/L (ref 0–33)
ANION GAP SERPL CALCULATED.3IONS-SCNC: 11.7 MMOL/L
AST SERPL-CCNC: 23 U/L (ref 0–32)
BASOPHILS # BLD AUTO: 0.04 10*3/MM3 (ref 0–0.1)
BASOPHILS NFR BLD AUTO: 0.7 % (ref 0–1.1)
BILIRUB SERPL-MCNC: 0.3 MG/DL (ref 0.1–1.2)
BUN BLD-MCNC: 17 MG/DL (ref 6–20)
BUN/CREAT SERPL: 27.9 (ref 7.3–30)
CALCIUM SPEC-SCNC: 9.1 MG/DL (ref 8.5–10.2)
CHLORIDE SERPL-SCNC: 103 MMOL/L (ref 98–107)
CO2 SERPL-SCNC: 27.3 MMOL/L (ref 22–29)
CREAT BLD-MCNC: 0.61 MG/DL (ref 0.6–1.1)
DEPRECATED RDW RBC AUTO: 45.4 FL (ref 37–49)
EOSINOPHIL # BLD AUTO: 0.19 10*3/MM3 (ref 0–0.36)
EOSINOPHIL NFR BLD AUTO: 3.2 % (ref 1–5)
ERYTHROCYTE [DISTWIDTH] IN BLOOD BY AUTOMATED COUNT: 12.6 % (ref 11.7–14.5)
GFR SERPL CREATININE-BSD FRML MDRD: 95 ML/MIN/1.73
GLOBULIN UR ELPH-MCNC: 2.6 GM/DL (ref 1.8–3.5)
GLUCOSE BLD-MCNC: 99 MG/DL (ref 74–124)
HCT VFR BLD AUTO: 35.2 % (ref 34–45)
HGB BLD-MCNC: 11.5 G/DL (ref 11.5–14.9)
IMM GRANULOCYTES # BLD: 0.02 10*3/MM3 (ref 0–0.03)
IMM GRANULOCYTES NFR BLD: 0.3 % (ref 0–0.5)
LYMPHOCYTES # BLD AUTO: 0.62 10*3/MM3 (ref 1–3.5)
LYMPHOCYTES NFR BLD AUTO: 10.4 % (ref 20–49)
MCH RBC QN AUTO: 31.9 PG (ref 27–33)
MCHC RBC AUTO-ENTMCNC: 32.7 G/DL (ref 32–35)
MCV RBC AUTO: 97.8 FL (ref 83–97)
MONOCYTES # BLD AUTO: 0.75 10*3/MM3 (ref 0.25–0.8)
MONOCYTES NFR BLD AUTO: 12.6 % (ref 4–12)
NEUTROPHILS # BLD AUTO: 4.35 10*3/MM3 (ref 1.5–7)
NEUTROPHILS NFR BLD AUTO: 72.8 % (ref 39–75)
NRBC BLD MANUAL-RTO: 0 /100 WBC (ref 0–0)
PLATELET # BLD AUTO: 88 10*3/MM3 (ref 150–375)
PMV BLD AUTO: 10.2 FL (ref 8.9–12.1)
POTASSIUM BLD-SCNC: 4.4 MMOL/L (ref 3.5–4.7)
PROT SERPL-MCNC: 6.6 G/DL (ref 6.3–8)
RBC # BLD AUTO: 3.6 10*6/MM3 (ref 3.9–5)
SODIUM BLD-SCNC: 142 MMOL/L (ref 134–145)
WBC NRBC COR # BLD: 5.97 10*3/MM3 (ref 4–10)

## 2018-11-26 PROCEDURE — 85025 COMPLETE CBC W/AUTO DIFF WBC: CPT

## 2018-11-26 PROCEDURE — 99214 OFFICE O/P EST MOD 30 MIN: CPT | Performed by: INTERNAL MEDICINE

## 2018-11-26 PROCEDURE — 80053 COMPREHEN METABOLIC PANEL: CPT

## 2018-11-26 PROCEDURE — 36591 DRAW BLOOD OFF VENOUS DEVICE: CPT

## 2018-11-26 RX ORDER — SODIUM CHLORIDE 0.9 % (FLUSH) 0.9 %
10 SYRINGE (ML) INJECTION AS NEEDED
Status: DISCONTINUED | OUTPATIENT
Start: 2018-11-26 | End: 2018-11-26 | Stop reason: HOSPADM

## 2018-11-26 RX ORDER — PANTOPRAZOLE SODIUM 40 MG/1
TABLET, DELAYED RELEASE ORAL
Qty: 90 TABLET | Refills: 0 | Status: SHIPPED | OUTPATIENT
Start: 2018-11-26 | End: 2019-02-24 | Stop reason: SDUPTHER

## 2018-11-26 RX ORDER — SODIUM CHLORIDE 0.9 % (FLUSH) 0.9 %
10 SYRINGE (ML) INJECTION AS NEEDED
Status: CANCELLED | OUTPATIENT
Start: 2018-11-26

## 2018-11-26 RX ADMIN — SODIUM CHLORIDE, PRESERVATIVE FREE 500 UNITS: 5 INJECTION INTRAVENOUS at 13:08

## 2018-11-26 NOTE — PROGRESS NOTES
Subjective .  Patient with improved status overall    REASONS FOR FOLLOWUP:    1. 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 female who we are asked to see April 29, 2018 in consultation for hypercalcemia, anemia and radiologic findings consistent with likely malignancy-?  Lymphoma.        She has a significant past medical history of palpitations followed by cardiology.  She presented to the emergency room April 10-14 with chest pain and palpitations and was found to be in A. fib with RVR and also demonstrated electrolyte abnormalities and anemia.  Records demonstrates that her anemia became particularly evident right April 11 ruptured you an H&H of 8.9 28.4 by can 6510, platelet count 212,000 with a normal automated differential.  She underwent GI assessment including upper and lower endoscopy demonstrated hernia, gastritis, esophagitis, diverticulosis but no evidence of acute bleed.  She been placed on Eliquis as result of a relatively high CHADSVASc score.  She had also been found to be hypercalcemic at approximately 11 with HCTZ altered and the patient started on Aldactone.  Additional testing had included a ferritin of 313.1, iron of 32 with TIBC of 222 and iron saturation of 14, occult blood negative per stool testing    She was brought back to the emergency room April 21 with further evidence of hypercalcemia, associated weakness, anorexia, nausea, ataxia and weight loss.  MRI of the brain April 21 was found to be unremarkable. Outpatient intact PTH levels were found to be 7.1(reduced), and an H&H of 10.3 and 32.9, white count 11,090, platelet count 267,000 with a normal differential.  Patient seen by renal medicine with the possibility of Fanconi syndrome raised.  Thereafter PTH hormone was found be less than 2.0, and prophylactic paresis included IgG of 948, IgA of 207, IgM 86, total protein 6.3, albumin  2.8, no M spike noted, slightly elevated free kappa and lambda light chains with normal ratio.  Repeat testing per iron profile again revealed low serum iron but high serum ferritin and normal CEA, normal AFP, CA-125 of 77.2, CA 19-9 7.8   At this point the reason for her hypercalcemia was thought to be possible medication effect and/or possible malignancy with calcium was running between 12 and 13 mg/dL.    This led to CT scan of chest abdomen pelvis performed April 24 demonstrate extensive metastatic disease throughout the abdomen and pelvis particularly involving the spleen and liver, extensive lymphadenopathy at the abdomen and pelvis with a 4 cm lesion splenic hilum partially encasing the pancreatic tail, pancreatic tail malignancy?,  Gastric primary malignancy? There was an approximately a 5 cm mass along the right wall of the urinary bladder with adjacent adenopathy.  CT of the chest revealed several tiny dense pleural-based nodular opacities-partially calcified granulomas, no mediastinal or hilar adenopathy, enlarged pulmonary arteries consistent with pulmonary arterial hypertension.  His subsequent bone scan performed April 26 revealed prominent uptake in the right frontal bone and right posterior ninth rib and a CT needle biopsy of the liver was obtained April 26 as well.The sample obtained revealed, on flow cytometry examination, a subpopulation of normal B cells that might be  B-cell lymphoma.  This was eventually felt consistent with diffuse large B-cell non-Hodgkin's lymphoma, CD20 positive, double hit (BCL-6 rearrangement 85%, MYC rearrangement 79%), KI-67 4+/4. This led to the patient receiving R CHOP chemotherapy May 04, 2018 followed by Granix.  She is able to be discharged May 16, 2018.    We made plans for her to be seen in follow-up for continued Zometa and a second cycle of CHOP R chemotherapy by May 25 with follow-up PET/CT after 2 cycles of treatment.  She is seen back in office May 18 given  Zometa with findings of potential right lower extremity DVT.  Doppler is positive for acute popliteal and calf vein DVT as well as superficial venous thrombosis and the patient was started on Xarelto.  She was seen again May 21 mouth additional redness and swelling per her right elbow and felt to have cellulitis started on Keflex as an outpatient.  She had been taking the Xarelto with some improvement in her right lower extremity.    The patient's next seen family history 25th 2018 and, fortunately, her cellulitis is resolving as well is her thrombosis per right lower extremity, albeit slowly.     As the patient's second cycle of CHOP R she underwent additional scans including repeat PET/CT.  This demonstrates a dramatic response with resolution of splenomegaly and associated hypermetabolic activity, resolution of hypermetabolic liver lesion, lymphadenopathy and bony metastasis as well as reduction left gluteal lesion.  She has near remission after these 2 cycles of chemotherapy and we discussed continuing chemotherapy with total of 6 cycles of CHOP R.  We will plan the additional Zometa today and plan for choose every other cycle at this point.  The patient is next seen August 03, 2018 having done well with treatment except for development of a constipation that was difficult to manage.  It is now resolved but we will be reducing her Oncovin for the remainder of treatments.   The patient went on to take her sixth lack of treatment August 23, 2015.As result of recurrent issues with urinary tract symptoms, urgency incontinence and dysuria the patient was seen by urology September 7.  She underwent additional scans,started Myrbetriq and prophylactic Macrobid.  As she is seen September 24 she actually feels improved neurologically.  We discussed her scans as well and they are improved though not completely normal and close follow-up will be necessary.  Treatment.    The patient is next seen November 26, 2018  "fortunately feeling better in terms of stamina and performance status.  We discussed continuing follow-up with maintenance of report an every 6 weeks and, potentially, using Prolia after bone density is planned as a method also help maintain taking some vigilance per hypercalcemia treatment.           Past Medical History:   Diagnosis Date   • Anemia     multifactorial   • Cystitis    • Cystocele     moderate   • Dyslipidemia    • Esophageal reflux    • Fatigue    • History of transfusion     no reaction   • Hypercalcemia    • Hypertension    • Major depression, chronic    • Menopause    • Non Hodgkin's lymphoma (CMS/HCC)    • Osteoarthritis    • Osteoporosis    • Palpitations    • Paroxysmal atrial fibrillation (CMS/HCC)    • Post menopausal problems    • RLS (restless legs syndrome)    • Seizure disorder (CMS/HCC)    • Subjective tinnitus    • Vaginal delivery     x2  \"SONYA\"      \"JASON\"   • Vitamin D deficiency        ONCOLOGIC HISTORY:  (History from previous dates can be found in the separate document.)    Current Outpatient Medications on File Prior to Visit   Medication Sig Dispense Refill   • amLODIPine (NORVASC) 10 MG tablet TAKE 1 TABLET BY MOUTH EVERY DAY 90 tablet 0   • benazepril (LOTENSIN) 40 MG tablet TAKE 1 TABLET BY MOUTH EVERY DAY. 90 tablet 0   • escitalopram (LEXAPRO) 10 MG tablet Take 0.5 tablets by mouth Daily. 30 tablet 5   • folic acid (FOLVITE) 400 MCG tablet Take 400 mcg by mouth daily.     • gabapentin (NEURONTIN) 300 MG capsule TAKE 2 CAPSULES BY MOUTH EVERY NIGHT 60 capsule 0   • hydrALAZINE (APRESOLINE) 25 MG tablet TAKE 1 TABLET BY MOUTH THREE TIMES DAILY 270 tablet 0   • lidocaine-prilocaine (EMLA) 2.5-2.5 % cream Apply 30 min prior to port access 5 g 2   • melatonin 5 MG tablet tablet Take 5 mg by mouth Every Night.     • metoprolol tartrate (LOPRESSOR) 25 MG tablet Take 1 tablet by mouth 2 (Two) Times a Day. 180 tablet 1   • montelukast (SINGULAIR) 10 MG tablet TAKE 1 TABLET BY MOUTH " EVERY DAY 90 tablet 0   • pantoprazole (PROTONIX) 40 MG EC tablet TAKE 1 TABLET BY MOUTH EVERY DAY 90 tablet 0   • phenazopyridine (PYRIDIUM) 100 MG tablet Take 1 tablet by mouth 3 (Three) Times a Day As Needed for bladder spasms. 6 tablet 2   • phenytoin (DILANTIN) 100 MG ER capsule Take 300 mg by mouth every night at bedtime.     • potassium chloride ER (K-TAB) 20 MEQ tablet controlled-release ER tablet TAKE 1 TABLET BY MOUTH TWICE DAILY 180 tablet 0   • vitamin B-12 (CYANOCOBALAMIN) 100 MCG tablet Take 100 mcg by mouth Daily.     • XARELTO 20 MG tablet      • nystatin (MYCOSTATIN) 250135 UNIT/GM powder Apply  topically Every 8 (Eight) Hours. 45 g 1   • ondansetron ODT (ZOFRAN-ODT) 8 MG disintegrating tablet Take 1 tablet by mouth Every 8 (Eight) Hours As Needed for Nausea or Vomiting for up to 60 doses. 60 tablet 5   • SENEXON-S 8.6-50 MG per tablet TAKE 2 TABLETS BY MOUTH EVERY EVENING 60 tablet 0     No current facility-administered medications on file prior to visit.        ALLERGIES:     Allergies   Allergen Reactions   • Crab (Diagnostic) Itching and Rash   • Pseudoephedrine Dizziness       Social History     Socioeconomic History   • Marital status:      Spouse name: JL   • Number of children: 2   • Years of education: Not on file   • Highest education level: Not on file   Social Needs   • Financial resource strain: Not on file   • Food insecurity - worry: Not on file   • Food insecurity - inability: Not on file   • Transportation needs - medical: Not on file   • Transportation needs - non-medical: Not on file   Occupational History     Employer: RETIRED   Tobacco Use   • Smoking status: Never Smoker   • Smokeless tobacco: Never Used   Substance and Sexual Activity   • Alcohol use: No   • Drug use: No   • Sexual activity: Defer     Birth control/protection: Surgical     Comment:  (Jl)   Other Topics Concern   • Not on file   Social History Narrative   • Not on file      "    Cancer-related family history is not on file.     Review of Systems  A comprehensive 14 point review of systems was performed and was negative except as mentioned.    Objective      Vitals:    11/26/18 1320   BP: 142/80   Pulse: (!) 44   Resp: 16   Temp: 97.8 °F (36.6 °C)   SpO2: 98%  Comment: at rest   Weight: 60.7 kg (133 lb 14.4 oz)   Height: 162 cm (63.78\")   PainSc: 0-No pain     Current Status 11/26/2018   ECOG score 0       Physical Exam    Constitutional: She is oriented to person, place, and time. She appears well-developed and well-nourished. No distress.   Seated in wheel chair   HENT:   Head: Normocephalic and atraumatic.   Eyes: Pupils are equal, round, and reactive to light.   Pulmonary/Chest: Effort normal. No respiratory distress.   Musculoskeletal: Normal range of motion. She exhibits edema (R>L improved).   Resolving thrombophlebitis of right inner thigh- improved at the groin and just above the right knee.    Neurological: She is alert and oriented to person, place, and time.   Skin: Skin is warm and dry. No rash noted.   Considerably reduced erythema and edema of the right elbow with slight warmth and tenderness.  There is no obvious trauma.    Psychiatric: She has a normal mood and affect.   Vitals reviewed.        RECENT LABS:  Hematology WBC   Date Value Ref Range Status   11/26/2018 5.97 4.00 - 10.00 10*3/mm3 Final     RBC   Date Value Ref Range Status   11/26/2018 3.60 (L) 3.90 - 5.00 10*6/mm3 Final     Hemoglobin   Date Value Ref Range Status   11/26/2018 11.5 11.5 - 14.9 g/dL Final     Hematocrit   Date Value Ref Range Status   11/26/2018 35.2 34.0 - 45.0 % Final     Platelets   Date Value Ref Range Status   11/26/2018 88 (L) 150 - 375 10*3/mm3 Final      F-18 FDG PET FROM SKULL BASE TO MID THIGH WITH PET/CT FUSION  June 06, 2018    FINDINGS: There has been significant interval decrease in splenic size  and there has been resolution of the intensely hypermetabolic activity  throughout " the spleen. Splenic activity is similar to that of the liver.  There are 2 regions of photopenia within the spleen measuring  approximately 2 cm and these are both within an approximately 4.6 cm  low-attenuation lesion on the corresponding CT sequence. There are also  much smaller low-attenuation splenic lesions which were previously much  larger and heterogeneous. There has been resolution of the intensely  hypermetabolic right hepatic lobe liver lesion. Given constraints of the  examination, there is likely a residual 1.9 cm lesion at the inferior  margin of the right hepatic lobe, previously measuring approximately 4.8  cm. There has been resolution of the hypermetabolic lymphadenopathy  throughout the abdomen and pelvis. The hypermetabolic cervical and  mediastinal lymphadenopathy has resolved as well. There has been  increase in the intensity of activity of the marrow diffusely and there  is a more patchy pattern of metabolic activity within the marrow in some  regions. However, the previously seen hypermetabolic bone lesions have  resolved. There is low-level activity at the bed of the 1.8 cm left  posterior cervical triangle node. There is ill-defined increased density  in this region and the maximal SUV is 3.4, previously 11.2. There has  been significant interval decrease in the size of the left gluteal  lesion measuring approximately 2.5 cm, previously approximately 4 cm.  The current maximal SUV is 3.9 and was previously 5.9.     IMPRESSION:  1. Dramatic interval response with resolution of splenomegaly and the  intense hypermetabolic splenic activity, resolution of the  hypermetabolic liver lesion, lymphadenopathy, and bone metastases. The left gluteal lesion is smaller and less intensely hypermetabolic.  2. Increase in the intensity of metabolic activity throughout the marrow  space and patchy marrow activity in some regions is likely chemotherapy  Related.      CT OF THE CHEST, ABDOMEN AND PELVIS WITH  CONTRAST 09/19/2018   The PET CT scan dated 06/08/2018 is compared.     There is some minimal biapical pleural thickening. This appears stable.  No lung masses or significant pulmonary infiltrates are seen.     No pathologically enlarged axillary, hilar or mediastinal lymph nodes  are seen.     Gallbladder has been removed. There are 1 or 2 tiny low-density lesions  in the liver. The largest is seen in the inferior aspect of the right  hepatic lobe measuring approximately 12 mm. This has decreased in size  from the previous PET/CT scan of 06/08/2018 when it measured  approximately 1.9 cm. There are multiple low-density lesions in the  spleen, the largest measuring 3.2 cm. This largest lesion as well as the  other lesions have also decreased in size from the PET/CT scan.      Spleen appears minimally enlarged but slightly smaller than on the  06/08/2018 study.     The pancreas, adrenals and kidneys appear unremarkable except for 1 or 2  tiny renal cysts. Multiple small retroperitoneal and mesenteric nodes  are again seen and these appear stable or slightly improved from the  previous study. There is some mild injection of the mesenteric fat.     There is colonic diverticulosis. Uterus has been removed.     No bowel wall thickening or bowel dilatation is seen.     IMPRESSION:  1. Small low-density liver and spleen lesions most numerous in the  spleen. These have decreased in size from the previous study of  06/08/2018.  2. Numerous small mesenteric and retroperitoneal lymph nodes appear  stable or slightly decreased in size from the previous study. No new  lymphadenopathy is seen.  3. Status post cholecystectomy and hysterectomy.  4. Colonic diverticulosis.  5. Additional followup CT of the chest, abdomen and pelvis suggested.       Assessment/Plan     1. 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  · PET scan 5/3/18 with diffuse splenic involvement (SUV 16.9), lymphadenopathy throughout upper abdomen and retroperitoneum (SUV 13.1), right liver lesion 5 cm (SUV 12.8), pelvic lymphadenopathy up to 4 cm, bladder wall thickening with associated hypermetabolism, cervical lymphadenopathy left posterior (SUV 11.2), multiple bone lesions including left intertrochanteric femur, ribs, right scapula, pelvis as well as left gluteal hematoma versus lymphomatous lesion.  · CT-guided liver biopsy 4/26/18 with diffuse large B-cell non-Hodgkin's lymphoma, CD20 positive, double hit (BCL-6 rearrangement 85%, MYC rearrangement 79%), KI-67 4+/4  · Left cervical excisional biopsy by ENT Dr. Oconnor 5/1/18 confirming high-grade B cell non-Hodgkin's lymphoma, Ki-67 100%, double hit (BCL-6 rearrangement 76%, MYC rearrangement 85%)  · Echocardiogram 4/11/18 with ejection fraction 66.7%  · Right port placement Dr Gill 5/4/18  · Patient not felt to be a candidate for more aggressive chemotherapy due to performance status and age (DA EPOCH-R)  · Therefore treated with R CHOP chemotherapy 5/4/18.  · Followed by granix 300mcg daily beginning 5/6/18 through 5/14/18.  · Today, the patient has increasing WBC related to growth factor use, stable hemoglobin, spontaneous improvement in platelet count.  She is doing quite well following her chemotherapy and is ready to go home.  There is some concern regarding her persistently elevated LDH at 347 today as well as her escalating calcium at 11.9.  She is currently asymptomatic from the calcium and her ionized calcium is stable at 6.8.  Therefore we are going to proceed with discharge home.  She will return to the office later this week on 5/18/18 with repeat labs, nurse practitioner visit, and potential treatment with a third dose of Zometa if her calcium has continued to escalate significantly. She is now seen with plans to begin cycle 2 R CHOP chemotherapy with growth  factor support ( Neulasta on body injector).  Will schedule follow-up PET scan after 2 cycles of therapy and see her back in 3 weeks to review her status.  · Patient reviewed June 19 with near resolution of hypermetabolic sites on PET/CT.  Plans made for third cycle of CHOP R, additional Zometa and total of 6 cycles of chemotherapy anticipated.  · Patient seen August 03, 2018 for fifth cycle of chemotherapy-CHOP R, reduction of Oncovin 50%, 6 cycles planned.  Discussed subsequent CT scan follow-up.  · Follow-up scan September 19 with small low-density liver lesions and splenic lesions are decreased from previous, numerous small mesenteric and RP nodes again stable slightly smaller.  These are discussed September 24 and follow-up scan in 3 months is anticipated  · Patient reviewed November 26 further generally improved, plans made to maintain port, review at 3 months    2. Myelosuppression with pancytopenia related to chemotherapy:  · Received granix 300mcg daily previously and will need Neulasta-on body this treatment as well as her subsequent cycles.  · Patient seen September 24 and follow-up is planned  · Further improvement noted November 26, 2018      3. Multifactorial anemia:  · Related to anemia chronic disease, chemotherapy, prior iron deficiency.  · Received previous venofer 600mg (5/7 and 5/8) with iron studies from 4/24/18 showing ferritin 411, iron saturation 9%, TIBC 180.  Additional follow-up is planned as she is seen September 24.  Further improvement noted November 26, 2018    4. Hypercalcemia of malignancy:  · Calcium up to 13.8 on 4/21/18 (albumin 3.3).  Intact PTH 8.1, PTH RP less than 2.0  · Received Zometa 4 mg IV 4/25/18.  · Calcium up to 11.3 on 5/7/18 with second dose of Zometa administered 4 mg IV.  · Calcium today has continued to gradually increase up to 11.9 with albumin 3.3.  Ionized calcium however is stable at 6.8 compared to yesterday.  The patient is asymptomatic.  We will not plan to  administer a further dose of Zometa just yet and will allow further time for the patient to respond to chemotherapy.  She returned 518 for continued Zometa and when seen May 25 has a normal calcium level.  · Patient to receive Zometa June 19, subsequently every other cycle, restart low-dose calcium supplementation  · Patient seen August 03, 2018, plans made for Zometa with her final course of chemotherapy August 23, 2018  · Patient scheduled for bone density prior to assessment 3 months following November visit, potential Prolia follow-up    5. Hypomagnesemia, hypokalemia: Stable when reviewed November 26, 2018      6. Paroxysmal atrial fibrillation:  · Recently diagnosed, anticoagulated with Eliquis initially, discontinued due to expected thrombocytopenia from chemotherapy  · Currently in sinus rhythm, continues on Lopressor 50 mg every 12 hours  · Anticoagulation restarted with Xarelto        7. Prior seizure disorder:  · Continues currently on Dilantin 300 mg daily at bedtime and Neurontin 600 mg daily at bedtime, will return to outpatient dose of Neurontin 300 mg daily at bedtime at discharge.      9. GI prophylaxis:  · Protonix 40 mg daily      10. Venous access:  · Port placement 5/4/18 by Dr. Gill      11. DVT- Continue Xarelto 20 mg by mouth daily      Plan:  *Patient will be observed off chemotherapy  *Reassessment via laboratory studies in 6 weeks, M.D. 12 weeks, considering scans to follow

## 2018-11-28 ENCOUNTER — TRANSCRIBE ORDERS (OUTPATIENT)
Dept: ADMINISTRATIVE | Facility: HOSPITAL | Age: 78
End: 2018-11-28

## 2018-11-28 DIAGNOSIS — Z12.31 SCREENING MAMMOGRAM, ENCOUNTER FOR: Primary | ICD-10-CM

## 2018-12-17 RX ORDER — PHENYTOIN SODIUM 100 MG/1
CAPSULE, EXTENDED RELEASE ORAL
Qty: 270 CAPSULE | Refills: 0 | Status: SHIPPED | OUTPATIENT
Start: 2018-12-17 | End: 2019-03-22 | Stop reason: SDUPTHER

## 2018-12-18 RX ORDER — GABAPENTIN 300 MG/1
CAPSULE ORAL
Qty: 60 CAPSULE | Refills: 0 | Status: SHIPPED | OUTPATIENT
Start: 2018-12-18 | End: 2019-01-14 | Stop reason: SDUPTHER

## 2018-12-24 RX ORDER — POTASSIUM CHLORIDE 1500 MG/1
TABLET, FILM COATED, EXTENDED RELEASE ORAL
Qty: 180 TABLET | Refills: 0 | Status: SHIPPED | OUTPATIENT
Start: 2018-12-24 | End: 2019-04-10

## 2018-12-27 ENCOUNTER — TELEPHONE (OUTPATIENT)
Dept: FAMILY MEDICINE CLINIC | Facility: CLINIC | Age: 78
End: 2018-12-27

## 2018-12-27 DIAGNOSIS — F33.9 CHRONIC RECURRENT MAJOR DEPRESSIVE DISORDER (HCC): ICD-10-CM

## 2018-12-27 RX ORDER — ESCITALOPRAM OXALATE 10 MG/1
10 TABLET ORAL DAILY
Qty: 30 TABLET | Refills: 5 | Status: SHIPPED | OUTPATIENT
Start: 2018-12-27 | End: 2019-03-06 | Stop reason: SDUPTHER

## 2019-01-07 ENCOUNTER — INFUSION (OUTPATIENT)
Dept: ONCOLOGY | Facility: HOSPITAL | Age: 79
End: 2019-01-07

## 2019-01-07 ENCOUNTER — HOSPITAL ENCOUNTER (OUTPATIENT)
Dept: BONE DENSITY | Facility: HOSPITAL | Age: 79
Discharge: HOME | End: 2019-01-07

## 2019-01-07 ENCOUNTER — HOSPITAL ENCOUNTER (OUTPATIENT)
Dept: MAMMOGRAPHY | Facility: HOSPITAL | Age: 79
Discharge: HOME | End: 2019-01-07
Admitting: FAMILY MEDICINE

## 2019-01-07 DIAGNOSIS — E83.52 HYPERCALCEMIA: ICD-10-CM

## 2019-01-07 DIAGNOSIS — T45.1X5A PANCYTOPENIA DUE TO ANTINEOPLASTIC CHEMOTHERAPY (HCC): ICD-10-CM

## 2019-01-07 DIAGNOSIS — Z12.31 SCREENING MAMMOGRAM, ENCOUNTER FOR: ICD-10-CM

## 2019-01-07 DIAGNOSIS — Z45.2 ENCOUNTER FOR ADJUSTMENT OR MANAGEMENT OF VASCULAR ACCESS DEVICE: Primary | ICD-10-CM

## 2019-01-07 DIAGNOSIS — C83.39 DIFFUSE LARGE B-CELL LYMPHOMA OF EXTRANODAL SITE EXCLUDING SPLEEN AND OTHER SOLID ORGANS (HCC): ICD-10-CM

## 2019-01-07 DIAGNOSIS — D50.0 IRON DEFICIENCY ANEMIA DUE TO CHRONIC BLOOD LOSS: ICD-10-CM

## 2019-01-07 DIAGNOSIS — T45.1X5A ANEMIA ASSOCIATED WITH CHEMOTHERAPY: ICD-10-CM

## 2019-01-07 DIAGNOSIS — D64.81 ANEMIA ASSOCIATED WITH CHEMOTHERAPY: ICD-10-CM

## 2019-01-07 DIAGNOSIS — D61.810 PANCYTOPENIA DUE TO ANTINEOPLASTIC CHEMOTHERAPY (HCC): ICD-10-CM

## 2019-01-07 LAB
ALBUMIN SERPL-MCNC: 4.3 G/DL (ref 3.5–5.2)
ALBUMIN/GLOB SERPL: 1.8 G/DL (ref 1.1–2.4)
ALP SERPL-CCNC: 122 U/L (ref 38–116)
ALT SERPL W P-5'-P-CCNC: 14 U/L (ref 0–33)
ANION GAP SERPL CALCULATED.3IONS-SCNC: 10.2 MMOL/L
AST SERPL-CCNC: 22 U/L (ref 0–32)
BASOPHILS # BLD AUTO: 0.02 10*3/MM3 (ref 0–0.1)
BASOPHILS NFR BLD AUTO: 0.2 % (ref 0–1.1)
BILIRUB SERPL-MCNC: 0.3 MG/DL (ref 0.1–1.2)
BUN BLD-MCNC: 21 MG/DL (ref 6–20)
BUN/CREAT SERPL: 36.2 (ref 7.3–30)
CALCIUM SPEC-SCNC: 9.2 MG/DL (ref 8.5–10.2)
CHLORIDE SERPL-SCNC: 106 MMOL/L (ref 98–107)
CO2 SERPL-SCNC: 25.8 MMOL/L (ref 22–29)
CREAT BLD-MCNC: 0.58 MG/DL (ref 0.6–1.1)
DEPRECATED RDW RBC AUTO: 44.5 FL (ref 37–49)
EOSINOPHIL # BLD AUTO: 0.21 10*3/MM3 (ref 0–0.36)
EOSINOPHIL NFR BLD AUTO: 2.6 % (ref 1–5)
ERYTHROCYTE [DISTWIDTH] IN BLOOD BY AUTOMATED COUNT: 12.7 % (ref 11.7–14.5)
GFR SERPL CREATININE-BSD FRML MDRD: 101 ML/MIN/1.73
GLOBULIN UR ELPH-MCNC: 2.4 GM/DL (ref 1.8–3.5)
GLUCOSE BLD-MCNC: 101 MG/DL (ref 74–124)
HCT VFR BLD AUTO: 35.1 % (ref 34–45)
HGB BLD-MCNC: 11.5 G/DL (ref 11.5–14.9)
IMM GRANULOCYTES # BLD AUTO: 0.03 10*3/MM3 (ref 0–0.03)
IMM GRANULOCYTES NFR BLD AUTO: 0.4 % (ref 0–0.5)
LYMPHOCYTES # BLD AUTO: 0.97 10*3/MM3 (ref 1–3.5)
LYMPHOCYTES NFR BLD AUTO: 12 % (ref 20–49)
MCH RBC QN AUTO: 31.3 PG (ref 27–33)
MCHC RBC AUTO-ENTMCNC: 32.8 G/DL (ref 32–35)
MCV RBC AUTO: 95.6 FL (ref 83–97)
MONOCYTES # BLD AUTO: 0.8 10*3/MM3 (ref 0.25–0.8)
MONOCYTES NFR BLD AUTO: 9.9 % (ref 4–12)
NEUTROPHILS # BLD AUTO: 6.05 10*3/MM3 (ref 1.5–7)
NEUTROPHILS NFR BLD AUTO: 74.9 % (ref 39–75)
NRBC BLD AUTO-RTO: 0 /100 WBC (ref 0–0)
PLATELET # BLD AUTO: 94 10*3/MM3 (ref 150–375)
PMV BLD AUTO: 10 FL (ref 8.9–12.1)
POTASSIUM BLD-SCNC: 4.4 MMOL/L (ref 3.5–4.7)
PROT SERPL-MCNC: 6.7 G/DL (ref 6.3–8)
RBC # BLD AUTO: 3.67 10*6/MM3 (ref 3.9–5)
SODIUM BLD-SCNC: 142 MMOL/L (ref 134–145)
WBC NRBC COR # BLD: 8.08 10*3/MM3 (ref 4–10)

## 2019-01-07 PROCEDURE — 96523 IRRIG DRUG DELIVERY DEVICE: CPT | Performed by: INTERNAL MEDICINE

## 2019-01-07 PROCEDURE — 36591 DRAW BLOOD OFF VENOUS DEVICE: CPT | Performed by: INTERNAL MEDICINE

## 2019-01-07 PROCEDURE — 77063 BREAST TOMOSYNTHESIS BI: CPT

## 2019-01-07 PROCEDURE — 85025 COMPLETE CBC W/AUTO DIFF WBC: CPT

## 2019-01-07 PROCEDURE — 80053 COMPREHEN METABOLIC PANEL: CPT

## 2019-01-07 PROCEDURE — 77080 DXA BONE DENSITY AXIAL: CPT

## 2019-01-07 PROCEDURE — 77067 SCR MAMMO BI INCL CAD: CPT

## 2019-01-07 RX ORDER — SODIUM CHLORIDE 0.9 % (FLUSH) 0.9 %
10 SYRINGE (ML) INJECTION AS NEEDED
Status: CANCELLED | OUTPATIENT
Start: 2019-01-07

## 2019-01-07 RX ORDER — SODIUM CHLORIDE 0.9 % (FLUSH) 0.9 %
10 SYRINGE (ML) INJECTION AS NEEDED
Status: DISCONTINUED | OUTPATIENT
Start: 2019-01-07 | End: 2019-01-07 | Stop reason: HOSPADM

## 2019-01-07 RX ADMIN — SODIUM CHLORIDE, PRESERVATIVE FREE 500 UNITS: 5 INJECTION INTRAVENOUS at 14:39

## 2019-01-07 RX ADMIN — SODIUM CHLORIDE, PRESERVATIVE FREE 10 ML: 5 INJECTION INTRAVENOUS at 14:39

## 2019-01-09 DIAGNOSIS — R92.8 ABNORMAL MAMMOGRAM OF LEFT BREAST: Primary | ICD-10-CM

## 2019-01-09 NOTE — PROGRESS NOTES
Please call the patient regarding her abnormal result.  Mammogram had incomplete view on left with some calcifications.  I placed order for diagnostic to obtain better view as per radiology recommendation.  Also DEXA scan noted osteopenia.  She is currently on zometa per oncology already.

## 2019-01-14 RX ORDER — GABAPENTIN 300 MG/1
CAPSULE ORAL
Qty: 60 CAPSULE | Refills: 3 | Status: SHIPPED | OUTPATIENT
Start: 2019-01-14 | End: 2019-05-14 | Stop reason: SDUPTHER

## 2019-01-18 ENCOUNTER — HOSPITAL ENCOUNTER (OUTPATIENT)
Dept: MAMMOGRAPHY | Facility: HOSPITAL | Age: 79
Discharge: HOME | End: 2019-01-18
Admitting: FAMILY MEDICINE

## 2019-01-18 ENCOUNTER — HOSPITAL ENCOUNTER (OUTPATIENT)
Dept: ULTRASOUND IMAGING | Facility: HOSPITAL | Age: 79
End: 2019-01-18

## 2019-01-18 DIAGNOSIS — R92.8 ABNORMAL MAMMOGRAM OF LEFT BREAST: ICD-10-CM

## 2019-01-18 PROCEDURE — 77065 DX MAMMO INCL CAD UNI: CPT

## 2019-01-21 RX ORDER — HYDRALAZINE HYDROCHLORIDE 25 MG/1
TABLET, FILM COATED ORAL
Qty: 270 TABLET | Refills: 0 | Status: SHIPPED | OUTPATIENT
Start: 2019-01-21 | End: 2019-04-10

## 2019-02-04 RX ORDER — AMLODIPINE BESYLATE 10 MG/1
TABLET ORAL
Qty: 90 TABLET | Refills: 0 | Status: SHIPPED | OUTPATIENT
Start: 2019-02-04 | End: 2019-04-27 | Stop reason: SDUPTHER

## 2019-02-04 RX ORDER — BENAZEPRIL HYDROCHLORIDE 40 MG/1
TABLET, FILM COATED ORAL
Qty: 90 TABLET | Refills: 0 | Status: SHIPPED | OUTPATIENT
Start: 2019-02-04 | End: 2019-04-27 | Stop reason: SDUPTHER

## 2019-02-11 RX ORDER — MONTELUKAST SODIUM 10 MG/1
TABLET ORAL
Qty: 90 TABLET | Refills: 0 | Status: SHIPPED | OUTPATIENT
Start: 2019-02-11 | End: 2019-02-18

## 2019-02-13 NOTE — PROGRESS NOTES
Subjective .  Patient with good performance status    REASONS FOR FOLLOWUP:    1. 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 female who we are asked to see April 29, 2018 in consultation for hypercalcemia, anemia and radiologic findings consistent with likely malignancy-?  Lymphoma.        She has a significant past medical history of palpitations followed by cardiology.  She presented to the emergency room April 10-14 with chest pain and palpitations and was found to be in A. fib with RVR and also demonstrated electrolyte abnormalities and anemia.  Records demonstrates that her anemia became particularly evident right April 11 ruptured you an H&H of 8.9 28.4 by can 6510, platelet count 212,000 with a normal automated differential.  She underwent GI assessment including upper and lower endoscopy demonstrated hernia, gastritis, esophagitis, diverticulosis but no evidence of acute bleed.  She been placed on Eliquis as result of a relatively high CHADSVASc score.  She had also been found to be hypercalcemic at approximately 11 with HCTZ altered and the patient started on Aldactone.  Additional testing had included a ferritin of 313.1, iron of 32 with TIBC of 222 and iron saturation of 14, occult blood negative per stool testing    She was brought back to the emergency room April 21 with further evidence of hypercalcemia, associated weakness, anorexia, nausea, ataxia and weight loss.  MRI of the brain April 21 was found to be unremarkable. Outpatient intact PTH levels were found to be 7.1(reduced), and an H&H of 10.3 and 32.9, white count 11,090, platelet count 267,000 with a normal differential.  Patient seen by renal medicine with the possibility of Fanconi syndrome raised.  Thereafter PTH hormone was found be less than 2.0, and prophylactic paresis included IgG of 948, IgA of 207, IgM 86, total protein 6.3, albumin  2.8, no M spike noted, slightly elevated free kappa and lambda light chains with normal ratio.  Repeat testing per iron profile again revealed low serum iron but high serum ferritin and normal CEA, normal AFP, CA-125 of 77.2, CA 19-9 7.8   At this point the reason for her hypercalcemia was thought to be possible medication effect and/or possible malignancy with calcium was running between 12 and 13 mg/dL.    This led to CT scan of chest abdomen pelvis performed April 24 demonstrate extensive metastatic disease throughout the abdomen and pelvis particularly involving the spleen and liver, extensive lymphadenopathy at the abdomen and pelvis with a 4 cm lesion splenic hilum partially encasing the pancreatic tail, pancreatic tail malignancy?,  Gastric primary malignancy? There was an approximately a 5 cm mass along the right wall of the urinary bladder with adjacent adenopathy.  CT of the chest revealed several tiny dense pleural-based nodular opacities-partially calcified granulomas, no mediastinal or hilar adenopathy, enlarged pulmonary arteries consistent with pulmonary arterial hypertension.  His subsequent bone scan performed April 26 revealed prominent uptake in the right frontal bone and right posterior ninth rib and a CT needle biopsy of the liver was obtained April 26 as well.The sample obtained revealed, on flow cytometry examination, a subpopulation of normal B cells that might be  B-cell lymphoma.  This was eventually felt consistent with diffuse large B-cell non-Hodgkin's lymphoma, CD20 positive, double hit (BCL-6 rearrangement 85%, MYC rearrangement 79%), KI-67 4+/4. This led to the patient receiving R CHOP chemotherapy May 04, 2018 followed by Granix.  She is able to be discharged May 16, 2018.    We made plans for her to be seen in follow-up for continued Zometa and a second cycle of CHOP R chemotherapy by May 25 with follow-up PET/CT after 2 cycles of treatment.  She is seen back in office May 18 given  Zometa with findings of potential right lower extremity DVT.  Doppler is positive for acute popliteal and calf vein DVT as well as superficial venous thrombosis and the patient was started on Xarelto.  She was seen again May 21 mouth additional redness and swelling per her right elbow and felt to have cellulitis started on Keflex as an outpatient.  She had been taking the Xarelto with some improvement in her right lower extremity.    The patient's next seen family history 25th 2018 and, fortunately, her cellulitis is resolving as well is her thrombosis per right lower extremity, albeit slowly.     As the patient's second cycle of CHOP R she underwent additional scans including repeat PET/CT.  This demonstrates a dramatic response with resolution of splenomegaly and associated hypermetabolic activity, resolution of hypermetabolic liver lesion, lymphadenopathy and bony metastasis as well as reduction left gluteal lesion.  She has near remission after these 2 cycles of chemotherapy and we discussed continuing chemotherapy with total of 6 cycles of CHOP R.  We will plan the additional Zometa today and plan for choose every other cycle at this point.  The patient is next seen August 03, 2018 having done well with treatment except for development of a constipation that was difficult to manage.  It is now resolved but we will be reducing her Oncovin for the remainder of treatments.   The patient went on to take her sixth lack of treatment August 23, 2015.As result of recurrent issues with urinary tract symptoms, urgency incontinence and dysuria the patient was seen by urology September 7.  She underwent additional scans,started Myrbetriq and prophylactic Macrobid.  As she is seen September 24 she actually feels improved neurologically.  We discussed her scans as well and they are improved though not completely normal and close follow-up will be necessary.  Treatment.    The patient is next seen November 26, 2018  "fortunately feeling better in terms of stamina and performance status.  We discussed continuing follow-up with maintenance of report an every 6 weeks and, potentially, using Prolia after bone density is planned as a method also help maintain taking some vigilance per hypercalcemia treatment.  The patient is next assessed February 18, 2019 with an excellent performance status.  She has had no additional issues since last visit we discussed repeat scanning in approximately 3 months which would be 6 months from her previous studies.           Past Medical History:   Diagnosis Date   • Anemia     multifactorial   • Cystitis    • Cystocele     moderate   • Drug therapy    • Dyslipidemia    • Esophageal reflux    • Fatigue    • History of transfusion     no reaction   • Hypercalcemia    • Hypertension    • Major depression, chronic    • Menopause    • Non Hodgkin's lymphoma (CMS/HCC)    • Osteoarthritis    • Osteoporosis    • Palpitations    • Paroxysmal atrial fibrillation (CMS/HCC)    • Post menopausal problems    • RLS (restless legs syndrome)    • Seizure disorder (CMS/HCC)    • Subjective tinnitus    • Vaginal delivery     x2  \"SONYA\"      \"JASON\"   • Vitamin D deficiency        ONCOLOGIC HISTORY:  (History from previous dates can be found in the separate document.)    Current Outpatient Medications on File Prior to Visit   Medication Sig Dispense Refill   • amLODIPine (NORVASC) 10 MG tablet TAKE 1 TABLET BY MOUTH EVERY DAY 90 tablet 0   • benazepril (LOTENSIN) 40 MG tablet TAKE 1 TABLET BY MOUTH EVERY DAY 90 tablet 0   • escitalopram (LEXAPRO) 10 MG tablet Take 1 tablet by mouth Daily. 30 tablet 5   • folic acid (FOLVITE) 400 MCG tablet Take 400 mcg by mouth daily.     • gabapentin (NEURONTIN) 300 MG capsule TAKE 2 CAPSULES BY MOUTH EVERY EVENING 60 capsule 3   • hydrALAZINE (APRESOLINE) 25 MG tablet TAKE 1 TABLET BY MOUTH THREE TIMES DAILY 270 tablet 0   • lidocaine-prilocaine (EMLA) 2.5-2.5 % cream Apply 30 min " prior to port access 5 g 2   • melatonin 5 MG tablet tablet Take 5 mg by mouth Every Night.     • metoprolol tartrate (LOPRESSOR) 25 MG tablet Take 1 tablet by mouth 2 (Two) Times a Day. 180 tablet 1   • nystatin (MYCOSTATIN) 160136 UNIT/GM powder Apply  topically Every 8 (Eight) Hours. 45 g 1   • ondansetron ODT (ZOFRAN-ODT) 8 MG disintegrating tablet Take 1 tablet by mouth Every 8 (Eight) Hours As Needed for Nausea or Vomiting for up to 60 doses. 60 tablet 5   • pantoprazole (PROTONIX) 40 MG EC tablet TAKE 1 TABLET BY MOUTH EVERY DAY 90 tablet 0   • phenazopyridine (PYRIDIUM) 100 MG tablet Take 1 tablet by mouth 3 (Three) Times a Day As Needed for bladder spasms. 6 tablet 2   • phenytoin (DILANTIN) 100 MG ER capsule TAKE 3 CAPSULES BY MOUTH EVERY NIGHT AT BEDTIME 270 capsule 0   • potassium chloride ER (K-TAB) 20 MEQ tablet controlled-release ER tablet TAKE 1 TABLET BY MOUTH TWICE DAILY 180 tablet 0   • SENEXON-S 8.6-50 MG per tablet TAKE 2 TABLETS BY MOUTH EVERY EVENING 60 tablet 0   • vitamin B-12 (CYANOCOBALAMIN) 100 MCG tablet Take 100 mcg by mouth Daily.     • XARELTO 20 MG tablet      • [DISCONTINUED] montelukast (SINGULAIR) 10 MG tablet TAKE 1 TABLET BY MOUTH EVERY DAY 90 tablet 0     Current Facility-Administered Medications on File Prior to Visit   Medication Dose Route Frequency Provider Last Rate Last Dose   • heparin flush (porcine) 100 UNIT/ML injection 500 Units  500 Units Intravenous PRN Chivo Iraheta MD   500 Units at 02/18/19 1121   • sodium chloride 0.9 % flush 10 mL  10 mL Intravenous PRN Chivo Iraheta MD   10 mL at 02/18/19 1121       ALLERGIES:     Allergies   Allergen Reactions   • Crab (Diagnostic) Itching and Rash   • Pseudoephedrine Dizziness       Social History     Socioeconomic History   • Marital status:      Spouse name: POOJA   • Number of children: 2   • Years of education: Not on file   • Highest education level: Not on file   Social Needs   • Financial  "resource strain: Not on file   • Food insecurity - worry: Not on file   • Food insecurity - inability: Not on file   • Transportation needs - medical: Not on file   • Transportation needs - non-medical: Not on file   Occupational History     Employer: RETIRED   Tobacco Use   • Smoking status: Never Smoker   • Smokeless tobacco: Never Used   Substance and Sexual Activity   • Alcohol use: No   • Drug use: No   • Sexual activity: Defer     Birth control/protection: Surgical     Comment:  (Jl)   Other Topics Concern   • Not on file   Social History Narrative   • Not on file         Cancer-related family history is not on file.     Review of Systems  A comprehensive 14 point review of systems was performed and was negative except as mentioned.    Objective      Vitals:    02/18/19 1119   BP: 160/64   Pulse: (!) 43   Resp: 18   Temp: 98.4 °F (36.9 °C)   TempSrc: Oral   SpO2: 98%   Weight: 63.1 kg (139 lb 3.2 oz)   Height: 162 cm (63.78\")   PainSc: 0-No pain     Current Status 2/18/2019   ECOG score 1       Physical Exam    Constitutional: She is oriented to person, place, and time. She appears well-developed and well-nourished. No distress.   Seated in wheel chair   HENT:   Head: Normocephalic and atraumatic.   Eyes: Pupils are equal, round, and reactive to light.   Pulmonary/Chest: Effort normal. No respiratory distress.   Musculoskeletal: Normal range of motion. She exhibits edema (R>L improved).   Resolving thrombophlebitis of right inner thigh- improved at the groin and just above the right knee.    Neurological: She is alert and oriented to person, place, and time.   Skin: Skin is warm and dry. No rash noted.   Considerably reduced erythema and edema of the right elbow with slight warmth and tenderness.  There is no obvious trauma.    Psychiatric: She has a normal mood and affect.   Vitals reviewed.        RECENT LABS:  Hematology WBC   Date Value Ref Range Status   02/18/2019 5.08 3.40 - 10.80 10*3/mm3 " Final     RBC   Date Value Ref Range Status   02/18/2019 3.59 (L) 3.77 - 5.28 10*6/mm3 Final     Hemoglobin   Date Value Ref Range Status   02/18/2019 11.4 (L) 12.0 - 15.9 g/dL Final     Hematocrit   Date Value Ref Range Status   02/18/2019 34.6 34.0 - 46.6 % Final     Platelets   Date Value Ref Range Status   02/18/2019 99 (L) 140 - 450 10*3/mm3 Final      F-18 FDG PET FROM SKULL BASE TO MID THIGH WITH PET/CT FUSION  June 06, 2018    FINDINGS: There has been significant interval decrease in splenic size  and there has been resolution of the intensely hypermetabolic activity  throughout the spleen. Splenic activity is similar to that of the liver.  There are 2 regions of photopenia within the spleen measuring  approximately 2 cm and these are both within an approximately 4.6 cm  low-attenuation lesion on the corresponding CT sequence. There are also  much smaller low-attenuation splenic lesions which were previously much  larger and heterogeneous. There has been resolution of the intensely  hypermetabolic right hepatic lobe liver lesion. Given constraints of the  examination, there is likely a residual 1.9 cm lesion at the inferior  margin of the right hepatic lobe, previously measuring approximately 4.8  cm. There has been resolution of the hypermetabolic lymphadenopathy  throughout the abdomen and pelvis. The hypermetabolic cervical and  mediastinal lymphadenopathy has resolved as well. There has been  increase in the intensity of activity of the marrow diffusely and there  is a more patchy pattern of metabolic activity within the marrow in some  regions. However, the previously seen hypermetabolic bone lesions have  resolved. There is low-level activity at the bed of the 1.8 cm left  posterior cervical triangle node. There is ill-defined increased density  in this region and the maximal SUV is 3.4, previously 11.2. There has  been significant interval decrease in the size of the left gluteal  lesion measuring  approximately 2.5 cm, previously approximately 4 cm.  The current maximal SUV is 3.9 and was previously 5.9.     IMPRESSION:  1. Dramatic interval response with resolution of splenomegaly and the  intense hypermetabolic splenic activity, resolution of the  hypermetabolic liver lesion, lymphadenopathy, and bone metastases. The left gluteal lesion is smaller and less intensely hypermetabolic.  2. Increase in the intensity of metabolic activity throughout the marrow  space and patchy marrow activity in some regions is likely chemotherapy  Related.      CT OF THE CHEST, ABDOMEN AND PELVIS WITH CONTRAST 09/19/2018   The PET CT scan dated 06/08/2018 is compared.     There is some minimal biapical pleural thickening. This appears stable.  No lung masses or significant pulmonary infiltrates are seen.     No pathologically enlarged axillary, hilar or mediastinal lymph nodes  are seen.     Gallbladder has been removed. There are 1 or 2 tiny low-density lesions  in the liver. The largest is seen in the inferior aspect of the right  hepatic lobe measuring approximately 12 mm. This has decreased in size  from the previous PET/CT scan of 06/08/2018 when it measured  approximately 1.9 cm. There are multiple low-density lesions in the  spleen, the largest measuring 3.2 cm. This largest lesion as well as the  other lesions have also decreased in size from the PET/CT scan.      Spleen appears minimally enlarged but slightly smaller than on the  06/08/2018 study.     The pancreas, adrenals and kidneys appear unremarkable except for 1 or 2  tiny renal cysts. Multiple small retroperitoneal and mesenteric nodes  are again seen and these appear stable or slightly improved from the  previous study. There is some mild injection of the mesenteric fat.     There is colonic diverticulosis. Uterus has been removed.     No bowel wall thickening or bowel dilatation is seen.     IMPRESSION:  1. Small low-density liver and spleen lesions most  numerous in the  spleen. These have decreased in size from the previous study of  06/08/2018.  2. Numerous small mesenteric and retroperitoneal lymph nodes appear  stable or slightly decreased in size from the previous study. No new  lymphadenopathy is seen.  3. Status post cholecystectomy and hysterectomy.  4. Colonic diverticulosis.  5. Additional followup CT of the chest, abdomen and pelvis suggested.       DEXA BONE DENSITY AXIAL 1/7/19    FINDINGS: Average lumbar bone mineral density 0.89 g/sq cm correlating  to a T value of -1.1 and a Z value of 1.4. Right total hip bone mineral  density 0.77 g/sq cm correlating to a T value of -1.4 and a Z value of  0.6. Left femoral neck bone mineral density is 0.69 g/sq cm correlating  to a T value of -1.4 and a Z value of 0.9.     CONCLUSION: Osteopenia spine and hips. Increased risk for fracture.    UNILATERAL LEFT DIGITAL DIAGNOSTIC MAMMOGRAPHY 1/18/19     FINDINGS: Digital spot magnification CC and MLO images were obtained  over the upper-outer quadrant of the left breast at the site of the  calcifications of interest. Comparison is made to prior mammography  dated 01/07/2019. With magnification imaging there is visualization of  coarse calcifications that have an appearance consistent with those  associated with an involuting fibroadenoma. No suspicious findings are  visualized.     IMPRESSION:  Benign calcifications in the left breast as described.  Routine follow-up mammography is recommended.     BI-RADS Category 2:  Benign.      Assessment/Plan     1. 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  · PET scan 5/3/18 with diffuse splenic involvement (SUV 16.9), lymphadenopathy throughout upper abdomen and retroperitoneum (SUV 13.1), right liver lesion 5 cm (SUV 12.8), pelvic lymphadenopathy up to 4 cm, bladder wall thickening  with associated hypermetabolism, cervical lymphadenopathy left posterior (SUV 11.2), multiple bone lesions including left intertrochanteric femur, ribs, right scapula, pelvis as well as left gluteal hematoma versus lymphomatous lesion.  · CT-guided liver biopsy 4/26/18 with diffuse large B-cell non-Hodgkin's lymphoma, CD20 positive, double hit (BCL-6 rearrangement 85%, MYC rearrangement 79%), KI-67 4+/4  · Left cervical excisional biopsy by ENT Dr. Oconnor 5/1/18 confirming high-grade B cell non-Hodgkin's lymphoma, Ki-67 100%, double hit (BCL-6 rearrangement 76%, MYC rearrangement 85%)  · Echocardiogram 4/11/18 with ejection fraction 66.7%  · Right port placement Dr Gill 5/4/18  · Patient not felt to be a candidate for more aggressive chemotherapy due to performance status and age (DA EPOCH-R)  · Therefore treated with R CHOP chemotherapy 5/4/18.  · Followed by granix 300mcg daily beginning 5/6/18 through 5/14/18.  · Today, the patient has increasing WBC related to growth factor use, stable hemoglobin, spontaneous improvement in platelet count.  She is doing quite well following her chemotherapy and is ready to go home.  There is some concern regarding her persistently elevated LDH at 347 today as well as her escalating calcium at 11.9.  She is currently asymptomatic from the calcium and her ionized calcium is stable at 6.8.  Therefore we are going to proceed with discharge home.  She will return to the office later this week on 5/18/18 with repeat labs, nurse practitioner visit, and potential treatment with a third dose of Zometa if her calcium has continued to escalate significantly. She is now seen with plans to begin cycle 2 R CHOP chemotherapy with growth factor support ( Neulasta on body injector).  Will schedule follow-up PET scan after 2 cycles of therapy and see her back in 3 weeks to review her status.  · Patient reviewed June 19 with near resolution of hypermetabolic sites on PET/CT.  Plans made for  third cycle of CHOP R, additional Zometa and total of 6 cycles of chemotherapy anticipated.  · Patient seen August 03, 2018 for fifth cycle of chemotherapy-CHOP R, reduction of Oncovin 50%, 6 cycles planned.  Discussed subsequent CT scan follow-up.  · Follow-up scan September 19 with small low-density liver lesions and splenic lesions are decreased from previous, numerous small mesenteric and RP nodes again stable slightly smaller.  These are discussed September 24 and follow-up scan in 3 months is anticipated  · Patient reviewed November 26 further generally improved, plans made to maintain port, review at 3 months  · Patient assessed February 18, 2019, no evidence of recurrent disease, repeat scans in 3 months plan-6 months from previous    2. Myelosuppression with pancytopenia related to chemotherapy:  · Received granix 300mcg daily previously and will need Neulasta-on body this treatment as well as her subsequent cycles.  · Patient seen September 24 and follow-up is planned  · Further improvement noted November 26, 2018, and February 18, 2019      3. Multifactorial anemia:  · Related to anemia chronic disease, chemotherapy, prior iron deficiency.  · Received previous venofer 600mg (5/7 and 5/8) with iron studies from 4/24/18 showing ferritin 411, iron saturation 9%, TIBC 180.  Additional follow-up is planned as she is seen September 24.  Further improvement noted November 26, 2018    4. Hypercalcemia of malignancy:  · Calcium up to 13.8 on 4/21/18 (albumin 3.3).  Intact PTH 8.1, PTH RP less than 2.0  · Received Zometa 4 mg IV 4/25/18.  · Calcium up to 11.3 on 5/7/18 with second dose of Zometa administered 4 mg IV.  · Calcium today has continued to gradually increase up to 11.9 with albumin 3.3.  Ionized calcium however is stable at 6.8 compared to yesterday.  The patient is asymptomatic.  We will not plan to administer a further dose of Zometa just yet and will allow further time for the patient to respond to  chemotherapy.  She returned 518 for continued Zometa and when seen May 25 has a normal calcium level.  · Patient to receive Zometa June 19, subsequently every other cycle, restart low-dose calcium supplementation  · Patient seen August 03, 2018, plans made for Zometa with her final course of chemotherapy August 23, 2018  · Patient scheduled for bone density prior to assessment 3 months following November visit, potential Xgeva and follow-up as she is seen back to step to repeat scans are performed in May 2019      5.  Paroxysmal atrial fibrillation:  · Recently diagnosed, anticoagulated with Eliquis initially, discontinued due to expected thrombocytopenia from chemotherapy  · Currently in sinus rhythm, continues on Lopressor 50 mg every 12 hours  · Anticoagulation restarted with Xarelto        6. Prior seizure disorder:  · Continues currently on Dilantin 300 mg daily at bedtime and Neurontin 600 mg daily at bedtime, will return to outpatient dose of Neurontin 300 mg daily at bedtime at discharge.      7. GI prophylaxis:  · Protonix 40 mg daily      8. Venous access:  · Port placement 5/4/18 by Dr. Gill      9. DVT- Continue Xarelto 20 mg by mouth daily      Plan:  *Patient will be observed off chemotherapy  *Port flush q. 4 weeks  *Repeat scans, labs, 11 weeks  *MD assessment in 12 weeks, possible Xgeva

## 2019-02-15 DIAGNOSIS — C79.51 BONE METASTASIS: ICD-10-CM

## 2019-02-15 DIAGNOSIS — C83.39 DIFFUSE LARGE B-CELL LYMPHOMA OF EXTRANODAL SITE EXCLUDING SPLEEN AND OTHER SOLID ORGANS (HCC): ICD-10-CM

## 2019-02-18 ENCOUNTER — OFFICE VISIT (OUTPATIENT)
Dept: ONCOLOGY | Facility: CLINIC | Age: 79
End: 2019-02-18

## 2019-02-18 ENCOUNTER — INFUSION (OUTPATIENT)
Dept: ONCOLOGY | Facility: HOSPITAL | Age: 79
End: 2019-02-18

## 2019-02-18 ENCOUNTER — APPOINTMENT (OUTPATIENT)
Dept: ONCOLOGY | Facility: HOSPITAL | Age: 79
End: 2019-02-18

## 2019-02-18 VITALS
WEIGHT: 139.2 LBS | HEIGHT: 64 IN | OXYGEN SATURATION: 98 % | TEMPERATURE: 98.4 F | RESPIRATION RATE: 18 BRPM | DIASTOLIC BLOOD PRESSURE: 64 MMHG | SYSTOLIC BLOOD PRESSURE: 160 MMHG | BODY MASS INDEX: 23.76 KG/M2 | HEART RATE: 43 BPM

## 2019-02-18 DIAGNOSIS — D50.0 IRON DEFICIENCY ANEMIA DUE TO CHRONIC BLOOD LOSS: ICD-10-CM

## 2019-02-18 DIAGNOSIS — I48.0 PAROXYSMAL ATRIAL FIBRILLATION (HCC): ICD-10-CM

## 2019-02-18 DIAGNOSIS — Z45.2 ENCOUNTER FOR ADJUSTMENT OR MANAGEMENT OF VASCULAR ACCESS DEVICE: ICD-10-CM

## 2019-02-18 DIAGNOSIS — C79.51 BONE METASTASIS: ICD-10-CM

## 2019-02-18 DIAGNOSIS — C83.39 DIFFUSE LARGE B-CELL LYMPHOMA OF EXTRANODAL SITE EXCLUDING SPLEEN AND OTHER SOLID ORGANS (HCC): Primary | ICD-10-CM

## 2019-02-18 DIAGNOSIS — C83.39 DIFFUSE LARGE B-CELL LYMPHOMA OF EXTRANODAL SITE EXCLUDING SPLEEN AND OTHER SOLID ORGANS (HCC): ICD-10-CM

## 2019-02-18 DIAGNOSIS — C79.51 BONE METASTASIS: Primary | ICD-10-CM

## 2019-02-18 LAB
ALBUMIN SERPL-MCNC: 3.9 G/DL (ref 3.5–5.2)
ALBUMIN/GLOB SERPL: 1.6 G/DL (ref 1.1–2.4)
ALP SERPL-CCNC: 107 U/L (ref 38–116)
ALT SERPL W P-5'-P-CCNC: 16 U/L (ref 0–33)
ANION GAP SERPL CALCULATED.3IONS-SCNC: 8.2 MMOL/L
AST SERPL-CCNC: 24 U/L (ref 0–32)
BASOPHILS # BLD AUTO: 0.02 10*3/MM3 (ref 0–0.2)
BASOPHILS NFR BLD AUTO: 0.4 % (ref 0–1.5)
BILIRUB SERPL-MCNC: 0.3 MG/DL (ref 0.1–1.2)
BUN BLD-MCNC: 20 MG/DL (ref 6–20)
BUN/CREAT SERPL: 32.3 (ref 7.3–30)
CALCIUM SPEC-SCNC: 9 MG/DL (ref 8.5–10.2)
CHLORIDE SERPL-SCNC: 107 MMOL/L (ref 98–107)
CO2 SERPL-SCNC: 26.8 MMOL/L (ref 22–29)
CREAT BLD-MCNC: 0.62 MG/DL (ref 0.6–1.1)
DEPRECATED RDW RBC AUTO: 45.3 FL (ref 37–54)
EOSINOPHIL # BLD AUTO: 0.13 10*3/MM3 (ref 0–0.4)
EOSINOPHIL NFR BLD AUTO: 2.6 % (ref 0.3–6.2)
ERYTHROCYTE [DISTWIDTH] IN BLOOD BY AUTOMATED COUNT: 12.7 % (ref 12.3–15.4)
GFR SERPL CREATININE-BSD FRML MDRD: 93 ML/MIN/1.73
GLOBULIN UR ELPH-MCNC: 2.5 GM/DL (ref 1.8–3.5)
GLUCOSE BLD-MCNC: 82 MG/DL (ref 74–124)
HCT VFR BLD AUTO: 34.6 % (ref 34–46.6)
HGB BLD-MCNC: 11.4 G/DL (ref 12–15.9)
IMM GRANULOCYTES # BLD AUTO: 0.01 10*3/MM3 (ref 0–0.05)
IMM GRANULOCYTES NFR BLD AUTO: 0.2 % (ref 0–0.5)
LYMPHOCYTES # BLD AUTO: 0.55 10*3/MM3 (ref 0.7–3.1)
LYMPHOCYTES NFR BLD AUTO: 10.8 % (ref 19.6–45.3)
MAGNESIUM SERPL-MCNC: 1.9 MG/DL (ref 1.8–2.5)
MCH RBC QN AUTO: 31.8 PG (ref 26.6–33)
MCHC RBC AUTO-ENTMCNC: 32.9 G/DL (ref 31.5–35.7)
MCV RBC AUTO: 96.4 FL (ref 79–97)
MONOCYTES # BLD AUTO: 0.62 10*3/MM3 (ref 0.1–0.9)
MONOCYTES NFR BLD AUTO: 12.2 % (ref 5–12)
NEUTROPHILS # BLD AUTO: 3.75 10*3/MM3 (ref 1.4–7)
NEUTROPHILS NFR BLD AUTO: 73.8 % (ref 42.7–76)
NRBC BLD AUTO-RTO: 0 /100 WBC (ref 0–0)
PHOSPHATE SERPL-MCNC: 4.2 MG/DL (ref 2.5–4.5)
PLATELET # BLD AUTO: 99 10*3/MM3 (ref 140–450)
PMV BLD AUTO: 9.6 FL (ref 6–12)
POTASSIUM BLD-SCNC: 4.5 MMOL/L (ref 3.5–4.7)
PROT SERPL-MCNC: 6.4 G/DL (ref 6.3–8)
RBC # BLD AUTO: 3.59 10*6/MM3 (ref 3.77–5.28)
SODIUM BLD-SCNC: 142 MMOL/L (ref 134–145)
WBC NRBC COR # BLD: 5.08 10*3/MM3 (ref 3.4–10.8)

## 2019-02-18 PROCEDURE — 99214 OFFICE O/P EST MOD 30 MIN: CPT | Performed by: INTERNAL MEDICINE

## 2019-02-18 PROCEDURE — 84100 ASSAY OF PHOSPHORUS: CPT

## 2019-02-18 PROCEDURE — 83735 ASSAY OF MAGNESIUM: CPT

## 2019-02-18 PROCEDURE — 96523 IRRIG DRUG DELIVERY DEVICE: CPT | Performed by: INTERNAL MEDICINE

## 2019-02-18 PROCEDURE — 80053 COMPREHEN METABOLIC PANEL: CPT

## 2019-02-18 PROCEDURE — 36591 DRAW BLOOD OFF VENOUS DEVICE: CPT | Performed by: INTERNAL MEDICINE

## 2019-02-18 PROCEDURE — 85025 COMPLETE CBC W/AUTO DIFF WBC: CPT

## 2019-02-18 RX ORDER — SODIUM CHLORIDE 0.9 % (FLUSH) 0.9 %
10 SYRINGE (ML) INJECTION AS NEEDED
Status: DISCONTINUED | OUTPATIENT
Start: 2019-02-18 | End: 2019-02-18 | Stop reason: HOSPADM

## 2019-02-18 RX ORDER — SODIUM CHLORIDE 0.9 % (FLUSH) 0.9 %
10 SYRINGE (ML) INJECTION AS NEEDED
Status: CANCELLED | OUTPATIENT
Start: 2019-02-18

## 2019-02-18 RX ADMIN — SODIUM CHLORIDE, PRESERVATIVE FREE 10 ML: 5 INJECTION INTRAVENOUS at 11:21

## 2019-02-18 RX ADMIN — Medication 500 UNITS: at 11:21

## 2019-02-25 ENCOUNTER — TELEPHONE (OUTPATIENT)
Dept: GENERAL RADIOLOGY | Facility: HOSPITAL | Age: 79
End: 2019-02-25

## 2019-02-25 ENCOUNTER — TELEPHONE (OUTPATIENT)
Dept: ONCOLOGY | Facility: HOSPITAL | Age: 79
End: 2019-02-25

## 2019-02-25 DIAGNOSIS — C83.39 DIFFUSE LARGE B-CELL LYMPHOMA OF EXTRANODAL SITE EXCLUDING SPLEEN AND OTHER SOLID ORGANS (HCC): Primary | ICD-10-CM

## 2019-02-25 RX ORDER — PANTOPRAZOLE SODIUM 40 MG/1
TABLET, DELAYED RELEASE ORAL
Qty: 90 TABLET | Refills: 0 | Status: SHIPPED | OUTPATIENT
Start: 2019-02-25 | End: 2019-05-22 | Stop reason: SDUPTHER

## 2019-02-25 RX ORDER — POTASSIUM CHLORIDE 1500 MG/1
TABLET, FILM COATED, EXTENDED RELEASE ORAL
Qty: 180 TABLET | Refills: 0 | Status: SHIPPED | OUTPATIENT
Start: 2019-02-25 | End: 2019-04-10 | Stop reason: SDUPTHER

## 2019-02-25 NOTE — TELEPHONE ENCOUNTER
Pt calling because she states she forgot to get Prolia at her last visit with Dr. Iraheta. There is no note regarding giving pt Prolia but Dr. Iraheta did mention that pt would need Xgeva in May. Informed pt I would s/w Dr. Iraheta and get back with her. Per Dr. Iraheta, he knows pt did not stop to get injection and he feels that it will be OK to wait until May to get Xgeva and he only plans to give this every 3 months. Informed pt and she v/u. Message sent to scheduling to add this on to pt's schedule in May along with labs.

## 2019-02-25 NOTE — TELEPHONE ENCOUNTER
----- Message from Kanchan Gaspar RN sent at 2/25/2019  3:10 PM EST -----  Please add possible Xgeva to pt's apt on 5/13 with Dr. Iraheta. Pt also needs CBC, CMP, MG and phos at this visit. Please call pt if this changes arrival time.

## 2019-03-05 ENCOUNTER — TELEPHONE (OUTPATIENT)
Dept: FAMILY MEDICINE CLINIC | Facility: CLINIC | Age: 79
End: 2019-03-05

## 2019-03-06 DIAGNOSIS — F33.9 CHRONIC RECURRENT MAJOR DEPRESSIVE DISORDER (HCC): ICD-10-CM

## 2019-03-06 RX ORDER — ESCITALOPRAM OXALATE 10 MG/1
10 TABLET ORAL DAILY
Qty: 90 TABLET | Refills: 1 | Status: SHIPPED | OUTPATIENT
Start: 2019-03-06 | End: 2019-04-22 | Stop reason: SDUPTHER

## 2019-03-18 ENCOUNTER — INFUSION (OUTPATIENT)
Dept: ONCOLOGY | Facility: HOSPITAL | Age: 79
End: 2019-03-18

## 2019-03-18 DIAGNOSIS — Z45.2 ENCOUNTER FOR ADJUSTMENT OR MANAGEMENT OF VASCULAR ACCESS DEVICE: Primary | ICD-10-CM

## 2019-03-18 PROCEDURE — 96523 IRRIG DRUG DELIVERY DEVICE: CPT | Performed by: INTERNAL MEDICINE

## 2019-03-18 RX ORDER — SODIUM CHLORIDE 0.9 % (FLUSH) 0.9 %
10 SYRINGE (ML) INJECTION AS NEEDED
Status: DISCONTINUED | OUTPATIENT
Start: 2019-03-18 | End: 2019-03-18 | Stop reason: HOSPADM

## 2019-03-18 RX ORDER — SODIUM CHLORIDE 0.9 % (FLUSH) 0.9 %
10 SYRINGE (ML) INJECTION AS NEEDED
Status: CANCELLED | OUTPATIENT
Start: 2019-03-18

## 2019-03-18 RX ADMIN — Medication 10 ML: at 13:33

## 2019-03-18 RX ADMIN — Medication 500 UNITS: at 13:33

## 2019-03-22 RX ORDER — PHENYTOIN SODIUM 100 MG/1
CAPSULE, EXTENDED RELEASE ORAL
Qty: 270 CAPSULE | Refills: 0 | Status: SHIPPED | OUTPATIENT
Start: 2019-03-22 | End: 2019-06-10 | Stop reason: SDUPTHER

## 2019-03-26 ENCOUNTER — TELEPHONE (OUTPATIENT)
Dept: FAMILY MEDICINE CLINIC | Facility: CLINIC | Age: 79
End: 2019-03-26

## 2019-03-26 NOTE — TELEPHONE ENCOUNTER
BP has been the followin/60  170/64  165/48  168/59    Pt wants to know what she needs to do for her blood pressure?      Pt is on the following:    Metoprolol Tartrate 50mg - 1/2 tab BID  Hydralizine 25mg - TID  Benzapril 40mg - daily  Amlodipine 10mg - daily    Pt is not on a water pill and she is not swelling.

## 2019-04-04 ENCOUNTER — DOCUMENTATION (OUTPATIENT)
Dept: ONCOLOGY | Facility: CLINIC | Age: 79
End: 2019-04-04

## 2019-04-04 ENCOUNTER — TELEPHONE (OUTPATIENT)
Dept: ONCOLOGY | Facility: CLINIC | Age: 79
End: 2019-04-04

## 2019-04-04 NOTE — PROGRESS NOTES
Message from Mckenzie BARNES RN in Triage-Pt calling about Xarelto samples. See below.    Mckenzie Oswald RN sent to Alana Banda   Phone Number: [phone]             -40--pt. Calling for more xarelto samples.  She said you told her to call back and you would probably have the 20mg samples.  Thanks mckenzie aguero      I contacted pt back and let her know I can provide samples to her. She is having a difficult time affording the copay. She will come in tomorrow and pick some up.

## 2019-04-04 NOTE — TELEPHONE ENCOUNTER
----- Message from Glenis Gallagher MA sent at 4/4/2019 12:44 PM EDT -----  Patient asking about scheduling change and RX refill

## 2019-04-10 ENCOUNTER — TELEPHONE (OUTPATIENT)
Dept: FAMILY MEDICINE CLINIC | Facility: CLINIC | Age: 79
End: 2019-04-10

## 2019-04-10 ENCOUNTER — OFFICE VISIT (OUTPATIENT)
Dept: CARDIOLOGY | Facility: CLINIC | Age: 79
End: 2019-04-10

## 2019-04-10 VITALS
BODY MASS INDEX: 24.59 KG/M2 | WEIGHT: 144 LBS | HEART RATE: 38 BPM | SYSTOLIC BLOOD PRESSURE: 148 MMHG | HEIGHT: 64 IN | DIASTOLIC BLOOD PRESSURE: 80 MMHG

## 2019-04-10 DIAGNOSIS — R00.1 BRADYCARDIA, SINUS: ICD-10-CM

## 2019-04-10 DIAGNOSIS — I10 ESSENTIAL HYPERTENSION: Chronic | ICD-10-CM

## 2019-04-10 DIAGNOSIS — M81.0 AGE-RELATED OSTEOPOROSIS WITHOUT CURRENT PATHOLOGICAL FRACTURE: ICD-10-CM

## 2019-04-10 DIAGNOSIS — I48.0 PAROXYSMAL ATRIAL FIBRILLATION (HCC): ICD-10-CM

## 2019-04-10 DIAGNOSIS — I48.0 PAROXYSMAL ATRIAL FIBRILLATION (HCC): Primary | ICD-10-CM

## 2019-04-10 DIAGNOSIS — I10 ESSENTIAL HYPERTENSION: Primary | Chronic | ICD-10-CM

## 2019-04-10 DIAGNOSIS — R73.09 BLOOD GLUCOSE ABNORMAL: ICD-10-CM

## 2019-04-10 DIAGNOSIS — E78.5 DYSLIPIDEMIA: ICD-10-CM

## 2019-04-10 PROCEDURE — 93000 ELECTROCARDIOGRAM COMPLETE: CPT | Performed by: INTERNAL MEDICINE

## 2019-04-10 PROCEDURE — 99214 OFFICE O/P EST MOD 30 MIN: CPT | Performed by: INTERNAL MEDICINE

## 2019-04-10 RX ORDER — HYDRALAZINE HYDROCHLORIDE 50 MG/1
50 TABLET, FILM COATED ORAL 3 TIMES DAILY
Qty: 270 TABLET | Refills: 3 | Status: SHIPPED | OUTPATIENT
Start: 2019-04-10 | End: 2019-06-11 | Stop reason: SDUPTHER

## 2019-04-10 RX ORDER — SPIRONOLACTONE 25 MG/1
25 TABLET ORAL DAILY
Qty: 90 TABLET | Refills: 3 | Status: SHIPPED | OUTPATIENT
Start: 2019-04-10 | End: 2019-11-19

## 2019-04-10 RX ORDER — METOPROLOL TARTRATE 50 MG/1
1 TABLET, FILM COATED ORAL 2 TIMES DAILY
Refills: 0 | COMMUNITY
Start: 2019-02-10 | End: 2019-04-10

## 2019-04-10 NOTE — PROGRESS NOTES
Date of Office Visit: 04/10/2019  Encounter Provider: Justine Barrios MD  Place of Service: [facility] CARDIOLOGY  Patient Name: Martina Blake  :1940      Patient ID:  Martina Blake is a 78 y.o. female is here for  followup for bradycardia.         History of Present Illness    She has a past medical history of hypertension, dyslipidemia, esophageal reflux, seizure disorder, and vitamin D deficiency.     She has a past medical history of atrial fibrillation with rapid ventricular response in the setting of hypokalemia, hypomagnesemia, hypercalcemia, and anemia.  She was placed on metoprolol.  She had an upper and lower endoscopy showing hiatal hernia, gastritis, esophagitis, and diverticulosis with no acute bleeding.  She was started on apixiban and and spironolactone.  She had a normal Cardiolite stress test in 2018 an echocardiogram at that time revealed an EF of 67%, grade 2 diastolic dysfunction, severe left atrial enlargement, mild to moderate tricuspid insufficiency, and RVSP elevated at 44 mmHg.       She has large B-cell lymphoma diffuse and is now in remission, was on R-CHOP (rituximab, doxorubicin, vincristine and cyclophosphamide) finished 18 with Dr. Iraheta.      She was hospitalized in May 2018 and chemotherapy was initiated.  Her apixiban was discontinued due to thrombocytopenia.  She was placed on metoprolol tartrate 50 mg twice daily for A. fib with rapid ventricular response.  She was discharged from the hospital on 18 and is followed up with her PCP and Dr. Iraheta.     Echocardiogram done 18 shows ejection fraction 59% with global longitudinal strain of -18%.  There was grade 2 diastolic dysfunction and no significant valve disease.    Heart rates been low and her blood pressures been high.  She has been checking her blood pressure at home and is running 150s-170s over 60s.  Her heart rate today is 38 but it has been running in the  "low 40s at home.  She has had no dizziness, syncope.  She has had no fatigue.  She has no orthopnea or PND.  She has no exertional dyspnea.  She has no exertional chest pain or pressure.    She had laboratory values done 2/2019 showing normal CMP with potassium 4.5, hemoglobin 11.4 but otherwise normal CBC and magnesium 1.9.    Past Medical History:   Diagnosis Date   • Anemia     multifactorial   • Cystitis    • Cystocele     moderate   • Drug therapy    • Dyslipidemia    • Esophageal reflux    • Fatigue    • History of transfusion     no reaction   • Hypercalcemia    • Hypertension    • Major depression, chronic    • Menopause    • Non Hodgkin's lymphoma (CMS/HCC)    • Osteoarthritis    • Osteoporosis    • Palpitations    • Paroxysmal atrial fibrillation (CMS/HCC)    • Post menopausal problems    • RLS (restless legs syndrome)    • Seizure disorder (CMS/HCC)    • Subjective tinnitus    • Vaginal delivery     x2  \"SONYA\"      \"JASON\"   • Vitamin D deficiency          Past Surgical History:   Procedure Laterality Date   • CERVICAL LYMPH NODE BIOPSY/EXCISION Left 5/1/2018    Procedure: CERVICAL LYMPH NODE BIOPSY/EXCISION;  Surgeon: Danny Oconnor MD;  Location: [facility] OR;  Service: ENT   • CHOLECYSTECTOMY     • COLONOSCOPY     • COLONOSCOPY N/A 4/13/2018    Procedure: COLONOSCOPY to terminal ileum;  Surgeon: Dominik Burt MD;  Location: [facility] ENDOSCOPY;  Service: Gastroenterology   • CRANIOTOMY     • ENDOSCOPY N/A 4/13/2018    Procedure: ESOPHAGOGASTRODUODENOSCOPY with biopsy;  Surgeon: Dominik Burt MD;  Location: [facility] ENDOSCOPY;  Service: Gastroenterology   • TOTAL ABDOMINAL HYSTERECTOMY  1980    w/ bladder suspension.  Probably vaginal hysterectomy with anterior colporrhaphy.    • VENOUS ACCESS DEVICE (PORT) INSERTION N/A 5/4/2018    Procedure: INSERTION VENOUS ACCESS DEVICE;  Surgeon: Jamie Gill MD;  Location: [facility] OR;  Service: General   • WRIST SURGERY Left        Current Outpatient " Medications on File Prior to Visit   Medication Sig Dispense Refill   • amLODIPine (NORVASC) 10 MG tablet TAKE 1 TABLET BY MOUTH EVERY DAY 90 tablet 0   • benazepril (LOTENSIN) 40 MG tablet TAKE 1 TABLET BY MOUTH EVERY DAY 90 tablet 0   • escitalopram (LEXAPRO) 10 MG tablet Take 1 tablet by mouth Daily. 90 tablet 1   • folic acid (FOLVITE) 400 MCG tablet Take 400 mcg by mouth daily.     • gabapentin (NEURONTIN) 300 MG capsule TAKE 2 CAPSULES BY MOUTH EVERY EVENING 60 capsule 3   • lidocaine-prilocaine (EMLA) 2.5-2.5 % cream Apply 30 min prior to port access 5 g 2   • melatonin 5 MG tablet tablet Take 5 mg by mouth Every Night.     • nystatin (MYCOSTATIN) 617101 UNIT/GM powder Apply  topically Every 8 (Eight) Hours. 45 g 1   • ondansetron ODT (ZOFRAN-ODT) 8 MG disintegrating tablet Take 1 tablet by mouth Every 8 (Eight) Hours As Needed for Nausea or Vomiting for up to 60 doses. 60 tablet 5   • pantoprazole (PROTONIX) 40 MG EC tablet TAKE 1 TABLET BY MOUTH EVERY DAY 90 tablet 0   • phenazopyridine (PYRIDIUM) 100 MG tablet Take 1 tablet by mouth 3 (Three) Times a Day As Needed for bladder spasms. 6 tablet 2   • phenytoin (DILANTIN) 100 MG ER capsule TAKE 3 CAPSULES BY MOUTH EVERY NIGHT AT BEDTIME 270 capsule 0   • SENEXON-S 8.6-50 MG per tablet TAKE 2 TABLETS BY MOUTH EVERY EVENING 60 tablet 0   • vitamin B-12 (CYANOCOBALAMIN) 100 MCG tablet Take 100 mcg by mouth Daily.     • XARELTO 20 MG tablet      • [DISCONTINUED] hydrALAZINE (APRESOLINE) 25 MG tablet TAKE 1 TABLET BY MOUTH THREE TIMES DAILY 270 tablet 0   • [DISCONTINUED] metoprolol tartrate (LOPRESSOR) 25 MG tablet Take 1 tablet by mouth 2 (Two) Times a Day. (Patient taking differently: Take 50 mg by mouth 2 (Two) Times a Day.) 180 tablet 1   • [DISCONTINUED] metoprolol tartrate (LOPRESSOR) 50 MG tablet Take 1 tablet by mouth 2 (Two) Times a Day.  0   • [DISCONTINUED] potassium chloride ER (K-TAB) 20 MEQ tablet controlled-release ER tablet TAKE 1 TABLET BY MOUTH  TWICE DAILY 180 tablet 0   • [DISCONTINUED] potassium chloride ER (K-TAB) 20 MEQ tablet controlled-release ER tablet TAKE 1 TABLET BY MOUTH TWICE DAILY 180 tablet 0     No current facility-administered medications on file prior to visit.        Social History     Socioeconomic History   • Marital status:      Spouse name: JL   • Number of children: 2   • Years of education: Not on file   • Highest education level: Not on file   Occupational History     Employer: RETIRED   Tobacco Use   • Smoking status: Never Smoker   • Smokeless tobacco: Never Used   Substance and Sexual Activity   • Alcohol use: No   • Drug use: No   • Sexual activity: Defer     Birth control/protection: Surgical     Comment:  (Jl)           Review of Systems   Constitution: Negative.   HENT: Negative for congestion.    Eyes: Negative for vision loss in left eye and vision loss in right eye.   Respiratory: Negative.  Negative for cough, hemoptysis, shortness of breath, sleep disturbances due to breathing, snoring, sputum production and wheezing.    Endocrine: Negative.    Hematologic/Lymphatic: Negative.    Skin: Negative for poor wound healing and rash.   Musculoskeletal: Negative for falls, gout, muscle cramps and myalgias.   Gastrointestinal: Negative for abdominal pain, diarrhea, dysphagia, hematemesis, melena, nausea and vomiting.   Neurological: Negative for excessive daytime sleepiness, dizziness, headaches, light-headedness, loss of balance, seizures and vertigo.   Psychiatric/Behavioral: Negative for depression and substance abuse. The patient is not nervous/anxious.        Procedures    ECG 12 Lead  Date/Time: 4/10/2019 11:06 AM  Performed by: Justine Barrios MD  Authorized by: Justine Barrios MD   Comparison: compared with previous ECG   Comparison to previous ECG: Heart rate lower  Rhythm: sinus bradycardia  Conduction: left anterior fascicular block  Other findings: poor R wave progression    Clinical  "impression: abnormal EKG                Objective:      Vitals:    04/10/19 1036   BP: 148/80   BP Location: Left arm   Patient Position: Sitting   Pulse: (!) 38   Weight: 65.3 kg (144 lb)   Height: 162.6 cm (64\")     Body mass index is 24.72 kg/m².    Physical Exam   Constitutional: She is oriented to person, place, and time. She appears well-developed and well-nourished. No distress.   HENT:   Head: Normocephalic and atraumatic.   Eyes: Conjunctivae are normal. No scleral icterus.   Neck: Neck supple. No JVD present. Carotid bruit is not present. No thyromegaly present.   Cardiovascular: Normal rate, regular rhythm, S1 normal, S2 normal and intact distal pulses.  No extrasystoles are present. PMI is not displaced. Exam reveals no gallop.   Murmur heard.   Midsystolic murmur is present with a grade of 2/6 at the upper right sternal border and upper left sternal border.  Pulses:       Carotid pulses are 2+ on the right side, and 2+ on the left side.       Radial pulses are 2+ on the right side, and 2+ on the left side.        Dorsalis pedis pulses are 2+ on the right side, and 2+ on the left side.        Posterior tibial pulses are 2+ on the right side, and 2+ on the left side.   Pulmonary/Chest: Effort normal and breath sounds normal. No respiratory distress. She has no wheezes. She has no rhonchi. She has no rales. She exhibits no tenderness.   Abdominal: Soft. Bowel sounds are normal. She exhibits no distension, no abdominal bruit and no mass. There is no tenderness.   Musculoskeletal: She exhibits no edema or deformity.   Lymphadenopathy:     She has no cervical adenopathy.   Neurological: She is alert and oriented to person, place, and time. No cranial nerve deficit.   Skin: Skin is warm and dry. No rash noted. She is not diaphoretic. No cyanosis. No pallor. Nails show no clubbing.   Psychiatric: She has a normal mood and affect. Judgment normal.   Vitals reviewed.      Lab Review:       Assessment:      " Diagnosis Plan   1. Paroxysmal atrial fibrillation (CMS/HCC)     2. Essential hypertension     3. Bradycardia, sinus  Thyroid Panel With TSH        1. PAF - on xarelto.   2. Hypertension - BP too high.   3. Large B cell lymphoma - s/p R-CHOP, stopped 8/2018 -sees Dr. Iraheta.   4. H/o DVT, on xarelto.   5. Sinus bradycardia, HR 38.      Plan:       See Carlota in 3 months, stop KCl, stop metoprolol, add spironolactone 25 mg daily, increase hydralazine to 50 mg 3 times a day.  Set up thyroid panel today.    Atrial Fibrillation and Atrial Flutter  Assessment  • The patient has paroxysmal atrial fibrillation  • The patient's CHADS2-VASc score is 4  • A [id number] score of 2 or more is considered a high risk for a thromboembolic event  • Rivaroxaban prescribed    Plan  • Attempt to maintain sinus rhythm  • Continue rivaroxaban for antithrombotic therapy, bleeding issues discussed

## 2019-04-15 ENCOUNTER — TELEPHONE (OUTPATIENT)
Dept: CARDIOLOGY | Facility: CLINIC | Age: 79
End: 2019-04-15

## 2019-04-15 ENCOUNTER — INFUSION (OUTPATIENT)
Dept: ONCOLOGY | Facility: HOSPITAL | Age: 79
End: 2019-04-15

## 2019-04-15 ENCOUNTER — APPOINTMENT (OUTPATIENT)
Dept: ONCOLOGY | Facility: HOSPITAL | Age: 79
End: 2019-04-15

## 2019-04-15 DIAGNOSIS — Z45.2 ENCOUNTER FOR ADJUSTMENT OR MANAGEMENT OF VASCULAR ACCESS DEVICE: Primary | ICD-10-CM

## 2019-04-15 DIAGNOSIS — R73.09 BLOOD GLUCOSE ABNORMAL: ICD-10-CM

## 2019-04-15 LAB
HBA1C MFR BLD: 5 % (ref 4.8–5.6)
T-UPTAKE NFR SERPL: 1.07 TBI (ref 0.8–1.3)
T4 SERPL-MCNC: 4.72 MCG/DL (ref 4.5–11.7)
TSH SERPL DL<=0.05 MIU/L-ACNC: 2.23 MIU/ML (ref 0.27–4.2)

## 2019-04-15 PROCEDURE — 84443 ASSAY THYROID STIM HORMONE: CPT | Performed by: INTERNAL MEDICINE

## 2019-04-15 PROCEDURE — 83036 HEMOGLOBIN GLYCOSYLATED A1C: CPT | Performed by: FAMILY MEDICINE

## 2019-04-15 PROCEDURE — 84479 ASSAY OF THYROID (T3 OR T4): CPT | Performed by: INTERNAL MEDICINE

## 2019-04-15 PROCEDURE — 84439 ASSAY OF FREE THYROXINE: CPT | Performed by: INTERNAL MEDICINE

## 2019-04-15 PROCEDURE — 36591 DRAW BLOOD OFF VENOUS DEVICE: CPT

## 2019-04-15 RX ORDER — SODIUM CHLORIDE 0.9 % (FLUSH) 0.9 %
10 SYRINGE (ML) INJECTION AS NEEDED
Status: CANCELLED | OUTPATIENT
Start: 2019-04-15

## 2019-04-15 RX ORDER — SODIUM CHLORIDE 0.9 % (FLUSH) 0.9 %
10 SYRINGE (ML) INJECTION AS NEEDED
Status: DISCONTINUED | OUTPATIENT
Start: 2019-04-15 | End: 2019-04-15 | Stop reason: HOSPADM

## 2019-04-15 RX ADMIN — SODIUM CHLORIDE, PRESERVATIVE FREE 10 ML: 5 INJECTION INTRAVENOUS at 10:31

## 2019-04-15 RX ADMIN — Medication 500 UNITS: at 10:31

## 2019-04-15 NOTE — TELEPHONE ENCOUNTER
I think this can wait until tomorrow with Dr. Barrios returns.  She just had spironolactone 25 mg added, and her hydralazine was increased to 50 mg 3 times a day on 4/10/19 which was 5 days ago.    I will defer to Dr. Barrios as she may not feel this is been enough time.

## 2019-04-15 NOTE — TELEPHONE ENCOUNTER
04/15/19  11:04 AM  Martina M Hayden  1940  Home Phone [phone]   Mobile [phone]       Martinaanamaria Blake is a patient of Dr caldera.  Patient is calling in to report that her bp is still elevated on the new medication.  She is asymptomatic, but was seen at her oncologist today and they wanted her to call.    bp   185/78 hr 51    Cardiac meds reviewed  Hydralazine 50mg TID  Spironolactone 25mg daily  Amlodipine 10mg daily  Benazepril 40mg daily  xarelto    Does she need a med adjustment?    Ivy Patiño RN  Triage nurse

## 2019-04-16 ENCOUNTER — TELEPHONE (OUTPATIENT)
Dept: CARDIOLOGY | Facility: CLINIC | Age: 79
End: 2019-04-16

## 2019-04-16 LAB
ALBUMIN SERPL-MCNC: 4.2 G/DL (ref 3.5–4.8)
ALBUMIN/GLOB SERPL: 1.8 {RATIO} (ref 1.2–2.2)
ALP SERPL-CCNC: 144 IU/L (ref 39–117)
ALT SERPL-CCNC: 31 IU/L (ref 0–32)
AST SERPL-CCNC: 21 IU/L (ref 0–40)
BILIRUB SERPL-MCNC: 0.3 MG/DL (ref 0–1.2)
BUN SERPL-MCNC: 15 MG/DL (ref 8–27)
BUN/CREAT SERPL: 22 (ref 12–28)
CALCIUM SERPL-MCNC: 9.4 MG/DL (ref 8.7–10.3)
CHLORIDE SERPL-SCNC: 103 MMOL/L (ref 96–106)
CHOLEST SERPL-MCNC: 189 MG/DL (ref 100–199)
CO2 SERPL-SCNC: 24 MMOL/L (ref 20–29)
CREAT SERPL-MCNC: 0.67 MG/DL (ref 0.57–1)
GLOBULIN SER CALC-MCNC: 2.4 G/DL (ref 1.5–4.5)
GLUCOSE SERPL-MCNC: 97 MG/DL (ref 65–99)
HDLC SERPL-MCNC: 64 MG/DL
LDLC SERPL CALC-MCNC: 109 MG/DL (ref 0–99)
POTASSIUM SERPL-SCNC: 4.1 MMOL/L (ref 3.5–5.2)
PROT SERPL-MCNC: 6.6 G/DL (ref 6–8.5)
SODIUM SERPL-SCNC: 142 MMOL/L (ref 134–144)
T3FREE SERPL-MCNC: 3 PG/ML (ref 2–4.4)
T4 FREE SERPL-MCNC: 0.85 NG/DL (ref 0.82–1.77)
TRIGL SERPL-MCNC: 82 MG/DL (ref 0–149)
TSH SERPL-ACNC: 2.14 UIU/ML (ref 0.45–4.5)
VLDLC SERPL-MCNC: 16 MG/DL (ref 5–40)

## 2019-04-22 ENCOUNTER — OFFICE VISIT (OUTPATIENT)
Dept: FAMILY MEDICINE CLINIC | Facility: CLINIC | Age: 79
End: 2019-04-22

## 2019-04-22 VITALS
OXYGEN SATURATION: 97 % | SYSTOLIC BLOOD PRESSURE: 126 MMHG | DIASTOLIC BLOOD PRESSURE: 66 MMHG | WEIGHT: 142 LBS | HEIGHT: 64 IN | HEART RATE: 63 BPM | BODY MASS INDEX: 24.24 KG/M2

## 2019-04-22 DIAGNOSIS — F33.9 CHRONIC RECURRENT MAJOR DEPRESSIVE DISORDER (HCC): ICD-10-CM

## 2019-04-22 DIAGNOSIS — C83.39 DIFFUSE LARGE B-CELL LYMPHOMA OF EXTRANODAL SITE EXCLUDING SPLEEN AND OTHER SOLID ORGANS (HCC): ICD-10-CM

## 2019-04-22 DIAGNOSIS — I10 ESSENTIAL HYPERTENSION: Primary | Chronic | ICD-10-CM

## 2019-04-22 DIAGNOSIS — E78.5 DYSLIPIDEMIA: ICD-10-CM

## 2019-04-22 DIAGNOSIS — R73.09 BLOOD GLUCOSE ABNORMAL: ICD-10-CM

## 2019-04-22 PROBLEM — R30.0 DYSURIA: Status: RESOLVED | Noted: 2018-08-23 | Resolved: 2019-04-22

## 2019-04-22 PROCEDURE — 99214 OFFICE O/P EST MOD 30 MIN: CPT | Performed by: FAMILY MEDICINE

## 2019-04-22 RX ORDER — ESCITALOPRAM OXALATE 20 MG/1
20 TABLET ORAL DAILY
Qty: 90 TABLET | Refills: 1 | Status: SHIPPED | OUTPATIENT
Start: 2019-04-22 | End: 2019-10-28 | Stop reason: SDUPTHER

## 2019-04-22 NOTE — PROGRESS NOTES
Subjective   Martina Blake is a 78 y.o. female. Presents today for   Chief Complaint   Patient presents with   • Hypertension     preappt labs   • Depression       Depression   Visit Type: follow-up  Patient presents with the following symptoms: anhedonia, depressed mood and feelings of worthlessness.  Patient is not experiencing: palpitations, shortness of breath, suicidal ideas, suicidal planning and thoughts of death.  Frequency of symptoms: most days   Severity: mild   Sleep quality: fair  Nighttime awakenings: occasional  Compliance with medications:  %  Treatment side effects: would like go up on lexapro.  Hypertension   This is a chronic problem. The current episode started more than 1 year ago. The problem has been gradually improving since onset. The problem is controlled. Pertinent negatives include no chest pain, orthopnea, palpitations, peripheral edema, PND or shortness of breath. There are no associated agents to hypertension. Risk factors for coronary artery disease include post-menopausal state and dyslipidemia. Past treatments include ACE inhibitors, calcium channel blockers, direct vasodilators and diuretics. Current antihypertension treatment includes diuretics, calcium channel blockers, ACE inhibitors and direct vasodilators. The current treatment provides moderate improvement. There are no compliance problems.      Patient hx of lymphoma, doing well post-treatmetn;  Following with [facility] group.  Patient had elevated bs in past and had preappt labs, we reviewed.  A1C and glucose normal;  Cholesterol ok.  Thyroid normal.    Review of Systems   Respiratory: Negative for shortness of breath.    Cardiovascular: Negative for chest pain, palpitations, orthopnea and PND.   Psychiatric/Behavioral: Negative for suicidal ideas.       Patient Active Problem List   Diagnosis   • Blood glucose abnormal   • Dyslipidemia   • Gastroesophageal reflux disease   • Fatigue   • Generalized osteoarthritis   •  Chronic recurrent major depressive disorder (CMS/HCC)   • Osteoporosis   • Restless legs syndrome   • Seizure disorder (CMS/HCC)   • Bradycardia   • Post-menopause on HRT (hormone replacement therapy)   • Chronic seasonal allergic rhinitis   • Paroxysmal atrial fibrillation (CMS/HCC)   • Iron deficiency anemia due to chronic blood loss   • Acute superficial gastritis without hemorrhage   • Hiatal hernia   • Esophagitis   • Diverticulosis of large intestine without hemorrhage   • Dizziness   • Weakness   • Hypertension   • Hypercalcemia   • Liver mass   • Lymphoma (CMS/HCC)   • Leukemoid reaction   • Insomnia   • Anemia associated with chemotherapy   • Pancytopenia due to antineoplastic chemotherapy (CMS/HCC)   • Chemotherapy-induced neutropenia (CMS/HCC)   • Encounter for adjustment or management of vascular access device   • 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 (CMS/HCC)   • Sinus bradycardia   • Thrombocytopenia (CMS/HCC)   • Bone metastasis (CMS/HCC)       Social History     Socioeconomic History   • Marital status:      Spouse name: JL   • Number of children: 2   • Years of education: Not on file   • Highest education level: Not on file   Occupational History     Employer: RETIRED   Tobacco Use   • Smoking status: Never Smoker   • Smokeless tobacco: Never Used   Substance and Sexual Activity   • Alcohol use: No   • Drug use: No   • Sexual activity: Defer     Birth control/protection: Surgical     Comment:  (Jl)       Allergies   Allergen Reactions   • Crab (Diagnostic) Itching and Rash   • Pseudoephedrine Dizziness       Current Outpatient Medications on File Prior to Visit   Medication Sig Dispense Refill   • amLODIPine (NORVASC) 10 MG tablet TAKE 1 TABLET BY MOUTH EVERY DAY 90 tablet 0   • benazepril (LOTENSIN) 40 MG tablet TAKE 1 TABLET BY MOUTH EVERY DAY 90 tablet 0   • folic acid (FOLVITE) 400 MCG tablet Take 400 mcg by mouth daily.    "  • gabapentin (NEURONTIN) 300 MG capsule TAKE 2 CAPSULES BY MOUTH EVERY EVENING 60 capsule 3   • hydrALAZINE (APRESOLINE) 50 MG tablet Take 1 tablet by mouth 3 (Three) Times a Day. 270 tablet 3   • lidocaine-prilocaine (EMLA) 2.5-2.5 % cream Apply 30 min prior to port access 5 g 2   • melatonin 5 MG tablet tablet Take 5 mg by mouth Every Night.     • pantoprazole (PROTONIX) 40 MG EC tablet TAKE 1 TABLET BY MOUTH EVERY DAY 90 tablet 0   • phenytoin (DILANTIN) 100 MG ER capsule TAKE 3 CAPSULES BY MOUTH EVERY NIGHT AT BEDTIME 270 capsule 0   • spironolactone (ALDACTONE) 25 MG tablet Take 1 tablet by mouth Daily. 90 tablet 3   • vitamin B-12 (CYANOCOBALAMIN) 100 MCG tablet Take 100 mcg by mouth Daily.     • XARELTO 20 MG tablet      • [DISCONTINUED] escitalopram (LEXAPRO) 10 MG tablet Take 1 tablet by mouth Daily. 90 tablet 1   • nystatin (MYCOSTATIN) 922244 UNIT/GM powder Apply  topically Every 8 (Eight) Hours. 45 g 1   • ondansetron ODT (ZOFRAN-ODT) 8 MG disintegrating tablet Take 1 tablet by mouth Every 8 (Eight) Hours As Needed for Nausea or Vomiting for up to 60 doses. 60 tablet 5   • phenazopyridine (PYRIDIUM) 100 MG tablet Take 1 tablet by mouth 3 (Three) Times a Day As Needed for bladder spasms. 6 tablet 2   • SENEXON-S 8.6-50 MG per tablet TAKE 2 TABLETS BY MOUTH EVERY EVENING 60 tablet 0     No current facility-administered medications on file prior to visit.        Objective   Vitals:    04/22/19 1251   BP: 126/66   BP Location: Left arm   Patient Position: Sitting   Cuff Size: Adult   Pulse: 63   SpO2: 97%   Weight: 64.4 kg (142 lb)   Height: 162.6 cm (64\")       Physical Exam   Constitutional: She appears well-developed and well-nourished.   HENT:   Head: Normocephalic and atraumatic.   Neck: Neck supple. No JVD present. No thyromegaly present.   Cardiovascular: Normal rate, regular rhythm and normal heart sounds. Exam reveals no gallop and no friction rub.   No murmur heard.  Pulmonary/Chest: Effort " normal and breath sounds normal. No respiratory distress. She has no wheezes. She has no rales.   Abdominal: Soft. Bowel sounds are normal. She exhibits no distension. There is no tenderness. There is no rebound and no guarding.   Musculoskeletal: She exhibits no edema.   Neurological: She is alert.   Skin: Skin is warm and dry.   Psychiatric: She has a normal mood and affect. Her behavior is normal.   Nursing note and vitals reviewed.    Component      Latest Ref Rng & Units 4/15/2019   Glucose      65 - 99 mg/dL 97   BUN      8 - 27 mg/dL 15   Creatinine      0.57 - 1.00 mg/dL 0.67   eGFR Non African Am      >59 mL/min/1.73 84   eGFR African Am      >59 mL/min/1.73 97   BUN/Creatinine Ratio      12 - 28 22   Sodium      134 - 144 mmol/L 142   Potassium      3.5 - 5.2 mmol/L 4.1   Chloride      96 - 106 mmol/L 103   CO2      20 - 29 mmol/L 24   Calcium      8.7 - 10.3 mg/dL 9.4   Total Protein      6.0 - 8.5 g/dL 6.6   Albumin      3.5 - 4.8 g/dL 4.2   Globulin      1.5 - 4.5 g/dL 2.4   A/G Ratio      1.2 - 2.2 1.8   Total Bilirubin      0.0 - 1.2 mg/dL 0.3   Alkaline Phosphatase      39 - 117 IU/L 144 (H)   AST (SGOT)      0 - 40 IU/L 21   ALT (SGPT)      0 - 32 IU/L 31   Total Cholesterol      100 - 199 mg/dL 189   Triglycerides      0 - 149 mg/dL 82   HDL Cholesterol      >39 mg/dL 64   VLDL Cholesterol      5 - 40 mg/dL 16   LDL Cholesterol       0 - 99 mg/dL 109 (H)   TSH Baseline      0.450 - 4.500 uIU/mL 2.140   Free T4      0.82 - 1.77 ng/dL 0.85   T3, Free      2.0 - 4.4 pg/mL 3.0   Hemoglobin A1C      4.80 - 5.60 % 5.00     Assessment/Plan   Martina was seen today for hypertension and depression.    Diagnoses and all orders for this visit:    Essential hypertension    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 (CMS/HCC)    Blood glucose abnormal    Dyslipidemia    Chronic recurrent major depressive disorder (CMS/HCC)  -     escitalopram (LEXAPRO) 20 MG tablet;  Take 1 tablet by mouth Daily.    bp better with addition of aldactone and HR back up off beta blocker  Depression uncontrolled, will increase lexapro;  RTC if any thoughts of self harm or not doing well  Blood sugar normal, a1c normal  F/u with hemonc as planned         -Follow up: 6 months and prn

## 2019-04-29 RX ORDER — BENAZEPRIL HYDROCHLORIDE 40 MG/1
TABLET, FILM COATED ORAL
Qty: 90 TABLET | Refills: 0 | Status: SHIPPED | OUTPATIENT
Start: 2019-04-29 | End: 2019-07-30 | Stop reason: SDUPTHER

## 2019-04-29 RX ORDER — AMLODIPINE BESYLATE 10 MG/1
TABLET ORAL
Qty: 90 TABLET | Refills: 0 | Status: SHIPPED | OUTPATIENT
Start: 2019-04-29 | End: 2019-07-22 | Stop reason: SDUPTHER

## 2019-05-02 ENCOUNTER — INFUSION (OUTPATIENT)
Dept: ONCOLOGY | Facility: HOSPITAL | Age: 79
End: 2019-05-02

## 2019-05-02 ENCOUNTER — HOSPITAL ENCOUNTER (OUTPATIENT)
Dept: PET IMAGING | Facility: HOSPITAL | Age: 79
Discharge: HOME | End: 2019-05-02
Admitting: INTERNAL MEDICINE

## 2019-05-02 DIAGNOSIS — C83.39 DIFFUSE LARGE B-CELL LYMPHOMA OF EXTRANODAL SITE EXCLUDING SPLEEN AND OTHER SOLID ORGANS (HCC): ICD-10-CM

## 2019-05-02 DIAGNOSIS — Z45.2 ENCOUNTER FOR ADJUSTMENT OR MANAGEMENT OF VASCULAR ACCESS DEVICE: ICD-10-CM

## 2019-05-02 LAB
ALBUMIN SERPL-MCNC: 4.2 G/DL (ref 3.5–5.2)
ALBUMIN/GLOB SERPL: 1.4 G/DL (ref 1.1–2.4)
ALP SERPL-CCNC: 120 U/L (ref 38–116)
ALT SERPL W P-5'-P-CCNC: 12 U/L (ref 0–33)
ANION GAP SERPL CALCULATED.3IONS-SCNC: 10.1 MMOL/L
AST SERPL-CCNC: 17 U/L (ref 0–32)
BILIRUB SERPL-MCNC: 0.3 MG/DL (ref 0.2–1.2)
BUN BLD-MCNC: 15 MG/DL (ref 6–20)
BUN/CREAT SERPL: 23.8 (ref 7.3–30)
CALCIUM SPEC-SCNC: 9.3 MG/DL (ref 8.5–10.2)
CHLORIDE SERPL-SCNC: 102 MMOL/L (ref 98–107)
CO2 SERPL-SCNC: 28.9 MMOL/L (ref 22–29)
CREAT BLD-MCNC: 0.63 MG/DL (ref 0.6–1.1)
CREAT BLDA-MCNC: 0.6 MG/DL (ref 0.6–1.3)
GFR SERPL CREATININE-BSD FRML MDRD: 91 ML/MIN/1.73
GLOBULIN UR ELPH-MCNC: 2.9 GM/DL (ref 1.8–3.5)
GLUCOSE BLD-MCNC: 97 MG/DL (ref 74–124)
POTASSIUM BLD-SCNC: 4.1 MMOL/L (ref 3.5–4.7)
PROT SERPL-MCNC: 7.1 G/DL (ref 6.3–8)
SODIUM BLD-SCNC: 141 MMOL/L (ref 134–145)

## 2019-05-02 PROCEDURE — 82565 ASSAY OF CREATININE: CPT

## 2019-05-02 PROCEDURE — 80053 COMPREHEN METABOLIC PANEL: CPT

## 2019-05-02 PROCEDURE — 74177 CT ABD & PELVIS W/CONTRAST: CPT

## 2019-05-02 PROCEDURE — 25010000002 IOPAMIDOL 61 % SOLUTION: Performed by: INTERNAL MEDICINE

## 2019-05-02 PROCEDURE — 71260 CT THORAX DX C+: CPT

## 2019-05-02 PROCEDURE — 96523 IRRIG DRUG DELIVERY DEVICE: CPT | Performed by: INTERNAL MEDICINE

## 2019-05-02 PROCEDURE — 0 DIATRIZOATE MEGLUMINE & SODIUM PER 1 ML: Performed by: INTERNAL MEDICINE

## 2019-05-02 RX ADMIN — DIATRIZOATE MEGLUMINE AND DIATRIZOATE SODIUM 30 ML: 660; 100 LIQUID ORAL; RECTAL at 10:32

## 2019-05-02 RX ADMIN — IOPAMIDOL 85 ML: 612 INJECTION, SOLUTION INTRAVENOUS at 11:35

## 2019-05-13 ENCOUNTER — LAB (OUTPATIENT)
Dept: ONCOLOGY | Facility: HOSPITAL | Age: 79
End: 2019-05-13

## 2019-05-13 ENCOUNTER — OFFICE VISIT (OUTPATIENT)
Dept: ONCOLOGY | Facility: CLINIC | Age: 79
End: 2019-05-13

## 2019-05-13 ENCOUNTER — INFUSION (OUTPATIENT)
Dept: ONCOLOGY | Facility: HOSPITAL | Age: 79
End: 2019-05-13

## 2019-05-13 ENCOUNTER — DOCUMENTATION (OUTPATIENT)
Dept: ONCOLOGY | Facility: CLINIC | Age: 79
End: 2019-05-13

## 2019-05-13 VITALS
HEIGHT: 64 IN | WEIGHT: 145.6 LBS | DIASTOLIC BLOOD PRESSURE: 71 MMHG | BODY MASS INDEX: 24.86 KG/M2 | OXYGEN SATURATION: 96 % | TEMPERATURE: 97.4 F | SYSTOLIC BLOOD PRESSURE: 148 MMHG | HEART RATE: 58 BPM | RESPIRATION RATE: 18 BRPM

## 2019-05-13 DIAGNOSIS — C83.39 DIFFUSE LARGE B-CELL LYMPHOMA OF EXTRANODAL SITE EXCLUDING SPLEEN AND OTHER SOLID ORGANS (HCC): Primary | ICD-10-CM

## 2019-05-13 DIAGNOSIS — C79.51 BONE METASTASIS: ICD-10-CM

## 2019-05-13 DIAGNOSIS — Z45.2 ENCOUNTER FOR ADJUSTMENT OR MANAGEMENT OF VASCULAR ACCESS DEVICE: ICD-10-CM

## 2019-05-13 DIAGNOSIS — C83.39 DIFFUSE LARGE B-CELL LYMPHOMA OF EXTRANODAL SITE EXCLUDING SPLEEN AND OTHER SOLID ORGANS (HCC): ICD-10-CM

## 2019-05-13 DIAGNOSIS — C85.91 LYMPHOMA OF LYMPH NODES OF NECK, UNSPECIFIED LYMPHOMA TYPE (HCC): Primary | ICD-10-CM

## 2019-05-13 DIAGNOSIS — C79.51 BONE METASTASIS: Primary | ICD-10-CM

## 2019-05-13 LAB
ALBUMIN SERPL-MCNC: 4 G/DL (ref 3.5–5.2)
ALBUMIN/GLOB SERPL: 1.5 G/DL (ref 1.1–2.4)
ALP SERPL-CCNC: 103 U/L (ref 38–116)
ALT SERPL W P-5'-P-CCNC: 12 U/L (ref 0–33)
ANION GAP SERPL CALCULATED.3IONS-SCNC: 8.3 MMOL/L
AST SERPL-CCNC: 16 U/L (ref 0–32)
BASOPHILS # BLD AUTO: 0.03 10*3/MM3 (ref 0–0.2)
BASOPHILS NFR BLD AUTO: 0.5 % (ref 0–1.5)
BILIRUB SERPL-MCNC: 0.2 MG/DL (ref 0.2–1.2)
BUN BLD-MCNC: 25 MG/DL (ref 6–20)
BUN/CREAT SERPL: 39.7 (ref 7.3–30)
CALCIUM SPEC-SCNC: 9.1 MG/DL (ref 8.5–10.2)
CHLORIDE SERPL-SCNC: 106 MMOL/L (ref 98–107)
CO2 SERPL-SCNC: 27.7 MMOL/L (ref 22–29)
CREAT BLD-MCNC: 0.63 MG/DL (ref 0.6–1.1)
DEPRECATED RDW RBC AUTO: 44.2 FL (ref 37–54)
EOSINOPHIL # BLD AUTO: 0.17 10*3/MM3 (ref 0–0.4)
EOSINOPHIL NFR BLD AUTO: 2.9 % (ref 0.3–6.2)
ERYTHROCYTE [DISTWIDTH] IN BLOOD BY AUTOMATED COUNT: 12.5 % (ref 12.3–15.4)
GFR SERPL CREATININE-BSD FRML MDRD: 91 ML/MIN/1.73
GLOBULIN UR ELPH-MCNC: 2.6 GM/DL (ref 1.8–3.5)
GLUCOSE BLD-MCNC: 85 MG/DL (ref 74–124)
HCT VFR BLD AUTO: 36.2 % (ref 34–46.6)
HGB BLD-MCNC: 11.8 G/DL (ref 12–15.9)
IMM GRANULOCYTES # BLD AUTO: 0.01 10*3/MM3 (ref 0–0.05)
IMM GRANULOCYTES NFR BLD AUTO: 0.2 % (ref 0–0.5)
LYMPHOCYTES # BLD AUTO: 0.69 10*3/MM3 (ref 0.7–3.1)
LYMPHOCYTES NFR BLD AUTO: 11.8 % (ref 19.6–45.3)
MAGNESIUM SERPL-MCNC: 1.9 MG/DL (ref 1.8–2.5)
MCH RBC QN AUTO: 31.4 PG (ref 26.6–33)
MCHC RBC AUTO-ENTMCNC: 32.6 G/DL (ref 31.5–35.7)
MCV RBC AUTO: 96.3 FL (ref 79–97)
MONOCYTES # BLD AUTO: 0.78 10*3/MM3 (ref 0.1–0.9)
MONOCYTES NFR BLD AUTO: 13.3 % (ref 5–12)
NEUTROPHILS # BLD AUTO: 4.17 10*3/MM3 (ref 1.7–7)
NEUTROPHILS NFR BLD AUTO: 71.3 % (ref 42.7–76)
NRBC BLD AUTO-RTO: 0 /100 WBC (ref 0–0.2)
PHOSPHATE SERPL-MCNC: 3.7 MG/DL (ref 2.5–4.5)
PLATELET # BLD AUTO: 122 10*3/MM3 (ref 140–450)
PMV BLD AUTO: 9.6 FL (ref 6–12)
POTASSIUM BLD-SCNC: 3.8 MMOL/L (ref 3.5–4.7)
PROT SERPL-MCNC: 6.6 G/DL (ref 6.3–8)
RBC # BLD AUTO: 3.76 10*6/MM3 (ref 3.77–5.28)
SODIUM BLD-SCNC: 142 MMOL/L (ref 134–145)
WBC NRBC COR # BLD: 5.85 10*3/MM3 (ref 3.4–10.8)

## 2019-05-13 PROCEDURE — 83735 ASSAY OF MAGNESIUM: CPT | Performed by: INTERNAL MEDICINE

## 2019-05-13 PROCEDURE — 80053 COMPREHEN METABOLIC PANEL: CPT | Performed by: INTERNAL MEDICINE

## 2019-05-13 PROCEDURE — 36415 COLL VENOUS BLD VENIPUNCTURE: CPT | Performed by: INTERNAL MEDICINE

## 2019-05-13 PROCEDURE — 84100 ASSAY OF PHOSPHORUS: CPT | Performed by: INTERNAL MEDICINE

## 2019-05-13 PROCEDURE — 96372 THER/PROPH/DIAG INJ SC/IM: CPT | Performed by: INTERNAL MEDICINE

## 2019-05-13 PROCEDURE — 85025 COMPLETE CBC W/AUTO DIFF WBC: CPT | Performed by: INTERNAL MEDICINE

## 2019-05-13 PROCEDURE — 99214 OFFICE O/P EST MOD 30 MIN: CPT | Performed by: INTERNAL MEDICINE

## 2019-05-13 PROCEDURE — 25010000002 DENOSUMAB 120 MG/1.7ML SOLUTION: Performed by: INTERNAL MEDICINE

## 2019-05-13 RX ORDER — SODIUM CHLORIDE 0.9 % (FLUSH) 0.9 %
10 SYRINGE (ML) INJECTION AS NEEDED
Status: CANCELLED | OUTPATIENT
Start: 2019-05-13

## 2019-05-13 RX ORDER — SODIUM CHLORIDE 0.9 % (FLUSH) 0.9 %
10 SYRINGE (ML) INJECTION AS NEEDED
Status: DISCONTINUED | OUTPATIENT
Start: 2019-05-13 | End: 2019-05-13 | Stop reason: HOSPADM

## 2019-05-13 RX ADMIN — Medication 500 UNITS: at 10:15

## 2019-05-13 RX ADMIN — DENOSUMAB 120 MG: 120 INJECTION SUBCUTANEOUS at 11:14

## 2019-05-13 RX ADMIN — SODIUM CHLORIDE, PRESERVATIVE FREE 10 ML: 5 INJECTION INTRAVENOUS at 10:15

## 2019-05-15 RX ORDER — GABAPENTIN 300 MG/1
CAPSULE ORAL
Qty: 60 CAPSULE | Refills: 0 | OUTPATIENT
Start: 2019-05-15 | End: 2019-06-19 | Stop reason: SDUPTHER

## 2019-05-22 RX ORDER — PANTOPRAZOLE SODIUM 40 MG/1
TABLET, DELAYED RELEASE ORAL
Qty: 90 TABLET | Refills: 1 | Status: SHIPPED | OUTPATIENT
Start: 2019-05-22 | End: 2019-08-19 | Stop reason: SDUPTHER

## 2019-05-23 RX ORDER — METOPROLOL TARTRATE 50 MG/1
TABLET, FILM COATED ORAL
Qty: 180 TABLET | Refills: 0 | Status: SHIPPED | OUTPATIENT
Start: 2019-05-23 | End: 2019-07-10

## 2019-06-10 RX ORDER — PHENYTOIN SODIUM 100 MG/1
CAPSULE, EXTENDED RELEASE ORAL
Qty: 270 CAPSULE | Refills: 0 | Status: SHIPPED | OUTPATIENT
Start: 2019-06-10 | End: 2019-09-10 | Stop reason: SDUPTHER

## 2019-06-11 RX ORDER — HYDRALAZINE HYDROCHLORIDE 25 MG/1
25 TABLET, FILM COATED ORAL 3 TIMES DAILY
Qty: 270 TABLET | Refills: 1 | Status: SHIPPED | OUTPATIENT
Start: 2019-06-11 | End: 2019-07-10

## 2019-06-11 RX ORDER — HYDRALAZINE HYDROCHLORIDE 50 MG/1
50 TABLET, FILM COATED ORAL 3 TIMES DAILY
Qty: 270 TABLET | Refills: 1 | Status: SHIPPED | OUTPATIENT
Start: 2019-06-11 | End: 2019-07-10 | Stop reason: SDUPTHER

## 2019-06-18 RX ORDER — GABAPENTIN 300 MG/1
CAPSULE ORAL
Qty: 60 CAPSULE | Refills: 0 | Status: CANCELLED | OUTPATIENT
Start: 2019-06-18

## 2019-06-19 RX ORDER — GABAPENTIN 300 MG/1
600 CAPSULE ORAL EVERY EVENING
Qty: 60 CAPSULE | Refills: 0 | OUTPATIENT
Start: 2019-06-19 | End: 2019-07-15 | Stop reason: SDUPTHER

## 2019-06-24 ENCOUNTER — INFUSION (OUTPATIENT)
Dept: ONCOLOGY | Facility: HOSPITAL | Age: 79
End: 2019-06-24

## 2019-06-24 DIAGNOSIS — Z45.2 ENCOUNTER FOR ADJUSTMENT OR MANAGEMENT OF VASCULAR ACCESS DEVICE: Primary | ICD-10-CM

## 2019-06-24 PROCEDURE — 96523 IRRIG DRUG DELIVERY DEVICE: CPT | Performed by: INTERNAL MEDICINE

## 2019-06-24 RX ORDER — SODIUM CHLORIDE 0.9 % (FLUSH) 0.9 %
10 SYRINGE (ML) INJECTION AS NEEDED
Status: CANCELLED | OUTPATIENT
Start: 2019-06-24

## 2019-06-24 RX ORDER — SODIUM CHLORIDE 0.9 % (FLUSH) 0.9 %
10 SYRINGE (ML) INJECTION AS NEEDED
Status: DISCONTINUED | OUTPATIENT
Start: 2019-06-24 | End: 2019-06-24 | Stop reason: HOSPADM

## 2019-06-24 RX ADMIN — Medication 500 UNITS: at 13:59

## 2019-06-24 RX ADMIN — SODIUM CHLORIDE, PRESERVATIVE FREE 10 ML: 5 INJECTION INTRAVENOUS at 13:59

## 2019-07-05 ENCOUNTER — TELEPHONE (OUTPATIENT)
Dept: ONCOLOGY | Facility: HOSPITAL | Age: 79
End: 2019-07-05

## 2019-07-05 NOTE — TELEPHONE ENCOUNTER
Pt called and reported a round quarter sized red area on her right leg. She said it has uneven borders. It is not warm, doesn't itch and is not painful. It has been there over a month. Pt currently has a dermatologist. Advised pt to call that office to be seen. Pt v/u.

## 2019-07-05 NOTE — TELEPHONE ENCOUNTER
----- Message from Malissa Heard sent at 7/5/2019  9:49 AM EDT -----  [phone]    Saw Dr. Iraheta was released in August.  She's had a place on her leg come up she thought it was a bite but it won't go away she's had it for a month and a half.  Doesn't itch, would Dr. Iraheta see her or does she need to go somewhere else.

## 2019-07-10 ENCOUNTER — OFFICE VISIT (OUTPATIENT)
Dept: CARDIOLOGY | Facility: CLINIC | Age: 79
End: 2019-07-10

## 2019-07-10 VITALS
BODY MASS INDEX: 24.62 KG/M2 | WEIGHT: 144.2 LBS | HEART RATE: 49 BPM | DIASTOLIC BLOOD PRESSURE: 70 MMHG | SYSTOLIC BLOOD PRESSURE: 150 MMHG | HEIGHT: 64 IN

## 2019-07-10 DIAGNOSIS — I10 ESSENTIAL HYPERTENSION: Primary | Chronic | ICD-10-CM

## 2019-07-10 DIAGNOSIS — R00.1 SINUS BRADYCARDIA: ICD-10-CM

## 2019-07-10 DIAGNOSIS — I48.0 PAROXYSMAL ATRIAL FIBRILLATION (HCC): ICD-10-CM

## 2019-07-10 PROBLEM — R42 DIZZINESS: Status: RESOLVED | Noted: 2018-04-19 | Resolved: 2019-07-10

## 2019-07-10 PROBLEM — R53.1 WEAKNESS: Status: RESOLVED | Noted: 2018-04-21 | Resolved: 2019-07-10

## 2019-07-10 PROBLEM — D72.823 LEUKEMOID REACTION: Status: RESOLVED | Noted: 2018-05-07 | Resolved: 2019-07-10

## 2019-07-10 PROCEDURE — 93000 ELECTROCARDIOGRAM COMPLETE: CPT | Performed by: NURSE PRACTITIONER

## 2019-07-10 PROCEDURE — 99214 OFFICE O/P EST MOD 30 MIN: CPT | Performed by: NURSE PRACTITIONER

## 2019-07-10 RX ORDER — HYDRALAZINE HYDROCHLORIDE 50 MG/1
100 TABLET, FILM COATED ORAL 3 TIMES DAILY
Qty: 270 TABLET | Refills: 1 | Status: SHIPPED | OUTPATIENT
Start: 2019-07-10 | End: 2019-11-19

## 2019-07-10 NOTE — PROGRESS NOTES
Date of Office Visit: 07/10/2019  Encounter Provider: MELINDA Mabry  Place of Service: [facility] CARDIOLOGY  Patient Name: Martina Blake  :1940    Chief Complaint   Patient presents with   • Follow-up   :     HPI: Martina Blake is a 79 y.o. female who presents today for cardiac follow-up.   She has a past medical history of hypertension, dyslipidemia, esophageal reflux, seizure disorder, and vitamin D deficiency.  She was diagnosed with large B-cell lymphoma and is undergone chemotherapy.    In the past, she was diagnosed with atrial fibrillation with rapid ventricular response in the setting of hypokalemia, hypomagnesemia, hypercalcemia, and anemia.  She was placed on metoprolol.  She had an upper and lower endoscopy showing hiatal hernia, gastritis, esophagitis, and diverticulosis with no acute bleeding.  She was started on apixiban and and spironolactone.  In 2018, she had a normal Cardiolite stress test and echocardiogram at that time revealed an EF of 67%, grade 2 diastolic dysfunction, severe left atrial enlargement, mild to moderate tricuspid insufficiency, and RVSP elevated at 44 mmHg.      She was recently seen in our office in 2019 by Dr. Barrios.  At that time it was noted that her heart rates have been running low and blood pressure was high at home.  Her heart rate had been as low as 38 in our office.  Dr. Barrios recommended stopping metoprolol and potassium.  She added spironolactone 25 mg 1 tablet daily and increase her hydralazine to 50 mg 3 times a day.She had a thyroid panel completed on 4/15/2019 which was normal.  She called back on 4/15/2019 stating that her blood pressure was running higher.  Dr. Barrios increased her hydralazine to 75 mg 3 times a day.  She had a follow-up CMP on 2019 and 2019 which showed normal kidney function and potassium level.    She presents today for 3-month follow-up visit.  She has  "checked her blood pressure a few times at home which is been as low as 126/66 and as high as 170/65.  She is probably averaging in the 130s over 65 with heart rates in the 50s, 60s, and 70s.  She says she feels great.  She denies chest pain, shortness of air, PND, orthopnea, cough, edema, dizziness, syncope, or bleeding.  She thinks she had a couple spells of atrial fibrillation that it just been short-lived.  Her blood pressure is elevated today.    Past Medical History:   Diagnosis Date   • Anemia     multifactorial   • Cystitis    • Cystocele     moderate   • Drug therapy    • Dyslipidemia    • Esophageal reflux    • Fatigue    • History of transfusion     no reaction   • Hypercalcemia    • Hypertension    • Major depression, chronic    • Menopause    • Non Hodgkin's lymphoma (CMS/HCC)    • Osteoarthritis    • Osteoporosis    • Palpitations    • Paroxysmal atrial fibrillation (CMS/HCC)    • Post menopausal problems    • RLS (restless legs syndrome)    • Seizure disorder (CMS/HCC)    • Sinus bradycardia    • Subjective tinnitus    • Vaginal delivery     x2  \"SONYA\"      \"JASON\"   • Vitamin D deficiency        Past Surgical History:   Procedure Laterality Date   • CERVICAL LYMPH NODE BIOPSY/EXCISION Left 5/1/2018    Procedure: CERVICAL LYMPH NODE BIOPSY/EXCISION;  Surgeon: Danny Oconnor MD;  Location: [facility] MAIN OR;  Service: ENT   • CHOLECYSTECTOMY     • COLONOSCOPY     • COLONOSCOPY N/A 4/13/2018    Procedure: COLONOSCOPY to terminal ileum;  Surgeon: Dominik Burt MD;  Location: [facility] ENDOSCOPY;  Service: Gastroenterology   • CRANIOTOMY     • ENDOSCOPY N/A 4/13/2018    Procedure: ESOPHAGOGASTRODUODENOSCOPY with biopsy;  Surgeon: Dominik Burt MD;  Location: [facility] ENDOSCOPY;  Service: Gastroenterology   • TOTAL ABDOMINAL HYSTERECTOMY  1980    w/ bladder suspension.  Probably vaginal hysterectomy with anterior colporrhaphy.    • VENOUS ACCESS DEVICE (PORT) INSERTION N/A 5/4/2018    Procedure: INSERTION " VENOUS ACCESS DEVICE;  Surgeon: Jamie Gill MD;  Location: [facility] OR;  Service: General   • WRIST SURGERY Left        Social History     Social History   • Marital status:      Spouse name: JL   • Number of children: 2   • Years of education: N/A     Occupational History   •  Retired     Social History Main Topics   • Smoking status: Never Smoker   • Smokeless tobacco: Never Used   • Alcohol use No   • Drug use: No   • Sexual activity: Defer      Comment:  (Jl)       Family History   Problem Relation Age of Onset   • No Known Problems Mother    • Stroke Father 54         @ 60    • No Known Problems Maternal Grandmother    • No Known Problems Maternal Grandfather    • Heart disease Paternal Grandmother    • Heart disease Paternal Grandfather    • No Known Problems Daughter    • Stroke Brother 76   • Diabetes type II Brother        Review of Systems   Constitution: Positive for weight gain. Negative for chills, diaphoresis, fever, malaise/fatigue, night sweats and weight loss.   HENT: Negative for hearing loss, nosebleeds, sore throat and tinnitus.    Eyes: Negative for blurred vision, double vision, pain and visual disturbance.   Cardiovascular: Negative for chest pain, claudication, cyanosis, dyspnea on exertion, irregular heartbeat, leg swelling, near-syncope, orthopnea, palpitations, paroxysmal nocturnal dyspnea and syncope.   Respiratory: Negative for cough, hemoptysis, shortness of breath, snoring and wheezing.    Endocrine: Negative for cold intolerance, heat intolerance and polyuria.   Hematologic/Lymphatic: Negative for bleeding problem. Does not bruise/bleed easily.   Skin: Negative for color change, dry skin, flushing and itching.   Musculoskeletal: Negative for falls, joint pain, joint swelling, muscle cramps, muscle weakness and myalgias.   Gastrointestinal: Negative for abdominal pain, constipation, heartburn, melena, nausea and vomiting.   Genitourinary: Positive  for frequency. Negative for dysuria and hematuria.   Neurological: Negative for excessive daytime sleepiness, dizziness, light-headedness, loss of balance, numbness, paresthesias, seizures and vertigo.   Psychiatric/Behavioral: Negative for altered mental status, depression, memory loss and substance abuse. The patient does not have insomnia and is not nervous/anxious.    Allergic/Immunologic: Negative for environmental allergies.       Allergies   Allergen Reactions   • Crab (Diagnostic) Itching and Rash   • Pseudoephedrine Dizziness         Current Outpatient Medications:   •  amLODIPine (NORVASC) 10 MG tablet, TAKE 1 TABLET BY MOUTH EVERY DAY, Disp: 90 tablet, Rfl: 0  •  benazepril (LOTENSIN) 40 MG tablet, TAKE 1 TABLET BY MOUTH EVERY DAY, Disp: 90 tablet, Rfl: 0  •  escitalopram (LEXAPRO) 20 MG tablet, Take 1 tablet by mouth Daily., Disp: 90 tablet, Rfl: 1  •  folic acid (FOLVITE) 400 MCG tablet, Take 400 mcg by mouth daily., Disp: , Rfl:   •  gabapentin (NEURONTIN) 300 MG capsule, Take 2 capsules by mouth Every Evening., Disp: 60 capsule, Rfl: 0  •  hydrALAZINE (APRESOLINE) 75 MG tablet, Take 1 tablets by mouth 3 (Three) Times a Day.   •  lidocaine-prilocaine (EMLA) 2.5-2.5 % cream, Apply 30 min prior to port access, Disp: 5 g, Rfl: 2  •  melatonin 5 MG tablet tablet, Take 5 mg by mouth Every Night., Disp: , Rfl:   •  nystatin (MYCOSTATIN) 474879 UNIT/GM powder, Apply  topically Every 8 (Eight) Hours., Disp: 45 g, Rfl: 1  •  ondansetron ODT (ZOFRAN-ODT) 8 MG disintegrating tablet, Take 1 tablet by mouth Every 8 (Eight) Hours As Needed for Nausea or Vomiting for up to 60 doses., Disp: 60 tablet, Rfl: 5  •  pantoprazole (PROTONIX) 40 MG EC tablet, TAKE 1 TABLET BY MOUTH EVERY DAY, Disp: 90 tablet, Rfl: 1  •  phenazopyridine (PYRIDIUM) 100 MG tablet, Take 1 tablet by mouth 3 (Three) Times a Day As Needed for bladder spasms., Disp: 6 tablet, Rfl: 2  •  phenytoin (DILANTIN) 100 MG ER capsule, TAKE 3 CAPSULES BY  "MOUTH EVERY NIGHT AT BEDTIME, Disp: 270 capsule, Rfl: 0  •  SENEXON-S 8.6-50 MG per tablet, TAKE 2 TABLETS BY MOUTH EVERY EVENING, Disp: 60 tablet, Rfl: 0  •  spironolactone (ALDACTONE) 25 MG tablet, Take 1 tablet by mouth Daily., Disp: 90 tablet, Rfl: 3  •  vitamin B-12 (CYANOCOBALAMIN) 100 MCG tablet, Take 100 mcg by mouth Daily., Disp: , Rfl:   •  XARELTO 20 MG tablet, , Disp: , Rfl:       Objective:     Vitals:    07/10/19 1232   BP: 150/70   BP Location: Left arm   Pulse: (!) 49   Weight: 65.4 kg (144 lb 3.2 oz)   Height: 162.6 cm (64\")     Body mass index is 24.75 kg/m².    PHYSICAL EXAM:    Vitals Reviewed.   General Appearance: No acute distress, well developed and well nourished. Thin.   Eyes: Conjunctiva and lids: No erythema, swelling, or discharge. Sclera non-icteric.    HENT: Atraumatic, normocephalic. External eyes, ears, and nose normal. No hearing loss noted. Mucous membranes normal. Lips not cyanotic. Neck supple with no tenderness. Hair loss with scarf.   Respiratory: No signs of respiratory distress. Respiration rhythm and depth normal.   Clear to auscultation. No rales, crackles, rhonchi, or wheezing auscultated.   Cardiovascular:  Jugular Venous Pressure: Normal  Heart Rate and Rhythm: Normal rhythm; bradycardic.  Heart Sounds: Normal S1 and S2. No S3 or S4 noted.  Murmurs: No murmurs noted. No rubs, thrills, or gallops.   Arterial Pulses: Carotid pulses normal. No carotid bruit noted.   Lower Extremities: No edema noted.  Gastrointestinal:  Abdomen soft, non-distended, non-tender. Normal bowel sounds. No hepatomegaly.   Musculoskeletal: Normal movement of extremities  Skin and Nails: General appearance normal. Pale. No cyanosis, diaphoresis. Skin temperature normal. No clubbing of fingernails.   Psychiatric: Patient alert and oriented to person, place, and time. Speech and behavior appropriate. Normal mood and affect.       ECG 12 Lead  Date/Time: 7/10/2019 12:36 PM  Performed by: Yarely" MELINDA Goldstein  Authorized by: Carlota Pritchett APRN   Comparison: compared with previous ECG from 4/10/2019  Similar to previous ECG  Rhythm: sinus bradycardia  Rate: bradycardic  BPM: 49  Q waves: V1, V2, V3, V4 and V5    ST Segments: ST segments normal  T Waves: T waves normal  Other findings: left ventricular hypertrophy    Clinical impression: abnormal EKG              Assessment:       Diagnosis Plan   1. Essential hypertension     2. Paroxysmal atrial fibrillation (CMS/HCC)     3. Sinus bradycardia            Plan:       1.  Hypertension: Her blood pressure is elevated today and has been so occasionally at home.  I would say she is averaging 130s over 65 at home.  I recommended increasing her hydralazine to 100 mg 3 times a day as she has been noted to have blood pressures as high as systolic 170s.  She will monitor her blood pressure at home and I recommended a low-sodium diet.  She will call with any concerns.    2. Paroxysmal Atrial Fibrillation: She is currently in a normal sinus rhythm.  She's had a 2 spells atrial fibrillation that were short-lived.  Her metoprolol was discontinued and she naturally is bradycardic at times.  She will continue with the rivaroxaban for stroke prevention.    Atrial Fibrillation and Atrial Flutter  Assessment  • The patient has paroxysmal atrial fibrillation  • The patient's CHADS2-VASc score is 4  • A TDE2PW5-UCAj score of 2 or more is considered a high risk for a thromboembolic event  • Rivaroxaban prescribed    Plan  • Attempt to maintain sinus rhythm  • Continue rivaroxaban for antithrombotic therapy, bleeding issues discussed  • Continue beta blocker for rate control    Subjective - Objective  • Currently in NSR      3.  Sinus Bradycardia: At times she is bradycardic and asymptomatic.  This is natural she is not on any rate lowering medication.    4.  I've recommended follow-up with Dr. Justine Barrios in 3-4 months, unless otherwise needed sooner.    As always, it  has been a pleasure to participate in your patient's care.      Sincerely,         MELINDA Oliva

## 2019-07-15 RX ORDER — GABAPENTIN 300 MG/1
CAPSULE ORAL
Qty: 60 CAPSULE | Refills: 0 | Status: SHIPPED | OUTPATIENT
Start: 2019-07-15 | End: 2019-08-19 | Stop reason: SDUPTHER

## 2019-07-22 RX ORDER — AMLODIPINE BESYLATE 10 MG/1
TABLET ORAL
Qty: 90 TABLET | Refills: 0 | Status: SHIPPED | OUTPATIENT
Start: 2019-07-22 | End: 2019-10-22 | Stop reason: SDUPTHER

## 2019-07-31 RX ORDER — BENAZEPRIL HYDROCHLORIDE 40 MG/1
TABLET, FILM COATED ORAL
Qty: 90 TABLET | Refills: 1 | Status: SHIPPED | OUTPATIENT
Start: 2019-07-31 | End: 2020-01-27

## 2019-08-05 ENCOUNTER — INFUSION (OUTPATIENT)
Dept: ONCOLOGY | Facility: HOSPITAL | Age: 79
End: 2019-08-05

## 2019-08-05 DIAGNOSIS — Z45.2 ENCOUNTER FOR ADJUSTMENT OR MANAGEMENT OF VASCULAR ACCESS DEVICE: Primary | ICD-10-CM

## 2019-08-05 PROCEDURE — 96523 IRRIG DRUG DELIVERY DEVICE: CPT | Performed by: INTERNAL MEDICINE

## 2019-08-05 RX ORDER — SODIUM CHLORIDE 0.9 % (FLUSH) 0.9 %
10 SYRINGE (ML) INJECTION AS NEEDED
Status: DISCONTINUED | OUTPATIENT
Start: 2019-08-05 | End: 2019-08-05 | Stop reason: HOSPADM

## 2019-08-05 RX ORDER — SODIUM CHLORIDE 0.9 % (FLUSH) 0.9 %
10 SYRINGE (ML) INJECTION AS NEEDED
Status: CANCELLED | OUTPATIENT
Start: 2019-08-05

## 2019-08-05 RX ADMIN — Medication 500 UNITS: at 14:07

## 2019-08-05 RX ADMIN — Medication 10 ML: at 14:07

## 2019-08-15 ENCOUNTER — TELEPHONE (OUTPATIENT)
Dept: ONCOLOGY | Facility: HOSPITAL | Age: 79
End: 2019-08-15

## 2019-08-15 NOTE — TELEPHONE ENCOUNTER
Spoke with patient about making sure she has enough ventilation in her house to minimize any fumes. Patient stated she had fans on and her windows open. Told her to call us if she needed anything else.     ----- Message from Robyn Alberto sent at 8/15/2019  3:57 PM EDT -----  Contact: [phone]  Pt just had her cabinets done and it was sealed with acetone . Is it ok for her to be around fumes?

## 2019-08-19 RX ORDER — PANTOPRAZOLE SODIUM 40 MG/1
TABLET, DELAYED RELEASE ORAL
Qty: 90 TABLET | Refills: 0 | Status: SHIPPED | OUTPATIENT
Start: 2019-08-19 | End: 2020-02-17

## 2019-08-19 RX ORDER — GABAPENTIN 300 MG/1
CAPSULE ORAL
Qty: 60 CAPSULE | Refills: 0 | Status: SHIPPED | OUTPATIENT
Start: 2019-08-19 | End: 2019-09-15 | Stop reason: SDUPTHER

## 2019-09-10 RX ORDER — PHENYTOIN SODIUM 100 MG/1
CAPSULE, EXTENDED RELEASE ORAL
Qty: 270 CAPSULE | Refills: 0 | Status: SHIPPED | OUTPATIENT
Start: 2019-09-10 | End: 2019-12-17 | Stop reason: SDUPTHER

## 2019-09-16 ENCOUNTER — INFUSION (OUTPATIENT)
Dept: ONCOLOGY | Facility: HOSPITAL | Age: 79
End: 2019-09-16

## 2019-09-16 DIAGNOSIS — Z45.2 ENCOUNTER FOR ADJUSTMENT OR MANAGEMENT OF VASCULAR ACCESS DEVICE: Primary | ICD-10-CM

## 2019-09-16 PROCEDURE — 96523 IRRIG DRUG DELIVERY DEVICE: CPT | Performed by: INTERNAL MEDICINE

## 2019-09-16 RX ORDER — GABAPENTIN 300 MG/1
CAPSULE ORAL
Qty: 60 CAPSULE | Refills: 0 | Status: SHIPPED | OUTPATIENT
Start: 2019-09-16 | End: 2019-10-15 | Stop reason: SDUPTHER

## 2019-09-16 RX ORDER — SODIUM CHLORIDE 0.9 % (FLUSH) 0.9 %
10 SYRINGE (ML) INJECTION AS NEEDED
Status: DISCONTINUED | OUTPATIENT
Start: 2019-09-16 | End: 2019-09-16 | Stop reason: HOSPADM

## 2019-09-16 RX ORDER — SODIUM CHLORIDE 0.9 % (FLUSH) 0.9 %
10 SYRINGE (ML) INJECTION AS NEEDED
Status: CANCELLED | OUTPATIENT
Start: 2019-09-16

## 2019-09-16 RX ADMIN — Medication 500 UNITS: at 13:43

## 2019-09-16 RX ADMIN — Medication 10 ML: at 13:43

## 2019-10-14 RX ORDER — GABAPENTIN 300 MG/1
CAPSULE ORAL
Qty: 60 CAPSULE | Refills: 0 | Status: CANCELLED | OUTPATIENT
Start: 2019-10-14

## 2019-10-15 RX ORDER — GABAPENTIN 300 MG/1
600 CAPSULE ORAL
Qty: 60 CAPSULE | Refills: 0 | OUTPATIENT
Start: 2019-10-15 | End: 2019-11-18 | Stop reason: SDUPTHER

## 2019-10-18 ENCOUNTER — TELEPHONE (OUTPATIENT)
Dept: FAMILY MEDICINE CLINIC | Facility: CLINIC | Age: 79
End: 2019-10-18

## 2019-10-18 DIAGNOSIS — R73.9 HYPERGLYCEMIA: ICD-10-CM

## 2019-10-18 DIAGNOSIS — E78.5 DYSLIPIDEMIA: Primary | ICD-10-CM

## 2019-10-21 ENCOUNTER — HOSPITAL ENCOUNTER (OUTPATIENT)
Dept: PET IMAGING | Facility: HOSPITAL | Age: 79
Discharge: HOME | End: 2019-10-21
Admitting: INTERNAL MEDICINE

## 2019-10-21 ENCOUNTER — INFUSION (OUTPATIENT)
Dept: ONCOLOGY | Facility: HOSPITAL | Age: 79
End: 2019-10-21

## 2019-10-21 DIAGNOSIS — C85.91 LYMPHOMA OF LYMPH NODES OF NECK, UNSPECIFIED LYMPHOMA TYPE (HCC): ICD-10-CM

## 2019-10-21 DIAGNOSIS — Z45.2 ENCOUNTER FOR ADJUSTMENT OR MANAGEMENT OF VASCULAR ACCESS DEVICE: ICD-10-CM

## 2019-10-21 LAB — CREAT BLDA-MCNC: 0.6 MG/DL (ref 0.6–1.3)

## 2019-10-21 PROCEDURE — 82565 ASSAY OF CREATININE: CPT

## 2019-10-21 PROCEDURE — 74177 CT ABD & PELVIS W/CONTRAST: CPT

## 2019-10-21 PROCEDURE — 0 DIATRIZOATE MEGLUMINE & SODIUM PER 1 ML: Performed by: INTERNAL MEDICINE

## 2019-10-21 PROCEDURE — 25010000002 IOPAMIDOL 61 % SOLUTION: Performed by: INTERNAL MEDICINE

## 2019-10-21 PROCEDURE — 71260 CT THORAX DX C+: CPT

## 2019-10-21 PROCEDURE — 96523 IRRIG DRUG DELIVERY DEVICE: CPT | Performed by: INTERNAL MEDICINE

## 2019-10-21 RX ADMIN — IOPAMIDOL 85 ML: 612 INJECTION, SOLUTION INTRAVENOUS at 11:33

## 2019-10-21 RX ADMIN — DIATRIZOATE MEGLUMINE AND DIATRIZOATE SODIUM 30 ML: 660; 100 LIQUID ORAL; RECTAL at 11:33

## 2019-10-22 ENCOUNTER — OFFICE VISIT (OUTPATIENT)
Dept: FAMILY MEDICINE CLINIC | Facility: CLINIC | Age: 79
End: 2019-10-22

## 2019-10-22 VITALS
HEIGHT: 64 IN | HEART RATE: 66 BPM | DIASTOLIC BLOOD PRESSURE: 60 MMHG | OXYGEN SATURATION: 98 % | WEIGHT: 150 LBS | SYSTOLIC BLOOD PRESSURE: 112 MMHG | BODY MASS INDEX: 25.61 KG/M2

## 2019-10-22 DIAGNOSIS — Z00.00 MEDICARE ANNUAL WELLNESS VISIT, SUBSEQUENT: Primary | ICD-10-CM

## 2019-10-22 DIAGNOSIS — I10 ESSENTIAL HYPERTENSION: Chronic | ICD-10-CM

## 2019-10-22 DIAGNOSIS — E78.5 DYSLIPIDEMIA: ICD-10-CM

## 2019-10-22 LAB
CHOLEST SERPL-MCNC: 183 MG/DL (ref 100–199)
HBA1C MFR BLD: 5.4 % (ref 4.8–5.6)
HDLC SERPL-MCNC: 58 MG/DL
LDLC SERPL CALC-MCNC: 114 MG/DL (ref 0–99)
TRIGL SERPL-MCNC: 56 MG/DL (ref 0–149)
VLDLC SERPL-MCNC: 11 MG/DL (ref 5–40)

## 2019-10-22 PROCEDURE — G0439 PPPS, SUBSEQ VISIT: HCPCS | Performed by: FAMILY MEDICINE

## 2019-10-22 PROCEDURE — 99213 OFFICE O/P EST LOW 20 MIN: CPT | Performed by: FAMILY MEDICINE

## 2019-10-22 NOTE — PROGRESS NOTES
QUICK REFERENCE INFORMATION:  The ABCs of the Annual Wellness Visit    Subsequent Medicare Wellness Visit    HEALTH RISK ASSESSMENT    1940    Recent Hospitalizations:  No hospitalization(s) within the last year..        Current Medical Providers:  Patient Care Team:  Jamie Walton DO as PCP - General  Chivo Iraheta MD as PCP - Claims Attributed  Isreal Tan MD as Referring Physician (Internal Medicine)  Chivo Iraheta MD as Consulting Physician (Hematology and Oncology)  Fahad Bobby Jr., MD as Consulting Physician (Hematology and Oncology)        Smoking Status:  Social History     Tobacco Use   Smoking Status Never Smoker   Smokeless Tobacco Never Used       Alcohol Consumption:  Social History     Substance and Sexual Activity   Alcohol Use No    Comment: Caffeine use       Depression Screen:   PHQ-2/PHQ-9 Depression Screening 10/22/2019   Little interest or pleasure in doing things 0   Feeling down, depressed, or hopeless 0   Trouble falling or staying asleep, or sleeping too much 0   Feeling tired or having little energy 1   Poor appetite or overeating 0   Feeling bad about yourself - or that you are a failure or have let yourself or your family down 0   Trouble concentrating on things, such as reading the newspaper or watching television 0   Moving or speaking so slowly that other people could have noticed. Or the opposite - being so fidgety or restless that you have been moving around a lot more than usual 0   Thoughts that you would be better off dead, or of hurting yourself in some way 0   Total Score 1   If you checked off any problems, how difficult have these problems made it for you to do your work, take care of things at home, or get along with other people? Not difficult at all       Health Habits and Functional and Cognitive Screening:  Functional & Cognitive Status 10/22/2019   Do you have difficulty preparing food and eating? No   Do you have difficulty bathing  yourself, getting dressed or grooming yourself? No   Do you have difficulty using the toilet? No   Do you have difficulty moving around from place to place? No   Do you have trouble with steps or getting out of a bed or a chair? No   Current Diet Well Balanced Diet   Dental Exam Up to date   Eye Exam Up to date   Exercise (times per week) 3 times per week   Current Exercise Activities Include Walking   Do you need help using the phone?  No   Are you deaf or do you have serious difficulty hearing?  No   Do you need help with transportation? No   Do you need help shopping? No   Do you need help preparing meals?  No   Do you need help with housework?  No   Do you need help with laundry? No   Do you need help taking your medications? No   Do you need help managing money? No   Do you ever drive or ride in a car without wearing a seat belt? No   Have you felt unusual stress, anger or loneliness in the last month? No   Who do you live with? Alone   If you need help, do you have trouble finding someone available to you? No   Have you been bothered in the last four weeks by sexual problems? No   Do you have difficulty concentrating, remembering or making decisions? No           Does the patient have evidence of cognitive impairment? No    Aspirin use counseling: Does not need ASA (and currently is not on it)      Recent Lab Results:  CMP:  Lab Results   Component Value Date    GLU 97 04/15/2019    BUN 25 (H) 05/13/2019    CREATININE 0.60 10/21/2019    EGFRIFNONA 91 05/13/2019    EGFRIFAFRI 97 04/15/2019    BCR 39.7 (H) 05/13/2019     05/13/2019    K 3.8 05/13/2019    CO2 27.7 05/13/2019    CALCIUM 9.1 05/13/2019    PROTENTOTREF 6.6 04/15/2019    ALBUMIN 4.00 05/13/2019    LABGLOBREF 2.4 04/15/2019    LABIL2 1.8 04/15/2019    BILITOT 0.2 05/13/2019    ALKPHOS 103 05/13/2019    AST 16 05/13/2019    ALT 12 05/13/2019     Lipid Panel:  Lab Results   Component Value Date    CHOL 156 10/12/2018    TRIG 56 10/21/2019    HDL  58 10/21/2019    VLDL 11 10/21/2019    LDLHDL 1.73 10/12/2018     HbA1c:  Lab Results   Component Value Date    HGBA1C 5.4 10/21/2019       Visual Acuity:  No exam data present    Age-appropriate Screening Schedule:  Refer to the list below for future screening recommendations based on patient's age, sex and/or medical conditions. Orders for these recommended tests are listed in the plan section. The patient has been provided with a written plan.    Health Maintenance   Topic Date Due   • LIPID PANEL  10/21/2020   • DXA SCAN  01/07/2021   • MAMMOGRAM  01/18/2021   • COLONOSCOPY  04/13/2028   • TDAP/TD VACCINES (3 - Td) 09/18/2029   • PNEUMOCOCCAL VACCINE (65+ HIGH RISK)  Completed   • INFLUENZA VACCINE  Completed   • ZOSTER VACCINE  Completed        Subjective   History of Present Illness    Martina Blake is a 79 y.o. female who presents for an Subsequent Wellness Visit.    The following portions of the patient's history were reviewed and updated as appropriate: allergies, current medications, past family history, past medical history, past social history, past surgical history and problem list.    Outpatient Medications Prior to Visit   Medication Sig Dispense Refill   • amLODIPine (NORVASC) 10 MG tablet TAKE 1 TABLET BY MOUTH EVERY DAY 90 tablet 0   • benazepril (LOTENSIN) 40 MG tablet TAKE 1 TABLET BY MOUTH EVERY DAY 90 tablet 1   • escitalopram (LEXAPRO) 20 MG tablet Take 1 tablet by mouth Daily. 90 tablet 1   • folic acid (FOLVITE) 400 MCG tablet Take 400 mcg by mouth daily.     • gabapentin (NEURONTIN) 300 MG capsule Take 2 capsules by mouth every night at bedtime. 60 capsule 0   • hydrALAZINE (APRESOLINE) 50 MG tablet Take 2 tablets by mouth 3 (Three) Times a Day. 270 tablet 1   • melatonin 5 MG tablet tablet Take 5 mg by mouth Every Night.     • pantoprazole (PROTONIX) 40 MG EC tablet TAKE 1 TABLET BY MOUTH EVERY DAY 90 tablet 0   • phenytoin (DILANTIN) 100 MG ER capsule TAKE 3 CAPSULES BY MOUTH EVERY  NIGHT AT BEDTIME 270 capsule 0   • spironolactone (ALDACTONE) 25 MG tablet Take 1 tablet by mouth Daily. 90 tablet 3   • vitamin B-12 (CYANOCOBALAMIN) 100 MCG tablet Take 100 mcg by mouth Daily.     • XARELTO 20 MG tablet Take 20 mg by mouth Daily.     • lidocaine-prilocaine (EMLA) 2.5-2.5 % cream Apply 30 min prior to port access 5 g 2   • nystatin (MYCOSTATIN) 826413 UNIT/GM powder Apply  topically Every 8 (Eight) Hours. 45 g 1   • ondansetron ODT (ZOFRAN-ODT) 8 MG disintegrating tablet Take 1 tablet by mouth Every 8 (Eight) Hours As Needed for Nausea or Vomiting for up to 60 doses. 60 tablet 5   • phenazopyridine (PYRIDIUM) 100 MG tablet Take 1 tablet by mouth 3 (Three) Times a Day As Needed for bladder spasms. 6 tablet 2   • SENEXON-S 8.6-50 MG per tablet TAKE 2 TABLETS BY MOUTH EVERY EVENING 60 tablet 0     No facility-administered medications prior to visit.        Patient Active Problem List   Diagnosis   • Blood glucose abnormal   • Dyslipidemia   • Gastroesophageal reflux disease   • Generalized osteoarthritis   • Chronic recurrent major depressive disorder (CMS/HCC)   • Osteoporosis   • Restless legs syndrome   • Seizure disorder (CMS/HCC)   • Post-menopause on HRT (hormone replacement therapy)   • Chronic seasonal allergic rhinitis   • Paroxysmal atrial fibrillation (CMS/HCC)   • Iron deficiency anemia due to chronic blood loss   • Acute superficial gastritis without hemorrhage   • Hiatal hernia   • Esophagitis   • Diverticulosis of large intestine without hemorrhage   • Hypertension   • Hypercalcemia   • Liver mass   • Lymphoma (CMS/HCC)   • Insomnia   • Anemia associated with chemotherapy   • Pancytopenia due to antineoplastic chemotherapy (CMS/HCC)   • Chemotherapy-induced neutropenia (CMS/HCC)   • Encounter for adjustment or management of vascular access device   • 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 (CMS/HCC)   • Sinus bradycardia   •  "Thrombocytopenia (CMS/HCC)   • Bone metastasis (CMS/HCC)       Advance Care Planning:  Patient does not have an advance directive - information provided to the patient today    Identification of Risk Factors:  Risk factors include: patient s/p treatment for lymphoma.    Review of Systems   Respiratory: Negative for shortness of breath.    Cardiovascular: Negative for chest pain, palpitations, orthopnea and PND.       Compared to one year ago, the patient feels her physical health is the same.  Compared to one year ago, the patient feels her mental health is the same.    Objective     Physical Exam   Constitutional: She appears well-developed and well-nourished.   HENT:   Head: Normocephalic and atraumatic.   Neck: Neck supple. No JVD present. No thyromegaly present.   Cardiovascular: Normal rate, regular rhythm and normal heart sounds. Exam reveals no gallop and no friction rub.   No murmur heard.  Pulmonary/Chest: Effort normal and breath sounds normal. No respiratory distress. She has no wheezes. She has no rales.   Abdominal: Soft. Bowel sounds are normal. She exhibits no distension. There is no tenderness. There is no rebound and no guarding.   Musculoskeletal: She exhibits no edema.   Neurological: She is alert.   Skin: Skin is warm and dry.   Psychiatric: She has a normal mood and affect. Her behavior is normal.   Nursing note and vitals reviewed.      Vitals:    10/22/19 1135   BP: 112/60   BP Location: Left arm   Patient Position: Sitting   Cuff Size: Adult   Pulse: 66   SpO2: 98%   Weight: 68 kg (150 lb)   Height: 162.6 cm (64\")   PainSc: 0-No pain       Patient's Body mass index is 25.75 kg/m². BMI is within normal parameters. No follow-up required..      Assessment/Plan   Patient Self-Management and Personalized Health Advice  The patient has been provided with information about: diet and exercise and preventive services including:   · Annual Wellness Visit (AWV).    Visit Diagnoses:    ICD-10-CM ICD-9-CM "   1. Medicare annual wellness visit, subsequent Z00.00 V70.0   2. Essential hypertension I10 401.9   3. Dyslipidemia E78.5 272.4       No orders of the defined types were placed in this encounter.      Outpatient Encounter Medications as of 10/22/2019   Medication Sig Dispense Refill   • amLODIPine (NORVASC) 10 MG tablet TAKE 1 TABLET BY MOUTH EVERY DAY 90 tablet 0   • benazepril (LOTENSIN) 40 MG tablet TAKE 1 TABLET BY MOUTH EVERY DAY 90 tablet 1   • escitalopram (LEXAPRO) 20 MG tablet Take 1 tablet by mouth Daily. 90 tablet 1   • folic acid (FOLVITE) 400 MCG tablet Take 400 mcg by mouth daily.     • gabapentin (NEURONTIN) 300 MG capsule Take 2 capsules by mouth every night at bedtime. 60 capsule 0   • hydrALAZINE (APRESOLINE) 50 MG tablet Take 2 tablets by mouth 3 (Three) Times a Day. 270 tablet 1   • melatonin 5 MG tablet tablet Take 5 mg by mouth Every Night.     • pantoprazole (PROTONIX) 40 MG EC tablet TAKE 1 TABLET BY MOUTH EVERY DAY 90 tablet 0   • phenytoin (DILANTIN) 100 MG ER capsule TAKE 3 CAPSULES BY MOUTH EVERY NIGHT AT BEDTIME 270 capsule 0   • spironolactone (ALDACTONE) 25 MG tablet Take 1 tablet by mouth Daily. 90 tablet 3   • vitamin B-12 (CYANOCOBALAMIN) 100 MCG tablet Take 100 mcg by mouth Daily.     • XARELTO 20 MG tablet Take 20 mg by mouth Daily.     • lidocaine-prilocaine (EMLA) 2.5-2.5 % cream Apply 30 min prior to port access 5 g 2   • nystatin (MYCOSTATIN) [id number] UNIT/GM powder Apply  topically Every 8 (Eight) Hours. 45 g 1   • [DISCONTINUED] ondansetron ODT (ZOFRAN-ODT) 8 MG disintegrating tablet Take 1 tablet by mouth Every 8 (Eight) Hours As Needed for Nausea or Vomiting for up to 60 doses. 60 tablet 5   • [DISCONTINUED] phenazopyridine (PYRIDIUM) 100 MG tablet Take 1 tablet by mouth 3 (Three) Times a Day As Needed for bladder spasms. 6 tablet 2   • [DISCONTINUED] SENEXON-S 8.6-50 MG per tablet TAKE 2 TABLETS BY MOUTH EVERY EVENING 60 tablet 0   • [] diatrizoate meglumine-sodium  (GASTROGRAFIN) 66-10 % solution 30 mL      • [] iopamidol (ISOVUE-300) 61 % injection 100 mL        No facility-administered encounter medications on file as of 10/22/2019.        Reviewed use of high risk medication in the elderly: yes  Reviewed for potential of harmful drug interactions in the elderly: yes    Follow Up:  Return in about 6 months (around 2020), or if symptoms worsen or fail to improve.     An After Visit Summary and PPPS with all of these plans were given to the patient.           ++++++++++++++++++++++++++++++++++++++++++++++++++++++++++++++++++   Chief Complaint   Patient presents with   • Hypertension   • Annual Exam   • Anxiety     Here for chronic disease management and above.  Had preappt labs done.      Had steroid induced hyperglycemia in past, preappt a1c normalized.  Cholesterol mildly elevated;  Has other labs with oncology.  Cr for PET/CT was 0.6.    Hypertension   This is a chronic problem. The current episode started more than 1 year ago. The problem is unchanged. The problem is controlled. Pertinent negatives include no chest pain, orthopnea, palpitations, peripheral edema, PND or shortness of breath. There are no associated agents to hypertension. Risk factors for coronary artery disease include dyslipidemia and post-menopausal state. Past treatments include calcium channel blockers, ACE inhibitors and direct vasodilators. Current antihypertension treatment includes direct vasodilators, ACE inhibitors and calcium channel blockers. The current treatment provides moderate improvement. There are no compliance problems.      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   Respiratory: Negative for shortness of breath.        Social History     Tobacco Use   • Smoking status: Never Smoker   • Smokeless  "tobacco: Never Used   Substance Use Topics   • Alcohol use: No     Comment: Caffeine use     O:   Vitals:    10/22/19 1135   BP: 112/60   BP Location: Left arm   Patient Position: Sitting   Cuff Size: Adult   Pulse: 66   SpO2: 98%   Weight: 68 kg (150 lb)   Height: 162.6 cm (64\")   PainSc: 0-No pain       Physical Exam   Constitutional: She appears well-developed and well-nourished.   HENT:   Head: Normocephalic and atraumatic.   Neck: Neck supple. No JVD present. No thyromegaly present.   Cardiovascular: Normal rate, regular rhythm and normal heart sounds. Exam reveals no gallop and no friction rub.   No murmur heard.  Pulmonary/Chest: Effort normal and breath sounds normal. No respiratory distress. She has no wheezes. She has no rales.   Abdominal: Soft. Bowel sounds are normal. She exhibits no distension. There is no tenderness. There is no rebound and no guarding.   Musculoskeletal: She exhibits no edema.   Neurological: She is alert.   Skin: Skin is warm and dry.   Psychiatric: She has a normal mood and affect. Her behavior is normal.   Nursing note and vitals reviewed.      Component      Latest Ref Rng & Units 10/21/2019   Total Cholesterol      100 - 199 mg/dL 183   Triglycerides      0 - 149 mg/dL 56   HDL Cholesterol      >39 mg/dL 58   VLDL Cholesterol      5 - 40 mg/dL 11   LDL Cholesterol       0 - 99 mg/dL 114 (H)   Hemoglobin A1C      4.8 - 5.6 % 5.4     Martina was seen today for hypertension, annual exam and anxiety.    Diagnoses and all orders for this visit:    Medicare annual wellness visit, subsequent    Essential hypertension    Dyslipidemia    a1c normalized now;   Cholesterol ok, no statin  hypertension - controlled, continue medications;  Cr normal    Return in about 6 months (around 4/22/2020), or if symptoms worsen or fail to improve.    "

## 2019-10-22 NOTE — PATIENT INSTRUCTIONS
Advance Directive    Advance directives are legal documents that let you make choices ahead of time about your health care and medical treatment in case you become unable to communicate for yourself. Advance directives are a way for you to communicate your wishes to family, friends, and health care providers. This can help convey your decisions about end-of-life care if you become unable to communicate.  Discussing and writing advance directives should happen over time rather than all at once. Advance directives can be changed depending on your situation and what you want, even after you have signed the advance directives.  If you do not have an advance directive, some states assign family decision makers to act on your behalf based on how closely you are related to them. Each state has its own laws regarding advance directives. You may want to check with your health care provider, , or state representative about the laws in your state. There are different types of advance directives, such as:  · Medical power of .  · Living will.  · Do not resuscitate (DNR) or do not attempt resuscitation (DNAR) order.  Health care proxy and medical power of   A health care proxy, also called a health care agent, is a person who is appointed to make medical decisions for you in cases in which you are unable to make the decisions yourself. Generally, people choose someone they know well and trust to represent their preferences. Make sure to ask this person for an agreement to act as your proxy. A proxy may have to exercise judgment in the event of a medical decision for which your wishes are not known.  A medical power of  is a legal document that names your health care proxy. Depending on the laws in your state, after the document is written, it may also need to be:  · Signed.  · Notarized.  · Dated.  · Copied.  · Witnessed.  · Incorporated into your medical record.  You may also want to appoint  someone to manage your financial affairs in a situation in which you are unable to do so. This is called a durable power of  for finances. It is a separate legal document from the durable power of  for health care. You may choose the same person or someone different from your health care proxy to act as your agent in financial matters.  If you do not appoint a proxy, or if there is a concern that the proxy is not acting in your best interests, a court-appointed guardian may be designated to act on your behalf.  Living will  A living will is a set of instructions documenting your wishes about medical care when you cannot express them yourself. Health care providers should keep a copy of your living will in your medical record. You may want to give a copy to family members or friends. To alert caregivers in case of an emergency, you can place a card in your wallet to let them know that you have a living will and where they can find it. A living will is used if you become:  · Terminally ill.  · Incapacitated.  · Unable to communicate or make decisions.  Items to consider in your living will include:  · The use or non-use of life-sustaining equipment, such as dialysis machines and breathing machines (ventilators).  · A DNR or DNAR order, which is the instruction not to use cardiopulmonary resuscitation (CPR) if breathing or heartbeat stops.  · The use or non-use of tube feeding.  · Withholding of food and fluids.  · Comfort (palliative) care when the goal becomes comfort rather than a cure.  · Organ and tissue donation.  A living will does not give instructions for distributing your money and property if you should pass away. It is recommended that you seek the advice of a  when writing a will. Decisions about taxes, beneficiaries, and asset distribution will be legally binding. This process can relieve your family and friends of any concerns surrounding disputes or questions that may come up about  the distribution of your assets.  DNR or DNAR  A DNR or DNAR order is a request not to have CPR in the event that your heart stops beating or you stop breathing. If a DNR or DNAR order has not been made and shared, a health care provider will try to help any patient whose heart has stopped or who has stopped breathing. If you plan to have surgery, talk with your health care provider about how your DNR or DNAR order will be followed if problems occur.  Summary  · Advance directives are the legal documents that allow you to make choices ahead of time about your health care and medical treatment in case you become unable to communicate for yourself.  · The process of discussing and writing advance directives should happen over time. You can change the advance directives, even after you have signed them.  · Advance directives include DNR or DNAR orders, living [place], and designating an agent as your medical power of .  This information is not intended to replace advice given to you by your health care provider. Make sure you discuss any questions you have with your health care provider.  Document Released: 03/26/2009 Document Revised: 11/06/2017 Document Reviewed: 11/06/2017  [employer] Interactive Patient Education © 2019 [employer]      Exercising to Lose Weight  Exercise is structured, repetitive physical activity to improve fitness and health. Getting regular exercise is important for everyone. It is especially important if you are overweight. Being overweight increases your risk of heart disease, stroke, diabetes, high blood pressure, and several types of cancer. Reducing your calorie intake and exercising can help you lose weight.  Exercise is usually categorized as moderate or vigorous intensity. To lose weight, most people need to do a certain amount of moderate-intensity or vigorous-intensity exercise each week.  Moderate-intensity exercise    Moderate-intensity exercise is any activity that gets you  moving enough to burn at least three times more energy (calories) than if you were sitting.  Examples of moderate exercise include:  · Walking a mile in 15 minutes.  · Doing light yard work.  · Biking at an easy pace.  Most people should get at least 150 minutes (2 hours and 30 minutes) a week of moderate-intensity exercise to maintain their body weight.  Vigorous-intensity exercise  Vigorous-intensity exercise is any activity that gets you moving enough to burn at least six times more calories than if you were sitting. When you exercise at this intensity, you should be working hard enough that you are not able to carry on a conversation.  Examples of vigorous exercise include:  · Running.  · Playing a team sport, such as football, basketball, and soccer.  · Jumping rope.  Most people should get at least 75 minutes (1 hour and 15 minutes) a week of vigorous-intensity exercise to maintain their body weight.  How can exercise affect me?  When you exercise enough to burn more calories than you eat, you lose weight. Exercise also reduces body fat and builds muscle. The more muscle you have, the more calories you burn. Exercise also:  · Improves mood.  · Reduces stress and tension.  · Improves your overall fitness, flexibility, and endurance.  · Increases bone strength.  The amount of exercise you need to lose weight depends on:  · Your age.  · The type of exercise.  · Any health conditions you have.  · Your overall physical ability.  Talk to your health care provider about how much exercise you need and what types of activities are safe for you.  What actions can I take to lose weight?  Nutrition    · Make changes to your diet as told by your health care provider or diet and nutrition specialist (dietitian). This may include:  ? Eating fewer calories.  ? Eating more protein.  ? Eating less unhealthy fats.  ? Eating a diet that includes fresh fruits and vegetables, whole grains, low-fat dairy products, and lean  protein.  ? Avoiding foods with added fat, salt, and sugar.  · Drink plenty of water while you exercise to prevent dehydration or heat stroke.  Activity  · Choose an activity that you enjoy and set realistic goals. Your health care provider can help you make an exercise plan that works for you.  · Exercise at a moderate or vigorous intensity most days of the week.  ? The intensity of exercise may vary from person to person. You can tell how intense a workout is for you by paying attention to your breathing and heartbeat. Most people will notice their breathing and heartbeat get faster with more intense exercise.  · Do resistance training twice each week, such as:  ? Push-ups.  ? Sit-ups.  ? Lifting weights.  ? Using resistance bands.  · Getting short amounts of exercise can be just as helpful as long structured periods of exercise. If you have trouble finding time to exercise, try to include exercise in your daily routine.  ? Get up, stretch, and walk around every 30 minutes throughout the day.  ? Go for a walk during your lunch break.  ? Park your car farther away from your destination.  ? If you take public transportation, get off one stop early and walk the rest of the way.  ? Make phone calls while standing up and walking around.  ? Take the stairs instead of elevators or escalators.  · Wear comfortable clothes and shoes with good support.  · Do not exercise so much that you hurt yourself, feel dizzy, or get very short of breath.  Where to find more information  · [place] Department of Health and Human Services: www.hhs.gov  · Centers for Disease Control and Prevention (CDC): www.cdc.gov  Contact a health care provider:  · Before starting a new exercise program.  · If you have questions or concerns about your weight.  · If you have a medical problem that keeps you from exercising.  Get help right away if you have any of the following while exercising:  · Injury.  · Dizziness.  · Difficulty breathing or shortness of  breath that does not go away when you stop exercising.  · Chest pain.  · Rapid heartbeat.  Summary  · Being overweight increases your risk of heart disease, stroke, diabetes, high blood pressure, and several types of cancer.  · Losing weight happens when you burn more calories than you eat.  · Reducing the amount of calories you eat in addition to getting regular moderate or vigorous exercise each week helps you lose weight.  This information is not intended to replace advice given to you by your health care provider. Make sure you discuss any questions you have with your health care provider.  Document Released: 01/20/2012 Document Revised: 12/31/2018 Document Reviewed: 12/31/2018  [employer] Interactive Patient Education © 2019 [employer]      Fall Prevention in the Home, Adult  Falls can cause injuries and can affect people from all age groups. There are many simple things that you can do to make your home safe and to help prevent falls. Ask for help when making these changes, if needed.  What actions can I take to prevent falls?  General instructions  · Use good lighting in all rooms. Replace any light bulbs that burn out.  · Turn on lights if it is dark. Use night-lights.  · Place frequently used items in easy-to-reach places. Lower the shelves around your home if necessary.  · Set up furniture so that there are clear paths around it. Avoid moving your furniture around.  · Remove throw rugs and other tripping hazards from the floor.  · Avoid walking on wet floors.  · Fix any uneven floor surfaces.  · Add color or contrast paint or tape to grab bars and handrails in your home. Place contrasting color strips on the first and last steps of stairways.  · When you use a stepladder, make sure that it is completely opened and that the sides are firmly locked. Have someone hold the ladder while you are using it. Do not climb a closed stepladder.  · Be aware of any and all pets.  What can I do in the  bathroom?    · Keep the floor dry. Immediately clean up any water that spills onto the floor.  · Remove soap buildup in the tub or shower on a regular basis.  · Use non-skid mats or decals on the floor of the tub or shower.  · Attach bath mats securely with double-sided, non-slip rug tape.  · If you need to sit down while you are in the shower, use a plastic, non-slip stool.  · Install grab bars by the toilet and in the tub and shower. Do not use towel bars as grab bars.  What can I do in the bedroom?  · Make sure that a bedside light is easy to reach.  · Do not use oversized bedding that drapes onto the floor.  · Have a firm chair that has side arms to use for getting dressed.  What can I do in the kitchen?  · Clean up any spills right away.  · If you need to reach for something above you, use a sturdy step stool that has a grab bar.  · Keep electrical cables out of the way.  · Do not use floor polish or wax that makes floors slippery. If you must use wax, make sure that it is non-skid floor wax.  What can I do in the stairways?  · Do not leave any items on the stairs.  · Make sure that you have a light switch at the top of the stairs and the bottom of the stairs. Have them installed if you do not have them.  · Make sure that there are handrails on both sides of the stairs. Fix handrails that are broken or loose. Make sure that handrails are as long as the stairways.  · Install non-slip stair treads on all stairs in your home.  · Avoid having throw rugs at the top or bottom of stairways, or secure the rugs with carpet tape to prevent them from moving.  · Choose a carpet design that does not hide the edge of steps on the stairway.  · Check any carpeting to make sure that it is firmly attached to the stairs. Fix any carpet that is loose or worn.  What can I do on the outside of my home?  · Use bright outdoor lighting.  · Regularly repair the edges of walkways and driveways and fix any cracks.  · Remove high doorway  thresholds.  · Trim any shrubbery on the main path into your home.  · Regularly check that handrails are securely fastened and in good repair. Both sides of any steps should have handrails.  · Install guardrails along the edges of any raised decks or porches.  · Clear walkways of debris and clutter, including tools and rocks.  · Have leaves, snow, and ice cleared regularly.  · Use sand or salt on walkways during winter months.  · In the garage, clean up any spills right away, including grease or oil spills.  What other actions can I take?  · Wear closed-toe shoes that fit well and support your feet. Wear shoes that have rubber soles or low heels.  · Use mobility aids as needed, such as canes, walkers, scooters, and crutches.  · Review your medicines with your health care provider. Some medicines can cause dizziness or changes in blood pressure, which increase your risk of falling.  Talk with your health care provider about other ways that you can decrease your risk of falls. This may include working with a physical therapist or  to improve your strength, balance, and endurance.  Where to find more information  · Centers for Disease Control and Prevention, [employer]: https://www.cdc.gov  · [employer]: https://yf3gpzs.gema.nih.gov  Contact a health care provider if:  · You are afraid of falling at home.  · You feel weak, drowsy, or dizzy at home.  · You fall at home.  Summary  · There are many simple things that you can do to make your home safe and to help prevent falls.  · Ways to make your home safe include removing tripping hazards and installing grab bars in the bathroom.  · Ask for help when making these changes in your home.  This information is not intended to replace advice given to you by your health care provider. Make sure you discuss any questions you have with your health care provider.  Document Released: 12/08/2003 Document Revised: 08/02/2018 Document Reviewed: 08/02/2018  [employer]  Interactive Patient Education © 2019 [employer]

## 2019-10-22 NOTE — PROGRESS NOTES
Subjective .  Patient with continued excellent performance status    REASONS FOR FOLLOWUP:    1. 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 female who we are asked to see April 29, 2018 in consultation for hypercalcemia, anemia and radiologic findings consistent with likely malignancy-?  Lymphoma.        She has a significant past medical history of palpitations followed by cardiology.  She presented to the emergency room April 10-14 with chest pain and palpitations and was found to be in A. fib with RVR and also demonstrated electrolyte abnormalities and anemia.  Records demonstrates that her anemia became particularly evident right April 11 ruptured you an H&H of 8.9 28.4 by can 6510, platelet count 212,000 with a normal automated differential.  She underwent GI assessment including upper and lower endoscopy demonstrated hernia, gastritis, esophagitis, diverticulosis but no evidence of acute bleed.  She been placed on Eliquis as result of a relatively high CHADSVASc score.  She had also been found to be hypercalcemic at approximately 11 with HCTZ altered and the patient started on Aldactone.  Additional testing had included a ferritin of 313.1, iron of 32 with TIBC of 222 and iron saturation of 14, occult blood negative per stool testing    She was brought back to the emergency room April 21 with further evidence of hypercalcemia, associated weakness, anorexia, nausea, ataxia and weight loss.  MRI of the brain April 21 was found to be unremarkable. Outpatient intact PTH levels were found to be 7.1(reduced), and an H&H of 10.3 and 32.9, white count 11,090, platelet count 267,000 with a normal differential.  Patient seen by renal medicine with the possibility of Fanconi syndrome raised.  Thereafter PTH hormone was found be less than 2.0, and prophylactic paresis included IgG of 948, IgA of 207, IgM 86, total  protein 6.3, albumin 2.8, no M spike noted, slightly elevated free kappa and lambda light chains with normal ratio.  Repeat testing per iron profile again revealed low serum iron but high serum ferritin and normal CEA, normal AFP, CA-125 of 77.2, CA 19-9 7.8   At this point the reason for her hypercalcemia was thought to be possible medication effect and/or possible malignancy with calcium was running between 12 and 13 mg/dL.    This led to CT scan of chest abdomen pelvis performed April 24 demonstrate extensive metastatic disease throughout the abdomen and pelvis particularly involving the spleen and liver, extensive lymphadenopathy at the abdomen and pelvis with a 4 cm lesion splenic hilum partially encasing the pancreatic tail, pancreatic tail malignancy?,  Gastric primary malignancy? There was an approximately a 5 cm mass along the right wall of the urinary bladder with adjacent adenopathy.  CT of the chest revealed several tiny dense pleural-based nodular opacities-partially calcified granulomas, no mediastinal or hilar adenopathy, enlarged pulmonary arteries consistent with pulmonary arterial hypertension.  His subsequent bone scan performed April 26 revealed prominent uptake in the right frontal bone and right posterior ninth rib and a CT needle biopsy of the liver was obtained April 26 as well.The sample obtained revealed, on flow cytometry examination, a subpopulation of normal B cells that might be  B-cell lymphoma.  This was eventually felt consistent with diffuse large B-cell non-Hodgkin's lymphoma, CD20 positive, double hit (BCL-6 rearrangement 85%, MYC rearrangement 79%), KI-67 4+/4. This led to the patient receiving R CHOP chemotherapy May 04, 2018 followed by Granix.  She is able to be discharged May 16, 2018.    We made plans for her to be seen in follow-up for continued Zometa and a second cycle of CHOP R chemotherapy by May 25 with follow-up PET/CT after 2 cycles of treatment.  She is seen back in  office May 18 given Zometa with findings of potential right lower extremity DVT.  Doppler is positive for acute popliteal and calf vein DVT as well as superficial venous thrombosis and the patient was started on Xarelto.  She was seen again May 21 mouth additional redness and swelling per her right elbow and felt to have cellulitis started on Keflex as an outpatient.  She had been taking the Xarelto with some improvement in her right lower extremity.    The patient's next seen family history 25th 2018 and, fortunately, her cellulitis is resolving as well is her thrombosis per right lower extremity, albeit slowly.     As the patient's second cycle of CHOP R she underwent additional scans including repeat PET/CT.  This demonstrates a dramatic response with resolution of splenomegaly and associated hypermetabolic activity, resolution of hypermetabolic liver lesion, lymphadenopathy and bony metastasis as well as reduction left gluteal lesion.  She has near remission after these 2 cycles of chemotherapy and we discussed continuing chemotherapy with total of 6 cycles of CHOP R.  We will plan the additional Zometa today and plan for choose every other cycle at this point.  The patient is next seen August 03, 2018 having done well with treatment except for development of a constipation that was difficult to manage.  It is now resolved but we will be reducing her Oncovin for the remainder of treatments.   The patient went on to take her sixth lack of treatment August 23, 2015.As result of recurrent issues with urinary tract symptoms, urgency incontinence and dysuria the patient was seen by urology September 7.  She underwent additional scans,started Myrbetriq and prophylactic Macrobid.  As she is seen September 24 she actually feels improved neurologically.  We discussed her scans as well and they are improved though not completely normal and close follow-up will be necessary.  Treatment.    The patient is next seen November  26, 2018 fortunately feeling better in terms of stamina and performance status.  We discussed continuing follow-up with maintenance of report an every 6 weeks and, potentially, using Prolia after bone density is planned as a method also help maintain taking some vigilance per hypercalcemia treatment.  The patient is next assessed February 18, 2019 with an excellent performance status.  She has had no additional issues since last visit we discussed repeat scanning in approximately 3 months which would be 6 months from her previous studies.  The patient proceeded to follow-up scans performed May 2, 2019 finding no additional pathologically enlarged lymph nodes in the chest abdomen or pelvis, spleen with interval decrease in size, multiple low-density lesions in the spleen also reduced from previous.  CMP with no significant abnormality though alk phos was 120.  The patient continues to feel well and just returned from a [place] vacation.  We have discussed the Xgeva today and then switching to Prolia for her osteopenia in 6 months.  The patient is seen in follow-up October 28, 2019.  Follow-up scans demonstrate no significant change from previous including small retroperitoneal and mesenteric lymph nodes.       She continues to feel, wonderfully, well but indicates she would like to maintain her port as possible.  We discussed having her studied at 2 years which would be 6 months from now we discussed whether to maintain the port or not.         Past Medical History:   Diagnosis Date   • Anemia     multifactorial   • Cystitis    • Cystocele     moderate   • Drug therapy    • Dyslipidemia    • Esophageal reflux    • Fatigue    • History of transfusion     no reaction   • Hypercalcemia    • Hypertension    • Major depression, chronic    • Menopause    • Non Hodgkin's lymphoma (CMS/HCC)    • Osteoarthritis    • Osteoporosis    • Palpitations    • Paroxysmal atrial fibrillation (CMS/HCC)    • Post menopausal problems   "  • RLS (restless legs syndrome)    • Seizure disorder (CMS/HCC)    • Sinus bradycardia    • Subjective tinnitus    • Vaginal delivery     x2  \"SONYA\"      \"JASON\"   • Vitamin D deficiency        ONCOLOGIC HISTORY:  (History from previous dates can be found in the separate document.)    Current Outpatient Medications on File Prior to Visit   Medication Sig Dispense Refill   • amLODIPine (NORVASC) 10 MG tablet TAKE 1 TABLET BY MOUTH EVERY DAY 90 tablet 1   • benazepril (LOTENSIN) 40 MG tablet TAKE 1 TABLET BY MOUTH EVERY DAY 90 tablet 1   • escitalopram (LEXAPRO) 20 MG tablet Take 1 tablet by mouth Daily. 90 tablet 1   • folic acid (FOLVITE) 400 MCG tablet Take 400 mcg by mouth daily.     • gabapentin (NEURONTIN) 300 MG capsule Take 2 capsules by mouth every night at bedtime. 60 capsule 0   • hydrALAZINE (APRESOLINE) 50 MG tablet Take 2 tablets by mouth 3 (Three) Times a Day. 270 tablet 1   • lidocaine-prilocaine (EMLA) 2.5-2.5 % cream Apply 30 min prior to port access 5 g 2   • melatonin 5 MG tablet tablet Take 5 mg by mouth Every Night.     • pantoprazole (PROTONIX) 40 MG EC tablet TAKE 1 TABLET BY MOUTH EVERY DAY 90 tablet 0   • phenytoin (DILANTIN) 100 MG ER capsule TAKE 3 CAPSULES BY MOUTH EVERY NIGHT AT BEDTIME 270 capsule 0   • spironolactone (ALDACTONE) 25 MG tablet Take 1 tablet by mouth Daily. 90 tablet 3   • vitamin B-12 (CYANOCOBALAMIN) 100 MCG tablet Take 100 mcg by mouth Daily.     • XARELTO 20 MG tablet Take 20 mg by mouth Daily.     • [DISCONTINUED] nystatin (MYCOSTATIN) 328539 UNIT/GM powder Apply  topically Every 8 (Eight) Hours. 45 g 1     No current facility-administered medications on file prior to visit.        ALLERGIES:     Allergies   Allergen Reactions   • Crab (Diagnostic) Itching and Rash   • Pseudoephedrine Dizziness       Social History     Socioeconomic History   • Marital status:      Spouse name: POOJA   • Number of children: 2   • Years of education: Not on file   • Highest " "education level: Not on file   Occupational History     Employer: RETIRED   Tobacco Use   • Smoking status: Never Smoker   • Smokeless tobacco: Never Used   Substance and Sexual Activity   • Alcohol use: No     Comment: Caffeine use   • Drug use: No   • Sexual activity: Defer     Birth control/protection: Surgical     Comment:  (Jl)         Cancer-related family history is not on file.     Review of Systems   Constitutional: Negative for fatigue.   Respiratory: Negative for chest tightness, shortness of breath and wheezing.    Gastrointestinal: Negative for abdominal pain, constipation, diarrhea, nausea and vomiting.   Neurological: Negative for weakness.     A comprehensive 14 point review of systems was performed and was negative except as mentioned.    Objective      Vitals:    10/28/19 1100   BP: 148/72   Pulse: 58   Resp: 18   Temp: 97.4 °F (36.3 °C)   TempSrc: Oral   SpO2: 98%   Weight: 67.4 kg (148 lb 11.2 oz)   Height: 162.6 cm (64.02\")   PainSc: 0-No pain     Current Status 10/28/2019   ECOG score 1       Physical Exam    Constitutional: She is oriented to person, place, and time. She appears well-developed and well-nourished. No distress.   Seated in wheel chair   HENT:   Head: Normocephalic and atraumatic.   Eyes: Pupils are equal, round, and reactive to light.   Pulmonary/Chest: Effort normal. No respiratory distress.   Musculoskeletal: Normal range of motion. She exhibits no current edema  Neurological: She is alert and oriented to person, place, and time.   Skin: Skin is warm and dry. No rash noted.    tenderness.  There is no obvious trauma.    Psychiatric: She has a normal mood and affect.   Vitals reviewed.        RECENT LABS:  Hematology WBC   Date Value Ref Range Status   10/28/2019 5.32 3.40 - 10.80 10*3/mm3 Final     RBC   Date Value Ref Range Status   10/28/2019 3.78 3.77 - 5.28 10*6/mm3 Final     Hemoglobin   Date Value Ref Range Status   10/28/2019 12.3 12.0 - 15.9 g/dL Final "     Hematocrit   Date Value Ref Range Status   10/28/2019 37.1 34.0 - 46.6 % Final     Platelets   Date Value Ref Range Status   10/28/2019 131 (L) 140 - 450 10*3/mm3 Final      CT chest, abdomen and pelvis with IV contrast 10/21/19       CHEST: There is no mediastinal or hilar or axillary corby enlargement.  No suspicious pulmonary nodule has developed and there is no pleural  effusion or infiltrate. A tiny calcific granuloma is present in the  posterior left upper lobe. There is pulmonary arterial enlargement  similar to the previous exam. Right IJ Mediport tip is in the SVC.     ABDOMEN/PELVIS: Splenic size is within normal limits and the low-density  splenic lesions are without change. The largest splenic lesion measures  approximately 1.7 cm and this is without change. Liver, adrenal glands,  pancreas, kidneys appear within normal limits. There has been previous  cholecystectomy. Multiple small retroperitoneal and mesenteric nodes are  without change. Dense central mesenteric calcifications are without  change. No new enlarged nodes have developed. There is sigmoid  diverticulosis without evidence for diverticulitis.  The densely  sclerotic lesion within the posterior inferior L2 vertebral body is  without change.     IMPRESSION:  No change compared to previous CT chest, abdomen and pelvis  05/02/2019. Splenic size is within normal limits and small splenic  low-density lesions are without change. Multiple small retroperitoneal  and mesenteric lymph nodes are without change and there is no corby  enlargement. No new abnormality.      Radiation dose reduction techniques were utilized, including automated  exposure control and exposure modulation based on body size.     This report was finalized on 10/21/2019 5:26 PM by Dr. Aubrey Pandey M.D.    Assessment/Plan     1. 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  · PET scan 5/3/18 with diffuse splenic involvement (SUV 16.9), lymphadenopathy throughout upper abdomen and retroperitoneum (SUV 13.1), right liver lesion 5 cm (SUV 12.8), pelvic lymphadenopathy up to 4 cm, bladder wall thickening with associated hypermetabolism, cervical lymphadenopathy left posterior (SUV 11.2), multiple bone lesions including left intertrochanteric femur, ribs, right scapula, pelvis as well as left gluteal hematoma versus lymphomatous lesion.  · CT-guided liver biopsy 4/26/18 with diffuse large B-cell non-Hodgkin's lymphoma, CD20 positive, double hit (BCL-6 rearrangement 85%, MYC rearrangement 79%), KI-67 4+/4  · Left cervical excisional biopsy by ENT Dr. Oconnor 5/1/18 confirming high-grade B cell non-Hodgkin's lymphoma, Ki-67 100%, double hit (BCL-6 rearrangement 76%, MYC rearrangement 85%)  · Echocardiogram 4/11/18 with ejection fraction 66.7%  · Right port placement Dr Gill 5/4/18  · Patient not felt to be a candidate for more aggressive chemotherapy due to performance status and age (DA EPOCH-R)  · Therefore treated with R CHOP chemotherapy 5/4/18.  · Followed by granix 300mcg daily beginning 5/6/18 through 5/14/18.  · Today, the patient has increasing WBC related to growth factor use, stable hemoglobin, spontaneous improvement in platelet count.  She is doing quite well following her chemotherapy and is ready to go home.  There is some concern regarding her persistently elevated LDH at 347 today as well as her escalating calcium at 11.9.  She is currently asymptomatic from the calcium and her ionized calcium is stable at 6.8.  Therefore we are going to proceed with discharge home.  She will return to the office later this week on 5/18/18 with repeat labs, nurse practitioner visit, and potential treatment with a third dose of Zometa if her calcium has continued to escalate significantly. She is now seen with plans to begin cycle 2 R CHOP  chemotherapy with growth factor support ( Neulasta on body injector).  Will schedule follow-up PET scan after 2 cycles of therapy and see her back in 3 weeks to review her status.  · Patient reviewed June 19 with near resolution of hypermetabolic sites on PET/CT.  Plans made for third cycle of CHOP R, additional Zometa and total of 6 cycles of chemotherapy anticipated.  · Patient seen August 03, 2018 for fifth cycle of chemotherapy-CHOP R, reduction of Oncovin 50%, 6 cycles planned.  Discussed subsequent CT scan follow-up.  · Follow-up scan September 19 with small low-density liver lesions and splenic lesions are decreased from previous, numerous small mesenteric and RP nodes again stable slightly smaller.  These are discussed September 24 and follow-up scan in 3 months is anticipated  · Patient reviewed November 26 further generally improved, plans made to maintain port, review at 3 months  · Patient assessed February 18, 2019, no evidence of recurrent disease, repeat scans in 3 months plan-6 months from previous  · Patient seen May 13, 2019, no evidence of recurrent disease, follow-up assessment in 6 months planned  · Patient reviewed again October 28, 2019 with follow-up scans negative for recurrence, 6 months plan follow-up at 2-year m    2. Myelosuppression with pancytopenia related to chemotherapy:  · Received granix 300mcg daily previously and will need Neulasta-on body this treatment as well as her subsequent cycles.  · Patient seen September 24 and follow-up is planned  · Further improvement noted November 26, 2018, and February 18, and on May 13,  2019      3. Multifactorial anemia:  · Related to anemia chronic disease, chemotherapy, prior iron deficiency.  · Received previous venofer 600mg (5/7 and 5/8) with iron studies from 4/24/18 showing ferritin 411, iron saturation 9%, TIBC 180.  Additional follow-up is planned as she is seen September 24.  Further improvement noted November 26, 2018 and May 13,  2019    4. Hypercalcemia of malignancy:  · Calcium up to 13.8 on 4/21/18 (albumin 3.3).  Intact PTH 8.1, PTH RP less than 2.0  · Received Zometa 4 mg IV 4/25/18.  · Calcium up to 11.3 on 5/7/18 with second dose of Zometa administered 4 mg IV.  · Calcium today has continued to gradually increase up to 11.9 with albumin 3.3.  Ionized calcium however is stable at 6.8 compared to yesterday.  The patient is asymptomatic.  We will not plan to administer a further dose of Zometa just yet and will allow further time for the patient to respond to chemotherapy.  She returned 518 for continued Zometa and when seen May 25 has a normal calcium level.  · Patient to receive Zometa June 19, subsequently every other cycle, restart low-dose calcium supplementation  · Patient seen August 03, 2018, plans made for Zometa with her final course of chemotherapy August 23, 2018  · Patient scheduled for bone density prior to assessment 3 months following November visit, potential Xgeva and follow-up as she is seen back to step to repeat scans are performed in May 2019.  As she is seen May 13 we do plan the Xgeva and then move to Prolia 6 months later for her subsequent treatment for osteopenia.  · Patient reviewed October 28, 2019 at which time Prolia would be initiated q 6 months      5.  Paroxysmal atrial fibrillation:  · Recently diagnosed, anticoagulated with Eliquis initially, discontinued due to expected thrombocytopenia from chemotherapy  · Currently in sinus rhythm, continues on Lopressor 50 mg every 12 hours  · Anticoagulation restarted with Xarelto        6. Prior seizure disorder:  · Continues currently on Dilantin 300 mg daily at bedtime and Neurontin 600 mg daily at bedtime, will return to outpatient dose of Neurontin 300 mg daily at bedtime at discharge.      7. GI prophylaxis:  · Protonix 40 mg daily, consider discontinuance in 6 months      8. Venous access:  · Port placement 5/4/18 by Dr. Gill, continue to manage q. 6  weeks      9. DVT- Continue Xarelto 20 mg by mouth daily      Plan:  *Patient will be observed off chemotherapy  *Port flush q. 6 weeks  *Repeat scans, labs, 23 weeks.  Pending these results we may discontinue repeat scans  *MD assessment in 24 weeks, continue Prolia for her osteopenia

## 2019-10-23 RX ORDER — AMLODIPINE BESYLATE 10 MG/1
TABLET ORAL
Qty: 90 TABLET | Refills: 1 | Status: SHIPPED | OUTPATIENT
Start: 2019-10-23 | End: 2020-04-20

## 2019-10-24 DIAGNOSIS — C83.39 DIFFUSE LARGE B-CELL LYMPHOMA OF EXTRANODAL SITE EXCLUDING SPLEEN AND OTHER SOLID ORGANS (HCC): ICD-10-CM

## 2019-10-24 DIAGNOSIS — M85.89 OSTEOPENIA OF MULTIPLE SITES: ICD-10-CM

## 2019-10-24 DIAGNOSIS — E83.52 HYPERCALCEMIA: ICD-10-CM

## 2019-10-28 ENCOUNTER — INFUSION (OUTPATIENT)
Dept: ONCOLOGY | Facility: HOSPITAL | Age: 79
End: 2019-10-28

## 2019-10-28 ENCOUNTER — OFFICE VISIT (OUTPATIENT)
Dept: ONCOLOGY | Facility: CLINIC | Age: 79
End: 2019-10-28

## 2019-10-28 VITALS
SYSTOLIC BLOOD PRESSURE: 148 MMHG | WEIGHT: 148.7 LBS | OXYGEN SATURATION: 98 % | BODY MASS INDEX: 25.39 KG/M2 | HEIGHT: 64 IN | TEMPERATURE: 97.4 F | DIASTOLIC BLOOD PRESSURE: 72 MMHG | HEART RATE: 58 BPM | RESPIRATION RATE: 18 BRPM

## 2019-10-28 DIAGNOSIS — M85.89 OSTEOPENIA OF MULTIPLE SITES: Primary | ICD-10-CM

## 2019-10-28 DIAGNOSIS — C85.91 LYMPHOMA OF LYMPH NODES OF NECK, UNSPECIFIED LYMPHOMA TYPE (HCC): Primary | ICD-10-CM

## 2019-10-28 DIAGNOSIS — C83.39 DIFFUSE LARGE B-CELL LYMPHOMA OF EXTRANODAL SITE EXCLUDING SPLEEN AND OTHER SOLID ORGANS (HCC): ICD-10-CM

## 2019-10-28 DIAGNOSIS — M85.89 OSTEOPENIA OF MULTIPLE SITES: ICD-10-CM

## 2019-10-28 DIAGNOSIS — E83.52 HYPERCALCEMIA: ICD-10-CM

## 2019-10-28 DIAGNOSIS — C79.51 BONE METASTASIS: ICD-10-CM

## 2019-10-28 DIAGNOSIS — F33.9 CHRONIC RECURRENT MAJOR DEPRESSIVE DISORDER (HCC): ICD-10-CM

## 2019-10-28 DIAGNOSIS — Z45.2 ENCOUNTER FOR ADJUSTMENT OR MANAGEMENT OF VASCULAR ACCESS DEVICE: ICD-10-CM

## 2019-10-28 LAB
ALBUMIN SERPL-MCNC: 4.2 G/DL (ref 3.5–5.2)
ALBUMIN/GLOB SERPL: 1.6 G/DL (ref 1.1–2.4)
ALP SERPL-CCNC: 81 U/L (ref 38–116)
ALT SERPL W P-5'-P-CCNC: 18 U/L (ref 0–33)
ANION GAP SERPL CALCULATED.3IONS-SCNC: 11.1 MMOL/L (ref 5–15)
AST SERPL-CCNC: 23 U/L (ref 0–32)
BASOPHILS # BLD AUTO: 0.04 10*3/MM3 (ref 0–0.2)
BASOPHILS NFR BLD AUTO: 0.8 % (ref 0–1.5)
BILIRUB SERPL-MCNC: 0.3 MG/DL (ref 0.2–1.2)
BUN BLD-MCNC: 16 MG/DL (ref 6–20)
BUN/CREAT SERPL: 23.9 (ref 7.3–30)
CALCIUM SPEC-SCNC: 9.4 MG/DL (ref 8.5–10.2)
CHLORIDE SERPL-SCNC: 101 MMOL/L (ref 98–107)
CO2 SERPL-SCNC: 28.9 MMOL/L (ref 22–29)
CREAT BLD-MCNC: 0.67 MG/DL (ref 0.6–1.1)
DEPRECATED RDW RBC AUTO: 46.4 FL (ref 37–54)
EOSINOPHIL # BLD AUTO: 0.23 10*3/MM3 (ref 0–0.4)
EOSINOPHIL NFR BLD AUTO: 4.3 % (ref 0.3–6.2)
ERYTHROCYTE [DISTWIDTH] IN BLOOD BY AUTOMATED COUNT: 12.8 % (ref 12.3–15.4)
GFR SERPL CREATININE-BSD FRML MDRD: 85 ML/MIN/1.73
GLOBULIN UR ELPH-MCNC: 2.7 GM/DL (ref 1.8–3.5)
GLUCOSE BLD-MCNC: 102 MG/DL (ref 74–124)
HCT VFR BLD AUTO: 37.1 % (ref 34–46.6)
HGB BLD-MCNC: 12.3 G/DL (ref 12–15.9)
IMM GRANULOCYTES # BLD AUTO: 0.01 10*3/MM3 (ref 0–0.05)
IMM GRANULOCYTES NFR BLD AUTO: 0.2 % (ref 0–0.5)
LYMPHOCYTES # BLD AUTO: 1.07 10*3/MM3 (ref 0.7–3.1)
LYMPHOCYTES NFR BLD AUTO: 20.1 % (ref 19.6–45.3)
MAGNESIUM SERPL-MCNC: 2 MG/DL (ref 1.8–2.5)
MCH RBC QN AUTO: 32.5 PG (ref 26.6–33)
MCHC RBC AUTO-ENTMCNC: 33.2 G/DL (ref 31.5–35.7)
MCV RBC AUTO: 98.1 FL (ref 79–97)
MONOCYTES # BLD AUTO: 0.67 10*3/MM3 (ref 0.1–0.9)
MONOCYTES NFR BLD AUTO: 12.6 % (ref 5–12)
NEUTROPHILS # BLD AUTO: 3.3 10*3/MM3 (ref 1.7–7)
NEUTROPHILS NFR BLD AUTO: 62 % (ref 42.7–76)
NRBC BLD AUTO-RTO: 0 /100 WBC (ref 0–0.2)
PHOSPHATE SERPL-MCNC: 3.7 MG/DL (ref 2.5–4.5)
PLATELET # BLD AUTO: 131 10*3/MM3 (ref 140–450)
PMV BLD AUTO: 9.7 FL (ref 6–12)
POTASSIUM BLD-SCNC: 4.2 MMOL/L (ref 3.5–4.7)
PROT SERPL-MCNC: 6.9 G/DL (ref 6.3–8)
RBC # BLD AUTO: 3.78 10*6/MM3 (ref 3.77–5.28)
SODIUM BLD-SCNC: 141 MMOL/L (ref 134–145)
WBC NRBC COR # BLD: 5.32 10*3/MM3 (ref 3.4–10.8)

## 2019-10-28 PROCEDURE — 83735 ASSAY OF MAGNESIUM: CPT

## 2019-10-28 PROCEDURE — 84100 ASSAY OF PHOSPHORUS: CPT

## 2019-10-28 PROCEDURE — 99214 OFFICE O/P EST MOD 30 MIN: CPT | Performed by: INTERNAL MEDICINE

## 2019-10-28 PROCEDURE — 96372 THER/PROPH/DIAG INJ SC/IM: CPT | Performed by: INTERNAL MEDICINE

## 2019-10-28 PROCEDURE — 85025 COMPLETE CBC W/AUTO DIFF WBC: CPT

## 2019-10-28 PROCEDURE — 25010000002 DENOSUMAB 60 MG/ML SOLUTION PREFILLED SYRINGE: Performed by: INTERNAL MEDICINE

## 2019-10-28 PROCEDURE — 80053 COMPREHEN METABOLIC PANEL: CPT

## 2019-10-28 RX ORDER — ESCITALOPRAM OXALATE 20 MG/1
20 TABLET ORAL DAILY
Qty: 90 TABLET | Refills: 1 | Status: SHIPPED | OUTPATIENT
Start: 2019-10-28 | End: 2020-04-20

## 2019-10-28 RX ORDER — SODIUM CHLORIDE 0.9 % (FLUSH) 0.9 %
10 SYRINGE (ML) INJECTION AS NEEDED
Status: DISCONTINUED | OUTPATIENT
Start: 2019-10-28 | End: 2019-10-28 | Stop reason: HOSPADM

## 2019-10-28 RX ORDER — SODIUM CHLORIDE 0.9 % (FLUSH) 0.9 %
10 SYRINGE (ML) INJECTION AS NEEDED
Status: CANCELLED | OUTPATIENT
Start: 2019-10-28

## 2019-10-28 RX ADMIN — DENOSUMAB 60 MG: 60 INJECTION SUBCUTANEOUS at 12:35

## 2019-10-28 RX ADMIN — Medication 500 UNITS: at 11:11

## 2019-10-28 RX ADMIN — SODIUM CHLORIDE, PRESERVATIVE FREE 10 ML: 5 INJECTION INTRAVENOUS at 11:11

## 2019-11-18 RX ORDER — GABAPENTIN 300 MG/1
600 CAPSULE ORAL
Qty: 60 CAPSULE | Refills: 3 | Status: SHIPPED | OUTPATIENT
Start: 2019-11-18 | End: 2020-03-17

## 2019-11-19 ENCOUNTER — OFFICE VISIT (OUTPATIENT)
Dept: OBSTETRICS AND GYNECOLOGY | Age: 79
End: 2019-11-19

## 2019-11-19 ENCOUNTER — OFFICE VISIT (OUTPATIENT)
Dept: CARDIOLOGY | Facility: CLINIC | Age: 79
End: 2019-11-19

## 2019-11-19 VITALS
WEIGHT: 149 LBS | HEIGHT: 64 IN | SYSTOLIC BLOOD PRESSURE: 112 MMHG | DIASTOLIC BLOOD PRESSURE: 62 MMHG | BODY MASS INDEX: 25.44 KG/M2

## 2019-11-19 VITALS
SYSTOLIC BLOOD PRESSURE: 140 MMHG | BODY MASS INDEX: 25.85 KG/M2 | WEIGHT: 151.4 LBS | HEIGHT: 64 IN | DIASTOLIC BLOOD PRESSURE: 70 MMHG | HEART RATE: 57 BPM

## 2019-11-19 DIAGNOSIS — I48.0 PAROXYSMAL ATRIAL FIBRILLATION (HCC): Primary | ICD-10-CM

## 2019-11-19 DIAGNOSIS — I10 ESSENTIAL HYPERTENSION: Chronic | ICD-10-CM

## 2019-11-19 DIAGNOSIS — Z01.419 ENCOUNTER FOR GYNECOLOGICAL EXAMINATION WITHOUT ABNORMAL FINDING: Primary | ICD-10-CM

## 2019-11-19 PROCEDURE — 99214 OFFICE O/P EST MOD 30 MIN: CPT | Performed by: INTERNAL MEDICINE

## 2019-11-19 PROCEDURE — G0101 CA SCREEN;PELVIC/BREAST EXAM: HCPCS | Performed by: OBSTETRICS & GYNECOLOGY

## 2019-11-19 PROCEDURE — 93000 ELECTROCARDIOGRAM COMPLETE: CPT | Performed by: INTERNAL MEDICINE

## 2019-11-19 RX ORDER — SPIRONOLACTONE 50 MG/1
50 TABLET, FILM COATED ORAL DAILY
Qty: 90 TABLET | Refills: 3 | Status: SHIPPED | OUTPATIENT
Start: 2019-11-19 | End: 2019-11-21 | Stop reason: SDUPTHER

## 2019-11-19 RX ORDER — HYDRALAZINE HYDROCHLORIDE 100 MG/1
100 TABLET, FILM COATED ORAL 3 TIMES DAILY
Qty: 270 TABLET | Refills: 3 | Status: SHIPPED | OUTPATIENT
Start: 2019-11-19 | End: 2019-11-21 | Stop reason: SDUPTHER

## 2019-11-19 NOTE — PROGRESS NOTES
Routine Annual Visit    2019    Patient: Martina Blake          MR#:[id number]      Chief Complaint   Patient presents with   • Gynecologic Exam     FORMER FERN PATIENT. DX LYMPHOMA LAST YEAR, DOING WELL AND CANCER FREE.   KATHERIN STILL HAS BOTH OVARIES NO HRT.   HX OF BLADDER SUSPENSION/ANTERIOR COLPORRHAPY.  NO MORE PAP SMEARS. MG 2019.  C - SCOPE  .         History of Present Illness    79 y.o. female  who presents for annual exam.   Having urgency and wears a pad but otherwise no gyn complaints  KATHERIN but does have ovaries  Treated for lymphoma last year  UTD mammo and CSC  No pap indicated          No LMP recorded. Patient has had a hysterectomy.  Obstetric History:  OB History      Para Term  AB Living    2 2 2 0 0 2    SAB TAB Ectopic Molar Multiple Live Births    0 0 0   0 2         Menstrual History:     No LMP recorded. Patient has had a hysterectomy.       Sexual History:       ________________________________________  Patient Active Problem List   Diagnosis   • Blood glucose abnormal   • Dyslipidemia   • Gastroesophageal reflux disease   • Generalized osteoarthritis   • Chronic recurrent major depressive disorder (CMS/HCC)   • Osteoporosis   • Restless legs syndrome   • Seizure disorder (CMS/HCC)   • Post-menopause on HRT (hormone replacement therapy)   • Chronic seasonal allergic rhinitis   • Paroxysmal atrial fibrillation (CMS/HCC)   • Iron deficiency anemia due to chronic blood loss   • Acute superficial gastritis without hemorrhage   • Hiatal hernia   • Esophagitis   • Diverticulosis of large intestine without hemorrhage   • Hypertension   • Hypercalcemia   • Liver mass   • Lymphoma (CMS/HCC)   • Insomnia   • Anemia associated with chemotherapy   • Pancytopenia due to antineoplastic chemotherapy (CMS/HCC)   • Chemotherapy-induced neutropenia (CMS/HCC)   • Encounter for adjustment or management of vascular access device   • 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 and other solid organs (CMS/HCC)   • Sinus bradycardia   • Thrombocytopenia (CMS/HCC)   • Bone metastasis (CMS/HCC)   • Osteopenia of multiple sites       Past Medical History:   Diagnosis Date   • Anemia     multifactorial   • Cystitis    • Cystocele     moderate   • Drug therapy    • Dyslipidemia    • Esophageal reflux    • Fatigue    • History of transfusion     no reaction   • Hypercalcemia    • Hypertension    • Major depression, chronic    • Menopause    • Non Hodgkin's lymphoma (CMS/HCC)    • Osteoarthritis    • Osteoporosis    • Palpitations    • Paroxysmal atrial fibrillation (CMS/HCC)    • Post menopausal problems    • RLS (restless legs syndrome)    • Seizure disorder (CMS/HCC)    • Sinus bradycardia    • Subjective tinnitus    • Vaginal delivery     x2  \"SONYA\"      \"JASON\"   • Vitamin D deficiency        Past Surgical History:   Procedure Laterality Date   • CERVICAL LYMPH NODE BIOPSY/EXCISION Left 5/1/2018    Procedure: CERVICAL LYMPH NODE BIOPSY/EXCISION;  Surgeon: Danny Oconnor MD;  Location: [facility] OR;  Service: ENT   • CHOLECYSTECTOMY     • COLONOSCOPY     • COLONOSCOPY N/A 4/13/2018    Procedure: COLONOSCOPY to terminal ileum;  Surgeon: Dominik Burt MD;  Location: [facility] ENDOSCOPY;  Service: Gastroenterology   • CRANIOTOMY     • ENDOSCOPY N/A 4/13/2018    Procedure: ESOPHAGOGASTRODUODENOSCOPY with biopsy;  Surgeon: Dominik Burt MD;  Location: [facility] ENDOSCOPY;  Service: Gastroenterology   • TOTAL ABDOMINAL HYSTERECTOMY  1980    w/ bladder suspension.  Probably vaginal hysterectomy with anterior colporrhaphy.    • VENOUS ACCESS DEVICE (PORT) INSERTION N/A 5/4/2018    Procedure: INSERTION VENOUS ACCESS DEVICE;  Surgeon: Jamie Gill MD;  Location: [facility] OR;  Service: General   • WRIST SURGERY Left        Social History     Tobacco Use   Smoking Status Never Smoker   Smokeless Tobacco Never Used       has a current medication list which " "includes the following prescription(s): amlodipine, benazepril, escitalopram, folic acid, gabapentin, hydralazine, lidocaine-prilocaine, melatonin, pantoprazole, phenytoin, spironolactone, vitamin b-12, and xarelto.  ________________________________________    Current contraception: status post hysterectomy  History of abnormal Pap smear: no  Family history of Breast cancer: no  Family history of uterine or ovarian cancer: no  Family History of colon cancer/colon polyps: no  History of abnormal mammogram: no      The following portions of the patient's history were reviewed and updated as appropriate: allergies, current medications, past family history, past medical history, past social history, past surgical history and problem list.    Review of Systems    Pertinent items are noted in HPI.     Objective   Physical Exam    /62   Ht 162.6 cm (64.02\")   Wt 67.6 kg (149 lb)   Breastfeeding? No   BMI 25.56 kg/m²    BP Readings from Last 3 Encounters:   11/19/19 112/62   10/28/19 148/72   10/22/19 112/60      Wt Readings from Last 3 Encounters:   11/19/19 67.6 kg (149 lb)   10/28/19 67.4 kg (148 lb 11.2 oz)   10/22/19 68 kg (150 lb)      BMI: Estimated body mass index is 25.56 kg/m² as calculated from the following:    Height as of this encounter: 162.6 cm (64.02\").    Weight as of this encounter: 67.6 kg (149 lb).      General:   alert, appears stated age and cooperative   Abdomen: soft, non-tender, without masses or organomegaly   Breast: inspection negative, no nipple discharge or bleeding, no masses or nodularity palpable   Vulva: normal   Vagina: normal mucosa   Cervix: absent   Uterus: absent    Adnexa: no mass, fullness, tenderness     Assessment:    1. Normal annual exam   Assessment     ICD-10-CM ICD-9-CM   1. Encounter for gynecological examination without abnormal finding Z01.419 V72.31     Plan:    Plan     []  Mammogram request made  []  PAP done  []  Labs:   []  GC/Chl/TV  []  DEXA scan   []  " Referral for colonoscopy:       Martina was seen today for gynecologic exam.    Diagnoses and all orders for this visit:    Encounter for gynecological examination without abnormal finding      Follow up in 2 years      Counseling:  --Nutrition: Stressed importance of moderation and caloric balance, stressed fresh fruit and vegetables  --Exercise: Stressed the importance of regular exercise. 3-5 times weekly   - Discussed screening mammogram recommendations.   --Discussed benefits of screening colonoscopy- age 45 unless FH  --Discussed pap smear screening recommendations

## 2019-11-19 NOTE — PROGRESS NOTES
Date of Office Visit: 2019  Encounter Provider: Justine Barrios MD  Place of Service: [facility] CARDIOLOGY  Patient Name: Martina Blake  :1940      Patient ID:  Martina Blake is a 79 y.o. female is here for  followup for PAF, htn.         History of Present Illness    She has a past medical history of hypertension, dyslipidemia, esophageal reflux, seizure disorder, and vitamin D deficiency.     She has a past medical history of atrial fibrillation with rapid ventricular response in the setting of hypokalemia, hypomagnesemia, hypercalcemia, and anemia.  She was placed on metoprolol.  She had an upper and lower endoscopy showing hiatal hernia, gastritis, esophagitis, and diverticulosis with no acute bleeding.  She was started on apixiban and and spironolactone.  She had a normal Cardiolite stress test in 2018 an echocardiogram at that time revealed an EF of 67%, grade 2 diastolic dysfunction, severe left atrial enlargement, mild to moderate tricuspid insufficiency, and RVSP elevated at 44 mmHg.       She has large B-cell lymphoma diffuse and is now in remission, was on R-CHOP (rituximab, doxorubicin, vincristine and cyclophosphamide) finished 18 with Dr. Iraheta.      She was hospitalized in May 2018 and chemotherapy was initiated.  Her apixiban was discontinued due to thrombocytopenia.  She was placed on metoprolol tartrate 50 mg twice daily for A. fib with rapid ventricular response.       Echocardiogram done 18 shows ejection fraction 59% with global longitudinal strain of -18%.  There was grade 2 diastolic dysfunction and no significant valve disease.     Labs on 10/20/2019 show normal CMP, normal CBC.  Lipids done 10/1/2019 show HDL 58, .    She is overall doing well but her blood pressure still labile.  She checks at home and it runs 130-150/70.  She is had no dizziness or syncope.  She had one episode of nonsustained atrial fibrillation  "that she knew of at home.  She has no chest pain or pressure with activity.  She has no nausea, vomiting, fevers or chills.  She is had no vomiting blood or blood in stool.  She had no strokelike symptoms.  She denies orthopnea or PND.  She has no exertional chest tightness or pressure.  She stays very active and feels good.    Past Medical History:   Diagnosis Date   • Anemia     multifactorial   • Cystitis    • Cystocele     moderate   • Drug therapy    • Dyslipidemia    • Esophageal reflux    • Fatigue    • History of transfusion     no reaction   • Hypercalcemia    • Hypertension    • Major depression, chronic    • Menopause    • Non Hodgkin's lymphoma (CMS/HCC)    • Osteoarthritis    • Osteoporosis    • Palpitations    • Paroxysmal atrial fibrillation (CMS/HCC)    • Post menopausal problems    • RLS (restless legs syndrome)    • Seizure disorder (CMS/HCC)    • Sinus bradycardia    • Subjective tinnitus    • Vaginal delivery     x2  \"SONYA\"      \"JASON\"   • Vitamin D deficiency          Past Surgical History:   Procedure Laterality Date   • CERVICAL LYMPH NODE BIOPSY/EXCISION Left 5/1/2018    Procedure: CERVICAL LYMPH NODE BIOPSY/EXCISION;  Surgeon: Danny Oconnor MD;  Location: [facility] OR;  Service: ENT   • CHOLECYSTECTOMY     • COLONOSCOPY     • COLONOSCOPY N/A 4/13/2018    Procedure: COLONOSCOPY to terminal ileum;  Surgeon: Dominik Burt MD;  Location: [facility] ENDOSCOPY;  Service: Gastroenterology   • CRANIOTOMY     • ENDOSCOPY N/A 4/13/2018    Procedure: ESOPHAGOGASTRODUODENOSCOPY with biopsy;  Surgeon: Dominik Burt MD;  Location: [facility] ENDOSCOPY;  Service: Gastroenterology   • TOTAL ABDOMINAL HYSTERECTOMY  1980    w/ bladder suspension.  Probably vaginal hysterectomy with anterior colporrhaphy.    • VENOUS ACCESS DEVICE (PORT) INSERTION N/A 5/4/2018    Procedure: INSERTION VENOUS ACCESS DEVICE;  Surgeon: Jamie Gill MD;  Location: [facility] OR;  Service: General   • WRIST SURGERY Left  "       Current Outpatient Medications on File Prior to Visit   Medication Sig Dispense Refill   • amLODIPine (NORVASC) 10 MG tablet TAKE 1 TABLET BY MOUTH EVERY DAY 90 tablet 1   • benazepril (LOTENSIN) 40 MG tablet TAKE 1 TABLET BY MOUTH EVERY DAY 90 tablet 1   • escitalopram (LEXAPRO) 20 MG tablet TAKE 1 TABLET BY MOUTH DAILY 90 tablet 1   • folic acid (FOLVITE) 400 MCG tablet Take 400 mcg by mouth daily.     • gabapentin (NEURONTIN) 300 MG capsule TAKE 2 CAPSULES BY MOUTH EVERY NIGHT AT BEDTIME 60 capsule 3   • hydrALAZINE (APRESOLINE) 50 MG tablet Take 2 tablets by mouth 3 (Three) Times a Day. 270 tablet 1   • lidocaine-prilocaine (EMLA) 2.5-2.5 % cream Apply 30 min prior to port access 5 g 2   • melatonin 5 MG tablet tablet Take 5 mg by mouth Every Night.     • pantoprazole (PROTONIX) 40 MG EC tablet TAKE 1 TABLET BY MOUTH EVERY DAY 90 tablet 0   • phenytoin (DILANTIN) 100 MG ER capsule TAKE 3 CAPSULES BY MOUTH EVERY NIGHT AT BEDTIME 270 capsule 0   • spironolactone (ALDACTONE) 25 MG tablet Take 1 tablet by mouth Daily. 90 tablet 3   • vitamin B-12 (CYANOCOBALAMIN) 100 MCG tablet Take 100 mcg by mouth Daily.     • XARELTO 20 MG tablet Take 20 mg by mouth Daily.       No current facility-administered medications on file prior to visit.        Social History     Socioeconomic History   • Marital status:      Spouse name: JL   • Number of children: 2   • Years of education: Not on file   • Highest education level: Not on file   Occupational History     Employer: RETIRED   Tobacco Use   • Smoking status: Never Smoker   • Smokeless tobacco: Never Used   Substance and Sexual Activity   • Alcohol use: No     Comment: Caffeine use   • Drug use: No   • Sexual activity: Defer     Birth control/protection: Surgical     Comment:  (Jl)           Review of Systems   Constitution: Negative.   HENT: Negative for congestion.    Eyes: Negative for vision loss in left eye and vision loss in right eye.  "  Respiratory: Negative.  Negative for cough, hemoptysis, shortness of breath, sleep disturbances due to breathing, snoring, sputum production and wheezing.    Endocrine: Negative.    Hematologic/Lymphatic: Negative.    Skin: Negative for poor wound healing and rash.   Musculoskeletal: Negative for falls, gout, muscle cramps and myalgias.   Gastrointestinal: Negative for abdominal pain, diarrhea, dysphagia, hematemesis, melena, nausea and vomiting.   Neurological: Negative for excessive daytime sleepiness, dizziness, headaches, light-headedness, loss of balance, seizures and vertigo.   Psychiatric/Behavioral: Negative for depression and substance abuse. The patient is not nervous/anxious.        Procedures    ECG 12 Lead  Date/Time: 11/19/2019 1:51 PM  Performed by: Justine Barrios MD  Authorized by: Justine Barrios MD   Comparison: compared with previous ECG   Similar to previous ECG  Rhythm: sinus rhythm  Conduction: left anterior fascicular block  T inversion: I and aVL  Other findings: left ventricular hypertrophy and poor R wave progression    Clinical impression: abnormal EKG                Objective:      Vitals:    11/19/19 1338   BP: 140/70   BP Location: Right arm   Patient Position: Sitting   Pulse: 57   Weight: 68.7 kg (151 lb 6.4 oz)   Height: 162.6 cm (64.02\")     Body mass index is 25.97 kg/m².    Physical Exam   Constitutional: She is oriented to person, place, and time. She appears well-developed and well-nourished. No distress.   HENT:   Head: Normocephalic and atraumatic.   Eyes: Conjunctivae are normal. No scleral icterus.   Neck: Neck supple. No JVD present. Carotid bruit is not present. No thyromegaly present.   Cardiovascular: Normal rate, regular rhythm, S1 normal, S2 normal and intact distal pulses.  No extrasystoles are present. PMI is not displaced. Exam reveals no gallop.   Murmur heard.   Midsystolic murmur is present with a grade of 3/6 at the upper right sternal border and " upper left sternal border.  Pulses:       Carotid pulses are 2+ on the right side, and 2+ on the left side.       Radial pulses are 2+ on the right side, and 2+ on the left side.        Dorsalis pedis pulses are 2+ on the right side, and 2+ on the left side.        Posterior tibial pulses are 2+ on the right side, and 2+ on the left side.   Pulmonary/Chest: Effort normal and breath sounds normal. No respiratory distress. She has no wheezes. She has no rhonchi. She has no rales. She exhibits no tenderness.   Abdominal: Soft. Bowel sounds are normal. She exhibits no distension, no abdominal bruit and no mass. There is no tenderness.   Musculoskeletal: She exhibits no edema or deformity.   Lymphadenopathy:     She has no cervical adenopathy.   Neurological: She is alert and oriented to person, place, and time. No cranial nerve deficit.   Skin: Skin is warm and dry. No rash noted. She is not diaphoretic. No cyanosis. No pallor. Nails show no clubbing.   Psychiatric: She has a normal mood and affect. Judgment normal.   Vitals reviewed.      Lab Review:       Assessment:      Diagnosis Plan   1. Paroxysmal atrial fibrillation (CMS/HCC)     2. Essential hypertension          1. PAF - on xarelto.   2. Hypertension - BP goal is <120/80.   3. Large B cell lymphoma - s/p R-CHOP, stopped 8/2018 -sees Dr. Iraheta.   4. H/o DVT, on xarelto.   5. Sinus bradycardia, HR 38.      Plan:       Increase spironolactone to 50 mg daily.  Check BMP in 1 month. See justice in 6 months.

## 2019-11-21 RX ORDER — HYDRALAZINE HYDROCHLORIDE 100 MG/1
100 TABLET, FILM COATED ORAL 3 TIMES DAILY
Qty: 270 TABLET | Refills: 3 | Status: SHIPPED | OUTPATIENT
Start: 2019-11-21 | End: 2020-12-02

## 2019-11-21 RX ORDER — SPIRONOLACTONE 50 MG/1
50 TABLET, FILM COATED ORAL DAILY
Qty: 90 TABLET | Refills: 3 | Status: SHIPPED | OUTPATIENT
Start: 2019-11-21 | End: 2020-11-20

## 2019-12-09 ENCOUNTER — INFUSION (OUTPATIENT)
Dept: ONCOLOGY | Facility: HOSPITAL | Age: 79
End: 2019-12-09

## 2019-12-09 DIAGNOSIS — I48.0 PAROXYSMAL ATRIAL FIBRILLATION (HCC): Primary | ICD-10-CM

## 2019-12-09 DIAGNOSIS — Z45.2 ENCOUNTER FOR ADJUSTMENT OR MANAGEMENT OF VASCULAR ACCESS DEVICE: ICD-10-CM

## 2019-12-09 LAB
ANION GAP SERPL CALCULATED.3IONS-SCNC: 13.3 MMOL/L (ref 5–15)
BUN BLD-MCNC: 17 MG/DL (ref 6–20)
BUN/CREAT SERPL: 20.5 (ref 7.3–30)
CALCIUM SPEC-SCNC: 9.3 MG/DL (ref 8.5–10.2)
CHLORIDE SERPL-SCNC: 99 MMOL/L (ref 98–107)
CO2 SERPL-SCNC: 27.7 MMOL/L (ref 22–29)
CREAT BLD-MCNC: 0.83 MG/DL (ref 0.6–1.1)
GFR SERPL CREATININE-BSD FRML MDRD: 66 ML/MIN/1.73
GLUCOSE BLD-MCNC: 86 MG/DL (ref 74–124)
POTASSIUM BLD-SCNC: 4.1 MMOL/L (ref 3.5–4.7)
SODIUM BLD-SCNC: 140 MMOL/L (ref 134–145)

## 2019-12-09 PROCEDURE — 36591 DRAW BLOOD OFF VENOUS DEVICE: CPT

## 2019-12-09 PROCEDURE — 96523 IRRIG DRUG DELIVERY DEVICE: CPT

## 2019-12-09 PROCEDURE — 80048 BASIC METABOLIC PNL TOTAL CA: CPT

## 2019-12-09 RX ORDER — SODIUM CHLORIDE 0.9 % (FLUSH) 0.9 %
10 SYRINGE (ML) INJECTION AS NEEDED
Status: CANCELLED | OUTPATIENT
Start: 2019-12-09

## 2019-12-09 RX ORDER — SODIUM CHLORIDE 0.9 % (FLUSH) 0.9 %
10 SYRINGE (ML) INJECTION AS NEEDED
Status: DISCONTINUED | OUTPATIENT
Start: 2019-12-09 | End: 2019-12-09 | Stop reason: HOSPADM

## 2019-12-09 RX ADMIN — Medication 10 ML: at 14:48

## 2019-12-09 RX ADMIN — SODIUM CHLORIDE, PRESERVATIVE FREE 500 UNITS: 5 INJECTION INTRAVENOUS at 14:48

## 2019-12-17 RX ORDER — PHENYTOIN SODIUM 100 MG/1
CAPSULE, EXTENDED RELEASE ORAL
Qty: 270 CAPSULE | Refills: 0 | Status: SHIPPED | OUTPATIENT
Start: 2019-12-17 | End: 2020-03-16

## 2019-12-18 NOTE — TELEPHONE ENCOUNTER
----- Message from Sophy Hall sent at 7/19/2018  9:31 AM EDT -----  [phone]    Is constipated and hemorid is bleeding wants to know what she can do.   Single

## 2020-01-08 ENCOUNTER — TRANSCRIBE ORDERS (OUTPATIENT)
Dept: ADMINISTRATIVE | Facility: HOSPITAL | Age: 80
End: 2020-01-08

## 2020-01-08 DIAGNOSIS — Z12.31 SCREENING MAMMOGRAM, ENCOUNTER FOR: Primary | ICD-10-CM

## 2020-01-20 ENCOUNTER — INFUSION (OUTPATIENT)
Dept: ONCOLOGY | Facility: HOSPITAL | Age: 80
End: 2020-01-20

## 2020-01-20 DIAGNOSIS — Z45.2 ENCOUNTER FOR ADJUSTMENT OR MANAGEMENT OF VASCULAR ACCESS DEVICE: Primary | ICD-10-CM

## 2020-01-20 PROCEDURE — 96523 IRRIG DRUG DELIVERY DEVICE: CPT

## 2020-01-20 RX ORDER — SODIUM CHLORIDE 0.9 % (FLUSH) 0.9 %
10 SYRINGE (ML) INJECTION AS NEEDED
Status: CANCELLED | OUTPATIENT
Start: 2020-01-20

## 2020-01-20 RX ORDER — SODIUM CHLORIDE 0.9 % (FLUSH) 0.9 %
10 SYRINGE (ML) INJECTION AS NEEDED
Status: DISCONTINUED | OUTPATIENT
Start: 2020-01-20 | End: 2020-01-20 | Stop reason: HOSPADM

## 2020-01-20 RX ORDER — HEPARIN SODIUM (PORCINE) LOCK FLUSH IV SOLN 100 UNIT/ML 100 UNIT/ML
500 SOLUTION INTRAVENOUS AS NEEDED
Status: CANCELLED | OUTPATIENT
Start: 2020-01-20

## 2020-01-20 RX ADMIN — SODIUM CHLORIDE, PRESERVATIVE FREE 10 ML: 5 INJECTION INTRAVENOUS at 14:29

## 2020-01-20 RX ADMIN — Medication 500 UNITS: at 14:29

## 2020-01-21 ENCOUNTER — HOSPITAL ENCOUNTER (OUTPATIENT)
Dept: MAMMOGRAPHY | Facility: HOSPITAL | Age: 80
Discharge: HOME | End: 2020-01-21
Admitting: FAMILY MEDICINE

## 2020-01-21 DIAGNOSIS — Z12.31 SCREENING MAMMOGRAM, ENCOUNTER FOR: ICD-10-CM

## 2020-01-21 PROCEDURE — 77063 BREAST TOMOSYNTHESIS BI: CPT

## 2020-01-21 PROCEDURE — 77067 SCR MAMMO BI INCL CAD: CPT

## 2020-01-26 NOTE — PROGRESS NOTES
Dear Martina Blake:    Your mammogram was normal.  We have enclosed a copy.    Sincerely,  BAO Walton DO, MS

## 2020-01-27 RX ORDER — BENAZEPRIL HYDROCHLORIDE 40 MG/1
TABLET, FILM COATED ORAL
Qty: 90 TABLET | Refills: 1 | Status: SHIPPED | OUTPATIENT
Start: 2020-01-27 | End: 2020-07-27

## 2020-02-17 RX ORDER — PANTOPRAZOLE SODIUM 40 MG/1
TABLET, DELAYED RELEASE ORAL
Qty: 90 TABLET | Refills: 0 | Status: SHIPPED | OUTPATIENT
Start: 2020-02-17 | End: 2020-05-18

## 2020-02-28 ENCOUNTER — TELEPHONE (OUTPATIENT)
Dept: ONCOLOGY | Facility: CLINIC | Age: 80
End: 2020-02-28

## 2020-02-28 NOTE — TELEPHONE ENCOUNTER
Pt called requesting to speak to Alana. Pt has appt on 3/2/20 and wants to verify that Alana will be there to give pt samples.     Please call pt back at [phone] regarding these samples.

## 2020-03-02 ENCOUNTER — INFUSION (OUTPATIENT)
Dept: ONCOLOGY | Facility: HOSPITAL | Age: 80
End: 2020-03-02

## 2020-03-02 DIAGNOSIS — Z45.2 ENCOUNTER FOR ADJUSTMENT OR MANAGEMENT OF VASCULAR ACCESS DEVICE: Primary | ICD-10-CM

## 2020-03-02 PROCEDURE — 25010000003 HEPARIN LOCK FLUCH PER 10 UNITS: Performed by: INTERNAL MEDICINE

## 2020-03-02 PROCEDURE — 96523 IRRIG DRUG DELIVERY DEVICE: CPT

## 2020-03-02 RX ORDER — SODIUM CHLORIDE 0.9 % (FLUSH) 0.9 %
10 SYRINGE (ML) INJECTION AS NEEDED
Status: CANCELLED | OUTPATIENT
Start: 2020-03-02

## 2020-03-02 RX ORDER — HEPARIN SODIUM (PORCINE) LOCK FLUSH IV SOLN 100 UNIT/ML 100 UNIT/ML
500 SOLUTION INTRAVENOUS AS NEEDED
Status: CANCELLED | OUTPATIENT
Start: 2020-03-02

## 2020-03-02 RX ORDER — SODIUM CHLORIDE 0.9 % (FLUSH) 0.9 %
10 SYRINGE (ML) INJECTION AS NEEDED
Status: DISCONTINUED | OUTPATIENT
Start: 2020-03-02 | End: 2020-03-02 | Stop reason: HOSPADM

## 2020-03-02 RX ORDER — HEPARIN SODIUM (PORCINE) LOCK FLUSH IV SOLN 100 UNIT/ML 100 UNIT/ML
500 SOLUTION INTRAVENOUS AS NEEDED
Status: DISCONTINUED | OUTPATIENT
Start: 2020-03-02 | End: 2020-03-02 | Stop reason: HOSPADM

## 2020-03-02 RX ADMIN — Medication 10 ML: at 14:39

## 2020-03-02 RX ADMIN — SODIUM CHLORIDE, PRESERVATIVE FREE 500 UNITS: 5 INJECTION INTRAVENOUS at 14:39

## 2020-03-14 DIAGNOSIS — C79.51 BONE METASTASIS: Primary | ICD-10-CM

## 2020-03-16 RX ORDER — PHENYTOIN SODIUM 100 MG/1
CAPSULE, EXTENDED RELEASE ORAL
Qty: 270 CAPSULE | Refills: 0 | Status: SHIPPED | OUTPATIENT
Start: 2020-03-16 | End: 2020-06-10

## 2020-03-17 ENCOUNTER — TELEPHONE (OUTPATIENT)
Dept: ONCOLOGY | Facility: CLINIC | Age: 80
End: 2020-03-17

## 2020-03-17 RX ORDER — GABAPENTIN 300 MG/1
600 CAPSULE ORAL
Qty: 60 CAPSULE | Refills: 0 | Status: SHIPPED | OUTPATIENT
Start: 2020-03-17 | End: 2020-04-13

## 2020-03-17 NOTE — TELEPHONE ENCOUNTER
Pt wants to ask Dr Iraheta if her immune system is still compromised her last chemo treatment was 8/23/18 she feels ok but was wondering if it was ok to go out to the store or have friends over ?    Pt contact # [phone]

## 2020-03-17 NOTE — TELEPHONE ENCOUNTER
Pt was advised to stay home and have others shop for her if possible and Follow the CDC guidelines. She has house guest that have been in her home for several days already.

## 2020-04-13 ENCOUNTER — TELEPHONE (OUTPATIENT)
Dept: ONCOLOGY | Facility: CLINIC | Age: 80
End: 2020-04-13

## 2020-04-13 DIAGNOSIS — C79.51 BONE METASTASIS: ICD-10-CM

## 2020-04-13 RX ORDER — GABAPENTIN 300 MG/1
600 CAPSULE ORAL
Qty: 60 CAPSULE | Refills: 2 | Status: SHIPPED | OUTPATIENT
Start: 2020-04-13 | End: 2020-09-24

## 2020-04-13 NOTE — TELEPHONE ENCOUNTER
Patient has 4/20 CT and port flush and then follow up with Dr. Iraheta on 4/27.      She has not been out anywhere since covid19 outbreak.  She is wondering if she can move these appointments out.  Also-how long can she go without a port flush?    [phone]

## 2020-04-16 NOTE — TELEPHONE ENCOUNTER
THIS REPLY ADDRESSES MD PART BUT WHAT ABOUT CT SCAN? MESSAGED DR. POSAAD TO MAKE SURE OK TO MOVE OUT BOTH CT SCAN AND MD VS 2-3 MONTHS.

## 2020-04-16 NOTE — TELEPHONE ENCOUNTER
PT CALLING ABOUT MOVING OUT APPTS. SEE THAT AF RN HAD ALREADY SENT OUT MESSAGE TO [facility] 3 D AGO ABOUT THIS. TOLD PT THAT THE MA HAD BEEN NOTIFIED AND I'M SURE THEY ARE STILL WAITING TO HEAR BACK FROM MD. PT V/U. MESSAGED MA POOL AGAIN TO CHECK ON THIS.

## 2020-04-16 NOTE — TELEPHONE ENCOUNTER
Patient calling back to discuss this. Said really wants to speak to someone today. Her callback is [phone]

## 2020-04-17 ENCOUNTER — TELEPHONE (OUTPATIENT)
Dept: ONCOLOGY | Facility: CLINIC | Age: 80
End: 2020-04-17

## 2020-04-17 ENCOUNTER — DOCUMENTATION (OUTPATIENT)
Dept: ONCOLOGY | Facility: HOSPITAL | Age: 80
End: 2020-04-17

## 2020-04-17 NOTE — TELEPHONE ENCOUNTER
PT RETURNING CALL FROM [facility] TO AMELIA HER 4/20/20 PORT FLUSH & CT APPTS.    PLEASE GIVE HER A CALL BACK [phone].

## 2020-04-17 NOTE — PROGRESS NOTES
SEE PREVIOUS NOTES ABOUT MOVING OUT SCAN. PER DR. POSADA OK TO MOVE OUT MD APPT AND CT SCAN BY 2-3 MONTHS. MESSAGED APPT DESK.

## 2020-04-17 NOTE — TELEPHONE ENCOUNTER
----- Message from Delilah Jauregui RN sent at 4/17/2020 10:03 AM EDT -----  Patient is scheduled for labs/CT scan on Monday. I called her for her screening. She states she wants to cancel that appointment for Monday. Please contact patient to reschedule new appt for lab/CT scan.

## 2020-04-20 ENCOUNTER — APPOINTMENT (OUTPATIENT)
Dept: PET IMAGING | Facility: HOSPITAL | Age: 80
End: 2020-04-20

## 2020-04-20 ENCOUNTER — APPOINTMENT (OUTPATIENT)
Dept: ONCOLOGY | Facility: HOSPITAL | Age: 80
End: 2020-04-20

## 2020-04-20 DIAGNOSIS — F33.9 CHRONIC RECURRENT MAJOR DEPRESSIVE DISORDER (HCC): ICD-10-CM

## 2020-04-20 RX ORDER — ESCITALOPRAM OXALATE 20 MG/1
20 TABLET ORAL DAILY
Qty: 90 TABLET | Refills: 1 | Status: SHIPPED | OUTPATIENT
Start: 2020-04-20 | End: 2020-10-20

## 2020-04-20 RX ORDER — AMLODIPINE BESYLATE 10 MG/1
TABLET ORAL
Qty: 90 TABLET | Refills: 1 | Status: SHIPPED | OUTPATIENT
Start: 2020-04-20 | End: 2020-10-20

## 2020-04-27 ENCOUNTER — APPOINTMENT (OUTPATIENT)
Dept: ONCOLOGY | Facility: HOSPITAL | Age: 80
End: 2020-04-27

## 2020-05-11 ENCOUNTER — TELEPHONE (OUTPATIENT)
Dept: CARDIOLOGY | Facility: CLINIC | Age: 80
End: 2020-05-11

## 2020-05-11 NOTE — TELEPHONE ENCOUNTER
With the recent concern of Covid-19 virus, our office is trying to decrease the risk of unnecessary exposure. We have recommended cancelling the in person office appointment and scheduling for a video or telephone visit instead.     LM for pt to return my call in regards to there appt on   Tuesday 5/19/2020

## 2020-05-18 RX ORDER — PANTOPRAZOLE SODIUM 40 MG/1
TABLET, DELAYED RELEASE ORAL
Qty: 90 TABLET | Refills: 1 | Status: SHIPPED | OUTPATIENT
Start: 2020-05-18

## 2020-05-19 ENCOUNTER — OFFICE VISIT (OUTPATIENT)
Dept: CARDIOLOGY | Facility: CLINIC | Age: 80
End: 2020-05-19

## 2020-05-19 VITALS
WEIGHT: 153 LBS | DIASTOLIC BLOOD PRESSURE: 70 MMHG | HEART RATE: 69 BPM | HEIGHT: 64 IN | SYSTOLIC BLOOD PRESSURE: 142 MMHG | BODY MASS INDEX: 26.12 KG/M2

## 2020-05-19 DIAGNOSIS — R00.1 SINUS BRADYCARDIA: ICD-10-CM

## 2020-05-19 DIAGNOSIS — I48.0 PAROXYSMAL ATRIAL FIBRILLATION (HCC): ICD-10-CM

## 2020-05-19 DIAGNOSIS — I10 ESSENTIAL HYPERTENSION: Primary | Chronic | ICD-10-CM

## 2020-05-19 PROCEDURE — 99443 PR PHYS/QHP TELEPHONE EVALUATION 21-30 MIN: CPT | Performed by: NURSE PRACTITIONER

## 2020-05-19 RX ORDER — MULTIVITAMIN WITH IRON
100 TABLET ORAL DAILY
COMMUNITY

## 2020-05-19 RX ORDER — CETIRIZINE HYDROCHLORIDE 10 MG/1
10 TABLET ORAL DAILY
COMMUNITY
End: 2021-02-25 | Stop reason: SDUPTHER

## 2020-05-19 NOTE — PROGRESS NOTES
Telehealth Visit    Date of Visit: 2020  Encounter Provider: MELINDA Mabry  Place of Service: [facility]  Patient Name: Martina Blake  :1940  Primary Cardiologist: Dr. Justien Barrios    Chief Complaint   Patient presents with   • Follow-up   :     Dear Dr. Jamie Walton,    HPI: Martina Blake is a pleasant 79 y.o. female who is an established patient of our practice and today is her 6-month follow-up visit. Due to COVID-19 virus, I am conducting a telehealth visit via telephone with patient and she has consented to this visit today.      She has a known history of hypertension, hyperlipidemia, and atrial fibrillation.  In 2018, she had a normal Cardiolite stress test and echocardiogram at that time revealed an EF of 67%, grade 2 diastolic dysfunction, severe left atrial enlargement, mild to moderate tricuspid insufficiency, and RVSP elevated at 44 mmHg.    In 2019, she followed up with Dr. Barrios and her spironolactone was increased to 50 mg daily for better blood pressure control.  Repeat BMP 2019 was normal.    Today is her 6-month follow-up visit.  Her blood pressure has ranged 120/52 - 146/64.  She says her blood pressure is elevated today because she has not had her morning medications.  She occasionally experiences some dizziness with quick positional changes.  She denies chest pain, shortness of air, PND, orthopnea, edema, palpitations, syncope, or bleeding.       Past Medical History:   Diagnosis Date   • Anemia     multifactorial   • Cystitis    • Cystocele     moderate   • Drug therapy    • Dyslipidemia    • Esophageal reflux    • Fatigue    • History of transfusion     no reaction   • Hypercalcemia    • Hypertension    • Major depression, chronic    • Menopause    • Non Hodgkin's lymphoma (CMS/HCC)    • Osteoarthritis    • Osteoporosis    • Palpitations    • Paroxysmal atrial fibrillation (CMS/HCC)    • Post  "menopausal problems    • RLS (restless legs syndrome)    • Seizure disorder (CMS/HCC)    • Sinus bradycardia    • Subjective tinnitus    • Vaginal delivery     x2  \"SONYA\"      \"JASON\"   • Vitamin D deficiency        Past Surgical History:   Procedure Laterality Date   • CERVICAL LYMPH NODE BIOPSY/EXCISION Left 2018    Procedure: CERVICAL LYMPH NODE BIOPSY/EXCISION;  Surgeon: Danny Oconnor MD;  Location: [facility] OR;  Service: ENT   • CHOLECYSTECTOMY     • COLONOSCOPY     • COLONOSCOPY N/A 2018    Procedure: COLONOSCOPY to terminal ileum;  Surgeon: Dominik Burt MD;  Location: [facility] ENDOSCOPY;  Service: Gastroenterology   • CRANIOTOMY     • ENDOSCOPY N/A 2018    Procedure: ESOPHAGOGASTRODUODENOSCOPY with biopsy;  Surgeon: Dominik Burt MD;  Location: [facility] ENDOSCOPY;  Service: Gastroenterology   • TOTAL ABDOMINAL HYSTERECTOMY  1980    w/ bladder suspension.  Probably vaginal hysterectomy with anterior colporrhaphy.    • VENOUS ACCESS DEVICE (PORT) INSERTION N/A 2018    Procedure: INSERTION VENOUS ACCESS DEVICE;  Surgeon: Jamie Gill MD;  Location: [facility] OR;  Service: General   • WRIST SURGERY Left        Social History     Socioeconomic History   • Marital status:      Spouse name: POOJA   • Number of children: 2   • Years of education: Not on file   • Highest education level: Not on file   Occupational History     Employer: RETIRED   Tobacco Use   • Smoking status: Never Smoker   • Smokeless tobacco: Never Used   Substance and Sexual Activity   • Alcohol use: No     Comment: Caffeine use: 1 cup daily   • Drug use: No   • Sexual activity: Defer     Birth control/protection: Surgical     Comment:  (Pooja)       Family History   Problem Relation Age of Onset   • No Known Problems Mother    • Stroke Father 54         @ 60    • No Known Problems Maternal Grandmother    • No Known Problems Maternal Grandfather    • Heart disease Paternal Grandmother    • Heart " disease Paternal Grandfather    • No Known Problems Daughter    • Stroke Brother 76   • Diabetes type II Brother        The following portion of the patient's history were reviewed and updated as appropriate: past medical history, past surgical history, past social history, past family history, allergies, current medications, and problem list.    Review of Systems   Constitution: Negative.   Cardiovascular: Negative.    Respiratory: Negative.    Hematologic/Lymphatic: Negative for bleeding problem.   Neurological: Negative.        Allergies   Allergen Reactions   • Crab (Diagnostic) Itching and Rash   • Pseudoephedrine Dizziness         Current Outpatient Medications:   •  amLODIPine (NORVASC) 10 MG tablet, TAKE 1 TABLET BY MOUTH EVERY DAY, Disp: 90 tablet, Rfl: 1  •  benazepril (LOTENSIN) 40 MG tablet, TAKE 1 TABLET BY MOUTH EVERY DAY, Disp: 90 tablet, Rfl: 1  •  cetirizine (zyrTEC) 10 MG tablet, Take 10 mg by mouth Daily., Disp: , Rfl:   •  Cholecalciferol (VITAMIN D3) 125 MCG (5000 UT) capsule capsule, Take 5,000 Units by mouth Daily., Disp: , Rfl:   •  escitalopram (LEXAPRO) 20 MG tablet, TAKE 1 TABLET BY MOUTH DAILY, Disp: 90 tablet, Rfl: 1  •  folic acid (FOLVITE) 400 MCG tablet, Take 400 mcg by mouth daily., Disp: , Rfl:   •  gabapentin (NEURONTIN) 300 MG capsule, TAKE 2 CAPSULES BY MOUTH EVERY NIGHT AT BEDTIME, Disp: 60 capsule, Rfl: 2  •  hydrALAZINE (APRESOLINE) 100 MG tablet, Take 1 tablet by mouth 3 (Three) Times a Day., Disp: 270 tablet, Rfl: 3  •  lidocaine-prilocaine (EMLA) 2.5-2.5 % cream, Apply 30 min prior to port access, Disp: 5 g, Rfl: 2  •  melatonin 5 MG tablet tablet, Take 5 mg by mouth Every Night., Disp: , Rfl:   •  pantoprazole (PROTONIX) 40 MG EC tablet, TAKE 1 TABLET BY MOUTH EVERY DAY, Disp: 90 tablet, Rfl: 1  •  phenytoin (DILANTIN) 100 MG ER capsule, TAKE 3 CAPSULES BY MOUTH EVERY NIGHT AT BEDTIME, Disp: 270 capsule, Rfl: 0  •  spironolactone (ALDACTONE) 50 MG tablet, Take 1 tablet by  "mouth Daily., Disp: 90 tablet, Rfl: 3  •  vitamin B-12 (CYANOCOBALAMIN) 100 MCG tablet, Take 100 mcg by mouth Daily., Disp: , Rfl:   •  vitamin B-6 (PYRIDOXINE) 100 MG tablet, Take 100 mg by mouth Daily., Disp: , Rfl:   •  vitamin E 100 UNIT capsule, Take 100 Units by mouth Daily., Disp: , Rfl:   •  XARELTO 20 MG tablet, Take 20 mg by mouth Daily., Disp: , Rfl:         Objective:     Vitals:    05/19/20 1043   BP: 142/70   BP Location: Left arm   Pulse: 69   Weight: 69.4 kg (153 lb)   Height: 162.6 cm (64\")     Body mass index is 26.26 kg/m².    Due to telehealth visit, there was no EKG, vitals, or weight performed in our office.  Vitals/Weight were reported by the patient and conducted at home.         Assessment:       Diagnosis Plan   1. Essential hypertension     2. Paroxysmal atrial fibrillation (CMS/HCC)     3. Sinus bradycardia            Plan:       1.  Hypertension: Blood pressure is elevated today 142/70 and she has not had her morning medication.  I recommended that she start checking her blood pressure 1 or 2 hours after her morning medications and sometimes in the afternoon.  I have asked her to keep a record and call me with an update in 4 weeks.    2.  Atrial Fibrillation: She is not on beta-blocker or calcium channel blocker therapy due to bradycardia.  She remains on rivaroxaban for stroke prevention and denies bleeding.    Atrial Fibrillation and Atrial Flutter  Assessment  • The patient has paroxysmal atrial fibrillation  • The patient's CHADS2-VASc score is 4  • A NHO3TA3-EBNk score of 2 or more is considered a high risk for a thromboembolic event  • Rivaroxaban prescribed     Plan  • Attempt to maintain sinus rhythm  • Continue rivaroxaban for antithrombotic therapy, bleeding issues discussed  • Continue beta blocker for rate control    3.  Bradycardia: Heart rates have been stable at home.    4.  I recommended follow-up with Dr. Barrios in 6 months, unless otherwise needed sooner.       This " patient has consented to a telehealth visit via telephone. The visit was scheduled as a follow-up phone visit to comply with patient safety concerns in accordance with CDC recommendations.  All vitals recorded within this visit are reported by the patient.  I spent 25 minutes in total including but not limited to the 10 minutes spent in direct conversation with this patient.      As always, it has been a pleasure to participate in your patient's care. Thank you.       Sincerely,         MELINDA Oliva        Dictated utilizing Dragon dictation

## 2020-05-22 ENCOUNTER — HOSPITAL ENCOUNTER (OUTPATIENT)
Dept: PET IMAGING | Facility: HOSPITAL | Age: 80
Discharge: HOME | End: 2020-05-22
Admitting: INTERNAL MEDICINE

## 2020-05-22 ENCOUNTER — INFUSION (OUTPATIENT)
Dept: ONCOLOGY | Facility: HOSPITAL | Age: 80
End: 2020-05-22

## 2020-05-22 ENCOUNTER — DOCUMENTATION (OUTPATIENT)
Dept: PHARMACY | Facility: HOSPITAL | Age: 80
End: 2020-05-22

## 2020-05-22 DIAGNOSIS — C83.39 DIFFUSE LARGE B-CELL LYMPHOMA OF EXTRANODAL SITE EXCLUDING SPLEEN AND OTHER SOLID ORGANS (HCC): ICD-10-CM

## 2020-05-22 DIAGNOSIS — C85.91 LYMPHOMA OF LYMPH NODES OF NECK, UNSPECIFIED LYMPHOMA TYPE (HCC): ICD-10-CM

## 2020-05-22 LAB
ALBUMIN SERPL-MCNC: 4.3 G/DL (ref 3.5–5.2)
ALBUMIN/GLOB SERPL: 1.5 G/DL (ref 1.1–2.4)
ALP SERPL-CCNC: 84 U/L (ref 38–116)
ALT SERPL W P-5'-P-CCNC: 14 U/L (ref 0–33)
ANION GAP SERPL CALCULATED.3IONS-SCNC: 11.1 MMOL/L (ref 5–15)
AST SERPL-CCNC: 19 U/L (ref 0–32)
BASOPHILS # BLD AUTO: 0.04 10*3/MM3 (ref 0–0.2)
BASOPHILS NFR BLD AUTO: 0.6 % (ref 0–1.5)
BILIRUB SERPL-MCNC: 0.2 MG/DL (ref 0.2–1.2)
BUN BLD-MCNC: 15 MG/DL (ref 6–20)
BUN/CREAT SERPL: 22.4 (ref 7.3–30)
CALCIUM SPEC-SCNC: 9.3 MG/DL (ref 8.5–10.2)
CHLORIDE SERPL-SCNC: 98 MMOL/L (ref 98–107)
CO2 SERPL-SCNC: 24.9 MMOL/L (ref 22–29)
CREAT BLD-MCNC: 0.67 MG/DL (ref 0.6–1.1)
CREAT BLDA-MCNC: 0.7 MG/DL (ref 0.6–1.3)
DEPRECATED RDW RBC AUTO: 42.5 FL (ref 37–54)
EOSINOPHIL # BLD AUTO: 0.18 10*3/MM3 (ref 0–0.4)
EOSINOPHIL NFR BLD AUTO: 2.7 % (ref 0.3–6.2)
ERYTHROCYTE [DISTWIDTH] IN BLOOD BY AUTOMATED COUNT: 12.2 % (ref 12.3–15.4)
GFR SERPL CREATININE-BSD FRML MDRD: 85 ML/MIN/1.73
GLOBULIN UR ELPH-MCNC: 2.9 GM/DL (ref 1.8–3.5)
GLUCOSE BLD-MCNC: 102 MG/DL (ref 74–124)
HCT VFR BLD AUTO: 35.2 % (ref 34–46.6)
HGB BLD-MCNC: 11.6 G/DL (ref 12–15.9)
IMM GRANULOCYTES # BLD AUTO: 0.02 10*3/MM3 (ref 0–0.05)
IMM GRANULOCYTES NFR BLD AUTO: 0.3 % (ref 0–0.5)
LYMPHOCYTES # BLD AUTO: 1.05 10*3/MM3 (ref 0.7–3.1)
LYMPHOCYTES NFR BLD AUTO: 15.7 % (ref 19.6–45.3)
MCH RBC QN AUTO: 31.7 PG (ref 26.6–33)
MCHC RBC AUTO-ENTMCNC: 33 G/DL (ref 31.5–35.7)
MCV RBC AUTO: 96.2 FL (ref 79–97)
MONOCYTES # BLD AUTO: 0.84 10*3/MM3 (ref 0.1–0.9)
MONOCYTES NFR BLD AUTO: 12.6 % (ref 5–12)
NEUTROPHILS # BLD AUTO: 4.54 10*3/MM3 (ref 1.7–7)
NEUTROPHILS NFR BLD AUTO: 68.1 % (ref 42.7–76)
NRBC BLD AUTO-RTO: 0 /100 WBC (ref 0–0.2)
PLATELET # BLD AUTO: 105 10*3/MM3 (ref 140–450)
PMV BLD AUTO: 9.3 FL (ref 6–12)
POTASSIUM BLD-SCNC: 4.1 MMOL/L (ref 3.5–4.7)
PROT SERPL-MCNC: 7.2 G/DL (ref 6.3–8)
RBC # BLD AUTO: 3.66 10*6/MM3 (ref 3.77–5.28)
SODIUM BLD-SCNC: 134 MMOL/L (ref 134–145)
WBC NRBC COR # BLD: 6.67 10*3/MM3 (ref 3.4–10.8)

## 2020-05-22 PROCEDURE — 85025 COMPLETE CBC W/AUTO DIFF WBC: CPT

## 2020-05-22 PROCEDURE — 71260 CT THORAX DX C+: CPT

## 2020-05-22 PROCEDURE — 25010000002 IOPAMIDOL 61 % SOLUTION: Performed by: INTERNAL MEDICINE

## 2020-05-22 PROCEDURE — 82565 ASSAY OF CREATININE: CPT

## 2020-05-22 PROCEDURE — 0 DIATRIZOATE MEGLUMINE & SODIUM PER 1 ML: Performed by: INTERNAL MEDICINE

## 2020-05-22 PROCEDURE — 36591 DRAW BLOOD OFF VENOUS DEVICE: CPT

## 2020-05-22 PROCEDURE — 74177 CT ABD & PELVIS W/CONTRAST: CPT

## 2020-05-22 PROCEDURE — 80053 COMPREHEN METABOLIC PANEL: CPT

## 2020-05-22 RX ADMIN — DIATRIZOATE MEGLUMINE AND DIATRIZOATE SODIUM 30 ML: 660; 100 LIQUID ORAL; RECTAL at 11:30

## 2020-05-22 RX ADMIN — IOPAMIDOL 85 ML: 612 INJECTION, SOLUTION INTRAVENOUS at 12:15

## 2020-06-03 ENCOUNTER — OFFICE VISIT (OUTPATIENT)
Dept: ONCOLOGY | Facility: CLINIC | Age: 80
End: 2020-06-03

## 2020-06-03 ENCOUNTER — INFUSION (OUTPATIENT)
Dept: ONCOLOGY | Facility: HOSPITAL | Age: 80
End: 2020-06-03

## 2020-06-03 ENCOUNTER — LAB (OUTPATIENT)
Dept: ONCOLOGY | Facility: HOSPITAL | Age: 80
End: 2020-06-03

## 2020-06-03 VITALS
BODY MASS INDEX: 25.54 KG/M2 | HEART RATE: 56 BPM | WEIGHT: 149.6 LBS | SYSTOLIC BLOOD PRESSURE: 157 MMHG | TEMPERATURE: 97.6 F | HEIGHT: 64 IN | OXYGEN SATURATION: 97 % | DIASTOLIC BLOOD PRESSURE: 70 MMHG | RESPIRATION RATE: 18 BRPM

## 2020-06-03 DIAGNOSIS — E83.52 HYPERCALCEMIA: ICD-10-CM

## 2020-06-03 DIAGNOSIS — M85.89 OSTEOPENIA OF MULTIPLE SITES: Primary | ICD-10-CM

## 2020-06-03 DIAGNOSIS — M85.89 OSTEOPENIA OF MULTIPLE SITES: ICD-10-CM

## 2020-06-03 DIAGNOSIS — C85.91 LYMPHOMA OF LYMPH NODES OF NECK, UNSPECIFIED LYMPHOMA TYPE (HCC): ICD-10-CM

## 2020-06-03 DIAGNOSIS — E83.52 HYPERCALCEMIA: Primary | ICD-10-CM

## 2020-06-03 DIAGNOSIS — Z45.2 ENCOUNTER FOR ADJUSTMENT OR MANAGEMENT OF VASCULAR ACCESS DEVICE: ICD-10-CM

## 2020-06-03 DIAGNOSIS — C83.39 DIFFUSE LARGE B-CELL LYMPHOMA OF EXTRANODAL SITE EXCLUDING SPLEEN AND OTHER SOLID ORGANS (HCC): ICD-10-CM

## 2020-06-03 DIAGNOSIS — C79.51 BONE METASTASIS: Primary | ICD-10-CM

## 2020-06-03 LAB
ALBUMIN SERPL-MCNC: 4.3 G/DL (ref 3.5–5.2)
ALBUMIN/GLOB SERPL: 1.6 G/DL (ref 1.1–2.4)
ALP SERPL-CCNC: 75 U/L (ref 38–116)
ALT SERPL W P-5'-P-CCNC: 15 U/L (ref 0–33)
ANION GAP SERPL CALCULATED.3IONS-SCNC: 11.9 MMOL/L (ref 5–15)
AST SERPL-CCNC: 20 U/L (ref 0–32)
BASOPHILS # BLD AUTO: 0.03 10*3/MM3 (ref 0–0.2)
BASOPHILS NFR BLD AUTO: 0.6 % (ref 0–1.5)
BILIRUB SERPL-MCNC: 0.3 MG/DL (ref 0.2–1.2)
BUN BLD-MCNC: 20 MG/DL (ref 6–20)
BUN/CREAT SERPL: 26.7 (ref 7.3–30)
CALCIUM SPEC-SCNC: 9.4 MG/DL (ref 8.5–10.2)
CHLORIDE SERPL-SCNC: 100 MMOL/L (ref 98–107)
CO2 SERPL-SCNC: 27.1 MMOL/L (ref 22–29)
CREAT BLD-MCNC: 0.75 MG/DL (ref 0.6–1.1)
DEPRECATED RDW RBC AUTO: 44.8 FL (ref 37–54)
EOSINOPHIL # BLD AUTO: 0.12 10*3/MM3 (ref 0–0.4)
EOSINOPHIL NFR BLD AUTO: 2.2 % (ref 0.3–6.2)
ERYTHROCYTE [DISTWIDTH] IN BLOOD BY AUTOMATED COUNT: 12.7 % (ref 12.3–15.4)
GFR SERPL CREATININE-BSD FRML MDRD: 74 ML/MIN/1.73
GLOBULIN UR ELPH-MCNC: 2.7 GM/DL (ref 1.8–3.5)
GLUCOSE BLD-MCNC: 91 MG/DL (ref 74–124)
HCT VFR BLD AUTO: 34.4 % (ref 34–46.6)
HGB BLD-MCNC: 11.2 G/DL (ref 12–15.9)
IMM GRANULOCYTES # BLD AUTO: 0.01 10*3/MM3 (ref 0–0.05)
IMM GRANULOCYTES NFR BLD AUTO: 0.2 % (ref 0–0.5)
LYMPHOCYTES # BLD AUTO: 1.16 10*3/MM3 (ref 0.7–3.1)
LYMPHOCYTES NFR BLD AUTO: 21.3 % (ref 19.6–45.3)
MAGNESIUM SERPL-MCNC: 2 MG/DL (ref 1.8–2.5)
MCH RBC QN AUTO: 31.8 PG (ref 26.6–33)
MCHC RBC AUTO-ENTMCNC: 32.6 G/DL (ref 31.5–35.7)
MCV RBC AUTO: 97.7 FL (ref 79–97)
MONOCYTES # BLD AUTO: 0.75 10*3/MM3 (ref 0.1–0.9)
MONOCYTES NFR BLD AUTO: 13.8 % (ref 5–12)
NEUTROPHILS # BLD AUTO: 3.38 10*3/MM3 (ref 1.7–7)
NEUTROPHILS NFR BLD AUTO: 61.9 % (ref 42.7–76)
NRBC BLD AUTO-RTO: 0 /100 WBC (ref 0–0.2)
PHOSPHATE SERPL-MCNC: 4.2 MG/DL (ref 2.5–4.5)
PLATELET # BLD AUTO: 20 10*3/MM3 (ref 140–450)
PMV BLD AUTO: 11.4 FL (ref 6–12)
POTASSIUM BLD-SCNC: 4.3 MMOL/L (ref 3.5–4.7)
PROT SERPL-MCNC: 7 G/DL (ref 6.3–8)
RBC # BLD AUTO: 3.52 10*6/MM3 (ref 3.77–5.28)
SODIUM BLD-SCNC: 139 MMOL/L (ref 134–145)
WBC NRBC COR # BLD: 5.45 10*3/MM3 (ref 3.4–10.8)

## 2020-06-03 PROCEDURE — 84100 ASSAY OF PHOSPHORUS: CPT

## 2020-06-03 PROCEDURE — 25010000003 HEPARIN LOCK FLUSH PER 10 UNITS: Performed by: INTERNAL MEDICINE

## 2020-06-03 PROCEDURE — 25010000002 DENOSUMAB 60 MG/ML SOLUTION PREFILLED SYRINGE: Performed by: INTERNAL MEDICINE

## 2020-06-03 PROCEDURE — 83735 ASSAY OF MAGNESIUM: CPT

## 2020-06-03 PROCEDURE — 85025 COMPLETE CBC W/AUTO DIFF WBC: CPT

## 2020-06-03 PROCEDURE — 80053 COMPREHEN METABOLIC PANEL: CPT

## 2020-06-03 PROCEDURE — 36415 COLL VENOUS BLD VENIPUNCTURE: CPT

## 2020-06-03 PROCEDURE — 96372 THER/PROPH/DIAG INJ SC/IM: CPT

## 2020-06-03 PROCEDURE — 99214 OFFICE O/P EST MOD 30 MIN: CPT | Performed by: INTERNAL MEDICINE

## 2020-06-03 RX ORDER — SODIUM CHLORIDE 0.9 % (FLUSH) 0.9 %
10 SYRINGE (ML) INJECTION AS NEEDED
Status: CANCELLED | OUTPATIENT
Start: 2020-06-03

## 2020-06-03 RX ORDER — HEPARIN SODIUM (PORCINE) LOCK FLUSH IV SOLN 100 UNIT/ML 100 UNIT/ML
500 SOLUTION INTRAVENOUS AS NEEDED
Status: DISCONTINUED | OUTPATIENT
Start: 2020-06-03 | End: 2020-06-03 | Stop reason: HOSPADM

## 2020-06-03 RX ORDER — SODIUM CHLORIDE 0.9 % (FLUSH) 0.9 %
10 SYRINGE (ML) INJECTION AS NEEDED
Status: DISCONTINUED | OUTPATIENT
Start: 2020-06-03 | End: 2020-06-03 | Stop reason: HOSPADM

## 2020-06-03 RX ORDER — HEPARIN SODIUM (PORCINE) LOCK FLUSH IV SOLN 100 UNIT/ML 100 UNIT/ML
500 SOLUTION INTRAVENOUS AS NEEDED
Status: CANCELLED | OUTPATIENT
Start: 2020-06-03

## 2020-06-03 RX ADMIN — Medication 10 ML: at 14:26

## 2020-06-03 RX ADMIN — SODIUM CHLORIDE, PRESERVATIVE FREE 500 UNITS: 5 INJECTION INTRAVENOUS at 14:26

## 2020-06-03 RX ADMIN — DENOSUMAB 60 MG: 60 INJECTION SUBCUTANEOUS at 14:57

## 2020-06-10 RX ORDER — PHENYTOIN SODIUM 100 MG/1
CAPSULE, EXTENDED RELEASE ORAL
Qty: 270 CAPSULE | Refills: 0 | Status: SHIPPED | OUTPATIENT
Start: 2020-06-10 | End: 2020-09-08

## 2020-06-22 ENCOUNTER — TELEPHONE (OUTPATIENT)
Dept: FAMILY MEDICINE CLINIC | Facility: CLINIC | Age: 80
End: 2020-06-22

## 2020-06-22 DIAGNOSIS — R73.9 HYPERGLYCEMIA: ICD-10-CM

## 2020-06-22 DIAGNOSIS — E78.5 DYSLIPIDEMIA: Primary | ICD-10-CM

## 2020-06-22 NOTE — TELEPHONE ENCOUNTER
I put in orders and they should be able to see them at [facility].

## 2020-06-22 NOTE — TELEPHONE ENCOUNTER
Patient called and stated that she had an upcoming appt for a port flush over at the [facility] on [address], and she stated that Dr. Walton will usually help her schedule/order labs for that location, as well. Patient's upcoming appt in on Wednesday, July 15th, and she would like to know if she can get her labs for the same day. Patient's chart shows that the last labs ordered by Dr. Walton (to the [facility]) were the Lipid Panel and Hemoglobin A1c for October 2019.    Please advise.  [phone]         [facility]  [address] [address]  PH: [phone]  FAX: [phone]   1. I was told the name of the physician that took care of my child while in the hospital.    2. I have been told about any important findings on my child's physical exam and my child's plan of care.    3. The doctor clearly explained my child's diagnosis and other possible diagnoses that were considered.    4. My child's doctor explained all the tests that were done and their results (if available). I understand that some of the test results may not be ready before we go home and I was told how I can get these results. I understand that a summary of my child's hospitalization and important test results will be shared with my child's outpatient doctor.    5. My child's doctor talked to me about what I need to do when we go home.    6. I understand what signs and symptoms to watch for. I understand what symptoms I would need to call my doctor for and/or return to the hospital.    7. I have the phone number to call the hospital for results and/or questions after I leave the hospital.

## 2020-06-23 ENCOUNTER — OFFICE (OUTPATIENT)
Dept: URBAN - METROPOLITAN AREA CLINIC 66 | Facility: CLINIC | Age: 80
End: 2020-06-23

## 2020-06-23 VITALS
HEIGHT: 64 IN | SYSTOLIC BLOOD PRESSURE: 140 MMHG | WEIGHT: 150 LBS | HEART RATE: 63 BPM | DIASTOLIC BLOOD PRESSURE: 68 MMHG | TEMPERATURE: 97.8 F

## 2020-06-23 DIAGNOSIS — K57.90 DIVERTICULOSIS OF INTESTINE, PART UNSPECIFIED, WITHOUT PERFO: ICD-10-CM

## 2020-06-23 DIAGNOSIS — I10 ESSENTIAL (PRIMARY) HYPERTENSION: ICD-10-CM

## 2020-06-23 DIAGNOSIS — K21.9 GASTRO-ESOPHAGEAL REFLUX DISEASE WITHOUT ESOPHAGITIS: ICD-10-CM

## 2020-06-23 DIAGNOSIS — K58.9 IRRITABLE BOWEL SYNDROME WITHOUT DIARRHEA: ICD-10-CM

## 2020-06-23 PROCEDURE — 99213 OFFICE O/P EST LOW 20 MIN: CPT | Performed by: INTERNAL MEDICINE

## 2020-06-23 RX ORDER — PANTOPRAZOLE SODIUM 40 MG/1
TABLET, DELAYED RELEASE ORAL
Qty: 90 | Refills: 3 | Status: COMPLETED
End: 2023-07-05

## 2020-07-07 ENCOUNTER — DOCUMENTATION (OUTPATIENT)
Dept: PHARMACY | Facility: HOSPITAL | Age: 80
End: 2020-07-07

## 2020-07-07 NOTE — PROGRESS NOTES
Prescriber (ariana) requested the following medication samples to be given to the patient during the Nurse Practitioner visit.    Drug name:  xarelto  Drug Strength:  10/20  Quantity:  4/2  Lot#:   [id number]  Expiration:  9/22  & 10/22    Product to be placed in the [employer] under 'Patient Specialty Medication' entry and is to be removed at time of visit. Pt picked up 7/6/20

## 2020-07-14 RX ORDER — SODIUM CHLORIDE 0.9 % (FLUSH) 0.9 %
10 SYRINGE (ML) INJECTION AS NEEDED
Status: CANCELLED | OUTPATIENT
Start: 2020-07-14

## 2020-07-14 RX ORDER — HEPARIN SODIUM (PORCINE) LOCK FLUSH IV SOLN 100 UNIT/ML 100 UNIT/ML
500 SOLUTION INTRAVENOUS AS NEEDED
Status: CANCELLED | OUTPATIENT
Start: 2020-07-14

## 2020-07-15 ENCOUNTER — INFUSION (OUTPATIENT)
Dept: ONCOLOGY | Facility: HOSPITAL | Age: 80
End: 2020-07-15

## 2020-07-15 ENCOUNTER — TELEPHONE (OUTPATIENT)
Dept: ONCOLOGY | Facility: CLINIC | Age: 80
End: 2020-07-15

## 2020-07-15 DIAGNOSIS — Z45.2 ENCOUNTER FOR ADJUSTMENT OR MANAGEMENT OF VASCULAR ACCESS DEVICE: Primary | ICD-10-CM

## 2020-07-15 PROCEDURE — 96523 IRRIG DRUG DELIVERY DEVICE: CPT

## 2020-07-15 PROCEDURE — 25010000003 HEPARIN LOCK FLUSH PER 10 UNITS: Performed by: INTERNAL MEDICINE

## 2020-07-15 RX ORDER — SODIUM CHLORIDE 0.9 % (FLUSH) 0.9 %
10 SYRINGE (ML) INJECTION AS NEEDED
Status: DISCONTINUED | OUTPATIENT
Start: 2020-07-15 | End: 2020-07-15 | Stop reason: HOSPADM

## 2020-07-15 RX ORDER — HEPARIN SODIUM (PORCINE) LOCK FLUSH IV SOLN 100 UNIT/ML 100 UNIT/ML
500 SOLUTION INTRAVENOUS AS NEEDED
Status: DISCONTINUED | OUTPATIENT
Start: 2020-07-15 | End: 2020-07-15 | Stop reason: HOSPADM

## 2020-07-15 RX ORDER — HEPARIN SODIUM (PORCINE) LOCK FLUSH IV SOLN 100 UNIT/ML 100 UNIT/ML
500 SOLUTION INTRAVENOUS AS NEEDED
Status: CANCELLED | OUTPATIENT
Start: 2020-07-15

## 2020-07-15 RX ORDER — SODIUM CHLORIDE 0.9 % (FLUSH) 0.9 %
10 SYRINGE (ML) INJECTION AS NEEDED
Status: CANCELLED | OUTPATIENT
Start: 2020-07-15

## 2020-07-15 RX ADMIN — Medication 10 ML: at 14:10

## 2020-07-15 RX ADMIN — SODIUM CHLORIDE, PRESERVATIVE FREE 500 UNITS: 5 INJECTION INTRAVENOUS at 14:11

## 2020-07-15 NOTE — TELEPHONE ENCOUNTER
Patient needs appointment scheduled toward the end of the month.  She can't get samples anymore unless she sees a nurse practitioner.  She needs to see someone no later than 7/30.  She was told this by Alana Banda, she states.    [phone]

## 2020-07-16 LAB
CHOLEST SERPL-MCNC: 168 MG/DL (ref 100–199)
HBA1C MFR BLD: 5.3 % (ref 4.8–5.6)
HDLC SERPL-MCNC: 52 MG/DL
LDLC SERPL CALC-MCNC: 105 MG/DL (ref 0–99)
TRIGL SERPL-MCNC: 54 MG/DL (ref 0–149)
VLDLC SERPL-MCNC: 11 MG/DL (ref 5–40)

## 2020-07-20 ENCOUNTER — TELEPHONE (OUTPATIENT)
Dept: FAMILY MEDICINE CLINIC | Facility: CLINIC | Age: 80
End: 2020-07-20

## 2020-07-23 ENCOUNTER — APPOINTMENT (OUTPATIENT)
Dept: LAB | Facility: HOSPITAL | Age: 80
End: 2020-07-23

## 2020-07-23 ENCOUNTER — OFFICE VISIT (OUTPATIENT)
Dept: ONCOLOGY | Facility: CLINIC | Age: 80
End: 2020-07-23

## 2020-07-23 ENCOUNTER — DOCUMENTATION (OUTPATIENT)
Dept: PHARMACY | Facility: HOSPITAL | Age: 80
End: 2020-07-23

## 2020-07-23 VITALS
HEIGHT: 64 IN | RESPIRATION RATE: 16 BRPM | TEMPERATURE: 97.1 F | BODY MASS INDEX: 25.44 KG/M2 | WEIGHT: 149 LBS | OXYGEN SATURATION: 97 % | SYSTOLIC BLOOD PRESSURE: 139 MMHG | HEART RATE: 58 BPM | DIASTOLIC BLOOD PRESSURE: 68 MMHG

## 2020-07-23 DIAGNOSIS — I48.0 PAROXYSMAL ATRIAL FIBRILLATION (HCC): Primary | ICD-10-CM

## 2020-07-23 PROCEDURE — 99212-NC PR NO CHARGE CBC OFFICE OUTPATIENT VISIT 10 MINUTES: Performed by: NURSE PRACTITIONER

## 2020-07-23 NOTE — PROGRESS NOTES
"  Subjective .     REASONS FOR FOLLOWUP:    1. 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 Paroxysmal atrial fibrillation  3. History of DVT    HISTORY OF PRESENT ILLNESS:  The patient is a 80 y.o. year old female who is here for follow-up with the above-mentioned history.    History of Present Illness    Ms. Blake is here today to  Xarelto samples.  She has been on Xarelto for atrial fibrillation and history of DVT.  She reports no new questions or concerns today.  She is also scheduled for port flush which has been performed.    Past Medical History:   Diagnosis Date   • Anemia     multifactorial   • Cystitis    • Cystocele     moderate   • Drug therapy    • Dyslipidemia    • Esophageal reflux    • Fatigue    • History of transfusion     no reaction   • Hypercalcemia    • Hypertension    • Major depression, chronic    • Menopause    • Non Hodgkin's lymphoma (CMS/HCC)    • Osteoarthritis    • Osteoporosis    • Palpitations    • Paroxysmal atrial fibrillation (CMS/HCC)    • Post menopausal problems    • RLS (restless legs syndrome)    • Seizure disorder (CMS/HCC)    • Sinus bradycardia    • Subjective tinnitus    • Vaginal delivery     x2  \"SONYA\"      \"JASON\"   • Vitamin D deficiency        ONCOLOGIC HISTORY:  (History from previous dates can be found in the separate document.)    Current Outpatient Medications on File Prior to Visit   Medication Sig Dispense Refill   • amLODIPine (NORVASC) 10 MG tablet TAKE 1 TABLET BY MOUTH EVERY DAY 90 tablet 1   • benazepril (LOTENSIN) 40 MG tablet TAKE 1 TABLET BY MOUTH EVERY DAY 90 tablet 1   • cetirizine (zyrTEC) 10 MG tablet Take 10 mg by mouth Daily.     • Cholecalciferol (VITAMIN D3) 125 MCG (5000 UT) capsule capsule Take 5,000 Units by mouth Daily.     • escitalopram (LEXAPRO) 20 MG tablet TAKE 1 TABLET BY MOUTH DAILY 90 tablet 1   • folic acid (FOLVITE) 400 MCG tablet Take 400 mcg by mouth daily.     • gabapentin (NEURONTIN) " 300 MG capsule TAKE 2 CAPSULES BY MOUTH EVERY NIGHT AT BEDTIME 60 capsule 2   • hydrALAZINE (APRESOLINE) 100 MG tablet Take 1 tablet by mouth 3 (Three) Times a Day. 270 tablet 3   • lidocaine-prilocaine (EMLA) 2.5-2.5 % cream Apply 30 min prior to port access 5 g 2   • melatonin 5 MG tablet tablet Take 5 mg by mouth Every Night.     • pantoprazole (PROTONIX) 40 MG EC tablet TAKE 1 TABLET BY MOUTH EVERY DAY 90 tablet 1   • phenytoin (DILANTIN) 100 MG ER capsule TAKE 3 CAPSULES BY MOUTH EVERY NIGHT AT BEDTIME 270 capsule 0   • spironolactone (ALDACTONE) 50 MG tablet Take 1 tablet by mouth Daily. 90 tablet 3   • vitamin B-12 (CYANOCOBALAMIN) 100 MCG tablet Take 100 mcg by mouth Daily.     • vitamin B-6 (PYRIDOXINE) 100 MG tablet Take 100 mg by mouth Daily.     • vitamin E 100 UNIT capsule Take 100 Units by mouth Daily.     • XARELTO 20 MG tablet Take 20 mg by mouth Daily.       No current facility-administered medications on file prior to visit.        ALLERGIES:     Allergies   Allergen Reactions   • Crab (Diagnostic) Itching and Rash   • Pseudoephedrine Dizziness       Social History     Socioeconomic History   • Marital status:      Spouse name: JL   • Number of children: 2   • Years of education: Not on file   • Highest education level: Not on file   Occupational History     Employer: RETIRED   Tobacco Use   • Smoking status: Never Smoker   • Smokeless tobacco: Never Used   Substance and Sexual Activity   • Alcohol use: No     Comment: Caffeine use: 1 cup daily   • Drug use: No   • Sexual activity: Defer     Birth control/protection: Surgical     Comment:  (Jl)         Cancer-related family history is not on file.     Review of Systems   Constitutional: Negative.    HENT: Negative.    Eyes: Negative.    Respiratory: Negative.    Cardiovascular: Negative.    Gastrointestinal: Negative.    Endocrine: Negative.    Genitourinary: Negative.    Musculoskeletal: Negative.    Skin: Negative.     Allergic/Immunologic: Negative.    Neurological: Negative.    Hematological: Negative.    Psychiatric/Behavioral: Negative.      A comprehensive 14 point review of systems was performed and was negative except as mentioned.    Objective      There were no vitals filed for this visit.  Current Status 7/23/2020   ECOG score 1       Physical Exam   Constitutional: She is oriented to person, place, and time. She appears well-developed and well-nourished.   Eyes: Pupils are equal, round, and reactive to light.   Neck: Normal range of motion.   Cardiovascular: Normal rate.   Pulmonary/Chest: Effort normal.   Musculoskeletal: Normal range of motion.   Neurological: She is alert and oriented to person, place, and time.   Skin: Skin is warm and dry.   Psychiatric: She has a normal mood and affect.       RECENT LABS:  Hematology WBC   Date Value Ref Range Status   06/03/2020 5.45 3.40 - 10.80 10*3/mm3 Final     RBC   Date Value Ref Range Status   06/03/2020 3.52 (L) 3.77 - 5.28 10*6/mm3 Final     Hemoglobin   Date Value Ref Range Status   06/03/2020 11.2 (L) 12.0 - 15.9 g/dL Final     Hematocrit   Date Value Ref Range Status   06/03/2020 34.4 34.0 - 46.6 % Final     Platelets   Date Value Ref Range Status   06/03/2020 20 (L) 140 - 450 10*3/mm3 Final        Assessment/Plan   1. 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  · PET scan 5/3/18 with diffuse splenic involvement (SUV 16.9), lymphadenopathy throughout upper abdomen and retroperitoneum (SUV 13.1), right liver lesion 5 cm (SUV 12.8), pelvic lymphadenopathy up to 4 cm, bladder wall thickening with associated hypermetabolism, cervical lymphadenopathy left posterior (SUV 11.2), multiple bone lesions including left intertrochanteric femur, ribs, right scapula, pelvis as well as left gluteal hematoma versus lymphomatous  lesion.  · CT-guided liver biopsy 4/26/18 with diffuse large B-cell non-Hodgkin's lymphoma, CD20 positive, double hit (BCL-6 rearrangement 85%, MYC rearrangement 79%), KI-67 4+/4  · Left cervical excisional biopsy by ENT Dr. Oconnor 5/1/18 confirming high-grade B cell non-Hodgkin's lymphoma, Ki-67 100%, double hit (BCL-6 rearrangement 76%, MYC rearrangement 85%)  · Echocardiogram 4/11/18 with ejection fraction 66.7%  · Right port placement Dr Gill 5/4/18  · Patient not felt to be a candidate for more aggressive chemotherapy due to performance status and age (DA EPOCH-R)  · Therefore treated with R CHOP chemotherapy 5/4/18.  · Followed by granix 300mcg daily beginning 5/6/18 through 5/14/18.  · Today, the patient has increasing WBC related to growth factor use, stable hemoglobin, spontaneous improvement in platelet count.  She is doing quite well following her chemotherapy and is ready to go home.  There is some concern regarding her persistently elevated LDH at 347 today as well as her escalating calcium at 11.9.  She is currently asymptomatic from the calcium and her ionized calcium is stable at 6.8.  Therefore we are going to proceed with discharge home.  She will return to the office later this week on 5/18/18 with repeat labs, nurse practitioner visit, and potential treatment with a third dose of Zometa if her calcium has continued to escalate significantly. She is now seen with plans to begin cycle 2 R CHOP chemotherapy with growth factor support ( Neulasta on body injector).  Will schedule follow-up PET scan after 2 cycles of therapy and see her back in 3 weeks to review her status.  · Patient reviewed June 19 with near resolution of hypermetabolic sites on PET/CT.  Plans made for third cycle of CHOP R, additional Zometa and total of 6 cycles of chemotherapy anticipated.  · Patient seen August 03, 2018 for fifth cycle of chemotherapy-CHOP R, reduction of Oncovin 50%, 6 cycles planned.  Discussed  subsequent CT scan follow-up.  · Follow-up scan September 19 with small low-density liver lesions and splenic lesions are decreased from previous, numerous small mesenteric and RP nodes again stable slightly smaller.  These are discussed September 24 and follow-up scan in 3 months is anticipated  · Patient reviewed November 26 further generally improved, plans made to maintain port, review at 3 months  · Patient assessed February 18, 2019, no evidence of recurrent disease, repeat scans in 3 months plan-6 months from previous  · Patient seen May 13, 2019, no evidence of recurrent disease, follow-up assessment in 6 months planned  · Patient reviewed again October 28, 2019 with follow-up scans negative for recurrence, 6 months plan follow-up at 2-year m  · Patient reviewed by Dr. Iraheta 6/3/2020, stable scans.     2. Myelosuppression with pancytopenia related to chemotherapy: Resolved      3. Multifactorial anemia:  · Related to anemia chronic disease, chemotherapy, prior iron deficiency.  · Stable Hgb at 11.1 6/30/2020     4. Hypercalcemia of malignancy:  · Calcium up to 13.8 on 4/21/18 (albumin 3.3).  Intact PTH 8.1, PTH RP less than 2.0  · Received Zometa 4 mg IV 4/25/18.  · Calcium up to 11.3 on 5/7/18 with second dose of Zometa administered 4 mg IV.  · Calcium today has continued to gradually increase up to 11.9 with albumin 3.3.  Ionized calcium however is stable at 6.8 compared to yesterday.  The patient is asymptomatic.  We will not plan to administer a further dose of Zometa just yet and will allow further time for the patient to respond to chemotherapy.  She returned 518 for continued Zometa and when seen May 25 has a normal calcium level.  · Patient to receive Zometa June 19, subsequently every other cycle, restart low-dose calcium supplementation  · Patient seen August 03, 2018, plans made for Zometa with her final course of chemotherapy August 23, 2018  · Patient scheduled for bone density prior to  assessment 3 months following November visit, potential Xgeva and follow-up as she is seen back to step to repeat scans are performed in May 2019.  As she is seen May 13 we do plan the Xgeva and then move to Prolia 6 months later for her subsequent treatment for osteopenia.  · Patient reviewed October 28, 2019 with Prolia continued every 6 months        5.  Paroxysmal atrial fibrillation:  · Recently diagnosed, anticoagulated with Eliquis initially, discontinued due to expected thrombocytopenia from chemotherapy  · Currently in sinus rhythm, continues on Lopressor 50 mg every 12 hours  · Anticoagulation restarted with Xarelto  · Is here today, 7/30/2024 samples.           6. Prior seizure disorder:  · Continues currently on Dilantin 300 mg daily at bedtime and Neurontin 600 mg daily at bedtime, will return to outpatient dose of Neurontin 300 mg daily at bedtime at discharge.        7. GI prophylaxis:  · Protonix 40 mg daily, consider discontinuance in 6 months        8. Venous access:  · Port placement 5/4/18 by Dr. Gill, continue to manage q. 6 weeks        9. DVT- Continue Xarelto 20 mg by mouth daily        Plan:  I have provided the patient with 4 units of 20 mg tablets to total a quantity of 28 tablets.  She will return monthly for port flush and to obtain samples.  She will return as already scheduled to see Dr Iraheta in December with labs.     I have asked the patient to call the office with any new or worsening symptoms before her next scheduled visit.              Cc:  No ref. provider found

## 2020-07-24 ENCOUNTER — TELEPHONE (OUTPATIENT)
Dept: ONCOLOGY | Facility: CLINIC | Age: 80
End: 2020-07-24

## 2020-07-24 NOTE — TELEPHONE ENCOUNTER
----- Message from MELINDA Olmedo sent at 7/23/2020  2:03 PM EDT -----  Regarding: Xarelto/appts  Alana - this patient has Xarelto to last her till 7/30/20. I have made her an appt for that day to see me to (hopefully) get the Xarelto 20 mg samples.  If you learn for some reason that the samples will not begin by then, please let me know as I will have no choice but to give her the 15 mg samples.    Ca -can you please rearrange this patient's appointments to have Mediport flush on the same day of Xarelto sample administration.  For example I will see her next week to give her Xarelto samples and if we can bring her back every 4 weeks for samples and Mediport flush that will make less trips for her in the end.  You can leave her appointment with Dr. Iraheta as is but otherwise make her an every 4-week return instead of every 6-week.  Let me know if that is confusing.  Thank you.

## 2020-07-27 RX ORDER — BENAZEPRIL HYDROCHLORIDE 40 MG/1
TABLET, FILM COATED ORAL
Qty: 90 TABLET | Refills: 1 | Status: SHIPPED | OUTPATIENT
Start: 2020-07-27 | End: 2021-01-25

## 2020-07-30 ENCOUNTER — OFFICE VISIT (OUTPATIENT)
Dept: ONCOLOGY | Facility: CLINIC | Age: 80
End: 2020-07-30

## 2020-07-30 ENCOUNTER — APPOINTMENT (OUTPATIENT)
Dept: LAB | Facility: HOSPITAL | Age: 80
End: 2020-07-30

## 2020-07-30 ENCOUNTER — INFUSION (OUTPATIENT)
Dept: ONCOLOGY | Facility: HOSPITAL | Age: 80
End: 2020-07-30

## 2020-07-30 ENCOUNTER — DOCUMENTATION (OUTPATIENT)
Dept: PHARMACY | Facility: HOSPITAL | Age: 80
End: 2020-07-30

## 2020-07-30 VITALS
RESPIRATION RATE: 18 BRPM | TEMPERATURE: 97.3 F | WEIGHT: 150.6 LBS | OXYGEN SATURATION: 97 % | SYSTOLIC BLOOD PRESSURE: 126 MMHG | HEART RATE: 63 BPM | HEIGHT: 64 IN | BODY MASS INDEX: 25.71 KG/M2 | DIASTOLIC BLOOD PRESSURE: 65 MMHG

## 2020-07-30 DIAGNOSIS — C79.51 BONE METASTASIS: Primary | ICD-10-CM

## 2020-07-30 DIAGNOSIS — C83.39 DIFFUSE LARGE B-CELL LYMPHOMA OF EXTRANODAL SITE EXCLUDING SPLEEN AND OTHER SOLID ORGANS (HCC): Primary | ICD-10-CM

## 2020-07-30 DIAGNOSIS — C85.91 LYMPHOMA OF LYMPH NODES OF NECK, UNSPECIFIED LYMPHOMA TYPE (HCC): ICD-10-CM

## 2020-07-30 DIAGNOSIS — Z45.2 ENCOUNTER FOR ADJUSTMENT OR MANAGEMENT OF VASCULAR ACCESS DEVICE: ICD-10-CM

## 2020-07-30 LAB
ALBUMIN SERPL-MCNC: 4.4 G/DL (ref 3.5–5.2)
ALBUMIN/GLOB SERPL: 1.5 G/DL (ref 1.1–2.4)
ALP SERPL-CCNC: 86 U/L (ref 38–116)
ALT SERPL W P-5'-P-CCNC: 9 U/L (ref 0–33)
ANION GAP SERPL CALCULATED.3IONS-SCNC: 13.1 MMOL/L (ref 5–15)
AST SERPL-CCNC: 15 U/L (ref 0–32)
BASOPHILS # BLD AUTO: 0.04 10*3/MM3 (ref 0–0.2)
BASOPHILS NFR BLD AUTO: 0.5 % (ref 0–1.5)
BILIRUB SERPL-MCNC: 0.3 MG/DL (ref 0.2–1.2)
BUN SERPL-MCNC: 21 MG/DL (ref 6–20)
BUN/CREAT SERPL: 25.9 (ref 7.3–30)
CALCIUM SPEC-SCNC: 9.3 MG/DL (ref 8.5–10.2)
CHLORIDE SERPL-SCNC: 96 MMOL/L (ref 98–107)
CO2 SERPL-SCNC: 22.9 MMOL/L (ref 22–29)
CREAT SERPL-MCNC: 0.81 MG/DL (ref 0.6–1.1)
DEPRECATED RDW RBC AUTO: 41.7 FL (ref 37–54)
EOSINOPHIL # BLD AUTO: 0.14 10*3/MM3 (ref 0–0.4)
EOSINOPHIL NFR BLD AUTO: 1.9 % (ref 0.3–6.2)
ERYTHROCYTE [DISTWIDTH] IN BLOOD BY AUTOMATED COUNT: 12 % (ref 12.3–15.4)
ERYTHROCYTE [SEDIMENTATION RATE] IN BLOOD: 10 MM/HR (ref 0–30)
GFR SERPL CREATININE-BSD FRML MDRD: 68 ML/MIN/1.73
GLOBULIN UR ELPH-MCNC: 2.9 GM/DL (ref 1.8–3.5)
GLUCOSE SERPL-MCNC: 130 MG/DL (ref 74–124)
HCT VFR BLD AUTO: 36.9 % (ref 34–46.6)
HGB BLD-MCNC: 12.4 G/DL (ref 12–15.9)
IMM GRANULOCYTES # BLD AUTO: 0.02 10*3/MM3 (ref 0–0.05)
IMM GRANULOCYTES NFR BLD AUTO: 0.3 % (ref 0–0.5)
LYMPHOCYTES # BLD AUTO: 1.47 10*3/MM3 (ref 0.7–3.1)
LYMPHOCYTES NFR BLD AUTO: 19.7 % (ref 19.6–45.3)
MCH RBC QN AUTO: 32 PG (ref 26.6–33)
MCHC RBC AUTO-ENTMCNC: 33.6 G/DL (ref 31.5–35.7)
MCV RBC AUTO: 95.1 FL (ref 79–97)
MONOCYTES # BLD AUTO: 0.84 10*3/MM3 (ref 0.1–0.9)
MONOCYTES NFR BLD AUTO: 11.2 % (ref 5–12)
NEUTROPHILS NFR BLD AUTO: 4.97 10*3/MM3 (ref 1.7–7)
NEUTROPHILS NFR BLD AUTO: 66.4 % (ref 42.7–76)
NRBC BLD AUTO-RTO: 0 /100 WBC (ref 0–0.2)
PLATELET # BLD AUTO: 23 10*3/MM3 (ref 140–450)
PMV BLD AUTO: 11.7 FL (ref 6–12)
POTASSIUM SERPL-SCNC: 4.5 MMOL/L (ref 3.5–4.7)
PROT SERPL-MCNC: 7.3 G/DL (ref 6.3–8)
RBC # BLD AUTO: 3.88 10*6/MM3 (ref 3.77–5.28)
SODIUM SERPL-SCNC: 132 MMOL/L (ref 134–145)
WBC # BLD AUTO: 7.48 10*3/MM3 (ref 3.4–10.8)

## 2020-07-30 PROCEDURE — 36591 DRAW BLOOD OFF VENOUS DEVICE: CPT

## 2020-07-30 PROCEDURE — 85025 COMPLETE CBC W/AUTO DIFF WBC: CPT

## 2020-07-30 PROCEDURE — 80053 COMPREHEN METABOLIC PANEL: CPT

## 2020-07-30 PROCEDURE — 99212 OFFICE O/P EST SF 10 MIN: CPT | Performed by: NURSE PRACTITIONER

## 2020-07-30 PROCEDURE — 85652 RBC SED RATE AUTOMATED: CPT | Performed by: INTERNAL MEDICINE

## 2020-07-30 PROCEDURE — 25010000003 HEPARIN LOCK FLUSH PER 10 UNITS: Performed by: INTERNAL MEDICINE

## 2020-07-30 RX ORDER — HEPARIN SODIUM (PORCINE) LOCK FLUSH IV SOLN 100 UNIT/ML 100 UNIT/ML
500 SOLUTION INTRAVENOUS AS NEEDED
Status: CANCELLED | OUTPATIENT
Start: 2020-07-30

## 2020-07-30 RX ORDER — HEPARIN SODIUM (PORCINE) LOCK FLUSH IV SOLN 100 UNIT/ML 100 UNIT/ML
500 SOLUTION INTRAVENOUS AS NEEDED
Status: DISCONTINUED | OUTPATIENT
Start: 2020-07-30 | End: 2020-07-30 | Stop reason: HOSPADM

## 2020-07-30 RX ORDER — SODIUM CHLORIDE 0.9 % (FLUSH) 0.9 %
10 SYRINGE (ML) INJECTION AS NEEDED
Status: CANCELLED | OUTPATIENT
Start: 2020-07-30

## 2020-07-30 RX ORDER — SODIUM CHLORIDE 0.9 % (FLUSH) 0.9 %
10 SYRINGE (ML) INJECTION AS NEEDED
Status: DISCONTINUED | OUTPATIENT
Start: 2020-07-30 | End: 2020-07-30 | Stop reason: HOSPADM

## 2020-07-30 RX ADMIN — SODIUM CHLORIDE, PRESERVATIVE FREE 500 UNITS: 5 INJECTION INTRAVENOUS at 14:52

## 2020-07-30 RX ADMIN — Medication 10 ML: at 14:52

## 2020-07-30 NOTE — PROGRESS NOTES
Prescriber (ariana) requested the following medication samples to be given to the patient during the Nurse Practitioner visit.    NDC:  31223-734-61  Drug name: xarelto  Strength: 20mg  Total Quantity:  28 (Containers- 4 X Units per Containers- 7)  Lot#:  [id number]  Expiration: 4/22    Product to be placed in the [employer] under 'Patient Specialty Medication' entry and is to be removed at time of visit.

## 2020-08-13 RX ORDER — RIVAROXABAN 20 MG/1
20 TABLET, FILM COATED ORAL DAILY
Qty: 28 TABLET | Refills: 0 | COMMUNITY
Start: 2020-07-30 | End: 2020-11-12 | Stop reason: SDUPTHER

## 2020-08-14 ENCOUNTER — OFFICE VISIT (OUTPATIENT)
Dept: FAMILY MEDICINE CLINIC | Facility: CLINIC | Age: 80
End: 2020-08-14

## 2020-08-14 DIAGNOSIS — I10 ESSENTIAL HYPERTENSION: Primary | Chronic | ICD-10-CM

## 2020-08-14 PROCEDURE — 99441 PR PHYS/QHP TELEPHONE EVALUATION 5-10 MIN: CPT | Performed by: FAMILY MEDICINE

## 2020-08-14 NOTE — PROGRESS NOTES
Subjective   Martina Blake is a 80 y.o. female. Presents today for No chief complaint on file.    This visit has been rescheduled as a phone visit to comply with patient safety concerns in accordance with CDC recommendations. Total time of discussion was 7 minutes.  Patient Telephone VISIT, patient gave consent to treat.      History of Present Illness  Patient with arterial htn, has been well controlled;  No cp/soa;  No pnd/orthopnea;  Feels good;   oncolgoy checks labs regulalry;  No concerns;  Staying isolated due to pandemic.    Review of Systems    Patient Active Problem List   Diagnosis   • Blood glucose abnormal   • Dyslipidemia   • Gastroesophageal reflux disease   • Generalized osteoarthritis   • Chronic recurrent major depressive disorder (CMS/HCC)   • Osteoporosis   • Restless legs syndrome   • Seizure disorder (CMS/HCC)   • Post-menopause on HRT (hormone replacement therapy)   • Chronic seasonal allergic rhinitis   • Paroxysmal atrial fibrillation (CMS/HCC)   • Iron deficiency anemia due to chronic blood loss   • Acute superficial gastritis without hemorrhage   • Hiatal hernia   • Esophagitis   • Diverticulosis of large intestine without hemorrhage   • Hypertension   • Hypercalcemia   • Liver mass   • Lymphoma (CMS/HCC)   • Insomnia   • Anemia associated with chemotherapy   • Pancytopenia due to antineoplastic chemotherapy (CMS/HCC)   • Chemotherapy-induced neutropenia (CMS/HCC)   • Encounter for adjustment or management of vascular access device   • 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 (CMS/HCC)   • Sinus bradycardia   • Thrombocytopenia (CMS/HCC)   • Bone metastasis (CMS/HCC)   • Osteopenia of multiple sites       Social History     Socioeconomic History   • Marital status:      Spouse name: POOJA   • Number of children: 2   • Years of education: Not on file   • Highest education level: Not on file   Occupational History     Employer:  RETIRED   Tobacco Use   • Smoking status: Never Smoker   • Smokeless tobacco: Never Used   Substance and Sexual Activity   • Alcohol use: No     Comment: Caffeine use: 1 cup daily   • Drug use: No   • Sexual activity: Defer     Birth control/protection: Surgical     Comment:  (Jl)       Allergies   Allergen Reactions   • Crab (Diagnostic) Itching and Rash   • Pseudoephedrine Dizziness       Current Outpatient Medications on File Prior to Visit   Medication Sig Dispense Refill   • amLODIPine (NORVASC) 10 MG tablet TAKE 1 TABLET BY MOUTH EVERY DAY 90 tablet 1   • benazepril (LOTENSIN) 40 MG tablet TAKE 1 TABLET BY MOUTH EVERY DAY 90 tablet 1   • cetirizine (zyrTEC) 10 MG tablet Take 10 mg by mouth Daily.     • Cholecalciferol (VITAMIN D3) 125 MCG (5000 UT) capsule capsule Take 5,000 Units by mouth Daily.     • escitalopram (LEXAPRO) 20 MG tablet TAKE 1 TABLET BY MOUTH DAILY 90 tablet 1   • folic acid (FOLVITE) 400 MCG tablet Take 400 mcg by mouth daily.     • gabapentin (NEURONTIN) 300 MG capsule TAKE 2 CAPSULES BY MOUTH EVERY NIGHT AT BEDTIME 60 capsule 2   • hydrALAZINE (APRESOLINE) 100 MG tablet Take 1 tablet by mouth 3 (Three) Times a Day. 270 tablet 3   • lidocaine-prilocaine (EMLA) 2.5-2.5 % cream Apply 30 min prior to port access 5 g 2   • melatonin 5 MG tablet tablet Take 5 mg by mouth Every Night.     • pantoprazole (PROTONIX) 40 MG EC tablet TAKE 1 TABLET BY MOUTH EVERY DAY 90 tablet 1   • phenytoin (DILANTIN) 100 MG ER capsule TAKE 3 CAPSULES BY MOUTH EVERY NIGHT AT BEDTIME 270 capsule 0   • spironolactone (ALDACTONE) 50 MG tablet Take 1 tablet by mouth Daily. 90 tablet 3   • vitamin B-12 (CYANOCOBALAMIN) 100 MCG tablet Take 100 mcg by mouth Daily.     • vitamin B-6 (PYRIDOXINE) 100 MG tablet Take 100 mg by mouth Daily.     • vitamin E 100 UNIT capsule Take 100 Units by mouth Daily.     • XARELTO 20 MG tablet Take 1 tablet by mouth Daily. 28 tablet 0     No current facility-administered  medications on file prior to visit.        Objective   There were no vitals filed for this visit.  There is no height or weight on file to calculate BMI.    Physical Exam   Pulmonary/Chest: No respiratory distress.   Psychiatric: She has a normal mood and affect. Her behavior is normal. Judgment and thought content normal.       Assessment/Plan   There are no diagnoses linked to this encounter.    Reports bp has been doing well;  No issues;  Will continue medications; recent check 126/65       -Follow up:

## 2020-08-25 ENCOUNTER — DOCUMENTATION (OUTPATIENT)
Dept: ONCOLOGY | Facility: CLINIC | Age: 80
End: 2020-08-25

## 2020-08-25 ENCOUNTER — DOCUMENTATION (OUTPATIENT)
Dept: PHARMACY | Facility: HOSPITAL | Age: 80
End: 2020-08-25

## 2020-08-25 NOTE — PROGRESS NOTES
Pt on the scheduled for tomorrow [place] come [place] [facility] [place]  [place].    I have notified Karen to place samples into the [employer].

## 2020-08-25 NOTE — PROGRESS NOTES
Site of Appt/Pickup: (n) [place]; (y) [facility]; (n) [facility];    Prescriber (ariana) requested the following medication samples to be given to the patient during the Nurse Practitioner visit.    NDC:  82095-186-98  Drug name: xarelto  Strength: 20mg  Total Quantity:  28 (Containers- 4 X Units per Containers- 7)  Lot#:  [id number]  Expiration: 5/22    Product to be placed in the [employer] under 'Patient Specialty Medication' entry and is to be removed at time of visit.

## 2020-08-26 ENCOUNTER — OFFICE VISIT (OUTPATIENT)
Dept: ONCOLOGY | Facility: CLINIC | Age: 80
End: 2020-08-26

## 2020-08-26 ENCOUNTER — INFUSION (OUTPATIENT)
Dept: ONCOLOGY | Facility: HOSPITAL | Age: 80
End: 2020-08-26

## 2020-08-26 VITALS
HEIGHT: 64 IN | WEIGHT: 148.2 LBS | HEART RATE: 59 BPM | OXYGEN SATURATION: 97 % | DIASTOLIC BLOOD PRESSURE: 64 MMHG | TEMPERATURE: 97.1 F | SYSTOLIC BLOOD PRESSURE: 135 MMHG | RESPIRATION RATE: 16 BRPM | BODY MASS INDEX: 25.3 KG/M2

## 2020-08-26 DIAGNOSIS — C85.91 LYMPHOMA OF LYMPH NODES OF NECK, UNSPECIFIED LYMPHOMA TYPE (HCC): ICD-10-CM

## 2020-08-26 DIAGNOSIS — Z45.2 ENCOUNTER FOR ADJUSTMENT OR MANAGEMENT OF VASCULAR ACCESS DEVICE: Primary | ICD-10-CM

## 2020-08-26 LAB
ALBUMIN SERPL-MCNC: 4 G/DL (ref 3.5–5.2)
ALBUMIN/GLOB SERPL: 1.5 G/DL (ref 1.1–2.4)
ALP SERPL-CCNC: 68 U/L (ref 38–116)
ALT SERPL W P-5'-P-CCNC: 11 U/L (ref 0–33)
ANION GAP SERPL CALCULATED.3IONS-SCNC: 10.7 MMOL/L (ref 5–15)
AST SERPL-CCNC: 16 U/L (ref 0–32)
BASOPHILS # BLD AUTO: 0.03 10*3/MM3 (ref 0–0.2)
BASOPHILS NFR BLD AUTO: 0.5 % (ref 0–1.5)
BILIRUB SERPL-MCNC: 0.3 MG/DL (ref 0.2–1.2)
BUN SERPL-MCNC: 18 MG/DL (ref 6–20)
BUN/CREAT SERPL: 23.1 (ref 7.3–30)
CALCIUM SPEC-SCNC: 8.6 MG/DL (ref 8.5–10.2)
CHLORIDE SERPL-SCNC: 101 MMOL/L (ref 98–107)
CO2 SERPL-SCNC: 24.3 MMOL/L (ref 22–29)
CREAT SERPL-MCNC: 0.78 MG/DL (ref 0.6–1.1)
DEPRECATED RDW RBC AUTO: 41.7 FL (ref 37–54)
EOSINOPHIL # BLD AUTO: 0.11 10*3/MM3 (ref 0–0.4)
EOSINOPHIL NFR BLD AUTO: 1.7 % (ref 0.3–6.2)
ERYTHROCYTE [DISTWIDTH] IN BLOOD BY AUTOMATED COUNT: 12 % (ref 12.3–15.4)
GFR SERPL CREATININE-BSD FRML MDRD: 71 ML/MIN/1.73
GLOBULIN UR ELPH-MCNC: 2.6 GM/DL (ref 1.8–3.5)
GLUCOSE SERPL-MCNC: 143 MG/DL (ref 74–124)
HCT VFR BLD AUTO: 33.3 % (ref 34–46.6)
HGB BLD-MCNC: 11.1 G/DL (ref 12–15.9)
IMM GRANULOCYTES # BLD AUTO: 0.02 10*3/MM3 (ref 0–0.05)
IMM GRANULOCYTES NFR BLD AUTO: 0.3 % (ref 0–0.5)
LYMPHOCYTES # BLD AUTO: 1.11 10*3/MM3 (ref 0.7–3.1)
LYMPHOCYTES NFR BLD AUTO: 16.7 % (ref 19.6–45.3)
MCH RBC QN AUTO: 31.6 PG (ref 26.6–33)
MCHC RBC AUTO-ENTMCNC: 33.3 G/DL (ref 31.5–35.7)
MCV RBC AUTO: 94.9 FL (ref 79–97)
MONOCYTES # BLD AUTO: 0.68 10*3/MM3 (ref 0.1–0.9)
MONOCYTES NFR BLD AUTO: 10.2 % (ref 5–12)
NEUTROPHILS NFR BLD AUTO: 4.7 10*3/MM3 (ref 1.7–7)
NEUTROPHILS NFR BLD AUTO: 70.6 % (ref 42.7–76)
NRBC BLD AUTO-RTO: 0 /100 WBC (ref 0–0.2)
PLATELET # BLD AUTO: 24 10*3/MM3 (ref 140–450)
PMV BLD AUTO: 11.2 FL (ref 6–12)
POTASSIUM SERPL-SCNC: 3.7 MMOL/L (ref 3.5–4.7)
PROT SERPL-MCNC: 6.6 G/DL (ref 6.3–8)
RBC # BLD AUTO: 3.51 10*6/MM3 (ref 3.77–5.28)
SODIUM SERPL-SCNC: 136 MMOL/L (ref 134–145)
WBC # BLD AUTO: 6.65 10*3/MM3 (ref 3.4–10.8)

## 2020-08-26 PROCEDURE — 80053 COMPREHEN METABOLIC PANEL: CPT

## 2020-08-26 PROCEDURE — 99212 OFFICE O/P EST SF 10 MIN: CPT | Performed by: NURSE PRACTITIONER

## 2020-08-26 PROCEDURE — 25010000003 HEPARIN LOCK FLUSH PER 10 UNITS: Performed by: INTERNAL MEDICINE

## 2020-08-26 PROCEDURE — 85025 COMPLETE CBC W/AUTO DIFF WBC: CPT

## 2020-08-26 PROCEDURE — G0463 HOSPITAL OUTPT CLINIC VISIT: HCPCS

## 2020-08-26 PROCEDURE — 36591 DRAW BLOOD OFF VENOUS DEVICE: CPT

## 2020-08-26 RX ORDER — HEPARIN SODIUM (PORCINE) LOCK FLUSH IV SOLN 100 UNIT/ML 100 UNIT/ML
500 SOLUTION INTRAVENOUS AS NEEDED
Status: CANCELLED | OUTPATIENT
Start: 2020-08-26

## 2020-08-26 RX ORDER — HEPARIN SODIUM (PORCINE) LOCK FLUSH IV SOLN 100 UNIT/ML 100 UNIT/ML
500 SOLUTION INTRAVENOUS AS NEEDED
Status: DISCONTINUED | OUTPATIENT
Start: 2020-08-26 | End: 2020-08-26 | Stop reason: HOSPADM

## 2020-08-26 RX ORDER — SODIUM CHLORIDE 0.9 % (FLUSH) 0.9 %
10 SYRINGE (ML) INJECTION AS NEEDED
Status: CANCELLED | OUTPATIENT
Start: 2020-08-26

## 2020-08-26 RX ORDER — SODIUM CHLORIDE 0.9 % (FLUSH) 0.9 %
10 SYRINGE (ML) INJECTION AS NEEDED
Status: DISCONTINUED | OUTPATIENT
Start: 2020-08-26 | End: 2020-08-26 | Stop reason: HOSPADM

## 2020-08-26 RX ADMIN — Medication 10 ML: at 13:55

## 2020-08-26 RX ADMIN — SODIUM CHLORIDE, PRESERVATIVE FREE 500 UNITS: 5 INJECTION INTRAVENOUS at 13:55

## 2020-08-26 NOTE — PROGRESS NOTES
"  Subjective .     REASONS FOR FOLLOWUP:    1. 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 Paroxysmal atrial fibrillation  3. History of DVT    HISTORY OF PRESENT ILLNESS:  The patient is a 80 y.o. year old female who is here for follow-up with the above-mentioned history.    History of Present Illness    Ms. Blake is here today to  Xarelto samples.  She has been on Xarelto for atrial fibrillation and history of DVT.  Mediport also flushed today.    She denies other concerns this time.      Past Medical History:   Diagnosis Date   • Anemia     multifactorial   • Cystitis    • Cystocele     moderate   • Drug therapy    • Dyslipidemia    • Esophageal reflux    • Fatigue    • History of transfusion     no reaction   • Hypercalcemia    • Hypertension    • Major depression, chronic    • Menopause    • Non Hodgkin's lymphoma (CMS/HCC)    • Osteoarthritis    • Osteoporosis    • Palpitations    • Paroxysmal atrial fibrillation (CMS/HCC)    • Post menopausal problems    • RLS (restless legs syndrome)    • Seizure disorder (CMS/HCC)    • Sinus bradycardia    • Subjective tinnitus    • Vaginal delivery     x2  \"SONYA\"      \"JASON\"   • Vitamin D deficiency        ONCOLOGIC HISTORY:  (History from previous dates can be found in the separate document.)    Current Outpatient Medications on File Prior to Visit   Medication Sig Dispense Refill   • amLODIPine (NORVASC) 10 MG tablet TAKE 1 TABLET BY MOUTH EVERY DAY 90 tablet 1   • benazepril (LOTENSIN) 40 MG tablet TAKE 1 TABLET BY MOUTH EVERY DAY 90 tablet 1   • cetirizine (zyrTEC) 10 MG tablet Take 10 mg by mouth Daily.     • Cholecalciferol (VITAMIN D3) 125 MCG (5000 UT) capsule capsule Take 5,000 Units by mouth Daily.     • escitalopram (LEXAPRO) 20 MG tablet TAKE 1 TABLET BY MOUTH DAILY 90 tablet 1   • folic acid (FOLVITE) 400 MCG tablet Take 400 mcg by mouth daily.     • gabapentin (NEURONTIN) 300 MG capsule TAKE 2 CAPSULES BY MOUTH " EVERY NIGHT AT BEDTIME 60 capsule 2   • hydrALAZINE (APRESOLINE) 100 MG tablet Take 1 tablet by mouth 3 (Three) Times a Day. 270 tablet 3   • lidocaine-prilocaine (EMLA) 2.5-2.5 % cream Apply 30 min prior to port access 5 g 2   • melatonin 5 MG tablet tablet Take 5 mg by mouth Every Night.     • pantoprazole (PROTONIX) 40 MG EC tablet TAKE 1 TABLET BY MOUTH EVERY DAY 90 tablet 1   • phenytoin (DILANTIN) 100 MG ER capsule TAKE 3 CAPSULES BY MOUTH EVERY NIGHT AT BEDTIME 270 capsule 0   • spironolactone (ALDACTONE) 50 MG tablet Take 1 tablet by mouth Daily. 90 tablet 3   • vitamin B-12 (CYANOCOBALAMIN) 100 MCG tablet Take 100 mcg by mouth Daily.     • vitamin B-6 (PYRIDOXINE) 100 MG tablet Take 100 mg by mouth Daily.     • vitamin E 100 UNIT capsule Take 100 Units by mouth Daily.     • XARELTO 20 MG tablet Take 1 tablet by mouth Daily. 28 tablet 0     Current Facility-Administered Medications on File Prior to Visit   Medication Dose Route Frequency Provider Last Rate Last Dose   • [DISCONTINUED] heparin injection 500 Units  500 Units Intravenous PRN Chivo Iraheta MD   500 Units at 08/26/20 1355   • [DISCONTINUED] sodium chloride 0.9 % flush 10 mL  10 mL Intravenous PRN Chivo Iraheta MD   10 mL at 08/26/20 1355       ALLERGIES:     Allergies   Allergen Reactions   • Crab (Diagnostic) Itching and Rash   • Pseudoephedrine Dizziness       Social History     Socioeconomic History   • Marital status:      Spouse name: JL   • Number of children: 2   • Years of education: Not on file   • Highest education level: Not on file   Occupational History     Employer: RETIRED   Tobacco Use   • Smoking status: Never Smoker   • Smokeless tobacco: Never Used   Substance and Sexual Activity   • Alcohol use: No     Comment: Caffeine use: 1 cup daily   • Drug use: No   • Sexual activity: Defer     Birth control/protection: Surgical     Comment:  (Jl)         Cancer-related family history is not on file.  "    Review of Systems   Constitutional: Negative.    HENT: Negative.    Eyes: Negative.    Respiratory: Negative.    Cardiovascular: Negative.    Gastrointestinal: Negative.    Endocrine: Negative.    Genitourinary: Negative.    Musculoskeletal: Negative.    Skin: Negative.    Allergic/Immunologic: Negative.    Neurological: Negative.    Hematological: Negative.    Psychiatric/Behavioral: Negative.      A comprehensive 14 point review of systems was performed and was negative except as mentioned.    Objective      Vitals:    08/26/20 1347   BP: 135/64   Pulse: 59   Resp: 16   Temp: 97.1 °F (36.2 °C)   TempSrc: Skin   SpO2: 97%   Weight: 67.2 kg (148 lb 3.2 oz)   Height: 162.6 cm (64.02\")   PainSc: 0-No pain     Current Status 8/26/2020   ECOG score 0       Physical Exam   Constitutional: She is oriented to person, place, and time. She appears well-developed and well-nourished.   Eyes: Pupils are equal, round, and reactive to light.   Neck: Normal range of motion.   Cardiovascular: Normal rate.   Pulmonary/Chest: Effort normal.   Musculoskeletal: Normal range of motion.   Neurological: She is alert and oriented to person, place, and time.   Skin: Skin is warm and dry.   Psychiatric: She has a normal mood and affect.       RECENT LABS:  Results from last 7 days   Lab Units 08/26/20  1359   WBC 10*3/mm3 6.65   NEUTROS ABS 10*3/mm3 4.70   HEMOGLOBIN g/dL 11.1*   HEMATOCRIT % 33.3*   PLATELETS 10*3/mm3 24*     Results from last 7 days   Lab Units 08/26/20  1359   SODIUM mmol/L 136   POTASSIUM mmol/L 3.7   CHLORIDE mmol/L 101   CO2 mmol/L 24.3   BUN mg/dL 18   CREATININE mg/dL 0.78   CALCIUM mg/dL 8.6   ALBUMIN g/dL 4.00   BILIRUBIN mg/dL 0.3   ALK PHOS U/L 68   ALT (SGPT) U/L 11   AST (SGOT) U/L 16   GLUCOSE mg/dL 143*             Assessment/Plan   1. 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  · PET scan 5/3/18 with diffuse splenic involvement (SUV 16.9), lymphadenopathy throughout upper abdomen and retroperitoneum (SUV 13.1), right liver lesion 5 cm (SUV 12.8), pelvic lymphadenopathy up to 4 cm, bladder wall thickening with associated hypermetabolism, cervical lymphadenopathy left posterior (SUV 11.2), multiple bone lesions including left intertrochanteric femur, ribs, right scapula, pelvis as well as left gluteal hematoma versus lymphomatous lesion.  · CT-guided liver biopsy 4/26/18 with diffuse large B-cell non-Hodgkin's lymphoma, CD20 positive, double hit (BCL-6 rearrangement 85%, MYC rearrangement 79%), KI-67 4+/4  · Left cervical excisional biopsy by ENT Dr. Oconnor 5/1/18 confirming high-grade B cell non-Hodgkin's lymphoma, Ki-67 100%, double hit (BCL-6 rearrangement 76%, MYC rearrangement 85%)  · Echocardiogram 4/11/18 with ejection fraction 66.7%  · Right port placement Dr Gill 5/4/18  · Patient not felt to be a candidate for more aggressive chemotherapy due to performance status and age (DA EPOCH-R)  · Therefore treated with R CHOP chemotherapy 5/4/18.  · Followed by granix 300mcg daily beginning 5/6/18 through 5/14/18.  · Today, the patient has increasing WBC related to growth factor use, stable hemoglobin, spontaneous improvement in platelet count.  She is doing quite well following her chemotherapy and is ready to go home.  There is some concern regarding her persistently elevated LDH at 347 today as well as her escalating calcium at 11.9.  She is currently asymptomatic from the calcium and her ionized calcium is stable at 6.8.  Therefore we are going to proceed with discharge home.  She will return to the office later this week on 5/18/18 with repeat labs, nurse practitioner visit, and potential treatment with a third dose of Zometa if her calcium has continued to escalate significantly. She is now seen with plans to begin cycle 2 R CHOP chemotherapy with growth  factor support ( Neulasta on body injector).  Will schedule follow-up PET scan after 2 cycles of therapy and see her back in 3 weeks to review her status.  · Patient reviewed June 19 with near resolution of hypermetabolic sites on PET/CT.  Plans made for third cycle of CHOP R, additional Zometa and total of 6 cycles of chemotherapy anticipated.  · Patient seen August 03, 2018 for fifth cycle of chemotherapy-CHOP R, reduction of Oncovin 50%, 6 cycles planned.  Discussed subsequent CT scan follow-up.  · Follow-up scan September 19 with small low-density liver lesions and splenic lesions are decreased from previous, numerous small mesenteric and RP nodes again stable slightly smaller.  These are discussed September 24 and follow-up scan in 3 months is anticipated  · Patient reviewed November 26 further generally improved, plans made to maintain port, review at 3 months  · Patient assessed February 18, 2019, no evidence of recurrent disease, repeat scans in 3 months plan-6 months from previous  · Patient seen May 13, 2019, no evidence of recurrent disease, follow-up assessment in 6 months planned  · Patient reviewed again October 28, 2019 with follow-up scans negative for recurrence, 6 months plan follow-up at 2-year m  · Patient reviewed by Dr. Iraheta 6/3/2020, stable scans.     2. Myelosuppression with pancytopenia related to chemotherapy: Resolved      3. Multifactorial anemia:  · Related to anemia chronic disease, chemotherapy, prior iron deficiency.  · Stable Hgb at 11.1 6/30/2020     4. Hypercalcemia of malignancy:  · Calcium up to 13.8 on 4/21/18 (albumin 3.3).  Intact PTH 8.1, PTH RP less than 2.0  · Received Zometa 4 mg IV 4/25/18.  · Calcium up to 11.3 on 5/7/18 with second dose of Zometa administered 4 mg IV.  · Calcium today has continued to gradually increase up to 11.9 with albumin 3.3.  Ionized calcium however is stable at 6.8 compared to yesterday.  The patient is asymptomatic.  We will not plan to  administer a further dose of Zometa just yet and will allow further time for the patient to respond to chemotherapy.  She returned 518 for continued Zometa and when seen May 25 has a normal calcium level.  · Patient to receive Zometa June 19, subsequently every other cycle, restart low-dose calcium supplementation  · Patient seen August 03, 2018, plans made for Zometa with her final course of chemotherapy August 23, 2018  · Patient scheduled for bone density prior to assessment 3 months following November visit, potential Xgeva and follow-up as she is seen back to step to repeat scans are performed in May 2019.  As she is seen May 13 we do plan the Xgeva and then move to Prolia 6 months later for her subsequent treatment for osteopenia.  · Patient reviewed October 28, 2019 with Prolia continued every 6 months      5.  Paroxysmal atrial fibrillation:  · Recently diagnosed, anticoagulated with Eliquis initially, discontinued due to expected thrombocytopenia from chemotherapy  · Currently in sinus rhythm, continues on Lopressor 50 mg every 12 hours  · Anticoagulation restarted with Xarelto  · Is here today, 7/30/2024 samples    6. Prior seizure disorder:  · Continues currently on Dilantin 300 mg daily at bedtime and Neurontin 600 mg daily at bedtime, will return to outpatient dose of Neurontin 300 mg daily at bedtime at discharge.        7. GI prophylaxis:  · Protonix 40 mg daily, consider discontinuance in 6 months        8. Venous access:  · Port placement 5/4/18 by Dr. Gill, continue to manage q. 6 weeks        9. DVT- Continue Xarelto 20 mg by mouth daily.        Plan:  I have provided the patient with 4 units of 20 mg tablets to total a quantity of 28 tablets.  He is scheduled to return monthly for port flush and to obtain additional samples.  She will see Dr. Iraheta as scheduled in December 2020.  Have asked the patient to call with any questions or concerns prior to scheduled return.              Cc:  No  ref. provider found

## 2020-09-08 RX ORDER — PHENYTOIN SODIUM 100 MG/1
CAPSULE, EXTENDED RELEASE ORAL
Qty: 270 CAPSULE | Refills: 5 | Status: SHIPPED | OUTPATIENT
Start: 2020-09-08 | End: 2021-03-16 | Stop reason: SDUPTHER

## 2020-09-21 ENCOUNTER — DOCUMENTATION (OUTPATIENT)
Dept: PHARMACY | Facility: HOSPITAL | Age: 80
End: 2020-09-21

## 2020-09-21 ENCOUNTER — DOCUMENTATION (OUTPATIENT)
Dept: ONCOLOGY | Facility: CLINIC | Age: 80
End: 2020-09-21

## 2020-09-21 NOTE — PROGRESS NOTES
Pt is on the schedule for Wednesday at [facility] to  Xarelto 20 mg samples.    I have asked Karen/Lily/Natasha to place 4 bottles of Xarelto 20 mg into the [employer].

## 2020-09-21 NOTE — PROGRESS NOTES
Site of Appt/Pickup: (n) [place]; (y) [employer]; (n) [facility];    Prescriber (ariana) requested the following medication samples to be given to the patient during the Nurse Practitioner visit.    NDC:  86275-913-68  Drug name: xarelto  Strength: 20mg  Total Quantity:  28 (Containers- 4 X Units per Containers- 7)  Lot#:  [id number]  Expiration: 5/22    Product to be placed in the [employer] under 'Patient Specialty Medication' entry and is to be removed at time of visit.

## 2020-09-23 ENCOUNTER — INFUSION (OUTPATIENT)
Dept: ONCOLOGY | Facility: HOSPITAL | Age: 80
End: 2020-09-23

## 2020-09-23 ENCOUNTER — OFFICE VISIT (OUTPATIENT)
Dept: ONCOLOGY | Facility: CLINIC | Age: 80
End: 2020-09-23

## 2020-09-23 VITALS
SYSTOLIC BLOOD PRESSURE: 150 MMHG | BODY MASS INDEX: 25.74 KG/M2 | OXYGEN SATURATION: 98 % | DIASTOLIC BLOOD PRESSURE: 71 MMHG | RESPIRATION RATE: 16 BRPM | TEMPERATURE: 97.1 F | HEIGHT: 64 IN | HEART RATE: 61 BPM | WEIGHT: 150.8 LBS

## 2020-09-23 DIAGNOSIS — D69.6 THROMBOCYTOPENIA (HCC): Primary | ICD-10-CM

## 2020-09-23 DIAGNOSIS — Z45.2 ENCOUNTER FOR ADJUSTMENT OR MANAGEMENT OF VASCULAR ACCESS DEVICE: ICD-10-CM

## 2020-09-23 DIAGNOSIS — C85.91 LYMPHOMA OF LYMPH NODES OF NECK, UNSPECIFIED LYMPHOMA TYPE (HCC): ICD-10-CM

## 2020-09-23 DIAGNOSIS — C79.51 BONE METASTASIS: ICD-10-CM

## 2020-09-23 LAB
ALBUMIN SERPL-MCNC: 4.3 G/DL (ref 3.5–5.2)
ALBUMIN/GLOB SERPL: 1.6 G/DL (ref 1.1–2.4)
ALP SERPL-CCNC: 77 U/L (ref 38–116)
ALT SERPL W P-5'-P-CCNC: 12 U/L (ref 0–33)
ANION GAP SERPL CALCULATED.3IONS-SCNC: 8.7 MMOL/L (ref 5–15)
AST SERPL-CCNC: 17 U/L (ref 0–32)
BASOPHILS # BLD AUTO: 0.04 10*3/MM3 (ref 0–0.2)
BASOPHILS NFR BLD AUTO: 0.6 % (ref 0–1.5)
BILIRUB SERPL-MCNC: 0.3 MG/DL (ref 0.2–1.2)
BUN SERPL-MCNC: 16 MG/DL (ref 6–20)
BUN/CREAT SERPL: 21.3 (ref 7.3–30)
CALCIUM SPEC-SCNC: 9.3 MG/DL (ref 8.5–10.2)
CHLORIDE SERPL-SCNC: 101 MMOL/L (ref 98–107)
CO2 SERPL-SCNC: 26.3 MMOL/L (ref 22–29)
CREAT SERPL-MCNC: 0.75 MG/DL (ref 0.6–1.1)
DEPRECATED RDW RBC AUTO: 42.6 FL (ref 37–54)
EOSINOPHIL # BLD AUTO: 0.19 10*3/MM3 (ref 0–0.4)
EOSINOPHIL NFR BLD AUTO: 2.7 % (ref 0.3–6.2)
ERYTHROCYTE [DISTWIDTH] IN BLOOD BY AUTOMATED COUNT: 12.3 % (ref 12.3–15.4)
GFR SERPL CREATININE-BSD FRML MDRD: 74 ML/MIN/1.73
GLOBULIN UR ELPH-MCNC: 2.7 GM/DL (ref 1.8–3.5)
GLUCOSE SERPL-MCNC: 94 MG/DL (ref 74–124)
HCT VFR BLD AUTO: 35.4 % (ref 34–46.6)
HGB BLD-MCNC: 11.6 G/DL (ref 12–15.9)
IMM GRANULOCYTES # BLD AUTO: 0.01 10*3/MM3 (ref 0–0.05)
IMM GRANULOCYTES NFR BLD AUTO: 0.1 % (ref 0–0.5)
LYMPHOCYTES # BLD AUTO: 1.46 10*3/MM3 (ref 0.7–3.1)
LYMPHOCYTES NFR BLD AUTO: 21 % (ref 19.6–45.3)
MCH RBC QN AUTO: 31.2 PG (ref 26.6–33)
MCHC RBC AUTO-ENTMCNC: 32.8 G/DL (ref 31.5–35.7)
MCV RBC AUTO: 95.2 FL (ref 79–97)
MONOCYTES # BLD AUTO: 0.89 10*3/MM3 (ref 0.1–0.9)
MONOCYTES NFR BLD AUTO: 12.8 % (ref 5–12)
NEUTROPHILS NFR BLD AUTO: 4.35 10*3/MM3 (ref 1.7–7)
NEUTROPHILS NFR BLD AUTO: 62.8 % (ref 42.7–76)
NRBC BLD AUTO-RTO: 0 /100 WBC (ref 0–0.2)
PLATELET # BLD AUTO: 45 10*3/MM3 (ref 140–450)
PLATELETS.RETICULATED NFR BLD AUTO: 7.6 % (ref 0.9–6.5)
PMV BLD AUTO: 10.2 FL (ref 6–12)
POTASSIUM SERPL-SCNC: 4.2 MMOL/L (ref 3.5–4.7)
PROT SERPL-MCNC: 7 G/DL (ref 6.3–8)
RBC # BLD AUTO: 3.72 10*6/MM3 (ref 3.77–5.28)
SODIUM SERPL-SCNC: 136 MMOL/L (ref 134–145)
WBC # BLD AUTO: 6.94 10*3/MM3 (ref 3.4–10.8)

## 2020-09-23 PROCEDURE — 85055 RETICULATED PLATELET ASSAY: CPT

## 2020-09-23 PROCEDURE — 85025 COMPLETE CBC W/AUTO DIFF WBC: CPT

## 2020-09-23 PROCEDURE — 80053 COMPREHEN METABOLIC PANEL: CPT

## 2020-09-23 PROCEDURE — 36415 COLL VENOUS BLD VENIPUNCTURE: CPT

## 2020-09-23 PROCEDURE — 99214 OFFICE O/P EST MOD 30 MIN: CPT | Performed by: NURSE PRACTITIONER

## 2020-09-23 PROCEDURE — 36591 DRAW BLOOD OFF VENOUS DEVICE: CPT

## 2020-09-23 PROCEDURE — 25010000003 HEPARIN LOCK FLUSH PER 10 UNITS: Performed by: INTERNAL MEDICINE

## 2020-09-23 RX ORDER — SODIUM CHLORIDE 0.9 % (FLUSH) 0.9 %
10 SYRINGE (ML) INJECTION AS NEEDED
Status: DISCONTINUED | OUTPATIENT
Start: 2020-09-23 | End: 2020-09-23 | Stop reason: HOSPADM

## 2020-09-23 RX ORDER — HEPARIN SODIUM (PORCINE) LOCK FLUSH IV SOLN 100 UNIT/ML 100 UNIT/ML
500 SOLUTION INTRAVENOUS AS NEEDED
Status: CANCELLED | OUTPATIENT
Start: 2020-09-23

## 2020-09-23 RX ORDER — SODIUM CHLORIDE 0.9 % (FLUSH) 0.9 %
10 SYRINGE (ML) INJECTION AS NEEDED
Status: CANCELLED | OUTPATIENT
Start: 2020-09-23

## 2020-09-23 RX ORDER — HEPARIN SODIUM (PORCINE) LOCK FLUSH IV SOLN 100 UNIT/ML 100 UNIT/ML
500 SOLUTION INTRAVENOUS AS NEEDED
Status: DISCONTINUED | OUTPATIENT
Start: 2020-09-23 | End: 2020-09-23 | Stop reason: HOSPADM

## 2020-09-23 RX ADMIN — SODIUM CHLORIDE, PRESERVATIVE FREE 500 UNITS: 5 INJECTION INTRAVENOUS at 13:40

## 2020-09-23 RX ADMIN — Medication 10 ML: at 13:40

## 2020-09-23 NOTE — PROGRESS NOTES
"  Subjective .     REASONS FOR FOLLOWUP:    1. 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 Paroxysmal atrial fibrillation  3. History of DVT    HISTORY OF PRESENT ILLNESS:  The patient is a 80 y.o. year old female who is here for follow-up with the above-mentioned history.    History of Present Illness    Ms. Blake is here today to  Xarelto samples.  She has been on Xarelto for atrial fibrillation and history of DVT.  Mediport also flushed today.    Patient denies any recent bleeding, dark stools, or unexplained bruising.  She does have a lesion on her right calf however this is been present for over a year.  She is asking if this could be left over from when she had swelling secondary to the blood clots.  She denies any recent increase in shortness of breath.  She has started spironolactone per the direction of cardiology within the past 3 to 4 months.  She denies fevers, night sweats, weight loss.  She denies taking NSAIDs.    Past Medical History:   Diagnosis Date   • Anemia     multifactorial   • Cystitis    • Cystocele     moderate   • Drug therapy    • Dyslipidemia    • Esophageal reflux    • Fatigue    • History of transfusion     no reaction   • Hypercalcemia    • Hypertension    • Major depression, chronic    • Menopause    • Non Hodgkin's lymphoma (CMS/HCC)    • Osteoarthritis    • Osteoporosis    • Palpitations    • Paroxysmal atrial fibrillation (CMS/HCC)    • Post menopausal problems    • RLS (restless legs syndrome)    • Seizure disorder (CMS/HCC)    • Sinus bradycardia    • Subjective tinnitus    • Vaginal delivery     x2  \"SONYA\"      \"JASON\"   • Vitamin D deficiency        ONCOLOGIC HISTORY:  (History from previous dates can be found in the separate document.)    Current Outpatient Medications on File Prior to Visit   Medication Sig Dispense Refill   • amLODIPine (NORVASC) 10 MG tablet TAKE 1 TABLET BY MOUTH EVERY DAY 90 tablet 1   • benazepril (LOTENSIN) " 40 MG tablet TAKE 1 TABLET BY MOUTH EVERY DAY 90 tablet 1   • cetirizine (zyrTEC) 10 MG tablet Take 10 mg by mouth Daily.     • Cholecalciferol (VITAMIN D3) 125 MCG (5000 UT) capsule capsule Take 5,000 Units by mouth Daily.     • escitalopram (LEXAPRO) 20 MG tablet TAKE 1 TABLET BY MOUTH DAILY 90 tablet 1   • folic acid (FOLVITE) 400 MCG tablet Take 400 mcg by mouth daily.     • gabapentin (NEURONTIN) 300 MG capsule TAKE 2 CAPSULES BY MOUTH EVERY NIGHT AT BEDTIME 60 capsule 2   • hydrALAZINE (APRESOLINE) 100 MG tablet Take 1 tablet by mouth 3 (Three) Times a Day. 270 tablet 3   • lidocaine-prilocaine (EMLA) 2.5-2.5 % cream Apply 30 min prior to port access 5 g 2   • melatonin 5 MG tablet tablet Take 5 mg by mouth Every Night.     • pantoprazole (PROTONIX) 40 MG EC tablet TAKE 1 TABLET BY MOUTH EVERY DAY 90 tablet 1   • phenytoin (DILANTIN) 100 MG ER capsule TAKE 3 CAPSULES BY MOUTH EVERY NIGHT AT BEDTIME 270 capsule 5   • spironolactone (ALDACTONE) 50 MG tablet Take 1 tablet by mouth Daily. 90 tablet 3   • vitamin B-12 (CYANOCOBALAMIN) 100 MCG tablet Take 100 mcg by mouth Daily.     • vitamin B-6 (PYRIDOXINE) 100 MG tablet Take 100 mg by mouth Daily.     • vitamin E 100 UNIT capsule Take 100 Units by mouth Daily.     • XARELTO 20 MG tablet Take 1 tablet by mouth Daily. 28 tablet 0     Current Facility-Administered Medications on File Prior to Visit   Medication Dose Route Frequency Provider Last Rate Last Dose   • heparin injection 500 Units  500 Units Intravenous PRN Chivo Iraheta MD   500 Units at 09/23/20 1340   • sodium chloride 0.9 % flush 10 mL  10 mL Intravenous PRN Chivo Iraheta MD   10 mL at 09/23/20 1340       ALLERGIES:     Allergies   Allergen Reactions   • Crab (Diagnostic) Itching and Rash   • Pseudoephedrine Dizziness       Social History     Socioeconomic History   • Marital status:      Spouse name: POOJA   • Number of children: 2   • Years of education: Not on file   • Highest  "education level: Not on file   Occupational History     Employer: RETIRED   Tobacco Use   • Smoking status: Never Smoker   • Smokeless tobacco: Never Used   Substance and Sexual Activity   • Alcohol use: No     Comment: Caffeine use: 1 cup daily   • Drug use: No   • Sexual activity: Defer     Birth control/protection: Surgical     Comment:  (Jl)         Cancer-related family history is not on file.     Review of Systems   Constitutional: Negative.    HENT: Negative.    Eyes: Negative.    Respiratory: Negative.    Cardiovascular: Negative.    Gastrointestinal: Negative.    Endocrine: Negative.    Genitourinary: Negative.    Musculoskeletal: Negative.    Skin: Negative.    Allergic/Immunologic: Negative.    Neurological: Negative.    Hematological: Negative.    Psychiatric/Behavioral: Negative.          Objective      Vitals:    09/23/20 1347   Height: 162.6 cm (64.02\")     Current Status 8/26/2020   ECOG score 0       Physical Exam   Constitutional: She is oriented to person, place, and time. She appears well-developed.   HENT:   Head: Normocephalic and atraumatic.   Eyes: Pupils are equal, round, and reactive to light.   Neck: Normal range of motion. Neck supple.   Cardiovascular: Normal rate.   Pulmonary/Chest: Effort normal and breath sounds normal. She has no wheezes.   Abdominal: Normal appearance and bowel sounds are normal. She exhibits no distension. There is no abdominal tenderness.   Musculoskeletal: Normal range of motion.   Neurological: She is alert and oriented to person, place, and time.   Skin: Skin is warm and dry. Lesion (darkened area right calf, present over 1 year) noted.   Scant scattered petechiae bilateral shins   Psychiatric: Her behavior is normal. Mood normal.   Vitals reviewed.      RECENT LABS:  Results from last 7 days   Lab Units 09/23/20  1345   WBC 10*3/mm3 6.94   NEUTROS ABS 10*3/mm3 4.35   HEMOGLOBIN g/dL 11.6*   HEMATOCRIT % 35.4   PLATELETS 10*3/mm3 45*           "       Assessment/Plan   1. 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  · PET scan 5/3/18 with diffuse splenic involvement (SUV 16.9), lymphadenopathy throughout upper abdomen and retroperitoneum (SUV 13.1), right liver lesion 5 cm (SUV 12.8), pelvic lymphadenopathy up to 4 cm, bladder wall thickening with associated hypermetabolism, cervical lymphadenopathy left posterior (SUV 11.2), multiple bone lesions including left intertrochanteric femur, ribs, right scapula, pelvis as well as left gluteal hematoma versus lymphomatous lesion.  · CT-guided liver biopsy 4/26/18 with diffuse large B-cell non-Hodgkin's lymphoma, CD20 positive, double hit (BCL-6 rearrangement 85%, MYC rearrangement 79%), KI-67 4+/4  · Left cervical excisional biopsy by ENT Dr. Oconnor 5/1/18 confirming high-grade B cell non-Hodgkin's lymphoma, Ki-67 100%, double hit (BCL-6 rearrangement 76%, MYC rearrangement 85%)  · Echocardiogram 4/11/18 with ejection fraction 66.7%  · Right port placement Dr Gill 5/4/18  · Patient not felt to be a candidate for more aggressive chemotherapy due to performance status and age (DA EPOCH-R)  · Therefore treated with R CHOP chemotherapy 5/4/18.  · Followed by granix 300mcg daily beginning 5/6/18 through 5/14/18.  · Today, the patient has increasing WBC related to growth factor use, stable hemoglobin, spontaneous improvement in platelet count.  She is doing quite well following her chemotherapy and is ready to go home.  There is some concern regarding her persistently elevated LDH at 347 today as well as her escalating calcium at 11.9.  She is currently asymptomatic from the calcium and her ionized calcium is stable at 6.8.  Therefore we are going to proceed with discharge home.  She will return to the office later this week on 5/18/18 with repeat labs, nurse  practitioner visit, and potential treatment with a third dose of Zometa if her calcium has continued to escalate significantly. She is now seen with plans to begin cycle 2 R CHOP chemotherapy with growth factor support ( Neulasta on body injector).  Will schedule follow-up PET scan after 2 cycles of therapy and see her back in 3 weeks to review her status.  · Patient reviewed June 19 with near resolution of hypermetabolic sites on PET/CT.  Plans made for third cycle of CHOP R, additional Zometa and total of 6 cycles of chemotherapy anticipated.  · Patient seen August 03, 2018 for fifth cycle of chemotherapy-CHOP R, reduction of Oncovin 50%, 6 cycles planned.  Discussed subsequent CT scan follow-up.  · Follow-up scan September 19 with small low-density liver lesions and splenic lesions are decreased from previous, numerous small mesenteric and RP nodes again stable slightly smaller.  These are discussed September 24 and follow-up scan in 3 months is anticipated  · Patient reviewed November 26 further generally improved, plans made to maintain port, review at 3 months  · Patient assessed February 18, 2019, no evidence of recurrent disease, repeat scans in 3 months plan-6 months from previous  · Patient seen May 13, 2019, no evidence of recurrent disease, follow-up assessment in 6 months planned  · Patient reviewed again October 28, 2019 with follow-up scans negative for recurrence, 6 months plan follow-up at 2-year m  · Patient reviewed by Dr. Iraheta 6/3/2020, stable scans.     2. Thrombocytopenia.  Patient had previous myelosuppression with chemo therapy however this had resolved.  It is noted when looking back today that her platelet count has declined following her visit in May.  She denies any recent bleeding.  She has however had in a new medication with Spironolactone within the past 3 to 4 months from cardiology.  As this can cause thrombocytopenia 1 would have to consider this to be the case.  We will perform  an IPF today as well as an antiplatelet antibody.  We will have her hold her spironolactone as well as her Xarelto.      3. Multifactorial anemia:  · Related to anemia chronic disease, chemotherapy, prior iron deficiency.  · Hemoglobin today, 9/23/2020, 11.6     4. Hypercalcemia of malignancy:  · Calcium up to 13.8 on 4/21/18 (albumin 3.3).  Intact PTH 8.1, PTH RP less than 2.0  · Received Zometa 4 mg IV 4/25/18.  · Calcium up to 11.3 on 5/7/18 with second dose of Zometa administered 4 mg IV.  · Calcium today has continued to gradually increase up to 11.9 with albumin 3.3.  Ionized calcium however is stable at 6.8 compared to yesterday.  The patient is asymptomatic.  We will not plan to administer a further dose of Zometa just yet and will allow further time for the patient to respond to chemotherapy.  She returned 518 for continued Zometa and when seen May 25 has a normal calcium level.  · Patient to receive Zometa June 19, subsequently every other cycle, restart low-dose calcium supplementation  · Patient seen August 03, 2018, plans made for Zometa with her final course of chemotherapy August 23, 2018  · Patient scheduled for bone density prior to assessment 3 months following November visit, potential Xgeva and follow-up as she is seen back to step to repeat scans are performed in May 2019.  As she is seen May 13 we do plan the Xgeva and then move to Prolia 6 months later for her subsequent treatment for osteopenia.  · Prolia continued every 6 months, last given 6/3/2020      5.  Paroxysmal atrial fibrillation:  · Recently diagnosed, anticoagulated with Eliquis initially, discontinued due to expected thrombocytopenia from chemotherapy  · Currently in sinus rhythm, continues on Lopressor 50 mg every 12 hours  · Anticoagulation restarted with Xarelto  · Is here today, 9/23/2020 for samples    6. Prior seizure disorder:  · Continues currently on Dilantin 300 mg daily at bedtime and Neurontin 600 mg daily at bedtime,  will return to outpatient dose of Neurontin 300 mg daily at bedtime at discharge.        7. GI prophylaxis:  · Protonix 40 mg daily, consider discontinuance in 6 months        8. Venous access:  · Port placement 5/4/18 by Dr. Gill, continue to manage q. 6 weeks        9. DVT-hold Xarelto 20 mg by mouth daily secondary to thrombocytopenia.        Plan:  1. Hold Xarelto secondary to thrombocytopenia  2. IPF pending  3. Antiplatelet antibody obtained today  4. Hold Spironolactone, asked patient to notify cardiology.  We will also send him an office note.  5. Follow-up with Dr. Iraheta in 1 month repeat CBC with IPF.  6. Plan of care discussed with Dr. Iraheta today.              Cc:  No ref. provider found

## 2020-09-24 RX ORDER — GABAPENTIN 300 MG/1
600 CAPSULE ORAL
Qty: 60 CAPSULE | Refills: 0 | Status: SHIPPED | OUTPATIENT
Start: 2020-09-24 | End: 2020-10-26 | Stop reason: SDUPTHER

## 2020-10-20 DIAGNOSIS — F33.9 CHRONIC RECURRENT MAJOR DEPRESSIVE DISORDER (HCC): ICD-10-CM

## 2020-10-20 RX ORDER — ESCITALOPRAM OXALATE 20 MG/1
20 TABLET ORAL DAILY
Qty: 90 TABLET | Refills: 1 | Status: SHIPPED | OUTPATIENT
Start: 2020-10-20 | End: 2021-04-12

## 2020-10-20 RX ORDER — AMLODIPINE BESYLATE 10 MG/1
TABLET ORAL
Qty: 90 TABLET | Refills: 1 | Status: SHIPPED | OUTPATIENT
Start: 2020-10-20 | End: 2021-04-19

## 2020-10-23 NOTE — PROGRESS NOTES
Subjective .  Patient with continued excellent performance status    REASONS FOR FOLLOWUP:    1. 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 female who we are asked to see April 29, 2018 in consultation for hypercalcemia, anemia and radiologic findings consistent with likely malignancy-?  Lymphoma.        She has a significant past medical history of palpitations followed by cardiology.  She presented to the emergency room April 10-14 with chest pain and palpitations and was found to be in A. fib with RVR and also demonstrated electrolyte abnormalities and anemia.  Records demonstrates that her anemia became particularly evident right April 11 ruptured you an H&H of 8.9 28.4 by can 6510, platelet count 212,000 with a normal automated differential.  She underwent GI assessment including upper and lower endoscopy demonstrated hernia, gastritis, esophagitis, diverticulosis but no evidence of acute bleed.  She been placed on Eliquis as result of a relatively high CHADSVASc score.  She had also been found to be hypercalcemic at approximately 11 with HCTZ altered and the patient started on Aldactone.  Additional testing had included a ferritin of 313.1, iron of 32 with TIBC of 222 and iron saturation of 14, occult blood negative per stool testing    She was brought back to the emergency room April 21 with further evidence of hypercalcemia, associated weakness, anorexia, nausea, ataxia and weight loss.  MRI of the brain April 21 was found to be unremarkable. Outpatient intact PTH levels were found to be 7.1(reduced), and an H&H of 10.3 and 32.9, white count 11,090, platelet count 267,000 with a normal differential.  Patient seen by renal medicine with the possibility of Fanconi syndrome raised.  Thereafter PTH hormone was found be less than 2.0, and prophylactic paresis included IgG of 948, IgA of 207, IgM 86, total  protein 6.3, albumin 2.8, no M spike noted, slightly elevated free kappa and lambda light chains with normal ratio.  Repeat testing per iron profile again revealed low serum iron but high serum ferritin and normal CEA, normal AFP, CA-125 of 77.2, CA 19-9 7.8   At this point the reason for her hypercalcemia was thought to be possible medication effect and/or possible malignancy with calcium was running between 12 and 13 mg/dL.    This led to CT scan of chest abdomen pelvis performed April 24 demonstrate extensive metastatic disease throughout the abdomen and pelvis particularly involving the spleen and liver, extensive lymphadenopathy at the abdomen and pelvis with a 4 cm lesion splenic hilum partially encasing the pancreatic tail, pancreatic tail malignancy?,  Gastric primary malignancy? There was an approximately a 5 cm mass along the right wall of the urinary bladder with adjacent adenopathy.  CT of the chest revealed several tiny dense pleural-based nodular opacities-partially calcified granulomas, no mediastinal or hilar adenopathy, enlarged pulmonary arteries consistent with pulmonary arterial hypertension.  His subsequent bone scan performed April 26 revealed prominent uptake in the right frontal bone and right posterior ninth rib and a CT needle biopsy of the liver was obtained April 26 as well.The sample obtained revealed, on flow cytometry examination, a subpopulation of normal B cells that might be  B-cell lymphoma.  This was eventually felt consistent with diffuse large B-cell non-Hodgkin's lymphoma, CD20 positive, double hit (BCL-6 rearrangement 85%, MYC rearrangement 79%), KI-67 4+/4. This led to the patient receiving R CHOP chemotherapy May 04, 2018 followed by Granix.  She is able to be discharged May 16, 2018.    We made plans for her to be seen in follow-up for continued Zometa and a second cycle of CHOP R chemotherapy by May 25 with follow-up PET/CT after 2 cycles of treatment.  She is seen back in  office May 18 given Zometa with findings of potential right lower extremity DVT.  Doppler is positive for acute popliteal and calf vein DVT as well as superficial venous thrombosis and the patient was started on Xarelto.  She was seen again May 21 mouth additional redness and swelling per her right elbow and felt to have cellulitis started on Keflex as an outpatient.  She had been taking the Xarelto with some improvement in her right lower extremity.    The patient's next seen family history 25th 2018 and, fortunately, her cellulitis is resolving as well is her thrombosis per right lower extremity, albeit slowly.     As the patient's second cycle of CHOP R she underwent additional scans including repeat PET/CT.  This demonstrates a dramatic response with resolution of splenomegaly and associated hypermetabolic activity, resolution of hypermetabolic liver lesion, lymphadenopathy and bony metastasis as well as reduction left gluteal lesion.  She has near remission after these 2 cycles of chemotherapy and we discussed continuing chemotherapy with total of 6 cycles of CHOP R.  We will plan the additional Zometa today and plan for choose every other cycle at this point.  The patient is next seen August 03, 2018 having done well with treatment except for development of a constipation that was difficult to manage.  It is now resolved but we will be reducing her Oncovin for the remainder of treatments.   The patient went on to take her sixth lack of treatment August 23, 2015.As result of recurrent issues with urinary tract symptoms, urgency incontinence and dysuria the patient was seen by urology September 7.  She underwent additional scans,started Myrbetriq and prophylactic Macrobid.  As she is seen September 24 she actually feels improved neurologically.  We discussed her scans as well and they are improved though not completely normal and close follow-up will be necessary.  Treatment.    The patient is next seen November  26, 2018 fortunately feeling better in terms of stamina and performance status.  We discussed continuing follow-up with maintenance of report an every 6 weeks and, potentially, using Prolia after bone density is planned as a method also help maintain taking some vigilance per hypercalcemia treatment.  The patient is next assessed February 18, 2019 with an excellent performance status.  She has had no additional issues since last visit we discussed repeat scanning in approximately 3 months which would be 6 months from her previous studies.  The patient proceeded to follow-up scans performed May 2, 2019 finding no additional pathologically enlarged lymph nodes in the chest abdomen or pelvis, spleen with interval decrease in size, multiple low-density lesions in the spleen also reduced from previous.  CMP with no significant abnormality though alk phos was 120.  The patient continues to feel well and just returned from a [place] vacation.  We have discussed the Xgeva today and then switching to Prolia for her osteopenia in 6 months.  The patient is seen in follow-up October 28, 2019.  Follow-up scans demonstrate no significant change from previous including small retroperitoneal and mesenteric lymph nodes.       She continues to feel, wonderfully, well but indicates she would like to maintain her port as possible.  We discussed having her studied at 2 years which would be 6 months from now we discussed whether to maintain the port or not.  The patient continued port flushes and follow-up scans were performed May 22, 2020 with stable disease and no evidence of recurrent findings.  There are small retroperitoneal and mesenteric lymph nodes without interval change.  Previously noted splenic lesions are smaller than compared to previous.     The patient is seen in the office Freya 3 and feeling well, performance status has normalized as has her weight and appetite.  We discussed maintaining her port and she agrees.    Past  "Medical History:   Diagnosis Date   • Anemia     multifactorial   • Cystitis    • Cystocele     moderate   • Drug therapy    • Dyslipidemia    • Esophageal reflux    • Fatigue    • History of transfusion     no reaction   • Hypercalcemia    • Hypertension    • Major depression, chronic    • Menopause    • Non Hodgkin's lymphoma (CMS/HCC)    • Osteoarthritis    • Osteoporosis    • Palpitations    • Paroxysmal atrial fibrillation (CMS/HCC)    • Post menopausal problems    • RLS (restless legs syndrome)    • Seizure disorder (CMS/HCC)    • Sinus bradycardia    • Subjective tinnitus    • Vaginal delivery     x2  \"SONYA\"      \"JASON\"   • Vitamin D deficiency        ONCOLOGIC HISTORY:  (History from previous dates can be found in the separate document.)    Current Outpatient Medications on File Prior to Visit   Medication Sig Dispense Refill   • amLODIPine (NORVASC) 10 MG tablet TAKE 1 TABLET BY MOUTH EVERY DAY 90 tablet 1   • benazepril (LOTENSIN) 40 MG tablet TAKE 1 TABLET BY MOUTH EVERY DAY 90 tablet 1   • cetirizine (zyrTEC) 10 MG tablet Take 10 mg by mouth Daily.     • Cholecalciferol (VITAMIN D3) 125 MCG (5000 UT) capsule capsule Take 5,000 Units by mouth Daily.     • escitalopram (LEXAPRO) 20 MG tablet TAKE 1 TABLET BY MOUTH DAILY 90 tablet 1   • folic acid (FOLVITE) 400 MCG tablet Take 400 mcg by mouth daily.     • gabapentin (NEURONTIN) 300 MG capsule TAKE 2 CAPSULES BY MOUTH EVERY NIGHT AT BEDTIME 60 capsule 0   • hydrALAZINE (APRESOLINE) 100 MG tablet Take 1 tablet by mouth 3 (Three) Times a Day. 270 tablet 3   • lidocaine-prilocaine (EMLA) 2.5-2.5 % cream Apply 30 min prior to port access 5 g 2   • melatonin 5 MG tablet tablet Take 5 mg by mouth Every Night.     • pantoprazole (PROTONIX) 40 MG EC tablet TAKE 1 TABLET BY MOUTH EVERY DAY 90 tablet 1   • phenytoin (DILANTIN) 100 MG ER capsule TAKE 3 CAPSULES BY MOUTH EVERY NIGHT AT BEDTIME 270 capsule 5   • spironolactone (ALDACTONE) 50 MG tablet Take 1 tablet by " mouth Daily. 90 tablet 3   • vitamin B-12 (CYANOCOBALAMIN) 100 MCG tablet Take 100 mcg by mouth Daily.     • vitamin B-6 (PYRIDOXINE) 100 MG tablet Take 100 mg by mouth Daily.     • vitamin E 100 UNIT capsule Take 100 Units by mouth Daily.     • XARELTO 20 MG tablet Take 1 tablet by mouth Daily. 28 tablet 0     No current facility-administered medications on file prior to visit.        ALLERGIES:     Allergies   Allergen Reactions   • Crab (Diagnostic) Itching and Rash   • Pseudoephedrine Dizziness       Social History     Socioeconomic History   • Marital status:      Spouse name: JL   • Number of children: 2   • Years of education: Not on file   • Highest education level: Not on file   Occupational History     Employer: RETIRED   Tobacco Use   • Smoking status: Never Smoker   • Smokeless tobacco: Never Used   Substance and Sexual Activity   • Alcohol use: No     Comment: Caffeine use: 1 cup daily   • Drug use: No   • Sexual activity: Defer     Birth control/protection: Surgical     Comment:  (Jl)         Cancer-related family history is not on file.     Review of Systems   Constitutional: Negative for fatigue.   HENT: Negative.    Eyes: Negative.    Respiratory: Negative.  Negative for chest tightness, shortness of breath and wheezing.    Cardiovascular: Negative.    Gastrointestinal: Negative.  Negative for abdominal pain, constipation, diarrhea, nausea and vomiting.   Endocrine: Negative.    Genitourinary: Negative.    Musculoskeletal: Negative.    Skin: Positive for rash.        Photosensitivity lower extremities   Neurological: Negative.  Negative for weakness.   Hematological: Negative.    Psychiatric/Behavioral: Negative.          Objective      There were no vitals filed for this visit.  Current Status 9/23/2020   ECOG score 0       Physical Exam   Cardiovascular: Normal rate, regular rhythm and normal heart sounds.   Pulmonary/Chest: Effort normal and breath sounds normal.    Abdominal: Soft. Bowel sounds are normal.   Skin:   Erythematous macular rash bilateral lower extremities mild       Constitutional: She is oriented to person, place, and time. She appears well-developed and well-nourished. No distress.   Seated in wheel chair   HENT:   Head: Normocephalic and atraumatic.   Eyes: Pupils are equal, round, and reactive to light.   Pulmonary/Chest: Effort normal. No respiratory distress.   Musculoskeletal: Normal range of motion. She exhibits no current edema  Neurological: She is alert and oriented to person, place, and time.   Skin: Skin is warm and dry. No rash noted.    tenderness.  There is no obvious trauma.    Psychiatric: She has a normal mood and affect.   Vitals reviewed.        RECENT LABS:  Hematology WBC   Date Value Ref Range Status   09/23/2020 6.94 3.40 - 10.80 10*3/mm3 Final     RBC   Date Value Ref Range Status   09/23/2020 3.72 (L) 3.77 - 5.28 10*6/mm3 Final     Hemoglobin   Date Value Ref Range Status   09/23/2020 11.6 (L) 12.0 - 15.9 g/dL Final     Hematocrit   Date Value Ref Range Status   09/23/2020 35.4 34.0 - 46.6 % Final     Platelets   Date Value Ref Range Status   09/23/2020 45 (L) 140 - 450 10*3/mm3 Final      CT chest, abdomen and pelvis with IV contrast May 22, 2020  CT CHEST: A right-sided chest wall port is seen with tip at the  cavoatrial junction. The visualized thyroid gland is normal. No  pathological mediastinal or hilar lymphadenopathy identified. Dilated  main pulmonary artery suggestive of pulmonary arterial hypertension. No  pathological axillary lymphadenopathy. The central airways are patent  without endobronchial lesion. No new suspicious pulmonary nodule is  seen.     CT ABDOMEN AND PELVIS:The liver demonstrates normal attenuation. No  focal hepatic lesions or intrahepatic biliary dilatation is seen. The  spleen is normal in size and attenuation. Although evaluation of the  spleen is limited secondary to motion artifact at this level,  multiple  previously demonstrated hypoattenuating lesions appear less  prominent/smaller on the current study. The pancreas, bilateral adrenal  gland and kidneys are normal. The urinary bladder is partially distended  and normal. No evidence of bowel obstruction. Colonic diverticulosis is  present. There is mesenteric stranding and small lymph nodes  particularly within the right aspect of the mesentery that is without  interim change from prior imaging. Dense central mesenteric  calcifications are also without interim change. Subcentimeter  retroperitoneal lymph nodes are without interim change. Index right  external iliac lymph node measures 1.2 cm in short axis dimension  unchanged. Stable sclerotic lesion within the L2 vertebral body.     IMPRESSION:  Stable exam. No convincing evidence of recurrent disease or  lymphadenopathy within the chest, abdomen or pelvis. Multiple small  retroperitoneal and mesenteric lymph nodes are without interim change.  Also somewhat limited by motion artifact, the splenic lesions are  smaller/less obvious on the current imaging.         Assessment/Plan     1. 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  · PET scan 5/3/18 with diffuse splenic involvement (SUV 16.9), lymphadenopathy throughout upper abdomen and retroperitoneum (SUV 13.1), right liver lesion 5 cm (SUV 12.8), pelvic lymphadenopathy up to 4 cm, bladder wall thickening with associated hypermetabolism, cervical lymphadenopathy left posterior (SUV 11.2), multiple bone lesions including left intertrochanteric femur, ribs, right scapula, pelvis as well as left gluteal hematoma versus lymphomatous lesion.  · CT-guided liver biopsy 4/26/18 with diffuse large B-cell non-Hodgkin's lymphoma, CD20 positive, double hit (BCL-6 rearrangement 85%, MYC rearrangement 79%), KI-67 4+/4  · Left  cervical excisional biopsy by ENT Dr. Oconnor 5/1/18 confirming high-grade B cell non-Hodgkin's lymphoma, Ki-67 100%, double hit (BCL-6 rearrangement 76%, MYC rearrangement 85%)  · Echocardiogram 4/11/18 with ejection fraction 66.7%  · Right port placement Dr Gill 5/4/18  · Patient not felt to be a candidate for more aggressive chemotherapy due to performance status and age (DA EPOCH-R)  · Therefore treated with R CHOP chemotherapy 5/4/18.  · Followed by granix 300mcg daily beginning 5/6/18 through 5/14/18.  · Today, the patient has increasing WBC related to growth factor use, stable hemoglobin, spontaneous improvement in platelet count.  She is doing quite well following her chemotherapy and is ready to go home.  There is some concern regarding her persistently elevated LDH at 347 today as well as her escalating calcium at 11.9.  She is currently asymptomatic from the calcium and her ionized calcium is stable at 6.8.  Therefore we are going to proceed with discharge home.  She will return to the office later this week on 5/18/18 with repeat labs, nurse practitioner visit, and potential treatment with a third dose of Zometa if her calcium has continued to escalate significantly. She is now seen with plans to begin cycle 2 R CHOP chemotherapy with growth factor support ( Neulasta on body injector).  Will schedule follow-up PET scan after 2 cycles of therapy and see her back in 3 weeks to review her status.  · Patient reviewed June 19 with near resolution of hypermetabolic sites on PET/CT.  Plans made for third cycle of CHOP R, additional Zometa and total of 6 cycles of chemotherapy anticipated.  · Patient seen August 03, 2018 for fifth cycle of chemotherapy-CHOP R, reduction of Oncovin 50%, 6 cycles planned.  Discussed subsequent CT scan follow-up.  · Follow-up scan September 19 with small low-density liver lesions and splenic lesions are decreased from previous, numerous small mesenteric and RP nodes again  stable slightly smaller.  These are discussed September 24 and follow-up scan in 3 months is anticipated  · Patient reviewed November 26 further generally improved, plans made to maintain port, review at 3 months  · Patient assessed February 18, 2019, no evidence of recurrent disease, repeat scans in 3 months plan-6 months from previous  · Patient seen May 13, 2019, no evidence of recurrent disease, follow-up assessment in 6 months planned  · Patient reviewed again October 28, 2019 with follow-up scans negative for recurrence, 6 months plan follow-up at 2-year m  · Patient reviewed Freya 3, 2020, stable scans.    2. Myelosuppression with pancytopenia related to chemotherapy: Resolved      3. Multifactorial anemia:  · Related to anemia chronic disease, chemotherapy, prior iron deficiency.  · Stable assessed June 30, 2020    4. Hypercalcemia of malignancy:  · Calcium up to 13.8 on 4/21/18 (albumin 3.3).  Intact PTH 8.1, PTH RP less than 2.0  · Received Zometa 4 mg IV 4/25/18.  · Calcium up to 11.3 on 5/7/18 with second dose of Zometa administered 4 mg IV.  · Calcium today has continued to gradually increase up to 11.9 with albumin 3.3.  Ionized calcium however is stable at 6.8 compared to yesterday.  The patient is asymptomatic.  We will not plan to administer a further dose of Zometa just yet and will allow further time for the patient to respond to chemotherapy.  She returned 518 for continued Zometa and when seen May 25 has a normal calcium level.  · Patient to receive Zometa June 19, subsequently every other cycle, restart low-dose calcium supplementation  · Patient seen August 03, 2018, plans made for Zometa with her final course of chemotherapy August 23, 2018  · Patient scheduled for bone density prior to assessment 3 months following November visit, potential Xgeva and follow-up as she is seen back to step to repeat scans are performed in May 2019.  As she is seen May 13 we do plan the Xgeva and then move to  Prolia 6 months later for her subsequent treatment for osteopenia.  · Patient reviewed October 28, 2019 with Prolia continued every 6 months      5.  Paroxysmal atrial fibrillation:  · Recently diagnosed, anticoagulated with Eliquis initially, discontinued due to expected thrombocytopenia from chemotherapy  · Currently in sinus rhythm, continues on Lopressor 50 mg every 12 hours  · Anticoagulation restarted with Xarelto        6. Prior seizure disorder:  · Continues currently on Dilantin 300 mg daily at bedtime and Neurontin 600 mg daily at bedtime, will return to outpatient dose of Neurontin 300 mg daily at bedtime at discharge.      7. GI prophylaxis:  · Protonix 40 mg daily, consider discontinuance in 6 months      8. Venous access:  · Port placement 5/4/18 by Dr. Gill, continue to manage q. 6 weeks      9. DVT- Continue Xarelto 20 mg by mouth daily      Plan:  *Patient will be observed off chemotherapy  *Port flush q. 6 weeks  *Repeat  labs, 23 weeks                                   *MD assessment in 24 weeks, continue Prolia for her osteopenia

## 2020-10-26 DIAGNOSIS — C79.51 BONE METASTASIS: ICD-10-CM

## 2020-10-26 RX ORDER — GABAPENTIN 300 MG/1
600 CAPSULE ORAL
Qty: 60 CAPSULE | Refills: 2 | Status: SHIPPED | OUTPATIENT
Start: 2020-10-26 | End: 2021-01-26 | Stop reason: SDUPTHER

## 2020-10-26 NOTE — TELEPHONE ENCOUNTER
REC'D RF REQUEST FOR NEURONTIN 600MG @ HS. LAST DICTATION SAID PT WOULD TAKE 300MG @ HS. CALLED PT AND SHE SAID SHE TRIED TAKING 300MG BUT IT DIDN'T HELP RESTLESS LEGS SO SHE HAS ACTUALLY BEEN TAKING 2 Q HS. WILL ROUTE RX TO DR. POSADA TO SIGN. PT V/U.

## 2020-10-30 ENCOUNTER — INFUSION (OUTPATIENT)
Dept: ONCOLOGY | Facility: HOSPITAL | Age: 80
End: 2020-10-30

## 2020-10-30 ENCOUNTER — OFFICE VISIT (OUTPATIENT)
Dept: ONCOLOGY | Facility: CLINIC | Age: 80
End: 2020-10-30

## 2020-10-30 VITALS
HEART RATE: 59 BPM | DIASTOLIC BLOOD PRESSURE: 75 MMHG | SYSTOLIC BLOOD PRESSURE: 154 MMHG | RESPIRATION RATE: 18 BRPM | TEMPERATURE: 97.5 F | WEIGHT: 153.5 LBS | BODY MASS INDEX: 26.21 KG/M2 | HEIGHT: 64 IN | OXYGEN SATURATION: 98 %

## 2020-10-30 DIAGNOSIS — D50.0 IRON DEFICIENCY ANEMIA DUE TO CHRONIC BLOOD LOSS: ICD-10-CM

## 2020-10-30 DIAGNOSIS — C83.39 DIFFUSE LARGE B-CELL LYMPHOMA OF EXTRANODAL SITE EXCLUDING SPLEEN AND OTHER SOLID ORGANS (HCC): Primary | ICD-10-CM

## 2020-10-30 DIAGNOSIS — D69.6 THROMBOCYTOPENIA (HCC): ICD-10-CM

## 2020-10-30 DIAGNOSIS — M85.89 OSTEOPENIA OF MULTIPLE SITES: ICD-10-CM

## 2020-10-30 DIAGNOSIS — C79.51 BONE METASTASIS: ICD-10-CM

## 2020-10-30 DIAGNOSIS — R16.0 LIVER MASS: Primary | ICD-10-CM

## 2020-10-30 DIAGNOSIS — E83.52 HYPERCALCEMIA: ICD-10-CM

## 2020-10-30 DIAGNOSIS — Z45.2 ENCOUNTER FOR ADJUSTMENT OR MANAGEMENT OF VASCULAR ACCESS DEVICE: ICD-10-CM

## 2020-10-30 LAB
BASOPHILS # BLD AUTO: 0.05 10*3/MM3 (ref 0–0.2)
BASOPHILS NFR BLD AUTO: 0.8 % (ref 0–1.5)
DEPRECATED RDW RBC AUTO: 43 FL (ref 37–54)
EOSINOPHIL # BLD AUTO: 0.18 10*3/MM3 (ref 0–0.4)
EOSINOPHIL NFR BLD AUTO: 2.9 % (ref 0.3–6.2)
ERYTHROCYTE [DISTWIDTH] IN BLOOD BY AUTOMATED COUNT: 12.3 % (ref 12.3–15.4)
HCT VFR BLD AUTO: 37.4 % (ref 34–46.6)
HGB BLD-MCNC: 12.3 G/DL (ref 12–15.9)
IMM GRANULOCYTES # BLD AUTO: 0.02 10*3/MM3 (ref 0–0.05)
IMM GRANULOCYTES NFR BLD AUTO: 0.3 % (ref 0–0.5)
LYMPHOCYTES # BLD AUTO: 1.2 10*3/MM3 (ref 0.7–3.1)
LYMPHOCYTES NFR BLD AUTO: 19.2 % (ref 19.6–45.3)
MCH RBC QN AUTO: 31.3 PG (ref 26.6–33)
MCHC RBC AUTO-ENTMCNC: 32.9 G/DL (ref 31.5–35.7)
MCV RBC AUTO: 95.2 FL (ref 79–97)
MONOCYTES # BLD AUTO: 0.79 10*3/MM3 (ref 0.1–0.9)
MONOCYTES NFR BLD AUTO: 12.7 % (ref 5–12)
NEUTROPHILS NFR BLD AUTO: 4 10*3/MM3 (ref 1.7–7)
NEUTROPHILS NFR BLD AUTO: 64.1 % (ref 42.7–76)
NRBC BLD AUTO-RTO: 0 /100 WBC (ref 0–0.2)
PLATELET # BLD AUTO: 60 10*3/MM3 (ref 140–450)
PLATELETS.RETICULATED NFR BLD AUTO: 6.3 % (ref 0.9–6.5)
PMV BLD AUTO: 9.8 FL (ref 6–12)
RBC # BLD AUTO: 3.93 10*6/MM3 (ref 3.77–5.28)
WBC # BLD AUTO: 6.24 10*3/MM3 (ref 3.4–10.8)

## 2020-10-30 PROCEDURE — 99214 OFFICE O/P EST MOD 30 MIN: CPT | Performed by: INTERNAL MEDICINE

## 2020-10-30 PROCEDURE — 85025 COMPLETE CBC W/AUTO DIFF WBC: CPT

## 2020-10-30 PROCEDURE — 85055 RETICULATED PLATELET ASSAY: CPT

## 2020-10-30 PROCEDURE — 36591 DRAW BLOOD OFF VENOUS DEVICE: CPT

## 2020-10-30 PROCEDURE — 25010000003 HEPARIN LOCK FLUSH PER 10 UNITS: Performed by: INTERNAL MEDICINE

## 2020-10-30 RX ORDER — HEPARIN SODIUM (PORCINE) LOCK FLUSH IV SOLN 100 UNIT/ML 100 UNIT/ML
500 SOLUTION INTRAVENOUS AS NEEDED
Status: CANCELLED | OUTPATIENT
Start: 2020-10-30

## 2020-10-30 RX ORDER — SODIUM CHLORIDE 0.9 % (FLUSH) 0.9 %
10 SYRINGE (ML) INJECTION AS NEEDED
Status: CANCELLED | OUTPATIENT
Start: 2020-10-30

## 2020-10-30 RX ORDER — HEPARIN SODIUM (PORCINE) LOCK FLUSH IV SOLN 100 UNIT/ML 100 UNIT/ML
500 SOLUTION INTRAVENOUS AS NEEDED
Status: DISCONTINUED | OUTPATIENT
Start: 2020-10-30 | End: 2020-10-30 | Stop reason: HOSPADM

## 2020-10-30 RX ADMIN — Medication 500 UNITS: at 14:06

## 2020-10-30 NOTE — PROGRESS NOTES
Subjective .  Patient feeling generally improved, discussed findings    REASONS FOR FOLLOWUP:    1. 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 Paroxysmal atrial fibrillation  3. History of DVT    HISTORY OF PRESENT ILLNESS:  The patient is a 80 y.o. year old female who is here for follow-up with the above-mentioned history.    History of Present Illness    Ms. Blake is here today to  Xarelto samples.  She has been on Xarelto for atrial fibrillation and history of DVT.  Mediport also flushed today.    Patient denies any recent bleeding, dark stools, or unexplained bruising.  She does have a lesion on her right calf however this is been present for over a year.  She is asking if this could be left over from when she had swelling secondary to the blood clots.  She denies any recent increase in shortness of breath.  She has started spironolactone per the direction of cardiology within the past 3 to 4 months.  She denies fevers, night sweats, weight loss.  She denies taking NSAIDs.     Patient when seen September 23, 2020 had developed worsening thrombocytopenia.  Records indicated this had started since her visit in May at approximate time that she started spironolactone.  Antibodies were drawn which are currently pending though her IPF had been 7.6 now October 30 8 down to 6.3 in follow-up CBC with H&H of 12.3 and three 7.4 with white count of 6-40 and platelet count of 60,000 having dropped to as low as 20,000 when seen Freya 3, 2020.  The patient is feeling considerably better, indicates that she has not had any palpitation episodes, notes generally improved stamina, no bleeding episodes.  She has remained off her Xarelto at this point.      Past Medical History:   Diagnosis Date   • Anemia     multifactorial   • Cystitis    • Cystocele     moderate   • Drug therapy    • Dyslipidemia    • Esophageal reflux    • Fatigue    • History of transfusion     no reaction   •  "Hypercalcemia    • Hypertension    • Major depression, chronic    • Menopause    • Non Hodgkin's lymphoma (CMS/HCC)    • Osteoarthritis    • Osteoporosis    • Palpitations    • Paroxysmal atrial fibrillation (CMS/HCC)    • Post menopausal problems    • RLS (restless legs syndrome)    • Seizure disorder (CMS/HCC)    • Sinus bradycardia    • Subjective tinnitus    • Vaginal delivery     x2  \"SONYA\"      \"JASON\"   • Vitamin D deficiency        ONCOLOGIC HISTORY:  (History from previous dates can be found in the separate document.)    Current Outpatient Medications on File Prior to Visit   Medication Sig Dispense Refill   • amLODIPine (NORVASC) 10 MG tablet TAKE 1 TABLET BY MOUTH EVERY DAY 90 tablet 1   • benazepril (LOTENSIN) 40 MG tablet TAKE 1 TABLET BY MOUTH EVERY DAY 90 tablet 1   • cetirizine (zyrTEC) 10 MG tablet Take 10 mg by mouth Daily.     • Cholecalciferol (VITAMIN D3) 125 MCG (5000 UT) capsule capsule Take 5,000 Units by mouth Daily.     • escitalopram (LEXAPRO) 20 MG tablet TAKE 1 TABLET BY MOUTH DAILY 90 tablet 1   • folic acid (FOLVITE) 400 MCG tablet Take 400 mcg by mouth daily.     • gabapentin (NEURONTIN) 300 MG capsule Take 2 capsules by mouth every night at bedtime. 60 capsule 2   • hydrALAZINE (APRESOLINE) 100 MG tablet Take 1 tablet by mouth 3 (Three) Times a Day. 270 tablet 3   • lidocaine-prilocaine (EMLA) 2.5-2.5 % cream Apply 30 min prior to port access 5 g 2   • melatonin 5 MG tablet tablet Take 5 mg by mouth Every Night.     • pantoprazole (PROTONIX) 40 MG EC tablet TAKE 1 TABLET BY MOUTH EVERY DAY 90 tablet 1   • phenytoin (DILANTIN) 100 MG ER capsule TAKE 3 CAPSULES BY MOUTH EVERY NIGHT AT BEDTIME 270 capsule 5   • vitamin B-12 (CYANOCOBALAMIN) 100 MCG tablet Take 100 mcg by mouth Daily.     • vitamin B-6 (PYRIDOXINE) 100 MG tablet Take 100 mg by mouth Daily.     • vitamin E 100 UNIT capsule Take 100 Units by mouth Daily.     • spironolactone (ALDACTONE) 50 MG tablet Take 1 tablet by mouth " "Daily. 90 tablet 3   • XARELTO 20 MG tablet Take 1 tablet by mouth Daily. 28 tablet 0     Current Facility-Administered Medications on File Prior to Visit   Medication Dose Route Frequency Provider Last Rate Last Dose   • heparin injection 500 Units  500 Units Intravenous PRN Chivo Iraheta MD   500 Units at 10/30/20 1406       ALLERGIES:     Allergies   Allergen Reactions   • Crab (Diagnostic) Itching and Rash   • Pseudoephedrine Dizziness       Social History     Socioeconomic History   • Marital status:      Spouse name: JL   • Number of children: 2   • Years of education: Not on file   • Highest education level: Not on file   Occupational History     Employer: RETIRED   Tobacco Use   • Smoking status: Never Smoker   • Smokeless tobacco: Never Used   Substance and Sexual Activity   • Alcohol use: No     Comment: Caffeine use: 1 cup daily   • Drug use: No   • Sexual activity: Defer     Birth control/protection: Surgical     Comment:  (Jl)         Cancer-related family history is not on file.     Review of Systems   Constitutional: Negative.    HENT: Negative.    Eyes: Negative.    Respiratory: Negative.    Cardiovascular: Negative.    Gastrointestinal: Negative.    Endocrine: Negative.    Genitourinary: Negative.    Musculoskeletal: Negative.    Skin: Negative.    Allergic/Immunologic: Negative.    Neurological: Negative.    Hematological: Negative.    Psychiatric/Behavioral: Negative.          Objective      Vitals:    10/30/20 1356   BP: 154/75   Pulse: 59   Resp: 18   Temp: 97.5 °F (36.4 °C)   TempSrc: Temporal   SpO2: 98%   Weight: 69.6 kg (153 lb 8 oz)   Height: 162.6 cm (64.02\")   PainSc: 0-No pain     Current Status 10/30/2020   ECOG score 0       Physical Exam   Constitutional: She is oriented to person, place, and time. She appears well-developed.   HENT:   Head: Normocephalic and atraumatic.   Eyes: Pupils are equal, round, and reactive to light.   Neck: Normal range of motion. " Neck supple.   Cardiovascular: Normal rate.   Pulmonary/Chest: Effort normal and breath sounds normal. She has no wheezes.   Abdominal: Normal appearance and bowel sounds are normal. She exhibits no distension. There is no abdominal tenderness.   Musculoskeletal: Normal range of motion.   Neurological: She is alert and oriented to person, place, and time.   Skin: Skin is warm and dry. Lesion (darkened area right calf, present over 1 year) noted.   Scant scattered petechiae bilateral shins   Psychiatric: Her behavior is normal. Mood normal.   Vitals reviewed.      RECENT LABS:  Results from last 7 days   Lab Units 10/30/20  1357   WBC 10*3/mm3 6.24   NEUTROS ABS 10*3/mm3 4.00   HEMOGLOBIN g/dL 12.3   HEMATOCRIT % 37.4   PLATELETS 10*3/mm3 60*                 Assessment/Plan   1. 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  · PET scan 5/3/18 with diffuse splenic involvement (SUV 16.9), lymphadenopathy throughout upper abdomen and retroperitoneum (SUV 13.1), right liver lesion 5 cm (SUV 12.8), pelvic lymphadenopathy up to 4 cm, bladder wall thickening with associated hypermetabolism, cervical lymphadenopathy left posterior (SUV 11.2), multiple bone lesions including left intertrochanteric femur, ribs, right scapula, pelvis as well as left gluteal hematoma versus lymphomatous lesion.  · CT-guided liver biopsy 4/26/18 with diffuse large B-cell non-Hodgkin's lymphoma, CD20 positive, double hit (BCL-6 rearrangement 85%, MYC rearrangement 79%), KI-67 4+/4  · Left cervical excisional biopsy by ENT Dr. Oconnor 5/1/18 confirming high-grade B cell non-Hodgkin's lymphoma, Ki-67 100%, double hit (BCL-6 rearrangement 76%, MYC rearrangement 85%)  · Echocardiogram 4/11/18 with ejection fraction 66.7%  · Right port placement Dr Gill 5/4/18  · Patient not felt to be a candidate for more  aggressive chemotherapy due to performance status and age (DA EPOCH-R)  · Therefore treated with R CHOP chemotherapy 5/4/18.  · Followed by granix 300mcg daily beginning 5/6/18 through 5/14/18.  · Today, the patient has increasing WBC related to growth factor use, stable hemoglobin, spontaneous improvement in platelet count.  She is doing quite well following her chemotherapy and is ready to go home.  There is some concern regarding her persistently elevated LDH at 347 today as well as her escalating calcium at 11.9.  She is currently asymptomatic from the calcium and her ionized calcium is stable at 6.8.  Therefore we are going to proceed with discharge home.  She will return to the office later this week on 5/18/18 with repeat labs, nurse practitioner visit, and potential treatment with a third dose of Zometa if her calcium has continued to escalate significantly. She is now seen with plans to begin cycle 2 R CHOP chemotherapy with growth factor support ( Neulasta on body injector).  Will schedule follow-up PET scan after 2 cycles of therapy and see her back in 3 weeks to review her status.  · Patient reviewed June 19 with near resolution of hypermetabolic sites on PET/CT.  Plans made for third cycle of CHOP R, additional Zometa and total of 6 cycles of chemotherapy anticipated.  · Patient seen August 03, 2018 for fifth cycle of chemotherapy-CHOP R, reduction of Oncovin 50%, 6 cycles planned.  Discussed subsequent CT scan follow-up.  · Follow-up scan September 19 with small low-density liver lesions and splenic lesions are decreased from previous, numerous small mesenteric and RP nodes again stable slightly smaller.  These are discussed September 24 and follow-up scan in 3 months is anticipated  · Patient reviewed November 26 further generally improved, plans made to maintain port, review at 3 months  · Patient assessed February 18, 2019, no evidence of recurrent disease, repeat scans in 3 months plan-6 months  from previous  · Patient seen May 13, 2019, no evidence of recurrent disease, follow-up assessment in 6 months planned  · Patient reviewed again October 28, 2019 with follow-up scans negative for recurrence, 6 months plan follow-up at 2-year m  · Patient reviewed by Dr. Iraheta 6/3/2020, stable scans.  · Stable clinically when seen October 30, 2020       2. Thrombocytopenia.  Patient had previous myelosuppression with chemo therapy however this had resolved.  It is noted when looking back today that her platelet count has declined following her visit in May.  She denies any recent bleeding.  She has however had in a new medication with Spironolactone within the past 3 to 4 months from cardiology.  As this can cause thrombocytopenia we have held this treatment and she is gradually recovering when seen October 30, 2020.      3. Multifactorial anemia:  · Related to anemia chronic disease, chemotherapy, prior iron deficiency.  · Hemoglobin rechecked October 30 12.3 hematocrit 37.4     4. Hypercalcemia of malignancy:  · Calcium up to 13.8 on 4/21/18 (albumin 3.3).  Intact PTH 8.1, PTH RP less than 2.0  · Received Zometa 4 mg IV 4/25/18.  · Calcium up to 11.3 on 5/7/18 with second dose of Zometa administered 4 mg IV.  · Calcium today has continued to gradually increase up to 11.9 with albumin 3.3.  Ionized calcium however is stable at 6.8 compared to yesterday.  The patient is asymptomatic.  We will not plan to administer a further dose of Zometa just yet and will allow further time for the patient to respond to chemotherapy.  She returned 518 for continued Zometa and when seen May 25 has a normal calcium level.  · Patient to receive Zometa June 19, subsequently every other cycle, restart low-dose calcium supplementation  · Patient seen August 03, 2018, plans made for Zometa with her final course of chemotherapy August 23, 2018  · Patient scheduled for bone density prior to assessment 3 months following November visit,  potential Xgeva and follow-up as she is seen back to step to repeat scans are performed in May 2019.  As she is seen May 13 we do plan the Xgeva and then move to Prolia 6 months later for her subsequent treatment for osteopenia.  · Prolia continued every 6 months, last given 6/3/2020, no rescheduled to December 2020      5.  Paroxysmal atrial fibrillation:  · Recently diagnosed, anticoagulated with Eliquis initially, discontinued due to expected thrombocytopenia from chemotherapy  · Currently in sinus rhythm, continues on Lopressor 50 mg every 12 hours  · Anticoagulation restarted with Xarelto though this has been held during her thrombocytopenia.    6. Prior seizure disorder:  · Continues currently on Dilantin 300 mg daily at bedtime and Neurontin 600 mg daily at bedtime, will return to outpatient dose of Neurontin 300 mg daily at bedtime at discharge.        7. GI prophylaxis:  · Continues Protonix p.o.        8. Venous access:  · Port placement 5/4/18 by Dr. Gill, continue to manage q. monthly        9. DVT-hold Xarelto 20 mg by mouth daily secondary to thrombocytopenia.        Plan:  1. Hold Xarelto secondary to thrombocytopenia  2. Antiplatelet antibody pending  3. Aldactone discontinued    4. Patient to keep current appointments including November 18 at which time we will add a CBC to have her likely restart her Xarelto with samples  5. Patient already scheduled for port flush and follow-up in December for Prolia.

## 2020-11-04 ENCOUNTER — TRANSCRIBE ORDERS (OUTPATIENT)
Dept: ADMINISTRATIVE | Facility: HOSPITAL | Age: 80
End: 2020-11-04

## 2020-11-04 DIAGNOSIS — M79.605 LEFT LEG PAIN: Primary | ICD-10-CM

## 2020-11-05 ENCOUNTER — HOSPITAL ENCOUNTER (OUTPATIENT)
Dept: CARDIOLOGY | Facility: HOSPITAL | Age: 80
Discharge: HOME | End: 2020-11-05
Admitting: NURSE PRACTITIONER

## 2020-11-05 ENCOUNTER — APPOINTMENT (OUTPATIENT)
Dept: CARDIOLOGY | Facility: HOSPITAL | Age: 80
End: 2020-11-05

## 2020-11-05 DIAGNOSIS — M79.605 LEFT LEG PAIN: ICD-10-CM

## 2020-11-05 LAB
BH CV LOWER VASCULAR LEFT COMMON FEMORAL AUGMENT: NORMAL
BH CV LOWER VASCULAR LEFT COMMON FEMORAL COMPETENT: NORMAL
BH CV LOWER VASCULAR LEFT COMMON FEMORAL COMPRESS: NORMAL
BH CV LOWER VASCULAR LEFT COMMON FEMORAL PHASIC: NORMAL
BH CV LOWER VASCULAR LEFT COMMON FEMORAL SPONT: NORMAL
BH CV LOWER VASCULAR LEFT DISTAL FEMORAL COMPRESS: NORMAL
BH CV LOWER VASCULAR LEFT GASTRONEMIUS COMPRESS: NORMAL
BH CV LOWER VASCULAR LEFT GREATER SAPH AK COMPRESS: NORMAL
BH CV LOWER VASCULAR LEFT GREATER SAPH BK COMPRESS: NORMAL
BH CV LOWER VASCULAR LEFT LESSER SAPH COMPRESS: NORMAL
BH CV LOWER VASCULAR LEFT MID FEMORAL AUGMENT: NORMAL
BH CV LOWER VASCULAR LEFT MID FEMORAL COMPETENT: NORMAL
BH CV LOWER VASCULAR LEFT MID FEMORAL COMPRESS: NORMAL
BH CV LOWER VASCULAR LEFT MID FEMORAL PHASIC: NORMAL
BH CV LOWER VASCULAR LEFT MID FEMORAL SPONT: NORMAL
BH CV LOWER VASCULAR LEFT PERONEAL COMPRESS: NORMAL
BH CV LOWER VASCULAR LEFT POPLITEAL AUGMENT: NORMAL
BH CV LOWER VASCULAR LEFT POPLITEAL COMPETENT: NORMAL
BH CV LOWER VASCULAR LEFT POPLITEAL COMPRESS: NORMAL
BH CV LOWER VASCULAR LEFT POPLITEAL PHASIC: NORMAL
BH CV LOWER VASCULAR LEFT POPLITEAL SPONT: NORMAL
BH CV LOWER VASCULAR LEFT POSTERIOR TIBIAL COMPRESS: NORMAL
BH CV LOWER VASCULAR LEFT PROFUNDA FEMORAL COMPRESS: NORMAL
BH CV LOWER VASCULAR LEFT PROXIMAL FEMORAL COMPRESS: NORMAL
BH CV LOWER VASCULAR LEFT SAPHENOFEMORAL JUNCTION COMPRESS: NORMAL
BH CV LOWER VASCULAR LEFT VARICOSITY AK COMPRESS: NORMAL
BH CV LOWER VASCULAR LEFT VARICOSITY BK COMPRESS: NORMAL
BH CV LOWER VASCULAR RIGHT COMMON FEMORAL AUGMENT: NORMAL
BH CV LOWER VASCULAR RIGHT COMMON FEMORAL COMPETENT: NORMAL
BH CV LOWER VASCULAR RIGHT COMMON FEMORAL COMPRESS: NORMAL
BH CV LOWER VASCULAR RIGHT COMMON FEMORAL PHASIC: NORMAL
BH CV LOWER VASCULAR RIGHT COMMON FEMORAL SPONT: NORMAL

## 2020-11-05 PROCEDURE — 93971 EXTREMITY STUDY: CPT

## 2020-11-05 NOTE — PROGRESS NOTES
Left leg venous doppler completed. Negative for deep and superficial vein thrombosis. Findings called to MELINDA Minor at [facility].

## 2020-11-06 ENCOUNTER — APPOINTMENT (OUTPATIENT)
Dept: CARDIOLOGY | Facility: HOSPITAL | Age: 80
End: 2020-11-06

## 2020-11-10 ENCOUNTER — TELEPHONE (OUTPATIENT)
Dept: CARDIOLOGY | Facility: CLINIC | Age: 80
End: 2020-11-10

## 2020-11-10 NOTE — TELEPHONE ENCOUNTER
Pt stated Dr. Webb said her platelets were to low and changed some of her meds and is concerned about it and would like for somebody to call her.

## 2020-11-11 NOTE — TELEPHONE ENCOUNTER
Called and spoke with pt. Dr. Iraheta stopped spironolactone and Xarelto. She said B/P has been running 150s/70s since stopping the medication. HR 70s.    Paty Washington RN  Triage [facility] [place], [place]

## 2020-11-11 NOTE — TELEPHONE ENCOUNTER
Notified pt of orders. She verbalized understanding.    Thank you,    Paty Washington, RN  Triage [facility]

## 2020-11-12 ENCOUNTER — DOCUMENTATION (OUTPATIENT)
Dept: PHARMACY | Facility: HOSPITAL | Age: 80
End: 2020-11-12

## 2020-11-12 RX ORDER — RIVAROXABAN 20 MG/1
20 TABLET, FILM COATED ORAL DAILY
Qty: 28 TABLET | Refills: 0 | COMMUNITY
Start: 2020-11-12 | End: 2022-02-02 | Stop reason: SDUPTHER

## 2020-11-12 NOTE — PROGRESS NOTES
Problem: Patient Care Overview  Goal: Plan of Care Review  Outcome: Ongoing (interventions implemented as appropriate)  Alert but confused. Generalized edema with some weeping noted to left upper arm.   Fall and injury free this shift. Pulse rate continues to fluctuate from the 120's to the 160's not sustaining.  Given metoprolol 5 mg ivp x 1 dose per MD order. Continued on IV AbX. Vital signs stable.  Plan of care reviewed with patient. Did not voice understanding. Bed in low position and locked.  Side rails up x 2. Call bell in reach. Telemetry        Site of Appt/Pickup: (n) [facility]; (y) [employer]; (n) [email or web address];    Prescriber (ariana) requested the following medication samples to be given to the patient during the Nurse Practitioner visit.    NDC:  30561-178-19  Drug name: xarelto  Strength: 20mg  Total Quantity:  28 (Containers- 4 X Units per Containers- 7)  Lot#:  [id number]  Expiration: 9/22    Product to be placed in the [employer] under 'Patient Specialty Medication' entry and is to be removed at time of visit.

## 2020-11-12 NOTE — TELEPHONE ENCOUNTER
[facility]  Xarelto samples. She is currently held but may resume on this date.     I have sent a message to Karen/Lily/Natasha to place 4 bottles of the Xarelto 20 mg into the [employer].

## 2020-11-18 ENCOUNTER — OFFICE VISIT (OUTPATIENT)
Dept: ONCOLOGY | Facility: CLINIC | Age: 80
End: 2020-11-18

## 2020-11-18 ENCOUNTER — LAB (OUTPATIENT)
Dept: LAB | Facility: HOSPITAL | Age: 80
End: 2020-11-18

## 2020-11-18 VITALS
SYSTOLIC BLOOD PRESSURE: 129 MMHG | DIASTOLIC BLOOD PRESSURE: 64 MMHG | HEART RATE: 44 BPM | OXYGEN SATURATION: 96 % | TEMPERATURE: 97.3 F

## 2020-11-18 DIAGNOSIS — I48.0 PAROXYSMAL ATRIAL FIBRILLATION (HCC): ICD-10-CM

## 2020-11-18 DIAGNOSIS — D69.6 THROMBOCYTOPENIA (HCC): Primary | ICD-10-CM

## 2020-11-18 DIAGNOSIS — C79.51 BONE METASTASIS: Primary | ICD-10-CM

## 2020-11-18 DIAGNOSIS — R00.1 BRADYCARDIA: ICD-10-CM

## 2020-11-18 DIAGNOSIS — C83.39 DIFFUSE LARGE B-CELL LYMPHOMA OF EXTRANODAL SITE EXCLUDING SPLEEN AND OTHER SOLID ORGANS (HCC): ICD-10-CM

## 2020-11-18 DIAGNOSIS — Z79.01 CHRONIC ANTICOAGULATION: ICD-10-CM

## 2020-11-18 LAB
BASOPHILS # BLD AUTO: 0.06 10*3/MM3 (ref 0–0.2)
BASOPHILS NFR BLD AUTO: 0.8 % (ref 0–1.5)
DEPRECATED RDW RBC AUTO: 40.9 FL (ref 37–54)
EOSINOPHIL # BLD AUTO: 0.2 10*3/MM3 (ref 0–0.4)
EOSINOPHIL NFR BLD AUTO: 2.8 % (ref 0.3–6.2)
ERYTHROCYTE [DISTWIDTH] IN BLOOD BY AUTOMATED COUNT: 12.1 % (ref 12.3–15.4)
HCT VFR BLD AUTO: 36.2 % (ref 34–46.6)
HGB BLD-MCNC: 12.2 G/DL (ref 12–15.9)
IMM GRANULOCYTES # BLD AUTO: 0.04 10*3/MM3 (ref 0–0.05)
IMM GRANULOCYTES NFR BLD AUTO: 0.6 % (ref 0–0.5)
LYMPHOCYTES # BLD AUTO: 1.13 10*3/MM3 (ref 0.7–3.1)
LYMPHOCYTES NFR BLD AUTO: 15.9 % (ref 19.6–45.3)
MCH RBC QN AUTO: 31.3 PG (ref 26.6–33)
MCHC RBC AUTO-ENTMCNC: 33.7 G/DL (ref 31.5–35.7)
MCV RBC AUTO: 92.8 FL (ref 79–97)
MONOCYTES # BLD AUTO: 0.76 10*3/MM3 (ref 0.1–0.9)
MONOCYTES NFR BLD AUTO: 10.7 % (ref 5–12)
NEUTROPHILS NFR BLD AUTO: 4.92 10*3/MM3 (ref 1.7–7)
NEUTROPHILS NFR BLD AUTO: 69.2 % (ref 42.7–76)
NRBC BLD AUTO-RTO: 0.3 /100 WBC (ref 0–0.2)
PLATELET # BLD AUTO: 70 10*3/MM3 (ref 140–450)
PMV BLD AUTO: 11.2 FL (ref 6–12)
RBC # BLD AUTO: 3.9 10*6/MM3 (ref 3.77–5.28)
WBC # BLD AUTO: 7.11 10*3/MM3 (ref 3.4–10.8)

## 2020-11-18 PROCEDURE — 85025 COMPLETE CBC W/AUTO DIFF WBC: CPT

## 2020-11-18 PROCEDURE — 99213 OFFICE O/P EST LOW 20 MIN: CPT | Performed by: NURSE PRACTITIONER

## 2020-11-18 PROCEDURE — 36415 COLL VENOUS BLD VENIPUNCTURE: CPT

## 2020-11-18 NOTE — PROGRESS NOTES
Subjective .     REASONS FOR FOLLOWUP:    1. 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 Paroxysmal atrial fibrillation  3. History of DVT    HISTORY OF PRESENT ILLNESS:  The patient is a 80 y.o. year old female who is here for follow-up with the above-mentioned history.    History of Present Illness    Ms. Blake is here today to  Xarelto samples.  She has been on Xarelto for atrial fibrillation and history of DVT.  Mediport also flushed today.    Patient was found a few months ago to be thrombocytopenic with platelet count down to 20,000 at the lowest point in June.  Dr. Stubbs was suspicious that the patient's newly started spironolactone was the culprit.  Spironolactone was held.  Last month platelet count was 60,000, IPF 6%.    From a cardiology standpoint with the patient spironolactone held, her blood pressure has been running higher reportedly in the 150s over 70s.  Per the EMR patient was started on metoprolol 25 mg twice a day.  She is now concerned that her heart rate is running too low at home and that is the case in the office today at 44.  I have instructed her to hold her night dose of metoprolol for now and to reach out to cardiology for further instructions.    We reviewed today that her platelet count is up to 70,000 and that she is okay to restart the Xarelto.    She denies other concerns today.    Past Medical History:   Diagnosis Date   • Anemia     multifactorial   • Cystitis    • Cystocele     moderate   • Drug therapy    • Dyslipidemia    • Esophageal reflux    • Fatigue    • History of transfusion     no reaction   • Hypercalcemia    • Hypertension    • Major depression, chronic    • Menopause    • Non Hodgkin's lymphoma (CMS/HCC)    • Osteoarthritis    • Osteoporosis    • Palpitations    • Paroxysmal atrial fibrillation (CMS/HCC)    • Post menopausal problems    • RLS (restless legs syndrome)    • Seizure disorder (CMS/HCC)    • Sinus bradycardia  "   • Subjective tinnitus    • Vaginal delivery     x2  \"SONYA\"      \"JAOSN\"   • Vitamin D deficiency        ONCOLOGIC HISTORY:  (History from previous dates can be found in the separate document.)    Current Outpatient Medications on File Prior to Visit   Medication Sig Dispense Refill   • amLODIPine (NORVASC) 10 MG tablet TAKE 1 TABLET BY MOUTH EVERY DAY 90 tablet 1   • benazepril (LOTENSIN) 40 MG tablet TAKE 1 TABLET BY MOUTH EVERY DAY 90 tablet 1   • cetirizine (zyrTEC) 10 MG tablet Take 10 mg by mouth Daily.     • Cholecalciferol (VITAMIN D3) 125 MCG (5000 UT) capsule capsule Take 5,000 Units by mouth Daily.     • escitalopram (LEXAPRO) 20 MG tablet TAKE 1 TABLET BY MOUTH DAILY 90 tablet 1   • folic acid (FOLVITE) 400 MCG tablet Take 400 mcg by mouth daily.     • gabapentin (NEURONTIN) 300 MG capsule Take 2 capsules by mouth every night at bedtime. 60 capsule 2   • hydrALAZINE (APRESOLINE) 100 MG tablet Take 1 tablet by mouth 3 (Three) Times a Day. 270 tablet 3   • lidocaine-prilocaine (EMLA) 2.5-2.5 % cream Apply 30 min prior to port access 5 g 2   • melatonin 5 MG tablet tablet Take 5 mg by mouth Every Night.     • metoprolol tartrate (LOPRESSOR) 25 MG tablet Take 1 tablet by mouth 2 (Two) Times a Day. 60 tablet 3   • pantoprazole (PROTONIX) 40 MG EC tablet TAKE 1 TABLET BY MOUTH EVERY DAY 90 tablet 1   • phenytoin (DILANTIN) 100 MG ER capsule TAKE 3 CAPSULES BY MOUTH EVERY NIGHT AT BEDTIME 270 capsule 5   • spironolactone (ALDACTONE) 50 MG tablet Take 1 tablet by mouth Daily. 90 tablet 3   • vitamin B-12 (CYANOCOBALAMIN) 100 MCG tablet Take 100 mcg by mouth Daily.     • vitamin B-6 (PYRIDOXINE) 100 MG tablet Take 100 mg by mouth Daily.     • vitamin E 100 UNIT capsule Take 100 Units by mouth Daily.     • Xarelto 20 MG tablet Take 1 tablet by mouth Daily. 28 tablet 0     No current facility-administered medications on file prior to visit.        ALLERGIES:     Allergies   Allergen Reactions   • Crab (Diagnostic) " Itching and Rash   • Pseudoephedrine Dizziness       Social History     Socioeconomic History   • Marital status:      Spouse name: JL   • Number of children: 2   • Years of education: Not on file   • Highest education level: Not on file   Occupational History     Employer: RETIRED   Tobacco Use   • Smoking status: Never Smoker   • Smokeless tobacco: Never Used   Substance and Sexual Activity   • Alcohol use: No     Comment: Caffeine use: 1 cup daily   • Drug use: No   • Sexual activity: Defer     Birth control/protection: Surgical     Comment:  (Jl)         Cancer-related family history is not on file.     Review of Systems   Constitutional: Negative.    HENT: Negative.    Eyes: Negative.    Respiratory: Negative.    Cardiovascular: Negative.    Gastrointestinal: Negative.    Endocrine: Negative.    Genitourinary: Negative.    Musculoskeletal: Negative.    Skin: Negative.    Allergic/Immunologic: Negative.    Neurological: Negative.    Hematological: Negative.    Psychiatric/Behavioral: Negative.          Objective      Vitals:    11/18/20 1411   BP: 129/64   Pulse: (!) 44   Temp: 97.3 °F (36.3 °C)   TempSrc: Skin   SpO2: 96%     Current Status 10/30/2020   ECOG score 0       Physical Exam   Constitutional: She is oriented to person, place, and time. She appears well-developed.   HENT:   Head: Normocephalic and atraumatic.   Eyes: Pupils are equal, round, and reactive to light.   Neck: Normal range of motion. Neck supple.   Cardiovascular: Normal rate.   Pulmonary/Chest: Effort normal and breath sounds normal. She has no wheezes.   Abdominal: Normal appearance and bowel sounds are normal. She exhibits no distension. There is no abdominal tenderness.   Musculoskeletal: Normal range of motion.   Neurological: She is alert and oriented to person, place, and time.   Skin: Skin is warm and dry.   Scant scattered petechiae bilateral shins   Psychiatric: Her behavior is normal. Mood normal.   Vitals  reviewed.      RECENT LABS:  Results from last 7 days   Lab Units 11/18/20  1334   WBC 10*3/mm3 7.11   NEUTROS ABS 10*3/mm3 4.92   HEMOGLOBIN g/dL 12.2   HEMATOCRIT % 36.2   PLATELETS 10*3/mm3 70*                 Assessment/Plan   1. 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  · PET scan 5/3/18 with diffuse splenic involvement (SUV 16.9), lymphadenopathy throughout upper abdomen and retroperitoneum (SUV 13.1), right liver lesion 5 cm (SUV 12.8), pelvic lymphadenopathy up to 4 cm, bladder wall thickening with associated hypermetabolism, cervical lymphadenopathy left posterior (SUV 11.2), multiple bone lesions including left intertrochanteric femur, ribs, right scapula, pelvis as well as left gluteal hematoma versus lymphomatous lesion.  · CT-guided liver biopsy 4/26/18 with diffuse large B-cell non-Hodgkin's lymphoma, CD20 positive, double hit (BCL-6 rearrangement 85%, MYC rearrangement 79%), KI-67 4+/4  · Left cervical excisional biopsy by ENT Dr. Oconnor 5/1/18 confirming high-grade B cell non-Hodgkin's lymphoma, Ki-67 100%, double hit (BCL-6 rearrangement 76%, MYC rearrangement 85%)  · Echocardiogram 4/11/18 with ejection fraction 66.7%  · Right port placement Dr Gill 5/4/18  · Patient not felt to be a candidate for more aggressive chemotherapy due to performance status and age (DA EPOCH-R)  · Therefore treated with R CHOP chemotherapy 5/4/18.  · Followed by granix 300mcg daily beginning 5/6/18 through 5/14/18.  · Today, the patient has increasing WBC related to growth factor use, stable hemoglobin, spontaneous improvement in platelet count.  She is doing quite well following her chemotherapy and is ready to go home.  There is some concern regarding her persistently elevated LDH at 347 today as well as her escalating calcium at 11.9.  She is currently asymptomatic  from the calcium and her ionized calcium is stable at 6.8.  Therefore we are going to proceed with discharge home.  She will return to the office later this week on 5/18/18 with repeat labs, nurse practitioner visit, and potential treatment with a third dose of Zometa if her calcium has continued to escalate significantly. She is now seen with plans to begin cycle 2 R CHOP chemotherapy with growth factor support ( Neulasta on body injector).  Will schedule follow-up PET scan after 2 cycles of therapy and see her back in 3 weeks to review her status.  · Patient reviewed June 19 with near resolution of hypermetabolic sites on PET/CT.  Plans made for third cycle of CHOP R, additional Zometa and total of 6 cycles of chemotherapy anticipated.  · Patient seen August 03, 2018 for fifth cycle of chemotherapy-CHOP R, reduction of Oncovin 50%, 6 cycles planned.  Discussed subsequent CT scan follow-up.  · Follow-up scan September 19 with small low-density liver lesions and splenic lesions are decreased from previous, numerous small mesenteric and RP nodes again stable slightly smaller.  These are discussed September 24 and follow-up scan in 3 months is anticipated  · Patient reviewed November 26 further generally improved, plans made to maintain port, review at 3 months  · Patient assessed February 18, 2019, no evidence of recurrent disease, repeat scans in 3 months plan-6 months from previous  · Patient seen May 13, 2019, no evidence of recurrent disease, follow-up assessment in 6 months planned  · Patient reviewed again October 28, 2019 with follow-up scans negative for recurrence, 6 months plan follow-up at 2-year m  · Patient reviewed by Dr. Iraheta 6/3/2020, stable scans.     2. Thrombocytopenia.  Patient had previous myelosuppression with chemo therapy however this resolved.  Patient's platelet count then began to decline following her visit in May.  Platelet count was as low as 20,000 in June 2020.  Per Dr. Castillo it was  suspected this was related to spironolactone which was discontinued.  Due to risk for bleeding Xarelto has been on hold. Platelet count has been slowly improving, up to 70,000.  Patient denies any bleeding difficulty.  After review with Dr. Iraheta patient has been given okay to resume Xarelto.       3. Multifactorial anemia:  · Related to anemia chronic disease, chemotherapy, prior iron deficiency.  · Hemoglobin today, 9/23/2020, 12.2     4. Hypercalcemia of malignancy:  · Calcium up to 13.8 on 4/21/18 (albumin 3.3).  Intact PTH 8.1, PTH RP less than 2.0  · Received Zometa 4 mg IV 4/25/18.  · Calcium up to 11.3 on 5/7/18 with second dose of Zometa administered 4 mg IV.  · Calcium today has continued to gradually increase up to 11.9 with albumin 3.3.  Ionized calcium however is stable at 6.8 compared to yesterday.  The patient is asymptomatic.  We will not plan to administer a further dose of Zometa just yet and will allow further time for the patient to respond to chemotherapy.  She returned 518 for continued Zometa and when seen May 25 has a normal calcium level.  · Patient to receive Zometa June 19, subsequently every other cycle, restart low-dose calcium supplementation  · Patient seen August 03, 2018, plans made for Zometa with her final course of chemotherapy August 23, 2018  · Patient scheduled for bone density prior to assessment 3 months following November visit, potential Xgeva and follow-up as she is seen back to step to repeat scans are performed in May 2019.  As she is seen May 13 we do plan the Xgeva and then move to Prolia 6 months later for her subsequent treatment for osteopenia.  · Prolia continued every 6 months, last given 6/3/2020      5.  Paroxysmal atrial fibrillation:  · Recently diagnosed, anticoagulated with Eliquis initially, discontinued due to expected thrombocytopenia from chemotherapy  · Currently in sinus rhythm, continues on Lopressor 50 mg every 12 hours  · Xarelto held due to  thrombocytopenia.  · As noted above, platelet count has improved to 70,000.  Per vincenzo Christopher to resume Xarelto.  Patient here for samples today.    6. Prior seizure disorder:  · Continues currently on Dilantin 300 mg daily at bedtime and Neurontin 600 mg daily at bedtime, will return to outpatient dose of Neurontin 300 mg daily at bedtime at discharge.      7. GI prophylaxis:  · Protonix 40 mg daily, consider discontinuance in 6 months        8. Venous access:  · Port placement 5/4/18 by Dr. Gill, continue to manage q. 6 weeks        9. DVT-hold Xarelto 20 mg by mouth daily secondary to thrombocytopenia.        10.  Hypertension.  Patient previously on spironolactone as noted above but this was held due to concerns that this was the culprit for thrombocytopenia.  Patient then started on metoprolol 25 mg twice a day.  Her heart rate is running too low now both at home and verified here at 44.  I instructed her to hold her night dose of metoprolol and to reach out to cardiology for further instructions.  She verbalized understanding.    Plan:  1. Resume Xarelto today.    2. 1 month supply of Xarelto samples provided to patient.  3. Patient to hold night dose of metoprolol as outlined above.  Call cardiology for further instruction.  4. Patient to return in 1 month for medication samples.  5. She is scheduled for formal follow-up with Dr. Castillo in 8 weeks.  6. Patient does have a Mediport requiring flushing at least every 6 weeks.                Cc:  No ref. provider found

## 2020-11-20 ENCOUNTER — TELEPHONE (OUTPATIENT)
Dept: CARDIOLOGY | Facility: CLINIC | Age: 80
End: 2020-11-20

## 2020-11-20 NOTE — TELEPHONE ENCOUNTER
Pt states she was seen at Dr Ng office yesterday and her heart rate was 44.  She is not feeling lightheaded or dizzy  Her bp at yesterdays visit was 129/64    Cardiac meds revived  Xarelto  Metoprolol 25mg BID- pt thinks she may have taken in the past and didn't tolerate due to bradycardia.  amlodipine 10mg daily  Benazepril 40mg daily  Hydralazine 100mg TID    Please advise  Thanks  Ivy Patiño RN  Triage nurse

## 2020-11-20 NOTE — TELEPHONE ENCOUNTER
Dr. Barrios,   Patient calling stating she was recently put on Metoprolol and it is causing her HR to drop. I tried calling her back for more information, no answer, LVM to return call.    In the voicemail she requested to speak with you about a different medication she could possible try.    Best contact # [phone]    Thank you,   Jacqui

## 2020-12-02 RX ORDER — HYDRALAZINE HYDROCHLORIDE 100 MG/1
TABLET, FILM COATED ORAL
Qty: 270 TABLET | Refills: 3 | Status: SHIPPED | OUTPATIENT
Start: 2020-12-02 | End: 2021-12-13

## 2020-12-10 ENCOUNTER — DOCUMENTATION (OUTPATIENT)
Dept: PHARMACY | Facility: HOSPITAL | Age: 80
End: 2020-12-10

## 2020-12-10 ENCOUNTER — DOCUMENTATION (OUTPATIENT)
Dept: ONCOLOGY | Facility: CLINIC | Age: 80
End: 2020-12-10

## 2020-12-10 NOTE — PROGRESS NOTES
Pt on the [facility] schedule for sample  on 12/14/2020.    I have sent a message to Karen/Lily/Natasha to place 4 bottles of the Xarelto 20 mg into the [facility].

## 2020-12-10 NOTE — PROGRESS NOTES
Site of Appt/Pickup: (n) [place]; (y) [employer]; (n) [employer];    Prescriber (ariana) requested the following medication samples to be given to the patient during the Nurse Practitioner visit.    NDC:  35627-115-43  Drug name: xarelto  Strength: 20mg  Total Quantity:  28 (Containers- 4 X Units per Containers- 7)  Lot#:  [id number]  Expiration: 9/22    Product to be placed in the [facility] under 'Patient Specialty Medication' entry and is to be removed at time of visit.

## 2020-12-14 ENCOUNTER — INFUSION (OUTPATIENT)
Dept: ONCOLOGY | Facility: HOSPITAL | Age: 80
End: 2020-12-14

## 2020-12-14 ENCOUNTER — OFFICE VISIT (OUTPATIENT)
Dept: ONCOLOGY | Facility: CLINIC | Age: 80
End: 2020-12-14

## 2020-12-14 VITALS
HEART RATE: 60 BPM | SYSTOLIC BLOOD PRESSURE: 152 MMHG | WEIGHT: 151.7 LBS | RESPIRATION RATE: 14 BRPM | OXYGEN SATURATION: 98 % | TEMPERATURE: 96.7 F | HEIGHT: 64 IN | BODY MASS INDEX: 25.9 KG/M2 | DIASTOLIC BLOOD PRESSURE: 72 MMHG

## 2020-12-14 DIAGNOSIS — C83.39 DIFFUSE LARGE B-CELL LYMPHOMA OF EXTRANODAL SITE EXCLUDING SPLEEN AND OTHER SOLID ORGANS (HCC): Primary | ICD-10-CM

## 2020-12-14 DIAGNOSIS — I15.9 SECONDARY HYPERTENSION: ICD-10-CM

## 2020-12-14 DIAGNOSIS — I48.0 PAROXYSMAL ATRIAL FIBRILLATION (HCC): ICD-10-CM

## 2020-12-14 DIAGNOSIS — Z45.2 ENCOUNTER FOR ADJUSTMENT OR MANAGEMENT OF VASCULAR ACCESS DEVICE: Primary | ICD-10-CM

## 2020-12-14 PROCEDURE — 25010000003 HEPARIN LOCK FLUSH PER 10 UNITS: Performed by: INTERNAL MEDICINE

## 2020-12-14 PROCEDURE — 96523 IRRIG DRUG DELIVERY DEVICE: CPT

## 2020-12-14 RX ORDER — HEPARIN SODIUM (PORCINE) LOCK FLUSH IV SOLN 100 UNIT/ML 100 UNIT/ML
500 SOLUTION INTRAVENOUS AS NEEDED
Status: CANCELLED | OUTPATIENT
Start: 2020-12-14

## 2020-12-14 RX ORDER — HEPARIN SODIUM (PORCINE) LOCK FLUSH IV SOLN 100 UNIT/ML 100 UNIT/ML
500 SOLUTION INTRAVENOUS AS NEEDED
Status: DISCONTINUED | OUTPATIENT
Start: 2020-12-14 | End: 2020-12-14 | Stop reason: HOSPADM

## 2020-12-14 RX ORDER — SODIUM CHLORIDE 0.9 % (FLUSH) 0.9 %
10 SYRINGE (ML) INJECTION AS NEEDED
Status: DISCONTINUED | OUTPATIENT
Start: 2020-12-14 | End: 2020-12-14 | Stop reason: HOSPADM

## 2020-12-14 RX ORDER — SODIUM CHLORIDE 0.9 % (FLUSH) 0.9 %
10 SYRINGE (ML) INJECTION AS NEEDED
Status: CANCELLED | OUTPATIENT
Start: 2020-12-14

## 2020-12-14 RX ADMIN — Medication 500 UNITS: at 13:49

## 2020-12-14 RX ADMIN — SODIUM CHLORIDE, PRESERVATIVE FREE 10 ML: 5 INJECTION INTRAVENOUS at 13:49

## 2020-12-14 NOTE — PROGRESS NOTES
"  .     REASON FOR FOLLOW UP:   Management of anticoagulation    HISTORY OF PRESENT ILLNESS:  The patient is a 80 y.o. year old female  who is here for follow-up with the above-mentioned history.  She presents to the office today for samples of her anticoagulation.  She currently continues on Xarelto 20 mg daily.  This has previously been interrupted due to thrombocytopenia, though was resumed 1 month ago, 11/18/2020.  She is tolerating this well, without signs or symptoms of bleeding.  She does report concern today regarding her blood pressure.  Initially reading 170s over 70 with a heart rate of 49.  She has had recent adjustment to her metoprolol due to bradycardia.  She reports she did not take her hydralazine prior to arriving to the office today.  She currently denies any chest pain or shortness of breath.  She denies lower extremity swelling, signs or symptoms of thrombosis.  She has no upcoming surgical procedures requiring a break in her anticoagulation.      Past Medical History:   Diagnosis Date   • Anemia     multifactorial   • Cystitis    • Cystocele     moderate   • Drug therapy    • Dyslipidemia    • Esophageal reflux    • Fatigue    • History of transfusion     no reaction   • Hypercalcemia    • Hypertension    • Major depression, chronic    • Menopause    • Non Hodgkin's lymphoma (CMS/HCC)    • Osteoarthritis    • Osteoporosis    • Palpitations    • Paroxysmal atrial fibrillation (CMS/HCC)    • Post menopausal problems    • RLS (restless legs syndrome)    • Seizure disorder (CMS/HCC)    • Sinus bradycardia    • Subjective tinnitus    • Vaginal delivery     x2  \"SONYA\"      \"JASON\"   • Vitamin D deficiency        MEDICATIONS    Current Outpatient Medications:   •  amLODIPine (NORVASC) 10 MG tablet, TAKE 1 TABLET BY MOUTH EVERY DAY, Disp: 90 tablet, Rfl: 1  •  benazepril (LOTENSIN) 40 MG tablet, TAKE 1 TABLET BY MOUTH EVERY DAY, Disp: 90 tablet, Rfl: 1  •  cetirizine (zyrTEC) 10 MG tablet, Take 10 mg by " mouth Daily., Disp: , Rfl:   •  Cholecalciferol (VITAMIN D3) 125 MCG (5000 UT) capsule capsule, Take 5,000 Units by mouth Daily., Disp: , Rfl:   •  escitalopram (LEXAPRO) 20 MG tablet, TAKE 1 TABLET BY MOUTH DAILY, Disp: 90 tablet, Rfl: 1  •  folic acid (FOLVITE) 400 MCG tablet, Take 400 mcg by mouth daily., Disp: , Rfl:   •  gabapentin (NEURONTIN) 300 MG capsule, Take 2 capsules by mouth every night at bedtime., Disp: 60 capsule, Rfl: 2  •  hydrALAZINE (APRESOLINE) 100 MG tablet, TAKE 1 TABLET BY MOUTH THREE TIMES DAILY, Disp: 270 tablet, Rfl: 3  •  lidocaine-prilocaine (EMLA) 2.5-2.5 % cream, Apply 30 min prior to port access, Disp: 5 g, Rfl: 2  •  melatonin 5 MG tablet tablet, Take 5 mg by mouth Every Night., Disp: , Rfl:   •  metoprolol tartrate (LOPRESSOR) 25 MG tablet, Take 1 tablet by mouth 2 (Two) Times a Day., Disp: 60 tablet, Rfl: 3  •  Multiple Vitamins-Minerals (ZINC PO), Take  by mouth., Disp: , Rfl:   •  pantoprazole (PROTONIX) 40 MG EC tablet, TAKE 1 TABLET BY MOUTH EVERY DAY, Disp: 90 tablet, Rfl: 1  •  phenytoin (DILANTIN) 100 MG ER capsule, TAKE 3 CAPSULES BY MOUTH EVERY NIGHT AT BEDTIME, Disp: 270 capsule, Rfl: 5  •  vitamin B-12 (CYANOCOBALAMIN) 100 MCG tablet, Take 100 mcg by mouth Daily., Disp: , Rfl:   •  vitamin B-6 (PYRIDOXINE) 100 MG tablet, Take 100 mg by mouth Daily., Disp: , Rfl:   •  vitamin E 100 UNIT capsule, Take 100 Units by mouth Daily., Disp: , Rfl:   •  Xarelto 20 MG tablet, Take 1 tablet by mouth Daily., Disp: 28 tablet, Rfl: 0  No current facility-administered medications for this visit.     ALLERGIES:     Allergies   Allergen Reactions   • Crab (Diagnostic) Itching and Rash   • Pseudoephedrine Dizziness       REVIEW OF SYSTEMS:  Review of Systems   Constitutional: Negative for chills, fatigue and fever.   HENT: Negative for mouth sores and sore throat.    Eyes: Negative for visual disturbance.   Respiratory: Negative for cough and shortness of breath.    Cardiovascular:  "Negative for chest pain and leg swelling.   Gastrointestinal: Negative for abdominal pain, diarrhea, nausea and vomiting.   Genitourinary: Negative for dysuria and frequency.   Neurological: Negative for dizziness and weakness.   Hematological: Does not bruise/bleed easily.   Psychiatric/Behavioral: The patient is not nervous/anxious.    All other systems reviewed and are negative.  Review of systems 12/14/2020  unchanged from previous office visit except as updated.             Vitals:    12/14/20 1414 12/14/20 1430   BP: 172/71 152/72  Comment: manual   Pulse: (!) 49 60   Resp: 14    Temp: 96.7 °F (35.9 °C)    TempSrc: Infrared    SpO2: 98%    Weight: 68.8 kg (151 lb 11.2 oz)    Height: 162.6 cm (64.02\")    PainSc: 0-No pain      Current Status 12/14/2020   ECOG score 0        PHYSICAL EXAM:    CONSTITUTIONAL:  Patient alert and oriented x3  EYES:  Conjunctiva and lids unremarkable.  PERRLA  RESPIRATORY:  Breathing unlabored, no acute distress.  CARDIAC: Heart rate regular rate and rhythm without murmurs noted  MUSCULOSKELETAL:  No lower extremity swelling, erythema or calf tenderness  SKIN:  Warm.  No rashes.  PSYCHIATRIC:  Normal judgment and insight.  Normal mood and affect.     RECENT LABS:  Lab Results   Component Value Date    WBC 7.11 11/18/2020    HGB 12.2 11/18/2020    HCT 36.2 11/18/2020    MCV 92.8 11/18/2020    PLT 70 (L) 11/18/2020         Assessment/Plan     1.  Stage IVb diffuse large B cell non-Hodgkin's lymphoma, status post chemotherapy with CHOP Rituxan, currently with no evidence of disease    2.  Intermittent thrombocytopenia.  The patient struggled with thrombocytopenia while undergoing chemotherapy.  Recurrent thrombocytopenia June 2020 secondary to spironolactone.  Counts of improved with discontinuation of spironolactone.  Most recent platelet count of 70,000, repeat labs in 1 week, without signs or symptoms of bleeding presently    3.  Chronic anticoagulation secondary to paroxysmal " atrial fibrillation and deep vein thrombosis.  Continue on Xarelto 20 mg daily without signs or symptoms of bleeding    4.  Hypertension.  Managed by Dr. Barrios, cardiology.  Manual blood pressure today improved compared to initial pressure.  The patient was encouraged to keep a log of her blood pressure twice daily for the next week and continue metoprolol as instructed by cardiology.      PLAN:  1. I have provided the patient with 1 month supply of Xarelto 20 mg tablets with instructions for 1 tablet daily. We reviewed the appropriate instructions for medication administration as well as dosing. We have reviewed potential side effects including increased risk of bleeding. The patient understand to call with any questions or concerns.    2.  Today we have discussed the patient's hypertension.  Repeat manual blood pressure per my exam at 152/72 with a heart rate of 60 apically.  The patient was instructed to keep a detailed log of her blood pressure twice daily and contact cardiology if she remains hypertensive    3.  Patient is scheduled for follow-up in regards to her lymphoma in 1 week with Dr. Iraheta, With repeat CBC.  She understands to call with any new signs or symptoms of bleeding.      Daina Caro, APRN  12/14/2020

## 2020-12-14 NOTE — PROGRESS NOTES
Subjective .  Patient continues to feel well    REASONS FOR FOLLOWUP:    1. 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 Paroxysmal atrial fibrillation  3. History of DVT    HISTORY OF PRESENT ILLNESS:  The patient is a 80 y.o. year old female who is here for follow-up with the above-mentioned history.    History of Present Illness    Ms. Blake is here today to  Xarelto samples.  She has been on Xarelto for atrial fibrillation and history of DVT.  Mediport also flushed today.    Patient denies any recent bleeding, dark stools, or unexplained bruising.  She does have a lesion on her right calf however this is been present for over a year.  She is asking if this could be left over from when she had swelling secondary to the blood clots.  She denies any recent increase in shortness of breath.  She has started spironolactone per the direction of cardiology within the past 3 to 4 months.  She denies fevers, night sweats, weight loss.  She denies taking NSAIDs.     Patient when seen September 23, 2020 had developed worsening thrombocytopenia.  Records indicated this had started since her visit in May at approximate time that she started spironolactone.  Antibodies were drawn which are currently pending though her IPF had been 7.6 now October 30 8 down to 6.3 in follow-up CBC with H&H of 12.3 and three 7.4 with white count of 6-40 and platelet count of 60,000 having dropped to as low as 20,000 when seen Freya 3, 2020.  The patient is feeling considerably better, indicates that she has not had any palpitation episodes, notes generally improved stamina, no bleeding episodes.  She has remained off her Xarelto at this point.  The patient is next seen December 21, 2020 continue to feel well with an unchanged platelet count.  Again she has no fever, chills, night sweats, nausea, vomiting, weight loss and normal performance status.      Past Medical History:   Diagnosis Date   • Anemia  "    multifactorial   • Cystitis    • Cystocele     moderate   • Drug therapy    • Dyslipidemia    • Esophageal reflux    • Fatigue    • History of transfusion     no reaction   • Hypercalcemia    • Hypertension    • Major depression, chronic    • Menopause    • Non Hodgkin's lymphoma (CMS/HCC)    • Osteoarthritis    • Osteoporosis    • Palpitations    • Paroxysmal atrial fibrillation (CMS/HCC)    • Post menopausal problems    • RLS (restless legs syndrome)    • Seizure disorder (CMS/HCC)    • Sinus bradycardia    • Subjective tinnitus    • Vaginal delivery     x2  \"SONYA\"      \"JASON\"   • Vitamin D deficiency        ONCOLOGIC HISTORY:  (History from previous dates can be found in the separate document.)    Current Outpatient Medications on File Prior to Visit   Medication Sig Dispense Refill   • amLODIPine (NORVASC) 10 MG tablet TAKE 1 TABLET BY MOUTH EVERY DAY 90 tablet 1   • benazepril (LOTENSIN) 40 MG tablet TAKE 1 TABLET BY MOUTH EVERY DAY 90 tablet 1   • cetirizine (zyrTEC) 10 MG tablet Take 10 mg by mouth Daily.     • Cholecalciferol (VITAMIN D3) 125 MCG (5000 UT) capsule capsule Take 5,000 Units by mouth Daily.     • escitalopram (LEXAPRO) 20 MG tablet TAKE 1 TABLET BY MOUTH DAILY 90 tablet 1   • folic acid (FOLVITE) 400 MCG tablet Take 400 mcg by mouth daily.     • gabapentin (NEURONTIN) 300 MG capsule Take 2 capsules by mouth every night at bedtime. 60 capsule 2   • hydrALAZINE (APRESOLINE) 100 MG tablet TAKE 1 TABLET BY MOUTH THREE TIMES DAILY 270 tablet 3   • lidocaine-prilocaine (EMLA) 2.5-2.5 % cream Apply 30 min prior to port access 5 g 2   • melatonin 5 MG tablet tablet Take 5 mg by mouth Every Night.     • metoprolol tartrate (LOPRESSOR) 25 MG tablet Take 1 tablet by mouth 2 (Two) Times a Day. 60 tablet 3   • Multiple Vitamins-Minerals (ZINC PO) Take  by mouth.     • pantoprazole (PROTONIX) 40 MG EC tablet TAKE 1 TABLET BY MOUTH EVERY DAY 90 tablet 1   • phenytoin (DILANTIN) 100 MG ER capsule TAKE 3 " "CAPSULES BY MOUTH EVERY NIGHT AT BEDTIME 270 capsule 5   • vitamin B-12 (CYANOCOBALAMIN) 100 MCG tablet Take 100 mcg by mouth Daily.     • vitamin B-6 (PYRIDOXINE) 100 MG tablet Take 100 mg by mouth Daily.     • vitamin E 100 UNIT capsule Take 100 Units by mouth Daily.     • Xarelto 20 MG tablet Take 1 tablet by mouth Daily. 28 tablet 0     No current facility-administered medications on file prior to visit.        ALLERGIES:     Allergies   Allergen Reactions   • Crab (Diagnostic) Itching and Rash   • Pseudoephedrine Dizziness       Social History     Socioeconomic History   • Marital status:      Spouse name: JL   • Number of children: 2   • Years of education: Not on file   • Highest education level: Not on file   Occupational History     Employer: RETIRED   Tobacco Use   • Smoking status: Never Smoker   • Smokeless tobacco: Never Used   Substance and Sexual Activity   • Alcohol use: No     Comment: Caffeine use: 1 cup daily   • Drug use: No   • Sexual activity: Defer     Birth control/protection: Surgical     Comment:  (Jl)         Cancer-related family history is not on file.     Review of Systems   Constitutional: Negative.    HENT: Negative.    Eyes: Negative.    Respiratory: Negative.    Cardiovascular: Negative.    Gastrointestinal: Negative.    Endocrine: Negative.    Genitourinary: Negative.    Musculoskeletal: Negative.    Skin: Negative.    Allergic/Immunologic: Negative.    Neurological: Negative.    Hematological: Negative.    Psychiatric/Behavioral: Negative.          Objective      Vitals:    12/21/20 1616   BP: 146/73   Pulse: 59   Resp: 18   Temp: 97.5 °F (36.4 °C)   TempSrc: Temporal   SpO2: 98%   Weight: 68.9 kg (151 lb 14.4 oz)   Height: 162.6 cm (64.02\")   PainSc: 0-No pain     Current Status 12/21/2020   ECOG score 0       Physical Exam   Constitutional: She is oriented to person, place, and time. She appears well-developed.   HENT:   Head: Normocephalic and atraumatic. "   Eyes: Pupils are equal, round, and reactive to light.   Neck: Normal range of motion. Neck supple.   Cardiovascular: Normal rate.   Pulmonary/Chest: Effort normal and breath sounds normal. She has no wheezes.   Abdominal: Normal appearance and bowel sounds are normal. She exhibits no distension. There is no abdominal tenderness.   Musculoskeletal: Normal range of motion.   Neurological: She is alert and oriented to person, place, and time.   Skin: Skin is warm and dry. Lesion (darkened area right calf, present over 1 year) noted.   Scant scattered petechiae bilateral shins   Psychiatric: Her behavior is normal. Mood normal.   Vitals reviewed.      RECENT LABS:  Results from last 7 days   Lab Units 12/21/20  1606   WBC 10*3/mm3 5.83   NEUTROS ABS 10*3/mm3 3.50   HEMOGLOBIN g/dL 12.0   HEMATOCRIT % 36.0   PLATELETS 10*3/mm3 60*     Results from last 7 days   Lab Units 12/21/20  1606   SODIUM mmol/L 140   POTASSIUM mmol/L 3.7   CHLORIDE mmol/L 102   CO2 mmol/L 28.0   BUN mg/dL 22*   CREATININE mg/dL 0.73   CALCIUM mg/dL 9.3   ALBUMIN g/dL 4.00   BILIRUBIN mg/dL 0.2   ALK PHOS U/L 92   ALT (SGPT) U/L 17   AST (SGOT) U/L 24   GLUCOSE mg/dL 98   MAGNESIUM mg/dL 1.9             Assessment/Plan   1. 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  · PET scan 5/3/18 with diffuse splenic involvement (SUV 16.9), lymphadenopathy throughout upper abdomen and retroperitoneum (SUV 13.1), right liver lesion 5 cm (SUV 12.8), pelvic lymphadenopathy up to 4 cm, bladder wall thickening with associated hypermetabolism, cervical lymphadenopathy left posterior (SUV 11.2), multiple bone lesions including left intertrochanteric femur, ribs, right scapula, pelvis as well as left gluteal hematoma versus lymphomatous lesion.  · CT-guided liver biopsy 4/26/18 with diffuse large B-cell non-Hodgkin's  lymphoma, CD20 positive, double hit (BCL-6 rearrangement 85%, MYC rearrangement 79%), KI-67 4+/4  · Left cervical excisional biopsy by ENT Dr. Oconnor 5/1/18 confirming high-grade B cell non-Hodgkin's lymphoma, Ki-67 100%, double hit (BCL-6 rearrangement 76%, MYC rearrangement 85%)  · Echocardiogram 4/11/18 with ejection fraction 66.7%  · Right port placement Dr Gill 5/4/18  · Patient not felt to be a candidate for more aggressive chemotherapy due to performance status and age (DA EPOCH-R)  · Therefore treated with R CHOP chemotherapy 5/4/18.  · Followed by granix 300mcg daily beginning 5/6/18 through 5/14/18.  · Today, the patient has increasing WBC related to growth factor use, stable hemoglobin, spontaneous improvement in platelet count.  She is doing quite well following her chemotherapy and is ready to go home.  There is some concern regarding her persistently elevated LDH at 347 today as well as her escalating calcium at 11.9.  She is currently asymptomatic from the calcium and her ionized calcium is stable at 6.8.  Therefore we are going to proceed with discharge home.  She will return to the office later this week on 5/18/18 with repeat labs, nurse practitioner visit, and potential treatment with a third dose of Zometa if her calcium has continued to escalate significantly. She is now seen with plans to begin cycle 2 R CHOP chemotherapy with growth factor support ( Neulasta on body injector).  Will schedule follow-up PET scan after 2 cycles of therapy and see her back in 3 weeks to review her status.  · Patient reviewed June 19 with near resolution of hypermetabolic sites on PET/CT.  Plans made for third cycle of CHOP R, additional Zometa and total of 6 cycles of chemotherapy anticipated.  · Patient seen August 03, 2018 for fifth cycle of chemotherapy-CHOP R, reduction of Oncovin 50%, 6 cycles planned.  Discussed subsequent CT scan follow-up.  · Follow-up scan September 19 with small low-density liver  lesions and splenic lesions are decreased from previous, numerous small mesenteric and RP nodes again stable slightly smaller.  These are discussed September 24 and follow-up scan in 3 months is anticipated  · Patient reviewed November 26 further generally improved, plans made to maintain port, review at 3 months  · Patient assessed February 18, 2019, no evidence of recurrent disease, repeat scans in 3 months plan-6 months from previous  · Patient seen May 13, 2019, no evidence of recurrent disease, follow-up assessment in 6 months planned  · Patient reviewed again October 28, 2019 with follow-up scans negative for recurrence, 6 months plan follow-up at 2-year m  · Patient reviewed by Dr. Iraheta 6/3/2020, stable scans.  · Stable clinically when seen October 30, 2020  · Reassessment planned December 21, 2020 via scans after she has developed thrombocytopenia.       2. Thrombocytopenia.  Patient had previous myelosuppression with chemo therapy however this had resolved.  It is noted when looking back today that her platelet count has declined following her visit in May.  She denies any recent bleeding.  She has however had in a new medication with Spironolactone within the past 3 to 4 months from cardiology.  As this can cause thrombocytopenia we have held this treatment and she is gradually recovering when seen October 30, 2020.  Reassessment of this with the patient seen December 21 continue disorder platelet count approximately 60,000.      3. Multifactorial anemia:  · Related to anemia chronic disease, chemotherapy, prior iron deficiency.  · Hemoglobin rechecked October 30 12.3 hematocrit 37.4     4. Hypercalcemia of malignancy:  · Calcium up to 13.8 on 4/21/18 (albumin 3.3).  Intact PTH 8.1, PTH RP less than 2.0  · Received Zometa 4 mg IV 4/25/18.  · Calcium up to 11.3 on 5/7/18 with second dose of Zometa administered 4 mg IV.  · Calcium today has continued to gradually increase up to 11.9 with albumin 3.3.   Ionized calcium however is stable at 6.8 compared to yesterday.  The patient is asymptomatic.  We will not plan to administer a further dose of Zometa just yet and will allow further time for the patient to respond to chemotherapy.  She returned 518 for continued Zometa and when seen May 25 has a normal calcium level.  · Patient to receive Zometa June 19, subsequently every other cycle, restart low-dose calcium supplementation  · Patient seen August 03, 2018, plans made for Zometa with her final course of chemotherapy August 23, 2018  · Patient scheduled for bone density prior to assessment 3 months following November visit, potential Xgeva and follow-up as she is seen back to step to repeat scans are performed in May 2019.  As she is seen May 13 we do plan the Xgeva and then move to Prolia 6 months later for her subsequent treatment for osteopenia.  · Prolia continued every 6 months, last given 6/3/2020, now scheduled December 21, 2020.      5.  Paroxysmal atrial fibrillation:  · Recently diagnosed, anticoagulated with Eliquis initially, discontinued due to expected thrombocytopenia from chemotherapy  · Currently in sinus rhythm, continues on Lopressor 50 mg every 12 hours  · Anticoagulation restarted with Xarelto though this has been held during her thrombocytopenia.    6. Prior seizure disorder:  · Continues currently on Dilantin 300 mg daily at bedtime and Neurontin 600 mg daily at bedtime, will return to outpatient dose of Neurontin 300 mg daily at bedtime at discharge.        7. GI prophylaxis:  · Continues Protonix p.o.        8. Venous access:  · Port placement 5/4/18 by Dr. Gill, continue to manage q. monthly        9. DVT-hold Xarelto 20 mg by mouth daily secondary to thrombocytopenia.        Plan:  1. Hold Xarelto secondary to thrombocytopenia  2. Antiplatelet antibody pending  3. Aldactone discontinued    4. Plan repeat CT chest abdomen pelvis as well as laboratory studies including repeat IPF,  B12, MMA, folate, LDH, CMP in approximately 2 weeks with MD follow-up 1 week later.  We may need to proceed with a bone marrow aspirate and biopsy in this patient.

## 2020-12-15 ENCOUNTER — TRANSCRIBE ORDERS (OUTPATIENT)
Dept: ADMINISTRATIVE | Facility: HOSPITAL | Age: 80
End: 2020-12-15

## 2020-12-15 DIAGNOSIS — Z12.39 SCREENING BREAST EXAMINATION: Primary | ICD-10-CM

## 2020-12-16 DIAGNOSIS — C79.51 BONE METASTASIS: Primary | ICD-10-CM

## 2020-12-21 ENCOUNTER — APPOINTMENT (OUTPATIENT)
Dept: LAB | Facility: HOSPITAL | Age: 80
End: 2020-12-21

## 2020-12-21 ENCOUNTER — OFFICE VISIT (OUTPATIENT)
Dept: ONCOLOGY | Facility: CLINIC | Age: 80
End: 2020-12-21

## 2020-12-21 ENCOUNTER — APPOINTMENT (OUTPATIENT)
Dept: ONCOLOGY | Facility: HOSPITAL | Age: 80
End: 2020-12-21

## 2020-12-21 ENCOUNTER — INFUSION (OUTPATIENT)
Dept: ONCOLOGY | Facility: HOSPITAL | Age: 80
End: 2020-12-21

## 2020-12-21 VITALS
RESPIRATION RATE: 18 BRPM | OXYGEN SATURATION: 98 % | TEMPERATURE: 97.5 F | BODY MASS INDEX: 25.93 KG/M2 | HEART RATE: 59 BPM | DIASTOLIC BLOOD PRESSURE: 73 MMHG | SYSTOLIC BLOOD PRESSURE: 146 MMHG | WEIGHT: 151.9 LBS | HEIGHT: 64 IN

## 2020-12-21 DIAGNOSIS — C83.39 DIFFUSE LARGE B-CELL LYMPHOMA OF EXTRANODAL SITE EXCLUDING SPLEEN AND OTHER SOLID ORGANS (HCC): ICD-10-CM

## 2020-12-21 DIAGNOSIS — M85.89 OSTEOPENIA OF MULTIPLE SITES: ICD-10-CM

## 2020-12-21 DIAGNOSIS — E83.52 HYPERCALCEMIA: Primary | ICD-10-CM

## 2020-12-21 DIAGNOSIS — E83.52 HYPERCALCEMIA: ICD-10-CM

## 2020-12-21 DIAGNOSIS — C85.91 LYMPHOMA OF LYMPH NODES OF NECK, UNSPECIFIED LYMPHOMA TYPE (HCC): ICD-10-CM

## 2020-12-21 DIAGNOSIS — T45.1X5A PANCYTOPENIA DUE TO ANTINEOPLASTIC CHEMOTHERAPY (HCC): ICD-10-CM

## 2020-12-21 DIAGNOSIS — Z45.2 ENCOUNTER FOR ADJUSTMENT OR MANAGEMENT OF VASCULAR ACCESS DEVICE: ICD-10-CM

## 2020-12-21 DIAGNOSIS — C85.91 LYMPHOMA OF LYMPH NODES OF NECK, UNSPECIFIED LYMPHOMA TYPE (HCC): Primary | ICD-10-CM

## 2020-12-21 DIAGNOSIS — D61.810 PANCYTOPENIA DUE TO ANTINEOPLASTIC CHEMOTHERAPY (HCC): ICD-10-CM

## 2020-12-21 DIAGNOSIS — M85.89 OSTEOPENIA OF MULTIPLE SITES: Primary | ICD-10-CM

## 2020-12-21 LAB
ALBUMIN SERPL-MCNC: 4 G/DL (ref 3.5–5.2)
ALBUMIN/GLOB SERPL: 1.3 G/DL (ref 1.1–2.4)
ALP SERPL-CCNC: 92 U/L (ref 38–116)
ALT SERPL W P-5'-P-CCNC: 17 U/L (ref 0–33)
ANION GAP SERPL CALCULATED.3IONS-SCNC: 10 MMOL/L (ref 5–15)
AST SERPL-CCNC: 24 U/L (ref 0–32)
BASOPHILS # BLD AUTO: 0.03 10*3/MM3 (ref 0–0.2)
BASOPHILS NFR BLD AUTO: 0.5 % (ref 0–1.5)
BILIRUB SERPL-MCNC: 0.2 MG/DL (ref 0.2–1.2)
BUN SERPL-MCNC: 22 MG/DL (ref 6–20)
BUN/CREAT SERPL: 30.1 (ref 7.3–30)
CALCIUM SPEC-SCNC: 9.3 MG/DL (ref 8.5–10.2)
CHLORIDE SERPL-SCNC: 102 MMOL/L (ref 98–107)
CO2 SERPL-SCNC: 28 MMOL/L (ref 22–29)
CREAT SERPL-MCNC: 0.73 MG/DL (ref 0.6–1.1)
DEPRECATED RDW RBC AUTO: 44 FL (ref 37–54)
EOSINOPHIL # BLD AUTO: 0.18 10*3/MM3 (ref 0–0.4)
EOSINOPHIL NFR BLD AUTO: 3.1 % (ref 0.3–6.2)
ERYTHROCYTE [DISTWIDTH] IN BLOOD BY AUTOMATED COUNT: 12.6 % (ref 12.3–15.4)
GFR SERPL CREATININE-BSD FRML MDRD: 77 ML/MIN/1.73
GLOBULIN UR ELPH-MCNC: 3.1 GM/DL (ref 1.8–3.5)
GLUCOSE SERPL-MCNC: 98 MG/DL (ref 74–124)
HCT VFR BLD AUTO: 36 % (ref 34–46.6)
HGB BLD-MCNC: 12 G/DL (ref 12–15.9)
IMM GRANULOCYTES # BLD AUTO: 0.01 10*3/MM3 (ref 0–0.05)
IMM GRANULOCYTES NFR BLD AUTO: 0.2 % (ref 0–0.5)
LYMPHOCYTES # BLD AUTO: 1.26 10*3/MM3 (ref 0.7–3.1)
LYMPHOCYTES NFR BLD AUTO: 21.6 % (ref 19.6–45.3)
MAGNESIUM SERPL-MCNC: 1.9 MG/DL (ref 1.8–2.5)
MCH RBC QN AUTO: 31.7 PG (ref 26.6–33)
MCHC RBC AUTO-ENTMCNC: 33.3 G/DL (ref 31.5–35.7)
MCV RBC AUTO: 95.2 FL (ref 79–97)
MONOCYTES # BLD AUTO: 0.85 10*3/MM3 (ref 0.1–0.9)
MONOCYTES NFR BLD AUTO: 14.6 % (ref 5–12)
NEUTROPHILS NFR BLD AUTO: 3.5 10*3/MM3 (ref 1.7–7)
NEUTROPHILS NFR BLD AUTO: 60 % (ref 42.7–76)
NRBC BLD AUTO-RTO: 0 /100 WBC (ref 0–0.2)
PHOSPHATE SERPL-MCNC: 4.2 MG/DL (ref 2.5–4.5)
PLATELET # BLD AUTO: 60 10*3/MM3 (ref 140–450)
PMV BLD AUTO: 10.7 FL (ref 6–12)
POTASSIUM SERPL-SCNC: 3.7 MMOL/L (ref 3.5–4.7)
PROT SERPL-MCNC: 7.1 G/DL (ref 6.3–8)
RBC # BLD AUTO: 3.78 10*6/MM3 (ref 3.77–5.28)
SODIUM SERPL-SCNC: 140 MMOL/L (ref 134–145)
WBC # BLD AUTO: 5.83 10*3/MM3 (ref 3.4–10.8)

## 2020-12-21 PROCEDURE — 25010000003 HEPARIN LOCK FLUSH PER 10 UNITS: Performed by: INTERNAL MEDICINE

## 2020-12-21 PROCEDURE — 83615 LACTATE (LD) (LDH) ENZYME: CPT

## 2020-12-21 PROCEDURE — 83735 ASSAY OF MAGNESIUM: CPT

## 2020-12-21 PROCEDURE — 85025 COMPLETE CBC W/AUTO DIFF WBC: CPT

## 2020-12-21 PROCEDURE — 99214 OFFICE O/P EST MOD 30 MIN: CPT | Performed by: INTERNAL MEDICINE

## 2020-12-21 PROCEDURE — 80053 COMPREHEN METABOLIC PANEL: CPT

## 2020-12-21 PROCEDURE — 96372 THER/PROPH/DIAG INJ SC/IM: CPT

## 2020-12-21 PROCEDURE — 36415 COLL VENOUS BLD VENIPUNCTURE: CPT

## 2020-12-21 PROCEDURE — 84100 ASSAY OF PHOSPHORUS: CPT

## 2020-12-21 PROCEDURE — 25010000002 DENOSUMAB 60 MG/ML SOLUTION PREFILLED SYRINGE: Performed by: INTERNAL MEDICINE

## 2020-12-21 RX ORDER — HEPARIN SODIUM (PORCINE) LOCK FLUSH IV SOLN 100 UNIT/ML 100 UNIT/ML
500 SOLUTION INTRAVENOUS AS NEEDED
Status: DISCONTINUED | OUTPATIENT
Start: 2020-12-21 | End: 2020-12-21 | Stop reason: HOSPADM

## 2020-12-21 RX ORDER — HEPARIN SODIUM (PORCINE) LOCK FLUSH IV SOLN 100 UNIT/ML 100 UNIT/ML
500 SOLUTION INTRAVENOUS AS NEEDED
Status: CANCELLED | OUTPATIENT
Start: 2020-12-21

## 2020-12-21 RX ORDER — SODIUM CHLORIDE 0.9 % (FLUSH) 0.9 %
10 SYRINGE (ML) INJECTION AS NEEDED
Status: CANCELLED | OUTPATIENT
Start: 2020-12-21

## 2020-12-21 RX ADMIN — Medication 500 UNITS: at 16:16

## 2020-12-21 RX ADMIN — DENOSUMAB 60 MG: 60 INJECTION SUBCUTANEOUS at 17:06

## 2020-12-22 LAB — LDH SERPL-CCNC: 172 U/L (ref 99–259)

## 2020-12-28 LAB
Lab: NORMAL
METHYLMALONATE SERPL-SCNC: 268 NMOL/L (ref 0–378)

## 2021-01-13 ENCOUNTER — INFUSION (OUTPATIENT)
Dept: ONCOLOGY | Facility: HOSPITAL | Age: 81
End: 2021-01-13

## 2021-01-13 ENCOUNTER — HOSPITAL ENCOUNTER (OUTPATIENT)
Dept: PET IMAGING | Facility: HOSPITAL | Age: 81
Discharge: HOME | End: 2021-01-13
Admitting: INTERNAL MEDICINE

## 2021-01-13 DIAGNOSIS — C83.39 DIFFUSE LARGE B-CELL LYMPHOMA OF EXTRANODAL SITE EXCLUDING SPLEEN AND OTHER SOLID ORGANS (HCC): ICD-10-CM

## 2021-01-13 DIAGNOSIS — C85.91 LYMPHOMA OF LYMPH NODES OF NECK, UNSPECIFIED LYMPHOMA TYPE (HCC): ICD-10-CM

## 2021-01-13 LAB
ALBUMIN SERPL-MCNC: 4.1 G/DL (ref 3.5–5.2)
ALBUMIN/GLOB SERPL: 1.3 G/DL (ref 1.1–2.4)
ALP SERPL-CCNC: 119 U/L (ref 38–116)
ALT SERPL W P-5'-P-CCNC: 17 U/L (ref 0–33)
ANION GAP SERPL CALCULATED.3IONS-SCNC: 9.8 MMOL/L (ref 5–15)
AST SERPL-CCNC: 21 U/L (ref 0–32)
BILIRUB SERPL-MCNC: 0.3 MG/DL (ref 0.2–1.2)
BUN SERPL-MCNC: 17 MG/DL (ref 6–20)
BUN/CREAT SERPL: 27 (ref 7.3–30)
CALCIUM SPEC-SCNC: 9.1 MG/DL (ref 8.5–10.2)
CHLORIDE SERPL-SCNC: 101 MMOL/L (ref 98–107)
CO2 SERPL-SCNC: 27.2 MMOL/L (ref 22–29)
CREAT BLDA-MCNC: 0.6 MG/DL (ref 0.6–1.3)
CREAT SERPL-MCNC: 0.63 MG/DL (ref 0.6–1.1)
GFR SERPL CREATININE-BSD FRML MDRD: 91 ML/MIN/1.73
GLOBULIN UR ELPH-MCNC: 3.2 GM/DL (ref 1.8–3.5)
GLUCOSE SERPL-MCNC: 99 MG/DL (ref 74–124)
POTASSIUM SERPL-SCNC: 4.2 MMOL/L (ref 3.5–4.7)
PROT SERPL-MCNC: 7.3 G/DL (ref 6.3–8)
SODIUM SERPL-SCNC: 138 MMOL/L (ref 134–145)

## 2021-01-13 PROCEDURE — 80053 COMPREHEN METABOLIC PANEL: CPT

## 2021-01-13 PROCEDURE — 71260 CT THORAX DX C+: CPT

## 2021-01-13 PROCEDURE — 0 DIATRIZOATE MEGLUMINE & SODIUM PER 1 ML: Performed by: INTERNAL MEDICINE

## 2021-01-13 PROCEDURE — 74177 CT ABD & PELVIS W/CONTRAST: CPT

## 2021-01-13 PROCEDURE — 82565 ASSAY OF CREATININE: CPT

## 2021-01-13 PROCEDURE — 25010000002 IOPAMIDOL 61 % SOLUTION: Performed by: INTERNAL MEDICINE

## 2021-01-13 RX ADMIN — DIATRIZOATE MEGLUMINE AND DIATRIZOATE SODIUM 30 ML: 660; 100 LIQUID ORAL; RECTAL at 14:07

## 2021-01-13 RX ADMIN — IOPAMIDOL 85 ML: 612 INJECTION, SOLUTION INTRAVENOUS at 14:54

## 2021-01-13 NOTE — PROGRESS NOTES
Subjective .  Patient continues to feel well    REASONS FOR FOLLOWUP:    1. 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 Paroxysmal atrial fibrillation  3. History of DVT  4. 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 year old female who is here for follow-up with the above-mentioned history.    History of Present Illness    Ms. Blake is here today to  Xarelto samples.  She has been on Xarelto for atrial fibrillation and history of DVT.  Mediport also flushed today.    Patient denies any recent bleeding, dark stools, or unexplained bruising.  She does have a lesion on her right calf however this is been present for over a year.  She is asking if this could be left over from when she had swelling secondary to the blood clots.  She denies any recent increase in shortness of breath.  She has started spironolactone per the direction of cardiology within the past 3 to 4 months.  She denies fevers, night sweats, weight loss.  She denies taking NSAIDs.     Patient when seen September 23, 2020 had developed worsening thrombocytopenia.  Records indicated this had started since her visit in May at approximate time that she started spironolactone.  Antibodies were drawn which are currently pending though her IPF had been 7.6 now October 30 8 down to 6.3 in follow-up CBC with H&H of 12.3 and three 7.4 with white count of 6-40 and platelet count of 60,000 having dropped to as low as 20,000 when seen Freya 3, 2020.  The patient is feeling considerably better, indicates that she has not had any palpitation episodes, notes generally improved stamina, no bleeding episodes.  She has remained off her Xarelto at this point.  The patient is next seen December 21, 2020 continue to feel well with an unchanged platelet count.  Again she has no fever, chills, night sweats, nausea, vomiting, weight loss and normal performance status.     The patient was  "initially asked to discontinue spironolactone and follow-up to determine response.  Her Xarelto was held and she underwent repeat scans including CT chest abdomen pelvis January 13, 2021 with no evidence of increasing lymphadenopathy in the chest abdomen pelvis or any other findings that might be consistent with recurrence.  Her IPF, however, has slowly increased and is now at 8.2.  We discussed a trial of prednisone for immune related thrombocytopenia under the circumstances that may be drug-related and/or related to her lymphoma.      Past Medical History:   Diagnosis Date   • Anemia     multifactorial   • Cystitis    • Cystocele     moderate   • Drug therapy    • Dyslipidemia    • Esophageal reflux    • Fatigue    • History of transfusion     no reaction   • Hypercalcemia    • Hypertension    • Major depression, chronic    • Menopause    • Non Hodgkin's lymphoma (CMS/HCC)    • Osteoarthritis    • Osteoporosis    • Palpitations    • Paroxysmal atrial fibrillation (CMS/HCC)    • Post menopausal problems    • RLS (restless legs syndrome)    • Seizure disorder (CMS/HCC)    • Sinus bradycardia    • Subjective tinnitus    • Vaginal delivery     x2  \"SONYA\"      \"JASON\"   • Vitamin D deficiency        ONCOLOGIC HISTORY:  (History from previous dates can be found in the separate document.)    Current Outpatient Medications on File Prior to Visit   Medication Sig Dispense Refill   • amLODIPine (NORVASC) 10 MG tablet TAKE 1 TABLET BY MOUTH EVERY DAY 90 tablet 1   • benazepril (LOTENSIN) 40 MG tablet TAKE 1 TABLET BY MOUTH EVERY DAY 90 tablet 1   • cetirizine (zyrTEC) 10 MG tablet Take 10 mg by mouth Daily.     • Cholecalciferol (VITAMIN D3) 125 MCG (5000 UT) capsule capsule Take 5,000 Units by mouth Daily.     • escitalopram (LEXAPRO) 20 MG tablet TAKE 1 TABLET BY MOUTH DAILY 90 tablet 1   • folic acid (FOLVITE) 400 MCG tablet Take 400 mcg by mouth daily.     • gabapentin (NEURONTIN) 300 MG capsule Take 2 capsules by mouth " every night at bedtime. 60 capsule 2   • hydrALAZINE (APRESOLINE) 100 MG tablet TAKE 1 TABLET BY MOUTH THREE TIMES DAILY 270 tablet 3   • lidocaine-prilocaine (EMLA) 2.5-2.5 % cream Apply 30 min prior to port access 5 g 2   • melatonin 5 MG tablet tablet Take 5 mg by mouth Every Night.     • metoprolol tartrate (LOPRESSOR) 25 MG tablet Take 1 tablet by mouth 2 (Two) Times a Day. 60 tablet 3   • Multiple Vitamins-Minerals (ZINC PO) Take  by mouth.     • pantoprazole (PROTONIX) 40 MG EC tablet TAKE 1 TABLET BY MOUTH EVERY DAY 90 tablet 1   • phenytoin (DILANTIN) 100 MG ER capsule TAKE 3 CAPSULES BY MOUTH EVERY NIGHT AT BEDTIME 270 capsule 5   • vitamin B-12 (CYANOCOBALAMIN) 100 MCG tablet Take 100 mcg by mouth Daily.     • vitamin B-6 (PYRIDOXINE) 100 MG tablet Take 100 mg by mouth Daily.     • vitamin E 100 UNIT capsule Take 100 Units by mouth Daily.     • Xarelto 20 MG tablet Take 1 tablet by mouth Daily. 28 tablet 0     No current facility-administered medications on file prior to visit.        ALLERGIES:     Allergies   Allergen Reactions   • Crab (Diagnostic) Itching and Rash   • Pseudoephedrine Dizziness       Social History     Socioeconomic History   • Marital status:      Spouse name: JL   • Number of children: 2   • Years of education: Not on file   • Highest education level: Not on file   Occupational History     Employer: RETIRED   Tobacco Use   • Smoking status: Never Smoker   • Smokeless tobacco: Never Used   Substance and Sexual Activity   • Alcohol use: No     Comment: Caffeine use: 1 cup daily   • Drug use: No   • Sexual activity: Defer     Birth control/protection: Surgical     Comment:  (Jl)         Cancer-related family history is not on file.     Review of Systems   Constitutional: Negative.    HENT: Negative.    Eyes: Negative.    Respiratory: Negative.    Cardiovascular: Negative.    Gastrointestinal: Negative.    Endocrine: Negative.    Genitourinary: Negative.   "  Musculoskeletal: Negative.    Skin: Negative.    Allergic/Immunologic: Negative.    Neurological: Negative.    Hematological: Negative.    Psychiatric/Behavioral: Negative.          Objective      Vitals:    01/20/21 1327   BP: 159/75   Pulse: 58   Resp: 18   Temp: 97.7 °F (36.5 °C)   TempSrc: Temporal   SpO2: 97%   Weight: 67.5 kg (148 lb 14.4 oz)   Height: 162.6 cm (64.02\")   PainSc: 0-No pain     Current Status 1/20/2021   ECOG score 0       Physical Exam   Constitutional: She is oriented to person, place, and time. She appears well-developed.   HENT:   Head: Normocephalic and atraumatic.   Eyes: Pupils are equal, round, and reactive to light.   Neck: Normal range of motion. Neck supple.   Cardiovascular: Normal rate.   Pulmonary/Chest: Effort normal and breath sounds normal. She has no wheezes.   Abdominal: Normal appearance and bowel sounds are normal. She exhibits no distension. There is no abdominal tenderness.   Musculoskeletal: Normal range of motion.   Neurological: She is alert and oriented to person, place, and time.   Skin: Skin is warm and dry. Lesion (darkened area right calf, present over 1 year) noted.   Scant scattered petechiae bilateral shins   Psychiatric: Her behavior is normal. Mood normal.   Vitals reviewed.      RECENT LABS:  Results from last 7 days   Lab Units 01/20/21  1331   WBC 10*3/mm3 4.99   NEUTROS ABS 10*3/mm3 2.76   HEMOGLOBIN g/dL 12.2   HEMATOCRIT % 37.3   PLATELETS 10*3/mm3 38*           CT CHEST, ABDOMEN AND PELVIS WITH CONTRAST 1/13/2021     COMPARISON: 05/22/2020     FINDINGS:     CT CHEST: A right-sided chest wall port is present with the tip in the  cavoatrial junction. No pleural or pericardial effusion is seen. Dilated  main pulmonary artery suggestive of pulmonary arterial hypertension. No  enlarging mediastinal lymphadenopathy is seen. No pathological axillary  lymphadenopathy. Calcified coronary artery disease is present. The  central airways are patent without " endobronchial lesion. There is  biapical pleural parenchymal scarring present. No new or suspicious  pulmonary nodules are seen. There is no pathological axillary  lymphadenopathy.     CT ABDOMEN AND PELVIS:The spleen is normal in size and attenuation. A  low-density lesion seen on image 31 is unchanged from prior imaging.  There are shotty retroperitoneal lymph nodes without interval  enlargement. Index right external iliac lymph node today measures 1 cm  in short axis dimension compared to 1.1 cm previously. Dense central  mesenteric calcifications are again seen. Small lymph nodes seen within  the small bowel mesentery are without interim change.     Small hypoattenuating left hepatic lobe lesion is without interim  change. No new hepatic lesion. Pancreas, bilateral adrenal gland and  kidneys are normal. No evidence of bowel obstruction. The urinary  bladder is partially distended and normal. Colonic diverticulosis is  present. Sclerotic lesions seen within the L2 vertebral body without  interim change.     IMPRESSION:  No enlarging or pathological lymphadenopathy within the  chest, abdomen and pelvis. Stable shotty abdominal lymphadenopathy as  above. Small hypoattenuating splenic lesion without interim change              Assessment/Plan   1. 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  · PET scan 5/3/18 with diffuse splenic involvement (SUV 16.9), lymphadenopathy throughout upper abdomen and retroperitoneum (SUV 13.1), right liver lesion 5 cm (SUV 12.8), pelvic lymphadenopathy up to 4 cm, bladder wall thickening with associated hypermetabolism, cervical lymphadenopathy left posterior (SUV 11.2), multiple bone lesions including left intertrochanteric femur, ribs, right scapula, pelvis as well as left gluteal hematoma versus lymphomatous lesion.  · CT-guided liver  biopsy 4/26/18 with diffuse large B-cell non-Hodgkin's lymphoma, CD20 positive, double hit (BCL-6 rearrangement 85%, MYC rearrangement 79%), KI-67 4+/4  · Left cervical excisional biopsy by ENT Dr. Oconnor 5/1/18 confirming high-grade B cell non-Hodgkin's lymphoma, Ki-67 100%, double hit (BCL-6 rearrangement 76%, MYC rearrangement 85%)  · Echocardiogram 4/11/18 with ejection fraction 66.7%  · Right port placement Dr Gill 5/4/18  · Patient not felt to be a candidate for more aggressive chemotherapy due to performance status and age (DA EPOCH-R)  · Therefore treated with R CHOP chemotherapy 5/4/18.  · Followed by granix 300mcg daily beginning 5/6/18 through 5/14/18.  · Today, the patient has increasing WBC related to growth factor use, stable hemoglobin, spontaneous improvement in platelet count.  She is doing quite well following her chemotherapy and is ready to go home.  There is some concern regarding her persistently elevated LDH at 347 today as well as her escalating calcium at 11.9.  She is currently asymptomatic from the calcium and her ionized calcium is stable at 6.8.  Therefore we are going to proceed with discharge home.  She will return to the office later this week on 5/18/18 with repeat labs, nurse practitioner visit, and potential treatment with a third dose of Zometa if her calcium has continued to escalate significantly. She is now seen with plans to begin cycle 2 R CHOP chemotherapy with growth factor support ( Neulasta on body injector).  Will schedule follow-up PET scan after 2 cycles of therapy and see her back in 3 weeks to review her status.  · Patient reviewed June 19 with near resolution of hypermetabolic sites on PET/CT.  Plans made for third cycle of CHOP R, additional Zometa and total of 6 cycles of chemotherapy anticipated.  · Patient seen August 03, 2018 for fifth cycle of chemotherapy-CHOP R, reduction of Oncovin 50%, 6 cycles planned.  Discussed subsequent CT scan  follow-up.  · Follow-up scan September 19 with small low-density liver lesions and splenic lesions are decreased from previous, numerous small mesenteric and RP nodes again stable slightly smaller.  These are discussed September 24 and follow-up scan in 3 months is anticipated  · Patient reviewed November 26 further generally improved, plans made to maintain port, review at 3 months  · Patient assessed February 18, 2019, no evidence of recurrent disease, repeat scans in 3 months plan-6 months from previous  · Patient seen May 13, 2019, no evidence of recurrent disease, follow-up assessment in 6 months planned  · Patient reviewed again October 28, 2019 with follow-up scans negative for recurrence, 6 months plan follow-up at 2-year m  · Patient reviewed by Dr. Iraheta 6/3/2020, stable scans.  · Stable clinically when seen October 30, 2020  · Reassessment planned December 21, 2020 via scans after she has developed thrombocytopenia.  · Follow-up CT scan chest and pelvis January 13, 2021 independent reviewed as negative for evidence of recurrence.       2. Thrombocytopenia.  Patient had previous myelosuppression with chemo therapy however this had resolved.  It is noted when looking back today that her platelet count has declined following her visit in May.  She denies any recent bleeding.  She has however had in a new medication with Spironolactone within the past 3 to 4 months from cardiology.  As this can cause thrombocytopenia we have held this treatment and she is gradually recovering when seen October 30, 2020.  Reassessment of this with the patient seen December 21 continue disorder platelet count approximately 60,000.  Subsequent testing per laboratory studies without evidence of etiology, slowly advancing IPF with plans to proceed to treat for immunologic thrombocytopenia such as ITP.        3. Multifactorial anemia:  · Related to anemia chronic disease, chemotherapy, prior iron deficiency.  · Hemoglobin  rechecked January 22, 2021, H&H 12.2 and 37.3     4. Hypercalcemia of malignancy:  · Calcium up to 13.8 on 4/21/18 (albumin 3.3).  Intact PTH 8.1, PTH RP less than 2.0  · Received Zometa 4 mg IV 4/25/18.  · Calcium up to 11.3 on 5/7/18 with second dose of Zometa administered 4 mg IV.  · Calcium today has continued to gradually increase up to 11.9 with albumin 3.3.  Ionized calcium however is stable at 6.8 compared to yesterday.  The patient is asymptomatic.  We will not plan to administer a further dose of Zometa just yet and will allow further time for the patient to respond to chemotherapy.  She returned 518 for continued Zometa and when seen May 25 has a normal calcium level.  · Patient to receive Zometa June 19, subsequently every other cycle, restart low-dose calcium supplementation  · Patient seen August 03, 2018, plans made for Zometa with her final course of chemotherapy August 23, 2018  · Patient scheduled for bone density prior to assessment 3 months following November visit, potential Xgeva and follow-up as she is seen back to step to repeat scans are performed in May 2019.  As she is seen May 13 we do plan the Xgeva and then move to Prolia 6 months later for her subsequent treatment for osteopenia.  · Prolia continued every 6 months, last given 6/3/2020, now scheduled December 21, 2020.   · Reassessment Janice 13th, normocalcemic      5.  Paroxysmal atrial fibrillation:  · Recently diagnosed, anticoagulated with Eliquis initially, discontinued due to expected thrombocytopenia from chemotherapy  · Currently in sinus rhythm, continues on Lopressor 50 mg every 12 hours  · Anticoagulation restarted with Xarelto though this has been held during her thrombocytopenia.    6. Prior seizure disorder:  · Continues currently on Dilantin 300 mg daily at bedtime and Neurontin 600 mg daily at bedtime, will return to outpatient dose of Neurontin 300 mg daily at bedtime at discharge.        7. GI  prophylaxis:  · Continues Protonix p.o.        8. Venous access:  · Port placement 5/4/18 by Dr. Gill, continue to manage q. monthly        9. DVT-hold Xarelto 20 mg by mouth daily secondary to thrombocytopenia.        Plan:  1. Hold Xarelto secondary to thrombocytopenia  2. Antiplatelet antibody pending  3. Aldactone discontinued    4. Initiate prednisone 0.5 mg/kg - 20 mg p.o. twice daily with food  5. Return weekly for CBC, IPF, RN evaluation, possible taper steroids  6.  MD follow-up 3 weeks, consider bone marrow aspirate and biopsy if no response

## 2021-01-18 ENCOUNTER — TELEPHONE (OUTPATIENT)
Dept: ONCOLOGY | Facility: CLINIC | Age: 81
End: 2021-01-18

## 2021-01-18 NOTE — TELEPHONE ENCOUNTER
----- Message from Kanchan Gaspar, RN sent at 1/18/2021 10:44 AM EST -----  Regarding: FW: see note below dr hardy 1/20  Pt did not have all labs drawn last week. Please add lab apt on to pt's apt on 1/20 and inform pt. Thank you!  ----- Message -----  From: Latonya Rothman  Sent: 1/18/2021   8:00 AM EST  To: Oncology Nurses  Subject: see note below dr hardy 1/20                         I think some labs were not done and she is on for vitals 1/20 here they are from note   IPF, B12, MMA, folate, LDH, CMP in approximately 2 weeks

## 2021-01-20 ENCOUNTER — OFFICE VISIT (OUTPATIENT)
Dept: ONCOLOGY | Facility: CLINIC | Age: 81
End: 2021-01-20

## 2021-01-20 ENCOUNTER — LAB (OUTPATIENT)
Dept: ONCOLOGY | Facility: HOSPITAL | Age: 81
End: 2021-01-20

## 2021-01-20 VITALS
SYSTOLIC BLOOD PRESSURE: 159 MMHG | HEART RATE: 58 BPM | TEMPERATURE: 97.7 F | WEIGHT: 148.9 LBS | DIASTOLIC BLOOD PRESSURE: 75 MMHG | OXYGEN SATURATION: 97 % | BODY MASS INDEX: 25.42 KG/M2 | RESPIRATION RATE: 18 BRPM | HEIGHT: 64 IN

## 2021-01-20 DIAGNOSIS — Z45.2 ENCOUNTER FOR ADJUSTMENT OR MANAGEMENT OF VASCULAR ACCESS DEVICE: ICD-10-CM

## 2021-01-20 DIAGNOSIS — C83.39 DIFFUSE LARGE B-CELL LYMPHOMA OF EXTRANODAL SITE EXCLUDING SPLEEN AND OTHER SOLID ORGANS (HCC): Primary | ICD-10-CM

## 2021-01-20 DIAGNOSIS — E83.52 HYPERCALCEMIA: ICD-10-CM

## 2021-01-20 DIAGNOSIS — C85.91 LYMPHOMA OF LYMPH NODES OF NECK, UNSPECIFIED LYMPHOMA TYPE (HCC): ICD-10-CM

## 2021-01-20 DIAGNOSIS — D50.0 IRON DEFICIENCY ANEMIA DUE TO CHRONIC BLOOD LOSS: ICD-10-CM

## 2021-01-20 DIAGNOSIS — C79.51 BONE METASTASIS: Primary | ICD-10-CM

## 2021-01-20 DIAGNOSIS — C83.39 DIFFUSE LARGE B-CELL LYMPHOMA OF EXTRANODAL SITE EXCLUDING SPLEEN AND OTHER SOLID ORGANS (HCC): ICD-10-CM

## 2021-01-20 DIAGNOSIS — D69.6 THROMBOCYTOPENIA (HCC): ICD-10-CM

## 2021-01-20 LAB
BASOPHILS # BLD AUTO: 0.02 10*3/MM3 (ref 0–0.2)
BASOPHILS NFR BLD AUTO: 0.4 % (ref 0–1.5)
DEPRECATED RDW RBC AUTO: 43.8 FL (ref 37–54)
EOSINOPHIL # BLD AUTO: 0.25 10*3/MM3 (ref 0–0.4)
EOSINOPHIL NFR BLD AUTO: 5 % (ref 0.3–6.2)
ERYTHROCYTE [DISTWIDTH] IN BLOOD BY AUTOMATED COUNT: 12.6 % (ref 12.3–15.4)
FOLATE SERPL-MCNC: >20 NG/ML (ref 4.78–24.2)
HCT VFR BLD AUTO: 37.3 % (ref 34–46.6)
HGB BLD-MCNC: 12.2 G/DL (ref 12–15.9)
IMM GRANULOCYTES # BLD AUTO: 0.01 10*3/MM3 (ref 0–0.05)
IMM GRANULOCYTES NFR BLD AUTO: 0.2 % (ref 0–0.5)
LYMPHOCYTES # BLD AUTO: 1.22 10*3/MM3 (ref 0.7–3.1)
LYMPHOCYTES NFR BLD AUTO: 24.4 % (ref 19.6–45.3)
MCH RBC QN AUTO: 31 PG (ref 26.6–33)
MCHC RBC AUTO-ENTMCNC: 32.7 G/DL (ref 31.5–35.7)
MCV RBC AUTO: 94.7 FL (ref 79–97)
MONOCYTES # BLD AUTO: 0.73 10*3/MM3 (ref 0.1–0.9)
MONOCYTES NFR BLD AUTO: 14.6 % (ref 5–12)
NEUTROPHILS NFR BLD AUTO: 2.76 10*3/MM3 (ref 1.7–7)
NEUTROPHILS NFR BLD AUTO: 55.4 % (ref 42.7–76)
NRBC BLD AUTO-RTO: 0 /100 WBC (ref 0–0.2)
PLATELET # BLD AUTO: 38 10*3/MM3 (ref 140–450)
PLATELETS.RETICULATED NFR BLD AUTO: 8.2 % (ref 0.9–6.5)
PMV BLD AUTO: 11.1 FL (ref 6–12)
RBC # BLD AUTO: 3.94 10*6/MM3 (ref 3.77–5.28)
VIT B12 BLD-MCNC: 1754 PG/ML (ref 211–946)
WBC # BLD AUTO: 4.99 10*3/MM3 (ref 3.4–10.8)

## 2021-01-20 PROCEDURE — 99214 OFFICE O/P EST MOD 30 MIN: CPT | Performed by: INTERNAL MEDICINE

## 2021-01-20 PROCEDURE — 82607 VITAMIN B-12: CPT | Performed by: INTERNAL MEDICINE

## 2021-01-20 PROCEDURE — 36591 DRAW BLOOD OFF VENOUS DEVICE: CPT

## 2021-01-20 PROCEDURE — 36415 COLL VENOUS BLD VENIPUNCTURE: CPT

## 2021-01-20 PROCEDURE — 82746 ASSAY OF FOLIC ACID SERUM: CPT | Performed by: INTERNAL MEDICINE

## 2021-01-20 PROCEDURE — 85055 RETICULATED PLATELET ASSAY: CPT

## 2021-01-20 PROCEDURE — 25010000003 HEPARIN LOCK FLUSH PER 10 UNITS: Performed by: INTERNAL MEDICINE

## 2021-01-20 PROCEDURE — 85025 COMPLETE CBC W/AUTO DIFF WBC: CPT

## 2021-01-20 RX ORDER — SODIUM CHLORIDE 0.9 % (FLUSH) 0.9 %
10 SYRINGE (ML) INJECTION AS NEEDED
Status: DISCONTINUED | OUTPATIENT
Start: 2021-01-20 | End: 2021-01-20 | Stop reason: HOSPADM

## 2021-01-20 RX ORDER — SODIUM CHLORIDE 0.9 % (FLUSH) 0.9 %
10 SYRINGE (ML) INJECTION AS NEEDED
Status: CANCELLED | OUTPATIENT
Start: 2021-01-20

## 2021-01-20 RX ORDER — PREDNISONE 1 MG/1
1 TABLET ORAL 2 TIMES DAILY
Qty: 60 TABLET | Refills: 1 | Status: SHIPPED | OUTPATIENT
Start: 2021-01-20 | End: 2021-01-27 | Stop reason: DRUGHIGH

## 2021-01-20 RX ORDER — HEPARIN SODIUM (PORCINE) LOCK FLUSH IV SOLN 100 UNIT/ML 100 UNIT/ML
500 SOLUTION INTRAVENOUS AS NEEDED
Status: DISCONTINUED | OUTPATIENT
Start: 2021-01-20 | End: 2021-01-20 | Stop reason: HOSPADM

## 2021-01-20 RX ORDER — HEPARIN SODIUM (PORCINE) LOCK FLUSH IV SOLN 100 UNIT/ML 100 UNIT/ML
500 SOLUTION INTRAVENOUS AS NEEDED
Status: CANCELLED | OUTPATIENT
Start: 2021-01-20

## 2021-01-20 RX ADMIN — SODIUM CHLORIDE, PRESERVATIVE FREE 10 ML: 5 INJECTION INTRAVENOUS at 13:37

## 2021-01-20 RX ADMIN — Medication 500 UNITS: at 13:37

## 2021-01-21 ENCOUNTER — TELEPHONE (OUTPATIENT)
Dept: ONCOLOGY | Facility: CLINIC | Age: 81
End: 2021-01-21

## 2021-01-21 NOTE — TELEPHONE ENCOUNTER
Caller: RUT FRANCISCO    Relationship to patient: SELF    Best call back number: [phone]    STATES SHE HAD A MISSED CALL, BELIEVES IT IS REGARDING RESCHEDULING HER APPT ON 01/27. PLEASE CALL BACK

## 2021-01-25 RX ORDER — BENAZEPRIL HYDROCHLORIDE 40 MG/1
TABLET, FILM COATED ORAL
Qty: 90 TABLET | Refills: 1 | Status: SHIPPED | OUTPATIENT
Start: 2021-01-25 | End: 2021-07-26

## 2021-01-26 DIAGNOSIS — C79.51 BONE METASTASIS: ICD-10-CM

## 2021-01-26 RX ORDER — GABAPENTIN 300 MG/1
600 CAPSULE ORAL
Qty: 60 CAPSULE | Refills: 2 | Status: SHIPPED | OUTPATIENT
Start: 2021-01-26 | End: 2021-04-28

## 2021-01-27 ENCOUNTER — APPOINTMENT (OUTPATIENT)
Dept: ONCOLOGY | Facility: HOSPITAL | Age: 81
End: 2021-01-27

## 2021-01-27 ENCOUNTER — LAB (OUTPATIENT)
Dept: LAB | Facility: HOSPITAL | Age: 81
End: 2021-01-27

## 2021-01-27 ENCOUNTER — CLINICAL SUPPORT (OUTPATIENT)
Dept: ONCOLOGY | Facility: HOSPITAL | Age: 81
End: 2021-01-27

## 2021-01-27 DIAGNOSIS — D61.810 PANCYTOPENIA DUE TO ANTINEOPLASTIC CHEMOTHERAPY (HCC): ICD-10-CM

## 2021-01-27 DIAGNOSIS — T45.1X5A PANCYTOPENIA DUE TO ANTINEOPLASTIC CHEMOTHERAPY (HCC): ICD-10-CM

## 2021-01-27 DIAGNOSIS — D64.81 ANEMIA ASSOCIATED WITH CHEMOTHERAPY: ICD-10-CM

## 2021-01-27 DIAGNOSIS — T45.1X5A ANEMIA ASSOCIATED WITH CHEMOTHERAPY: ICD-10-CM

## 2021-01-27 LAB
BASOPHILS # BLD AUTO: 0.05 10*3/MM3 (ref 0–0.2)
BASOPHILS NFR BLD AUTO: 0.6 % (ref 0–1.5)
DEPRECATED RDW RBC AUTO: 43.1 FL (ref 37–54)
EOSINOPHIL # BLD AUTO: 0.29 10*3/MM3 (ref 0–0.4)
EOSINOPHIL NFR BLD AUTO: 3.4 % (ref 0.3–6.2)
ERYTHROCYTE [DISTWIDTH] IN BLOOD BY AUTOMATED COUNT: 12.6 % (ref 12.3–15.4)
HCT VFR BLD AUTO: 37.6 % (ref 34–46.6)
HGB BLD-MCNC: 13 G/DL (ref 12–15.9)
IMM GRANULOCYTES # BLD AUTO: 0.33 10*3/MM3 (ref 0–0.05)
IMM GRANULOCYTES NFR BLD AUTO: 3.8 % (ref 0–0.5)
LYMPHOCYTES # BLD AUTO: 2.24 10*3/MM3 (ref 0.7–3.1)
LYMPHOCYTES NFR BLD AUTO: 26.1 % (ref 19.6–45.3)
MCH RBC QN AUTO: 32.3 PG (ref 26.6–33)
MCHC RBC AUTO-ENTMCNC: 34.6 G/DL (ref 31.5–35.7)
MCV RBC AUTO: 93.3 FL (ref 79–97)
MONOCYTES # BLD AUTO: 0.81 10*3/MM3 (ref 0.1–0.9)
MONOCYTES NFR BLD AUTO: 9.4 % (ref 5–12)
NEUTROPHILS NFR BLD AUTO: 4.87 10*3/MM3 (ref 1.7–7)
NEUTROPHILS NFR BLD AUTO: 56.7 % (ref 42.7–76)
NRBC BLD AUTO-RTO: 0 /100 WBC (ref 0–0.2)
PLATELET # BLD AUTO: 32 10*3/MM3 (ref 140–450)
PLATELETS.RETICULATED NFR BLD AUTO: 8.9 % (ref 0.9–6.5)
PMV BLD AUTO: 11.9 FL (ref 6–12)
RBC # BLD AUTO: 4.03 10*6/MM3 (ref 3.77–5.28)
WBC # BLD AUTO: 8.59 10*3/MM3 (ref 3.4–10.8)

## 2021-01-27 PROCEDURE — 85055 RETICULATED PLATELET ASSAY: CPT

## 2021-01-27 PROCEDURE — 85025 COMPLETE CBC W/AUTO DIFF WBC: CPT

## 2021-01-27 PROCEDURE — G0463 HOSPITAL OUTPT CLINIC VISIT: HCPCS

## 2021-01-27 PROCEDURE — 36415 COLL VENOUS BLD VENIPUNCTURE: CPT

## 2021-01-27 RX ORDER — PREDNISONE 10 MG/1
20 TABLET ORAL 2 TIMES DAILY
Qty: 60 TABLET | Refills: 1 | Status: SHIPPED | OUTPATIENT
Start: 2021-01-27 | End: 2021-02-10 | Stop reason: SDUPTHER

## 2021-01-27 NOTE — PROGRESS NOTES
Pt here for RN review. Copy of labs given to pt. Pt's platelets remain low. Pt is to be taking 20mg PO prednisone twice daily but the prescription that was sent in was for 1mg tabs so she has been only taking a total of 4mg daily since last week. I reviewed with Dr. Iraheta, new script for 10mg prednisone tabs sent to pharmacy. Pt is aware to start taking 20mg twice daily as soon as she picks up the new prescription. Pt will return next week to recheck platelets.       Lab Results   Component Value Date    WBC 8.59 01/27/2021    HGB 13.0 01/27/2021    HCT 37.6 01/27/2021    MCV 93.3 01/27/2021    PLT 32 (L) 01/27/2021

## 2021-02-03 NOTE — PROGRESS NOTES
Subjective .  Patient feeling well, concerned about her platelet count, no episodes of hemorrhage.                                                                                                                         REASONS FOR FOLLOWUP:    1. 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 Paroxysmal atrial fibrillation  3. History of DVT  4. 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 year old female who is here for follow-up with the above-mentioned history.    History of Present Illness    Ms. Blake is here today to  Xarelto samples.  She has been on Xarelto for atrial fibrillation and history of DVT.  Mediport also flushed today.    Patient denies any recent bleeding, dark stools, or unexplained bruising.  She does have a lesion on her right calf however this is been present for over a year.  She is asking if this could be left over from when she had swelling secondary to the blood clots.  She denies any recent increase in shortness of breath.  She has started spironolactone per the direction of cardiology within the past 3 to 4 months.  She denies fevers, night sweats, weight loss.  She denies taking NSAIDs.     Patient when seen September 23, 2020 had developed worsening thrombocytopenia.  Records indicated this had started since her visit in May at approximate time that she started spironolactone.  Antibodies were drawn which are currently pending though her IPF had been 7.6 now October 30 8 down to 6.3 in follow-up CBC with H&H of 12.3 and three 7.4 with white count of 6-40 and platelet count of 60,000 having dropped to as low as 20,000 when seen Freya 3, 2020.  The patient is feeling considerably better, indicates that she has not had any palpitation episodes, notes generally improved stamina, no bleeding episodes.  She has remained off her Xarelto at this point.  The patient is next seen December 21, 2020 continue to feel  "well with an unchanged platelet count.  Again she has no fever, chills, night sweats, nausea, vomiting, weight loss and normal performance status.     The patient was initially asked to discontinue spironolactone and follow-up to determine response.  Her Xarelto was held and she underwent repeat scans including CT chest abdomen pelvis January 13, 2021 with no evidence of increasing lymphadenopathy in the chest abdomen pelvis or any other findings that might be consistent with recurrence.  Her IPF, however, has slowly increased and is now at 8.2.  We discussed a trial of prednisone for immune related thrombocytopenia under the circumstances that may be drug-related and/or related to her lymphoma.  The patient, as result, started on prednisone with some delay the platelet count increasing February 4 2 79,000.  Additionally IP have dropped to 5.4 from a high of 8.9 January 27, 2021.  We have contacted by cardiology about restarting spironolactone and this did occur.  As she is seen back February 10, 2020 when she is doing well without additional changes symptomatically your platelet count has now dropped to 66,000.  There has been no other hematologic changes and she has been maintained on her current 20 mg p.o. twice daily of prednisone.      Past Medical History:   Diagnosis Date   • Anemia     multifactorial   • Cystitis    • Cystocele     moderate   • Drug therapy    • Dyslipidemia    • Esophageal reflux    • Fatigue    • History of transfusion     no reaction   • Hypercalcemia    • Hypertension    • Major depression, chronic    • Menopause    • Non Hodgkin's lymphoma (CMS/HCC)    • Osteoarthritis    • Osteoporosis    • Palpitations    • Paroxysmal atrial fibrillation (CMS/HCC)    • Post menopausal problems    • RLS (restless legs syndrome)    • Seizure disorder (CMS/HCC)    • Sinus bradycardia    • Subjective tinnitus    • Vaginal delivery     x2  \"SONYA\"      \"JASON\"   • Vitamin D deficiency        ONCOLOGIC " HISTORY:  (History from previous dates can be found in the separate document.)    Current Outpatient Medications on File Prior to Visit   Medication Sig Dispense Refill   • amLODIPine (NORVASC) 10 MG tablet TAKE 1 TABLET BY MOUTH EVERY DAY 90 tablet 1   • benazepril (LOTENSIN) 40 MG tablet TAKE 1 TABLET BY MOUTH EVERY DAY 90 tablet 1   • cetirizine (zyrTEC) 10 MG tablet Take 10 mg by mouth Daily.     • cetirizine (zyrTEC) 10 MG tablet Take 1 tablet by mouth Daily As Needed for Allergies. 30 tablet 1   • Cholecalciferol (VITAMIN D3) 125 MCG (5000 UT) capsule capsule Take 5,000 Units by mouth Daily.     • escitalopram (LEXAPRO) 20 MG tablet TAKE 1 TABLET BY MOUTH DAILY 90 tablet 1   • folic acid (FOLVITE) 400 MCG tablet Take 400 mcg by mouth daily.     • gabapentin (NEURONTIN) 300 MG capsule Take 2 capsules by mouth every night at bedtime. 60 capsule 2   • hydrALAZINE (APRESOLINE) 100 MG tablet TAKE 1 TABLET BY MOUTH THREE TIMES DAILY 270 tablet 3   • lidocaine-prilocaine (EMLA) 2.5-2.5 % cream Apply 30 min prior to port access 5 g 2   • melatonin 5 MG tablet tablet Take 5 mg by mouth Every Night.     • Multiple Vitamins-Minerals (ZINC PO) Take  by mouth.     • pantoprazole (PROTONIX) 40 MG EC tablet TAKE 1 TABLET BY MOUTH EVERY DAY 90 tablet 1   • phenytoin (DILANTIN) 100 MG ER capsule TAKE 3 CAPSULES BY MOUTH EVERY NIGHT AT BEDTIME 270 capsule 5   • spironolactone (ALDACTONE) 25 MG tablet Take 1 tablet by mouth Daily. 90 tablet 3   • vitamin B-12 (CYANOCOBALAMIN) 100 MCG tablet Take 100 mcg by mouth Daily.     • vitamin B-6 (PYRIDOXINE) 100 MG tablet Take 100 mg by mouth Daily.     • vitamin E 100 UNIT capsule Take 100 Units by mouth Daily.     • Xarelto 20 MG tablet Take 1 tablet by mouth Daily. 28 tablet 0   • [DISCONTINUED] predniSONE (DELTASONE) 10 MG tablet Take 2 tablets by mouth 2 (Two) Times a Day. 60 tablet 1     No current facility-administered medications on file prior to visit.        ALLERGIES:    "  Allergies   Allergen Reactions   • Crab (Diagnostic) Itching and Rash   • Pseudoephedrine Dizziness       Social History     Socioeconomic History   • Marital status:      Spouse name: JL   • Number of children: 2   • Years of education: Not on file   • Highest education level: Not on file   Occupational History     Employer: RETIRED   Tobacco Use   • Smoking status: Never Smoker   • Smokeless tobacco: Never Used   Substance and Sexual Activity   • Alcohol use: No     Comment: Caffeine use: 1 cup daily (decaf)   • Drug use: No   • Sexual activity: Defer     Birth control/protection: Surgical     Comment:  (Jl)         Cancer-related family history is not on file.   Repeat examination May 10, 2021  Review of Systems   Constitutional: Negative.    HENT: Negative.    Eyes: Negative.    Respiratory: Negative.    Cardiovascular: Negative.    Gastrointestinal: Negative.    Endocrine: Negative.    Genitourinary: Negative.    Musculoskeletal: Negative.    Skin: Negative.    Allergic/Immunologic: Negative.    Neurological: Negative.    Hematological: Negative.    Psychiatric/Behavioral: Negative.          Objective      Vitals:    02/10/21 1304   BP: 136/73   Pulse: 61   Resp: 16   Temp: 96.9 °F (36.1 °C)   TempSrc: Temporal   SpO2: 96%   Weight: 70.1 kg (154 lb 8 oz)   Height: 162.6 cm (64.02\")   PainSc: 0-No pain     Current Status 1/20/2021   ECOG score 0     Reviewed physical examination May 10, 2021  Physical Exam   Constitutional: She is oriented to person, place, and time. She appears well-developed.   HENT:   Head: Normocephalic and atraumatic.   Eyes: Pupils are equal, round, and reactive to light.   Neck: Normal range of motion. Neck supple.   Cardiovascular: Normal rate.   Pulmonary/Chest: Effort normal and breath sounds normal. She has no wheezes.   Abdominal: Normal appearance and bowel sounds are normal. She exhibits no distension. There is no abdominal tenderness.   Musculoskeletal: " Normal range of motion.   Neurological: She is alert and oriented to person, place, and time.   Skin: Skin is warm and dry. Lesion (darkened area right calf, present over 1 year) noted.   Scant scattered petechiae bilateral shins   Psychiatric: Her behavior is normal. Mood normal.   Vitals reviewed.      RECENT LABS:  Results from last 7 days   Lab Units 02/10/21  1236 02/04/21  1209   WBC 10*3/mm3 13.85* 12.06*   NEUTROS ABS 10*3/mm3 10.74* 8.56*   HEMOGLOBIN g/dL 13.0 13.6   HEMATOCRIT % 38.1 39.0   PLATELETS 10*3/mm3 66* 79*           CT CHEST, ABDOMEN AND PELVIS WITH CONTRAST 1/13/2021     COMPARISON: 05/22/2020     FINDINGS:     CT CHEST: A right-sided chest wall port is present with the tip in the  cavoatrial junction. No pleural or pericardial effusion is seen. Dilated  main pulmonary artery suggestive of pulmonary arterial hypertension. No  enlarging mediastinal lymphadenopathy is seen. No pathological axillary  lymphadenopathy. Calcified coronary artery disease is present. The  central airways are patent without endobronchial lesion. There is  biapical pleural parenchymal scarring present. No new or suspicious  pulmonary nodules are seen. There is no pathological axillary  lymphadenopathy.     CT ABDOMEN AND PELVIS:The spleen is normal in size and attenuation. A  low-density lesion seen on image 31 is unchanged from prior imaging.  There are shotty retroperitoneal lymph nodes without interval  enlargement. Index right external iliac lymph node today measures 1 cm  in short axis dimension compared to 1.1 cm previously. Dense central  mesenteric calcifications are again seen. Small lymph nodes seen within  the small bowel mesentery are without interim change.     Small hypoattenuating left hepatic lobe lesion is without interim  change. No new hepatic lesion. Pancreas, bilateral adrenal gland and  kidneys are normal. No evidence of bowel obstruction. The urinary  bladder is partially distended and normal.  Colonic diverticulosis is  present. Sclerotic lesions seen within the L2 vertebral body without  interim change.     IMPRESSION:  No enlarging or pathological lymphadenopathy within the  chest, abdomen and pelvis. Stable shotty abdominal lymphadenopathy as  above. Small hypoattenuating splenic lesion without interim change              Assessment/Plan   1. 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  · PET scan 5/3/18 with diffuse splenic involvement (SUV 16.9), lymphadenopathy throughout upper abdomen and retroperitoneum (SUV 13.1), right liver lesion 5 cm (SUV 12.8), pelvic lymphadenopathy up to 4 cm, bladder wall thickening with associated hypermetabolism, cervical lymphadenopathy left posterior (SUV 11.2), multiple bone lesions including left intertrochanteric femur, ribs, right scapula, pelvis as well as left gluteal hematoma versus lymphomatous lesion.  · CT-guided liver biopsy 4/26/18 with diffuse large B-cell non-Hodgkin's lymphoma, CD20 positive, double hit (BCL-6 rearrangement 85%, MYC rearrangement 79%), KI-67 4+/4  · Left cervical excisional biopsy by ENT Dr. Oconnor 5/1/18 confirming high-grade B cell non-Hodgkin's lymphoma, Ki-67 100%, double hit (BCL-6 rearrangement 76%, MYC rearrangement 85%)  · Echocardiogram 4/11/18 with ejection fraction 66.7%  · Right port placement Dr Gill 5/4/18  · Patient not felt to be a candidate for more aggressive chemotherapy due to performance status and age (DA EPOCH-R)  · Therefore treated with R CHOP chemotherapy 5/4/18.  · Followed by granix 300mcg daily beginning 5/6/18 through 5/14/18.  · Today, the patient has increasing WBC related to growth factor use, stable hemoglobin, spontaneous improvement in platelet count.  She is doing quite well following her chemotherapy and is ready to go home.  There is some concern  regarding her persistently elevated LDH at 347 today as well as her escalating calcium at 11.9.  She is currently asymptomatic from the calcium and her ionized calcium is stable at 6.8.  Therefore we are going to proceed with discharge home.  She will return to the office later this week on 5/18/18 with repeat labs, nurse practitioner visit, and potential treatment with a third dose of Zometa if her calcium has continued to escalate significantly. She is now seen with plans to begin cycle 2 R CHOP chemotherapy with growth factor support ( Neulasta on body injector).  Will schedule follow-up PET scan after 2 cycles of therapy and see her back in 3 weeks to review her status.  · Patient reviewed June 19 with near resolution of hypermetabolic sites on PET/CT.  Plans made for third cycle of CHOP R, additional Zometa and total of 6 cycles of chemotherapy anticipated.  · Patient seen August 03, 2018 for fifth cycle of chemotherapy-CHOP R, reduction of Oncovin 50%, 6 cycles planned.  Discussed subsequent CT scan follow-up.  · Follow-up scan September 19 with small low-density liver lesions and splenic lesions are decreased from previous, numerous small mesenteric and RP nodes again stable slightly smaller.  These are discussed September 24 and follow-up scan in 3 months is anticipated  · Patient reviewed November 26 further generally improved, plans made to maintain port, review at 3 months  · Patient assessed February 18, 2019, no evidence of recurrent disease, repeat scans in 3 months plan-6 months from previous  · Patient seen May 13, 2019, no evidence of recurrent disease, follow-up assessment in 6 months planned  · Patient reviewed again October 28, 2019 with follow-up scans negative for recurrence, 6 months plan follow-up at 2-year m  · Patient reviewed by Dr. Iraheta 6/3/2020, stable scans.  · Stable clinically when seen October 30, 2020  · Reassessment planned December 21, 2020 via scans after she has developed  thrombocytopenia.  · Follow-up CT scan chest and pelvis January 13, 2021 independent reviewed as negative for evidence of recurrence.       2. Thrombocytopenia.  Patient had previous myelosuppression with chemo therapy however this had resolved.  It is noted when looking back today that her platelet count has declined following her visit in May.  She denies any recent bleeding.  She has however had in a new medication with Spironolactone within the past 3 to 4 months from cardiology.  As this can cause thrombocytopenia we have held this treatment and she is gradually recovering when seen October 30, 2020.  Reassessment of this with the patient seen December 21 continue disorder platelet count approximately 60,000.  Subsequent testing per laboratory studies without evidence of etiology, slowly advancing IPF with plans to proceed to treat for immunologic thrombocytopenia such as ITP.  The patient subsequently started prednisone at 0.5 mg/kg twice daily.  Initially this was missed dosed at 1 mg twice daily but then increased to 20 mg twice daily and she has demonstrated a significant improvement in her platelet count.  Upon recheck 1 week later she has a mild reduction through her IPF level has remained low.  We have discussed that restarting spironolactone may be an issue here though we will plan to continue her current prednisone dosing at least over the next 2 weeks.    Plan:  *Continue prednisone 20 mg p.o. twice daily    *Continue folic acid daily, Protonix daily and her other medications including her spironolactone    *Weekly CBC, IPF RN evaluation until MD follow-up in 2 weeks.    *Patient agreeable with this plan and follow-up          3. Multifactorial anemia:  · Related to anemia chronic disease, chemotherapy, prior iron deficiency.  · Hemoglobin rechecked January 22, 2021, H&H 12.2 and 37.3     4. Hypercalcemia of malignancy:  · Calcium up to 13.8 on 4/21/18 (albumin 3.3).  Intact PTH 8.1, PTH RP less than  2.0  · Received Zometa 4 mg IV 4/25/18.  · Calcium up to 11.3 on 5/7/18 with second dose of Zometa administered 4 mg IV.  · Calcium today has continued to gradually increase up to 11.9 with albumin 3.3.  Ionized calcium however is stable at 6.8 compared to yesterday.  The patient is asymptomatic.  We will not plan to administer a further dose of Zometa just yet and will allow further time for the patient to respond to chemotherapy.  She returned 518 for continued Zometa and when seen May 25 has a normal calcium level.  · Patient to receive Zometa June 19, subsequently every other cycle, restart low-dose calcium supplementation  · Patient seen August 03, 2018, plans made for Zometa with her final course of chemotherapy August 23, 2018  · Patient scheduled for bone density prior to assessment 3 months following November visit, potential Xgeva and follow-up as she is seen back to step to repeat scans are performed in May 2019.  As she is seen May 13 we do plan the Xgeva and then move to Prolia 6 months later for her subsequent treatment for osteopenia.  · Prolia continued every 6 months, last given 6/3/2020, now scheduled December 21, 2020.   · Reassessment Janice 13th, normocalcemic      5.  Paroxysmal atrial fibrillation:  · Recently diagnosed, anticoagulated with Eliquis initially, discontinued due to expected thrombocytopenia from chemotherapy  · Currently in sinus rhythm, continues on Lopressor 50 mg every 12 hours  · Anticoagulation restarted with Xarelto though this has been held during her thrombocytopenia.    6. Prior seizure disorder:  · Continues currently on Dilantin 300 mg daily at bedtime and Neurontin 600 mg daily at bedtime, will return to outpatient dose of Neurontin 300 mg daily at bedtime at discharge.        7. GI prophylaxis:  · Continues Protonix p.o.        8. Venous access:  · Port placement 5/4/18 by Dr. Gill, continue to manage q. monthly        9. DVT-hold Xarelto 20 mg by mouth daily  secondary to thrombocytopenia.        Plan:  1. Hold Xarelto secondary to thrombocytopenia  2. Antiplatelet antibody pending  3. Aldactone discontinued    4. Initiate prednisone 0.5 mg/kg - 20 mg p.o. twice daily with food  5. Return weekly for CBC, IPF, RN evaluation, possible taper steroids  6.  MD follow-up 3 weeks, consider bone marrow aspirate and biopsy if no response

## 2021-02-04 ENCOUNTER — LAB (OUTPATIENT)
Dept: LAB | Facility: HOSPITAL | Age: 81
End: 2021-02-04

## 2021-02-04 ENCOUNTER — CLINICAL SUPPORT (OUTPATIENT)
Dept: ONCOLOGY | Facility: HOSPITAL | Age: 81
End: 2021-02-04

## 2021-02-04 ENCOUNTER — OFFICE VISIT (OUTPATIENT)
Dept: CARDIOLOGY | Facility: CLINIC | Age: 81
End: 2021-02-04

## 2021-02-04 VITALS
HEART RATE: 47 BPM | WEIGHT: 152.2 LBS | HEIGHT: 64 IN | SYSTOLIC BLOOD PRESSURE: 118 MMHG | BODY MASS INDEX: 25.99 KG/M2 | DIASTOLIC BLOOD PRESSURE: 70 MMHG

## 2021-02-04 DIAGNOSIS — D61.810 PANCYTOPENIA DUE TO ANTINEOPLASTIC CHEMOTHERAPY (HCC): ICD-10-CM

## 2021-02-04 DIAGNOSIS — I10 ESSENTIAL HYPERTENSION: Chronic | ICD-10-CM

## 2021-02-04 DIAGNOSIS — I48.0 PAROXYSMAL ATRIAL FIBRILLATION (HCC): Primary | ICD-10-CM

## 2021-02-04 DIAGNOSIS — T45.1X5A ANEMIA ASSOCIATED WITH CHEMOTHERAPY: ICD-10-CM

## 2021-02-04 DIAGNOSIS — T45.1X5A PANCYTOPENIA DUE TO ANTINEOPLASTIC CHEMOTHERAPY (HCC): ICD-10-CM

## 2021-02-04 DIAGNOSIS — D64.81 ANEMIA ASSOCIATED WITH CHEMOTHERAPY: ICD-10-CM

## 2021-02-04 DIAGNOSIS — C83.39 DIFFUSE LARGE B-CELL LYMPHOMA OF EXTRANODAL SITE EXCLUDING SPLEEN AND OTHER SOLID ORGANS (HCC): ICD-10-CM

## 2021-02-04 DIAGNOSIS — E78.5 DYSLIPIDEMIA: ICD-10-CM

## 2021-02-04 LAB
BASOPHILS # BLD AUTO: 0.05 10*3/MM3 (ref 0–0.2)
BASOPHILS NFR BLD AUTO: 0.4 % (ref 0–1.5)
DEPRECATED RDW RBC AUTO: 44 FL (ref 37–54)
EOSINOPHIL # BLD AUTO: 0.08 10*3/MM3 (ref 0–0.4)
EOSINOPHIL NFR BLD AUTO: 0.7 % (ref 0.3–6.2)
ERYTHROCYTE [DISTWIDTH] IN BLOOD BY AUTOMATED COUNT: 13 % (ref 12.3–15.4)
HCT VFR BLD AUTO: 39 % (ref 34–46.6)
HGB BLD-MCNC: 13.6 G/DL (ref 12–15.9)
IMM GRANULOCYTES # BLD AUTO: 0.2 10*3/MM3 (ref 0–0.05)
IMM GRANULOCYTES NFR BLD AUTO: 1.7 % (ref 0–0.5)
LYMPHOCYTES # BLD AUTO: 2.07 10*3/MM3 (ref 0.7–3.1)
LYMPHOCYTES NFR BLD AUTO: 17.2 % (ref 19.6–45.3)
MCH RBC QN AUTO: 32.2 PG (ref 26.6–33)
MCHC RBC AUTO-ENTMCNC: 34.9 G/DL (ref 31.5–35.7)
MCV RBC AUTO: 92.4 FL (ref 79–97)
MONOCYTES # BLD AUTO: 1.1 10*3/MM3 (ref 0.1–0.9)
MONOCYTES NFR BLD AUTO: 9.1 % (ref 5–12)
NEUTROPHILS NFR BLD AUTO: 70.9 % (ref 42.7–76)
NEUTROPHILS NFR BLD AUTO: 8.56 10*3/MM3 (ref 1.7–7)
NRBC BLD AUTO-RTO: 0 /100 WBC (ref 0–0.2)
PLATELET # BLD AUTO: 79 10*3/MM3 (ref 140–450)
PLATELETS.RETICULATED NFR BLD AUTO: 5.4 % (ref 0.9–6.5)
PMV BLD AUTO: 10.3 FL (ref 6–12)
RBC # BLD AUTO: 4.22 10*6/MM3 (ref 3.77–5.28)
WBC # BLD AUTO: 12.06 10*3/MM3 (ref 3.4–10.8)

## 2021-02-04 PROCEDURE — 85055 RETICULATED PLATELET ASSAY: CPT

## 2021-02-04 PROCEDURE — 85025 COMPLETE CBC W/AUTO DIFF WBC: CPT

## 2021-02-04 PROCEDURE — 93000 ELECTROCARDIOGRAM COMPLETE: CPT | Performed by: INTERNAL MEDICINE

## 2021-02-04 PROCEDURE — 99214 OFFICE O/P EST MOD 30 MIN: CPT | Performed by: INTERNAL MEDICINE

## 2021-02-04 PROCEDURE — 36415 COLL VENOUS BLD VENIPUNCTURE: CPT

## 2021-02-04 PROCEDURE — G0463 HOSPITAL OUTPT CLINIC VISIT: HCPCS

## 2021-02-04 RX ORDER — SPIRONOLACTONE 25 MG/1
25 TABLET ORAL DAILY
Qty: 90 TABLET | Refills: 3 | Status: SHIPPED | OUTPATIENT
Start: 2021-02-04 | End: 2022-03-29

## 2021-02-04 NOTE — NURSING NOTE
Pt here for CBC and RN review. CBC reviewed with pt. Plts improved to 79. Pt has no c/o at this time. Pt denies bleeding/bruising. Counts reviewed with Daina JI. Per Daina JI, pt should continue holding xarelto and continue same dose of prednisone until pt sees Dr. Iraheta next week. Told pt and she v/u. Copy of labs given to pt and f/u appt reviewed.       Lab Results   Component Value Date    WBC 12.06 (H) 02/04/2021    HGB 13.6 02/04/2021    HCT 39.0 02/04/2021    MCV 92.4 02/04/2021    PLT 79 (L) 02/04/2021

## 2021-02-04 NOTE — PROGRESS NOTES
Date of Office Visit: 2021  Encounter Provider: Justine Barrios MD  Place of Service: [facility] CARDIOLOGY  Patient Name: Martina Blake  :1940      Patient ID:  Martina Blake is a 80 y.o. female is here for  followup for PAF, cardiac risks.         History of Present Illness    She has a past medical history of hypertension, dyslipidemia, esophageal reflux, seizure disorder, and vitamin D deficiency.     She has a past medical history of atrial fibrillation with rapid ventricular response in the setting of hypokalemia, hypomagnesemia, hypercalcemia, and anemia.  She was placed on metoprolol.  She had an upper and lower endoscopy showing hiatal hernia, gastritis, esophagitis, and diverticulosis with no acute bleeding.  She was started on apixiban and and spironolactone.  She had a normal Cardiolite stress test in 2018 and an echocardiogram at that time revealed an EF of 67%, grade 2 diastolic dysfunction, severe left atrial enlargement, mild to moderate tricuspid insufficiency, and RVSP elevated at 44 mmHg.       She has large B-cell lymphoma diffuse and is now in remission, was on R-CHOP (rituximab, doxorubicin, vincristine and cyclophosphamide) finished 18 with Dr. Iraheta.      She was hospitalized in May 2018 and chemotherapy was initiated.  Her apixiban was discontinued due to thrombocytopenia.  She was placed on metoprolol tartrate 50 mg twice daily for A. fib with rapid ventricular response.       Echocardiogram done 18 shows ejection fraction 59% with global longitudinal strain of -18%.  There was grade 2 diastolic dysfunction and no significant valve disease.     She had a normal CMP done 2021.  She had normal CBC done 2021 showing platelets 32, otherwise normal CBC.  She had lipids done 7/15/2020 showing total cholesterol 168, HDL 52, , triglycerides 54.    Dr. Iraheta is following her platelets and has stopped her Xarelto.  " He stopped her spironolactone over the summer because of thrombocytopenia.  Her blood pressure however is not elevating.  The spironolactone really work for her blood pressure.  Her blood pressure is now running 140s to 160s.  She is very concerned about a stroke as she is a family history of strokes.  In addition, the metoprolol keeps her heart rate quite low and is not really working well for her blood pressure.  She denies chest pain or pressure.  She has no orthopnea or PND.  Her energy levels been fairly stable.  She is on prednisone right now to try to increase her platelets.  Her PET scan showed no recurrence of cancer.    Past Medical History:   Diagnosis Date   • Anemia     multifactorial   • Cystitis    • Cystocele     moderate   • Drug therapy    • Dyslipidemia    • Esophageal reflux    • Fatigue    • History of transfusion     no reaction   • Hypercalcemia    • Hypertension    • Major depression, chronic    • Menopause    • Non Hodgkin's lymphoma (CMS/HCC)    • Osteoarthritis    • Osteoporosis    • Palpitations    • Paroxysmal atrial fibrillation (CMS/HCC)    • Post menopausal problems    • RLS (restless legs syndrome)    • Seizure disorder (CMS/HCC)    • Sinus bradycardia    • Subjective tinnitus    • Vaginal delivery     x2  \"SONYA\"      \"JASON\"   • Vitamin D deficiency          Past Surgical History:   Procedure Laterality Date   • CERVICAL LYMPH NODE BIOPSY/EXCISION Left 5/1/2018    Procedure: CERVICAL LYMPH NODE BIOPSY/EXCISION;  Surgeon: Danny Oconnor MD;  Location: [facility] OR;  Service: ENT   • CHOLECYSTECTOMY     • COLONOSCOPY     • COLONOSCOPY N/A 4/13/2018    Procedure: COLONOSCOPY to terminal ileum;  Surgeon: Dominik Burt MD;  Location: [facility] ENDOSCOPY;  Service: Gastroenterology   • CRANIOTOMY     • ENDOSCOPY N/A 4/13/2018    Procedure: ESOPHAGOGASTRODUODENOSCOPY with biopsy;  Surgeon: Dominik Burt MD;  Location: [facility] ENDOSCOPY;  Service: Gastroenterology   • TOTAL ABDOMINAL " HYSTERECTOMY  1980    w/ bladder suspension.  Probably vaginal hysterectomy with anterior colporrhaphy.    • VENOUS ACCESS DEVICE (PORT) INSERTION N/A 5/4/2018    Procedure: INSERTION VENOUS ACCESS DEVICE;  Surgeon: Jamie Gill MD;  Location: [facility] MAIN OR;  Service: General   • WRIST SURGERY Left        Current Outpatient Medications on File Prior to Visit   Medication Sig Dispense Refill   • amLODIPine (NORVASC) 10 MG tablet TAKE 1 TABLET BY MOUTH EVERY DAY 90 tablet 1   • benazepril (LOTENSIN) 40 MG tablet TAKE 1 TABLET BY MOUTH EVERY DAY 90 tablet 1   • cetirizine (zyrTEC) 10 MG tablet Take 10 mg by mouth Daily.     • Cholecalciferol (VITAMIN D3) 125 MCG (5000 UT) capsule capsule Take 5,000 Units by mouth Daily.     • escitalopram (LEXAPRO) 20 MG tablet TAKE 1 TABLET BY MOUTH DAILY 90 tablet 1   • folic acid (FOLVITE) 400 MCG tablet Take 400 mcg by mouth daily.     • gabapentin (NEURONTIN) 300 MG capsule Take 2 capsules by mouth every night at bedtime. 60 capsule 2   • hydrALAZINE (APRESOLINE) 100 MG tablet TAKE 1 TABLET BY MOUTH THREE TIMES DAILY 270 tablet 3   • lidocaine-prilocaine (EMLA) 2.5-2.5 % cream Apply 30 min prior to port access 5 g 2   • melatonin 5 MG tablet tablet Take 5 mg by mouth Every Night.     • metoprolol tartrate (LOPRESSOR) 25 MG tablet Take 1 tablet by mouth 2 (Two) Times a Day. (Patient taking differently: Take 25 mg by mouth Daily.) 60 tablet 3   • Multiple Vitamins-Minerals (ZINC PO) Take  by mouth.     • pantoprazole (PROTONIX) 40 MG EC tablet TAKE 1 TABLET BY MOUTH EVERY DAY 90 tablet 1   • phenytoin (DILANTIN) 100 MG ER capsule TAKE 3 CAPSULES BY MOUTH EVERY NIGHT AT BEDTIME 270 capsule 5   • predniSONE (DELTASONE) 10 MG tablet Take 2 tablets by mouth 2 (Two) Times a Day. 60 tablet 1   • vitamin B-12 (CYANOCOBALAMIN) 100 MCG tablet Take 100 mcg by mouth Daily.     • vitamin B-6 (PYRIDOXINE) 100 MG tablet Take 100 mg by mouth Daily.     • vitamin E 100 UNIT capsule Take 100  "Units by mouth Daily.     • Xarelto 20 MG tablet Take 1 tablet by mouth Daily. 28 tablet 0     No current facility-administered medications on file prior to visit.        Social History     Socioeconomic History   • Marital status:      Spouse name: JL   • Number of children: 2   • Years of education: Not on file   • Highest education level: Not on file   Occupational History     Employer: RETIRED   Tobacco Use   • Smoking status: Never Smoker   • Smokeless tobacco: Never Used   Substance and Sexual Activity   • Alcohol use: No     Comment: Caffeine use: 1 cup daily (decaf)   • Drug use: No   • Sexual activity: Defer     Birth control/protection: Surgical     Comment:  (Jl)           ROS    Procedures    ECG 12 Lead    Date/Time: 2/4/2021 10:43 AM  Performed by: Justine Barrios MD  Authorized by: Justine Barrios MD   Comparison: compared with previous ECG   Similar to previous ECG  Rhythm: sinus rhythm  Conduction: left anterior fascicular block  T inversion: aVL  Other findings: poor R wave progression    Clinical impression: abnormal EKG                Objective:      Vitals:    02/04/21 1030   BP: 118/70   BP Location: Left arm   Pulse: (!) 47   Weight: 69 kg (152 lb 3.2 oz)   Height: 162.6 cm (64\")     Body mass index is 26.13 kg/m².    Vitals signs reviewed.   Constitutional:       General: Not in acute distress.     Appearance: Well-developed. Not diaphoretic.   Eyes:      General: No scleral icterus.     Conjunctiva/sclera: Conjunctivae normal.   HENT:      Head: Normocephalic and atraumatic.   Neck:      Musculoskeletal: Neck supple.      Thyroid: No thyromegaly.      Vascular: No carotid bruit or JVD.      Lymphadenopathy: No cervical adenopathy.   Pulmonary:      Effort: Pulmonary effort is normal. No respiratory distress.      Breath sounds: Normal breath sounds. No wheezing. No rhonchi. No rales.   Chest:      Chest wall: Not tender to palpatation.   Cardiovascular:      " Normal rate. Regular rhythm.      Murmurs: There is no murmur.      No gallop.   Pulses:     Intact distal pulses.   Edema:     Peripheral edema absent.   Abdominal:      General: Bowel sounds are normal. There is no distension or abdominal bruit.      Palpations: Abdomen is soft. There is no abdominal mass.      Tenderness: There is no abdominal tenderness.   Musculoskeletal:         General: No deformity.      Extremities: No clubbing present.  Skin:     General: Skin is warm and dry. There is no cyanosis.      Coloration: Skin is not pale.      Findings: No rash.   Neurological:      Mental Status: Alert and oriented to person, place, and time.      Cranial Nerves: No cranial nerve deficit.   Psychiatric:         Judgment: Judgment normal.         Lab Review:       Assessment:      Diagnosis Plan   1. Paroxysmal atrial fibrillation (CMS/HCC)     2. 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 Essential hypertension     4. Dyslipidemia       1. PAF - on xarelto.   2. Hypertension - BP goal is <120/80.   3. Large B cell lymphoma - s/p R-CHOP, stopped 8/2018 -sees Dr. Iraheta.   4. H/o DVT, on xarelto.   5. Sinus bradycardia, HR 38.   6. Thrombocytopenia, on prednisone.      Plan:       I did speak with Dr. Iraheta and okay to restart her spironolactone.  We will stop her metoprolol.  See Carlota in 3 months.  No other testing at this time.  I think overall she is doing well just her blood pressure is too high.  She will monitor her BP.

## 2021-02-05 ENCOUNTER — TELEPHONE (OUTPATIENT)
Dept: CARDIOLOGY | Facility: CLINIC | Age: 81
End: 2021-02-05

## 2021-02-05 NOTE — TELEPHONE ENCOUNTER
----- Message from Daina Florentino CMA sent at 2/4/2021 11:36 AM EST -----  Regarding: FW: meds  LM for pt to return my call  ----- Message -----  From: Justine Barrios MD  Sent: 2/4/2021  10:59 AM EST  To: Daina Florentino CMA  Subject: meds                                             please call and have her stop her metoprolol and  her prescription of spironolactone which was sent to her pharmacy.  I spoke with ariana.     julián

## 2021-02-08 ENCOUNTER — TELEPHONE (OUTPATIENT)
Dept: FAMILY MEDICINE CLINIC | Facility: CLINIC | Age: 81
End: 2021-02-08

## 2021-02-08 RX ORDER — CETIRIZINE HYDROCHLORIDE 10 MG/1
10 TABLET ORAL DAILY PRN
Qty: 30 TABLET | Refills: 1 | Status: SHIPPED | OUTPATIENT
Start: 2021-02-08 | End: 2022-04-06

## 2021-02-08 NOTE — TELEPHONE ENCOUNTER
Zyrtec sent and would take prior to vaccine.  This can help reduce reactions.    Please send or advise.

## 2021-02-08 NOTE — TELEPHONE ENCOUNTER
Caller: Martina Blake    Relationship: Self    Best call back number:[phone]    What medication are you requesting: allergy     What are your current symptoms: none    How long have you been experiencing symptoms: n/a    Have you had these symptoms before:    [x] Yes  [] No    Have you been treated for these symptoms before:   [x] Yes  [] No    If a prescription is needed, what is your preferred pharmacy and phone number:  [id number] [address] [address] [facility] [address] - [phone]  - [phone]   [phone]    Additional notes:small, black pill prescribed for allergic reaction to crab. At time of call, patient did not know the name of drug. Patient is scheduled to have Covid vaccine on Feb 17, would like to have this medication in case she has allergic reaction to vaccine

## 2021-02-10 ENCOUNTER — OFFICE VISIT (OUTPATIENT)
Dept: ONCOLOGY | Facility: CLINIC | Age: 81
End: 2021-02-10

## 2021-02-10 ENCOUNTER — LAB (OUTPATIENT)
Dept: LAB | Facility: HOSPITAL | Age: 81
End: 2021-02-10

## 2021-02-10 VITALS
SYSTOLIC BLOOD PRESSURE: 136 MMHG | HEART RATE: 61 BPM | RESPIRATION RATE: 16 BRPM | DIASTOLIC BLOOD PRESSURE: 73 MMHG | TEMPERATURE: 96.9 F | WEIGHT: 154.5 LBS | HEIGHT: 64 IN | OXYGEN SATURATION: 96 % | BODY MASS INDEX: 26.38 KG/M2

## 2021-02-10 DIAGNOSIS — T45.1X5A ANEMIA ASSOCIATED WITH CHEMOTHERAPY: ICD-10-CM

## 2021-02-10 DIAGNOSIS — T45.1X5A PANCYTOPENIA DUE TO ANTINEOPLASTIC CHEMOTHERAPY (HCC): ICD-10-CM

## 2021-02-10 DIAGNOSIS — D61.810 PANCYTOPENIA DUE TO ANTINEOPLASTIC CHEMOTHERAPY (HCC): ICD-10-CM

## 2021-02-10 DIAGNOSIS — D69.3 IDIOPATHIC THROMBOCYTOPENIC PURPURA (ITP) (HCC): Primary | ICD-10-CM

## 2021-02-10 DIAGNOSIS — D64.81 ANEMIA ASSOCIATED WITH CHEMOTHERAPY: ICD-10-CM

## 2021-02-10 LAB
BASOPHILS # BLD AUTO: 0.03 10*3/MM3 (ref 0–0.2)
BASOPHILS NFR BLD AUTO: 0.2 % (ref 0–1.5)
DEPRECATED RDW RBC AUTO: 45 FL (ref 37–54)
EOSINOPHIL # BLD AUTO: 0.14 10*3/MM3 (ref 0–0.4)
EOSINOPHIL NFR BLD AUTO: 1 % (ref 0.3–6.2)
ERYTHROCYTE [DISTWIDTH] IN BLOOD BY AUTOMATED COUNT: 13.2 % (ref 12.3–15.4)
HCT VFR BLD AUTO: 38.1 % (ref 34–46.6)
HGB BLD-MCNC: 13 G/DL (ref 12–15.9)
IMM GRANULOCYTES # BLD AUTO: 0.1 10*3/MM3 (ref 0–0.05)
IMM GRANULOCYTES NFR BLD AUTO: 0.7 % (ref 0–0.5)
LYMPHOCYTES # BLD AUTO: 1.64 10*3/MM3 (ref 0.7–3.1)
LYMPHOCYTES NFR BLD AUTO: 11.8 % (ref 19.6–45.3)
MCH RBC QN AUTO: 31.7 PG (ref 26.6–33)
MCHC RBC AUTO-ENTMCNC: 34.1 G/DL (ref 31.5–35.7)
MCV RBC AUTO: 92.9 FL (ref 79–97)
MONOCYTES # BLD AUTO: 1.2 10*3/MM3 (ref 0.1–0.9)
MONOCYTES NFR BLD AUTO: 8.7 % (ref 5–12)
NEUTROPHILS NFR BLD AUTO: 10.74 10*3/MM3 (ref 1.7–7)
NEUTROPHILS NFR BLD AUTO: 77.6 % (ref 42.7–76)
NRBC BLD AUTO-RTO: 0 /100 WBC (ref 0–0.2)
PLATELET # BLD AUTO: 66 10*3/MM3 (ref 140–450)
PLATELETS.RETICULATED NFR BLD AUTO: 5.4 % (ref 0.9–6.5)
PMV BLD AUTO: 11.2 FL (ref 6–12)
RBC # BLD AUTO: 4.1 10*6/MM3 (ref 3.77–5.28)
WBC # BLD AUTO: 13.85 10*3/MM3 (ref 3.4–10.8)

## 2021-02-10 PROCEDURE — 85025 COMPLETE CBC W/AUTO DIFF WBC: CPT

## 2021-02-10 PROCEDURE — 99214 OFFICE O/P EST MOD 30 MIN: CPT | Performed by: INTERNAL MEDICINE

## 2021-02-10 PROCEDURE — 36415 COLL VENOUS BLD VENIPUNCTURE: CPT

## 2021-02-10 PROCEDURE — 85055 RETICULATED PLATELET ASSAY: CPT

## 2021-02-10 RX ORDER — PREDNISONE 10 MG/1
20 TABLET ORAL 2 TIMES DAILY
Qty: 60 TABLET | Refills: 1 | Status: SHIPPED | OUTPATIENT
Start: 2021-02-10 | End: 2021-02-25 | Stop reason: SDUPTHER

## 2021-02-17 ENCOUNTER — CLINICAL SUPPORT (OUTPATIENT)
Dept: ONCOLOGY | Facility: HOSPITAL | Age: 81
End: 2021-02-17

## 2021-02-17 ENCOUNTER — LAB (OUTPATIENT)
Dept: LAB | Facility: HOSPITAL | Age: 81
End: 2021-02-17

## 2021-02-17 DIAGNOSIS — D69.3 IDIOPATHIC THROMBOCYTOPENIC PURPURA (ITP) (HCC): ICD-10-CM

## 2021-02-17 LAB
BASOPHILS # BLD AUTO: 0.03 10*3/MM3 (ref 0–0.2)
BASOPHILS NFR BLD AUTO: 0.2 % (ref 0–1.5)
DEPRECATED RDW RBC AUTO: 45.7 FL (ref 37–54)
EOSINOPHIL # BLD AUTO: 0.04 10*3/MM3 (ref 0–0.4)
EOSINOPHIL NFR BLD AUTO: 0.3 % (ref 0.3–6.2)
ERYTHROCYTE [DISTWIDTH] IN BLOOD BY AUTOMATED COUNT: 13.4 % (ref 12.3–15.4)
HCT VFR BLD AUTO: 38.1 % (ref 34–46.6)
HGB BLD-MCNC: 12.8 G/DL (ref 12–15.9)
IMM GRANULOCYTES # BLD AUTO: 0.09 10*3/MM3 (ref 0–0.05)
IMM GRANULOCYTES NFR BLD AUTO: 0.7 % (ref 0–0.5)
LYMPHOCYTES # BLD AUTO: 1.14 10*3/MM3 (ref 0.7–3.1)
LYMPHOCYTES NFR BLD AUTO: 9.4 % (ref 19.6–45.3)
MCH RBC QN AUTO: 31.3 PG (ref 26.6–33)
MCHC RBC AUTO-ENTMCNC: 33.6 G/DL (ref 31.5–35.7)
MCV RBC AUTO: 93.2 FL (ref 79–97)
MONOCYTES # BLD AUTO: 0.61 10*3/MM3 (ref 0.1–0.9)
MONOCYTES NFR BLD AUTO: 5 % (ref 5–12)
NEUTROPHILS NFR BLD AUTO: 10.23 10*3/MM3 (ref 1.7–7)
NEUTROPHILS NFR BLD AUTO: 84.4 % (ref 42.7–76)
NRBC BLD AUTO-RTO: 0.2 /100 WBC (ref 0–0.2)
PLATELET # BLD AUTO: 62 10*3/MM3 (ref 140–450)
PLATELETS.RETICULATED NFR BLD AUTO: 4.9 % (ref 0.9–6.5)
PMV BLD AUTO: 10.2 FL (ref 6–12)
RBC # BLD AUTO: 4.09 10*6/MM3 (ref 3.77–5.28)
WBC # BLD AUTO: 12.14 10*3/MM3 (ref 3.4–10.8)

## 2021-02-17 PROCEDURE — G0463 HOSPITAL OUTPT CLINIC VISIT: HCPCS

## 2021-02-17 PROCEDURE — 85025 COMPLETE CBC W/AUTO DIFF WBC: CPT

## 2021-02-17 PROCEDURE — 85055 RETICULATED PLATELET ASSAY: CPT

## 2021-02-17 PROCEDURE — 36415 COLL VENOUS BLD VENIPUNCTURE: CPT

## 2021-02-17 NOTE — NURSING NOTE
Lab Results   Component Value Date    WBC 12.14 (H) 02/17/2021    HGB 12.8 02/17/2021    HCT 38.1 02/17/2021    MCV 93.2 02/17/2021    PLT 62 (L) 02/17/2021   Pt is here for lab with RN review.  CBC reviewed with pt, counts are stable for this pt at this time. Pt has no complaints. Platelet count reviewed with Dr. Iraheta. No new orders. Copy of labs given to pt and f/u appt reviewed. Pt is instructed to call the office with any concerns or new symptoms prior to next visit. Pt vu

## 2021-02-19 NOTE — PROGRESS NOTES
Subjective .  Patient feeling well, no additional evidence of hemorrhage                                                                                                                 REASONS FOR FOLLOWUP:    1. 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 Paroxysmal atrial fibrillation  3. History of DVT  4. 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  She is seen regularly in the office thereafter.     Patient when seen September 23, 2020 had developed worsening thrombocytopenia.  Records indicated this had started since her visit in May at approximate time that she started spironolactone.  Antibodies were drawn which are currently pending though her IPF had been 7.6 now October 30 8 down to 6.3 in follow-up CBC with H&H of 12.3 and three 7.4 with white count of 6-40 and platelet count of 60,000 having dropped to as low as 20,000 when seen Freya 3, 2020.  The patient is feeling considerably better, indicates that she has not had any palpitation episodes, notes generally improved stamina, no bleeding episodes.  She has remained off her Xarelto at this point.  The patient is next seen December 21, 2020 continue to feel well with an unchanged platelet count.  Again she has no fever, chills, night sweats, nausea, vomiting, weight loss and normal performance status.     The patient was initially asked to discontinue spironolactone and follow-up to determine response.  Her Xarelto was held and she underwent repeat scans including CT chest abdomen pelvis January 13, 2021 with no evidence of increasing lymphadenopathy in the chest abdomen pelvis or any other findings that might be consistent with recurrence.  Her IPF, however, has slowly increased and is now at 8.2.  We discussed a trial of prednisone for immune  "related thrombocytopenia under the circumstances that may be drug-related and/or related to her lymphoma.  The patient, as result, started on prednisone with some delay the platelet count increasing February 4 2 79,000.  Additionally IP have dropped to 5.4 from a high of 8.9 January 27, 2021.  We have contacted by cardiology about restarting spironolactone and this did occur.  As she is seen back February 10, 2020 when she is doing well without additional changes symptomatically your platelet count has now dropped to 66,000.  There has been no other hematologic changes and she has been maintained on her current 20 mg p.o. twice daily of prednisone.      Patient was asked to continue prednisone 20 mg p.o. twice daily and is next February 21, 2021 fortunately feeling well and with a gradual improvement in her platelet count now up to 94,000.  She is having no clear difficulty tolerating her prednisone dosing and we discussed the move to taper her dose.      Past Medical History:   Diagnosis Date   • Anemia     multifactorial   • Cystitis    • Cystocele     moderate   • Drug therapy    • Dyslipidemia    • Esophageal reflux    • Fatigue    • History of transfusion     no reaction   • Hypercalcemia    • Hypertension    • Major depression, chronic    • Menopause    • Non Hodgkin's lymphoma (CMS/HCC)    • Osteoarthritis    • Osteoporosis    • Palpitations    • Paroxysmal atrial fibrillation (CMS/HCC)    • Post menopausal problems    • RLS (restless legs syndrome)    • Seizure disorder (CMS/HCC)    • Sinus bradycardia    • Subjective tinnitus    • Vaginal delivery     x2  \"SONYA\"      \"JASON\"   • Vitamin D deficiency        ONCOLOGIC HISTORY:  (History from previous dates can be found in the separate document.)    Current Outpatient Medications on File Prior to Visit   Medication Sig Dispense Refill   • amLODIPine (NORVASC) 10 MG tablet TAKE 1 TABLET BY MOUTH EVERY DAY 90 tablet 1   • Ascorbic Acid (Vitamin C) 500 MG capsule " Take  by mouth.     • benazepril (LOTENSIN) 40 MG tablet TAKE 1 TABLET BY MOUTH EVERY DAY 90 tablet 1   • cetirizine (zyrTEC) 10 MG tablet Take 1 tablet by mouth Daily As Needed for Allergies. 30 tablet 1   • Cholecalciferol (VITAMIN D3) 125 MCG (5000 UT) capsule capsule Take 5,000 Units by mouth Daily.     • escitalopram (LEXAPRO) 20 MG tablet TAKE 1 TABLET BY MOUTH DAILY 90 tablet 1   • folic acid (FOLVITE) 400 MCG tablet Take 400 mcg by mouth daily.     • gabapentin (NEURONTIN) 300 MG capsule Take 2 capsules by mouth every night at bedtime. 60 capsule 2   • hydrALAZINE (APRESOLINE) 100 MG tablet TAKE 1 TABLET BY MOUTH THREE TIMES DAILY 270 tablet 3   • lidocaine-prilocaine (EMLA) 2.5-2.5 % cream Apply 30 min prior to port access 5 g 2   • melatonin 5 MG tablet tablet Take 5 mg by mouth Every Night.     • Multiple Vitamins-Minerals (ZINC PO) Take  by mouth.     • pantoprazole (PROTONIX) 40 MG EC tablet TAKE 1 TABLET BY MOUTH EVERY DAY 90 tablet 1   • phenytoin (DILANTIN) 100 MG ER capsule TAKE 3 CAPSULES BY MOUTH EVERY NIGHT AT BEDTIME 270 capsule 5   • spironolactone (ALDACTONE) 25 MG tablet Take 1 tablet by mouth Daily. 90 tablet 3   • vitamin B-12 (CYANOCOBALAMIN) 100 MCG tablet Take 1,000 mcg by mouth Daily.     • vitamin B-6 (PYRIDOXINE) 100 MG tablet Take 100 mg by mouth Daily.     • vitamin E 100 UNIT capsule Take 180 Units by mouth Daily.     • Xarelto 20 MG tablet Take 1 tablet by mouth Daily. 28 tablet 0   • Zinc Sulfate (ZINC 15 PO) Take 400 mg by mouth.     • [DISCONTINUED] predniSONE (DELTASONE) 10 MG tablet Take 2 tablets by mouth 2 (Two) Times a Day. 60 tablet 1   • [DISCONTINUED] cetirizine (zyrTEC) 10 MG tablet Take 10 mg by mouth Daily.       No current facility-administered medications on file prior to visit.        ALLERGIES:     Allergies   Allergen Reactions   • Crab (Diagnostic) Itching and Rash   • Pseudoephedrine Dizziness       Social History     Socioeconomic History   • Marital status:  "     Spouse name: JL   • Number of children: 2   • Years of education: Not on file   • Highest education level: Not on file   Occupational History     Employer: RETIRED   Tobacco Use   • Smoking status: Never Smoker   • Smokeless tobacco: Never Used   Substance and Sexual Activity   • Alcohol use: No     Comment: Caffeine use: 1 cup daily (decaf)   • Drug use: No   • Sexual activity: Defer     Birth control/protection: Surgical     Comment:  (Jl)         Cancer-related family history is not on file.   Repeat examination May 10, 2021  Review of Systems   Constitutional: Negative.    HENT: Negative.    Eyes: Negative.    Respiratory: Negative.    Cardiovascular: Negative.    Gastrointestinal: Negative.    Endocrine: Negative.    Genitourinary: Negative.    Musculoskeletal: Negative.    Skin: Negative.    Allergic/Immunologic: Negative.    Neurological: Negative.    Hematological: Negative.    Psychiatric/Behavioral: Negative.    All other systems reviewed and are negative.        Objective      Vitals:    02/25/21 1537   BP: 143/76   Pulse: 63   Resp: 18   Temp: 97.7 °F (36.5 °C)   TempSrc: Temporal   SpO2: 95%   Weight: 68.9 kg (151 lb 14.4 oz)   Height: 162.6 cm (64.02\")   PainSc: 0-No pain     Current Status 2/25/2021   ECOG score 0     Reviewed physical examination May 10, 2021  Physical Exam   Constitutional: She is oriented to person, place, and time. She appears well-developed.   HENT:   Head: Normocephalic and atraumatic.   Eyes: Pupils are equal, round, and reactive to light.   Neck: Normal range of motion. Neck supple.   Cardiovascular: Normal rate.   Pulmonary/Chest: Effort normal and breath sounds normal. She has no wheezes.   Abdominal: Normal appearance and bowel sounds are normal. She exhibits no distension. There is no abdominal tenderness.   Musculoskeletal: Normal range of motion.   Neurological: She is alert and oriented to person, place, and time.   Skin: Skin is warm and dry. " No lesion noted.   Scant scattered petechiae bilateral shins   Psychiatric: Her behavior is normal. Mood normal.   Vitals reviewed.      RECENT LABS:  Results from last 7 days   Lab Units 02/25/21  1531   WBC 10*3/mm3 12.49*   NEUTROS ABS 10*3/mm3 10.41*   HEMOGLOBIN g/dL 13.1   HEMATOCRIT % 37.7   PLATELETS 10*3/mm3 94*           CT CHEST, ABDOMEN AND PELVIS WITH CONTRAST 1/13/2021     COMPARISON: 05/22/2020     FINDINGS:     CT CHEST: A right-sided chest wall port is present with the tip in the  cavoatrial junction. No pleural or pericardial effusion is seen. Dilated  main pulmonary artery suggestive of pulmonary arterial hypertension. No  enlarging mediastinal lymphadenopathy is seen. No pathological axillary  lymphadenopathy. Calcified coronary artery disease is present. The  central airways are patent without endobronchial lesion. There is  biapical pleural parenchymal scarring present. No new or suspicious  pulmonary nodules are seen. There is no pathological axillary  lymphadenopathy.     CT ABDOMEN AND PELVIS:The spleen is normal in size and attenuation. A  low-density lesion seen on image 31 is unchanged from prior imaging.  There are shotty retroperitoneal lymph nodes without interval  enlargement. Index right external iliac lymph node today measures 1 cm  in short axis dimension compared to 1.1 cm previously. Dense central  mesenteric calcifications are again seen. Small lymph nodes seen within  the small bowel mesentery are without interim change.     Small hypoattenuating left hepatic lobe lesion is without interim  change. No new hepatic lesion. Pancreas, bilateral adrenal gland and  kidneys are normal. No evidence of bowel obstruction. The urinary  bladder is partially distended and normal. Colonic diverticulosis is  present. Sclerotic lesions seen within the L2 vertebral body without  interim change.     IMPRESSION:  No enlarging or pathological lymphadenopathy within the  chest, abdomen and pelvis.  Stable shotty abdominal lymphadenopathy as  above. Small hypoattenuating splenic lesion without interim change              Assessment/Plan   1. 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  · PET scan 5/3/18 with diffuse splenic involvement (SUV 16.9), lymphadenopathy throughout upper abdomen and retroperitoneum (SUV 13.1), right liver lesion 5 cm (SUV 12.8), pelvic lymphadenopathy up to 4 cm, bladder wall thickening with associated hypermetabolism, cervical lymphadenopathy left posterior (SUV 11.2), multiple bone lesions including left intertrochanteric femur, ribs, right scapula, pelvis as well as left gluteal hematoma versus lymphomatous lesion.  · CT-guided liver biopsy 4/26/18 with diffuse large B-cell non-Hodgkin's lymphoma, CD20 positive, double hit (BCL-6 rearrangement 85%, MYC rearrangement 79%), KI-67 4+/4  · Left cervical excisional biopsy by ENT Dr. Oconnor 5/1/18 confirming high-grade B cell non-Hodgkin's lymphoma, Ki-67 100%, double hit (BCL-6 rearrangement 76%, MYC rearrangement 85%)  · Echocardiogram 4/11/18 with ejection fraction 66.7%  · Right port placement Dr Gill 5/4/18  · Patient not felt to be a candidate for more aggressive chemotherapy due to performance status and age (DA EPOCH-R)  · Therefore treated with R CHOP chemotherapy 5/4/18.  · Followed by granix 300mcg daily beginning 5/6/18 through 5/14/18.  · Today, the patient has increasing WBC related to growth factor use, stable hemoglobin, spontaneous improvement in platelet count.  She is doing quite well following her chemotherapy and is ready to go home.  There is some concern regarding her persistently elevated LDH at 347 today as well as her escalating calcium at 11.9.  She is currently asymptomatic from the calcium and her ionized calcium is stable at 6.8.  Therefore we are going to  proceed with discharge home.  She will return to the office later this week on 5/18/18 with repeat labs, nurse practitioner visit, and potential treatment with a third dose of Zometa if her calcium has continued to escalate significantly. She is now seen with plans to begin cycle 2 R CHOP chemotherapy with growth factor support ( Neulasta on body injector).  Will schedule follow-up PET scan after 2 cycles of therapy and see her back in 3 weeks to review her status.  · Patient reviewed June 19 with near resolution of hypermetabolic sites on PET/CT.  Plans made for third cycle of CHOP R, additional Zometa and total of 6 cycles of chemotherapy anticipated.  · Patient seen August 03, 2018 for fifth cycle of chemotherapy-CHOP R, reduction of Oncovin 50%, 6 cycles planned.  Discussed subsequent CT scan follow-up.  · Follow-up scan September 19 with small low-density liver lesions and splenic lesions are decreased from previous, numerous small mesenteric and RP nodes again stable slightly smaller.  These are discussed September 24 and follow-up scan in 3 months is anticipated  · Patient reviewed November 26 further generally improved, plans made to maintain port, review at 3 months  · Patient assessed February 18, 2019, no evidence of recurrent disease, repeat scans in 3 months plan-6 months from previous  · Patient seen May 13, 2019, no evidence of recurrent disease, follow-up assessment in 6 months planned  · Patient reviewed again October 28, 2019 with follow-up scans negative for recurrence, 6 months plan follow-up at 2-year m  · Patient reviewed by Dr. Iraheta 6/3/2020, stable scans.  · Stable clinically when seen October 30, 2020  · Reassessment planned December 21, 2020 via scans after she has developed thrombocytopenia.  · Follow-up CT scan chest and pelvis January 13, 2021 independent reviewed as negative for evidence of recurrence.       2. Thrombocytopenia.  Patient had previous myelosuppression with chemo  therapy however this had resolved.  It is noted when looking back today that her platelet count has declined following her visit in May.  She denies any recent bleeding.  She has however had in a new medication with Spironolactone within the past 3 to 4 months from cardiology.  As this can cause thrombocytopenia we have held this treatment and she is gradually recovering when seen October 30, 2020.  Reassessment of this with the patient seen December 21 continue disorder platelet count approximately 60,000.  Subsequent testing per laboratory studies without evidence of etiology, slowly advancing IPF with plans to proceed to treat for immunologic thrombocytopenia such as ITP.  The patient subsequently started prednisone at 0.5 mg/kg twice daily.  Initially this was missed dosed at 1 mg twice daily but then increased to 20 mg twice daily and she has demonstrated a significant improvement in her platelet count.  Upon recheck 1 week later she has a mild reduction through her IPF level has remained low.  We have discussed that restarting spironolactone may be an issue here though we will plan to continue her current prednisone dosing at least over the next 2 weeks.  The patient was seen back February 25, 2021 with an improvement of platelet count 94,000, normalized IPF.                   Plan:  *Taper prednisone to 30 mg p.o. daily, new prescription E scribed to pharmacy.        3. Multifactorial anemia:  · Related to anemia chronic disease, chemotherapy, prior iron deficiency.  · Hemoglobin rechecked January 22, 2021, H&H 12.2 and 37.3     4. Hypercalcemia of malignancy:  · Calcium up to 13.8 on 4/21/18 (albumin 3.3).  Intact PTH 8.1, PTH RP less than 2.0  · Received Zometa 4 mg IV 4/25/18.  · Calcium up to 11.3 on 5/7/18 with second dose of Zometa administered 4 mg IV.  · Calcium today has continued to gradually increase up to 11.9 with albumin 3.3.  Ionized calcium however is stable at 6.8 compared to yesterday.  The  patient is asymptomatic.  We will not plan to administer a further dose of Zometa just yet and will allow further time for the patient to respond to chemotherapy.  She returned 518 for continued Zometa and when seen May 25 has a normal calcium level.  · Patient to receive Zometa June 19, subsequently every other cycle, restart low-dose calcium supplementation  · Patient seen August 03, 2018, plans made for Zometa with her final course of chemotherapy August 23, 2018  · Patient scheduled for bone density prior to assessment 3 months following November visit, potential Xgeva and follow-up as she is seen back to step to repeat scans are performed in May 2019.  As she is seen May 13 we do plan the Xgeva and then move to Prolia 6 months later for her subsequent treatment for osteopenia.  · Prolia continued every 6 months, last given 6/3/2020, now scheduled December 21, 2020.   · Reassessment January 13th, 2021 normocalcemic      5.  Paroxysmal atrial fibrillation:  · Recently diagnosed, anticoagulated with Eliquis initially, discontinued due to expected thrombocytopenia from chemotherapy  · Currently in sinus rhythm, continues on Lopressor 50 mg every 12 hours  · Anticoagulation restarted with Xarelto though this has been held during her thrombocytopenia.    6. Prior seizure disorder:  · Continues currently on Dilantin 300 mg daily at bedtime and Neurontin 600 mg daily at bedtime, will return to outpatient dose of Neurontin 300 mg daily at bedtime at discharge.        7. GI prophylaxis:  · Continues Protonix p.o.        8. Venous access:  · Port placement 5/4/18 by Dr. Gill, continue to manage q. monthly        9. DVT-hold Xarelto 20 mg by mouth daily secondary to thrombocytopenia.        Plan:  1. Hold Xarelto secondary to thrombocytopenia  2. Antiplatelet antibodies found to be negative  3. Aldactone discontinued    4. Taper prednisone to 30 mg p.o. daily  5. Return weekly for CBC, IPF, RN evaluation, possible  taper steroids, NP assessment 2 weeks  6.  MD follow-up 3 weeks, consider bone marrow aspirate and biopsy if no continued response

## 2021-02-25 ENCOUNTER — LAB (OUTPATIENT)
Dept: LAB | Facility: HOSPITAL | Age: 81
End: 2021-02-25

## 2021-02-25 ENCOUNTER — OFFICE VISIT (OUTPATIENT)
Dept: ONCOLOGY | Facility: CLINIC | Age: 81
End: 2021-02-25

## 2021-02-25 VITALS
RESPIRATION RATE: 18 BRPM | BODY MASS INDEX: 25.93 KG/M2 | HEART RATE: 63 BPM | TEMPERATURE: 97.7 F | SYSTOLIC BLOOD PRESSURE: 143 MMHG | OXYGEN SATURATION: 95 % | DIASTOLIC BLOOD PRESSURE: 76 MMHG | HEIGHT: 64 IN | WEIGHT: 151.9 LBS

## 2021-02-25 DIAGNOSIS — D69.3 IDIOPATHIC THROMBOCYTOPENIC PURPURA (ITP) (HCC): ICD-10-CM

## 2021-02-25 DIAGNOSIS — D69.6 THROMBOCYTOPENIA (HCC): ICD-10-CM

## 2021-02-25 DIAGNOSIS — C83.39 DIFFUSE LARGE B-CELL LYMPHOMA OF EXTRANODAL SITE EXCLUDING SPLEEN AND OTHER SOLID ORGANS (HCC): Primary | ICD-10-CM

## 2021-02-25 LAB
BASOPHILS # BLD AUTO: 0.03 10*3/MM3 (ref 0–0.2)
BASOPHILS NFR BLD AUTO: 0.2 % (ref 0–1.5)
DEPRECATED RDW RBC AUTO: 44.8 FL (ref 37–54)
EOSINOPHIL # BLD AUTO: 0.03 10*3/MM3 (ref 0–0.4)
EOSINOPHIL NFR BLD AUTO: 0.2 % (ref 0.3–6.2)
ERYTHROCYTE [DISTWIDTH] IN BLOOD BY AUTOMATED COUNT: 13.3 % (ref 12.3–15.4)
HCT VFR BLD AUTO: 37.7 % (ref 34–46.6)
HGB BLD-MCNC: 13.1 G/DL (ref 12–15.9)
IMM GRANULOCYTES # BLD AUTO: 0.11 10*3/MM3 (ref 0–0.05)
IMM GRANULOCYTES NFR BLD AUTO: 0.9 % (ref 0–0.5)
LYMPHOCYTES # BLD AUTO: 1.26 10*3/MM3 (ref 0.7–3.1)
LYMPHOCYTES NFR BLD AUTO: 10.1 % (ref 19.6–45.3)
MCH RBC QN AUTO: 31.8 PG (ref 26.6–33)
MCHC RBC AUTO-ENTMCNC: 34.7 G/DL (ref 31.5–35.7)
MCV RBC AUTO: 91.5 FL (ref 79–97)
MONOCYTES # BLD AUTO: 0.65 10*3/MM3 (ref 0.1–0.9)
MONOCYTES NFR BLD AUTO: 5.2 % (ref 5–12)
NEUTROPHILS NFR BLD AUTO: 10.41 10*3/MM3 (ref 1.7–7)
NEUTROPHILS NFR BLD AUTO: 83.4 % (ref 42.7–76)
NRBC BLD AUTO-RTO: 0 /100 WBC (ref 0–0.2)
PLATELET # BLD AUTO: 94 10*3/MM3 (ref 140–450)
PLATELETS.RETICULATED NFR BLD AUTO: 3.9 % (ref 0.9–6.5)
PMV BLD AUTO: 10.4 FL (ref 6–12)
RBC # BLD AUTO: 4.12 10*6/MM3 (ref 3.77–5.28)
WBC # BLD AUTO: 12.49 10*3/MM3 (ref 3.4–10.8)

## 2021-02-25 PROCEDURE — 85055 RETICULATED PLATELET ASSAY: CPT

## 2021-02-25 PROCEDURE — 99213 OFFICE O/P EST LOW 20 MIN: CPT | Performed by: INTERNAL MEDICINE

## 2021-02-25 PROCEDURE — 85025 COMPLETE CBC W/AUTO DIFF WBC: CPT

## 2021-02-25 PROCEDURE — 36415 COLL VENOUS BLD VENIPUNCTURE: CPT

## 2021-02-25 RX ORDER — PREDNISONE 10 MG/1
30 TABLET ORAL DAILY
Qty: 60 TABLET | Refills: 1 | Status: SHIPPED | OUTPATIENT
Start: 2021-02-25 | End: 2021-04-13

## 2021-02-25 RX ORDER — MULTIVIT-MIN/IRON/FOLIC ACID/K 18-600-40
CAPSULE ORAL DAILY
COMMUNITY

## 2021-03-01 ENCOUNTER — TELEPHONE (OUTPATIENT)
Dept: ONCOLOGY | Facility: CLINIC | Age: 81
End: 2021-03-01

## 2021-03-01 NOTE — TELEPHONE ENCOUNTER
Caller: RUT FRANCISCO    Relationship to patient: SELF    Best call back number: [phone]    Chief complaint: PT IS DRIVING A FRIEND TO AN APPT ON [facility] AND WOULD LIKE TO HAVE HER APPT MOVED TO [facility] AS WELL    Type of visit: PORT FLUSH AND INFUSION    Requested date: 03/03    If rescheduling, when is the original appointment: 03/04

## 2021-03-03 ENCOUNTER — INFUSION (OUTPATIENT)
Dept: ONCOLOGY | Facility: HOSPITAL | Age: 81
End: 2021-03-03

## 2021-03-03 ENCOUNTER — DOCUMENTATION (OUTPATIENT)
Dept: ONCOLOGY | Facility: CLINIC | Age: 81
End: 2021-03-03

## 2021-03-03 ENCOUNTER — DOCUMENTATION (OUTPATIENT)
Dept: PHARMACY | Facility: HOSPITAL | Age: 81
End: 2021-03-03

## 2021-03-03 DIAGNOSIS — D69.6 THROMBOCYTOPENIA (HCC): ICD-10-CM

## 2021-03-03 DIAGNOSIS — C83.39 DIFFUSE LARGE B-CELL LYMPHOMA OF EXTRANODAL SITE EXCLUDING SPLEEN AND OTHER SOLID ORGANS (HCC): Primary | ICD-10-CM

## 2021-03-03 DIAGNOSIS — Z45.2 ENCOUNTER FOR ADJUSTMENT OR MANAGEMENT OF VASCULAR ACCESS DEVICE: ICD-10-CM

## 2021-03-03 LAB
BASOPHILS # BLD AUTO: 0.02 10*3/MM3 (ref 0–0.2)
BASOPHILS NFR BLD AUTO: 0.2 % (ref 0–1.5)
DEPRECATED RDW RBC AUTO: 47.7 FL (ref 37–54)
EOSINOPHIL # BLD AUTO: 0.26 10*3/MM3 (ref 0–0.4)
EOSINOPHIL NFR BLD AUTO: 2.1 % (ref 0.3–6.2)
ERYTHROCYTE [DISTWIDTH] IN BLOOD BY AUTOMATED COUNT: 13.6 % (ref 12.3–15.4)
HCT VFR BLD AUTO: 36.3 % (ref 34–46.6)
HGB BLD-MCNC: 12.1 G/DL (ref 12–15.9)
IMM GRANULOCYTES # BLD AUTO: 0.05 10*3/MM3 (ref 0–0.05)
IMM GRANULOCYTES NFR BLD AUTO: 0.4 % (ref 0–0.5)
LYMPHOCYTES # BLD AUTO: 0.89 10*3/MM3 (ref 0.7–3.1)
LYMPHOCYTES NFR BLD AUTO: 7.2 % (ref 19.6–45.3)
MCH RBC QN AUTO: 31.8 PG (ref 26.6–33)
MCHC RBC AUTO-ENTMCNC: 33.3 G/DL (ref 31.5–35.7)
MCV RBC AUTO: 95.5 FL (ref 79–97)
MONOCYTES # BLD AUTO: 0.98 10*3/MM3 (ref 0.1–0.9)
MONOCYTES NFR BLD AUTO: 7.9 % (ref 5–12)
NEUTROPHILS NFR BLD AUTO: 10.16 10*3/MM3 (ref 1.7–7)
NEUTROPHILS NFR BLD AUTO: 82.2 % (ref 42.7–76)
NRBC BLD AUTO-RTO: 0 /100 WBC (ref 0–0.2)
PLATELET # BLD AUTO: 69 10*3/MM3 (ref 140–450)
PLATELETS.RETICULATED NFR BLD AUTO: 5.5 % (ref 0.9–6.5)
PMV BLD AUTO: 9.9 FL (ref 6–12)
RBC # BLD AUTO: 3.8 10*6/MM3 (ref 3.77–5.28)
WBC # BLD AUTO: 12.36 10*3/MM3 (ref 3.4–10.8)

## 2021-03-03 PROCEDURE — 85055 RETICULATED PLATELET ASSAY: CPT

## 2021-03-03 PROCEDURE — 36591 DRAW BLOOD OFF VENOUS DEVICE: CPT

## 2021-03-03 PROCEDURE — 85025 COMPLETE CBC W/AUTO DIFF WBC: CPT

## 2021-03-03 PROCEDURE — 25010000003 HEPARIN LOCK FLUSH PER 10 UNITS: Performed by: INTERNAL MEDICINE

## 2021-03-03 RX ORDER — HEPARIN SODIUM (PORCINE) LOCK FLUSH IV SOLN 100 UNIT/ML 100 UNIT/ML
500 SOLUTION INTRAVENOUS AS NEEDED
Status: DISCONTINUED | OUTPATIENT
Start: 2021-03-03 | End: 2021-03-03 | Stop reason: HOSPADM

## 2021-03-03 RX ORDER — SODIUM CHLORIDE 0.9 % (FLUSH) 0.9 %
10 SYRINGE (ML) INJECTION AS NEEDED
Status: CANCELLED | OUTPATIENT
Start: 2021-03-03

## 2021-03-03 RX ORDER — SODIUM CHLORIDE 0.9 % (FLUSH) 0.9 %
10 SYRINGE (ML) INJECTION AS NEEDED
Status: DISCONTINUED | OUTPATIENT
Start: 2021-03-03 | End: 2021-03-03 | Stop reason: HOSPADM

## 2021-03-03 RX ORDER — HEPARIN SODIUM (PORCINE) LOCK FLUSH IV SOLN 100 UNIT/ML 100 UNIT/ML
500 SOLUTION INTRAVENOUS AS NEEDED
Status: CANCELLED | OUTPATIENT
Start: 2021-03-03

## 2021-03-03 RX ADMIN — SODIUM CHLORIDE, PRESERVATIVE FREE 10 ML: 5 INJECTION INTRAVENOUS at 13:17

## 2021-03-03 RX ADMIN — Medication 500 UNITS: at 13:17

## 2021-03-03 NOTE — PROGRESS NOTES
Site of Appt/Pickup: (n) [place]; (y) [place]; (n) [place];    Prescriber (ariana) contacted pharmacy to obtain the medication below.    NDC:  66526-317-00  Drug name: xarelto  Strength: 20mg  Total Quantity:  28 (Containers- 4 X Units per Containers- 7)  Lot#:  [id number]  Expiration: 12/22    Prescriber or staff member who picked up medication Mariel Cunningham

## 2021-03-03 NOTE — PROGRESS NOTES
Pt here for RN review. Copy of labs given to pt. Platelets decreased to 69k today, previous 94. Reviewed with Dr. Iraheta, increase prednisone to 40 mg. Pt may restart xarelto if platelets greater than 50k. Informed pt to resume xarelto. Pt states she is out of xarelto. Contacted Karen in pharmacy. Samples given to pt. Pt will return next week to see NP. Pt v/u.       Lab Results   Component Value Date    WBC 12.36 (H) 03/03/2021    HGB 12.1 03/03/2021    HCT 36.3 03/03/2021    MCV 95.5 03/03/2021    PLT 69 (L) 03/03/2021

## 2021-03-03 NOTE — PROGRESS NOTES
Call rec from Mariel IBARRA RN-she states pt is restarting her Xarelto and reported she gets samples from the office. I asked Mariel to call Karen in pharmacy and she can get 4 bottles of the Xarelto 20 mg for pt.    I have also sent a staff message to Karen

## 2021-03-04 ENCOUNTER — APPOINTMENT (OUTPATIENT)
Dept: ONCOLOGY | Facility: HOSPITAL | Age: 81
End: 2021-03-04

## 2021-03-09 ENCOUNTER — TELEPHONE (OUTPATIENT)
Dept: ONCOLOGY | Facility: CLINIC | Age: 81
End: 2021-03-09

## 2021-03-09 NOTE — TELEPHONE ENCOUNTER
----- Message from Delilah Jauregui RN sent at 3/9/2021  7:30 AM EST -----  Regarding: move to lab  Please move to lab for Wednesday appt as port flush not needed at this time. Thanks

## 2021-03-10 ENCOUNTER — LAB (OUTPATIENT)
Dept: LAB | Facility: HOSPITAL | Age: 81
End: 2021-03-10

## 2021-03-10 ENCOUNTER — OFFICE VISIT (OUTPATIENT)
Dept: ONCOLOGY | Facility: CLINIC | Age: 81
End: 2021-03-10

## 2021-03-10 ENCOUNTER — APPOINTMENT (OUTPATIENT)
Dept: ONCOLOGY | Facility: HOSPITAL | Age: 81
End: 2021-03-10

## 2021-03-10 ENCOUNTER — TRANSCRIBE ORDERS (OUTPATIENT)
Dept: ADMINISTRATIVE | Facility: HOSPITAL | Age: 81
End: 2021-03-10

## 2021-03-10 VITALS
SYSTOLIC BLOOD PRESSURE: 136 MMHG | TEMPERATURE: 97.8 F | BODY MASS INDEX: 26.55 KG/M2 | HEIGHT: 64 IN | DIASTOLIC BLOOD PRESSURE: 78 MMHG | RESPIRATION RATE: 16 BRPM | HEART RATE: 63 BPM | WEIGHT: 155.5 LBS

## 2021-03-10 DIAGNOSIS — Z01.818 OTHER SPECIFIED PRE-OPERATIVE EXAMINATION: Primary | ICD-10-CM

## 2021-03-10 DIAGNOSIS — D69.6 THROMBOCYTOPENIA (HCC): ICD-10-CM

## 2021-03-10 DIAGNOSIS — C83.39 DIFFUSE LARGE B-CELL LYMPHOMA OF EXTRANODAL SITE EXCLUDING SPLEEN AND OTHER SOLID ORGANS (HCC): ICD-10-CM

## 2021-03-10 DIAGNOSIS — C83.39 DIFFUSE LARGE B-CELL LYMPHOMA OF EXTRANODAL SITE EXCLUDING SPLEEN AND OTHER SOLID ORGANS (HCC): Primary | ICD-10-CM

## 2021-03-10 LAB
BASOPHILS # BLD AUTO: 0.02 10*3/MM3 (ref 0–0.2)
BASOPHILS NFR BLD AUTO: 0.2 % (ref 0–1.5)
DEPRECATED RDW RBC AUTO: 43.5 FL (ref 37–54)
EOSINOPHIL # BLD AUTO: 0.02 10*3/MM3 (ref 0–0.4)
EOSINOPHIL NFR BLD AUTO: 0.2 % (ref 0.3–6.2)
ERYTHROCYTE [DISTWIDTH] IN BLOOD BY AUTOMATED COUNT: 13 % (ref 12.3–15.4)
HCT VFR BLD AUTO: 34.3 % (ref 34–46.6)
HGB BLD-MCNC: 11.8 G/DL (ref 12–15.9)
IMM GRANULOCYTES # BLD AUTO: 0.1 10*3/MM3 (ref 0–0.05)
IMM GRANULOCYTES NFR BLD AUTO: 1 % (ref 0–0.5)
LYMPHOCYTES # BLD AUTO: 0.92 10*3/MM3 (ref 0.7–3.1)
LYMPHOCYTES NFR BLD AUTO: 9 % (ref 19.6–45.3)
MCH RBC QN AUTO: 31.5 PG (ref 26.6–33)
MCHC RBC AUTO-ENTMCNC: 34.4 G/DL (ref 31.5–35.7)
MCV RBC AUTO: 91.5 FL (ref 79–97)
MONOCYTES # BLD AUTO: 0.51 10*3/MM3 (ref 0.1–0.9)
MONOCYTES NFR BLD AUTO: 5 % (ref 5–12)
NEUTROPHILS NFR BLD AUTO: 8.68 10*3/MM3 (ref 1.7–7)
NEUTROPHILS NFR BLD AUTO: 84.6 % (ref 42.7–76)
NRBC BLD AUTO-RTO: 0 /100 WBC (ref 0–0.2)
PLATELET # BLD AUTO: 77 10*3/MM3 (ref 140–450)
PLATELETS.RETICULATED NFR BLD AUTO: 4.3 % (ref 0.9–6.5)
PMV BLD AUTO: 10.3 FL (ref 6–12)
RBC # BLD AUTO: 3.75 10*6/MM3 (ref 3.77–5.28)
WBC # BLD AUTO: 10.25 10*3/MM3 (ref 3.4–10.8)

## 2021-03-10 PROCEDURE — 85055 RETICULATED PLATELET ASSAY: CPT

## 2021-03-10 PROCEDURE — 85025 COMPLETE CBC W/AUTO DIFF WBC: CPT

## 2021-03-10 PROCEDURE — 36415 COLL VENOUS BLD VENIPUNCTURE: CPT

## 2021-03-10 PROCEDURE — 99214 OFFICE O/P EST MOD 30 MIN: CPT | Performed by: NURSE PRACTITIONER

## 2021-03-10 NOTE — H&P (VIEW-ONLY)
"  Subjective .    REASONS FOR FOLLOWUP:    1. 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 Paroxysmal atrial fibrillation  3. History of DVT  4. 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 female with the above-mentioned history, returns the office today for weekly follow-up and review of her platelet count.  She continues on prednisone 40 mg daily which was increased last week when her platelet count dropped.  She is tolerating the prednisone well.  She did resume Xarelto 1 week ago which she is tolerating well without signs or symptoms of bleeding.  She denies any new pain.  She does report some insomnia secondary to prednisone though is able to get enough sleep to function.  Her energy is adequate.  Her appetite and intake are improved on prednisone.      ONCOLOGIC HISTORY:  (History from previous dates can be found in the separate document.)      Objective      Vitals:    03/10/21 1310   BP: 136/78   Pulse: 63   Resp: 16   Temp: 97.8 °F (36.6 °C)   TempSrc: Skin   Weight: 70.5 kg (155 lb 8 oz)   Height: 162.6 cm (64.02\")   PainSc: 0-No pain     Current Status 3/10/2021   ECOG score 0       Physical Exam   Constitutional: She is oriented to person, place, and time. She appears well-developed.   HENT:   Head: Normocephalic and atraumatic.   Eyes: Pupils are equal, round, and reactive to light.   Cardiovascular: Normal rate.   Pulmonary/Chest: Effort normal and breath sounds normal. She has no wheezes.   Abdominal: Normal appearance and bowel sounds are normal. She exhibits no distension. There is no abdominal tenderness.   Musculoskeletal: Normal range of motion.   Neurological: She is alert and oriented to person, place, and time.   Skin: Skin is warm and dry. No lesion noted.   Scant scattered petechiae bilateral shins   Psychiatric: Her behavior is normal. Mood normal.   Vitals reviewed.      RECENT LABS:  Results from last 7 days   Lab " Units 03/10/21  1318   WBC 10*3/mm3 10.25   NEUTROS ABS 10*3/mm3 8.68*   HEMOGLOBIN g/dL 11.8*   HEMATOCRIT % 34.3   PLATELETS 10*3/mm3 77*         Assessment/Plan   1. 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  · PET scan 5/3/18 with diffuse splenic involvement (SUV 16.9), lymphadenopathy throughout upper abdomen and retroperitoneum (SUV 13.1), right liver lesion 5 cm (SUV 12.8), pelvic lymphadenopathy up to 4 cm, bladder wall thickening with associated hypermetabolism, cervical lymphadenopathy left posterior (SUV 11.2), multiple bone lesions including left intertrochanteric femur, ribs, right scapula, pelvis as well as left gluteal hematoma versus lymphomatous lesion.  · CT-guided liver biopsy 4/26/18 with diffuse large B-cell non-Hodgkin's lymphoma, CD20 positive, double hit (BCL-6 rearrangement 85%, MYC rearrangement 79%), KI-67 4+/4  · Left cervical excisional biopsy by ENT Dr. Oconnor 5/1/18 confirming high-grade B cell non-Hodgkin's lymphoma, Ki-67 100%, double hit (BCL-6 rearrangement 76%, MYC rearrangement 85%)  · Echocardiogram 4/11/18 with ejection fraction 66.7%  · Right port placement Dr Gill 5/4/18  · Patient not felt to be a candidate for more aggressive chemotherapy due to performance status and age (DA EPOCH-R)  · Therefore treated with R CHOP chemotherapy 5/4/18.  · Followed by granix 300mcg daily beginning 5/6/18 through 5/14/18.  · Patient reviewed June 19 with near resolution of hypermetabolic sites on PET/CT.  Plans made for third cycle of CHOP R, additional Zometa and total of 6 cycles of chemotherapy anticipated.  · 8/3/18 Cycle 5 CHOP R, reduction of Oncovin 50%, 6 cycles planned.    · Follow-up scan September 19 with small low-density liver lesions and splenic lesions are decreased from previous, numerous small mesenteric and RP nodes again  stable slightly smaller.    · Patient assessed February 18, 2019, no evidence of recurrent disease  · Patient seen May 13, 2019, no evidence of recurrent disease  · Patient reviewed again October 28, 2019 with follow-up scans negative for recurrence,   · Reassessment planned December 21, 2020 via scans after she has developed thrombocytopenia.  · Follow-up CT scan chest and pelvis January 13, 2021 no for evidence of recurrence.    2. Thrombocytopenia.    · Patient had previous myelosuppression with chemo therapy however this had resolved.    · She has however had in a new medication with Spironolactone within the past 3 to 4 months from cardiology.    · Spironolactone was discontinued with gradual improvement in her platelet count October 2020   · December 2020, worsening thrombocytopenia with platelet count of 60,000  · Subsequent testing per laboratory studies without evidence of etiology, slowly advancing IPF with plans to proceed to treat for immunologic thrombocytopenia such as ITP.  The patient subsequently started prednisone at 0.5 mg/kg twice daily.    · Improvement in her platelet count   · 2/25/2021, platelets improved to 94,000 with prednisone decreased to 30 mg  · 3/3/2020, platelets declined to 69,000, prednisone increased to 40 mg  · Platelets slightly improved today at 77,000 on 40 mg prednisone.  This will be continued, the patient will proceed with bone marrow biopsy for further evaluation of thrombocytopenia as her IPF is not elevated at 4.3%    3.Multifactorial anemia:  · Related to anemia chronic disease, chemotherapy, prior iron deficiency.  · Hgb 11.8 today.     4. Hypercalcemia of malignancy:  · Calcium up to 13.8 on 4/21/18 (albumin 3.3).  Intact PTH 8.1, PTH RP less than 2.0  · Received Zometa 4 mg IV 4/25/18.  · Calcium up to 11.3 on 5/7/18 with second dose of Zometa administered 4 mg IV.  · Calcium today has continued to gradually increase up to 11.9 with albumin 3.3.  Ionized calcium  however is stable at 6.8 compared to yesterday.  The patient is asymptomatic.  We will not plan to administer a further dose of Zometa just yet and will allow further time for the patient to respond to chemotherapy.  She returned 518 for continued Zometa and when seen May 25 has a normal calcium level.  · Patient to receive Zometa June 19, subsequently every other cycle, restart low-dose calcium supplementation  · Patient seen August 03, 2018, plans made for Zometa with her final course of chemotherapy August 23, 2018  · Patient scheduled for bone density prior to assessment 3 months following November visit, potential Xgeva and follow-up as she is seen back to step to repeat scans are performed in May 2019.  As she is seen May 13 we do plan the Xgeva and then move to Prolia 6 months later for her subsequent treatment for osteopenia.  · Prolia continued every 6 months, last given 6/3/2020, now scheduled December 21, 2020.   · Reassessment January 13th, 2021 normocalcemic      5.  Paroxysmal atrial fibrillation:  · Recently diagnosed, anticoagulated with Eliquis initially, discontinued due to expected thrombocytopenia from chemotherapy  · Currently in sinus rhythm, continues on Lopressor 50 mg every 12 hours  · Anticoagulated with Xarelto which is continued if platelets are greater than 50,000    6. Prior seizure disorder:  · Continues currently on Dilantin 300 mg daily at bedtime and Neurontin 600 mg daily at bedtime, will return to outpatient dose of Neurontin 300 mg daily at bedtime at discharge.      7. GI prophylaxis:  · Continues Protonix p.o.      8. Venous access:  · Port placement 5/4/18 by Dr. Gill, continue to manage q. Monthly    9. DVT  · Anticoagulated with Xarelto 20 mg daily  · Xarelto held if platelets drop less than 50,000    Plan:  1. Continue prednisone 40 mg daily  2. Continue Xarelto 20 mg daily.  Patient will continue on Xarelto if platelets greater than 50,000  3. Proceed with bone marrow  biopsy and aspirate with comprehensive panel and flow cytometry  4. Return in 1 week for CBC and RN review  5. MD follow-up with Dr. Iraheta in 2 weeks for review of bone marrow biopsy and aspirate    Today's plan of care was discussed and reviewed with Dr. Iraheta who is in agreement.    Daina Caro, APRN  03/10/2021

## 2021-03-13 NOTE — PROGRESS NOTES
03/19/21 0000   Pre-Procedure Phone Call   Procedure Time Verified Yes   Arrival Time 0830   Procedure Location Verified Yes   Medical History Reviewed No   NPO Status Reinforced Yes   Ride and Caregiver Arranged Yes   Phone Number for Ride/Caregiver Karen Gomez [phone]   Patient Knows to Bring Current Medications No   Bring Outside Films Requested No

## 2021-03-16 RX ORDER — PHENYTOIN SODIUM 100 MG/1
300 CAPSULE, EXTENDED RELEASE ORAL
Qty: 270 CAPSULE | Refills: 3 | Status: SHIPPED | OUTPATIENT
Start: 2021-03-16 | End: 2022-03-15

## 2021-03-16 NOTE — TELEPHONE ENCOUNTER
PATIENT CALLED AND STATED SHE IS HAVING A HARD TIME AFFORDING phenytoin (DILANTIN) 100 MG ER capsule. PATIENT CALLED [facility] AND THEY GAVE HER A NUMBER FOR DR MAHER TO CALL TO MAKE A TIER  EXCEPTION. THAT NUMBER IS [phone]    PATIENT IS ALSO NEEDING A REFILL SENT TO       [id number] - [facility] [address] [address] [address] [address] OF [place] [address] [address] [place] [address] - [phone] [facility] [phone] FX     PATIENT ONLY HAS 3 PILLS LEFT       PLEASE ADVISE     [phone]

## 2021-03-17 ENCOUNTER — CLINICAL SUPPORT (OUTPATIENT)
Dept: ONCOLOGY | Facility: HOSPITAL | Age: 81
End: 2021-03-17

## 2021-03-17 ENCOUNTER — APPOINTMENT (OUTPATIENT)
Dept: ONCOLOGY | Facility: HOSPITAL | Age: 81
End: 2021-03-17

## 2021-03-17 ENCOUNTER — LAB (OUTPATIENT)
Dept: LAB | Facility: HOSPITAL | Age: 81
End: 2021-03-17

## 2021-03-17 DIAGNOSIS — D69.6 THROMBOCYTOPENIA (HCC): ICD-10-CM

## 2021-03-17 DIAGNOSIS — Z01.818 OTHER SPECIFIED PRE-OPERATIVE EXAMINATION: ICD-10-CM

## 2021-03-17 DIAGNOSIS — C83.39 DIFFUSE LARGE B-CELL LYMPHOMA OF EXTRANODAL SITE EXCLUDING SPLEEN AND OTHER SOLID ORGANS (HCC): ICD-10-CM

## 2021-03-17 LAB
BASOPHILS # BLD AUTO: 0.03 10*3/MM3 (ref 0–0.2)
BASOPHILS NFR BLD AUTO: 0.2 % (ref 0–1.5)
DEPRECATED RDW RBC AUTO: 45.1 FL (ref 37–54)
EOSINOPHIL # BLD AUTO: 0.11 10*3/MM3 (ref 0–0.4)
EOSINOPHIL NFR BLD AUTO: 0.8 % (ref 0.3–6.2)
ERYTHROCYTE [DISTWIDTH] IN BLOOD BY AUTOMATED COUNT: 13.2 % (ref 12.3–15.4)
HCT VFR BLD AUTO: 36.3 % (ref 34–46.6)
HGB BLD-MCNC: 12.8 G/DL (ref 12–15.9)
IMM GRANULOCYTES # BLD AUTO: 0.15 10*3/MM3 (ref 0–0.05)
IMM GRANULOCYTES NFR BLD AUTO: 1.1 % (ref 0–0.5)
LYMPHOCYTES # BLD AUTO: 1.53 10*3/MM3 (ref 0.7–3.1)
LYMPHOCYTES NFR BLD AUTO: 11 % (ref 19.6–45.3)
MCH RBC QN AUTO: 32.7 PG (ref 26.6–33)
MCHC RBC AUTO-ENTMCNC: 35.3 G/DL (ref 31.5–35.7)
MCV RBC AUTO: 92.8 FL (ref 79–97)
MONOCYTES # BLD AUTO: 0.98 10*3/MM3 (ref 0.1–0.9)
MONOCYTES NFR BLD AUTO: 7.1 % (ref 5–12)
NEUTROPHILS NFR BLD AUTO: 11.09 10*3/MM3 (ref 1.7–7)
NEUTROPHILS NFR BLD AUTO: 79.8 % (ref 42.7–76)
NRBC BLD AUTO-RTO: 0 /100 WBC (ref 0–0.2)
PLATELET # BLD AUTO: 115 10*3/MM3 (ref 140–450)
PLATELETS.RETICULATED NFR BLD AUTO: 3.2 % (ref 0.9–6.5)
PMV BLD AUTO: 9.5 FL (ref 6–12)
RBC # BLD AUTO: 3.91 10*6/MM3 (ref 3.77–5.28)
WBC # BLD AUTO: 13.89 10*3/MM3 (ref 3.4–10.8)

## 2021-03-17 PROCEDURE — 85025 COMPLETE CBC W/AUTO DIFF WBC: CPT

## 2021-03-17 PROCEDURE — U0004 COV-19 TEST NON-CDC HGH THRU: HCPCS

## 2021-03-17 PROCEDURE — C9803 HOPD COVID-19 SPEC COLLECT: HCPCS

## 2021-03-17 PROCEDURE — G0463 HOSPITAL OUTPT CLINIC VISIT: HCPCS

## 2021-03-17 PROCEDURE — 36415 COLL VENOUS BLD VENIPUNCTURE: CPT

## 2021-03-17 PROCEDURE — 85055 RETICULATED PLATELET ASSAY: CPT

## 2021-03-17 PROCEDURE — U0005 INFEC AGEN DETEC AMPLI PROBE: HCPCS

## 2021-03-17 NOTE — NURSING NOTE
Pt here for CBC and RN review. CBC reviewed with pt. Plts increased to 115 today. Pt stated she is feeling well, no signs bleeding/bruising. Pt confirmed taking prednisone 40 mg daily. Pt stated she is scheduled for her BMA this Friday assuming Dr. Iraheta still wanted her to do that. Told pt that per Daina JI's last note pt is to get the BMA. Told pt I would go discuss her labs and BMA with Dr. Iraheta to see if he wants to decrease dosing at all.     Discussed labs with Dr. Iraheta and pt is to start taking 30 mg prednisone daily now and still good to have BMA on Friday.       Told pt and she v/u. Copy of labs given to pt and f/u appts reviewed. Pt v/u to call with any new concerns.       Lab Results   Component Value Date    WBC 13.89 (H) 03/17/2021    HGB 12.8 03/17/2021    HCT 36.3 03/17/2021    MCV 92.8 03/17/2021     (L) 03/17/2021

## 2021-03-18 LAB — SARS-COV-2 RNA RESP QL NAA+PROBE: NOT DETECTED

## 2021-03-19 ENCOUNTER — HOSPITAL ENCOUNTER (OUTPATIENT)
Dept: CT IMAGING | Facility: HOSPITAL | Age: 81
Discharge: HOME | End: 2021-03-19
Admitting: NURSE PRACTITIONER

## 2021-03-19 VITALS
SYSTOLIC BLOOD PRESSURE: 134 MMHG | WEIGHT: 152 LBS | RESPIRATION RATE: 18 BRPM | HEART RATE: 68 BPM | OXYGEN SATURATION: 94 % | TEMPERATURE: 96.9 F | DIASTOLIC BLOOD PRESSURE: 62 MMHG | HEIGHT: 64 IN | BODY MASS INDEX: 25.95 KG/M2

## 2021-03-19 DIAGNOSIS — Z45.2 ENCOUNTER FOR ADJUSTMENT OR MANAGEMENT OF VASCULAR ACCESS DEVICE: ICD-10-CM

## 2021-03-19 DIAGNOSIS — C83.39 DIFFUSE LARGE B-CELL LYMPHOMA OF EXTRANODAL SITE EXCLUDING SPLEEN AND OTHER SOLID ORGANS (HCC): Primary | ICD-10-CM

## 2021-03-19 LAB
INR PPP: 1.1 (ref 0.8–1.2)
PROTHROMBIN TIME: 13.1 SECONDS (ref 12.8–15.2)

## 2021-03-19 PROCEDURE — 25010000003 HEPARIN LOCK FLUSH PER 10 UNITS: Performed by: INTERNAL MEDICINE

## 2021-03-19 PROCEDURE — 88300 SURGICAL PATH GROSS: CPT | Performed by: NURSE PRACTITIONER

## 2021-03-19 PROCEDURE — 88313 SPECIAL STAINS GROUP 2: CPT

## 2021-03-19 PROCEDURE — 88341 IMHCHEM/IMCYTCHM EA ADD ANTB: CPT

## 2021-03-19 PROCEDURE — 88305 TISSUE EXAM BY PATHOLOGIST: CPT | Performed by: NURSE PRACTITIONER

## 2021-03-19 PROCEDURE — 88184 FLOWCYTOMETRY/ TC 1 MARKER: CPT

## 2021-03-19 PROCEDURE — 88341 IMHCHEM/IMCYTCHM EA ADD ANTB: CPT | Performed by: NURSE PRACTITIONER

## 2021-03-19 PROCEDURE — 25010000002 FENTANYL CITRATE (PF) 100 MCG/2ML SOLUTION: Performed by: RADIOLOGY

## 2021-03-19 PROCEDURE — 88182 CELL MARKER STUDY: CPT

## 2021-03-19 PROCEDURE — 77012 CT SCAN FOR NEEDLE BIOPSY: CPT

## 2021-03-19 PROCEDURE — 88313 SPECIAL STAINS GROUP 2: CPT | Performed by: NURSE PRACTITIONER

## 2021-03-19 PROCEDURE — 88237 TISSUE CULTURE BONE MARROW: CPT

## 2021-03-19 PROCEDURE — 88311 DECALCIFY TISSUE: CPT | Performed by: NURSE PRACTITIONER

## 2021-03-19 PROCEDURE — 25010000002 MIDAZOLAM PER 1 MG: Performed by: RADIOLOGY

## 2021-03-19 PROCEDURE — 85610 PROTHROMBIN TIME: CPT

## 2021-03-19 PROCEDURE — 85097 BONE MARROW INTERPRETATION: CPT | Performed by: NURSE PRACTITIONER

## 2021-03-19 PROCEDURE — 88342 IMHCHEM/IMCYTCHM 1ST ANTB: CPT | Performed by: NURSE PRACTITIONER

## 2021-03-19 PROCEDURE — 88264 CHROMOSOME ANALYSIS 20-25: CPT

## 2021-03-19 PROCEDURE — 88185 FLOWCYTOMETRY/TC ADD-ON: CPT

## 2021-03-19 PROCEDURE — 88323 CONSLTJ&REPRT MATRL PREP SLD: CPT

## 2021-03-19 RX ORDER — LIDOCAINE HYDROCHLORIDE 10 MG/ML
20 INJECTION, SOLUTION INFILTRATION; PERINEURAL ONCE
Status: DISCONTINUED | OUTPATIENT
Start: 2021-03-19 | End: 2021-03-20 | Stop reason: HOSPADM

## 2021-03-19 RX ORDER — FENTANYL CITRATE 50 UG/ML
INJECTION, SOLUTION INTRAMUSCULAR; INTRAVENOUS
Status: COMPLETED | OUTPATIENT
Start: 2021-03-19 | End: 2021-03-19

## 2021-03-19 RX ORDER — SODIUM CHLORIDE 0.9 % (FLUSH) 0.9 %
10 SYRINGE (ML) INJECTION AS NEEDED
Status: DISCONTINUED | OUTPATIENT
Start: 2021-03-19 | End: 2021-03-20 | Stop reason: HOSPADM

## 2021-03-19 RX ORDER — SODIUM CHLORIDE 0.9 % (FLUSH) 0.9 %
10 SYRINGE (ML) INJECTION AS NEEDED
Status: CANCELLED | OUTPATIENT
Start: 2021-03-19

## 2021-03-19 RX ORDER — SODIUM CHLORIDE 9 MG/ML
25 INJECTION, SOLUTION INTRAVENOUS ONCE
Status: COMPLETED | OUTPATIENT
Start: 2021-03-19 | End: 2021-03-19

## 2021-03-19 RX ORDER — MIDAZOLAM HYDROCHLORIDE 1 MG/ML
INJECTION INTRAMUSCULAR; INTRAVENOUS
Status: COMPLETED | OUTPATIENT
Start: 2021-03-19 | End: 2021-03-19

## 2021-03-19 RX ORDER — SODIUM CHLORIDE 0.9 % (FLUSH) 0.9 %
3 SYRINGE (ML) INJECTION EVERY 12 HOURS SCHEDULED
Status: DISCONTINUED | OUTPATIENT
Start: 2021-03-19 | End: 2021-03-20 | Stop reason: HOSPADM

## 2021-03-19 RX ORDER — HEPARIN SODIUM (PORCINE) LOCK FLUSH IV SOLN 100 UNIT/ML 100 UNIT/ML
500 SOLUTION INTRAVENOUS AS NEEDED
Status: DISCONTINUED | OUTPATIENT
Start: 2021-03-19 | End: 2021-03-20 | Stop reason: HOSPADM

## 2021-03-19 RX ORDER — HEPARIN SODIUM (PORCINE) LOCK FLUSH IV SOLN 100 UNIT/ML 100 UNIT/ML
500 SOLUTION INTRAVENOUS AS NEEDED
Status: CANCELLED | OUTPATIENT
Start: 2021-03-19

## 2021-03-19 RX ADMIN — Medication 10 ML: at 11:50

## 2021-03-19 RX ADMIN — Medication 500 UNITS: at 11:50

## 2021-03-19 RX ADMIN — SODIUM CHLORIDE 25 ML/HR: 9 INJECTION, SOLUTION INTRAVENOUS at 10:28

## 2021-03-19 RX ADMIN — Medication 3 ML: at 10:29

## 2021-03-19 RX ADMIN — MIDAZOLAM 1 MG: 1 INJECTION INTRAMUSCULAR; INTRAVENOUS at 10:37

## 2021-03-19 RX ADMIN — FENTANYL CITRATE 50 MCG: 50 INJECTION INTRAMUSCULAR; INTRAVENOUS at 10:38

## 2021-03-19 NOTE — POST-PROCEDURE NOTE
POST PROCEDURE NOTE      Pre-Procedure Diagnosis: lymphoma    Post-procedure Diagnosis: same    Findings: ct bone marrow bx    Complications: no immediate    Blood loss: none    Specimen Removed: bone marrow core and aspirate    Discharge Plan:   Home:  Yes

## 2021-03-19 NOTE — NURSING NOTE
Patient arrived for CT Bone Marrow Biopsy.    Protective goggles and mask in place with all patient interactions today

## 2021-03-19 NOTE — DISCHARGE INSTRUCTIONS
Discharge Instructions after receiving Moderate Sedation  These instructions provide you with information about caring for yourself after your procedure. Your health care provider may also give you more specific instructions. Your treatment has been planned according to current medical practices, but problems sometimes occur. Call your physican or the Radiology Nurses ([phone]) if you have any problems or questions after your procedure.    What can I expect after the procedure?  After your procedure, you may:  Feel sleepy or light headed for several hours.  Feel clumsy, dizzy or have poor balance for several hours.  Feel forgetful about what happened after the procedure.  Have poor judgment for several hours.  Feel nauseous or vomit.    For at least 24 hours after the procedure:  Have a responsible adult stay with you or frequently check on you until you are awake and alert.   Rest as needed.    Do not:  Participate in activities in which you could fall or become injured.  Drive or operate heavy machinery  Take sleeping pills or medicines that cause drowsiness.  Make important decisions or sign legal documents.  Take care of children on your own.    Eating and drinking: Clear liquids then progress slowly to a normal diet.  If you vomit, drink water, juice, or soup when you can drink without vomiting.  Make sure you have little or no nausea before eating solid foods.    General instructions: Take over-the-counter and prescription medicines only as told by your health care provider .If you have sleep apnea, surgery and certain medicines can increase your risk for breathing problems. Follow instructions from your health care provider about wearing your sleep device: If you smoke, do not smoke without supervision.  Keep all follow-up visits as told by your health care provider. This is important.    Contact your physician if:  You continue to keep feeling nauseous or you keep vomiting. You continue to feel  light-headed, develop a fever or a rash.  Get help right away if you have trouble breathing    I acknowledge the above instructions:    Patient/ Responsible person _________________________________Date ____________Time ____________    Witnessed by____________________________________________ Date_____________ Time____________            EDUCATION /DISCHARGE INSTRUCTIONS  CT/US guided biopsy:  A biopsy is a procedure done to remove tissue for further analysis.  Before images are taken to locate the target area.  Images can be obtained using ultrasound, CT or MRI.  A physician will clean your skin with antiseptic soap, place a sterile towel around the site and administer a local anesthetic to numb the area.  The physician will then insert a special needle.  Sometimes images are taken of the needle after it is inserted to ensure the needle is in the correct area to be biopsied.   A sample is obtained and sent to the laboratory for study.  Occasionally the laboratory is unable to make a diagnosis from the sample and the procedure may need to be repeated.  Within a week the radiologist will send a report to your physician.  A pathologist will also examine the tissue and send a report.    Risks of the procedure include but are not limited to:   *  Bleeding    *  Infection   *  Puncture of surrounding organs *  Death     *  Lung collapse if the biopsy is near the chest which may require insertion of a      chest tube to re-inflate the lung if severe.    Benefits of the procedure:  Using x-ray helps to locate the area that requires a biopsy. The procedure is less invasive than a surgical procedure, there are no large incisions and it does not require anesthesia.    Alternatives to the procedure:  A biopsy can be performed surgically.  Risks of a surgical biopsy include exposure to anesthesia, infection, excessive bleeding and injury to abdominal organs.  A benefit of surgical biopsy is the ability to see the area to be  biopsied and remove of a larger piece of tissue.    THIS EDUCATION INFORMATION WAS REVIEWED PRIOR TO PROCEDURE AND CONSENT. Patient initials__________________Time___________________    Post Procedure:    *  Expect the biopsy site may be tender up to one week.    *  Rest today (no pushing pulling or straining).   *  Slowly increase activity tomorrow.    *  If you received sedation do not drive for 24 hours.   *  Keep dressing clean and dry.   *  Leave dressing on puncture site for 24 hours.    *  You may shower when dressing removed.  Call your doctor if experiencing:   *  Signs of infection such as redness, swelling, excessive pain and / or foul        smelling drainage from the puncture site.   *  Chills or fever over 101 degrees (by mouth).   *  Unrelieved pain.   *  Any new or severe symptoms.   *  If experiencing sudden / severe shortness of breath or chest pain go to the       nearest emergency room.   Following the procedure:     Follow-up with the ordering physician as directed.    Continue to take other medications as directed by your physician unless    otherwise instructed.   If applicable, resume taking your blood thinners on March 20, 2021.    If you have any concerns please call the Radiology Nurses Desk at [phone] 7am-10pm.  You are the most important factor in your recovery.  Follow the above instructions carefully.

## 2021-03-19 NOTE — NURSING NOTE
Patient education complete. Port deaccessed. Flushed with heparin. Taken by wheelchair to patient discharge to meet her son.

## 2021-03-20 ENCOUNTER — TELEPHONE (OUTPATIENT)
Dept: INTERVENTIONAL RADIOLOGY/VASCULAR | Facility: HOSPITAL | Age: 81
End: 2021-03-20

## 2021-03-24 DIAGNOSIS — C83.39 DIFFUSE LARGE B-CELL LYMPHOMA OF EXTRANODAL SITE EXCLUDING SPLEEN AND OTHER SOLID ORGANS (HCC): Primary | ICD-10-CM

## 2021-03-24 DIAGNOSIS — D69.6 THROMBOCYTOPENIA (HCC): ICD-10-CM

## 2021-03-24 NOTE — PROGRESS NOTES
Subjective .  Patient feeling well, discussed her findings                                                                                                                                               REASONS FOR FOLLOWUP:    1. 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 Paroxysmal atrial fibrillation  3. History of DVT  4. Thrombocytopenia-  ITP, bone marrow with adequate hematopoiesis megakaryopoiesis, no evidence of lymphoma        History of Present Illness        The patient is an 80-year-old female with the above medical history status post completion of her chemotherapy August 23, 2018 and in continued remission.  She is seen regularly in the office thereafter.     Patient when seen September 23, 2020 had developed worsening thrombocytopenia.  Records indicated this had started since her visit in May at approximate time that she started spironolactone.  Antibodies were drawn which are currently pending though her IPF had been 7.6 now October 30 8 down to 6.3 in follow-up CBC with H&H of 12.3 and three 7.4 with white count of 6-40 and platelet count of 60,000 having dropped to as low as 20,000 when seen Freya 3, 2020.  The patient is feeling considerably better, indicates that she has not had any palpitation episodes, notes generally improved stamina, no bleeding episodes.  She has remained off her Xarelto at this point.  The patient is next seen December 21, 2020 continue to feel well with an unchanged platelet count.  Again she has no fever, chills, night sweats, nausea, vomiting, weight loss and normal performance status.     The patient was initially asked to discontinue spironolactone and follow-up to determine response.  Her Xarelto was held and she underwent repeat scans including CT chest abdomen pelvis January 13, 2021 with no evidence of increasing lymphadenopathy in the chest abdomen pelvis or any other findings that might be consistent with recurrence.  Her  IPF, however, has slowly increased and is now at 8.2.  We discussed a trial of prednisone for immune related thrombocytopenia under the circumstances that may be drug-related and/or related to her lymphoma.  The patient, as result, started on prednisone with some delay the platelet count increasing February 4 2 79,000.  Additionally IP have dropped to 5.4 from a high of 8.9 January 27, 2021.  We have contacted by cardiology about restarting spironolactone and this did occur.  As she is seen back February 10, 2020 when she is doing well without additional changes symptomatically your platelet count has now dropped to 66,000.  There has been no other hematologic changes and she has been maintained on her current 20 mg p.o. twice daily of prednisone.      Patient was asked to continue prednisone 20 mg p.o. twice daily and is next February 21, 2021 fortunately feeling well and with a gradual improvement in her platelet count now up to 94,000.  She is having no clear difficulty tolerating her prednisone dosing and we discussed the move to taper her dose.  The patient thereafter underwent marrow examination performed March 19 which was normal cellular with no evidence of malignant lymphoma, trilineage hematopoiesis showing adequate megakaryopoiesis.  Again flow cytometry is negative, cytogenetics pending.  The patient's most recent platelet count March 17 was 1 15,000 with an IPF of 3.2.  The patient went on to continue steroids dropping to 30 mg daily as she is seen March 29, 2021.  At this point her marrow is normal per cytogenetics as well.  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      Past Medical History:   Diagnosis Date   • Anemia     multifactorial   • Cystitis    • Cystocele     moderate   • Drug therapy    • Dyslipidemia    • Esophageal reflux    • Fatigue    • History of transfusion     no reaction   • Hypercalcemia    • Hypertension    • Major depression, chronic    • Menopause    • Non Hodgkin's  "lymphoma (CMS/HCC)    • Osteoarthritis    • Osteoporosis    • Palpitations    • Paroxysmal atrial fibrillation (CMS/HCC)    • Post menopausal problems    • RLS (restless legs syndrome)    • Seizure disorder (CMS/HCC)    • Sinus bradycardia    • Subjective tinnitus    • Vaginal delivery     x2  \"SONYA\"      \"JASON\"   • Vitamin D deficiency        ONCOLOGIC HISTORY:  (History from previous dates can be found in the separate document.)    Current Outpatient Medications on File Prior to Visit   Medication Sig Dispense Refill   • amLODIPine (NORVASC) 10 MG tablet TAKE 1 TABLET BY MOUTH EVERY DAY 90 tablet 1   • Ascorbic Acid (Vitamin C) 500 MG capsule Take  by mouth.     • benazepril (LOTENSIN) 40 MG tablet TAKE 1 TABLET BY MOUTH EVERY DAY 90 tablet 1   • cetirizine (zyrTEC) 10 MG tablet Take 1 tablet by mouth Daily As Needed for Allergies. 30 tablet 1   • Cholecalciferol (VITAMIN D3) 125 MCG (5000 UT) capsule capsule Take 5,000 Units by mouth Daily.     • escitalopram (LEXAPRO) 20 MG tablet TAKE 1 TABLET BY MOUTH DAILY 90 tablet 1   • folic acid (FOLVITE) 400 MCG tablet Take 400 mcg by mouth daily.     • gabapentin (NEURONTIN) 300 MG capsule Take 2 capsules by mouth every night at bedtime. 60 capsule 2   • hydrALAZINE (APRESOLINE) 100 MG tablet TAKE 1 TABLET BY MOUTH THREE TIMES DAILY 270 tablet 3   • lidocaine-prilocaine (EMLA) 2.5-2.5 % cream Apply 30 min prior to port access 5 g 2   • melatonin 5 MG tablet tablet Take 5 mg by mouth Every Night.     • Multiple Vitamins-Minerals (ZINC PO) Take  by mouth.     • pantoprazole (PROTONIX) 40 MG EC tablet TAKE 1 TABLET BY MOUTH EVERY DAY 90 tablet 1   • phenytoin (DILANTIN) 100 MG ER capsule Take 3 capsules by mouth every night at bedtime. 270 capsule 3   • predniSONE (DELTASONE) 10 MG tablet Take 3 tablets by mouth Daily. 60 tablet 1   • spironolactone (ALDACTONE) 25 MG tablet Take 1 tablet by mouth Daily. 90 tablet 3   • vitamin B-12 (CYANOCOBALAMIN) 100 MCG tablet Take " "1,000 mcg by mouth Daily.     • vitamin B-6 (PYRIDOXINE) 100 MG tablet Take 100 mg by mouth Daily.     • vitamin E 100 UNIT capsule Take 180 Units by mouth Daily.     • Xarelto 20 MG tablet Take 1 tablet by mouth Daily. 28 tablet 0   • Zinc Sulfate (ZINC 15 PO) Take 400 mg by mouth.       No current facility-administered medications on file prior to visit.       ALLERGIES:     Allergies   Allergen Reactions   • Crab (Diagnostic) Itching and Rash   • Pseudoephedrine Dizziness       Social History     Socioeconomic History   • Marital status:      Spouse name: JL   • Number of children: 2   • Years of education: Not on file   • Highest education level: Not on file   Tobacco Use   • Smoking status: Never Smoker   • Smokeless tobacco: Never Used   Vaping Use   • Vaping Use: Never used   Substance and Sexual Activity   • Alcohol use: No     Comment: Caffeine use: 1 cup daily (decaf)   • Drug use: No   • Sexual activity: Defer     Birth control/protection: Surgical     Comment:  (Jl)         Cancer-related family history is not on file.     Review of Systems   Constitutional: Negative.    HENT: Negative.    Eyes: Negative.    Respiratory: Negative.    Cardiovascular: Negative.    Gastrointestinal: Negative.    Endocrine: Negative.    Genitourinary: Negative.    Musculoskeletal: Negative.    Skin: Negative.    Allergic/Immunologic: Negative.    Neurological: Negative.    Hematological: Negative.    Psychiatric/Behavioral: Negative.    All other systems reviewed and are negative.        Objective      Vitals:    03/29/21 1508   BP: 136/65   Pulse: 78   Resp: 18   Temp: 97.3 °F (36.3 °C)   TempSrc: Temporal   SpO2: 95%   Weight: 71.2 kg (156 lb 14.4 oz)   Height: 162.6 cm (64.02\")   PainSc: 0-No pain     Current Status 3/10/2021   ECOG score 0       Physical Exam   Constitutional: She is oriented to person, place, and time. She appears well-developed.   HENT:   Head: Normocephalic and atraumatic. "   Eyes: Pupils are equal, round, and reactive to light.   Cardiovascular: Normal rate.   Pulmonary/Chest: Effort normal and breath sounds normal. She has no wheezes.   Abdominal: Normal appearance and bowel sounds are normal. She exhibits no distension. There is no abdominal tenderness.   Musculoskeletal: Normal range of motion.   Neurological: She is alert and oriented to person, place, and time.   Skin: Skin is warm and dry. No lesion noted.   Scant scattered petechiae bilateral shins   Psychiatric: Her behavior is normal. Mood normal.   Vitals reviewed.      RECENT LABS:  Results from last 7 days   Lab Units 03/29/21  1411   WBC 10*3/mm3 18.19*   NEUTROS ABS 10*3/mm3 15.87*   HEMOGLOBIN g/dL 13.4   HEMATOCRIT % 38.2   PLATELETS 10*3/mm3 125*           CT CHEST, ABDOMEN AND PELVIS WITH CONTRAST 1/13/2021     COMPARISON: 05/22/2020     FINDINGS:     CT CHEST: A right-sided chest wall port is present with the tip in the  cavoatrial junction. No pleural or pericardial effusion is seen. Dilated  main pulmonary artery suggestive of pulmonary arterial hypertension. No  enlarging mediastinal lymphadenopathy is seen. No pathological axillary  lymphadenopathy. Calcified coronary artery disease is present. The  central airways are patent without endobronchial lesion. There is  biapical pleural parenchymal scarring present. No new or suspicious  pulmonary nodules are seen. There is no pathological axillary  lymphadenopathy.     CT ABDOMEN AND PELVIS:The spleen is normal in size and attenuation. A  low-density lesion seen on image 31 is unchanged from prior imaging.  There are shotty retroperitoneal lymph nodes without interval  enlargement. Index right external iliac lymph node today measures 1 cm  in short axis dimension compared to 1.1 cm previously. Dense central  mesenteric calcifications are again seen. Small lymph nodes seen within  the small bowel mesentery are without interim change.     Small hypoattenuating left  hepatic lobe lesion is without interim  change. No new hepatic lesion. Pancreas, bilateral adrenal gland and  kidneys are normal. No evidence of bowel obstruction. The urinary  bladder is partially distended and normal. Colonic diverticulosis is  present. Sclerotic lesions seen within the L2 vertebral body without  interim change.     IMPRESSION:  No enlarging or pathological lymphadenopathy within the  chest, abdomen and pelvis. Stable shotty abdominal lymphadenopathy as  above. Small hypoattenuating splenic lesion without interim change              Assessment/Plan   1. 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  · PET scan 5/3/18 with diffuse splenic involvement (SUV 16.9), lymphadenopathy throughout upper abdomen and retroperitoneum (SUV 13.1), right liver lesion 5 cm (SUV 12.8), pelvic lymphadenopathy up to 4 cm, bladder wall thickening with associated hypermetabolism, cervical lymphadenopathy left posterior (SUV 11.2), multiple bone lesions including left intertrochanteric femur, ribs, right scapula, pelvis as well as left gluteal hematoma versus lymphomatous lesion.  · CT-guided liver biopsy 4/26/18 with diffuse large B-cell non-Hodgkin's lymphoma, CD20 positive, double hit (BCL-6 rearrangement 85%, MYC rearrangement 79%), KI-67 4+/4  · Left cervical excisional biopsy by ENT Dr. Oconnor 5/1/18 confirming high-grade B cell non-Hodgkin's lymphoma, Ki-67 100%, double hit (BCL-6 rearrangement 76%, MYC rearrangement 85%)  · Echocardiogram 4/11/18 with ejection fraction 66.7%  · Right port placement Dr Gill 5/4/18  · Patient not felt to be a candidate for more aggressive chemotherapy due to performance status and age (DA EPOCH-R)  · Therefore treated with R CHOP chemotherapy 5/4/18.  · Followed by granix 300mcg daily beginning 5/6/18 through 5/14/18.  · Today, the  patient has increasing WBC related to growth factor use, stable hemoglobin, spontaneous improvement in platelet count.  She is doing quite well following her chemotherapy and is ready to go home.  There is some concern regarding her persistently elevated LDH at 347 today as well as her escalating calcium at 11.9.  She is currently asymptomatic from the calcium and her ionized calcium is stable at 6.8.  Therefore we are going to proceed with discharge home.  She will return to the office later this week on 5/18/18 with repeat labs, nurse practitioner visit, and potential treatment with a third dose of Zometa if her calcium has continued to escalate significantly. She is now seen with plans to begin cycle 2 R CHOP chemotherapy with growth factor support ( Neulasta on body injector).  Will schedule follow-up PET scan after 2 cycles of therapy and see her back in 3 weeks to review her status.  · Patient reviewed June 19 with near resolution of hypermetabolic sites on PET/CT.  Plans made for third cycle of CHOP R, additional Zometa and total of 6 cycles of chemotherapy anticipated.  · Patient seen August 03, 2018 for fifth cycle of chemotherapy-CHOP R, reduction of Oncovin 50%, 6 cycles planned.  Discussed subsequent CT scan follow-up.  · Follow-up scan September 19 with small low-density liver lesions and splenic lesions are decreased from previous, numerous small mesenteric and RP nodes again stable slightly smaller.  These are discussed September 24 and follow-up scan in 3 months is anticipated  · Patient reviewed November 26 further generally improved, plans made to maintain port, review at 3 months  · Patient assessed February 18, 2019, no evidence of recurrent disease, repeat scans in 3 months plan-6 months from previous  · Patient seen May 13, 2019, no evidence of recurrent disease, follow-up assessment in 6 months planned  · Patient reviewed again October 28, 2019 with follow-up scans negative for recurrence, 6  months plan follow-up at 2-year m  · Patient reviewed by Dr. Iraheta 6/3/2020, stable scans.  · Stable clinically when seen October 30, 2020  · Reassessment planned December 21, 2020 via scans after she has developed thrombocytopenia.  · Follow-up CT scan chest and pelvis January 13, 2021 independent reviewed as negative for evidence of recurrence.       2. Thrombocytopenia.  Patient had previous myelosuppression with chemo therapy however this had resolved.  It is noted when looking back today that her platelet count has declined following her visit in May.  She denies any recent bleeding.  She has however had in a new medication with Spironolactone within the past 3 to 4 months from cardiology.  As this can cause thrombocytopenia we have held this treatment and she is gradually recovering when seen October 30, 2020.  Reassessment of this with the patient seen December 21 continue disorder platelet count approximately 60,000.  Subsequent testing per laboratory studies without evidence of etiology, slowly advancing IPF with plans to proceed to treat for immunologic thrombocytopenia such as ITP.  The patient subsequently started prednisone at 0.5 mg/kg twice daily.  Initially this was missed dosed at 1 mg twice daily but then increased to 20 mg twice daily and she has demonstrated a significant improvement in her platelet count.  Upon recheck 1 week later she has a mild reduction through her IPF level has remained low.  We have discussed that restarting spironolactone may be an issue here though we will plan to continue her current prednisone dosing at least over the next 2 weeks.  The patient was seen back February 25, 2021 with an improvement of platelet count 94,000, normalized IPF.                                                            The patient underwent a bone marrow aspirate biopsy March 19, 2021 that was negative for lymphomatous involvement, normal trilineage hematopoiesis including megakaryopoiesis.   She is felt to have ITP with further slow prednisone taper planned.                     Plan:  *Taper prednisone to 20 mg p.o. daily        3. Multifactorial anemia:  · Related to anemia chronic disease, chemotherapy, prior iron deficiency.  · Hemoglobin rechecked March 29, 2021 at 13.4 and 38.2     4. Hypercalcemia of malignancy:  · Calcium up to 13.8 on 4/21/18 (albumin 3.3).  Intact PTH 8.1, PTH RP less than 2.0  · Received Zometa 4 mg IV 4/25/18.  · Calcium up to 11.3 on 5/7/18 with second dose of Zometa administered 4 mg IV.  · Calcium today has continued to gradually increase up to 11.9 with albumin 3.3.  Ionized calcium however is stable at 6.8 compared to yesterday.  The patient is asymptomatic.  We will not plan to administer a further dose of Zometa just yet and will allow further time for the patient to respond to chemotherapy.  She returned 518 for continued Zometa and when seen May 25 has a normal calcium level.  · Patient to receive Zometa June 19, subsequently every other cycle, restart low-dose calcium supplementation  · Patient seen August 03, 2018, plans made for Zometa with her final course of chemotherapy August 23, 2018  · Patient scheduled for bone density prior to assessment 3 months following November visit, potential Xgeva and follow-up as she is seen back to step to repeat scans are performed in May 2019.  As she is seen May 13 we do plan the Xgeva and then move to Prolia 6 months later for her subsequent treatment for osteopenia.  · Prolia continued every 6 months, last given 6/3/2020, now scheduled December 21, 2020.   · Reassessment January 13th, 2021 normocalcemic      5.  Paroxysmal atrial fibrillation:  · Recently diagnosed, anticoagulated with Eliquis initially, discontinued due to expected thrombocytopenia from chemotherapy  · Currently in sinus rhythm, continues on Lopressor 50 mg every 12 hours  · Anticoagulation restarted with Xarelto though this has been held during her  thrombocytopenia.    6. Prior seizure disorder:  · Continues currently on Dilantin 300 mg daily at bedtime and Neurontin 600 mg daily at bedtime, will return to outpatient dose of Neurontin 300 mg daily at bedtime at discharge.        7. GI prophylaxis:  · Continues Protonix p.o.        8. Venous access:  · Port placement 5/4/18 by Dr. Gill, continue to manage q. monthly        9. DVT-hold Xarelto 20 mg by mouth daily secondary to thrombocytopenia.        Plan:  1. Xarelto restarted  2. Antiplatelet antibodies found to be negative  3.  Patient felt to have acute idiopathic thrombocytopenic purpura and started on prednisone as above.  Plan to taper very slowly.    *Return 1 week for CBC IPF  *2 weeks MD or NP, CBC, IPF tapering 5 mg weekly.

## 2021-03-29 ENCOUNTER — LAB (OUTPATIENT)
Dept: LAB | Facility: HOSPITAL | Age: 81
End: 2021-03-29

## 2021-03-29 ENCOUNTER — DOCUMENTATION (OUTPATIENT)
Dept: PHARMACY | Facility: HOSPITAL | Age: 81
End: 2021-03-29

## 2021-03-29 ENCOUNTER — OFFICE VISIT (OUTPATIENT)
Dept: ONCOLOGY | Facility: CLINIC | Age: 81
End: 2021-03-29

## 2021-03-29 VITALS
HEIGHT: 64 IN | BODY MASS INDEX: 26.79 KG/M2 | HEART RATE: 78 BPM | SYSTOLIC BLOOD PRESSURE: 136 MMHG | DIASTOLIC BLOOD PRESSURE: 65 MMHG | RESPIRATION RATE: 18 BRPM | TEMPERATURE: 97.3 F | OXYGEN SATURATION: 95 % | WEIGHT: 156.9 LBS

## 2021-03-29 DIAGNOSIS — D69.6 THROMBOCYTOPENIA (HCC): ICD-10-CM

## 2021-03-29 DIAGNOSIS — C83.39 DIFFUSE LARGE B-CELL LYMPHOMA OF EXTRANODAL SITE EXCLUDING SPLEEN AND OTHER SOLID ORGANS (HCC): Primary | ICD-10-CM

## 2021-03-29 DIAGNOSIS — D69.3 ACUTE ITP (HCC): ICD-10-CM

## 2021-03-29 DIAGNOSIS — C83.39 DIFFUSE LARGE B-CELL LYMPHOMA OF EXTRANODAL SITE EXCLUDING SPLEEN AND OTHER SOLID ORGANS (HCC): ICD-10-CM

## 2021-03-29 LAB
BASOPHILS # BLD AUTO: 0.03 10*3/MM3 (ref 0–0.2)
BASOPHILS NFR BLD AUTO: 0.2 % (ref 0–1.5)
DEPRECATED RDW RBC AUTO: 45.9 FL (ref 37–54)
EOSINOPHIL # BLD AUTO: 0.09 10*3/MM3 (ref 0–0.4)
EOSINOPHIL NFR BLD AUTO: 0.5 % (ref 0.3–6.2)
ERYTHROCYTE [DISTWIDTH] IN BLOOD BY AUTOMATED COUNT: 13.5 % (ref 12.3–15.4)
HCT VFR BLD AUTO: 38.2 % (ref 34–46.6)
HGB BLD-MCNC: 13.4 G/DL (ref 12–15.9)
IMM GRANULOCYTES # BLD AUTO: 0.11 10*3/MM3 (ref 0–0.05)
IMM GRANULOCYTES NFR BLD AUTO: 0.6 % (ref 0–0.5)
LYMPHOCYTES # BLD AUTO: 1.19 10*3/MM3 (ref 0.7–3.1)
LYMPHOCYTES NFR BLD AUTO: 6.5 % (ref 19.6–45.3)
MCH RBC QN AUTO: 32.8 PG (ref 26.6–33)
MCHC RBC AUTO-ENTMCNC: 35.1 G/DL (ref 31.5–35.7)
MCV RBC AUTO: 93.6 FL (ref 79–97)
MONOCYTES # BLD AUTO: 0.9 10*3/MM3 (ref 0.1–0.9)
MONOCYTES NFR BLD AUTO: 4.9 % (ref 5–12)
NEUTROPHILS NFR BLD AUTO: 15.87 10*3/MM3 (ref 1.7–7)
NEUTROPHILS NFR BLD AUTO: 87.3 % (ref 42.7–76)
NRBC BLD AUTO-RTO: 0 /100 WBC (ref 0–0.2)
PLATELET # BLD AUTO: 125 10*3/MM3 (ref 140–450)
PMV BLD AUTO: 10 FL (ref 6–12)
RBC # BLD AUTO: 4.08 10*6/MM3 (ref 3.77–5.28)
WBC # BLD AUTO: 18.19 10*3/MM3 (ref 3.4–10.8)

## 2021-03-29 PROCEDURE — 36415 COLL VENOUS BLD VENIPUNCTURE: CPT

## 2021-03-29 PROCEDURE — 99214 OFFICE O/P EST MOD 30 MIN: CPT | Performed by: INTERNAL MEDICINE

## 2021-03-29 PROCEDURE — 85025 COMPLETE CBC W/AUTO DIFF WBC: CPT

## 2021-03-29 NOTE — PROGRESS NOTES
Site of Appt/Pickup: (n) [place]; (y) [place]; (n) [place];    Prescriber (ariana) contacted pharmacy to obtain the medication below.    NDC:  72174-756-87  Drug name: xarelto  Strength: 20mg  Total Quantity:  28 (Containers- 4 X Units per Containers- 7)  Lot#:  [id number]  Expiration: 12/22    Prescriber or staff member who picked up medication Tori

## 2021-04-05 ENCOUNTER — LAB (OUTPATIENT)
Dept: LAB | Facility: HOSPITAL | Age: 81
End: 2021-04-05

## 2021-04-05 DIAGNOSIS — D69.6 THROMBOCYTOPENIA (HCC): ICD-10-CM

## 2021-04-05 DIAGNOSIS — D69.3 ACUTE ITP (HCC): ICD-10-CM

## 2021-04-05 LAB
BASOPHILS # BLD AUTO: 0.04 10*3/MM3 (ref 0–0.2)
BASOPHILS NFR BLD AUTO: 0.3 % (ref 0–1.5)
DEPRECATED RDW RBC AUTO: 46 FL (ref 37–54)
EOSINOPHIL # BLD AUTO: 0.16 10*3/MM3 (ref 0–0.4)
EOSINOPHIL NFR BLD AUTO: 1.3 % (ref 0.3–6.2)
ERYTHROCYTE [DISTWIDTH] IN BLOOD BY AUTOMATED COUNT: 13.4 % (ref 12.3–15.4)
HCT VFR BLD AUTO: 33.9 % (ref 34–46.6)
HGB BLD-MCNC: 11.7 G/DL (ref 12–15.9)
IMM GRANULOCYTES # BLD AUTO: 0.07 10*3/MM3 (ref 0–0.05)
IMM GRANULOCYTES NFR BLD AUTO: 0.6 % (ref 0–0.5)
LYMPHOCYTES # BLD AUTO: 1.27 10*3/MM3 (ref 0.7–3.1)
LYMPHOCYTES NFR BLD AUTO: 10.3 % (ref 19.6–45.3)
MCH RBC QN AUTO: 32.4 PG (ref 26.6–33)
MCHC RBC AUTO-ENTMCNC: 34.5 G/DL (ref 31.5–35.7)
MCV RBC AUTO: 93.9 FL (ref 79–97)
MONOCYTES # BLD AUTO: 1.28 10*3/MM3 (ref 0.1–0.9)
MONOCYTES NFR BLD AUTO: 10.3 % (ref 5–12)
NEUTROPHILS NFR BLD AUTO: 77.2 % (ref 42.7–76)
NEUTROPHILS NFR BLD AUTO: 9.55 10*3/MM3 (ref 1.7–7)
NRBC BLD AUTO-RTO: 0 /100 WBC (ref 0–0.2)
PLATELET # BLD AUTO: 106 10*3/MM3 (ref 140–450)
PLATELETS.RETICULATED NFR BLD AUTO: 3.8 % (ref 0.9–6.5)
PMV BLD AUTO: 10.2 FL (ref 6–12)
RBC # BLD AUTO: 3.61 10*6/MM3 (ref 3.77–5.28)
WBC # BLD AUTO: 12.37 10*3/MM3 (ref 3.4–10.8)

## 2021-04-05 PROCEDURE — 85025 COMPLETE CBC W/AUTO DIFF WBC: CPT

## 2021-04-05 PROCEDURE — 85055 RETICULATED PLATELET ASSAY: CPT

## 2021-04-05 PROCEDURE — 36415 COLL VENOUS BLD VENIPUNCTURE: CPT

## 2021-04-06 LAB
CYTO UR: NORMAL
DX PRELIMINARY: NORMAL
LAB AP CASE REPORT: NORMAL
LAB AP CLINICAL INFORMATION: NORMAL
LAB AP FLOW CYTOMETRY SUMMARY: NORMAL
LAB AP SPECIAL STAINS: NORMAL
Lab: NORMAL
PATH REPORT.ADDENDUM SPEC: NORMAL
PATH REPORT.FINAL DX SPEC: NORMAL
PATH REPORT.GROSS SPEC: NORMAL

## 2021-04-12 DIAGNOSIS — F33.9 CHRONIC RECURRENT MAJOR DEPRESSIVE DISORDER (HCC): ICD-10-CM

## 2021-04-12 RX ORDER — ESCITALOPRAM OXALATE 20 MG/1
20 TABLET ORAL DAILY
Qty: 90 TABLET | Refills: 1 | Status: SHIPPED | OUTPATIENT
Start: 2021-04-12 | End: 2021-10-22

## 2021-04-13 ENCOUNTER — OFFICE VISIT (OUTPATIENT)
Dept: ONCOLOGY | Facility: CLINIC | Age: 81
End: 2021-04-13

## 2021-04-13 ENCOUNTER — LAB (OUTPATIENT)
Dept: LAB | Facility: HOSPITAL | Age: 81
End: 2021-04-13

## 2021-04-13 ENCOUNTER — DOCUMENTATION (OUTPATIENT)
Dept: PHARMACY | Facility: HOSPITAL | Age: 81
End: 2021-04-13

## 2021-04-13 VITALS
TEMPERATURE: 97.1 F | DIASTOLIC BLOOD PRESSURE: 61 MMHG | WEIGHT: 159.1 LBS | RESPIRATION RATE: 16 BRPM | HEIGHT: 64 IN | HEART RATE: 61 BPM | SYSTOLIC BLOOD PRESSURE: 123 MMHG | BODY MASS INDEX: 27.16 KG/M2 | OXYGEN SATURATION: 97 %

## 2021-04-13 DIAGNOSIS — D69.3 ACUTE ITP (HCC): ICD-10-CM

## 2021-04-13 DIAGNOSIS — I48.0 PAROXYSMAL ATRIAL FIBRILLATION (HCC): ICD-10-CM

## 2021-04-13 DIAGNOSIS — D64.81 ANEMIA ASSOCIATED WITH CHEMOTHERAPY: ICD-10-CM

## 2021-04-13 DIAGNOSIS — D69.3 ACUTE ITP (HCC): Primary | ICD-10-CM

## 2021-04-13 DIAGNOSIS — D69.6 THROMBOCYTOPENIA (HCC): ICD-10-CM

## 2021-04-13 DIAGNOSIS — T45.1X5A ANEMIA ASSOCIATED WITH CHEMOTHERAPY: ICD-10-CM

## 2021-04-13 LAB
BASOPHILS # BLD AUTO: 0.04 10*3/MM3 (ref 0–0.2)
BASOPHILS NFR BLD AUTO: 0.4 % (ref 0–1.5)
DEPRECATED RDW RBC AUTO: 46.2 FL (ref 37–54)
EOSINOPHIL # BLD AUTO: 0.13 10*3/MM3 (ref 0–0.4)
EOSINOPHIL NFR BLD AUTO: 1.4 % (ref 0.3–6.2)
ERYTHROCYTE [DISTWIDTH] IN BLOOD BY AUTOMATED COUNT: 13.3 % (ref 12.3–15.4)
HCT VFR BLD AUTO: 35.3 % (ref 34–46.6)
HGB BLD-MCNC: 12 G/DL (ref 12–15.9)
IMM GRANULOCYTES # BLD AUTO: 0.04 10*3/MM3 (ref 0–0.05)
IMM GRANULOCYTES NFR BLD AUTO: 0.4 % (ref 0–0.5)
LYMPHOCYTES # BLD AUTO: 1.49 10*3/MM3 (ref 0.7–3.1)
LYMPHOCYTES NFR BLD AUTO: 16.1 % (ref 19.6–45.3)
MCH RBC QN AUTO: 32 PG (ref 26.6–33)
MCHC RBC AUTO-ENTMCNC: 34 G/DL (ref 31.5–35.7)
MCV RBC AUTO: 94.1 FL (ref 79–97)
MONOCYTES # BLD AUTO: 1.03 10*3/MM3 (ref 0.1–0.9)
MONOCYTES NFR BLD AUTO: 11.1 % (ref 5–12)
NEUTROPHILS NFR BLD AUTO: 6.54 10*3/MM3 (ref 1.7–7)
NEUTROPHILS NFR BLD AUTO: 70.6 % (ref 42.7–76)
NRBC BLD AUTO-RTO: 0 /100 WBC (ref 0–0.2)
PLATELET # BLD AUTO: 105 10*3/MM3 (ref 140–450)
PLATELETS.RETICULATED NFR BLD AUTO: 3.2 % (ref 0.9–6.5)
PMV BLD AUTO: 9.7 FL (ref 6–12)
RBC # BLD AUTO: 3.75 10*6/MM3 (ref 3.77–5.28)
WBC # BLD AUTO: 9.27 10*3/MM3 (ref 3.4–10.8)

## 2021-04-13 PROCEDURE — 85055 RETICULATED PLATELET ASSAY: CPT

## 2021-04-13 PROCEDURE — 85025 COMPLETE CBC W/AUTO DIFF WBC: CPT

## 2021-04-13 PROCEDURE — 36415 COLL VENOUS BLD VENIPUNCTURE: CPT

## 2021-04-13 PROCEDURE — 99214 OFFICE O/P EST MOD 30 MIN: CPT | Performed by: NURSE PRACTITIONER

## 2021-04-13 RX ORDER — PREDNISONE 10 MG/1
30 TABLET ORAL DAILY
Qty: 60 TABLET | Refills: 1 | OUTPATIENT
Start: 2021-04-13

## 2021-04-13 RX ORDER — PREDNISONE 1 MG/1
15 TABLET ORAL DAILY
Qty: 90 TABLET | Refills: 2 | Status: SHIPPED | OUTPATIENT
Start: 2021-04-13 | End: 2021-09-02

## 2021-04-13 NOTE — PROGRESS NOTES
"  Subjective .    REASONS FOR FOLLOWUP:    1. 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 Paroxysmal atrial fibrillation  3. History of DVT  4. 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 female the above-mentioned history returns today for follow-up and lab review continuing on prednisone 20 mg daily.  Her prednisone was decreased about 2 weeks ago, and we are continuing to closely monitor her labs, in hopes of slowly tapering the prednisone.  Patient also continues on her Xarelto and denies bleeding issues.  She feels like overall, she is tolerating prednisone quite well.  She denies issues with insomnia.  She reports good bowel function.    ONCOLOGIC HISTORY:  (History from previous dates can be found in the separate document.)      Objective      Vitals:    04/13/21 1412   BP: 123/61   Pulse: 61   Resp: 16   Temp: 97.1 °F (36.2 °C)   TempSrc: Temporal   SpO2: 97%   Weight: 72.2 kg (159 lb 1.6 oz)   Height: 162.6 cm (64.02\")   PainSc: 0-No pain     Current Status 4/13/2021   ECOG score 0       Physical Exam   Constitutional: She is oriented to person, place, and time. She appears well-developed. No distress.   HENT:   Head: Normocephalic and atraumatic.   Mouth/Throat: No oropharyngeal exudate.   Eyes: Pupils are equal, round, and reactive to light.   Cardiovascular: Normal rate, regular rhythm and normal heart sounds.   No murmur heard.  Pulmonary/Chest: Effort normal and breath sounds normal. No respiratory distress. She has no wheezes. She has no rhonchi. She has no rales.   Abdominal: Soft. Normal appearance and bowel sounds are normal. She exhibits no distension.   Musculoskeletal: Normal range of motion.   Neurological: She is alert and oriented to person, place, and time.   Skin: Skin is warm and dry. No rash noted.   Vitals reviewed.      RECENT LABS:  Results from last 7 days   Lab Units 04/13/21  1403   WBC 10*3/mm3 9.27 "   NEUTROS ABS 10*3/mm3 6.54   HEMOGLOBIN g/dL 12.0   HEMATOCRIT % 35.3   PLATELETS 10*3/mm3 105*         Assessment/Plan   1. 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  · PET scan 5/3/18 with diffuse splenic involvement (SUV 16.9), lymphadenopathy throughout upper abdomen and retroperitoneum (SUV 13.1), right liver lesion 5 cm (SUV 12.8), pelvic lymphadenopathy up to 4 cm, bladder wall thickening with associated hypermetabolism, cervical lymphadenopathy left posterior (SUV 11.2), multiple bone lesions including left intertrochanteric femur, ribs, right scapula, pelvis as well as left gluteal hematoma versus lymphomatous lesion.  · CT-guided liver biopsy 4/26/18 with diffuse large B-cell non-Hodgkin's lymphoma, CD20 positive, double hit (BCL-6 rearrangement 85%, MYC rearrangement 79%), KI-67 4+/4  · Left cervical excisional biopsy by ENT Dr. Oconnor 5/1/18 confirming high-grade B cell non-Hodgkin's lymphoma, Ki-67 100%, double hit (BCL-6 rearrangement 76%, MYC rearrangement 85%)  · Echocardiogram 4/11/18 with ejection fraction 66.7%  · Right port placement Dr Gill 5/4/18  · Patient not felt to be a candidate for more aggressive chemotherapy due to performance status and age (DA EPOCH-R)  · Therefore treated with R CHOP chemotherapy 5/4/18.  · Followed by granix 300mcg daily beginning 5/6/18 through 5/14/18.  · Patient reviewed June 19 with near resolution of hypermetabolic sites on PET/CT.  Plans made for third cycle of CHOP R, additional Zometa and total of 6 cycles of chemotherapy anticipated.  · 8/3/18 Cycle 5 CHOP R, reduction of Oncovin 50%, 6 cycles planned.    · Follow-up scan September 19 with small low-density liver lesions and splenic lesions are decreased from previous, numerous small mesenteric and RP nodes again stable slightly smaller.    · Patient  assessed February 18, 2019, no evidence of recurrent disease  · Patient seen May 13, 2019, no evidence of recurrent disease  · Patient reviewed again October 28, 2019 with follow-up scans negative for recurrence,   · Reassessment planned December 21, 2020 via scans after she has developed thrombocytopenia.  · Follow-up CT scan chest and pelvis January 13, 2021 no for evidence of recurrence.    2. Thrombocytopenia.    · Patient had previous myelosuppression with chemo therapy however this had resolved.    · She has however had in a new medication with Spironolactone within the past 3 to 4 months from cardiology.    · Spironolactone was discontinued with gradual improvement in her platelet count October 2020   · December 2020, worsening thrombocytopenia with platelet count of 60,000  · Subsequent testing per laboratory studies without evidence of etiology, slowly advancing IPF with plans to proceed to treat for immunologic thrombocytopenia such as ITP.  The patient subsequently started prednisone at 0.5 mg/kg twice daily.    · Improvement in her platelet count   · 2/25/2021, platelets improved to 94,000 with prednisone decreased to 30 mg  · 3/3/2020, platelets declined to 69,000, prednisone increased to 40 mg  · 3/10/2021 platelets slightly improved at 77,000 on 40 mg prednisone.  This will be continued, the patient will proceed with bone marrow biopsy for further evaluation of thrombocytopenia as her IPF is not elevated at 4.3%.  · 3/19/2021 bone marrow aspirate biopsy negative for lympho-Chauncey involvement, normal trilineage hematopoiesis, including megakaryopoiesis.  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  · 4/13/2021 platelet count is 105,000.  We will decrease the prednisone to 15 mg daily, with plans to decrease by 5 mg weekly if tolerated.  I have sent in a new prescription for 5 mg tablets.    3.Multifactorial  anemia:  · Related to anemia chronic disease, chemotherapy, prior iron deficiency.  · Hgb 12.0.  Monitor.     4. Hypercalcemia of malignancy:  · Calcium up to 13.8 on 4/21/18 (albumin 3.3).  Intact PTH 8.1, PTH RP less than 2.0  · Received Zometa 4 mg IV 4/25/18.  · Calcium up to 11.3 on 5/7/18 with second dose of Zometa administered 4 mg IV.  · Calcium today has continued to gradually increase up to 11.9 with albumin 3.3.  Ionized calcium however is stable at 6.8 compared to yesterday.  The patient is asymptomatic.  We will not plan to administer a further dose of Zometa just yet and will allow further time for the patient to respond to chemotherapy.  She returned 518 for continued Zometa and when seen May 25 has a normal calcium level.  · Patient to receive Zometa June 19, subsequently every other cycle, restart low-dose calcium supplementation  · Patient seen August 03, 2018, plans made for Zometa with her final course of chemotherapy August 23, 2018  · Patient scheduled for bone density prior to assessment 3 months following November visit, potential Xgeva and follow-up as she is seen back to step to repeat scans are performed in May 2019.  As she is seen May 13 we do plan the Xgeva and then move to Prolia 6 months later for her subsequent treatment for osteopenia.  · Prolia continued every 6 months, last given 6/3/2020, now scheduled December 21, 2020.   · Reassessment January 13th, 2021 normocalcemic      5.  Paroxysmal atrial fibrillation:  · Recently diagnosed, anticoagulated with Eliquis initially, discontinued due to expected thrombocytopenia from chemotherapy  · Currently in sinus rhythm, continues on Lopressor 50 mg every 12 hours  · Anticoagulated with Xarelto which is continued if platelets are greater than 50,000    6. Prior seizure disorder:  · Continues currently on Dilantin 300 mg daily at bedtime and Neurontin 600 mg daily at bedtime, will return to outpatient dose of Neurontin 300 mg daily at  bedtime at discharge.      7. GI prophylaxis:  · Continues Protonix p.o.      8. Venous access:  · Port placement 5/4/18 by Dr. Gill, continue to manage q. Monthly    9. DVT  · Anticoagulated with Xarelto 20 mg daily  · Xarelto held if platelets drop less than 50,000    Plan:  1. Decrease prednisone to 15 mg daily.  2. Have sent in a new prescription for prednisone 5 mg tablets.  3. Continue Xarelto 20 mg daily (patient will continue on Xarelto as long as platelets are greater than 50,000).  4. Return in 1 week for CBC, IPF with RN review as well as further Xarelto samples.  5. Return in 2 weeks for follow-up with Dr. Iraheta with repeat CBC and IPF.        Linda Floyd, APRN  04/13/2021

## 2021-04-13 NOTE — PROGRESS NOTES
Site of Appt/Pickup: (n) [place]; (y) [employer]; (n) [employer];    Prescriber (ariana) requested the following medication samples to be given to the patient during the Nurse Practitioner visit.    NDC:  39149-465-84  Drug name: xarelto  Strength: 20mg  Total Quantity:  28 (Containers- 4 X Units per Containers- 7)  Lot#:  [id number]  Expiration: 1/22    Product to be placed in the [employer] under 'Patient Specialty Medication' entry and is to be removed at time of visit.    
Yes

## 2021-04-19 ENCOUNTER — LAB (OUTPATIENT)
Dept: LAB | Facility: HOSPITAL | Age: 81
End: 2021-04-19

## 2021-04-19 ENCOUNTER — INFUSION (OUTPATIENT)
Dept: ONCOLOGY | Facility: HOSPITAL | Age: 81
End: 2021-04-19

## 2021-04-19 ENCOUNTER — HOSPITAL ENCOUNTER (OUTPATIENT)
Dept: MAMMOGRAPHY | Facility: HOSPITAL | Age: 81
Discharge: HOME | End: 2021-04-19
Admitting: FAMILY MEDICINE

## 2021-04-19 ENCOUNTER — OFFICE VISIT (OUTPATIENT)
Dept: ONCOLOGY | Facility: CLINIC | Age: 81
End: 2021-04-19

## 2021-04-19 DIAGNOSIS — D64.81 ANEMIA ASSOCIATED WITH CHEMOTHERAPY: ICD-10-CM

## 2021-04-19 DIAGNOSIS — I48.0 PAROXYSMAL ATRIAL FIBRILLATION (HCC): ICD-10-CM

## 2021-04-19 DIAGNOSIS — Z12.39 SCREENING BREAST EXAMINATION: ICD-10-CM

## 2021-04-19 DIAGNOSIS — I48.0 PAROXYSMAL ATRIAL FIBRILLATION (HCC): Primary | ICD-10-CM

## 2021-04-19 DIAGNOSIS — T45.1X5A ANEMIA ASSOCIATED WITH CHEMOTHERAPY: ICD-10-CM

## 2021-04-19 DIAGNOSIS — D69.6 THROMBOCYTOPENIA (HCC): ICD-10-CM

## 2021-04-19 DIAGNOSIS — D69.3 ACUTE ITP (HCC): ICD-10-CM

## 2021-04-19 DIAGNOSIS — C83.39 DIFFUSE LARGE B-CELL LYMPHOMA OF EXTRANODAL SITE EXCLUDING SPLEEN AND OTHER SOLID ORGANS (HCC): ICD-10-CM

## 2021-04-19 LAB
BASOPHILS # BLD AUTO: 0.05 10*3/MM3 (ref 0–0.2)
BASOPHILS NFR BLD AUTO: 0.5 % (ref 0–1.5)
DEPRECATED RDW RBC AUTO: 45.7 FL (ref 37–54)
EOSINOPHIL # BLD AUTO: 0.06 10*3/MM3 (ref 0–0.4)
EOSINOPHIL NFR BLD AUTO: 0.6 % (ref 0.3–6.2)
ERYTHROCYTE [DISTWIDTH] IN BLOOD BY AUTOMATED COUNT: 13.3 % (ref 12.3–15.4)
HCT VFR BLD AUTO: 35.5 % (ref 34–46.6)
HGB BLD-MCNC: 12 G/DL (ref 12–15.9)
IMM GRANULOCYTES # BLD AUTO: 0.04 10*3/MM3 (ref 0–0.05)
IMM GRANULOCYTES NFR BLD AUTO: 0.4 % (ref 0–0.5)
LYMPHOCYTES # BLD AUTO: 1 10*3/MM3 (ref 0.7–3.1)
LYMPHOCYTES NFR BLD AUTO: 10.1 % (ref 19.6–45.3)
MCH RBC QN AUTO: 31.6 PG (ref 26.6–33)
MCHC RBC AUTO-ENTMCNC: 33.8 G/DL (ref 31.5–35.7)
MCV RBC AUTO: 93.4 FL (ref 79–97)
MONOCYTES # BLD AUTO: 0.49 10*3/MM3 (ref 0.1–0.9)
MONOCYTES NFR BLD AUTO: 4.9 % (ref 5–12)
NEUTROPHILS NFR BLD AUTO: 8.28 10*3/MM3 (ref 1.7–7)
NEUTROPHILS NFR BLD AUTO: 83.5 % (ref 42.7–76)
NRBC BLD AUTO-RTO: 0 /100 WBC (ref 0–0.2)
PLATELET # BLD AUTO: 99 10*3/MM3 (ref 140–450)
PLATELETS.RETICULATED NFR BLD AUTO: 3.8 % (ref 0.9–6.5)
PMV BLD AUTO: 10.3 FL (ref 6–12)
RBC # BLD AUTO: 3.8 10*6/MM3 (ref 3.77–5.28)
WBC # BLD AUTO: 9.92 10*3/MM3 (ref 3.4–10.8)

## 2021-04-19 PROCEDURE — 85025 COMPLETE CBC W/AUTO DIFF WBC: CPT

## 2021-04-19 PROCEDURE — 77063 BREAST TOMOSYNTHESIS BI: CPT

## 2021-04-19 PROCEDURE — 77067 SCR MAMMO BI INCL CAD: CPT

## 2021-04-19 PROCEDURE — 85055 RETICULATED PLATELET ASSAY: CPT

## 2021-04-19 PROCEDURE — 36415 COLL VENOUS BLD VENIPUNCTURE: CPT

## 2021-04-19 RX ORDER — AMLODIPINE BESYLATE 10 MG/1
TABLET ORAL
Qty: 90 TABLET | Refills: 1 | Status: SHIPPED | OUTPATIENT
Start: 2021-04-19 | End: 2021-10-21 | Stop reason: SDUPTHER

## 2021-04-19 NOTE — PROGRESS NOTES
"  .   REASON FOR FOLLOW UP:   Management of anticoagulation, Paroxysmal A-fib, Thrombocytopenia    HISTORY OF PRESENT ILLNESS:  The patient is a 80 y.o. year old female  who is here for follow-up with the above-mentioned history here today for samples of Xarelto 20 mg tablets.  She denies any blood in her urine or stool.  She denies any lightheadedness or dizziness.  She denies easy bruising.  Her platelets today are 99,000, with plans to continue Xarelto with platelets over 50,000.  She is tolerating the Xarelto well.    Past Medical History:   Diagnosis Date   • Anemia     multifactorial   • Cystitis    • Cystocele     moderate   • Drug therapy    • Dyslipidemia    • Esophageal reflux    • Fatigue    • History of transfusion     no reaction   • Hypercalcemia    • Hypertension    • Major depression, chronic    • Menopause    • Non Hodgkin's lymphoma (CMS/HCC)    • Osteoarthritis    • Osteoporosis    • Palpitations    • Paroxysmal atrial fibrillation (CMS/HCC)    • Post menopausal problems    • RLS (restless legs syndrome)    • Seizure disorder (CMS/HCC)    • Sinus bradycardia    • Subjective tinnitus    • Vaginal delivery     x2  \"SONYA\"      \"JASON\"   • Vitamin D deficiency      MEDICATIONS    Current Outpatient Medications:   •  amLODIPine (NORVASC) 10 MG tablet, TAKE 1 TABLET BY MOUTH EVERY DAY, Disp: 90 tablet, Rfl: 1  •  Ascorbic Acid (Vitamin C) 500 MG capsule, Take  by mouth., Disp: , Rfl:   •  benazepril (LOTENSIN) 40 MG tablet, TAKE 1 TABLET BY MOUTH EVERY DAY, Disp: 90 tablet, Rfl: 1  •  cetirizine (zyrTEC) 10 MG tablet, Take 1 tablet by mouth Daily As Needed for Allergies., Disp: 30 tablet, Rfl: 1  •  Cholecalciferol (VITAMIN D3) 125 MCG (5000 UT) capsule capsule, Take 5,000 Units by mouth Daily., Disp: , Rfl:   •  escitalopram (LEXAPRO) 20 MG tablet, TAKE 1 TABLET BY MOUTH DAILY, Disp: 90 tablet, Rfl: 1  •  folic acid (FOLVITE) 400 MCG tablet, Take 400 mcg by mouth daily., Disp: , Rfl:   •  gabapentin " (NEURONTIN) 300 MG capsule, Take 2 capsules by mouth every night at bedtime., Disp: 60 capsule, Rfl: 2  •  hydrALAZINE (APRESOLINE) 100 MG tablet, TAKE 1 TABLET BY MOUTH THREE TIMES DAILY, Disp: 270 tablet, Rfl: 3  •  lidocaine-prilocaine (EMLA) 2.5-2.5 % cream, Apply 30 min prior to port access, Disp: 5 g, Rfl: 2  •  melatonin 5 MG tablet tablet, Take 5 mg by mouth Every Night., Disp: , Rfl:   •  Multiple Vitamins-Minerals (ZINC PO), Take  by mouth., Disp: , Rfl:   •  pantoprazole (PROTONIX) 40 MG EC tablet, TAKE 1 TABLET BY MOUTH EVERY DAY, Disp: 90 tablet, Rfl: 1  •  phenytoin (DILANTIN) 100 MG ER capsule, Take 3 capsules by mouth every night at bedtime., Disp: 270 capsule, Rfl: 3  •  predniSONE (DELTASONE) 5 MG tablet, Take 3 tablets by mouth Daily. Or as directed by provider, Disp: 90 tablet, Rfl: 2  •  spironolactone (ALDACTONE) 25 MG tablet, Take 1 tablet by mouth Daily., Disp: 90 tablet, Rfl: 3  •  vitamin B-12 (CYANOCOBALAMIN) 100 MCG tablet, Take 1,000 mcg by mouth Daily., Disp: , Rfl:   •  vitamin B-6 (PYRIDOXINE) 100 MG tablet, Take 100 mg by mouth Daily., Disp: , Rfl:   •  vitamin E 100 UNIT capsule, Take 180 Units by mouth Daily., Disp: , Rfl:   •  Xarelto 20 MG tablet, Take 1 tablet by mouth Daily., Disp: 28 tablet, Rfl: 0  •  Zinc Sulfate (ZINC 15 PO), Take 400 mg by mouth., Disp: , Rfl:     ALLERGIES:     Allergies   Allergen Reactions   • Crab (Diagnostic) Itching and Rash   • Pseudoephedrine Dizziness     REVIEW OF SYSTEMS:  Review of Systems   Constitutional: Negative for chills, fatigue and fever.   Cardiovascular: Negative for chest pain, palpitations and leg swelling.   Gastrointestinal: Negative for blood in stool.   Genitourinary: Negative for hematuria.   Neurological: Negative for dizziness and light-headedness.          There were no vitals filed for this visit.  Current Status 4/13/2021   ECOG score 0     PHYSICAL EXAM:    CONSTITUTIONAL:  Patient alert and oriented x3  EYES:   Conjunctiva and lids unremarkable.  PERRLA  RESPIRATORY:  Breathing unlabored, no acute distress.  MUSCULOSKELETAL:  No lower extremity swelling, erythema or calf tenderness  SKIN:  Warm.  No rashes.  PSYCHIATRIC:  Normal judgment and insight.  Normal mood and affect.     RECENT LABS:  CBC & Differential (04/19/2021 13:57)    Assessment/Plan     PLAN:  I have provided the patient with 1 month supply of Xarelto 20 mg tablets, to be taken once daily. We reviewed the appropriate instructions for medication administration as well as dosing. We have reviewed potential side effects including increased risk of bleeding.  The patient understands this medication will need to be stopped prior to any surgical procedures. The patient understand to call with any questions or concerns.      MELINDA Altman  04/19/2021

## 2021-04-27 DIAGNOSIS — C79.51 BONE METASTASIS: ICD-10-CM

## 2021-04-28 ENCOUNTER — LAB (OUTPATIENT)
Dept: LAB | Facility: HOSPITAL | Age: 81
End: 2021-04-28

## 2021-04-28 ENCOUNTER — OFFICE VISIT (OUTPATIENT)
Dept: ONCOLOGY | Facility: CLINIC | Age: 81
End: 2021-04-28

## 2021-04-28 VITALS
DIASTOLIC BLOOD PRESSURE: 66 MMHG | TEMPERATURE: 97.3 F | WEIGHT: 158.3 LBS | HEART RATE: 64 BPM | RESPIRATION RATE: 18 BRPM | OXYGEN SATURATION: 96 % | BODY MASS INDEX: 27.02 KG/M2 | HEIGHT: 64 IN | SYSTOLIC BLOOD PRESSURE: 113 MMHG

## 2021-04-28 DIAGNOSIS — D64.81 ANEMIA ASSOCIATED WITH CHEMOTHERAPY: ICD-10-CM

## 2021-04-28 DIAGNOSIS — D69.6 THROMBOCYTOPENIA (HCC): ICD-10-CM

## 2021-04-28 DIAGNOSIS — I48.0 PAROXYSMAL ATRIAL FIBRILLATION (HCC): ICD-10-CM

## 2021-04-28 DIAGNOSIS — D69.3 ACUTE ITP (HCC): Primary | ICD-10-CM

## 2021-04-28 DIAGNOSIS — T45.1X5A ANEMIA ASSOCIATED WITH CHEMOTHERAPY: ICD-10-CM

## 2021-04-28 DIAGNOSIS — D69.3 ACUTE ITP (HCC): ICD-10-CM

## 2021-04-28 LAB
BASOPHILS # BLD AUTO: 0.01 10*3/MM3 (ref 0–0.2)
BASOPHILS NFR BLD AUTO: 0.1 % (ref 0–1.5)
DEPRECATED RDW RBC AUTO: 45.6 FL (ref 37–54)
EOSINOPHIL # BLD AUTO: 0.02 10*3/MM3 (ref 0–0.4)
EOSINOPHIL NFR BLD AUTO: 0.2 % (ref 0.3–6.2)
ERYTHROCYTE [DISTWIDTH] IN BLOOD BY AUTOMATED COUNT: 13.2 % (ref 12.3–15.4)
HCT VFR BLD AUTO: 35.4 % (ref 34–46.6)
HGB BLD-MCNC: 12.3 G/DL (ref 12–15.9)
IMM GRANULOCYTES # BLD AUTO: 0.07 10*3/MM3 (ref 0–0.05)
IMM GRANULOCYTES NFR BLD AUTO: 0.7 % (ref 0–0.5)
LYMPHOCYTES # BLD AUTO: 1.12 10*3/MM3 (ref 0.7–3.1)
LYMPHOCYTES NFR BLD AUTO: 10.7 % (ref 19.6–45.3)
MCH RBC QN AUTO: 32.8 PG (ref 26.6–33)
MCHC RBC AUTO-ENTMCNC: 34.7 G/DL (ref 31.5–35.7)
MCV RBC AUTO: 94.4 FL (ref 79–97)
MONOCYTES # BLD AUTO: 0.42 10*3/MM3 (ref 0.1–0.9)
MONOCYTES NFR BLD AUTO: 4 % (ref 5–12)
NEUTROPHILS NFR BLD AUTO: 8.78 10*3/MM3 (ref 1.7–7)
NEUTROPHILS NFR BLD AUTO: 84.3 % (ref 42.7–76)
NRBC BLD AUTO-RTO: 0 /100 WBC (ref 0–0.2)
PLATELET # BLD AUTO: 106 10*3/MM3 (ref 140–450)
PLATELETS.RETICULATED NFR BLD AUTO: 4.3 % (ref 0.9–6.5)
PMV BLD AUTO: 10 FL (ref 6–12)
RBC # BLD AUTO: 3.75 10*6/MM3 (ref 3.77–5.28)
WBC # BLD AUTO: 10.42 10*3/MM3 (ref 3.4–10.8)

## 2021-04-28 PROCEDURE — 36415 COLL VENOUS BLD VENIPUNCTURE: CPT

## 2021-04-28 PROCEDURE — 99213 OFFICE O/P EST LOW 20 MIN: CPT | Performed by: INTERNAL MEDICINE

## 2021-04-28 PROCEDURE — 85055 RETICULATED PLATELET ASSAY: CPT

## 2021-04-28 PROCEDURE — 85025 COMPLETE CBC W/AUTO DIFF WBC: CPT

## 2021-04-28 RX ORDER — GABAPENTIN 300 MG/1
600 CAPSULE ORAL
Qty: 60 CAPSULE | Refills: 0 | Status: SHIPPED | OUTPATIENT
Start: 2021-04-28 | End: 2021-06-01 | Stop reason: SDUPTHER

## 2021-04-28 NOTE — PROGRESS NOTES
Subjective .    REASONS FOR FOLLOWUP:    1. 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 Paroxysmal atrial fibrillation  3. History of DVT  4. 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  She is seen regularly in the office thereafter.     Patient when seen September 23, 2020 had developed worsening thrombocytopenia.  Records indicated this had started since her visit in May at approximate time that she started spironolactone.  Antibodies were drawn which are currently pending though her IPF had been 7.6 now October 30 8 down to 6.3 in follow-up CBC with H&H of 12.3 and three 7.4 with white count of 6-40 and platelet count of 60,000 having dropped to as low as 20,000 when seen Freya 3, 2020.  The patient is feeling considerably better, indicates that she has not had any palpitation episodes, notes generally improved stamina, no bleeding episodes.  She has remained off her Xarelto at this point.  The patient is next seen December 21, 2020 continue to feel well with an unchanged platelet count.  Again she has no fever, chills, night sweats, nausea, vomiting, weight loss and normal performance status.     The patient was initially asked to discontinue spironolactone and follow-up to determine response.  Her Xarelto was held and she underwent repeat scans including CT chest abdomen pelvis January 13, 2021 with no evidence of increasing lymphadenopathy in the chest abdomen pelvis or any other findings that might be consistent with recurrence.  Her IPF, however, has slowly increased and is now at 8.2.  We discussed a trial of prednisone for immune related thrombocytopenia under the circumstances that may be drug-related and/or related to her lymphoma.  The patient, as result, started on prednisone with some delay the  platelet count increasing February 4 2 79,000.  Additionally IP have dropped to 5.4 from a high of 8.9 January 27, 2021.  We have contacted by cardiology about restarting spironolactone and this did occur.  As she is seen back February 10, 2020 when she is doing well without additional changes symptomatically your platelet count has now dropped to 66,000.  There has been no other hematologic changes and she has been maintained on her current 20 mg p.o. twice daily of prednisone.      Patient was asked to continue prednisone 20 mg p.o. twice daily and is next February 21, 2021 fortunately feeling well and with a gradual improvement in her platelet count now up to 94,000.  She is having no clear difficulty tolerating her prednisone dosing and we discussed the move to taper her dose.  The patient thereafter underwent marrow examination performed March 19 which was normal cellular with no evidence of malignant lymphoma, trilineage hematopoiesis showing adequate megakaryopoiesis.  Again flow cytometry is negative, cytogenetics pending.  The patient's most recent platelet count March 17 was 1 15,000 with an IPF of 3.2.  The patient went on to continue steroids dropping to 30 mg daily as she is seen March 29, 2021.  At this point her marrow is normal per cytogenetics as well.  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  The patient continued tapering stating at approximately [id number]   4/19/2021 in his neck seen back 4/28/2021 with H&H of 12.3 and 35.4 white count 10,420 platelet count of 106,000 with a normal IPF.  Patient is seen 4/28/2021 is began to have some proximal muscle weakness.  We discussed that she should go on to taper further at this point.              ONCOLOGIC HISTORY:  (History from previous dates can be found in the separate document.)      Objective      Vitals:    04/28/21 1404   BP: 113/66   Pulse: 64   Resp: 18   Temp: 97.3 °F (36.3 °C)   TempSrc: Temporal   SpO2: 96%   Weight: 71.8 kg (158 lb 4.8  "oz)   Height: 162.6 cm (64.02\")   PainSc: 0-No pain     Current Status 4/28/2021   ECOG score 0       Physical Exam   Constitutional: She is oriented to person, place, and time. She appears well-developed. No distress.   HENT:   Head: Normocephalic and atraumatic.   Mouth/Throat: No oropharyngeal exudate.   Eyes: Pupils are equal, round, and reactive to light.   Cardiovascular: Normal rate, regular rhythm and normal heart sounds.   No murmur heard.  Pulmonary/Chest: Effort normal and breath sounds normal. No respiratory distress. She has no wheezes. She has no rhonchi. She has no rales.   Abdominal: Soft. Normal appearance and bowel sounds are normal. She exhibits no distension.   Musculoskeletal: Normal range of motion.   Neurological: She is alert and oriented to person, place, and time.   Skin: Skin is warm and dry. No rash noted.   Vitals reviewed.      RECENT LABS:  Results from last 7 days   Lab Units 04/28/21  1358   WBC 10*3/mm3 10.42   NEUTROS ABS 10*3/mm3 8.78*   HEMOGLOBIN g/dL 12.3   HEMATOCRIT % 35.4   PLATELETS 10*3/mm3 106*         Assessment/Plan   1. 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  · PET scan 5/3/18 with diffuse splenic involvement (SUV 16.9), lymphadenopathy throughout upper abdomen and retroperitoneum (SUV 13.1), right liver lesion 5 cm (SUV 12.8), pelvic lymphadenopathy up to 4 cm, bladder wall thickening with associated hypermetabolism, cervical lymphadenopathy left posterior (SUV 11.2), multiple bone lesions including left intertrochanteric femur, ribs, right scapula, pelvis as well as left gluteal hematoma versus lymphomatous lesion.  · CT-guided liver biopsy 4/26/18 with diffuse large B-cell non-Hodgkin's lymphoma, CD20 positive, double hit (BCL-6 rearrangement 85%, MYC rearrangement 79%), KI-67 4+/4  · Left cervical excisional biopsy by " ENT Dr. Oconnor 5/1/18 confirming high-grade B cell non-Hodgkin's lymphoma, Ki-67 100%, double hit (BCL-6 rearrangement 76%, MYC rearrangement 85%)  · Echocardiogram 4/11/18 with ejection fraction 66.7%  · Right port placement Dr Gill 5/4/18  · Patient not felt to be a candidate for more aggressive chemotherapy due to performance status and age (DA EPOCH-R)  · Therefore treated with R CHOP chemotherapy 5/4/18.  · Followed by granix 300mcg daily beginning 5/6/18 through 5/14/18.  · Patient reviewed June 19 with near resolution of hypermetabolic sites on PET/CT.  Plans made for third cycle of CHOP R, additional Zometa and total of 6 cycles of chemotherapy anticipated.  · 8/3/18 Cycle 5 CHOP R, reduction of Oncovin 50%, 6 cycles planned.    · Follow-up scan September 19 with small low-density liver lesions and splenic lesions are decreased from previous, numerous small mesenteric and RP nodes again stable slightly smaller.    · Patient assessed February 18, 2019, no evidence of recurrent disease  · Patient seen May 13, 2019, no evidence of recurrent disease  · Patient reviewed again October 28, 2019 with follow-up scans negative for recurrence,   · Reassessment planned December 21, 2020 via scans after she has developed thrombocytopenia.  · Follow-up CT scan chest and pelvis January 13, 2021 no for evidence of recurrence.    2. Thrombocytopenia.    · Patient had previous myelosuppression with chemo therapy however this had resolved.    · She has however had in a new medication with Spironolactone within the past 3 to 4 months from cardiology.    · Spironolactone was discontinued with gradual improvement in her platelet count October 2020   · December 2020, worsening thrombocytopenia with platelet count of 60,000  · Subsequent testing per laboratory studies without evidence of etiology, slowly advancing IPF with plans to proceed to treat for immunologic thrombocytopenia such as ITP.  The patient subsequently  started prednisone at 0.5 mg/kg twice daily.    · Improvement in her platelet count   · 2/25/2021, platelets improved to 94,000 with prednisone decreased to 30 mg  · 3/3/2020, platelets declined to 69,000, prednisone increased to 40 mg  · 3/10/2021 platelets slightly improved at 77,000 on 40 mg prednisone.  This will be continued, the patient will proceed with bone marrow biopsy for further evaluation of thrombocytopenia as her IPF is not elevated at 4.3%.  · 3/19/2021 bone marrow aspirate biopsy negative for lympho-Chauncey involvement, normal trilineage hematopoiesis, including megakaryopoiesis.  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  · 4/13/2021 platelet count is 105,000.  We will decrease the prednisone to 15 mg daily, with plans to decrease by 5 mg weekly if tolerated.  I have sent in a new prescription for 5 mg tablets.  · Patient assessed 4/28, prednisone dropped to 10 mg daily with weekly assessment planned.    3.Multifactorial anemia:  · Related to anemia chronic disease, chemotherapy, prior iron deficiency.  · Hgb 12.3.  Monitor.     4. Hypercalcemia of malignancy:  · Calcium up to 13.8 on 4/21/18 (albumin 3.3).  Intact PTH 8.1, PTH RP less than 2.0  · Received Zometa 4 mg IV 4/25/18.  · Calcium up to 11.3 on 5/7/18 with second dose of Zometa administered 4 mg IV.  · Calcium today has continued to gradually increase up to 11.9 with albumin 3.3.  Ionized calcium however is stable at 6.8 compared to yesterday.  The patient is asymptomatic.  We will not plan to administer a further dose of Zometa just yet and will allow further time for the patient to respond to chemotherapy.  She returned 518 for continued Zometa and when seen May 25 has a normal calcium level.  · Patient to receive Zometa June 19, subsequently every other cycle, restart low-dose calcium supplementation  · Patient seen August 03, 2018, plans  made for Zometa with her final course of chemotherapy August 23, 2018  · Patient scheduled for bone density prior to assessment 3 months following November visit, potential Xgeva and follow-up as she is seen back to step to repeat scans are performed in May 2019.  As she is seen May 13 we do plan the Xgeva and then move to Prolia 6 months later for her subsequent treatment for osteopenia.  · Prolia continued every 6 months, last given 6/3/2020, now scheduled December 21, 2020.   · Reassessment January 13th, 2021 normocalcemic      5.  Paroxysmal atrial fibrillation:  · Recently diagnosed, anticoagulated with Eliquis initially, discontinued due to expected thrombocytopenia from chemotherapy  · Currently in sinus rhythm, continues on Lopressor 50 mg every 12 hours  · Anticoagulated with Xarelto which is continued if platelets are greater than 50,000    6. Prior seizure disorder:  · Continues currently on Dilantin 300 mg daily at bedtime and Neurontin 600 mg daily at bedtime, will return to outpatient dose of Neurontin 300 mg daily at bedtime at discharge.      7. GI prophylaxis:  · Continues Protonix p.o.      8. Venous access:  · Port placement 5/4/18 by Dr. Gill, continue to manage q. Monthly    9. DVT  · Anticoagulated with Xarelto 20 mg daily  · Xarelto held if platelets drop less than 50,000    Plan:  1. Decrease prednisone to 10 mg daily.  2. Return weekly for CBC, APRN evaluation in 1 week, MD in 2 weeks  3. Continue Xarelto 20 mg daily (patient will continue on Xarelto as long as platelets are greater than 50,000).      Chivo Iraheta MD  04/28/2021

## 2021-05-05 ENCOUNTER — LAB (OUTPATIENT)
Dept: ONCOLOGY | Facility: HOSPITAL | Age: 81
End: 2021-05-05

## 2021-05-05 ENCOUNTER — OFFICE VISIT (OUTPATIENT)
Dept: ONCOLOGY | Facility: CLINIC | Age: 81
End: 2021-05-05

## 2021-05-05 VITALS
TEMPERATURE: 96.8 F | DIASTOLIC BLOOD PRESSURE: 71 MMHG | BODY MASS INDEX: 27.11 KG/M2 | RESPIRATION RATE: 16 BRPM | SYSTOLIC BLOOD PRESSURE: 134 MMHG | HEART RATE: 67 BPM | WEIGHT: 158.8 LBS | HEIGHT: 64 IN | OXYGEN SATURATION: 96 %

## 2021-05-05 DIAGNOSIS — D69.6 THROMBOCYTOPENIA (HCC): Primary | ICD-10-CM

## 2021-05-05 DIAGNOSIS — Z45.2 ENCOUNTER FOR ADJUSTMENT OR MANAGEMENT OF VASCULAR ACCESS DEVICE: ICD-10-CM

## 2021-05-05 DIAGNOSIS — C85.91 LYMPHOMA OF LYMPH NODES OF NECK, UNSPECIFIED LYMPHOMA TYPE (HCC): ICD-10-CM

## 2021-05-05 DIAGNOSIS — D69.3 ACUTE ITP (HCC): Primary | ICD-10-CM

## 2021-05-05 DIAGNOSIS — D69.3 ACUTE ITP (HCC): ICD-10-CM

## 2021-05-05 LAB
ALBUMIN SERPL-MCNC: 4 G/DL (ref 3.5–5.2)
ALBUMIN/GLOB SERPL: 1.6 G/DL (ref 1.1–2.4)
ALP SERPL-CCNC: 59 U/L (ref 38–116)
ALT SERPL W P-5'-P-CCNC: 16 U/L (ref 0–33)
ANION GAP SERPL CALCULATED.3IONS-SCNC: 10.3 MMOL/L (ref 5–15)
AST SERPL-CCNC: 17 U/L (ref 0–32)
BASOPHILS # BLD AUTO: 0.02 10*3/MM3 (ref 0–0.2)
BASOPHILS NFR BLD AUTO: 0.2 % (ref 0–1.5)
BILIRUB SERPL-MCNC: 0.3 MG/DL (ref 0.2–1.2)
BUN SERPL-MCNC: 17 MG/DL (ref 6–20)
BUN/CREAT SERPL: 21.8 (ref 7.3–30)
CALCIUM SPEC-SCNC: 8.9 MG/DL (ref 8.5–10.2)
CHLORIDE SERPL-SCNC: 100 MMOL/L (ref 98–107)
CO2 SERPL-SCNC: 26.7 MMOL/L (ref 22–29)
CREAT SERPL-MCNC: 0.78 MG/DL (ref 0.6–1.1)
DEPRECATED RDW RBC AUTO: 46.3 FL (ref 37–54)
EOSINOPHIL # BLD AUTO: 0.08 10*3/MM3 (ref 0–0.4)
EOSINOPHIL NFR BLD AUTO: 0.8 % (ref 0.3–6.2)
ERYTHROCYTE [DISTWIDTH] IN BLOOD BY AUTOMATED COUNT: 13.2 % (ref 12.3–15.4)
GFR SERPL CREATININE-BSD FRML MDRD: 71 ML/MIN/1.73
GLOBULIN UR ELPH-MCNC: 2.5 GM/DL (ref 1.8–3.5)
GLUCOSE SERPL-MCNC: 117 MG/DL (ref 74–124)
HCT VFR BLD AUTO: 34 % (ref 34–46.6)
HGB BLD-MCNC: 11.3 G/DL (ref 12–15.9)
IMM GRANULOCYTES # BLD AUTO: 0.04 10*3/MM3 (ref 0–0.05)
IMM GRANULOCYTES NFR BLD AUTO: 0.4 % (ref 0–0.5)
LYMPHOCYTES # BLD AUTO: 0.73 10*3/MM3 (ref 0.7–3.1)
LYMPHOCYTES NFR BLD AUTO: 7 % (ref 19.6–45.3)
MAGNESIUM SERPL-MCNC: 1.9 MG/DL (ref 1.8–2.5)
MCH RBC QN AUTO: 32 PG (ref 26.6–33)
MCHC RBC AUTO-ENTMCNC: 33.2 G/DL (ref 31.5–35.7)
MCV RBC AUTO: 96.3 FL (ref 79–97)
MONOCYTES # BLD AUTO: 1.03 10*3/MM3 (ref 0.1–0.9)
MONOCYTES NFR BLD AUTO: 9.9 % (ref 5–12)
NEUTROPHILS NFR BLD AUTO: 8.46 10*3/MM3 (ref 1.7–7)
NEUTROPHILS NFR BLD AUTO: 81.7 % (ref 42.7–76)
NRBC BLD AUTO-RTO: 0 /100 WBC (ref 0–0.2)
PLATELET # BLD AUTO: 104 10*3/MM3 (ref 140–450)
PLATELETS.RETICULATED NFR BLD AUTO: 3.3 % (ref 0.9–6.5)
PMV BLD AUTO: 9.8 FL (ref 6–12)
POTASSIUM SERPL-SCNC: 4.2 MMOL/L (ref 3.5–4.7)
PROT SERPL-MCNC: 6.5 G/DL (ref 6.3–8)
RBC # BLD AUTO: 3.53 10*6/MM3 (ref 3.77–5.28)
SODIUM SERPL-SCNC: 137 MMOL/L (ref 134–145)
WBC # BLD AUTO: 10.36 10*3/MM3 (ref 3.4–10.8)

## 2021-05-05 PROCEDURE — 99214 OFFICE O/P EST MOD 30 MIN: CPT | Performed by: NURSE PRACTITIONER

## 2021-05-05 PROCEDURE — 36415 COLL VENOUS BLD VENIPUNCTURE: CPT

## 2021-05-05 PROCEDURE — 85025 COMPLETE CBC W/AUTO DIFF WBC: CPT

## 2021-05-05 PROCEDURE — 25010000002 HEPARIN LOCK FLUSH PER 10 UNITS: Performed by: INTERNAL MEDICINE

## 2021-05-05 PROCEDURE — 83735 ASSAY OF MAGNESIUM: CPT

## 2021-05-05 PROCEDURE — 80053 COMPREHEN METABOLIC PANEL: CPT

## 2021-05-05 PROCEDURE — 36591 DRAW BLOOD OFF VENOUS DEVICE: CPT

## 2021-05-05 PROCEDURE — 85055 RETICULATED PLATELET ASSAY: CPT

## 2021-05-05 RX ORDER — HEPARIN SODIUM (PORCINE) LOCK FLUSH IV SOLN 100 UNIT/ML 100 UNIT/ML
500 SOLUTION INTRAVENOUS AS NEEDED
Status: DISCONTINUED | OUTPATIENT
Start: 2021-05-05 | End: 2021-05-05 | Stop reason: HOSPADM

## 2021-05-05 RX ORDER — SODIUM CHLORIDE 0.9 % (FLUSH) 0.9 %
10 SYRINGE (ML) INJECTION AS NEEDED
Status: CANCELLED | OUTPATIENT
Start: 2021-05-05

## 2021-05-05 RX ORDER — SODIUM CHLORIDE 0.9 % (FLUSH) 0.9 %
10 SYRINGE (ML) INJECTION AS NEEDED
Status: DISCONTINUED | OUTPATIENT
Start: 2021-05-05 | End: 2021-05-05 | Stop reason: HOSPADM

## 2021-05-05 RX ORDER — HEPARIN SODIUM (PORCINE) LOCK FLUSH IV SOLN 100 UNIT/ML 100 UNIT/ML
500 SOLUTION INTRAVENOUS AS NEEDED
Status: CANCELLED | OUTPATIENT
Start: 2021-05-05

## 2021-05-05 RX ADMIN — Medication 500 UNITS: at 12:35

## 2021-05-05 RX ADMIN — SODIUM CHLORIDE, PRESERVATIVE FREE 10 ML: 5 INJECTION INTRAVENOUS at 12:35

## 2021-05-05 NOTE — PROGRESS NOTES
"  Subjective .    REASONS FOR FOLLOWUP:    1. 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 Paroxysmal atrial fibrillation  3. History of DVT  4. 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 female with the above-mentioned history, returns the office today for weekly follow-up and lab review.  She continues on prednisone taper for her ITP.  She is currently taking 10 mg daily.  She is tolerating this reasonably well though is eager to continue to taper off steroids.  She is struggling with some insomnia, particularly related to restless leg.  She denies signs or symptoms of bleeding.  She does continue on Xarelto which she tolerates well.  This will be continued unless platelets drop less than 50,000.    ONCOLOGIC HISTORY:  (History from previous dates can be found in the separate document.)    Objective      Vitals:    05/05/21 1302   BP: 134/71   Pulse: 67   Resp: 16   Temp: 96.8 °F (36 °C)   TempSrc: Skin   SpO2: 96%   Weight: 72 kg (158 lb 12.8 oz)   Height: 162.6 cm (64.02\")   PainSc: 0-No pain     Current Status 5/5/2021   ECOG score 0       Physical Exam   Constitutional: She is oriented to person, place, and time. She appears well-developed.   HENT:   Head: Normocephalic and atraumatic.   Eyes: Pupils are equal, round, and reactive to light.   Cardiovascular: Normal rate.   Pulmonary/Chest: Effort normal and breath sounds normal. She has no wheezes.   Abdominal: Normal appearance and bowel sounds are normal.   Musculoskeletal: Normal range of motion.   Neurological: She is alert and oriented to person, place, and time.   Skin: Skin is warm and dry. No lesion noted.   Psychiatric: Her behavior is normal. Mood normal.   Vitals reviewed.  I have reexamined the patient and the results are consistent with the previously documented exam. MELINDA Figueroa       RECENT LABS:  Results from last 7 days   Lab Units 05/05/21  1229   WBC " 10*3/mm3 10.36   NEUTROS ABS 10*3/mm3 8.46*   HEMOGLOBIN g/dL 11.3*   HEMATOCRIT % 34.0   PLATELETS 10*3/mm3 104*     Results from last 7 days   Lab Units 05/05/21  1229   SODIUM mmol/L 137   POTASSIUM mmol/L 4.2   CHLORIDE mmol/L 100   CO2 mmol/L 26.7   BUN mg/dL 17   CREATININE mg/dL 0.78   CALCIUM mg/dL 8.9   ALBUMIN g/dL 4.00   BILIRUBIN mg/dL 0.3   ALK PHOS U/L 59   ALT (SGPT) U/L 16   AST (SGOT) U/L 17   GLUCOSE mg/dL 117   MAGNESIUM mg/dL 1.9     Assessment/Plan   1. 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  · PET scan 5/3/18 with diffuse splenic involvement (SUV 16.9), lymphadenopathy throughout upper abdomen and retroperitoneum (SUV 13.1), right liver lesion 5 cm (SUV 12.8), pelvic lymphadenopathy up to 4 cm, bladder wall thickening with associated hypermetabolism, cervical lymphadenopathy left posterior (SUV 11.2), multiple bone lesions including left intertrochanteric femur, ribs, right scapula, pelvis as well as left gluteal hematoma versus lymphomatous lesion.  · CT-guided liver biopsy 4/26/18 with diffuse large B-cell non-Hodgkin's lymphoma, CD20 positive, double hit (BCL-6 rearrangement 85%, MYC rearrangement 79%), KI-67 4+/4  · Left cervical excisional biopsy by ENT Dr. Oconnor 5/1/18 confirming high-grade B cell non-Hodgkin's lymphoma, Ki-67 100%, double hit (BCL-6 rearrangement 76%, MYC rearrangement 85%)  · Echocardiogram 4/11/18 with ejection fraction 66.7%  · Right port placement Dr Gill 5/4/18  · Patient not felt to be a candidate for more aggressive chemotherapy due to performance status and age (DA EPOCH-R)  · Therefore treated with R CHOP chemotherapy 5/4/18.  · Followed by granix 300mcg daily beginning 5/6/18 through 5/14/18.  · Patient reviewed June 19 with near resolution of hypermetabolic sites on PET/CT.  Plans made for third cycle of  CHOP R, additional Zometa and total of 6 cycles of chemotherapy anticipated.  · 8/3/18 Cycle 5 CHOP R, reduction of Oncovin 50%, 6 cycles planned.    · Follow-up scan September 19 with small low-density liver lesions and splenic lesions are decreased from previous, numerous small mesenteric and RP nodes again stable slightly smaller.    · Patient assessed February 18, 2019, no evidence of recurrent disease  · Patient seen May 13, 2019, no evidence of recurrent disease  · Patient reviewed again October 28, 2019 with follow-up scans negative for recurrence,   · Reassessment planned December 21, 2020 via scans after she has developed thrombocytopenia.  · Follow-up CT scan chest and pelvis January 13, 2021 no for evidence of recurrence.    2. Thrombocytopenia.    · Patient had previous myelosuppression with chemo therapy however this had resolved.    · She has however had in a new medication with Spironolactone within the past 3 to 4 months from cardiology.    · Spironolactone was discontinued with gradual improvement in her platelet count October 2020   · December 2020, worsening thrombocytopenia with platelet count of 60,000  · Subsequent testing per laboratory studies without evidence of etiology, slowly advancing IPF with plans to proceed to treat for immunologic thrombocytopenia such as ITP.  The patient subsequently started prednisone at 0.5 mg/kg twice daily.    · Improvement in her platelet count   · 2/25/2021, platelets improved to 94,000 with prednisone decreased to 30 mg  · 3/3/2020, platelets declined to 69,000, prednisone increased to 40 mg  · Bone marrow biopsy and aspirate 3/19/2021 - for lymphoma involvement, normal trilineage hematopoiesis, including megakaryopoiesis.  She is felt to have ITP with further slow prednisone taper planned  · 3/29/2021 platelet count 125,000, prednisone decreased to 20 mg daily  · 4/13/2021, platelet count 105,000, prednisone decreased to 15 mg daily  · 4/28/2021, platelet  count 106,000, prednisone decreased to 10 mg daily    3.Multifactorial anemia:  · Related to anemia chronic disease, chemotherapy, prior iron deficiency.  · Hgb 11.3 today.     4. Hypercalcemia of malignancy:  · Calcium up to 13.8 on 4/21/18 (albumin 3.3).  Intact PTH 8.1, PTH RP less than 2.0  · Received Zometa 4 mg IV 4/25/18.  · Calcium up to 11.3 on 5/7/18 with second dose of Zometa administered 4 mg IV.  · Calcium today has continued to gradually increase up to 11.9 with albumin 3.3.  Ionized calcium however is stable at 6.8 compared to yesterday.  The patient is asymptomatic.  We will not plan to administer a further dose of Zometa just yet and will allow further time for the patient to respond to chemotherapy.  She returned 518 for continued Zometa and when seen May 25 has a normal calcium level.  · Patient to receive Zometa June 19, subsequently every other cycle, restart low-dose calcium supplementation  · Patient seen August 03, 2018, plans made for Zometa with her final course of chemotherapy August 23, 2018  · Patient scheduled for bone density prior to assessment 3 months following November visit, potential Xgeva and follow-up as she is seen back to step to repeat scans are performed in May 2019.  As she is seen May 13 we do plan the Xgeva and then move to Prolia 6 months later for her subsequent treatment for osteopenia.  · Prolia continued every 6 months, last given 6/3/2020, now scheduled December 21, 2020.   · Reassessment January 13th, 2021 normocalcemic      5.  Paroxysmal atrial fibrillation:  · Recently diagnosed, anticoagulated with Eliquis initially, discontinued due to expected thrombocytopenia from chemotherapy  · Currently in sinus rhythm, continues on Lopressor 50 mg every 12 hours  · Anticoagulated with Xarelto which is continued if platelets are greater than 50,000    6. Prior seizure disorder:  · Continues currently on Dilantin 300 mg daily at bedtime and Neurontin 600 mg daily at  bedtime, will return to outpatient dose of Neurontin 300 mg daily at bedtime at discharge.      7. GI prophylaxis:  · Continues Protonix p.o.      8. Venous access:  · Port placement 5/4/18 by Dr. Gill, continue to manage q. Monthly    9. DVT  · Anticoagulated with Xarelto 20 mg daily  · Xarelto held if platelets drop less than 50,000  · Currently tolerating Xarelto well, without signs or symptoms of bleeding.  No evidence of recurrent thrombosis    Plan:  1. Decrease prednisone to 5 mg daily  2. Continue Xarelto 20 mg daily.  Patient will continue on Xarelto if platelets greater than 50,000.  3. Return 5/10/2021 for MD follow-up, CBC, IPF, and consideration of further prednisone taper or discontinuation pending platelet count    Daina Caro, APRN  05/05/2021

## 2021-05-10 ENCOUNTER — OFFICE VISIT (OUTPATIENT)
Dept: ONCOLOGY | Facility: CLINIC | Age: 81
End: 2021-05-10

## 2021-05-10 ENCOUNTER — TELEPHONE (OUTPATIENT)
Dept: ONCOLOGY | Facility: CLINIC | Age: 81
End: 2021-05-10

## 2021-05-10 ENCOUNTER — LAB (OUTPATIENT)
Dept: LAB | Facility: HOSPITAL | Age: 81
End: 2021-05-10

## 2021-05-10 VITALS
SYSTOLIC BLOOD PRESSURE: 146 MMHG | TEMPERATURE: 97.1 F | BODY MASS INDEX: 27.11 KG/M2 | HEIGHT: 64 IN | HEART RATE: 64 BPM | WEIGHT: 158.8 LBS | OXYGEN SATURATION: 97 % | DIASTOLIC BLOOD PRESSURE: 73 MMHG | RESPIRATION RATE: 16 BRPM

## 2021-05-10 DIAGNOSIS — D69.6 THROMBOCYTOPENIA (HCC): Primary | ICD-10-CM

## 2021-05-10 DIAGNOSIS — D69.3 ACUTE ITP (HCC): ICD-10-CM

## 2021-05-10 LAB
BASOPHILS # BLD AUTO: 0.03 10*3/MM3 (ref 0–0.2)
BASOPHILS NFR BLD AUTO: 0.4 % (ref 0–1.5)
DEPRECATED RDW RBC AUTO: 44.8 FL (ref 37–54)
EOSINOPHIL # BLD AUTO: 0.06 10*3/MM3 (ref 0–0.4)
EOSINOPHIL NFR BLD AUTO: 0.7 % (ref 0.3–6.2)
ERYTHROCYTE [DISTWIDTH] IN BLOOD BY AUTOMATED COUNT: 12.7 % (ref 12.3–15.4)
HCT VFR BLD AUTO: 34.4 % (ref 34–46.6)
HGB BLD-MCNC: 11.4 G/DL (ref 12–15.9)
IMM GRANULOCYTES # BLD AUTO: 0.05 10*3/MM3 (ref 0–0.05)
IMM GRANULOCYTES NFR BLD AUTO: 0.6 % (ref 0–0.5)
LYMPHOCYTES # BLD AUTO: 1.05 10*3/MM3 (ref 0.7–3.1)
LYMPHOCYTES NFR BLD AUTO: 12.7 % (ref 19.6–45.3)
MCH RBC QN AUTO: 31.7 PG (ref 26.6–33)
MCHC RBC AUTO-ENTMCNC: 33.1 G/DL (ref 31.5–35.7)
MCV RBC AUTO: 95.6 FL (ref 79–97)
MONOCYTES # BLD AUTO: 0.7 10*3/MM3 (ref 0.1–0.9)
MONOCYTES NFR BLD AUTO: 8.5 % (ref 5–12)
NEUTROPHILS NFR BLD AUTO: 6.36 10*3/MM3 (ref 1.7–7)
NEUTROPHILS NFR BLD AUTO: 77.1 % (ref 42.7–76)
NRBC BLD AUTO-RTO: 0 /100 WBC (ref 0–0.2)
PLATELET # BLD AUTO: 96 10*3/MM3 (ref 140–450)
PLATELETS.RETICULATED NFR BLD AUTO: 4.1 % (ref 0.9–6.5)
PMV BLD AUTO: 10.5 FL (ref 6–12)
RBC # BLD AUTO: 3.6 10*6/MM3 (ref 3.77–5.28)
WBC # BLD AUTO: 8.25 10*3/MM3 (ref 3.4–10.8)

## 2021-05-10 PROCEDURE — 36415 COLL VENOUS BLD VENIPUNCTURE: CPT

## 2021-05-10 PROCEDURE — 85055 RETICULATED PLATELET ASSAY: CPT

## 2021-05-10 PROCEDURE — 85025 COMPLETE CBC W/AUTO DIFF WBC: CPT

## 2021-05-10 PROCEDURE — 99213 OFFICE O/P EST LOW 20 MIN: CPT | Performed by: INTERNAL MEDICINE

## 2021-05-10 NOTE — TELEPHONE ENCOUNTER
Caller: RUT FRANCISCO    Relationship to patient: SELF    Best call back number: [phone]    Type of visit: LAB AND FOLLOW UP    Additional notes: PATIENT STATED SHE RECEIVED A CALL TO RESCHEDULE HER APPT FOR 5/10 AT 4:00.    PLEASE CALL TO RESCHEDULE.

## 2021-05-11 NOTE — PROGRESS NOTES
Subjective .  Patient feeling generally well  REASONS FOR FOLLOWUP:    1. 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 Paroxysmal atrial fibrillation  3. History of DVT  4. 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  She is seen regularly in the office thereafter.     Patient when seen September 23, 2020 had developed worsening thrombocytopenia.  Records indicated this had started since her visit in May at approximate time that she started spironolactone.  Antibodies were drawn which are currently pending though her IPF had been 7.6 now October 30 8 down to 6.3 in follow-up CBC with H&H of 12.3 and three 7.4 with white count of 6-40 and platelet count of 60,000 having dropped to as low as 20,000 when seen Freya 3, 2020.  The patient is feeling considerably better, indicates that she has not had any palpitation episodes, notes generally improved stamina, no bleeding episodes.  She has remained off her Xarelto at this point.  The patient is next seen December 21, 2020 continue to feel well with an unchanged platelet count.  Again she has no fever, chills, night sweats, nausea, vomiting, weight loss and normal performance status.     The patient was initially asked to discontinue spironolactone and follow-up to determine response.  Her Xarelto was held and she underwent repeat scans including CT chest abdomen pelvis January 13, 2021 with no evidence of increasing lymphadenopathy in the chest abdomen pelvis or any other findings that might be consistent with recurrence.  Her IPF, however, has slowly increased and is now at 8.2.  We discussed a trial of prednisone for immune related thrombocytopenia under the circumstances that may be drug-related and/or related to her lymphoma.  The patient, as result, started on  prednisone with some delay the platelet count increasing February 4 2 79,000.  Additionally IP have dropped to 5.4 from a high of 8.9 January 27, 2021.  We have contacted by cardiology about restarting spironolactone and this did occur.  As she is seen back February 10, 2020 when she is doing well without additional changes symptomatically your platelet count has now dropped to 66,000.  There has been no other hematologic changes and she has been maintained on her current 20 mg p.o. twice daily of prednisone.      Patient was asked to continue prednisone 20 mg p.o. twice daily and is next February 21, 2021 fortunately feeling well and with a gradual improvement in her platelet count now up to 94,000.  She is having no clear difficulty tolerating her prednisone dosing and we discussed the move to taper her dose.  The patient thereafter underwent marrow examination performed March 19 which was normal cellular with no evidence of malignant lymphoma, trilineage hematopoiesis showing adequate megakaryopoiesis.  Again flow cytometry is negative, cytogenetics pending.  The patient's most recent platelet count March 17 was 1 15,000 with an IPF of 3.2.  The patient went on to continue steroids dropping to 30 mg daily as she is seen March 29, 2021.  At this point her marrow is normal per cytogenetics as well.  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  The patient continued tapering stating at approximately [id number]   4/19/2021 in his neck seen back 4/28/2021 with H&H of 12.3 and 35.4 white count 10,420 platelet count of 106,000 with a normal IPF.  Patient is seen 4/28/2021 is began to have some proximal muscle weakness.  We discussed that she should go on to taper further at this point.  The patient continue to taper prednisone with her platelet count approximately 100,000 is seen back 5/10/2021 at 5 mg prednisone daily.  She is feeling well with an excellent performance status.           ONCOLOGIC HISTORY:  (History from  "previous dates can be found in the separate document.)      Objective      Vitals:    05/10/21 1547   BP: 146/73   Pulse: 64   Resp: 16   Temp: 97.1 °F (36.2 °C)   SpO2: 97%   Weight: 72 kg (158 lb 12.8 oz)   Height: 162.6 cm (64.02\")   PainSc: 0-No pain     Current Status 5/10/2021   ECOG score 0       Physical Exam   Constitutional: She is oriented to person, place, and time. She appears well-developed. No distress.   HENT:   Head: Normocephalic and atraumatic.   Mouth/Throat: No oropharyngeal exudate.   Eyes: Pupils are equal, round, and reactive to light.   Cardiovascular: Normal rate, regular rhythm and normal heart sounds.   No murmur heard.  Pulmonary/Chest: Effort normal and breath sounds normal. No respiratory distress. She has no wheezes. She has no rhonchi. She has no rales.   Abdominal: Soft. Normal appearance and bowel sounds are normal. She exhibits no distension.   Musculoskeletal: Normal range of motion.   Neurological: She is alert and oriented to person, place, and time.   Skin: Skin is warm and dry. No rash noted.   Vitals reviewed.      RECENT LABS:  Results from last 7 days   Lab Units 05/10/21  1513 05/05/21  1229   WBC 10*3/mm3 8.25 10.36   NEUTROS ABS 10*3/mm3 6.36 8.46*   HEMOGLOBIN g/dL 11.4* 11.3*   HEMATOCRIT % 34.4 34.0   PLATELETS 10*3/mm3 96* 104*     Results from last 7 days   Lab Units 05/05/21  1229   SODIUM mmol/L 137   POTASSIUM mmol/L 4.2   CHLORIDE mmol/L 100   CO2 mmol/L 26.7   BUN mg/dL 17   CREATININE mg/dL 0.78   CALCIUM mg/dL 8.9   ALBUMIN g/dL 4.00   BILIRUBIN mg/dL 0.3   ALK PHOS U/L 59   ALT (SGPT) U/L 16   AST (SGOT) U/L 17   GLUCOSE mg/dL 117   MAGNESIUM mg/dL 1.9     Assessment/Plan   1. 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  · PET scan 5/3/18 with diffuse splenic involvement (SUV 16.9), lymphadenopathy throughout upper " abdomen and retroperitoneum (SUV 13.1), right liver lesion 5 cm (SUV 12.8), pelvic lymphadenopathy up to 4 cm, bladder wall thickening with associated hypermetabolism, cervical lymphadenopathy left posterior (SUV 11.2), multiple bone lesions including left intertrochanteric femur, ribs, right scapula, pelvis as well as left gluteal hematoma versus lymphomatous lesion.  · CT-guided liver biopsy 4/26/18 with diffuse large B-cell non-Hodgkin's lymphoma, CD20 positive, double hit (BCL-6 rearrangement 85%, MYC rearrangement 79%), KI-67 4+/4  · Left cervical excisional biopsy by ENT Dr. Oconnor 5/1/18 confirming high-grade B cell non-Hodgkin's lymphoma, Ki-67 100%, double hit (BCL-6 rearrangement 76%, MYC rearrangement 85%)  · Echocardiogram 4/11/18 with ejection fraction 66.7%  · Right port placement Dr Gill 5/4/18  · Patient not felt to be a candidate for more aggressive chemotherapy due to performance status and age (DA EPOCH-R)  · Therefore treated with R CHOP chemotherapy 5/4/18.  · Followed by granix 300mcg daily beginning 5/6/18 through 5/14/18.  · Patient reviewed June 19 with near resolution of hypermetabolic sites on PET/CT.  Plans made for third cycle of CHOP R, additional Zometa and total of 6 cycles of chemotherapy anticipated.  · 8/3/18 Cycle 5 CHOP R, reduction of Oncovin 50%, 6 cycles planned.    · Follow-up scan September 19 with small low-density liver lesions and splenic lesions are decreased from previous, numerous small mesenteric and RP nodes again stable slightly smaller.    · Patient assessed February 18, 2019, no evidence of recurrent disease  · Patient seen May 13, 2019, no evidence of recurrent disease  · Patient reviewed again October 28, 2019 with follow-up scans negative for recurrence,   · Reassessment planned December 21, 2020 via scans after she has developed thrombocytopenia.  · Follow-up CT scan chest and pelvis January 13, 2021 no for evidence of recurrence.    2. Thrombocytopenia.     · Patient had previous myelosuppression with chemo therapy however this had resolved.    · She has however had in a new medication with Spironolactone within the past 3 to 4 months from cardiology.    · Spironolactone was discontinued with gradual improvement in her platelet count October 2020   · December 2020, worsening thrombocytopenia with platelet count of 60,000  · Subsequent testing per laboratory studies without evidence of etiology, slowly advancing IPF with plans to proceed to treat for immunologic thrombocytopenia such as ITP.  The patient subsequently started prednisone at 0.5 mg/kg twice daily.    · Improvement in her platelet count   · 2/25/2021, platelets improved to 94,000 with prednisone decreased to 30 mg  · 3/3/2020, platelets declined to 69,000, prednisone increased to 40 mg  · 3/10/2021 platelets slightly improved at 77,000 on 40 mg prednisone.  This will be continued, the patient will proceed with bone marrow biopsy for further evaluation of thrombocytopenia as her IPF is not elevated at 4.3%.  · 3/19/2021 bone marrow aspirate biopsy negative for lympho-Chauncey involvement, normal trilineage hematopoiesis, including megakaryopoiesis.  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  · 4/13/2021 platelet count is 105,000.  We will decrease the prednisone to 15 mg daily, with plans to decrease by 5 mg weekly if tolerated.  I have sent in a new prescription for 5 mg tablets.  · Patient assessed 4/28, prednisone dropped to 10 mg daily with weekly assessment planned.  · Patient says 5/10/2021 it just under 100,000 for platelet count, prednisone 5 mg daily.  Plans moved to 5 mg every other day with weekly checks until discontinuance.    3.Multifactorial anemia:  · Related to anemia chronic disease, chemotherapy, prior iron deficiency.  · Hgb 11.4 monitor.     4. Hypercalcemia of malignancy:  · Calcium up to  13.8 on 4/21/18 (albumin 3.3).  Intact PTH 8.1, PTH RP less than 2.0  · Received Zometa 4 mg IV 4/25/18.  · Calcium up to 11.3 on 5/7/18 with second dose of Zometa administered 4 mg IV.  · Calcium today has continued to gradually increase up to 11.9 with albumin 3.3.  Ionized calcium however is stable at 6.8 compared to yesterday.  The patient is asymptomatic.  We will not plan to administer a further dose of Zometa just yet and will allow further time for the patient to respond to chemotherapy.  She returned 518 for continued Zometa and when seen May 25 has a normal calcium level.  · Patient to receive Zometa June 19, subsequently every other cycle, restart low-dose calcium supplementation  · Patient seen August 03, 2018, plans made for Zometa with her final course of chemotherapy August 23, 2018  · Patient scheduled for bone density prior to assessment 3 months following November visit, potential Xgeva and follow-up as she is seen back to step to repeat scans are performed in May 2019.  As she is seen May 13 we do plan the Xgeva and then move to Prolia 6 months later for her subsequent treatment for osteopenia.  · Prolia continued every 6 months, last given 6/3/2020, now scheduled December 21, 2020.   · Reassessment January 13th, 2021 normocalcemic      5.  Paroxysmal atrial fibrillation:  · Recently diagnosed, anticoagulated with Eliquis initially, discontinued due to expected thrombocytopenia from chemotherapy  · Currently in sinus rhythm, continues on Lopressor 50 mg every 12 hours  · Anticoagulated with Xarelto which is continued if platelets are greater than 50,000    6. Prior seizure disorder:  · Continues currently on Dilantin 300 mg daily at bedtime and Neurontin 600 mg daily at bedtime, will return to outpatient dose of Neurontin 300 mg daily at bedtime at discharge.      7. GI prophylaxis:  · Continues Protonix p.o.      8. Venous access:  · Port placement 5/4/18 by Dr. Gill, continue to manage q.  Monthly    9. DVT  · Anticoagulated with Xarelto 20 mg daily  · Xarelto held if platelets drop less than 50,000    Plan:  1. Decrease prednisone to 5 mg every other day  2. Return 1 week, CBC RN evaluation, 3-week CBC RN evaluation, 5 weeks MD CBC at which time would likely discontinue prednisone if platelet count remains at approximately 100,000.  3. Continue Xarelto 20 mg daily (patient will continue on Xarelto as long as platelets are greater than 50,000).      Chivo Iraheta MD  05/10/2021

## 2021-05-17 ENCOUNTER — TELEPHONE (OUTPATIENT)
Dept: ONCOLOGY | Facility: CLINIC | Age: 81
End: 2021-05-17

## 2021-05-17 NOTE — TELEPHONE ENCOUNTER
Caller: RUT FRANCISCO    Relationship to patient: SELF    Best call back number: [phone]    Type of visit: LAB, FOLLOW UP AND RN REVIEW    Requested date: ANY TIME NEXT WEEK    If rescheduling, when is the original appointment: 5/18    Additional notes: PLEASE CALL TO RESCHEDULE

## 2021-05-18 ENCOUNTER — APPOINTMENT (OUTPATIENT)
Dept: LAB | Facility: HOSPITAL | Age: 81
End: 2021-05-18

## 2021-05-18 ENCOUNTER — APPOINTMENT (OUTPATIENT)
Dept: ONCOLOGY | Facility: HOSPITAL | Age: 81
End: 2021-05-18

## 2021-05-25 ENCOUNTER — DOCUMENTATION (OUTPATIENT)
Dept: PHARMACY | Facility: HOSPITAL | Age: 81
End: 2021-05-25

## 2021-05-25 ENCOUNTER — INFUSION (OUTPATIENT)
Dept: ONCOLOGY | Facility: HOSPITAL | Age: 81
End: 2021-05-25

## 2021-05-25 ENCOUNTER — LAB (OUTPATIENT)
Dept: LAB | Facility: HOSPITAL | Age: 81
End: 2021-05-25

## 2021-05-25 ENCOUNTER — OFFICE VISIT (OUTPATIENT)
Dept: ONCOLOGY | Facility: CLINIC | Age: 81
End: 2021-05-25

## 2021-05-25 DIAGNOSIS — D64.81 ANEMIA ASSOCIATED WITH CHEMOTHERAPY: Primary | ICD-10-CM

## 2021-05-25 DIAGNOSIS — T45.1X5A ANEMIA ASSOCIATED WITH CHEMOTHERAPY: Primary | ICD-10-CM

## 2021-05-25 DIAGNOSIS — D69.6 THROMBOCYTOPENIA (HCC): ICD-10-CM

## 2021-05-25 DIAGNOSIS — Z79.01 CHRONIC ANTICOAGULATION: Primary | ICD-10-CM

## 2021-05-25 LAB
BASOPHILS # BLD AUTO: 0.03 10*3/MM3 (ref 0–0.2)
BASOPHILS NFR BLD AUTO: 0.4 % (ref 0–1.5)
DEPRECATED RDW RBC AUTO: 43.1 FL (ref 37–54)
EOSINOPHIL # BLD AUTO: 0.03 10*3/MM3 (ref 0–0.4)
EOSINOPHIL NFR BLD AUTO: 0.4 % (ref 0.3–6.2)
ERYTHROCYTE [DISTWIDTH] IN BLOOD BY AUTOMATED COUNT: 12.4 % (ref 12.3–15.4)
HCT VFR BLD AUTO: 35.4 % (ref 34–46.6)
HGB BLD-MCNC: 12 G/DL (ref 12–15.9)
IMM GRANULOCYTES # BLD AUTO: 0.05 10*3/MM3 (ref 0–0.05)
IMM GRANULOCYTES NFR BLD AUTO: 0.6 % (ref 0–0.5)
LYMPHOCYTES # BLD AUTO: 0.92 10*3/MM3 (ref 0.7–3.1)
LYMPHOCYTES NFR BLD AUTO: 11.9 % (ref 19.6–45.3)
MCH RBC QN AUTO: 32.3 PG (ref 26.6–33)
MCHC RBC AUTO-ENTMCNC: 33.9 G/DL (ref 31.5–35.7)
MCV RBC AUTO: 95.4 FL (ref 79–97)
MONOCYTES # BLD AUTO: 0.51 10*3/MM3 (ref 0.1–0.9)
MONOCYTES NFR BLD AUTO: 6.6 % (ref 5–12)
NEUTROPHILS NFR BLD AUTO: 6.2 10*3/MM3 (ref 1.7–7)
NEUTROPHILS NFR BLD AUTO: 80.1 % (ref 42.7–76)
NRBC BLD AUTO-RTO: 0 /100 WBC (ref 0–0.2)
PLATELET # BLD AUTO: 114 10*3/MM3 (ref 140–450)
PMV BLD AUTO: 10.5 FL (ref 6–12)
RBC # BLD AUTO: 3.71 10*6/MM3 (ref 3.77–5.28)
WBC # BLD AUTO: 7.74 10*3/MM3 (ref 3.4–10.8)

## 2021-05-25 PROCEDURE — 85025 COMPLETE CBC W/AUTO DIFF WBC: CPT

## 2021-05-25 PROCEDURE — 99213 OFFICE O/P EST LOW 20 MIN: CPT | Performed by: NURSE PRACTITIONER

## 2021-05-25 PROCEDURE — G0463 HOSPITAL OUTPT CLINIC VISIT: HCPCS

## 2021-05-25 PROCEDURE — 36415 COLL VENOUS BLD VENIPUNCTURE: CPT

## 2021-05-25 NOTE — PROGRESS NOTES
"  .   REASON FOR FOLLOW UP:   Management of anticoagulation, Paroxysmal A-fib, Thrombocytopenia    HISTORY OF PRESENT ILLNESS:  The patient is a 81 y.o. year old female  who is here for follow-up with the above-mentioned history here today for samples of Xarelto 20 mg tablets.  She denies any blood in her urine or stool.  She denies any lightheadedness or dizziness.  She denies easy bruising.  Her platelets today are 114,000, with plans to continue Xarelto with platelets over 50,000.  She is tolerating the Xarelto well.    Past Medical History:   Diagnosis Date   • Anemia     multifactorial   • Cystitis    • Cystocele     moderate   • Drug therapy    • Dyslipidemia    • Esophageal reflux    • Fatigue    • History of transfusion     no reaction   • Hypercalcemia    • Hypertension    • Major depression, chronic    • Menopause    • Non Hodgkin's lymphoma (CMS/HCC)    • Osteoarthritis    • Osteoporosis    • Palpitations    • Paroxysmal atrial fibrillation (CMS/HCC)    • Post menopausal problems    • RLS (restless legs syndrome)    • Seizure disorder (CMS/HCC)    • Sinus bradycardia    • Subjective tinnitus    • Vaginal delivery     x2  \"SONYA\"      \"JASON\"   • Vitamin D deficiency      MEDICATIONS    Current Outpatient Medications:   •  amLODIPine (NORVASC) 10 MG tablet, TAKE 1 TABLET BY MOUTH EVERY DAY, Disp: 90 tablet, Rfl: 1  •  Ascorbic Acid (Vitamin C) 500 MG capsule, Take  by mouth., Disp: , Rfl:   •  benazepril (LOTENSIN) 40 MG tablet, TAKE 1 TABLET BY MOUTH EVERY DAY, Disp: 90 tablet, Rfl: 1  •  cetirizine (zyrTEC) 10 MG tablet, Take 1 tablet by mouth Daily As Needed for Allergies., Disp: 30 tablet, Rfl: 1  •  Cholecalciferol (VITAMIN D3) 125 MCG (5000 UT) capsule capsule, Take 5,000 Units by mouth Daily., Disp: , Rfl:   •  escitalopram (LEXAPRO) 20 MG tablet, TAKE 1 TABLET BY MOUTH DAILY, Disp: 90 tablet, Rfl: 1  •  folic acid (FOLVITE) 400 MCG tablet, Take 400 mcg by mouth daily., Disp: , Rfl:   •  gabapentin " (NEURONTIN) 300 MG capsule, TAKE 2 CAPSULES BY MOUTH EVERY NIGHT AT BEDTIME, Disp: 60 capsule, Rfl: 0  •  hydrALAZINE (APRESOLINE) 100 MG tablet, TAKE 1 TABLET BY MOUTH THREE TIMES DAILY, Disp: 270 tablet, Rfl: 3  •  lidocaine-prilocaine (EMLA) 2.5-2.5 % cream, Apply 30 min prior to port access, Disp: 5 g, Rfl: 2  •  melatonin 5 MG tablet tablet, Take 5 mg by mouth Every Night., Disp: , Rfl:   •  Multiple Vitamins-Minerals (ZINC PO), Take  by mouth., Disp: , Rfl:   •  pantoprazole (PROTONIX) 40 MG EC tablet, TAKE 1 TABLET BY MOUTH EVERY DAY, Disp: 90 tablet, Rfl: 1  •  phenytoin (DILANTIN) 100 MG ER capsule, Take 3 capsules by mouth every night at bedtime., Disp: 270 capsule, Rfl: 3  •  predniSONE (DELTASONE) 5 MG tablet, Take 3 tablets by mouth Daily. Or as directed by provider, Disp: 90 tablet, Rfl: 2  •  spironolactone (ALDACTONE) 25 MG tablet, Take 1 tablet by mouth Daily., Disp: 90 tablet, Rfl: 3  •  vitamin B-12 (CYANOCOBALAMIN) 100 MCG tablet, Take 1,000 mcg by mouth Daily., Disp: , Rfl:   •  vitamin B-6 (PYRIDOXINE) 100 MG tablet, Take 100 mg by mouth Daily., Disp: , Rfl:   •  vitamin E 100 UNIT capsule, Take 180 Units by mouth Daily., Disp: , Rfl:   •  Xarelto 20 MG tablet, Take 1 tablet by mouth Daily., Disp: 28 tablet, Rfl: 0  •  Zinc Sulfate (ZINC 15 PO), Take 400 mg by mouth., Disp: , Rfl:     ALLERGIES:     Allergies   Allergen Reactions   • Crab (Diagnostic) Itching and Rash   • Pseudoephedrine Dizziness     REVIEW OF SYSTEMS:  Review of Systems   Constitutional: Negative for chills, fatigue and fever.   Cardiovascular: Negative for chest pain, palpitations and leg swelling.   Gastrointestinal: Negative for blood in stool.   Genitourinary: Negative for hematuria.   Neurological: Negative for dizziness and light-headedness.   ROS reviewed and updated 05/25/2021         There were no vitals filed for this visit.  Current Status 5/10/2021   ECOG score 0     PHYSICAL EXAM:    CONSTITUTIONAL:  Patient  alert and oriented x3  EYES:  Conjunctiva and lids unremarkable.  PERRLA  RESPIRATORY:  Breathing unlabored, no acute distress.  MUSCULOSKELETAL:  No lower extremity swelling, erythema or calf tenderness  SKIN:  Warm.  No rashes.  PSYCHIATRIC:  Normal judgment and insight.  Normal mood and affect.   Physical exam reviewed and updated on 05/25/2021    RECENT LABS:  CBC & Differential (05/25/2021 14:42)    Assessment/Plan     PLAN:  I have provided the patient with 1 month supply of Xarelto 20 mg tablets, to be taken once daily. We reviewed the appropriate instructions for medication administration as well as dosing. We have reviewed potential side effects including increased risk of bleeding.  The patient understands this medication will need to be stopped prior to any surgical procedures. The patient understand to call with any questions or concerns.    MELINDA Altman  04/19/2021

## 2021-05-25 NOTE — PROGRESS NOTES
Pt here for an RN review. Pt reports feeling well other than dealing with the loss of her daughter last week. Pt taking 20 mg xarelto daily and is requesting samples today. S/w Myranda JI who will see pt in the back and provide samples. Pt also taking prednisone 5 mg every other day. Counts have improved this week. Reviewed labs and provided copy of schedule.    Lab Results   Component Value Date    WBC 7.74 05/25/2021    HGB 12.0 05/25/2021    HCT 35.4 05/25/2021    MCV 95.4 05/25/2021     (L) 05/25/2021

## 2021-05-25 NOTE — PROGRESS NOTES
[place] of Appt/[place] (n[place] [facility] ([place];    Prescriber (brittney) contacted pharmacy to obtain the medication below.    NDC:  25669-695-63  Drug name: xarelto  Strength: 20mg  Total Quantity:  28 (Containers- 4 X Units per Containers- 7)  Lot#:  [id number]  Expiration: 11/21    Prescriber or staff member who picked up medication Myranda

## 2021-06-01 DIAGNOSIS — C79.51 BONE METASTASIS: ICD-10-CM

## 2021-06-01 RX ORDER — GABAPENTIN 300 MG/1
600 CAPSULE ORAL
Qty: 60 CAPSULE | Refills: 0 | Status: SHIPPED | OUTPATIENT
Start: 2021-06-01 | End: 2021-07-07

## 2021-06-02 ENCOUNTER — TELEPHONE (OUTPATIENT)
Dept: ONCOLOGY | Facility: CLINIC | Age: 81
End: 2021-06-02

## 2021-06-02 ENCOUNTER — LAB (OUTPATIENT)
Dept: LAB | Facility: HOSPITAL | Age: 81
End: 2021-06-02

## 2021-06-02 ENCOUNTER — CLINICAL SUPPORT (OUTPATIENT)
Dept: ONCOLOGY | Facility: HOSPITAL | Age: 81
End: 2021-06-02

## 2021-06-02 DIAGNOSIS — D50.0 IRON DEFICIENCY ANEMIA DUE TO CHRONIC BLOOD LOSS: Primary | ICD-10-CM

## 2021-06-02 LAB
BASOPHILS # BLD AUTO: 0.02 10*3/MM3 (ref 0–0.2)
BASOPHILS NFR BLD AUTO: 0.3 % (ref 0–1.5)
DEPRECATED RDW RBC AUTO: 44.9 FL (ref 37–54)
EOSINOPHIL # BLD AUTO: 0.04 10*3/MM3 (ref 0–0.4)
EOSINOPHIL NFR BLD AUTO: 0.6 % (ref 0.3–6.2)
ERYTHROCYTE [DISTWIDTH] IN BLOOD BY AUTOMATED COUNT: 12.7 % (ref 12.3–15.4)
HCT VFR BLD AUTO: 35.5 % (ref 34–46.6)
HGB BLD-MCNC: 11.9 G/DL (ref 12–15.9)
IMM GRANULOCYTES # BLD AUTO: 0.05 10*3/MM3 (ref 0–0.05)
IMM GRANULOCYTES NFR BLD AUTO: 0.7 % (ref 0–0.5)
LYMPHOCYTES # BLD AUTO: 0.88 10*3/MM3 (ref 0.7–3.1)
LYMPHOCYTES NFR BLD AUTO: 12.7 % (ref 19.6–45.3)
MCH RBC QN AUTO: 32.2 PG (ref 26.6–33)
MCHC RBC AUTO-ENTMCNC: 33.5 G/DL (ref 31.5–35.7)
MCV RBC AUTO: 96.2 FL (ref 79–97)
MONOCYTES # BLD AUTO: 0.54 10*3/MM3 (ref 0.1–0.9)
MONOCYTES NFR BLD AUTO: 7.8 % (ref 5–12)
NEUTROPHILS NFR BLD AUTO: 5.39 10*3/MM3 (ref 1.7–7)
NEUTROPHILS NFR BLD AUTO: 77.9 % (ref 42.7–76)
NRBC BLD AUTO-RTO: 0 /100 WBC (ref 0–0.2)
PLATELET # BLD AUTO: 100 10*3/MM3 (ref 140–450)
PMV BLD AUTO: 10.7 FL (ref 6–12)
RBC # BLD AUTO: 3.69 10*6/MM3 (ref 3.77–5.28)
WBC # BLD AUTO: 6.92 10*3/MM3 (ref 3.4–10.8)

## 2021-06-02 PROCEDURE — 85025 COMPLETE CBC W/AUTO DIFF WBC: CPT

## 2021-06-02 PROCEDURE — G0463 HOSPITAL OUTPT CLINIC VISIT: HCPCS

## 2021-06-02 PROCEDURE — 36415 COLL VENOUS BLD VENIPUNCTURE: CPT

## 2021-06-02 NOTE — NURSING NOTE
Pt here for CBC with RN review. Pt plts have decreased to 100 today. Pt confirms she is still taking Xarelto 20mg daily and has tampered her prednisone to 2.5mg every other day. Per Dr. Mcconnell note pt ok to continue Xarelto as long as plts are above 50. Message sent to Dr. Iraheta and Kanchan RN to advise if pt should D/C prednisone or remain on current dose until her appt with him on 6/14. Pt informed that someone will call her with instructions on what Dr. Iraheta advises. Pt v/u.       Lab Results   Component Value Date    WBC 6.92 06/02/2021    HGB 11.9 (L) 06/02/2021    HCT 35.5 06/02/2021    MCV 96.2 06/02/2021     (L) 06/02/2021     Per Dr. Iraheta Continue 2.5 daily, next week CBC IPF, RN review. Message sent to scheduling. Pt called but there was no answer and VM was left instructing pt to remain on 2.5mg and that scheduling will be contacting her with appt information for next week.

## 2021-06-02 NOTE — TELEPHONE ENCOUNTER
Returned call, see note from Jennifer Magana) KEIRY. Pt v/u.       Caller: URT FRANCISCO    Relationship to patient: SELF    Best call back number: [phone]    What is the best time to reach you: ANY    Who are you requesting to speak with (clinical staff, provider,  specific staff member): CLINICAL STAFF    What was the call regarding: PT MISSED A CALL FROM THE OFFICE, BELIEVES IT IS REGARDING HER LABS TODAY. PLEASE CALL BACK WHEN POSSIBLE    Do you require a callback: YES

## 2021-06-02 NOTE — TELEPHONE ENCOUNTER
Caller: RUT FRANCISCO    Relationship to patient: SELF    Best call back number: [phone]    What is the best time to reach you: ANY    Who are you requesting to speak with (clinical staff, provider,  specific staff member): CLINICAL STAFF    What was the call regarding: PT MISSED A CALL FROM THE OFFICE, BELIEVES IT IS REGARDING HER LABS TODAY. PLEASE CALL BACK WHEN POSSIBLE    Do you require a callback: YES

## 2021-06-03 ENCOUNTER — TELEPHONE (OUTPATIENT)
Dept: ONCOLOGY | Facility: CLINIC | Age: 81
End: 2021-06-03

## 2021-06-03 NOTE — TELEPHONE ENCOUNTER
D/W Dr. Iraheta. He would like for pt to stay on 2.5mg of prednisone every other day until she is seen on 6/14 by MD. Informed pt of this and she v/u.     ----- Message from Jennifer Marin RN sent at 6/2/2021  2:38 PM EDT -----  Veronica Iraheta, Mrs. Blake is here today for a CBC with RN review. She states she is now taking 2.5mg of prednisone every other day, but her plts have decreased from 114 to 100. She is scheduled to return to clinic in 2 weeks for a visit with you, but I was unsure if you wanted her to D/C the prednisone or continue it at 2.5? If you want her to D/C it do you want her to come back next week for another CBC with review? Thank you so much!

## 2021-06-07 NOTE — PROGRESS NOTES
Subjective .  Patient feeling generally well  REASONS FOR FOLLOWUP:      1. 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 Paroxysmal atrial fibrillation  3. History of DVT  4. 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  She is seen regularly in the office thereafter.     Patient when seen September 23, 2020 had developed worsening thrombocytopenia.  Records indicated this had started since her visit in May at approximate time that she started spironolactone.  Antibodies were drawn which are currently pending though her IPF had been 7.6 now October 30 8 down to 6.3 in follow-up CBC with H&H of 12.3 and three 7.4 with white count of 6-40 and platelet count of 60,000 having dropped to as low as 20,000 when seen Freya 3, 2020.  The patient is feeling considerably better, indicates that she has not had any palpitation episodes, notes generally improved stamina, no bleeding episodes.  She has remained off her Xarelto at this point.  The patient is next seen December 21, 2020 continue to feel well with an unchanged platelet count.  Again she has no fever, chills, night sweats, nausea, vomiting, weight loss and normal performance status.     The patient was initially asked to discontinue spironolactone and follow-up to determine response.  Her Xarelto was held and she underwent repeat scans including CT chest abdomen pelvis January 13, 2021 with no evidence of increasing lymphadenopathy in the chest abdomen pelvis or any other findings that might be consistent with recurrence.  Her IPF, however, has slowly increased and is now at 8.2.  We discussed a trial of prednisone for immune related thrombocytopenia under the circumstances that may be drug-related and/or related to her lymphoma.  The patient, as result, started on  prednisone with some delay the platelet count increasing February 4 2 79,000.  Additionally IP have dropped to 5.4 from a high of 8.9 January 27, 2021.  We have contacted by cardiology about restarting spironolactone and this did occur.  As she is seen back February 10, 2020 when she is doing well without additional changes symptomatically your platelet count has now dropped to 66,000.  There has been no other hematologic changes and she has been maintained on her current 20 mg p.o. twice daily of prednisone.      Patient was asked to continue prednisone 20 mg p.o. twice daily and is next February 21, 2021 fortunately feeling well and with a gradual improvement in her platelet count now up to 94,000.  She is having no clear difficulty tolerating her prednisone dosing and we discussed the move to taper her dose.  The patient thereafter underwent marrow examination performed March 19 which was normal cellular with no evidence of malignant lymphoma, trilineage hematopoiesis showing adequate megakaryopoiesis.  Again flow cytometry is negative, cytogenetics pending.  The patient's most recent platelet count March 17 was 1 15,000 with an IPF of 3.2.  The patient went on to continue steroids dropping to 30 mg daily as she is seen March 29, 2021.  At this point her marrow is normal per cytogenetics as well.  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  The patient continued tapering stating at approximately [id number]   4/19/2021 in his neck seen back 4/28/2021 with H&H of 12.3 and 35.4 white count 10,420 platelet count of 106,000 with a normal IPF.  Patient is seen 4/28/2021 is began to have some proximal muscle weakness.  We discussed that she should go on to taper further at this point.  The patient continue to taper prednisone with her platelet count approximately 100,000 is seen back 5/10/2021 at 5 mg prednisone daily.  She is feeling well with an excellent performance status.  The patient's prednisone dose was  "gradually tapered and she maintained approximately 100,000 platelet count now (6/14/2021) at 2.5 mg every other day prednisone.     The patient is assessed 6/14/2021 doing well with acceptable platelet count/that we will now discontinue prednisone.           ONCOLOGIC HISTORY:  (History from previous dates can be found in the separate document.)      Objective      Vitals:    06/14/21 1535   BP: 134/75   Pulse: 75   Resp: 16   Temp: 97.3 °F (36.3 °C)   TempSrc: Temporal   SpO2: 97%   Weight: 69.7 kg (153 lb 9.6 oz)   Height: 162.6 cm (64.02\")   PainSc: 0-No pain     Current Status 6/14/2021   ECOG score 0       Physical Exam   Constitutional: She is oriented to person, place, and time. She appears well-developed. No distress.   HENT:   Head: Normocephalic and atraumatic.   Mouth/Throat: No oropharyngeal exudate.   Eyes: Pupils are equal, round, and reactive to light.   Cardiovascular: Normal rate, regular rhythm and normal heart sounds.   No murmur heard.  Pulmonary/Chest: Effort normal and breath sounds normal. No respiratory distress. She has no wheezes. She has no rhonchi. She has no rales.   Abdominal: Soft. Normal appearance and bowel sounds are normal. She exhibits no distension.   Musculoskeletal: Normal range of motion.   Neurological: She is alert and oriented to person, place, and time.   Skin: Skin is warm and dry. No rash noted.   Vitals reviewed.      RECENT LABS:  Results from last 7 days   Lab Units 06/14/21  1539 06/09/21  1414   WBC 10*3/mm3 6.51 9.17   NEUTROS ABS 10*3/mm3 4.31 7.58*   HEMOGLOBIN g/dL 12.3 13.9   HEMATOCRIT % 38.2 40.3   PLATELETS 10*3/mm3 103* 104*         Assessment/Plan   1. 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  · PET scan 5/3/18 with diffuse splenic involvement (SUV 16.9), lymphadenopathy throughout upper abdomen and " retroperitoneum (SUV 13.1), right liver lesion 5 cm (SUV 12.8), pelvic lymphadenopathy up to 4 cm, bladder wall thickening with associated hypermetabolism, cervical lymphadenopathy left posterior (SUV 11.2), multiple bone lesions including left intertrochanteric femur, ribs, right scapula, pelvis as well as left gluteal hematoma versus lymphomatous lesion.  · CT-guided liver biopsy 4/26/18 with diffuse large B-cell non-Hodgkin's lymphoma, CD20 positive, double hit (BCL-6 rearrangement 85%, MYC rearrangement 79%), KI-67 4+/4  · Left cervical excisional biopsy by ENT Dr. Oconnor 5/1/18 confirming high-grade B cell non-Hodgkin's lymphoma, Ki-67 100%, double hit (BCL-6 rearrangement 76%, MYC rearrangement 85%)  · Echocardiogram 4/11/18 with ejection fraction 66.7%  · Right port placement Dr Gill 5/4/18  · Patient not felt to be a candidate for more aggressive chemotherapy due to performance status and age (DA EPOCH-R)  · Therefore treated with R CHOP chemotherapy 5/4/18.  · Followed by granix 300mcg daily beginning 5/6/18 through 5/14/18.  · Patient reviewed June 19 with near resolution of hypermetabolic sites on PET/CT.  Plans made for third cycle of CHOP R, additional Zometa and total of 6 cycles of chemotherapy anticipated.  · 8/3/18 Cycle 5 CHOP R, reduction of Oncovin 50%, 6 cycles planned.    · Follow-up scan September 19 with small low-density liver lesions and splenic lesions are decreased from previous, numerous small mesenteric and RP nodes again stable slightly smaller.    · Patient assessed February 18, 2019, no evidence of recurrent disease  · Patient seen May 13, 2019, no evidence of recurrent disease  · Patient reviewed again October 28, 2019 with follow-up scans negative for recurrence,   · Reassessment planned December 21, 2020 via scans after she has developed thrombocytopenia.  · Follow-up CT scan chest and pelvis January 13, 2021 no for evidence of recurrence.    2. Thrombocytopenia.     · Patient had previous myelosuppression with chemo therapy however this had resolved.    · She has however had in a new medication with Spironolactone within the past 3 to 4 months from cardiology.    · Spironolactone was discontinued with gradual improvement in her platelet count October 2020   · December 2020, worsening thrombocytopenia with platelet count of 60,000  · Subsequent testing per laboratory studies without evidence of etiology, slowly advancing IPF with plans to proceed to treat for immunologic thrombocytopenia such as ITP.  The patient subsequently started prednisone at 0.5 mg/kg twice daily.    · Improvement in her platelet count   · 2/25/2021, platelets improved to 94,000 with prednisone decreased to 30 mg  · 3/3/2020, platelets declined to 69,000, prednisone increased to 40 mg  · 3/10/2021 platelets slightly improved at 77,000 on 40 mg prednisone.  This will be continued, the patient will proceed with bone marrow biopsy for further evaluation of thrombocytopenia as her IPF is not elevated at 4.3%.  · 3/19/2021 bone marrow aspirate biopsy negative for lympho-Chauncey involvement, normal trilineage hematopoiesis, including megakaryopoiesis.  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  · 4/13/2021 platelet count is 105,000.  We will decrease the prednisone to 15 mg daily, with plans to decrease by 5 mg weekly if tolerated.  I have sent in a new prescription for 5 mg tablets.  · Patient assessed 4/28, prednisone dropped to 10 mg daily with weekly assessment planned.  · Patient says 5/10/2021 it just under 100,000 for platelet count, prednisone 5 mg daily.  Plans moved to 5 mg every other day with weekly checks until discontinuance.  · Patient dropped to 2.5 mg every other day and when assessed 6/14/2021 platelet count greater than 100,000, prednisone discontinued    3.Multifactorial anemia:  · Related to  anemia chronic disease, chemotherapy, prior iron deficiency.  · Hgb 12.3 g/dL monitor.     4. Hypercalcemia of malignancy:  · Calcium up to 13.8 on 4/21/18 (albumin 3.3).  Intact PTH 8.1, PTH RP less than 2.0  · Received Zometa 4 mg IV 4/25/18.  · Calcium up to 11.3 on 5/7/18 with second dose of Zometa administered 4 mg IV.  · Calcium today has continued to gradually increase up to 11.9 with albumin 3.3.  Ionized calcium however is stable at 6.8 compared to yesterday.  The patient is asymptomatic.  We will not plan to administer a further dose of Zometa just yet and will allow further time for the patient to respond to chemotherapy.  She returned 518 for continued Zometa and when seen May 25 has a normal calcium level.  · Patient to receive Zometa June 19, subsequently every other cycle, restart low-dose calcium supplementation  · Patient seen August 03, 2018, plans made for Zometa with her final course of chemotherapy August 23, 2018  · Patient scheduled for bone density prior to assessment 3 months following November visit, potential Xgeva and follow-up as she is seen back to step to repeat scans are performed in May 2019.  As she is seen May 13 we do plan the Xgeva and then move to Prolia 6 months later for her subsequent treatment for osteopenia.  · Prolia continued every 6 months, last given 6/3/2020, now scheduled December 21, 2020.   · Reassessment January 13th, 2021 normocalcemic      5.  Paroxysmal atrial fibrillation:  · Recently diagnosed, anticoagulated with Eliquis initially, discontinued due to expected thrombocytopenia from chemotherapy  · Currently in sinus rhythm, continues on Lopressor 50 mg every 12 hours  · Anticoagulated with Xarelto which is continued if platelets are greater than 50,000    6. Prior seizure disorder:  · Continues currently on Dilantin 300 mg daily at bedtime and Neurontin 600 mg daily at bedtime, will return to outpatient dose of Neurontin 300 mg daily at bedtime at  discharge.      7. GI prophylaxis:  · Continues Protonix p.o.      8. Venous access:  · Port placement 5/4/18 by Dr. Gill, continue to manage q. 6 weeks    9. DVT  · Anticoagulated with Xarelto 20 mg daily  · Xarelto held if platelets drop less than 50,000    Plan:  1. Discontinue prednisone  2. Return every 2 weeks for CBC MD VALENCIA 6 weeks, port flush  3. Continue Xarelto 20 mg daily (patient will continue on Xarelto as long as platelets are greater than 50,000).      Chivo Iraheta MD  06/14/2021

## 2021-06-09 ENCOUNTER — CLINICAL SUPPORT (OUTPATIENT)
Dept: ONCOLOGY | Facility: HOSPITAL | Age: 81
End: 2021-06-09

## 2021-06-09 ENCOUNTER — LAB (OUTPATIENT)
Dept: LAB | Facility: HOSPITAL | Age: 81
End: 2021-06-09

## 2021-06-09 DIAGNOSIS — D50.0 IRON DEFICIENCY ANEMIA DUE TO CHRONIC BLOOD LOSS: Primary | ICD-10-CM

## 2021-06-09 LAB
BASOPHILS # BLD AUTO: 0.03 10*3/MM3 (ref 0–0.2)
BASOPHILS NFR BLD AUTO: 0.3 % (ref 0–1.5)
DEPRECATED RDW RBC AUTO: 43.3 FL (ref 37–54)
EOSINOPHIL # BLD AUTO: 0.04 10*3/MM3 (ref 0–0.4)
EOSINOPHIL NFR BLD AUTO: 0.4 % (ref 0.3–6.2)
ERYTHROCYTE [DISTWIDTH] IN BLOOD BY AUTOMATED COUNT: 12.4 % (ref 12.3–15.4)
HCT VFR BLD AUTO: 40.3 % (ref 34–46.6)
HGB BLD-MCNC: 13.9 G/DL (ref 12–15.9)
IMM GRANULOCYTES # BLD AUTO: 0.04 10*3/MM3 (ref 0–0.05)
IMM GRANULOCYTES NFR BLD AUTO: 0.4 % (ref 0–0.5)
LYMPHOCYTES # BLD AUTO: 1.02 10*3/MM3 (ref 0.7–3.1)
LYMPHOCYTES NFR BLD AUTO: 11.1 % (ref 19.6–45.3)
MCH RBC QN AUTO: 32.9 PG (ref 26.6–33)
MCHC RBC AUTO-ENTMCNC: 34.5 G/DL (ref 31.5–35.7)
MCV RBC AUTO: 95.5 FL (ref 79–97)
MONOCYTES # BLD AUTO: 0.46 10*3/MM3 (ref 0.1–0.9)
MONOCYTES NFR BLD AUTO: 5 % (ref 5–12)
NEUTROPHILS NFR BLD AUTO: 7.58 10*3/MM3 (ref 1.7–7)
NEUTROPHILS NFR BLD AUTO: 82.8 % (ref 42.7–76)
NRBC BLD AUTO-RTO: 0 /100 WBC (ref 0–0.2)
PLATELET # BLD AUTO: 104 10*3/MM3 (ref 140–450)
PLATELETS.RETICULATED NFR BLD AUTO: 4.1 % (ref 0.9–6.5)
PMV BLD AUTO: 10.6 FL (ref 6–12)
RBC # BLD AUTO: 4.22 10*6/MM3 (ref 3.77–5.28)
WBC # BLD AUTO: 9.17 10*3/MM3 (ref 3.4–10.8)

## 2021-06-09 PROCEDURE — 85025 COMPLETE CBC W/AUTO DIFF WBC: CPT

## 2021-06-09 PROCEDURE — 85055 RETICULATED PLATELET ASSAY: CPT

## 2021-06-09 PROCEDURE — 36415 COLL VENOUS BLD VENIPUNCTURE: CPT

## 2021-06-09 PROCEDURE — G0463 HOSPITAL OUTPT CLINIC VISIT: HCPCS

## 2021-06-09 NOTE — NURSING NOTE
Pt here for CBC with RN review. Pt counts are stable for this pt at this time. Pt confirms taking Xarelto and prednisone 2.5mg as prescribed. Labs reviewed with Dr. Iraheta. Per Dr. Iraheta pt is to remain on current 2.5 mg dose of prednisone until her appt with him next Monday. Pt has no complaints at this time. Copy of labs given to pt and instructed her to contact clinic with any signs of bruising or increased bleeding. Pt v/u.       Lab Results   Component Value Date    WBC 9.17 06/09/2021    HGB 13.9 06/09/2021    HCT 40.3 06/09/2021    MCV 95.5 06/09/2021     (L) 06/09/2021

## 2021-06-14 ENCOUNTER — INFUSION (OUTPATIENT)
Dept: ONCOLOGY | Facility: HOSPITAL | Age: 81
End: 2021-06-14

## 2021-06-14 ENCOUNTER — DOCUMENTATION (OUTPATIENT)
Dept: PHARMACY | Facility: HOSPITAL | Age: 81
End: 2021-06-14

## 2021-06-14 ENCOUNTER — OFFICE VISIT (OUTPATIENT)
Dept: ONCOLOGY | Facility: CLINIC | Age: 81
End: 2021-06-14

## 2021-06-14 VITALS
SYSTOLIC BLOOD PRESSURE: 134 MMHG | HEIGHT: 64 IN | HEART RATE: 75 BPM | OXYGEN SATURATION: 97 % | RESPIRATION RATE: 16 BRPM | DIASTOLIC BLOOD PRESSURE: 75 MMHG | TEMPERATURE: 97.3 F | WEIGHT: 153.6 LBS | BODY MASS INDEX: 26.22 KG/M2

## 2021-06-14 DIAGNOSIS — C83.39 DIFFUSE LARGE B-CELL LYMPHOMA OF EXTRANODAL SITE EXCLUDING SPLEEN AND OTHER SOLID ORGANS (HCC): ICD-10-CM

## 2021-06-14 DIAGNOSIS — Z45.2 ENCOUNTER FOR ADJUSTMENT OR MANAGEMENT OF VASCULAR ACCESS DEVICE: Primary | ICD-10-CM

## 2021-06-14 DIAGNOSIS — C85.91 LYMPHOMA OF LYMPH NODES OF NECK, UNSPECIFIED LYMPHOMA TYPE (HCC): Primary | ICD-10-CM

## 2021-06-14 DIAGNOSIS — C85.91 LYMPHOMA OF LYMPH NODES OF NECK, UNSPECIFIED LYMPHOMA TYPE (HCC): ICD-10-CM

## 2021-06-14 DIAGNOSIS — D69.6 THROMBOCYTOPENIA (HCC): ICD-10-CM

## 2021-06-14 LAB
BASOPHILS # BLD AUTO: 0.02 10*3/MM3 (ref 0–0.2)
BASOPHILS NFR BLD AUTO: 0.3 % (ref 0–1.5)
DEPRECATED RDW RBC AUTO: 45 FL (ref 37–54)
EOSINOPHIL # BLD AUTO: 0.06 10*3/MM3 (ref 0–0.4)
EOSINOPHIL NFR BLD AUTO: 0.9 % (ref 0.3–6.2)
ERYTHROCYTE [DISTWIDTH] IN BLOOD BY AUTOMATED COUNT: 12.5 % (ref 12.3–15.4)
HCT VFR BLD AUTO: 38.2 % (ref 34–46.6)
HGB BLD-MCNC: 12.3 G/DL (ref 12–15.9)
IMM GRANULOCYTES # BLD AUTO: 0.02 10*3/MM3 (ref 0–0.05)
IMM GRANULOCYTES NFR BLD AUTO: 0.3 % (ref 0–0.5)
LYMPHOCYTES # BLD AUTO: 1.27 10*3/MM3 (ref 0.7–3.1)
LYMPHOCYTES NFR BLD AUTO: 19.5 % (ref 19.6–45.3)
MCH RBC QN AUTO: 31.6 PG (ref 26.6–33)
MCHC RBC AUTO-ENTMCNC: 32.2 G/DL (ref 31.5–35.7)
MCV RBC AUTO: 98.2 FL (ref 79–97)
MONOCYTES # BLD AUTO: 0.83 10*3/MM3 (ref 0.1–0.9)
MONOCYTES NFR BLD AUTO: 12.7 % (ref 5–12)
NEUTROPHILS NFR BLD AUTO: 4.31 10*3/MM3 (ref 1.7–7)
NEUTROPHILS NFR BLD AUTO: 66.3 % (ref 42.7–76)
NRBC BLD AUTO-RTO: 0 /100 WBC (ref 0–0.2)
PLATELET # BLD AUTO: 103 10*3/MM3 (ref 140–450)
PMV BLD AUTO: 9.9 FL (ref 6–12)
RBC # BLD AUTO: 3.89 10*6/MM3 (ref 3.77–5.28)
WBC # BLD AUTO: 6.51 10*3/MM3 (ref 3.4–10.8)

## 2021-06-14 PROCEDURE — 99214 OFFICE O/P EST MOD 30 MIN: CPT | Performed by: INTERNAL MEDICINE

## 2021-06-14 PROCEDURE — 85025 COMPLETE CBC W/AUTO DIFF WBC: CPT

## 2021-06-14 PROCEDURE — 25010000002 HEPARIN LOCK FLUSH PER 10 UNITS: Performed by: INTERNAL MEDICINE

## 2021-06-14 PROCEDURE — 36591 DRAW BLOOD OFF VENOUS DEVICE: CPT

## 2021-06-14 RX ORDER — SODIUM CHLORIDE 0.9 % (FLUSH) 0.9 %
10 SYRINGE (ML) INJECTION AS NEEDED
Status: DISCONTINUED | OUTPATIENT
Start: 2021-06-14 | End: 2021-06-14 | Stop reason: HOSPADM

## 2021-06-14 RX ORDER — HEPARIN SODIUM (PORCINE) LOCK FLUSH IV SOLN 100 UNIT/ML 100 UNIT/ML
500 SOLUTION INTRAVENOUS AS NEEDED
Status: DISCONTINUED | OUTPATIENT
Start: 2021-06-14 | End: 2021-06-14 | Stop reason: HOSPADM

## 2021-06-14 RX ORDER — HEPARIN SODIUM (PORCINE) LOCK FLUSH IV SOLN 100 UNIT/ML 100 UNIT/ML
500 SOLUTION INTRAVENOUS AS NEEDED
Status: CANCELLED | OUTPATIENT
Start: 2021-06-14

## 2021-06-14 RX ORDER — SODIUM CHLORIDE 0.9 % (FLUSH) 0.9 %
10 SYRINGE (ML) INJECTION AS NEEDED
Status: CANCELLED | OUTPATIENT
Start: 2021-06-14

## 2021-06-14 RX ADMIN — SODIUM CHLORIDE, PRESERVATIVE FREE 10 ML: 5 INJECTION INTRAVENOUS at 15:39

## 2021-06-14 RX ADMIN — Medication 500 UNITS: at 15:39

## 2021-06-14 NOTE — PROGRESS NOTES
Site of Appt/Pickup: (n) [place]; (y) [place]; (n) [place];    Prescriber (ariana) contacted pharmacy to obtain the medication below.    NDC:  86821-357-73  Drug name: xarelto  Strength: 20mg  Total Quantity:  28 (Containers- 4 X Units per Containers- 7)  Lot#:  [id number]  Expiration: 11/21    Prescriber or staff member who picked up medication ariana

## 2021-06-22 ENCOUNTER — TELEPHONE (OUTPATIENT)
Dept: ONCOLOGY | Facility: CLINIC | Age: 81
End: 2021-06-22

## 2021-06-22 ENCOUNTER — OFFICE (OUTPATIENT)
Dept: URBAN - METROPOLITAN AREA CLINIC 66 | Facility: CLINIC | Age: 81
End: 2021-06-22

## 2021-06-22 ENCOUNTER — APPOINTMENT (OUTPATIENT)
Dept: LAB | Facility: HOSPITAL | Age: 81
End: 2021-06-22

## 2021-06-22 VITALS
HEIGHT: 64 IN | SYSTOLIC BLOOD PRESSURE: 116 MMHG | DIASTOLIC BLOOD PRESSURE: 49 MMHG | WEIGHT: 153 LBS | HEART RATE: 69 BPM

## 2021-06-22 DIAGNOSIS — K58.0 IRRITABLE BOWEL SYNDROME WITH DIARRHEA: ICD-10-CM

## 2021-06-22 DIAGNOSIS — K57.90 DIVERTICULOSIS OF INTESTINE, PART UNSPECIFIED, WITHOUT PERFO: ICD-10-CM

## 2021-06-22 DIAGNOSIS — K21.9 GASTRO-ESOPHAGEAL REFLUX DISEASE WITHOUT ESOPHAGITIS: ICD-10-CM

## 2021-06-22 PROCEDURE — 99214 OFFICE O/P EST MOD 30 MIN: CPT | Performed by: INTERNAL MEDICINE

## 2021-06-22 RX ORDER — PANTOPRAZOLE SODIUM 40 MG/1
TABLET, DELAYED RELEASE ORAL
Qty: 90 | Refills: 3 | Status: COMPLETED
End: 2023-07-05

## 2021-06-22 NOTE — TELEPHONE ENCOUNTER
Caller: Martina Blake    Relationship to patient: Self    Best call back number: [phone]    Type of visit: LAB AND RN REVIEW    Requested date: 06/23 OR 06/24    If rescheduling, when is the original appointment: 06/25    Additional notes: PLEASE CALL ONCE R/S.      
Forceps were not used

## 2021-06-23 ENCOUNTER — CLINICAL SUPPORT (OUTPATIENT)
Dept: ONCOLOGY | Facility: HOSPITAL | Age: 81
End: 2021-06-23

## 2021-06-23 ENCOUNTER — LAB (OUTPATIENT)
Dept: LAB | Facility: HOSPITAL | Age: 81
End: 2021-06-23

## 2021-06-23 DIAGNOSIS — D50.0 IRON DEFICIENCY ANEMIA DUE TO CHRONIC BLOOD LOSS: Primary | ICD-10-CM

## 2021-06-23 LAB
BASOPHILS # BLD AUTO: 0.03 10*3/MM3 (ref 0–0.2)
BASOPHILS NFR BLD AUTO: 0.5 % (ref 0–1.5)
DEPRECATED RDW RBC AUTO: 41.1 FL (ref 37–54)
EOSINOPHIL # BLD AUTO: 0.16 10*3/MM3 (ref 0–0.4)
EOSINOPHIL NFR BLD AUTO: 2.7 % (ref 0.3–6.2)
ERYTHROCYTE [DISTWIDTH] IN BLOOD BY AUTOMATED COUNT: 12 % (ref 12.3–15.4)
HCT VFR BLD AUTO: 39.5 % (ref 34–46.6)
HGB BLD-MCNC: 13.4 G/DL (ref 12–15.9)
IMM GRANULOCYTES # BLD AUTO: 0.02 10*3/MM3 (ref 0–0.05)
IMM GRANULOCYTES NFR BLD AUTO: 0.3 % (ref 0–0.5)
LYMPHOCYTES # BLD AUTO: 1.13 10*3/MM3 (ref 0.7–3.1)
LYMPHOCYTES NFR BLD AUTO: 19.2 % (ref 19.6–45.3)
MCH RBC QN AUTO: 31.6 PG (ref 26.6–33)
MCHC RBC AUTO-ENTMCNC: 33.9 G/DL (ref 31.5–35.7)
MCV RBC AUTO: 93.2 FL (ref 79–97)
MONOCYTES # BLD AUTO: 0.65 10*3/MM3 (ref 0.1–0.9)
MONOCYTES NFR BLD AUTO: 11 % (ref 5–12)
NEUTROPHILS NFR BLD AUTO: 3.91 10*3/MM3 (ref 1.7–7)
NEUTROPHILS NFR BLD AUTO: 66.3 % (ref 42.7–76)
NRBC BLD AUTO-RTO: 0 /100 WBC (ref 0–0.2)
PLATELET # BLD AUTO: 99 10*3/MM3 (ref 140–450)
PLATELETS.RETICULATED NFR BLD AUTO: 5.3 % (ref 0.9–6.5)
PMV BLD AUTO: 11 FL (ref 6–12)
RBC # BLD AUTO: 4.24 10*6/MM3 (ref 3.77–5.28)
WBC # BLD AUTO: 5.9 10*3/MM3 (ref 3.4–10.8)

## 2021-06-23 PROCEDURE — 85055 RETICULATED PLATELET ASSAY: CPT

## 2021-06-23 PROCEDURE — G0463 HOSPITAL OUTPT CLINIC VISIT: HCPCS

## 2021-06-23 PROCEDURE — 85025 COMPLETE CBC W/AUTO DIFF WBC: CPT

## 2021-06-23 PROCEDURE — 36415 COLL VENOUS BLD VENIPUNCTURE: CPT

## 2021-06-23 NOTE — NURSING NOTE
Pt is here for lab with RN review.  CBC reviewed with pt, counts are stable for this pt at this time. Pt has no complaints.  Copy of labs given to pt and f/u appt reviewed. Pt is instructed to call the office with any concerns or new symptoms prior to next visit. Pt v/u.   Lab Results   Component Value Date    WBC 5.90 06/23/2021    HGB 13.4 06/23/2021    HCT 39.5 06/23/2021    MCV 93.2 06/23/2021    PLT 99 (L) 06/23/2021

## 2021-06-25 ENCOUNTER — APPOINTMENT (OUTPATIENT)
Dept: ONCOLOGY | Facility: HOSPITAL | Age: 81
End: 2021-06-25

## 2021-06-25 ENCOUNTER — APPOINTMENT (OUTPATIENT)
Dept: LAB | Facility: HOSPITAL | Age: 81
End: 2021-06-25

## 2021-07-02 DIAGNOSIS — C79.51 BONE METASTASIS: ICD-10-CM

## 2021-07-06 NOTE — TELEPHONE ENCOUNTER
PATIENT ONLY HAS MEDICATION FOR TONIGHT AND THEN DOESN'T HAVE ANY MORE. PLEASE CALL PATIENT [phone]

## 2021-07-07 RX ORDER — GABAPENTIN 300 MG/1
600 CAPSULE ORAL
Qty: 60 CAPSULE | Refills: 0 | Status: SHIPPED | OUTPATIENT
Start: 2021-07-07 | End: 2021-08-04

## 2021-07-07 NOTE — TELEPHONE ENCOUNTER
Caller: RUT    Relationship: PATIENT    Best call back number: [phone]     What is the best time to reach you: ASAP    Who are you requesting to speak with (clinical staff, provider,  specific staff member): NURSE    What was the call regarding: RUT CALLED FOR AN UPDATE ON HER REFILL. SHE'S ASKING FOR THE RX TO BE SENT IN TODAY OR TO SPEAK WITH A NURSE. SHE HAS ONLY 1 PILL REMAINING.    Do you require a callback: YES

## 2021-07-12 ENCOUNTER — LAB (OUTPATIENT)
Dept: LAB | Facility: HOSPITAL | Age: 81
End: 2021-07-12

## 2021-07-12 ENCOUNTER — OFFICE VISIT (OUTPATIENT)
Dept: ONCOLOGY | Facility: CLINIC | Age: 81
End: 2021-07-12

## 2021-07-12 ENCOUNTER — CLINICAL SUPPORT (OUTPATIENT)
Dept: ONCOLOGY | Facility: HOSPITAL | Age: 81
End: 2021-07-12

## 2021-07-12 ENCOUNTER — TELEPHONE (OUTPATIENT)
Dept: PHARMACY | Facility: HOSPITAL | Age: 81
End: 2021-07-12

## 2021-07-12 VITALS
WEIGHT: 153.1 LBS | TEMPERATURE: 97.6 F | BODY MASS INDEX: 26.14 KG/M2 | RESPIRATION RATE: 18 BRPM | DIASTOLIC BLOOD PRESSURE: 66 MMHG | HEART RATE: 65 BPM | SYSTOLIC BLOOD PRESSURE: 113 MMHG | HEIGHT: 64 IN | OXYGEN SATURATION: 97 %

## 2021-07-12 DIAGNOSIS — D50.0 IRON DEFICIENCY ANEMIA DUE TO CHRONIC BLOOD LOSS: Primary | ICD-10-CM

## 2021-07-12 DIAGNOSIS — C83.39 DIFFUSE LARGE B-CELL LYMPHOMA OF EXTRANODAL SITE EXCLUDING SPLEEN AND OTHER SOLID ORGANS (HCC): Primary | ICD-10-CM

## 2021-07-12 LAB
BASOPHILS # BLD AUTO: 0.03 10*3/MM3 (ref 0–0.2)
BASOPHILS NFR BLD AUTO: 0.5 % (ref 0–1.5)
DEPRECATED RDW RBC AUTO: 41.1 FL (ref 37–54)
EOSINOPHIL # BLD AUTO: 0.12 10*3/MM3 (ref 0–0.4)
EOSINOPHIL NFR BLD AUTO: 1.8 % (ref 0.3–6.2)
ERYTHROCYTE [DISTWIDTH] IN BLOOD BY AUTOMATED COUNT: 11.9 % (ref 12.3–15.4)
HCT VFR BLD AUTO: 39.7 % (ref 34–46.6)
HGB BLD-MCNC: 13.2 G/DL (ref 12–15.9)
IMM GRANULOCYTES # BLD AUTO: 0.13 10*3/MM3 (ref 0–0.05)
IMM GRANULOCYTES NFR BLD AUTO: 2 % (ref 0–0.5)
LYMPHOCYTES # BLD AUTO: 1.49 10*3/MM3 (ref 0.7–3.1)
LYMPHOCYTES NFR BLD AUTO: 22.9 % (ref 19.6–45.3)
MCH RBC QN AUTO: 31.1 PG (ref 26.6–33)
MCHC RBC AUTO-ENTMCNC: 33.2 G/DL (ref 31.5–35.7)
MCV RBC AUTO: 93.4 FL (ref 79–97)
MONOCYTES # BLD AUTO: 0.84 10*3/MM3 (ref 0.1–0.9)
MONOCYTES NFR BLD AUTO: 12.9 % (ref 5–12)
NEUTROPHILS NFR BLD AUTO: 3.91 10*3/MM3 (ref 1.7–7)
NEUTROPHILS NFR BLD AUTO: 59.9 % (ref 42.7–76)
NRBC BLD AUTO-RTO: 0 /100 WBC (ref 0–0.2)
PLATELET # BLD AUTO: 102 10*3/MM3 (ref 140–450)
PLATELETS.RETICULATED NFR BLD AUTO: 3.9 % (ref 0.9–6.5)
PMV BLD AUTO: 10.7 FL (ref 6–12)
RBC # BLD AUTO: 4.25 10*6/MM3 (ref 3.77–5.28)
WBC # BLD AUTO: 6.52 10*3/MM3 (ref 3.4–10.8)

## 2021-07-12 PROCEDURE — 99213 OFFICE O/P EST LOW 20 MIN: CPT | Performed by: NURSE PRACTITIONER

## 2021-07-12 PROCEDURE — 36415 COLL VENOUS BLD VENIPUNCTURE: CPT

## 2021-07-12 PROCEDURE — 85025 COMPLETE CBC W/AUTO DIFF WBC: CPT

## 2021-07-12 PROCEDURE — G0463 HOSPITAL OUTPT CLINIC VISIT: HCPCS

## 2021-07-12 PROCEDURE — 85055 RETICULATED PLATELET ASSAY: CPT

## 2021-07-12 NOTE — PROGRESS NOTES
"  .   REASON FOR FOLLOW UP:   Management of anticoagulation, Paroxysmal A-fib, Thrombocytopenia    HISTORY OF PRESENT ILLNESS:  The patient is a 81 y.o. year old female  who is here for follow-up with the above-mentioned history here today for  samples of Xarelto 20 mg tablets.  She continues to tolerate the medication well.  Of note, she does have chronic thrombocytopenia. today her platelet count is 102,000.  She denies any blood in her stool or urine.  She denies any recurrent evidence of thrombosis such as chest pain, shortness of breath, or extremity swelling.  He has no other concerns today.  She will continue on Xarelto with platelets over 50,000.    Past Medical History:   Diagnosis Date   • Anemia     multifactorial   • Cystitis    • Cystocele     moderate   • Drug therapy    • Dyslipidemia    • Esophageal reflux    • Fatigue    • History of transfusion     no reaction   • Hypercalcemia    • Hypertension    • Major depression, chronic    • Menopause    • Non Hodgkin's lymphoma (CMS/HCC)    • Osteoarthritis    • Osteoporosis    • Palpitations    • Paroxysmal atrial fibrillation (CMS/HCC)    • Post menopausal problems    • RLS (restless legs syndrome)    • Seizure disorder (CMS/HCC)    • Sinus bradycardia    • Subjective tinnitus    • Vaginal delivery     x2  \"SONYA\"      \"JASON\"   • Vitamin D deficiency      MEDICATIONS    Current Outpatient Medications:   •  amLODIPine (NORVASC) 10 MG tablet, TAKE 1 TABLET BY MOUTH EVERY DAY, Disp: 90 tablet, Rfl: 1  •  Ascorbic Acid (Vitamin C) 500 MG capsule, Take  by mouth., Disp: , Rfl:   •  benazepril (LOTENSIN) 40 MG tablet, TAKE 1 TABLET BY MOUTH EVERY DAY, Disp: 90 tablet, Rfl: 1  •  cetirizine (zyrTEC) 10 MG tablet, Take 1 tablet by mouth Daily As Needed for Allergies., Disp: 30 tablet, Rfl: 1  •  Cholecalciferol (VITAMIN D3) 125 MCG (5000 UT) capsule capsule, Take 5,000 Units by mouth Daily., Disp: , Rfl:   •  escitalopram (LEXAPRO) 20 MG tablet, TAKE 1 TABLET BY " MOUTH DAILY, Disp: 90 tablet, Rfl: 1  •  folic acid (FOLVITE) 400 MCG tablet, Take 400 mcg by mouth daily., Disp: , Rfl:   •  gabapentin (NEURONTIN) 300 MG capsule, TAKE 2 CAPSULES BY MOUTH EVERY NIGHT AT BEDTIME, Disp: 60 capsule, Rfl: 0  •  hydrALAZINE (APRESOLINE) 100 MG tablet, TAKE 1 TABLET BY MOUTH THREE TIMES DAILY, Disp: 270 tablet, Rfl: 3  •  lidocaine-prilocaine (EMLA) 2.5-2.5 % cream, Apply 30 min prior to port access, Disp: 5 g, Rfl: 2  •  melatonin 5 MG tablet tablet, Take 5 mg by mouth Every Night., Disp: , Rfl:   •  Multiple Vitamins-Minerals (ZINC PO), Take  by mouth., Disp: , Rfl:   •  pantoprazole (PROTONIX) 40 MG EC tablet, TAKE 1 TABLET BY MOUTH EVERY DAY, Disp: 90 tablet, Rfl: 1  •  phenytoin (DILANTIN) 100 MG ER capsule, Take 3 capsules by mouth every night at bedtime., Disp: 270 capsule, Rfl: 3  •  predniSONE (DELTASONE) 5 MG tablet, Take 3 tablets by mouth Daily. Or as directed by provider, Disp: 90 tablet, Rfl: 2  •  spironolactone (ALDACTONE) 25 MG tablet, Take 1 tablet by mouth Daily., Disp: 90 tablet, Rfl: 3  •  vitamin B-12 (CYANOCOBALAMIN) 100 MCG tablet, Take 1,000 mcg by mouth Daily., Disp: , Rfl:   •  vitamin B-6 (PYRIDOXINE) 100 MG tablet, Take 100 mg by mouth Daily., Disp: , Rfl:   •  vitamin E 100 UNIT capsule, Take 180 Units by mouth Daily., Disp: , Rfl:   •  Xarelto 20 MG tablet, Take 1 tablet by mouth Daily., Disp: 28 tablet, Rfl: 0  •  Zinc Sulfate (ZINC 15 PO), Take 400 mg by mouth., Disp: , Rfl:     ALLERGIES:     Allergies   Allergen Reactions   • Crab (Diagnostic) Itching and Rash   • Pseudoephedrine Dizziness     REVIEW OF SYSTEMS:  Review of Systems   Constitutional: Negative for chills, fatigue and fever.   Cardiovascular: Negative for chest pain, palpitations and leg swelling.   Gastrointestinal: Negative for blood in stool.   Genitourinary: Negative for hematuria.   Neurological: Negative for dizziness and light-headedness.   ROS reviewed and updated 7/12/2021          There were no vitals filed for this visit.  Current Status 6/14/2021   ECOG score 0     PHYSICAL EXAM:    CONSTITUTIONAL:  Patient alert and oriented x3  EYES:  Conjunctiva and lids unremarkable.  PERRLA  RESPIRATORY:  Breathing unlabored, no acute distress.  MUSCULOSKELETAL:  No lower extremity swelling, erythema or calf tenderness  SKIN:  Warm.  No rashes.  PSYCHIATRIC:  Normal judgment and insight.  Normal mood and affect.    I have reexamined the patient and the results are consistent with the previously documented exam. MELINDA Lopez   RECENT LABS:  Lab Results   Component Value Date    WBC 6.52 07/12/2021    HGB 13.2 07/12/2021    HCT 39.7 07/12/2021    MCV 93.4 07/12/2021     (L) 07/12/2021       Assessment/Plan     PLAN:  I have provided the patient with 1 month supply of Xarelto 20 mg tablets, to be taken once daily. We reviewed the appropriate instructions for medication administration as well as dosing. We have reviewed potential side effects including increased risk of bleeding.  The patient understands this medication will need to be stopped prior to any surgical procedures. The patient understand to call with any questions or concerns.    MELINDA Lopez  7/12/21

## 2021-07-12 NOTE — TELEPHONE ENCOUNTER
Site of Appt/Pickup: (N) [place]; (Y) [place]; (N) [place];    Prescriber (FRANKIE FARRELL) contacted pharmacy to obtain the medication below.    NDC:  [address]-61  Drug name: XARELTO  Strength: 20MG  Total Quantity:  28 (Containers- 7X Units per Containers- 4)  Lot#:  [id number]  Expiration: 1/2022    Prescriber or staff member who picked up medication FRANKIE FARRELL

## 2021-07-12 NOTE — NURSING NOTE
Pt is here for lab with RN review.  CBC reviewed with pt, counts are stable for this pt at this time. Pt has no complaints.  Copy of labs given to pt and f/u appt reviewed. Pt is instructed to call the office with any concerns or new symptoms prior to next visit. Pt v/u.     Lab Results   Component Value Date    WBC 6.52 07/12/2021    HGB 13.2 07/12/2021    HCT 39.7 07/12/2021    MCV 93.4 07/12/2021     (L) 07/12/2021

## 2021-07-22 DIAGNOSIS — D69.6 THROMBOCYTOPENIA (HCC): Primary | ICD-10-CM

## 2021-07-26 ENCOUNTER — DOCUMENTATION (OUTPATIENT)
Dept: PHARMACY | Facility: HOSPITAL | Age: 81
End: 2021-07-26

## 2021-07-26 ENCOUNTER — INFUSION (OUTPATIENT)
Dept: ONCOLOGY | Facility: HOSPITAL | Age: 81
End: 2021-07-26

## 2021-07-26 ENCOUNTER — OFFICE VISIT (OUTPATIENT)
Dept: ONCOLOGY | Facility: CLINIC | Age: 81
End: 2021-07-26

## 2021-07-26 VITALS
BODY MASS INDEX: 26.1 KG/M2 | WEIGHT: 152.9 LBS | RESPIRATION RATE: 18 BRPM | HEIGHT: 64 IN | OXYGEN SATURATION: 96 % | HEART RATE: 62 BPM | DIASTOLIC BLOOD PRESSURE: 73 MMHG | TEMPERATURE: 97.6 F | SYSTOLIC BLOOD PRESSURE: 160 MMHG

## 2021-07-26 DIAGNOSIS — D69.6 THROMBOCYTOPENIA (HCC): ICD-10-CM

## 2021-07-26 DIAGNOSIS — C83.39 DIFFUSE LARGE B-CELL LYMPHOMA OF EXTRANODAL SITE EXCLUDING SPLEEN AND OTHER SOLID ORGANS (HCC): Primary | ICD-10-CM

## 2021-07-26 DIAGNOSIS — Z45.2 ENCOUNTER FOR ADJUSTMENT OR MANAGEMENT OF VASCULAR ACCESS DEVICE: Primary | ICD-10-CM

## 2021-07-26 LAB
BASOPHILS # BLD AUTO: 0.03 10*3/MM3 (ref 0–0.2)
BASOPHILS NFR BLD AUTO: 0.5 % (ref 0–1.5)
DEPRECATED RDW RBC AUTO: 42.2 FL (ref 37–54)
EOSINOPHIL # BLD AUTO: 0.08 10*3/MM3 (ref 0–0.4)
EOSINOPHIL NFR BLD AUTO: 1.3 % (ref 0.3–6.2)
ERYTHROCYTE [DISTWIDTH] IN BLOOD BY AUTOMATED COUNT: 12.1 % (ref 12.3–15.4)
HCT VFR BLD AUTO: 37.1 % (ref 34–46.6)
HGB BLD-MCNC: 12.2 G/DL (ref 12–15.9)
IMM GRANULOCYTES # BLD AUTO: 0.01 10*3/MM3 (ref 0–0.05)
IMM GRANULOCYTES NFR BLD AUTO: 0.2 % (ref 0–0.5)
LYMPHOCYTES # BLD AUTO: 1.09 10*3/MM3 (ref 0.7–3.1)
LYMPHOCYTES NFR BLD AUTO: 18.3 % (ref 19.6–45.3)
MCH RBC QN AUTO: 31.6 PG (ref 26.6–33)
MCHC RBC AUTO-ENTMCNC: 32.9 G/DL (ref 31.5–35.7)
MCV RBC AUTO: 96.1 FL (ref 79–97)
MONOCYTES # BLD AUTO: 0.66 10*3/MM3 (ref 0.1–0.9)
MONOCYTES NFR BLD AUTO: 11.1 % (ref 5–12)
NEUTROPHILS NFR BLD AUTO: 4.09 10*3/MM3 (ref 1.7–7)
NEUTROPHILS NFR BLD AUTO: 68.6 % (ref 42.7–76)
NRBC BLD AUTO-RTO: 0 /100 WBC (ref 0–0.2)
PLATELET # BLD AUTO: 107 10*3/MM3 (ref 140–450)
PLATELETS.RETICULATED NFR BLD AUTO: 4.5 % (ref 0.9–6.5)
PMV BLD AUTO: 10 FL (ref 6–12)
RBC # BLD AUTO: 3.86 10*6/MM3 (ref 3.77–5.28)
WBC # BLD AUTO: 5.96 10*3/MM3 (ref 3.4–10.8)

## 2021-07-26 PROCEDURE — 85055 RETICULATED PLATELET ASSAY: CPT

## 2021-07-26 PROCEDURE — 85025 COMPLETE CBC W/AUTO DIFF WBC: CPT

## 2021-07-26 PROCEDURE — 36591 DRAW BLOOD OFF VENOUS DEVICE: CPT

## 2021-07-26 PROCEDURE — 25010000002 HEPARIN LOCK FLUSH PER 10 UNITS: Performed by: INTERNAL MEDICINE

## 2021-07-26 PROCEDURE — 99213 OFFICE O/P EST LOW 20 MIN: CPT | Performed by: INTERNAL MEDICINE

## 2021-07-26 RX ORDER — BENAZEPRIL HYDROCHLORIDE 40 MG/1
40 TABLET, FILM COATED ORAL DAILY
Qty: 90 TABLET | Refills: 0 | Status: SHIPPED | OUTPATIENT
Start: 2021-07-26 | End: 2021-10-22

## 2021-07-26 RX ORDER — HEPARIN SODIUM (PORCINE) LOCK FLUSH IV SOLN 100 UNIT/ML 100 UNIT/ML
500 SOLUTION INTRAVENOUS AS NEEDED
Status: CANCELLED | OUTPATIENT
Start: 2021-07-26

## 2021-07-26 RX ORDER — SODIUM CHLORIDE 0.9 % (FLUSH) 0.9 %
10 SYRINGE (ML) INJECTION AS NEEDED
Status: CANCELLED | OUTPATIENT
Start: 2021-07-26

## 2021-07-26 RX ORDER — HEPARIN SODIUM (PORCINE) LOCK FLUSH IV SOLN 100 UNIT/ML 100 UNIT/ML
500 SOLUTION INTRAVENOUS AS NEEDED
Status: DISCONTINUED | OUTPATIENT
Start: 2021-07-26 | End: 2021-07-26 | Stop reason: HOSPADM

## 2021-07-26 RX ORDER — SODIUM CHLORIDE 0.9 % (FLUSH) 0.9 %
10 SYRINGE (ML) INJECTION AS NEEDED
Status: DISCONTINUED | OUTPATIENT
Start: 2021-07-26 | End: 2021-07-26 | Stop reason: HOSPADM

## 2021-07-26 RX ADMIN — Medication 10 ML: at 14:56

## 2021-07-26 RX ADMIN — Medication 500 UNITS: at 14:56

## 2021-07-26 NOTE — PROGRESS NOTES
Subjective .  Patient feeling generally well  REASONS FOR FOLLOWUP:      1. 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 Paroxysmal atrial fibrillation  3. History of DVT  4. 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  She is seen regularly in the office thereafter.     Patient when seen September 23, 2020 had developed worsening thrombocytopenia.  Records indicated this had started since her visit in May at approximate time that she started spironolactone.  Antibodies were drawn which are currently pending though her IPF had been 7.6 now October 30 8 down to 6.3 in follow-up CBC with H&H of 12.3 and three 7.4 with white count of 6-40 and platelet count of 60,000 having dropped to as low as 20,000 when seen Freya 3, 2020.  The patient is feeling considerably better, indicates that she has not had any palpitation episodes, notes generally improved stamina, no bleeding episodes.  She has remained off her Xarelto at this point.  The patient is next seen December 21, 2020 continue to feel well with an unchanged platelet count.  Again she has no fever, chills, night sweats, nausea, vomiting, weight loss and normal performance status.     The patient was initially asked to discontinue spironolactone and follow-up to determine response.  Her Xarelto was held and she underwent repeat scans including CT chest abdomen pelvis January 13, 2021 with no evidence of increasing lymphadenopathy in the chest abdomen pelvis or any other findings that might be consistent with recurrence.  Her IPF, however, has slowly increased and is now at 8.2.  We discussed a trial of prednisone for immune related thrombocytopenia under the circumstances that may be drug-related and/or related to her lymphoma.  The patient, as result, started on  prednisone with some delay the platelet count increasing February 4 2 79,000.  Additionally IP have dropped to 5.4 from a high of 8.9 January 27, 2021.  We have contacted by cardiology about restarting spironolactone and this did occur.  As she is seen back February 10, 2020 when she is doing well without additional changes symptomatically your platelet count has now dropped to 66,000.  There has been no other hematologic changes and she has been maintained on her current 20 mg p.o. twice daily of prednisone.      Patient was asked to continue prednisone 20 mg p.o. twice daily and is next February 21, 2021 fortunately feeling well and with a gradual improvement in her platelet count now up to 94,000.  She is having no clear difficulty tolerating her prednisone dosing and we discussed the move to taper her dose.  The patient thereafter underwent marrow examination performed March 19 which was normal cellular with no evidence of malignant lymphoma, trilineage hematopoiesis showing adequate megakaryopoiesis.  Again flow cytometry is negative, cytogenetics pending.  The patient's most recent platelet count March 17 was 1 15,000 with an IPF of 3.2.  The patient went on to continue steroids dropping to 30 mg daily as she is seen March 29, 2021.  At this point her marrow is normal per cytogenetics as well.  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  The patient continued tapering stating at approximately [id number]   4/19/2021 in his neck seen back 4/28/2021 with H&H of 12.3 and 35.4 white count 10,420 platelet count of 106,000 with a normal IPF.  Patient is seen 4/28/2021 is began to have some proximal muscle weakness.  We discussed that she should go on to taper further at this point.  The patient continue to taper prednisone with her platelet count approximately 100,000 is seen back 5/10/2021 at 5 mg prednisone daily.  She is feeling well with an excellent performance status.  The patient's prednisone dose was  "gradually tapered and she maintained approximately 100,000 platelet count now (6/14/2021) at 2.5 mg every other day prednisone.     The patient is assessed 6/14/2021 doing well with acceptable platelet count/that we will now discontinue prednisone.  She had subsequent testing 6/23 and 7/12 with platelet count approximately 100,000, IPF between 4 and 5 respectively.     The patient return for follow-up 7/12 and 7/26 2021 with platelet count above 100,000.  She is feeling well and recently took a vacation without any complications developing.           ONCOLOGIC HISTORY:  (History from previous dates can be found in the separate document.)      Objective      Vitals:    07/26/21 1526   BP: 160/73   Pulse: 62   Resp: 18   Temp: 97.6 °F (36.4 °C)   TempSrc: Temporal   SpO2: 96%   Weight: 69.4 kg (152 lb 14.4 oz)   Height: 162.6 cm (64.02\")   PainSc: 0-No pain     Current Status 7/26/2021   ECOG score 0       Physical Exam   Constitutional: She is oriented to person, place, and time. She appears well-developed. No distress.   HENT:   Head: Normocephalic and atraumatic.   Mouth/Throat: No oropharyngeal exudate.   Eyes: Pupils are equal, round, and reactive to light.   Cardiovascular: Normal rate, regular rhythm and normal heart sounds.   No murmur heard.  Pulmonary/Chest: Effort normal and breath sounds normal. No respiratory distress. She has no wheezes. She has no rhonchi. She has no rales.   Abdominal: Soft. Normal appearance and bowel sounds are normal. She exhibits no distension.   Musculoskeletal: Normal range of motion.   Neurological: She is alert and oriented to person, place, and time.   Skin: Skin is warm and dry. No rash noted.   Vitals reviewed.      RECENT LABS:  Results from last 7 days   Lab Units 07/26/21  1456   WBC 10*3/mm3 5.96   NEUTROS ABS 10*3/mm3 4.09   HEMOGLOBIN g/dL 12.2   HEMATOCRIT % 37.1   PLATELETS 10*3/mm3 107*         Assessment/Plan   1. Stage IVB diffuse large B-cell non-Hodgkin's " lymphoma (double hit lymphoma):  · Presented with weight loss (15 pounds), generalized weakness, fatigue, hypercalcemia of malignancy, .  · PET scan 5/3/18 with diffuse splenic involvement (SUV 16.9), lymphadenopathy throughout upper abdomen and retroperitoneum (SUV 13.1), right liver lesion 5 cm (SUV 12.8), pelvic lymphadenopathy up to 4 cm, bladder wall thickening with associated hypermetabolism, cervical lymphadenopathy left posterior (SUV 11.2), multiple bone lesions including left intertrochanteric femur, ribs, right scapula, pelvis as well as left gluteal hematoma versus lymphomatous lesion.  · CT-guided liver biopsy 4/26/18 with diffuse large B-cell non-Hodgkin's lymphoma, CD20 positive, double hit (BCL-6 rearrangement 85%, MYC rearrangement 79%), KI-67 4+/4  · Left cervical excisional biopsy by ENT Dr. Oconnor 5/1/18 confirming high-grade B cell non-Hodgkin's lymphoma, Ki-67 100%, double hit (BCL-6 rearrangement 76%, MYC rearrangement 85%)  · Echocardiogram 4/11/18 with ejection fraction 66.7%  · Right port placement Dr Gill 5/4/18  · Patient not felt to be a candidate for more aggressive chemotherapy due to performance status and age (DA EPOCH-R)  · Therefore treated with R CHOP chemotherapy 5/4/18.  · Followed by granix 300mcg daily beginning 5/6/18 through 5/14/18.  · Patient reviewed June 19 with near resolution of hypermetabolic sites on PET/CT.  Plans made for third cycle of CHOP R, additional Zometa and total of 6 cycles of chemotherapy anticipated.  · 8/3/18 Cycle 5 CHOP R, reduction of Oncovin 50%, 6 cycles planned.    · Follow-up scan September 19 with small low-density liver lesions and splenic lesions are decreased from previous, numerous small mesenteric and RP nodes again stable slightly smaller.    · Patient assessed February 18, 2019, no evidence of recurrent disease  · Patient seen May 13, 2019, no evidence of recurrent disease  · Patient reviewed again October 28, 2019 with  follow-up scans negative for recurrence,   · Reassessment planned December 21, 2020 via scans after she has developed thrombocytopenia.  · Follow-up CT scan chest and pelvis January 13, 2021 no for evidence of recurrence.    2. Thrombocytopenia.    · Patient had previous myelosuppression with chemo therapy however this had resolved.    · She has however had in a new medication with Spironolactone within the past 3 to 4 months from cardiology.    · Spironolactone was discontinued with gradual improvement in her platelet count October 2020   · December 2020, worsening thrombocytopenia with platelet count of 60,000  · Subsequent testing per laboratory studies without evidence of etiology, slowly advancing IPF with plans to proceed to treat for immunologic thrombocytopenia such as ITP.  The patient subsequently started prednisone at 0.5 mg/kg twice daily.    · Improvement in her platelet count   · 2/25/2021, platelets improved to 94,000 with prednisone decreased to 30 mg  · 3/3/2020, platelets declined to 69,000, prednisone increased to 40 mg  · 3/10/2021 platelets slightly improved at 77,000 on 40 mg prednisone.  This will be continued, the patient will proceed with bone marrow biopsy for further evaluation of thrombocytopenia as her IPF is not elevated at 4.3%.  · 3/19/2021 bone marrow aspirate biopsy negative for lympho-Chauncey involvement, normal trilineage hematopoiesis, including megakaryopoiesis.  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  · 4/13/2021 platelet count is 105,000.  We will decrease the prednisone to 15 mg daily, with plans to decrease by 5 mg weekly if tolerated.  I have sent in a new prescription for 5 mg tablets.  · Patient assessed 4/28, prednisone dropped to 10 mg daily with weekly assessment planned.  · Patient says 5/10/2021 it just under 100,000 for platelet count, prednisone 5 mg daily.  Plans  moved to 5 mg every other day with weekly checks until discontinuance.  · Patient dropped to 2.5 mg every other day and when assessed 6/14/2021 platelet count greater than 100,000, prednisone discontinued.  · Reassessment 7/12 and 7/26 without evidence of recurrence in terms of worsening thrombocytopenia, monthly assessments thereafter planned.    3.Multifactorial anemia:  · Related to anemia chronic disease, chemotherapy, prior iron deficiency.  · Hgb 12.2 g/dL monitor.     4. Hypercalcemia of malignancy:  · Calcium up to 13.8 on 4/21/18 (albumin 3.3).  Intact PTH 8.1, PTH RP less than 2.0  · Received Zometa 4 mg IV 4/25/18.  · Calcium up to 11.3 on 5/7/18 with second dose of Zometa administered 4 mg IV.  · Calcium today has continued to gradually increase up to 11.9 with albumin 3.3.  Ionized calcium however is stable at 6.8 compared to yesterday.  The patient is asymptomatic.  We will not plan to administer a further dose of Zometa just yet and will allow further time for the patient to respond to chemotherapy.  She returned 518 for continued Zometa and when seen May 25 has a normal calcium level.  · Patient to receive Zometa June 19, subsequently every other cycle, restart low-dose calcium supplementation  · Patient seen August 03, 2018, plans made for Zometa with her final course of chemotherapy August 23, 2018  · Patient scheduled for bone density prior to assessment 3 months following November visit, potential Xgeva and follow-up as she is seen back to step to repeat scans are performed in May 2019.  As she is seen May 13 we do plan the Xgeva and then move to Prolia 6 months later for her subsequent treatment for osteopenia.  · Prolia continued every 6 months, last given 6/3/2020, now scheduled December 21, 2020.   · Reassessment January 13th, 2021 normocalcemic      5.  Paroxysmal atrial fibrillation:  · Recently diagnosed, anticoagulated with Eliquis initially, discontinued due to expected thrombocytopenia  from chemotherapy  · Currently in sinus rhythm, continues on Lopressor 50 mg every 12 hours  · Anticoagulated with Xarelto which is continued if platelets are greater than 50,000    6. Prior seizure disorder:  · Continues currently on Dilantin 300 mg daily at bedtime and Neurontin 600 mg daily at bedtime, will return to outpatient dose of Neurontin 300 mg daily at bedtime at discharge.      7. GI prophylaxis:  · Continues Protonix p.o.      8. Venous access:  · Port placement 5/4/18 by Dr. Gill, continue to manage q. 6 weeks    9. DVT  · Anticoagulated with Xarelto 20 mg daily  · Xarelto held if platelets drop less than 50,000    Plan:  1. Change schedule to q. monthly CBC MD oscar 3 months  2. Continue Xarelto 20 mg daily (patient will continue on Xarelto as long as platelets are greater than 50,000).      Chivo Iraheta MD  07/26/2021

## 2021-07-26 NOTE — PROGRESS NOTES
Site of Appt/Pickup: (n) [place]; (y) [place]; (n) [place];    Prescriber (ariana) contacted pharmacy to obtain the medication below.    NDC:  64608-403-42  Drug name: xarelto  Strength: 20mg  Total Quantity:  28 (Containers- 4 X Units per Containers- 7)  Lot#:  [id number]  Expiration: 1/22    Prescriber or staff member who picked up medication Dr Iraheta

## 2021-08-04 DIAGNOSIS — C79.51 BONE METASTASIS: ICD-10-CM

## 2021-08-04 RX ORDER — GABAPENTIN 300 MG/1
600 CAPSULE ORAL
Qty: 60 CAPSULE | Refills: 0 | Status: SHIPPED | OUTPATIENT
Start: 2021-08-04 | End: 2021-08-31 | Stop reason: SDUPTHER

## 2021-08-23 ENCOUNTER — DOCUMENTATION (OUTPATIENT)
Dept: PHARMACY | Facility: HOSPITAL | Age: 81
End: 2021-08-23

## 2021-08-23 ENCOUNTER — APPOINTMENT (OUTPATIENT)
Dept: LAB | Facility: HOSPITAL | Age: 81
End: 2021-08-23

## 2021-08-23 ENCOUNTER — TELEPHONE (OUTPATIENT)
Dept: ONCOLOGY | Facility: CLINIC | Age: 81
End: 2021-08-23

## 2021-08-23 ENCOUNTER — LAB (OUTPATIENT)
Dept: ONCOLOGY | Facility: HOSPITAL | Age: 81
End: 2021-08-23

## 2021-08-23 ENCOUNTER — APPOINTMENT (OUTPATIENT)
Dept: ONCOLOGY | Facility: HOSPITAL | Age: 81
End: 2021-08-23

## 2021-08-23 ENCOUNTER — OFFICE VISIT (OUTPATIENT)
Dept: ONCOLOGY | Facility: CLINIC | Age: 81
End: 2021-08-23

## 2021-08-23 VITALS
RESPIRATION RATE: 18 BRPM | BODY MASS INDEX: 26.07 KG/M2 | OXYGEN SATURATION: 96 % | WEIGHT: 152.7 LBS | DIASTOLIC BLOOD PRESSURE: 74 MMHG | SYSTOLIC BLOOD PRESSURE: 121 MMHG | HEIGHT: 64 IN | TEMPERATURE: 97.7 F | HEART RATE: 65 BPM

## 2021-08-23 DIAGNOSIS — D69.6 THROMBOCYTOPENIA (HCC): ICD-10-CM

## 2021-08-23 DIAGNOSIS — D64.9 ANEMIA, UNSPECIFIED TYPE: Primary | ICD-10-CM

## 2021-08-23 DIAGNOSIS — Z79.01 CHRONIC ANTICOAGULATION: ICD-10-CM

## 2021-08-23 DIAGNOSIS — D50.0 IRON DEFICIENCY ANEMIA DUE TO CHRONIC BLOOD LOSS: Primary | ICD-10-CM

## 2021-08-23 DIAGNOSIS — C79.51 BONE METASTASIS: ICD-10-CM

## 2021-08-23 LAB
BASOPHILS # BLD AUTO: 0.03 10*3/MM3 (ref 0–0.2)
BASOPHILS NFR BLD AUTO: 0.5 % (ref 0–1.5)
DEPRECATED RDW RBC AUTO: 43.7 FL (ref 37–54)
EOSINOPHIL # BLD AUTO: 0.09 10*3/MM3 (ref 0–0.4)
EOSINOPHIL NFR BLD AUTO: 1.5 % (ref 0.3–6.2)
ERYTHROCYTE [DISTWIDTH] IN BLOOD BY AUTOMATED COUNT: 12.5 % (ref 12.3–15.4)
FERRITIN SERPL-MCNC: 113 NG/ML (ref 13–150)
FOLATE SERPL-MCNC: >20 NG/ML (ref 4.78–24.2)
HCT VFR BLD AUTO: 33.7 % (ref 34–46.6)
HGB BLD-MCNC: 11.2 G/DL (ref 12–15.9)
IMM GRANULOCYTES # BLD AUTO: 0.01 10*3/MM3 (ref 0–0.05)
IMM GRANULOCYTES NFR BLD AUTO: 0.2 % (ref 0–0.5)
IRON 24H UR-MRATE: 92 MCG/DL (ref 37–145)
IRON SATN MFR SERPL: 40 % (ref 20–50)
LYMPHOCYTES # BLD AUTO: 1.53 10*3/MM3 (ref 0.7–3.1)
LYMPHOCYTES NFR BLD AUTO: 24.9 % (ref 19.6–45.3)
MCH RBC QN AUTO: 31.5 PG (ref 26.6–33)
MCHC RBC AUTO-ENTMCNC: 33.2 G/DL (ref 31.5–35.7)
MCV RBC AUTO: 94.9 FL (ref 79–97)
MONOCYTES # BLD AUTO: 0.82 10*3/MM3 (ref 0.1–0.9)
MONOCYTES NFR BLD AUTO: 13.4 % (ref 5–12)
NEUTROPHILS NFR BLD AUTO: 3.66 10*3/MM3 (ref 1.7–7)
NEUTROPHILS NFR BLD AUTO: 59.5 % (ref 42.7–76)
NRBC BLD AUTO-RTO: 0 /100 WBC (ref 0–0.2)
PLATELET # BLD AUTO: 109 10*3/MM3 (ref 140–450)
PMV BLD AUTO: 10 FL (ref 6–12)
RBC # BLD AUTO: 3.55 10*6/MM3 (ref 3.77–5.28)
TIBC SERPL-MCNC: 228 MCG/DL (ref 298–536)
TRANSFERRIN SERPL-MCNC: 153 MG/DL (ref 200–360)
VIT B12 BLD-MCNC: 988 PG/ML (ref 211–946)
WBC # BLD AUTO: 6.14 10*3/MM3 (ref 3.4–10.8)

## 2021-08-23 PROCEDURE — 82607 VITAMIN B-12: CPT | Performed by: NURSE PRACTITIONER

## 2021-08-23 PROCEDURE — 84466 ASSAY OF TRANSFERRIN: CPT | Performed by: NURSE PRACTITIONER

## 2021-08-23 PROCEDURE — 83540 ASSAY OF IRON: CPT | Performed by: NURSE PRACTITIONER

## 2021-08-23 PROCEDURE — 82746 ASSAY OF FOLIC ACID SERUM: CPT | Performed by: NURSE PRACTITIONER

## 2021-08-23 PROCEDURE — 85025 COMPLETE CBC W/AUTO DIFF WBC: CPT

## 2021-08-23 PROCEDURE — 99214 OFFICE O/P EST MOD 30 MIN: CPT | Performed by: NURSE PRACTITIONER

## 2021-08-23 PROCEDURE — 36415 COLL VENOUS BLD VENIPUNCTURE: CPT

## 2021-08-23 PROCEDURE — 82728 ASSAY OF FERRITIN: CPT | Performed by: NURSE PRACTITIONER

## 2021-08-23 NOTE — PROGRESS NOTES
Subjective .  Patient feeling generally well  REASONS FOR FOLLOWUP:      1. 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 Paroxysmal atrial fibrillation  3. History of DVT  4. 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  She is seen regularly in the office thereafter.     Patient when seen September 23, 2020 had developed worsening thrombocytopenia.  Records indicated this had started since her visit in May at approximate time that she started spironolactone.  Antibodies were drawn which are currently pending though her IPF had been 7.6 now October 30 8 down to 6.3 in follow-up CBC with H&H of 12.3 and three 7.4 with white count of 6-40 and platelet count of 60,000 having dropped to as low as 20,000 when seen Freya 3, 2020.  The patient is feeling considerably better, indicates that she has not had any palpitation episodes, notes generally improved stamina, no bleeding episodes.  She has remained off her Xarelto at this point.  The patient is next seen December 21, 2020 continue to feel well with an unchanged platelet count.  Again she has no fever, chills, night sweats, nausea, vomiting, weight loss and normal performance status.     The patient was initially asked to discontinue spironolactone and follow-up to determine response.  Her Xarelto was held and she underwent repeat scans including CT chest abdomen pelvis January 13, 2021 with no evidence of increasing lymphadenopathy in the chest abdomen pelvis or any other findings that might be consistent with recurrence.  Her IPF, however, has slowly increased and is now at 8.2.  We discussed a trial of prednisone for immune related thrombocytopenia under the circumstances that may be drug-related and/or related to her lymphoma.  The patient, as result, started on  prednisone with some delay the platelet count increasing February 4 2 79,000.  Additionally IP have dropped to 5.4 from a high of 8.9 January 27, 2021.  We have contacted by cardiology about restarting spironolactone and this did occur.  As she is seen back February 10, 2020 when she is doing well without additional changes symptomatically your platelet count has now dropped to 66,000.  There has been no other hematologic changes and she has been maintained on her current 20 mg p.o. twice daily of prednisone.      Patient was asked to continue prednisone 20 mg p.o. twice daily and is next February 21, 2021 fortunately feeling well and with a gradual improvement in her platelet count now up to 94,000.  She is having no clear difficulty tolerating her prednisone dosing and we discussed the move to taper her dose.  The patient thereafter underwent marrow examination performed March 19 which was normal cellular with no evidence of malignant lymphoma, trilineage hematopoiesis showing adequate megakaryopoiesis.  Again flow cytometry is negative, cytogenetics pending.  The patient's most recent platelet count March 17 was 1 15,000 with an IPF of 3.2.  The patient went on to continue steroids dropping to 30 mg daily as she is seen March 29, 2021.  At this point her marrow is normal per cytogenetics as well.  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  The patient continued tapering stating at approximately [id number]   4/19/2021 in his neck seen back 4/28/2021 with H&H of 12.3 and 35.4 white count 10,420 platelet count of 106,000 with a normal IPF.  Patient is seen 4/28/2021 is began to have some proximal muscle weakness.  We discussed that she should go on to taper further at this point.  The patient continue to taper prednisone with her platelet count approximately 100,000 is seen back 5/10/2021 at 5 mg prednisone daily.  She is feeling well with an excellent performance status.  The patient's prednisone dose was  "gradually tapered and she maintained approximately 100,000 platelet count now (6/14/2021) at 2.5 mg every other day prednisone.     The patient is assessed 6/14/2021 doing well with acceptable platelet count/that we will now discontinue prednisone.  She had subsequent testing 6/23 and 7/12 with platelet count approximately 100,000, IPF between 4 and 5 respectively.     The patient return for follow-up 7/12 and 7/26 2021 with platelet count above 100,000.  She is feeling well and recently took a vacation without any complications developing.     Patient comes in today on 8/23/2021 for Xarelto samples.  She is due for CBC with review today and is noted that her hemoglobin has declined to 11.2.  Patient denies any signs or symptoms of bleeding or dark stools.  She is had no recent fevers or infections.  Platelet count is stable at 109,000.           ONCOLOGIC HISTORY:  (History from previous dates can be found in the separate document.)      Objective      Vitals:    08/23/21 1523   BP: 121/74   Pulse: 65   Resp: 18   Temp: 97.7 °F (36.5 °C)   TempSrc: Temporal   SpO2: 96%   Weight: 69.3 kg (152 lb 11.2 oz)   Height: 162.5 cm (63.98\")   PainSc: 0-No pain     Current Status 8/23/2021   ECOG score 0       Physical Exam   Constitutional: She is oriented to person, place, and time. She appears well-developed. No distress.   HENT:   Head: Normocephalic and atraumatic.   Mouth/Throat: No oropharyngeal exudate.   Eyes: Pupils are equal, round, and reactive to light.   Cardiovascular: Normal rate, regular rhythm and normal heart sounds.   No murmur heard.  Pulmonary/Chest: Effort normal and breath sounds normal. She has no rhonchi.   Abdominal: Normal appearance.   Musculoskeletal: Normal range of motion.   Neurological: She is alert and oriented to person, place, and time.   Skin: Skin is warm and dry. No rash noted.   Vitals reviewed.      RECENT LABS:  Results from last 7 days   Lab Units 08/23/21  1509   WBC 10*3/mm3 6.14 "   NEUTROS ABS 10*3/mm3 3.66   HEMOGLOBIN g/dL 11.2*   HEMATOCRIT % 33.7*   PLATELETS 10*3/mm3 109*         Assessment/Plan   1. 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  · PET scan 5/3/18 with diffuse splenic involvement (SUV 16.9), lymphadenopathy throughout upper abdomen and retroperitoneum (SUV 13.1), right liver lesion 5 cm (SUV 12.8), pelvic lymphadenopathy up to 4 cm, bladder wall thickening with associated hypermetabolism, cervical lymphadenopathy left posterior (SUV 11.2), multiple bone lesions including left intertrochanteric femur, ribs, right scapula, pelvis as well as left gluteal hematoma versus lymphomatous lesion.  · CT-guided liver biopsy 4/26/18 with diffuse large B-cell non-Hodgkin's lymphoma, CD20 positive, double hit (BCL-6 rearrangement 85%, MYC rearrangement 79%), KI-67 4+/4  · Left cervical excisional biopsy by ENT Dr. Oconnor 5/1/18 confirming high-grade B cell non-Hodgkin's lymphoma, Ki-67 100%, double hit (BCL-6 rearrangement 76%, MYC rearrangement 85%)  · Echocardiogram 4/11/18 with ejection fraction 66.7%  · Right port placement Dr Gill 5/4/18  · Patient not felt to be a candidate for more aggressive chemotherapy due to performance status and age (DA EPOCH-R)  · Therefore treated with R CHOP chemotherapy 5/4/18.  · Followed by granix 300mcg daily beginning 5/6/18 through 5/14/18.  · Patient reviewed June 19 with near resolution of hypermetabolic sites on PET/CT.  Plans made for third cycle of CHOP R, additional Zometa and total of 6 cycles of chemotherapy anticipated.  · 8/3/18 Cycle 5 CHOP R, reduction of Oncovin 50%, 6 cycles planned.    · Follow-up scan September 19 with small low-density liver lesions and splenic lesions are decreased from previous, numerous small mesenteric and RP nodes again stable slightly smaller.    · Patient  assessed February 18, 2019, no evidence of recurrent disease  · Patient seen May 13, 2019, no evidence of recurrent disease  · Patient reviewed again October 28, 2019 with follow-up scans negative for recurrence,   · Reassessment planned December 21, 2020 via scans after she has developed thrombocytopenia.  · Follow-up CT scan chest and pelvis January 13, 2021 no for evidence of recurrence.    2. Thrombocytopenia.    · Patient had previous myelosuppression with chemo therapy however this had resolved.    · She has however had in a new medication with Spironolactone within the past 3 to 4 months from cardiology.    · Spironolactone was discontinued with gradual improvement in her platelet count October 2020   · December 2020, worsening thrombocytopenia with platelet count of 60,000  · Subsequent testing per laboratory studies without evidence of etiology, slowly advancing IPF with plans to proceed to treat for immunologic thrombocytopenia such as ITP.  The patient subsequently started prednisone at 0.5 mg/kg twice daily.    · Improvement in her platelet count   · 2/25/2021, platelets improved to 94,000 with prednisone decreased to 30 mg  · 3/3/2020, platelets declined to 69,000, prednisone increased to 40 mg  · 3/10/2021 platelets slightly improved at 77,000 on 40 mg prednisone.  This will be continued, the patient will proceed with bone marrow biopsy for further evaluation of thrombocytopenia as her IPF is not elevated at 4.3%.  · 3/19/2021 bone marrow aspirate biopsy negative for lympho-Chauncey involvement, normal trilineage hematopoiesis, including megakaryopoiesis.  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  · 4/13/2021 platelet count is 105,000.  We will decrease the prednisone to 15 mg daily, with plans to decrease by 5 mg weekly if tolerated.  I have sent in a new prescription for 5 mg tablets.  · Patient assessed  4/28, prednisone dropped to 10 mg daily with weekly assessment planned.  · Patient says 5/10/2021 it just under 100,000 for platelet count, prednisone 5 mg daily.  Plans moved to 5 mg every other day with weekly checks until discontinuance.  · Patient dropped to 2.5 mg every other day and when assessed 6/14/2021 platelet count greater than 100,000, prednisone discontinued.  · Reassessment 7/12 and 7/26 without evidence of recurrence in terms of worsening thrombocytopenia, monthly assessments thereafter planned.  · 8/23/2021 platelet count 109,000    3.Multifactorial anemia:  · Related to anemia chronic disease, chemotherapy, prior iron deficiency.  · Hgb 11.2 g/dL monitor.     4. Hypercalcemia of malignancy:  · Calcium up to 13.8 on 4/21/18 (albumin 3.3).  Intact PTH 8.1, PTH RP less than 2.0  · Received Zometa 4 mg IV 4/25/18.  · Calcium up to 11.3 on 5/7/18 with second dose of Zometa administered 4 mg IV.  · Calcium today has continued to gradually increase up to 11.9 with albumin 3.3.  Ionized calcium however is stable at 6.8 compared to yesterday.  The patient is asymptomatic.  We will not plan to administer a further dose of Zometa just yet and will allow further time for the patient to respond to chemotherapy.  She returned 518 for continued Zometa and when seen May 25 has a normal calcium level.  · Patient to receive Zometa June 19, subsequently every other cycle, restart low-dose calcium supplementation  · Patient seen August 03, 2018, plans made for Zometa with her final course of chemotherapy August 23, 2018  · Patient scheduled for bone density prior to assessment 3 months following November visit, potential Xgeva and follow-up as she is seen back to step to repeat scans are performed in May 2019.  As she is seen May 13 we do plan the Xgeva and then move to Prolia 6 months later for her subsequent treatment for osteopenia.  · Prolia continued every 6 months, last given 6/3/2020, now scheduled December 21,  2020.   · Reassessment January 13th, 2021 normocalcemic      5.  Paroxysmal atrial fibrillation:  · Recently diagnosed, anticoagulated with Eliquis initially, discontinued due to expected thrombocytopenia from chemotherapy  · Currently in sinus rhythm, continues on Lopressor 50 mg every 12 hours  · Anticoagulated with Xarelto which is continued if platelets are greater than 50,000    6. Prior seizure disorder:  · Continues currently on Dilantin 300 mg daily at bedtime and Neurontin 600 mg daily at bedtime, will return to outpatient dose of Neurontin 300 mg daily at bedtime at discharge.      7. GI prophylaxis:  · Continues Protonix p.o.      8. Venous access:  · Port placement 5/4/18 by Dr. Gill, continue to manage q. 6 weeks    9. DVT  · Anticoagulated with Xarelto 20 mg daily  · Xarelto held if platelets drop less than 50,000    Plan:  1. Ferritin, iron profile, B12, and folate obtained today and are pending  2. CBC in 1 month with nurse practitioner review and Xarelto samples.  3. Continue Xarelto 20 mg daily (patient will continue on Xarelto as long as platelets are greater than 50,000).  4. CBC in 2 months with Dr. Myrtle Chávez, APRN  08/23/2021    ADDENDUM: Ferritin, iron profile, B12, and folate resulted and are unremarkable.  Patient did have hemoglobin readings in the 11 range in May and it appears to fluctuate.  We will monitor again in 1 month.

## 2021-08-23 NOTE — PROGRESS NOTES
Site of Appt/Pickup: (n) [place]; (y) [employer]; (n) [employer];    Prescriber (ariana) requested the following medication samples to be given to the patient during the Nurse Practitioner visit.    NDC:  95555-391-81  Drug name: xarelto  Strength: 20mg  Total Quantity:  28 (Containers- 4 X Units per Containers- 7)  Lot#:  [id number]  Expiration: 1/22    Product to be placed in the [employer] under 'Patient Specialty Medication' entry and is to be removed at time of visit.

## 2021-08-23 NOTE — TELEPHONE ENCOUNTER
Caller: RUT FRANCISCO    Relationship to patient: SELF    Best call back number: [phone]    PT WAS TOLD TO CALL AHEAD OF HER APPT TO REMIND THE NURSES TO GET HER XARELTO 20 MG READY. PT'S APPT IS AT 12:30 PM

## 2021-08-24 RX ORDER — GABAPENTIN 300 MG/1
600 CAPSULE ORAL
Qty: 60 CAPSULE | OUTPATIENT
Start: 2021-08-24

## 2021-08-24 NOTE — TELEPHONE ENCOUNTER
REC'D RF FOR NEURONTIN. NOT DUE YET. ATTEMPTED TO CALL PT TO DISCUSS. L/M ASKING PT TO RTN CALL. RF DENIED FOR NOW UNTIL WE CAN S/W PT.

## 2021-08-25 ENCOUNTER — TELEPHONE (OUTPATIENT)
Dept: FAMILY MEDICINE CLINIC | Facility: CLINIC | Age: 81
End: 2021-08-25

## 2021-08-25 NOTE — TELEPHONE ENCOUNTER
Caller: Martina Blake    Relationship: Self    Best call back number:[phone] (M)    What orders are you requesting (i.e. lab or imaging): LAB WORK    In what timeframe would the patient need to come in: 09/20/2021    Where will you receive your lab/imaging services:  CBC    Additional notes: PATIENT WANTS TO KNOW IF SHE IS DUE FOR BLOOD WORK TO BE DONE BECAUSE SHE HAS A PORT AND GETS BLOOD WORK DONE AT THE [facility] AT THE SAME TIME AND HER NEXT APPOINTMENT IS ON 09/20/2021 SO SHE WOULD LIKE TO KNWO IF SHE IS DUE FOR BLOOD WORK ON THAT DATE, PLEASE ADVISE PATIENT ASAP AND IF SHE IS DUE FOR LAB WORK, PLEASE SEND ORDER TO THE [facility] FOR THIS APPOINTMENT DATE

## 2021-08-26 DIAGNOSIS — E78.5 DYSLIPIDEMIA: Primary | ICD-10-CM

## 2021-08-26 DIAGNOSIS — R73.9 HYPERGLYCEMIA: ICD-10-CM

## 2021-08-31 DIAGNOSIS — C79.51 BONE METASTASIS: ICD-10-CM

## 2021-08-31 RX ORDER — GABAPENTIN 300 MG/1
600 CAPSULE ORAL
Qty: 60 CAPSULE | Refills: 2 | Status: SHIPPED | OUTPATIENT
Start: 2021-08-31 | End: 2021-11-29

## 2021-08-31 RX ORDER — GABAPENTIN 300 MG/1
600 CAPSULE ORAL
Qty: 60 CAPSULE | Refills: 0 | Status: CANCELLED | OUTPATIENT
Start: 2021-08-31

## 2021-09-02 ENCOUNTER — OFFICE VISIT (OUTPATIENT)
Dept: CARDIOLOGY | Facility: CLINIC | Age: 81
End: 2021-09-02

## 2021-09-02 VITALS
BODY MASS INDEX: 26.9 KG/M2 | HEART RATE: 61 BPM | SYSTOLIC BLOOD PRESSURE: 120 MMHG | DIASTOLIC BLOOD PRESSURE: 78 MMHG | WEIGHT: 151.8 LBS | HEIGHT: 63 IN

## 2021-09-02 DIAGNOSIS — I10 ESSENTIAL HYPERTENSION: Primary | Chronic | ICD-10-CM

## 2021-09-02 DIAGNOSIS — Z86.718 HISTORY OF DVT (DEEP VEIN THROMBOSIS): ICD-10-CM

## 2021-09-02 DIAGNOSIS — F43.21 GRIEF: ICD-10-CM

## 2021-09-02 DIAGNOSIS — C83.39 DIFFUSE LARGE B-CELL LYMPHOMA OF EXTRANODAL SITE EXCLUDING SPLEEN AND OTHER SOLID ORGANS (HCC): ICD-10-CM

## 2021-09-02 DIAGNOSIS — D69.6 THROMBOCYTOPENIA (HCC): ICD-10-CM

## 2021-09-02 DIAGNOSIS — I48.0 PAROXYSMAL ATRIAL FIBRILLATION (HCC): ICD-10-CM

## 2021-09-02 PROBLEM — R00.1 SINUS BRADYCARDIA: Status: RESOLVED | Noted: 2018-06-28 | Resolved: 2021-09-02

## 2021-09-02 PROCEDURE — 99214 OFFICE O/P EST MOD 30 MIN: CPT | Performed by: NURSE PRACTITIONER

## 2021-09-02 PROCEDURE — 93000 ELECTROCARDIOGRAM COMPLETE: CPT | Performed by: NURSE PRACTITIONER

## 2021-09-02 NOTE — PROGRESS NOTES
Date of Office Visit: 2021  Encounter Provider: MELINDA Mabry  Place of Service: [facility] CARDIOLOGY  Patient Name: Martina Blaek  :1940  Primary Cardiologist: Dr. Jusitne Barrios     Chief Complaint   Patient presents with   • Hypertension   • Follow-up   :     HPI: Martina Blake is a pleasant 81 y.o. female who presents today for follow-up on hypertension and paroxysmal atrial fibrillation. I have reviewed her medical records.    She has hypertension, dyslipidemia, seizure disorder, and esophageal reflux. She had atrial fibrillation in the setting of electrolyte imbalance and diverticulosis. She has been on apixaban and was switched to rivaroxaban. In 2018, echocardiogram showed normal LVEF, grade 2 diastolic dysfunction, severe left atrial enlargement, mild to moderate tricuspid insufficiency, and RVSP elevated at 44 mmHg.    She has known large B-cell lymphoma which is now in remission. In May 2018, she was placed on R-CHOP (rituximab, doxorubicin, vincristine, and cyclophosphamide) finished in 2018 and followed by Dr. Wills. At some point, her apixaban, rivaroxaban, and spironolactone were all discontinued because of thrombocytopenia.    In 2018, repeat echocardiogram showed LVEF 59%, GLS -18.3%, grade 2 diastolic dysfunction, physiologic tricuspid regurgitation, and trace mitral regurgitation.    In 2021, her blood pressure was elevated. The patient felt like the spironolactone really controlled her blood pressure and the metoprolol did not. Dr. Barrios spoke with Dr. Iraheta and they restarted her spironolactone and stopped her metoprolol. In May 2021, follow-up BMP was normal.    She presents today for follow-up visit. Her blood pressure at home is averaging 130s/70s with heart rates in the 60s. Occasionally she sees her systolic blood pressure in the 120s. She reports dyspnea only when going up steps and  "dizziness with quick positional changes. She denies shortness of breath with any of her daily activities. She denies chest pain, PND, orthopnea, cough, edema, palpitations, syncope, or bleeding. Her blood pressure and heart rate are both normal today. Unfortunately, her daughter passed away suddenly from a brain aneurysm in May 2021.    Past Medical History:   Diagnosis Date   • Anemia     multifactorial   • Cystitis    • Cystocele     moderate   • Drug therapy    • Dyslipidemia    • Esophageal reflux    • Fatigue    • History of transfusion     no reaction   • Hypercalcemia    • Hypertension    • Major depression, chronic    • Menopause    • Non Hodgkin's lymphoma (CMS/HCC)    • Osteoarthritis    • Osteoporosis    • Palpitations    • Paroxysmal atrial fibrillation (CMS/HCC)    • Post menopausal problems    • RLS (restless legs syndrome)    • Seizure disorder (CMS/HCC)    • Sinus bradycardia    • Subjective tinnitus    • Vaginal delivery     x2  \"SONYA\"      \"JASON\"   • Vitamin D deficiency        Past Surgical History:   Procedure Laterality Date   • CERVICAL LYMPH NODE BIOPSY/EXCISION Left 5/1/2018    Procedure: CERVICAL LYMPH NODE BIOPSY/EXCISION;  Surgeon: Danny Oconnor MD;  Location: [facility] OR;  Service: ENT   • CHOLECYSTECTOMY     • COLONOSCOPY     • COLONOSCOPY N/A 4/13/2018    Procedure: COLONOSCOPY to terminal ileum;  Surgeon: Dominik Burt MD;  Location: [facility] ENDOSCOPY;  Service: Gastroenterology   • CRANIOTOMY     • ENDOSCOPY N/A 4/13/2018    Procedure: ESOPHAGOGASTRODUODENOSCOPY with biopsy;  Surgeon: Dominik Burt MD;  Location: [facility] ENDOSCOPY;  Service: Gastroenterology   • TOTAL ABDOMINAL HYSTERECTOMY  1980    w/ bladder suspension.  Probably vaginal hysterectomy with anterior colporrhaphy.    • VENOUS ACCESS DEVICE (PORT) INSERTION N/A 5/4/2018    Procedure: INSERTION VENOUS ACCESS DEVICE;  Surgeon: Jamie Gill MD;  Location: [facility] OR;  Service: General   • WRIST SURGERY " Left        Social History     Socioeconomic History   • Marital status:      Spouse name: JL   • Number of children: 2   • Years of education: Not on file   • Highest education level: Not on file   Tobacco Use   • Smoking status: Never Smoker   • Smokeless tobacco: Never Used   Vaping Use   • Vaping Use: Never used   Substance and Sexual Activity   • Alcohol use: No     Comment: Caffeine use: 1 cup daily (decaf)   • Drug use: No   • Sexual activity: Defer     Birth control/protection: Surgical     Comment:  (Jl)       Family History   Problem Relation Age of Onset   • No Known Problems Mother    • Stroke Father 54         @ 60    • No Known Problems Maternal Grandmother    • No Known Problems Maternal Grandfather    • Heart disease Paternal Grandmother    • Heart disease Paternal Grandfather    • No Known Problems Daughter    • Stroke Brother 76   • Diabetes type II Brother        The following portion of the patient's history were reviewed and updated as appropriate: past medical history, past surgical history, past social history, past family history, allergies, current medications, and problem list.    Review of Systems   Constitutional: Negative.   Cardiovascular: Negative.    Respiratory: Negative.    Hematologic/Lymphatic: Negative.    Neurological: Negative.        Allergies   Allergen Reactions   • Crab (Diagnostic) Itching and Rash   • Pseudoephedrine Dizziness         Current Outpatient Medications:   •  amLODIPine (NORVASC) 10 MG tablet, TAKE 1 TABLET BY MOUTH EVERY DAY, Disp: 90 tablet, Rfl: 1  •  Ascorbic Acid (Vitamin C) 500 MG capsule, Take  by mouth., Disp: , Rfl:   •  benazepril (LOTENSIN) 40 MG tablet, Take 1 tablet by mouth Daily. PLEASE CALL THE OFFICE FOR AN APPT, Disp: 90 tablet, Rfl: 0  •  cetirizine (zyrTEC) 10 MG tablet, Take 1 tablet by mouth Daily As Needed for Allergies., Disp: 30 tablet, Rfl: 1  •  Cholecalciferol (VITAMIN D3) 125 MCG (5000 UT) capsule capsule,  "Take 5,000 Units by mouth Daily., Disp: , Rfl:   •  escitalopram (LEXAPRO) 20 MG tablet, TAKE 1 TABLET BY MOUTH DAILY, Disp: 90 tablet, Rfl: 1  •  folic acid (FOLVITE) 400 MCG tablet, Take 400 mcg by mouth daily., Disp: , Rfl:   •  gabapentin (NEURONTIN) 300 MG capsule, Take 2 capsules by mouth every night at bedtime., Disp: 60 capsule, Rfl: 2  •  hydrALAZINE (APRESOLINE) 100 MG tablet, TAKE 1 TABLET BY MOUTH THREE TIMES DAILY, Disp: 270 tablet, Rfl: 3  •  lidocaine-prilocaine (EMLA) 2.5-2.5 % cream, Apply 30 min prior to port access, Disp: 5 g, Rfl: 2  •  melatonin 5 MG tablet tablet, Take 5 mg by mouth Every Night., Disp: , Rfl:   •  Multiple Vitamins-Minerals (ZINC PO), Take  by mouth., Disp: , Rfl:   •  pantoprazole (PROTONIX) 40 MG EC tablet, TAKE 1 TABLET BY MOUTH EVERY DAY, Disp: 90 tablet, Rfl: 1  •  phenytoin (DILANTIN) 100 MG ER capsule, Take 3 capsules by mouth every night at bedtime., Disp: 270 capsule, Rfl: 3  •  spironolactone (ALDACTONE) 25 MG tablet, Take 1 tablet by mouth Daily., Disp: 90 tablet, Rfl: 3  •  vitamin B-12 (CYANOCOBALAMIN) 100 MCG tablet, Take 1,000 mcg by mouth Daily., Disp: , Rfl:   •  vitamin B-6 (PYRIDOXINE) 100 MG tablet, Take 100 mg by mouth Daily., Disp: , Rfl:   •  vitamin E 100 UNIT capsule, Take 180 Units by mouth Daily., Disp: , Rfl:   •  Xarelto 20 MG tablet, Take 1 tablet by mouth Daily., Disp: 28 tablet, Rfl: 0  •  Zinc Sulfate (ZINC 15 PO), Take 400 mg by mouth., Disp: , Rfl:         Objective:     Vitals:    09/02/21 1316   BP: 120/78   Pulse: 61   Weight: 68.9 kg (151 lb 12.8 oz)   Height: 160 cm (63\")     Body mass index is 26.89 kg/m².    PHYSICAL EXAM:    Vitals Reviewed.   General Appearance: No acute distress, well developed and well nourished.    Eyes: Conjunctiva and lids: No erythema, swelling, or discharge. Sclera non-icteric.   HENT: Atraumatic, normocephalic. External eyes, ears, and nose normal. No hearing loss noted. Mucous membranes normal. Lips not " cyanotic. Neck supple with no tenderness.  Respiratory: No signs of respiratory distress. Respiration rhythm and depth normal.   Clear to auscultation. No rales, crackles, rhonchi, or wheezing auscultated.   Cardiovascular:  Jugular Venous Pressure: Normal  Heart Rate and Rhythm: Normal, Heart Sounds: Normal S1 and S2. No S3 or S4 noted.  Murmurs: No murmurs noted. No rubs, thrills, or gallops.   Lower Extremities: No edema noted. Varicose veins noted bilaterally.  Gastrointestinal:  Abdomen soft, non-distended, non-tender. Normal bowel sounds.    Musculoskeletal: Normal movement of extremities  Skin and Nails: General appearance normal. No pallor, cyanosis, diaphoresis. Skin temperature normal. No clubbing of fingernails.   Psychiatric: Patient alert and oriented to person, place, and time. Speech and behavior appropriate. Normal mood and affect.       ECG 12 Lead    Date/Time: 9/2/2021 1:06 PM  Performed by: Carlota Pritchett APRN  Authorized by: Carlota Pritchett APRN   Comparison: compared with previous ECG from 2/4/2021  Rhythm: sinus rhythm  Rate: normal  BPM: 61  Conduction: left anterior fascicular block  ST Segments: ST segments normal  T Waves: T waves normal  QRS axis: normal    Clinical impression: abnormal EKG              Assessment:       Diagnosis Plan   1. Essential hypertension     2. Paroxysmal atrial fibrillation (CMS/HCC)     3. 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 (CMS/HCC)     4. History of DVT (deep vein thrombosis)     5. Thrombocytopenia (CMS/HCC)     6. Grief            Plan:       1. Paroxysmal Atrial Fibrillation: Remains in normal sinus rhythm. She is no longer on metoprolol is back on rivaroxaban for stroke prevention.    2. Hypertension: Blood pressure goal less than 120/80. Blood pressure has been well controlled in the 120s/70s with the addition of spironolactone.    3. Large B cell lymphoma-status post R-CHOP which was stopped in  August 2018. I will defer to Dr. Barrios if she wants to repeat an echocardiogram at some point.    4. History of DVT. On rivaroxaban.    5. Chronic thrombocytopenia.    6. Grief: Unfortunately her daughter passed away from a brain aneurysm in May 2021.    7. I think she is doing very well from a cardiac standpoint. I think she her appointment could go out 1 year, but she prefers to see Dr. Barrios's every 6 months.    As always, it has been a pleasure to participate in your patient's care. Thank you.       Sincerely,       MELINDA Oliva  [facility]      · COVID-19 Precautions - Patient was compliant in wearing a mask. When I saw the patient, I used appropriate personal protective equipment (PPE) including mask and eye shield (standard procedure).  Additionally, I used gown and gloves if indicated.  Hand hygiene was completed before and after seeing the patient.  · Dictated utilizing Dragon Dictation  · I spent 30 minutes reviewing her medical records/testing/previous office notes/labs, face-to-face interaction with patient, physical examination, formulating the plan of care, and discussion of plan of care with patient.

## 2021-09-16 ENCOUNTER — TELEPHONE (OUTPATIENT)
Dept: FAMILY MEDICINE CLINIC | Facility: CLINIC | Age: 81
End: 2021-09-16

## 2021-09-16 ENCOUNTER — TELEPHONE (OUTPATIENT)
Dept: ONCOLOGY | Facility: CLINIC | Age: 81
End: 2021-09-16

## 2021-09-16 NOTE — TELEPHONE ENCOUNTER
I spoke with Sanket at the [facility] and he said he couldn't see the lab orders. I faxed them to [phone]. Pt is aware    Per Sanket, sometimes they can see our lab orders and sometimes they can't.

## 2021-09-16 NOTE — TELEPHONE ENCOUNTER
Provider: SWETHA    Caller: RUT    Relationship to Patient: SELF    Phone Number: [phone]    Reason for Call: PT HAS APPT 9/20. PT REQUEST SAMPLES OF XERELLTO AVAILABLE AT HER APPT

## 2021-09-16 NOTE — TELEPHONE ENCOUNTER
PT CALLING ASKING FOR XARELTO SAMPLES AT HER NEXT VISIT AND TO DRAW OUTSIDE LAB ORDERS. MESSAGED APPT DESK TO MAKE HER NEXT APPT ONE TO SEE THE NP. PT V/U.

## 2021-09-16 NOTE — TELEPHONE ENCOUNTER
Caller: Martina Blake    Relationship: Self    Best call back number: [phone]    What orders are you requesting (i.e. lab or imaging): BLOODWORK ORDERS FOR EVERY 6 MONTHS     In what timeframe would the patient need to come in: ASAP    Where will you receive your lab/imaging services: [facility]    Additional notes: PATIENT STATES THEY HAVE NOT RECEIVED THE ORDERS

## 2021-09-20 ENCOUNTER — APPOINTMENT (OUTPATIENT)
Dept: ONCOLOGY | Facility: HOSPITAL | Age: 81
End: 2021-09-20

## 2021-09-20 ENCOUNTER — LAB (OUTPATIENT)
Dept: ONCOLOGY | Facility: HOSPITAL | Age: 81
End: 2021-09-20

## 2021-09-20 ENCOUNTER — OFFICE VISIT (OUTPATIENT)
Dept: ONCOLOGY | Facility: CLINIC | Age: 81
End: 2021-09-20

## 2021-09-20 ENCOUNTER — TELEPHONE (OUTPATIENT)
Dept: PHARMACY | Facility: HOSPITAL | Age: 81
End: 2021-09-20

## 2021-09-20 VITALS
RESPIRATION RATE: 14 BRPM | HEIGHT: 63 IN | WEIGHT: 153.4 LBS | BODY MASS INDEX: 27.18 KG/M2 | TEMPERATURE: 97.5 F | SYSTOLIC BLOOD PRESSURE: 164 MMHG | DIASTOLIC BLOOD PRESSURE: 75 MMHG | HEART RATE: 62 BPM | OXYGEN SATURATION: 100 %

## 2021-09-20 DIAGNOSIS — D69.3 ACUTE ITP (HCC): ICD-10-CM

## 2021-09-20 DIAGNOSIS — C83.39 DIFFUSE LARGE B-CELL LYMPHOMA OF EXTRANODAL SITE EXCLUDING SPLEEN AND OTHER SOLID ORGANS (HCC): Primary | ICD-10-CM

## 2021-09-20 DIAGNOSIS — Z79.01 CHRONIC ANTICOAGULATION: ICD-10-CM

## 2021-09-20 DIAGNOSIS — D50.0 IRON DEFICIENCY ANEMIA DUE TO CHRONIC BLOOD LOSS: Primary | ICD-10-CM

## 2021-09-20 DIAGNOSIS — Z45.2 ENCOUNTER FOR ADJUSTMENT OR MANAGEMENT OF VASCULAR ACCESS DEVICE: ICD-10-CM

## 2021-09-20 LAB
BASOPHILS # BLD AUTO: 0.03 10*3/MM3 (ref 0–0.2)
BASOPHILS NFR BLD AUTO: 0.7 % (ref 0–1.5)
DEPRECATED RDW RBC AUTO: 44.4 FL (ref 37–54)
EOSINOPHIL # BLD AUTO: 0.1 10*3/MM3 (ref 0–0.4)
EOSINOPHIL NFR BLD AUTO: 2.2 % (ref 0.3–6.2)
ERYTHROCYTE [DISTWIDTH] IN BLOOD BY AUTOMATED COUNT: 12.7 % (ref 12.3–15.4)
FERRITIN SERPL-MCNC: 154.9 NG/ML (ref 13–150)
FOLATE SERPL-MCNC: >20 NG/ML (ref 4.78–24.2)
HCT VFR BLD AUTO: 33.3 % (ref 34–46.6)
HGB BLD-MCNC: 11 G/DL (ref 12–15.9)
IMM GRANULOCYTES # BLD AUTO: 0.01 10*3/MM3 (ref 0–0.05)
IMM GRANULOCYTES NFR BLD AUTO: 0.2 % (ref 0–0.5)
IRON 24H UR-MRATE: 98 MCG/DL (ref 37–145)
IRON SATN MFR SERPL: 42 % (ref 14–48)
LYMPHOCYTES # BLD AUTO: 1.08 10*3/MM3 (ref 0.7–3.1)
LYMPHOCYTES NFR BLD AUTO: 24.1 % (ref 19.6–45.3)
MCH RBC QN AUTO: 31.8 PG (ref 26.6–33)
MCHC RBC AUTO-ENTMCNC: 33 G/DL (ref 31.5–35.7)
MCV RBC AUTO: 96.2 FL (ref 79–97)
MONOCYTES # BLD AUTO: 0.56 10*3/MM3 (ref 0.1–0.9)
MONOCYTES NFR BLD AUTO: 12.5 % (ref 5–12)
NEUTROPHILS NFR BLD AUTO: 2.71 10*3/MM3 (ref 1.7–7)
NEUTROPHILS NFR BLD AUTO: 60.3 % (ref 42.7–76)
NRBC BLD AUTO-RTO: 0 /100 WBC (ref 0–0.2)
PLATELET # BLD AUTO: 106 10*3/MM3 (ref 140–450)
PMV BLD AUTO: 10 FL (ref 6–12)
RBC # BLD AUTO: 3.46 10*6/MM3 (ref 3.77–5.28)
TIBC SERPL-MCNC: 231 MCG/DL (ref 249–505)
TRANSFERRIN SERPL-MCNC: 165 MG/DL (ref 200–360)
VIT B12 BLD-MCNC: 977 PG/ML (ref 211–946)
WBC # BLD AUTO: 4.49 10*3/MM3 (ref 3.4–10.8)

## 2021-09-20 PROCEDURE — 84466 ASSAY OF TRANSFERRIN: CPT

## 2021-09-20 PROCEDURE — 82607 VITAMIN B-12: CPT | Performed by: INTERNAL MEDICINE

## 2021-09-20 PROCEDURE — 99213 OFFICE O/P EST LOW 20 MIN: CPT | Performed by: NURSE PRACTITIONER

## 2021-09-20 PROCEDURE — 25010000002 HEPARIN LOCK FLUSH PER 10 UNITS: Performed by: INTERNAL MEDICINE

## 2021-09-20 PROCEDURE — 82728 ASSAY OF FERRITIN: CPT

## 2021-09-20 PROCEDURE — 36591 DRAW BLOOD OFF VENOUS DEVICE: CPT

## 2021-09-20 PROCEDURE — 82746 ASSAY OF FOLIC ACID SERUM: CPT | Performed by: INTERNAL MEDICINE

## 2021-09-20 PROCEDURE — 85025 COMPLETE CBC W/AUTO DIFF WBC: CPT

## 2021-09-20 PROCEDURE — 83540 ASSAY OF IRON: CPT

## 2021-09-20 RX ORDER — HEPARIN SODIUM (PORCINE) LOCK FLUSH IV SOLN 100 UNIT/ML 100 UNIT/ML
500 SOLUTION INTRAVENOUS AS NEEDED
Status: DISCONTINUED | OUTPATIENT
Start: 2021-09-20 | End: 2021-09-20 | Stop reason: HOSPADM

## 2021-09-20 RX ORDER — SODIUM CHLORIDE 0.9 % (FLUSH) 0.9 %
10 SYRINGE (ML) INJECTION AS NEEDED
Status: CANCELLED | OUTPATIENT
Start: 2021-09-20

## 2021-09-20 RX ORDER — HEPARIN SODIUM (PORCINE) LOCK FLUSH IV SOLN 100 UNIT/ML 100 UNIT/ML
500 SOLUTION INTRAVENOUS AS NEEDED
Status: CANCELLED | OUTPATIENT
Start: 2021-09-20

## 2021-09-20 RX ORDER — SODIUM CHLORIDE 0.9 % (FLUSH) 0.9 %
10 SYRINGE (ML) INJECTION AS NEEDED
Status: DISCONTINUED | OUTPATIENT
Start: 2021-09-20 | End: 2021-09-20 | Stop reason: HOSPADM

## 2021-09-20 RX ADMIN — Medication 10 ML: at 12:46

## 2021-09-20 RX ADMIN — Medication 500 UNITS: at 12:46

## 2021-09-20 NOTE — PROGRESS NOTES
"  Subjective .    REASONS FOR FOLLOWUP:    1. 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 Paroxysmal atrial fibrillation  3. History of DVT  4. 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 female with the above-mentioned history, returns the office today     ONCOLOGIC HISTORY:  (History from previous dates can be found in the separate document.)    Objective      Vitals:    09/20/21 1303   BP: 164/75   Pulse: 62   Resp: 14   Temp: 97.5 °F (36.4 °C)   TempSrc: Temporal   SpO2: 100%   Weight: 69.6 kg (153 lb 6.4 oz)   Height: 160 cm (62.99\")   PainSc: 0-No pain     Current Status 8/23/2021   ECOG score 0       Physical Exam   Constitutional: She is oriented to person, place, and time. She appears well-developed.   HENT:   Head: Normocephalic and atraumatic.   Eyes: Pupils are equal, round, and reactive to light.   Cardiovascular: Normal rate.   Pulmonary/Chest: Effort normal and breath sounds normal. She has no wheezes.   Abdominal: Normal appearance and bowel sounds are normal.   Musculoskeletal: Normal range of motion.   Neurological: She is alert and oriented to person, place, and time.   Skin: Skin is warm and dry. No lesion noted.   Psychiatric: Her behavior is normal. Mood normal.   Vitals reviewed.  I have reexamined the patient and the results are consistent with the previously documented exam. Daina Caro, MELINDA       RECENT LABS:  Results from last 7 days   Lab Units 09/20/21  1248   WBC 10*3/mm3 4.49   NEUTROS ABS 10*3/mm3 2.71   HEMOGLOBIN g/dL 11.0*   HEMATOCRIT % 33.3*   PLATELETS 10*3/mm3 106*     Results from last 7 days   Lab Units 09/20/21  1248   FERRITIN ng/mL 154.90*   IRON mcg/dL 98   TIBC mcg/dL 231*     Assessment/Plan   1. 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  · PET scan 5/3/18 with diffuse splenic involvement (SUV 16.9), lymphadenopathy throughout upper abdomen and retroperitoneum (SUV 13.1), right liver lesion 5 cm (SUV 12.8), pelvic lymphadenopathy up to 4 cm, bladder wall thickening with associated hypermetabolism, cervical lymphadenopathy left posterior (SUV 11.2), multiple bone lesions including left intertrochanteric femur, ribs, right scapula, pelvis as well as left gluteal hematoma versus lymphomatous lesion.  · CT-guided liver biopsy 4/26/18 with diffuse large B-cell non-Hodgkin's lymphoma, CD20 positive, double hit (BCL-6 rearrangement 85%, MYC rearrangement 79%), KI-67 4+/4  · Left cervical excisional biopsy by ENT Dr. Oconnor 5/1/18 confirming high-grade B cell non-Hodgkin's lymphoma, Ki-67 100%, double hit (BCL-6 rearrangement 76%, MYC rearrangement 85%)  · Echocardiogram 4/11/18 with ejection fraction 66.7%  · Right port placement Dr Gill 5/4/18  · Patient not felt to be a candidate for more aggressive chemotherapy due to performance status and age (DA EPOCH-R)  · Therefore treated with R CHOP chemotherapy 5/4/18.  · Followed by granix 300mcg daily beginning 5/6/18 through 5/14/18.  · Patient reviewed June 19 with near resolution of hypermetabolic sites on PET/CT.  Plans made for third cycle of CHOP R, additional Zometa and total of 6 cycles of chemotherapy anticipated.  · 8/3/18 Cycle 5 CHOP R, reduction of Oncovin 50%, 6 cycles planned.    · Follow-up scan September 19 with small low-density liver lesions and splenic lesions are decreased from previous, numerous small mesenteric and RP nodes again stable slightly smaller.    · Patient assessed February 18, 2019, no evidence of recurrent disease  · Patient seen May 13, 2019, no evidence of recurrent disease  · Patient reviewed again October 28, 2019 with follow-up scans negative for recurrence,   · Reassessment planned December 21, 2020 via scans after she has developed  thrombocytopenia.  · Follow-up CT scan chest and pelvis January 13, 2021 no for evidence of recurrence.    2. Thrombocytopenia.    · Patient had previous myelosuppression with chemo therapy however this had resolved.    · She has however had in a new medication with Spironolactone within the past 3 to 4 months from cardiology.    · Spironolactone was discontinued with gradual improvement in her platelet count October 2020   · December 2020, worsening thrombocytopenia with platelet count of 60,000  · Subsequent testing per laboratory studies without evidence of etiology, slowly advancing IPF with plans to proceed to treat for immunologic thrombocytopenia such as ITP.  The patient subsequently started prednisone at 0.5 mg/kg twice daily.    · Improvement in her platelet count   · 2/25/2021, platelets improved to 94,000 with prednisone decreased to 30 mg  · 3/3/2020, platelets declined to 69,000, prednisone increased to 40 mg  · Bone marrow biopsy and aspirate 3/19/2021 - for lymphoma involvement, normal trilineage hematopoiesis, including megakaryopoiesis.  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  · Hgb 11.3 today.     4. Hypercalcemia of malignancy:  · Calcium up to 13.8 on 4/21/18 (albumin 3.3).  Intact PTH 8.1, PTH RP less than 2.0  · Received Zometa 4 mg IV 4/25/18.  · Calcium up to 11.3 on 5/7/18 with second dose of Zometa administered 4 mg IV.  · Calcium today has continued to gradually increase up to 11.9 with albumin 3.3.  Ionized calcium however is stable at 6.8 compared to yesterday.  The patient is asymptomatic.  We will not plan to administer a further dose of Zometa just yet and will allow further time for the patient to respond to chemotherapy.  She returned 518 for continued  Zometa and when seen May 25 has a normal calcium level.  · Patient to receive Zometa June 19, subsequently every other cycle, restart low-dose calcium supplementation  · Patient seen August 03, 2018, plans made for Zometa with her final course of chemotherapy August 23, 2018  · Patient scheduled for bone density prior to assessment 3 months following November visit, potential Xgeva and follow-up as she is seen back to step to repeat scans are performed in May 2019.  As she is seen May 13 we do plan the Xgeva and then move to Prolia 6 months later for her subsequent treatment for osteopenia.  · Prolia continued every 6 months, last given 6/3/2020, now scheduled December 21, 2020.   · 5/5/2021 calcium of 8.9      5.  Paroxysmal atrial fibrillation:  · Recently diagnosed, anticoagulated with Eliquis initially, discontinued due to expected thrombocytopenia from chemotherapy  · Currently in sinus rhythm, continues on Lopressor 50 mg every 12 hours  · Anticoagulated with Xarelto which is continued if platelets are greater than 50,000    6. Prior seizure disorder:  · Continues currently on Dilantin 300 mg daily at bedtime and Neurontin 600 mg daily at bedtime, will return to outpatient dose of Neurontin 300 mg daily at bedtime at discharge.      7. GI prophylaxis:  · Continues Protonix p.o.      8. Venous access:  · Port placement 5/4/18 by Dr. Gill, continue to manage q. Monthly    9. DVT  · Anticoagulated with Xarelto 20 mg daily  · Xarelto held if platelets drop less than 50,000  · Currently tolerating Xarelto well, without signs or symptoms of bleeding.  No evidence of recurrent thrombosis    Plan:  1. I have provided the patient samples of her anticoagulation today with Xarelto 20 mg daily.  She understands to call if she develops signs or symptoms of bleeding.  Anticoagulation will be continued so long as platelets are greater than 50,000.  2. She has no upcoming surgical procedures  3. No evidence of recurrent  lymphoma at this time  4. Follow-up with Dr. Iraheta in 1 month with CBC, CMP.  Samples will be provided at that visit.    Daina Caro, APRN  09/20/2021

## 2021-09-20 NOTE — TELEPHONE ENCOUNTER
Site of Appt/Pickup: (n) [place]; (y) [place]; (n) [place];    Prescriber (Vania) contacted pharmacy to obtain the medication below.    NDC:  [address]-16  Drug name: Xarelto  Strength: 20mg  Total Quantity:  28 (Containers- 4 X Units per Containers- 7)  Lot#:  [id number]  Expiration: 04/2023    Prescriber or staff member who picked up medication DESIRAE PADILLA

## 2021-09-21 LAB
CHOLEST SERPL-MCNC: 176 MG/DL (ref 100–199)
HBA1C MFR BLD: 5.7 % (ref 4.8–5.6)
HDLC SERPL-MCNC: 50 MG/DL
LDLC SERPL CALC-MCNC: 111 MG/DL (ref 0–99)
TRIGL SERPL-MCNC: 78 MG/DL (ref 0–149)
VLDLC SERPL CALC-MCNC: 15 MG/DL (ref 5–40)

## 2021-09-27 ENCOUNTER — TELEPHONE (OUTPATIENT)
Dept: FAMILY MEDICINE CLINIC | Facility: CLINIC | Age: 81
End: 2021-09-27

## 2021-09-27 NOTE — TELEPHONE ENCOUNTER
Caller: Martina Blake    Relationship: Self    Best call back number: [phone] (M)    What form or medical record are you requesting: LAB RESULTS     Who is requesting this form or medical record from you: PATIENT     How would you like to receive the form or medical records (pick-up, mail, fax): MAILED    If mail, what is the address: [address] [place] [id number]    Timeframe paperwork needed: ASAP    Additional notes: PATIENT CALLED AND WOULD LIKE TO HAVE A COPY OF HER LAB RESULTS MAILED TO HOME ADDRESS ON FILE.       THANKS

## 2021-10-14 DIAGNOSIS — D69.3 ACUTE ITP (HCC): Primary | ICD-10-CM

## 2021-10-14 DIAGNOSIS — C83.39 DIFFUSE LARGE B-CELL LYMPHOMA OF EXTRANODAL SITE EXCLUDING SPLEEN AND OTHER SOLID ORGANS (HCC): ICD-10-CM

## 2021-10-18 ENCOUNTER — LAB (OUTPATIENT)
Dept: ONCOLOGY | Facility: HOSPITAL | Age: 81
End: 2021-10-18

## 2021-10-18 ENCOUNTER — DOCUMENTATION (OUTPATIENT)
Dept: PHARMACY | Facility: HOSPITAL | Age: 81
End: 2021-10-18

## 2021-10-18 ENCOUNTER — OFFICE VISIT (OUTPATIENT)
Dept: ONCOLOGY | Facility: CLINIC | Age: 81
End: 2021-10-18

## 2021-10-18 VITALS
DIASTOLIC BLOOD PRESSURE: 68 MMHG | WEIGHT: 151 LBS | TEMPERATURE: 97.1 F | HEART RATE: 70 BPM | HEIGHT: 63 IN | SYSTOLIC BLOOD PRESSURE: 138 MMHG | OXYGEN SATURATION: 97 % | BODY MASS INDEX: 26.75 KG/M2 | RESPIRATION RATE: 16 BRPM

## 2021-10-18 DIAGNOSIS — D69.6 THROMBOCYTOPENIA (HCC): ICD-10-CM

## 2021-10-18 DIAGNOSIS — C79.51 BONE METASTASIS: ICD-10-CM

## 2021-10-18 DIAGNOSIS — C83.39 DIFFUSE LARGE B-CELL LYMPHOMA OF EXTRANODAL SITE EXCLUDING SPLEEN AND OTHER SOLID ORGANS (HCC): Primary | ICD-10-CM

## 2021-10-18 DIAGNOSIS — Z86.718 HISTORY OF THROMBOSIS: ICD-10-CM

## 2021-10-18 DIAGNOSIS — Z45.2 ENCOUNTER FOR ADJUSTMENT OR MANAGEMENT OF VASCULAR ACCESS DEVICE: ICD-10-CM

## 2021-10-18 LAB
ALBUMIN SERPL-MCNC: 4.3 G/DL (ref 3.5–5.2)
ALBUMIN/GLOB SERPL: 1.7 G/DL (ref 1.1–2.4)
ALP SERPL-CCNC: 90 U/L (ref 38–116)
ALT SERPL W P-5'-P-CCNC: 12 U/L (ref 0–33)
ANION GAP SERPL CALCULATED.3IONS-SCNC: 9.3 MMOL/L (ref 5–15)
AST SERPL-CCNC: 20 U/L (ref 0–32)
BASOPHILS # BLD AUTO: 0.02 10*3/MM3 (ref 0–0.2)
BASOPHILS NFR BLD AUTO: 0.4 % (ref 0–1.5)
BILIRUB SERPL-MCNC: 0.3 MG/DL (ref 0.2–1.2)
BUN SERPL-MCNC: 23 MG/DL (ref 6–20)
BUN/CREAT SERPL: 30.3 (ref 7.3–30)
CALCIUM SPEC-SCNC: 9.4 MG/DL (ref 8.5–10.2)
CHLORIDE SERPL-SCNC: 103 MMOL/L (ref 98–107)
CO2 SERPL-SCNC: 27.7 MMOL/L (ref 22–29)
CREAT SERPL-MCNC: 0.76 MG/DL (ref 0.6–1.1)
DEPRECATED RDW RBC AUTO: 44.3 FL (ref 37–54)
EOSINOPHIL # BLD AUTO: 0.08 10*3/MM3 (ref 0–0.4)
EOSINOPHIL NFR BLD AUTO: 1.4 % (ref 0.3–6.2)
ERYTHROCYTE [DISTWIDTH] IN BLOOD BY AUTOMATED COUNT: 12.6 % (ref 12.3–15.4)
GFR SERPL CREATININE-BSD FRML MDRD: 73 ML/MIN/1.73
GLOBULIN UR ELPH-MCNC: 2.6 GM/DL (ref 1.8–3.5)
GLUCOSE SERPL-MCNC: 63 MG/DL (ref 74–124)
HCT VFR BLD AUTO: 37.3 % (ref 34–46.6)
HGB BLD-MCNC: 12.1 G/DL (ref 12–15.9)
IMM GRANULOCYTES # BLD AUTO: 0.01 10*3/MM3 (ref 0–0.05)
IMM GRANULOCYTES NFR BLD AUTO: 0.2 % (ref 0–0.5)
LYMPHOCYTES # BLD AUTO: 1.22 10*3/MM3 (ref 0.7–3.1)
LYMPHOCYTES NFR BLD AUTO: 21.7 % (ref 19.6–45.3)
MCH RBC QN AUTO: 31.3 PG (ref 26.6–33)
MCHC RBC AUTO-ENTMCNC: 32.4 G/DL (ref 31.5–35.7)
MCV RBC AUTO: 96.6 FL (ref 79–97)
MONOCYTES # BLD AUTO: 0.78 10*3/MM3 (ref 0.1–0.9)
MONOCYTES NFR BLD AUTO: 13.9 % (ref 5–12)
NEUTROPHILS NFR BLD AUTO: 3.52 10*3/MM3 (ref 1.7–7)
NEUTROPHILS NFR BLD AUTO: 62.4 % (ref 42.7–76)
NRBC BLD AUTO-RTO: 0 /100 WBC (ref 0–0.2)
PLATELET # BLD AUTO: 115 10*3/MM3 (ref 140–450)
PMV BLD AUTO: 10.1 FL (ref 6–12)
POTASSIUM SERPL-SCNC: 3.9 MMOL/L (ref 3.5–4.7)
PROT SERPL-MCNC: 6.9 G/DL (ref 6.3–8)
RBC # BLD AUTO: 3.86 10*6/MM3 (ref 3.77–5.28)
SODIUM SERPL-SCNC: 140 MMOL/L (ref 134–145)
WBC # BLD AUTO: 5.63 10*3/MM3 (ref 3.4–10.8)

## 2021-10-18 PROCEDURE — 85025 COMPLETE CBC W/AUTO DIFF WBC: CPT

## 2021-10-18 PROCEDURE — 25010000002 HEPARIN LOCK FLUSH PER 10 UNITS: Performed by: INTERNAL MEDICINE

## 2021-10-18 PROCEDURE — 36591 DRAW BLOOD OFF VENOUS DEVICE: CPT

## 2021-10-18 PROCEDURE — 36415 COLL VENOUS BLD VENIPUNCTURE: CPT

## 2021-10-18 PROCEDURE — 99214 OFFICE O/P EST MOD 30 MIN: CPT | Performed by: INTERNAL MEDICINE

## 2021-10-18 PROCEDURE — 80053 COMPREHEN METABOLIC PANEL: CPT

## 2021-10-18 RX ORDER — SODIUM CHLORIDE 0.9 % (FLUSH) 0.9 %
10 SYRINGE (ML) INJECTION AS NEEDED
Status: CANCELLED | OUTPATIENT
Start: 2021-10-18

## 2021-10-18 RX ORDER — CAMPHOR 0.45 %
GEL (GRAM) TOPICAL DAILY PRN
COMMUNITY
Start: 2021-09-10

## 2021-10-18 RX ORDER — TRIAMCINOLONE ACETONIDE 1 MG/G
CREAM TOPICAL SEE ADMIN INSTRUCTIONS
COMMUNITY
Start: 2021-09-15

## 2021-10-18 RX ORDER — HEPARIN SODIUM (PORCINE) LOCK FLUSH IV SOLN 100 UNIT/ML 100 UNIT/ML
500 SOLUTION INTRAVENOUS AS NEEDED
Status: CANCELLED | OUTPATIENT
Start: 2021-10-18

## 2021-10-18 RX ORDER — HEPARIN SODIUM (PORCINE) LOCK FLUSH IV SOLN 100 UNIT/ML 100 UNIT/ML
500 SOLUTION INTRAVENOUS AS NEEDED
Status: DISCONTINUED | OUTPATIENT
Start: 2021-10-18 | End: 2021-10-18 | Stop reason: HOSPADM

## 2021-10-18 RX ORDER — SODIUM CHLORIDE 0.9 % (FLUSH) 0.9 %
10 SYRINGE (ML) INJECTION AS NEEDED
Status: DISCONTINUED | OUTPATIENT
Start: 2021-10-18 | End: 2021-10-18 | Stop reason: HOSPADM

## 2021-10-18 RX ADMIN — Medication 10 ML: at 13:31

## 2021-10-18 RX ADMIN — Medication 500 UNITS: at 13:31

## 2021-10-18 NOTE — PROGRESS NOTES
"  Subjective .    REASONS FOR FOLLOWUP:    1. 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 Paroxysmal atrial fibrillation  3. History of DVT  4. 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 female with the above-mentioned history, returns the office today   She is feeling well with excellent performance status and we have discussed that she is approximately 3 years out from her last chemotherapy treatment as well as for treatment for her DVT.  There is the possibility of starting to change her medications including a reduced dose anticoagulant as well as potential port removal as we reassess her at the approximate 3-year luisana.  Fortunately her performance status remains excellent at this point.      ONCOLOGIC HISTORY:  (History from previous dates can be found in the separate document.)    Objective      Vitals:    10/18/21 1305   BP: 138/68   Pulse: 70   Resp: 16   Temp: 97.1 °F (36.2 °C)   TempSrc: Temporal   SpO2: 97%   Weight: 68.5 kg (151 lb)   Height: 160 cm (62.99\")   PainSc: 0-No pain     Current Status 8/23/2021   ECOG score 0       Physical Exam   Constitutional: She is oriented to person, place, and time. She appears well-developed.   HENT:   Head: Normocephalic and atraumatic.   Eyes: Pupils are equal, round, and reactive to light.   Cardiovascular: Normal rate.   Pulmonary/Chest: Effort normal and breath sounds normal. She has no wheezes.   Abdominal: Normal appearance and bowel sounds are normal.   Musculoskeletal: Normal range of motion.   Neurological: She is alert and oriented to person, place, and time.   Skin: Skin is warm and dry. No lesion noted.   Psychiatric: Her behavior is normal. Mood normal.   Vitals reviewed.  I have reexamined the patient and the results are consistent with the previously documented exam. Chivo Iraheta MD       RECENT LABS:  Results from last 7 days   Lab Units 10/18/21  1321   WBC 10*3/mm3 " 5.63   NEUTROS ABS 10*3/mm3 3.52   HEMOGLOBIN g/dL 12.1   HEMATOCRIT % 37.3   PLATELETS 10*3/mm3 115*         Assessment/Plan   1. 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  · PET scan 5/3/18 with diffuse splenic involvement (SUV 16.9), lymphadenopathy throughout upper abdomen and retroperitoneum (SUV 13.1), right liver lesion 5 cm (SUV 12.8), pelvic lymphadenopathy up to 4 cm, bladder wall thickening with associated hypermetabolism, cervical lymphadenopathy left posterior (SUV 11.2), multiple bone lesions including left intertrochanteric femur, ribs, right scapula, pelvis as well as left gluteal hematoma versus lymphomatous lesion.  · CT-guided liver biopsy 4/26/18 with diffuse large B-cell non-Hodgkin's lymphoma, CD20 positive, double hit (BCL-6 rearrangement 85%, MYC rearrangement 79%), KI-67 4+/4  · Left cervical excisional biopsy by ENT Dr. Oconnor 5/1/18 confirming high-grade B cell non-Hodgkin's lymphoma, Ki-67 100%, double hit (BCL-6 rearrangement 76%, MYC rearrangement 85%)  · Echocardiogram 4/11/18 with ejection fraction 66.7%  · Right port placement Dr Gill 5/4/18  · Patient not felt to be a candidate for more aggressive chemotherapy due to performance status and age (DA EPOCH-R)  · Therefore treated with R CHOP chemotherapy 5/4/18.  · Followed by granix 300mcg daily beginning 5/6/18 through 5/14/18.  · Patient reviewed June 19 with near resolution of hypermetabolic sites on PET/CT.  Plans made for third cycle of CHOP R, additional Zometa and total of 6 cycles of chemotherapy anticipated.  · 8/3/18 Cycle 5 CHOP R, reduction of Oncovin 50%, 6 cycles planned.    · Follow-up scan September 19 with small low-density liver lesions and splenic lesions are decreased from previous, numerous small mesenteric and RP nodes again stable slightly smaller.    · Patient  assessed February 18, 2019, no evidence of recurrent disease  · Patient seen May 13, 2019, no evidence of recurrent disease  · Patient reviewed again October 28, 2019 with follow-up scans negative for recurrence,   · Reassessment planned December 21, 2020 via scans after she has developed thrombocytopenia.  · Follow-up CT scan chest and pelvis January 13, 2021 no for evidence of recurrence.  · Patient seen 10/18/2021 with repeat CT chest and pelvis planned in November 2021.  If unchanged then we would discontinue such surveillance studies.    2. Thrombocytopenia.    · Patient had previous myelosuppression with chemo therapy however this had resolved.    · She has however had in a new medication with Spironolactone within the past 3 to 4 months from cardiology.    · Spironolactone was discontinued with gradual improvement in her platelet count October 2020   · December 2020, worsening thrombocytopenia with platelet count of 60,000  · Subsequent testing per laboratory studies without evidence of etiology, slowly advancing IPF with plans to proceed to treat for immunologic thrombocytopenia such as ITP.  The patient subsequently started prednisone at 0.5 mg/kg twice daily.    · Improvement in her platelet count   · 2/25/2021, platelets improved to 94,000 with prednisone decreased to 30 mg  · 3/3/2020, platelets declined to 69,000, prednisone increased to 40 mg  · Bone marrow biopsy and aspirate 3/19/2021 - for lymphoma involvement, normal trilineage hematopoiesis, including megakaryopoiesis.  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  · Hgb 12.1 g% 10/18/2021     4. Hypercalcemia of malignancy:  · Calcium up to 13.8 on 4/21/18 (albumin 3.3).  Intact PTH 8.1, PTH RP less than 2.0  · Received Zometa 4 mg IV  4/25/18.  · Calcium up to 11.3 on 5/7/18 with second dose of Zometa administered 4 mg IV.  · Calcium today has continued to gradually increase up to 11.9 with albumin 3.3.  Ionized calcium however is stable at 6.8 compared to yesterday.  The patient is asymptomatic.  We will not plan to administer a further dose of Zometa just yet and will allow further time for the patient to respond to chemotherapy.  She returned 518 for continued Zometa and when seen May 25 has a normal calcium level.  · Patient to receive Zometa June 19, subsequently every other cycle, restart low-dose calcium supplementation  · Patient seen August 03, 2018, plans made for Zometa with her final course of chemotherapy August 23, 2018  · Patient scheduled for bone density prior to assessment 3 months following November visit, potential Xgeva and follow-up as she is seen back to step to repeat scans are performed in May 2019.  As she is seen May 13 we do plan the Xgeva and then move to Prolia 6 months later for her subsequent treatment for osteopenia.  · Prolia continued every 6 months, last given 6/3/2020, now scheduled December 21, 2020.   · Patient seen 10/18/2021, consider repeat bone density at subsequent visits.          5.  Paroxysmal atrial fibrillation:  · Recently diagnosed, anticoagulated with Eliquis initially, discontinued due to expected thrombocytopenia from chemotherapy  · Currently in sinus rhythm, continues on Lopressor 50 mg every 12 hours  · Anticoagulated with Xarelto which is continued if platelets are greater than 50,000      6. Prior seizure disorder:  · Continues currently on Dilantin 300 mg daily at bedtime and Neurontin 600 mg daily at bedtime, will return to outpatient dose of Neurontin 300 mg daily at bedtime at discharge.        7. GI prophylaxis:  · Continues Protonix p.o.        8. Venous access:  · Port placement 5/4/18 by Dr. Gill, continue to manage every 2 monthly    9. DVT  · Anticoagulated with Xarelto 20  mg daily  · Xarelto held if platelets drop less than 50,000  · Currently tolerating Xarelto well, without signs or symptoms of bleeding.  No evidence of recurrent thrombosis  · Patient assessed 10/18/2021 with repeat Dopplers planned,?  Xarelto 10 mg daily or discontinuance    Plan:  1.  We have provided patient samples of Xarelto 20 mg daily, plan to continue as long as platelets are greater than 50,000  2.  Repeat CT of chest abdomen pelvis in 3 weeks  3.  Lower extremity Dopplers 3 weeks  4.  Follow-up visit in 4 weeks and review for potential discontinuance of surveillance scans, lowering of Xarelto dosing to 10 mg or discontinuation of the medication (review for PAF)  5.  When she is next seen also consider bone density follow-up if this is not already scheduled through another physician.  6.  Port flush every 2 months at this point,?  Removal      Chivo Iraheta MD  10/18/2021

## 2021-10-18 NOTE — PROGRESS NOTES
Site of Appt/Pickup: (n) [place]; (y) [place]; (n) [place];    Prescriber (ariana) contacted pharmacy to obtain the medication below.    NDC:  15403-425-06  Drug name: xarelto  Strength: 20mg  Total Quantity:  28 (Containers- 4 X Units per Containers- 14)  Lot#:  [id number]  Expiration: 4/23    Prescriber or staff member who picked up medication Kanchan Gaspar

## 2021-10-20 RX ORDER — AMLODIPINE BESYLATE 10 MG/1
TABLET ORAL
Qty: 90 TABLET | Refills: 1 | OUTPATIENT
Start: 2021-10-20

## 2021-10-21 ENCOUNTER — TELEPHONE (OUTPATIENT)
Dept: FAMILY MEDICINE CLINIC | Facility: CLINIC | Age: 81
End: 2021-10-21

## 2021-10-21 RX ORDER — AMLODIPINE BESYLATE 10 MG/1
10 TABLET ORAL DAILY
Qty: 90 TABLET | Refills: 0 | Status: SHIPPED | OUTPATIENT
Start: 2021-10-21 | End: 2022-01-10

## 2021-10-21 NOTE — TELEPHONE ENCOUNTER
Caller: Martina Blake    Relationship: Self    requested Prescriptions:   Requested Prescriptions     Pending Prescriptions Disp Refills   • amLODIPine (NORVASC) 10 MG tablet 90 tablet 1     Sig: Take 1 tablet by mouth Daily.        Pharmacy where request should be sent: [id number] [address] [address] [address] AT [facility] [address] [facility] [address] [facility] [address] - [phone]  - [phone]   [phone]    Additional details provided by patient: PATIENT MADE AN APPOINTMENT FOR FIRST AVAILABLE 11/30/21    Best call back number: [phone]       Does the patient have less than a 3 day supply:  [x] Yes  [] No    Tj Deleon Rep   10/21/21 13:10 EDT

## 2021-10-22 ENCOUNTER — TELEPHONE (OUTPATIENT)
Dept: ONCOLOGY | Facility: CLINIC | Age: 81
End: 2021-10-22

## 2021-10-22 DIAGNOSIS — F33.9 CHRONIC RECURRENT MAJOR DEPRESSIVE DISORDER (HCC): ICD-10-CM

## 2021-10-22 RX ORDER — ESCITALOPRAM OXALATE 20 MG/1
20 TABLET ORAL DAILY
Qty: 30 TABLET | Refills: 0 | Status: SHIPPED | OUTPATIENT
Start: 2021-10-22 | End: 2021-11-12

## 2021-10-22 RX ORDER — BENAZEPRIL HYDROCHLORIDE 40 MG/1
TABLET, FILM COATED ORAL
Qty: 30 TABLET | Refills: 0 | Status: SHIPPED | OUTPATIENT
Start: 2021-10-22 | End: 2021-11-22

## 2021-10-22 NOTE — TELEPHONE ENCOUNTER
Caller: Martina Blake    Relationship: Self    Best call back number: [phone]    What is the best time to reach you: ANYTIME    Who are you requesting to speak with (clinical staff, provider,  specific staff member):   DR POSADA OR NURSE    Do you know the name of the person who called: MARTINA    What was the call regarding:   HAVE CT AND DOPPLER SCHED [facility] ON 11/09 AND WANTING TO KNOW IF CAN GET HOOKED UP AT THE [facility] OR THE HOSPITAL TO USE HER PORT SO DOESN'T HAVE TO BE STUCK IN ARM SINCE IS A HARD STICK.     Do you require a callback: YES

## 2021-11-03 ENCOUNTER — TELEPHONE (OUTPATIENT)
Dept: ONCOLOGY | Facility: CLINIC | Age: 81
End: 2021-11-03

## 2021-11-03 NOTE — TELEPHONE ENCOUNTER
Provider: DR POSADA    Caller: RUT     Relationship to Patient: SELF      Reason for Call: RUT IS CALLING AND IS WANTING TO SEE IF SHE CAN GET HER PORT FLUSH APPOINTMENT EARLIER. SHE HAS A CT AND A DOPPLER SCHEDULED THAT DAY AS WELL AND THE TIMES ARE OVERLAPING.    PLEASE CALL PATIENT TO ADVISE.

## 2021-11-03 NOTE — TELEPHONE ENCOUNTER
Left msg on pt vm.  Scans scheduled at 2:45 need to arrive 1:30 for scans, get contrast to drink think come up to our office for port access at 1:45

## 2021-11-03 NOTE — TELEPHONE ENCOUNTER
Caller: RUT FRANCISCO      Best call back number: [phone]  PATIENT STATED MAY CALL BACK ANYTIME AND LEAVE .      Type of visit: [facility] FLU    Requested date: SAME DAY JUST NEEDS TO BE 1230 DUE TO PATIENT NEEDS TO BE AN HOUR EARLY FOR ANOTHER APPT WHICH IS A CT W/C     If rescheduling, when is the original appointment: 11/9/2021

## 2021-11-09 ENCOUNTER — APPOINTMENT (OUTPATIENT)
Dept: ONCOLOGY | Facility: HOSPITAL | Age: 81
End: 2021-11-09

## 2021-11-09 ENCOUNTER — HOSPITAL ENCOUNTER (OUTPATIENT)
Dept: CT IMAGING | Facility: HOSPITAL | Age: 81
Discharge: HOME | End: 2021-11-09

## 2021-11-09 ENCOUNTER — HOSPITAL ENCOUNTER (OUTPATIENT)
Dept: CARDIOLOGY | Facility: HOSPITAL | Age: 81
Discharge: HOME | End: 2021-11-09

## 2021-11-09 DIAGNOSIS — Z86.718 HISTORY OF THROMBOSIS: ICD-10-CM

## 2021-11-09 DIAGNOSIS — C83.39 DIFFUSE LARGE B-CELL LYMPHOMA OF EXTRANODAL SITE EXCLUDING SPLEEN AND OTHER SOLID ORGANS (HCC): ICD-10-CM

## 2021-11-09 DIAGNOSIS — D69.6 THROMBOCYTOPENIA (HCC): ICD-10-CM

## 2021-11-09 LAB
BASOPHILS # BLD AUTO: 0.03 10*3/MM3 (ref 0–0.2)
BASOPHILS NFR BLD AUTO: 0.5 % (ref 0–1.5)
BH CV LOWER VASCULAR LEFT COMMON FEMORAL AUGMENT: NORMAL
BH CV LOWER VASCULAR LEFT COMMON FEMORAL COMPETENT: NORMAL
BH CV LOWER VASCULAR LEFT COMMON FEMORAL COMPRESS: NORMAL
BH CV LOWER VASCULAR LEFT COMMON FEMORAL PHASIC: NORMAL
BH CV LOWER VASCULAR LEFT COMMON FEMORAL SPONT: NORMAL
BH CV LOWER VASCULAR LEFT DISTAL FEMORAL COMPRESS: NORMAL
BH CV LOWER VASCULAR LEFT GASTRONEMIUS COMPRESS: NORMAL
BH CV LOWER VASCULAR LEFT GREATER SAPH AK COMPRESS: NORMAL
BH CV LOWER VASCULAR LEFT GREATER SAPH BK COMPRESS: NORMAL
BH CV LOWER VASCULAR LEFT LESSER SAPH COMPRESS: NORMAL
BH CV LOWER VASCULAR LEFT MID FEMORAL AUGMENT: NORMAL
BH CV LOWER VASCULAR LEFT MID FEMORAL COMPETENT: NORMAL
BH CV LOWER VASCULAR LEFT MID FEMORAL COMPRESS: NORMAL
BH CV LOWER VASCULAR LEFT MID FEMORAL PHASIC: NORMAL
BH CV LOWER VASCULAR LEFT MID FEMORAL SPONT: NORMAL
BH CV LOWER VASCULAR LEFT PERONEAL COMPRESS: NORMAL
BH CV LOWER VASCULAR LEFT POPLITEAL AUGMENT: NORMAL
BH CV LOWER VASCULAR LEFT POPLITEAL COMPETENT: NORMAL
BH CV LOWER VASCULAR LEFT POPLITEAL COMPRESS: NORMAL
BH CV LOWER VASCULAR LEFT POPLITEAL PHASIC: NORMAL
BH CV LOWER VASCULAR LEFT POPLITEAL SPONT: NORMAL
BH CV LOWER VASCULAR LEFT POSTERIOR TIBIAL COMPRESS: NORMAL
BH CV LOWER VASCULAR LEFT PROFUNDA FEMORAL COMPRESS: NORMAL
BH CV LOWER VASCULAR LEFT PROXIMAL FEMORAL COMPRESS: NORMAL
BH CV LOWER VASCULAR LEFT SAPHENOFEMORAL JUNCTION COMPRESS: NORMAL
BH CV LOWER VASCULAR RIGHT COMMON FEMORAL AUGMENT: NORMAL
BH CV LOWER VASCULAR RIGHT COMMON FEMORAL COMPETENT: NORMAL
BH CV LOWER VASCULAR RIGHT COMMON FEMORAL COMPRESS: NORMAL
BH CV LOWER VASCULAR RIGHT COMMON FEMORAL PHASIC: NORMAL
BH CV LOWER VASCULAR RIGHT COMMON FEMORAL SPONT: NORMAL
BH CV LOWER VASCULAR RIGHT DISTAL FEMORAL COMPRESS: NORMAL
BH CV LOWER VASCULAR RIGHT GASTRONEMIUS COMPRESS: NORMAL
BH CV LOWER VASCULAR RIGHT GREATER SAPH AK COMPRESS: NORMAL
BH CV LOWER VASCULAR RIGHT GREATER SAPH BK COMPRESS: NORMAL
BH CV LOWER VASCULAR RIGHT LESSER SAPH COMPRESS: NORMAL
BH CV LOWER VASCULAR RIGHT MID FEMORAL AUGMENT: NORMAL
BH CV LOWER VASCULAR RIGHT MID FEMORAL COMPETENT: NORMAL
BH CV LOWER VASCULAR RIGHT MID FEMORAL COMPRESS: NORMAL
BH CV LOWER VASCULAR RIGHT MID FEMORAL PHASIC: NORMAL
BH CV LOWER VASCULAR RIGHT MID FEMORAL SPONT: NORMAL
BH CV LOWER VASCULAR RIGHT PERONEAL COMPRESS: NORMAL
BH CV LOWER VASCULAR RIGHT POPLITEAL AUGMENT: NORMAL
BH CV LOWER VASCULAR RIGHT POPLITEAL COMPETENT: NORMAL
BH CV LOWER VASCULAR RIGHT POPLITEAL COMPRESS: NORMAL
BH CV LOWER VASCULAR RIGHT POPLITEAL PHASIC: NORMAL
BH CV LOWER VASCULAR RIGHT POPLITEAL SPONT: NORMAL
BH CV LOWER VASCULAR RIGHT POSTERIOR TIBIAL COMPRESS: NORMAL
BH CV LOWER VASCULAR RIGHT PROFUNDA FEMORAL COMPRESS: NORMAL
BH CV LOWER VASCULAR RIGHT PROXIMAL FEMORAL COMPRESS: NORMAL
BH CV LOWER VASCULAR RIGHT SAPHENOFEMORAL JUNCTION COMPRESS: NORMAL
CREAT BLDA-MCNC: 0.9 MG/DL (ref 0.6–1.3)
DEPRECATED RDW RBC AUTO: 40.3 FL (ref 37–54)
EOSINOPHIL # BLD AUTO: 0.07 10*3/MM3 (ref 0–0.4)
EOSINOPHIL NFR BLD AUTO: 1.2 % (ref 0.3–6.2)
ERYTHROCYTE [DISTWIDTH] IN BLOOD BY AUTOMATED COUNT: 11.8 % (ref 12.3–15.4)
HCT VFR BLD AUTO: 34.7 % (ref 34–46.6)
HGB BLD-MCNC: 11.9 G/DL (ref 12–15.9)
IMM GRANULOCYTES # BLD AUTO: 0.01 10*3/MM3 (ref 0–0.05)
IMM GRANULOCYTES NFR BLD AUTO: 0.2 % (ref 0–0.5)
LYMPHOCYTES # BLD AUTO: 1.36 10*3/MM3 (ref 0.7–3.1)
LYMPHOCYTES NFR BLD AUTO: 24.2 % (ref 19.6–45.3)
MCH RBC QN AUTO: 31.8 PG (ref 26.6–33)
MCHC RBC AUTO-ENTMCNC: 34.3 G/DL (ref 31.5–35.7)
MCV RBC AUTO: 92.8 FL (ref 79–97)
MONOCYTES # BLD AUTO: 0.69 10*3/MM3 (ref 0.1–0.9)
MONOCYTES NFR BLD AUTO: 12.3 % (ref 5–12)
NEUTROPHILS NFR BLD AUTO: 3.46 10*3/MM3 (ref 1.7–7)
NEUTROPHILS NFR BLD AUTO: 61.6 % (ref 42.7–76)
NRBC BLD AUTO-RTO: 0 /100 WBC (ref 0–0.2)
PLATELET # BLD AUTO: 127 10*3/MM3 (ref 140–450)
PMV BLD AUTO: 10.7 FL (ref 6–12)
RBC # BLD AUTO: 3.74 10*6/MM3 (ref 3.77–5.28)
WBC # BLD AUTO: 5.62 10*3/MM3 (ref 3.4–10.8)

## 2021-11-09 PROCEDURE — 74177 CT ABD & PELVIS W/CONTRAST: CPT

## 2021-11-09 PROCEDURE — 71260 CT THORAX DX C+: CPT

## 2021-11-09 PROCEDURE — 93970 EXTREMITY STUDY: CPT

## 2021-11-09 PROCEDURE — 85025 COMPLETE CBC W/AUTO DIFF WBC: CPT | Performed by: INTERNAL MEDICINE

## 2021-11-09 PROCEDURE — 25010000002 HEPARIN LOCK FLUSH PER 10 UNITS: Performed by: INTERNAL MEDICINE

## 2021-11-09 PROCEDURE — 25010000002 IOPAMIDOL 61 % SOLUTION: Performed by: INTERNAL MEDICINE

## 2021-11-09 PROCEDURE — 82565 ASSAY OF CREATININE: CPT

## 2021-11-09 PROCEDURE — 0 DIATRIZOATE MEGLUMINE & SODIUM PER 1 ML: Performed by: INTERNAL MEDICINE

## 2021-11-09 RX ORDER — SODIUM CHLORIDE 0.9 % (FLUSH) 0.9 %
10 SYRINGE (ML) INJECTION AS NEEDED
Status: DISCONTINUED | OUTPATIENT
Start: 2021-11-09 | End: 2021-11-10 | Stop reason: HOSPADM

## 2021-11-09 RX ORDER — HEPARIN SODIUM (PORCINE) LOCK FLUSH IV SOLN 100 UNIT/ML 100 UNIT/ML
500 SOLUTION INTRAVENOUS ONCE
Status: COMPLETED | OUTPATIENT
Start: 2021-11-09 | End: 2021-11-09

## 2021-11-09 RX ADMIN — Medication 500 UNITS: at 15:06

## 2021-11-09 RX ADMIN — DIATRIZOATE MEGLUMINE AND DIATRIZOATE SODIUM 30 ML: 600; 100 SOLUTION ORAL; RECTAL at 14:59

## 2021-11-09 RX ADMIN — Medication 10 ML: at 15:05

## 2021-11-09 RX ADMIN — IOPAMIDOL 85 ML: 612 INJECTION, SOLUTION INTRAVENOUS at 14:59

## 2021-11-12 ENCOUNTER — INFUSION (OUTPATIENT)
Dept: ONCOLOGY | Facility: HOSPITAL | Age: 81
End: 2021-11-12

## 2021-11-12 ENCOUNTER — TELEPHONE (OUTPATIENT)
Dept: PHARMACY | Facility: HOSPITAL | Age: 81
End: 2021-11-12

## 2021-11-12 ENCOUNTER — OFFICE VISIT (OUTPATIENT)
Dept: ONCOLOGY | Facility: CLINIC | Age: 81
End: 2021-11-12

## 2021-11-12 VITALS
HEART RATE: 60 BPM | DIASTOLIC BLOOD PRESSURE: 75 MMHG | BODY MASS INDEX: 26.67 KG/M2 | SYSTOLIC BLOOD PRESSURE: 162 MMHG | WEIGHT: 150.5 LBS | HEIGHT: 63 IN | TEMPERATURE: 97.1 F | OXYGEN SATURATION: 97 %

## 2021-11-12 DIAGNOSIS — F33.9 CHRONIC RECURRENT MAJOR DEPRESSIVE DISORDER (HCC): ICD-10-CM

## 2021-11-12 DIAGNOSIS — Z45.2 ENCOUNTER FOR ADJUSTMENT OR MANAGEMENT OF VASCULAR ACCESS DEVICE: Primary | ICD-10-CM

## 2021-11-12 DIAGNOSIS — C83.39 DIFFUSE LARGE B-CELL LYMPHOMA OF EXTRANODAL SITE EXCLUDING SPLEEN AND OTHER SOLID ORGANS (HCC): Primary | ICD-10-CM

## 2021-11-12 DIAGNOSIS — Z45.2 ENCOUNTER FOR ADJUSTMENT OR MANAGEMENT OF VASCULAR ACCESS DEVICE: ICD-10-CM

## 2021-11-12 PROBLEM — T45.1X5A CHEMOTHERAPY-INDUCED NEUTROPENIA: Status: RESOLVED | Noted: 2018-05-11 | Resolved: 2021-11-12

## 2021-11-12 PROBLEM — D70.1 CHEMOTHERAPY-INDUCED NEUTROPENIA: Status: RESOLVED | Noted: 2018-05-11 | Resolved: 2021-11-12

## 2021-11-12 LAB
ALBUMIN SERPL-MCNC: 4 G/DL (ref 3.5–5.2)
ALBUMIN/GLOB SERPL: 1.4 G/DL (ref 1.1–2.4)
ALP SERPL-CCNC: 93 U/L (ref 38–116)
ALT SERPL W P-5'-P-CCNC: 11 U/L (ref 0–33)
ANION GAP SERPL CALCULATED.3IONS-SCNC: 9.2 MMOL/L (ref 5–15)
AST SERPL-CCNC: 18 U/L (ref 0–32)
BASOPHILS # BLD AUTO: 0.02 10*3/MM3 (ref 0–0.2)
BASOPHILS NFR BLD AUTO: 0.4 % (ref 0–1.5)
BILIRUB SERPL-MCNC: 0.2 MG/DL (ref 0.2–1.2)
BUN SERPL-MCNC: 17 MG/DL (ref 6–20)
BUN/CREAT SERPL: 23.6 (ref 7.3–30)
CALCIUM SPEC-SCNC: 9.2 MG/DL (ref 8.5–10.2)
CHLORIDE SERPL-SCNC: 101 MMOL/L (ref 98–107)
CO2 SERPL-SCNC: 25.8 MMOL/L (ref 22–29)
CREAT SERPL-MCNC: 0.72 MG/DL (ref 0.6–1.1)
DEPRECATED RDW RBC AUTO: 41.5 FL (ref 37–54)
EOSINOPHIL # BLD AUTO: 0.08 10*3/MM3 (ref 0–0.4)
EOSINOPHIL NFR BLD AUTO: 1.6 % (ref 0.3–6.2)
ERYTHROCYTE [DISTWIDTH] IN BLOOD BY AUTOMATED COUNT: 12.1 % (ref 12.3–15.4)
GFR SERPL CREATININE-BSD FRML MDRD: 78 ML/MIN/1.73
GLOBULIN UR ELPH-MCNC: 2.8 GM/DL (ref 1.8–3.5)
GLUCOSE SERPL-MCNC: 101 MG/DL (ref 74–124)
HCT VFR BLD AUTO: 34.4 % (ref 34–46.6)
HGB BLD-MCNC: 11.6 G/DL (ref 12–15.9)
IMM GRANULOCYTES # BLD AUTO: 0.01 10*3/MM3 (ref 0–0.05)
IMM GRANULOCYTES NFR BLD AUTO: 0.2 % (ref 0–0.5)
LYMPHOCYTES # BLD AUTO: 1.35 10*3/MM3 (ref 0.7–3.1)
LYMPHOCYTES NFR BLD AUTO: 26.4 % (ref 19.6–45.3)
MCH RBC QN AUTO: 31.3 PG (ref 26.6–33)
MCHC RBC AUTO-ENTMCNC: 33.7 G/DL (ref 31.5–35.7)
MCV RBC AUTO: 92.7 FL (ref 79–97)
MONOCYTES # BLD AUTO: 0.75 10*3/MM3 (ref 0.1–0.9)
MONOCYTES NFR BLD AUTO: 14.7 % (ref 5–12)
NEUTROPHILS NFR BLD AUTO: 2.9 10*3/MM3 (ref 1.7–7)
NEUTROPHILS NFR BLD AUTO: 56.7 % (ref 42.7–76)
NRBC BLD AUTO-RTO: 0 /100 WBC (ref 0–0.2)
PLATELET # BLD AUTO: 119 10*3/MM3 (ref 140–450)
PMV BLD AUTO: 9.3 FL (ref 6–12)
POTASSIUM SERPL-SCNC: 4.4 MMOL/L (ref 3.5–4.7)
PROT SERPL-MCNC: 6.8 G/DL (ref 6.3–8)
RBC # BLD AUTO: 3.71 10*6/MM3 (ref 3.77–5.28)
SODIUM SERPL-SCNC: 136 MMOL/L (ref 134–145)
WBC # BLD AUTO: 5.11 10*3/MM3 (ref 3.4–10.8)

## 2021-11-12 PROCEDURE — 99214 OFFICE O/P EST MOD 30 MIN: CPT | Performed by: INTERNAL MEDICINE

## 2021-11-12 PROCEDURE — 36591 DRAW BLOOD OFF VENOUS DEVICE: CPT

## 2021-11-12 PROCEDURE — 25010000002 HEPARIN LOCK FLUSH PER 10 UNITS: Performed by: INTERNAL MEDICINE

## 2021-11-12 PROCEDURE — 80053 COMPREHEN METABOLIC PANEL: CPT

## 2021-11-12 PROCEDURE — 85025 COMPLETE CBC W/AUTO DIFF WBC: CPT

## 2021-11-12 RX ORDER — HEPARIN SODIUM (PORCINE) LOCK FLUSH IV SOLN 100 UNIT/ML 100 UNIT/ML
500 SOLUTION INTRAVENOUS AS NEEDED
Status: CANCELLED | OUTPATIENT
Start: 2021-11-12

## 2021-11-12 RX ORDER — BENZONATATE 100 MG/1
100 CAPSULE ORAL 3 TIMES DAILY PRN
COMMUNITY
Start: 2021-11-02 | End: 2021-12-06

## 2021-11-12 RX ORDER — ESCITALOPRAM OXALATE 20 MG/1
20 TABLET ORAL DAILY
Qty: 30 TABLET | Refills: 5 | Status: SHIPPED | OUTPATIENT
Start: 2021-11-12 | End: 2022-05-13

## 2021-11-12 RX ORDER — HEPARIN SODIUM (PORCINE) LOCK FLUSH IV SOLN 100 UNIT/ML 100 UNIT/ML
500 SOLUTION INTRAVENOUS AS NEEDED
Status: DISCONTINUED | OUTPATIENT
Start: 2021-11-12 | End: 2021-11-12 | Stop reason: HOSPADM

## 2021-11-12 RX ORDER — SODIUM CHLORIDE 0.9 % (FLUSH) 0.9 %
10 SYRINGE (ML) INJECTION AS NEEDED
Status: CANCELLED | OUTPATIENT
Start: 2021-11-12

## 2021-11-12 RX ADMIN — Medication 500 UNITS: at 13:53

## 2021-11-12 NOTE — PROGRESS NOTES
"  Subjective .    REASONS FOR FOLLOWUP:    1. 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 Paroxysmal atrial fibrillation  3. History of DVT  4. 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 female with the above-mentioned history who returned to the office 10/18/2021  approximately 3 years out from her last chemotherapy treatment as well as for treatment for her DVT.  There is the possibility of starting to change her medications including a reduced dose anticoagulant as well as possible port removal as we plan to assess her at the 3-year luisana.  Fortunately her performance status has remained excellent up to that point.       She did undergo repeat imaging CT chest and pelvis 11/9/2021 showing, fortunately, no evidence for recurrent lymphoma in chest abdomen or pelvis.  Further Doppler examination of lower extremities were also normal bilaterally.  The patient is seen 11/12/2021 generally improving.  We have discussed that at this point she continues in remission.  She hopes to maintain her prior port at this point.    ONCOLOGIC HISTORY:  (History from previous dates can be found in the separate document.)    Objective      Vitals:    11/12/21 1407   BP: 162/75   Pulse: 60   Temp: 97.1 °F (36.2 °C)   TempSrc: Temporal   SpO2: 97%   Weight: 68.3 kg (150 lb 8 oz)   Height: 160 cm (62.99\")   PainSc: 0-No pain     Current Status 8/23/2021   ECOG score 0       Physical Exam   Constitutional: She is oriented to person, place, and time. She appears well-developed.   HENT:   Head: Normocephalic and atraumatic.   Eyes: Pupils are equal, round, and reactive to light.   Cardiovascular: Normal rate.   Pulmonary/Chest: Effort normal and breath sounds normal. She has no wheezes.   Abdominal: Normal appearance and bowel sounds are normal.   Musculoskeletal: Normal range of motion.   Neurological: She is alert and oriented to person, place, and time.   Skin: " Skin is warm and dry. No lesion noted.   Psychiatric: Her behavior is normal. Mood normal.   Vitals reviewed.  I have reexamined the patient and the results are consistent with the previously documented exam. Chivo Iraheta MD       RECENT LABS:  Results from last 7 days   Lab Units 11/12/21  1348 11/09/21  1437   WBC 10*3/mm3 5.11 5.62   NEUTROS ABS 10*3/mm3 2.90 3.46   HEMOGLOBIN g/dL 11.6* 11.9*   HEMATOCRIT % 34.4 34.7   PLATELETS 10*3/mm3 119* 127*     Results from last 7 days   Lab Units 11/09/21  1435   CREATININE mg/dL 0.90     Assessment/Plan   1. 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  · PET scan 5/3/18 with diffuse splenic involvement (SUV 16.9), lymphadenopathy throughout upper abdomen and retroperitoneum (SUV 13.1), right liver lesion 5 cm (SUV 12.8), pelvic lymphadenopathy up to 4 cm, bladder wall thickening with associated hypermetabolism, cervical lymphadenopathy left posterior (SUV 11.2), multiple bone lesions including left intertrochanteric femur, ribs, right scapula, pelvis as well as left gluteal hematoma versus lymphomatous lesion.  · CT-guided liver biopsy 4/26/18 with diffuse large B-cell non-Hodgkin's lymphoma, CD20 positive, double hit (BCL-6 rearrangement 85%, MYC rearrangement 79%), KI-67 4+/4  · Left cervical excisional biopsy by ENT Dr. Oconnor 5/1/18 confirming high-grade B cell non-Hodgkin's lymphoma, Ki-67 100%, double hit (BCL-6 rearrangement 76%, MYC rearrangement 85%)  · Echocardiogram 4/11/18 with ejection fraction 66.7%  · Right port placement Dr Gill 5/4/18  · Patient not felt to be a candidate for more aggressive chemotherapy due to performance status and age (DA EPOCH-R)  · Therefore treated with R CHOP chemotherapy 5/4/18.  · Followed by granix 300mcg daily beginning 5/6/18 through 5/14/18.  · Patient reviewed June 19  with near resolution of hypermetabolic sites on PET/CT.  Plans made for third cycle of CHOP R, additional Zometa and total of 6 cycles of chemotherapy anticipated.  · 8/3/18 Cycle 5 CHOP R, reduction of Oncovin 50%, 6 cycles planned.    · Follow-up scan September 19 with small low-density liver lesions and splenic lesions are decreased from previous, numerous small mesenteric and RP nodes again stable slightly smaller.    · Patient assessed February 18, 2019, no evidence of recurrent disease  · Patient seen May 13, 2019, no evidence of recurrent disease  · Patient reviewed again October 28, 2019 with follow-up scans negative for recurrence,   · Reassessment planned December 21, 2020 via scans after she has developed thrombocytopenia.  · Follow-up CT scan chest and pelvis January 13, 2021 no for evidence of recurrence.  · Patient seen 10/18/2021 with repeat CT chest and pelvis planned in November 2021.  If unchanged then we would discontinue such surveillance studies.  · Repeat scans 11/9/2021 - for any recurrent disease, no additional scans planned, every 6 month follow-up MD through year 5.    2. Thrombocytopenia.    · Patient had previous myelosuppression with chemo therapy however this had resolved.    · She has however had in a new medication with Spironolactone within the past 3 to 4 months from cardiology.    · Spironolactone was discontinued with gradual improvement in her platelet count October 2020   · December 2020, worsening thrombocytopenia with platelet count of 60,000  · Subsequent testing per laboratory studies without evidence of etiology, slowly advancing IPF with plans to proceed to treat for immunologic thrombocytopenia such as ITP.  The patient subsequently started prednisone at 0.5 mg/kg twice daily.    · Improvement in her platelet count   · 2/25/2021, platelets improved to 94,000 with prednisone decreased to 30 mg  · 3/3/2020, platelets declined to 69,000, prednisone increased to 40 mg  · Bone  marrow biopsy and aspirate 3/19/2021 - for lymphoma involvement, normal trilineage hematopoiesis, including megakaryopoiesis.  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  · Hemoglobin 11.6, hematocrit 34.4-11/12/2021     4. Hypercalcemia of malignancy:  · Calcium up to 13.8 on 4/21/18 (albumin 3.3).  Intact PTH 8.1, PTH RP less than 2.0  · Received Zometa 4 mg IV 4/25/18.  · Calcium up to 11.3 on 5/7/18 with second dose of Zometa administered 4 mg IV.  · Calcium today has continued to gradually increase up to 11.9 with albumin 3.3.  Ionized calcium however is stable at 6.8 compared to yesterday.  The patient is asymptomatic.  We will not plan to administer a further dose of Zometa just yet and will allow further time for the patient to respond to chemotherapy.  She returned 518 for continued Zometa and when seen May 25 has a normal calcium level.  · Patient to receive Zometa June 19, subsequently every other cycle, restart low-dose calcium supplementation  · Patient seen August 03, 2018, plans made for Zometa with her final course of chemotherapy August 23, 2018  · Patient scheduled for bone density prior to assessment 3 months following November visit, potential Xgeva and follow-up as she is seen back to step to repeat scans are performed in May 2019.  As she is seen May 13 we do plan the Xgeva and then move to Prolia 6 months later for her subsequent treatment for osteopenia.  · Prolia continued every 6 months, last given 6/3/2020, now scheduled December 21, 2020.   · Patient seen 10/18/2021, consider repeat bone density at subsequent visits.  · Patient normocalcemic 11/12/2021          5.  Paroxysmal atrial fibrillation:  · Recently diagnosed, anticoagulated  with Eliquis initially, discontinued due to expected thrombocytopenia from chemotherapy  · Currently in sinus rhythm, continues on Lopressor 50 mg every 12 hours  · Anticoagulated with Xarelto which is continued if platelets are greater than 50,000      6. Prior seizure disorder:  · Continues currently on Dilantin 300 mg daily at bedtime and Neurontin 600 mg daily at bedtime, will return to outpatient dose of Neurontin 300 mg daily at bedtime at discharge.        7. GI prophylaxis:  · Continues Protonix p.o.        8. Venous access:  · Port placement 5/4/18 by Dr. Gill, continue to manage every 4 weeks    9. DVT  · Anticoagulated with Xarelto 20 mg daily  · Xarelto held if platelets drop less than 50,000  · Currently tolerating Xarelto well, without signs or symptoms of bleeding.  No evidence of recurrent thrombosis  · Patient assessed 10/18/2021 with repeat Dopplers planned-assessed 11/5/2021 with no evidence of abnormality, resolution of thrombus bilaterally.  · Patient returns q. 4 weeks for NP, Xarelto sampling.    Plan:  1.  We have provided patient samples of Xarelto 20 mg daily, plan to continue as long as platelets are greater than 50,000, return monthly for NP assessment and refill of Xarelto as available.  2.  CBC 3 months  3.  MD follow-up 6 months    I spent 40 minutes caring for Martina on this date of service. This time includes time spent by me in the following activities: preparing for the visit, reviewing tests, obtaining and/or reviewing a separately obtained history, performing a medically appropriate examination and/or evaluation, counseling and educating the patient/family/caregiver, ordering medications, tests, or procedures, referring and communicating with other health care professionals, documenting information in the medical record, independently interpreting results and communicating that information with the patient/family/caregiver and care coordination.

## 2021-11-12 NOTE — TELEPHONE ENCOUNTER
----- Message from Alana Banda sent at 11/11/2021  3:32 PM EST -----  Regarding: RE: Xarelto-Kresge-11/12  Sorry 20 mg  ----- Message -----  From: Essence Gomez  Sent: 11/11/2021   2:48 PM EST  To: Alana Banda  Subject: RE: Xarelto-Kresge-11/12                         What strength?  ----- Message -----  From: Alana Banda  Sent: 11/11/2021  12:18 PM EST  To: Gisell Andrews  Subject: Qazaovr-Ummiwe-40/12                             Martina is scheduled at [facility] tomorrow for samples-please place 4 bottles into the [employer]. Pt of Dr Iraheta's

## 2021-11-12 NOTE — TELEPHONE ENCOUNTER
Site of Appt/Pickup: (n) [place]; (y) [employer]; (n) [email or web address];    Prescriber (Myrtle) requested the following medication samples to be given to the patient during the Nurse Practitioner visit.    NDC:  [address]-76  Drug name: Xarelto  Strength: 20mg  Total Quantity:  28 (Containers- 4 X Units per Containers- 7)  Lot#:  [id number]  Expiration: 04/2023    Product to be placed in the [employer] under 'Patient Specialty Medication' entry and is to be removed at time of visit.

## 2021-11-22 ENCOUNTER — OFFICE VISIT (OUTPATIENT)
Dept: OBSTETRICS AND GYNECOLOGY | Age: 81
End: 2021-11-22

## 2021-11-22 VITALS
SYSTOLIC BLOOD PRESSURE: 118 MMHG | DIASTOLIC BLOOD PRESSURE: 70 MMHG | HEIGHT: 63 IN | BODY MASS INDEX: 26.4 KG/M2 | WEIGHT: 149 LBS

## 2021-11-22 DIAGNOSIS — M85.80 OSTEOPENIA, UNSPECIFIED LOCATION: ICD-10-CM

## 2021-11-22 DIAGNOSIS — Z01.419 WELL WOMAN EXAM WITH ROUTINE GYNECOLOGICAL EXAM: Primary | ICD-10-CM

## 2021-11-22 PROCEDURE — G0101 CA SCREEN;PELVIC/BREAST EXAM: HCPCS | Performed by: PHYSICIAN ASSISTANT

## 2021-11-22 RX ORDER — BENAZEPRIL HYDROCHLORIDE 40 MG/1
40 TABLET, FILM COATED ORAL DAILY
Qty: 15 TABLET | Refills: 0 | Status: SHIPPED | OUTPATIENT
Start: 2021-11-22 | End: 2021-12-06 | Stop reason: SDUPTHER

## 2021-11-22 NOTE — PROGRESS NOTES
Subjective     Chief Complaint   Patient presents with   • Gynecologic Exam     annual exam no longer needing paps, m/g 2021, c/scope , dexa 2019       History of Present Illness    Martina Blake is a 81 y.o.  who presents for annual exam.    H/o hyst 40 ya  Has B ovaries  No h/o abnormal paps, paps no longer needed    utd on Mammogram and CSC  Due for DEXA  Osteopenic in past    She sees Dr Walton routinely, will be seeing him soon for a check up    No significant bladder or bowel issues    Is active  Travels with a group of friends that are in their 80's  Favorite place was [place], [place]      Obstetric History:  OB History        2    Para   2    Term   2       0    AB   0    Living   2       SAB   0    IAB   0    Ectopic   0    Molar        Multiple   0    Live Births   2               Menstrual History:     No LMP recorded (lmp unknown). Patient has had a hysterectomy.         Current contraception: post menopausal status  History of abnormal Pap smear: no  Received Gardasil immunization: no  Perform regular self breast exam: yes - occl  Family history of uterine or ovarian cancer: no  Family History of colon cancer: no  Family history of breast cancer: no    Mammogram: up to date.  Colonoscopy: up to date.  DEXA: up to date.    Exercise: moderately active  Calcium/Vitamin D: adequate intake and uses supplements    The following portions of the patient's history were reviewed and updated as appropriate: allergies, current medications, past family history, past medical history, past social history, past surgical history and problem list.    Review of Systems    Review of Systems   Constitutional: Negative for fatigue.   Respiratory: Negative for shortness of breath.    Gastrointestinal: Negative for abdominal pain.   Genitourinary: Negative for dysuria.   Neurological: Negative for headaches.   Psychiatric/Behavioral: Negative for dysphoric mood.         Objective   Physical  "Exam    /70   Ht 160 cm (63\")   Wt 67.6 kg (149 lb)   LMP  (LMP Unknown)   Breastfeeding No   BMI 26.39 kg/m²   General:   alert, comfortable and no distress   Heart: regular rate and rhythm   Lungs: clear to auscultation bilaterally   Breast: normal appearance, no masses or tenderness, Inspection negative, No nipple retraction or dimpling, No nipple discharge or bleeding, No axillary or supraclavicular adenopathy, Normal to palpation without dominant masses   Neck: no adenopathy and no carotid bruit   Abdomen: {normal findings: soft, non-tender   CVA: Not performed today   Pelvis: External genitalia: normal general appearance  Urinary system: urethral meatus normal  Vaginal: atrophic mucosa  Cervix: absent and removed surgically  Adnexa: normal bimanual exam and non palpable  Uterus: absent and removed surgically  Bladder: wnl  Rectal: rectal exam deferred   Extremities: Not performed today   Neurologic: negative   Psychiatric: Normal affect, judgement, and mood     Assessment/Plan   Diagnoses and all orders for this visit:    1. Well woman exam with routine gynecological exam (Primary)    2. Osteopenia, unspecified location        All questions answered.  Breast self exam technique reviewed and patient encouraged to perform self-exam monthly.  Discussed healthy lifestyle modifications.  Recommended 30 minutes of aerobic exercise five times per week.  Discussed calcium needs to prevent osteoporosis.    Pap no longer needed  DEXA to be scheduled  Mammogram utd  F/u in 2 years or sooner prn               "

## 2021-11-26 DIAGNOSIS — C79.51 BONE METASTASIS: ICD-10-CM

## 2021-11-29 RX ORDER — GABAPENTIN 300 MG/1
600 CAPSULE ORAL
Qty: 60 CAPSULE | Refills: 0 | Status: SHIPPED | OUTPATIENT
Start: 2021-11-29 | End: 2021-12-27

## 2021-12-03 ENCOUNTER — TELEPHONE (OUTPATIENT)
Dept: PHARMACY | Facility: HOSPITAL | Age: 81
End: 2021-12-03

## 2021-12-03 NOTE — TELEPHONE ENCOUNTER
Site of Appt/Pickup: (N) [place]; (Y) [employer] (N) [employer];    Prescriber (JAIR DAVIDSON) requested the following medication samples to be given to the patient during the Nurse Practitioner visit.    NDC:  [address]-34  Drug name: XARELTO  Strength: 20MG  Total Quantity:  28 (Containers- 4 X Units per Containers- 14)  Lot#:  [id number]  Expiration: 02/2023    Product to be placed in the [facility] under 'Patient Specialty Medication' entry and is to be removed at time of visit.

## 2021-12-06 ENCOUNTER — OFFICE VISIT (OUTPATIENT)
Dept: FAMILY MEDICINE CLINIC | Facility: CLINIC | Age: 81
End: 2021-12-06

## 2021-12-06 VITALS
HEIGHT: 63 IN | WEIGHT: 150 LBS | SYSTOLIC BLOOD PRESSURE: 114 MMHG | HEART RATE: 79 BPM | BODY MASS INDEX: 26.58 KG/M2 | OXYGEN SATURATION: 98 % | DIASTOLIC BLOOD PRESSURE: 62 MMHG

## 2021-12-06 DIAGNOSIS — I48.0 PAROXYSMAL ATRIAL FIBRILLATION (HCC): ICD-10-CM

## 2021-12-06 DIAGNOSIS — Z00.00 MEDICARE ANNUAL WELLNESS VISIT, SUBSEQUENT: Primary | ICD-10-CM

## 2021-12-06 DIAGNOSIS — I10 ESSENTIAL HYPERTENSION: ICD-10-CM

## 2021-12-06 DIAGNOSIS — E78.5 DYSLIPIDEMIA: ICD-10-CM

## 2021-12-06 DIAGNOSIS — Z78.0 MENOPAUSE: ICD-10-CM

## 2021-12-06 PROCEDURE — 99214 OFFICE O/P EST MOD 30 MIN: CPT | Performed by: FAMILY MEDICINE

## 2021-12-06 PROCEDURE — 1170F FXNL STATUS ASSESSED: CPT | Performed by: FAMILY MEDICINE

## 2021-12-06 PROCEDURE — G0439 PPPS, SUBSEQ VISIT: HCPCS | Performed by: FAMILY MEDICINE

## 2021-12-06 PROCEDURE — 1159F MED LIST DOCD IN RCRD: CPT | Performed by: FAMILY MEDICINE

## 2021-12-06 RX ORDER — BENAZEPRIL HYDROCHLORIDE 40 MG/1
40 TABLET, FILM COATED ORAL DAILY
Qty: 90 TABLET | Refills: 3 | Status: SHIPPED | OUTPATIENT
Start: 2021-12-06 | End: 2022-11-25

## 2021-12-06 NOTE — PATIENT INSTRUCTIONS
Advance Directive    Advance directives are legal documents that let you make choices ahead of time about your health care and medical treatment in case you become unable to communicate for yourself. Advance directives are a way for you to make known your wishes to family, friends, and health care providers. This can let others know about your end-of-life care if you become unable to communicate.  Discussing and writing advance directives should happen over time rather than all at once. Advance directives can be changed depending on your situation and what you want, even after you have signed the advance directives.  There are different types of advance directives, such as:  · Medical power of .  · Living will.  · Do not resuscitate (DNR) or do not attempt resuscitation (DNAR) order.  Health care proxy and medical power of   A health care proxy is also called a health care agent. This is a person who is appointed to make medical decisions for you in cases where you are unable to make the decisions yourself. Generally, people choose someone they know well and trust to represent their preferences. Make sure to ask this person for an agreement to act as your proxy. A proxy may have to exercise judgment in the event of a medical decision for which your wishes are not known.  A medical power of  is a legal document that names your health care proxy. Depending on the laws in your state, after the document is written, it may also need to be:  · Signed.  · Notarized.  · Dated.  · Copied.  · Witnessed.  · Incorporated into your medical record.  You may also want to appoint someone to manage your money in a situation in which you are unable to do so. This is called a durable power of  for finances. It is a separate legal document from the durable power of  for health care. You may choose the same person or someone different from your health care proxy to act as your agent in money  matters.  If you do not appoint a proxy, or if there is a concern that the proxy is not acting in your best interests, a court may appoint a guardian to act on your behalf.  Living will  A living will is a set of instructions that state your wishes about medical care when you cannot express them yourself. Health care providers should keep a copy of your living will in your medical record. You may want to give a copy to family members or friends. To alert caregivers in case of an emergency, you can place a card in your wallet to let them know that you have a living will and where they can find it. A living will is used if you become:  · Terminally ill.  · Disabled.  · Unable to communicate or make decisions.  Items to consider in your living will include:  · To use or not to use life-support equipment, such as dialysis machines and breathing machines (ventilators).  · A DNR or DNAR order. This tells health care providers not to use cardiopulmonary resuscitation (CPR) if breathing or heartbeat stops.  · To use or not to use tube feeding.  · To be given or not to be given food and fluids.  · Comfort (palliative) care when the goal becomes comfort rather than a cure.  · Donation of organs and tissues.  A living will does not give instructions for distributing your money and property if you should pass away.  DNR or DNAR  A DNR or DNAR order is a request not to have CPR in the event that your heart stops beating or you stop breathing. If a DNR or DNAR order has not been made and shared, a health care provider will try to help any patient whose heart has stopped or who has stopped breathing. If you plan to have surgery, talk with your health care provider about how your DNR or DNAR order will be followed if problems occur.  What if I do not have an advance directive?  If you do not have an advance directive, some states assign family decision makers to act on your behalf based on how closely you are related to them. Each  state has its own laws about advance directives. You may want to check with your health care provider, , or state representative about the laws in your state.  Summary  · Advance directives are the legal documents that allow you to make choices ahead of time about your health care and medical treatment in case you become unable to tell others about your care.  · The process of discussing and writing advance directives should happen over time. You can change the advance directives, even after you have signed them.  · Advance directives include DNR or DNAR orders, living [place], and designating an agent as your medical power of .  This information is not intended to replace advice given to you by your health care provider. Make sure you discuss any questions you have with your health care provider.  Document Revised: 01/28/2021 Document Reviewed: 07/16/2020  [employer] Patient Education © 2021 [employer]      Calorie Counting for Weight Loss  Calories are units of energy. Your body needs a certain number of calories from food to keep going throughout the day. When you eat or drink more calories than your body needs, your body stores the extra calories mostly as fat. When you eat or drink fewer calories than your body needs, your body burns fat to get the energy it needs.  Calorie counting means keeping track of how many calories you eat and drink each day. Calorie counting can be helpful if you need to lose weight. If you eat fewer calories than your body needs, you should lose weight. Ask your health care provider what a healthy weight is for you.  For calorie counting to work, you will need to eat the right number of calories each day to lose a healthy amount of weight per week. A dietitian can help you figure out how many calories you need in a day and will suggest ways to reach your calorie goal.  · A healthy amount of weight to lose each week is usually 1-2 lb (0.5-0.9 kg). This usually means that  your daily calorie intake should be reduced by 500-750 calories.  · Eating 1,200-1,500 calories a day can help most women lose weight.  · Eating 1,500-1,800 calories a day can help most men lose weight.  What do I need to know about calorie counting?  Work with your health care provider or dietitian to determine how many calories you should get each day. To meet your daily calorie goal, you will need to:  · Find out how many calories are in each food that you would like to eat. Try to do this before you eat.  · Decide how much of the food you plan to eat.  · Keep a food log. Do this by writing down what you ate and how many calories it had.  To successfully lose weight, it is important to balance calorie counting with a healthy lifestyle that includes regular activity.  Where do I find calorie information?    The number of calories in a food can be found on a Nutrition Facts label. If a food does not have a Nutrition Facts label, try to look up the calories online or ask your dietitian for help.  Remember that calories are listed per serving. If you choose to have more than one serving of a food, you will have to multiply the calories per serving by the number of servings you plan to eat. For example, the label on a package of bread might say that a serving size is 1 slice and that there are 90 calories in a serving. If you eat 1 slice, you will have eaten 90 calories. If you eat 2 slices, you will have eaten 180 calories.  How do I keep a food log?  After each time that you eat, record the following in your food log as soon as possible:  · What you ate. Be sure to include toppings, sauces, and other extras on the food.  · How much you ate. This can be measured in cups, ounces, or number of items.  · How many calories were in each food and drink.  · The total number of calories in the food you ate.  Keep your food log near you, such as in a pocket-sized notebook or on an torres or website on your mobile phone. Some  programs will calculate calories for you and show you how many calories you have left to meet your daily goal.  What are some portion-control tips?  · Know how many calories are in a serving. This will help you know how many servings you can have of a certain food.  · Use a measuring cup to measure serving sizes. You could also try weighing out portions on a kitchen scale. With time, you will be able to estimate serving sizes for some foods.  · Take time to put servings of different foods on your favorite plates or in your favorite bowls and cups so you know what a serving looks like.  · Try not to eat straight from a food's packaging, such as from a bag or box. Eating straight from the package makes it hard to see how much you are eating and can lead to overeating. Put the amount you would like to eat in a cup or on a plate to make sure you are eating the right portion.  · Use smaller plates, glasses, and bowls for smaller portions and to prevent overeating.  · Try not to multitask. For example, avoid watching TV or using your computer while eating. If it is time to eat, sit down at a table and enjoy your food. This will help you recognize when you are full. It will also help you be more mindful of what and how much you are eating.  What are tips for following this plan?  Reading food labels  · Check the calorie count compared with the serving size. The serving size may be smaller than what you are used to eating.  · Check the source of the calories. Try to choose foods that are high in protein, fiber, and vitamins, and low in saturated fat, trans fat, and sodium.  Shopping  · Read nutrition labels while you shop. This will help you make healthy decisions about which foods to buy.  · Pay attention to nutrition labels for low-fat or fat-free foods. These foods sometimes have the same number of calories or more calories than the full-fat versions. They also often have added sugar, starch, or salt to make up for  flavor that was removed with the fat.  · Make a grocery list of lower-calorie foods and stick to it.  Cooking  · Try to cook your favorite foods in a healthier way. For example, try baking instead of frying.  · Use low-fat dairy products.  Meal planning  · Use more fruits and vegetables. One-half of your plate should be fruits and vegetables.  · Include lean proteins, such as chicken, turkey, and fish.  Lifestyle  Each week, aim to do one of the following:  · 150 minutes of moderate exercise, such as walking.  · 75 minutes of vigorous exercise, such as running.  General information  · Know how many calories are in the foods you eat most often. This will help you calculate calorie counts faster.  · Find a way of tracking calories that works for you. Get creative. Try different apps or programs if writing down calories does not work for you.  What foods should I eat?    · Eat nutritious foods. It is better to have a nutritious, high-calorie food, such as an avocado, than a food with few nutrients, such as a bag of potato chips.  · Use your calories on foods and drinks that will fill you up and will not leave you hungry soon after eating.  ? Examples of foods that fill you up are nuts and nut butters, vegetables, lean proteins, and high-fiber foods such as whole grains. High-fiber foods are foods with more than 5 g of fiber per serving.  · Pay attention to calories in drinks. Low-calorie drinks include water and unsweetened drinks.  The items listed above may not be a complete list of foods and beverages you can eat. Contact a dietitian for more information.  What foods should I limit?  Limit foods or drinks that are not good sources of vitamins, minerals, or protein or that are high in unhealthy fats. These include:  · Candy.  · Other sweets.  · Sodas, specialty coffee drinks, alcohol, and juice.  The items listed above may not be a complete list of foods and beverages you should avoid. Contact a dietitian for more  information.  How do I count calories when eating out?  · Pay attention to portions. Often, portions are much larger when eating out. Try these tips to keep portions smaller:  ? Consider sharing a meal instead of getting your own.  ? If you get your own meal, eat only half of it. Before you start eating, ask for a container and put half of your meal into it.  ? When available, consider ordering smaller portions from the menu instead of full portions.  · Pay attention to your food and drink choices. Knowing the way food is cooked and what is included with the meal can help you eat fewer calories.  ? If calories are listed on the menu, choose the lower-calorie options.  ? Choose dishes that include vegetables, fruits, whole grains, low-fat dairy products, and lean proteins.  ? Choose items that are boiled, broiled, grilled, or steamed. Avoid items that are buttered, battered, fried, or served with cream sauce. Items labeled as crispy are usually fried, unless stated otherwise.  ? Choose water, low-fat milk, unsweetened iced tea, or other drinks without added sugar. If you want an alcoholic beverage, choose a lower-calorie option, such as a glass of wine or light beer.  ? Ask for dressings, sauces, and syrups on the side. These are usually high in calories, so you should limit the amount you eat.  ? If you want a salad, choose a garden salad and ask for grilled meats. Avoid extra toppings such as lauren, cheese, or fried items. Ask for the dressing on the side, or ask for olive oil and vinegar or lemon to use as dressing.  · Estimate how many servings of a food you are given. Knowing serving sizes will help you be aware of how much food you are eating at restaurants.  Where to find more information  · Centers for Disease Control and Prevention: www.cdc.gov  · [place]. Department of Agriculture: myplate.gov  Summary  · Calorie counting means keeping track of how many calories you eat and drink each day. If you eat fewer  calories than your body needs, you should lose weight.  · A healthy amount of weight to lose per week is usually 1-2 lb (0.5-0.9 kg). This usually means reducing your daily calorie intake by 500-750 calories.  · The number of calories in a food can be found on a Nutrition Facts label. If a food does not have a Nutrition Facts label, try to look up the calories online or ask your dietitian for help.  · Use smaller plates, glasses, and bowls for smaller portions and to prevent overeating.  · Use your calories on foods and drinks that will fill you up and not leave you hungry shortly after a meal.  This information is not intended to replace advice given to you by your health care provider. Make sure you discuss any questions you have with your health care provider.  Document Revised: 01/28/2021 Document Reviewed: 01/28/2021  [employer] Patient Education © 2021 [employer]      Exercising to Lose Weight  Exercise is structured, repetitive physical activity to improve fitness and health. Getting regular exercise is important for everyone. It is especially important if you are overweight. Being overweight increases your risk of heart disease, stroke, diabetes, high blood pressure, and several types of cancer. Reducing your calorie intake and exercising can help you lose weight.  Exercise is usually categorized as moderate or vigorous intensity. To lose weight, most people need to do a certain amount of moderate-intensity or vigorous-intensity exercise each week.  Moderate-intensity exercise    Moderate-intensity exercise is any activity that gets you moving enough to burn at least three times more energy (calories) than if you were sitting.  Examples of moderate exercise include:  · Walking a mile in 15 minutes.  · Doing light yard work.  · Biking at an easy pace.  Most people should get at least 150 minutes (2 hours and 30 minutes) a week of moderate-intensity exercise to maintain their body weight.  Vigorous-intensity  exercise  Vigorous-intensity exercise is any activity that gets you moving enough to burn at least six times more calories than if you were sitting. When you exercise at this intensity, you should be working hard enough that you are not able to carry on a conversation.  Examples of vigorous exercise include:  · Running.  · Playing a team sport, such as football, basketball, and soccer.  · Jumping rope.  Most people should get at least 75 minutes (1 hour and 15 minutes) a week of vigorous-intensity exercise to maintain their body weight.  How can exercise affect me?  When you exercise enough to burn more calories than you eat, you lose weight. Exercise also reduces body fat and builds muscle. The more muscle you have, the more calories you burn. Exercise also:  · Improves mood.  · Reduces stress and tension.  · Improves your overall fitness, flexibility, and endurance.  · Increases bone strength.  The amount of exercise you need to lose weight depends on:  · Your age.  · The type of exercise.  · Any health conditions you have.  · Your overall physical ability.  Talk to your health care provider about how much exercise you need and what types of activities are safe for you.  What actions can I take to lose weight?  Nutrition    · Make changes to your diet as told by your health care provider or diet and nutrition specialist (dietitian). This may include:  ? Eating fewer calories.  ? Eating more protein.  ? Eating less unhealthy fats.  ? Eating a diet that includes fresh fruits and vegetables, whole grains, low-fat dairy products, and lean protein.  ? Avoiding foods with added fat, salt, and sugar.  · Drink plenty of water while you exercise to prevent dehydration or heat stroke.    Activity  · Choose an activity that you enjoy and set realistic goals. Your health care provider can help you make an exercise plan that works for you.  · Exercise at a moderate or vigorous intensity most days of the week.  ? The intensity  of exercise may vary from person to person. You can tell how intense a workout is for you by paying attention to your breathing and heartbeat. Most people will notice their breathing and heartbeat get faster with more intense exercise.  · Do resistance training twice each week, such as:  ? Push-ups.  ? Sit-ups.  ? Lifting weights.  ? Using resistance bands.  · Getting short amounts of exercise can be just as helpful as long structured periods of exercise. If you have trouble finding time to exercise, try to include exercise in your daily routine.  ? Get up, stretch, and walk around every 30 minutes throughout the day.  ? Go for a walk during your lunch break.  ? Park your car farther away from your destination.  ? If you take public transportation, get off one stop early and walk the rest of the way.  ? Make phone calls while standing up and walking around.  ? Take the stairs instead of elevators or escalators.  · Wear comfortable clothes and shoes with good support.  · Do not exercise so much that you hurt yourself, feel dizzy, or get very short of breath.  Where to find more information  · [place] Department of Health and Human Services: www.hhs.gov  · Centers for Disease Control and Prevention (CDC): www.cdc.gov  Contact a health care provider:  · Before starting a new exercise program.  · If you have questions or concerns about your weight.  · If you have a medical problem that keeps you from exercising.  Get help right away if you have any of the following while exercising:  · Injury.  · Dizziness.  · Difficulty breathing or shortness of breath that does not go away when you stop exercising.  · Chest pain.  · Rapid heartbeat.  Summary  · Being overweight increases your risk of heart disease, stroke, diabetes, high blood pressure, and several types of cancer.  · Losing weight happens when you burn more calories than you eat.  · Reducing the amount of calories you eat in addition to getting regular moderate or  vigorous exercise each week helps you lose weight.  This information is not intended to replace advice given to you by your health care provider. Make sure you discuss any questions you have with your health care provider.  Document Revised: 04/15/2021 Document Reviewed: 04/15/2021  [employer] Patient Education © 2021 [employer]

## 2021-12-06 NOTE — PROGRESS NOTES
QUICK REFERENCE INFORMATION:  The ABCs of the Annual Wellness Visit    Subsequent Medicare Wellness Visit    HEALTH RISK ASSESSMENT    1940    Recent Hospitalizations:  No hospitalization(s) within the last year..        Current Medical Providers:  Patient Care Team:  Jamie Walton DO as PCP - General  Jackie Grossman MD as PCP - Ob/Gyn (Obstetrics and Gynecology)  Chivo Iraheta MD as Consulting Physician (Hematology and Oncology)  Justine Barrios MD as Consulting Physician (Cardiology)  Jamie Walton DO as Referring Physician (Family Medicine)  Cynthia Wright PA as Physician Assistant (Obstetrics and Gynecology)        Smoking Status:  Social History     Tobacco Use   Smoking Status Never Smoker   Smokeless Tobacco Never Used       Alcohol Consumption:  Social History     Substance and Sexual Activity   Alcohol Use No    Comment: Caffeine use: 1 cup daily (decaf)       Depression Screen:   PHQ-2/PHQ-9 Depression Screening 12/6/2021   Little interest or pleasure in doing things 0   Feeling down, depressed, or hopeless 0   Trouble falling or staying asleep, or sleeping too much 0   Feeling tired or having little energy 0   Poor appetite or overeating 0   Feeling bad about yourself - or that you are a failure or have let yourself or your family down 0   Trouble concentrating on things, such as reading the newspaper or watching television 0   Moving or speaking so slowly that other people could have noticed. Or the opposite - being so fidgety or restless that you have been moving around a lot more than usual 0   Thoughts that you would be better off dead, or of hurting yourself in some way 0   Total Score 0   If you checked off any problems, how difficult have these problems made it for you to do your work, take care of things at home, or get along with other people? Not difficult at all       Health Habits and Functional and Cognitive Screening:  Functional & Cognitive Status  12/6/2021   Do you have difficulty preparing food and eating? No   Do you have difficulty bathing yourself, getting dressed or grooming yourself? No   Do you have difficulty using the toilet? No   Do you have difficulty moving around from place to place? No   Do you have trouble with steps or getting out of a bed or a chair? No   Current Diet Well Balanced Diet   Dental Exam Up to date   Eye Exam Up to date   Exercise (times per week) 3 times per week   Current Exercises Include Walking   Current Exercise Activities Include -   Do you need help using the phone?  No   Are you deaf or do you have serious difficulty hearing?  No   Do you need help with transportation? No   Do you need help shopping? No   Do you need help preparing meals?  No   Do you need help with housework?  No   Do you need help with laundry? No   Do you need help taking your medications? No   Do you need help managing money? No   Do you ever drive or ride in a car without wearing a seat belt? No   Have you felt unusual stress, anger or loneliness in the last month? No   Who do you live with? Alone   If you need help, do you have trouble finding someone available to you? No   Have you been bothered in the last four weeks by sexual problems? No   Do you have difficulty concentrating, remembering or making decisions? No           Does the patient have evidence of cognitive impairment? No    Aspirin use counseling: Does not need ASA (and currently is not on it)      Recent Lab Results:  CMP:  Lab Results   Component Value Date    BUN 17 11/12/2021    CREATININE 0.72 11/12/2021    EGFRIFNONA 78 11/12/2021    EGFRIFAFRI 97 04/15/2019    BCR 23.6 11/12/2021     11/12/2021    K 4.4 11/12/2021    CO2 25.8 11/12/2021    CALCIUM 9.2 11/12/2021    PROTENTOTREF 6.6 04/15/2019    ALBUMIN 4.00 11/12/2021    LABGLOBREF 2.4 04/15/2019    LABIL2 1.8 04/15/2019    BILITOT 0.2 11/12/2021    ALKPHOS 93 11/12/2021    AST 18 11/12/2021    ALT 11 11/12/2021      Lipid Panel:  Lab Results   Component Value Date    CHOL 156 10/12/2018    TRIG 78 09/20/2021    HDL 50 09/20/2021    VLDL 15 09/20/2021    LDLHDL 1.73 10/12/2018     HbA1c:  Lab Results   Component Value Date    HGBA1C 5.7 (H) 09/20/2021       Visual Acuity:  No exam data present    Age-appropriate Screening Schedule:  Refer to the list below for future screening recommendations based on patient's age, sex and/or medical conditions. Orders for these recommended tests are listed in the plan section. The patient has been provided with a written plan.    Health Maintenance   Topic Date Due   • DXA SCAN  01/07/2021   • LIPID PANEL  09/20/2022   • MAMMOGRAM  04/19/2023   • TDAP/TD VACCINES (3 - Td or Tdap) 09/18/2029   • INFLUENZA VACCINE  Completed   • ZOSTER VACCINE  Completed        Subjective   History of Present Illness    Martina Blake is a 81 y.o. female who presents for an Subsequent Wellness Visit.    The following portions of the patient's history were reviewed and updated as appropriate: allergies, current medications, past family history, past medical history, past social history, past surgical history and problem list.    Outpatient Medications Prior to Visit   Medication Sig Dispense Refill   • amLODIPine (NORVASC) 10 MG tablet Take 1 tablet by mouth Daily. 90 tablet 0   • Ascorbic Acid (Vitamin C) 500 MG capsule Take  by mouth.     • Benadryl Itch Stopping 1-0.1 % cream APPLY 1 APPLICAITON ON SKIN FOUR TIMES DAILY     • cetirizine (zyrTEC) 10 MG tablet Take 1 tablet by mouth Daily As Needed for Allergies. 30 tablet 1   • Cholecalciferol (VITAMIN D3) 125 MCG (5000 UT) capsule capsule Take 5,000 Units by mouth Daily.     • escitalopram (LEXAPRO) 20 MG tablet TAKE 1 TABLET BY MOUTH DAILY 30 tablet 5   • folic acid (FOLVITE) 400 MCG tablet Take 400 mcg by mouth daily.     • gabapentin (NEURONTIN) 300 MG capsule TAKE 2 CAPSULES BY MOUTH EVERY NIGHT AT BEDTIME 60 capsule 0   • lidocaine-prilocaine (EMLA)  2.5-2.5 % cream Apply 30 min prior to port access 5 g 2   • melatonin 5 MG tablet tablet Take 5 mg by mouth Every Night.     • Multiple Vitamins-Minerals (ZINC PO) Take  by mouth.     • pantoprazole (PROTONIX) 40 MG EC tablet TAKE 1 TABLET BY MOUTH EVERY DAY 90 tablet 1   • phenytoin (DILANTIN) 100 MG ER capsule Take 3 capsules by mouth every night at bedtime. 270 capsule 3   • spironolactone (ALDACTONE) 25 MG tablet Take 1 tablet by mouth Daily. 90 tablet 3   • triamcinolone (KENALOG) 0.1 % cream Apply  topically to the appropriate area as directed See Admin Instructions. Apply topically to the affected area twice daily     • vitamin B-12 (CYANOCOBALAMIN) 100 MCG tablet Take 1,000 mcg by mouth Daily.     • vitamin B-6 (PYRIDOXINE) 100 MG tablet Take 100 mg by mouth Daily.     • vitamin E 100 UNIT capsule Take 180 Units by mouth Daily.     • Xarelto 20 MG tablet Take 1 tablet by mouth Daily. 28 tablet 0   • Zinc Sulfate (ZINC 15 PO) Take 400 mg by mouth.     • benazepril (LOTENSIN) 40 MG tablet Take 1 tablet by mouth Daily. PLEASE CALL THE OFFICE FOR AN APPT 15 tablet 0   • hydrALAZINE (APRESOLINE) 100 MG tablet TAKE 1 TABLET BY MOUTH THREE TIMES DAILY 270 tablet 3   • benzonatate (TESSALON) 100 MG capsule Take 100 mg by mouth 3 (Three) Times a Day As Needed.       No facility-administered medications prior to visit.       Patient Active Problem List   Diagnosis   • Blood glucose abnormal   • Dyslipidemia   • Gastroesophageal reflux disease   • Generalized osteoarthritis   • Chronic recurrent major depressive disorder (HCC)   • Osteoporosis   • Restless legs syndrome   • Seizure disorder (HCC)   • Post-menopause on HRT (hormone replacement therapy)   • Chronic seasonal allergic rhinitis   • Paroxysmal atrial fibrillation (HCC)   • Iron deficiency anemia due to chronic blood loss   • Acute superficial gastritis without hemorrhage   • Hiatal hernia   • Esophagitis   • Diverticulosis of large intestine without  hemorrhage   • Hypertension   • Hypercalcemia   • Liver mass   • Lymphoma (HCC)   • Insomnia   • Anemia associated with chemotherapy   • Pancytopenia due to antineoplastic chemotherapy (HCC)   • Encounter for adjustment or management of vascular access device   • 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 Patient does not have an advance directive, information provided.    Identification of Risk Factors:  Risk factors include: Obesity/Overweight .    Review of Systems   Respiratory: Negative for shortness of breath.    Cardiovascular: Negative for chest pain, palpitations, orthopnea, syncope and PND.       Compared to one year ago, the patient feels her physical health is the same.  Compared to one year ago, the patient feels her mental health is the same.    Objective     Physical Exam  Vitals and nursing note reviewed.   Constitutional:       Appearance: She is well-developed.   HENT:      Head: Normocephalic and atraumatic.   Neck:      Thyroid: No thyromegaly.      Vascular: No JVD.   Cardiovascular:      Rate and Rhythm: Normal rate and regular rhythm.      Heart sounds: Normal heart sounds. No murmur heard.  No friction rub. No gallop.    Pulmonary:      Effort: Pulmonary effort is normal. No respiratory distress.      Breath sounds: Normal breath sounds. No wheezing or rales.   Abdominal:      General: Bowel sounds are normal. There is no distension.      Palpations: Abdomen is soft.      Tenderness: There is no abdominal tenderness. There is no guarding or rebound.   Musculoskeletal:      Cervical back: Neck supple.   Skin:     General: Skin is warm and dry.   Neurological:      Mental Status: She is alert.   Psychiatric:    "      Behavior: Behavior normal.         Vitals:    12/06/21 1553   BP: 114/62   Pulse: 79   SpO2: 98%   Weight: 68 kg (150 lb)   Height: 160 cm (63\")       Patient's Body mass index is 26.57 kg/m². indicating that she is overweight (BMI 25-29.9). Obesity-related health conditions include the following: hypertension. Obesity is unchanged. BMI is is above average; BMI management plan is completed. We discussed portion control and increasing exercise..      Assessment/Plan   Patient Self-Management and Personalized Health Advice  The patient has been provided with information about: diet and exercise and preventive services including:   · Annual Wellness Visit (AWV).    Visit Diagnoses:    ICD-10-CM ICD-9-CM   1. Medicare annual wellness visit, subsequent  Z00.00 V70.0   2. Essential hypertension  I10 401.9   3. Dyslipidemia  E78.5 272.4   4. Paroxysmal atrial fibrillation (HCC)  I48.0 427.31   5. Menopause  Z78.0 627.2       Orders Placed This Encounter   Procedures   • DEXA Bone Density Axial     Standing Status:   Future     Standing Expiration Date:   12/6/2022     Scheduling Instructions:      Prefers Friday afternoon after 1pm in general.   Or 12/27 or 12/28 available to schedule.     Order Specific Question:   Reason for Exam:     Answer:   osteoporosis screening, postmenopause     Order Specific Question:   Release to patient     Answer:   Immediate       Outpatient Encounter Medications as of 12/6/2021   Medication Sig Dispense Refill   • amLODIPine (NORVASC) 10 MG tablet Take 1 tablet by mouth Daily. 90 tablet 0   • Ascorbic Acid (Vitamin C) 500 MG capsule Take  by mouth.     • Benadryl Itch Stopping 1-0.1 % cream APPLY 1 APPLICAITON ON SKIN FOUR TIMES DAILY     • benazepril (LOTENSIN) 40 MG tablet Take 1 tablet by mouth Daily. 90 tablet 3   • cetirizine (zyrTEC) 10 MG tablet Take 1 tablet by mouth Daily As Needed for Allergies. 30 tablet 1   • Cholecalciferol (VITAMIN D3) 125 MCG (5000 UT) capsule capsule " Take 5,000 Units by mouth Daily.     • escitalopram (LEXAPRO) 20 MG tablet TAKE 1 TABLET BY MOUTH DAILY 30 tablet 5   • folic acid (FOLVITE) 400 MCG tablet Take 400 mcg by mouth daily.     • gabapentin (NEURONTIN) 300 MG capsule TAKE 2 CAPSULES BY MOUTH EVERY NIGHT AT BEDTIME 60 capsule 0   • lidocaine-prilocaine (EMLA) 2.5-2.5 % cream Apply 30 min prior to port access 5 g 2   • melatonin 5 MG tablet tablet Take 5 mg by mouth Every Night.     • Multiple Vitamins-Minerals (ZINC PO) Take  by mouth.     • pantoprazole (PROTONIX) 40 MG EC tablet TAKE 1 TABLET BY MOUTH EVERY DAY 90 tablet 1   • phenytoin (DILANTIN) 100 MG ER capsule Take 3 capsules by mouth every night at bedtime. 270 capsule 3   • spironolactone (ALDACTONE) 25 MG tablet Take 1 tablet by mouth Daily. 90 tablet 3   • triamcinolone (KENALOG) 0.1 % cream Apply  topically to the appropriate area as directed See Admin Instructions. Apply topically to the affected area twice daily     • vitamin B-12 (CYANOCOBALAMIN) 100 MCG tablet Take 1,000 mcg by mouth Daily.     • vitamin B-6 (PYRIDOXINE) 100 MG tablet Take 100 mg by mouth Daily.     • vitamin E 100 UNIT capsule Take 180 Units by mouth Daily.     • Xarelto 20 MG tablet Take 1 tablet by mouth Daily. 28 tablet 0   • Zinc Sulfate (ZINC 15 PO) Take 400 mg by mouth.     • [DISCONTINUED] benazepril (LOTENSIN) 40 MG tablet Take 1 tablet by mouth Daily. PLEASE CALL THE OFFICE FOR AN APPT 15 tablet 0   • [DISCONTINUED] hydrALAZINE (APRESOLINE) 100 MG tablet TAKE 1 TABLET BY MOUTH THREE TIMES DAILY 270 tablet 3   • [DISCONTINUED] benzonatate (TESSALON) 100 MG capsule Take 100 mg by mouth 3 (Three) Times a Day As Needed.       No facility-administered encounter medications on file as of 12/6/2021.       Reviewed use of high risk medication in the elderly: yes  Reviewed for potential of harmful drug interactions in the elderly: yes    Follow Up:  Return in about 1 year (around 12/6/2022), or if symptoms worsen or fail  "to improve.     An After Visit Summary and PPPS with all of these plans were given to the patient.           ++++++++++++++++++++++++++++++++++++++++++++++++++++++++++++++++++     Chief Complaint   Patient presents with   • Annual Exam     medicare   • Hypertension   • dexa orders   • Atrial Fibrillation     Hypertension  This is a chronic problem. The current episode started more than 1 year ago. The problem is unchanged. The problem is controlled. Pertinent negatives include no chest pain, orthopnea, palpitations, peripheral edema, PND or shortness of breath. The current treatment provides moderate improvement.   Atrial Fibrillation  Presents for follow-up visit. Symptoms are negative for chest pain, hypertension, hypotension, palpitations, shortness of breath and syncope. (On xarelto) The symptoms have been stable. Past medical history includes atrial fibrillation.     Has lymphoma mgmt per [facility] and they are monitoring.    Review of Systems   Cardiovascular: Negative for chest pain, orthopnea, palpitations, paroxysmal nocturnal dyspnea and syncope.   Respiratory: Negative for shortness of breath.        Social History     Tobacco Use   • Smoking status: Never Smoker   • Smokeless tobacco: Never Used   Substance Use Topics   • Alcohol use: No     Comment: Caffeine use: 1 cup daily (decaf)     O:   Vitals:    12/06/21 1553   BP: 114/62   Pulse: 79   SpO2: 98%   Weight: 68 kg (150 lb)   Height: 160 cm (63\")     Body mass index is 26.57 kg/m².  Vitals and nursing note reviewed.   Constitutional:       Appearance: Well-developed.   HENT:      Head: Normocephalic and atraumatic.   Neck:      Thyroid: No thyromegaly.      Vascular: No JVD.   Pulmonary:      Effort: Pulmonary effort is normal. No respiratory distress.      Breath sounds: Normal breath sounds. No wheezing. No rales.   Cardiovascular:      Normal rate. Regular rhythm. Normal heart sounds.      No gallop. No friction rub.   Abdominal:      General: Bowel " sounds are normal. There is no distension.      Palpations: Abdomen is soft.      Tenderness: There is no abdominal tenderness. There is no guarding or rebound.   Musculoskeletal:      Cervical back: Neck supple. Skin:     General: Skin is warm and dry.   Neurological:      Mental Status: Alert.   Psychiatric:         Behavior: Behavior normal.       Component      Latest Ref Rng & Units 9/20/2021   WBC      3.40 - 10.80 10*3/mm3 4.49   RBC      3.77 - 5.28 10*6/mm3 3.46 (L)   Hemoglobin      12.0 - 15.9 g/dL 11.0 (L)   Hematocrit      34.0 - 46.6 % 33.3 (L)   MCV      79.0 - 97.0 fL 96.2   MCH      26.6 - 33.0 pg 31.8   MCHC      31.5 - 35.7 g/dL 33.0   RDW      12.3 - 15.4 % 12.7   RDW-SD      37.0 - 54.0 fl 44.4   MPV      6.0 - 12.0 fL 10.0   Platelets      140 - 450 10*3/mm3 106 (L)   Neutrophil Rel %      42.7 - 76.0 % 60.3   Lymphocyte Rel %      19.6 - 45.3 % 24.1   Monocyte Rel %      5.0 - 12.0 % 12.5 (H)   Eosinophil Rel %      0.3 - 6.2 % 2.2   Basophil Rel %      0.0 - 1.5 % 0.7   Immature Granulocyte Rel %      0.0 - 0.5 % 0.2   Neutrophils Absolute      1.70 - 7.00 10*3/mm3 2.71   Lymphocytes Absolute      0.70 - 3.10 10*3/mm3 1.08   Monocytes Absolute      0.10 - 0.90 10*3/mm3 0.56   Eosinophils Absolute      0.00 - 0.40 10*3/mm3 0.10   Basophils Absolute      0.00 - 0.20 10*3/mm3 0.03   Immature Grans, Absolute      0.00 - 0.05 10*3/mm3 0.01   nRBC      0.0 - 0.2 /100 WBC 0.0   Total Cholesterol      100 - 199 mg/dL 176   Triglycerides      0 - 149 mg/dL 78   HDL Cholesterol      >39 mg/dL 50   VLDL Cholesterol [place]      5 - 40 mg/dL 15   LDL Cholesterol       0 - 99 mg/dL 111 (H)   Hemoglobin A1C      4.8 - 5.6 % 5.7 (H)     Study Result    Narrative & Impression   CT CHEST W CONTRAST DIAGNOSTIC-, CT ABDOMEN PELVIS W CONTRAST-     CLINICAL HISTORY: 81-year-old female with history of diffuse large cell  non-Hodgkin's lymphoma. Restaging.     TECHNIQUE: Spiral CT images were obtained through the chest  abdomen and  pelvis with oral and IV contrast and were reconstructed in 3 mm thick  slices.     Radiation dose reduction techniques were utilized, including automated  exposure control and exposure modulation based on body size.     COMPARISON: CT imaging of the chest abdomen and pelvis dated 01/13/2021.     FINDINGS: There is no mediastinal or hilar or axillary lymphadenopathy.  Lung window images demonstrate no parenchymal infiltrates or nodules.  There are no pleural effusions. A right internal jugular Mediport device  remains in satisfactory position without change.     The liver and pancreas and kidneys and adrenal glands appear within  normal limits. The gallbladder is absent. The spleen has an irregular  contour. 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 This is stable. The stomach and small and large bowel are  unremarkable except for multiple diverticuli in the sigmoid colon. There  is no CT evidence of diverticulitis. There is a multilobulated densely  calcified mesenteric mass that has been shown on numerous previous CTs.  This is unchanged. No noncalcified lymphadenopathy is identified within  the abdomen or pelvis. The uterus is absent.     Bone window images demonstrate a well-circumscribed dense area of  sclerosis in the posterior aspect of the L2 vertebral body that is  unchanged and are otherwise unremarkable.     IMPRESSION:  There is no evidence of recurrent lymphoma within the chest,  abdomen and pelvis. No lymphadenopathy is identified.     This report was finalized on 11/10/2021 12:14 PM by Dr. Nestor Dove M.D.       Diagnoses and all orders for this visit:    1. Medicare annual wellness visit, subsequent (Primary)    2. Essential hypertension  -     benazepril (LOTENSIN) 40 MG tablet; Take 1 tablet by mouth Daily.  Dispense: 90 tablet; Refill: 3    3. Dyslipidemia    4. Paroxysmal atrial fibrillation (HCC)    5. Menopause  -     DEXA Bone Density Axial;  Future    -hypertension - controlled, continue medications  -a. Fib -xarelto 20mg po daily  -due check dexa  -hld - adequate control          Return in about 1 year (around 12/6/2022), or if symptoms worsen or fail to improve.

## 2021-12-10 ENCOUNTER — INFUSION (OUTPATIENT)
Dept: ONCOLOGY | Facility: HOSPITAL | Age: 81
End: 2021-12-10

## 2021-12-10 ENCOUNTER — OFFICE VISIT (OUTPATIENT)
Dept: ONCOLOGY | Facility: CLINIC | Age: 81
End: 2021-12-10

## 2021-12-10 ENCOUNTER — APPOINTMENT (OUTPATIENT)
Dept: ONCOLOGY | Facility: HOSPITAL | Age: 81
End: 2021-12-10

## 2021-12-10 VITALS
DIASTOLIC BLOOD PRESSURE: 74 MMHG | HEIGHT: 63 IN | TEMPERATURE: 97.3 F | WEIGHT: 149.1 LBS | OXYGEN SATURATION: 96 % | SYSTOLIC BLOOD PRESSURE: 153 MMHG | HEART RATE: 49 BPM | BODY MASS INDEX: 26.42 KG/M2 | RESPIRATION RATE: 16 BRPM

## 2021-12-10 DIAGNOSIS — D69.6 THROMBOCYTOPENIA (HCC): ICD-10-CM

## 2021-12-10 DIAGNOSIS — D64.9 ANEMIA, UNSPECIFIED TYPE: ICD-10-CM

## 2021-12-10 DIAGNOSIS — I48.0 PAROXYSMAL ATRIAL FIBRILLATION (HCC): Primary | ICD-10-CM

## 2021-12-10 DIAGNOSIS — C83.39 DIFFUSE LARGE B-CELL LYMPHOMA OF EXTRANODAL SITE EXCLUDING SPLEEN AND OTHER SOLID ORGANS (HCC): Primary | ICD-10-CM

## 2021-12-10 DIAGNOSIS — Z45.2 ENCOUNTER FOR ADJUSTMENT OR MANAGEMENT OF VASCULAR ACCESS DEVICE: ICD-10-CM

## 2021-12-10 DIAGNOSIS — Z79.01 CHRONIC ANTICOAGULATION: ICD-10-CM

## 2021-12-10 LAB
BASOPHILS # BLD AUTO: 0.03 10*3/MM3 (ref 0–0.2)
BASOPHILS NFR BLD AUTO: 0.4 % (ref 0–1.5)
DEPRECATED RDW RBC AUTO: 42.1 FL (ref 37–54)
EOSINOPHIL # BLD AUTO: 0.06 10*3/MM3 (ref 0–0.4)
EOSINOPHIL NFR BLD AUTO: 0.9 % (ref 0.3–6.2)
ERYTHROCYTE [DISTWIDTH] IN BLOOD BY AUTOMATED COUNT: 12.1 % (ref 12.3–15.4)
HCT VFR BLD AUTO: 33.7 % (ref 34–46.6)
HGB BLD-MCNC: 11.2 G/DL (ref 12–15.9)
IMM GRANULOCYTES # BLD AUTO: 0.01 10*3/MM3 (ref 0–0.05)
IMM GRANULOCYTES NFR BLD AUTO: 0.1 % (ref 0–0.5)
LYMPHOCYTES # BLD AUTO: 1.24 10*3/MM3 (ref 0.7–3.1)
LYMPHOCYTES NFR BLD AUTO: 18.2 % (ref 19.6–45.3)
MCH RBC QN AUTO: 31.3 PG (ref 26.6–33)
MCHC RBC AUTO-ENTMCNC: 33.2 G/DL (ref 31.5–35.7)
MCV RBC AUTO: 94.1 FL (ref 79–97)
MONOCYTES # BLD AUTO: 0.83 10*3/MM3 (ref 0.1–0.9)
MONOCYTES NFR BLD AUTO: 12.2 % (ref 5–12)
NEUTROPHILS NFR BLD AUTO: 4.66 10*3/MM3 (ref 1.7–7)
NEUTROPHILS NFR BLD AUTO: 68.2 % (ref 42.7–76)
NRBC BLD AUTO-RTO: 0 /100 WBC (ref 0–0.2)
PLATELET # BLD AUTO: 134 10*3/MM3 (ref 140–450)
PMV BLD AUTO: 9.8 FL (ref 6–12)
RBC # BLD AUTO: 3.58 10*6/MM3 (ref 3.77–5.28)
WBC NRBC COR # BLD: 6.83 10*3/MM3 (ref 3.4–10.8)

## 2021-12-10 PROCEDURE — 36591 DRAW BLOOD OFF VENOUS DEVICE: CPT

## 2021-12-10 PROCEDURE — 99213 OFFICE O/P EST LOW 20 MIN: CPT | Performed by: NURSE PRACTITIONER

## 2021-12-10 PROCEDURE — 25010000002 HEPARIN LOCK FLUSH PER 10 UNITS: Performed by: INTERNAL MEDICINE

## 2021-12-10 PROCEDURE — 85025 COMPLETE CBC W/AUTO DIFF WBC: CPT

## 2021-12-10 RX ORDER — SODIUM CHLORIDE 0.9 % (FLUSH) 0.9 %
10 SYRINGE (ML) INJECTION AS NEEDED
Status: DISCONTINUED | OUTPATIENT
Start: 2021-12-10 | End: 2021-12-10 | Stop reason: HOSPADM

## 2021-12-10 RX ORDER — SODIUM CHLORIDE 0.9 % (FLUSH) 0.9 %
10 SYRINGE (ML) INJECTION AS NEEDED
Status: CANCELLED | OUTPATIENT
Start: 2021-12-10

## 2021-12-10 RX ORDER — HEPARIN SODIUM (PORCINE) LOCK FLUSH IV SOLN 100 UNIT/ML 100 UNIT/ML
500 SOLUTION INTRAVENOUS AS NEEDED
Status: DISCONTINUED | OUTPATIENT
Start: 2021-12-10 | End: 2021-12-10 | Stop reason: HOSPADM

## 2021-12-10 RX ORDER — HEPARIN SODIUM (PORCINE) LOCK FLUSH IV SOLN 100 UNIT/ML 100 UNIT/ML
500 SOLUTION INTRAVENOUS AS NEEDED
Status: CANCELLED | OUTPATIENT
Start: 2021-12-10

## 2021-12-10 RX ADMIN — Medication 10 ML: at 13:12

## 2021-12-10 RX ADMIN — Medication 500 UNITS: at 13:12

## 2021-12-10 NOTE — PROGRESS NOTES
"  .     REASON FOR FOLLOW UP:   Management of anticoagulation    HISTORY OF PRESENT ILLNESS:  The patient is a 81 y.o. year old female  who is here for follow-up with the above-mentioned history.  Patient continues on Xarelto 20 mg daily for her history of atrial fibrillation along with history of DVT as long as her platelet count is greater than 50,000.  We are monitoring her platelet count every 3 months and last month this was 119,000.  She obtains samples of the Xarelto through our office secondary to financial difficulties with the co-pay.  She denies any recent bleeding, lower extremity edema, or shortness of breath above baseline.        Past Medical History:   Diagnosis Date   • Anemia     multifactorial   • Cystitis    • Cystocele     moderate   • Drug therapy    • Dyslipidemia    • Esophageal reflux    • Fatigue    • History of transfusion     no reaction   • Hypercalcemia    • Hypertension    • Major depression, chronic    • Menopause    • Non Hodgkin's lymphoma (HCC)    • Osteoarthritis    • Osteoporosis    • Palpitations    • Paroxysmal atrial fibrillation (HCC)    • Post menopausal problems    • RLS (restless legs syndrome)    • Seizure disorder (HCC)    • Sinus bradycardia    • Subjective tinnitus    • Vaginal delivery     x2  \"SONYA\"      \"JASON\"   • Vitamin D deficiency        MEDICATIONS    Current Outpatient Medications:   •  amLODIPine (NORVASC) 10 MG tablet, Take 1 tablet by mouth Daily., Disp: 90 tablet, Rfl: 0  •  Ascorbic Acid (Vitamin C) 500 MG capsule, Take  by mouth., Disp: , Rfl:   •  Benadryl Itch Stopping 1-0.1 % cream, APPLY 1 APPLICAITON ON SKIN FOUR TIMES DAILY, Disp: , Rfl:   •  benazepril (LOTENSIN) 40 MG tablet, Take 1 tablet by mouth Daily., Disp: 90 tablet, Rfl: 3  •  cetirizine (zyrTEC) 10 MG tablet, Take 1 tablet by mouth Daily As Needed for Allergies., Disp: 30 tablet, Rfl: 1  •  Cholecalciferol (VITAMIN D3) 125 MCG (5000 UT) capsule capsule, Take 5,000 Units by mouth Daily., " Disp: , Rfl:   •  escitalopram (LEXAPRO) 20 MG tablet, TAKE 1 TABLET BY MOUTH DAILY, Disp: 30 tablet, Rfl: 5  •  folic acid (FOLVITE) 400 MCG tablet, Take 400 mcg by mouth daily., Disp: , Rfl:   •  gabapentin (NEURONTIN) 300 MG capsule, TAKE 2 CAPSULES BY MOUTH EVERY NIGHT AT BEDTIME, Disp: 60 capsule, Rfl: 0  •  hydrALAZINE (APRESOLINE) 100 MG tablet, TAKE 1 TABLET BY MOUTH THREE TIMES DAILY, Disp: 270 tablet, Rfl: 3  •  lidocaine-prilocaine (EMLA) 2.5-2.5 % cream, Apply 30 min prior to port access, Disp: 5 g, Rfl: 2  •  melatonin 5 MG tablet tablet, Take 5 mg by mouth Every Night., Disp: , Rfl:   •  Multiple Vitamins-Minerals (ZINC PO), Take  by mouth., Disp: , Rfl:   •  pantoprazole (PROTONIX) 40 MG EC tablet, TAKE 1 TABLET BY MOUTH EVERY DAY, Disp: 90 tablet, Rfl: 1  •  phenytoin (DILANTIN) 100 MG ER capsule, Take 3 capsules by mouth every night at bedtime., Disp: 270 capsule, Rfl: 3  •  spironolactone (ALDACTONE) 25 MG tablet, Take 1 tablet by mouth Daily., Disp: 90 tablet, Rfl: 3  •  triamcinolone (KENALOG) 0.1 % cream, Apply  topically to the appropriate area as directed See Admin Instructions. Apply topically to the affected area twice daily, Disp: , Rfl:   •  vitamin B-12 (CYANOCOBALAMIN) 100 MCG tablet, Take 1,000 mcg by mouth Daily., Disp: , Rfl:   •  vitamin B-6 (PYRIDOXINE) 100 MG tablet, Take 100 mg by mouth Daily., Disp: , Rfl:   •  vitamin E 100 UNIT capsule, Take 180 Units by mouth Daily., Disp: , Rfl:   •  Xarelto 20 MG tablet, Take 1 tablet by mouth Daily., Disp: 28 tablet, Rfl: 0  •  Zinc Sulfate (ZINC 15 PO), Take 400 mg by mouth., Disp: , Rfl:     ALLERGIES:     Allergies   Allergen Reactions   • Crab (Diagnostic) Itching and Rash   • Pseudoephedrine Dizziness            There were no vitals filed for this visit.  Current Status 8/23/2021   ECOG score 0        PHYSICAL EXAM:    CONSTITUTIONAL:  Patient alert and oriented x3  EYES:  Conjunctiva and lids unremarkable.  PERRLA  RESPIRATORY:   Breathing unlabored, no acute distress.  MUSCULOSKELETAL:  No lower extremity swelling, erythema or calf tenderness  SKIN:  Warm.  No rashes.  PSYCHIATRIC:  Normal judgment and insight.  Normal mood and affect.     RECENT LABS:      Assessment/Plan   *Chronic atrial fibrillation anticoagulated with Xarelto 20 mg daily.  Patient also has thrombocytopenia we will continue the Xarelto as long as her platelet count is greater than 50,000.      *Thrombocytopenia.  She did have a CBC last month, we are checking this every 3 months, and her platelet count was 119,000.  For some reason, CBC was obtained today and therefore we did review this showing platelets improved to 134,000.    *Anemia.  Patient's hemoglobin has been declining the past couple of months, at 11.2 today.  She denies any recent bleeding or consistently dark stools.  She does have a history of iron deficiency however iron studies were just checked in September and were sufficient.  She had history of anemia related to chronic disease as well.  We will go ahead and check a CBC again next month and if this is declining further we will repeat iron studies.    PLAN:  1. I have provided the patient with 1 month supply of Xarelto 20 mg which she will continue daily. We reviewed the appropriate instructions for medication administration as well as dosing. We have reviewed potential side effects including increased risk of bleeding. The patient understand to call with signs or symptoms of bleeding or dark stools.  2. Repeat CBC in 1 month monitor hemoglobin and platelets.

## 2021-12-13 RX ORDER — HYDRALAZINE HYDROCHLORIDE 100 MG/1
TABLET, FILM COATED ORAL
Qty: 270 TABLET | Refills: 0 | Status: SHIPPED | OUTPATIENT
Start: 2021-12-13 | End: 2022-03-15

## 2021-12-24 DIAGNOSIS — C79.51 BONE METASTASIS: ICD-10-CM

## 2021-12-27 RX ORDER — GABAPENTIN 300 MG/1
600 CAPSULE ORAL
Qty: 60 CAPSULE | Refills: 0 | Status: SHIPPED | OUTPATIENT
Start: 2021-12-27 | End: 2022-01-24

## 2021-12-28 NOTE — PROGRESS NOTES
Site of Appt/Pickup: () [employer]; (y) [employer]; () [email or web address];    Prescriber (Mariel) requested the following medication samples to be given to the patient during the Nurse Practitioner visit.    NDC:    Drug name: Xarelto  Strength: 20mg  Total Quantity:  28 (Containers- 4 X Units per Containers- 7)  Lot#:  [id number]  Expiration: 02/23    Product to be placed in the [employer] under 'Patient Specialty Medication' entry and is to be removed at time of visit.

## 2022-01-07 ENCOUNTER — APPOINTMENT (OUTPATIENT)
Dept: ONCOLOGY | Facility: HOSPITAL | Age: 82
End: 2022-01-07

## 2022-01-07 ENCOUNTER — TELEPHONE (OUTPATIENT)
Dept: ONCOLOGY | Facility: CLINIC | Age: 82
End: 2022-01-07

## 2022-01-10 ENCOUNTER — OFFICE VISIT (OUTPATIENT)
Dept: ONCOLOGY | Facility: CLINIC | Age: 82
End: 2022-01-10

## 2022-01-10 ENCOUNTER — INFUSION (OUTPATIENT)
Dept: ONCOLOGY | Facility: HOSPITAL | Age: 82
End: 2022-01-10

## 2022-01-10 VITALS
SYSTOLIC BLOOD PRESSURE: 157 MMHG | WEIGHT: 149 LBS | TEMPERATURE: 97.3 F | DIASTOLIC BLOOD PRESSURE: 73 MMHG | BODY MASS INDEX: 26.4 KG/M2 | OXYGEN SATURATION: 97 % | HEIGHT: 63 IN | HEART RATE: 61 BPM | RESPIRATION RATE: 16 BRPM

## 2022-01-10 DIAGNOSIS — D50.0 IRON DEFICIENCY ANEMIA DUE TO CHRONIC BLOOD LOSS: ICD-10-CM

## 2022-01-10 DIAGNOSIS — D69.6 THROMBOCYTOPENIA: Primary | ICD-10-CM

## 2022-01-10 DIAGNOSIS — Z45.2 ENCOUNTER FOR ADJUSTMENT OR MANAGEMENT OF VASCULAR ACCESS DEVICE: ICD-10-CM

## 2022-01-10 DIAGNOSIS — C83.39 DIFFUSE LARGE B-CELL LYMPHOMA OF EXTRANODAL SITE EXCLUDING SPLEEN AND OTHER SOLID ORGANS: Primary | ICD-10-CM

## 2022-01-10 LAB
BASOPHILS # BLD AUTO: 0.04 10*3/MM3 (ref 0–0.2)
BASOPHILS NFR BLD AUTO: 0.8 % (ref 0–1.5)
DEPRECATED RDW RBC AUTO: 44.1 FL (ref 37–54)
EOSINOPHIL # BLD AUTO: 0.17 10*3/MM3 (ref 0–0.4)
EOSINOPHIL NFR BLD AUTO: 3.2 % (ref 0.3–6.2)
ERYTHROCYTE [DISTWIDTH] IN BLOOD BY AUTOMATED COUNT: 12.5 % (ref 12.3–15.4)
HCT VFR BLD AUTO: 36.1 % (ref 34–46.6)
HGB BLD-MCNC: 11.8 G/DL (ref 12–15.9)
IMM GRANULOCYTES # BLD AUTO: 0 10*3/MM3 (ref 0–0.05)
IMM GRANULOCYTES NFR BLD AUTO: 0 % (ref 0–0.5)
LYMPHOCYTES # BLD AUTO: 1.19 10*3/MM3 (ref 0.7–3.1)
LYMPHOCYTES NFR BLD AUTO: 22.6 % (ref 19.6–45.3)
MCH RBC QN AUTO: 31.5 PG (ref 26.6–33)
MCHC RBC AUTO-ENTMCNC: 32.7 G/DL (ref 31.5–35.7)
MCV RBC AUTO: 96.3 FL (ref 79–97)
MONOCYTES # BLD AUTO: 0.66 10*3/MM3 (ref 0.1–0.9)
MONOCYTES NFR BLD AUTO: 12.5 % (ref 5–12)
NEUTROPHILS NFR BLD AUTO: 3.21 10*3/MM3 (ref 1.7–7)
NEUTROPHILS NFR BLD AUTO: 60.9 % (ref 42.7–76)
NRBC BLD AUTO-RTO: 0 /100 WBC (ref 0–0.2)
PLATELET # BLD AUTO: 107 10*3/MM3 (ref 140–450)
PMV BLD AUTO: 10 FL (ref 6–12)
RBC # BLD AUTO: 3.75 10*6/MM3 (ref 3.77–5.28)
WBC NRBC COR # BLD: 5.27 10*3/MM3 (ref 3.4–10.8)

## 2022-01-10 PROCEDURE — 36591 DRAW BLOOD OFF VENOUS DEVICE: CPT

## 2022-01-10 PROCEDURE — 85025 COMPLETE CBC W/AUTO DIFF WBC: CPT

## 2022-01-10 PROCEDURE — 25010000002 HEPARIN LOCK FLUSH PER 10 UNITS: Performed by: INTERNAL MEDICINE

## 2022-01-10 RX ORDER — SODIUM CHLORIDE 0.9 % (FLUSH) 0.9 %
10 SYRINGE (ML) INJECTION AS NEEDED
Status: CANCELLED | OUTPATIENT
Start: 2022-01-10

## 2022-01-10 RX ORDER — HEPARIN SODIUM (PORCINE) LOCK FLUSH IV SOLN 100 UNIT/ML 100 UNIT/ML
500 SOLUTION INTRAVENOUS AS NEEDED
Status: DISCONTINUED | OUTPATIENT
Start: 2022-01-10 | End: 2022-01-10 | Stop reason: HOSPADM

## 2022-01-10 RX ORDER — HEPARIN SODIUM (PORCINE) LOCK FLUSH IV SOLN 100 UNIT/ML 100 UNIT/ML
500 SOLUTION INTRAVENOUS AS NEEDED
Status: CANCELLED | OUTPATIENT
Start: 2022-01-10

## 2022-01-10 RX ORDER — SODIUM CHLORIDE 0.9 % (FLUSH) 0.9 %
10 SYRINGE (ML) INJECTION AS NEEDED
Status: DISCONTINUED | OUTPATIENT
Start: 2022-01-10 | End: 2022-01-10 | Stop reason: HOSPADM

## 2022-01-10 RX ORDER — AMLODIPINE BESYLATE 10 MG/1
10 TABLET ORAL DAILY
Qty: 90 TABLET | Refills: 0 | Status: SHIPPED | OUTPATIENT
Start: 2022-01-10 | End: 2022-04-12

## 2022-01-10 RX ADMIN — Medication 10 ML: at 10:32

## 2022-01-10 RX ADMIN — Medication 500 UNITS: at 10:32

## 2022-01-10 NOTE — PROGRESS NOTES
"  .     REASON FOR FOLLOW UP:   Management of anticoagulation    HISTORY OF PRESENT ILLNESS:  The patient is a 81 y.o. year old female with the above-mentioned history who is here today for Xarelto samples. She continues on Xarelto 20 mg daily for history of atrial fibrillation as well as DVT. She continues on Xarelto as long as her platelet count is greater than 50,000. She denies bleeding issues and is tolerating Xarelto well.      Past Medical History:   Diagnosis Date   • Anemia     multifactorial   • Cystitis    • Cystocele     moderate   • Drug therapy    • Dyslipidemia    • Esophageal reflux    • Fatigue    • History of transfusion     no reaction   • Hypercalcemia    • Hypertension    • Major depression, chronic    • Menopause    • Non Hodgkin's lymphoma (HCC)    • Osteoarthritis    • Osteoporosis    • Palpitations    • Paroxysmal atrial fibrillation (HCC)    • Post menopausal problems    • RLS (restless legs syndrome)    • Seizure disorder (HCC)    • Sinus bradycardia    • Subjective tinnitus    • Vaginal delivery     x2  \"SONYA\"      \"JASON\"   • Vitamin D deficiency        MEDICATIONS    Current Outpatient Medications:   •  Ascorbic Acid (Vitamin C) 500 MG capsule, Take  by mouth., Disp: , Rfl:   •  Benadryl Itch Stopping 1-0.1 % cream, APPLY 1 APPLICAITON ON SKIN FOUR TIMES DAILY, Disp: , Rfl:   •  benazepril (LOTENSIN) 40 MG tablet, Take 1 tablet by mouth Daily., Disp: 90 tablet, Rfl: 3  •  cetirizine (zyrTEC) 10 MG tablet, Take 1 tablet by mouth Daily As Needed for Allergies., Disp: 30 tablet, Rfl: 1  •  Cholecalciferol (VITAMIN D3) 125 MCG (5000 UT) capsule capsule, Take 5,000 Units by mouth Daily., Disp: , Rfl:   •  escitalopram (LEXAPRO) 20 MG tablet, TAKE 1 TABLET BY MOUTH DAILY, Disp: 30 tablet, Rfl: 5  •  folic acid (FOLVITE) 400 MCG tablet, Take 400 mcg by mouth daily., Disp: , Rfl:   •  gabapentin (NEURONTIN) 300 MG capsule, TAKE 2 CAPSULES BY MOUTH EVERY NIGHT AT BEDTIME, Disp: 60 capsule, Rfl: " "0  •  hydrALAZINE (APRESOLINE) 100 MG tablet, TAKE 1 TABLET BY MOUTH THREE TIMES DAILY, Disp: 270 tablet, Rfl: 0  •  lidocaine-prilocaine (EMLA) 2.5-2.5 % cream, Apply 30 min prior to port access, Disp: 5 g, Rfl: 2  •  melatonin 5 MG tablet tablet, Take 5 mg by mouth Every Night., Disp: , Rfl:   •  Multiple Vitamins-Minerals (ZINC PO), Take  by mouth., Disp: , Rfl:   •  pantoprazole (PROTONIX) 40 MG EC tablet, TAKE 1 TABLET BY MOUTH EVERY DAY, Disp: 90 tablet, Rfl: 1  •  phenytoin (DILANTIN) 100 MG ER capsule, Take 3 capsules by mouth every night at bedtime., Disp: 270 capsule, Rfl: 3  •  spironolactone (ALDACTONE) 25 MG tablet, Take 1 tablet by mouth Daily., Disp: 90 tablet, Rfl: 3  •  triamcinolone (KENALOG) 0.1 % cream, Apply  topically to the appropriate area as directed See Admin Instructions. Apply topically to the affected area twice daily, Disp: , Rfl:   •  vitamin B-12 (CYANOCOBALAMIN) 100 MCG tablet, Take 1,000 mcg by mouth Daily., Disp: , Rfl:   •  vitamin B-6 (PYRIDOXINE) 100 MG tablet, Take 100 mg by mouth Daily., Disp: , Rfl:   •  vitamin E 100 UNIT capsule, Take 180 Units by mouth Daily., Disp: , Rfl:   •  Xarelto 20 MG tablet, Take 1 tablet by mouth Daily., Disp: 28 tablet, Rfl: 0  •  Zinc Sulfate (ZINC 15 PO), Take 400 mg by mouth., Disp: , Rfl:   •  amLODIPine (NORVASC) 10 MG tablet, TAKE 1 TABLET BY MOUTH DAILY, Disp: 90 tablet, Rfl: 0  No current facility-administered medications for this visit.    ALLERGIES:     Allergies   Allergen Reactions   • Crab (Diagnostic) Itching and Rash   • Pseudoephedrine Dizziness            Vitals:    01/10/22 1038   BP: 157/73   Pulse: 61   Resp: 16   Temp: 97.3 °F (36.3 °C)   TempSrc: Temporal   SpO2: 97%   Weight: 67.6 kg (149 lb)   Height: 160 cm (62.99\")   PainSc: 0-No pain     Current Status 1/10/2022   ECOG score 0        Physical Exam  Constitutional:       General: She is not in acute distress.     Appearance: She is well-developed.   Pulmonary:      " Effort: Pulmonary effort is normal. No respiratory distress.   Skin:     General: Skin is warm and dry.   Neurological:      Mental Status: She is alert and oriented to person, place, and time.         RECENT LABS:  Lab Results   Component Value Date    WBC 5.27 01/10/2022    HGB 11.8 (L) 01/10/2022    HCT 36.1 01/10/2022    MCV 96.3 01/10/2022     (L) 01/10/2022         Assessment/Plan   *Chronic atrial fibrillation anticoagulated with Xarelto 20 mg daily.  Patient also has thrombocytopenia we will continue the Xarelto as long as her platelet count is greater than 50,000.      *Thrombocytopenia.  She did have a CBC last month, we are checking this every 3 months, and her platelet count was 119,000.    · 1/10/2022 platelet count is 107,000. We will continue to monitor.    *Anemia.  Patient's hemoglobin has been declining the past couple of months, at 11.2 today.  She denies any recent bleeding or consistently dark stools.  She does have a history of iron deficiency however iron studies were just checked in September and were sufficient.  She had history of anemia related to chronic disease as well.  We will go ahead and check a CBC again next month and if this is declining further we will repeat iron studies.  · 11/10/2022 hemoglobin 11.8, which is stable.    PLAN:  1. I provided the patient with a 1 month supply of Xarelto 20 mg tablets, which she will take 1 daily. We have reviewed the appropriate instructions for medication administration as well as dosing. Patient is aware of potential side effects of the medication including increased risk of bleeding. She will call with any signs of symptoms of bleeding.  2. Return monthly for follow-up visit with nurse practitioner for further Xarelto samples.   3. Follow-up 4/29/2020 with Dr. Iraheta.        Linda Floyd, APRN  01/10/2022

## 2022-01-22 DIAGNOSIS — C79.51 BONE METASTASIS: ICD-10-CM

## 2022-01-24 RX ORDER — GABAPENTIN 300 MG/1
600 CAPSULE ORAL
Qty: 60 CAPSULE | Refills: 0 | Status: SHIPPED | OUTPATIENT
Start: 2022-01-24 | End: 2022-01-24 | Stop reason: SDUPTHER

## 2022-01-24 RX ORDER — GABAPENTIN 300 MG/1
600 CAPSULE ORAL
Qty: 60 CAPSULE | Refills: 0 | Status: SHIPPED | OUTPATIENT
Start: 2022-01-24 | End: 2022-02-22

## 2022-02-02 RX ORDER — RIVAROXABAN 20 MG/1
20 TABLET, FILM COATED ORAL DAILY
Qty: 28 TABLET | Refills: 0 | COMMUNITY
Start: 2022-02-02 | End: 2022-02-07

## 2022-02-04 ENCOUNTER — TELEPHONE (OUTPATIENT)
Dept: ONCOLOGY | Facility: CLINIC | Age: 82
End: 2022-02-04

## 2022-02-04 ENCOUNTER — APPOINTMENT (OUTPATIENT)
Dept: LAB | Facility: HOSPITAL | Age: 82
End: 2022-02-04

## 2022-02-04 ENCOUNTER — APPOINTMENT (OUTPATIENT)
Dept: ONCOLOGY | Facility: HOSPITAL | Age: 82
End: 2022-02-04

## 2022-02-07 ENCOUNTER — OFFICE VISIT (OUTPATIENT)
Dept: ONCOLOGY | Facility: CLINIC | Age: 82
End: 2022-02-07

## 2022-02-07 ENCOUNTER — INFUSION (OUTPATIENT)
Dept: ONCOLOGY | Facility: HOSPITAL | Age: 82
End: 2022-02-07

## 2022-02-07 VITALS
WEIGHT: 150.1 LBS | DIASTOLIC BLOOD PRESSURE: 68 MMHG | TEMPERATURE: 97.3 F | HEART RATE: 63 BPM | RESPIRATION RATE: 16 BRPM | BODY MASS INDEX: 26.59 KG/M2 | OXYGEN SATURATION: 98 % | HEIGHT: 63 IN | SYSTOLIC BLOOD PRESSURE: 149 MMHG

## 2022-02-07 DIAGNOSIS — D64.9 ANEMIA, UNSPECIFIED TYPE: ICD-10-CM

## 2022-02-07 DIAGNOSIS — D69.6 THROMBOCYTOPENIA: Primary | ICD-10-CM

## 2022-02-07 DIAGNOSIS — C85.91 LYMPHOMA OF LYMPH NODES OF NECK, UNSPECIFIED LYMPHOMA TYPE: ICD-10-CM

## 2022-02-07 DIAGNOSIS — Z45.2 ENCOUNTER FOR ADJUSTMENT OR MANAGEMENT OF VASCULAR ACCESS DEVICE: Primary | ICD-10-CM

## 2022-02-07 LAB
BASOPHILS # BLD AUTO: 0.04 10*3/MM3 (ref 0–0.2)
BASOPHILS NFR BLD AUTO: 0.8 % (ref 0–1.5)
DEPRECATED RDW RBC AUTO: 44.1 FL (ref 37–54)
EOSINOPHIL # BLD AUTO: 0.11 10*3/MM3 (ref 0–0.4)
EOSINOPHIL NFR BLD AUTO: 2.1 % (ref 0.3–6.2)
ERYTHROCYTE [DISTWIDTH] IN BLOOD BY AUTOMATED COUNT: 12.5 % (ref 12.3–15.4)
HCT VFR BLD AUTO: 35.1 % (ref 34–46.6)
HGB BLD-MCNC: 11.5 G/DL (ref 12–15.9)
IMM GRANULOCYTES # BLD AUTO: 0 10*3/MM3 (ref 0–0.05)
IMM GRANULOCYTES NFR BLD AUTO: 0 % (ref 0–0.5)
LYMPHOCYTES # BLD AUTO: 1.15 10*3/MM3 (ref 0.7–3.1)
LYMPHOCYTES NFR BLD AUTO: 21.7 % (ref 19.6–45.3)
MCH RBC QN AUTO: 31.3 PG (ref 26.6–33)
MCHC RBC AUTO-ENTMCNC: 32.8 G/DL (ref 31.5–35.7)
MCV RBC AUTO: 95.4 FL (ref 79–97)
MONOCYTES # BLD AUTO: 0.72 10*3/MM3 (ref 0.1–0.9)
MONOCYTES NFR BLD AUTO: 13.6 % (ref 5–12)
NEUTROPHILS NFR BLD AUTO: 3.28 10*3/MM3 (ref 1.7–7)
NEUTROPHILS NFR BLD AUTO: 61.8 % (ref 42.7–76)
NRBC BLD AUTO-RTO: 0 /100 WBC (ref 0–0.2)
PLATELET # BLD AUTO: 118 10*3/MM3 (ref 140–450)
PMV BLD AUTO: 10.4 FL (ref 6–12)
RBC # BLD AUTO: 3.68 10*6/MM3 (ref 3.77–5.28)
WBC NRBC COR # BLD: 5.3 10*3/MM3 (ref 3.4–10.8)

## 2022-02-07 PROCEDURE — 85025 COMPLETE CBC W/AUTO DIFF WBC: CPT

## 2022-02-07 PROCEDURE — 36591 DRAW BLOOD OFF VENOUS DEVICE: CPT

## 2022-02-07 PROCEDURE — 25010000002 HEPARIN LOCK FLUSH PER 10 UNITS: Performed by: INTERNAL MEDICINE

## 2022-02-07 RX ORDER — SODIUM CHLORIDE 0.9 % (FLUSH) 0.9 %
10 SYRINGE (ML) INJECTION AS NEEDED
Status: CANCELLED | OUTPATIENT
Start: 2022-02-07

## 2022-02-07 RX ORDER — SODIUM CHLORIDE 0.9 % (FLUSH) 0.9 %
10 SYRINGE (ML) INJECTION AS NEEDED
Status: DISCONTINUED | OUTPATIENT
Start: 2022-02-07 | End: 2022-02-07 | Stop reason: HOSPADM

## 2022-02-07 RX ORDER — RIVAROXABAN 20 MG/1
20 TABLET, FILM COATED ORAL DAILY
Qty: 28 TABLET | Refills: 0 | COMMUNITY
Start: 2022-02-07 | End: 2022-02-25 | Stop reason: SDUPTHER

## 2022-02-07 RX ORDER — HEPARIN SODIUM (PORCINE) LOCK FLUSH IV SOLN 100 UNIT/ML 100 UNIT/ML
500 SOLUTION INTRAVENOUS AS NEEDED
Status: DISCONTINUED | OUTPATIENT
Start: 2022-02-07 | End: 2022-02-07 | Stop reason: HOSPADM

## 2022-02-07 RX ORDER — HEPARIN SODIUM (PORCINE) LOCK FLUSH IV SOLN 100 UNIT/ML 100 UNIT/ML
500 SOLUTION INTRAVENOUS AS NEEDED
Status: CANCELLED | OUTPATIENT
Start: 2022-02-07

## 2022-02-07 RX ADMIN — Medication 500 UNITS: at 13:59

## 2022-02-07 RX ADMIN — Medication 10 ML: at 13:59

## 2022-02-08 ENCOUNTER — TELEPHONE (OUTPATIENT)
Dept: ONCOLOGY | Facility: CLINIC | Age: 82
End: 2022-02-08

## 2022-02-08 NOTE — TELEPHONE ENCOUNTER
----- Message from MELINDA Rotmhan sent at 2/7/2022  4:10 PM EST -----  In one month change patients appointment to CBC with RN review and then she will also need Xarelto samples that day.  Can be taken off NP schedule.       Thanks,    Linda

## 2022-02-10 ENCOUNTER — TELEPHONE (OUTPATIENT)
Dept: ONCOLOGY | Facility: CLINIC | Age: 82
End: 2022-02-10

## 2022-02-10 NOTE — TELEPHONE ENCOUNTER
Caller: Martina Blake    Relationship: Self    Best call back number:[phone]    Who are you requesting to speak with (clinical staff, provider,  specific staff member): JONNY    What was the call regarding: MARTINA WAS RETURNING JONNY'S CALL. SHE SAYS IT WAS REGARDING GOING OFF HER XARELTO.    Do you require a callback: YES

## 2022-02-21 DIAGNOSIS — C79.51 BONE METASTASIS: ICD-10-CM

## 2022-02-22 ENCOUNTER — PROCEDURE VISIT (OUTPATIENT)
Dept: OBSTETRICS AND GYNECOLOGY | Age: 82
End: 2022-02-22

## 2022-02-22 ENCOUNTER — SPECIALTY PHARMACY (OUTPATIENT)
Dept: PHARMACY | Facility: HOSPITAL | Age: 82
End: 2022-02-22

## 2022-02-22 DIAGNOSIS — Z78.0 POST-MENOPAUSAL: Primary | ICD-10-CM

## 2022-02-22 PROCEDURE — 77080 DXA BONE DENSITY AXIAL: CPT | Performed by: PHYSICIAN ASSISTANT

## 2022-02-22 RX ORDER — GABAPENTIN 300 MG/1
600 CAPSULE ORAL
Qty: 60 CAPSULE | Refills: 0 | Status: SHIPPED | OUTPATIENT
Start: 2022-02-22 | End: 2022-03-22

## 2022-02-22 NOTE — PROGRESS NOTES
I received an in-basket from nursing stating that pt was getting samples from the office. I determined the co-pay for the pt ($47) then I called pt to determine the pt's financial burden and give her the options of going after free drug or the [facility] pancho. After determining that, I contacted the [facility] pharmacy to determine whether the pharmacy would pay for the co-pay with the information provided. The pancho was 100% approved for one year.

## 2022-02-24 ENCOUNTER — TRANSCRIBE ORDERS (OUTPATIENT)
Dept: ADMINISTRATIVE | Facility: HOSPITAL | Age: 82
End: 2022-02-24

## 2022-02-24 DIAGNOSIS — Z12.31 SCREENING MAMMOGRAM FOR BREAST CANCER: Primary | ICD-10-CM

## 2022-02-25 NOTE — PROGRESS NOTES
Discussed with patient today, as I noted her MAR contains phenytoin. Pt states she has been on phenytoin for years (since early adulthood). She states she was start on xarelto in the hospital with her 2018 clot. She reports no clots since this time. Given pt has been stable on this combination, will go ahead and send RX with MD to cosign & note to MD to ensure he is aware of interaction.     Vilma Olea, Pharmacist  2/25/2022  15:12 EST

## 2022-03-04 ENCOUNTER — CLINICAL SUPPORT (OUTPATIENT)
Dept: ONCOLOGY | Facility: HOSPITAL | Age: 82
End: 2022-03-04

## 2022-03-04 ENCOUNTER — SPECIALTY PHARMACY (OUTPATIENT)
Dept: ONCOLOGY | Facility: HOSPITAL | Age: 82
End: 2022-03-04

## 2022-03-04 ENCOUNTER — APPOINTMENT (OUTPATIENT)
Dept: LAB | Facility: HOSPITAL | Age: 82
End: 2022-03-04

## 2022-03-04 ENCOUNTER — TELEPHONE (OUTPATIENT)
Dept: ONCOLOGY | Facility: CLINIC | Age: 82
End: 2022-03-04

## 2022-03-04 ENCOUNTER — INFUSION (OUTPATIENT)
Dept: ONCOLOGY | Facility: HOSPITAL | Age: 82
End: 2022-03-04

## 2022-03-04 DIAGNOSIS — C85.91 LYMPHOMA OF LYMPH NODES OF NECK, UNSPECIFIED LYMPHOMA TYPE: ICD-10-CM

## 2022-03-04 DIAGNOSIS — Z45.2 ENCOUNTER FOR ADJUSTMENT OR MANAGEMENT OF VASCULAR ACCESS DEVICE: Primary | ICD-10-CM

## 2022-03-04 LAB
BASOPHILS # BLD AUTO: 0.04 10*3/MM3 (ref 0–0.2)
BASOPHILS NFR BLD AUTO: 0.8 % (ref 0–1.5)
DEPRECATED RDW RBC AUTO: 42.8 FL (ref 37–54)
EOSINOPHIL # BLD AUTO: 0.08 10*3/MM3 (ref 0–0.4)
EOSINOPHIL NFR BLD AUTO: 1.6 % (ref 0.3–6.2)
ERYTHROCYTE [DISTWIDTH] IN BLOOD BY AUTOMATED COUNT: 12.3 % (ref 12.3–15.4)
HCT VFR BLD AUTO: 37.1 % (ref 34–46.6)
HGB BLD-MCNC: 12.3 G/DL (ref 12–15.9)
IMM GRANULOCYTES # BLD AUTO: 0.01 10*3/MM3 (ref 0–0.05)
IMM GRANULOCYTES NFR BLD AUTO: 0.2 % (ref 0–0.5)
LYMPHOCYTES # BLD AUTO: 1.38 10*3/MM3 (ref 0.7–3.1)
LYMPHOCYTES NFR BLD AUTO: 27.1 % (ref 19.6–45.3)
MCH RBC QN AUTO: 31.5 PG (ref 26.6–33)
MCHC RBC AUTO-ENTMCNC: 33.2 G/DL (ref 31.5–35.7)
MCV RBC AUTO: 94.9 FL (ref 79–97)
MONOCYTES # BLD AUTO: 0.61 10*3/MM3 (ref 0.1–0.9)
MONOCYTES NFR BLD AUTO: 12 % (ref 5–12)
NEUTROPHILS NFR BLD AUTO: 2.98 10*3/MM3 (ref 1.7–7)
NEUTROPHILS NFR BLD AUTO: 58.3 % (ref 42.7–76)
NRBC BLD AUTO-RTO: 0 /100 WBC (ref 0–0.2)
PLATELET # BLD AUTO: 134 10*3/MM3 (ref 140–450)
PMV BLD AUTO: 9.9 FL (ref 6–12)
RBC # BLD AUTO: 3.91 10*6/MM3 (ref 3.77–5.28)
WBC NRBC COR # BLD: 5.1 10*3/MM3 (ref 3.4–10.8)

## 2022-03-04 PROCEDURE — 85025 COMPLETE CBC W/AUTO DIFF WBC: CPT

## 2022-03-04 PROCEDURE — 36591 DRAW BLOOD OFF VENOUS DEVICE: CPT

## 2022-03-04 PROCEDURE — 25010000002 HEPARIN LOCK FLUSH PER 10 UNITS: Performed by: INTERNAL MEDICINE

## 2022-03-04 RX ORDER — HEPARIN SODIUM (PORCINE) LOCK FLUSH IV SOLN 100 UNIT/ML 100 UNIT/ML
500 SOLUTION INTRAVENOUS AS NEEDED
Status: DISCONTINUED | OUTPATIENT
Start: 2022-03-04 | End: 2022-03-04 | Stop reason: HOSPADM

## 2022-03-04 RX ORDER — SODIUM CHLORIDE 0.9 % (FLUSH) 0.9 %
10 SYRINGE (ML) INJECTION AS NEEDED
Status: DISCONTINUED | OUTPATIENT
Start: 2022-03-04 | End: 2022-03-04 | Stop reason: HOSPADM

## 2022-03-04 RX ORDER — PHENOL 1.4 %
600 AEROSOL, SPRAY (ML) MUCOUS MEMBRANE DAILY
COMMUNITY

## 2022-03-04 RX ORDER — HEPARIN SODIUM (PORCINE) LOCK FLUSH IV SOLN 100 UNIT/ML 100 UNIT/ML
500 SOLUTION INTRAVENOUS AS NEEDED
Status: CANCELLED | OUTPATIENT
Start: 2022-03-04

## 2022-03-04 RX ORDER — SODIUM CHLORIDE 0.9 % (FLUSH) 0.9 %
10 SYRINGE (ML) INJECTION AS NEEDED
Status: CANCELLED | OUTPATIENT
Start: 2022-03-04

## 2022-03-04 RX ADMIN — Medication 10 ML: at 14:09

## 2022-03-04 RX ADMIN — Medication 500 UNITS: at 14:09

## 2022-03-04 NOTE — TELEPHONE ENCOUNTER
----- Message from Lea Rowan RN sent at 3/4/2022  2:44 PM EST -----  The pt needs to change her appt on 4/1 to 4/4. Please call pts cell with the new appt time.    Thanks

## 2022-03-04 NOTE — PROGRESS NOTES
The pt was here for RN review. Counts are stable for this pt. She confirms taking xarelto as prescribed with no issues. Denies bleeding. Copy of labs provided. Pt knows to call office if experiencing bleeding.    Lab Results   Component Value Date    WBC 5.10 03/04/2022    HGB 12.3 03/04/2022    HCT 37.1 03/04/2022    MCV 94.9 03/04/2022     (L) 03/04/2022

## 2022-03-04 NOTE — PROGRESS NOTES
[facility] Encounter-Re: Adherence and side effects (Xarelto)    Today's encounter was conducted in person, face-to-face.     Medication:  Xarelto 20 mg po daily   - Reason for outreach: Routine medication check-in .  - Administration: Patient is taking every day at the same time  and with food .  - Missed doses: Patient reports missing 0 doses in the last 30 days.  - Self-administration: Patient demonstrates ability to self-administer medication. No barriers to adherence identified.   - Diagnosis/Indication: history of DVT, atrial fibrillation. Progress toward achieving therapeutic goals reviewed.   - Patient denies side effects.    - Medication availability/affordability: Patient has had no issues obtaining medication from pharmacy.   - Questions/concerns about medications: n/a       Completed medication reconciliation today to assess for drug interactions.   Reviewed allergies, medical history, labs, quality of life, and medication history with patient.   Patient denies starting or stopping any medications.  Assessed medication list for interactions, phenytoin + xarelto - checked with MD, okay to proceed (see prior notes)   Advised pt to call the clinic if any new medications are started so we can assess for drug-drug interactions.     All questions addressed. Patient had no additional concerns for [facility] office.     Vilma Olea, Pharmacist  3/4/2022  16:32 EST

## 2022-03-15 ENCOUNTER — TELEPHONE (OUTPATIENT)
Dept: CARDIOLOGY | Facility: CLINIC | Age: 82
End: 2022-03-15

## 2022-03-15 RX ORDER — PHENYTOIN SODIUM 100 MG/1
300 CAPSULE, EXTENDED RELEASE ORAL
Qty: 270 CAPSULE | Refills: 3 | Status: SHIPPED | OUTPATIENT
Start: 2022-03-15 | End: 2023-02-27

## 2022-03-15 RX ORDER — HYDRALAZINE HYDROCHLORIDE 100 MG/1
TABLET, FILM COATED ORAL
Qty: 270 TABLET | Refills: 0 | Status: SHIPPED | OUTPATIENT
Start: 2022-03-15 | End: 2022-06-09

## 2022-03-22 DIAGNOSIS — C79.51 BONE METASTASIS: ICD-10-CM

## 2022-03-22 RX ORDER — GABAPENTIN 300 MG/1
600 CAPSULE ORAL
Qty: 60 CAPSULE | Refills: 0 | Status: SHIPPED | OUTPATIENT
Start: 2022-03-22 | End: 2022-04-22

## 2022-03-24 ENCOUNTER — SPECIALTY PHARMACY (OUTPATIENT)
Dept: PHARMACY | Facility: HOSPITAL | Age: 82
End: 2022-03-24

## 2022-03-24 NOTE — PROGRESS NOTES
Specialty Pharmacy Refill Coordination Note     Martina is a 81 y.o. female contacted today regarding refills of  Xarelto specialty medication(s).    Reviewed and verified with patient:         Specialty medication(s) and dose(s) confirmed: yes    Refill Questions    Flowsheet Row Most Recent Value   Changes to allergies? No   Changes to medications? No   New conditions since last clinic visit No   Unplanned office visit, urgent care, ED, or hospital admission in the last 4 weeks  No   How does patient/caregiver feel medication is working? Good   Financial problems or insurance changes  No   How many doses of your specialty medications were missed in the last 4 weeks? none   Does this patient require a clinical escalation to a pharmacist? No          Delivery Questions    Flowsheet Row Most Recent Value   Delivery method FedEx  [[employer]Ex standard no sig ship 3/28 deliver 3/29]   Delivery address correct? Yes   Preferred delivery time? Anytime   Number of medications in delivery 1   Medication being filled and delivered Xarelto   Doses left of specialty medications about one week   Questions or concerns for the pharmacist? No                 Follow-up: 3 week(s)     Karen Marin  Specialty Pharmacy Technician

## 2022-03-24 NOTE — TELEPHONE ENCOUNTER
Refill requested from pharmacy. Per last chart note, patient to continue Xarelto 20 mg daily . Will route to MD for cosignature.

## 2022-03-29 RX ORDER — SPIRONOLACTONE 25 MG/1
25 TABLET ORAL DAILY
Qty: 90 TABLET | Refills: 0 | Status: SHIPPED | OUTPATIENT
Start: 2022-03-29 | End: 2022-06-29

## 2022-04-04 ENCOUNTER — CLINICAL SUPPORT (OUTPATIENT)
Dept: ONCOLOGY | Facility: HOSPITAL | Age: 82
End: 2022-04-04

## 2022-04-04 ENCOUNTER — INFUSION (OUTPATIENT)
Dept: ONCOLOGY | Facility: HOSPITAL | Age: 82
End: 2022-04-04

## 2022-04-04 ENCOUNTER — APPOINTMENT (OUTPATIENT)
Dept: LAB | Facility: HOSPITAL | Age: 82
End: 2022-04-04

## 2022-04-04 VITALS — DIASTOLIC BLOOD PRESSURE: 67 MMHG | HEART RATE: 76 BPM | SYSTOLIC BLOOD PRESSURE: 127 MMHG

## 2022-04-04 DIAGNOSIS — Z45.2 ENCOUNTER FOR ADJUSTMENT OR MANAGEMENT OF VASCULAR ACCESS DEVICE: Primary | ICD-10-CM

## 2022-04-04 DIAGNOSIS — C85.91 LYMPHOMA OF LYMPH NODES OF NECK, UNSPECIFIED LYMPHOMA TYPE: ICD-10-CM

## 2022-04-04 LAB
BASOPHILS # BLD AUTO: 0.03 10*3/MM3 (ref 0–0.2)
BASOPHILS NFR BLD AUTO: 0.6 % (ref 0–1.5)
DEPRECATED RDW RBC AUTO: 45.8 FL (ref 37–54)
EOSINOPHIL # BLD AUTO: 0.11 10*3/MM3 (ref 0–0.4)
EOSINOPHIL NFR BLD AUTO: 2 % (ref 0.3–6.2)
ERYTHROCYTE [DISTWIDTH] IN BLOOD BY AUTOMATED COUNT: 12.9 % (ref 12.3–15.4)
HCT VFR BLD AUTO: 35.9 % (ref 34–46.6)
HGB BLD-MCNC: 11.5 G/DL (ref 12–15.9)
IMM GRANULOCYTES # BLD AUTO: 0.01 10*3/MM3 (ref 0–0.05)
IMM GRANULOCYTES NFR BLD AUTO: 0.2 % (ref 0–0.5)
LYMPHOCYTES # BLD AUTO: 1.17 10*3/MM3 (ref 0.7–3.1)
LYMPHOCYTES NFR BLD AUTO: 21.7 % (ref 19.6–45.3)
MCH RBC QN AUTO: 30.9 PG (ref 26.6–33)
MCHC RBC AUTO-ENTMCNC: 32 G/DL (ref 31.5–35.7)
MCV RBC AUTO: 96.5 FL (ref 79–97)
MONOCYTES # BLD AUTO: 0.68 10*3/MM3 (ref 0.1–0.9)
MONOCYTES NFR BLD AUTO: 12.6 % (ref 5–12)
NEUTROPHILS NFR BLD AUTO: 3.39 10*3/MM3 (ref 1.7–7)
NEUTROPHILS NFR BLD AUTO: 62.9 % (ref 42.7–76)
NRBC BLD AUTO-RTO: 0 /100 WBC (ref 0–0.2)
PLATELET # BLD AUTO: 123 10*3/MM3 (ref 140–450)
PMV BLD AUTO: 10.1 FL (ref 6–12)
RBC # BLD AUTO: 3.72 10*6/MM3 (ref 3.77–5.28)
WBC NRBC COR # BLD: 5.39 10*3/MM3 (ref 3.4–10.8)

## 2022-04-04 PROCEDURE — 25010000002 HEPARIN LOCK FLUSH PER 10 UNITS: Performed by: INTERNAL MEDICINE

## 2022-04-04 PROCEDURE — 85025 COMPLETE CBC W/AUTO DIFF WBC: CPT

## 2022-04-04 PROCEDURE — 36591 DRAW BLOOD OFF VENOUS DEVICE: CPT

## 2022-04-04 RX ORDER — SODIUM CHLORIDE 0.9 % (FLUSH) 0.9 %
10 SYRINGE (ML) INJECTION AS NEEDED
Status: DISCONTINUED | OUTPATIENT
Start: 2022-04-04 | End: 2022-04-04 | Stop reason: HOSPADM

## 2022-04-04 RX ORDER — SODIUM CHLORIDE 0.9 % (FLUSH) 0.9 %
10 SYRINGE (ML) INJECTION AS NEEDED
Status: CANCELLED | OUTPATIENT
Start: 2022-04-04

## 2022-04-04 RX ORDER — HEPARIN SODIUM (PORCINE) LOCK FLUSH IV SOLN 100 UNIT/ML 100 UNIT/ML
500 SOLUTION INTRAVENOUS AS NEEDED
Status: CANCELLED | OUTPATIENT
Start: 2022-04-04

## 2022-04-04 RX ORDER — HEPARIN SODIUM (PORCINE) LOCK FLUSH IV SOLN 100 UNIT/ML 100 UNIT/ML
500 SOLUTION INTRAVENOUS AS NEEDED
Status: DISCONTINUED | OUTPATIENT
Start: 2022-04-04 | End: 2022-04-04 | Stop reason: HOSPADM

## 2022-04-04 RX ADMIN — Medication 10 ML: at 12:49

## 2022-04-04 RX ADMIN — Medication 500 UNITS: at 12:49

## 2022-04-04 NOTE — NURSING NOTE
Pt here for CBC and review. CBC is stable with hgb of 11.5 and platelet count of 123. Pt denies any signs of bleeding and is tolerating the Xarelto without complication. Pt states she has enough medication, it is now being mailed to her. Copy of labs provided.    Lab Results   Component Value Date    WBC 5.39 04/04/2022    HGB 11.5 (L) 04/04/2022    HCT 35.9 04/04/2022    MCV 96.5 04/04/2022     (L) 04/04/2022

## 2022-04-06 RX ORDER — CETIRIZINE HYDROCHLORIDE 10 MG/1
TABLET ORAL
Qty: 90 TABLET | Refills: 1 | Status: SHIPPED | OUTPATIENT
Start: 2022-04-06

## 2022-04-12 RX ORDER — AMLODIPINE BESYLATE 10 MG/1
10 TABLET ORAL DAILY
Qty: 90 TABLET | Refills: 0 | Status: SHIPPED | OUTPATIENT
Start: 2022-04-12 | End: 2022-07-08

## 2022-04-21 DIAGNOSIS — C79.51 BONE METASTASIS: ICD-10-CM

## 2022-04-22 ENCOUNTER — SPECIALTY PHARMACY (OUTPATIENT)
Dept: PHARMACY | Facility: HOSPITAL | Age: 82
End: 2022-04-22

## 2022-04-22 RX ORDER — GABAPENTIN 300 MG/1
600 CAPSULE ORAL
Qty: 60 CAPSULE | Refills: 0 | Status: SHIPPED | OUTPATIENT
Start: 2022-04-22 | End: 2022-05-23

## 2022-04-22 NOTE — PROGRESS NOTES
Specialty Pharmacy Refill Coordination Note     Martina is a 81 y.o. female contacted today regarding refills of  Xarelto specialty medication(s).    Reviewed and verified with patient:         Specialty medication(s) and dose(s) confirmed: yes    Refill Questions    Flowsheet Row Most Recent Value   Changes to allergies? No   Changes to medications? No   New conditions since last clinic visit No   Unplanned office visit, urgent care, ED, or hospital admission in the last 4 weeks  No   How does patient/caregiver feel medication is working? Good   Financial problems or insurance changes  No   How many doses of your specialty medications were missed in the last 4 weeks? none   Does this patient require a clinical escalation to a pharmacist? No          Delivery Questions    Flowsheet Row Most Recent Value   Delivery method [employer]Ex  [[employer] standard no sig ship 4/28 deliver 4/29]   Delivery address correct? Yes   Preferred delivery time? Anytime   Number of medications in delivery 1   Medication being filled and delivered Xarelto   Doses left of specialty medications 1 week   Questions or concerns for the pharmacist? No                 Follow-up: 3 week(s)     Karen Marin  Specialty Pharmacy Technician

## 2022-04-27 DIAGNOSIS — I48.0 PAROXYSMAL ATRIAL FIBRILLATION: Primary | ICD-10-CM

## 2022-04-27 DIAGNOSIS — D69.3 ACUTE ITP: ICD-10-CM

## 2022-04-28 NOTE — PROGRESS NOTES
"  Subjective .    REASONS FOR FOLLOWUP:    1. 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 Paroxysmal atrial fibrillation  3. History of DVT  4. 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 female with the above-mentioned history who returned to the office 10/18/2021  approximately 3 years out from her last chemotherapy treatment as well as for treatment for her DVT.  There is the possibility of starting to change her medications including a reduced dose anticoagulant as well as possible port removal as we plan to assess her at the 3-year luisana.  Fortunately her performance status has remained excellent up to that point.       She did undergo repeat imaging CT chest and pelvis 11/9/2021 showing, fortunately, no evidence for recurrent lymphoma in chest abdomen or pelvis.  Further Doppler examination of lower extremities were also normal bilaterally.  The patient is seen 11/12/2021 generally improving.  We have discussed that at this point she continues in remission.  She hopes to maintain her prior port at this point.    The patient is seen in follow-up 4/2022 for years after diagnosis.  She is feeling well except for worsening conjunctival erythema having been told that she has dry eyes according to her ophthalmologist.  She is concerned about thyroid dysfunction being told that she \"had large eyes\" for many years.    ONCOLOGIC HISTORY:  (History from previous dates can be found in the separate document.)    Objective      Vitals:    04/29/22 1248   BP: 134/71   Pulse: 64   Resp: 16   Temp: 97.3 °F (36.3 °C)   TempSrc: Temporal   SpO2: 98%   Weight: 68.1 kg (150 lb 1.6 oz)   Height: 160 cm (62.99\")   PainSc: 0-No pain     Current Status 4/29/2022   ECOG score 0       Physical Exam   Constitutional: She is oriented to person, place, and time. She appears well-developed.   HENT:   Head: Normocephalic and atraumatic.   Eyes: " Pupils are equal, round, and reactive to light.   Bilateral conjunctival erythema, minimal proptosis   Cardiovascular: Normal rate.   Pulmonary/Chest: Effort normal and breath sounds normal. She has no wheezes.   Abdominal: Normal appearance and bowel sounds are normal.   Musculoskeletal: Normal range of motion.   Neurological: She is alert and oriented to person, place, and time.   Skin: Skin is warm and dry. No lesion noted.   Psychiatric: Her behavior is normal. Mood normal.   Vitals reviewed.  I have reexamined the patient and the results are consistent with the previously documented exam. Chivo Iraheta MD       RECENT LABS:  Results from last 7 days   Lab Units 04/29/22  1220   WBC 10*3/mm3 5.62   NEUTROS ABS 10*3/mm3 3.70   HEMOGLOBIN g/dL 11.9*   HEMATOCRIT % 35.9   PLATELETS 10*3/mm3 140         Assessment/Plan   1. 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  · PET scan 5/3/18 with diffuse splenic involvement (SUV 16.9), lymphadenopathy throughout upper abdomen and retroperitoneum (SUV 13.1), right liver lesion 5 cm (SUV 12.8), pelvic lymphadenopathy up to 4 cm, bladder wall thickening with associated hypermetabolism, cervical lymphadenopathy left posterior (SUV 11.2), multiple bone lesions including left intertrochanteric femur, ribs, right scapula, pelvis as well as left gluteal hematoma versus lymphomatous lesion.  · CT-guided liver biopsy 4/26/18 with diffuse large B-cell non-Hodgkin's lymphoma, CD20 positive, double hit (BCL-6 rearrangement 85%, MYC rearrangement 79%), KI-67 4+/4  · Left cervical excisional biopsy by ENT Dr. Oconnor 5/1/18 confirming high-grade B cell non-Hodgkin's lymphoma, Ki-67 100%, double hit (BCL-6 rearrangement 76%, MYC rearrangement 85%)  · Echocardiogram 4/11/18 with ejection fraction 66.7%  · Right port placement Dr Gill  5/4/18  · Patient not felt to be a candidate for more aggressive chemotherapy due to performance status and age (DA EPOCH-R)  · Therefore treated with R CHOP chemotherapy 5/4/18.  · Followed by granix 300mcg daily beginning 5/6/18 through 5/14/18.  · Patient reviewed June 19 with near resolution of hypermetabolic sites on PET/CT.  Plans made for third cycle of CHOP R, additional Zometa and total of 6 cycles of chemotherapy anticipated.  · 8/3/18 Cycle 5 CHOP R, reduction of Oncovin 50%, 6 cycles planned.    · Follow-up scan September 19 with small low-density liver lesions and splenic lesions are decreased from previous, numerous small mesenteric and RP nodes again stable slightly smaller.    · Patient assessed February 18, 2019, no evidence of recurrent disease  · Patient seen May 13, 2019, no evidence of recurrent disease  · Patient reviewed again October 28, 2019 with follow-up scans negative for recurrence,   · Reassessment planned December 21, 2020 via scans after she has developed thrombocytopenia.  · Follow-up CT scan chest and pelvis January 13, 2021 no for evidence of recurrence.  · Patient seen 10/18/2021 with repeat CT chest and pelvis planned in November 2021.  If unchanged then we would discontinue such surveillance studies.  · Repeat scans 11/9/2021 - for any recurrent disease, no additional scans planned, every 6 month follow-up MD through year 5.  · Patient assessed 4/29/2022 continued excellent performance status.    2. Thrombocytopenia.    · Patient had previous myelosuppression with chemo therapy however this had resolved.    · She has however had in a new medication with Spironolactone within the past 3 to 4 months from cardiology.    · Spironolactone was discontinued with gradual improvement in her platelet count October 2020   · December 2020, worsening thrombocytopenia with platelet count of 60,000  · Subsequent testing per laboratory studies without evidence of etiology, slowly advancing IPF  with plans to proceed to treat for immunologic thrombocytopenia such as ITP.  The patient subsequently started prednisone at 0.5 mg/kg twice daily.    · Improvement in her platelet count   · 2/25/2021, platelets improved to 94,000 with prednisone decreased to 30 mg  · 3/3/2020, platelets declined to 69,000, prednisone increased to 40 mg  · Bone marrow biopsy and aspirate 3/19/2021 - for lymphoma involvement, normal trilineage hematopoiesis, including megakaryopoiesis.  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  · Hemoglobin 11.6, hematocrit 34.4-11/12/2021  · Reassessment 4/29/2022 with H&H 11.9 and 35.9       4. Hypercalcemia of malignancy:  · Calcium up to 13.8 on 4/21/18 (albumin 3.3).  Intact PTH 8.1, PTH RP less than 2.0  · Received Zometa 4 mg IV 4/25/18.  · Calcium up to 11.3 on 5/7/18 with second dose of Zometa administered 4 mg IV.  · Calcium today has continued to gradually increase up to 11.9 with albumin 3.3.  Ionized calcium however is stable at 6.8 compared to yesterday.  The patient is asymptomatic.  We will not plan to administer a further dose of Zometa just yet and will allow further time for the patient to respond to chemotherapy.  She returned 518 for continued Zometa and when seen May 25 has a normal calcium level.  · Patient to receive Zometa June 19, subsequently every other cycle, restart low-dose calcium supplementation  · Patient seen August 03, 2018, plans made for Zometa with her final course of chemotherapy August 23, 2018  · Patient scheduled for bone density prior to assessment 3 months following November visit, potential Xgeva and follow-up as she is seen back to step to repeat  scans are performed in May 2019.  As she is seen May 13 we do plan the Xgeva and then move to Prolia 6 months later for her subsequent treatment for osteopenia.  · Prolia continued every 6 months, last given 6/3/2020, now scheduled December 21, 2020.   · Patient seen 10/18/2021, consider repeat bone density at subsequent visits.  · Patient normocalcemic 11/12/2022  · Pending assessment 4/29/2022          5.  Paroxysmal atrial fibrillation:  · Recently diagnosed, anticoagulated with Eliquis initially, discontinued due to expected thrombocytopenia from chemotherapy  · Currently in sinus rhythm, continues on Lopressor 50 mg every 12 hours  · Anticoagulated with Xarelto which is continued if platelets are greater than 50,000      6. Prior seizure disorder:  · Continues currently on Dilantin 300 mg daily at bedtime and Neurontin 600 mg daily at bedtime, will return to outpatient dose of Neurontin 300 mg daily at bedtime at discharge.        7. GI prophylaxis:  · Continues Protonix p.o.        8. Venous access:  · Port placement 5/4/18 by Dr. Gill, continue to manage every 6 weeks    9. DVT  · Anticoagulated with Xarelto 20 mg daily  · Xarelto held if platelets drop less than 50,000  · Currently tolerating Xarelto well, without signs or symptoms of bleeding.  No evidence of recurrent thrombosis  · Patient assessed 10/18/2021 with repeat Dopplers planned-assessed 11/5/2021 with no evidence of abnormality, resolution of thrombus bilaterally.    Plan:  1.  Return to laboratory today for TSH, CMP testing  2.  Return in 6/21/2022 for Prolia, recent DEXA scan stable  3.  Every 6 week port flush  4.  CBC 3 months, RN evaluation  5.  6-month follow-up MD

## 2022-04-29 ENCOUNTER — INFUSION (OUTPATIENT)
Dept: ONCOLOGY | Facility: HOSPITAL | Age: 82
End: 2022-04-29

## 2022-04-29 ENCOUNTER — OFFICE VISIT (OUTPATIENT)
Dept: ONCOLOGY | Facility: CLINIC | Age: 82
End: 2022-04-29

## 2022-04-29 VITALS
WEIGHT: 150.1 LBS | HEART RATE: 64 BPM | RESPIRATION RATE: 16 BRPM | BODY MASS INDEX: 26.59 KG/M2 | OXYGEN SATURATION: 98 % | HEIGHT: 63 IN | DIASTOLIC BLOOD PRESSURE: 71 MMHG | TEMPERATURE: 97.3 F | SYSTOLIC BLOOD PRESSURE: 134 MMHG

## 2022-04-29 DIAGNOSIS — D69.3 ACUTE ITP: ICD-10-CM

## 2022-04-29 DIAGNOSIS — M85.89 OSTEOPENIA OF MULTIPLE SITES: ICD-10-CM

## 2022-04-29 DIAGNOSIS — C79.51 BONE METASTASIS: ICD-10-CM

## 2022-04-29 DIAGNOSIS — C83.39 DIFFUSE LARGE B-CELL LYMPHOMA OF EXTRANODAL SITE EXCLUDING SPLEEN AND OTHER SOLID ORGANS: Primary | ICD-10-CM

## 2022-04-29 DIAGNOSIS — H11.433 CONJUNCTIVAL HYPEREMIA, BILATERAL: ICD-10-CM

## 2022-04-29 DIAGNOSIS — Z45.2 ENCOUNTER FOR ADJUSTMENT OR MANAGEMENT OF VASCULAR ACCESS DEVICE: Primary | ICD-10-CM

## 2022-04-29 DIAGNOSIS — I48.0 PAROXYSMAL ATRIAL FIBRILLATION: ICD-10-CM

## 2022-04-29 LAB
ALBUMIN SERPL-MCNC: 4.2 G/DL (ref 3.5–5.2)
ALBUMIN/GLOB SERPL: 1.7 G/DL (ref 1.1–2.4)
ALP SERPL-CCNC: 99 U/L (ref 38–116)
ALT SERPL W P-5'-P-CCNC: 14 U/L (ref 0–33)
ANION GAP SERPL CALCULATED.3IONS-SCNC: 12.8 MMOL/L (ref 5–15)
AST SERPL-CCNC: 20 U/L (ref 0–32)
BASOPHILS # BLD AUTO: 0.03 10*3/MM3 (ref 0–0.2)
BASOPHILS NFR BLD AUTO: 0.5 % (ref 0–1.5)
BILIRUB SERPL-MCNC: 0.2 MG/DL (ref 0.2–1.2)
BUN SERPL-MCNC: 18 MG/DL (ref 6–20)
BUN/CREAT SERPL: 28.1 (ref 7.3–30)
CALCIUM SPEC-SCNC: 9.6 MG/DL (ref 8.5–10.2)
CHLORIDE SERPL-SCNC: 102 MMOL/L (ref 98–107)
CO2 SERPL-SCNC: 24.2 MMOL/L (ref 22–29)
CREAT SERPL-MCNC: 0.64 MG/DL (ref 0.6–1.1)
DEPRECATED RDW RBC AUTO: 43.3 FL (ref 37–54)
EGFRCR SERPLBLD CKD-EPI 2021: 88.9 ML/MIN/1.73
EOSINOPHIL # BLD AUTO: 0.09 10*3/MM3 (ref 0–0.4)
EOSINOPHIL NFR BLD AUTO: 1.6 % (ref 0.3–6.2)
ERYTHROCYTE [DISTWIDTH] IN BLOOD BY AUTOMATED COUNT: 12.5 % (ref 12.3–15.4)
GLOBULIN UR ELPH-MCNC: 2.5 GM/DL (ref 1.8–3.5)
GLUCOSE SERPL-MCNC: 112 MG/DL (ref 74–124)
HCT VFR BLD AUTO: 35.9 % (ref 34–46.6)
HGB BLD-MCNC: 11.9 G/DL (ref 12–15.9)
IMM GRANULOCYTES # BLD AUTO: 0.01 10*3/MM3 (ref 0–0.05)
IMM GRANULOCYTES NFR BLD AUTO: 0.2 % (ref 0–0.5)
LYMPHOCYTES # BLD AUTO: 1.19 10*3/MM3 (ref 0.7–3.1)
LYMPHOCYTES NFR BLD AUTO: 21.2 % (ref 19.6–45.3)
MCH RBC QN AUTO: 31.3 PG (ref 26.6–33)
MCHC RBC AUTO-ENTMCNC: 33.1 G/DL (ref 31.5–35.7)
MCV RBC AUTO: 94.5 FL (ref 79–97)
MONOCYTES # BLD AUTO: 0.6 10*3/MM3 (ref 0.1–0.9)
MONOCYTES NFR BLD AUTO: 10.7 % (ref 5–12)
NEUTROPHILS NFR BLD AUTO: 3.7 10*3/MM3 (ref 1.7–7)
NEUTROPHILS NFR BLD AUTO: 65.8 % (ref 42.7–76)
NRBC BLD AUTO-RTO: 0 /100 WBC (ref 0–0.2)
PLATELET # BLD AUTO: 140 10*3/MM3 (ref 140–450)
PMV BLD AUTO: 10.1 FL (ref 6–12)
POTASSIUM SERPL-SCNC: 4.3 MMOL/L (ref 3.5–4.7)
PROT SERPL-MCNC: 6.7 G/DL (ref 6.3–8)
RBC # BLD AUTO: 3.8 10*6/MM3 (ref 3.77–5.28)
SODIUM SERPL-SCNC: 139 MMOL/L (ref 134–145)
TSH SERPL DL<=0.05 MIU/L-ACNC: 1.28 UIU/ML (ref 0.27–4.2)
WBC NRBC COR # BLD: 5.62 10*3/MM3 (ref 3.4–10.8)

## 2022-04-29 PROCEDURE — 80053 COMPREHEN METABOLIC PANEL: CPT | Performed by: INTERNAL MEDICINE

## 2022-04-29 PROCEDURE — 36415 COLL VENOUS BLD VENIPUNCTURE: CPT | Performed by: INTERNAL MEDICINE

## 2022-04-29 PROCEDURE — 85025 COMPLETE CBC W/AUTO DIFF WBC: CPT

## 2022-04-29 PROCEDURE — 25010000002 HEPARIN LOCK FLUSH PER 10 UNITS: Performed by: INTERNAL MEDICINE

## 2022-04-29 PROCEDURE — 36591 DRAW BLOOD OFF VENOUS DEVICE: CPT

## 2022-04-29 PROCEDURE — 99214 OFFICE O/P EST MOD 30 MIN: CPT | Performed by: INTERNAL MEDICINE

## 2022-04-29 PROCEDURE — 84443 ASSAY THYROID STIM HORMONE: CPT | Performed by: INTERNAL MEDICINE

## 2022-04-29 RX ORDER — HEPARIN SODIUM (PORCINE) LOCK FLUSH IV SOLN 100 UNIT/ML 100 UNIT/ML
500 SOLUTION INTRAVENOUS AS NEEDED
Status: CANCELLED | OUTPATIENT
Start: 2022-04-29

## 2022-04-29 RX ORDER — SODIUM CHLORIDE 0.9 % (FLUSH) 0.9 %
10 SYRINGE (ML) INJECTION AS NEEDED
Status: DISCONTINUED | OUTPATIENT
Start: 2022-04-29 | End: 2022-04-29 | Stop reason: HOSPADM

## 2022-04-29 RX ORDER — HEPARIN SODIUM (PORCINE) LOCK FLUSH IV SOLN 100 UNIT/ML 100 UNIT/ML
500 SOLUTION INTRAVENOUS AS NEEDED
Status: DISCONTINUED | OUTPATIENT
Start: 2022-04-29 | End: 2022-04-29 | Stop reason: HOSPADM

## 2022-04-29 RX ORDER — SODIUM CHLORIDE 0.9 % (FLUSH) 0.9 %
10 SYRINGE (ML) INJECTION AS NEEDED
Status: CANCELLED | OUTPATIENT
Start: 2022-04-29

## 2022-04-29 RX ADMIN — Medication 10 ML: at 12:20

## 2022-04-29 RX ADMIN — Medication 500 UNITS: at 12:20

## 2022-05-02 ENCOUNTER — SPECIALTY PHARMACY (OUTPATIENT)
Dept: PHARMACY | Facility: HOSPITAL | Age: 82
End: 2022-05-02

## 2022-05-05 ENCOUNTER — TELEPHONE (OUTPATIENT)
Dept: ONCOLOGY | Facility: CLINIC | Age: 82
End: 2022-05-05

## 2022-05-05 NOTE — TELEPHONE ENCOUNTER
Caller: Martina Blake    Relationship: Self    Best call back number: [phone]    What is the best time to reach you: ASAP    Who are you requesting to speak with (clinical staff, provider,  specific staff member): NURSE    Do you know the name of the person who called:     What was the call regarding: PT WANTS TO DISCUSS TSH RESULTS    Do you require a callback: YES

## 2022-05-13 DIAGNOSIS — F33.9 CHRONIC RECURRENT MAJOR DEPRESSIVE DISORDER: ICD-10-CM

## 2022-05-13 RX ORDER — ESCITALOPRAM OXALATE 20 MG/1
20 TABLET ORAL DAILY
Qty: 30 TABLET | Refills: 5 | Status: SHIPPED | OUTPATIENT
Start: 2022-05-13 | End: 2022-11-09

## 2022-05-16 ENCOUNTER — HOSPITAL ENCOUNTER (OUTPATIENT)
Dept: MAMMOGRAPHY | Facility: HOSPITAL | Age: 82
Discharge: HOME | End: 2022-05-16
Admitting: FAMILY MEDICINE

## 2022-05-16 DIAGNOSIS — Z12.31 SCREENING MAMMOGRAM FOR BREAST CANCER: ICD-10-CM

## 2022-05-16 PROCEDURE — 77063 BREAST TOMOSYNTHESIS BI: CPT

## 2022-05-16 PROCEDURE — 77067 SCR MAMMO BI INCL CAD: CPT

## 2022-05-21 DIAGNOSIS — C79.51 BONE METASTASIS: ICD-10-CM

## 2022-05-23 ENCOUNTER — SPECIALTY PHARMACY (OUTPATIENT)
Dept: PHARMACY | Facility: HOSPITAL | Age: 82
End: 2022-05-23

## 2022-05-23 RX ORDER — GABAPENTIN 300 MG/1
600 CAPSULE ORAL
Qty: 60 CAPSULE | Refills: 0 | Status: SHIPPED | OUTPATIENT
Start: 2022-05-23 | End: 2022-05-23

## 2022-05-23 RX ORDER — GABAPENTIN 300 MG/1
600 CAPSULE ORAL
Qty: 60 CAPSULE | Refills: 0 | Status: SHIPPED | OUTPATIENT
Start: 2022-05-23 | End: 2022-06-24 | Stop reason: SDUPTHER

## 2022-05-23 NOTE — PROGRESS NOTES
Specialty Pharmacy Refill Coordination Note     Martina is a 82 y.o. female contacted today regarding refills of  Xarelto specialty medication(s).    Reviewed and verified with patient:         Specialty medication(s) and dose(s) confirmed: yes    Refill Questions    Flowsheet Row Most Recent Value   Changes to allergies? No   Changes to medications? No   New conditions since last clinic visit No   Unplanned office visit, urgent care, ED, or hospital admission in the last 4 weeks  No   How does patient/caregiver feel medication is working? Good   Financial problems or insurance changes  No   Since the previous refill, were any specialty medication doses or scheduled injections missed or delayed?  No   Does this patient require a clinical escalation to a pharmacist? No          Delivery Questions    Flowsheet Row Most Recent Value   Delivery method [employer]  [[employer] standard no sig put on bench ship 5/26 deliver 5/27]   Delivery address correct? Yes   Preferred delivery time? Anytime   Number of medications in delivery 1   Medication being filled and delivered Xarelto   Doses left of specialty medications enough until 5/29   Is there any medication that is due not being filled? No   Cooler needed? No   Do any medications need mixed or dated? No   Copay form of payment Payment plan already set up   Questions or concerns for the pharmacist? No   Are any medications first time fills? No                 Follow-up: 3 week(s)     Karen Marin  Specialty Pharmacy Technician

## 2022-05-25 ENCOUNTER — OFFICE VISIT (OUTPATIENT)
Dept: CARDIOLOGY | Facility: CLINIC | Age: 82
End: 2022-05-25

## 2022-05-25 VITALS
SYSTOLIC BLOOD PRESSURE: 138 MMHG | WEIGHT: 149 LBS | HEART RATE: 60 BPM | BODY MASS INDEX: 26.4 KG/M2 | HEIGHT: 63 IN | DIASTOLIC BLOOD PRESSURE: 78 MMHG

## 2022-05-25 DIAGNOSIS — I10 PRIMARY HYPERTENSION: Chronic | ICD-10-CM

## 2022-05-25 DIAGNOSIS — I44.4 LAFB (LEFT ANTERIOR FASCICULAR BLOCK): ICD-10-CM

## 2022-05-25 DIAGNOSIS — I48.0 PAROXYSMAL ATRIAL FIBRILLATION: Primary | ICD-10-CM

## 2022-05-25 DIAGNOSIS — C83.39 DIFFUSE LARGE B-CELL LYMPHOMA OF EXTRANODAL SITE EXCLUDING SPLEEN AND OTHER SOLID ORGANS: ICD-10-CM

## 2022-05-25 DIAGNOSIS — E78.5 DYSLIPIDEMIA: ICD-10-CM

## 2022-05-25 PROCEDURE — 99214 OFFICE O/P EST MOD 30 MIN: CPT | Performed by: INTERNAL MEDICINE

## 2022-05-25 PROCEDURE — 93000 ELECTROCARDIOGRAM COMPLETE: CPT | Performed by: INTERNAL MEDICINE

## 2022-05-25 RX ORDER — ROSUVASTATIN CALCIUM 5 MG/1
5 TABLET, COATED ORAL NIGHTLY
Qty: 90 TABLET | Refills: 3 | Status: SHIPPED | OUTPATIENT
Start: 2022-05-25

## 2022-05-25 NOTE — PROGRESS NOTES
Date of Office Visit: 2022  Encounter Provider: Justine Barrios MD  Place of Service: [facility] CARDIOLOGY  Patient Name: Martina Blake  :1940      Patient ID:  Martina Blake is a 82 y.o. female is here for  followup for PAF.         History of Present Illness    She has a past medical history of hypertension, dyslipidemia, esophageal reflux, seizure disorder, and vitamin D deficiency.     She has a past medical history of atrial fibrillation with rapid ventricular response in the setting of hypokalemia, hypomagnesemia, hypercalcemia, and anemia.  She was placed on metoprolol.  She had an upper and lower endoscopy showing hiatal hernia, gastritis, esophagitis, and diverticulosis with no acute bleeding.  She was started on apixiban and and spironolactone.  She had a normal Cardiolite stress test in 2018 and an echocardiogram at that time revealed an EF of 67%, grade 2 diastolic dysfunction, severe left atrial enlargement, mild to moderate tricuspid insufficiency, and RVSP elevated at 44 mmHg.       She has large B-cell lymphoma diffuse and is now in remission, was on R-CHOP (rituximab, doxorubicin, vincristine and cyclophosphamide) finished 18 with Dr. Iraheta.      She was hospitalized in May 2018 and chemotherapy was initiated.  Her apixiban was discontinued due to thrombocytopenia.  She was placed on metoprolol tartrate 50 mg twice daily for A. fib with rapid ventricular response.       Echocardiogram done 18 shows ejection fraction 59% with global longitudinal strain of -18%.  There was grade 2 diastolic dysfunction and no significant valve disease.     Dr. Iraheta is following her platelets and has stopped her Xarelto.  He stopped her spironolactone over the summer because of thrombocytopenia.  Her PET scan showed no recurrence of cancer.  In 2021, her blood pressure was elevated. The patient felt like the spironolactone really  "controlled her blood pressure and the metoprolol did not.  After speaking with Dr. Iraheta , her spironolactone was restarted and stopped her metoprolol. In May 2021, follow-up BMP was normal.    Lipids done 9/20/2021 show HDL 50, , triglycerides 78, VLDL 15, total cholesterol 176.  She had normal lower extremity venous duplex studies done 11/9/2021.  She had CT chest abdomen and pelvis done 11/9/2021 which showed no recurrence of lymphoma.  I did review the CT images and there is calcification noted in the proximal LAD at the takeoff of a diagonal branch, no calcification in the left main, the RCA is not well visualized, there is no pericardial effusion, the descending thoracic aorta also has diffuse calcification.Labs on 4/29/2022 show normal CMP, normal TSH, hemoglobin 11.9, otherwise normal CBC.      She does not feel her heart racing or skipping and she has had no dizziness or chest pain.  She denies orthopnea or PND.  She has no exertional dyspnea.  She has no nausea, vomiting, diarrhea.  Her energy level stable.  She is taking her medications as directed without difficulty.    Past Medical History:   Diagnosis Date   • Anemia     multifactorial   • Cystitis    • Cystocele     moderate   • Drug therapy    • Dyslipidemia    • Esophageal reflux    • Fatigue    • History of transfusion     no reaction   • Hypercalcemia    • Hypertension    • Major depression, chronic    • Menopause    • Non Hodgkin's lymphoma (HCC)    • Osteoarthritis    • Osteoporosis    • Palpitations    • Paroxysmal atrial fibrillation (HCC)    • Post menopausal problems    • RLS (restless legs syndrome)    • Seizure disorder (HCC)    • Sinus bradycardia    • Subjective tinnitus    • Vaginal delivery     x2  \"SONYA\"      \"JASON\"   • Vitamin D deficiency          Past Surgical History:   Procedure Laterality Date   • CERVICAL LYMPH NODE BIOPSY/EXCISION Left 5/1/2018    Procedure: CERVICAL LYMPH NODE BIOPSY/EXCISION;  Surgeon: Danny SHEPHERD" MD Anastasia;  Location: [facility] MAIN OR;  Service: ENT   • CHOLECYSTECTOMY     • COLONOSCOPY     • COLONOSCOPY N/A 4/13/2018    Procedure: COLONOSCOPY to terminal ileum;  Surgeon: Dominik Burt MD;  Location: [facility] ENDOSCOPY;  Service: Gastroenterology   • CRANIOTOMY     • ENDOSCOPY N/A 4/13/2018    Procedure: ESOPHAGOGASTRODUODENOSCOPY with biopsy;  Surgeon: Dominik Burt MD;  Location: [facility] ENDOSCOPY;  Service: Gastroenterology   • TOTAL ABDOMINAL HYSTERECTOMY  1980    w/ bladder suspension.  Probably vaginal hysterectomy with anterior colporrhaphy.    • VENOUS ACCESS DEVICE (PORT) INSERTION N/A 5/4/2018    Procedure: INSERTION VENOUS ACCESS DEVICE;  Surgeon: Jamie Gill MD;  Location: [facility] MAIN OR;  Service: General   • WRIST SURGERY Left        Current Outpatient Medications on File Prior to Visit   Medication Sig Dispense Refill   • amLODIPine (NORVASC) 10 MG tablet TAKE 1 TABLET BY MOUTH DAILY 90 tablet 0   • Ascorbic Acid (Vitamin C) 500 MG capsule Take  by mouth Daily.     • Benadryl Itch Stopping 1-0.1 % cream Daily As Needed.     • benazepril (LOTENSIN) 40 MG tablet Take 1 tablet by mouth Daily. 90 tablet 3   • calcium carbonate (OS-PAPA) 600 MG tablet Take 600 mg by mouth Daily.     • cetirizine (zyrTEC) 10 MG tablet TAKE 1 TABLET BY MOUTH EVERY DAY AS NEEDED FOR ALLERGIES 90 tablet 1   • Cholecalciferol (VITAMIN D3) 125 MCG (5000 UT) capsule capsule Take 5,000 Units by mouth Daily.     • escitalopram (LEXAPRO) 20 MG tablet TAKE 1 TABLET BY MOUTH DAILY 30 tablet 5   • folic acid (FOLVITE) 400 MCG tablet Take 400 mcg by mouth daily.     • gabapentin (NEURONTIN) 300 MG capsule Take 2 capsules by mouth every night at bedtime. 60 capsule 0   • hydrALAZINE (APRESOLINE) 100 MG tablet TAKE 1 TABLET BY MOUTH THREE TIMES DAILY 270 tablet 0   • lidocaine-prilocaine (EMLA) 2.5-2.5 % cream Apply 30 min prior to port access 5 g 2   • melatonin 5 MG tablet tablet Take 5 mg by mouth Every Night.     •  "pantoprazole (PROTONIX) 40 MG EC tablet TAKE 1 TABLET BY MOUTH EVERY DAY 90 tablet 1   • phenytoin ER (DILANTIN) 100 MG capsule TAKE 3 CAPSULES BY MOUTH EVERY NIGHT AT BEDTIME 270 capsule 3   • rivaroxaban (Xarelto) 20 MG tablet Take 1 tablet by mouth Daily. Take with food. 30 tablet 3   • spironolactone (ALDACTONE) 25 MG tablet TAKE 1 TABLET BY MOUTH DAILY 90 tablet 0   • triamcinolone (KENALOG) 0.1 % cream Apply  topically to the appropriate area as directed See Admin Instructions. Apply topically to the affected area twice daily as needed     • vitamin B-12 (CYANOCOBALAMIN) 100 MCG tablet Take 1,000 mcg by mouth Daily.     • vitamin B-6 (PYRIDOXINE) 100 MG tablet Take 100 mg by mouth Daily.     • vitamin E 100 UNIT capsule Take 180 Units by mouth Daily.     • Zinc Sulfate (ZINC 15 PO) Take 400 mg by mouth.       No current facility-administered medications on file prior to visit.       Social History     Socioeconomic History   • Marital status:      Spouse name: JL   • Number of children: 2   Tobacco Use   • Smoking status: Never Smoker   • Smokeless tobacco: Never Used   Vaping Use   • Vaping Use: Never used   Substance and Sexual Activity   • Alcohol use: No     Comment: Caffeine use: 1 cup daily (decaf)   • Drug use: No   • Sexual activity: Defer     Birth control/protection: Surgical     Comment:  (Jl)           ROS    Procedures    ECG 12 Lead    Date/Time: 5/25/2022 8:41 AM  Performed by: Justine Barrios MD  Authorized by: Justine Barrios MD   Comparison: compared with previous ECG   Similar to previous ECG  Rhythm: sinus rhythm  Conduction: left anterior fascicular block    Clinical impression: abnormal EKG                Objective:      Vitals:    05/25/22 0827   BP: 138/78   Pulse: 60   Weight: 67.6 kg (149 lb)   Height: 160 cm (63\")     Body mass index is 26.39 kg/m².    Vitals reviewed.   Constitutional:       General: Not in acute distress.     Appearance: " Well-developed. Not diaphoretic.   Eyes:      General: No scleral icterus.     Conjunctiva/sclera: Conjunctivae normal.   HENT:      Head: Normocephalic and atraumatic.   Neck:      Thyroid: No thyromegaly.      Vascular: No carotid bruit or JVD.      Lymphadenopathy: No cervical adenopathy.   Pulmonary:      Effort: Pulmonary effort is normal. No respiratory distress.      Breath sounds: Normal breath sounds. No wheezing. No rhonchi. No rales.   Chest:      Chest wall: Not tender to palpatation.   Cardiovascular:      Normal rate. Regular rhythm.      Murmurs: There is no murmur.      No gallop.   Pulses:     Intact distal pulses.   Edema:     Peripheral edema absent.   Abdominal:      General: Bowel sounds are normal. There is no distension or abdominal bruit.      Palpations: Abdomen is soft. There is no abdominal mass.      Tenderness: There is no abdominal tenderness.   Musculoskeletal:         General: No deformity.      Extremities: No clubbing present.     Cervical back: Neck supple. Skin:     General: Skin is warm and dry. There is no cyanosis.      Coloration: Skin is not pale.      Findings: No rash.   Neurological:      Mental Status: Alert and oriented to person, place, and time.      Cranial Nerves: No cranial nerve deficit.   Psychiatric:         Judgment: Judgment normal.         Lab Review:       Assessment:      Diagnosis Plan   1. Paroxysmal atrial fibrillation (HCC)  Adult Transthoracic Echo Complete W/ Cont if Necessary Per Protocol   2. LAFB (left anterior fascicular block)  Adult Transthoracic Echo Complete W/ Cont if Necessary Per Protocol   3. Primary hypertension  Adult Transthoracic Echo Complete W/ Cont if Necessary Per Protocol   4. Dyslipidemia  Adult Transthoracic Echo Complete W/ Cont if Necessary Per Protocol   5. 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 (HCC)       1. PAF - on xarelto and can remain on this as long as her platelet  "counts are greater than 50,000.   2. Hypertension - BP goal is <120/80.   3. Large B cell lymphoma - s/p R-CHOP, stopped 8/2018 -sees Dr. Iraheta.   4. H/o DVT, on xarelto.   5. Thrombocytopenia, on prednisone.   This has resolved.  6. Coronary artery calcification noted on CT chest.       Plan:         See  in 1 year, add rosuvastatin 5mg nightly, set up echo.  Overall doing well.     STOP-Bang Score  Have you been diagnosed with Sleep Apnea?: no  Snoring?: no  Tired?: no  Observed?: no  Pressure?: yes  Stop Score: 1  Body Mass Index more than 35 kg/m2?: no  Age older than 50 year old?: yes  Neck large? \">17\"/43cm-M, >16\"/41cm-F: no  Gender=Male?: no  Total Stop-Bang Score: 2  "

## 2022-06-06 ENCOUNTER — TELEPHONE (OUTPATIENT)
Dept: ONCOLOGY | Facility: CLINIC | Age: 82
End: 2022-06-06

## 2022-06-06 NOTE — TELEPHONE ENCOUNTER
Caller: RUT    Relationship to patient: Self    Best call back number: [phone]    Chief complaint: CANC.AMELIA. DUE TO HAVING AN APPT. ON 6/9/2022    Type of visit: PORT FLUSH    Requested date: 6/9/2022, BEFORE HER 2:45 APPT.    If rescheduling, when is the original appointment: 6/10/2022

## 2022-06-09 ENCOUNTER — INFUSION (OUTPATIENT)
Dept: ONCOLOGY | Facility: HOSPITAL | Age: 82
End: 2022-06-09

## 2022-06-09 ENCOUNTER — HOSPITAL ENCOUNTER (OUTPATIENT)
Dept: CARDIOLOGY | Facility: HOSPITAL | Age: 82
Discharge: HOME | End: 2022-06-09
Admitting: INTERNAL MEDICINE

## 2022-06-09 VITALS
WEIGHT: 149 LBS | HEIGHT: 63 IN | DIASTOLIC BLOOD PRESSURE: 69 MMHG | SYSTOLIC BLOOD PRESSURE: 144 MMHG | BODY MASS INDEX: 26.4 KG/M2 | HEART RATE: 64 BPM

## 2022-06-09 DIAGNOSIS — Z45.2 ENCOUNTER FOR ADJUSTMENT OR MANAGEMENT OF VASCULAR ACCESS DEVICE: Primary | ICD-10-CM

## 2022-06-09 DIAGNOSIS — E78.5 DYSLIPIDEMIA: ICD-10-CM

## 2022-06-09 DIAGNOSIS — I44.4 LAFB (LEFT ANTERIOR FASCICULAR BLOCK): ICD-10-CM

## 2022-06-09 DIAGNOSIS — I10 PRIMARY HYPERTENSION: ICD-10-CM

## 2022-06-09 DIAGNOSIS — I48.0 PAROXYSMAL ATRIAL FIBRILLATION: ICD-10-CM

## 2022-06-09 LAB
ASCENDING AORTA: 2.4 CM
BH CV ECHO LEFT VENTRICLE GLOBAL LONGITUDINAL STRAIN: -21.9 %
BH CV ECHO MEAS - ACS: 1.75 CM
BH CV ECHO MEAS - AO MAX PG: 8.5 MMHG
BH CV ECHO MEAS - AO MEAN PG: 5 MMHG
BH CV ECHO MEAS - AO ROOT DIAM: 2.5 CM
BH CV ECHO MEAS - AO V2 MAX: 146 CM/SEC
BH CV ECHO MEAS - AO V2 VTI: 38.1 CM
BH CV ECHO MEAS - AVA(I,D): 2.46 CM2
BH CV ECHO MEAS - EDV(CUBED): 79.1 ML
BH CV ECHO MEAS - EDV(MOD-SP2): 73 ML
BH CV ECHO MEAS - EDV(MOD-SP4): 48 ML
BH CV ECHO MEAS - EF(MOD-BP): 66.7 %
BH CV ECHO MEAS - EF(MOD-SP2): 65.8 %
BH CV ECHO MEAS - EF(MOD-SP4): 66.7 %
BH CV ECHO MEAS - ESV(CUBED): 29.6 ML
BH CV ECHO MEAS - ESV(MOD-SP2): 25 ML
BH CV ECHO MEAS - ESV(MOD-SP4): 16 ML
BH CV ECHO MEAS - FS: 27.9 %
BH CV ECHO MEAS - IVS/LVPW: 0.91 CM
BH CV ECHO MEAS - IVSD: 1.04 CM
BH CV ECHO MEAS - LAT PEAK E' VEL: 6.7 CM/SEC
BH CV ECHO MEAS - LV DIASTOLIC VOL/BSA (35-75): 27.8 CM2
BH CV ECHO MEAS - LV MASS(C)D: 160 GRAMS
BH CV ECHO MEAS - LV MAX PG: 5.4 MMHG
BH CV ECHO MEAS - LV MEAN PG: 3 MMHG
BH CV ECHO MEAS - LV SYSTOLIC VOL/BSA (12-30): 9.3 CM2
BH CV ECHO MEAS - LV V1 MAX: 116.1 CM/SEC
BH CV ECHO MEAS - LV V1 VTI: 29.7 CM
BH CV ECHO MEAS - LVIDD: 4.3 CM
BH CV ECHO MEAS - LVIDS: 3.1 CM
BH CV ECHO MEAS - LVOT AREA: 3.1 CM2
BH CV ECHO MEAS - LVOT DIAM: 2 CM
BH CV ECHO MEAS - LVPWD: 1.14 CM
BH CV ECHO MEAS - MED PEAK E' VEL: 4.9 CM/SEC
BH CV ECHO MEAS - MV A DUR: 0.19 SEC
BH CV ECHO MEAS - MV A MAX VEL: 89.5 CM/SEC
BH CV ECHO MEAS - MV DEC SLOPE: 152.3 CM/SEC2
BH CV ECHO MEAS - MV DEC TIME: 0.22 MSEC
BH CV ECHO MEAS - MV E MAX VEL: 79.7 CM/SEC
BH CV ECHO MEAS - MV E/A: 0.89
BH CV ECHO MEAS - MV MAX PG: 2.9 MMHG
BH CV ECHO MEAS - MV MEAN PG: 0.92 MMHG
BH CV ECHO MEAS - MV P1/2T: 138 MSEC
BH CV ECHO MEAS - MV V2 VTI: 31 CM
BH CV ECHO MEAS - MVA(P1/2T): 1.59 CM2
BH CV ECHO MEAS - MVA(VTI): 3 CM2
BH CV ECHO MEAS - PA ACC TIME: 0.06 SEC
BH CV ECHO MEAS - PA PR(ACCEL): 50.5 MMHG
BH CV ECHO MEAS - PA V2 MAX: 112.7 CM/SEC
BH CV ECHO MEAS - PULM A REVS DUR: 0.18 SEC
BH CV ECHO MEAS - PULM A REVS VEL: 30.4 CM/SEC
BH CV ECHO MEAS - PULM DIAS VEL: 30 CM/SEC
BH CV ECHO MEAS - PULM S/D: 1.6
BH CV ECHO MEAS - PULM SYS VEL: 48 CM/SEC
BH CV ECHO MEAS - QP/QS: 0.71
BH CV ECHO MEAS - RAP SYSTOLE: 3 MMHG
BH CV ECHO MEAS - RV MAX PG: 1.83 MMHG
BH CV ECHO MEAS - RV V1 MAX: 67.7 CM/SEC
BH CV ECHO MEAS - RV V1 VTI: 15.1 CM
BH CV ECHO MEAS - RVOT DIAM: 2.37 CM
BH CV ECHO MEAS - RVSP: 24.5 MMHG
BH CV ECHO MEAS - SI(MOD-SP2): 27.8 ML/M2
BH CV ECHO MEAS - SI(MOD-SP4): 18.5 ML/M2
BH CV ECHO MEAS - SV(LVOT): 93.5 ML
BH CV ECHO MEAS - SV(MOD-SP2): 48 ML
BH CV ECHO MEAS - SV(MOD-SP4): 32 ML
BH CV ECHO MEAS - SV(RVOT): 66.5 ML
BH CV ECHO MEAS - TAPSE (>1.6): 2.48 CM
BH CV ECHO MEAS - TR MAX PG: 21.5 MMHG
BH CV ECHO MEAS - TR MAX VEL: 231.7 CM/SEC
BH CV ECHO MEASUREMENTS AVERAGE E/E' RATIO: 13.74
BH CV XLRA - RV BASE: 2.5 CM
BH CV XLRA - RV LENGTH: 6.4 CM
BH CV XLRA - RV MID: 2.5 CM
BH CV XLRA - TDI S': 13.1 CM/SEC
LEFT ATRIUM VOLUME INDEX: 16.8 ML/M2
MAXIMAL PREDICTED HEART RATE: 138 BPM
SINUS: 2.6 CM
STJ: 2.32 CM
STRESS TARGET HR: 117 BPM

## 2022-06-09 PROCEDURE — 93306 TTE W/DOPPLER COMPLETE: CPT

## 2022-06-09 PROCEDURE — 25010000002 HEPARIN LOCK FLUSH PER 10 UNITS: Performed by: INTERNAL MEDICINE

## 2022-06-09 PROCEDURE — 93306 TTE W/DOPPLER COMPLETE: CPT | Performed by: INTERNAL MEDICINE

## 2022-06-09 PROCEDURE — 96523 IRRIG DRUG DELIVERY DEVICE: CPT

## 2022-06-09 PROCEDURE — 93356 MYOCRD STRAIN IMG SPCKL TRCK: CPT | Performed by: INTERNAL MEDICINE

## 2022-06-09 PROCEDURE — 93356 MYOCRD STRAIN IMG SPCKL TRCK: CPT

## 2022-06-09 RX ORDER — HEPARIN SODIUM (PORCINE) LOCK FLUSH IV SOLN 100 UNIT/ML 100 UNIT/ML
500 SOLUTION INTRAVENOUS AS NEEDED
Status: DISCONTINUED | OUTPATIENT
Start: 2022-06-09 | End: 2022-06-09 | Stop reason: HOSPADM

## 2022-06-09 RX ORDER — HEPARIN SODIUM (PORCINE) LOCK FLUSH IV SOLN 100 UNIT/ML 100 UNIT/ML
500 SOLUTION INTRAVENOUS AS NEEDED
Status: CANCELLED | OUTPATIENT
Start: 2022-06-09

## 2022-06-09 RX ORDER — SODIUM CHLORIDE 0.9 % (FLUSH) 0.9 %
10 SYRINGE (ML) INJECTION AS NEEDED
Status: CANCELLED | OUTPATIENT
Start: 2022-06-09

## 2022-06-09 RX ORDER — HYDRALAZINE HYDROCHLORIDE 100 MG/1
TABLET, FILM COATED ORAL
Qty: 270 TABLET | Refills: 3 | Status: SHIPPED | OUTPATIENT
Start: 2022-06-09

## 2022-06-09 RX ORDER — SODIUM CHLORIDE 0.9 % (FLUSH) 0.9 %
10 SYRINGE (ML) INJECTION AS NEEDED
Status: DISCONTINUED | OUTPATIENT
Start: 2022-06-09 | End: 2022-06-09 | Stop reason: HOSPADM

## 2022-06-09 RX ADMIN — Medication 10 ML: at 15:29

## 2022-06-09 RX ADMIN — Medication 500 UNITS: at 15:29

## 2022-06-21 ENCOUNTER — TELEPHONE (OUTPATIENT)
Dept: ONCOLOGY | Facility: CLINIC | Age: 82
End: 2022-06-21

## 2022-06-21 ENCOUNTER — SPECIALTY PHARMACY (OUTPATIENT)
Dept: PHARMACY | Facility: HOSPITAL | Age: 82
End: 2022-06-21

## 2022-06-21 NOTE — TELEPHONE ENCOUNTER
Pt called wanting to know the results of her TSH back in April. Advised her lab was WNL. She had no further questions.       ----- Message from Nubia Nascimento, Pharmacy Technician sent at 6/21/2022 11:38 AM EDT -----  Regarding: Follow-up on Thyroid Labs  Andriy Hemphill    Ms. Blake asked to speak w/Dr. Iraheta's nurse because she's wonder about the results of some recent labs that she had done. I told her that I'd shoot you a message and ask you to call her @ [phone].    Thank you,  Nubia Nascimento - Care Coordinator   6/21/2022  11:42 EDT

## 2022-06-21 NOTE — PROGRESS NOTES
Specialty Pharmacy Refill Coordination Note     Martina is a 82 y.o. female contacted today regarding refills of  XARELTO specialty medication(s).    Reviewed and verified with patient:         Specialty medication(s) and dose(s) confirmed: yes    Refill Questions    Flowsheet Row Most Recent Value   Changes to allergies? No   Changes to medications? No   New conditions since last clinic visit No   Unplanned office visit, urgent care, ED, or hospital admission in the last 4 weeks  No   How does patient/caregiver feel medication is working? Good   Financial problems or insurance changes  No   Since the previous refill, were any specialty medication doses or scheduled injections missed or delayed?  No   If yes, please provide the amount N/A   Does this patient require a clinical escalation to a pharmacist? No          Delivery Questions    Flowsheet Row Most Recent Value   Delivery method FedEx  [ship stnd on 6/23 to arrive 6/24. Address confirmed. $0 co-pay.]   Delivery address correct? Yes   Delivery phone number [phone]   Preferred delivery time? Anytime   Number of medications in delivery 1   Medication being filled and delivered XARELTO   Doses left of specialty medications 7   Is there any medication that is due not being filled? No   Supplies needed? No supplies needed   Cooler needed? No   Do any medications need mixed or dated? No   Copay form of payment Payment plan already set up   Additional comments N/A   Questions or concerns for the pharmacist? No   Explain any questions or concerns for the pharmacist N/A   Are any medications first time fills? No                 Follow-up: 3 week(s)     Nubia Nascimento, Pharmacy Technician  Specialty Pharmacy Technician

## 2022-06-23 DIAGNOSIS — C79.51 BONE METASTASIS: ICD-10-CM

## 2022-06-24 DIAGNOSIS — C79.51 BONE METASTASIS: ICD-10-CM

## 2022-06-24 RX ORDER — GABAPENTIN 300 MG/1
600 CAPSULE ORAL
Qty: 60 CAPSULE | Refills: 0 | Status: SHIPPED | OUTPATIENT
Start: 2022-06-24 | End: 2022-08-09 | Stop reason: SDUPTHER

## 2022-06-24 RX ORDER — GABAPENTIN 300 MG/1
600 CAPSULE ORAL
Qty: 60 CAPSULE | Refills: 0 | Status: SHIPPED | OUTPATIENT
Start: 2022-06-24 | End: 2022-07-20

## 2022-06-24 NOTE — TELEPHONE ENCOUNTER
Caller: Martina Blake    Relationship: Self    Best call back number: [phone]    Requested Prescriptions:   GABAPENTIN  300 MG    Pharmacy where request should be sent:    [id number] - [facility] [address] [facility] [address] [facility] [address] [place] [address] & [place] [address] - [phone]  - [phone]      Does the patient have less than a 3 day supply:  [x] Yes  [] No    Tj PANCHAL Rep   06/24/22 11:09 EDT

## 2022-06-29 RX ORDER — SPIRONOLACTONE 25 MG/1
25 TABLET ORAL DAILY
Qty: 90 TABLET | Refills: 1 | Status: SHIPPED | OUTPATIENT
Start: 2022-06-29 | End: 2022-12-27

## 2022-07-07 ENCOUNTER — OFFICE (OUTPATIENT)
Dept: URBAN - METROPOLITAN AREA CLINIC 66 | Facility: CLINIC | Age: 82
End: 2022-07-07

## 2022-07-07 VITALS — WEIGHT: 153 LBS | HEIGHT: 64 IN

## 2022-07-07 DIAGNOSIS — R11.10 VOMITING, UNSPECIFIED: ICD-10-CM

## 2022-07-07 DIAGNOSIS — R19.7 DIARRHEA, UNSPECIFIED: ICD-10-CM

## 2022-07-07 PROCEDURE — 99213 OFFICE O/P EST LOW 20 MIN: CPT | Performed by: NURSE PRACTITIONER

## 2022-07-07 RX ORDER — DICYCLOMINE HYDROCHLORIDE 10 MG/1
40 CAPSULE ORAL
Qty: 60 | Refills: 1 | Status: ACTIVE
Start: 2022-07-07

## 2022-07-07 RX ORDER — PANTOPRAZOLE 40 MG/1
TABLET, DELAYED RELEASE ORAL
Qty: 90 | Refills: 3 | Status: ACTIVE

## 2022-07-08 RX ORDER — AMLODIPINE BESYLATE 10 MG/1
10 TABLET ORAL DAILY
Qty: 90 TABLET | Refills: 0 | Status: SHIPPED | OUTPATIENT
Start: 2022-07-08 | End: 2022-10-05

## 2022-07-19 ENCOUNTER — SPECIALTY PHARMACY (OUTPATIENT)
Dept: PHARMACY | Facility: HOSPITAL | Age: 82
End: 2022-07-19

## 2022-07-19 DIAGNOSIS — C79.51 BONE METASTASIS: ICD-10-CM

## 2022-07-19 NOTE — PROGRESS NOTES
Specialty Pharmacy Refill Coordination Note     Martina is a 82 y.o. female contacted today regarding refills of  Xarelto specialty medication(s).    Reviewed and verified with patient:         Specialty medication(s) and dose(s) confirmed: yes    Refill Questions    Flowsheet Row Most Recent Value   Changes to allergies? No   Changes to medications? Yes  [added Crestor]   New conditions since last clinic visit No   Unplanned office visit, urgent care, ED, or hospital admission in the last 4 weeks  No   How does patient/caregiver feel medication is working? Good   Financial problems or insurance changes  No   Since the previous refill, were any specialty medication doses or scheduled injections missed or delayed?  No   Does this patient require a clinical escalation to a pharmacist? No  [will alert [facility]]          Delivery Questions    Flowsheet Row Most Recent Value   Delivery method FedEx  [FedEx standard no sig ship 7/20 deliver 7/21- leave on bench]   Delivery address correct? Yes   Preferred delivery time? Anytime   Number of medications in delivery 1   Medication being filled and delivered Xarelto   Doses left of specialty medications good until Friday 7/22   Is there any medication that is due not being filled? No   Supplies needed? No supplies needed   Cooler needed? No   Do any medications need mixed or dated? No   Copay form of payment Payment plan already set up   Questions or concerns for the pharmacist? No  [will alert [facility]]   Are any medications first time fills? No                 Follow-up: 3 week(s)     Karen Marin  Specialty Pharmacy Technician

## 2022-07-20 ENCOUNTER — TELEPHONE (OUTPATIENT)
Dept: ONCOLOGY | Facility: CLINIC | Age: 82
End: 2022-07-20

## 2022-07-20 RX ORDER — GABAPENTIN 300 MG/1
600 CAPSULE ORAL
Qty: 60 CAPSULE | Refills: 0 | Status: SHIPPED | OUTPATIENT
Start: 2022-07-20 | End: 2022-12-21

## 2022-07-20 NOTE — TELEPHONE ENCOUNTER
Caller: Martina Blake    Relationship: Self    Best call back number: [phone]    What is the best time to reach you: ANYTIME    Who are you requesting to speak with (clinical staff, provider,  specific staff member): SCHEDULING    What was the call regarding: PT WOULD LIKE TO R/S HER 7/22 APPT FOR 7/26 IF POSSIBLE.     PLEASE CALL TO ADVISE.     Do you require a callback: YES

## 2022-07-22 ENCOUNTER — APPOINTMENT (OUTPATIENT)
Dept: ONCOLOGY | Facility: HOSPITAL | Age: 82
End: 2022-07-22

## 2022-07-26 ENCOUNTER — INFUSION (OUTPATIENT)
Dept: ONCOLOGY | Facility: HOSPITAL | Age: 82
End: 2022-07-26

## 2022-07-26 ENCOUNTER — APPOINTMENT (OUTPATIENT)
Dept: ONCOLOGY | Facility: HOSPITAL | Age: 82
End: 2022-07-26

## 2022-07-26 VITALS
TEMPERATURE: 97.7 F | OXYGEN SATURATION: 96 % | BODY MASS INDEX: 26.29 KG/M2 | WEIGHT: 148.4 LBS | DIASTOLIC BLOOD PRESSURE: 65 MMHG | HEART RATE: 65 BPM | RESPIRATION RATE: 16 BRPM | SYSTOLIC BLOOD PRESSURE: 135 MMHG

## 2022-07-26 DIAGNOSIS — Z45.2 ENCOUNTER FOR ADJUSTMENT OR MANAGEMENT OF VASCULAR ACCESS DEVICE: Primary | ICD-10-CM

## 2022-07-26 DIAGNOSIS — M85.89 OSTEOPENIA OF MULTIPLE SITES: ICD-10-CM

## 2022-07-26 LAB
ALBUMIN SERPL-MCNC: 4.1 G/DL (ref 3.5–5.2)
ALBUMIN/GLOB SERPL: 1.5 G/DL (ref 1.1–2.4)
ALP SERPL-CCNC: 99 U/L (ref 38–116)
ALT SERPL W P-5'-P-CCNC: 13 U/L (ref 0–33)
ANION GAP SERPL CALCULATED.3IONS-SCNC: 11.1 MMOL/L (ref 5–15)
AST SERPL-CCNC: 20 U/L (ref 0–32)
BASOPHILS # BLD AUTO: 0.03 10*3/MM3 (ref 0–0.2)
BASOPHILS NFR BLD AUTO: 0.5 % (ref 0–1.5)
BILIRUB SERPL-MCNC: 0.3 MG/DL (ref 0.2–1.2)
BUN SERPL-MCNC: 17 MG/DL (ref 6–20)
BUN/CREAT SERPL: 23.3 (ref 7.3–30)
CALCIUM SPEC-SCNC: 8.9 MG/DL (ref 8.5–10.2)
CHLORIDE SERPL-SCNC: 101 MMOL/L (ref 98–107)
CO2 SERPL-SCNC: 25.9 MMOL/L (ref 22–29)
CREAT SERPL-MCNC: 0.73 MG/DL (ref 0.6–1.1)
DEPRECATED RDW RBC AUTO: 42.5 FL (ref 37–54)
EGFRCR SERPLBLD CKD-EPI 2021: 82.2 ML/MIN/1.73
EOSINOPHIL # BLD AUTO: 0.11 10*3/MM3 (ref 0–0.4)
EOSINOPHIL NFR BLD AUTO: 1.9 % (ref 0.3–6.2)
ERYTHROCYTE [DISTWIDTH] IN BLOOD BY AUTOMATED COUNT: 12.1 % (ref 12.3–15.4)
GLOBULIN UR ELPH-MCNC: 2.8 GM/DL (ref 1.8–3.5)
GLUCOSE SERPL-MCNC: 127 MG/DL (ref 74–124)
HCT VFR BLD AUTO: 34.3 % (ref 34–46.6)
HGB BLD-MCNC: 11.5 G/DL (ref 12–15.9)
IMM GRANULOCYTES # BLD AUTO: 0.01 10*3/MM3 (ref 0–0.05)
IMM GRANULOCYTES NFR BLD AUTO: 0.2 % (ref 0–0.5)
LYMPHOCYTES # BLD AUTO: 1.1 10*3/MM3 (ref 0.7–3.1)
LYMPHOCYTES NFR BLD AUTO: 19 % (ref 19.6–45.3)
MAGNESIUM SERPL-MCNC: 1.7 MG/DL (ref 1.8–2.5)
MCH RBC QN AUTO: 31.8 PG (ref 26.6–33)
MCHC RBC AUTO-ENTMCNC: 33.5 G/DL (ref 31.5–35.7)
MCV RBC AUTO: 94.8 FL (ref 79–97)
MONOCYTES # BLD AUTO: 0.63 10*3/MM3 (ref 0.1–0.9)
MONOCYTES NFR BLD AUTO: 10.9 % (ref 5–12)
NEUTROPHILS NFR BLD AUTO: 3.91 10*3/MM3 (ref 1.7–7)
NEUTROPHILS NFR BLD AUTO: 67.5 % (ref 42.7–76)
NRBC BLD AUTO-RTO: 0 /100 WBC (ref 0–0.2)
PHOSPHATE SERPL-MCNC: 4.2 MG/DL (ref 2.5–4.5)
PLATELET # BLD AUTO: 118 10*3/MM3 (ref 140–450)
PMV BLD AUTO: 9.6 FL (ref 6–12)
POTASSIUM SERPL-SCNC: 4.1 MMOL/L (ref 3.5–4.7)
PROT SERPL-MCNC: 6.9 G/DL (ref 6.3–8)
RBC # BLD AUTO: 3.62 10*6/MM3 (ref 3.77–5.28)
SODIUM SERPL-SCNC: 138 MMOL/L (ref 134–145)
WBC NRBC COR # BLD: 5.79 10*3/MM3 (ref 3.4–10.8)

## 2022-07-26 PROCEDURE — 80053 COMPREHEN METABOLIC PANEL: CPT

## 2022-07-26 PROCEDURE — 83735 ASSAY OF MAGNESIUM: CPT

## 2022-07-26 PROCEDURE — 85025 COMPLETE CBC W/AUTO DIFF WBC: CPT

## 2022-07-26 PROCEDURE — 25010000002 HEPARIN LOCK FLUSH PER 10 UNITS: Performed by: INTERNAL MEDICINE

## 2022-07-26 PROCEDURE — 84100 ASSAY OF PHOSPHORUS: CPT

## 2022-07-26 PROCEDURE — 36591 DRAW BLOOD OFF VENOUS DEVICE: CPT

## 2022-07-26 RX ORDER — SODIUM CHLORIDE 0.9 % (FLUSH) 0.9 %
10 SYRINGE (ML) INJECTION AS NEEDED
Status: DISCONTINUED | OUTPATIENT
Start: 2022-07-26 | End: 2022-07-26 | Stop reason: HOSPADM

## 2022-07-26 RX ORDER — HEPARIN SODIUM (PORCINE) LOCK FLUSH IV SOLN 100 UNIT/ML 100 UNIT/ML
500 SOLUTION INTRAVENOUS AS NEEDED
Status: DISCONTINUED | OUTPATIENT
Start: 2022-07-26 | End: 2022-07-26 | Stop reason: HOSPADM

## 2022-07-26 RX ORDER — SODIUM CHLORIDE 0.9 % (FLUSH) 0.9 %
10 SYRINGE (ML) INJECTION AS NEEDED
Status: CANCELLED | OUTPATIENT
Start: 2022-07-26

## 2022-07-26 RX ORDER — HEPARIN SODIUM (PORCINE) LOCK FLUSH IV SOLN 100 UNIT/ML 100 UNIT/ML
500 SOLUTION INTRAVENOUS AS NEEDED
Status: CANCELLED | OUTPATIENT
Start: 2022-07-26

## 2022-07-26 RX ADMIN — Medication 500 UNITS: at 12:54

## 2022-07-26 RX ADMIN — Medication 10 ML: at 12:54

## 2022-07-26 NOTE — NURSING NOTE
Pt would prefer to continue receiving Prolia instead of Reclast. This was discussed with Dr. Iraheta and he is in agreement. Reclast D/C and episode resolved. Pre TX plan for Prolia enter and message sent to scheduling an Pre-cert. Pt V/U.

## 2022-07-26 NOTE — NURSING NOTE
Pt here today for reclast. She typically gets prolia so pt is questioning why she is getting reclast today. Insurance approval is for reclast. Reviewed with Favio RICCI who states that  is ok with either drug, however pt prefers prolia. She is going to reach out to pre-auth team to work on getting prolia approval. I have asked they let pt know the result once insurance approval is obtained. Pt left but knows her labs are good for one month per pharmacy.

## 2022-08-02 ENCOUNTER — TELEPHONE (OUTPATIENT)
Dept: ONCOLOGY | Facility: CLINIC | Age: 82
End: 2022-08-02

## 2022-08-02 DIAGNOSIS — C83.39 DIFFUSE LARGE B-CELL LYMPHOMA OF EXTRANODAL SITE EXCLUDING SPLEEN AND OTHER SOLID ORGANS: Primary | ICD-10-CM

## 2022-08-02 NOTE — TELEPHONE ENCOUNTER
Caller: Martina Blake    Relationship: Self      What was the call regarding:     STARTED ABOUT 5 DAYS AGO,  HAS RED LOOKS SAUL LIKE FILLED WITH PUS PLACES ALL OVER BODY, THAT ARE VERY ITCHY,  CONCERNED CAUSE HAS THIS WHEN FIRST HAD CANCER 4 YEARS AGO    WARM TRANSFERRED TO [facility] ON THE CLINICAL LINE TO FURTHER ASSIST.

## 2022-08-02 NOTE — TELEPHONE ENCOUNTER
"Pt called stating that she has developed a rash all over her body. It started about 6 days ago and is gradually getting worse. She reports red, \"pus filled\" bumps that started on her arms but are spreading. She states prior to her lymphoma diagnosis, she had an itchy rash for about 6 months. She is concerned her cancer has returned. She denies fevers or aches. Informed pt I would talk to Dr. Iraheta about this and would let her know when he would like to see her in follow up. She v/u.   "

## 2022-08-02 NOTE — TELEPHONE ENCOUNTER
Wants to see if she can see Dr. Iraheta,  She's having same symptoms she had when she was diagnosed with Cancer.
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
## 2022-08-09 ENCOUNTER — INFUSION (OUTPATIENT)
Dept: ONCOLOGY | Facility: HOSPITAL | Age: 82
End: 2022-08-09

## 2022-08-09 ENCOUNTER — OFFICE VISIT (OUTPATIENT)
Dept: ONCOLOGY | Facility: CLINIC | Age: 82
End: 2022-08-09

## 2022-08-09 VITALS
RESPIRATION RATE: 18 BRPM | BODY MASS INDEX: 26.22 KG/M2 | HEIGHT: 63 IN | WEIGHT: 148 LBS | OXYGEN SATURATION: 96 % | TEMPERATURE: 97.1 F | HEART RATE: 59 BPM | SYSTOLIC BLOOD PRESSURE: 132 MMHG | DIASTOLIC BLOOD PRESSURE: 75 MMHG

## 2022-08-09 DIAGNOSIS — Z79.899 LONG-TERM USE OF HIGH-RISK MEDICATION: ICD-10-CM

## 2022-08-09 DIAGNOSIS — M81.0 AGE-RELATED OSTEOPOROSIS WITHOUT CURRENT PATHOLOGICAL FRACTURE: ICD-10-CM

## 2022-08-09 DIAGNOSIS — C83.39 DIFFUSE LARGE B-CELL LYMPHOMA OF EXTRANODAL SITE EXCLUDING SPLEEN AND OTHER SOLID ORGANS: Primary | ICD-10-CM

## 2022-08-09 DIAGNOSIS — Z45.2 ENCOUNTER FOR ADJUSTMENT OR MANAGEMENT OF VASCULAR ACCESS DEVICE: ICD-10-CM

## 2022-08-09 DIAGNOSIS — C79.51 BONE METASTASIS: Primary | ICD-10-CM

## 2022-08-09 LAB
ALBUMIN SERPL-MCNC: 4.2 G/DL (ref 3.5–5.2)
ALBUMIN/GLOB SERPL: 1.3 G/DL
ALP SERPL-CCNC: 102 U/L (ref 39–117)
ALT SERPL W P-5'-P-CCNC: 18 U/L (ref 1–33)
ANION GAP SERPL CALCULATED.3IONS-SCNC: 9.4 MMOL/L (ref 5–15)
AST SERPL-CCNC: 22 U/L (ref 1–32)
BASOPHILS # BLD AUTO: 0.05 10*3/MM3 (ref 0–0.2)
BASOPHILS NFR BLD AUTO: 0.7 % (ref 0–1.5)
BILIRUB SERPL-MCNC: 0.2 MG/DL (ref 0–1.2)
BUN SERPL-MCNC: 22 MG/DL (ref 8–23)
BUN/CREAT SERPL: 31 (ref 7–25)
CALCIUM SPEC-SCNC: 9.4 MG/DL (ref 8.6–10.5)
CHLORIDE SERPL-SCNC: 96 MMOL/L (ref 98–107)
CO2 SERPL-SCNC: 26.6 MMOL/L (ref 22–29)
CREAT SERPL-MCNC: 0.71 MG/DL (ref 0.57–1)
CRP SERPL-MCNC: 0.33 MG/DL (ref 0–0.5)
DEPRECATED RDW RBC AUTO: 42 FL (ref 37–54)
EGFRCR SERPLBLD CKD-EPI 2021: 85 ML/MIN/1.73
EOSINOPHIL # BLD AUTO: 0.21 10*3/MM3 (ref 0–0.4)
EOSINOPHIL NFR BLD AUTO: 3 % (ref 0.3–6.2)
ERYTHROCYTE [DISTWIDTH] IN BLOOD BY AUTOMATED COUNT: 12.2 % (ref 12.3–15.4)
ERYTHROCYTE [SEDIMENTATION RATE] IN BLOOD: 8 MM/HR (ref 0–30)
GLOBULIN UR ELPH-MCNC: 3.2 GM/DL
GLUCOSE SERPL-MCNC: 90 MG/DL (ref 65–99)
HCT VFR BLD AUTO: 35.9 % (ref 34–46.6)
HGB BLD-MCNC: 12.3 G/DL (ref 12–15.9)
IMM GRANULOCYTES # BLD AUTO: 0.02 10*3/MM3 (ref 0–0.05)
IMM GRANULOCYTES NFR BLD AUTO: 0.3 % (ref 0–0.5)
LDH SERPL-CCNC: 177 U/L (ref 135–214)
LYMPHOCYTES # BLD AUTO: 1.51 10*3/MM3 (ref 0.7–3.1)
LYMPHOCYTES NFR BLD AUTO: 21.5 % (ref 19.6–45.3)
MCH RBC QN AUTO: 31.9 PG (ref 26.6–33)
MCHC RBC AUTO-ENTMCNC: 34.3 G/DL (ref 31.5–35.7)
MCV RBC AUTO: 93.2 FL (ref 79–97)
MONOCYTES # BLD AUTO: 1.57 10*3/MM3 (ref 0.1–0.9)
MONOCYTES NFR BLD AUTO: 22.4 % (ref 5–12)
NEUTROPHILS NFR BLD AUTO: 3.66 10*3/MM3 (ref 1.7–7)
NEUTROPHILS NFR BLD AUTO: 52.1 % (ref 42.7–76)
NRBC BLD AUTO-RTO: 0 /100 WBC (ref 0–0.2)
PLAT MORPH BLD: NORMAL
PLATELET # BLD AUTO: 137 10*3/MM3 (ref 140–450)
PMV BLD AUTO: 9.7 FL (ref 6–12)
POTASSIUM SERPL-SCNC: 4.6 MMOL/L (ref 3.5–5.2)
PROT SERPL-MCNC: 7.4 G/DL (ref 6–8.5)
RBC # BLD AUTO: 3.85 10*6/MM3 (ref 3.77–5.28)
RBC MORPH BLD: NORMAL
SODIUM SERPL-SCNC: 132 MMOL/L (ref 136–145)
URATE SERPL-MCNC: 3.3 MG/DL (ref 2.4–5.7)
WBC MORPH BLD: NORMAL
WBC NRBC COR # BLD: 7.02 10*3/MM3 (ref 3.4–10.8)

## 2022-08-09 PROCEDURE — 83615 LACTATE (LD) (LDH) ENZYME: CPT | Performed by: INTERNAL MEDICINE

## 2022-08-09 PROCEDURE — 99214 OFFICE O/P EST MOD 30 MIN: CPT | Performed by: INTERNAL MEDICINE

## 2022-08-09 PROCEDURE — 85025 COMPLETE CBC W/AUTO DIFF WBC: CPT | Performed by: INTERNAL MEDICINE

## 2022-08-09 PROCEDURE — 25010000002 HEPARIN LOCK FLUSH PER 10 UNITS: Performed by: INTERNAL MEDICINE

## 2022-08-09 PROCEDURE — 80053 COMPREHEN METABOLIC PANEL: CPT | Performed by: INTERNAL MEDICINE

## 2022-08-09 PROCEDURE — 36591 DRAW BLOOD OFF VENOUS DEVICE: CPT

## 2022-08-09 PROCEDURE — 85007 BL SMEAR W/DIFF WBC COUNT: CPT | Performed by: INTERNAL MEDICINE

## 2022-08-09 PROCEDURE — 85652 RBC SED RATE AUTOMATED: CPT | Performed by: INTERNAL MEDICINE

## 2022-08-09 PROCEDURE — 84550 ASSAY OF BLOOD/URIC ACID: CPT | Performed by: INTERNAL MEDICINE

## 2022-08-09 PROCEDURE — 86140 C-REACTIVE PROTEIN: CPT | Performed by: INTERNAL MEDICINE

## 2022-08-09 RX ORDER — SODIUM CHLORIDE 0.9 % (FLUSH) 0.9 %
10 SYRINGE (ML) INJECTION AS NEEDED
Status: DISCONTINUED | OUTPATIENT
Start: 2022-08-09 | End: 2022-08-09 | Stop reason: HOSPADM

## 2022-08-09 RX ORDER — HEPARIN SODIUM (PORCINE) LOCK FLUSH IV SOLN 100 UNIT/ML 100 UNIT/ML
500 SOLUTION INTRAVENOUS AS NEEDED
Status: DISCONTINUED | OUTPATIENT
Start: 2022-08-09 | End: 2022-08-09 | Stop reason: HOSPADM

## 2022-08-09 RX ORDER — TRAMADOL HYDROCHLORIDE 50 MG/1
50 TABLET ORAL EVERY 6 HOURS PRN
Qty: 40 TABLET | Refills: 0 | Status: SHIPPED | OUTPATIENT
Start: 2022-08-09

## 2022-08-09 RX ORDER — GABAPENTIN 300 MG/1
900 CAPSULE ORAL
Qty: 90 CAPSULE | Refills: 0 | Status: SHIPPED | OUTPATIENT
Start: 2022-08-09 | End: 2022-09-21

## 2022-08-09 RX ADMIN — Medication 500 UNITS: at 07:46

## 2022-08-09 RX ADMIN — Medication 10 ML: at 07:45

## 2022-08-16 ENCOUNTER — SPECIALTY PHARMACY (OUTPATIENT)
Dept: PHARMACY | Facility: HOSPITAL | Age: 82
End: 2022-08-16

## 2022-08-16 NOTE — PROGRESS NOTES
Specialty Pharmacy Refill Coordination Note     Martina is a 82 y.o. female contacted today regarding refills of  XARELTO specialty medication(s).    Reviewed and verified with patient:       Specialty medication(s) and dose(s) confirmed: yes    Refill Questions    Flowsheet Row Most Recent Value   Changes to allergies? No   Changes to medications? No   New conditions since last clinic visit No   Unplanned office visit, urgent care, ED, or hospital admission in the last 4 weeks  No   How does patient/caregiver feel medication is working? Good   Financial problems or insurance changes  No   Since the previous refill, were any specialty medication doses or scheduled injections missed or delayed?  No   If yes, please provide the amount N/A   Does this patient require a clinical escalation to a pharmacist? No          Delivery Questions    Flowsheet Row Most Recent Value   Delivery method FedEx  [shp stnd on 8/18 to arrive 8/19. Address confirmed. $0 co-pay.FEDEX NOTE: Place Package on the bench on that's on the porch]   Delivery address correct? Yes   Delivery phone number [phone]   Preferred delivery time? Anytime   Number of medications in delivery 1   Medication being filled and delivered XARELTO   Doses left of specialty medications 7-10   Is there any medication that is due not being filled? No   Supplies needed? No supplies needed   Cooler needed? No   Do any medications need mixed or dated? No   Copay form of payment Payment plan already set up   Additional comments N/A   Questions or concerns for the pharmacist? No   Explain any questions or concerns for the pharmacist N/A   Are any medications first time fills? No                 Follow-up: 25 day(s)     Nubia Nascimento, Pharmacy Technician  Specialty Pharmacy Technician

## 2022-09-08 ENCOUNTER — DOCUMENTATION (OUTPATIENT)
Dept: PHARMACY | Facility: HOSPITAL | Age: 82
End: 2022-09-08

## 2022-09-08 ENCOUNTER — SPECIALTY PHARMACY (OUTPATIENT)
Dept: ONCOLOGY | Facility: CLINIC | Age: 82
End: 2022-09-08

## 2022-09-08 NOTE — PROGRESS NOTES
Specialty Pharmacy Refill Coordination Note     Martina is a 82 y.o. female contacted today regarding refills of Xarelto medication(s).    Reviewed and verified with patient:       Specialty medication(s) and dose(s) confirmed: yes  Xarelto 20 mg daily    Refill Questions    Flowsheet Row Most Recent Value   Changes to allergies? No   Changes to medications? No   New conditions since last clinic visit No   Unplanned office visit, urgent care, ED, or hospital admission in the last 4 weeks  No   How does patient/caregiver feel medication is working? Good   Financial problems or insurance changes  No   Since the previous refill, were any specialty medication doses or scheduled injections missed or delayed?  No   If yes, please provide the amount N/A   Does this patient require a clinical escalation to a pharmacist? No          Delivery Questions    Flowsheet Row Most Recent Value   Delivery method Other (Comment)  [[facility] to [facility] office 9/9-$0 copay]   Delivery address correct? Yes  [Ship to [facility] 9/9]   Delivery phone number [phone]   Preferred delivery time? AM   Number of medications in delivery 1   Medication being filled and delivered Xarelto   Doses left of specialty medications 7 days   Is there any medication that is due not being filled? No   Supplies needed? No supplies needed   Cooler needed? No   Do any medications need mixed or dated? No   Copay form of payment Payment plan already set up   Additional comments $0 copay with PPAF   Questions or concerns for the pharmacist? No   Explain any questions or concerns for the pharmacist N/A   Are any medications first time fills? No        Xarelto delivery coordinated with pt to [facility] office on 9/9/22 and she will  ather appt on 9/12/22. $0 copay with PPAF. Her last delivery was on 8/19/22. No issues or concerns to report to MTM Team today.     Follow-up: 21 day(s)     Alana Banda  Specialty Pharmacy Technician

## 2022-09-08 NOTE — PROGRESS NOTES
Xarelto received from [facility] and placed in the [facility] at [facility].       Ms. Blake is aware that the medication is ready for pickup and states that she will pickup during her 9/12/22 appointment.    Nubia Nascimento - Care Coordinator   9/8/2022  14:42 EDT
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
## 2022-09-12 ENCOUNTER — OFFICE VISIT (OUTPATIENT)
Dept: ONCOLOGY | Facility: CLINIC | Age: 82
End: 2022-09-12

## 2022-09-12 ENCOUNTER — INFUSION (OUTPATIENT)
Dept: ONCOLOGY | Facility: HOSPITAL | Age: 82
End: 2022-09-12

## 2022-09-12 VITALS
SYSTOLIC BLOOD PRESSURE: 134 MMHG | RESPIRATION RATE: 18 BRPM | TEMPERATURE: 97.1 F | HEIGHT: 63 IN | HEART RATE: 68 BPM | OXYGEN SATURATION: 95 % | DIASTOLIC BLOOD PRESSURE: 69 MMHG | WEIGHT: 147.8 LBS | BODY MASS INDEX: 26.19 KG/M2

## 2022-09-12 DIAGNOSIS — Z79.899 LONG-TERM USE OF HIGH-RISK MEDICATION: ICD-10-CM

## 2022-09-12 DIAGNOSIS — Z45.2 ENCOUNTER FOR ADJUSTMENT OR MANAGEMENT OF VASCULAR ACCESS DEVICE: ICD-10-CM

## 2022-09-12 DIAGNOSIS — C83.39 DIFFUSE LARGE B-CELL LYMPHOMA OF EXTRANODAL SITE EXCLUDING SPLEEN AND OTHER SOLID ORGANS: Primary | ICD-10-CM

## 2022-09-12 DIAGNOSIS — C79.51 BONE METASTASIS: ICD-10-CM

## 2022-09-12 DIAGNOSIS — M81.0 AGE-RELATED OSTEOPOROSIS WITHOUT CURRENT PATHOLOGICAL FRACTURE: ICD-10-CM

## 2022-09-12 DIAGNOSIS — Z79.899 LONG-TERM USE OF HIGH-RISK MEDICATION: Primary | ICD-10-CM

## 2022-09-12 LAB
ALBUMIN SERPL-MCNC: 4.1 G/DL (ref 3.5–5.2)
ALBUMIN/GLOB SERPL: 1.3 G/DL (ref 1.1–2.4)
ALP SERPL-CCNC: 109 U/L (ref 38–116)
ALT SERPL W P-5'-P-CCNC: 20 U/L (ref 0–33)
ANION GAP SERPL CALCULATED.3IONS-SCNC: 12.3 MMOL/L (ref 5–15)
AST SERPL-CCNC: 28 U/L (ref 0–32)
BASOPHILS # BLD AUTO: 0.04 10*3/MM3 (ref 0–0.2)
BASOPHILS NFR BLD AUTO: 0.6 % (ref 0–1.5)
BILIRUB SERPL-MCNC: 0.4 MG/DL (ref 0.2–1.2)
BUN SERPL-MCNC: 20 MG/DL (ref 6–20)
BUN/CREAT SERPL: 26.7 (ref 7.3–30)
CALCIUM SPEC-SCNC: 9.4 MG/DL (ref 8.5–10.2)
CHLORIDE SERPL-SCNC: 99 MMOL/L (ref 98–107)
CO2 SERPL-SCNC: 24.7 MMOL/L (ref 22–29)
CREAT SERPL-MCNC: 0.75 MG/DL (ref 0.6–1.1)
DEPRECATED RDW RBC AUTO: 44.6 FL (ref 37–54)
EGFRCR SERPLBLD CKD-EPI 2021: 79.6 ML/MIN/1.73
EOSINOPHIL # BLD AUTO: 0.15 10*3/MM3 (ref 0–0.4)
EOSINOPHIL NFR BLD AUTO: 2.2 % (ref 0.3–6.2)
ERYTHROCYTE [DISTWIDTH] IN BLOOD BY AUTOMATED COUNT: 12.7 % (ref 12.3–15.4)
GLOBULIN UR ELPH-MCNC: 3.2 GM/DL (ref 1.8–3.5)
GLUCOSE SERPL-MCNC: 108 MG/DL (ref 74–124)
HCT VFR BLD AUTO: 35.8 % (ref 34–46.6)
HGB BLD-MCNC: 11.7 G/DL (ref 12–15.9)
IMM GRANULOCYTES # BLD AUTO: 0.02 10*3/MM3 (ref 0–0.05)
IMM GRANULOCYTES NFR BLD AUTO: 0.3 % (ref 0–0.5)
LYMPHOCYTES # BLD AUTO: 1.02 10*3/MM3 (ref 0.7–3.1)
LYMPHOCYTES NFR BLD AUTO: 14.9 % (ref 19.6–45.3)
MAGNESIUM SERPL-MCNC: 1.7 MG/DL (ref 1.8–2.5)
MCH RBC QN AUTO: 31.2 PG (ref 26.6–33)
MCHC RBC AUTO-ENTMCNC: 32.7 G/DL (ref 31.5–35.7)
MCV RBC AUTO: 95.5 FL (ref 79–97)
MONOCYTES # BLD AUTO: 0.82 10*3/MM3 (ref 0.1–0.9)
MONOCYTES NFR BLD AUTO: 12 % (ref 5–12)
NEUTROPHILS NFR BLD AUTO: 4.79 10*3/MM3 (ref 1.7–7)
NEUTROPHILS NFR BLD AUTO: 70 % (ref 42.7–76)
NRBC BLD AUTO-RTO: 0 /100 WBC (ref 0–0.2)
PHOSPHATE SERPL-MCNC: 3.5 MG/DL (ref 2.5–4.5)
PLATELET # BLD AUTO: 218 10*3/MM3 (ref 140–450)
PMV BLD AUTO: 8.8 FL (ref 6–12)
POTASSIUM SERPL-SCNC: 4 MMOL/L (ref 3.5–4.7)
PROT SERPL-MCNC: 7.3 G/DL (ref 6.3–8)
RBC # BLD AUTO: 3.75 10*6/MM3 (ref 3.77–5.28)
SODIUM SERPL-SCNC: 136 MMOL/L (ref 134–145)
WBC NRBC COR # BLD: 6.84 10*3/MM3 (ref 3.4–10.8)

## 2022-09-12 PROCEDURE — 25010000002 HEPARIN LOCK FLUSH PER 10 UNITS: Performed by: INTERNAL MEDICINE

## 2022-09-12 PROCEDURE — 83735 ASSAY OF MAGNESIUM: CPT

## 2022-09-12 PROCEDURE — 84100 ASSAY OF PHOSPHORUS: CPT

## 2022-09-12 PROCEDURE — 80053 COMPREHEN METABOLIC PANEL: CPT

## 2022-09-12 PROCEDURE — 99214 OFFICE O/P EST MOD 30 MIN: CPT | Performed by: INTERNAL MEDICINE

## 2022-09-12 PROCEDURE — 25010000002 ZOLEDRONIC ACID 5 MG/100ML SOLUTION: Performed by: INTERNAL MEDICINE

## 2022-09-12 PROCEDURE — 85025 COMPLETE CBC W/AUTO DIFF WBC: CPT

## 2022-09-12 PROCEDURE — 96374 THER/PROPH/DIAG INJ IV PUSH: CPT

## 2022-09-12 RX ORDER — ZOLEDRONIC ACID 5 MG/100ML
5 INJECTION, SOLUTION INTRAVENOUS ONCE
Status: CANCELLED | OUTPATIENT
Start: 2022-09-12

## 2022-09-12 RX ORDER — SODIUM CHLORIDE 9 MG/ML
250 INJECTION, SOLUTION INTRAVENOUS ONCE
Status: CANCELLED | OUTPATIENT
Start: 2022-09-12

## 2022-09-12 RX ORDER — ZOLEDRONIC ACID 5 MG/100ML
5 INJECTION, SOLUTION INTRAVENOUS ONCE
Status: COMPLETED | OUTPATIENT
Start: 2022-09-12 | End: 2022-09-12

## 2022-09-12 RX ORDER — SODIUM CHLORIDE 9 MG/ML
250 INJECTION, SOLUTION INTRAVENOUS ONCE
Status: COMPLETED | OUTPATIENT
Start: 2022-09-12 | End: 2022-09-12

## 2022-09-12 RX ORDER — HEPARIN SODIUM (PORCINE) LOCK FLUSH IV SOLN 100 UNIT/ML 100 UNIT/ML
500 SOLUTION INTRAVENOUS AS NEEDED
Status: DISCONTINUED | OUTPATIENT
Start: 2022-09-12 | End: 2022-09-12 | Stop reason: HOSPADM

## 2022-09-12 RX ORDER — SODIUM CHLORIDE 0.9 % (FLUSH) 0.9 %
10 SYRINGE (ML) INJECTION AS NEEDED
Status: DISCONTINUED | OUTPATIENT
Start: 2022-09-12 | End: 2022-09-12 | Stop reason: HOSPADM

## 2022-09-12 RX ADMIN — SODIUM CHLORIDE 250 ML: 9 INJECTION, SOLUTION INTRAVENOUS at 11:43

## 2022-09-12 RX ADMIN — SODIUM CHLORIDE, PRESERVATIVE FREE 500 UNITS: 5 INJECTION INTRAVENOUS at 12:10

## 2022-09-12 RX ADMIN — ZOLEDRONIC ACID 5 MG: 0.05 INJECTION, SOLUTION INTRAVENOUS at 11:43

## 2022-09-12 RX ADMIN — Medication 10 ML: at 12:10

## 2022-09-21 ENCOUNTER — DOCUMENTATION (OUTPATIENT)
Dept: FAMILY MEDICINE CLINIC | Facility: CLINIC | Age: 82
End: 2022-09-21

## 2022-09-21 DIAGNOSIS — C79.51 BONE METASTASIS: ICD-10-CM

## 2022-09-21 RX ORDER — GABAPENTIN 300 MG/1
900 CAPSULE ORAL
Qty: 90 CAPSULE | Refills: 0 | Status: SHIPPED | OUTPATIENT
Start: 2022-09-21 | End: 2022-11-15 | Stop reason: SDUPTHER

## 2022-09-30 ENCOUNTER — SPECIALTY PHARMACY (OUTPATIENT)
Dept: PHARMACY | Facility: HOSPITAL | Age: 82
End: 2022-09-30

## 2022-09-30 NOTE — PROGRESS NOTES
Specialty Pharmacy Refill Coordination Note     Martina is a 82 y.o. female contacted today regarding refills of  Xarelto specialty medication(s).    Reviewed and verified with patient:       Specialty medication(s) and dose(s) confirmed: yes    Refill Questions    Flowsheet Row Most Recent Value   Changes to allergies? No   Changes to medications? Yes  [was on antibiotics for uti's- completed now]   New conditions since last clinic visit Yes  [uti's]   Unplanned office visit, urgent care, ED, or hospital admission in the last 4 weeks  Yes  [uti's]   How does patient/caregiver feel medication is working? Good   Financial problems or insurance changes  No   Since the previous refill, were any specialty medication doses or scheduled injections missed or delayed?  No   Does this patient require a clinical escalation to a pharmacist? No  [will alert [facility]]          Delivery Questions    Flowsheet Row Most Recent Value   Delivery method [employer]  [[employer] standard no sig ship 10/3 deliver 10/4- put on bench]   Delivery address correct? Yes   Preferred delivery time? Anytime   Number of medications in delivery 1   Medication being filled and delivered Xarelto   Doses left of specialty medications good til 10/5   Is there any medication that is due not being filled? No   Supplies needed? No supplies needed   Cooler needed? No   Do any medications need mixed or dated? No   Copay form of payment Payment plan already set up   Questions or concerns for the pharmacist? No   Are any medications first time fills? No                 Follow-up: 3 week(s)     Karen Marin  Specialty Pharmacy Technician

## 2022-10-03 ENCOUNTER — SPECIALTY PHARMACY (OUTPATIENT)
Dept: PHARMACY | Facility: HOSPITAL | Age: 82
End: 2022-10-03

## 2022-10-05 RX ORDER — AMLODIPINE BESYLATE 10 MG/1
10 TABLET ORAL DAILY
Qty: 90 TABLET | Refills: 1 | Status: SHIPPED | OUTPATIENT
Start: 2022-10-05

## 2022-10-07 ENCOUNTER — INFUSION (OUTPATIENT)
Dept: ONCOLOGY | Facility: HOSPITAL | Age: 82
End: 2022-10-07

## 2022-10-07 DIAGNOSIS — Z45.2 ENCOUNTER FOR ADJUSTMENT OR MANAGEMENT OF VASCULAR ACCESS DEVICE: Primary | ICD-10-CM

## 2022-10-07 RX ORDER — HEPARIN SODIUM (PORCINE) LOCK FLUSH IV SOLN 100 UNIT/ML 100 UNIT/ML
500 SOLUTION INTRAVENOUS AS NEEDED
Status: DISCONTINUED | OUTPATIENT
Start: 2022-10-07 | End: 2022-10-10 | Stop reason: HOSPADM

## 2022-10-07 RX ORDER — SODIUM CHLORIDE 0.9 % (FLUSH) 0.9 %
10 SYRINGE (ML) INJECTION AS NEEDED
Status: DISCONTINUED | OUTPATIENT
Start: 2022-10-07 | End: 2022-10-10 | Stop reason: HOSPADM

## 2022-10-12 ENCOUNTER — INFUSION (OUTPATIENT)
Dept: ONCOLOGY | Facility: HOSPITAL | Age: 82
End: 2022-10-12

## 2022-10-12 DIAGNOSIS — Z45.2 ENCOUNTER FOR ADJUSTMENT OR MANAGEMENT OF VASCULAR ACCESS DEVICE: Primary | ICD-10-CM

## 2022-10-12 PROCEDURE — 25010000002 HEPARIN LOCK FLUSH PER 10 UNITS: Performed by: INTERNAL MEDICINE

## 2022-10-12 PROCEDURE — 96523 IRRIG DRUG DELIVERY DEVICE: CPT

## 2022-10-12 RX ORDER — HEPARIN SODIUM (PORCINE) LOCK FLUSH IV SOLN 100 UNIT/ML 100 UNIT/ML
500 SOLUTION INTRAVENOUS AS NEEDED
Status: DISCONTINUED | OUTPATIENT
Start: 2022-10-12 | End: 2022-10-12 | Stop reason: HOSPADM

## 2022-10-12 RX ORDER — SODIUM CHLORIDE 0.9 % (FLUSH) 0.9 %
10 SYRINGE (ML) INJECTION AS NEEDED
Status: DISCONTINUED | OUTPATIENT
Start: 2022-10-12 | End: 2022-10-12 | Stop reason: HOSPADM

## 2022-10-12 RX ADMIN — Medication 500 UNITS: at 13:44

## 2022-10-12 RX ADMIN — Medication 10 ML: at 13:44

## 2022-10-21 ENCOUNTER — SPECIALTY PHARMACY (OUTPATIENT)
Dept: PHARMACY | Facility: HOSPITAL | Age: 82
End: 2022-10-21

## 2022-10-21 ENCOUNTER — DOCUMENTATION (OUTPATIENT)
Dept: PHARMACY | Facility: HOSPITAL | Age: 82
End: 2022-10-21

## 2022-10-21 NOTE — TELEPHONE ENCOUNTER
Refill requested from pharmacy. Per last chart note, patient to continue Xarelto 20 mg daily. Will route to MD for cosignature.    Manpreet Olivier, Ollie, BCOP  10/21/2022 14:09 EDT

## 2022-10-21 NOTE — PROGRESS NOTES
Specialty Pharmacy Refill Coordination Note     Martina is a 82 y.o. female contacted today regarding refills of  Xarelto specialty medication(s).    Reviewed and verified with patient:       Specialty medication(s) and dose(s) confirmed: yes    Refill Questions    Flowsheet Row Most Recent Value   Changes to allergies? No   Changes to medications? No   New conditions since last clinic visit No   Unplanned office visit, urgent care, ED, or hospital admission in the last 4 weeks  No   How does patient/caregiver feel medication is working? Good   Financial problems or insurance changes  No   Since the previous refill, were any specialty medication doses or scheduled injections missed or delayed?  No   Does this patient require a clinical escalation to a pharmacist? No          Delivery Questions    Flowsheet Row Most Recent Value   Delivery method [employer]  [[employer] standard no sig ship 10/24 deliver 10/25- leave on bench on front porch]   Delivery address correct? Yes   Preferred delivery time? Anytime   Number of medications in delivery 1   Medication being filled and delivered Xarelto   Doses left of specialty medications good until 10/26 + few extra days   Is there any medication that is due not being filled? No   Supplies needed? No supplies needed   Cooler needed? No   Do any medications need mixed or dated? No   Copay form of payment Payment plan already set up   Questions or concerns for the pharmacist? No   Are any medications first time fills? No                 Follow-up: 3 week(s)     Karen Marin  Specialty Pharmacy Technician

## 2022-10-21 NOTE — PROGRESS NOTES
"Due to Xarelto being too soon until 10/26- shipping coordination was changed to the following.  \"[employer] standard no sig ship 10/26 deliver 10/27- leave on bench on front porch\"  "

## 2022-10-26 ENCOUNTER — INFUSION (OUTPATIENT)
Dept: ONCOLOGY | Facility: HOSPITAL | Age: 82
End: 2022-10-26

## 2022-10-26 ENCOUNTER — HOSPITAL ENCOUNTER (OUTPATIENT)
Dept: PET IMAGING | Facility: HOSPITAL | Age: 82
Discharge: HOME | End: 2022-10-26
Admitting: INTERNAL MEDICINE

## 2022-10-26 ENCOUNTER — SPECIALTY PHARMACY (OUTPATIENT)
Dept: PHARMACY | Facility: HOSPITAL | Age: 82
End: 2022-10-26

## 2022-10-26 DIAGNOSIS — C79.51 BONE METASTASIS: ICD-10-CM

## 2022-10-26 DIAGNOSIS — D69.3 ACUTE ITP: ICD-10-CM

## 2022-10-26 DIAGNOSIS — C83.39 DIFFUSE LARGE B-CELL LYMPHOMA OF EXTRANODAL SITE EXCLUDING SPLEEN AND OTHER SOLID ORGANS: ICD-10-CM

## 2022-10-26 DIAGNOSIS — M85.89 OSTEOPENIA OF MULTIPLE SITES: ICD-10-CM

## 2022-10-26 DIAGNOSIS — H11.433 CONJUNCTIVAL HYPEREMIA, BILATERAL: ICD-10-CM

## 2022-10-26 LAB
BASOPHILS # BLD AUTO: 0.03 10*3/MM3 (ref 0–0.2)
BASOPHILS NFR BLD AUTO: 0.6 % (ref 0–1.5)
CREAT BLDA-MCNC: 0.7 MG/DL (ref 0.6–1.3)
DEPRECATED RDW RBC AUTO: 42.7 FL (ref 37–54)
EOSINOPHIL # BLD AUTO: 0.08 10*3/MM3 (ref 0–0.4)
EOSINOPHIL NFR BLD AUTO: 1.6 % (ref 0.3–6.2)
ERYTHROCYTE [DISTWIDTH] IN BLOOD BY AUTOMATED COUNT: 12.1 % (ref 12.3–15.4)
HCT VFR BLD AUTO: 34.2 % (ref 34–46.6)
HGB BLD-MCNC: 11.4 G/DL (ref 12–15.9)
IMM GRANULOCYTES # BLD AUTO: 0.01 10*3/MM3 (ref 0–0.05)
IMM GRANULOCYTES NFR BLD AUTO: 0.2 % (ref 0–0.5)
LDH SERPL-CCNC: 143 U/L (ref 99–259)
LYMPHOCYTES # BLD AUTO: 1.17 10*3/MM3 (ref 0.7–3.1)
LYMPHOCYTES NFR BLD AUTO: 22.7 % (ref 19.6–45.3)
MCH RBC QN AUTO: 31.6 PG (ref 26.6–33)
MCHC RBC AUTO-ENTMCNC: 33.3 G/DL (ref 31.5–35.7)
MCV RBC AUTO: 94.7 FL (ref 79–97)
MONOCYTES # BLD AUTO: 0.59 10*3/MM3 (ref 0.1–0.9)
MONOCYTES NFR BLD AUTO: 11.4 % (ref 5–12)
NEUTROPHILS NFR BLD AUTO: 3.28 10*3/MM3 (ref 1.7–7)
NEUTROPHILS NFR BLD AUTO: 63.5 % (ref 42.7–76)
NRBC BLD AUTO-RTO: 0 /100 WBC (ref 0–0.2)
PLATELET # BLD AUTO: 166 10*3/MM3 (ref 140–450)
PLATELETS.RETICULATED NFR BLD AUTO: 3.2 % (ref 0.9–6.5)
PMV BLD AUTO: 9.1 FL (ref 6–12)
RBC # BLD AUTO: 3.61 10*6/MM3 (ref 3.77–5.28)
WBC NRBC COR # BLD: 5.16 10*3/MM3 (ref 3.4–10.8)

## 2022-10-26 PROCEDURE — 85055 RETICULATED PLATELET ASSAY: CPT

## 2022-10-26 PROCEDURE — 71260 CT THORAX DX C+: CPT

## 2022-10-26 PROCEDURE — 25010000002 IOPAMIDOL 61 % SOLUTION: Performed by: INTERNAL MEDICINE

## 2022-10-26 PROCEDURE — 82565 ASSAY OF CREATININE: CPT

## 2022-10-26 PROCEDURE — 85025 COMPLETE CBC W/AUTO DIFF WBC: CPT

## 2022-10-26 PROCEDURE — 74177 CT ABD & PELVIS W/CONTRAST: CPT

## 2022-10-26 PROCEDURE — 83615 LACTATE (LD) (LDH) ENZYME: CPT

## 2022-10-26 PROCEDURE — 0 DIATRIZOATE MEGLUMINE & SODIUM PER 1 ML: Performed by: INTERNAL MEDICINE

## 2022-10-26 RX ADMIN — DIATRIZOATE MEGLUMINE AND DIATRIZOATE SODIUM 30 ML: 660; 100 LIQUID ORAL; RECTAL at 13:03

## 2022-10-26 RX ADMIN — IOPAMIDOL 85 ML: 612 INJECTION, SOLUTION INTRAVENOUS at 13:03

## 2022-11-02 ENCOUNTER — SPECIALTY PHARMACY (OUTPATIENT)
Dept: PHARMACY | Facility: HOSPITAL | Age: 82
End: 2022-11-02

## 2022-11-02 NOTE — PROGRESS NOTES
Do NOT schedule a refill coordination for November. Push it out.  Pt did not  the September 8, 2022 [facility] delivery to [facility] office.  We can  the September fill to pt's home this November.

## 2022-11-07 ENCOUNTER — OFFICE VISIT (OUTPATIENT)
Dept: ONCOLOGY | Facility: CLINIC | Age: 82
End: 2022-11-07

## 2022-11-07 ENCOUNTER — APPOINTMENT (OUTPATIENT)
Dept: LAB | Facility: HOSPITAL | Age: 82
End: 2022-11-07

## 2022-11-07 ENCOUNTER — SPECIALTY PHARMACY (OUTPATIENT)
Dept: ONCOLOGY | Facility: HOSPITAL | Age: 82
End: 2022-11-07

## 2022-11-07 VITALS
BODY MASS INDEX: 26.24 KG/M2 | TEMPERATURE: 96.6 F | WEIGHT: 148.1 LBS | SYSTOLIC BLOOD PRESSURE: 130 MMHG | HEART RATE: 64 BPM | DIASTOLIC BLOOD PRESSURE: 71 MMHG | OXYGEN SATURATION: 97 % | HEIGHT: 63 IN | RESPIRATION RATE: 16 BRPM

## 2022-11-07 DIAGNOSIS — G47.00 INSOMNIA, UNSPECIFIED TYPE: Primary | ICD-10-CM

## 2022-11-07 DIAGNOSIS — C83.39 DIFFUSE LARGE B-CELL LYMPHOMA OF EXTRANODAL SITE EXCLUDING SPLEEN AND OTHER SOLID ORGANS: ICD-10-CM

## 2022-11-07 PROCEDURE — 99215 OFFICE O/P EST HI 40 MIN: CPT | Performed by: INTERNAL MEDICINE

## 2022-11-07 RX ORDER — TEMAZEPAM 7.5 MG/1
7.5 CAPSULE ORAL NIGHTLY PRN
Qty: 40 CAPSULE | Refills: 1 | Status: SHIPPED | OUTPATIENT
Start: 2022-11-07
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
## 2022-11-09 ENCOUNTER — TELEPHONE (OUTPATIENT)
Dept: ONCOLOGY | Facility: CLINIC | Age: 82
End: 2022-11-09

## 2022-11-09 ENCOUNTER — SPECIALTY PHARMACY (OUTPATIENT)
Dept: PHARMACY | Facility: HOSPITAL | Age: 82
End: 2022-11-09

## 2022-11-09 DIAGNOSIS — F33.9 CHRONIC RECURRENT MAJOR DEPRESSIVE DISORDER: ICD-10-CM

## 2022-11-09 RX ORDER — ESCITALOPRAM OXALATE 20 MG/1
20 TABLET ORAL DAILY
Qty: 30 TABLET | Refills: 5 | Status: SHIPPED | OUTPATIENT
Start: 2022-11-09

## 2022-11-09 NOTE — TELEPHONE ENCOUNTER
Caller: Martina Blake    Relationship: Self    Best call back number: [phone]    What is the best time to reach you: ANYTIME    Who are you requesting to speak with (clinical staff, provider,  specific staff member): DR POSADA OR NURSE    What was the call regarding: PT STATED THAT [facility] INFORMED HER THAT THE TEMAZEPAM NEEDS PRIOR AUTH.    PLEASE CALL TO ADVISE.     Do you require a callback: YES

## 2022-11-14 ENCOUNTER — TELEPHONE (OUTPATIENT)
Dept: ONCOLOGY | Facility: CLINIC | Age: 82
End: 2022-11-14

## 2022-11-14 DIAGNOSIS — C79.51 BONE METASTASIS: ICD-10-CM

## 2022-11-14 DIAGNOSIS — C83.39 DIFFUSE LARGE B-CELL LYMPHOMA OF EXTRANODAL SITE EXCLUDING SPLEEN AND OTHER SOLID ORGANS: Primary | ICD-10-CM

## 2022-11-14 NOTE — TELEPHONE ENCOUNTER
Caller: Martina Blake    Relationship: Self    Best call back number: [phone]    What is the best time to reach you: ANYTIME     Who are you requesting to speak with (clinical staff, provider,  specific staff member): CLINICAL      What was the call regarding: DR POSADA GAVE MARTINA A SLEEPING PILL BUT SHE CAN NOT TAKE THAT MEDICATION. SHE IS WANTING TO SEE IF DR POSADA WILL JUST REFILL HER GABAPENTIN     Do you require a callback: YES

## 2022-11-15 ENCOUNTER — SPECIALTY PHARMACY (OUTPATIENT)
Dept: PHARMACY | Facility: HOSPITAL | Age: 82
End: 2022-11-15

## 2022-11-15 RX ORDER — GABAPENTIN 300 MG/1
900 CAPSULE ORAL
Qty: 90 CAPSULE | Refills: 0 | Status: SHIPPED | OUTPATIENT
Start: 2022-11-15 | End: 2022-12-21

## 2022-11-15 NOTE — PROGRESS NOTES
Specialty Pharmacy Refill Coordination Note     Martina is a 82 y.o. female contacted today regarding refills of  Xarelto specialty medication(s).    Reviewed and verified with patient:       Specialty medication(s) and dose(s) confirmed: yes    Refill Questions    Flowsheet Row Most Recent Value   Changes to allergies? No   Changes to medications? No   New conditions since last clinic visit No   Unplanned office visit, urgent care, ED, or hospital admission in the last 4 weeks  No   How does patient/caregiver feel medication is working? Good   Financial problems or insurance changes  No   Since the previous refill, were any specialty medication doses or scheduled injections missed or delayed?  No   Does this patient require a clinical escalation to a pharmacist? No          Delivery Questions    Flowsheet Row Most Recent Value   Delivery method [employer]Ex  [[employer] standard no sig ship 11/17 deliver 11/18]   Delivery address correct? Yes   Preferred delivery time? Anytime   Number of medications in delivery 1   Medication being filled and delivered Xarelto   Doses left of specialty medications at least good thru end of this week   Is there any medication that is due not being filled? No   Supplies needed? No supplies needed   Cooler needed? No   Do any medications need mixed or dated? No   Copay form of payment Payment plan already set up   Questions or concerns for the pharmacist? No   Are any medications first time fills? No                 Follow-up: 3 week(s)     Karen Marin  Specialty Pharmacy Technician

## 2022-11-15 NOTE — PROGRESS NOTES
Xarelto is too soon until 11/18/22 on insurance.  Pt wants by end of week.  Changed the shipping coordination to [employer] to home address 11/18/22- put on bench

## 2022-11-15 NOTE — TELEPHONE ENCOUNTER
Patient returned call. She states she cannot tolerate Restoril because it caused her to be jittery and she did not like that feeling. She would prefer to take gabapentin 900mg nightly as previously prescribed. D/W Dr. Iraheta who is OK with this. Script routed to him for signature.

## 2022-11-18 ENCOUNTER — SPECIALTY PHARMACY (OUTPATIENT)
Dept: PHARMACY | Facility: HOSPITAL | Age: 82
End: 2022-11-18

## 2022-11-25 DIAGNOSIS — I10 ESSENTIAL HYPERTENSION: ICD-10-CM

## 2022-11-25 RX ORDER — BENAZEPRIL HYDROCHLORIDE 40 MG/1
40 TABLET, FILM COATED ORAL DAILY
Qty: 90 TABLET | Refills: 3 | Status: SHIPPED | OUTPATIENT
Start: 2022-11-25

## 2022-11-30 ENCOUNTER — TELEPHONE (OUTPATIENT)
Dept: FAMILY MEDICINE CLINIC | Facility: CLINIC | Age: 82
End: 2022-11-30

## 2022-11-30 NOTE — TELEPHONE ENCOUNTER
Provider: LUIS MAHER    Caller: RUT FRANCISCO     Relationship to Patient: SELF    Phone Number: [phone]    Reason for Call: PATIENT STATES SHE HAS LOST HER HANDICAP STICKER AND IS NEEDING A NEW ONE

## 2022-11-30 NOTE — TELEPHONE ENCOUNTER
Caller: Martina Blake    Relationship: Self    Best call back number: [phone]    What is the best time to reach you: ANYTIME     Who are you requesting to speak with (clinical staff, provider,  specific staff member): CLINICAL     What was the call regarding: PATIENT IS WONDERING IF SHE IS NEEDING TO HAVE LABS DONE FOR HER APPOINTMENT ON 12.09.22. IF PATIENT IS NEEDING LABS PATIENT IS REQUESTING TO HAVE THEM DONE ON 12.20.22 WHEN SHE COMES IN THE GET HER PORT FLUSHED.     Do you require a callback: YES

## 2022-12-02 ENCOUNTER — TELEPHONE (OUTPATIENT)
Dept: FAMILY MEDICINE CLINIC | Facility: CLINIC | Age: 82
End: 2022-12-02

## 2022-12-02 DIAGNOSIS — I10 ESSENTIAL HYPERTENSION: Primary | ICD-10-CM

## 2022-12-02 DIAGNOSIS — R73.9 HYPERGLYCEMIA: ICD-10-CM

## 2022-12-02 DIAGNOSIS — E55.9 VITAMIN D DEFICIENCY: ICD-10-CM

## 2022-12-02 DIAGNOSIS — E78.5 DYSLIPIDEMIA: ICD-10-CM

## 2022-12-07 ENCOUNTER — TELEPHONE (OUTPATIENT)
Dept: FAMILY MEDICINE CLINIC | Facility: CLINIC | Age: 82
End: 2022-12-07

## 2022-12-07 NOTE — TELEPHONE ENCOUNTER
Patient unable to come to her Apt on  12/21 at 11:30    Patient can come in earlier that day or any day the week of the 26 th.    Please advise where we can reschedule her      [phone].

## 2022-12-16 ENCOUNTER — DOCUMENTATION (OUTPATIENT)
Dept: PHARMACY | Facility: HOSPITAL | Age: 82
End: 2022-12-16

## 2022-12-16 NOTE — PROGRESS NOTES
Pt never picked up the package at the [facility] office from September for Xarelto. I Beelined that package of Xarelto that was in the [employer] to her home today.

## 2022-12-20 ENCOUNTER — INFUSION (OUTPATIENT)
Dept: ONCOLOGY | Facility: HOSPITAL | Age: 82
End: 2022-12-20

## 2022-12-20 DIAGNOSIS — Z45.2 ENCOUNTER FOR ADJUSTMENT OR MANAGEMENT OF VASCULAR ACCESS DEVICE: Primary | ICD-10-CM

## 2022-12-20 PROCEDURE — 25010000002 HEPARIN LOCK FLUSH PER 10 UNITS: Performed by: INTERNAL MEDICINE

## 2022-12-20 PROCEDURE — 36591 DRAW BLOOD OFF VENOUS DEVICE: CPT

## 2022-12-20 PROCEDURE — 96523 IRRIG DRUG DELIVERY DEVICE: CPT

## 2022-12-20 RX ORDER — HEPARIN SODIUM (PORCINE) LOCK FLUSH IV SOLN 100 UNIT/ML 100 UNIT/ML
500 SOLUTION INTRAVENOUS AS NEEDED
Status: DISCONTINUED | OUTPATIENT
Start: 2022-12-20 | End: 2022-12-20 | Stop reason: HOSPADM

## 2022-12-20 RX ORDER — SODIUM CHLORIDE 0.9 % (FLUSH) 0.9 %
10 SYRINGE (ML) INJECTION AS NEEDED
Status: DISCONTINUED | OUTPATIENT
Start: 2022-12-20 | End: 2022-12-20 | Stop reason: HOSPADM

## 2022-12-20 RX ADMIN — Medication 10 ML: at 12:21

## 2022-12-20 RX ADMIN — Medication 500 UNITS: at 12:21

## 2022-12-21 ENCOUNTER — OFFICE VISIT (OUTPATIENT)
Dept: FAMILY MEDICINE CLINIC | Facility: CLINIC | Age: 82
End: 2022-12-21

## 2022-12-21 VITALS
OXYGEN SATURATION: 97 % | WEIGHT: 148 LBS | DIASTOLIC BLOOD PRESSURE: 52 MMHG | BODY MASS INDEX: 27.23 KG/M2 | SYSTOLIC BLOOD PRESSURE: 122 MMHG | HEART RATE: 74 BPM | HEIGHT: 62 IN

## 2022-12-21 DIAGNOSIS — C79.51 BONE METASTASIS: ICD-10-CM

## 2022-12-21 DIAGNOSIS — Z00.00 MEDICARE ANNUAL WELLNESS VISIT, SUBSEQUENT: Primary | ICD-10-CM

## 2022-12-21 DIAGNOSIS — C83.39 DIFFUSE LARGE B-CELL LYMPHOMA OF EXTRANODAL SITE EXCLUDING SPLEEN AND OTHER SOLID ORGANS: ICD-10-CM

## 2022-12-21 LAB
25(OH)D3+25(OH)D2 SERPL-MCNC: 56.3 NG/ML (ref 30–100)
CHOLEST SERPL-MCNC: 150 MG/DL (ref 100–199)
HBA1C MFR BLD: 5.4 % (ref 4.8–5.6)
HDLC SERPL-MCNC: 48 MG/DL
LDLC SERPL CALC-MCNC: 84 MG/DL (ref 0–99)
TRIGL SERPL-MCNC: 99 MG/DL (ref 0–149)
VLDLC SERPL CALC-MCNC: 18 MG/DL (ref 5–40)

## 2022-12-21 PROCEDURE — G0439 PPPS, SUBSEQ VISIT: HCPCS | Performed by: FAMILY MEDICINE

## 2022-12-21 PROCEDURE — 1160F RVW MEDS BY RX/DR IN RCRD: CPT | Performed by: FAMILY MEDICINE

## 2022-12-21 PROCEDURE — 1170F FXNL STATUS ASSESSED: CPT | Performed by: FAMILY MEDICINE

## 2022-12-21 RX ORDER — GABAPENTIN 300 MG/1
900 CAPSULE ORAL
Qty: 270 CAPSULE | Refills: 1 | Status: SHIPPED | OUTPATIENT
Start: 2022-12-21

## 2022-12-21 NOTE — PATIENT INSTRUCTIONS
Advance Care Planning and Advance Directives     You make decisions on a daily basis - decisions about where you want to live, your career, your home, your life. Perhaps one of the most important decisions you face is your choice for future medical care. Take time to talk with your family and your healthcare team and start planning today.  Advance Care Planning is a process that can help you:  Understand possible future healthcare decisions in light of your own experiences  Reflect on those decision in light of your goals and values  Discuss your decisions with those closest to you and the healthcare professionals that care for you  Make a plan by creating a document that reflects your wishes    Surrogate Decision Maker  In the event of a medical emergency, which has left you unable to communicate or to make your own decisions, you would need someone to make decisions for you.  It is important to discuss your preferences for medical treatment with this person while you are in good health.     Qualities of a surrogate decision maker:  Willing to take on this role and responsibility  Knows what you want for future medical care  Willing to follow your wishes even if they don't agree with them  Able to make difficult medical decisions under stressful circumstances    Advance Directives  These are legal documents you can create that will guide your healthcare team and decision maker(s) when needed. These documents can be stored in the electronic medical record.    Living Will - a legal document to guide your care if you have a terminal condition or a serious illness and are unable to communicate. States vary by statute in document names/types, but most forms may include one or more of the following:        -  Directions regarding life-prolonging treatments        -  Directions regarding artificially provided nutrition/hydration        -  Choosing a healthcare decision maker        -  Direction regarding organ/tissue  "donation    Durable Power of  for Healthcare - this document names an -in-fact to make medical decisions for you, but it may also allow this person to make personal and financial decisions for you. Please seek the advice of an  if you need this type of document.    **Advance Directives are not required and no one may discriminate against you if you do not sign one.    Medical Orders  Many states allow specific forms/orders signed by your physician to record your wishes for medical treatment in your current state of health. This form, signed in personal communication with your physician, addresses resuscitation and other medical interventions that you may or may not want.      For more information or to schedule a time with a [facility] Advance Care Planning Facilitator contact: [facility] or call [phone] and someone will contact you directly.\"1-3-2-Almost None!\"  Healthy Habits Start Early    EAT 5 OR MORE SERVINGS OF VEGETABLES AND FRUITS EVERY DAY.    Help Martina get three vegetables and two fruits each day. Red, green, yellow, orange...encourage them to try all the colors so they can enjoy different flavors and get more vitamins.    How can I help Martina do this?  ---------------------------------------------  -BE PATIENT WITH Martina, remember it may take 10 times before they start to like new food. So, start with small bites and just keep trying.  -Serve at least one vegetable or fruit at every meal. Even try two. Remember, portions do not have to be as big as you think.  -Encourage eating fruits and vegetables instead of drinking them..it's a better way to get fiber and vitamins..so limit the amount of juice to 1/2 cup per day for children 1-6 years and one cup per day for children 7-18 years of age. Try using 1/2 part water and 1/2 part juice.    Spend less than two hours per day watching television and other screen media. Screen media includes video games, movies and " computer use for entertainment.    How can I help Martina do this?  -Turn off the TV at dinner. Dinner is the best time to hang out with your kids and just talk, learn about their day, and tell them about your day. Your kids have a lot to learn from you and dinner is a great time to share.

## 2022-12-21 NOTE — PROGRESS NOTES
QUICK REFERENCE INFORMATION:  The ABCs of the Annual Wellness Visit    Subsequent Medicare Wellness Visit    HEALTH RISK ASSESSMENT    1940    Recent Hospitalizations:  No hospitalization(s) within the last year..        Current Medical Providers:  Patient Care Team:  Jamie Walton DO as PCP - General  Jackie Grossman MD as PCP - Ob/Gyn (Obstetrics and Gynecology)  Chivo Iraheta MD as Consulting Physician (Hematology and Oncology)  Justine Barrios MD as Consulting Physician (Cardiology)  Jamie Walton DO as Referring Physician (Family Medicine)  Cynthia Wright PA as Physician Assistant (Obstetrics and Gynecology)        Smoking Status:  Social History     Tobacco Use   Smoking Status Never   Smokeless Tobacco Never       Alcohol Consumption:  Social History     Substance and Sexual Activity   Alcohol Use No    Comment: Caffeine use: 1 cup daily (decaf)       Depression Screen:   PHQ-2/PHQ-9 Depression Screening 12/21/2022   Retired PHQ-9 Total Score -   Retired Total Score -   Little Interest or Pleasure in Doing Things 0-->not at all   Feeling Down, Depressed or Hopeless 0-->not at all   PHQ-9: Brief Depression Severity Measure Score 0       Health Habits and Functional and Cognitive Screening:  Functional & Cognitive Status 12/21/2022   Do you have difficulty preparing food and eating? No   Do you have difficulty bathing yourself, getting dressed or grooming yourself? No   Do you have difficulty using the toilet? No   Do you have difficulty moving around from place to place? No   Do you have trouble with steps or getting out of a bed or a chair? No   Current Diet Well Balanced Diet   Dental Exam Up to date   Eye Exam Up to date   Exercise (times per week) 3 times per week   Current Exercises Include Walking   Current Exercise Activities Include -   Do you need help using the phone?  No   Are you deaf or do you have serious difficulty hearing?  No   Do you need help with  transportation? No   Do you need help shopping? No   Do you need help preparing meals?  No   Do you need help with housework?  No   Do you need help with laundry? No   Do you need help taking your medications? No   Do you need help managing money? No   Do you ever drive or ride in a car without wearing a seat belt? No   Have you felt unusual stress, anger or loneliness in the last month? No   Who do you live with? Spouse   If you need help, do you have trouble finding someone available to you? No   Have you been bothered in the last four weeks by sexual problems? No   Do you have difficulty concentrating, remembering or making decisions? No           Does the patient have evidence of cognitive impairment? No    Aspirin use counseling: Does not need ASA (and currently is not on it)      Recent Lab Results:  CMP:  Lab Results   Component Value Date    BUN 20 09/12/2022    CREATININE 0.70 10/26/2022    EGFRIFNONA 78 11/12/2021    EGFRIFAFRI 97 04/15/2019    BCR 26.7 09/12/2022     09/12/2022    K 4.0 09/12/2022    CO2 24.7 09/12/2022    CALCIUM 9.4 09/12/2022    PROTENTOTREF 6.6 04/15/2019    ALBUMIN 4.10 09/12/2022    LABGLOBREF 2.4 04/15/2019    LABIL2 1.8 04/15/2019    BILITOT 0.4 09/12/2022    ALKPHOS 109 09/12/2022    AST 28 09/12/2022    ALT 20 09/12/2022     Lipid Panel:  Lab Results   Component Value Date    CHOL 156 10/12/2018    TRIG 99 12/20/2022    HDL 48 12/20/2022    VLDL 18 12/20/2022    LDLHDL 1.73 10/12/2018     HbA1c:  Lab Results   Component Value Date    HGBA1C 5.4 12/20/2022       Visual Acuity:  No results found.    Age-appropriate Screening Schedule:  Refer to the list below for future screening recommendations based on patient's age, sex and/or medical conditions. Orders for these recommended tests are listed in the plan section. The patient has been provided with a written plan.    Health Maintenance   Topic Date Due   • LIPID PANEL  12/20/2023   • DXA SCAN  02/24/2024   • MAMMOGRAM   05/16/2024   • TDAP/TD VACCINES (3 - Td or Tdap) 09/18/2029   • INFLUENZA VACCINE  Completed   • ZOSTER VACCINE  Completed        Subjective   History of Present Illness    Martina Blake is a 82 y.o. female who presents for an Subsequent Wellness Visit.  Had labs done and ok;  Refilled gabapentin for her as wanted 90 days as tx for lymphoma bu thelps mostly rls    The following portions of the patient's history were reviewed and updated as appropriate: allergies, current medications, past family history, past medical history, past social history, past surgical history and problem list.    Outpatient Medications Prior to Visit   Medication Sig Dispense Refill   • ACETAMINOPHEN 8 HOUR PO Take  by mouth Every Night.     • amLODIPine (NORVASC) 10 MG tablet TAKE 1 TABLET BY MOUTH DAILY 90 tablet 1   • Ascorbic Acid (Vitamin C) 500 MG capsule Take  by mouth Daily.     • Benadryl Itch Stopping 1-0.1 % cream Daily As Needed.     • benazepril (LOTENSIN) 40 MG tablet TAKE 1 TABLET BY MOUTH DAILY 90 tablet 3   • calcium carbonate (OS-PAPA) 600 MG tablet Take 600 mg by mouth Daily.     • cetirizine (zyrTEC) 10 MG tablet TAKE 1 TABLET BY MOUTH EVERY DAY AS NEEDED FOR ALLERGIES 90 tablet 1   • Cholecalciferol (VITAMIN D3) 125 MCG (5000 UT) capsule capsule Take 5,000 Units by mouth Daily.     • escitalopram (LEXAPRO) 20 MG tablet TAKE 1 TABLET BY MOUTH DAILY 30 tablet 5   • folic acid (FOLVITE) 400 MCG tablet Take 400 mcg by mouth daily.     • hydrALAZINE (APRESOLINE) 100 MG tablet TAKE 1 TABLET BY MOUTH THREE TIMES DAILY 270 tablet 3   • melatonin 5 MG tablet tablet Take 5 mg by mouth Every Night.     • pantoprazole (PROTONIX) 40 MG EC tablet TAKE 1 TABLET BY MOUTH EVERY DAY 90 tablet 1   • phenytoin ER (DILANTIN) 100 MG capsule TAKE 3 CAPSULES BY MOUTH EVERY NIGHT AT BEDTIME 270 capsule 3   • rivaroxaban (Xarelto) 20 MG tablet Take 1 tablet by mouth Daily. Take with food. 30 tablet 5   • rosuvastatin (CRESTOR) 5 MG tablet Take  1 tablet by mouth Every Night. 90 tablet 3   • spironolactone (ALDACTONE) 25 MG tablet TAKE 1 TABLET BY MOUTH DAILY 90 tablet 1   • temazepam (RESTORIL) 7.5 MG capsule Take 1 capsule by mouth At Night As Needed for Sleep (CAn use up to 2 caps PO QHS). 40 capsule 1   • traMADol (ULTRAM) 50 MG tablet Take 1 tablet by mouth Every 6 (Six) Hours As Needed for Moderate Pain . 40 tablet 0   • triamcinolone (KENALOG) 0.1 % cream Apply  topically to the appropriate area as directed See Admin Instructions. Apply topically to the affected area twice daily as needed     • vitamin B-12 (CYANOCOBALAMIN) 100 MCG tablet Take 1,000 mcg by mouth Daily.     • vitamin B-6 (PYRIDOXINE) 100 MG tablet Take 100 mg by mouth Daily.     • vitamin E 100 UNIT capsule Take 180 Units by mouth Daily.     • Zinc Sulfate (ZINC 15 PO) Take 400 mg by mouth.     • gabapentin (NEURONTIN) 300 MG capsule TAKE 2 CAPSULES BY MOUTH EVERY NIGHT AT BEDTIME 60 capsule 0   • gabapentin (NEURONTIN) 300 MG capsule Take 3 capsules by mouth every night at bedtime. 90 capsule 0     No facility-administered medications prior to visit.       Patient Active Problem List   Diagnosis   • Blood glucose abnormal   • Dyslipidemia   • Gastroesophageal reflux disease   • Generalized osteoarthritis   • Chronic recurrent major depressive disorder (HCC)   • Osteoporosis   • Restless legs syndrome   • Seizure disorder (HCC)   • Post-menopause on HRT (hormone replacement therapy)   • Chronic seasonal allergic rhinitis   • Paroxysmal atrial fibrillation (HCC)   • Iron deficiency anemia due to chronic blood loss   • Acute superficial gastritis without hemorrhage   • Hiatal hernia   • Esophagitis   • Diverticulosis of large intestine without hemorrhage   • Hypertension   • Hypercalcemia   • Liver mass   • Lymphoma (HCC)   • Insomnia   • Anemia associated with chemotherapy   • Pancytopenia due to antineoplastic chemotherapy (HCC)   • Encounter for adjustment or management of vascular  "access device   • 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 Patient does not have an advance directive, information provided.    Identification of Risk Factors:  Risk factors include: Obesity/Overweight .    Review of Systems   Respiratory: Negative for shortness of breath.    Cardiovascular: Negative for chest pain and palpitations.       Compared to one year ago, the patient feels her physical health is the same.  Compared to one year ago, the patient feels her mental health is the same.    Objective     Physical Exam  Vitals and nursing note reviewed.   Constitutional:       Appearance: She is well-developed.   HENT:      Head: Normocephalic and atraumatic.   Neck:      Thyroid: No thyromegaly.      Vascular: No JVD.   Cardiovascular:      Rate and Rhythm: Normal rate and regular rhythm.      Heart sounds: Normal heart sounds. No murmur heard.    No friction rub. No gallop.   Pulmonary:      Effort: Pulmonary effort is normal. No respiratory distress.      Breath sounds: Normal breath sounds. No wheezing or rales.   Abdominal:      General: Bowel sounds are normal. There is no distension.      Palpations: Abdomen is soft.      Tenderness: There is no abdominal tenderness. There is no guarding or rebound.   Musculoskeletal:      Cervical back: Neck supple.   Skin:     General: Skin is warm and dry.   Neurological:      Mental Status: She is alert.   Psychiatric:         Behavior: Behavior normal.         Vitals:    12/21/22 0812   BP: 122/52   Pulse: 74   SpO2: 97%   Weight: 67.1 kg (148 lb)   Height: 157.5 cm (62\")   PainSc: 0-No pain     Body mass index is " 27.07 kg/m².    BMI is >= 25 and <30. (Overweight) The following options were offered after discussion;: weight loss educational material (shared in after visit summary)      Assessment & Plan   Patient Self-Management and Personalized Health Advice  The patient has been provided with information about: diet and exercise and preventive services including:   · Annual Wellness Visit (AWV).    Visit Diagnoses:    ICD-10-CM ICD-9-CM   1. Medicare annual wellness visit, subsequent  Z00.00 V70.0   2. Bone metastasis (HCC)  C79.51 198.5   3. 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      Outpatient Encounter Medications as of 12/21/2022   Medication Sig Dispense Refill   • ACETAMINOPHEN 8 HOUR PO Take  by mouth Every Night.     • amLODIPine (NORVASC) 10 MG tablet TAKE 1 TABLET BY MOUTH DAILY 90 tablet 1   • Ascorbic Acid (Vitamin C) 500 MG capsule Take  by mouth Daily.     • Benadryl Itch Stopping 1-0.1 % cream Daily As Needed.     • benazepril (LOTENSIN) 40 MG tablet TAKE 1 TABLET BY MOUTH DAILY 90 tablet 3   • calcium carbonate (OS-PAPA) 600 MG tablet Take 600 mg by mouth Daily.     • cetirizine (zyrTEC) 10 MG tablet TAKE 1 TABLET BY MOUTH EVERY DAY AS NEEDED FOR ALLERGIES 90 tablet 1   • Cholecalciferol (VITAMIN D3) 125 MCG (5000 UT) capsule capsule Take 5,000 Units by mouth Daily.     • escitalopram (LEXAPRO) 20 MG tablet TAKE 1 TABLET BY MOUTH DAILY 30 tablet 5   • folic acid (FOLVITE) 400 MCG tablet Take 400 mcg by mouth daily.     • gabapentin (NEURONTIN) 300 MG capsule Take 3 capsules by mouth every night at bedtime. 270 capsule 1   • hydrALAZINE (APRESOLINE) 100 MG tablet TAKE 1 TABLET BY MOUTH THREE TIMES DAILY 270 tablet 3   • melatonin 5 MG tablet tablet Take 5 mg by mouth Every Night.     • pantoprazole (PROTONIX) 40 MG EC tablet TAKE 1 TABLET BY MOUTH EVERY DAY 90 tablet 1   •  phenytoin ER (DILANTIN) 100 MG capsule TAKE 3 CAPSULES BY MOUTH EVERY NIGHT AT BEDTIME 270 capsule 3   • rivaroxaban (Xarelto) 20 MG tablet Take 1 tablet by mouth Daily. Take with food. 30 tablet 5   • rosuvastatin (CRESTOR) 5 MG tablet Take 1 tablet by mouth Every Night. 90 tablet 3   • spironolactone (ALDACTONE) 25 MG tablet TAKE 1 TABLET BY MOUTH DAILY 90 tablet 1   • temazepam (RESTORIL) 7.5 MG capsule Take 1 capsule by mouth At Night As Needed for Sleep (CAn use up to 2 caps PO QHS). 40 capsule 1   • traMADol (ULTRAM) 50 MG tablet Take 1 tablet by mouth Every 6 (Six) Hours As Needed for Moderate Pain . 40 tablet 0   • triamcinolone (KENALOG) 0.1 % cream Apply  topically to the appropriate area as directed See Admin Instructions. Apply topically to the affected area twice daily as needed     • vitamin B-12 (CYANOCOBALAMIN) 100 MCG tablet Take 1,000 mcg by mouth Daily.     • vitamin B-6 (PYRIDOXINE) 100 MG tablet Take 100 mg by mouth Daily.     • vitamin E 100 UNIT capsule Take 180 Units by mouth Daily.     • Zinc Sulfate (ZINC 15 PO) Take 400 mg by mouth.     • [DISCONTINUED] gabapentin (NEURONTIN) 300 MG capsule TAKE 2 CAPSULES BY MOUTH EVERY NIGHT AT BEDTIME 60 capsule 0   • [DISCONTINUED] gabapentin (NEURONTIN) 300 MG capsule Take 3 capsules by mouth every night at bedtime. 90 capsule 0     Facility-Administered Encounter Medications as of 12/21/2022   Medication Dose Route Frequency Provider Last Rate Last Admin   • [DISCONTINUED] heparin injection 500 Units  500 Units Intravenous PRN Chivo Iraheta MD   500 Units at 12/20/22 1221   • [DISCONTINUED] sodium chloride 0.9 % flush 10 mL  10 mL Intravenous PRN Chivo Iraheta MD   10 mL at 12/20/22 1221       Reviewed use of high risk medication in the elderly: yes  Reviewed for potential of harmful drug interactions in the elderly: yes    Follow Up:  No follow-ups on file.     An After Visit Summary and PPPS with all of these plans were given to the  patient.           ++++++++++++++++++++++++++++++++++++++++++++++++++++++++++++++++++

## 2022-12-27 RX ORDER — SPIRONOLACTONE 25 MG/1
25 TABLET ORAL DAILY
Qty: 90 TABLET | Refills: 1 | Status: SHIPPED | OUTPATIENT
Start: 2022-12-27

## 2023-01-09 ENCOUNTER — SPECIALTY PHARMACY (OUTPATIENT)
Dept: PHARMACY | Facility: HOSPITAL | Age: 83
End: 2023-01-09
Payer: MEDICARE

## 2023-01-09 NOTE — PROGRESS NOTES
Specialty Pharmacy Refill Coordination Note     Martina is a 82 y.o. female contacted today regarding refills of  Xarelto specialty medication(s).    Reviewed and verified with patient:       Specialty medication(s) and dose(s) confirmed: yes    Refill Questions    Flowsheet Row Most Recent Value   Changes to allergies? No   Changes to medications? No   New conditions since last clinic visit No   Unplanned office visit, urgent care, ED, or hospital admission in the last 4 weeks  No   How does patient/caregiver feel medication is working? Good   Financial problems or insurance changes  No   Since the previous refill, were any specialty medication doses or scheduled injections missed or delayed?  No   Does this patient require a clinical escalation to a pharmacist? No          Delivery Questions    Flowsheet Row Most Recent Value   Delivery method FedEx  [[employer] standard no sig [facility] ship 1/12 deliver 1/13- put on bench on porch]   Delivery address correct? Yes   Preferred delivery time? Anytime   Number of medications in delivery 1   Medication being filled and delivered Xarelto   Doses left of specialty medications 1 week   Is there any medication that is due not being filled? No   Supplies needed? No supplies needed   Cooler needed? No   Do any medications need mixed or dated? No   Copay form of payment Credit card on file   Questions or concerns for the pharmacist? No   Are any medications first time fills? No                 Follow-up: 3 week(s)     Karen Marin  Specialty Pharmacy Technician

## 2023-01-30 ENCOUNTER — INFUSION (OUTPATIENT)
Dept: ONCOLOGY | Facility: HOSPITAL | Age: 83
End: 2023-01-30
Payer: MEDICARE

## 2023-01-30 DIAGNOSIS — Z45.2 ENCOUNTER FOR ADJUSTMENT OR MANAGEMENT OF VASCULAR ACCESS DEVICE: Primary | ICD-10-CM

## 2023-01-30 PROCEDURE — 25010000002 HEPARIN LOCK FLUSH PER 10 UNITS: Performed by: INTERNAL MEDICINE

## 2023-01-30 PROCEDURE — 96523 IRRIG DRUG DELIVERY DEVICE: CPT

## 2023-01-30 RX ORDER — SODIUM CHLORIDE 0.9 % (FLUSH) 0.9 %
10 SYRINGE (ML) INJECTION AS NEEDED
Status: DISCONTINUED | OUTPATIENT
Start: 2023-01-30 | End: 2023-01-30 | Stop reason: HOSPADM

## 2023-01-30 RX ORDER — HEPARIN SODIUM (PORCINE) LOCK FLUSH IV SOLN 100 UNIT/ML 100 UNIT/ML
500 SOLUTION INTRAVENOUS AS NEEDED
Status: DISCONTINUED | OUTPATIENT
Start: 2023-01-30 | End: 2023-01-30 | Stop reason: HOSPADM

## 2023-01-30 RX ADMIN — Medication 10 ML: at 12:18

## 2023-01-30 RX ADMIN — Medication 500 UNITS: at 12:18

## 2023-02-02 ENCOUNTER — OFFICE (OUTPATIENT)
Dept: URBAN - METROPOLITAN AREA CLINIC 66 | Facility: CLINIC | Age: 83
End: 2023-02-02

## 2023-02-02 VITALS
HEART RATE: 76 BPM | DIASTOLIC BLOOD PRESSURE: 79 MMHG | WEIGHT: 148 LBS | HEIGHT: 64 IN | SYSTOLIC BLOOD PRESSURE: 151 MMHG

## 2023-02-02 DIAGNOSIS — K59.00 CONSTIPATION, UNSPECIFIED: ICD-10-CM

## 2023-02-02 DIAGNOSIS — Z90.49 ACQUIRED ABSENCE OF OTHER SPECIFIED PARTS OF DIGESTIVE TRACT: ICD-10-CM

## 2023-02-02 DIAGNOSIS — C85.90 NON-HODGKIN LYMPHOMA, UNSPECIFIED, UNSPECIFIED SITE: ICD-10-CM

## 2023-02-02 DIAGNOSIS — R14.0 ABDOMINAL DISTENSION (GASEOUS): ICD-10-CM

## 2023-02-02 DIAGNOSIS — K57.90 DIVERTICULOSIS OF INTESTINE, PART UNSPECIFIED, WITHOUT PERFO: ICD-10-CM

## 2023-02-02 DIAGNOSIS — R10.30 LOWER ABDOMINAL PAIN, UNSPECIFIED: ICD-10-CM

## 2023-02-02 DIAGNOSIS — R19.4 CHANGE IN BOWEL HABIT: ICD-10-CM

## 2023-02-02 DIAGNOSIS — K21.9 GASTRO-ESOPHAGEAL REFLUX DISEASE WITHOUT ESOPHAGITIS: ICD-10-CM

## 2023-02-02 PROCEDURE — 99213 OFFICE O/P EST LOW 20 MIN: CPT | Performed by: NURSE PRACTITIONER

## 2023-02-06 ENCOUNTER — SPECIALTY PHARMACY (OUTPATIENT)
Dept: PHARMACY | Facility: HOSPITAL | Age: 83
End: 2023-02-06
Payer: MEDICARE

## 2023-02-06 NOTE — PROGRESS NOTES
Specialty Pharmacy Refill Coordination Note     Martina is a 82 y.o. female contacted today regarding refills of  Xarelto specialty medication(s).    Reviewed and verified with patient:       Specialty medication(s) and dose(s) confirmed: yes    Refill Questions    Flowsheet Row Most Recent Value   Changes to allergies? No   Changes to medications? No   New conditions since last clinic visit No   Unplanned office visit, urgent care, ED, or hospital admission in the last 4 weeks  No   How does patient/caregiver feel medication is working? Good   Financial problems or insurance changes  No   Since the previous refill, were any specialty medication doses or scheduled injections missed or delayed?  No   Does this patient require a clinical escalation to a pharmacist? No          Delivery Questions    Flowsheet Row Most Recent Value   Delivery method [employer]Ex  [[employer]Ex standard no sig ship 2/13 deliver 2/14]   Delivery address correct? Yes   Preferred delivery time? Anytime   Number of medications in delivery 1   Medication being filled and delivered Xarelto   Doses left of specialty medications 1.5 weeks   Is there any medication that is due not being filled? No   Supplies needed? No supplies needed   Cooler needed? No   Do any medications need mixed or dated? No   Copay form of payment Credit card on file   Questions or concerns for the pharmacist? No   Are any medications first time fills? No                 Follow-up: 3 week(s)     Karen Marin  Specialty Pharmacy Technician

## 2023-02-08 ENCOUNTER — OFFICE (OUTPATIENT)
Dept: URBAN - METROPOLITAN AREA CLINIC 66 | Facility: CLINIC | Age: 83
End: 2023-02-08

## 2023-02-08 VITALS — HEIGHT: 64 IN

## 2023-02-08 DIAGNOSIS — R19.4 CHANGE IN BOWEL HABIT: ICD-10-CM

## 2023-02-08 PROCEDURE — 99441: CPT | Performed by: NURSE PRACTITIONER

## 2023-02-27 RX ORDER — PHENYTOIN SODIUM 100 MG/1
300 CAPSULE, EXTENDED RELEASE ORAL
Qty: 270 CAPSULE | Refills: 3 | Status: SHIPPED | OUTPATIENT
Start: 2023-02-27

## 2023-03-13 ENCOUNTER — INFUSION (OUTPATIENT)
Dept: ONCOLOGY | Facility: HOSPITAL | Age: 83
End: 2023-03-13
Payer: MEDICARE

## 2023-03-13 ENCOUNTER — SPECIALTY PHARMACY (OUTPATIENT)
Dept: PHARMACY | Facility: HOSPITAL | Age: 83
End: 2023-03-13
Payer: MEDICARE

## 2023-03-13 DIAGNOSIS — Z45.2 ENCOUNTER FOR ADJUSTMENT OR MANAGEMENT OF VASCULAR ACCESS DEVICE: Primary | ICD-10-CM

## 2023-03-13 PROCEDURE — 25010000002 HEPARIN LOCK FLUSH PER 10 UNITS: Performed by: INTERNAL MEDICINE

## 2023-03-13 PROCEDURE — 96523 IRRIG DRUG DELIVERY DEVICE: CPT

## 2023-03-13 RX ORDER — HEPARIN SODIUM (PORCINE) LOCK FLUSH IV SOLN 100 UNIT/ML 100 UNIT/ML
500 SOLUTION INTRAVENOUS AS NEEDED
Status: DISCONTINUED | OUTPATIENT
Start: 2023-03-13 | End: 2023-03-13 | Stop reason: HOSPADM

## 2023-03-13 RX ORDER — SODIUM CHLORIDE 0.9 % (FLUSH) 0.9 %
10 SYRINGE (ML) INJECTION AS NEEDED
Status: DISCONTINUED | OUTPATIENT
Start: 2023-03-13 | End: 2023-03-13 | Stop reason: HOSPADM

## 2023-03-13 RX ADMIN — Medication 500 UNITS: at 12:36

## 2023-03-13 RX ADMIN — Medication 10 ML: at 12:36

## 2023-03-13 NOTE — PROGRESS NOTES
Specialty Pharmacy Refill Coordination Note     Martina is a 82 y.o. female contacted today regarding refills of  Xarelto specialty medication(s).    Reviewed and verified with patient:       Specialty medication(s) and dose(s) confirmed: yes    Refill Questions    Flowsheet Row Most Recent Value   Changes to allergies? No   Changes to medications? No   New conditions since last clinic visit No   Unplanned office visit, urgent care, ED, or hospital admission in the last 4 weeks  No   How does patient/caregiver feel medication is working? Good   Financial problems or insurance changes  No   Since the previous refill, were any specialty medication doses or scheduled injections missed or delayed?  No   Does this patient require a clinical escalation to a pharmacist? No          Delivery Questions    Flowsheet Row Most Recent Value   Delivery method [employer]Ex  [[employer] standard no sig ship 3/16 standard 3/17- leave on bench on porch]   Delivery address correct? Yes   Preferred delivery time? Anytime   Number of medications in delivery 1   Medication being filled and delivered Xarelto   Doses left of specialty medications good at least thru end of week   Is there any medication that is due not being filled? No   Supplies needed? No supplies needed   Cooler needed? No   Do any medications need mixed or dated? No   Copay form of payment Credit card on file   Questions or concerns for the pharmacist? No   Are any medications first time fills? No                 Follow-up: 3 week(s)     Karen Marin  Specialty Pharmacy Technician

## 2023-03-24 ENCOUNTER — TRANSCRIBE ORDERS (OUTPATIENT)
Dept: ADMINISTRATIVE | Facility: HOSPITAL | Age: 83
End: 2023-03-24
Payer: MEDICARE

## 2023-03-24 DIAGNOSIS — Z12.31 ENCOUNTER FOR SCREENING MAMMOGRAM FOR BREAST CANCER: Primary | ICD-10-CM

## 2023-04-10 ENCOUNTER — SPECIALTY PHARMACY (OUTPATIENT)
Dept: PHARMACY | Facility: HOSPITAL | Age: 83
End: 2023-04-10
Payer: MEDICARE

## 2023-04-10 NOTE — PROGRESS NOTES
Specialty Pharmacy Refill Coordination Note     Martina is a 82 y.o. female contacted today regarding refills of  Xarelto specialty medication(s).    Reviewed and verified with patient:       Specialty medication(s) and dose(s) confirmed: yes    Refill Questions    Flowsheet Row Most Recent Value   Changes to allergies? No   Changes to medications? No   New conditions since last clinic visit No   Unplanned office visit, urgent care, ED, or hospital admission in the last 4 weeks  No   How does patient/caregiver feel medication is working? Good   Financial problems or insurance changes  No   Since the previous refill, were any specialty medication doses or scheduled injections missed or delayed?  No   Does this patient require a clinical escalation to a pharmacist? No          Delivery Questions    Flowsheet Row Most Recent Value   Delivery method [employer]Ex  [[employer] standard no sig ship 4/20 deliver 4/21]   Delivery address correct? Yes   Preferred delivery time? Anytime   Number of medications in delivery 1   Medication being filled and delivered Xarelto   Doses left of specialty medications good thru 4/24   Is there any medication that is due not being filled? No   Supplies needed? No supplies needed   Cooler needed? No   Do any medications need mixed or dated? No   Copay form of payment Credit card on file   Questions or concerns for the pharmacist? No   Are any medications first time fills? No                 Follow-up: 3 week(s)     Karen Marin  Specialty Pharmacy Technician

## 2023-04-11 RX ORDER — AMLODIPINE BESYLATE 10 MG/1
10 TABLET ORAL DAILY
Qty: 90 TABLET | Refills: 1 | Status: SHIPPED | OUTPATIENT
Start: 2023-04-11

## 2023-04-14 ENCOUNTER — SPECIALTY PHARMACY (OUTPATIENT)
Dept: PHARMACY | Facility: HOSPITAL | Age: 83
End: 2023-04-14
Payer: MEDICARE

## 2023-04-14 NOTE — PROGRESS NOTES
"Xarelto put in [facility] with notes \"[employer] standard no sig ship 4/20 deliver 4/21\"   Karen Marin  Specialty Pharmacy Technician      "

## 2023-05-01 DIAGNOSIS — F33.9 CHRONIC RECURRENT MAJOR DEPRESSIVE DISORDER: ICD-10-CM

## 2023-05-01 RX ORDER — ESCITALOPRAM OXALATE 20 MG/1
20 TABLET ORAL DAILY
Qty: 30 TABLET | Refills: 7 | Status: SHIPPED | OUTPATIENT
Start: 2023-05-01

## 2023-05-08 ENCOUNTER — SPECIALTY PHARMACY (OUTPATIENT)
Dept: ONCOLOGY | Facility: HOSPITAL | Age: 83
End: 2023-05-08
Payer: MEDICARE

## 2023-05-08 ENCOUNTER — OFFICE VISIT (OUTPATIENT)
Dept: ONCOLOGY | Facility: CLINIC | Age: 83
End: 2023-05-08
Payer: MEDICARE

## 2023-05-08 ENCOUNTER — INFUSION (OUTPATIENT)
Dept: ONCOLOGY | Facility: HOSPITAL | Age: 83
End: 2023-05-08
Payer: MEDICARE

## 2023-05-08 VITALS
WEIGHT: 143.7 LBS | DIASTOLIC BLOOD PRESSURE: 66 MMHG | SYSTOLIC BLOOD PRESSURE: 114 MMHG | HEIGHT: 62 IN | TEMPERATURE: 97.5 F | OXYGEN SATURATION: 96 % | BODY MASS INDEX: 26.44 KG/M2 | HEART RATE: 60 BPM

## 2023-05-08 DIAGNOSIS — C83.39 DIFFUSE LARGE B-CELL LYMPHOMA OF EXTRANODAL SITE EXCLUDING SPLEEN AND OTHER SOLID ORGANS: ICD-10-CM

## 2023-05-08 DIAGNOSIS — Z45.2 ENCOUNTER FOR ADJUSTMENT OR MANAGEMENT OF VASCULAR ACCESS DEVICE: ICD-10-CM

## 2023-05-08 DIAGNOSIS — C79.51 MALIGNANT NEOPLASM METASTATIC TO BONE: ICD-10-CM

## 2023-05-08 DIAGNOSIS — C83.39 DIFFUSE LARGE B-CELL LYMPHOMA OF EXTRANODAL SITE EXCLUDING SPLEEN AND OTHER SOLID ORGANS: Primary | ICD-10-CM

## 2023-05-08 DIAGNOSIS — M85.89 OSTEOPENIA OF MULTIPLE SITES: Primary | ICD-10-CM

## 2023-05-08 DIAGNOSIS — D50.0 IRON DEFICIENCY ANEMIA DUE TO CHRONIC BLOOD LOSS: ICD-10-CM

## 2023-05-08 LAB
ALBUMIN SERPL-MCNC: 4.2 G/DL (ref 3.5–5.2)
ALBUMIN/GLOB SERPL: 1.3 G/DL (ref 1.1–2.4)
ALP SERPL-CCNC: 100 U/L (ref 38–116)
ALT SERPL W P-5'-P-CCNC: 15 U/L (ref 0–33)
ANION GAP SERPL CALCULATED.3IONS-SCNC: 11.4 MMOL/L (ref 5–15)
AST SERPL-CCNC: 24 U/L (ref 0–32)
BASOPHILS # BLD AUTO: 0.03 10*3/MM3 (ref 0–0.2)
BASOPHILS NFR BLD AUTO: 0.5 % (ref 0–1.5)
BILIRUB SERPL-MCNC: 0.3 MG/DL (ref 0.2–1.2)
BUN SERPL-MCNC: 20 MG/DL (ref 6–20)
BUN/CREAT SERPL: 28.2 (ref 7.3–30)
CALCIUM SPEC-SCNC: 9.5 MG/DL (ref 8.5–10.2)
CHLORIDE SERPL-SCNC: 100 MMOL/L (ref 98–107)
CO2 SERPL-SCNC: 25.6 MMOL/L (ref 22–29)
CREAT SERPL-MCNC: 0.71 MG/DL (ref 0.6–1.1)
CRP SERPL-MCNC: 0.44 MG/DL (ref 0–0.5)
DEPRECATED RDW RBC AUTO: 46.1 FL (ref 37–54)
EGFRCR SERPLBLD CKD-EPI 2021: 85 ML/MIN/1.73
EOSINOPHIL # BLD AUTO: 0.1 10*3/MM3 (ref 0–0.4)
EOSINOPHIL NFR BLD AUTO: 1.7 % (ref 0.3–6.2)
ERYTHROCYTE [DISTWIDTH] IN BLOOD BY AUTOMATED COUNT: 13.1 % (ref 12.3–15.4)
ERYTHROCYTE [SEDIMENTATION RATE] IN BLOOD: 27 MM/HR (ref 0–30)
GLOBULIN UR ELPH-MCNC: 3.3 GM/DL (ref 1.8–3.5)
GLUCOSE SERPL-MCNC: 120 MG/DL (ref 74–124)
HCT VFR BLD AUTO: 37.6 % (ref 34–46.6)
HGB BLD-MCNC: 12.3 G/DL (ref 12–15.9)
IMM GRANULOCYTES # BLD AUTO: 0.01 10*3/MM3 (ref 0–0.05)
IMM GRANULOCYTES NFR BLD AUTO: 0.2 % (ref 0–0.5)
LYMPHOCYTES # BLD AUTO: 1.05 10*3/MM3 (ref 0.7–3.1)
LYMPHOCYTES NFR BLD AUTO: 17.4 % (ref 19.6–45.3)
MCH RBC QN AUTO: 31.2 PG (ref 26.6–33)
MCHC RBC AUTO-ENTMCNC: 32.7 G/DL (ref 31.5–35.7)
MCV RBC AUTO: 95.4 FL (ref 79–97)
MONOCYTES # BLD AUTO: 0.68 10*3/MM3 (ref 0.1–0.9)
MONOCYTES NFR BLD AUTO: 11.3 % (ref 5–12)
NEUTROPHILS NFR BLD AUTO: 4.16 10*3/MM3 (ref 1.7–7)
NEUTROPHILS NFR BLD AUTO: 68.9 % (ref 42.7–76)
NRBC BLD AUTO-RTO: 0 /100 WBC (ref 0–0.2)
PLATELET # BLD AUTO: 155 10*3/MM3 (ref 140–450)
PMV BLD AUTO: 9.8 FL (ref 6–12)
POTASSIUM SERPL-SCNC: 4.1 MMOL/L (ref 3.5–4.7)
PROT SERPL-MCNC: 7.5 G/DL (ref 6.3–8)
RBC # BLD AUTO: 3.94 10*6/MM3 (ref 3.77–5.28)
SODIUM SERPL-SCNC: 137 MMOL/L (ref 134–145)
WBC NRBC COR # BLD: 6.03 10*3/MM3 (ref 3.4–10.8)

## 2023-05-08 PROCEDURE — 25010000002 HEPARIN LOCK FLUSH PER 10 UNITS: Performed by: INTERNAL MEDICINE

## 2023-05-08 PROCEDURE — 3078F DIAST BP <80 MM HG: CPT | Performed by: INTERNAL MEDICINE

## 2023-05-08 PROCEDURE — 80053 COMPREHEN METABOLIC PANEL: CPT

## 2023-05-08 PROCEDURE — 99214 OFFICE O/P EST MOD 30 MIN: CPT | Performed by: INTERNAL MEDICINE

## 2023-05-08 PROCEDURE — 85652 RBC SED RATE AUTOMATED: CPT | Performed by: INTERNAL MEDICINE

## 2023-05-08 PROCEDURE — 85025 COMPLETE CBC W/AUTO DIFF WBC: CPT

## 2023-05-08 PROCEDURE — 36591 DRAW BLOOD OFF VENOUS DEVICE: CPT

## 2023-05-08 PROCEDURE — 3074F SYST BP LT 130 MM HG: CPT | Performed by: INTERNAL MEDICINE

## 2023-05-08 PROCEDURE — 1126F AMNT PAIN NOTED NONE PRSNT: CPT | Performed by: INTERNAL MEDICINE

## 2023-05-08 PROCEDURE — 86140 C-REACTIVE PROTEIN: CPT | Performed by: INTERNAL MEDICINE

## 2023-05-08 RX ORDER — HEPARIN SODIUM (PORCINE) LOCK FLUSH IV SOLN 100 UNIT/ML 100 UNIT/ML
500 SOLUTION INTRAVENOUS AS NEEDED
Status: DISCONTINUED | OUTPATIENT
Start: 2023-05-08 | End: 2023-05-08 | Stop reason: HOSPADM

## 2023-05-08 RX ORDER — SODIUM CHLORIDE 0.9 % (FLUSH) 0.9 %
10 SYRINGE (ML) INJECTION AS NEEDED
Status: DISCONTINUED | OUTPATIENT
Start: 2023-05-08 | End: 2023-05-08 | Stop reason: HOSPADM

## 2023-05-08 RX ADMIN — Medication 500 UNITS: at 12:54

## 2023-05-08 RX ADMIN — Medication 10 ML: at 12:49

## 2023-05-08 NOTE — PROGRESS NOTES
Specialty Pharmacy Patient Management Program  Oncology 6-Month Clinical Assessment       Martina Blake is a 82 y.o. female with history of atrial fibrillation seen today to assess adherence and side effects.    Regimen: Xarelto 20 mg po daily    Reason for Outreach: Routine medication check-in .       Problem list reviewed by Deann Watkins RPH on 5/8/2023 at  2:08 PM  Medicines reviewed by Deann Watkins RPH on 5/8/2023 at  2:08 PM  Allergies reviewed by Deann Watkins RPH on 5/8/2023 at  2:08 PM     Goals     • Specialty Pharmacy General Goal      Prevention of CVA          Medication Assessment:  • Medication Assessment  o Follow Up Clinical Assessment  o Medication(s) assessed: Xarelto  o Therapeutic appropriateness: Appropriate  o Medication tolerability: Tolerating with no to minimal ADRs  o Medication plan: Continue therapy with normal follow-up  o Quality of Life Improvement Scale: A good deal better  o Comments on Quality of Life: No issues to report today  o Administration: as prescribed .   o Patient can self administer medications: yes  o Medication Follow-Up Plan: Next clinical assessment  • Lab Review: The labs listed below have been reviewed. No dose adjustments are needed for the oral specialty medication(s) based on the labs.    Lab Results   Component Value Date    GLUCOSE 120 05/08/2023    CALCIUM 9.5 05/08/2023     05/08/2023    K 4.1 05/08/2023    CO2 25.6 05/08/2023     05/08/2023    BUN 20 05/08/2023    CREATININE 0.71 05/08/2023    EGFRIFAFRI 97 04/15/2019    EGFRIFNONA 78 11/12/2021    BCR 28.2 05/08/2023    ANIONGAP 11.4 05/08/2023     Lab Results   Component Value Date    WBC 6.03 05/08/2023    RBC 3.94 05/08/2023    HGB 12.3 05/08/2023    HCT 37.6 05/08/2023    MCV 95.4 05/08/2023    MCH 31.2 05/08/2023    MCHC 32.7 05/08/2023    RDW 13.1 05/08/2023    RDWSD 46.1 05/08/2023    MPV 9.8 05/08/2023     05/08/2023    NEUTRORELPCT 68.9 05/08/2023    LYMPHORELPCT  17.4 (L) 05/08/2023    MONORELPCT 11.3 05/08/2023    EOSRELPCT 1.7 05/08/2023    BASORELPCT 0.5 05/08/2023    AUTOIGPER 0.2 05/08/2023    NEUTROABS 4.16 05/08/2023    LYMPHSABS 1.05 05/08/2023    MONOSABS 0.68 05/08/2023    EOSABS 0.10 05/08/2023    BASOSABS 0.03 05/08/2023    AUTOIGNUM 0.01 05/08/2023    NRBC 0.0 05/08/2023     • Drug-drug interactions  o Completed medication reconciliation today to assess for drug interactions. Patient denies starting or stopping any medications.    o Assessed medication list for interactions, level x ddi with use of Xarelto and Phenytoin ( can reduce concentration of xarelto)- no reports of clots, TIAs or CVA  since initiation of Xarelto.  Level c ddi wth Xarelto and Vitamin E- increased risk of bleeding and level c ddi with Xarelto and Lexapro- increased antiplatelet effects.  She has no reports of nosebleeds, excessive bruising, or blood in urine or stool.  o Advised patient to call the clinic if any new medications are started so we can assess for drug-drug interactions.  • Drug-food interactions discussed: none of concern  • Vaccines are coordinated by the patient's oncologist and primary care provider.    Allergies  Known allergies and reactions were discussed with the patient. The patient's chart has been reviewed for allergy information and updated as necessary.   Allergies   Allergen Reactions   • Crab (Diagnostic) Itching and Rash   • Pseudoephedrine Dizziness        Hospitalizations and Urgent Care Visits Since Last Assessment:  • Unplanned hospitalizations or inpatient admissions: no  • ED Visits: no  • Urgent Office Visits: no    Adherence Assessment:  Adherence Questions  Medication(s) assessed: Xarelto  On average, how many doses/injections does the patient miss per month?: 0  What are the identified reasons for non-adherence or missed doses? : no problems identfied  What is the estimated medication adherence level?: %  Based on the patient/caregiver response  and refill history, does this patient require an MTP to track adherence improvements?: no    Quality of Life Assessment:  Quality of Life Assessment  Quality of Life Improvement Scale: A good deal better  Comments on Quality of Life: No issues to report today  -- Quality of Life: 9/10    Financial Assessment:  Medication availability/affordability: Patient has had no issues obtaining medication from pharmacy.      All questions addressed and patient had no additional concerns. Patient has pharmacy contact information.    We discussed that she should call office for Xarelto holding instructions prior to surgery, dental work, epidurals, or spinal procedures ( we discussed risk of paralysis).  She verbalized understanding of these directives.      Name/Credentials Deann Watkins RPh, NAGA  In person encounter    5/8/2023  14:10 EDT
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
## 2023-05-11 RX ORDER — ROSUVASTATIN CALCIUM 5 MG/1
5 TABLET, COATED ORAL NIGHTLY
Qty: 90 TABLET | Refills: 3 | Status: SHIPPED | OUTPATIENT
Start: 2023-05-11

## 2023-05-15 ENCOUNTER — SPECIALTY PHARMACY (OUTPATIENT)
Dept: PHARMACY | Facility: HOSPITAL | Age: 83
End: 2023-05-15
Payer: MEDICARE

## 2023-05-15 NOTE — PROGRESS NOTES
Specialty Pharmacy Refill Coordination Note     Martina is a 82 y.o. female contacted today regarding refills of  Xarelto specialty medication(s).    Reviewed and verified with patient:       Specialty medication(s) and dose(s) confirmed: yes    Refill Questions    Flowsheet Row Most Recent Value   Changes to allergies? No   Changes to medications? No   New conditions since last clinic visit No   Unplanned office visit, urgent care, ED, or hospital admission in the last 4 weeks  No   How does patient/caregiver feel medication is working? Good   Financial problems or insurance changes  No   Since the previous refill, were any specialty medication doses or scheduled injections missed or delayed?  No   Does this patient require a clinical escalation to a pharmacist? No          Delivery Questions    Flowsheet Row Most Recent Value   Delivery method Other (Comment)  [Beeline to pt home ship 6/1 deliver 6/2- put on bench]   Delivery address correct? Yes   Preferred delivery time? Anytime   Number of medications in delivery 1   Medication being filled and delivered Xarelto   Doses left of specialty medications good until 6/2   Is there any medication that is due not being filled? No   Supplies needed? No supplies needed   Cooler needed? No   Do any medications need mixed or dated? No   Copay form of payment Credit card on file   Questions or concerns for the pharmacist? No   Are any medications first time fills? No                 Follow-up: 3 week(s)     Karen Marin  Specialty Pharmacy Technician

## 2023-05-30 ENCOUNTER — SPECIALTY PHARMACY (OUTPATIENT)
Dept: PHARMACY | Facility: HOSPITAL | Age: 83
End: 2023-05-30

## 2023-05-30 NOTE — TELEPHONE ENCOUNTER
Refill requested from pharmacy. Per last chart note, patient to continue Xarelto 20 mg daily. Will route to MD for cosignature.    Manpreet Olivier, PharmD, BCOP

## 2023-05-30 NOTE — PROGRESS NOTES
"Xarelto put in [facility] with notes \" Beeline to pt home ship 6/1 deliver 6/2- put on bench\"   Karen Marin  Specialty Pharmacy Technician      "

## 2023-05-31 ENCOUNTER — HOSPITAL ENCOUNTER (OUTPATIENT)
Dept: MAMMOGRAPHY | Facility: HOSPITAL | Age: 83
Discharge: HOME | End: 2023-05-31
Admitting: FAMILY MEDICINE

## 2023-05-31 ENCOUNTER — OFFICE VISIT (OUTPATIENT)
Dept: CARDIOLOGY | Facility: CLINIC | Age: 83
End: 2023-05-31

## 2023-05-31 VITALS
BODY MASS INDEX: 26.68 KG/M2 | SYSTOLIC BLOOD PRESSURE: 110 MMHG | DIASTOLIC BLOOD PRESSURE: 72 MMHG | HEART RATE: 58 BPM | HEIGHT: 62 IN | WEIGHT: 145 LBS

## 2023-05-31 DIAGNOSIS — C83.39 DIFFUSE LARGE B-CELL LYMPHOMA OF EXTRANODAL SITE EXCLUDING SPLEEN AND OTHER SOLID ORGANS: ICD-10-CM

## 2023-05-31 DIAGNOSIS — G40.909 SEIZURE DISORDER: ICD-10-CM

## 2023-05-31 DIAGNOSIS — E78.5 DYSLIPIDEMIA: ICD-10-CM

## 2023-05-31 DIAGNOSIS — I51.89 DIASTOLIC DYSFUNCTION: ICD-10-CM

## 2023-05-31 DIAGNOSIS — D69.6 THROMBOCYTOPENIA: ICD-10-CM

## 2023-05-31 DIAGNOSIS — I10 PRIMARY HYPERTENSION: Chronic | ICD-10-CM

## 2023-05-31 DIAGNOSIS — I48.0 PAROXYSMAL ATRIAL FIBRILLATION: Primary | ICD-10-CM

## 2023-05-31 DIAGNOSIS — R06.09 DYSPNEA ON EXERTION: ICD-10-CM

## 2023-05-31 DIAGNOSIS — Z12.31 ENCOUNTER FOR SCREENING MAMMOGRAM FOR BREAST CANCER: ICD-10-CM

## 2023-05-31 DIAGNOSIS — I44.4 LAFB (LEFT ANTERIOR FASCICULAR BLOCK): ICD-10-CM

## 2023-05-31 PROCEDURE — 77063 BREAST TOMOSYNTHESIS BI: CPT

## 2023-05-31 PROCEDURE — 77067 SCR MAMMO BI INCL CAD: CPT

## 2023-05-31 RX ORDER — HYDRALAZINE HYDROCHLORIDE 100 MG/1
100 TABLET, FILM COATED ORAL 2 TIMES DAILY
Qty: 180 TABLET | Refills: 3
Start: 2023-05-31

## 2023-05-31 NOTE — PROGRESS NOTES
"      [facility] CARDIOLOGY  [address]  [facility] [address]  Phone: [phone]  Fax: [phone]  Patient Name: Martina Blake  :1940  Age: 83 y.o.  Primary Cardiologist: Justine Barrios MD  Encounter Provider:  MELINDA Ramirez    History of Present Illness     Martina Blake is a 83 y.o.  female whose medical history includes hypertension, hyperlipidemia, esophageal reflux disease, history of diffuse large B-cell lymphoma, seizure disorder, and vitamin D deficiency.  She is followed in our office by Dr. Barrios for paroxysmal atrial fibrillation. I have reviewed the past medical records in preparation of today's visit.     23 Follow-up:  She is here for 1 year follow-up and I am seeing her for the first time today. There have been no changes to her medical history. She still walks 4 days a week. She is having some right-side sciatic pain but walking helps it. She has noticed that her BP has been lower than usual of late; she gets lightheaded if she stands up too fast. She denies chest pain but has dyspnea when she walks up the 24 steps to the walking track; it is relieved with rest and she is able to walk without issue. She denies palpitations or syncope. She is taking her medications as prescribed.     Data Review     The following data was reviewed by MELINDA Ramirez on 23:    Vital Signs:   /72   Pulse 58   Ht 157.5 cm (62\")   Wt 65.8 kg (145 lb)   BMI 26.52 kg/m²       Weight:  Wt Readings from Last 3 Encounters:   23 65.8 kg (145 lb)   23 65.2 kg (143 lb 11.2 oz)   22 67.1 kg (148 lb)     Body mass index is 26.52 kg/m².    Below is a summary of pertinent cardiology findings:  • She has diffuse large B-cell lymphoma, now in remission, which was treated with R-CHOP; her therapy completed in 2018.  She follows with Dr. Iraheta.  • 2018 she had a normal Cardiolite " stress test.  Echocardiogram at that time showed EF 67%, grade 2 diastolic dysfunction, severe left atrial enlargement, mild to moderate tricuspid insufficiency, and mildly elevated RVSP at 44 mmHg.  • May 2018 she required hospitalization when her chemotherapy was initiated; her apixaban had to be stopped due to thrombocytopenia.  She was also she was diagnosed with paroxysmal atrial fibrillation with RVR when she had episodes of hypokalemia, hypomagnesemia, hypercalcemia, and anemia; she was started on metoprolol.  She had upper and lower endoscopy at that time which showed no acute bleeding.  • November 2018 echocardiogram showed EF 59%, GLS -18%, grade 2 diastolic dysfunction, and no significant valvular disease.  • February 2021 she was having elevated blood pressure; spironolactone was restarted and metoprolol was stopped.  Spironolactone had previously been stopped due to concerns for thrombocytopenia.  • November 2021 normal lower extremity venous Doppler studies.  CT chest and abdomen showed no recurrence of lymphoma.  Dr. Barrios reviewed the CT images and noted calcification in the proximal LAD at the takeoff of the diagonal branch, no calcification in the left main coronary artery, the RCA was not well visualized, there was no pericardial effusion, and there was diffuse calcification noted in the descending thoracic aorta.  • June 2022 echocardiogram showed EF 66.7%, normal RVSP, and borderline concentric LVH.  • October 2022 CT chest and abdomen and pelvis showed no new lymphadenopathy or other neoplasm.    Labs:  • 12/20/2022: HgbA1c 5.4, Chol 150, HDL 48, LDL 84, trig 99  • 05/08/2023:  cr 0.7, K 4.1 CMP,hgb 12.3, Plt 155      ECG 12 Lead    Date/Time: 5/31/2023 3:10 PM  Performed by: Abigail Disla APRN  Authorized by: Abigail Disla, MELINDA   Comparison: compared with previous ECG from 5/25/2022  Similar to previous ECG  Rhythm: sinus rhythm  Rate: normal  BPM:  58  Conduction: left anterior fascicular block  T inversion: aVL  T flattening: V1 and V2  Other findings: left atrial abnormality    Clinical impression: abnormal EKG            Medications     Allergies as of 05/31/2023 - Reviewed 05/31/2023   Allergen Reaction Noted   • Crab (diagnostic) Itching and Rash 10/21/2016   • Pseudoephedrine Dizziness 04/15/2016         Current Outpatient Medications:   •  ACETAMINOPHEN 8 HOUR PO, Take  by mouth Every Night., Disp: , Rfl:   •  amLODIPine (NORVASC) 10 MG tablet, TAKE 1 TABLET BY MOUTH DAILY, Disp: 90 tablet, Rfl: 1  •  Ascorbic Acid (Vitamin C) 500 MG capsule, Take  by mouth Daily., Disp: , Rfl:   •  benazepril (LOTENSIN) 40 MG tablet, TAKE 1 TABLET BY MOUTH DAILY, Disp: 90 tablet, Rfl: 3  •  calcium carbonate (OS-PAPA) 600 MG tablet, Take 1 tablet by mouth Daily., Disp: , Rfl:   •  cetirizine (zyrTEC) 10 MG tablet, TAKE 1 TABLET BY MOUTH EVERY DAY AS NEEDED FOR ALLERGIES, Disp: 90 tablet, Rfl: 1  •  Cholecalciferol (VITAMIN D3) 125 MCG (5000 UT) capsule capsule, Take 1 capsule by mouth Daily., Disp: , Rfl:   •  escitalopram (LEXAPRO) 20 MG tablet, TAKE 1 TABLET BY MOUTH DAILY, Disp: 30 tablet, Rfl: 7  •  folic acid (FOLVITE) 400 MCG tablet, Take 1 tablet by mouth Daily., Disp: , Rfl:   •  gabapentin (NEURONTIN) 300 MG capsule, Take 3 capsules by mouth every night at bedtime., Disp: 270 capsule, Rfl: 1  •  hydrALAZINE (APRESOLINE) 100 MG tablet, Take 1 tablet by mouth 2 (Two) Times a Day., Disp: 180 tablet, Rfl: 3  •  melatonin 5 MG tablet tablet, Take 1 tablet by mouth Every Night., Disp: , Rfl:   •  pantoprazole (PROTONIX) 40 MG EC tablet, TAKE 1 TABLET BY MOUTH EVERY DAY, Disp: 90 tablet, Rfl: 1  •  phenytoin ER (DILANTIN) 100 MG capsule, TAKE 3 CAPSULES BY MOUTH EVERY NIGHT AT BEDTIME, Disp: 270 capsule, Rfl: 3  •  rivaroxaban (Xarelto) 20 MG tablet, Take 1 tablet by mouth Daily. Take with food., Disp: 30 tablet, Rfl: 5  •  rosuvastatin (CRESTOR) 5 MG tablet, TAKE 1  TABLET BY MOUTH EVERY NIGHT, Disp: 90 tablet, Rfl: 3  •  spironolactone (ALDACTONE) 25 MG tablet, TAKE 1 TABLET BY MOUTH DAILY, Disp: 90 tablet, Rfl: 1  •  traMADol (ULTRAM) 50 MG tablet, Take 1 tablet by mouth Every 6 (Six) Hours As Needed for Moderate Pain ., Disp: 40 tablet, Rfl: 0  •  triamcinolone (KENALOG) 0.1 % cream, Apply  topically to the appropriate area as directed See Admin Instructions. Apply topically to the affected area twice daily as needed, Disp: , Rfl:   •  vitamin B-12 (CYANOCOBALAMIN) 100 MCG tablet, Take 10 tablets by mouth Daily., Disp: , Rfl:   •  vitamin B-6 (PYRIDOXINE) 100 MG tablet, Take 1 tablet by mouth Daily., Disp: , Rfl:   •  vitamin E 100 UNIT capsule, Take 180 Units by mouth Daily., Disp: , Rfl:   •  Zinc Sulfate (ZINC 15 PO), Take 400 mg by mouth., Disp: , Rfl:      Past History, Review of Systems, Exam     Past Medical History:   Diagnosis Date   • Anemia    • Cystitis    • Cystocele    • Drug therapy    • Dyslipidemia    • Esophageal reflux    • Fatigue    • History of transfusion    • Hypercalcemia    • Hypertension    • Major depression, chronic    • Menopause    • Non Hodgkin's lymphoma    • Osteoarthritis    • Osteoporosis    • Palpitations    • Paroxysmal atrial fibrillation    • Post menopausal problems    • RLS (restless legs syndrome)    • Seizure disorder    • Sinus bradycardia    • Subjective tinnitus    • Vaginal delivery    • Vitamin D deficiency        Past Surgical History:   has a past surgical history that includes Colonoscopy; Craniotomy; Total abdominal hysterectomy (1980); Cholecystectomy; Esophagogastroduodenoscopy (N/A, 4/13/2018); Colonoscopy (N/A, 4/13/2018); Cervical Lymph Node Biopsy/Excision (Left, 5/1/2018); Venous Access Device (Port) (N/A, 5/4/2018); and Wrist surgery (Left).     Social History     Socioeconomic History   • Marital status:      Spouse name: POOJA   • Number of children: 2   Tobacco Use   • Smoking status: Never     Passive  exposure: Never   • Smokeless tobacco: Never   Vaping Use   • Vaping Use: Never used   Substance and Sexual Activity   • Alcohol use: No     Comment: Caffeine use: 1 cup daily (decaf)   • Drug use: No   • Sexual activity: Defer     Birth control/protection: Surgical     Comment:  (Jl)       Review of Systems   Cardiovascular: Positive for dyspnea on exertion. Negative for chest pain, claudication, cyanosis, irregular heartbeat, leg swelling, near-syncope, orthopnea, palpitations, paroxysmal nocturnal dyspnea and syncope.   Musculoskeletal: Positive for muscle weakness.       Vitals reviewed.   Constitutional:       Appearance: Not in distress.   Eyes:      Conjunctiva/sclera: Conjunctivae normal.      Pupils: Pupils are equal, round, and reactive to light.   HENT:      Head: Normocephalic.      Nose: Nose normal.    Mouth/Throat:      Pharynx: Oropharynx is clear.   Neck:      Vascular: JVD normal.   Pulmonary:      Effort: Pulmonary effort is normal.      Breath sounds: Normal breath sounds. No wheezing. No rhonchi. No rales.   Cardiovascular:      Normal rate. Regular rhythm. Normal S1. Normal S2.      Murmurs: There is no murmur.   Pulses:     Intact distal pulses.   Edema:     Peripheral edema absent.   Abdominal:      General: Bowel sounds are normal. There is no distension.      Palpations: Abdomen is soft.      Tenderness: There is no abdominal tenderness.   Musculoskeletal: Normal range of motion.      Cervical back: Normal range of motion and neck supple. Skin:     General: Skin is warm and dry.   Neurological:      Mental Status: Alert and oriented to person, place and time.   Psychiatric:         Attention and Perception: Attention normal.         Mood and Affect: Mood normal.         Speech: Speech normal.         Behavior: Behavior is cooperative.          Assessment and Plan     Assessment:  1. Paroxysmal atrial fibrillation    2. LAFB (left anterior fascicular block)    3. Dyspnea on  exertion    4. Diastolic dysfunction    5. Primary hypertension    6. Dyslipidemia    7. 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    8. Thrombocytopenia    9. Seizure disorder         1. Paroxysmal atrial fibrillation: This occurred when she had multiple electrolyte abnormalities in the setting of chemotherapy and anemia.  She was maintained on metoprolol but this was stopped when she was having difficult to control blood pressure; she has not had any further episodes of A-fib that she is aware.  She is in sinus rhythm today and her heart rate is controlled without rate controlling medications.  Her [id number] score is 4 and she is anticoagulated with Xarelto.  2. 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  She had a normal Cardiolite stress test in April 2018.  3. Dyspnea on exertion: She only has this when she walks up the 24 steps to the walking track; she has no dyspnea with walking or other activities.  She denies chest pain.  There are no ischemic changes on her EKG today.  4. Diastolic dysfunction: This was noted to be grade 2 on April 2018 echocardiogram in November 2018 echocardiogram but normal on November 2021 echocardiogram.  She is compensated by exam today.  She is on 25 mg spironolactone and benazepril.  5. Hypertension: She has history of difficult to control blood pressure but blood pressures are lower of late.  6. Hyperlipidemia: Her LDL is at goal on 5 mg rosuvastatin daily.  7. History of diffuse large B-cell lymphoma: Currently in remission after she received R-CHOP; she follows with Dr. Iraheta.  There was no evidence of malignancy on recent CT imaging in October 2022.  8. Thrombocytopenia: She had to be taken off apixaban due to this.  Her platelets are stable on Xarelto.    Ms. Blake is a patient of Dr. Cervantes with history of paroxysmal atrial fibrillation in the setting of electrolyte abnormalities; she has had rare  paroxysms since that time.  Her heart rate is controlled and she is in sinus rhythm without rate controlling medications.  She is anticoagulated with Xarelto and not having any thrombocytopenia on this.  Her blood pressure is a little lower than normal and her weight is down about 5 pounds.  We are going to omit her midday dose of hydralazine.  She will call if her blood pressure becomes greater than 140/80 consistently.  Barring any blood pressure issues, I will have her see Dr. Barrios in 1 year.    Return in about 1 year (around 5/31/2024) for Follow-up with Dr. Barrios.  Orders Placed This Encounter   Procedures   • ECG 12 Lead      New Medications Ordered This Visit   Medications   • hydrALAZINE (APRESOLINE) 100 MG tablet     Sig: Take 1 tablet by mouth 2 (Two) Times a Day.     Dispense:  180 tablet     Refill:  3         Thank you for the opportunity to participate in this patient's care.    MELINDA Fritz    This office note has been dictated.

## 2023-07-05 ENCOUNTER — OFFICE (OUTPATIENT)
Dept: URBAN - METROPOLITAN AREA CLINIC 66 | Facility: CLINIC | Age: 83
End: 2023-07-05

## 2023-07-05 VITALS
HEIGHT: 64 IN | DIASTOLIC BLOOD PRESSURE: 65 MMHG | WEIGHT: 146 LBS | SYSTOLIC BLOOD PRESSURE: 127 MMHG | HEART RATE: 60 BPM

## 2023-07-05 DIAGNOSIS — K59.00 CONSTIPATION, UNSPECIFIED: ICD-10-CM

## 2023-07-05 DIAGNOSIS — K57.90 DIVERTICULOSIS OF INTESTINE, PART UNSPECIFIED, WITHOUT PERFO: ICD-10-CM

## 2023-07-05 DIAGNOSIS — C85.90 NON-HODGKIN LYMPHOMA, UNSPECIFIED, UNSPECIFIED SITE: ICD-10-CM

## 2023-07-05 DIAGNOSIS — K21.9 GASTRO-ESOPHAGEAL REFLUX DISEASE WITHOUT ESOPHAGITIS: ICD-10-CM

## 2023-07-05 DIAGNOSIS — K58.9 IRRITABLE BOWEL SYNDROME WITHOUT DIARRHEA: ICD-10-CM

## 2023-07-05 PROCEDURE — 99213 OFFICE O/P EST LOW 20 MIN: CPT | Performed by: INTERNAL MEDICINE

## 2023-07-31 ENCOUNTER — SPECIALTY PHARMACY (OUTPATIENT)
Dept: PHARMACY | Facility: HOSPITAL | Age: 83
End: 2023-07-31
Payer: MEDICARE

## 2023-07-31 ENCOUNTER — TELEPHONE (OUTPATIENT)
Dept: ONCOLOGY | Facility: CLINIC | Age: 83
End: 2023-07-31
Payer: MEDICARE

## 2023-07-31 ENCOUNTER — INFUSION (OUTPATIENT)
Dept: ONCOLOGY | Facility: HOSPITAL | Age: 83
End: 2023-07-31
Payer: MEDICARE

## 2023-07-31 DIAGNOSIS — Z45.2 ENCOUNTER FOR ADJUSTMENT OR MANAGEMENT OF VASCULAR ACCESS DEVICE: Primary | ICD-10-CM

## 2023-07-31 PROCEDURE — 96523 IRRIG DRUG DELIVERY DEVICE: CPT

## 2023-07-31 PROCEDURE — 25010000002 HEPARIN LOCK FLUSH PER 10 UNITS: Performed by: INTERNAL MEDICINE

## 2023-07-31 RX ORDER — SODIUM CHLORIDE 0.9 % (FLUSH) 0.9 %
10 SYRINGE (ML) INJECTION AS NEEDED
Status: DISCONTINUED | OUTPATIENT
Start: 2023-07-31 | End: 2023-07-31 | Stop reason: HOSPADM

## 2023-07-31 RX ORDER — HEPARIN SODIUM (PORCINE) LOCK FLUSH IV SOLN 100 UNIT/ML 100 UNIT/ML
500 SOLUTION INTRAVENOUS AS NEEDED
Status: DISCONTINUED | OUTPATIENT
Start: 2023-07-31 | End: 2023-07-31 | Stop reason: HOSPADM

## 2023-07-31 RX ADMIN — Medication 500 UNITS: at 13:50

## 2023-07-31 RX ADMIN — Medication 10 ML: at 13:50

## 2023-08-09 ENCOUNTER — SPECIALTY PHARMACY (OUTPATIENT)
Dept: PHARMACY | Facility: HOSPITAL | Age: 83
End: 2023-08-09
Payer: MEDICARE

## 2023-08-09 NOTE — PROGRESS NOTES
"Xarelto put in [facility] with notes \"Beeline to pt home ship 8/10 deliver 8/11- place on bench\"     Karen Marin  Specialty Pharmacy Technician        "

## 2023-08-16 RX ORDER — PANTOPRAZOLE SODIUM 40 MG/1
40 TABLET, DELAYED RELEASE ORAL DAILY
Qty: 90 TABLET | Refills: 1 | Status: SHIPPED | OUTPATIENT
Start: 2023-08-16

## 2023-08-16 NOTE — TELEPHONE ENCOUNTER
Caller: Martina Blake    Relationship: Self    Best call back number: [phone]     Requested Prescriptions:   Requested Prescriptions     Pending Prescriptions Disp Refills    pantoprazole (PROTONIX) 40 MG EC tablet 90 tablet 1     Sig: Take 1 tablet by mouth Daily.        Pharmacy where request should be sent: [id number] - [facility] [address]  AT [facility] [address] [place] [address] & [place] [address] - [phone] [facility] [phone] FX     Last office visit with prescribing clinician: 12/21/2022   Last telemedicine visit with prescribing clinician: Visit date not found   Next office visit with prescribing clinician: 12/29/2023     Additional details provided by patient: PATIENT HAS TRIED TO GET THIS FILLED THROUGH THE ORIGINAL PROVIDER, BUT IS NOT GETTING A RESPONSE WHEN REACHING OUT TO THEM.    SHE WOULD LIKE TO KNOW IF DR. MAHER WOULD TAKE OVER THE PRESCRIPTION FOR THIS MEDICATION.    Does the patient have less than a 3 day supply:  [] Yes  [x] No    Would you like a call back once the refill request has been completed: [x] Yes [] No    If the office needs to give you a call back, can they leave a voicemail: [] Yes [x] No    Tj Jennings Rep   08/16/23 14:40 EDT

## 2023-09-05 ENCOUNTER — SPECIALTY PHARMACY (OUTPATIENT)
Dept: PHARMACY | Facility: HOSPITAL | Age: 83
End: 2023-09-05
Payer: MEDICARE

## 2023-09-05 ENCOUNTER — INFUSION (OUTPATIENT)
Dept: ONCOLOGY | Facility: HOSPITAL | Age: 83
End: 2023-09-05
Payer: MEDICARE

## 2023-09-05 DIAGNOSIS — Z45.2 ENCOUNTER FOR ADJUSTMENT OR MANAGEMENT OF VASCULAR ACCESS DEVICE: Primary | ICD-10-CM

## 2023-09-05 PROCEDURE — 25010000002 HEPARIN LOCK FLUSH PER 10 UNITS: Performed by: INTERNAL MEDICINE

## 2023-09-05 PROCEDURE — 96523 IRRIG DRUG DELIVERY DEVICE: CPT

## 2023-09-05 RX ORDER — SODIUM CHLORIDE 0.9 % (FLUSH) 0.9 %
10 SYRINGE (ML) INJECTION AS NEEDED
Status: DISCONTINUED | OUTPATIENT
Start: 2023-09-05 | End: 2023-09-05 | Stop reason: HOSPADM

## 2023-09-05 RX ORDER — HEPARIN SODIUM (PORCINE) LOCK FLUSH IV SOLN 100 UNIT/ML 100 UNIT/ML
500 SOLUTION INTRAVENOUS AS NEEDED
Status: DISCONTINUED | OUTPATIENT
Start: 2023-09-05 | End: 2023-09-05 | Stop reason: HOSPADM

## 2023-09-05 RX ADMIN — Medication 10 ML: at 12:42

## 2023-09-05 RX ADMIN — Medication 500 UNITS: at 12:42

## 2023-09-05 NOTE — PROGRESS NOTES
Specialty Pharmacy Refill Coordination Note     Martina is a 83 y.o. female contacted today regarding refills of  Xarelto specialty medication(s).    Reviewed and verified with patient:       Specialty medication(s) and dose(s) confirmed: yes    Refill Questions      Flowsheet Row Most Recent Value   Changes to allergies? No   Changes to medications? No   New conditions since last clinic visit No   Unplanned office visit, urgent care, ED, or hospital admission in the last 4 weeks  No   How does patient/caregiver feel medication is working? Good   Financial problems or insurance changes  No   Since the previous refill, were any specialty medication doses or scheduled injections missed or delayed?  No   Does this patient require a clinical escalation to a pharmacist? No            Delivery Questions      Flowsheet Row Most Recent Value   Delivery method Other (Comment)  [Beeline to pt home ship 9/14 deliver 9/15- put on bench]   Delivery address correct? Yes   Preferred delivery time? Anytime   Number of medications in delivery 1   Medication being filled and delivered Xarelto   Doses left of specialty medications about 2 weeks   Is there any medication that is due not being filled? No   Supplies needed? No supplies needed   Cooler needed? No   Do any medications need mixed or dated? No   Copay form of payment Credit card on file   Questions or concerns for the pharmacist? No   Are any medications first time fills? No                   Follow-up: 3 week(s)     Karen Marin  Specialty Pharmacy Technician

## 2023-09-13 ENCOUNTER — SPECIALTY PHARMACY (OUTPATIENT)
Dept: PHARMACY | Facility: HOSPITAL | Age: 83
End: 2023-09-13
Payer: MEDICARE

## 2023-09-13 NOTE — PROGRESS NOTES
"Xarelto put in [facility] with notes \"Beeline to pt home ship 9/14 deliver 9/15- put on bench\"     Karen Marin  Specialty Pharmacy Technician        "

## 2023-09-18 DIAGNOSIS — C79.51 MALIGNANT NEOPLASM METASTATIC TO BONE: ICD-10-CM

## 2023-09-18 DIAGNOSIS — C83.39 DIFFUSE LARGE B-CELL LYMPHOMA OF EXTRANODAL SITE EXCLUDING SPLEEN AND OTHER SOLID ORGANS: ICD-10-CM

## 2023-09-18 RX ORDER — GABAPENTIN 300 MG/1
900 CAPSULE ORAL
Qty: 270 CAPSULE | OUTPATIENT
Start: 2023-09-18

## 2023-09-21 ENCOUNTER — TELEPHONE (OUTPATIENT)
Dept: FAMILY MEDICINE CLINIC | Facility: CLINIC | Age: 83
End: 2023-09-21
Payer: MEDICARE

## 2023-09-21 DIAGNOSIS — Z79.899 HIGH RISK MEDICATION USE: Primary | ICD-10-CM

## 2023-09-21 DIAGNOSIS — C83.39 DIFFUSE LARGE B-CELL LYMPHOMA OF EXTRANODAL SITE EXCLUDING SPLEEN AND OTHER SOLID ORGANS: ICD-10-CM

## 2023-09-21 DIAGNOSIS — E55.9 VITAMIN D DEFICIENCY: ICD-10-CM

## 2023-09-21 DIAGNOSIS — G60.9 IDIOPATHIC NEUROPATHY: Primary | ICD-10-CM

## 2023-09-21 DIAGNOSIS — C79.51 MALIGNANT NEOPLASM METASTATIC TO BONE: ICD-10-CM

## 2023-09-21 DIAGNOSIS — I10 PRIMARY HYPERTENSION: Chronic | ICD-10-CM

## 2023-09-21 DIAGNOSIS — R73.9 HYPERGLYCEMIA: ICD-10-CM

## 2023-09-21 DIAGNOSIS — E78.5 DYSLIPIDEMIA: ICD-10-CM

## 2023-09-21 RX ORDER — GABAPENTIN 300 MG/1
900 CAPSULE ORAL
Qty: 270 CAPSULE | Refills: 0 | Status: SHIPPED | OUTPATIENT
Start: 2023-09-21

## 2023-09-21 RX ORDER — SPIRONOLACTONE 25 MG/1
25 TABLET ORAL DAILY
Qty: 90 TABLET | Refills: 1 | Status: SHIPPED | OUTPATIENT
Start: 2023-09-21

## 2023-09-21 NOTE — TELEPHONE ENCOUNTER
Caller: Martina Blake    Relationship: Self    Best call back number: [phone]     What medication are you requesting: GABAPENTIN    If a prescription is needed, what is your preferred pharmacy and phone number:  [id number] - [facility] [address] OF [place] [address] & [place] [address] - [phone] [facility] [phone]      Additional notes: PATIENT IS OUT OF MEDICATION AND SHE IS LEAVING FOR OUT OF TOWN TOMORROW. PATIENT HAS AN APPT SCHEDULED IN DECEMBER. PLEASE CALL IF THERE IS AN ISSUE REFILLING THIS MEDICATION.     ([facility] WOULD NOT LET HUB SELECT THE MEDICATION FOR REFILL WITHOUT PUTTING A DIAGNOSIS IN)

## 2023-09-21 NOTE — TELEPHONE ENCOUNTER
Pt said she is out of med and needs refilled now since she will be leaving town tomorrow. Her last appt was 12/21/22 and she was told to follow up in a year. Please advise if you will refill med or how to proceed.

## 2023-09-28 ENCOUNTER — TELEPHONE (OUTPATIENT)
Dept: FAMILY MEDICINE CLINIC | Facility: CLINIC | Age: 83
End: 2023-09-28

## 2023-09-28 NOTE — TELEPHONE ENCOUNTER
Caller: Martina Blake    Relationship to patient: Self    Best call back number: [phone]    Patient is needing: SHE IS GETTING LABS DONE AND WANTS TO MAKE SURE THAT YOU CHECK HER POTASSIUM AND MAGNESIUM LEVELS AS WELL.

## 2023-10-03 ENCOUNTER — TELEPHONE (OUTPATIENT)
Dept: ONCOLOGY | Facility: CLINIC | Age: 83
End: 2023-10-03
Payer: MEDICARE

## 2023-10-03 NOTE — TELEPHONE ENCOUNTER
Caller: Martina Blake    Relationship to patient: Self    Best call back number: [phone]     Chief complaint: NEED PORT FLUSH R/S    Type of visit: PORT FLUSH    Requested date: WOULD  LIKE TO HAVE MOVED TO 10/09/23 OF 10/10/23    If rescheduling, when is the original appointment: 10/23/23    Additional notes: PT WOULD LIKE TO HAVE MOVED UP DUE TO HAVING TO GIVE BLOOD TO PCP OFFICE.

## 2023-10-06 ENCOUNTER — DOCUMENTATION (OUTPATIENT)
Dept: PHARMACY | Facility: HOSPITAL | Age: 83
End: 2023-10-06
Payer: MEDICARE

## 2023-10-06 RX ORDER — AMLODIPINE BESYLATE 10 MG/1
10 TABLET ORAL DAILY
Qty: 90 TABLET | Refills: 1 | Status: SHIPPED | OUTPATIENT
Start: 2023-10-06

## 2023-10-06 NOTE — PROGRESS NOTES
"Covering for Karen Marin, Care Coordinator    Pt in queue for a refill call on 10/9/2023-I contacted pt and she states she will need delivery on 10/20/23. She has 2 weeks on hand-she found a small supply in her purse that she packed for a trip. She mentioned the copay being high-I explained the Xarelto With Me program (formerly [employer]). She stated she would like information on this and this was sent to her via [employer].    She was going to call [facility] to see about the \"donut hole cost\". She wanted me to let Karen know that she will contact next week to notify Karen if she needed delivery from us or not.    Refill task moved to 10/12/23    Alana Banda, Pharmacy Technician  Specialty Pharmacy Technician        "

## 2023-10-10 ENCOUNTER — INFUSION (OUTPATIENT)
Dept: ONCOLOGY | Facility: HOSPITAL | Age: 83
End: 2023-10-10
Payer: MEDICARE

## 2023-10-10 DIAGNOSIS — Z45.2 ENCOUNTER FOR ADJUSTMENT OR MANAGEMENT OF VASCULAR ACCESS DEVICE: Primary | ICD-10-CM

## 2023-10-10 PROCEDURE — 96523 IRRIG DRUG DELIVERY DEVICE: CPT

## 2023-10-10 PROCEDURE — 25010000002 HEPARIN LOCK FLUSH PER 10 UNITS: Performed by: INTERNAL MEDICINE

## 2023-10-10 PROCEDURE — 36591 DRAW BLOOD OFF VENOUS DEVICE: CPT

## 2023-10-10 RX ORDER — HEPARIN SODIUM (PORCINE) LOCK FLUSH IV SOLN 100 UNIT/ML 100 UNIT/ML
500 SOLUTION INTRAVENOUS AS NEEDED
Status: DISCONTINUED | OUTPATIENT
Start: 2023-10-10 | End: 2023-10-10 | Stop reason: HOSPADM

## 2023-10-10 RX ORDER — SODIUM CHLORIDE 0.9 % (FLUSH) 0.9 %
10 SYRINGE (ML) INJECTION AS NEEDED
Status: DISCONTINUED | OUTPATIENT
Start: 2023-10-10 | End: 2023-10-10 | Stop reason: HOSPADM

## 2023-10-10 RX ADMIN — Medication 10 ML: at 13:13

## 2023-10-10 RX ADMIN — Medication 500 UNITS: at 13:13

## 2023-10-11 LAB
25(OH)D3+25(OH)D2 SERPL-MCNC: 50.9 NG/ML (ref 30–100)
ALBUMIN SERPL-MCNC: 4.1 G/DL (ref 3.7–4.7)
ALBUMIN/GLOB SERPL: 1.6 {RATIO} (ref 1.2–2.2)
ALP SERPL-CCNC: 104 IU/L (ref 44–121)
ALT SERPL-CCNC: 11 IU/L (ref 0–32)
AST SERPL-CCNC: 20 IU/L (ref 0–40)
BASOPHILS # BLD AUTO: 0 X10E3/UL (ref 0–0.2)
BASOPHILS NFR BLD AUTO: 1 %
BILIRUB SERPL-MCNC: 0.3 MG/DL (ref 0–1.2)
BUN SERPL-MCNC: 19 MG/DL (ref 8–27)
BUN/CREAT SERPL: 25 (ref 12–28)
CALCIUM SERPL-MCNC: 9.3 MG/DL (ref 8.7–10.3)
CHLORIDE SERPL-SCNC: 99 MMOL/L (ref 96–106)
CHOLEST SERPL-MCNC: 160 MG/DL (ref 100–199)
CO2 SERPL-SCNC: 24 MMOL/L (ref 20–29)
CREAT SERPL-MCNC: 0.75 MG/DL (ref 0.57–1)
EGFRCR SERPLBLD CKD-EPI 2021: 79 ML/MIN/1.73
EOSINOPHIL # BLD AUTO: 0.1 X10E3/UL (ref 0–0.4)
EOSINOPHIL NFR BLD AUTO: 2 %
ERYTHROCYTE [DISTWIDTH] IN BLOOD BY AUTOMATED COUNT: 11.5 % (ref 11.7–15.4)
GLOBULIN SER CALC-MCNC: 2.6 G/DL (ref 1.5–4.5)
GLUCOSE SERPL-MCNC: 137 MG/DL (ref 70–99)
HCT VFR BLD AUTO: 35.8 % (ref 34–46.6)
HDLC SERPL-MCNC: 53 MG/DL
HGB BLD-MCNC: 12.5 G/DL (ref 11.1–15.9)
IMM GRANULOCYTES # BLD AUTO: 0 X10E3/UL (ref 0–0.1)
IMM GRANULOCYTES NFR BLD AUTO: 0 %
LDLC SERPL CALC-MCNC: 93 MG/DL (ref 0–99)
LYMPHOCYTES # BLD AUTO: 1.4 X10E3/UL (ref 0.7–3.1)
LYMPHOCYTES NFR BLD AUTO: 26 %
MAGNESIUM SERPL-MCNC: 2.2 MG/DL (ref 1.6–2.3)
MCH RBC QN AUTO: 31.7 PG (ref 26.6–33)
MCHC RBC AUTO-ENTMCNC: 34.9 G/DL (ref 31.5–35.7)
MCV RBC AUTO: 91 FL (ref 79–97)
MONOCYTES # BLD AUTO: 0.5 X10E3/UL (ref 0.1–0.9)
MONOCYTES NFR BLD AUTO: 10 %
NEUTROPHILS # BLD AUTO: 3.1 X10E3/UL (ref 1.4–7)
NEUTROPHILS NFR BLD AUTO: 61 %
PLATELET # BLD AUTO: 163 X10E3/UL (ref 150–450)
POTASSIUM SERPL-SCNC: 4.3 MMOL/L (ref 3.5–5.2)
PROT SERPL-MCNC: 6.7 G/DL (ref 6–8.5)
RBC # BLD AUTO: 3.94 X10E6/UL (ref 3.77–5.28)
SODIUM SERPL-SCNC: 138 MMOL/L (ref 134–144)
TRIGL SERPL-MCNC: 71 MG/DL (ref 0–149)
VLDLC SERPL CALC-MCNC: 14 MG/DL (ref 5–40)
WBC # BLD AUTO: 5.2 X10E3/UL (ref 3.4–10.8)

## 2023-10-12 ENCOUNTER — OFFICE VISIT (OUTPATIENT)
Dept: FAMILY MEDICINE CLINIC | Facility: CLINIC | Age: 83
End: 2023-10-12
Payer: MEDICARE

## 2023-10-12 VITALS
OXYGEN SATURATION: 97 % | HEIGHT: 62 IN | DIASTOLIC BLOOD PRESSURE: 54 MMHG | BODY MASS INDEX: 26.68 KG/M2 | WEIGHT: 145 LBS | HEART RATE: 66 BPM | SYSTOLIC BLOOD PRESSURE: 108 MMHG

## 2023-10-12 DIAGNOSIS — Z23 NEED FOR INFLUENZA VACCINATION: Primary | ICD-10-CM

## 2023-10-12 RX ORDER — VITAMIN B COMPLEX
1 CAPSULE ORAL DAILY
COMMUNITY

## 2023-10-12 NOTE — PROGRESS NOTES
Subjective   Martina Blake is a 83 y.o. female.     Chief Complaint   Patient presents with    Follow-up    Hyperlipidemia    Hypertension     History of Present Illness     Patient is here today for medication review and review of lab results.  Medications were refilled prior to appointment.  Patient states no concerns today, no issues with current medication.    The following portions of the patient's history were reviewed and updated as appropriate: allergies, current medications, past family history, past medical history, past social history, past surgical history and problem list.    Review of Systems   Denies dizziness, fatigue, fever/chills, CP, SOA, headache, blood in urine/stool, new abd pain, leg swelling.    Objective     Vitals:    10/12/23 1254   BP: 108/54   Pulse: 66   SpO2: 97%      Body mass index is 26.52 kg/mý.    Physical Exam  Constitutional:       General: She is not in acute distress.     Appearance: Normal appearance. She is not ill-appearing or toxic-appearing.   HENT:      Right Ear: External ear normal.      Left Ear: External ear normal.      Nose: No congestion or rhinorrhea.      Mouth/Throat:      Mouth: Mucous membranes are moist.      Pharynx: Oropharynx is clear.   Eyes:      Conjunctiva/sclera: Conjunctivae normal.   Cardiovascular:      Rate and Rhythm: Normal rate.      Pulses: Normal pulses.      Heart sounds: Normal heart sounds.   Pulmonary:      Effort: Pulmonary effort is normal. No respiratory distress.      Breath sounds: Normal breath sounds.   Abdominal:      General: Bowel sounds are normal.      Palpations: Abdomen is soft.      Tenderness: There is no abdominal tenderness.   Skin:     General: Skin is warm and dry.      Capillary Refill: Capillary refill takes less than 2 seconds.   Neurological:      General: No focal deficit present.      Mental Status: She is alert and oriented to person, place, and time.      Motor: No weakness.   Psychiatric:         Behavior:  Behavior normal.       Assessment & Plan   Diagnoses and all orders for this visit:    1. Need for influenza vaccination (Primary)  -     Fluzone High-Dose 65+yrs ([id number])      Plan:     Continue taking medications as prescribed.   Due for RUDDY 12/22/23    Discussed Care Gaps, ordered referrals and encouraged vaccination updates.       - Pt agrees with plan of care and denies further questions/concerns today  - This document is intended for medical expert use only. Persons  reading this document without medical staff guidance may result in misinterpretation and unintended morbidity     Go to the ER for any possible life-threatening symptoms such as chest pain or shortness of air.      Please allow 3-5 business days for recommendations based on new results      I personally spent time with this patient, preparing for the visit, reviewing tests, obtaining and/or reviewing a separately obtained history, performing a medically appropriate examination and/or evaluation, counseling and educating the patient/family/caregiver, ordering medications,  documenting information in the medical record and indepentently interpreting results.        EMS/Patient

## 2023-10-13 ENCOUNTER — SPECIALTY PHARMACY (OUTPATIENT)
Dept: PHARMACY | Facility: HOSPITAL | Age: 83
End: 2023-10-13
Payer: MEDICARE

## 2023-10-13 NOTE — PROGRESS NOTES
Specialty Pharmacy Refill Coordination Note     Martina is a 83 y.o. female contacted today regarding refills of  Xarelto specialty medication(s).    Reviewed and verified with patient:       Specialty medication(s) and dose(s) confirmed: yes    Refill Questions      Flowsheet Row Most Recent Value   Changes to allergies? No   Changes to medications? No   New conditions since last clinic visit No   Unplanned office visit, urgent care, ED, or hospital admission in the last 4 weeks  No   How does patient/caregiver feel medication is working? Good   Financial problems or insurance changes  No   Since the previous refill, were any specialty medication doses or scheduled injections missed or delayed?  No   Does this patient require a clinical escalation to a pharmacist? No            Delivery Questions      Flowsheet Row Most Recent Value   Delivery method Other (Comment)  [Beeline to pt home ship 10/19 deliver 10/20- put on bench on front porch]   Delivery address correct? Yes   Preferred delivery time? Anytime   Number of medications in delivery 1   Medication(s) being filled and delivered Rivaroxaban   Doses left of specialty medications 1.5 weeks   Is there any medication that is due not being filled? No   Supplies needed? No supplies needed   Cooler needed? No   Do any medications need mixed or dated? No   Copay form of payment Credit card on file   Questions or concerns for the pharmacist? No   Are any medications first time fills? No                   Follow-up: 3 week(s)     Karen Marin  Specialty Pharmacy Technician

## 2023-10-31 ENCOUNTER — SPECIALTY PHARMACY (OUTPATIENT)
Dept: PHARMACY | Facility: HOSPITAL | Age: 83
End: 2023-10-31
Payer: MEDICARE

## 2023-10-31 NOTE — PROGRESS NOTES
Called patient for 6 month assessment, no answer. Left voicemail to return my call at direct line [phone].     Manpreet Olivier, PharmD, BCOP  Clinical Oncology Pharmacist         Specialty Pharmacy Patient Management Program  Oncology 6-Month Clinical Assessment       Martina Blake is a 83 y.o. female with A.Fib called today to assess adherence and side effects.    Regimen: Xarelto 20 mg daily    Reason for Outreach: Routine medication check-in .       Problem list reviewed by Serenity Olivier RPH on 10/31/2023 at  3:38 PM  Medicines reviewed by Serenity Olivier RPH on 10/31/2023 at  3:37 PM  Allergies reviewed by Serenity Olivier RPH on 10/31/2023 at  3:37 PM     Goals Addressed Today        Specialty Pharmacy General Goal      Prevention of CVA            Medication Assessment:  Medication Assessment  Follow Up Clinical Assessment  Linked Medication(s) Assessed: Rivaroxaban  Therapeutic appropriateness: Appropriate  Medication tolerability: Tolerating with no to minimal ADRs  Medication plan: Continue therapy with normal follow-up  Quality of Life Improvement Scale: No change  Comments on Quality of Life: No changes  Administration: Patient is taking every day at the same time  and as prescribed .   Patient can self administer medications: yes  Medication Follow-Up Plan: Next clinical assessment  Lab Review: The labs listed below have been reviewed. No dose adjustments are needed for the oral specialty medication(s) based on the labs.    Lab Results   Component Value Date    GLUCOSE 137 (H) 10/10/2023    CALCIUM 9.3 10/10/2023     10/10/2023    K 4.3 10/10/2023    CO2 24 10/10/2023    CL 99 10/10/2023    BUN 19 10/10/2023    CREATININE 0.75 10/10/2023    EGFRIFAFRI 97 04/15/2019    EGFRIFNONA 78 11/12/2021    BCR 25 10/10/2023    ANIONGAP 11.4 05/08/2023     Lab Results   Component Value Date    WBC 5.2 10/10/2023    RBC 3.94 10/10/2023    HGB 12.5 10/10/2023    HCT 35.8 10/10/2023    MCV 91  10/10/2023    MCH 31.7 10/10/2023    MCHC 34.9 10/10/2023    RDW 11.5 (L) 10/10/2023    RDWSD 46.1 05/08/2023    MPV 9.8 05/08/2023     10/10/2023    NEUTRORELPCT 61 10/10/2023    LYMPHORELPCT 26 10/10/2023    MONORELPCT 10 10/10/2023    EOSRELPCT 2 10/10/2023    BASORELPCT 1 10/10/2023    AUTOIGPER 0.2 05/08/2023    NEUTROABS 3.1 10/10/2023    LYMPHSABS 1.4 10/10/2023    MONOSABS 0.5 10/10/2023    EOSABS 0.1 10/10/2023    BASOSABS 0.0 10/10/2023    AUTOIGNUM 0.01 05/08/2023    NRBC 0.0 05/08/2023     Drug-drug interactions  Completed medication reconciliation today to assess for drug interactions. Patient denies starting or stopping any medications.    Assessed medication list for interactions, no significant drug interactions noted.   Advised patient to call the clinic if any new medications are started so we can assess for drug-drug interactions.  Drug-food interactions discussed:  none today  Vaccines are coordinated by the patient's oncologist and primary care provider.    Allergies  Known allergies and reactions were discussed with the patient. The patient's chart has been reviewed for allergy information and updated as necessary.   Allergies   Allergen Reactions    Crab (Diagnostic) Itching and Rash    Pseudoephedrine Dizziness        Hospitalizations and Urgent Care Visits Since Last Assessment:  Unplanned hospitalizations or inpatient admissions: no  ED Visits: no  Urgent Office Visits: no    Adherence Assessment:  Adherence Questions  Linked Medication(s) Assessed: Rivaroxaban  On average, how many doses/injections does the patient miss per month?: 0  What are the identified reasons for non-adherence or missed doses? : no problems identfied  What is the estimated medication adherence level?: %  Based on the patient/caregiver response and refill history, does this patient require an MTP to track adherence improvements?: no    Quality of Life Assessment:  Quality of Life Assessment  Quality of  Life Improvement Scale: No change  Comments on Quality of Life: No changes  -- Quality of Life: 5/10    Financial Assessment:  Medication availability/affordability: Patient has had no issues obtaining medication from pharmacy.  Patient states copays are becoming expensive.  [facility], has offered Xarelto assistance program in the past, however, patient has declined.  Will let clinic RN and team know of patient's concerns.       Attestation     I attest the patient was actively involved in and has agreed to the above plan of care.  If the prescribed therapy is at any point deemed not appropriate based on the current or future assessments, a consultation will be initiated with the patient's specialty care provider to determine the best course of action. The revised plan of therapy will be documented along with any required assessments and/or additional patient education provided.       All questions addressed and patient had no additional concerns. Patient has pharmacy contact information.    Name/Credentials: Manpreet Olivier, BernabeD, [facility]  Clinical Oncology Pharmacist    10/31/2023  15:50 EDT

## 2023-11-02 ENCOUNTER — TELEPHONE (OUTPATIENT)
Dept: ONCOLOGY | Facility: CLINIC | Age: 83
End: 2023-11-02
Payer: MEDICARE

## 2023-11-02 NOTE — TELEPHONE ENCOUNTER
Called pt and she was interested in coming in for sample pickup. She would like to come in next week, 11/7 for sample pickup. Message sent to scheduling.       ----- Message from Serenity Olivier RPH sent at 10/31/2023  3:45 PM EDT -----  Regarding: FW: Xarelto Samples  Forgot to mention -- we have offered to help her apply for the Xarelto assistance program in the past and she has declined.     ----- Message -----  From: Serenity Olivier RPH  Sent: 10/31/2023   3:45 PM EDT  To: Kanchan Gaspar RN  Subject: Xarelto Samples                                  Veronica Hemphill,     I spoke with Mrs. Blake today for toxicity check on the Xarelto.  She mentioned the copays are getting very expensive.  She asked if we every have samples in the office anymore.  I told her we do sometimes have them but I was unsure of our current supply.  She asked if we could keep her in mind if a new supply comes in.  I told her I would pass along to you to see if needed to get her scheduled for . She comes for port flush on 12/4.     Thanks!  Manpreet

## 2023-11-13 ENCOUNTER — SPECIALTY PHARMACY (OUTPATIENT)
Dept: PHARMACY | Facility: HOSPITAL | Age: 83
End: 2023-11-13
Payer: MEDICARE

## 2023-11-13 NOTE — TELEPHONE ENCOUNTER
Preventive Care Visit  [facility]  Kika Li MD, Pediatrics  Nov 13, 2023    Assessment & Plan   4 year old 0 month old, here for preventive care.    1. Encounter for routine child health examination w/o abnormal findings  - excellent growth and development, no concerns   - we will do DTaP/IPV today and plan for MMR/V next fall    - BEHAVIORAL/EMOTIONAL ASSESSMENT (29611)  - SCREENING TEST, PURE TONE, AIR ONLY  - SCREENING, VISUAL ACUITY, QUANTITATIVE, BILAT  - COVID-19 6M-4YRS (2023-24) (PFIZER)  - DTAP/IPV, 4-6Y (QUADRACEL/KINRIX)    Growth      Normal height and weight  Pediatric Healthy Lifestyle Action Plan         Exercise and nutrition counseling performed  I am not concerned about the BMI today - we will monitor  Immunizations   Appropriate vaccinations were ordered.  I provided face to face vaccine counseling, answered questions, and explained the benefits and risks of the vaccine components ordered today including:  DTaP-IPV (Kinrix ) (4-6Y)    Anticipatory Guidance    Reviewed age appropriate anticipatory guidance.       Referrals/Ongoing Specialty Care  None  Verbal Dental Referral: Patient has established dental home  Dental Fluoride Varnish: No, parent/guardian declines fluoride varnish.  Reason for decline: Recent/Upcoming dental appointment  Dyslipidemia Follow Up:  Discussed nutrition  Discussed screening in future      Subjective     - no additional concerns       11/13/2023    12:04 PM   Additional Questions   Accompanied by mom   Questions for today's visit No   Surgery, major illness, or injury since last physical No         11/13/2023   Social   Lives with Parent(s)    Sibling(s)   Who takes care of your child? Parent(s)    Grandparent(s)       Recent potential stressors (!) PARENT JOB CHANGE - dad   (!) DIFFICULTIES BETWEEN PARENTS   History of trauma No   Family Hx mental health challenges (!) YES   Lack of transportation has limited access to  Pt. States she is sched. For a port flush on the 15th and she can't make that day due to another appt she can't cancel.  Also would like to s/w laci about xarelto samples.  Will send the appt desk and laci samreen a message regarding these issues.  V/u.   "appts/meds No   Do you have housing?  Yes   Are you worried about losing your housing? No         11/13/2023    12:01 PM   Health Risks/Safety   What type of car seat does your child use? Car seat with harness   Is your child's car seat forward or rear facing? Forward facing   Where does your child sit in the car?  Back seat   Are poisons/cleaning supplies and medications kept out of reach? Yes   Do you have a swimming pool? No   Helmet use? Yes         11/12/2022     6:34 PM   TB Screening   Was your child born outside of the [place]? No         11/13/2023    12:01 PM   TB Screening: Consider immunosuppression as a risk factor for TB   Recent TB infection or positive TB test in family/close contacts No   Recent travel outside [place] (child/family/close contacts) No   Recent residence in high-risk group setting (correctional facility/health care facility/homeless shelter/refugee camp) No          11/13/2023    12:01 PM   Dyslipidemia   FH: premature cardiovascular disease (!) GRANDPARENT - MGF heart attack age 42, passed away   FH: hyperlipidemia No   Personal risk factors for heart disease NO diabetes, high blood pressure, obesity, smokes cigarettes, kidney problems, heart or kidney transplant, history of Kawasaki disease with an aneurysm, lupus, rheumatoid arthritis, or HIV       No results for input(s): \"CHOL\", \"HDL\", \"LDL\", \"TRIG\", \"CHOLHDLRATIO\" in the last 93294 hours.      11/13/2023    12:01 PM   Dental Screening   Has your child seen a dentist? Yes   When was the last visit? 3 months to 6 months ago   Has your child had cavities in the last 2 years? No   Have parents/caregivers/siblings had cavities in the last 2 years? (!) YES, IN THE LAST 7-23 MONTHS- MODERATE RISK         11/13/2023   Diet   Do you have questions about feeding your child? No   What does your child regularly drink? Water    Cow's milk   What type of milk? (!) WHOLE    (!) 2%   What type of water? Tap    (!) FILTERED   How often does " your family eat meals together? (!) SOME DAYS   How many snacks does your child eat per day 2   Are there types of foods your child won't eat? (!) YES   Please specify: meat   At least 3 servings of food or beverages that have calcium each day Yes   In past 12 months, concerned food might run out No   In past 12 months, food has run out/couldn't afford more No         11/13/2023    12:01 PM   Elimination   Bowel or bladder concerns? No concerns   Toilet training status: Toilet trained, daytime only         11/13/2023   Activity   Days per week of moderate/strenuous exercise 7 days   On average, how many minutes do you engage in exercise at this level? 30 min   What does your child do for exercise?  swimming,playinh,scooter/bike         11/13/2023    12:01 PM   Media Use   Hours per day of screen time (for entertainment) 1   Screen in bedroom No         11/13/2023    12:01 PM   Sleep   Do you have any concerns about your child's sleep?  No concerns, sleeps well through the night    (!) BEDTIME STRUGGLES         11/13/2023    12:01 PM   School   Early childhood screen complete Not yet done   Grade in school    Current school [employer]          11/13/2023    12:01 PM   Vision/Hearing   Vision or hearing concerns No concerns         11/13/2023    12:01 PM   Development/ Social-Emotional Screen   Developmental concerns No   Does your child receive any special services? No     Development/Social-Emotional Screen - PSC-17 required for C&TC     Screening tool used, reviewed with parent/guardian:   Electronic PSC       11/13/2023    12:08 PM   PSC SCORES   Inattentive / Hyperactive Symptoms Subtotal 3   Externalizing Symptoms Subtotal 7 (At Risk)   Internalizing Symptoms Subtotal 2   PSC - 17 Total Score 12       Was not able to review until after pt left.  Will keep score in mind at future appts (externalizing symptoms were just over normal range)              Objective     Exam  BP  "100/71   Pulse 81   Temp 97  F (36.1  C) (Oral)   Ht 3' 3.37\" (1 m)   Wt 39 lb 9.6 oz (18 kg)   BMI 17.96 kg/m    42 %ile (Z= -0.21) based on CDC (Girls, 2-20 Years) Stature-for-age data based on Stature recorded on 11/13/2023.  81 %ile (Z= 0.89) based on [facility] (Girls, 2-20 Years) weight-for-age data using vitals from 11/13/2023.  95 %ile (Z= 1.62) based on CDC (Girls, 2-20 Years) BMI-for-age based on BMI available as of 11/13/2023.  Blood pressure %wayne are 84% systolic and 97% diastolic based on the 2017 AAP Clinical Practice Guideline. This reading is in the Stage 1 hypertension range (BP >= 95th %ile).    Vision Screen  Vision Screen Details  Does the patient have corrective lenses (glasses/contacts)?: No  Vision Acuity Screen  Vision Acuity Tool: VIK  RIGHT EYE: 10/10 (20/20)  LEFT EYE: 10/12.5 (20/25)  Is there a two line difference?: No  Vision Screen Results: Pass    Hearing Screen      11/13/2023    12:19 PM   Hearing Screen Results   Right Ear- 1000Hz/40dB Pass   Right Ear - 500Hz/25dB Pass   Right Ear - 1000Hz/20dB Pass   Right Ear - 2000Hz/20dB Pass   Right Ear - 4000Hz/20dB Pass   Left Ear - 500Hz/25dB Pass   Left Ear - 1000Hz/20dB Pass   Left Ear - 2000Hz/20dB Pass   Left Ear - 4000Hz/20dB Pass   Hearing Screen Results Pass              Physical Exam  GENERAL: Alert, well appearing, no distress  SKIN: Clear. No significant rash, abnormal pigmentation or lesions  HEAD: Normocephalic.  EYES:  Symmetric light reflex and no eye movement on cover/uncover test. Normal conjunctivae.  EARS: Normal canals. Tympanic membranes are normal; gray and translucent.  NOSE: Normal without discharge.  MOUTH/THROAT: Clear. No oral lesions. Teeth without obvious abnormalities.  NECK: Supple, no masses.  No thyromegaly.  LYMPH NODES: No adenopathy  LUNGS: Clear. No rales, rhonchi, wheezing or retractions  HEART: Regular rhythm. Normal S1/S2. No murmurs. Normal pulses.  ABDOMEN: Soft, non-tender, not distended, no masses " or hepatosplenomegaly. Bowel sounds normal.   GENITALIA: Normal female external genitalia. Arley stage I,  No inguinal herniae are present.  EXTREMITIES: Full range of motion, no deformities  NEUROLOGIC: No focal findings. Cranial nerves grossly intact: DTR's normal. Normal gait, strength and tone      Prior to immunization administration, verified patients identity using patient s name and date of birth. Please see Immunization Activity for additional information.     Screening Questionnaire for Pediatric Immunization    Is the child sick today?   No   Does the child have allergies to medications, food, a vaccine component, or latex?   No   Has the child had a serious reaction to a vaccine in the past?   No   Does the child have a long-term health problem with lung, heart, kidney or metabolic disease (e.g., diabetes), asthma, a blood disorder, no spleen, complement component deficiency, a cochlear implant, or a spinal fluid leak?  Is he/she on long-term aspirin therapy?   No   If the child to be vaccinated is 2 through 4 years of age, has a healthcare provider told you that the child had wheezing or asthma in the  past 12 months?   No   If your child is a baby, have you ever been told he or she has had intussusception?   No   Has the child, sibling or parent had a seizure, has the child had brain or other nervous system problems?   No   Does the child have cancer, leukemia, AIDS, or any immune system         problem?   No   Does the child have a parent, brother, or sister with an immune system problem?   No   In the past 3 months, has the child taken medications that affect the immune system such as prednisone, other steroids, or anticancer drugs; drugs for the treatment of rheumatoid arthritis, Crohn s disease, or psoriasis; or had radiation treatments?   No   In the past year, has the child received a transfusion of blood or blood products, or been given immune (gamma) globulin or an antiviral drug?   No   Is  the child/teen pregnant or is there a chance that she could become       pregnant during the next month?   No   Has the child received any vaccinations in the past 4 weeks?   No               Immunization questionnaire answers were all negative.      Patient instructed to remain in clinic for 15 minutes afterwards, and to report any adverse reactions.     Screening performed by Eugenia Denis MA on 11/13/2023 at 12:06 PM.  Kika Li MD  [facility]

## 2023-11-20 ENCOUNTER — OFFICE VISIT (OUTPATIENT)
Dept: FAMILY MEDICINE CLINIC | Facility: CLINIC | Age: 83
End: 2023-11-20
Payer: MEDICARE

## 2023-11-20 VITALS
SYSTOLIC BLOOD PRESSURE: 130 MMHG | BODY MASS INDEX: 27.38 KG/M2 | OXYGEN SATURATION: 98 % | DIASTOLIC BLOOD PRESSURE: 60 MMHG | HEART RATE: 68 BPM | HEIGHT: 62 IN | WEIGHT: 148.8 LBS

## 2023-11-20 DIAGNOSIS — M15.9 GENERALIZED OSTEOARTHRITIS: ICD-10-CM

## 2023-11-20 DIAGNOSIS — M25.551 ACUTE RIGHT HIP PAIN: Primary | ICD-10-CM

## 2023-11-20 RX ORDER — PREDNISONE 10 MG/1
TABLET ORAL
Qty: 28 TABLET | Refills: 0 | Status: SHIPPED | OUTPATIENT
Start: 2023-11-20 | End: 2023-12-01

## 2023-11-20 RX ORDER — ACETAMINOPHEN 500 MG
500 TABLET ORAL EVERY 6 HOURS PRN
Qty: 90 TABLET | Refills: 3 | Status: SHIPPED | OUTPATIENT
Start: 2023-11-20

## 2023-11-20 NOTE — PROGRESS NOTES
Subjective   Martina Blake is a 83 y.o. female.     Chief Complaint   Patient presents with    hip and leg pain     Right side x 1 week     Hip Pain   The incident occurred more than 1 week ago (1.5 weeks ago). Incident location: unknown injury. There was no injury mechanism. The pain is present in the right hip. The quality of the pain is described as aching. The pain is at a severity of 6/10. The pain is moderate. The pain has been Intermittent since onset. Pertinent negatives include no inability to bear weight, loss of sensation, muscle weakness, numbness or tingling. Associated symptoms comments: stiffness. She reports no foreign bodies present. The symptoms are aggravated by movement and weight bearing. She has tried acetaminophen and heat for the symptoms. The treatment provided no relief.      Pain worse at night. She has been using biofreeze, taking a single Tylenol only at night.     The following portions of the patient's history were reviewed and updated as appropriate: allergies, current medications, past family history, past medical history, past social history, past surgical history and problem list.    Review of Systems   Denies dizziness, fatigue, fever/chills, CP, SOA, headache, blood in urine/stool, abd pain, leg swelling.    Objective     Vitals:    11/20/23 1309   BP: 130/60   Pulse: 68   SpO2: 98%      Body mass index is 27.22 kg/m².    Physical Exam  Constitutional:       General: She is not in acute distress.     Appearance: Normal appearance. She is not ill-appearing or toxic-appearing.   HENT:      Right Ear: External ear normal.      Left Ear: External ear normal.      Nose: No congestion or rhinorrhea.      Mouth/Throat:      Mouth: Mucous membranes are moist.      Pharynx: Oropharynx is clear.   Eyes:      Conjunctiva/sclera: Conjunctivae normal.   Cardiovascular:      Rate and Rhythm: Normal rate.      Pulses: Normal pulses.      Heart sounds: Normal heart sounds.   Pulmonary:       Effort: Pulmonary effort is normal. No respiratory distress.      Breath sounds: Normal breath sounds.   Abdominal:      General: Bowel sounds are normal.      Palpations: Abdomen is soft.      Tenderness: There is no abdominal tenderness.   Musculoskeletal:      Right hip: Bony tenderness present. No lacerations. Decreased range of motion (mild). Normal strength.      Left hip: Normal.      Right lower leg: No edema.      Left lower leg: No edema.   Skin:     General: Skin is warm and dry.      Capillary Refill: Capillary refill takes less than 2 seconds.   Neurological:      General: No focal deficit present.      Mental Status: She is alert and oriented to person, place, and time.      Motor: No weakness.   Psychiatric:         Behavior: Behavior normal.       Assessment & Plan   Diagnoses and all orders for this visit:    1. Acute right hip pain (Primary)  -     XR Hip With or Without Pelvis 2 - 3 View Right  -     acetaminophen (TYLENOL) 500 MG tablet; Take 1 tablet by mouth Every 6 (Six) Hours As Needed for Moderate Pain.  Dispense: 90 tablet; Refill: 3  -     predniSONE (DELTASONE) 10 MG tablet; Take 4 tablets by mouth Daily for 4 days, THEN 2 tablets Daily for 4 days, THEN 1 tablet Daily for 4 days.  Dispense: 28 tablet; Refill: 0    2. Generalized osteoarthritis      Plan:     Start prednisone and extra-strength tylenol. Take as prescribed.  Rest, heat/ice as needed for comfort.  Will wait on radiology report for next steps. Provider suspects osteoarthritis.   Follow up if symptoms worsen or fail to improve    Discussed Care Gaps, ordered referrals and encouraged vaccination updates.       - Pt agrees with plan of care and denies further questions/concerns today  - This document is intended for medical expert use only. Persons  reading this document without medical staff guidance may result in misinterpretation and unintended morbidity     Go to the ER for any possible life-threatening symptoms such as  chest pain or shortness of air.      Please allow 3-5 business days for recommendations based on new results      I personally spent time with this patient, preparing for the visit, reviewing tests, obtaining and/or reviewing a separately obtained history, performing a medically appropriate examination and/or evaluation, counseling and educating the patient/family/caregiver, ordering medications,  documenting information in the medical record and indepentently interpreting results.

## 2023-11-25 DIAGNOSIS — I10 ESSENTIAL HYPERTENSION: ICD-10-CM

## 2023-11-26 RX ORDER — BENAZEPRIL HYDROCHLORIDE 40 MG/1
40 TABLET, FILM COATED ORAL DAILY
Qty: 90 TABLET | Refills: 3 | Status: SHIPPED | OUTPATIENT
Start: 2023-11-26

## 2023-11-27 ENCOUNTER — TELEPHONE (OUTPATIENT)
Dept: FAMILY MEDICINE CLINIC | Facility: CLINIC | Age: 83
End: 2023-11-27
Payer: MEDICARE

## 2023-11-27 NOTE — TELEPHONE ENCOUNTER
Caller: Martina Blake    Relationship: Self    Best call back number: [phone]*    Caller requesting test results: PATIENT    What test was performed: XRAY OF RIGHT HIP    When was the test performed: 11/20/23    Where was the test performed: IN OFFICE    Additional notes: REQUEST CALL BACK WITH RESULTS

## 2023-11-28 NOTE — TELEPHONE ENCOUNTER
Refill requested from pharmacy. Per last chart note, patient to continue Xarelto 20 mg daily. Will route to MD for cosignature.    Manpreet Olivier, PharmD, [facility]  Clinical Oncology Pharmacist

## 2023-11-29 ENCOUNTER — SPECIALTY PHARMACY (OUTPATIENT)
Dept: PHARMACY | Facility: HOSPITAL | Age: 83
End: 2023-11-29
Payer: MEDICARE

## 2023-11-29 NOTE — PLAN OF CARE
[address] [facility]    NAME: Pau Loya  AGE: 28 y.o. SEX: male  : 1988     DATE: 2023     Assessment and Plan:     Problem List Items Addressed This Visit        Other    Anxiety     The patient's anxiety is well-controlled with the current dose of his fluoxetine. We have made no changes today. We did refill his medication today as indicated. We will see him back in the office as scheduled.          Relevant Medications    FLUoxetine (PROzac) 20 mg capsule   Other Visit Diagnoses     Annual physical exam    -  Primary    Relevant Orders    CBC and differential    Comprehensive metabolic panel    LDL cholesterol, direct    Lipid panel    TSH, 3rd generation with Free T4 reflex    UA (URINE) with reflex to Scope    Need for influenza vaccination        Relevant Orders    influenza vaccine, quadrivalent, 0.5 mL, preservative-free, for adult and pediatric patients 6 mos+ (AFLURIA, FLUARIX, FLULAVAL, FLUZONE) (Completed)    Screening for cardiovascular condition        Relevant Orders    CBC and differential    Comprehensive metabolic panel    LDL cholesterol, direct    Lipid panel    TSH, 3rd generation with Free T4 reflex    UA (URINE) with reflex to Scope    Screening for endocrine, metabolic and immunity disorder        Relevant Orders    CBC and differential    Comprehensive metabolic panel    LDL cholesterol, direct    Lipid panel    TSH, 3rd generation with Free T4 reflex    UA (URINE) with reflex to Scope    Screening for diabetes mellitus        Relevant Orders    CBC and differential    Comprehensive metabolic panel    LDL cholesterol, direct    Lipid panel    TSH, 3rd generation with Free T4 reflex    UA (URINE) with reflex to Scope    Current moderate episode of major depressive disorder, unspecified whether recurrent (HCC)        Relevant Medications    FLUoxetine (PROzac) 20 mg capsule      The patient had Problem: Patient Care Overview  Goal: Plan of Care Review  Outcome: Ongoing (interventions implemented as appropriate)   05/07/18 8047   Coping/Psychosocial   Plan of Care Reviewed With patient   Plan of Care Review   Progress improving   OTHER   Outcome Summary Pt A&Ox4. Up with assistance, using rolling walker. Up in chair throughout day. Port flushing with blood return. IV Iron Sucrose given. Zomedia given IV. VSS. Will continue to monitor. Gabapentin restarted for tonight to help patient sleep. Family at bedside.      Goal: Individualization and Mutuality  Outcome: Ongoing (interventions implemented as appropriate)    Goal: Discharge Needs Assessment  Outcome: Ongoing (interventions implemented as appropriate)    Goal: Interprofessional Rounds/Family Conf  Outcome: Ongoing (interventions implemented as appropriate)      Problem: Activity Intolerance (Adult)  Goal: Activity Tolerance  Outcome: Ongoing (interventions implemented as appropriate)    Goal: Effective Energy Conservation Techniques  Outcome: Ongoing (interventions implemented as appropriate)      Problem: Oncology Care (Adult)  Goal: Signs and Symptoms of Listed Potential Problems Will be Absent, Minimized or Managed (Oncology Care)  Outcome: Ongoing (interventions implemented as appropriate)         a normal exam today in the office. He will continue with his healthy diet and exercise. He will go for the lab work as ordered and we will follow-up with the results when available. We will see him back in the office as scheduled. Immunizations and preventive care screenings were discussed with patient today. Appropriate education was printed on patient's after visit summary. Counseling:  Dental Health: discussed importance of regular tooth brushing, flossing, and dental visits. Injury prevention: discussed safety/seat belts, safety helmets, smoke detectors, carbon dioxide detectors, and smoking near bedding or upholstery. Exercise: the importance of regular exercise/physical activity was discussed. Recommend exercise 3-5 times per week for at least 30 minutes. Return in about 1 year (around 11/29/2024) for [employer]. Chief Complaint:     Chief Complaint   Patient presents with   • Annual Exam     Complete Physical      History of Present Illness:     Adult Annual Physical   Patient here for a comprehensive physical exam. The patient reports no problems. He is doing well on the current dose of the fluoxetine. The patient denies any chest pain, shortness of breath, or palpitations. There is no edema. There are no headaches or visual changes. There is no lightheadedness, dizziness, or fainting spells. The patient currently denies any nausea, vomiting, or GERD symptoms. he has normal bowel movements and normal urine output. he has a normal appetite. There is occasional heartburn. Diet and Physical Activity  Diet/Nutrition: well balanced diet, limited junk food, low fat diet, low carb diet, and consuming 3-5 servings of fruits/vegetables daily. Exercise: moderate cardiovascular exercise and 3-4 times a week on average.       Depression Screening  PHQ-2/9 Depression Screening    Little interest or pleasure in doing things: 0 - not at all  Feeling down, depressed, or hopeless: 0 - not at all  Trouble falling or staying asleep, or sleeping too much: 3 - nearly every day  Feeling tired or having little energy: 1 - several days  Poor appetite or overeatin - several days  Feeling bad about yourself - or that you are a failure or have let yourself or your family down: 1 - several days  Trouble concentrating on things, such as reading the newspaper or watching television: 1 - several days  Moving or speaking so slowly that other people could have noticed. Or the opposite - being so fidgety or restless that you have been moving around a lot more than usual: 0 - not at all  Thoughts that you would be better off dead, or of hurting yourself in some way: 0 - not at all  PHQ-9 Score: 7   PHQ-9 Interpretation: Mild depression        General Health  Sleep: sleeps well and there are some nights that he has trouble sleeping, better whenhe exercises . Hearing: normal - bilateral.  Vision: no vision problems, goes for regular eye exams, most recent eye exam <1 year ago, and wears contacts. Dental: regular dental visits and brushes teeth twice daily.  Health  History of STDs?: no.         Review of Systems:     Review of Systems   Constitutional: Negative. HENT: Negative. Eyes: Negative. Respiratory: Negative. Cardiovascular: Negative. Gastrointestinal: Negative. Endocrine: Negative. Genitourinary: Negative. Musculoskeletal: Negative. Skin: Negative. Allergic/Immunologic: Negative. Neurological: Negative. Hematological: Negative. Psychiatric/Behavioral: Negative.         Past Medical History:     Past Medical History:   Diagnosis Date   • Anxiety    • Depression       Past Surgical History:     Past Surgical History:   Procedure Laterality Date   • WISDOM TOOTH EXTRACTION        Social History:     Social History     Socioeconomic History   • Marital status: /Civil Union     Spouse name: None   • Number of children: None   • Years of education: None • Highest education level: None   Occupational History   • None   Tobacco Use   • Smoking status: Never   • Smokeless tobacco: Never   Vaping Use   • Vaping Use: Never used   Substance and Sexual Activity   • Alcohol use: Yes     Comment: Social   • Drug use: Never   • Sexual activity: Yes     Partners: Female   Other Topics Concern   • None   Social History Narrative   • None     Social Determinants of Health     Financial Resource Strain: Not on file   Food Insecurity: Not on file   Transportation Needs: Not on file   Physical Activity: Not on file   Stress: Not on file   Social Connections: Not on file   Intimate Partner Violence: Not At Risk (11/29/2023)    Humiliation, Afraid, Rape, and Kick questionnaire    • Fear of Current or Ex-Partner: No    • Emotionally Abused: No    • Physically Abused: No    • Sexually Abused: No   Housing Stability: Not on file      Family History:     Family History   Problem Relation Age of Onset   • Arthritis Mother    • Arthritis Father    • Hypertension Father    • Colon cancer Paternal Grandfather       Current Medications:     Current Outpatient Medications   Medication Sig Dispense Refill   • FLUoxetine (PROzac) 20 mg capsule Take 1 capsule (20 mg total) by mouth daily 90 capsule 1     No current facility-administered medications for this visit. Allergies:     No Known Allergies   Physical Exam:     /72 (BP Location: Left arm, Patient Position: Sitting, Cuff Size: Large)   Pulse 69   Temp 98.6 °F (37 °C) (Tympanic)   Resp 16   Ht 5' 11" (1.803 m)   Wt 72.7 kg (160 lb 3.2 oz)   SpO2 99%   BMI 22.34 kg/m²     Physical Exam  Vitals and nursing note reviewed. Exam conducted with a chaperone present. Constitutional:       Appearance: Normal appearance. He is well-developed. HENT:      Head: Normocephalic and atraumatic.       Right Ear: External ear normal.      Left Ear: External ear normal.      Nose: Nose normal.      Mouth/Throat:      Mouth: Mucous membranes are moist.      Pharynx: Oropharynx is clear. No oropharyngeal exudate. Eyes:      Extraocular Movements: Extraocular movements intact. Conjunctiva/sclera: Conjunctivae normal.      Pupils: Pupils are equal, round, and reactive to light. Neck:      Thyroid: No thyromegaly. Trachea: No tracheal deviation. Cardiovascular:      Rate and Rhythm: Normal rate and regular rhythm. Pulses: Normal pulses. Heart sounds: Normal heart sounds. No murmur heard. No friction rub. No gallop. Pulmonary:      Effort: Pulmonary effort is normal. No respiratory distress. Breath sounds: Normal breath sounds. No stridor. No wheezing or rales. Chest:      Chest wall: No tenderness. Abdominal:      General: Bowel sounds are normal. There is no distension. Palpations: Abdomen is soft. There is no mass. Tenderness: There is no abdominal tenderness. There is no guarding or rebound. Hernia: No hernia is present. Genitourinary:     Penis: Normal. No tenderness. Testes: Normal.      Comments: There are no palpable hernias. Musculoskeletal:         General: No tenderness or deformity. Normal range of motion. Cervical back: Normal range of motion and neck supple. Lymphadenopathy:      Cervical: No cervical adenopathy. Skin:     General: Skin is warm and dry. Coloration: Skin is not pale. Findings: No erythema or rash. Neurological:      Mental Status: He is alert and oriented to person, place, and time. Cranial Nerves: No cranial nerve deficit. Sensory: No sensory deficit. Motor: No abnormal muscle tone. Coordination: Coordination normal.      Deep Tendon Reflexes: Reflexes normal.   Psychiatric:         Mood and Affect: Mood normal.         Behavior: Behavior normal.         Thought Content:  Thought content normal.         Judgment: Judgment normal.          Quillian Aase, DO   [address] [facility]

## 2023-12-04 ENCOUNTER — INFUSION (OUTPATIENT)
Dept: ONCOLOGY | Facility: HOSPITAL | Age: 83
End: 2023-12-04
Payer: MEDICARE

## 2023-12-04 ENCOUNTER — TELEPHONE (OUTPATIENT)
Dept: ONCOLOGY | Facility: CLINIC | Age: 83
End: 2023-12-04

## 2023-12-04 ENCOUNTER — APPOINTMENT (OUTPATIENT)
Dept: LAB | Facility: HOSPITAL | Age: 83
End: 2023-12-04
Payer: MEDICARE

## 2023-12-04 DIAGNOSIS — Z45.2 ENCOUNTER FOR ADJUSTMENT OR MANAGEMENT OF VASCULAR ACCESS DEVICE: Primary | ICD-10-CM

## 2023-12-04 PROCEDURE — 25010000002 HEPARIN LOCK FLUSH PER 10 UNITS: Performed by: INTERNAL MEDICINE

## 2023-12-04 PROCEDURE — 96523 IRRIG DRUG DELIVERY DEVICE: CPT

## 2023-12-04 RX ORDER — HEPARIN SODIUM (PORCINE) LOCK FLUSH IV SOLN 100 UNIT/ML 100 UNIT/ML
500 SOLUTION INTRAVENOUS AS NEEDED
Status: DISCONTINUED | OUTPATIENT
Start: 2023-12-04 | End: 2023-12-04 | Stop reason: HOSPADM

## 2023-12-04 RX ORDER — SODIUM CHLORIDE 0.9 % (FLUSH) 0.9 %
10 SYRINGE (ML) INJECTION AS NEEDED
Status: DISCONTINUED | OUTPATIENT
Start: 2023-12-04 | End: 2023-12-04 | Stop reason: HOSPADM

## 2023-12-04 RX ADMIN — Medication 10 ML: at 14:04

## 2023-12-04 RX ADMIN — Medication 500 UNITS: at 14:04

## 2023-12-04 NOTE — TELEPHONE ENCOUNTER
Caller: Martina Blake    Relationship to patient: Self    Best call back number: [phone]    Patient is needing: SAMPLES OF XARELTO TODAY AT HER APPT.

## 2023-12-26 DIAGNOSIS — F33.9 CHRONIC RECURRENT MAJOR DEPRESSIVE DISORDER: ICD-10-CM

## 2023-12-27 ENCOUNTER — SPECIALTY PHARMACY (OUTPATIENT)
Dept: PHARMACY | Facility: HOSPITAL | Age: 83
End: 2023-12-27
Payer: MEDICARE

## 2023-12-27 RX ORDER — ESCITALOPRAM OXALATE 20 MG/1
20 TABLET ORAL DAILY
Qty: 30 TABLET | Refills: 11 | Status: SHIPPED | OUTPATIENT
Start: 2023-12-27

## 2023-12-27 NOTE — PROGRESS NOTES
Pt has about one month of Xarelto on-hand.  She received samples from the office.  I will call in about three weeks.     Karen Marin  Specialty Pharmacy Technician

## 2024-01-05 ENCOUNTER — OFFICE VISIT (OUTPATIENT)
Dept: FAMILY MEDICINE CLINIC | Facility: CLINIC | Age: 84
End: 2024-01-05
Payer: MEDICARE

## 2024-01-05 VITALS
SYSTOLIC BLOOD PRESSURE: 118 MMHG | DIASTOLIC BLOOD PRESSURE: 52 MMHG | OXYGEN SATURATION: 97 % | HEIGHT: 62 IN | HEART RATE: 65 BPM | BODY MASS INDEX: 27.05 KG/M2 | WEIGHT: 147 LBS

## 2024-01-05 DIAGNOSIS — G60.9 IDIOPATHIC NEUROPATHY: ICD-10-CM

## 2024-01-05 DIAGNOSIS — N39.0 ACUTE UTI: ICD-10-CM

## 2024-01-05 DIAGNOSIS — E78.5 DYSLIPIDEMIA: ICD-10-CM

## 2024-01-05 DIAGNOSIS — E66.3 OVERWEIGHT (BMI 25.0-29.9): ICD-10-CM

## 2024-01-05 DIAGNOSIS — Z00.00 MEDICARE ANNUAL WELLNESS VISIT, SUBSEQUENT: Primary | ICD-10-CM

## 2024-01-05 DIAGNOSIS — I10 PRIMARY HYPERTENSION: Chronic | ICD-10-CM

## 2024-01-05 RX ORDER — NITROFURANTOIN 25; 75 MG/1; MG/1
100 CAPSULE ORAL 2 TIMES DAILY
Qty: 14 CAPSULE | Refills: 0 | Status: SHIPPED | OUTPATIENT
Start: 2024-01-05

## 2024-01-05 RX ORDER — GABAPENTIN 300 MG/1
900 CAPSULE ORAL
Qty: 270 CAPSULE | Refills: 1 | Status: SHIPPED | OUTPATIENT
Start: 2024-01-05

## 2024-01-05 RX ORDER — SPIRONOLACTONE 25 MG/1
25 TABLET ORAL DAILY
Qty: 90 TABLET | Refills: 3 | Status: SHIPPED | OUTPATIENT
Start: 2024-01-05

## 2024-01-05 NOTE — PROGRESS NOTES
QUICK REFERENCE INFORMATION:  The ABCs of the Annual Wellness Visit    Subsequent Medicare Wellness Visit    HEALTH RISK ASSESSMENT    1940    Recent Hospitalizations:  No hospitalization(s) within the last year..        Current Medical Providers:  Patient Care Team:  Jamie Walton DO as PCP - General  Payton-Jackie Grossman MD as PCP - Ob/Gyn (Obstetrics and Gynecology)  Chivo Iraheta MD as Consulting Physician (Hematology and Oncology)  Justine Barrios MD as Consulting Physician (Cardiology)  Jamie Walton DO as Referring Physician (Family Medicine)  Cynthia Wright PA as Physician Assistant (Obstetrics and Gynecology)    I reviewed list of current Medical providers and suppliers with patient and are listed above to the best of the patient's and my knowledge.    Smoking Status:  Social History     Tobacco Use   Smoking Status Never    Passive exposure: Never   Smokeless Tobacco Never       Alcohol Consumption:  Social History     Substance and Sexual Activity   Alcohol Use No    Comment: Caffeine use: 1 cup daily (decaf)       Depression Screen:       2024     1:00 PM   PHQ-2/PHQ-9 Depression Screening   Little Interest or Pleasure in Doing Things 0-->not at all   Feeling Down, Depressed or Hopeless 0-->not at all   PHQ-9: Brief Depression Severity Measure Score 0       Health Habits and Functional and Cognitive Screenin/5/2024     1:00 PM   Functional & Cognitive Status   Do you have difficulty preparing food and eating? No   Do you have difficulty bathing yourself, getting dressed or grooming yourself? No   Do you have difficulty using the toilet? No   Do you have difficulty moving around from place to place? No   Do you have trouble with steps or getting out of a bed or a chair? No   Current Diet Well Balanced Diet   Dental Exam Up to date   Eye Exam Up to date   Exercise (times per week) 4 times per week   Current Exercises Include Walking   Do you need help  using the phone?  No   Are you deaf or do you have serious difficulty hearing?  No   Do you need help to go to places out of walking distance? No   Do you need help shopping? No   Do you need help preparing meals?  No   Do you need help with housework?  No   Do you need help with laundry? No   Do you need help taking your medications? No   Do you need help managing money? No   Do you ever drive or ride in a car without wearing a seat belt? No   Have you felt unusual stress, anger or loneliness in the last month? No   Who do you live with? Alone   If you need help, do you have trouble finding someone available to you? No   Have you been bothered in the last four weeks by sexual problems? No   Do you have difficulty concentrating, remembering or making decisions? No           Does the patient have evidence of cognitive impairment? No    Aspirin use counseling: Does not need ASA (and currently is not on it)      Recent Lab Results:  CMP:  Lab Results   Component Value Date    BUN 19 10/10/2023    CREATININE 0.75 10/10/2023    EGFRIFNONA 78 11/12/2021    EGFRIFAFRI 97 04/15/2019    BCR 25 10/10/2023     10/10/2023    K 4.3 10/10/2023    CO2 24 10/10/2023    CALCIUM 9.3 10/10/2023    PROTENTOTREF 6.7 10/10/2023    ALBUMIN 4.1 10/10/2023    LABGLOBREF 2.6 10/10/2023    LABIL2 1.6 10/10/2023    BILITOT 0.3 10/10/2023    ALKPHOS 104 10/10/2023    AST 20 10/10/2023    ALT 11 10/10/2023     Lipid Panel:  Lab Results   Component Value Date    CHOL 156 10/12/2018    TRIG 71 10/10/2023    HDL 53 10/10/2023    VLDL 14 10/10/2023    LDLHDL 1.73 10/12/2018     HbA1c:  Lab Results   Component Value Date    HGBA1C 5.4 12/20/2022       Visual Acuity:  No results found.    Age-appropriate Screening Schedule:  Refer to the list below for future screening recommendations based on patient's age, sex and/or medical conditions. Orders for these recommended tests are listed in the plan section. The patient has been provided with a  written plan.    Health Maintenance   Topic Date Due    COVID-19 Vaccine (6 - 2023-24 season) 09/01/2023    DXA SCAN  02/24/2024    LIPID PANEL  10/10/2024    ANNUAL WELLNESS VISIT  01/05/2025    BMI FOLLOWUP  01/05/2025    COLORECTAL CANCER SCREENING  04/13/2028    TDAP/TD VACCINES (3 - Td or Tdap) 09/18/2029    INFLUENZA VACCINE  Completed    Pneumococcal Vaccine 65+  Completed    ZOSTER VACCINE  Completed        Subjective   History of Present Illness    Martina Blake is a 83 y.o. female who presents for an Subsequent Wellness Visit.    The following portions of the patient's history were reviewed and updated as appropriate: allergies, current medications, past family history, past medical history, past social history, past surgical history, and problem list.    Outpatient Medications Prior to Visit   Medication Sig Dispense Refill    acetaminophen (TYLENOL) 500 MG tablet Take 1 tablet by mouth Every 6 (Six) Hours As Needed for Moderate Pain. 90 tablet 3    ACETAMINOPHEN 8 HOUR PO Take  by mouth Every Night.      amLODIPine (NORVASC) 10 MG tablet TAKE 1 TABLET BY MOUTH DAILY 90 tablet 1    Ascorbic Acid (Vitamin C) 500 MG capsule Take  by mouth Daily.      B Complex Vitamins (vitamin b complex) capsule capsule Take 1 capsule by mouth Daily.      benazepril (LOTENSIN) 40 MG tablet TAKE 1 TABLET BY MOUTH DAILY 90 tablet 3    calcium carbonate (OS-PAPA) 600 MG tablet Take 1 tablet by mouth Daily.      cetirizine (zyrTEC) 10 MG tablet TAKE 1 TABLET BY MOUTH EVERY DAY AS NEEDED FOR ALLERGIES 90 tablet 1    Cholecalciferol (VITAMIN D3) 125 MCG (5000 UT) capsule capsule Take 1 capsule by mouth Daily.      escitalopram (LEXAPRO) 20 MG tablet TAKE 1 TABLET BY MOUTH DAILY 30 tablet 11    folic acid (FOLVITE) 400 MCG tablet Take 1 tablet by mouth Daily.      hydrALAZINE (APRESOLINE) 100 MG tablet Take 1 tablet by mouth 2 (Two) Times a Day. 180 tablet 3    melatonin 5 MG tablet tablet Take 1 tablet by mouth Every Night.       pantoprazole (PROTONIX) 40 MG EC tablet Take 1 tablet by mouth Daily. 90 tablet 1    phenytoin ER (DILANTIN) 100 MG capsule TAKE 3 CAPSULES BY MOUTH EVERY NIGHT AT BEDTIME 270 capsule 3    rivaroxaban (Xarelto) 20 MG tablet Take 1 tablet by mouth Daily. Take with food. 28 tablet 0    rosuvastatin (CRESTOR) 5 MG tablet TAKE 1 TABLET BY MOUTH EVERY NIGHT 90 tablet 3    traMADol (ULTRAM) 50 MG tablet Take 1 tablet by mouth Every 6 (Six) Hours As Needed for Moderate Pain . 40 tablet 0    vitamin E 100 UNIT capsule Take 180 Units by mouth Daily.      Zinc Sulfate (ZINC 15 PO) Take 400 mg by mouth.      gabapentin (NEURONTIN) 300 MG capsule Take 3 capsules by mouth every night at bedtime. 270 capsule 0    rivaroxaban (XARELTO) 20 MG tablet Take 1 tablet by mouth Daily. 28 tablet 0    spironolactone (ALDACTONE) 25 MG tablet TAKE 1 TABLET BY MOUTH DAILY 90 tablet 1     No facility-administered medications prior to visit.       Patient Active Problem List   Diagnosis    Blood glucose abnormal    Dyslipidemia    Gastroesophageal reflux disease    Generalized osteoarthritis    Chronic recurrent major depressive disorder    Osteoporosis    Restless legs syndrome    Seizure disorder    Post-menopause on HRT (hormone replacement therapy)    Chronic seasonal allergic rhinitis    Paroxysmal atrial fibrillation    Iron deficiency anemia due to chronic blood loss    Acute superficial gastritis without hemorrhage    Hiatal hernia    Esophagitis    Diverticulosis of large intestine without hemorrhage    Hypertension    Hypercalcemia    Liver mass    Lymphoma    Insomnia    Anemia associated with chemotherapy    Pancytopenia due to antineoplastic chemotherapy    Encounter for adjustment or management of vascular access device    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 Patient does not have an advance directive, information provided.    Identification of Risk Factors:  Risk factors include: Obesity/Overweight .    Review of Systems   Respiratory:  Negative for shortness of breath.    Cardiovascular:  Negative for chest pain, palpitations, orthopnea and PND.   Gastrointestinal:  Negative for vomiting.   Genitourinary:  Positive for frequency and urgency. Negative for flank pain and hematuria.       Compared to one year ago, the patient feels her physical health is the same.  Compared to one year ago, the patient feels her mental health is the same.    Objective     Physical Exam  Vitals and nursing note reviewed.   Constitutional:       Appearance: She is well-developed.   HENT:      Head: Normocephalic and atraumatic.   Neck:      Thyroid: No thyromegaly.      Vascular: No JVD.   Cardiovascular:      Rate and Rhythm: Normal rate and regular rhythm.      Heart sounds: Normal heart sounds. No murmur heard.     No friction rub. No gallop.   Pulmonary:      Effort: Pulmonary effort is normal. No respiratory distress.      Breath sounds: Normal breath sounds. No wheezing or rales.   Abdominal:      General: Bowel sounds are normal. There is no distension.      Palpations: Abdomen is soft.      Tenderness: There is no abdominal tenderness. There is no guarding or rebound.   Musculoskeletal:      Cervical back: Neck supple.   Skin:     General: Skin is warm and dry.   Neurological:      Mental Status: She is alert.   Psychiatric:         Behavior: Behavior normal.         Vitals:    01/05/24 1313   BP: 118/52   Pulse: 65   SpO2: 97%   Weight: 66.7 kg (147 lb)   Height: 157.5 cm (62\")   PainSc: 0-No pain     Body mass index is 26.89 kg/m².    BMI is >= 25 and <30. (Overweight) The following options were offered after " discussion;: weight loss educational material (shared in after visit summary)      Assessment & Plan   Patient Self-Management and Personalized Health Advice  The patient has been provided with information about: diet and exercise and preventive services including:   Annual Wellness Visit (AWV).    Visit Diagnoses:    ICD-10-CM ICD-9-CM   1. Medicare annual wellness visit, subsequent  Z00.00 V70.0   2. Acute UTI  N39.0 599.0   3. Idiopathic neuropathy  G60.9 355.9   4. Overweight (BMI 25.0-29.9)  E66.3 278.02   5. Primary hypertension [I10]  I10 401.9   6. Dyslipidemia [E78.5]  E78.5 272.4       Orders Placed This Encounter   Procedures    Urine Culture - , Urine, Clean Catch     Order Specific Question:   Release to patient     Answer:   Routine Release [[id number]]    Urinalysis With Microscopic - Urine, Clean Catch     Order Specific Question:   Release to patient     Answer:   Routine Release [[id number]]       Outpatient Encounter Medications as of 1/5/2024   Medication Sig Dispense Refill    acetaminophen (TYLENOL) 500 MG tablet Take 1 tablet by mouth Every 6 (Six) Hours As Needed for Moderate Pain. 90 tablet 3    ACETAMINOPHEN 8 HOUR PO Take  by mouth Every Night.      amLODIPine (NORVASC) 10 MG tablet TAKE 1 TABLET BY MOUTH DAILY 90 tablet 1    Ascorbic Acid (Vitamin C) 500 MG capsule Take  by mouth Daily.      B Complex Vitamins (vitamin b complex) capsule capsule Take 1 capsule by mouth Daily.      benazepril (LOTENSIN) 40 MG tablet TAKE 1 TABLET BY MOUTH DAILY 90 tablet 3    calcium carbonate (OS-PAPA) 600 MG tablet Take 1 tablet by mouth Daily.      cetirizine (zyrTEC) 10 MG tablet TAKE 1 TABLET BY MOUTH EVERY DAY AS NEEDED FOR ALLERGIES 90 tablet 1    Cholecalciferol (VITAMIN D3) 125 MCG (5000 UT) capsule capsule Take 1 capsule by mouth Daily.      escitalopram (LEXAPRO) 20 MG tablet TAKE 1 TABLET BY MOUTH DAILY 30 tablet 11    folic acid (FOLVITE) 400 MCG tablet Take 1 tablet by mouth Daily.       gabapentin (NEURONTIN) 300 MG capsule Take 3 capsules by mouth every night at bedtime. 270 capsule 1    hydrALAZINE (APRESOLINE) 100 MG tablet Take 1 tablet by mouth 2 (Two) Times a Day. 180 tablet 3    melatonin 5 MG tablet tablet Take 1 tablet by mouth Every Night.      pantoprazole (PROTONIX) 40 MG EC tablet Take 1 tablet by mouth Daily. 90 tablet 1    phenytoin ER (DILANTIN) 100 MG capsule TAKE 3 CAPSULES BY MOUTH EVERY NIGHT AT BEDTIME 270 capsule 3    rivaroxaban (Xarelto) 20 MG tablet Take 1 tablet by mouth Daily. Take with food. 28 tablet 0    rosuvastatin (CRESTOR) 5 MG tablet TAKE 1 TABLET BY MOUTH EVERY NIGHT 90 tablet 3    spironolactone (ALDACTONE) 25 MG tablet Take 1 tablet by mouth Daily. 90 tablet 3    traMADol (ULTRAM) 50 MG tablet Take 1 tablet by mouth Every 6 (Six) Hours As Needed for Moderate Pain . 40 tablet 0    vitamin E 100 UNIT capsule Take 180 Units by mouth Daily.      Zinc Sulfate (ZINC 15 PO) Take 400 mg by mouth.      [DISCONTINUED] gabapentin (NEURONTIN) 300 MG capsule Take 3 capsules by mouth every night at bedtime. 270 capsule 0    [DISCONTINUED] rivaroxaban (XARELTO) 20 MG tablet Take 1 tablet by mouth Daily. 28 tablet 0    [DISCONTINUED] spironolactone (ALDACTONE) 25 MG tablet TAKE 1 TABLET BY MOUTH DAILY 90 tablet 1    nitrofurantoin, macrocrystal-monohydrate, (Macrobid) 100 MG capsule Take 1 capsule by mouth 2 (Two) Times a Day. 14 capsule 0     No facility-administered encounter medications on file as of 1/5/2024.       Reviewed use of high risk medication in the elderly: yes  Reviewed for potential of harmful drug interactions in the elderly: yes    Social History     Socioeconomic History    Marital status:      Spouse name: POOJA    Number of children: 2   Tobacco Use    Smoking status: Never     Passive exposure: Never    Smokeless tobacco: Never   Vaping Use    Vaping Use: Never used   Substance and Sexual Activity    Alcohol use: No     Comment: Caffeine use: 1  cup daily (decaf)    Drug use: No    Sexual activity: Defer     Birth control/protection: Surgical     Comment:  (Jl)     Patient was screened for OUD and ANTHONY risk factors.  Martina Blake's pain level was assessed as well today.  I included a review current recommendations on pain management, best use practices, current CDC guidelines, alternatives to opioids including OTC & Rx topicals, non-opioid oral medications (eg nsaids, acetominophen) and life style interventions such as yoga, sharon chi, stretching, regular exercise, PT/OT and referral to specialty care like Pain Management that specialize in pain treatment when appropriate.   I also included a review of serious risks of opioid use and substance use disorder.  I have included all of this in the after visit summary along with other risk factors identified that are pertinent to the patient today.    Not taking tramadol, not needed;  on gabapnetin;    Follow Up:  Return in 6 months (on 7/5/2024), or if symptoms worsen or fail to improve.     An After Visit Summary and PPPS with all of these plans were given to the patient.           ++++++++++++++++++++++++++++++++++++++++++++++++++++++++++++++++++   Additional E&M Note during same encounter follows:  Patient has multiple medical problems which are significant and separately identifiable that require additional work above and beyond the Medicare Wellness Visit.   Chief Complaint   Patient presents with    Medicare Wellness-subsequent    Hypertension    Hyperlipidemia     Hypertension  This is a chronic problem. The current episode started more than 1 year ago. The problem is unchanged. The problem is controlled. Pertinent negatives include no chest pain, orthopnea, palpitations, peripheral edema, PND or shortness of breath. The current treatment provides moderate improvement.   Hyperlipidemia  This is a chronic problem. The problem is controlled. Recent lipid tests were reviewed and are normal.  "Pertinent negatives include no chest pain or shortness of breath. Current antihyperlipidemic treatment includes statins. The current treatment provides moderate improvement of lipids.   Urinary Tract Infection   This is a new problem. The current episode started in the past 7 days. The problem occurs constantly. The problem has been unchanged. The quality of the pain is described as burning. The pain is mild. Associated symptoms include frequency and urgency. Pertinent negatives include no flank pain, hematuria or vomiting. She has tried nothing for the symptoms.     Needs gabapentin refilled;  uses for neuropathy after chemo.       Review of Systems   Cardiovascular:  Negative for chest pain, orthopnea, palpitations and paroxysmal nocturnal dyspnea.   Respiratory:  Negative for shortness of breath.    Gastrointestinal:  Negative for vomiting.   Genitourinary:  Positive for frequency and urgency. Negative for flank pain and hematuria.       Social History     Tobacco Use    Smoking status: Never     Passive exposure: Never    Smokeless tobacco: Never   Substance Use Topics    Alcohol use: No     Comment: Caffeine use: 1 cup daily (decaf)     O:   Vitals:    01/05/24 1313   BP: 118/52   Pulse: 65   SpO2: 97%   Weight: 66.7 kg (147 lb)   Height: 157.5 cm (62\")   PainSc: 0-No pain     Body mass index is 26.89 kg/m².  Vitals and nursing note reviewed.   Constitutional:       Appearance: Well-developed.   HENT:      Head: Normocephalic and atraumatic.   Neck:      Thyroid: No thyromegaly.      Vascular: No JVD.   Pulmonary:      Effort: Pulmonary effort is normal. No respiratory distress.      Breath sounds: Normal breath sounds. No wheezing. No rales.   Cardiovascular:      Normal rate. Regular rhythm. Normal heart sounds.      No gallop.  No friction rub.   Abdominal:      General: Bowel sounds are normal. There is no distension.      Palpations: Abdomen is soft.      Tenderness: There is no abdominal tenderness. There " is no guarding or rebound.   Musculoskeletal:      Cervical back: Neck supple. Skin:     General: Skin is warm and dry.   Neurological:      Mental Status: Alert.   Psychiatric:         Behavior: Behavior normal.       Component      Latest Ref Rng 10/10/2023   WBC      3.4 - 10.8 x10E3/uL 5.2    RBC      3.77 - 5.28 x10E6/uL 3.94    Hemoglobin      11.1 - 15.9 g/dL 12.5    Hematocrit      34.0 - 46.6 % 35.8    MCV      79 - 97 fL 91    MCH      26.6 - 33.0 pg 31.7    MCHC      31.5 - 35.7 g/dL 34.9    RDW      11.7 - 15.4 % 11.5 (L)    Platelets      150 - 450 x10E3/uL 163    Neutrophil Rel %      Not Estab. % 61    Lymphocyte Rel %      Not Estab. % 26    Monocyte Rel %      Not Estab. % 10    Eosinophil Rel %      Not Estab. % 2    Basophil Rel %      Not Estab. % 1    Neutrophils Absolute      1.4 - 7.0 x10E3/uL 3.1    Lymphocytes Absolute      0.7 - 3.1 x10E3/uL 1.4    Monocytes Absolute      0.1 - 0.9 x10E3/uL 0.5    Eosinophils Absolute      0.0 - 0.4 x10E3/uL 0.1    Basophils Absolute      0.0 - 0.2 x10E3/uL 0.0    Immature Granulocyte Rel %      Not Estab. % 0    Immature Grans, Absolute      0.0 - 0.1 x10E3/uL 0.0    Glucose      70 - 99 mg/dL 137 (H)    BUN      8 - 27 mg/dL 19    Creatinine      0.57 - 1.00 mg/dL 0.75    EGFR Result      >59 mL/min/1.73 79    BUN/Creatinine Ratio      12 - 28  25    Sodium      134 - 144 mmol/L 138    Potassium      3.5 - 5.2 mmol/L 4.3    Chloride      96 - 106 mmol/L 99    CO2      20 - 29 mmol/L 24    Calcium      8.7 - 10.3 mg/dL 9.3    Total Protein      6.0 - 8.5 g/dL 6.7    Albumin      3.7 - 4.7 g/dL 4.1    Globulin      1.5 - 4.5 g/dL 2.6    A/G Ratio      1.2 - 2.2  1.6    Total Bilirubin      0.0 - 1.2 mg/dL 0.3    Alkaline Phosphatase      44 - 121 IU/L 104    AST (SGOT)      0 - 40 IU/L 20    ALT (SGPT)      0 - 32 IU/L 11    Total Cholesterol      100 - 199 mg/dL 160    Triglycerides      0 - 149 mg/dL 71    HDL Cholesterol      >39 mg/dL 53    VLDL  Cholesterol [place]      5 - 40 mg/dL 14    LDL Cholesterol       0 - 99 mg/dL 93    25 Hydroxy, Vitamin D      30.0 - 100.0 ng/mL 50.9    Magnesium      1.6 - 2.3 mg/dL 2.2       Legend:  (L) Low  (H) High    Diagnoses and all orders for this visit:    1. Medicare annual wellness visit, subsequent (Primary)    2. Acute UTI  -     Urinalysis With Microscopic - Urine, Clean Catch  -     Urine Culture - , Urine, Clean Catch  -     nitrofurantoin, macrocrystal-monohydrate, (Macrobid) 100 MG capsule; Take 1 capsule by mouth 2 (Two) Times a Day.  Dispense: 14 capsule; Refill: 0    3. Idiopathic neuropathy  -     gabapentin (NEURONTIN) 300 MG capsule; Take 3 capsules by mouth every night at bedtime.  Dispense: 270 capsule; Refill: 1    4. Overweight (BMI 25.0-29.9)    5. Primary hypertension [I10]    6. Dyslipidemia [E78.5]    Other orders  -     spironolactone (ALDACTONE) 25 MG tablet; Take 1 tablet by mouth Daily.  Dispense: 90 tablet; Refill: 3    Will start abx and send off urinary studies  The patient has read and signed the [facility] Controlled Substance Contract.  I will continue to see patient for regular follow up appointments.  They are well controlled on their medication.  NAYLA is updated every 3 months. The patient is aware of the potential for addiction and dependence.  Reviewed bp log, 1 low and felt light headed, but none since;  no syncope;   contineu medications  -HLD - continue statin, recheck lipids controlled, will check cmp, lipid profile next ov    Return in 6 months (on 7/5/2024), or if symptoms worsen or fail to improve.

## 2024-01-05 NOTE — PATIENT INSTRUCTIONS
Advance Care Planning and Advance Directives     You make decisions on a daily basis - decisions about where you want to live, your career, your home, your life. Perhaps one of the most important decisions you face is your choice for future medical care. Take time to talk with your family and your healthcare team and start planning today.  Advance Care Planning is a process that can help you:  Understand possible future healthcare decisions in light of your own experiences  Reflect on those decision in light of your goals and values  Discuss your decisions with those closest to you and the healthcare professionals that care for you  Make a plan by creating a document that reflects your wishes    Surrogate Decision Maker  In the event of a medical emergency, which has left you unable to communicate or to make your own decisions, you would need someone to make decisions for you.  It is important to discuss your preferences for medical treatment with this person while you are in good health.     Qualities of a surrogate decision maker:  Willing to take on this role and responsibility  Knows what you want for future medical care  Willing to follow your wishes even if they don't agree with them  Able to make difficult medical decisions under stressful circumstances    Advance Directives  These are legal documents you can create that will guide your healthcare team and decision maker(s) when needed. These documents can be stored in the electronic medical record.    Living Will - a legal document to guide your care if you have a terminal condition or a serious illness and are unable to communicate. States vary by statute in document names/types, but most forms may include one or more of the following:        -  Directions regarding life-prolonging treatments        -  Directions regarding artificially provided nutrition/hydration        -  Choosing a healthcare decision maker        -  Direction regarding organ/tissue  donation    Durable Power of  for Healthcare - this document names an -in-fact to make medical decisions for you, but it may also allow this person to make personal and financial decisions for you. Please seek the advice of an  if you need this type of document.    **Advance Directives are not required and no one may discriminate against you if you do not sign one.    Medical Orders  Many states allow specific forms/orders signed by your physician to record your wishes for medical treatment in your current state of health. This form, signed in personal communication with your physician, addresses resuscitation and other medical interventions that you may or may not want.      For more information or to schedule a time with a [facility] Advance Care Planning Facilitator contact: [facility]/ACP or call [phone] and someone will contact you directly.Calorie Counting for Weight Loss  Calories are units of energy. Your body needs a certain number of calories from food to keep going throughout the day. When you eat or drink more calories than your body needs, your body stores the extra calories mostly as fat. When you eat or drink fewer calories than your body needs, your body burns fat to get the energy it needs.  Calorie counting means keeping track of how many calories you eat and drink each day. Calorie counting can be helpful if you need to lose weight. If you eat fewer calories than your body needs, you should lose weight. Ask your health care provider what a healthy weight is for you.  For calorie counting to work, you will need to eat the right number of calories each day to lose a healthy amount of weight per week. A dietitian can help you figure out how many calories you need in a day and will suggest ways to reach your calorie goal.  A healthy amount of weight to lose each week is usually 1-2 lb (0.5-0.9 kg). This usually means that your daily calorie intake should be  reduced by 500-750 calories.  Eating 1,200-1,500 calories a day can help most women lose weight.  Eating 1,500-1,800 calories a day can help most men lose weight.  What do I need to know about calorie counting?  Work with your health care provider or dietitian to determine how many calories you should get each day. To meet your daily calorie goal, you will need to:  Find out how many calories are in each food that you would like to eat. Try to do this before you eat.  Decide how much of the food you plan to eat.  Keep a food log. Do this by writing down what you ate and how many calories it had.  To successfully lose weight, it is important to balance calorie counting with a healthy lifestyle that includes regular activity.  Where do I find calorie information?  The number of calories in a food can be found on a Nutrition Facts label. If a food does not have a Nutrition Facts label, try to look up the calories online or ask your dietitian for help.  Remember that calories are listed per serving. If you choose to have more than one serving of a food, you will have to multiply the calories per serving by the number of servings you plan to eat. For example, the label on a package of bread might say that a serving size is 1 slice and that there are 90 calories in a serving. If you eat 1 slice, you will have eaten 90 calories. If you eat 2 slices, you will have eaten 180 calories.  How do I keep a food log?  After each time that you eat, record the following in your food log as soon as possible:  What you ate. Be sure to include toppings, sauces, and other extras on the food.  How much you ate. This can be measured in cups, ounces, or number of items.  How many calories were in each food and drink.  The total number of calories in the food you ate.  Keep your food log near you, such as in a pocket-sized notebook or on an torres or website on your mobile phone. Some programs will calculate calories for you and show you how  many calories you have left to meet your daily goal.  What are some portion-control tips?  Know how many calories are in a serving. This will help you know how many servings you can have of a certain food.  Use a measuring cup to measure serving sizes. You could also try weighing out portions on a kitchen scale. With time, you will be able to estimate serving sizes for some foods.  Take time to put servings of different foods on your favorite plates or in your favorite bowls and cups so you know what a serving looks like.  Try not to eat straight from a food's packaging, such as from a bag or box. Eating straight from the package makes it hard to see how much you are eating and can lead to overeating. Put the amount you would like to eat in a cup or on a plate to make sure you are eating the right portion.  Use smaller plates, glasses, and bowls for smaller portions and to prevent overeating.  Try not to multitask. For example, avoid watching TV or using your computer while eating. If it is time to eat, sit down at a table and enjoy your food. This will help you recognize when you are full. It will also help you be more mindful of what and how much you are eating.  What are tips for following this plan?  Reading food labels  Check the calorie count compared with the serving size. The serving size may be smaller than what you are used to eating.  Check the source of the calories. Try to choose foods that are high in protein, fiber, and vitamins, and low in saturated fat, trans fat, and sodium.  Shopping  Read nutrition labels while you shop. This will help you make healthy decisions about which foods to buy.  Pay attention to nutrition labels for low-fat or fat-free foods. These foods sometimes have the same number of calories or more calories than the full-fat versions. They also often have added sugar, starch, or salt to make up for flavor that was removed with the fat.  Make a grocery list of lower-calorie foods  and stick to it.  Cooking  Try to cook your favorite foods in a healthier way. For example, try baking instead of frying.  Use low-fat dairy products.  Meal planning  Use more fruits and vegetables. One-half of your plate should be fruits and vegetables.  Include lean proteins, such as chicken, turkey, and fish.  Lifestyle  Each week, aim to do one of the followin minutes of moderate exercise, such as walking.  75 minutes of vigorous exercise, such as running.  General information  Know how many calories are in the foods you eat most often. This will help you calculate calorie counts faster.  Find a way of tracking calories that works for you. Get creative. Try different apps or programs if writing down calories does not work for you.  What foods should I eat?    Eat nutritious foods. It is better to have a nutritious, high-calorie food, such as an avocado, than a food with few nutrients, such as a bag of potato chips.  Use your calories on foods and drinks that will fill you up and will not leave you hungry soon after eating.  Examples of foods that fill you up are nuts and nut butters, vegetables, lean proteins, and high-fiber foods such as whole grains. High-fiber foods are foods with more than 5 g of fiber per serving.  Pay attention to calories in drinks. Low-calorie drinks include water and unsweetened drinks.  The items listed above may not be a complete list of foods and beverages you can eat. Contact a dietitian for more information.  What foods should I limit?  Limit foods or drinks that are not good sources of vitamins, minerals, or protein or that are high in unhealthy fats. These include:  Candy.  Other sweets.  Sodas, specialty coffee drinks, alcohol, and juice.  The items listed above may not be a complete list of foods and beverages you should avoid. Contact a dietitian for more information.  How do I count calories when eating out?  Pay attention to portions. Often, portions are much larger  when eating out. Try these tips to keep portions smaller:  Consider sharing a meal instead of getting your own.  If you get your own meal, eat only half of it. Before you start eating, ask for a container and put half of your meal into it.  When available, consider ordering smaller portions from the menu instead of full portions.  Pay attention to your food and drink choices. Knowing the way food is cooked and what is included with the meal can help you eat fewer calories.  If calories are listed on the menu, choose the lower-calorie options.  Choose dishes that include vegetables, fruits, whole grains, low-fat dairy products, and lean proteins.  Choose items that are boiled, broiled, grilled, or steamed. Avoid items that are buttered, battered, fried, or served with cream sauce. Items labeled as crispy are usually fried, unless stated otherwise.  Choose water, low-fat milk, unsweetened iced tea, or other drinks without added sugar. If you want an alcoholic beverage, choose a lower-calorie option, such as a glass of wine or light beer.  Ask for dressings, sauces, and syrups on the side. These are usually high in calories, so you should limit the amount you eat.  If you want a salad, choose a garden salad and ask for grilled meats. Avoid extra toppings such as lauren, cheese, or fried items. Ask for the dressing on the side, or ask for olive oil and vinegar or lemon to use as dressing.  Estimate how many servings of a food you are given. Knowing serving sizes will help you be aware of how much food you are eating at restaurants.  Where to find more information  Centers for Disease Control and Prevention: www.cdc.gov  [place] Department of Agriculture: myplate.gov  Summary  Calorie counting means keeping track of how many calories you eat and drink each day. If you eat fewer calories than your body needs, you should lose weight.  A healthy amount of weight to lose per week is usually 1-2 lb (0.5-0.9 kg). This usually  means reducing your daily calorie intake by 500-750 calories.  The number of calories in a food can be found on a Nutrition Facts label. If a food does not have a Nutrition Facts label, try to look up the calories online or ask your dietitian for help.  Use smaller plates, glasses, and bowls for smaller portions and to prevent overeating.  Use your calories on foods and drinks that will fill you up and not leave you hungry shortly after a meal.  This information is not intended to replace advice given to you by your health care provider. Make sure you discuss any questions you have with your health care provider.  Document Revised: 01/28/2021 Document Reviewed: 01/28/2021  [employer] Patient Education © 2022 [employer]    Exercising to Lose Weight  Getting regular exercise is important for everyone. It is especially important if you are overweight. Being overweight increases your risk of heart disease, stroke, diabetes, high blood pressure, and several types of cancer. Exercising, and reducing the calories you consume, can help you lose weight and improve fitness and health.  Exercise can be moderate or vigorous intensity. To lose weight, most people need to do a certain amount of moderate or vigorous-intensity exercise each week.  How can exercise affect me?  You lose weight when you exercise enough to burn more calories than you eat. Exercise also reduces body fat and builds muscle. The more muscle you have, the more calories you burn. Exercise also:  Improves mood.  Reduces stress and tension.  Improves your overall fitness, flexibility, and endurance.  Increases bone strength.  Moderate-intensity exercise  Moderate-intensity exercise is any activity that gets you moving enough to burn at least three times more energy (calories) than if you were sitting.  Examples of moderate exercise include:  Walking a mile in 15 minutes.  Doing light yard work.  Biking at an easy pace.  Most people should get at least 150  minutes of moderate-intensity exercise a week to maintain their body weight.  Vigorous-intensity exercise  Vigorous-intensity exercise is any activity that gets you moving enough to burn at least six times more calories than if you were sitting. When you exercise at this intensity, you should be working hard enough that you are not able to carry on a conversation.  Examples of vigorous exercise include:  Running.  Playing a team sport, such as football, basketball, and soccer.  Jumping rope.  Most people should get at least 75 minutes a week of vigorous exercise to maintain their body weight.  What actions can I take to lose weight?  The amount of exercise you need to lose weight depends on:  Your age.  The type of exercise.  Any health conditions you have.  Your overall physical ability.  Talk to your health care provider about how much exercise you need and what types of activities are safe for you.  Nutrition    Make changes to your diet as told by your health care provider or diet and nutrition specialist (dietitian). This may include:  Eating fewer calories.  Eating more protein.  Eating less unhealthy fats.  Eating a diet that includes fresh fruits and vegetables, whole grains, low-fat dairy products, and lean protein.  Avoiding foods with added fat, salt, and sugar.  Drink plenty of water while you exercise to prevent dehydration or heat stroke.  Activity  Choose an activity that you enjoy and set realistic goals. Your health care provider can help you make an exercise plan that works for you.  Exercise at a moderate or vigorous intensity most days of the week.  The intensity of exercise may vary from person to person. You can tell how intense a workout is for you by paying attention to your breathing and heartbeat. Most people will notice their breathing and heartbeat get faster with more intense exercise.  Do resistance training twice each week, such as:  Push-ups.  Sit-ups.  Lifting weights.  Using  resistance bands.  Getting short amounts of exercise can be just as helpful as long, structured periods of exercise. If you have trouble finding time to exercise, try doing these things as part of your daily routine:  Get up, stretch, and walk around every 30 minutes throughout the day.  Go for a walk during your lunch break.  Park your car farther away from your destination.  If you take public transportation, get off one stop early and walk the rest of the way.  Make phone calls while standing up and walking around.  Take the stairs instead of elevators or escalators.  Wear comfortable clothes and shoes with good support.  Do not exercise so much that you hurt yourself, feel dizzy, or get very short of breath.  Where to find more information  [place] Department of Health and Human Services: www.hhs.gov  Centers for Disease Control and Prevention: www.cdc.gov  Contact a health care provider:  Before starting a new exercise program.  If you have questions or concerns about your weight.  If you have a medical problem that keeps you from exercising.  Get help right away if:  You have any of the following while exercising:  Injury.  Dizziness.  Difficulty breathing or shortness of breath that does not go away when you stop exercising.  Chest pain.  Rapid heartbeat.  These symptoms may represent a serious problem that is an emergency. Do not wait to see if the symptoms will go away. Get medical help right away. Call your local emergency services (911 in the [place] Do not drive yourself to the hospital.  Summary  Getting regular exercise is especially important if you are overweight.  Being overweight increases your risk of heart disease, stroke, diabetes, high blood pressure, and several types of cancer.  Losing weight happens when you burn more calories than you eat.  Reducing the amount of calories you eat, and getting regular moderate or vigorous exercise each week, helps you lose weight.  This information is not  intended to replace advice given to you by your health care provider. Make sure you discuss any questions you have with your health care provider.  Document Revised: 02/13/2022 Document Reviewed: 02/13/2022  [employer] Patient Education © 2022 [employer]

## 2024-01-10 LAB
APPEARANCE UR: ABNORMAL
BACTERIA #/AREA URNS HPF: ABNORMAL /[HPF]
BACTERIA UR CULT: ABNORMAL
BACTERIA UR CULT: ABNORMAL
BILIRUB UR QL STRIP: NEGATIVE
CASTS URNS QL MICRO: ABNORMAL /LPF
COLOR UR: YELLOW
EPI CELLS #/AREA URNS HPF: ABNORMAL /HPF (ref 0–10)
GLUCOSE UR QL STRIP: NEGATIVE
HGB UR QL STRIP: ABNORMAL
KETONES UR QL STRIP: NEGATIVE
LEUKOCYTE ESTERASE UR QL STRIP: ABNORMAL
MICRO URNS: ABNORMAL
MUCOUS THREADS URNS QL MICRO: PRESENT
NITRITE UR QL STRIP: NEGATIVE
OTHER ANTIBIOTIC SUSC ISLT: ABNORMAL
PH UR STRIP: 6 [PH] (ref 5–7.5)
PROT UR QL STRIP: ABNORMAL
RBC #/AREA URNS HPF: >30 /HPF (ref 0–2)
SP GR UR STRIP: 1.02 (ref 1–1.03)
UROBILINOGEN UR STRIP-MCNC: 0.2 MG/DL (ref 0.2–1)
WBC #/AREA URNS HPF: ABNORMAL /HPF (ref 0–5)

## 2024-01-15 ENCOUNTER — SPECIALTY PHARMACY (OUTPATIENT)
Dept: PHARMACY | Facility: HOSPITAL | Age: 84
End: 2024-01-15
Payer: MEDICARE

## 2024-01-15 NOTE — PROGRESS NOTES
Patient dis-enrolled from the MTM program.  Patient is not in need of the Xarelto at this time.  When she does need it, she will call and have it sent in to her local [facility].     Karen Marin  Specialty Pharmacy Technician

## 2024-01-17 ENCOUNTER — APPOINTMENT (OUTPATIENT)
Dept: LAB | Facility: HOSPITAL | Age: 84
End: 2024-01-17
Payer: MEDICARE

## 2024-01-17 ENCOUNTER — INFUSION (OUTPATIENT)
Dept: ONCOLOGY | Facility: HOSPITAL | Age: 84
End: 2024-01-17
Payer: MEDICARE

## 2024-01-17 DIAGNOSIS — Z45.2 ENCOUNTER FOR ADJUSTMENT OR MANAGEMENT OF VASCULAR ACCESS DEVICE: Primary | ICD-10-CM

## 2024-01-17 PROCEDURE — 96523 IRRIG DRUG DELIVERY DEVICE: CPT

## 2024-01-17 PROCEDURE — 25010000002 HEPARIN LOCK FLUSH PER 10 UNITS: Performed by: INTERNAL MEDICINE

## 2024-01-17 RX ORDER — SODIUM CHLORIDE 0.9 % (FLUSH) 0.9 %
10 SYRINGE (ML) INJECTION AS NEEDED
Status: DISCONTINUED | OUTPATIENT
Start: 2024-01-17 | End: 2024-01-17 | Stop reason: HOSPADM

## 2024-01-17 RX ORDER — HEPARIN SODIUM (PORCINE) LOCK FLUSH IV SOLN 100 UNIT/ML 100 UNIT/ML
500 SOLUTION INTRAVENOUS AS NEEDED
Status: DISCONTINUED | OUTPATIENT
Start: 2024-01-17 | End: 2024-01-17 | Stop reason: HOSPADM

## 2024-01-17 RX ADMIN — Medication 10 ML: at 12:36

## 2024-01-17 RX ADMIN — Medication 500 UNITS: at 12:36

## 2024-02-09 ENCOUNTER — TELEPHONE (OUTPATIENT)
Dept: FAMILY MEDICINE CLINIC | Facility: CLINIC | Age: 84
End: 2024-02-09

## 2024-02-09 RX ORDER — CEPHALEXIN 500 MG/1
500 CAPSULE ORAL 2 TIMES DAILY
Qty: 14 CAPSULE | Refills: 0 | Status: SHIPPED | OUTPATIENT
Start: 2024-02-09

## 2024-02-09 NOTE — TELEPHONE ENCOUNTER
Caller: Martina Blake    Relationship: Self    Best call back number: [phone]    What is the best time to reach you: ANYTIME     Who are you requesting to speak with (clinical staff, provider,  specific staff member): CLINICAL STAFF     Do you know the name of the person who called: SELF     What was the call regarding: PATIENT CALLED AND STATED SHE HAS A POSSIBLE BLADDER INFECTION WITH SOME BURNING. PATIENT WOULD LIKE MEDICATION CALLED INTO TODAY TO [facility] IN [facility].     Is it okay if the provider responds through MyChart: NO

## 2024-02-12 RX ORDER — PANTOPRAZOLE SODIUM 40 MG/1
40 TABLET, DELAYED RELEASE ORAL DAILY
Qty: 90 TABLET | Refills: 1 | Status: SHIPPED | OUTPATIENT
Start: 2024-02-12

## 2024-02-23 ENCOUNTER — TELEPHONE (OUTPATIENT)
Dept: ONCOLOGY | Facility: CLINIC | Age: 84
End: 2024-02-23
Payer: MEDICARE

## 2024-02-23 NOTE — TELEPHONE ENCOUNTER
Caller Name: Martina Blake  Relationship: Self  Best Contact Number:     [phone]     Patient is requesting samples of XARELTO  How many days of medication do you have left? ONLY A FEW     Additional Information: PT WOULD LIKE TO PICK SAMPLES UP WHEN SHE COMES IN FOR HER PORT FLUSH ON MONDAY 02/26/24

## 2024-02-23 NOTE — TELEPHONE ENCOUNTER
Called the patient to let her know we had 2 bottles or 2 weeks and that I would place the order with her port flush appt. Patient v/u.

## 2024-02-26 ENCOUNTER — INFUSION (OUTPATIENT)
Dept: ONCOLOGY | Facility: HOSPITAL | Age: 84
End: 2024-02-26
Payer: MEDICARE

## 2024-02-26 DIAGNOSIS — Z45.2 ENCOUNTER FOR ADJUSTMENT OR MANAGEMENT OF VASCULAR ACCESS DEVICE: Primary | ICD-10-CM

## 2024-02-26 PROCEDURE — 25010000002 HEPARIN LOCK FLUSH PER 10 UNITS: Performed by: INTERNAL MEDICINE

## 2024-02-26 PROCEDURE — 96523 IRRIG DRUG DELIVERY DEVICE: CPT

## 2024-02-26 RX ORDER — SODIUM CHLORIDE 0.9 % (FLUSH) 0.9 %
10 SYRINGE (ML) INJECTION AS NEEDED
Status: DISCONTINUED | OUTPATIENT
Start: 2024-02-26 | End: 2024-02-26 | Stop reason: HOSPADM

## 2024-02-26 RX ORDER — HEPARIN SODIUM (PORCINE) LOCK FLUSH IV SOLN 100 UNIT/ML 100 UNIT/ML
500 SOLUTION INTRAVENOUS AS NEEDED
Status: DISCONTINUED | OUTPATIENT
Start: 2024-02-26 | End: 2024-02-26 | Stop reason: HOSPADM

## 2024-02-26 RX ADMIN — Medication 10 ML: at 14:00

## 2024-02-26 RX ADMIN — Medication 500 UNITS: at 14:00

## 2024-03-11 ENCOUNTER — TELEPHONE (OUTPATIENT)
Dept: FAMILY MEDICINE CLINIC | Facility: CLINIC | Age: 84
End: 2024-03-11
Payer: MEDICARE

## 2024-03-11 RX ORDER — HYDRALAZINE HYDROCHLORIDE 100 MG/1
100 TABLET, FILM COATED ORAL 3 TIMES DAILY
Qty: 270 TABLET | Refills: 3 | Status: SHIPPED | OUTPATIENT
Start: 2024-03-11

## 2024-03-11 RX ORDER — PHENYTOIN SODIUM 100 MG/1
300 CAPSULE, EXTENDED RELEASE ORAL
Qty: 270 CAPSULE | Refills: 1 | Status: SHIPPED | OUTPATIENT
Start: 2024-03-11

## 2024-03-11 NOTE — TELEPHONE ENCOUNTER
Caller: Martina Blake    Relationship: Self    Best call back number: [phone]      What is the best time to reach you: ANY TIME    Who are you requesting to speak with (clinical staff, provider,  specific staff member): CLINICAL STAFF    What was the call regarding: PATIENT STATES THAT SHE WOULD LIKE A CALLBACK TO DISCUSS INCREASING HER HYDRALAZINE MEDICATION BACK TO THREE TABLETS DAILY.    Is it okay if the provider responds through [employer]:     PLEASE ADVISE.

## 2024-03-14 ENCOUNTER — TELEPHONE (OUTPATIENT)
Dept: ONCOLOGY | Facility: CLINIC | Age: 84
End: 2024-03-14
Payer: MEDICARE

## 2024-03-14 NOTE — TELEPHONE ENCOUNTER
Pt asked if she could  samples of Xarelto 20mg on Tuesday. Advised her we did have it in stock and we would put some back for her. She v/u. Message sent to scheduling.

## 2024-03-29 ENCOUNTER — TELEPHONE (OUTPATIENT)
Dept: ONCOLOGY | Facility: CLINIC | Age: 84
End: 2024-03-29
Payer: MEDICARE

## 2024-03-29 NOTE — TELEPHONE ENCOUNTER
Pt called stating she would like to  Xarelto samples on Wednesday 4/3. We only have a 2 week supply at this time. Pt v/u and said she would be back on 4/8 for a port flush and wondering if she could be added on for that time as well. Advised her we could do this. Message sent to scheduling.

## 2024-04-01 RX ORDER — AMLODIPINE BESYLATE 10 MG/1
10 TABLET ORAL DAILY
Qty: 90 TABLET | Refills: 1 | Status: SHIPPED | OUTPATIENT
Start: 2024-04-01

## 2024-04-03 ENCOUNTER — OFFICE (OUTPATIENT)
Dept: URBAN - METROPOLITAN AREA CLINIC 66 | Facility: CLINIC | Age: 84
End: 2024-04-03

## 2024-04-03 VITALS
SYSTOLIC BLOOD PRESSURE: 125 MMHG | HEIGHT: 64 IN | HEART RATE: 71 BPM | DIASTOLIC BLOOD PRESSURE: 62 MMHG | WEIGHT: 146 LBS | RESPIRATION RATE: 16 BRPM

## 2024-04-03 DIAGNOSIS — K21.9 GASTRO-ESOPHAGEAL REFLUX DISEASE WITHOUT ESOPHAGITIS: ICD-10-CM

## 2024-04-03 DIAGNOSIS — K58.9 IRRITABLE BOWEL SYNDROME WITHOUT DIARRHEA: ICD-10-CM

## 2024-04-03 PROCEDURE — 99213 OFFICE O/P EST LOW 20 MIN: CPT | Performed by: INTERNAL MEDICINE

## 2024-04-03 RX ORDER — PANTOPRAZOLE 40 MG/1
TABLET, DELAYED RELEASE ORAL
Qty: 90 | Refills: 3 | Status: ACTIVE

## 2024-04-08 ENCOUNTER — INFUSION (OUTPATIENT)
Dept: ONCOLOGY | Facility: HOSPITAL | Age: 84
End: 2024-04-08
Payer: MEDICARE

## 2024-04-08 ENCOUNTER — APPOINTMENT (OUTPATIENT)
Dept: LAB | Facility: HOSPITAL | Age: 84
End: 2024-04-08
Payer: MEDICARE

## 2024-04-08 ENCOUNTER — HOSPITAL ENCOUNTER (OUTPATIENT)
Dept: BONE DENSITY | Facility: HOSPITAL | Age: 84
Discharge: HOME | End: 2024-04-08
Admitting: INTERNAL MEDICINE
Payer: MEDICARE

## 2024-04-08 DIAGNOSIS — M85.89 OSTEOPENIA OF MULTIPLE SITES: ICD-10-CM

## 2024-04-08 DIAGNOSIS — Z45.2 ENCOUNTER FOR ADJUSTMENT OR MANAGEMENT OF VASCULAR ACCESS DEVICE: Primary | ICD-10-CM

## 2024-04-08 PROCEDURE — 96523 IRRIG DRUG DELIVERY DEVICE: CPT

## 2024-04-08 PROCEDURE — 25010000002 HEPARIN LOCK FLUSH PER 10 UNITS: Performed by: INTERNAL MEDICINE

## 2024-04-08 PROCEDURE — 77080 DXA BONE DENSITY AXIAL: CPT

## 2024-04-08 RX ORDER — SODIUM CHLORIDE 0.9 % (FLUSH) 0.9 %
10 SYRINGE (ML) INJECTION AS NEEDED
Status: DISCONTINUED | OUTPATIENT
Start: 2024-04-08 | End: 2024-04-08 | Stop reason: HOSPADM

## 2024-04-08 RX ORDER — HEPARIN SODIUM (PORCINE) LOCK FLUSH IV SOLN 100 UNIT/ML 100 UNIT/ML
500 SOLUTION INTRAVENOUS AS NEEDED
Status: DISCONTINUED | OUTPATIENT
Start: 2024-04-08 | End: 2024-04-08 | Stop reason: HOSPADM

## 2024-04-08 RX ADMIN — Medication 10 ML: at 13:37

## 2024-04-08 RX ADMIN — Medication 500 UNITS: at 13:37

## 2024-04-10 ENCOUNTER — TRANSCRIBE ORDERS (OUTPATIENT)
Dept: ADMINISTRATIVE | Facility: HOSPITAL | Age: 84
End: 2024-04-10
Payer: MEDICARE

## 2024-04-10 DIAGNOSIS — Z12.31 SCREENING MAMMOGRAM, ENCOUNTER FOR: Primary | ICD-10-CM

## 2024-04-25 RX ORDER — ROSUVASTATIN CALCIUM 5 MG/1
5 TABLET, COATED ORAL NIGHTLY
Qty: 90 TABLET | Refills: 0 | Status: SHIPPED | OUTPATIENT
Start: 2024-04-25
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
## 2024-04-26 ENCOUNTER — OFFICE VISIT (OUTPATIENT)
Dept: ONCOLOGY | Facility: CLINIC | Age: 84
End: 2024-04-26
Payer: MEDICARE

## 2024-04-26 ENCOUNTER — INFUSION (OUTPATIENT)
Dept: ONCOLOGY | Facility: HOSPITAL | Age: 84
End: 2024-04-26
Payer: MEDICARE

## 2024-04-26 VITALS
DIASTOLIC BLOOD PRESSURE: 70 MMHG | HEIGHT: 62 IN | OXYGEN SATURATION: 99 % | HEART RATE: 67 BPM | TEMPERATURE: 97.3 F | BODY MASS INDEX: 26.94 KG/M2 | SYSTOLIC BLOOD PRESSURE: 137 MMHG | WEIGHT: 146.4 LBS | RESPIRATION RATE: 16 BRPM

## 2024-04-26 DIAGNOSIS — M85.89 OSTEOPENIA OF MULTIPLE SITES: ICD-10-CM

## 2024-04-26 DIAGNOSIS — C83.39 DIFFUSE LARGE B-CELL LYMPHOMA OF EXTRANODAL SITE EXCLUDING SPLEEN AND OTHER SOLID ORGANS: ICD-10-CM

## 2024-04-26 DIAGNOSIS — Z45.2 ENCOUNTER FOR ADJUSTMENT OR MANAGEMENT OF VASCULAR ACCESS DEVICE: Primary | ICD-10-CM

## 2024-04-26 DIAGNOSIS — D50.0 IRON DEFICIENCY ANEMIA DUE TO CHRONIC BLOOD LOSS: ICD-10-CM

## 2024-04-26 DIAGNOSIS — E83.52 HYPERCALCEMIA: Primary | ICD-10-CM

## 2024-04-26 LAB
ALBUMIN SERPL-MCNC: 3.8 G/DL (ref 3.5–5.2)
ALBUMIN/GLOB SERPL: 1.2 G/DL
ALP SERPL-CCNC: 115 U/L (ref 39–117)
ALT SERPL W P-5'-P-CCNC: 9 U/L (ref 1–33)
ANION GAP SERPL CALCULATED.3IONS-SCNC: 9.9 MMOL/L (ref 5–15)
AST SERPL-CCNC: 22 U/L (ref 1–32)
BASOPHILS # BLD AUTO: 0.03 10*3/MM3 (ref 0–0.2)
BASOPHILS NFR BLD AUTO: 0.4 % (ref 0–1.5)
BILIRUB SERPL-MCNC: 0.3 MG/DL (ref 0–1.2)
BUN SERPL-MCNC: 15 MG/DL (ref 8–23)
BUN/CREAT SERPL: 25 (ref 7–25)
CALCIUM SPEC-SCNC: 8.9 MG/DL (ref 8.6–10.5)
CHLORIDE SERPL-SCNC: 103 MMOL/L (ref 98–107)
CO2 SERPL-SCNC: 26.1 MMOL/L (ref 22–29)
CREAT SERPL-MCNC: 0.6 MG/DL (ref 0.57–1)
DEPRECATED RDW RBC AUTO: 43.3 FL (ref 37–54)
EGFRCR SERPLBLD CKD-EPI 2021: 89.2 ML/MIN/1.73
EOSINOPHIL # BLD AUTO: 0.11 10*3/MM3 (ref 0–0.4)
EOSINOPHIL NFR BLD AUTO: 1.4 % (ref 0.3–6.2)
ERYTHROCYTE [DISTWIDTH] IN BLOOD BY AUTOMATED COUNT: 12.5 % (ref 12.3–15.4)
GLOBULIN UR ELPH-MCNC: 3.2 GM/DL
GLUCOSE SERPL-MCNC: 83 MG/DL (ref 65–99)
HCT VFR BLD AUTO: 37.9 % (ref 34–46.6)
HGB BLD-MCNC: 12.5 G/DL (ref 12–15.9)
IMM GRANULOCYTES # BLD AUTO: 0.02 10*3/MM3 (ref 0–0.05)
IMM GRANULOCYTES NFR BLD AUTO: 0.3 % (ref 0–0.5)
LDH SERPL-CCNC: 165 U/L (ref 135–214)
LYMPHOCYTES # BLD AUTO: 1.22 10*3/MM3 (ref 0.7–3.1)
LYMPHOCYTES NFR BLD AUTO: 15.3 % (ref 19.6–45.3)
MCH RBC QN AUTO: 30.9 PG (ref 26.6–33)
MCHC RBC AUTO-ENTMCNC: 33 G/DL (ref 31.5–35.7)
MCV RBC AUTO: 93.8 FL (ref 79–97)
MONOCYTES # BLD AUTO: 0.88 10*3/MM3 (ref 0.1–0.9)
MONOCYTES NFR BLD AUTO: 11 % (ref 5–12)
NEUTROPHILS NFR BLD AUTO: 5.72 10*3/MM3 (ref 1.7–7)
NEUTROPHILS NFR BLD AUTO: 71.6 % (ref 42.7–76)
NRBC BLD AUTO-RTO: 0 /100 WBC (ref 0–0.2)
PLATELET # BLD AUTO: 165 10*3/MM3 (ref 140–450)
PMV BLD AUTO: 9.8 FL (ref 6–12)
POTASSIUM SERPL-SCNC: 4.1 MMOL/L (ref 3.5–5.2)
PROT SERPL-MCNC: 7 G/DL (ref 6–8.5)
RBC # BLD AUTO: 4.04 10*6/MM3 (ref 3.77–5.28)
SODIUM SERPL-SCNC: 139 MMOL/L (ref 136–145)
WBC NRBC COR # BLD AUTO: 7.98 10*3/MM3 (ref 3.4–10.8)

## 2024-04-26 PROCEDURE — 3078F DIAST BP <80 MM HG: CPT | Performed by: INTERNAL MEDICINE

## 2024-04-26 PROCEDURE — 25010000002 HEPARIN LOCK FLUSH PER 10 UNITS: Performed by: INTERNAL MEDICINE

## 2024-04-26 PROCEDURE — 36591 DRAW BLOOD OFF VENOUS DEVICE: CPT

## 2024-04-26 PROCEDURE — 85025 COMPLETE CBC W/AUTO DIFF WBC: CPT

## 2024-04-26 PROCEDURE — 80053 COMPREHEN METABOLIC PANEL: CPT

## 2024-04-26 PROCEDURE — 83615 LACTATE (LD) (LDH) ENZYME: CPT

## 2024-04-26 PROCEDURE — 99213 OFFICE O/P EST LOW 20 MIN: CPT | Performed by: INTERNAL MEDICINE

## 2024-04-26 PROCEDURE — 3075F SYST BP GE 130 - 139MM HG: CPT | Performed by: INTERNAL MEDICINE

## 2024-04-26 PROCEDURE — 1125F AMNT PAIN NOTED PAIN PRSNT: CPT | Performed by: INTERNAL MEDICINE

## 2024-04-26 RX ORDER — HEPARIN SODIUM (PORCINE) LOCK FLUSH IV SOLN 100 UNIT/ML 100 UNIT/ML
500 SOLUTION INTRAVENOUS AS NEEDED
Status: DISCONTINUED | OUTPATIENT
Start: 2024-04-26 | End: 2024-04-26 | Stop reason: HOSPADM

## 2024-04-26 RX ORDER — SODIUM CHLORIDE 0.9 % (FLUSH) 0.9 %
10 SYRINGE (ML) INJECTION AS NEEDED
Status: DISCONTINUED | OUTPATIENT
Start: 2024-04-26 | End: 2024-04-26 | Stop reason: HOSPADM

## 2024-04-26 RX ADMIN — Medication 500 UNITS: at 12:57

## 2024-04-26 RX ADMIN — Medication 10 ML: at 12:57
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
## 2024-04-30 ENCOUNTER — TELEPHONE (OUTPATIENT)
Dept: OTHER | Facility: HOSPITAL | Age: 84
End: 2024-04-30
Payer: MEDICARE

## 2024-04-30 NOTE — TELEPHONE ENCOUNTER
Returned call to pt. She would like to  Xarelto samples tomorrow if possible. Samples obtained and message sent to scheduling.

## 2024-06-03 ENCOUNTER — HOSPITAL ENCOUNTER (OUTPATIENT)
Dept: MAMMOGRAPHY | Facility: HOSPITAL | Age: 84
Discharge: HOME | End: 2024-06-03
Admitting: FAMILY MEDICINE
Payer: MEDICARE

## 2024-06-03 DIAGNOSIS — Z12.31 SCREENING MAMMOGRAM, ENCOUNTER FOR: ICD-10-CM

## 2024-06-03 PROCEDURE — 77067 SCR MAMMO BI INCL CAD: CPT

## 2024-06-03 PROCEDURE — 77063 BREAST TOMOSYNTHESIS BI: CPT

## 2024-06-05 ENCOUNTER — OFFICE VISIT (OUTPATIENT)
Dept: CARDIOLOGY | Facility: CLINIC | Age: 84
End: 2024-06-05
Payer: MEDICARE

## 2024-06-05 VITALS
HEIGHT: 62 IN | BODY MASS INDEX: 26.5 KG/M2 | DIASTOLIC BLOOD PRESSURE: 70 MMHG | SYSTOLIC BLOOD PRESSURE: 130 MMHG | WEIGHT: 144 LBS | HEART RATE: 63 BPM

## 2024-06-05 DIAGNOSIS — I48.0 PAROXYSMAL ATRIAL FIBRILLATION: Primary | ICD-10-CM

## 2024-06-05 DIAGNOSIS — I44.4 LAFB (LEFT ANTERIOR FASCICULAR BLOCK): ICD-10-CM

## 2024-06-05 DIAGNOSIS — I10 PRIMARY HYPERTENSION: Chronic | ICD-10-CM

## 2024-06-05 DIAGNOSIS — E78.5 DYSLIPIDEMIA: ICD-10-CM

## 2024-06-05 PROCEDURE — 99214 OFFICE O/P EST MOD 30 MIN: CPT | Performed by: INTERNAL MEDICINE

## 2024-06-05 PROCEDURE — 3075F SYST BP GE 130 - 139MM HG: CPT | Performed by: INTERNAL MEDICINE

## 2024-06-05 PROCEDURE — 1160F RVW MEDS BY RX/DR IN RCRD: CPT | Performed by: INTERNAL MEDICINE

## 2024-06-05 PROCEDURE — 3078F DIAST BP <80 MM HG: CPT | Performed by: INTERNAL MEDICINE

## 2024-06-05 PROCEDURE — 1159F MED LIST DOCD IN RCRD: CPT | Performed by: INTERNAL MEDICINE

## 2024-06-05 PROCEDURE — 93000 ELECTROCARDIOGRAM COMPLETE: CPT | Performed by: INTERNAL MEDICINE

## 2024-06-05 NOTE — PROGRESS NOTES
Date of Office Visit: 2024  Encounter Provider: Justine Barrios MD  Place of Service: [facility] CARDIOLOGY  Patient Name: Martina Blake  :1940      Patient ID:  Martina Blake is a 84 y.o. female is here for  followup for cardiac risks         History of Present Illness    She has a past medical history of hypertension, dyslipidemia, esophageal reflux, seizure disorder, history of large B-cell lymphoma, and vitamin D deficiency.     She was hospitalized in May 2018 and chemotherapy was initiated.  Her apixiban was discontinued due to thrombocytopenia.  She was placed on metoprolol tartrate 50 mg twice daily for A. fib with rapid ventricular response, occurring in the setting of hypokalemia, hypomagnesemia, hypercalcemia, and anemia.  She was placed on metoprolol.  She had an upper and lower endoscopy showing hiatal hernia, gastritis, esophagitis, and diverticulosis with no acute bleeding.  She was started on apixiban and and spironolactone.  She had a normal Cardiolite stress test in 2018 and an echocardiogram at that time revealed an EF of 67%, grade 2 diastolic dysfunction, severe left atrial enlargement, mild to moderate tricuspid insufficiency, and RVSP elevated at 44 mmHg.       She has large B-cell lymphoma diffuse and is now in remission, was on R-CHOP (rituximab, doxorubicin, vincristine and cyclophosphamide) finished 18 with Dr. Iraheta.      Echocardiogram done 18 shows ejection fraction 59% with global longitudinal strain of -18%.  There was grade 2 diastolic dysfunction and no significant valve disease.     Dr. Iraheta is following her platelets and has stopped her Xarelto.  He stopped her spironolactone over the summer because of thrombocytopenia.  Her PET scan showed no recurrence of cancer.  In 2021, her blood pressure was elevated. The patient felt like the spironolactone really controlled her blood pressure and the  "metoprolol did not.  After speaking with Dr. Iraheta , her spironolactone was restarted and stopped her metoprolol.      She had normal lower extremity venous duplex studies done 11/9/2021.  She had CT chest abdomen and pelvis done 11/9/2021 which showed no recurrence of lymphoma.  I did review the CT images and there is calcification noted in the proximal LAD at the takeoff of a diagonal branch, no calcification in the left main, the RCA is not well visualized, there is no pericardial effusion, the descending thoracic aorta also has diffuse calcification.     Labs on 4/26/24 show normal CMP, normal CBC.  Labs on 10/10/2023 showed total cholesterol 160, HDL 53, LDL 93, triglycerides 71.    Past Medical History:   Diagnosis Date    Anemia     multifactorial    Cystitis     Cystocele     moderate    Drug therapy     Dyslipidemia     Esophageal reflux     Fatigue     History of transfusion     no reaction    Hypercalcemia     Hypertension     Major depression, chronic     Menopause     Non Hodgkin's lymphoma     Osteoarthritis     Osteoporosis     Palpitations     Paroxysmal atrial fibrillation     Post menopausal problems     RLS (restless legs syndrome)     Seizure disorder     Sinus bradycardia     Subjective tinnitus     Vaginal delivery     x2  \"SONYA\"      \"JASON\"    Vitamin D deficiency          Past Surgical History:   Procedure Laterality Date    CERVICAL LYMPH NODE BIOPSY/EXCISION Left 5/1/2018    Procedure: CERVICAL LYMPH NODE BIOPSY/EXCISION;  Surgeon: Danny Oconnor MD;  Location: [facility] OR;  Service: ENT    CHOLECYSTECTOMY      COLONOSCOPY      COLONOSCOPY N/A 4/13/2018    Procedure: COLONOSCOPY to terminal ileum;  Surgeon: Dominik Burt MD;  Location: [facility] ENDOSCOPY;  Service: Gastroenterology    CRANIOTOMY      ENDOSCOPY N/A 4/13/2018    Procedure: ESOPHAGOGASTRODUODENOSCOPY with biopsy;  Surgeon: Dominik Burt MD;  Location: [facility] ENDOSCOPY;  Service: Gastroenterology    TOTAL ABDOMINAL " HYSTERECTOMY  1980    w/ bladder suspension.  Probably vaginal hysterectomy with anterior colporrhaphy.     VENOUS ACCESS DEVICE (PORT) INSERTION N/A 5/4/2018    Procedure: INSERTION VENOUS ACCESS DEVICE;  Surgeon: Jamie Gill MD;  Location: [facility] OR;  Service: General    WRIST SURGERY Left        Current Outpatient Medications on File Prior to Visit   Medication Sig Dispense Refill    acetaminophen (TYLENOL) 500 MG tablet Take 1 tablet by mouth Every 6 (Six) Hours As Needed for Moderate Pain. 90 tablet 3    amLODIPine (NORVASC) 10 MG tablet TAKE 1 TABLET BY MOUTH DAILY 90 tablet 1    Ascorbic Acid (Vitamin C) 500 MG capsule Take  by mouth Daily.      B Complex Vitamins (vitamin b complex) capsule capsule Take 1 capsule by mouth Daily.      benazepril (LOTENSIN) 40 MG tablet TAKE 1 TABLET BY MOUTH DAILY 90 tablet 3    calcium carbonate (OS-PAPA) 600 MG tablet Take 1 tablet by mouth Daily.      cetirizine (zyrTEC) 10 MG tablet TAKE 1 TABLET BY MOUTH EVERY DAY AS NEEDED FOR ALLERGIES 90 tablet 1    Cholecalciferol (VITAMIN D3) 125 MCG (5000 UT) capsule capsule Take 1 capsule by mouth Daily.      escitalopram (LEXAPRO) 20 MG tablet TAKE 1 TABLET BY MOUTH DAILY 30 tablet 11    folic acid (FOLVITE) 400 MCG tablet Take 1 tablet by mouth Daily.      gabapentin (NEURONTIN) 300 MG capsule Take 3 capsules by mouth every night at bedtime. 270 capsule 1    hydrALAZINE (APRESOLINE) 100 MG tablet Take 1 tablet by mouth 3 (Three) Times a Day. 270 tablet 3    melatonin 5 MG tablet tablet Take 1 tablet by mouth Every Night.      pantoprazole (PROTONIX) 40 MG EC tablet TAKE 1 TABLET BY MOUTH DAILY 90 tablet 1    phenytoin ER (DILANTIN) 100 MG capsule TAKE 3 CAPSULES BY MOUTH EVERY NIGHT AT BEDTIME 270 capsule 1    rivaroxaban (Xarelto) 20 MG tablet Take 1 tablet by mouth Daily. Take with food. 28 tablet 0    rosuvastatin (CRESTOR) 5 MG tablet TAKE 1 TABLET BY MOUTH EVERY NIGHT 90 tablet 0    spironolactone (ALDACTONE) 25  "MG tablet Take 1 tablet by mouth Daily. 90 tablet 3    traMADol (ULTRAM) 50 MG tablet Take 1 tablet by mouth Every 6 (Six) Hours As Needed for Moderate Pain . 40 tablet 0    vitamin E 100 UNIT capsule Take 180 Units by mouth Daily.      Zinc Sulfate (ZINC 15 PO) Take 400 mg by mouth.      [DISCONTINUED] ACETAMINOPHEN 8 HOUR PO Take  by mouth Every Night. (Patient not taking: Reported on 6/5/2024)      [DISCONTINUED] cephalexin (KEFLEX) 500 MG capsule Take 1 capsule by mouth 2 (Two) Times a Day. (Patient not taking: Reported on 6/5/2024) 14 capsule 0    [DISCONTINUED] nitrofurantoin, macrocrystal-monohydrate, (Macrobid) 100 MG capsule Take 1 capsule by mouth 2 (Two) Times a Day. (Patient not taking: Reported on 6/5/2024) 14 capsule 0     No current facility-administered medications on file prior to visit.       Social History     Socioeconomic History    Marital status:      Spouse name: JL    Number of children: 2   Tobacco Use    Smoking status: Never     Passive exposure: Never    Smokeless tobacco: Never   Vaping Use    Vaping status: Never Used   Substance and Sexual Activity    Alcohol use: No     Comment: Caffeine use: 1 cup daily (decaf)    Drug use: No    Sexual activity: Defer     Birth control/protection: Surgical     Comment:  (Jl)             Procedures    ECG 12 Lead    Date/Time: 6/5/2024 12:58 PM  Performed by: Jusitne Barrios MD    Authorized by: Justine Barrios MD  Comparison: compared with previous ECG   Similar to previous ECG  Rhythm: sinus rhythm  Conduction: left anterior fascicular block  Other findings: left ventricular hypertrophy    Clinical impression: abnormal EKG              Objective:      Vitals:    06/05/24 1251   BP: 130/70   Pulse: 63   Weight: 65.3 kg (144 lb)   Height: 157.5 cm (62\")     Body mass index is 26.34 kg/m².    Vitals reviewed.   Constitutional:       General: Not in acute distress.     Appearance: Not diaphoretic.   Neck:      " Vascular: No carotid bruit or JVD.   Pulmonary:      Effort: Pulmonary effort is normal.      Breath sounds: Normal breath sounds.   Cardiovascular:      Normal rate. Regular rhythm.      Murmurs: There is no murmur.      No gallop.  No rub.   Pulses:     Intact distal pulses.      Carotid: 2+ bilaterally.     Radial: 2+ bilaterally.     Dorsalis pedis: 2+ bilaterally.     Posterior tibial: 2+ bilaterally.  Edema:     Peripheral edema absent.   Neurological:      Cranial Nerves: No cranial nerve deficit.         Lab Review:       Assessment:      Diagnosis Plan   1. Paroxysmal atrial fibrillation        2. Primary hypertension        3. LAFB (left anterior fascicular block)        4. Dyslipidemia          PAF - on xarelto and can remain on this as long as her platelet counts are greater than 50,000.   Hypertension - BP goal is <120/80.   Large B cell lymphoma - s/p R-CHOP, stopped 8/2018 -sees Dr. Iraheta.   H/o DVT, on xarelto.   Thrombocytopenia, on prednisone.  This has resolved.  Coronary artery calcification noted on CT chest.     Plan:     See back in 1 year, no medication changes, overall doing well.

## 2024-06-13 ENCOUNTER — INFUSION (OUTPATIENT)
Dept: ONCOLOGY | Facility: HOSPITAL | Age: 84
End: 2024-06-13
Payer: MEDICARE

## 2024-06-13 ENCOUNTER — APPOINTMENT (OUTPATIENT)
Dept: LAB | Facility: HOSPITAL | Age: 84
End: 2024-06-13
Payer: MEDICARE

## 2024-06-13 DIAGNOSIS — Z45.2 ENCOUNTER FOR ADJUSTMENT OR MANAGEMENT OF VASCULAR ACCESS DEVICE: Primary | ICD-10-CM

## 2024-06-13 PROCEDURE — 96523 IRRIG DRUG DELIVERY DEVICE: CPT

## 2024-06-13 PROCEDURE — 25010000002 HEPARIN LOCK FLUSH PER 10 UNITS: Performed by: INTERNAL MEDICINE

## 2024-06-13 RX ORDER — SODIUM CHLORIDE 0.9 % (FLUSH) 0.9 %
10 SYRINGE (ML) INJECTION AS NEEDED
Status: DISCONTINUED | OUTPATIENT
Start: 2024-06-13 | End: 2024-06-13 | Stop reason: HOSPADM

## 2024-06-13 RX ORDER — HEPARIN SODIUM (PORCINE) LOCK FLUSH IV SOLN 100 UNIT/ML 100 UNIT/ML
500 SOLUTION INTRAVENOUS AS NEEDED
Status: DISCONTINUED | OUTPATIENT
Start: 2024-06-13 | End: 2024-06-13 | Stop reason: HOSPADM

## 2024-06-13 RX ADMIN — Medication 500 UNITS: at 13:22

## 2024-06-13 RX ADMIN — Medication 10 ML: at 13:22

## 2024-06-14 RX ORDER — SPIRONOLACTONE 25 MG/1
25 TABLET ORAL DAILY
Qty: 90 TABLET | Refills: 3 | Status: SHIPPED | OUTPATIENT
Start: 2024-06-14

## 2024-07-12 ENCOUNTER — OFFICE VISIT (OUTPATIENT)
Dept: FAMILY MEDICINE CLINIC | Facility: CLINIC | Age: 84
End: 2024-07-12
Payer: MEDICARE

## 2024-07-12 VITALS
OXYGEN SATURATION: 99 % | WEIGHT: 144 LBS | BODY MASS INDEX: 26.5 KG/M2 | HEIGHT: 62 IN | HEART RATE: 69 BPM | SYSTOLIC BLOOD PRESSURE: 110 MMHG | DIASTOLIC BLOOD PRESSURE: 56 MMHG

## 2024-07-12 DIAGNOSIS — R42 DIZZINESS: ICD-10-CM

## 2024-07-12 DIAGNOSIS — I10 PRIMARY HYPERTENSION: Primary | ICD-10-CM

## 2024-07-12 DIAGNOSIS — E78.5 DYSLIPIDEMIA: ICD-10-CM

## 2024-07-12 PROCEDURE — 3074F SYST BP LT 130 MM HG: CPT | Performed by: FAMILY MEDICINE

## 2024-07-12 PROCEDURE — 1125F AMNT PAIN NOTED PAIN PRSNT: CPT | Performed by: FAMILY MEDICINE

## 2024-07-12 PROCEDURE — 1160F RVW MEDS BY RX/DR IN RCRD: CPT | Performed by: FAMILY MEDICINE

## 2024-07-12 PROCEDURE — 99214 OFFICE O/P EST MOD 30 MIN: CPT | Performed by: FAMILY MEDICINE

## 2024-07-12 PROCEDURE — 1159F MED LIST DOCD IN RCRD: CPT | Performed by: FAMILY MEDICINE

## 2024-07-12 PROCEDURE — 3078F DIAST BP <80 MM HG: CPT | Performed by: FAMILY MEDICINE

## 2024-07-12 RX ORDER — VIBEGRON 75 MG/1
1 TABLET, FILM COATED ORAL DAILY
COMMUNITY
Start: 2024-07-08

## 2024-07-12 RX ORDER — AMLODIPINE BESYLATE 10 MG/1
10 TABLET ORAL NIGHTLY
Qty: 90 TABLET | Refills: 1 | Status: SHIPPED | OUTPATIENT
Start: 2024-07-12

## 2024-07-12 RX ORDER — HYDRALAZINE HYDROCHLORIDE 100 MG/1
100 TABLET, FILM COATED ORAL 2 TIMES DAILY
Start: 2024-07-12

## 2024-07-12 NOTE — PROGRESS NOTES
Subjective   Martina Blake is a 84 y.o. female. Presents today for   Chief Complaint   Patient presents with    Hypertension    Hyperlipidemia    Dizziness     Mornings only         Hypertension  Pertinent negatives include no chest pain or shortness of breath.   Hyperlipidemia  Pertinent negatives include no chest pain or shortness of breath.   Dizziness  Pertinent negatives include no abdominal pain or chest pain.     Patient 83 y/o female with htn, hld;  doing well;   in am feels light headed when gets up quickly;   no cp/soa;  no abd pain;       Review of Systems   Respiratory:  Negative for shortness of breath.    Cardiovascular:  Negative for chest pain.   Gastrointestinal:  Negative for abdominal pain.   Neurological:  Positive for dizziness and light-headedness.       Patient Active Problem List   Diagnosis    Blood glucose abnormal    Dyslipidemia    Gastroesophageal reflux disease    Generalized osteoarthritis    Chronic recurrent major depressive disorder    Osteoporosis    Restless legs syndrome    Seizure disorder    Post-menopause on HRT (hormone replacement therapy)    Chronic seasonal allergic rhinitis    Paroxysmal atrial fibrillation    Iron deficiency anemia due to chronic blood loss    Acute superficial gastritis without hemorrhage    Hiatal hernia    Esophagitis    Diverticulosis of large intestine without hemorrhage    Hypertension    Hypercalcemia    Liver mass    Lymphoma    Insomnia    Anemia associated with chemotherapy    Pancytopenia due to antineoplastic chemotherapy    Encounter for adjustment or management of vascular access device    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    Thrombocytopenia    Bone metastasis    Osteopenia of multiple sites    Acute ITP    LAFB (left anterior fascicular block)    High risk medication use    Long-term use of high-risk medication       Social History     Socioeconomic History    Marital status:       Spouse name: JL    Number of children: 2   Tobacco Use    Smoking status: Never     Passive exposure: Never    Smokeless tobacco: Never   Vaping Use    Vaping status: Never Used   Substance and Sexual Activity    Alcohol use: No     Comment: Caffeine use: 1 cup daily (decaf)    Drug use: No    Sexual activity: Defer     Birth control/protection: Surgical     Comment:  (Jl)       Allergies   Allergen Reactions    Crab (Diagnostic) Itching and Rash    Pseudoephedrine Dizziness       Current Outpatient Medications on File Prior to Visit   Medication Sig Dispense Refill    acetaminophen (TYLENOL) 500 MG tablet Take 1 tablet by mouth Every 6 (Six) Hours As Needed for Moderate Pain. 90 tablet 3    amLODIPine (NORVASC) 10 MG tablet TAKE 1 TABLET BY MOUTH DAILY 90 tablet 1    Ascorbic Acid (Vitamin C) 500 MG capsule Take  by mouth Daily.      B Complex Vitamins (vitamin b complex) capsule capsule Take 1 capsule by mouth Daily.      benazepril (LOTENSIN) 40 MG tablet TAKE 1 TABLET BY MOUTH DAILY 90 tablet 3    calcium carbonate (OS-PAPA) 600 MG tablet Take 1 tablet by mouth Daily.      cetirizine (zyrTEC) 10 MG tablet TAKE 1 TABLET BY MOUTH EVERY DAY AS NEEDED FOR ALLERGIES 90 tablet 1    Cholecalciferol (VITAMIN D3) 125 MCG (5000 UT) capsule capsule Take 1 capsule by mouth Daily.      escitalopram (LEXAPRO) 20 MG tablet TAKE 1 TABLET BY MOUTH DAILY 30 tablet 11    folic acid (FOLVITE) 400 MCG tablet Take 1 tablet by mouth Daily.      gabapentin (NEURONTIN) 300 MG capsule Take 3 capsules by mouth every night at bedtime. 270 capsule 1    Gemtesa 75 MG tablet Take 1 tablet by mouth Daily.      hydrALAZINE (APRESOLINE) 100 MG tablet Take 1 tablet by mouth 3 (Three) Times a Day. 270 tablet 3    melatonin 5 MG tablet tablet Take 1 tablet by mouth Every Night.      pantoprazole (PROTONIX) 40 MG EC tablet TAKE 1 TABLET BY MOUTH DAILY 90 tablet 1    phenytoin ER (DILANTIN) 100 MG capsule TAKE 3 CAPSULES BY  "MOUTH EVERY NIGHT AT BEDTIME 270 capsule 1    rivaroxaban (Xarelto) 20 MG tablet Take 1 tablet by mouth Daily. Take with food. 28 tablet 0    rosuvastatin (CRESTOR) 5 MG tablet TAKE 1 TABLET BY MOUTH EVERY NIGHT 90 tablet 0    spironolactone (ALDACTONE) 25 MG tablet Take 1 tablet by mouth Daily. 90 tablet 3    traMADol (ULTRAM) 50 MG tablet Take 1 tablet by mouth Every 6 (Six) Hours As Needed for Moderate Pain . 40 tablet 0    vitamin E 100 UNIT capsule Take 180 Units by mouth Daily.      Zinc Sulfate (ZINC 15 PO) Take 400 mg by mouth.       No current facility-administered medications on file prior to visit.       Objective   Vitals:    07/12/24 1319   BP: 110/56   Pulse: 69   SpO2: 99%   Weight: 65.3 kg (144 lb)   Height: 157.5 cm (62\")     Body mass index is 26.34 kg/m².    Physical Exam  Vitals and nursing note reviewed.   Constitutional:       Appearance: She is well-developed.   HENT:      Head: Normocephalic and atraumatic.   Neck:      Thyroid: No thyromegaly.      Vascular: No JVD.   Cardiovascular:      Rate and Rhythm: Normal rate and regular rhythm.      Heart sounds: Normal heart sounds. No murmur heard.     No friction rub. No gallop.   Pulmonary:      Effort: Pulmonary effort is normal. No respiratory distress.      Breath sounds: Normal breath sounds. No wheezing or rales.   Abdominal:      General: Bowel sounds are normal. There is no distension.      Palpations: Abdomen is soft.      Tenderness: There is no abdominal tenderness. There is no guarding or rebound.   Musculoskeletal:      Cervical back: Neck supple.   Skin:     General: Skin is warm and dry.   Neurological:      Mental Status: She is alert.      Gait: Gait normal.   Psychiatric:         Behavior: Behavior normal.         Assessment & Plan   Diagnoses and all orders for this visit:    1. Primary hypertension [I10] (Primary)    2. Dyslipidemia [E78.5]    3. Dizziness    Other orders  -     hydrALAZINE (APRESOLINE) 100 MG tablet; Take 1 " tablet by mouth 2 (Two) Times a Day.    Try taking amlodipine at bedtime to see if light headed sensation in am better (take most meds am).  Apresoline reports only taking bid  -HLD - continue statin, recheck lipids.                   -Follow up: 6 to 8 weeks and prn

## 2024-07-18 ENCOUNTER — APPOINTMENT (OUTPATIENT)
Dept: LAB | Facility: HOSPITAL | Age: 84
End: 2024-07-18
Payer: MEDICARE

## 2024-07-18 ENCOUNTER — INFUSION (OUTPATIENT)
Dept: ONCOLOGY | Facility: HOSPITAL | Age: 84
End: 2024-07-18
Payer: MEDICARE

## 2024-07-18 DIAGNOSIS — Z45.2 ENCOUNTER FOR ADJUSTMENT OR MANAGEMENT OF VASCULAR ACCESS DEVICE: Primary | ICD-10-CM

## 2024-07-18 DIAGNOSIS — Z79.899 LONG-TERM USE OF HIGH-RISK MEDICATION: ICD-10-CM

## 2024-07-18 DIAGNOSIS — M81.0 AGE-RELATED OSTEOPOROSIS WITHOUT CURRENT PATHOLOGICAL FRACTURE: ICD-10-CM

## 2024-07-18 LAB
BASOPHILS # BLD AUTO: 0.03 10*3/MM3 (ref 0–0.2)
BASOPHILS NFR BLD AUTO: 0.3 % (ref 0–1.5)
DEPRECATED RDW RBC AUTO: 42.5 FL (ref 37–54)
EOSINOPHIL # BLD AUTO: 0.1 10*3/MM3 (ref 0–0.4)
EOSINOPHIL NFR BLD AUTO: 1.1 % (ref 0.3–6.2)
ERYTHROCYTE [DISTWIDTH] IN BLOOD BY AUTOMATED COUNT: 12.4 % (ref 12.3–15.4)
HCT VFR BLD AUTO: 36.5 % (ref 34–46.6)
HGB BLD-MCNC: 12.1 G/DL (ref 12–15.9)
IMM GRANULOCYTES # BLD AUTO: 0.02 10*3/MM3 (ref 0–0.05)
IMM GRANULOCYTES NFR BLD AUTO: 0.2 % (ref 0–0.5)
LYMPHOCYTES # BLD AUTO: 0.86 10*3/MM3 (ref 0.7–3.1)
LYMPHOCYTES NFR BLD AUTO: 9.8 % (ref 19.6–45.3)
MAGNESIUM SERPL-MCNC: 2.1 MG/DL (ref 1.6–2.4)
MCH RBC QN AUTO: 30.9 PG (ref 26.6–33)
MCHC RBC AUTO-ENTMCNC: 33.2 G/DL (ref 31.5–35.7)
MCV RBC AUTO: 93.4 FL (ref 79–97)
MONOCYTES # BLD AUTO: 0.92 10*3/MM3 (ref 0.1–0.9)
MONOCYTES NFR BLD AUTO: 10.5 % (ref 5–12)
NEUTROPHILS NFR BLD AUTO: 6.81 10*3/MM3 (ref 1.7–7)
NEUTROPHILS NFR BLD AUTO: 78.1 % (ref 42.7–76)
NRBC BLD AUTO-RTO: 0 /100 WBC (ref 0–0.2)
PLATELET # BLD AUTO: 164 10*3/MM3 (ref 140–450)
PMV BLD AUTO: 9.4 FL (ref 6–12)
RBC # BLD AUTO: 3.91 10*6/MM3 (ref 3.77–5.28)
WBC NRBC COR # BLD AUTO: 8.74 10*3/MM3 (ref 3.4–10.8)

## 2024-07-18 PROCEDURE — 25010000002 HEPARIN LOCK FLUSH PER 10 UNITS: Performed by: INTERNAL MEDICINE

## 2024-07-18 PROCEDURE — 85025 COMPLETE CBC W/AUTO DIFF WBC: CPT

## 2024-07-18 RX ORDER — SODIUM CHLORIDE 0.9 % (FLUSH) 0.9 %
10 SYRINGE (ML) INJECTION AS NEEDED
Status: DISCONTINUED | OUTPATIENT
Start: 2024-07-18 | End: 2024-07-18 | Stop reason: HOSPADM

## 2024-07-18 RX ORDER — HEPARIN SODIUM (PORCINE) LOCK FLUSH IV SOLN 100 UNIT/ML 100 UNIT/ML
500 SOLUTION INTRAVENOUS AS NEEDED
Status: DISCONTINUED | OUTPATIENT
Start: 2024-07-18 | End: 2024-07-18 | Stop reason: HOSPADM

## 2024-07-18 RX ADMIN — Medication 500 UNITS: at 12:23

## 2024-07-18 RX ADMIN — Medication 10 ML: at 12:23

## 2024-07-19 LAB
ALBUMIN SERPL-MCNC: 4.4 G/DL (ref 3.7–4.7)
ALP SERPL-CCNC: 122 IU/L (ref 44–121)
ALT SERPL-CCNC: 14 IU/L (ref 0–32)
AST SERPL-CCNC: 18 IU/L (ref 0–40)
BILIRUB SERPL-MCNC: 0.2 MG/DL (ref 0–1.2)
BUN SERPL-MCNC: 16 MG/DL (ref 8–27)
BUN/CREAT SERPL: 21 (ref 12–28)
CALCIUM SERPL-MCNC: 9.2 MG/DL (ref 8.7–10.3)
CHLORIDE SERPL-SCNC: 99 MMOL/L (ref 96–106)
CHOLEST SERPL-MCNC: 143 MG/DL (ref 100–199)
CO2 SERPL-SCNC: 18 MMOL/L (ref 20–29)
CREAT SERPL-MCNC: 0.75 MG/DL (ref 0.57–1)
EGFRCR SERPLBLD CKD-EPI 2021: 78 ML/MIN/1.73
GLOBULIN SER CALC-MCNC: 2.6 G/DL (ref 1.5–4.5)
GLUCOSE SERPL-MCNC: ABNORMAL MG/DL
HDLC SERPL-MCNC: 47 MG/DL
LDLC SERPL CALC-MCNC: 76 MG/DL (ref 0–99)
POTASSIUM SERPL-SCNC: ABNORMAL MMOL/L
PROT SERPL-MCNC: 7 G/DL (ref 6–8.5)
SODIUM SERPL-SCNC: 136 MMOL/L (ref 134–144)
TRIGL SERPL-MCNC: 110 MG/DL (ref 0–149)
VLDLC SERPL CALC-MCNC: 20 MG/DL (ref 5–40)

## 2024-08-02 RX ORDER — ROSUVASTATIN CALCIUM 5 MG/1
5 TABLET, COATED ORAL NIGHTLY
Qty: 90 TABLET | Refills: 0 | Status: SHIPPED | OUTPATIENT
Start: 2024-08-02

## 2024-08-07 ENCOUNTER — TELEPHONE (OUTPATIENT)
Dept: ONCOLOGY | Facility: CLINIC | Age: 84
End: 2024-08-07
Payer: MEDICARE

## 2024-08-07 DIAGNOSIS — M81.0 AGE-RELATED OSTEOPOROSIS WITHOUT CURRENT PATHOLOGICAL FRACTURE: Primary | ICD-10-CM

## 2024-08-07 DIAGNOSIS — Z79.899 LONG-TERM USE OF HIGH-RISK MEDICATION: ICD-10-CM

## 2024-08-07 NOTE — TELEPHONE ENCOUNTER
Pt called stating that she had a tooth extraction on 7/29 and is scheduled for reclast on 9/12. She is wondering if we need to move this out? She also questioned if she could push her port flush out to 8 weeks? D/W Dr. Iraheta. Per Dr. Iraheta, pt should move her relcast apt out 3 months. OK to wait 8 weeks between port flushes. Informed pt and she v/u. Message sent to scheduling.

## 2024-08-22 ENCOUNTER — APPOINTMENT (OUTPATIENT)
Dept: LAB | Facility: HOSPITAL | Age: 84
End: 2024-08-22
Payer: MEDICARE

## 2024-08-22 ENCOUNTER — INFUSION (OUTPATIENT)
Dept: ONCOLOGY | Facility: HOSPITAL | Age: 84
End: 2024-08-22
Payer: MEDICARE

## 2024-08-22 DIAGNOSIS — Z45.2 ENCOUNTER FOR ADJUSTMENT OR MANAGEMENT OF VASCULAR ACCESS DEVICE: Primary | ICD-10-CM

## 2024-08-22 PROCEDURE — 96523 IRRIG DRUG DELIVERY DEVICE: CPT

## 2024-08-22 PROCEDURE — 25010000002 HEPARIN LOCK FLUSH PER 10 UNITS: Performed by: INTERNAL MEDICINE

## 2024-08-22 RX ORDER — SODIUM CHLORIDE 0.9 % (FLUSH) 0.9 %
10 SYRINGE (ML) INJECTION AS NEEDED
Status: DISCONTINUED | OUTPATIENT
Start: 2024-08-22 | End: 2024-08-22 | Stop reason: HOSPADM

## 2024-08-22 RX ORDER — HEPARIN SODIUM (PORCINE) LOCK FLUSH IV SOLN 100 UNIT/ML 100 UNIT/ML
500 SOLUTION INTRAVENOUS AS NEEDED
Status: DISCONTINUED | OUTPATIENT
Start: 2024-08-22 | End: 2024-08-22 | Stop reason: HOSPADM

## 2024-08-22 RX ORDER — SODIUM CHLORIDE 0.9 % (FLUSH) 0.9 %
10 SYRINGE (ML) INJECTION AS NEEDED
OUTPATIENT
Start: 2024-08-22

## 2024-08-22 RX ORDER — HEPARIN SODIUM (PORCINE) LOCK FLUSH IV SOLN 100 UNIT/ML 100 UNIT/ML
500 SOLUTION INTRAVENOUS AS NEEDED
OUTPATIENT
Start: 2024-08-22

## 2024-08-22 RX ADMIN — Medication 500 UNITS: at 12:53

## 2024-08-22 RX ADMIN — Medication 10 ML: at 12:53

## 2024-09-06 ENCOUNTER — OFFICE VISIT (OUTPATIENT)
Dept: FAMILY MEDICINE CLINIC | Facility: CLINIC | Age: 84
End: 2024-09-06
Payer: MEDICARE

## 2024-09-06 VITALS
SYSTOLIC BLOOD PRESSURE: 92 MMHG | HEIGHT: 62 IN | WEIGHT: 143 LBS | BODY MASS INDEX: 26.31 KG/M2 | DIASTOLIC BLOOD PRESSURE: 52 MMHG | HEART RATE: 76 BPM | OXYGEN SATURATION: 98 %

## 2024-09-06 DIAGNOSIS — I10 PRIMARY HYPERTENSION: Primary | ICD-10-CM

## 2024-09-06 DIAGNOSIS — I95.1 ORTHOSTATIC HYPOTENSION: ICD-10-CM

## 2024-09-06 PROBLEM — Z90.49 S/P LAPAROSCOPIC CHOLECYSTECTOMY: Status: ACTIVE | Noted: 2024-09-06

## 2024-09-06 PROCEDURE — 1125F AMNT PAIN NOTED PAIN PRSNT: CPT | Performed by: FAMILY MEDICINE

## 2024-09-06 PROCEDURE — 99214 OFFICE O/P EST MOD 30 MIN: CPT | Performed by: FAMILY MEDICINE

## 2024-09-06 PROCEDURE — 3078F DIAST BP <80 MM HG: CPT | Performed by: FAMILY MEDICINE

## 2024-09-06 PROCEDURE — 3074F SYST BP LT 130 MM HG: CPT | Performed by: FAMILY MEDICINE

## 2024-09-06 RX ORDER — SPIRONOLACTONE 25 MG/1
25 TABLET ORAL DAILY
Qty: 90 TABLET | Refills: 3 | Status: SHIPPED | OUTPATIENT
Start: 2024-09-06

## 2024-09-06 RX ORDER — AMLODIPINE BESYLATE 5 MG/1
5 TABLET ORAL NIGHTLY
Qty: 90 TABLET | Refills: 3 | Status: SHIPPED | OUTPATIENT
Start: 2024-09-06

## 2024-09-06 NOTE — PROGRESS NOTES
Subjective   Martina Blake is a 84 y.o. female. Presents today for   Chief Complaint   Patient presents with    Hypertension       Hypertension  Pertinent negatives include no chest pain, palpitations or shortness of breath.   Patient 85 y/o female had resistant htn at one point and so on several medications, but developed orthostatic symptoms quite severe and more so in am;  I had her start takign her amlodipien in evening and this has helped some, but still running low with dizziness with position changes;  no cp/soa;  no syncope    Review of Systems   Respiratory:  Negative for shortness of breath.    Cardiovascular:  Negative for chest pain and palpitations.   Neurological:  Positive for dizziness and light-headedness.       Patient Active Problem List   Diagnosis    Blood glucose abnormal    Dyslipidemia    Gastroesophageal reflux disease    Generalized osteoarthritis    Chronic recurrent major depressive disorder    Osteoporosis    Restless legs syndrome    Seizure disorder    Post-menopause on HRT (hormone replacement therapy)    Chronic seasonal allergic rhinitis    Paroxysmal atrial fibrillation    Iron deficiency anemia due to chronic blood loss    Acute superficial gastritis without hemorrhage    Hiatal hernia    Esophagitis    Diverticulosis of large intestine without hemorrhage    Hypertension    Hypercalcemia    Liver mass    Lymphoma    Insomnia    Anemia associated with chemotherapy    Pancytopenia due to antineoplastic chemotherapy    Encounter for adjustment or management of vascular access device    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    Thrombocytopenia    Bone metastasis    Osteopenia of multiple sites    Acute ITP    LAFB (left anterior fascicular block)    High risk medication use    Long-term use of high-risk medication    S/P laparoscopic cholecystectomy       Social History     Socioeconomic History    Marital status:      Spouse  name: JL    Number of children: 2   Tobacco Use    Smoking status: Never     Passive exposure: Never    Smokeless tobacco: Never   Vaping Use    Vaping status: Never Used   Substance and Sexual Activity    Alcohol use: No     Comment: Caffeine use: 1 cup daily (decaf)    Drug use: No    Sexual activity: Defer     Birth control/protection: Surgical     Comment:  (Jl)       Allergies   Allergen Reactions    Crab (Diagnostic) Itching and Rash    Pseudoephedrine Dizziness       Current Outpatient Medications on File Prior to Visit   Medication Sig Dispense Refill    acetaminophen (TYLENOL) 500 MG tablet Take 1 tablet by mouth Every 6 (Six) Hours As Needed for Moderate Pain. 90 tablet 3    Ascorbic Acid (Vitamin C) 500 MG capsule Take  by mouth Daily.      B Complex Vitamins (vitamin b complex) capsule capsule Take 1 capsule by mouth Daily.      benazepril (LOTENSIN) 40 MG tablet TAKE 1 TABLET BY MOUTH DAILY 90 tablet 3    calcium carbonate (OS-PAPA) 600 MG tablet Take 1 tablet by mouth Daily.      cetirizine (zyrTEC) 10 MG tablet TAKE 1 TABLET BY MOUTH EVERY DAY AS NEEDED FOR ALLERGIES 90 tablet 1    Cholecalciferol (VITAMIN D3) 125 MCG (5000 UT) capsule capsule Take 1 capsule by mouth Daily.      escitalopram (LEXAPRO) 20 MG tablet TAKE 1 TABLET BY MOUTH DAILY 30 tablet 11    folic acid (FOLVITE) 400 MCG tablet Take 1 tablet by mouth Daily.      gabapentin (NEURONTIN) 300 MG capsule Take 3 capsules by mouth every night at bedtime. 270 capsule 1    Gemtesa 75 MG tablet Take 1 tablet by mouth Daily.      hydrALAZINE (APRESOLINE) 100 MG tablet Take 1 tablet by mouth 2 (Two) Times a Day.      melatonin 5 MG tablet tablet Take 1 tablet by mouth Every Night.      pantoprazole (PROTONIX) 40 MG EC tablet TAKE 1 TABLET BY MOUTH DAILY 90 tablet 1    phenytoin ER (DILANTIN) 100 MG capsule TAKE 3 CAPSULES BY MOUTH EVERY NIGHT AT BEDTIME 270 capsule 1    rivaroxaban (Xarelto) 20 MG tablet Take 1 tablet by mouth Daily.  "Take with food. 28 tablet 0    rosuvastatin (CRESTOR) 5 MG tablet TAKE 1 TABLET BY MOUTH EVERY NIGHT 90 tablet 0    traMADol (ULTRAM) 50 MG tablet Take 1 tablet by mouth Every 6 (Six) Hours As Needed for Moderate Pain . 40 tablet 0    vitamin E 100 UNIT capsule Take 180 Units by mouth Daily.      Zinc Sulfate (ZINC 15 PO) Take 400 mg by mouth.      [DISCONTINUED] amLODIPine (NORVASC) 10 MG tablet Take 1 tablet by mouth Every Night. 90 tablet 1    [DISCONTINUED] spironolactone (ALDACTONE) 25 MG tablet Take 1 tablet by mouth Daily. 90 tablet 3     No current facility-administered medications on file prior to visit.       Objective   Vitals:    09/06/24 1309   BP: 92/52   Pulse: 76   SpO2: 98%   Weight: 64.9 kg (143 lb)   Height: 157.5 cm (62\")     Body mass index is 26.16 kg/m².    Physical Exam  Vitals and nursing note reviewed.   Constitutional:       Appearance: Normal appearance. She is not toxic-appearing or diaphoretic.   HENT:      Head: Normocephalic and atraumatic.   Musculoskeletal:      Cervical back: Neck supple.   Skin:     General: Skin is warm and dry.      Capillary Refill: Capillary refill takes less than 2 seconds.   Neurological:      Mental Status: She is alert.   Psychiatric:         Mood and Affect: Mood normal.         Behavior: Behavior normal.       Component      Latest Ref Rng 7/18/2024   Glucose      mg/dL TNP    BUN      8 - 27 mg/dL 16    Creatinine      0.57 - 1.00 mg/dL 0.75    EGFR Result      >59 mL/min/1.73 78    BUN/Creatinine Ratio      12 - 28  21    Sodium      134 - 144 mmol/L 136    Potassium      mmol/L TNP    Chloride      96 - 106 mmol/L 99    CO2      20 - 29 mmol/L 18 (L)    Calcium      8.7 - 10.3 mg/dL 9.2    Total Protein      6.0 - 8.5 g/dL 7.0    Albumin      3.7 - 4.7 g/dL 4.4    Globulin      1.5 - 4.5 g/dL 2.6    Total Bilirubin      0.0 - 1.2 mg/dL 0.2    Alkaline Phosphatase      44 - 121 IU/L 122 (H)    AST (SGOT)      0 - 40 IU/L 18    ALT (SGPT)      0 - 32 " IU/L 14       Legend:  (L) Low  (H) High    Assessment & Plan   Diagnoses and all orders for this visit:    1. Primary hypertension (Primary)  -     amLODIPine (NORVASC) 5 MG tablet; Take 1 tablet by mouth Every Night.  Dispense: 90 tablet; Refill: 3  -     spironolactone (ALDACTONE) 25 MG tablet; Take 1 tablet by mouth Daily.  Dispense: 90 tablet; Refill: 3    2. Orthostatic hypotension      Will cut amlodipine down to 5mg daily and see if orthostatic symptoms improve;  keep bp log and bring next ov               -Follow up: 2 months and prn

## 2024-09-16 ENCOUNTER — INFUSION (OUTPATIENT)
Dept: ONCOLOGY | Facility: HOSPITAL | Age: 84
End: 2024-09-16
Payer: MEDICARE

## 2024-09-16 ENCOUNTER — APPOINTMENT (OUTPATIENT)
Dept: LAB | Facility: HOSPITAL | Age: 84
End: 2024-09-16
Payer: MEDICARE

## 2024-09-16 DIAGNOSIS — Z45.2 ENCOUNTER FOR ADJUSTMENT OR MANAGEMENT OF VASCULAR ACCESS DEVICE: Primary | ICD-10-CM

## 2024-09-16 PROCEDURE — 96523 IRRIG DRUG DELIVERY DEVICE: CPT

## 2024-09-16 PROCEDURE — 25010000002 HEPARIN LOCK FLUSH PER 10 UNITS: Performed by: INTERNAL MEDICINE

## 2024-09-16 RX ORDER — SODIUM CHLORIDE 0.9 % (FLUSH) 0.9 %
10 SYRINGE (ML) INJECTION AS NEEDED
OUTPATIENT
Start: 2024-09-16

## 2024-09-16 RX ORDER — SODIUM CHLORIDE 0.9 % (FLUSH) 0.9 %
10 SYRINGE (ML) INJECTION AS NEEDED
Status: DISCONTINUED | OUTPATIENT
Start: 2024-09-16 | End: 2024-09-16 | Stop reason: HOSPADM

## 2024-09-16 RX ORDER — HEPARIN SODIUM (PORCINE) LOCK FLUSH IV SOLN 100 UNIT/ML 100 UNIT/ML
500 SOLUTION INTRAVENOUS AS NEEDED
OUTPATIENT
Start: 2024-09-16

## 2024-09-16 RX ORDER — HEPARIN SODIUM (PORCINE) LOCK FLUSH IV SOLN 100 UNIT/ML 100 UNIT/ML
500 SOLUTION INTRAVENOUS AS NEEDED
Status: DISCONTINUED | OUTPATIENT
Start: 2024-09-16 | End: 2024-09-16 | Stop reason: HOSPADM

## 2024-09-16 RX ADMIN — Medication 10 ML: at 13:28

## 2024-09-16 RX ADMIN — Medication 500 UNITS: at 13:28

## 2024-09-17 RX ORDER — PHENYTOIN SODIUM 100 MG/1
300 CAPSULE, EXTENDED RELEASE ORAL
Qty: 270 CAPSULE | Refills: 1 | Status: SHIPPED | OUTPATIENT
Start: 2024-09-17
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
## 2024-10-28 ENCOUNTER — INFUSION (OUTPATIENT)
Dept: ONCOLOGY | Facility: HOSPITAL | Age: 84
End: 2024-10-28
Payer: MEDICARE

## 2024-10-28 ENCOUNTER — OFFICE VISIT (OUTPATIENT)
Dept: ONCOLOGY | Facility: CLINIC | Age: 84
End: 2024-10-28
Payer: MEDICARE

## 2024-10-28 VITALS
SYSTOLIC BLOOD PRESSURE: 149 MMHG | RESPIRATION RATE: 16 BRPM | TEMPERATURE: 97.7 F | DIASTOLIC BLOOD PRESSURE: 71 MMHG | HEART RATE: 65 BPM | WEIGHT: 145.9 LBS | HEIGHT: 62 IN | BODY MASS INDEX: 26.85 KG/M2 | OXYGEN SATURATION: 97 %

## 2024-10-28 DIAGNOSIS — C83.398 DIFFUSE LARGE B-CELL LYMPHOMA OF EXTRANODAL SITE EXCLUDING SPLEEN AND OTHER SOLID ORGANS: ICD-10-CM

## 2024-10-28 DIAGNOSIS — Z45.2 ENCOUNTER FOR ADJUSTMENT OR MANAGEMENT OF VASCULAR ACCESS DEVICE: ICD-10-CM

## 2024-10-28 DIAGNOSIS — M81.0 AGE-RELATED OSTEOPOROSIS WITHOUT CURRENT PATHOLOGICAL FRACTURE: ICD-10-CM

## 2024-10-28 DIAGNOSIS — Z79.899 LONG-TERM USE OF HIGH-RISK MEDICATION: ICD-10-CM

## 2024-10-28 DIAGNOSIS — Z79.899 LONG-TERM USE OF HIGH-RISK MEDICATION: Primary | ICD-10-CM

## 2024-10-28 DIAGNOSIS — M81.0 AGE-RELATED OSTEOPOROSIS WITHOUT CURRENT PATHOLOGICAL FRACTURE: Primary | ICD-10-CM

## 2024-10-28 LAB
ALBUMIN SERPL-MCNC: 4.1 G/DL (ref 3.5–5.2)
ALBUMIN/GLOB SERPL: 1.5 G/DL
ALP SERPL-CCNC: 106 U/L (ref 39–117)
ALT SERPL W P-5'-P-CCNC: 13 U/L (ref 1–33)
ANION GAP SERPL CALCULATED.3IONS-SCNC: 10 MMOL/L (ref 5–15)
AST SERPL-CCNC: 21 U/L (ref 1–32)
BASOPHILS # BLD AUTO: 0.04 10*3/MM3 (ref 0–0.2)
BASOPHILS NFR BLD AUTO: 0.6 % (ref 0–1.5)
BILIRUB SERPL-MCNC: 0.3 MG/DL (ref 0–1.2)
BUN SERPL-MCNC: 18 MG/DL (ref 8–23)
BUN/CREAT SERPL: 27.3 (ref 7–25)
CALCIUM SPEC-SCNC: 8.8 MG/DL (ref 8.6–10.5)
CHLORIDE SERPL-SCNC: 101 MMOL/L (ref 98–107)
CO2 SERPL-SCNC: 27 MMOL/L (ref 22–29)
CREAT SERPL-MCNC: 0.66 MG/DL (ref 0.57–1)
DEPRECATED RDW RBC AUTO: 43.5 FL (ref 37–54)
EGFRCR SERPLBLD CKD-EPI 2021: 86.6 ML/MIN/1.73
EOSINOPHIL # BLD AUTO: 0.2 10*3/MM3 (ref 0–0.4)
EOSINOPHIL NFR BLD AUTO: 2.9 % (ref 0.3–6.2)
ERYTHROCYTE [DISTWIDTH] IN BLOOD BY AUTOMATED COUNT: 12.4 % (ref 12.3–15.4)
GLOBULIN UR ELPH-MCNC: 2.8 GM/DL
GLUCOSE SERPL-MCNC: 97 MG/DL (ref 65–99)
HCT VFR BLD AUTO: 36 % (ref 34–46.6)
HGB BLD-MCNC: 12 G/DL (ref 12–15.9)
IMM GRANULOCYTES # BLD AUTO: 0.03 10*3/MM3 (ref 0–0.05)
IMM GRANULOCYTES NFR BLD AUTO: 0.4 % (ref 0–0.5)
LYMPHOCYTES # BLD AUTO: 1.31 10*3/MM3 (ref 0.7–3.1)
LYMPHOCYTES NFR BLD AUTO: 19.2 % (ref 19.6–45.3)
MAGNESIUM SERPL-MCNC: 2.1 MG/DL (ref 1.6–2.4)
MCH RBC QN AUTO: 31.5 PG (ref 26.6–33)
MCHC RBC AUTO-ENTMCNC: 33.3 G/DL (ref 31.5–35.7)
MCV RBC AUTO: 94.5 FL (ref 79–97)
MONOCYTES # BLD AUTO: 0.78 10*3/MM3 (ref 0.1–0.9)
MONOCYTES NFR BLD AUTO: 11.4 % (ref 5–12)
NEUTROPHILS NFR BLD AUTO: 4.46 10*3/MM3 (ref 1.7–7)
NEUTROPHILS NFR BLD AUTO: 65.5 % (ref 42.7–76)
NRBC BLD AUTO-RTO: 0 /100 WBC (ref 0–0.2)
PHOSPHATE SERPL-MCNC: 3.5 MG/DL (ref 2.5–4.5)
PLATELET # BLD AUTO: 162 10*3/MM3 (ref 140–450)
PMV BLD AUTO: 9.4 FL (ref 6–12)
POTASSIUM SERPL-SCNC: 4.1 MMOL/L (ref 3.5–5.2)
PROT SERPL-MCNC: 6.9 G/DL (ref 6–8.5)
RBC # BLD AUTO: 3.81 10*6/MM3 (ref 3.77–5.28)
SODIUM SERPL-SCNC: 138 MMOL/L (ref 136–145)
WBC NRBC COR # BLD AUTO: 6.82 10*3/MM3 (ref 3.4–10.8)

## 2024-10-28 PROCEDURE — 3077F SYST BP >= 140 MM HG: CPT | Performed by: NURSE PRACTITIONER

## 2024-10-28 PROCEDURE — 1126F AMNT PAIN NOTED NONE PRSNT: CPT | Performed by: NURSE PRACTITIONER

## 2024-10-28 PROCEDURE — 3078F DIAST BP <80 MM HG: CPT | Performed by: NURSE PRACTITIONER

## 2024-10-28 PROCEDURE — 1160F RVW MEDS BY RX/DR IN RCRD: CPT | Performed by: NURSE PRACTITIONER

## 2024-10-28 PROCEDURE — 80053 COMPREHEN METABOLIC PANEL: CPT

## 2024-10-28 PROCEDURE — 99213 OFFICE O/P EST LOW 20 MIN: CPT | Performed by: NURSE PRACTITIONER

## 2024-10-28 PROCEDURE — 85025 COMPLETE CBC W/AUTO DIFF WBC: CPT

## 2024-10-28 PROCEDURE — 25010000002 HEPARIN LOCK FLUSH PER 10 UNITS: Performed by: INTERNAL MEDICINE

## 2024-10-28 PROCEDURE — 83735 ASSAY OF MAGNESIUM: CPT

## 2024-10-28 PROCEDURE — 96374 THER/PROPH/DIAG INJ IV PUSH: CPT

## 2024-10-28 PROCEDURE — 1159F MED LIST DOCD IN RCRD: CPT | Performed by: NURSE PRACTITIONER

## 2024-10-28 PROCEDURE — 25010000002 ZOLEDRONIC ACID 5 MG/100ML SOLUTION: Performed by: NURSE PRACTITIONER

## 2024-10-28 PROCEDURE — 84100 ASSAY OF PHOSPHORUS: CPT

## 2024-10-28 RX ORDER — HEPARIN SODIUM (PORCINE) LOCK FLUSH IV SOLN 100 UNIT/ML 100 UNIT/ML
500 SOLUTION INTRAVENOUS AS NEEDED
OUTPATIENT
Start: 2024-10-28

## 2024-10-28 RX ORDER — SODIUM CHLORIDE 9 MG/ML
20 INJECTION, SOLUTION INTRAVENOUS ONCE
Status: CANCELLED | OUTPATIENT
Start: 2024-10-28

## 2024-10-28 RX ORDER — HEPARIN SODIUM (PORCINE) LOCK FLUSH IV SOLN 100 UNIT/ML 100 UNIT/ML
500 SOLUTION INTRAVENOUS AS NEEDED
Status: DISCONTINUED | OUTPATIENT
Start: 2024-10-28 | End: 2024-10-28 | Stop reason: HOSPADM

## 2024-10-28 RX ORDER — SODIUM CHLORIDE 0.9 % (FLUSH) 0.9 %
10 SYRINGE (ML) INJECTION AS NEEDED
Status: DISCONTINUED | OUTPATIENT
Start: 2024-10-28 | End: 2024-10-28 | Stop reason: HOSPADM

## 2024-10-28 RX ORDER — ZOLEDRONIC ACID 0.05 MG/ML
5 INJECTION, SOLUTION INTRAVENOUS ONCE
Status: COMPLETED | OUTPATIENT
Start: 2024-10-28 | End: 2024-10-28

## 2024-10-28 RX ORDER — SODIUM CHLORIDE 0.9 % (FLUSH) 0.9 %
10 SYRINGE (ML) INJECTION AS NEEDED
OUTPATIENT
Start: 2024-10-28

## 2024-10-28 RX ORDER — ZOLEDRONIC ACID 0.05 MG/ML
5 INJECTION, SOLUTION INTRAVENOUS ONCE
Status: CANCELLED | OUTPATIENT
Start: 2024-10-28

## 2024-10-28 RX ORDER — SODIUM CHLORIDE 9 MG/ML
20 INJECTION, SOLUTION INTRAVENOUS ONCE
Status: DISCONTINUED | OUTPATIENT
Start: 2024-10-28 | End: 2024-10-28 | Stop reason: HOSPADM

## 2024-10-28 RX ADMIN — ZOLEDRONIC ACID 5 MG: 5 INJECTION INTRAVENOUS at 14:16

## 2024-10-28 RX ADMIN — Medication 500 UNITS: at 14:34

## 2024-10-28 RX ADMIN — Medication 10 ML: at 14:34

## 2024-10-29 RX ORDER — ROSUVASTATIN CALCIUM 5 MG/1
5 TABLET, COATED ORAL NIGHTLY
Qty: 90 TABLET | Refills: 2 | Status: SHIPPED | OUTPATIENT
Start: 2024-10-29

## 2024-10-29 NOTE — TELEPHONE ENCOUNTER
NOV 6/9/25 (TK)  LOV 6/5/24 (RM)      Plan:      See back in 1 year, no medication changes, overall doing well.

## 2024-11-08 ENCOUNTER — OFFICE VISIT (OUTPATIENT)
Dept: FAMILY MEDICINE CLINIC | Facility: CLINIC | Age: 84
End: 2024-11-08
Payer: MEDICARE

## 2024-11-08 VITALS
BODY MASS INDEX: 26.87 KG/M2 | DIASTOLIC BLOOD PRESSURE: 62 MMHG | WEIGHT: 146 LBS | OXYGEN SATURATION: 97 % | HEIGHT: 62 IN | SYSTOLIC BLOOD PRESSURE: 134 MMHG | HEART RATE: 73 BPM

## 2024-11-08 DIAGNOSIS — F33.9 CHRONIC RECURRENT MAJOR DEPRESSIVE DISORDER: ICD-10-CM

## 2024-11-08 DIAGNOSIS — E78.5 DYSLIPIDEMIA: ICD-10-CM

## 2024-11-08 DIAGNOSIS — I10 PRIMARY HYPERTENSION: Primary | ICD-10-CM

## 2024-11-08 PROCEDURE — 3078F DIAST BP <80 MM HG: CPT | Performed by: FAMILY MEDICINE

## 2024-11-08 PROCEDURE — 1126F AMNT PAIN NOTED NONE PRSNT: CPT | Performed by: FAMILY MEDICINE

## 2024-11-08 PROCEDURE — 99214 OFFICE O/P EST MOD 30 MIN: CPT | Performed by: FAMILY MEDICINE

## 2024-11-08 PROCEDURE — 3075F SYST BP GE 130 - 139MM HG: CPT | Performed by: FAMILY MEDICINE

## 2024-11-08 RX ORDER — ESCITALOPRAM OXALATE 20 MG/1
20 TABLET ORAL DAILY
Qty: 90 TABLET | Refills: 3 | Status: SHIPPED | OUTPATIENT
Start: 2024-11-08

## 2024-11-08 RX ORDER — AMLODIPINE BESYLATE 10 MG/1
10 TABLET ORAL NIGHTLY
Qty: 90 TABLET | Refills: 3 | Status: SHIPPED | OUTPATIENT
Start: 2024-11-08

## 2024-11-08 NOTE — PROGRESS NOTES
Subjective   Martina Blake is a 84 y.o. female. Presents today for   Chief Complaint   Patient presents with    Hypertension    Hyperlipidemia       History of Present Illness  Patient 85 yo female htn and hld her efor recheck;  last ov had orthostatic symptoms with low bp so cut amlodipine in half, but reports after 2 weks bp went back up so on 10mg again.  Hld well controlled;   Feeling more down, would like go up on lexapro to 20mg;    Review of Systems   Respiratory:  Negative for shortness of breath.    Cardiovascular:  Negative for chest pain and palpitations.       Patient Active Problem List   Diagnosis    Blood glucose abnormal    Dyslipidemia    Gastroesophageal reflux disease    Generalized osteoarthritis    Chronic recurrent major depressive disorder    Osteoporosis    Restless legs syndrome    Seizure disorder    Post-menopause on HRT (hormone replacement therapy)    Chronic seasonal allergic rhinitis    Paroxysmal atrial fibrillation    Iron deficiency anemia due to chronic blood loss    Acute superficial gastritis without hemorrhage    Hiatal hernia    Esophagitis    Diverticulosis of large intestine without hemorrhage    Hypertension    Hypercalcemia    Liver mass    Lymphoma    Insomnia    Anemia associated with chemotherapy    Pancytopenia due to antineoplastic chemotherapy    Encounter for adjustment or management of vascular access device    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    Thrombocytopenia    Bone metastasis    Osteopenia of multiple sites    Acute ITP    LAFB (left anterior fascicular block)    High risk medication use    Long-term use of high-risk medication    S/P laparoscopic cholecystectomy       Social History     Socioeconomic History    Marital status:      Spouse name: POOJA    Number of children: 2   Tobacco Use    Smoking status: Never     Passive exposure: Never    Smokeless tobacco: Never   Vaping Use    Vaping  status: Never Used   Substance and Sexual Activity    Alcohol use: No     Comment: Caffeine use: 1 cup daily (decaf)    Drug use: No    Sexual activity: Defer     Birth control/protection: Surgical     Comment:  (Jl)       Allergies   Allergen Reactions    Crab (Diagnostic) Itching and Rash    Pseudoephedrine Dizziness    Penicillins Other (See Comments)     Rash around mouth       Current Outpatient Medications on File Prior to Visit   Medication Sig Dispense Refill    acetaminophen (TYLENOL) 500 MG tablet Take 1 tablet by mouth Every 6 (Six) Hours As Needed for Moderate Pain. 90 tablet 3    Ascorbic Acid (Vitamin C) 500 MG capsule Take  by mouth Daily.      B Complex Vitamins (vitamin b complex) capsule capsule Take 1 capsule by mouth Daily.      benazepril (LOTENSIN) 40 MG tablet TAKE 1 TABLET BY MOUTH DAILY 90 tablet 3    calcium carbonate (OS-PAPA) 600 MG tablet Take 1 tablet by mouth Daily.      cetirizine (zyrTEC) 10 MG tablet TAKE 1 TABLET BY MOUTH EVERY DAY AS NEEDED FOR ALLERGIES 90 tablet 1    Cholecalciferol (VITAMIN D3) 125 MCG (5000 UT) capsule capsule Take 1 capsule by mouth Daily.      folic acid (FOLVITE) 400 MCG tablet Take 1 tablet by mouth Daily.      gabapentin (NEURONTIN) 300 MG capsule Take 3 capsules by mouth every night at bedtime. 270 capsule 1    Gemtesa 75 MG tablet Take 1 tablet by mouth Daily.      hydrALAZINE (APRESOLINE) 100 MG tablet Take 1 tablet by mouth 2 (Two) Times a Day.      melatonin 5 MG tablet tablet Take 1 tablet by mouth Every Night.      pantoprazole (PROTONIX) 40 MG EC tablet TAKE 1 TABLET BY MOUTH DAILY 90 tablet 1    phenytoin ER (DILANTIN) 100 MG capsule Take 3 capsules by mouth every night at bedtime. 270 capsule 1    rivaroxaban (Xarelto) 20 MG tablet Take 1 tablet by mouth Daily. Take with food. 28 tablet 0    rosuvastatin (CRESTOR) 5 MG tablet TAKE 1 TABLET BY MOUTH EVERY NIGHT 90 tablet 2    spironolactone (ALDACTONE) 25 MG tablet Take 1 tablet by mouth  "Daily. 90 tablet 3    traMADol (ULTRAM) 50 MG tablet Take 1 tablet by mouth Every 6 (Six) Hours As Needed for Moderate Pain . 40 tablet 0    vitamin E 100 UNIT capsule Take 180 Units by mouth Daily.      Zinc Sulfate (ZINC 15 PO) Take 400 mg by mouth.      [DISCONTINUED] amLODIPine (NORVASC) 5 MG tablet Take 1 tablet by mouth Every Night. 90 tablet 3    [DISCONTINUED] escitalopram (LEXAPRO) 20 MG tablet TAKE 1 TABLET BY MOUTH DAILY 30 tablet 11     No current facility-administered medications on file prior to visit.       Objective   Vitals:    11/08/24 1312   BP: 134/62   Pulse: 73   SpO2: 97%   Weight: 66.2 kg (146 lb)   Height: 157.5 cm (62\")     Body mass index is 26.7 kg/m².    Physical Exam  Vitals and nursing note reviewed.   Constitutional:       Appearance: She is well-developed.   HENT:      Head: Normocephalic and atraumatic.   Neck:      Thyroid: No thyromegaly.      Vascular: No JVD.   Cardiovascular:      Rate and Rhythm: Normal rate and regular rhythm.      Heart sounds: Normal heart sounds. No murmur heard.     No friction rub. No gallop.   Pulmonary:      Effort: Pulmonary effort is normal. No respiratory distress.      Breath sounds: Normal breath sounds. No wheezing or rales.   Abdominal:      General: Bowel sounds are normal. There is no distension.      Palpations: Abdomen is soft.      Tenderness: There is no abdominal tenderness. There is no guarding or rebound.   Musculoskeletal:      Cervical back: Neck supple.   Skin:     General: Skin is warm and dry.   Neurological:      Mental Status: She is alert.      Gait: Gait normal.   Psychiatric:         Behavior: Behavior normal.       Component      Latest Ref Rng 7/18/2024 10/28/2024   WBC      3.40 - 10.80 10*3/mm3 8.74     RBC      3.77 - 5.28 10*6/mm3 3.91     Hemoglobin      12.0 - 15.9 g/dL 12.1     Hematocrit      34.0 - 46.6 % 36.5     MCV      79.0 - 97.0 fL 93.4     MCH      26.6 - 33.0 pg 30.9     MCHC      31.5 - 35.7 g/dL 33.2   "   RDW      12.3 - 15.4 % 12.4     RDW-SD      37.0 - 54.0 fl 42.5     MPV      6.0 - 12.0 fL 9.4     Platelets      140 - 450 10*3/mm3 164     Neutrophil Rel %      42.7 - 76.0 % 78.1 (H)     Lymphocyte Rel %      19.6 - 45.3 % 9.8 (L)     Monocyte Rel %      5.0 - 12.0 % 10.5     Eosinophil Rel %      0.3 - 6.2 % 1.1     Basophil Rel %      0.0 - 1.5 % 0.3     Immature Granulocyte Rel %      0.0 - 0.5 % 0.2     Neutrophils Absolute      1.70 - 7.00 10*3/mm3 6.81     Lymphocytes Absolute      0.70 - 3.10 10*3/mm3 0.86     Monocytes Absolute      0.10 - 0.90 10*3/mm3 0.92 (H)     Eosinophils Absolute      0.00 - 0.40 10*3/mm3 0.10     Basophils Absolute      0.00 - 0.20 10*3/mm3 0.03     Immature Grans, Absolute      0.00 - 0.05 10*3/mm3 0.02     nRBC      0.0 - 0.2 /100 WBC 0.0     Glucose      65 - 99 mg/dL TNP  97    BUN      8 - 23 mg/dL 16  18    Creatinine      0.57 - 1.00 mg/dL 0.75  0.66    Sodium      136 - 145 mmol/L 136  138    Potassium      3.5 - 5.2 mmol/L TNP  4.1    Chloride      98 - 107 mmol/L 99  101    CO2      22.0 - 29.0 mmol/L 18 (L)  27.0    Calcium      8.6 - 10.5 mg/dL 9.2  8.8    Total Protein      6.0 - 8.5 g/dL 7.0  6.9    Albumin      3.5 - 5.2 g/dL 4.4  4.1    ALT (SGPT)      1 - 33 U/L 14  13    AST (SGOT)      1 - 32 U/L 18  21    Alkaline Phosphatase      39 - 117 U/L 122 (H)  106    Total Bilirubin      0.0 - 1.2 mg/dL 0.2  0.3    Globulin      gm/dL  2.8    A/G Ratio      g/dL  1.5    BUN/Creatinine Ratio      7.0 - 25.0  21  27.3 (H)    Anion Gap      5.0 - 15.0 mmol/L  10.0    eGFR      >60.0 mL/min/1.73  86.6    EGFR Result      >59 mL/min/1.73 78     Globulin      1.5 - 4.5 g/dL 2.6     Total Cholesterol      100 - 199 mg/dL 143     Triglycerides      0 - 149 mg/dL 110     HDL Cholesterol      >39 mg/dL 47     VLDL Cholesterol [place]      5 - 40 mg/dL 20     LDL Cholesterol       0 - 99 mg/dL 76     Magnesium      1.6 - 2.4 mg/dL 2.1  2.1    Phosphorus      2.5 - 4.5 mg/dL  3.5        Legend:  (H) High  (L) Low  Assessment & Plan   Diagnoses and all orders for this visit:    1. Primary hypertension (Primary)  -     amLODIPine (NORVASC) 10 MG tablet; Take 1 tablet by mouth Every Night.  Dispense: 90 tablet; Refill: 3    2. Chronic recurrent major depressive disorder  -     escitalopram (LEXAPRO) 20 MG tablet; Take 1 tablet by mouth Daily.  Dispense: 90 tablet; Refill: 3    3. Dyslipidemia      -hypertension - controlled, continue medications  Hld - continue work on diet, continue statin  Increase dose of lexapro to 20mg daily and seek care if not doing well               -Follow up:

## 2024-11-11 DIAGNOSIS — I10 ESSENTIAL HYPERTENSION: ICD-10-CM

## 2024-11-12 RX ORDER — BENAZEPRIL HYDROCHLORIDE 40 MG/1
40 TABLET ORAL DAILY
Qty: 90 TABLET | Refills: 3 | Status: SHIPPED | OUTPATIENT
Start: 2024-11-12

## 2024-11-20 DIAGNOSIS — G60.9 IDIOPATHIC NEUROPATHY: ICD-10-CM

## 2024-11-20 RX ORDER — GABAPENTIN 300 MG/1
900 CAPSULE ORAL
Qty: 270 CAPSULE | Refills: 1 | Status: SHIPPED | OUTPATIENT
Start: 2024-11-20

## 2024-12-04 ENCOUNTER — TELEPHONE (OUTPATIENT)
Dept: FAMILY MEDICINE CLINIC | Facility: CLINIC | Age: 84
End: 2024-12-04
Payer: MEDICARE

## 2024-12-04 RX ORDER — VIBEGRON 75 MG/1
1 TABLET, FILM COATED ORAL DAILY
Qty: 30 TABLET | Refills: 5 | Status: SHIPPED | OUTPATIENT
Start: 2024-12-04

## 2024-12-04 NOTE — TELEPHONE ENCOUNTER
Caller: RUT    Relationship:SELF    Callback number: [phone]   Is it ok to leave a message: [x] Yes [] No    Requested medication for samples: GEMTESA 75 MG     How much medication does the patient currently have left: A COUPLE    Who will be picking up the samples: SELF    Do you need information about patient financial assistance for this medication: [] Yes [x] No    Additional details provided: WILL BE IN THE AREA ON FRIDAY

## 2024-12-09 ENCOUNTER — APPOINTMENT (OUTPATIENT)
Dept: LAB | Facility: HOSPITAL | Age: 84
End: 2024-12-09
Payer: MEDICARE

## 2024-12-09 ENCOUNTER — INFUSION (OUTPATIENT)
Dept: ONCOLOGY | Facility: HOSPITAL | Age: 84
End: 2024-12-09
Payer: MEDICARE

## 2024-12-09 DIAGNOSIS — Z45.2 ENCOUNTER FOR ADJUSTMENT OR MANAGEMENT OF VASCULAR ACCESS DEVICE: Primary | ICD-10-CM

## 2024-12-09 PROCEDURE — 96523 IRRIG DRUG DELIVERY DEVICE: CPT

## 2024-12-09 PROCEDURE — 25010000002 HEPARIN LOCK FLUSH PER 10 UNITS: Performed by: INTERNAL MEDICINE

## 2024-12-09 RX ORDER — SODIUM CHLORIDE 0.9 % (FLUSH) 0.9 %
10 SYRINGE (ML) INJECTION AS NEEDED
OUTPATIENT
Start: 2024-12-09

## 2024-12-09 RX ORDER — SODIUM CHLORIDE 0.9 % (FLUSH) 0.9 %
10 SYRINGE (ML) INJECTION AS NEEDED
Status: DISCONTINUED | OUTPATIENT
Start: 2024-12-09 | End: 2024-12-09 | Stop reason: HOSPADM

## 2024-12-09 RX ORDER — HEPARIN SODIUM (PORCINE) LOCK FLUSH IV SOLN 100 UNIT/ML 100 UNIT/ML
500 SOLUTION INTRAVENOUS AS NEEDED
OUTPATIENT
Start: 2024-12-09

## 2024-12-09 RX ORDER — HEPARIN SODIUM (PORCINE) LOCK FLUSH IV SOLN 100 UNIT/ML 100 UNIT/ML
500 SOLUTION INTRAVENOUS AS NEEDED
Status: DISCONTINUED | OUTPATIENT
Start: 2024-12-09 | End: 2024-12-09 | Stop reason: HOSPADM

## 2024-12-09 RX ADMIN — Medication 500 UNITS: at 13:19

## 2024-12-09 RX ADMIN — Medication 10 ML: at 13:19

## 2024-12-18 ENCOUNTER — TELEPHONE (OUTPATIENT)
Dept: FAMILY MEDICINE CLINIC | Facility: CLINIC | Age: 84
End: 2024-12-18
Payer: MEDICARE

## 2025-01-07 ENCOUNTER — TELEPHONE (OUTPATIENT)
Dept: ONCOLOGY | Facility: CLINIC | Age: 85
End: 2025-01-07
Payer: MEDICARE

## 2025-01-07 NOTE — TELEPHONE ENCOUNTER
Pt called stating she is almost out of Xarelto samples. She would like to come in Thursday for pickup. Message sent to scheduling to put pt on for Thursday.       Pt called back to say her son could  samples today. Scheduling changed apt time and date

## 2025-01-10 ENCOUNTER — HOSPITAL ENCOUNTER (OUTPATIENT)
Facility: HOSPITAL | Age: 85
Discharge: HOME | End: 2025-01-10
Attending: STUDENT IN AN ORGANIZED HEALTH CARE EDUCATION/TRAINING PROGRAM | Admitting: STUDENT IN AN ORGANIZED HEALTH CARE EDUCATION/TRAINING PROGRAM
Payer: MEDICARE

## 2025-01-10 ENCOUNTER — APPOINTMENT (OUTPATIENT)
Dept: GENERAL RADIOLOGY | Facility: HOSPITAL | Age: 85
End: 2025-01-10
Payer: MEDICARE

## 2025-01-10 VITALS
SYSTOLIC BLOOD PRESSURE: 107 MMHG | HEIGHT: 63 IN | WEIGHT: 142 LBS | HEART RATE: 76 BPM | DIASTOLIC BLOOD PRESSURE: 54 MMHG | BODY MASS INDEX: 25.16 KG/M2 | OXYGEN SATURATION: 98 % | TEMPERATURE: 97.8 F | RESPIRATION RATE: 18 BRPM

## 2025-01-10 DIAGNOSIS — M16.11 OSTEOARTHRITIS OF RIGHT HIP, UNSPECIFIED OSTEOARTHRITIS TYPE: Primary | ICD-10-CM

## 2025-01-10 PROCEDURE — 73560 X-RAY EXAM OF KNEE 1 OR 2: CPT

## 2025-01-10 PROCEDURE — G0463 HOSPITAL OUTPT CLINIC VISIT: HCPCS | Performed by: STUDENT IN AN ORGANIZED HEALTH CARE EDUCATION/TRAINING PROGRAM

## 2025-01-10 PROCEDURE — 99204 OFFICE O/P NEW MOD 45 MIN: CPT | Performed by: STUDENT IN AN ORGANIZED HEALTH CARE EDUCATION/TRAINING PROGRAM

## 2025-01-10 PROCEDURE — 73502 X-RAY EXAM HIP UNI 2-3 VIEWS: CPT

## 2025-01-10 RX ORDER — LIDOCAINE 50 MG/G
1 PATCH TOPICAL EVERY 24 HOURS
Qty: 30 PATCH | Refills: 0 | Status: SHIPPED | OUTPATIENT
Start: 2025-01-10

## 2025-01-10 RX ORDER — METHYLPREDNISOLONE 4 MG/1
TABLET ORAL
Qty: 21 TABLET | Refills: 0 | Status: SHIPPED | OUTPATIENT
Start: 2025-01-10

## 2025-01-10 RX ORDER — ACETAMINOPHEN 500 MG
1000 TABLET ORAL ONCE
Status: COMPLETED | OUTPATIENT
Start: 2025-01-10 | End: 2025-01-10

## 2025-01-10 RX ORDER — ACETAMINOPHEN 500 MG
500 TABLET ORAL EVERY 6 HOURS PRN
Qty: 30 TABLET | Refills: 0 | Status: SHIPPED | OUTPATIENT
Start: 2025-01-10

## 2025-01-10 RX ADMIN — ACETAMINOPHEN 1000 MG: 500 TABLET, FILM COATED ORAL at 07:59

## 2025-01-10 NOTE — FSED PROVIDER NOTE
"Subjective   History of Present Illness  84-year-old female history of hypertension, lymphoma in remission, DVT on Xarelto, presents emergency department for right hip pain.  Ongoing for couple days.  Does not member any specific injury.  However, she has been working on [employer].  Has been experiencing some difficulty ambulating secondary to her pain.  Is located in her right lower back and right hip and radiates around into her right leg down into the knee.  No numbness or paresthesias.  No fevers or chills.  No swelling.  No rashes.    Gen: NAD  CV: RRR, S1S2, No murmur, No edema or erythema of periphery  Pulm: CTAB, Unlabored respirations  Abd: Soft, NT/ND  MSK: No deformities, Mild tenderness to the greater trochanter of the femur on the right side  Neuro: A+Ox3, GCS 15, Moving all extremities spontaneously    Differential diagnosis includes fracture, sprain, strain, contusion          Review of Systems    Past Medical History:   Diagnosis Date    Anemia     multifactorial    Cystitis     Cystocele     moderate    Drug therapy     Dyslipidemia     Esophageal reflux     Fatigue     History of transfusion     no reaction    Hypercalcemia     Hypertension     Major depression, chronic     Menopause     Non Hodgkin's lymphoma     Osteoarthritis     Osteoporosis     Palpitations     Paroxysmal atrial fibrillation     Post menopausal problems     RLS (restless legs syndrome)     Seizure disorder     Sinus bradycardia     Subjective tinnitus     Vaginal delivery     x2  \"SONYA\"      \"JASON\"    Vitamin D deficiency        Allergies   Allergen Reactions    Crab (Diagnostic) Itching and Rash    Pseudoephedrine Dizziness    Penicillins Other (See Comments)     Rash around mouth       Past Surgical History:   Procedure Laterality Date    CERVICAL LYMPH NODE BIOPSY/EXCISION Left 5/1/2018    Procedure: CERVICAL LYMPH NODE BIOPSY/EXCISION;  Surgeon: Danny Oconnor MD;  Location: [facility];  Service: ENT    " CHOLECYSTECTOMY      COLONOSCOPY      COLONOSCOPY N/A 2018    Procedure: COLONOSCOPY to terminal ileum;  Surgeon: Dominik Burt MD;  Location:  [facility];  Service: Gastroenterology    CRANIOTOMY      ENDOSCOPY N/A 2018    Procedure: ESOPHAGOGASTRODUODENOSCOPY with biopsy;  Surgeon: Dominik Burt MD;  [facility];  Service: Gastroenterology    TOTAL ABDOMINAL HYSTERECTOMY  1980    w/ bladder suspension.  Probably vaginal hysterectomy with anterior colporrhaphy.     VENOUS ACCESS DEVICE (PORT) INSERTION N/A 2018    Procedure: INSERTION VENOUS ACCESS DEVICE;  Surgeon: Jamie Gill MD;  Location: [facility] MAIN OR;  Service: General    WRIST SURGERY Left        Family History   Problem Relation Age of Onset    No Known Problems Mother     Stroke Father 54         @ 60     Stroke Brother 76    Diabetes type II Brother     No Known Problems Daughter     No Known Problems Maternal Grandmother     Heart disease Paternal Grandmother     No Known Problems Maternal Grandfather     Heart disease Paternal Grandfather     Breast cancer Neg Hx        Social History     Socioeconomic History    Marital status:      Spouse name: POOJA    Number of children: 2   Tobacco Use    Smoking status: Never     Passive exposure: Never    Smokeless tobacco: Never   Vaping Use    Vaping status: Never Used   Substance and Sexual Activity    Alcohol use: No     Comment: Caffeine use: 1 cup daily (decaf)    Drug use: No    Sexual activity: Defer     Birth control/protection: Surgical     Comment:  (Pooja)           Objective   Physical Exam    Procedures           ED Course                                           Medical Decision Making  Imaging viewed and interpreted contemporaneously by me and are  unremarkable for emergent abnormalities including Fracture of right femur on xray right hip.  Per radiology interpretation, patient does have some severe hip degeneration    Prescribed  diclofenac gel and lidocaine patches    Follow up with ortho or sports med    Overall impression is Right hip osteoarthritis      Patient stable on reassessment.  Vital signs afebrile and hemodynamically stable.  Pain is well controlled. Patient is able to ambulate and tolerate PO intake without difficulty.  Low concern for any additional acute or life-threatening conditions requiring further work-up or emergent intervention at this time.  Laboratory work-up and imaging findings including degeneration right hip discussed with the patient who is able to articulate and affirmed their understanding, and feels comfortable with discharge at this time. Plan on prescribing patient with the above medications, and discussed the risks and benefits of these prescriptions and the schedule for their proper administration. Post discharge follow-up appointments including with ortho/sports med also discussed with patient, who indicated that they would schedule and attend these follow-up appointments.  Strict return precautions discussed prior to discharge including the importance of returning to the ED with any new or worsening symptoms. Patient agreeable to plan as stated above without any further questions or concerns.      Amount and/or Complexity of Data Reviewed  Radiology: ordered.    Risk  OTC drugs.        Final diagnoses:   Osteoarthritis of right hip, unspecified osteoarthritis type       ED Disposition  ED Disposition       ED Disposition   Discharge    Condition   Stable    Comment   --               Jaime Vega Jr., DO  [address] [address]  [phone]    Call in 1 week           Medication List        New Prescriptions      Diclofenac Sodium 1 % gel gel  Commonly known as: VOLTAREN  Apply 4 g topically to the appropriate area as directed 3 (Three) Times a Day.     lidocaine 5 %  Commonly known as: LIDODERM  Place 1 patch on the skin as directed by provider Daily. Remove & Discard  patch within 12 hours or as directed by MD            Changed      acetaminophen 500 MG tablet  Commonly known as: TYLENOL  Take 1 tablet by mouth Every 6 (Six) Hours As Needed for Mild Pain.  What changed: reasons to take this               Where to Get Your Medications        These medications were sent to [id number] - [place], [place] - [address] [address] [address] [address] [address] [address] - [phone]  - [phone]   [id number] [place] [address] E, [facility] [address]      Phone: [phone]   acetaminophen 500 MG tablet  Diclofenac Sodium 1 % gel gel  lidocaine 5 %

## 2025-01-13 ENCOUNTER — TELEPHONE (OUTPATIENT)
Dept: ONCOLOGY | Facility: CLINIC | Age: 85
End: 2025-01-13
Payer: MEDICARE

## 2025-01-13 NOTE — TELEPHONE ENCOUNTER
Caller: Martina Blake    Relationship to patient: Self    Best call back number: [phone]    Chief complaint: PATIENT CALLED TO RESCHEDULE     Type of visit: PORT FLUSH     Requested date: 1-21-25 NOT TO EARLY IN THE MORNING     If rescheduling, when is the original appointment: 1-20-25

## 2025-01-21 ENCOUNTER — INFUSION (OUTPATIENT)
Dept: ONCOLOGY | Facility: HOSPITAL | Age: 85
End: 2025-01-21
Payer: MEDICARE

## 2025-01-21 DIAGNOSIS — Z45.2 ENCOUNTER FOR ADJUSTMENT OR MANAGEMENT OF VASCULAR ACCESS DEVICE: Primary | ICD-10-CM

## 2025-01-21 PROCEDURE — 96523 IRRIG DRUG DELIVERY DEVICE: CPT

## 2025-01-21 PROCEDURE — 25010000002 HEPARIN LOCK FLUSH PER 10 UNITS: Performed by: INTERNAL MEDICINE

## 2025-01-21 RX ORDER — HEPARIN SODIUM (PORCINE) LOCK FLUSH IV SOLN 100 UNIT/ML 100 UNIT/ML
500 SOLUTION INTRAVENOUS AS NEEDED
OUTPATIENT
Start: 2025-01-21

## 2025-01-21 RX ORDER — SODIUM CHLORIDE 0.9 % (FLUSH) 0.9 %
10 SYRINGE (ML) INJECTION AS NEEDED
OUTPATIENT
Start: 2025-01-21

## 2025-01-21 RX ORDER — SODIUM CHLORIDE 0.9 % (FLUSH) 0.9 %
10 SYRINGE (ML) INJECTION AS NEEDED
Status: DISCONTINUED | OUTPATIENT
Start: 2025-01-21 | End: 2025-01-21 | Stop reason: HOSPADM

## 2025-01-21 RX ORDER — HEPARIN SODIUM (PORCINE) LOCK FLUSH IV SOLN 100 UNIT/ML 100 UNIT/ML
500 SOLUTION INTRAVENOUS AS NEEDED
Status: DISCONTINUED | OUTPATIENT
Start: 2025-01-21 | End: 2025-01-21 | Stop reason: HOSPADM

## 2025-01-21 RX ADMIN — Medication 500 UNITS: at 13:03

## 2025-01-21 RX ADMIN — Medication 10 ML: at 13:03

## 2025-01-28 ENCOUNTER — OFFICE VISIT (OUTPATIENT)
Dept: ORTHOPEDIC SURGERY | Facility: CLINIC | Age: 85
End: 2025-01-28
Payer: MEDICARE

## 2025-01-28 VITALS — BODY MASS INDEX: 27.64 KG/M2 | HEIGHT: 61 IN | TEMPERATURE: 98.4 F | WEIGHT: 146.4 LBS

## 2025-01-28 DIAGNOSIS — M25.551 RIGHT HIP PAIN: Primary | ICD-10-CM

## 2025-01-28 RX ORDER — SOLIFENACIN SUCCINATE 5 MG/1
1 TABLET, FILM COATED ORAL DAILY
COMMUNITY
Start: 2025-01-20

## 2025-01-28 RX ORDER — METHYLPREDNISOLONE 4 MG/1
TABLET ORAL
Qty: 21 TABLET | Refills: 0 | Status: SHIPPED | OUTPATIENT
Start: 2025-01-28

## 2025-01-28 NOTE — PROGRESS NOTES
Patient: Martina Blake  YOB: 1940 84 y.o. female  Medical Record Number: [id number]    Chief Complaints:   Chief Complaint   Patient presents with    Right Hip - Follow-up       History of Present Illness:Martina Blake is a 84 y.o. female who presents as a new patient both myself as well as to the practice with complaints of right hip pain.  She reports start about a month ago, unfortunately has progressively gotten worse she is going emergency room at that time was told she had bone-on-bone end-stage osteoarthritis of the right hip.  She denies any injury.  She describes the pain as being lateral posterior hip, no specific groin pain it does radiate all the way down her leg toward her foot.  She denies any specific low back pain, no numbness or tingling    Allergies:   Allergies   Allergen Reactions    Crab (Diagnostic) Itching and Rash    Pseudoephedrine Dizziness    Penicillins Other (See Comments)     Rash around mouth       Medications:   Current Outpatient Medications   Medication Sig Dispense Refill    acetaminophen (TYLENOL) 500 MG tablet Take 1 tablet by mouth Every 6 (Six) Hours As Needed for Mild Pain. 30 tablet 0    amLODIPine (NORVASC) 10 MG tablet Take 1 tablet by mouth Every Night. 90 tablet 3    Ascorbic Acid (Vitamin C) 500 MG capsule Take  by mouth Daily.      B Complex Vitamins (vitamin b complex) capsule capsule Take 1 capsule by mouth Daily.      benazepril (LOTENSIN) 40 MG tablet TAKE 1 TABLET BY MOUTH DAILY 90 tablet 3    calcium carbonate (OS-PAPA) 600 MG tablet Take 1 tablet by mouth Daily.      cetirizine (zyrTEC) 10 MG tablet TAKE 1 TABLET BY MOUTH EVERY DAY AS NEEDED FOR ALLERGIES 90 tablet 1    Cholecalciferol (VITAMIN D3) 125 MCG (5000 UT) capsule capsule Take 1 capsule by mouth Daily.      Diclofenac Sodium (VOLTAREN) 1 % gel gel Apply 4 g topically to the appropriate area as directed 3 (Three) Times a Day. 50 g 0    escitalopram (LEXAPRO) 20 MG tablet Take 1 tablet  by mouth Daily. 90 tablet 3    folic acid (FOLVITE) 400 MCG tablet Take 1 tablet by mouth Daily.      gabapentin (NEURONTIN) 300 MG capsule TAKE 3 CAPSULES BY MOUTH EVERY NIGHT AT BEDTIME 270 capsule 1    Gemtesa 75 MG tablet Take 1 tablet by mouth Daily. 30 tablet 5    hydrALAZINE (APRESOLINE) 100 MG tablet Take 1 tablet by mouth 2 (Two) Times a Day.      lidocaine (LIDODERM) 5 % Place 1 patch on the skin as directed by provider Daily. Remove & Discard patch within 12 hours or as directed by MD 30 patch 0    melatonin 5 MG tablet tablet Take 1 tablet by mouth Every Night.      methylPREDNISolone (MEDROL) 4 MG dose pack Take as directed on package instructions. 21 tablet 0    pantoprazole (PROTONIX) 40 MG EC tablet TAKE 1 TABLET BY MOUTH DAILY 90 tablet 1    phenytoin ER (DILANTIN) 100 MG capsule Take 3 capsules by mouth every night at bedtime. 270 capsule 1    rivaroxaban (Xarelto) 20 MG tablet Take 1 tablet by mouth Daily. Take with food. 28 tablet 0    rosuvastatin (CRESTOR) 5 MG tablet TAKE 1 TABLET BY MOUTH EVERY NIGHT 90 tablet 2    solifenacin (VESICARE) 5 MG tablet Take 1 tablet by mouth Daily.      spironolactone (ALDACTONE) 25 MG tablet Take 1 tablet by mouth Daily. 90 tablet 3    traMADol (ULTRAM) 50 MG tablet Take 1 tablet by mouth Every 6 (Six) Hours As Needed for Moderate Pain . 40 tablet 0    vitamin E 100 UNIT capsule Take 180 Units by mouth Daily.      Zinc Sulfate (ZINC 15 PO) Take 400 mg by mouth.       No current facility-administered medications for this visit.         The following portions of the patient's history were reviewed and updated as appropriate: allergies, current medications, past family history, past medical history, past social history, past surgical history and problem list.    Review of Systems:   14 point review of systems was performed. All systems negative except pertinent positives/negatives listed in HPI above    Physical Exam:   Vitals:    01/28/25 0846   Temp: 98.4 °F  "(36.9 °C)   Weight: 66.4 kg (146 lb 6.4 oz)   Height: 154.9 cm (61\")       General: A and O x 3, ASA, NAD    Skin clear no unusual lesions noted  Right hip the patient is nontender palpation she does have pain with internal and external rotation with a mildly [facility] negative logroll calf soft and nontender she has decreased range of motion of the lower back with a positive right straight leg raise      Radiology:  Xrays 2 views of the right hip were reviewed that were done previously and show mild to moderate arthritic changes I do not appreciate any bone-on-bone articulation, radiologist did not either.  She does have significant arthritic changes noted of the lower lumbar spine as well as chronic calcifications of the mesentery also seen on previous CT scan in 2022.    Assessment/Plan: Right hip pain    Patient and I discussed options, we will try a Medrol Dosepak hopefully that will help give her some relief at this time the question is how much of the pain is coming from her back versus her lumbar spine, we will proceed with an MRI of the right hip to further evaluate, the patient understands some of this actually may be referred from her lumbar spine but we will start with the MRI first and she also does have a positive hip examination.  Once we get the results back we will contact patient regarding treatment options      Tena Ledesma, APRN  1/28/2025  "

## 2025-02-13 ENCOUNTER — TELEPHONE (OUTPATIENT)
Dept: ONCOLOGY | Facility: CLINIC | Age: 85
End: 2025-02-13
Payer: MEDICARE

## 2025-02-13 NOTE — TELEPHONE ENCOUNTER
Caller: Martina Blake    Relationship to patient: Self    Best call back number: [phone]    Chief complaint: CANC. - R/S     Type of visit: PORT FLUSH     Requested date: ANY DAY IN THE AFTERNOON     If rescheduling, when is the original appointment: 3/3/25

## 2025-02-19 ENCOUNTER — HOSPITAL ENCOUNTER (OUTPATIENT)
Dept: MRI IMAGING | Facility: HOSPITAL | Age: 85
Discharge: HOME | End: 2025-02-19
Admitting: NURSE PRACTITIONER
Payer: MEDICARE

## 2025-02-19 DIAGNOSIS — M25.551 RIGHT HIP PAIN: ICD-10-CM

## 2025-02-19 PROCEDURE — 73721 MRI JNT OF LWR EXTRE W/O DYE: CPT

## 2025-02-21 DIAGNOSIS — M25.551 RIGHT HIP PAIN: Primary | ICD-10-CM

## 2025-02-24 ENCOUNTER — TELEPHONE (OUTPATIENT)
Dept: ORTHOPEDIC SURGERY | Facility: CLINIC | Age: 85
End: 2025-02-24
Payer: MEDICARE

## 2025-02-24 NOTE — TELEPHONE ENCOUNTER
Patient wants to know what kind of medication she will be receiving for her injection?     Patient wants to know if she can have a valium prescribed before her injection?     Will patient need to make a follow up appointment?

## 2025-02-24 NOTE — TELEPHONE ENCOUNTER
Provider: LAURA    Caller: Martina Blake    Relationship to Patient: Self    Phone Number: [phone]    Reason for Call: PATIENT HAS SOME QUESTIONS ABOUT HER INJECTION ON 03/13/25, SHE WOULD LIKE A CALL BACK PLEASE.

## 2025-02-25 ENCOUNTER — TELEPHONE (OUTPATIENT)
Dept: ONCOLOGY | Facility: CLINIC | Age: 85
End: 2025-02-25

## 2025-02-25 NOTE — TELEPHONE ENCOUNTER
The fluoroscopy guided hip injection will be cortisone, they do not prescribe Valium beforehand, they will numb the area appropriately, would like to see the patient back for follow-up about 4 to 6 weeks after to see how she responds to the injection--please schedule

## 2025-02-25 NOTE — TELEPHONE ENCOUNTER
Caller Name: Martina Blake  Relationship: Self  Best Contact Number: [phone]    Patient is requesting samples of XARELTO  How many days of medication do you have left? 7    Additional Information: PATIENT WILL BE IN THE OFFICE 3/4 FOR A PORT FLUSH - SHE WOULD LIKE TO  SAMPLES AT THIS TIME.

## 2025-03-04 ENCOUNTER — INFUSION (OUTPATIENT)
Dept: ONCOLOGY | Facility: HOSPITAL | Age: 85
End: 2025-03-04
Payer: MEDICARE

## 2025-03-04 ENCOUNTER — APPOINTMENT (OUTPATIENT)
Dept: LAB | Facility: HOSPITAL | Age: 85
End: 2025-03-04
Payer: MEDICARE

## 2025-03-04 DIAGNOSIS — F33.9 CHRONIC RECURRENT MAJOR DEPRESSIVE DISORDER: ICD-10-CM

## 2025-03-04 DIAGNOSIS — Z45.2 ENCOUNTER FOR ADJUSTMENT OR MANAGEMENT OF VASCULAR ACCESS DEVICE: Primary | ICD-10-CM

## 2025-03-04 PROCEDURE — 25010000002 HEPARIN LOCK FLUSH PER 10 UNITS: Performed by: INTERNAL MEDICINE

## 2025-03-04 PROCEDURE — 96523 IRRIG DRUG DELIVERY DEVICE: CPT

## 2025-03-04 RX ORDER — SODIUM CHLORIDE 0.9 % (FLUSH) 0.9 %
10 SYRINGE (ML) INJECTION AS NEEDED
OUTPATIENT
Start: 2025-03-04

## 2025-03-04 RX ORDER — SODIUM CHLORIDE 0.9 % (FLUSH) 0.9 %
10 SYRINGE (ML) INJECTION AS NEEDED
Status: DISCONTINUED | OUTPATIENT
Start: 2025-03-04 | End: 2025-03-04 | Stop reason: HOSPADM

## 2025-03-04 RX ORDER — HEPARIN SODIUM (PORCINE) LOCK FLUSH IV SOLN 100 UNIT/ML 100 UNIT/ML
500 SOLUTION INTRAVENOUS AS NEEDED
OUTPATIENT
Start: 2025-03-04

## 2025-03-04 RX ORDER — HEPARIN SODIUM (PORCINE) LOCK FLUSH IV SOLN 100 UNIT/ML 100 UNIT/ML
500 SOLUTION INTRAVENOUS AS NEEDED
Status: DISCONTINUED | OUTPATIENT
Start: 2025-03-04 | End: 2025-03-04 | Stop reason: HOSPADM

## 2025-03-04 RX ADMIN — Medication 500 UNITS: at 14:08

## 2025-03-04 RX ADMIN — Medication 10 ML: at 14:08

## 2025-03-13 ENCOUNTER — HOSPITAL ENCOUNTER (OUTPATIENT)
Dept: GENERAL RADIOLOGY | Facility: HOSPITAL | Age: 85
Discharge: HOME | End: 2025-03-13
Payer: MEDICARE

## 2025-03-13 DIAGNOSIS — M25.551 RIGHT HIP PAIN: ICD-10-CM

## 2025-03-13 PROCEDURE — 25010000002 METHYLPREDNISOLONE PER 125 MG: Performed by: NURSE PRACTITIONER

## 2025-03-13 PROCEDURE — 25510000001 IOPAMIDOL 61 % SOLUTION: Performed by: NURSE PRACTITIONER

## 2025-03-13 PROCEDURE — 77002 NEEDLE LOCALIZATION BY XRAY: CPT

## 2025-03-13 PROCEDURE — 25010000002 BUPIVACAINE (PF) 0.25 % SOLUTION: Performed by: NURSE PRACTITIONER

## 2025-03-13 PROCEDURE — 25010000002 LIDOCAINE 1 % SOLUTION: Performed by: NURSE PRACTITIONER

## 2025-03-13 RX ORDER — LIDOCAINE HYDROCHLORIDE 10 MG/ML
10 INJECTION, SOLUTION INFILTRATION; PERINEURAL ONCE
Status: COMPLETED | OUTPATIENT
Start: 2025-03-13 | End: 2025-03-13

## 2025-03-13 RX ORDER — IOPAMIDOL 612 MG/ML
30 INJECTION, SOLUTION INTRAVASCULAR
Status: COMPLETED | OUTPATIENT
Start: 2025-03-13 | End: 2025-03-13

## 2025-03-13 RX ORDER — PHENYTOIN SODIUM 100 MG/1
300 CAPSULE, EXTENDED RELEASE ORAL
Qty: 270 CAPSULE | Refills: 1 | Status: SHIPPED | OUTPATIENT
Start: 2025-03-13

## 2025-03-13 RX ORDER — METHYLPREDNISOLONE SODIUM SUCCINATE 125 MG/2ML
80 INJECTION, POWDER, LYOPHILIZED, FOR SOLUTION INTRAMUSCULAR; INTRAVENOUS
Status: COMPLETED | OUTPATIENT
Start: 2025-03-13 | End: 2025-03-13

## 2025-03-13 RX ORDER — BUPIVACAINE HYDROCHLORIDE 2.5 MG/ML
10 INJECTION, SOLUTION EPIDURAL; INFILTRATION; INTRACAUDAL ONCE
Status: COMPLETED | OUTPATIENT
Start: 2025-03-13 | End: 2025-03-13

## 2025-03-13 RX ADMIN — IOPAMIDOL 0.5 ML: 612 INJECTION, SOLUTION INTRAVENOUS at 10:43

## 2025-03-13 RX ADMIN — METHYLPREDNISOLONE SODIUM SUCCINATE 80 MG: 125 INJECTION, POWDER, FOR SOLUTION INTRAMUSCULAR; INTRAVENOUS at 10:43

## 2025-03-13 RX ADMIN — LIDOCAINE HYDROCHLORIDE 3 ML: 10 INJECTION, SOLUTION INFILTRATION; PERINEURAL at 10:43

## 2025-03-13 RX ADMIN — BUPIVACAINE HYDROCHLORIDE 3 ML: 2.5 INJECTION, SOLUTION EPIDURAL; INFILTRATION; INTRACAUDAL; PERINEURAL at 10:43

## 2025-04-02 ENCOUNTER — TELEPHONE (OUTPATIENT)
Dept: ONCOLOGY | Facility: CLINIC | Age: 85
End: 2025-04-02
Payer: MEDICARE

## 2025-04-02 NOTE — TELEPHONE ENCOUNTER
Caller Name: Martina Blake  Relationship: Self  Best Contact Number: [phone]    Patient is requesting samples of XARELTO 20 MG  How many days of medication do you have left? 4- DAYS    Additional Information: PATIENT IS COMING IN MONDAY AND WANTED TO  THEN

## 2025-04-02 NOTE — TELEPHONE ENCOUNTER
Patient called and informed she can  samples at her appointment with Dr Iraheta on Monday 4/7/25.    Patient v/u
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
## 2025-04-07 ENCOUNTER — OFFICE VISIT (OUTPATIENT)
Dept: ONCOLOGY | Facility: CLINIC | Age: 85
End: 2025-04-07
Payer: MEDICARE

## 2025-04-07 ENCOUNTER — INFUSION (OUTPATIENT)
Dept: ONCOLOGY | Facility: HOSPITAL | Age: 85
End: 2025-04-07
Payer: MEDICARE

## 2025-04-07 VITALS
HEIGHT: 61 IN | BODY MASS INDEX: 28.17 KG/M2 | RESPIRATION RATE: 17 BRPM | HEART RATE: 60 BPM | OXYGEN SATURATION: 96 % | WEIGHT: 149.2 LBS | TEMPERATURE: 97.1 F | SYSTOLIC BLOOD PRESSURE: 105 MMHG | DIASTOLIC BLOOD PRESSURE: 64 MMHG

## 2025-04-07 DIAGNOSIS — C85.91 LYMPHOMA OF LYMPH NODES OF NECK, UNSPECIFIED LYMPHOMA TYPE: ICD-10-CM

## 2025-04-07 DIAGNOSIS — E83.52 HYPERCALCEMIA: Primary | ICD-10-CM

## 2025-04-07 DIAGNOSIS — M85.89 OSTEOPENIA OF MULTIPLE SITES: ICD-10-CM

## 2025-04-07 DIAGNOSIS — Z45.2 ENCOUNTER FOR ADJUSTMENT OR MANAGEMENT OF VASCULAR ACCESS DEVICE: Primary | ICD-10-CM

## 2025-04-07 DIAGNOSIS — C83.398 DIFFUSE LARGE B-CELL LYMPHOMA OF EXTRANODAL SITE EXCLUDING SPLEEN AND OTHER SOLID ORGANS: ICD-10-CM

## 2025-04-07 LAB
ALBUMIN SERPL-MCNC: 4.1 G/DL (ref 3.5–5.2)
ALBUMIN/GLOB SERPL: 1.6 G/DL
ALP SERPL-CCNC: 85 U/L (ref 39–117)
ALT SERPL W P-5'-P-CCNC: 16 U/L (ref 1–33)
ANION GAP SERPL CALCULATED.3IONS-SCNC: 9.9 MMOL/L (ref 5–15)
AST SERPL-CCNC: 18 U/L (ref 1–32)
BASOPHILS # BLD AUTO: 0.02 10*3/MM3 (ref 0–0.2)
BASOPHILS NFR BLD AUTO: 0.3 % (ref 0–1.5)
BILIRUB SERPL-MCNC: 0.3 MG/DL (ref 0–1.2)
BUN SERPL-MCNC: 23 MG/DL (ref 8–23)
BUN/CREAT SERPL: 31.9 (ref 7–25)
CALCIUM SPEC-SCNC: 9.2 MG/DL (ref 8.6–10.5)
CHLORIDE SERPL-SCNC: 103 MMOL/L (ref 98–107)
CO2 SERPL-SCNC: 26.1 MMOL/L (ref 22–29)
CREAT SERPL-MCNC: 0.72 MG/DL (ref 0.57–1)
DEPRECATED RDW RBC AUTO: 44.3 FL (ref 37–54)
EGFRCR SERPLBLD CKD-EPI 2021: 82.6 ML/MIN/1.73
EOSINOPHIL # BLD AUTO: 0.11 10*3/MM3 (ref 0–0.4)
EOSINOPHIL NFR BLD AUTO: 1.9 % (ref 0.3–6.2)
ERYTHROCYTE [DISTWIDTH] IN BLOOD BY AUTOMATED COUNT: 12.6 % (ref 12.3–15.4)
GLOBULIN UR ELPH-MCNC: 2.6 GM/DL
GLUCOSE SERPL-MCNC: 107 MG/DL (ref 65–99)
HCT VFR BLD AUTO: 37.3 % (ref 34–46.6)
HGB BLD-MCNC: 12.1 G/DL (ref 12–15.9)
IMM GRANULOCYTES # BLD AUTO: 0.01 10*3/MM3 (ref 0–0.05)
IMM GRANULOCYTES NFR BLD AUTO: 0.2 % (ref 0–0.5)
LYMPHOCYTES # BLD AUTO: 1.14 10*3/MM3 (ref 0.7–3.1)
LYMPHOCYTES NFR BLD AUTO: 19.5 % (ref 19.6–45.3)
MAGNESIUM SERPL-MCNC: 2.2 MG/DL (ref 1.6–2.4)
MCH RBC QN AUTO: 31.2 PG (ref 26.6–33)
MCHC RBC AUTO-ENTMCNC: 32.4 G/DL (ref 31.5–35.7)
MCV RBC AUTO: 96.1 FL (ref 79–97)
MONOCYTES # BLD AUTO: 0.76 10*3/MM3 (ref 0.1–0.9)
MONOCYTES NFR BLD AUTO: 13 % (ref 5–12)
NEUTROPHILS NFR BLD AUTO: 3.81 10*3/MM3 (ref 1.7–7)
NEUTROPHILS NFR BLD AUTO: 65.1 % (ref 42.7–76)
NRBC BLD AUTO-RTO: 0 /100 WBC (ref 0–0.2)
PLATELET # BLD AUTO: 171 10*3/MM3 (ref 140–450)
PMV BLD AUTO: 10.2 FL (ref 6–12)
POTASSIUM SERPL-SCNC: 4.1 MMOL/L (ref 3.5–5.2)
PROT SERPL-MCNC: 6.7 G/DL (ref 6–8.5)
RBC # BLD AUTO: 3.88 10*6/MM3 (ref 3.77–5.28)
SODIUM SERPL-SCNC: 139 MMOL/L (ref 136–145)
WBC NRBC COR # BLD AUTO: 5.85 10*3/MM3 (ref 3.4–10.8)

## 2025-04-07 PROCEDURE — 3074F SYST BP LT 130 MM HG: CPT | Performed by: INTERNAL MEDICINE

## 2025-04-07 PROCEDURE — 1126F AMNT PAIN NOTED NONE PRSNT: CPT | Performed by: INTERNAL MEDICINE

## 2025-04-07 PROCEDURE — 36591 DRAW BLOOD OFF VENOUS DEVICE: CPT

## 2025-04-07 PROCEDURE — 80053 COMPREHEN METABOLIC PANEL: CPT | Performed by: INTERNAL MEDICINE

## 2025-04-07 PROCEDURE — 83735 ASSAY OF MAGNESIUM: CPT | Performed by: INTERNAL MEDICINE

## 2025-04-07 PROCEDURE — 99214 OFFICE O/P EST MOD 30 MIN: CPT | Performed by: INTERNAL MEDICINE

## 2025-04-07 PROCEDURE — 85025 COMPLETE CBC W/AUTO DIFF WBC: CPT | Performed by: INTERNAL MEDICINE

## 2025-04-07 PROCEDURE — 25010000002 HEPARIN LOCK FLUSH PER 10 UNITS: Performed by: INTERNAL MEDICINE

## 2025-04-07 PROCEDURE — 3078F DIAST BP <80 MM HG: CPT | Performed by: INTERNAL MEDICINE

## 2025-04-07 RX ORDER — HEPARIN SODIUM (PORCINE) LOCK FLUSH IV SOLN 100 UNIT/ML 100 UNIT/ML
500 SOLUTION INTRAVENOUS AS NEEDED
OUTPATIENT
Start: 2025-04-07

## 2025-04-07 RX ORDER — SODIUM CHLORIDE 0.9 % (FLUSH) 0.9 %
10 SYRINGE (ML) INJECTION AS NEEDED
OUTPATIENT
Start: 2025-04-07

## 2025-04-07 RX ORDER — HEPARIN SODIUM (PORCINE) LOCK FLUSH IV SOLN 100 UNIT/ML 100 UNIT/ML
500 SOLUTION INTRAVENOUS AS NEEDED
Status: DISCONTINUED | OUTPATIENT
Start: 2025-04-07 | End: 2025-04-07 | Stop reason: HOSPADM

## 2025-04-07 RX ADMIN — Medication 500 UNITS: at 10:48
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
## 2025-04-08 ENCOUNTER — OFFICE (OUTPATIENT)
Dept: URBAN - METROPOLITAN AREA CLINIC 76 | Facility: CLINIC | Age: 85
End: 2025-04-08
Payer: MEDICARE

## 2025-04-08 VITALS
DIASTOLIC BLOOD PRESSURE: 74 MMHG | WEIGHT: 149 LBS | OXYGEN SATURATION: 95 % | HEART RATE: 92 BPM | SYSTOLIC BLOOD PRESSURE: 118 MMHG | HEIGHT: 64 IN

## 2025-04-08 DIAGNOSIS — K21.9 GASTRO-ESOPHAGEAL REFLUX DISEASE WITHOUT ESOPHAGITIS: ICD-10-CM

## 2025-04-08 DIAGNOSIS — K58.9 IRRITABLE BOWEL SYNDROME, UNSPECIFIED: ICD-10-CM

## 2025-04-08 PROCEDURE — 99213 OFFICE O/P EST LOW 20 MIN: CPT | Performed by: INTERNAL MEDICINE

## 2025-04-08 RX ORDER — PANTOPRAZOLE 40 MG/1
TABLET, DELAYED RELEASE ORAL
Qty: 90 | Refills: 3 | Status: ACTIVE

## 2025-04-09 ENCOUNTER — TELEPHONE (OUTPATIENT)
Dept: FAMILY MEDICINE CLINIC | Facility: CLINIC | Age: 85
End: 2025-04-09
Payer: MEDICARE

## 2025-04-09 NOTE — TELEPHONE ENCOUNTER
Caller: RUT FRANCISCO    Relationship:SELF    Callback number: [phone]     Is it ok to leave a message: [x] Yes [] No    Requested medication for samples: Gemtesa 75 MG tablet     How much medication does the patient currently have left: ENOUGH FOR THIS WEEK    Who will be picking up the samples: RUT    Do you need information about patient financial assistance for this medication: [] Yes [x] No    Additional details provided:  SHE WILL BE UP HERE ON FRIDAY TO PICK THEM UP IF YOU HAVE THEM.  PLEASE CALL AND ADVISE.

## 2025-04-09 NOTE — TELEPHONE ENCOUNTER
Hub to relay    We don't have any samples at this time. If pt needs script what pharmacy does she want med sent to?    I left msg informing pt and asked that she call us back if she needs us to send refill in for her

## 2025-04-15 ENCOUNTER — TRANSCRIBE ORDERS (OUTPATIENT)
Dept: ADMINISTRATIVE | Facility: HOSPITAL | Age: 85
End: 2025-04-15
Payer: MEDICARE

## 2025-04-15 DIAGNOSIS — Z12.31 SCREENING MAMMOGRAM, ENCOUNTER FOR: Primary | ICD-10-CM

## 2025-04-18 ENCOUNTER — OFFICE VISIT (OUTPATIENT)
Dept: ORTHOPEDIC SURGERY | Facility: CLINIC | Age: 85
End: 2025-04-18
Payer: MEDICARE

## 2025-04-18 VITALS — TEMPERATURE: 98 F | HEIGHT: 64 IN | WEIGHT: 149.4 LBS | BODY MASS INDEX: 25.51 KG/M2

## 2025-04-18 DIAGNOSIS — M25.551 RIGHT HIP PAIN: Primary | ICD-10-CM

## 2025-04-18 PROCEDURE — 99213 OFFICE O/P EST LOW 20 MIN: CPT | Performed by: NURSE PRACTITIONER

## 2025-04-18 PROCEDURE — 1159F MED LIST DOCD IN RCRD: CPT | Performed by: NURSE PRACTITIONER

## 2025-04-18 PROCEDURE — 1160F RVW MEDS BY RX/DR IN RCRD: CPT | Performed by: NURSE PRACTITIONER

## 2025-04-18 RX ORDER — ESCITALOPRAM OXALATE 20 MG/1
TABLET ORAL
COMMUNITY

## 2025-04-18 RX ORDER — HYDRALAZINE HYDROCHLORIDE 100 MG/1
TABLET, FILM COATED ORAL
COMMUNITY

## 2025-04-18 RX ORDER — EVENING PRIMROSE OIL 500 MG
CAPSULE ORAL
COMMUNITY

## 2025-04-18 RX ORDER — SPIRONOLACTONE 25 MG/1
90 TABLET ORAL
COMMUNITY

## 2025-04-18 RX ORDER — PHENYTOIN SODIUM 100 MG/1
CAPSULE, EXTENDED RELEASE ORAL
COMMUNITY

## 2025-04-18 RX ORDER — GABAPENTIN 300 MG/1
CAPSULE ORAL
COMMUNITY

## 2025-04-18 RX ORDER — ROSUVASTATIN CALCIUM 5 MG/1
TABLET, COATED ORAL
COMMUNITY

## 2025-04-18 RX ORDER — SOLIFENACIN SUCCINATE 5 MG/1
TABLET, FILM COATED ORAL
COMMUNITY

## 2025-04-18 RX ORDER — RIBOFLAVIN (VITAMIN B2) 100 MG
TABLET ORAL
COMMUNITY

## 2025-04-18 RX ORDER — AMLODIPINE BESYLATE 10 MG/1
TABLET ORAL
COMMUNITY

## 2025-04-18 RX ORDER — PANTOPRAZOLE SODIUM 40 MG/1
90 TABLET, DELAYED RELEASE ORAL
COMMUNITY

## 2025-04-18 RX ORDER — MULTIVITAMIN WITH IRON
TABLET ORAL
COMMUNITY

## 2025-04-18 RX ORDER — VIBEGRON 75 MG/1
TABLET, FILM COATED ORAL
COMMUNITY

## 2025-04-18 RX ORDER — BENAZEPRIL HYDROCHLORIDE 40 MG/1
TABLET ORAL
COMMUNITY

## 2025-04-18 RX ORDER — CALCIUM CARBONATE 260MG(650)
TABLET,CHEWABLE ORAL
COMMUNITY

## 2025-04-18 NOTE — PROGRESS NOTES
Patient: Martina Blake  YOB: 1940 84 y.o. female  Medical Record Number: [id number]    Chief Complaints:   Chief Complaint   Patient presents with    Right Hip - Follow-up       History of Present Illness:Martina Blake is a 84 y.o. female who presents follow-up for right hip pain.  She reports doing great following the injection, pain has completely gone away.    Allergies:   Allergies   Allergen Reactions    Crab (Diagnostic) Itching and Rash    Pseudoephedrine Dizziness    Penicillins Other (See Comments)     Rash around mouth       Medications:   Current Outpatient Medications   Medication Sig Dispense Refill    acetaminophen (TYLENOL) 500 MG tablet Take 1 tablet by mouth Every 6 (Six) Hours As Needed for Mild Pain. 30 tablet 0    Acetaminophen 500 MG capsule 0      amLODIPine (NORVASC) 10 MG tablet Take 1 tablet by mouth Every Night. 90 tablet 3    ascorbic acid (VITAMIN C) 100 MG tablet 0      Ascorbic Acid (Vitamin C) 500 MG capsule Take  by mouth Daily.      B Complex Vitamins (VITAMIN B COMPLEX 100 IJ) 0      B Complex Vitamins (vitamin b complex) capsule capsule Take 1 capsule by mouth Daily.      calcium carbonate (OS-PAPA) 600 MG tablet Take 1 tablet by mouth Daily.      cetirizine (zyrTEC) 10 MG tablet TAKE 1 TABLET BY MOUTH EVERY DAY AS NEEDED FOR ALLERGIES 90 tablet 1    Cholecalciferol (VITAMIN D3) 125 MCG (5000 UT) capsule capsule Take 1 capsule by mouth Daily.      cyanocobalamin (VITAMIN B-12N) 50 MCG tablet 0      Diclofenac Sodium (VOLTAREN) 1 % gel gel Apply 4 g topically to the appropriate area as directed 3 (Three) Times a Day. 50 g 0    escitalopram (LEXAPRO) 20 MG tablet 90      folic acid (FOLVITE) 400 MCG tablet Take 1 tablet by mouth Daily.      gabapentin (NEURONTIN) 300 MG capsule TAKE 3 CAPSULES BY MOUTH EVERY NIGHT AT BEDTIME 270 capsule 1    gabapentin (NEURONTIN) 300 MG capsule 270      Gemtesa 75 MG tablet Take 1 tablet by mouth Daily. 30 tablet 5    hydrALAZINE  (APRESOLINE) 100 MG tablet 270      lidocaine (LIDODERM) 5 % Place 1 patch on the skin as directed by provider Daily. Remove & Discard patch within 12 hours or as directed by MD 30 patch 0    melatonin 5 MG tablet tablet Take 1 tablet by mouth Every Night.      methylPREDNISolone (MEDROL) 4 MG dose pack Take as directed on package instructions. 21 tablet 0    pantoprazole (PROTONIX) 40 MG EC tablet 90 tablets.      phenytoin ER (DILANTIN) 100 MG capsule TAKE 3 CAPSULES BY MOUTH EVERY NIGHT AT BEDTIME 270 capsule 1    phenytoin ER (DILANTIN) 100 MG capsule 270      Pyridoxine HCl (VITAMIN B-6 CR PO) 0      rivaroxaban (Xarelto) 20 MG tablet Take 1 tablet by mouth Daily. Take with food. 28 tablet 0    rosuvastatin (CRESTOR) 5 MG tablet 90      solifenacin (VESICARE) 5 MG tablet Take 1 tablet by mouth Daily.      solifenacin (VESICARE) 5 MG tablet 90      spironolactone (ALDACTONE) 25 MG tablet 90 tablets.      traMADol (ULTRAM) 50 MG tablet Take 1 tablet by mouth Every 6 (Six) Hours As Needed for Moderate Pain . 40 tablet 0    Vibegron (Gemtesa) 75 MG tablet 30      vitamin E 100 UNIT capsule Take 180 Units by mouth Daily.      Zinc Sulfate (ZINC 15 PO) Take 400 mg by mouth.      amLODIPine (NORVASC) 10 MG tablet 90 (Patient not taking: Reported on 4/18/2025)      ascorbic acid 1500 MG/100 ML solution IVPB 0 (Patient not taking: Reported on 4/18/2025)      benazepril (LOTENSIN) 40 MG tablet TAKE 1 TABLET BY MOUTH DAILY (Patient not taking: Reported on 4/18/2025) 90 tablet 3    benazepril (LOTENSIN) 40 MG tablet 90 (Patient not taking: Reported on 4/18/2025)      escitalopram (LEXAPRO) 20 MG tablet Take 1 tablet by mouth Daily. (Patient not taking: Reported on 4/18/2025) 90 tablet 3    hydrALAZINE (APRESOLINE) 100 MG tablet Take 1 tablet by mouth 2 (Two) Times a Day. (Patient not taking: Reported on 4/18/2025)      pantoprazole (PROTONIX) 40 MG EC tablet TAKE 1 TABLET BY MOUTH DAILY (Patient not taking: Reported on  "4/18/2025) 90 tablet 1    rosuvastatin (CRESTOR) 5 MG tablet TAKE 1 TABLET BY MOUTH EVERY NIGHT (Patient not taking: Reported on 4/18/2025) 90 tablet 2    spironolactone (ALDACTONE) 25 MG tablet Take 1 tablet by mouth Daily. (Patient not taking: Reported on 4/18/2025) 90 tablet 3    Vitamin E 45 MG (100 UNIT) capsule 0 (Patient not taking: Reported on 4/18/2025)      Zinc 10 MG lozenge 0 (Patient not taking: Reported on 4/18/2025)       No current facility-administered medications for this visit.         The following portions of the patient's history were reviewed and updated as appropriate: allergies, current medications, past family history, past medical history, past social history, past surgical history and problem list.    Review of Systems:   14 point review of systems was performed. All systems negative except pertinent positives/negatives listed in HPI above    Physical Exam:   Vitals:    04/18/25 1401   Temp: 98 °F (36.7 °C)   Weight: 67.8 kg (149 lb 6.4 oz)   Height: 161.3 cm (63.5\")       General: A and O x 3, ASA, NAD    Skin clear no unusual lesions noted  Right hip the patient has no tenderness to palpation normal range of motion    Radiology:  Xrays right hip reviewed that were done previously show significant osteoarthritis    Assessment/Plan: Osteoarthritis right hip    Patient and I discussed options, at this point she is doing much better will take Tylenol as needed if her hip pain worsens we can always proceed with another fluoroscopy guided hip injection.  She will let us know we will otherwise see her back as needed      Tena Ledesma, APRN  4/18/2025  "

## 2025-04-28 ENCOUNTER — TELEPHONE (OUTPATIENT)
Dept: FAMILY MEDICINE CLINIC | Facility: CLINIC | Age: 85
End: 2025-04-28
Payer: MEDICARE

## 2025-04-28 DIAGNOSIS — E55.9 VITAMIN D DEFICIENCY: ICD-10-CM

## 2025-04-28 DIAGNOSIS — E83.42 HYPOMAGNESEMIA: Primary | ICD-10-CM

## 2025-04-28 DIAGNOSIS — I10 PRIMARY HYPERTENSION: ICD-10-CM

## 2025-04-28 DIAGNOSIS — R73.9 HYPERGLYCEMIA: ICD-10-CM

## 2025-04-28 DIAGNOSIS — E78.5 DYSLIPIDEMIA: ICD-10-CM

## 2025-04-28 DIAGNOSIS — Z79.899 DRUG THERAPY: ICD-10-CM

## 2025-05-05 ENCOUNTER — INFUSION (OUTPATIENT)
Dept: ONCOLOGY | Facility: HOSPITAL | Age: 85
End: 2025-05-05
Payer: MEDICARE

## 2025-05-05 ENCOUNTER — OFFICE VISIT (OUTPATIENT)
Dept: LAB | Facility: HOSPITAL | Age: 85
End: 2025-05-05
Payer: MEDICARE

## 2025-05-05 DIAGNOSIS — Z45.2 ENCOUNTER FOR ADJUSTMENT OR MANAGEMENT OF VASCULAR ACCESS DEVICE: Primary | ICD-10-CM

## 2025-05-05 PROCEDURE — 25010000002 HEPARIN LOCK FLUSH PER 10 UNITS: Performed by: INTERNAL MEDICINE

## 2025-05-05 PROCEDURE — 96523 IRRIG DRUG DELIVERY DEVICE: CPT

## 2025-05-05 RX ORDER — HEPARIN SODIUM (PORCINE) LOCK FLUSH IV SOLN 100 UNIT/ML 100 UNIT/ML
500 SOLUTION INTRAVENOUS AS NEEDED
Status: DISCONTINUED | OUTPATIENT
Start: 2025-05-05 | End: 2025-05-05 | Stop reason: HOSPADM

## 2025-05-05 RX ORDER — HEPARIN SODIUM (PORCINE) LOCK FLUSH IV SOLN 100 UNIT/ML 100 UNIT/ML
500 SOLUTION INTRAVENOUS AS NEEDED
OUTPATIENT
Start: 2025-05-05

## 2025-05-05 RX ORDER — SODIUM CHLORIDE 0.9 % (FLUSH) 0.9 %
10 SYRINGE (ML) INJECTION AS NEEDED
OUTPATIENT
Start: 2025-05-05

## 2025-05-05 RX ORDER — SODIUM CHLORIDE 0.9 % (FLUSH) 0.9 %
10 SYRINGE (ML) INJECTION AS NEEDED
Status: DISCONTINUED | OUTPATIENT
Start: 2025-05-05 | End: 2025-05-05 | Stop reason: HOSPADM

## 2025-05-05 RX ADMIN — Medication 500 UNITS: at 13:20

## 2025-05-05 RX ADMIN — Medication 10 ML: at 13:20

## 2025-05-05 NOTE — PROGRESS NOTES
Pt came in for port flush today and needed Xarelto samples. Currently out of Xarelto samples. Sent in a 7 day supply for her to  at her pharmacy and will call her as soon as we get samples in. She v/u.

## 2025-05-06 LAB
25(OH)D3+25(OH)D2 SERPL-MCNC: 59.7 NG/ML (ref 30–100)
ALBUMIN SERPL-MCNC: 4.4 G/DL (ref 3.7–4.7)
ALP SERPL-CCNC: 86 IU/L (ref 44–121)
ALT SERPL-CCNC: 12 IU/L (ref 0–32)
AST SERPL-CCNC: 16 IU/L (ref 0–40)
BILIRUB SERPL-MCNC: <0.2 MG/DL (ref 0–1.2)
BUN SERPL-MCNC: 24 MG/DL (ref 8–27)
BUN/CREAT SERPL: 32 (ref 12–28)
CALCIUM SERPL-MCNC: 9.2 MG/DL (ref 8.7–10.3)
CHLORIDE SERPL-SCNC: 103 MMOL/L (ref 96–106)
CHOLEST SERPL-MCNC: 164 MG/DL (ref 100–199)
CO2 SERPL-SCNC: 18 MMOL/L (ref 20–29)
CREAT SERPL-MCNC: 0.76 MG/DL (ref 0.57–1)
EGFRCR SERPLBLD CKD-EPI 2021: 77 ML/MIN/1.73
ERYTHROCYTE [DISTWIDTH] IN BLOOD BY AUTOMATED COUNT: 11.7 % (ref 11.7–15.4)
GLOBULIN SER CALC-MCNC: 2.8 G/DL (ref 1.5–4.5)
GLUCOSE SERPL-MCNC: ABNORMAL MG/DL
HBA1C MFR BLD: 5.4 % (ref 4.8–5.6)
HCT VFR BLD AUTO: 36.1 % (ref 34–46.6)
HDLC SERPL-MCNC: 49 MG/DL
HGB BLD-MCNC: 12 G/DL (ref 11.1–15.9)
LDLC SERPL CALC-MCNC: 90 MG/DL (ref 0–99)
MAGNESIUM SERPL-MCNC: 2.1 MG/DL (ref 1.6–2.3)
MCH RBC QN AUTO: 31.5 PG (ref 26.6–33)
MCHC RBC AUTO-ENTMCNC: 33.2 G/DL (ref 31.5–35.7)
MCV RBC AUTO: 95 FL (ref 79–97)
PLATELET # BLD AUTO: 171 X10E3/UL (ref 150–450)
POTASSIUM SERPL-SCNC: ABNORMAL MMOL/L
PROT SERPL-MCNC: 7.2 G/DL (ref 6–8.5)
RBC # BLD AUTO: 3.81 X10E6/UL (ref 3.77–5.28)
SODIUM SERPL-SCNC: 140 MMOL/L (ref 134–144)
TRIGL SERPL-MCNC: 144 MG/DL (ref 0–149)
TSH SERPL DL<=0.005 MIU/L-ACNC: 1.67 UIU/ML (ref 0.45–4.5)
VLDLC SERPL CALC-MCNC: 25 MG/DL (ref 5–40)
WBC # BLD AUTO: 6.1 X10E3/UL (ref 3.4–10.8)

## 2025-05-09 ENCOUNTER — OFFICE VISIT (OUTPATIENT)
Dept: FAMILY MEDICINE CLINIC | Facility: CLINIC | Age: 85
End: 2025-05-09
Payer: MEDICARE

## 2025-05-09 VITALS
HEART RATE: 56 BPM | DIASTOLIC BLOOD PRESSURE: 70 MMHG | BODY MASS INDEX: 25.44 KG/M2 | WEIGHT: 149 LBS | HEIGHT: 64 IN | OXYGEN SATURATION: 98 % | SYSTOLIC BLOOD PRESSURE: 130 MMHG

## 2025-05-09 DIAGNOSIS — Z00.00 MEDICARE ANNUAL WELLNESS VISIT, SUBSEQUENT: Primary | ICD-10-CM

## 2025-05-09 DIAGNOSIS — I10 ESSENTIAL HYPERTENSION: ICD-10-CM

## 2025-05-09 RX ORDER — BENAZEPRIL HYDROCHLORIDE 20 MG/1
20 TABLET ORAL 2 TIMES DAILY
Qty: 180 TABLET | Refills: 1 | Status: SHIPPED | OUTPATIENT
Start: 2025-05-09

## 2025-05-09 RX ORDER — AMLODIPINE BESYLATE 5 MG/1
5 TABLET ORAL 2 TIMES DAILY
Qty: 180 TABLET | Refills: 1 | Status: SHIPPED | OUTPATIENT
Start: 2025-05-09

## 2025-05-09 NOTE — NURSING NOTE
Lab Results   Component Value Date    WBC 9.92 04/19/2021    HGB 12.0 04/19/2021    HCT 35.5 04/19/2021    MCV 93.4 04/19/2021    PLT 99 (L) 04/19/2021   Labs reviewed with patient. Waiting for NP to give her samples of Xarelto.  
Ambulatory

## 2025-05-09 NOTE — PATIENT INSTRUCTIONS
Advance Care Planning and Advance Directives     You make decisions on a daily basis - decisions about where you want to live, your career, your home, your life. Perhaps one of the most important decisions you face is your choice for future medical care. Take time to talk with your family and your healthcare team and start planning today.  Advance Care Planning is a process that can help you:  Understand possible future healthcare decisions in light of your own experiences  Reflect on those decision in light of your goals and values  Discuss your decisions with those closest to you and the healthcare professionals that care for you  Make a plan by creating a document that reflects your wishes    Surrogate Decision Maker  In the event of a medical emergency, which has left you unable to communicate or to make your own decisions, you would need someone to make decisions for you.  It is important to discuss your preferences for medical treatment with this person while you are in good health.     Qualities of a surrogate decision maker:  Willing to take on this role and responsibility  Knows what you want for future medical care  Willing to follow your wishes even if they don't agree with them  Able to make difficult medical decisions under stressful circumstances    Advance Directives  These are legal documents you can create that will guide your healthcare team and decision maker(s) when needed. These documents can be stored in the electronic medical record.    Living Will - a legal document to guide your care if you have a terminal condition or a serious illness and are unable to communicate. States vary by statute in document names/types, but most forms may include one or more of the following:        -  Directions regarding life-prolonging treatments        -  Directions regarding artificially provided nutrition/hydration        -  Choosing a healthcare decision maker        -  Direction regarding organ/tissue  donation    Durable Power of  for Healthcare - this document names an -in-fact to make medical decisions for you, but it may also allow this person to make personal and financial decisions for you. Please seek the advice of an  if you need this type of document.    **Advance Directives are not required and no one may discriminate against you if you do not sign one.    Medical Orders  Many states allow specific forms/orders signed by your physician to record your wishes for medical treatment in your current state of health. This form, signed in personal communication with your physician, addresses resuscitation and other medical interventions that you may or may not want.      For more information or to schedule a time with a [facility] Advance Care Planning Facilitator contact: [facility] or call [phone] and someone will contact you directly.Calorie Counting for Weight Loss  Calories are units of energy. Your body needs a certain number of calories from food to keep going throughout the day. When you eat or drink more calories than your body needs, your body stores the extra calories mostly as fat. When you eat or drink fewer calories than your body needs, your body burns fat to get the energy it needs.  Calorie counting means keeping track of how many calories you eat and drink each day. Calorie counting can be helpful if you need to lose weight. If you eat fewer calories than your body needs, you should lose weight. Ask your health care provider what a healthy weight is for you.  For calorie counting to work, you will need to eat the right number of calories each day to lose a healthy amount of weight per week. A dietitian can help you figure out how many calories you need in a day and will suggest ways to reach your calorie goal.  A healthy amount of weight to lose each week is usually 1-2 lb (0.5-0.9 kg). This usually means that your daily calorie intake should be  reduced by 500-750 calories.  Eating 1,200-1,500 calories a day can help most women lose weight.  Eating 1,500-1,800 calories a day can help most men lose weight.  What do I need to know about calorie counting?  Work with your health care provider or dietitian to determine how many calories you should get each day. To meet your daily calorie goal, you will need to:  Find out how many calories are in each food that you would like to eat. Try to do this before you eat.  Decide how much of the food you plan to eat.  Keep a food log. Do this by writing down what you ate and how many calories it had.  To successfully lose weight, it is important to balance calorie counting with a healthy lifestyle that includes regular activity.  Where do I find calorie information?  The number of calories in a food can be found on a Nutrition Facts label. If a food does not have a Nutrition Facts label, try to look up the calories online or ask your dietitian for help.  Remember that calories are listed per serving. If you choose to have more than one serving of a food, you will have to multiply the calories per serving by the number of servings you plan to eat. For example, the label on a package of bread might say that a serving size is 1 slice and that there are 90 calories in a serving. If you eat 1 slice, you will have eaten 90 calories. If you eat 2 slices, you will have eaten 180 calories.  How do I keep a food log?  After each time that you eat, record the following in your food log as soon as possible:  What you ate. Be sure to include toppings, sauces, and other extras on the food.  How much you ate. This can be measured in cups, ounces, or number of items.  How many calories were in each food and drink.  The total number of calories in the food you ate.  Keep your food log near you, such as in a pocket-sized notebook or on an torres or website on your mobile phone. Some programs will calculate calories for you and show you how  many calories you have left to meet your daily goal.  What are some portion-control tips?  Know how many calories are in a serving. This will help you know how many servings you can have of a certain food.  Use a measuring cup to measure serving sizes. You could also try weighing out portions on a kitchen scale. With time, you will be able to estimate serving sizes for some foods.  Take time to put servings of different foods on your favorite plates or in your favorite bowls and cups so you know what a serving looks like.  Try not to eat straight from a food's packaging, such as from a bag or box. Eating straight from the package makes it hard to see how much you are eating and can lead to overeating. Put the amount you would like to eat in a cup or on a plate to make sure you are eating the right portion.  Use smaller plates, glasses, and bowls for smaller portions and to prevent overeating.  Try not to multitask. For example, avoid watching TV or using your computer while eating. If it is time to eat, sit down at a table and enjoy your food. This will help you recognize when you are full. It will also help you be more mindful of what and how much you are eating.  What are tips for following this plan?  Reading food labels  Check the calorie count compared with the serving size. The serving size may be smaller than what you are used to eating.  Check the source of the calories. Try to choose foods that are high in protein, fiber, and vitamins, and low in saturated fat, trans fat, and sodium.  Shopping  Read nutrition labels while you shop. This will help you make healthy decisions about which foods to buy.  Pay attention to nutrition labels for low-fat or fat-free foods. These foods sometimes have the same number of calories or more calories than the full-fat versions. They also often have added sugar, starch, or salt to make up for flavor that was removed with the fat.  Make a grocery list of lower-calorie foods  and stick to it.  Cooking  Try to cook your favorite foods in a healthier way. For example, try baking instead of frying.  Use low-fat dairy products.  Meal planning  Use more fruits and vegetables. One-half of your plate should be fruits and vegetables.  Include lean proteins, such as chicken, turkey, and fish.  Lifestyle  Each week, aim to do one of the followin minutes of moderate exercise, such as walking.  75 minutes of vigorous exercise, such as running.  General information  Know how many calories are in the foods you eat most often. This will help you calculate calorie counts faster.  Find a way of tracking calories that works for you. Get creative. Try different apps or programs if writing down calories does not work for you.  What foods should I eat?    Eat nutritious foods. It is better to have a nutritious, high-calorie food, such as an avocado, than a food with few nutrients, such as a bag of potato chips.  Use your calories on foods and drinks that will fill you up and will not leave you hungry soon after eating.  Examples of foods that fill you up are nuts and nut butters, vegetables, lean proteins, and high-fiber foods such as whole grains. High-fiber foods are foods with more than 5 g of fiber per serving.  Pay attention to calories in drinks. Low-calorie drinks include water and unsweetened drinks.  The items listed above may not be a complete list of foods and beverages you can eat. Contact a dietitian for more information.  What foods should I limit?  Limit foods or drinks that are not good sources of vitamins, minerals, or protein or that are high in unhealthy fats. These include:  Candy.  Other sweets.  Sodas, specialty coffee drinks, alcohol, and juice.  The items listed above may not be a complete list of foods and beverages you should avoid. Contact a dietitian for more information.  How do I count calories when eating out?  Pay attention to portions. Often, portions are much larger  when eating out. Try these tips to keep portions smaller:  Consider sharing a meal instead of getting your own.  If you get your own meal, eat only half of it. Before you start eating, ask for a container and put half of your meal into it.  When available, consider ordering smaller portions from the menu instead of full portions.  Pay attention to your food and drink choices. Knowing the way food is cooked and what is included with the meal can help you eat fewer calories.  If calories are listed on the menu, choose the lower-calorie options.  Choose dishes that include vegetables, fruits, whole grains, low-fat dairy products, and lean proteins.  Choose items that are boiled, broiled, grilled, or steamed. Avoid items that are buttered, battered, fried, or served with cream sauce. Items labeled as crispy are usually fried, unless stated otherwise.  Choose water, low-fat milk, unsweetened iced tea, or other drinks without added sugar. If you want an alcoholic beverage, choose a lower-calorie option, such as a glass of wine or light beer.  Ask for dressings, sauces, and syrups on the side. These are usually high in calories, so you should limit the amount you eat.  If you want a salad, choose a garden salad and ask for grilled meats. Avoid extra toppings such as lauren, cheese, or fried items. Ask for the dressing on the side, or ask for olive oil and vinegar or lemon to use as dressing.  Estimate how many servings of a food you are given. Knowing serving sizes will help you be aware of how much food you are eating at restaurants.  Where to find more information  Centers for Disease Control and Prevention: www.cdc.gov  [place] Department of Agriculture: myplate.gov  Summary  Calorie counting means keeping track of how many calories you eat and drink each day. If you eat fewer calories than your body needs, you should lose weight.  A healthy amount of weight to lose per week is usually 1-2 lb (0.5-0.9 kg). This usually  means reducing your daily calorie intake by 500-750 calories.  The number of calories in a food can be found on a Nutrition Facts label. If a food does not have a Nutrition Facts label, try to look up the calories online or ask your dietitian for help.  Use smaller plates, glasses, and bowls for smaller portions and to prevent overeating.  Use your calories on foods and drinks that will fill you up and not leave you hungry shortly after a meal.  This information is not intended to replace advice given to you by your health care provider. Make sure you discuss any questions you have with your health care provider.  Document Revised: 01/28/2021 Document Reviewed: 01/28/2021  [employer] Patient Education © 2022 [employer]    Exercising to Lose Weight  Getting regular exercise is important for everyone. It is especially important if you are overweight. Being overweight increases your risk of heart disease, stroke, diabetes, high blood pressure, and several types of cancer. Exercising, and reducing the calories you consume, can help you lose weight and improve fitness and health.  Exercise can be moderate or vigorous intensity. To lose weight, most people need to do a certain amount of moderate or vigorous-intensity exercise each week.  How can exercise affect me?  You lose weight when you exercise enough to burn more calories than you eat. Exercise also reduces body fat and builds muscle. The more muscle you have, the more calories you burn. Exercise also:  Improves mood.  Reduces stress and tension.  Improves your overall fitness, flexibility, and endurance.  Increases bone strength.  Moderate-intensity exercise  Moderate-intensity exercise is any activity that gets you moving enough to burn at least three times more energy (calories) than if you were sitting.  Examples of moderate exercise include:  Walking a mile in 15 minutes.  Doing light yard work.  Biking at an easy pace.  Most people should get at least 150  minutes of moderate-intensity exercise a week to maintain their body weight.  Vigorous-intensity exercise  Vigorous-intensity exercise is any activity that gets you moving enough to burn at least six times more calories than if you were sitting. When you exercise at this intensity, you should be working hard enough that you are not able to carry on a conversation.  Examples of vigorous exercise include:  Running.  Playing a team sport, such as football, basketball, and soccer.  Jumping rope.  Most people should get at least 75 minutes a week of vigorous exercise to maintain their body weight.  What actions can I take to lose weight?  The amount of exercise you need to lose weight depends on:  Your age.  The type of exercise.  Any health conditions you have.  Your overall physical ability.  Talk to your health care provider about how much exercise you need and what types of activities are safe for you.  Nutrition    Make changes to your diet as told by your health care provider or diet and nutrition specialist (dietitian). This may include:  Eating fewer calories.  Eating more protein.  Eating less unhealthy fats.  Eating a diet that includes fresh fruits and vegetables, whole grains, low-fat dairy products, and lean protein.  Avoiding foods with added fat, salt, and sugar.  Drink plenty of water while you exercise to prevent dehydration or heat stroke.  Activity  Choose an activity that you enjoy and set realistic goals. Your health care provider can help you make an exercise plan that works for you.  Exercise at a moderate or vigorous intensity most days of the week.  The intensity of exercise may vary from person to person. You can tell how intense a workout is for you by paying attention to your breathing and heartbeat. Most people will notice their breathing and heartbeat get faster with more intense exercise.  Do resistance training twice each week, such as:  Push-ups.  Sit-ups.  Lifting weights.  Using  resistance bands.  Getting short amounts of exercise can be just as helpful as long, structured periods of exercise. If you have trouble finding time to exercise, try doing these things as part of your daily routine:  Get up, stretch, and walk around every 30 minutes throughout the day.  Go for a walk during your lunch break.  Park your car farther away from your destination.  If you take public transportation, get off one stop early and walk the rest of the way.  Make phone calls while standing up and walking around.  Take the stairs instead of elevators or escalators.  Wear comfortable clothes and shoes with good support.  Do not exercise so much that you hurt yourself, feel dizzy, or get very short of breath.  Where to find more information  [place] Department of Health and Human Services: www.hhs.gov  Centers for Disease Control and Prevention: www.cdc.gov  Contact a health care provider:  Before starting a new exercise program.  If you have questions or concerns about your weight.  If you have a medical problem that keeps you from exercising.  Get help right away if:  You have any of the following while exercising:  Injury.  Dizziness.  Difficulty breathing or shortness of breath that does not go away when you stop exercising.  Chest pain.  Rapid heartbeat.  These symptoms may represent a serious problem that is an emergency. Do not wait to see if the symptoms will go away. Get medical help right away. Call your local emergency services (911 in the [place] Do not drive yourself to the hospital.  Summary  Getting regular exercise is especially important if you are overweight.  Being overweight increases your risk of heart disease, stroke, diabetes, high blood pressure, and several types of cancer.  Losing weight happens when you burn more calories than you eat.  Reducing the amount of calories you eat, and getting regular moderate or vigorous exercise each week, helps you lose weight.  This information is not  intended to replace advice given to you by your health care provider. Make sure you discuss any questions you have with your health care provider.  Document Revised: 02/13/2022 Document Reviewed: 02/13/2022  [employer] Patient Education © 2022 [employer]

## 2025-05-09 NOTE — TELEPHONE ENCOUNTER
Still no Xarelto samples available in our office. There is a 30 day free trial card from Xarelto that was given to pt's pharmacy. They are going to try to run this with her new script. Advised her to call once she returns from her vacation and we will see where we are with samples. She v/u.

## 2025-05-09 NOTE — PROGRESS NOTES
QUICK REFERENCE INFORMATION:  The ABCs of the Annual Wellness Visit    Subsequent Medicare Wellness Visit    HEALTH RISK ASSESSMENT    1940  Martina Blake is a 84 y.o. female who presents for an Subsequent Wellness Visit.    The following portions of the patient's history were reviewed and   updated as appropriate: allergies, current medications, past family history, past medical history, past social history, past surgical history, and problem list.    Compared to one year ago, the patient feels his physical   health is the same.    Compared to one year ago, the patient feels his mental   health is the same.    Recent Hospitalizations:  She was not admitted to the hospital during the last year.     Current Medical Providers:  Patient Care Team:  Jamie Walton DO as PCP - General  Jackie Grossman MD as PCP - Ob/Gyn (Obstetrics and Gynecology)  Chivo Iraheta MD as Consulting Physician (Hematology and Oncology)  Justine Barrios MD as Consulting Physician (Cardiology)  Jamie Walton DO as Referring Physician (Family Medicine)  Cynthia Wright PA as Physician Assistant (Obstetrics and Gynecology)    I reviewed list of current Medical providers and suppliers with patient and are listed above to the best of the patient's and my knowledge.    Smoking Status:  Social History     Tobacco Use   Smoking Status Never    Passive exposure: Never   Smokeless Tobacco Never       Alcohol Consumption:  Social History     Substance and Sexual Activity   Alcohol Use No    Comment: Caffeine use: 1 cup daily (decaf)       Depression Screen:       2025     2:00 PM   PHQ-2/PHQ-9 Depression Screening   Little interest or pleasure in doing things Not at all   Feeling down, depressed, or hopeless Not at all       Health Habits and Functional and Cognitive Screenin/9/2025     2:00 PM   Functional & Cognitive Status   Do you have difficulty preparing food and eating? No   Do you have difficulty  bathing yourself, getting dressed or grooming yourself? No   Do you have difficulty using the toilet? No   Do you have difficulty moving around from place to place? No   Do you have trouble with steps or getting out of a bed or a chair? No   Current Diet Well Balanced Diet   Dental Exam Up to date   Eye Exam Up to date   Exercise (times per week) 4 times per week   Current Exercises Include Walking   Do you need help using the phone?  No   Are you deaf or do you have serious difficulty hearing?  No   Do you need help to go to places out of walking distance? No   Do you need help shopping? No   Do you need help preparing meals?  No   Do you need help with housework?  No   Do you need help with laundry? No   Do you need help taking your medications? No   Do you need help managing money? No   Do you ever drive or ride in a car without wearing a seat belt? No   Have you felt unusual stress, anger or loneliness in the last month? No   Who do you live with? Alone   If you need help, do you have trouble finding someone available to you? No   Have you been bothered in the last four weeks by sexual problems? No   Do you have difficulty concentrating, remembering or making decisions? No       Does the patient have evidence of cognitive impairment? No    Aspirin use counseling: Does not need ASA (and currently is not on it)    Recent Lab Results:  CMP:  Lab Results   Component Value Date    Glucose TNP 05/05/2025    Glucose 107 (H) 04/07/2025    Glucose 100 06/08/2018    Glucose, UA Negative 01/05/2024    Glucose, UA Negative 08/23/2018    BUN 24 05/05/2025    BUN 23 04/07/2025    BUN/Creatinine Ratio 32 (H) 05/05/2025    BUN/Creatinine Ratio 31.9 (H) 04/07/2025    Creatinine 0.76 05/05/2025    Creatinine 0.72 04/07/2025    Creatinine 0.70 10/26/2022    Creatinine, Urine 20.4 04/23/2018    Kappa/Lambda Ratio 0.94 04/22/2018    Ketones Negative 01/05/2024    Ketones, UA Negative 08/23/2018    KID L RIGHT 10.3 04/23/2018    KID  "W RIGHT 5.4 04/23/2018    CO2 26.1 04/07/2025    Total CO2 18 (L) 05/05/2025    Calcium 9.2 05/05/2025    Calcium 9.2 04/07/2025    Albumin 4.4 05/05/2025    Albumin 4.1 04/07/2025    AST (SGOT) 16 05/05/2025    AST (SGOT) 18 04/07/2025    ALT (SGPT) 12 05/05/2025    ALT (SGPT) 16 04/07/2025     Lipid Panel:  Lab Results   Component Value Date    Total Cholesterol 164 05/05/2025    Total Cholesterol 156 10/12/2018    Triglycerides 144 05/05/2025    Triglycerides 69 10/12/2018    HDL Cholesterol 49 05/05/2025    HDL Cholesterol 52 10/12/2018    VLDL Cholesterol 11 07/15/2020    VLDL Cholesterol 13.8 10/12/2018    VLDL Cholesterol [place] 25 05/05/2025     HbA1c:  No components found for: \"HGBA1C\"    Visual Acuity:  No results found.    Age-appropriate Screening Schedule:  Refer to the list below for future screening recommendations based on patient's age, sex and/or medical conditions. Orders for these recommended tests are listed in the plan section. The patient has been provided with a written plan.    Health Maintenance   Topic Date Due    ANNUAL WELLNESS VISIT  01/05/2025    RSV Vaccine - Adults (1 - 1-dose 75+ series) 07/12/2025 (Originally 5/22/2015)    COVID-19 Vaccine (6 - 2024-25 season) 11/09/2025 (Originally 9/1/2024)    INFLUENZA VACCINE  07/01/2025    DXA SCAN  04/08/2026    COLORECTAL CANCER SCREENING  04/13/2028    TDAP/TD VACCINES (3 - Td or Tdap) 09/18/2029    Pneumococcal Vaccine 50+  Completed    ZOSTER VACCINE  Completed    MAMMOGRAM  Discontinued          Outpatient Medications Prior to Visit   Medication Sig Dispense Refill    acetaminophen (TYLENOL) 500 MG tablet Take 1 tablet by mouth Every 6 (Six) Hours As Needed for Mild Pain. 30 tablet 0    amLODIPine (NORVASC) 10 MG tablet Take 1 tablet by mouth Every Night. 90 tablet 3    Ascorbic Acid (Vitamin C) 500 MG capsule Take  by mouth Daily.      B Complex Vitamins (vitamin b complex) capsule capsule Take 1 capsule by mouth Daily.      benazepril " (LOTENSIN) 40 MG tablet TAKE 1 TABLET BY MOUTH DAILY 90 tablet 3    calcium carbonate (OS-PAPA) 600 MG tablet Take 1 tablet by mouth Daily.      cetirizine (zyrTEC) 10 MG tablet TAKE 1 TABLET BY MOUTH EVERY DAY AS NEEDED FOR ALLERGIES 90 tablet 1    Cholecalciferol (VITAMIN D3) 125 MCG (5000 UT) capsule capsule Take 1 capsule by mouth Daily.      cyanocobalamin (VITAMIN B-12N) 50 MCG tablet 0      Diclofenac Sodium (VOLTAREN) 1 % gel gel Apply 4 g topically to the appropriate area as directed 3 (Three) Times a Day. 50 g 0    escitalopram (LEXAPRO) 20 MG tablet Take 1 tablet by mouth Daily. 90 tablet 3    folic acid (FOLVITE) 400 MCG tablet Take 1 tablet by mouth Daily.      gabapentin (NEURONTIN) 300 MG capsule TAKE 3 CAPSULES BY MOUTH EVERY NIGHT AT BEDTIME 270 capsule 1    hydrALAZINE (APRESOLINE) 100 MG tablet Take 1 tablet by mouth 2 (Two) Times a Day.      lidocaine (LIDODERM) 5 % Place 1 patch on the skin as directed by provider Daily. Remove & Discard patch within 12 hours or as directed by MD 30 patch 0    melatonin 5 MG tablet tablet Take 1 tablet by mouth Every Night.      pantoprazole (PROTONIX) 40 MG EC tablet TAKE 1 TABLET BY MOUTH DAILY 90 tablet 1    phenytoin ER (DILANTIN) 100 MG capsule TAKE 3 CAPSULES BY MOUTH EVERY NIGHT AT BEDTIME 270 capsule 1    Pyridoxine HCl (VITAMIN B-6 CR PO) 0      rivaroxaban (Xarelto) 20 MG tablet Take 1 tablet by mouth Daily. Take with food. 30 tablet 0    rosuvastatin (CRESTOR) 5 MG tablet TAKE 1 TABLET BY MOUTH EVERY NIGHT 90 tablet 2    solifenacin (VESICARE) 5 MG tablet Take 1 tablet by mouth Daily.      spironolactone (ALDACTONE) 25 MG tablet Take 1 tablet by mouth Daily. 90 tablet 3    traMADol (ULTRAM) 50 MG tablet Take 1 tablet by mouth Every 6 (Six) Hours As Needed for Moderate Pain . 40 tablet 0    vitamin E 100 UNIT capsule Take 180 Units by mouth Daily.      Zinc Sulfate (ZINC 15 PO) Take 400 mg by mouth.      ascorbic acid 1500 MG/100 ML solution IVPB 0  (Patient not taking: Reported on 5/9/2025)      Vibegron (Gemtesa) 75 MG tablet 30 (Patient not taking: Reported on 5/9/2025)      Acetaminophen 500 MG capsule 0      amLODIPine (NORVASC) 10 MG tablet 90 (Patient not taking: Reported on 4/18/2025)      ascorbic acid (VITAMIN C) 100 MG tablet 0      B Complex Vitamins (VITAMIN B COMPLEX 100 IJ) 0      benazepril (LOTENSIN) 40 MG tablet 90 (Patient not taking: Reported on 4/18/2025)      escitalopram (LEXAPRO) 20 MG tablet 90      gabapentin (NEURONTIN) 300 MG capsule 270      Gemtesa 75 MG tablet Take 1 tablet by mouth Daily. (Patient not taking: Reported on 5/9/2025) 30 tablet 5    hydrALAZINE (APRESOLINE) 100 MG tablet 270      methylPREDNISolone (MEDROL) 4 MG dose pack Take as directed on package instructions. (Patient not taking: Reported on 5/9/2025) 21 tablet 0    pantoprazole (PROTONIX) 40 MG EC tablet 90 tablets.      phenytoin ER (DILANTIN) 100 MG capsule 270      rosuvastatin (CRESTOR) 5 MG tablet 90      solifenacin (VESICARE) 5 MG tablet 90      spironolactone (ALDACTONE) 25 MG tablet 90 tablets.      Vitamin E 45 MG (100 UNIT) capsule 0 (Patient not taking: Reported on 4/18/2025)      Zinc 10 MG lozenge 0 (Patient not taking: Reported on 4/18/2025)       No facility-administered medications prior to visit.       Patient Active Problem List   Diagnosis    Blood glucose abnormal    Dyslipidemia    Gastroesophageal reflux disease    Generalized osteoarthritis    Chronic recurrent major depressive disorder    Osteoporosis    Restless legs syndrome    Seizure disorder    Post-menopause on HRT (hormone replacement therapy)    Chronic seasonal allergic rhinitis    Paroxysmal atrial fibrillation    Iron deficiency anemia due to chronic blood loss    Acute superficial gastritis without hemorrhage    Hiatal hernia    Esophagitis    Diverticulosis of large intestine without hemorrhage    Hypertension    Hypercalcemia    Liver mass    Lymphoma    Insomnia    Anemia  associated with chemotherapy    Pancytopenia due to antineoplastic chemotherapy    Encounter for adjustment or management of vascular access device    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    Thrombocytopenia    Bone metastasis    Osteopenia of multiple sites    Acute ITP    LAFB (left anterior fascicular block)    High risk medication use    Long-term use of high-risk medication    S/P laparoscopic cholecystectomy       Advance Care Planning:  ACP discussion was held with the patient during this visit. Patient does not have an advance directive, information provided.    Identification of Risk Factors:  Risk factors include: Obesity/Overweight .    Review of Systems   Respiratory:  Negative for shortness of breath.    Cardiovascular:  Negative for chest pain and palpitations.   Musculoskeletal:  Positive for arthralgias.   Neurological:  Positive for dizziness and light-headedness. Negative for syncope.       Objective     Physical Exam  Vitals and nursing note reviewed.   Constitutional:       Appearance: She is well-developed.   HENT:      Head: Normocephalic and atraumatic.   Neck:      Thyroid: No thyromegaly.      Vascular: No JVD.   Cardiovascular:      Rate and Rhythm: Normal rate and regular rhythm.      Heart sounds: Normal heart sounds. No murmur heard.     No friction rub. No gallop.   Pulmonary:      Effort: Pulmonary effort is normal. No respiratory distress.      Breath sounds: Normal breath sounds. No wheezing or rales.   Abdominal:      General: Bowel sounds are normal. There is no distension.      Palpations: Abdomen is soft.      Tenderness: There is no abdominal tenderness. There is no guarding or rebound.   Musculoskeletal:      Cervical back: Neck supple.   Skin:     General: Skin is warm and dry.   Neurological:      Mental Status: She is alert.      Gait: Gait normal.   Psychiatric:         Behavior: Behavior normal.       Physical  "Exam      Vitals:    05/09/25 1352   BP: 130/70   Pulse: 56   SpO2: 98%   Weight: 67.6 kg (149 lb)   Height: 161.3 cm (63.5\")   PainSc: 0-No pain     Body mass index is 25.98 kg/m².    BMI is >= 25 and <30. (Overweight) The following options were offered after discussion;: weight loss educational material (shared in after visit summary)      Assessment & Plan   Patient Self-Management and Personalized Health Advice  The patient has been provided with information about: diet and exercise and preventive services including:   Annual Wellness Visit (AWV).    Visit Diagnoses:  No diagnosis found.    No orders of the defined types were placed in this encounter.      Outpatient Encounter Medications as of 5/9/2025   Medication Sig Dispense Refill    acetaminophen (TYLENOL) 500 MG tablet Take 1 tablet by mouth Every 6 (Six) Hours As Needed for Mild Pain. 30 tablet 0    amLODIPine (NORVASC) 10 MG tablet Take 1 tablet by mouth Every Night. 90 tablet 3    Ascorbic Acid (Vitamin C) 500 MG capsule Take  by mouth Daily.      B Complex Vitamins (vitamin b complex) capsule capsule Take 1 capsule by mouth Daily.      benazepril (LOTENSIN) 40 MG tablet TAKE 1 TABLET BY MOUTH DAILY 90 tablet 3    calcium carbonate (OS-PAPA) 600 MG tablet Take 1 tablet by mouth Daily.      cetirizine (zyrTEC) 10 MG tablet TAKE 1 TABLET BY MOUTH EVERY DAY AS NEEDED FOR ALLERGIES 90 tablet 1    Cholecalciferol (VITAMIN D3) 125 MCG (5000 UT) capsule capsule Take 1 capsule by mouth Daily.      cyanocobalamin (VITAMIN B-12N) 50 MCG tablet 0      Diclofenac Sodium (VOLTAREN) 1 % gel gel Apply 4 g topically to the appropriate area as directed 3 (Three) Times a Day. 50 g 0    escitalopram (LEXAPRO) 20 MG tablet Take 1 tablet by mouth Daily. 90 tablet 3    folic acid (FOLVITE) 400 MCG tablet Take 1 tablet by mouth Daily.      gabapentin (NEURONTIN) 300 MG capsule TAKE 3 CAPSULES BY MOUTH EVERY NIGHT AT BEDTIME 270 capsule 1    hydrALAZINE (APRESOLINE) 100 MG " tablet Take 1 tablet by mouth 2 (Two) Times a Day.      lidocaine (LIDODERM) 5 % Place 1 patch on the skin as directed by provider Daily. Remove & Discard patch within 12 hours or as directed by MD 30 patch 0    melatonin 5 MG tablet tablet Take 1 tablet by mouth Every Night.      pantoprazole (PROTONIX) 40 MG EC tablet TAKE 1 TABLET BY MOUTH DAILY 90 tablet 1    phenytoin ER (DILANTIN) 100 MG capsule TAKE 3 CAPSULES BY MOUTH EVERY NIGHT AT BEDTIME 270 capsule 1    Pyridoxine HCl (VITAMIN B-6 CR PO) 0      rivaroxaban (Xarelto) 20 MG tablet Take 1 tablet by mouth Daily. Take with food. 30 tablet 0    rosuvastatin (CRESTOR) 5 MG tablet TAKE 1 TABLET BY MOUTH EVERY NIGHT 90 tablet 2    solifenacin (VESICARE) 5 MG tablet Take 1 tablet by mouth Daily.      spironolactone (ALDACTONE) 25 MG tablet Take 1 tablet by mouth Daily. 90 tablet 3    traMADol (ULTRAM) 50 MG tablet Take 1 tablet by mouth Every 6 (Six) Hours As Needed for Moderate Pain . 40 tablet 0    vitamin E 100 UNIT capsule Take 180 Units by mouth Daily.      Zinc Sulfate (ZINC 15 PO) Take 400 mg by mouth.      ascorbic acid 1500 MG/100 ML solution IVPB 0 (Patient not taking: Reported on 5/9/2025)      Vibegron (Gemtesa) 75 MG tablet 30 (Patient not taking: Reported on 5/9/2025)      [DISCONTINUED] Acetaminophen 500 MG capsule 0      [DISCONTINUED] amLODIPine (NORVASC) 10 MG tablet 90 (Patient not taking: Reported on 4/18/2025)      [DISCONTINUED] ascorbic acid (VITAMIN C) 100 MG tablet 0      [DISCONTINUED] B Complex Vitamins (VITAMIN B COMPLEX 100 IJ) 0      [DISCONTINUED] benazepril (LOTENSIN) 40 MG tablet 90 (Patient not taking: Reported on 4/18/2025)      [DISCONTINUED] escitalopram (LEXAPRO) 20 MG tablet 90      [DISCONTINUED] gabapentin (NEURONTIN) 300 MG capsule 270      [DISCONTINUED] Gemtesa 75 MG tablet Take 1 tablet by mouth Daily. (Patient not taking: Reported on 5/9/2025) 30 tablet 5    [DISCONTINUED] hydrALAZINE (APRESOLINE) 100 MG tablet 270       [DISCONTINUED] methylPREDNISolone (MEDROL) 4 MG dose pack Take as directed on package instructions. (Patient not taking: Reported on 5/9/2025) 21 tablet 0    [DISCONTINUED] pantoprazole (PROTONIX) 40 MG EC tablet 90 tablets.      [DISCONTINUED] phenytoin ER (DILANTIN) 100 MG capsule 270      [DISCONTINUED] rivaroxaban (Xarelto) 20 MG tablet Take 1 tablet by mouth Daily. Take with food. 28 tablet 0    [DISCONTINUED] rivaroxaban (XARELTO) 20 MG tablet Take 1 tablet by mouth Daily. 7 tablet 0    [DISCONTINUED] rosuvastatin (CRESTOR) 5 MG tablet 90      [DISCONTINUED] solifenacin (VESICARE) 5 MG tablet 90      [DISCONTINUED] spironolactone (ALDACTONE) 25 MG tablet 90 tablets.      [DISCONTINUED] Vitamin E 45 MG (100 UNIT) capsule 0 (Patient not taking: Reported on 4/18/2025)      [DISCONTINUED] Zinc 10 MG lozenge 0 (Patient not taking: Reported on 4/18/2025)       No facility-administered encounter medications on file as of 5/9/2025.       Reviewed use of high risk medication in the elderly: yes  Reviewed for potential of harmful drug interactions in the elderly: yes  No opioid medication identified on active medication list. I have reviewed chart for other potential  high risk medication/s and harmful drug interactions in the elderly.    Social History     Socioeconomic History    Marital status:      Spouse name: JL    Number of children: 2   Tobacco Use    Smoking status: Never     Passive exposure: Never    Smokeless tobacco: Never   Vaping Use    Vaping status: Never Used   Substance and Sexual Activity    Alcohol use: No     Comment: Caffeine use: 1 cup daily (decaf)    Drug use: No    Sexual activity: Defer     Birth control/protection: Surgical     Comment:  (Jl)     Patient was screened for OUD and ANTHONY risk factors.  Martina Blake's pain level was assessed as well today.  I included a review current recommendations on pain management, best use practices, current CDC guidelines,  alternatives to opioids including OTC & Rx topicals, non-opioid oral medications (eg nsaids, acetominophen) and life style interventions such as yoga, sharon chi, stretching, regular exercise, PT/OT and referral to specialty care like Pain Management that specialize in pain treatment when appropriate.   I also included a review of serious risks of opioid use and substance use disorder.  I have included all of this in the after visit summary along with other risk factors identified that are pertinent to the patient today.      Follow Up:  No follow-ups on file.     An After Visit Summary and PPPS with all of these plans were given to the patient.           ++++++++++++++++++++++++++++++++++++++++++++++++++++++++++++++++++   Additional E&M Note during same encounter follows:  Patient has multiple medical problems which are significant and separately identifiable that require additional work above and beyond the Medicare Wellness Visit.   Chief Complaint   Patient presents with    Medicare Wellness-subsequent    Hypertension    Anxiety    Hyperlipidemia    Hip Pain     right       HPI  History of Present Illness  The patient is an 84-year-old female here for an annual wellness visit.    She has been experiencing episodes of dizziness, particularly in the mornings, which have persisted despite adjustments in her medication schedule. She has attempted to manage this by either taking her medications in the morning or abstaining from them, but the dizziness remains. She also reports difficulty in maintaining an upright posture during her morning walks, although she does not experience any associated pain. She is currently on amlodipine and benazepril.    She is seeking samples of Gemtesa and Xarelto, as her urologist has discontinued providing them due to their high cost. She has been informed that she cannot miss a single dose of Xarelto due to her history of blood clots. Her physician has recommended Xarelto over Eliquis,  "even though the latter is more affordable. She is planning a vacation on Sunday and has been provided with a week's supply of Xarelto, which cost her $127. She is currently on Xarelto.    She experienced a severe hip issue in January 2025, which was managed with an injection administered by Dr. Graham Gregory's office. This intervention has significantly improved her condition. She recalls that prior to the injection, she was unable to ambulate due to the severity of the pain.    She continues to take gabapentin for her leg symptoms, which she reports have improved.    Review of Systems   Cardiovascular:  Negative for chest pain and palpitations.   Respiratory:  Negative for shortness of breath.    Musculoskeletal:  Positive for arthralgias.   Neurological:  Positive for dizziness and light-headedness. Negative for syncope.       Social History     Tobacco Use    Smoking status: Never     Passive exposure: Never    Smokeless tobacco: Never   Substance Use Topics    Alcohol use: No     Comment: Caffeine use: 1 cup daily (decaf)     O:   Vitals:    05/09/25 1352   BP: 130/70   Pulse: 56   SpO2: 98%   Weight: 67.6 kg (149 lb)   Height: 161.3 cm (63.5\")   PainSc: 0-No pain     Body mass index is 25.98 kg/m².  Vitals and nursing note reviewed.   Constitutional:       Appearance: Well-developed.   HENT:      Head: Normocephalic and atraumatic.   Neck:      Thyroid: No thyromegaly.      Vascular: No JVD.   Pulmonary:      Effort: Pulmonary effort is normal. No respiratory distress.      Breath sounds: Normal breath sounds. No wheezing. No rales.   Cardiovascular:      Normal rate. Regular rhythm. Normal heart sounds.      No gallop.  No friction rub.   Abdominal:      General: Bowel sounds are normal. There is no distension.      Palpations: Abdomen is soft.      Tenderness: There is no abdominal tenderness. There is no guarding or rebound.   Musculoskeletal:      Cervical back: Neck supple. Skin:     General: Skin is warm and " dry.   Neurological:      Mental Status: Alert.      Gait: Gait normal.   Psychiatric:         Behavior: Behavior normal.       Results  Labs   - White count: Normal   - Hemoglobin: Normal   - Hematocrit: Normal   - Platelet counts: Normal   - Magnesium level: Normal   - Thyroid level: Normal   - Hemoglobin A1c: Normal   - Total cholesterol: Normal   - Triglycerides: Normal   - HDL: Normal   - LDL: 90   - Kidney function: Normal   - Liver function: Normal   - Electrolytes: Normal   - Vitamin D: Normal     There are no diagnoses linked to this encounter.  Assessment & Plan  1. Hypertension.  - Blood pressure readings have been within the normal range during this visit.  - Current dosage of amlodipine may be causing hypotension, leading to dizziness.  - Advised to monitor blood pressure before morning walks.  - Dosage of amlodipine will be reduced to 5 mg, and benazepril will be adjusted to 20 mg twice daily. Further reduction in medication dosage will be considered if symptoms persist.    2. Medication Management.  - Requested samples of Gemtesa and Xarelto; no samples available at this time.  - Advised to apply for patient assistance for Xarelto through [employer] program, which may provide the medication for free for up to a year if she qualifies.  - Prescription Drug Monitoring Program was reviewed.    3. Hip Pain.  - Received a guided injection for hip pain on 03/13/2025, which has significantly improved symptoms.  - No further intervention required at this time.  - Reviewed records from Dr. Graham Gregory's office regarding the injection.    4. Leg Pain.  - Currently on gabapentin for leg pain, effectively managing symptoms.  - Advised to continue with the current regimen as it is the safest option given her normal kidney function.  - Counseled on the safety of gabapentin and reassured about its use.    Follow-up  - Follow up in 6 months.  No follow-ups on  file.      _____________________________________  Jamie Walton DO, MS    Current Outpatient Medications on File Prior to Visit   Medication Sig Dispense Refill    acetaminophen (TYLENOL) 500 MG tablet Take 1 tablet by mouth Every 6 (Six) Hours As Needed for Mild Pain. 30 tablet 0    amLODIPine (NORVASC) 10 MG tablet Take 1 tablet by mouth Every Night. 90 tablet 3    Ascorbic Acid (Vitamin C) 500 MG capsule Take  by mouth Daily.      B Complex Vitamins (vitamin b complex) capsule capsule Take 1 capsule by mouth Daily.      benazepril (LOTENSIN) 40 MG tablet TAKE 1 TABLET BY MOUTH DAILY 90 tablet 3    calcium carbonate (OS-PAPA) 600 MG tablet Take 1 tablet by mouth Daily.      cetirizine (zyrTEC) 10 MG tablet TAKE 1 TABLET BY MOUTH EVERY DAY AS NEEDED FOR ALLERGIES 90 tablet 1    Cholecalciferol (VITAMIN D3) 125 MCG (5000 UT) capsule capsule Take 1 capsule by mouth Daily.      cyanocobalamin (VITAMIN B-12N) 50 MCG tablet 0      Diclofenac Sodium (VOLTAREN) 1 % gel gel Apply 4 g topically to the appropriate area as directed 3 (Three) Times a Day. 50 g 0    escitalopram (LEXAPRO) 20 MG tablet Take 1 tablet by mouth Daily. 90 tablet 3    folic acid (FOLVITE) 400 MCG tablet Take 1 tablet by mouth Daily.      gabapentin (NEURONTIN) 300 MG capsule TAKE 3 CAPSULES BY MOUTH EVERY NIGHT AT BEDTIME 270 capsule 1    hydrALAZINE (APRESOLINE) 100 MG tablet Take 1 tablet by mouth 2 (Two) Times a Day.      lidocaine (LIDODERM) 5 % Place 1 patch on the skin as directed by provider Daily. Remove & Discard patch within 12 hours or as directed by MD 30 patch 0    melatonin 5 MG tablet tablet Take 1 tablet by mouth Every Night.      pantoprazole (PROTONIX) 40 MG EC tablet TAKE 1 TABLET BY MOUTH DAILY 90 tablet 1    phenytoin ER (DILANTIN) 100 MG capsule TAKE 3 CAPSULES BY MOUTH EVERY NIGHT AT BEDTIME 270 capsule 1    Pyridoxine HCl (VITAMIN B-6 CR PO) 0      rosuvastatin (CRESTOR) 5 MG tablet TAKE 1 TABLET BY MOUTH EVERY NIGHT 90  tablet 2    solifenacin (VESICARE) 5 MG tablet Take 1 tablet by mouth Daily.      spironolactone (ALDACTONE) 25 MG tablet Take 1 tablet by mouth Daily. 90 tablet 3    traMADol (ULTRAM) 50 MG tablet Take 1 tablet by mouth Every 6 (Six) Hours As Needed for Moderate Pain . 40 tablet 0    vitamin E 100 UNIT capsule Take 180 Units by mouth Daily.      Zinc Sulfate (ZINC 15 PO) Take 400 mg by mouth.      ascorbic acid 1500 MG/100 ML solution IVPB 0 (Patient not taking: Reported on 5/9/2025)      Vibegron (Gemtesa) 75 MG tablet 30 (Patient not taking: Reported on 5/9/2025)      [DISCONTINUED] Acetaminophen 500 MG capsule 0      [DISCONTINUED] amLODIPine (NORVASC) 10 MG tablet 90 (Patient not taking: Reported on 4/18/2025)      [DISCONTINUED] ascorbic acid (VITAMIN C) 100 MG tablet 0      [DISCONTINUED] B Complex Vitamins (VITAMIN B COMPLEX 100 IJ) 0      [DISCONTINUED] benazepril (LOTENSIN) 40 MG tablet 90 (Patient not taking: Reported on 4/18/2025)      [DISCONTINUED] escitalopram (LEXAPRO) 20 MG tablet 90      [DISCONTINUED] gabapentin (NEURONTIN) 300 MG capsule 270      [DISCONTINUED] Gemtesa 75 MG tablet Take 1 tablet by mouth Daily. (Patient not taking: Reported on 5/9/2025) 30 tablet 5    [DISCONTINUED] hydrALAZINE (APRESOLINE) 100 MG tablet 270      [DISCONTINUED] methylPREDNISolone (MEDROL) 4 MG dose pack Take as directed on package instructions. (Patient not taking: Reported on 5/9/2025) 21 tablet 0    [DISCONTINUED] pantoprazole (PROTONIX) 40 MG EC tablet 90 tablets.      [DISCONTINUED] phenytoin ER (DILANTIN) 100 MG capsule 270      [DISCONTINUED] rivaroxaban (Xarelto) 20 MG tablet Take 1 tablet by mouth Daily. Take with food. 28 tablet 0    [DISCONTINUED] rivaroxaban (XARELTO) 20 MG tablet Take 1 tablet by mouth Daily. 7 tablet 0    [DISCONTINUED] rosuvastatin (CRESTOR) 5 MG tablet 90      [DISCONTINUED] solifenacin (VESICARE) 5 MG tablet 90      [DISCONTINUED] spironolactone (ALDACTONE) 25 MG tablet 90  tablets.      [DISCONTINUED] Vitamin E 45 MG (100 UNIT) capsule 0 (Patient not taking: Reported on 4/18/2025)      [DISCONTINUED] Zinc 10 MG lozenge 0 (Patient not taking: Reported on 4/18/2025)       No current facility-administered medications on file prior to visit.

## 2025-05-20 RX ORDER — HYDRALAZINE HYDROCHLORIDE 100 MG/1
100 TABLET, FILM COATED ORAL 3 TIMES DAILY
Qty: 270 TABLET | Refills: 1 | Status: SHIPPED | OUTPATIENT
Start: 2025-05-20

## 2025-05-21 ENCOUNTER — TELEPHONE (OUTPATIENT)
Dept: ONCOLOGY | Facility: CLINIC | Age: 85
End: 2025-05-21
Payer: MEDICARE

## 2025-06-04 ENCOUNTER — HOSPITAL ENCOUNTER (OUTPATIENT)
Dept: MAMMOGRAPHY | Facility: HOSPITAL | Age: 85
Discharge: HOME | End: 2025-06-04
Admitting: FAMILY MEDICINE
Payer: MEDICARE

## 2025-06-04 DIAGNOSIS — Z12.31 SCREENING MAMMOGRAM, ENCOUNTER FOR: ICD-10-CM

## 2025-06-04 PROCEDURE — 77067 SCR MAMMO BI INCL CAD: CPT

## 2025-06-04 PROCEDURE — 77063 BREAST TOMOSYNTHESIS BI: CPT

## 2025-06-08 ENCOUNTER — RESULTS FOLLOW-UP (OUTPATIENT)
Dept: ADMINISTRATIVE | Facility: HOSPITAL | Age: 85
End: 2025-06-08
Payer: MEDICARE

## 2025-06-08 NOTE — LETTER
Martina Blake  [address] [place] [id number]    June 8, 2025     Dear MsClaribel Hayden:    Below are the results from your recent visit:    Your mammogram was normal. We have enclosed a copy.     Resulted Orders   Mammo Screening Digital Tomosynthesis Bilateral With CAD    Narrative    MAMMO SCREENING DIGITAL TOMOSYNTHESIS BILATERAL W CAD-     CLINICAL INDICATION: 85 years old female presents for annual screening  mammogram.     COMPARISON: Prior examinations dating back to 5/16/2022.     TECHNIQUE: 2-D and tomosynthesis MLO and CC views of the breast(s) were  obtained     FINDINGS:  There are scattered areas of fibroglandular density.     There are no suspicious masses, calcifications, or areas of  architectural distortion.        IMPRESSION/RECOMMENDATION(S):  No mammographic evidence of malignancy. Recommend annual screening  mammogram in one year.        BI-RADS Category 1: Negative            This report was finalized on 6/5/2025 8:50 AM by Dr. Palma Marion M.D  on Workstation: [id number]      If you have any questions or concerns, please don't hesitate to call.    Sincerely,  Jamie Walton, DO

## 2025-06-08 NOTE — PROGRESS NOTES
Dear Martina Blake:    Your mammogram was normal.  We have enclosed a copy.    Sincerely,  BAO Walton DO, MS   CT showing upper lobe groundglass opacities  Productive cough  - F/u sputum Cx  - F/u legionella/strep  - F/u MRSA PCR  - Continue cefepime   - If MRSA PCR positive start vanc CT showing upper lobe groundglass opacities  Productive cough  - sputum Cx: Commensal quinn,   - MRSA PCR, legionella/strep negative  - F/u GI PCR, c diff, patient has not had Bm    Plan  - C/w Cefepime

## 2025-06-10 ENCOUNTER — TELEPHONE (OUTPATIENT)
Dept: ONCOLOGY | Facility: CLINIC | Age: 85
End: 2025-06-10

## 2025-06-10 NOTE — TELEPHONE ENCOUNTER
Caller: Martina Blake    Relationship: Self    Best call back number:   Telephone Information:   Mobile [phone]       What is the best time to reach you: ANYTIME     What was the call regarding: PT IS NEEDING TO R/S 6/30 [place] OUT OF [place]OWN CAN COME IN ANY DAY BEFORE 6/27 PLEASE CB TO ADVISE.

## 2025-06-25 ENCOUNTER — APPOINTMENT (OUTPATIENT)
Dept: LAB | Facility: HOSPITAL | Age: 85
End: 2025-06-25
Payer: MEDICARE

## 2025-06-25 ENCOUNTER — INFUSION (OUTPATIENT)
Dept: ONCOLOGY | Facility: HOSPITAL | Age: 85
End: 2025-06-25
Payer: MEDICARE

## 2025-06-25 DIAGNOSIS — Z45.2 ENCOUNTER FOR ADJUSTMENT OR MANAGEMENT OF VASCULAR ACCESS DEVICE: Primary | ICD-10-CM

## 2025-06-25 PROCEDURE — 25010000002 HEPARIN LOCK FLUSH PER 10 UNITS: Performed by: INTERNAL MEDICINE

## 2025-06-25 PROCEDURE — 96523 IRRIG DRUG DELIVERY DEVICE: CPT

## 2025-06-25 RX ORDER — SODIUM CHLORIDE 0.9 % (FLUSH) 0.9 %
10 SYRINGE (ML) INJECTION AS NEEDED
Status: DISCONTINUED | OUTPATIENT
Start: 2025-06-25 | End: 2025-06-25 | Stop reason: HOSPADM

## 2025-06-25 RX ORDER — HEPARIN SODIUM (PORCINE) LOCK FLUSH IV SOLN 100 UNIT/ML 100 UNIT/ML
500 SOLUTION INTRAVENOUS AS NEEDED
Status: DISCONTINUED | OUTPATIENT
Start: 2025-06-25 | End: 2025-06-25 | Stop reason: HOSPADM

## 2025-06-25 RX ORDER — HEPARIN SODIUM (PORCINE) LOCK FLUSH IV SOLN 100 UNIT/ML 100 UNIT/ML
500 SOLUTION INTRAVENOUS AS NEEDED
OUTPATIENT
Start: 2025-06-25

## 2025-06-25 RX ORDER — SODIUM CHLORIDE 0.9 % (FLUSH) 0.9 %
10 SYRINGE (ML) INJECTION AS NEEDED
OUTPATIENT
Start: 2025-06-25

## 2025-06-25 RX ADMIN — Medication 10 ML: at 14:35

## 2025-06-25 RX ADMIN — Medication 500 UNITS: at 14:34

## 2025-07-02 ENCOUNTER — OFFICE VISIT (OUTPATIENT)
Dept: CARDIOLOGY | Age: 85
End: 2025-07-02
Payer: MEDICARE

## 2025-07-02 VITALS
SYSTOLIC BLOOD PRESSURE: 116 MMHG | HEIGHT: 64 IN | DIASTOLIC BLOOD PRESSURE: 60 MMHG | OXYGEN SATURATION: 95 % | WEIGHT: 151.2 LBS | BODY MASS INDEX: 25.81 KG/M2 | HEART RATE: 67 BPM

## 2025-07-02 DIAGNOSIS — I48.0 PAROXYSMAL ATRIAL FIBRILLATION: Primary | ICD-10-CM

## 2025-07-02 DIAGNOSIS — I25.10 CORONARY ARTERY CALCIFICATION SEEN ON CT SCAN: ICD-10-CM

## 2025-07-02 DIAGNOSIS — I10 PRIMARY HYPERTENSION: Chronic | ICD-10-CM

## 2025-07-02 DIAGNOSIS — C83.398 DIFFUSE LARGE B-CELL LYMPHOMA OF EXTRANODAL SITE EXCLUDING SPLEEN AND OTHER SOLID ORGANS: ICD-10-CM

## 2025-07-02 DIAGNOSIS — I49.1 PAC (PREMATURE ATRIAL CONTRACTION): ICD-10-CM

## 2025-07-02 DIAGNOSIS — R42 LIGHTHEADEDNESS: ICD-10-CM

## 2025-07-02 DIAGNOSIS — R01.1 HEART MURMUR: ICD-10-CM

## 2025-07-02 PROBLEM — Z90.49 S/P LAPAROSCOPIC CHOLECYSTECTOMY: Status: RESOLVED | Noted: 2024-09-06 | Resolved: 2025-07-02

## 2025-07-02 PROCEDURE — 3078F DIAST BP <80 MM HG: CPT | Performed by: NURSE PRACTITIONER

## 2025-07-02 PROCEDURE — 93000 ELECTROCARDIOGRAM COMPLETE: CPT | Performed by: NURSE PRACTITIONER

## 2025-07-02 PROCEDURE — 1159F MED LIST DOCD IN RCRD: CPT | Performed by: NURSE PRACTITIONER

## 2025-07-02 PROCEDURE — 99214 OFFICE O/P EST MOD 30 MIN: CPT | Performed by: NURSE PRACTITIONER

## 2025-07-02 PROCEDURE — 3074F SYST BP LT 130 MM HG: CPT | Performed by: NURSE PRACTITIONER

## 2025-07-02 PROCEDURE — 1160F RVW MEDS BY RX/DR IN RCRD: CPT | Performed by: NURSE PRACTITIONER

## 2025-07-02 NOTE — PROGRESS NOTES
"  Date of Office Visit: 2025  Encounter Provider: MELINDA Mabry  Place of Service: [facility]  Patient Name: Martina Blake  :1940  Primary Cardiologist: Dr. Justine Barrios    Chief Complaint   Patient presents with    Annual Exam    Atrial Fibrillation   :     HPI: Martina Blake is a 85 y.o. female who presents today for annual cardiac follow-up visit.  I have reviewed her medical records.    She has known hypertension, dyslipidemia, GERD, multifactorial anemia, seizure disorder, and restless leg syndrome.      She has a history of large B-cell lymphoma in remission.  In May 2018 she was treated with R-CHOP (rituximab, doxorubicin, vincristine, and cyclophosphamide) finished in 2018.  Anticoagulation was discontinued because of thrombocytopenia.    She follows with Dr. Barrios for paroxysmal atrial fibrillation () and sinus bradycardia.      In 2021, she had a CT of the abdomen/pelvis completed.  Dr. Barrios reviewed the CT images and noted \"calcification in the proximal LAD at the takeoff of a diagonal branch, no calcification in the left main, the RCA is not well visualized, there is no pericardial effusion, the descending thoracic aorta also has diffuse calcification\".     In 2022, echocardiogram showed LVEF 66%, GLS -21.9%, borderline concentric hypertrophy, and trace mitral and tricuspid regurgitation.    She presents today for follow-up visit.  When she gets up in the morning and goes for her walks she feels lightheaded.  Her PCP told her to start splitting her dosages of amlodipine and benazepril to be taken in the a.m. and p.m.  She wonders if she took her morning medications when she returned back from her walk if she would feel better.  She reports fatigue and daytime somnolence sometimes.  She has varicose veins and trace lower extremity edema.  She denies chest pain, shortness of air, palpitations, " "dizziness, syncope, or bleeding.  Her blood pressure and heart rate are normal today.  She is in normal rhythm with a single asymptomatic premature atrial contraction.        Past Medical History:   Diagnosis Date    Anemia     multifactorial    Coronary artery calcification seen on CT scan 2021    Cystitis     Cystocele     moderate    Drug therapy     Dyslipidemia     Esophageal reflux     Fatigue     History of transfusion     no reaction    Hypercalcemia     Hypertension     Major depression, chronic     Menopause     Non Hodgkin's lymphoma     Osteoarthritis     Osteoporosis     Palpitations     Paroxysmal atrial fibrillation     Post menopausal problems     RLS (restless legs syndrome)     Seizure disorder     Sinus bradycardia     Subjective tinnitus     Vaginal delivery     x2  \"SONYA\"      \"JASON\"    Vitamin D deficiency        Past Surgical History:   Procedure Laterality Date    CERVICAL LYMPH NODE BIOPSY/EXCISION Left 5/1/2018    Procedure: CERVICAL LYMPH NODE BIOPSY/EXCISION;  Surgeon: Danny Oconnor MD;  Location: [facility] OR;  Service: ENT    CHOLECYSTECTOMY      COLONOSCOPY      COLONOSCOPY N/A 4/13/2018    Procedure: COLONOSCOPY to terminal ileum;  Surgeon: Dominik Burt MD;  Location: [facility] ENDOSCOPY;  Service: Gastroenterology    CRANIOTOMY      ENDOSCOPY N/A 4/13/2018    Procedure: ESOPHAGOGASTRODUODENOSCOPY with biopsy;  Surgeon: Dominik Burt MD;  Location: [facility] ENDOSCOPY;  Service: Gastroenterology    TOTAL ABDOMINAL HYSTERECTOMY  1980    w/ bladder suspension.  Probably vaginal hysterectomy with anterior colporrhaphy.     VENOUS ACCESS DEVICE (PORT) INSERTION N/A 5/4/2018    Procedure: INSERTION VENOUS ACCESS DEVICE;  Surgeon: Jamie Gill MD;  Location: [facility] OR;  Service: General    WRIST SURGERY Left        Social History     Socioeconomic History    Marital status:      Spouse name: POOJA    Number of children: 2   Tobacco Use    Smoking status: Never     " Passive exposure: Never    Smokeless tobacco: Never   Vaping Use    Vaping status: Never Used   Substance and Sexual Activity    Alcohol use: No     Comment: Caffeine use: 1 cup daily (decaf)    Drug use: No    Sexual activity: Defer     Birth control/protection: Surgical     Comment:  (Jl)       Family History   Problem Relation Age of Onset    No Known Problems Mother     Stroke Father 54         @ 60     Stroke Brother 76    Diabetes type II Brother     No Known Problems Daughter     No Known Problems Maternal Grandmother     Heart disease Paternal Grandmother     No Known Problems Maternal Grandfather     Heart disease Paternal Grandfather     Breast cancer Neg Hx        The following portion of the patient's history were reviewed and updated as appropriate: past medical history, past surgical history, past social history, past family history, allergies, current medications, and problem list.    Review of Systems   Constitutional: Positive for malaise/fatigue.   Cardiovascular: Negative.    Respiratory: Negative.     Hematologic/Lymphatic: Negative.    Neurological:  Positive for light-headedness.       Allergies   Allergen Reactions    Crab (Diagnostic) Itching and Rash    Pseudoephedrine Dizziness    Penicillins Other (See Comments)     Rash around mouth         Current Outpatient Medications:     acetaminophen (TYLENOL) 500 MG tablet, Take 1 tablet by mouth Every 6 (Six) Hours As Needed for Mild Pain., Disp: 30 tablet, Rfl: 0    amLODIPine (NORVASC) 5 MG tablet, Take 1 tablet by mouth 2 (Two) Times a Day., Disp: 180 tablet, Rfl: 1    Ascorbic Acid (Vitamin C) 500 MG capsule, Take  by mouth Daily., Disp: , Rfl:     B Complex Vitamins (vitamin b complex) capsule capsule, Take 1 capsule by mouth Daily., Disp: , Rfl:     benazepril (LOTENSIN) 20 MG tablet, Take 1 tablet by mouth 2 (Two) Times a Day., Disp: 180 tablet, Rfl: 1    calcium carbonate (OS-PAPA) 600 MG tablet, Take 1 tablet by mouth Daily.,  Disp: , Rfl:     cetirizine (zyrTEC) 10 MG tablet, TAKE 1 TABLET BY MOUTH EVERY DAY AS NEEDED FOR ALLERGIES, Disp: 90 tablet, Rfl: 1    Cholecalciferol (VITAMIN D3) 125 MCG (5000 UT) capsule capsule, Take 1 capsule by mouth Daily., Disp: , Rfl:     cyanocobalamin (VITAMIN B-12N) 50 MCG tablet, 0, Disp: , Rfl:     Diclofenac Sodium (VOLTAREN) 1 % gel gel, Apply 4 g topically to the appropriate area as directed 3 (Three) Times a Day., Disp: 50 g, Rfl: 0    escitalopram (LEXAPRO) 20 MG tablet, Take 1 tablet by mouth Daily., Disp: 90 tablet, Rfl: 3    folic acid (FOLVITE) 400 MCG tablet, Take 1 tablet by mouth Daily., Disp: , Rfl:     gabapentin (NEURONTIN) 300 MG capsule, TAKE 3 CAPSULES BY MOUTH EVERY NIGHT AT BEDTIME, Disp: 270 capsule, Rfl: 1    hydrALAZINE (APRESOLINE) 100 MG tablet, TAKE 1 TABLET BY MOUTH THREE TIMES DAILY, Disp: 270 tablet, Rfl: 1    lidocaine (LIDODERM) 5 %, Place 1 patch on the skin as directed by provider Daily. Remove & Discard patch within 12 hours or as directed by MD, Disp: 30 patch, Rfl: 0    melatonin 5 MG tablet tablet, Take 1 tablet by mouth Every Night., Disp: , Rfl:     pantoprazole (PROTONIX) 40 MG EC tablet, TAKE 1 TABLET BY MOUTH DAILY, Disp: 90 tablet, Rfl: 1    Pyridoxine HCl (VITAMIN B-6 CR PO), 0, Disp: , Rfl:     rivaroxaban (Xarelto) 20 MG tablet, Take 1 tablet by mouth Daily. Take with food., Disp: 30 tablet, Rfl: 0    rosuvastatin (CRESTOR) 5 MG tablet, TAKE 1 TABLET BY MOUTH EVERY NIGHT, Disp: 90 tablet, Rfl: 2    solifenacin (VESICARE) 5 MG tablet, Take 1 tablet by mouth Daily., Disp: , Rfl:     spironolactone (ALDACTONE) 25 MG tablet, Take 1 tablet by mouth Daily., Disp: 90 tablet, Rfl: 3    traMADol (ULTRAM) 50 MG tablet, Take 1 tablet by mouth Every 6 (Six) Hours As Needed for Moderate Pain ., Disp: 40 tablet, Rfl: 0    vitamin E 100 UNIT capsule, Take 180 Units by mouth Daily., Disp: , Rfl:     Zinc Sulfate (ZINC 15 PO), Take 400 mg by mouth., Disp: , Rfl:      "phenytoin ER (DILANTIN) 100 MG capsule, TAKE 3 CAPSULES BY MOUTH EVERY NIGHT AT BEDTIME, Disp: 270 capsule, Rfl: 1    Vibegron (Gemtesa) 75 MG tablet, 30 (Patient not taking: Reported on 7/2/2025), Disp: , Rfl:          Objective:     Vitals:    07/02/25 1305   BP: 116/60   BP Location: Left arm   Patient Position: Sitting   Cuff Size: Adult   Pulse: 67   SpO2: 95%   Weight: 68.6 kg (151 lb 3.2 oz)   Height: 161.3 cm (63.5\")     Body mass index is 26.36 kg/m².    PHYSICAL EXAM:    Vitals Reviewed.   General Appearance: No acute distress, well developed and well nourished.   Eyes: Glasses.   HENT: No hearing loss noted.    Respiratory: No signs of respiratory distress. Respiration rhythm and depth normal.  Clear to auscultation.   Cardiovascular:  Jugular Venous Pressure: Normal  Heart Rate and Rhythm: Normal, Heart Sounds: Normal S1 and S2. No S3 or S4 noted.  Murmurs: RUSB grade 1/6 systolic murmur present.  Lower Extremities: Trace bilateral lower extremity edema noted.  Varicose veins.  Musculoskeletal: Normal movement of extremities.  Skin: General appearance normal.    Psychiatric: Patient alert and oriented to person, place, and time. Speech and behavior appropriate. Normal mood and affect.       ECG 12 Lead    Date/Time: 7/2/2025 1:33 PM  Performed by: Carlota Pritchett APRN    Authorized by: Carlota Pritchett APRN  Comparison: compared with previous ECG from 6/5/2024  Similar to previous ECG  Rhythm: sinus rhythm  Ectopy: atrial premature contractions  Rate: normal  BPM: 67  Conduction: non-specific intraventricular conduction delay  Q waves: V1 and V2    ST Segments: ST segments normal  T inversion: aVL  QRS axis: normal  Other: no other findings  Other findings: poor R wave progression    Clinical impression: abnormal EKG            Assessment:       Diagnosis Plan   1. Paroxysmal atrial fibrillation        2. PAC (premature atrial contraction)        3. Coronary artery calcification seen on CT scan      "   4. Heart murmur  Adult Transthoracic Echo Complete w/ Color, Spectral and Contrast if Necessary Per Protocol      5. Primary hypertension        6. 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  Paroxysmal Atrial Fibrillation: Dr. Stubbs said she can remain on rivaroxaban as long as her platelet counts are greater than 50,000.  She has a [id number] score of 4.  She denies palpitations.  She is currently in a normal sinus rhythm.    2.  Single premature atrial contraction noted on today's EKG.    3.  Coronary artery calcification noted on CT scan in 2021.  Denies angina.  She is not on aspirin because of the rivaroxaban.  She is on statin therapy.    4.  She was noted to have a mild aortic heart murmur today most likely from sclerosis or calcification.  Check echocardiogram.    5.  Hypertension: Blood pressure normal today.    5.  She reports lightheadedness during her morning walks.  She is can hold her a.m. medications until she gets back and see if her lightheadedness resolves.  She feels that she keeps herself well-hydrated.    6.  Large B-cell lymphoma: Status post R-CHOP-discontinued 8/2018.  In remission and follows with Dr. Iraheta.    7.  History of DVT.  On rivaroxaban.    8.  She will call with any cardiac concerns.  Follow-up with Dr. Barrios in 1 year.    As always, it has been a pleasure to participate in your patient's care. Thank you.         Sincerely,         MELINDA Oliva  [facility]      Dictated utilizing Dragon Dictation  I spent 30 minutes reviewing her medical records/testing/previous office notes/labs, face-to-face interaction with patient, physical examination, formulating the plan of care, and discussion of plan of care with patient.

## 2025-07-17 ENCOUNTER — HOSPITAL ENCOUNTER (OUTPATIENT)
Dept: CARDIOLOGY | Facility: HOSPITAL | Age: 85
Discharge: HOME | End: 2025-07-17
Admitting: NURSE PRACTITIONER
Payer: MEDICARE

## 2025-07-17 VITALS
WEIGHT: 151 LBS | HEIGHT: 63 IN | BODY MASS INDEX: 26.75 KG/M2 | SYSTOLIC BLOOD PRESSURE: 130 MMHG | DIASTOLIC BLOOD PRESSURE: 74 MMHG

## 2025-07-17 DIAGNOSIS — R01.1 HEART MURMUR: ICD-10-CM

## 2025-07-17 LAB
AORTIC ARCH: 1.9 CM
AORTIC DIMENSIONLESS INDEX: 0.84 (DI)
ASCENDING AORTA: 3.4 CM
AV MEAN PRESS GRAD SYS DOP V1V2: 3.3 MMHG
AV VMAX SYS DOP: 125.1 CM/SEC
BH CV ECHO MEAS - ACS: 1.77 CM
BH CV ECHO MEAS - AO MAX PG: 6.3 MMHG
BH CV ECHO MEAS - AO ROOT DIAM: 3.1 CM
BH CV ECHO MEAS - AO V2 VTI: 29.5 CM
BH CV ECHO MEAS - AVA(I,D): 2.5 CM2
BH CV ECHO MEAS - EDV(CUBED): 88.2 ML
BH CV ECHO MEAS - EDV(MOD-SP2): 101 ML
BH CV ECHO MEAS - EDV(MOD-SP4): 95 ML
BH CV ECHO MEAS - EF(MOD-SP2): 65.3 %
BH CV ECHO MEAS - EF(MOD-SP4): 65.3 %
BH CV ECHO MEAS - ESV(CUBED): 29.3 ML
BH CV ECHO MEAS - ESV(MOD-SP2): 35 ML
BH CV ECHO MEAS - ESV(MOD-SP4): 33 ML
BH CV ECHO MEAS - FS: 30.8 %
BH CV ECHO MEAS - IVS/LVPW: 1.01 CM
BH CV ECHO MEAS - IVSD: 0.95 CM
BH CV ECHO MEAS - LAT PEAK E' VEL: 6.1 CM/SEC
BH CV ECHO MEAS - LV DIASTOLIC VOL/BSA (35-75): 55.4 CM2
BH CV ECHO MEAS - LV MASS(C)D: 139.3 GRAMS
BH CV ECHO MEAS - LV MAX PG: 4 MMHG
BH CV ECHO MEAS - LV MEAN PG: 1.96 MMHG
BH CV ECHO MEAS - LV SYSTOLIC VOL/BSA (12-30): 19.2 CM2
BH CV ECHO MEAS - LV V1 MAX: 99.8 CM/SEC
BH CV ECHO MEAS - LV V1 VTI: 24.8 CM
BH CV ECHO MEAS - LVIDD: 4.5 CM
BH CV ECHO MEAS - LVIDS: 3.1 CM
BH CV ECHO MEAS - LVOT AREA: 3 CM2
BH CV ECHO MEAS - LVOT DIAM: 1.95 CM
BH CV ECHO MEAS - LVPWD: 0.94 CM
BH CV ECHO MEAS - MED PEAK E' VEL: 3.7 CM/SEC
BH CV ECHO MEAS - MV A DUR: 0.13 SEC
BH CV ECHO MEAS - MV A MAX VEL: 77.1 CM/SEC
BH CV ECHO MEAS - MV DEC SLOPE: 190.5 CM/SEC2
BH CV ECHO MEAS - MV DEC TIME: 0.28 SEC
BH CV ECHO MEAS - MV E MAX VEL: 58.8 CM/SEC
BH CV ECHO MEAS - MV E/A: 0.76
BH CV ECHO MEAS - MV MAX PG: 2.7 MMHG
BH CV ECHO MEAS - MV MEAN PG: 0.72 MMHG
BH CV ECHO MEAS - MV P1/2T: 108.5 MSEC
BH CV ECHO MEAS - MV V2 VTI: 25.4 CM
BH CV ECHO MEAS - MVA(P1/2T): 2.03 CM2
BH CV ECHO MEAS - MVA(VTI): 2.9 CM2
BH CV ECHO MEAS - PA ACC TIME: 0.15 SEC
BH CV ECHO MEAS - PA V2 MAX: 88 CM/SEC
BH CV ECHO MEAS - PULM A REVS DUR: 0.13 SEC
BH CV ECHO MEAS - PULM A REVS VEL: 37.2 CM/SEC
BH CV ECHO MEAS - PULM DIAS VEL: 36.6 CM/SEC
BH CV ECHO MEAS - PULM S/D: 1.3
BH CV ECHO MEAS - PULM SYS VEL: 47.6 CM/SEC
BH CV ECHO MEAS - QP/QS: 0.71
BH CV ECHO MEAS - RAP SYSTOLE: 3 MMHG
BH CV ECHO MEAS - RV MAX PG: 1.91 MMHG
BH CV ECHO MEAS - RV V1 MAX: 69.2 CM/SEC
BH CV ECHO MEAS - RV V1 VTI: 17.4 CM
BH CV ECHO MEAS - RVOT DIAM: 1.96 CM
BH CV ECHO MEAS - RVSP: 26.1 MMHG
BH CV ECHO MEAS - SUP REN AO DIAM: 1.6 CM
BH CV ECHO MEAS - SV(LVOT): 74.4 ML
BH CV ECHO MEAS - SV(MOD-SP2): 66 ML
BH CV ECHO MEAS - SV(MOD-SP4): 62 ML
BH CV ECHO MEAS - SV(RVOT): 52.7 ML
BH CV ECHO MEAS - SVI(LVOT): 43.3 ML/M2
BH CV ECHO MEAS - SVI(MOD-SP2): 38.5 ML/M2
BH CV ECHO MEAS - SVI(MOD-SP4): 36.1 ML/M2
BH CV ECHO MEAS - TR MAX PG: 23.1 MMHG
BH CV ECHO MEAS - TR MAX VEL: 240.3 CM/SEC
BH CV ECHO MEASUREMENTS AVERAGE E/E' RATIO: 12
BH CV XLRA - TDI S': 11.7 CM/SEC
LEFT ATRIUM VOLUME INDEX: 19.8 ML/M2
LV EF BIPLANE MOD: 64.1 %
SINUS: 2.9 CM
STJ: 2.33 CM

## 2025-07-17 PROCEDURE — 93306 TTE W/DOPPLER COMPLETE: CPT

## 2025-07-22 ENCOUNTER — TELEPHONE (OUTPATIENT)
Dept: FAMILY MEDICINE CLINIC | Facility: CLINIC | Age: 85
End: 2025-07-22
Payer: MEDICARE

## 2025-07-22 NOTE — TELEPHONE ENCOUNTER
Caller: Martina Blake    Relationship: Self    Best call back number: [phone]     What form or medical record are you requesting: FORM FOR HANDICAP STICKER     How would you like to receive the form or medical records (pick-up, mail, fax):     Timeframe paperwork needed: PATIENT WOULD LIKE TO PICK IT UP ON FRIDAY BY 12:00PM.     Additional notes: PATIENT ASKED THAT NO EXPIRATION DATE BE PUT DOWN ON THE FORM. PLEASE ADVISE.

## 2025-07-28 RX ORDER — ROSUVASTATIN CALCIUM 5 MG/1
5 TABLET, COATED ORAL NIGHTLY
Qty: 90 TABLET | Refills: 2 | Status: SHIPPED | OUTPATIENT
Start: 2025-07-28

## 2025-07-29 ENCOUNTER — TELEPHONE (OUTPATIENT)
Dept: ONCOLOGY | Facility: CLINIC | Age: 85
End: 2025-07-29
Payer: MEDICARE

## 2025-07-29 NOTE — TELEPHONE ENCOUNTER
----- Message from Kanchan WILKINSON sent at 7/29/2025  8:40 AM EDT -----  Can you please set pt up for sample pickup at her convenience? Thank you!!

## 2025-07-31 ENCOUNTER — INFUSION (OUTPATIENT)
Dept: ONCOLOGY | Facility: HOSPITAL | Age: 85
End: 2025-07-31
Payer: MEDICARE

## 2025-07-31 ENCOUNTER — APPOINTMENT (OUTPATIENT)
Dept: LAB | Facility: HOSPITAL | Age: 85
End: 2025-07-31
Payer: MEDICARE

## 2025-07-31 DIAGNOSIS — Z45.2 ENCOUNTER FOR ADJUSTMENT OR MANAGEMENT OF VASCULAR ACCESS DEVICE: Primary | ICD-10-CM

## 2025-07-31 PROCEDURE — 25010000002 HEPARIN LOCK FLUSH PER 10 UNITS: Performed by: INTERNAL MEDICINE

## 2025-07-31 PROCEDURE — 96523 IRRIG DRUG DELIVERY DEVICE: CPT

## 2025-07-31 RX ORDER — HEPARIN SODIUM (PORCINE) LOCK FLUSH IV SOLN 100 UNIT/ML 100 UNIT/ML
500 SOLUTION INTRAVENOUS AS NEEDED
Status: DISCONTINUED | OUTPATIENT
Start: 2025-07-31 | End: 2025-07-31 | Stop reason: HOSPADM

## 2025-07-31 RX ORDER — SODIUM CHLORIDE 0.9 % (FLUSH) 0.9 %
10 SYRINGE (ML) INJECTION AS NEEDED
Status: DISCONTINUED | OUTPATIENT
Start: 2025-07-31 | End: 2025-07-31 | Stop reason: HOSPADM

## 2025-07-31 RX ORDER — SODIUM CHLORIDE 0.9 % (FLUSH) 0.9 %
10 SYRINGE (ML) INJECTION AS NEEDED
OUTPATIENT
Start: 2025-07-31

## 2025-07-31 RX ORDER — HEPARIN SODIUM (PORCINE) LOCK FLUSH IV SOLN 100 UNIT/ML 100 UNIT/ML
500 SOLUTION INTRAVENOUS AS NEEDED
OUTPATIENT
Start: 2025-07-31

## 2025-07-31 RX ADMIN — Medication 10 ML: at 14:26

## 2025-07-31 RX ADMIN — Medication 500 UNITS: at 14:26

## 2025-08-25 ENCOUNTER — TELEPHONE (OUTPATIENT)
Dept: ONCOLOGY | Facility: CLINIC | Age: 85
End: 2025-08-25
Payer: MEDICARE

## 2025-08-28 ENCOUNTER — APPOINTMENT (OUTPATIENT)
Dept: LAB | Facility: HOSPITAL | Age: 85
End: 2025-08-28
Payer: MEDICARE

## 2025-08-28 ENCOUNTER — INFUSION (OUTPATIENT)
Dept: ONCOLOGY | Facility: HOSPITAL | Age: 85
End: 2025-08-28
Payer: MEDICARE

## 2025-08-28 DIAGNOSIS — Z45.2 ENCOUNTER FOR ADJUSTMENT OR MANAGEMENT OF VASCULAR ACCESS DEVICE: Primary | ICD-10-CM

## 2025-08-28 PROCEDURE — 25010000002 HEPARIN LOCK FLUSH PER 10 UNITS: Performed by: INTERNAL MEDICINE

## 2025-08-28 PROCEDURE — 96523 IRRIG DRUG DELIVERY DEVICE: CPT

## 2025-08-28 RX ORDER — SODIUM CHLORIDE 0.9 % (FLUSH) 0.9 %
10 SYRINGE (ML) INJECTION AS NEEDED
Status: DISCONTINUED | OUTPATIENT
Start: 2025-08-28 | End: 2025-08-28 | Stop reason: HOSPADM

## 2025-08-28 RX ORDER — HEPARIN SODIUM (PORCINE) LOCK FLUSH IV SOLN 100 UNIT/ML 100 UNIT/ML
500 SOLUTION INTRAVENOUS AS NEEDED
Status: DISCONTINUED | OUTPATIENT
Start: 2025-08-28 | End: 2025-08-28 | Stop reason: HOSPADM

## 2025-08-28 RX ADMIN — Medication 500 UNITS: at 13:26

## 2025-08-28 RX ADMIN — Medication 10 ML: at 13:26

## (undated) DEVICE — SUT VIC 3/0 SH 27IN J416H

## (undated) DEVICE — THE TORRENT IRRIGATION SCOPE CONNECTOR IS USED WITH THE TORRENT IRRIGATION TUBING TO PROVIDE IRRIGATION FLUIDS SUCH AS STERILE WATER DURING GASTROINTESTINAL ENDOSCOPIC PROCEDURES WHEN USED IN CONJUNCTION WITH AN IRRIGATION PUMP (OR ELECTROSURGICAL UNIT).: Brand: TORRENT

## (undated) DEVICE — INTENDED FOR TISSUE SEPARATION, AND OTHER PROCEDURES THAT REQUIRE A SHARP SURGICAL BLADE TO PUNCTURE OR CUT.: Brand: BARD-PARKER ® CARBON RIB-BACK BLADES

## (undated) DEVICE — IRR EAR 2OZ

## (undated) DEVICE — NDL HYPO PRECISIONGLIDE REG 25G 1 1/2

## (undated) DEVICE — SUT SILK 4/0 TIES 18IN A183H

## (undated) DEVICE — ANTIBACTERIAL UNDYED BRAIDED (POLYGLACTIN 910), SYNTHETIC ABSORBABLE SUTURE: Brand: COATED VICRYL

## (undated) DEVICE — 3M™ STERI-STRIP™ REINFORCED ADHESIVE SKIN CLOSURES, R1547, 1/2 IN X 4 IN (12 MM X 100 MM), 6 STRIPS/ENVELOPE: Brand: 3M™ STERI-STRIP™

## (undated) DEVICE — CANN NASL CO2 TRULINK W/O2 A/

## (undated) DEVICE — GOWN,NON-REINFORCED,SIRUS,SET IN SLV,XL: Brand: MEDLINE

## (undated) DEVICE — SOL NACL 0.9PCT 100ML SGL

## (undated) DEVICE — BITEBLOCK OMNI BLOC

## (undated) DEVICE — DRSNG TELFA PAD NONADH STR 1S 3X4IN

## (undated) DEVICE — ENCORE® LATEX ORTHO SIZE 7.5, STERILE LATEX POWDER-FREE SURGICAL GLOVE: Brand: ENCORE

## (undated) DEVICE — SUT MNCRYL PLS ANTIB UD 4/0 PS2 18IN

## (undated) DEVICE — ADHS LIQ MASTISOL 2/3ML

## (undated) DEVICE — FRCP BX RADJAW4 NDL 2.8 240CM LG OG BX40

## (undated) DEVICE — SPNG DISECTOR KTNER XRAY COTN 1/4X9/16IN PK/5

## (undated) DEVICE — Device: Brand: DEFENDO AIR/WATER/SUCTION AND BIOPSY VALVE

## (undated) DEVICE — BIOPATCH™ ANTIMICROBIAL DRESSING WITH CHLORHEXIDINE GLUCONATE IS A HYDROPHILLIC POLYURETHANE ABSORPTIVE FOAM WITH CHLORHEXIDINE GLUCONATE (CHG) WHICH INHIBITS BACTERIAL GROWTH UNDER THE DRESSING. THE DRESSING IS INTENDED TO BE USED TO ABSORB EXUDATE, COVER A WOUND CAUSED BY VASCULAR AND NONVASCULAR PERCUTANEOUS MEDICAL DEVICES DURING SURGERY, AS WELL AS REDUCE LOCAL INFECTION AND COLONIZATION OF MICROORGANISMS.: Brand: BIOPATCH

## (undated) DEVICE — DRSNG SURESITE WNDW 2.38X2.75

## (undated) DEVICE — SPNG GZ WOVN 4X4IN 12PLY 10/BX STRL

## (undated) DEVICE — ELECTRD NDL EZ CLN MOD 2.75IN

## (undated) DEVICE — MAGNETIC DRAPE: Brand: DEVON

## (undated) DEVICE — STPLR SKIN VISISTAT WD 35CT

## (undated) DEVICE — TUBING, SUCTION, 1/4" X 10', STRAIGHT: Brand: MEDLINE

## (undated) DEVICE — PENCL E/S HNDSWCH ROCKR CB

## (undated) DEVICE — STIMULATOR 8562010 VARI-STIM 10PK ROHS: Brand: VARI-STIM®

## (undated) DEVICE — TBG 02 CRUSH RESIST LF CLR 7FT

## (undated) DEVICE — GLV SURG BIOGEL LTX PF 8

## (undated) DEVICE — DRP C/ARM 41X74IN

## (undated) DEVICE — SUT PROLN 5/0 PS2 18IN 8686G

## (undated) DEVICE — RESUSCITATOR,MANUAL,ADLT,MASK,TUBE RES: Brand: MEDLINE INDUSTRIES, INC.

## (undated) DEVICE — LOU MINOR PROCEDURE: Brand: MEDLINE INDUSTRIES, INC.

## (undated) DEVICE — APPL CHLORAPREP W/TINT 10.5ML PERC STRL

## (undated) DEVICE — SUT ETHLN 2/0 PS 18IN 585H

## (undated) DEVICE — DRSNG TELFA PAD NONADH STR 1S 3X8IN